# Patient Record
Sex: MALE | Race: WHITE | NOT HISPANIC OR LATINO | Employment: FULL TIME | URBAN - METROPOLITAN AREA
[De-identification: names, ages, dates, MRNs, and addresses within clinical notes are randomized per-mention and may not be internally consistent; named-entity substitution may affect disease eponyms.]

---

## 2018-09-27 LAB — CRC RECOMMENDATION EXT: NORMAL

## 2020-08-11 ENCOUNTER — OFFICE VISIT (OUTPATIENT)
Dept: FAMILY MEDICINE | Facility: CLINIC | Age: 55
End: 2020-08-11
Payer: COMMERCIAL

## 2020-08-11 VITALS
BODY MASS INDEX: 29.83 KG/M2 | HEART RATE: 74 BPM | DIASTOLIC BLOOD PRESSURE: 98 MMHG | TEMPERATURE: 97 F | SYSTOLIC BLOOD PRESSURE: 158 MMHG | HEIGHT: 76 IN | WEIGHT: 245 LBS

## 2020-08-11 DIAGNOSIS — I10 ESSENTIAL HYPERTENSION: ICD-10-CM

## 2020-08-11 DIAGNOSIS — E78.2 MIXED HYPERLIPIDEMIA: Primary | ICD-10-CM

## 2020-08-11 DIAGNOSIS — M72.2 PLANTAR FASCIITIS OF RIGHT FOOT: ICD-10-CM

## 2020-08-11 DIAGNOSIS — E11.9 TYPE 2 DIABETES MELLITUS WITHOUT COMPLICATION, WITHOUT LONG-TERM CURRENT USE OF INSULIN: ICD-10-CM

## 2020-08-11 DIAGNOSIS — Z12.5 SCREENING FOR PROSTATE CANCER: ICD-10-CM

## 2020-08-11 LAB — HBA1C MFR BLD: 8.3 %

## 2020-08-11 PROCEDURE — 83036 HEMOGLOBIN GLYCOSYLATED A1C: CPT | Mod: QW,,, | Performed by: NURSE PRACTITIONER

## 2020-08-11 PROCEDURE — 3008F PR BODY MASS INDEX (BMI) DOCUMENTED: ICD-10-PCS | Mod: S$GLB,,, | Performed by: NURSE PRACTITIONER

## 2020-08-11 PROCEDURE — 83036 POCT HEMOGLOBIN A1C: ICD-10-PCS | Mod: QW,,, | Performed by: NURSE PRACTITIONER

## 2020-08-11 PROCEDURE — 99204 PR OFFICE/OUTPT VISIT, NEW, LEVL IV, 45-59 MIN: ICD-10-PCS | Mod: S$GLB,,, | Performed by: NURSE PRACTITIONER

## 2020-08-11 PROCEDURE — 99204 OFFICE O/P NEW MOD 45 MIN: CPT | Mod: S$GLB,,, | Performed by: NURSE PRACTITIONER

## 2020-08-11 PROCEDURE — 3008F BODY MASS INDEX DOCD: CPT | Mod: S$GLB,,, | Performed by: NURSE PRACTITIONER

## 2020-08-11 RX ORDER — EMPAGLIFLOZIN 25 MG/1
25 TABLET, FILM COATED ORAL DAILY
Qty: 90 TABLET | Refills: 1 | Status: SHIPPED | OUTPATIENT
Start: 2020-08-11 | End: 2021-06-29

## 2020-08-11 RX ORDER — ATORVASTATIN CALCIUM 20 MG/1
20 TABLET, FILM COATED ORAL DAILY
COMMUNITY
End: 2020-08-11 | Stop reason: SDUPTHER

## 2020-08-11 RX ORDER — METFORMIN HYDROCHLORIDE 1000 MG/1
1000 TABLET ORAL 2 TIMES DAILY WITH MEALS
Qty: 180 TABLET | Refills: 1 | Status: SHIPPED | OUTPATIENT
Start: 2020-08-11 | End: 2021-02-18 | Stop reason: SDUPTHER

## 2020-08-11 RX ORDER — SILDENAFIL 100 MG/1
100 TABLET, FILM COATED ORAL DAILY PRN
COMMUNITY
End: 2020-08-11 | Stop reason: SDUPTHER

## 2020-08-11 RX ORDER — EMPAGLIFLOZIN 25 MG/1
25 TABLET, FILM COATED ORAL DAILY
COMMUNITY
End: 2020-08-11 | Stop reason: SDUPTHER

## 2020-08-11 RX ORDER — SILDENAFIL 100 MG/1
100 TABLET, FILM COATED ORAL DAILY PRN
Qty: 30 TABLET | Refills: 0 | Status: SHIPPED | OUTPATIENT
Start: 2020-08-11 | End: 2021-01-04 | Stop reason: SDUPTHER

## 2020-08-11 RX ORDER — ATORVASTATIN CALCIUM 20 MG/1
20 TABLET, FILM COATED ORAL DAILY
Qty: 90 TABLET | Refills: 1 | Status: SHIPPED | OUTPATIENT
Start: 2020-08-11 | End: 2021-02-18 | Stop reason: SDUPTHER

## 2020-08-11 RX ORDER — ENALAPRIL MALEATE AND HYDROCHLOROTHIAZIDE 10; 25 MG/1; MG/1
TABLET ORAL
COMMUNITY
Start: 2020-05-10 | End: 2020-08-11 | Stop reason: SDUPTHER

## 2020-08-11 RX ORDER — ENALAPRIL MALEATE AND HYDROCHLOROTHIAZIDE 10; 25 MG/1; MG/1
1 TABLET ORAL DAILY
Qty: 90 TABLET | Refills: 1 | Status: SHIPPED | OUTPATIENT
Start: 2020-08-11 | End: 2021-02-18 | Stop reason: SDUPTHER

## 2020-08-11 RX ORDER — METFORMIN HYDROCHLORIDE 1000 MG/1
1000 TABLET ORAL 2 TIMES DAILY WITH MEALS
COMMUNITY
End: 2020-08-11 | Stop reason: SDUPTHER

## 2020-08-11 NOTE — PATIENT INSTRUCTIONS
Treating Plantar Fasciitis  First, your healthcare provider tries to determine the cause of your problem in order to suggest ways to relieve pain. If your pain is due to poor foot mechanics, custom-made shoe inserts (orthoses) may help.    Reduce symptoms  · To relieve mild symptoms, try aspirin, ibuprofen, or other medicines as directed. Rubbing ice on the affected area may also help.  · To reduce severe pain and swelling, your healthcare provider may prescribe pills or injections or a walking cast in some instances. Physical therapy, such as ultrasound or a daily stretching program, may also be recommended. Surgery is rarely required.  · To reduce symptoms caused by poor foot mechanics, your foot may be taped. This supports the arch and temporarily controls movement. Night splints may also help by stretching the fascia.  Control movement  If taping helps, your healthcare provider may prescribe orthoses. Built from plaster casts of your feet, these inserts control the way your foot moves. As a result, your symptoms should go away.  Reduce overuse  Every time your foot strikes the ground, the plantar fascia is stretched. You can reduce the strain on the plantar fascia and the possibility of overuse by following these suggestions:  · Lose any excess weight.  · Avoid running on hard or uneven ground.  · Use orthoses at all times in your shoes and house slippers.  If surgery is needed  Your healthcare provider may consider surgery if other types of treatment don't control your pain. During surgery, the plantar fascia is partially cut to release tension. As you heal, fibrous tissue fills the space between the heel bone and the plantar fascia.   Date Last Reviewed: 10/14/2015  © 3924-6115 [employer] [address] [place] [address], [place] [address]. All rights reserved. This information is not intended as a substitute for professional medical care. Always follow your healthcare professional's  instructions.

## 2020-08-11 NOTE — PROGRESS NOTES
SUBJECTIVE:    Patient ID: Osman Li is a 55 y.o. male.    Chief Complaint: Establish Care (brought bottles/list of meds//pt has been out of meds vaseretic and jardiance for 1 month tb )    Pt presents to establish care. Moved to the area last fall. History includes HTN, DMII, and HLD. No known cardiac issues. Currently on Metformin and Jardiance but has been out of Jardiance for over a month. Also has been out of enalapril-HCTZ for over a month. BP elevated this morning. Works full-time and lives with wife. Had colonoscopy in 2018. Needs blood work. Complains of some right foot pain thought to be plantar fasciitis that has developed in the last few weeks. Changed out work boots insoles in hopes of helping.      No results found for any previous visit.       Past Medical History:   Diagnosis Date    Hyperlipidemia     Hypertension     Prediabetes      Past Surgical History:   Procedure Laterality Date    CHOLECYSTECTOMY  2011    COLONOSCOPY      2018     Family History   Problem Relation Age of Onset    Heart disease Father        Marital Status:   Alcohol History:  reports previous alcohol use.  Tobacco History:  reports that he has been smoking. He has a 35.00 pack-year smoking history. He has never used smokeless tobacco.  Drug History:  reports no history of drug use.    Review of patient's allergies indicates:   Allergen Reactions    Penicillins Rash       Current Outpatient Medications:     atorvastatin (LIPITOR) 20 MG tablet, Take 1 tablet (20 mg total) by mouth once daily., Disp: 90 tablet, Rfl: 1    metFORMIN (GLUCOPHAGE) 1000 MG tablet, Take 1 tablet (1,000 mg total) by mouth 2 (two) times daily with meals., Disp: 180 tablet, Rfl: 1    sildenafiL (VIAGRA) 100 MG tablet, Take 1 tablet (100 mg total) by mouth daily as needed for Erectile Dysfunction., Disp: 30 tablet, Rfl: 0    empagliflozin (JARDIANCE) 25 mg tablet, Take 1 tablet (25 mg total) by mouth once daily., Disp: 90  "tablet, Rfl: 1    enalapriL-hydrochlorothiazide (VASERETIC) 10-25 mg per tablet, Take 1 tablet by mouth once daily., Disp: 90 tablet, Rfl: 1    Review of Systems   Constitutional: Negative.    HENT: Negative for congestion, ear pain, sinus pressure, sinus pain, tinnitus and trouble swallowing.         Seasonal allergies. Takes otc antihistamine daily   Eyes: Negative for pain and redness.   Respiratory: Negative for cough, chest tightness, shortness of breath and wheezing.    Cardiovascular: Negative for chest pain and palpitations.   Gastrointestinal: Negative for abdominal pain, nausea and vomiting.   Genitourinary: Negative for dysuria, frequency and urgency.   Musculoskeletal: Negative for arthralgias, back pain and myalgias.   Skin: Negative for rash and wound.   Neurological: Negative for dizziness, weakness, light-headedness and headaches.   Psychiatric/Behavioral: Negative.           Objective:      Vitals:    08/11/20 0950 08/11/20 0952   BP: (!) 160/98 (!) 158/98   Pulse: 74    Temp: 97.3 °F (36.3 °C)    Weight: 111.1 kg (245 lb)    Height: 6' 4" (1.93 m)      Body mass index is 29.82 kg/m².  Physical Exam  Vitals signs reviewed.   Constitutional:       General: He is not in acute distress.     Appearance: Normal appearance. He is well-developed.   HENT:      Head: Normocephalic and atraumatic.      Right Ear: Tympanic membrane normal.      Left Ear: Tympanic membrane normal.      Nose: Nose normal. No nasal deformity or mucosal edema.      Mouth/Throat:      Dentition: No dental caries or dental abscesses.      Pharynx: No oropharyngeal exudate.   Eyes:      General: Lids are normal.      Conjunctiva/sclera: Conjunctivae normal.      Pupils: Pupils are equal, round, and reactive to light.   Neck:      Musculoskeletal: Normal range of motion.      Thyroid: No thyromegaly.      Vascular: No JVD.      Trachea: No tracheal tenderness or tracheal deviation.   Cardiovascular:      Rate and Rhythm: Normal rate " and regular rhythm.      Heart sounds: Normal heart sounds. No murmur.   Pulmonary:      Effort: Pulmonary effort is normal. No accessory muscle usage or respiratory distress.      Breath sounds: Normal breath sounds.   Abdominal:      General: Bowel sounds are normal.      Palpations: Abdomen is soft.      Tenderness: There is no abdominal tenderness.   Musculoskeletal: Normal range of motion.         General: No tenderness.   Lymphadenopathy:      Cervical: No cervical adenopathy.   Skin:     General: Skin is warm and dry.      Capillary Refill: Capillary refill takes less than 2 seconds.      Findings: No bruising, ecchymosis or erythema.   Neurological:      Mental Status: He is alert and oriented to person, place, and time.   Psychiatric:         Mood and Affect: Mood normal.         Behavior: Behavior normal.           Assessment:       1. Mixed hyperlipidemia    2. Essential hypertension    3. Type 2 diabetes mellitus without complication, without long-term current use of insulin    4. Screening for prostate cancer    5. BMI 29.0-29.9,adult    6. Plantar fasciitis of right foot         Plan:       Mixed hyperlipidemia  -     atorvastatin (LIPITOR) 20 MG tablet; Take 1 tablet (20 mg total) by mouth once daily.  Dispense: 90 tablet; Refill: 1  -     Lipid Panel; Future; Expected date: 08/11/2020  -     TSH w/reflex to FT4; Future; Expected date: 08/11/2020    Essential hypertension  -     enalapriL-hydrochlorothiazide (VASERETIC) 10-25 mg per tablet; Take 1 tablet by mouth once daily.  Dispense: 90 tablet; Refill: 1  -     CBC auto differential; Future; Expected date: 08/11/2020  -     Comprehensive metabolic panel; Future; Expected date: 08/11/2020  -     Urinalysis, Reflex to Urine Culture Urine, Clean Catch; Future; Expected date: 08/11/2020    Type 2 diabetes mellitus without complication, without long-term current use of insulin  -     empagliflozin (JARDIANCE) 25 mg tablet; Take 1 tablet (25 mg total) by  mouth once daily.  Dispense: 90 tablet; Refill: 1  -     metFORMIN (GLUCOPHAGE) 1000 MG tablet; Take 1 tablet (1,000 mg total) by mouth 2 (two) times daily with meals.  Dispense: 180 tablet; Refill: 1  -     POCT HEMOGLOBIN A1C    Screening for prostate cancer  -     PSA, Screening; Future; Expected date: 08/11/2020    BMI 29.0-29.9,adult    Plantar fasciitis of right foot  - Consider Podiatry referral if no improvement    Other orders  -     sildenafiL (VIAGRA) 100 MG tablet; Take 1 tablet (100 mg total) by mouth daily as needed for Erectile Dysfunction.  Dispense: 30 tablet; Refill: 0      No follow-ups on file.

## 2020-08-12 LAB
ALBUMIN SERPL-MCNC: 4.9 G/DL (ref 3.6–5.1)
ALBUMIN/GLOB SERPL: 1.8 (CALC) (ref 1–2.5)
ALP SERPL-CCNC: 91 U/L (ref 35–144)
ALT SERPL-CCNC: 16 U/L (ref 9–46)
APPEARANCE UR: CLEAR
AST SERPL-CCNC: 14 U/L (ref 10–35)
BACTERIA #/AREA URNS HPF: ABNORMAL /HPF
BACTERIA UR CULT: ABNORMAL
BASOPHILS # BLD AUTO: 64 CELLS/UL (ref 0–200)
BASOPHILS NFR BLD AUTO: 0.6 %
BILIRUB SERPL-MCNC: 0.8 MG/DL (ref 0.2–1.2)
BILIRUB UR QL STRIP: NEGATIVE
BUN SERPL-MCNC: 21 MG/DL (ref 7–25)
BUN/CREAT SERPL: ABNORMAL (CALC) (ref 6–22)
CALCIUM SERPL-MCNC: 10.3 MG/DL (ref 8.6–10.3)
CHLORIDE SERPL-SCNC: 101 MMOL/L (ref 98–110)
CHOLEST SERPL-MCNC: 145 MG/DL
CHOLEST/HDLC SERPL: 3.9 (CALC)
CO2 SERPL-SCNC: 28 MMOL/L (ref 20–32)
COLOR UR: YELLOW
CREAT SERPL-MCNC: 0.97 MG/DL (ref 0.7–1.33)
EOSINOPHIL # BLD AUTO: 127 CELLS/UL (ref 15–500)
EOSINOPHIL NFR BLD AUTO: 1.2 %
ERYTHROCYTE [DISTWIDTH] IN BLOOD BY AUTOMATED COUNT: 13 % (ref 11–15)
GFRSERPLBLD MDRD-ARVRAT: 87 ML/MIN/1.73M2
GLOBULIN SER CALC-MCNC: 2.7 G/DL (CALC) (ref 1.9–3.7)
GLUCOSE SERPL-MCNC: 152 MG/DL (ref 65–99)
GLUCOSE UR QL STRIP: ABNORMAL
HCT VFR BLD AUTO: 47.5 % (ref 38.5–50)
HDLC SERPL-MCNC: 37 MG/DL
HGB BLD-MCNC: 16.2 G/DL (ref 13.2–17.1)
HGB UR QL STRIP: NEGATIVE
HYALINE CASTS #/AREA URNS LPF: ABNORMAL /LPF
KETONES UR QL STRIP: NEGATIVE
LDLC SERPL CALC-MCNC: 83 MG/DL (CALC)
LEUKOCYTE ESTERASE UR QL STRIP: NEGATIVE
LYMPHOCYTES # BLD AUTO: 3180 CELLS/UL (ref 850–3900)
LYMPHOCYTES NFR BLD AUTO: 30 %
MCH RBC QN AUTO: 30.2 PG (ref 27–33)
MCHC RBC AUTO-ENTMCNC: 34.1 G/DL (ref 32–36)
MCV RBC AUTO: 88.5 FL (ref 80–100)
MONOCYTES # BLD AUTO: 721 CELLS/UL (ref 200–950)
MONOCYTES NFR BLD AUTO: 6.8 %
NEUTROPHILS # BLD AUTO: 6508 CELLS/UL (ref 1500–7800)
NEUTROPHILS NFR BLD AUTO: 61.4 %
NITRITE UR QL STRIP: NEGATIVE
NONHDLC SERPL-MCNC: 108 MG/DL (CALC)
PH UR STRIP: ABNORMAL [PH] (ref 5–8)
PLATELET # BLD AUTO: 324 THOUSAND/UL (ref 140–400)
PMV BLD REES-ECKER: 10.1 FL (ref 7.5–12.5)
POTASSIUM SERPL-SCNC: 4 MMOL/L (ref 3.5–5.3)
PROT SERPL-MCNC: 7.6 G/DL (ref 6.1–8.1)
PROT UR QL STRIP: NEGATIVE
PSA SERPL-MCNC: 0.7 NG/ML
RBC # BLD AUTO: 5.37 MILLION/UL (ref 4.2–5.8)
RBC #/AREA URNS HPF: ABNORMAL /HPF
SODIUM SERPL-SCNC: 138 MMOL/L (ref 135–146)
SP GR UR STRIP: 1.02 (ref 1–1.03)
SQUAMOUS #/AREA URNS HPF: ABNORMAL /HPF
TRIGL SERPL-MCNC: 145 MG/DL
TSH SERPL-ACNC: 1.72 MIU/L (ref 0.4–4.5)
WBC # BLD AUTO: 10.6 THOUSAND/UL (ref 3.8–10.8)
WBC #/AREA URNS HPF: ABNORMAL /HPF

## 2020-08-14 ENCOUNTER — TELEPHONE (OUTPATIENT)
Dept: FAMILY MEDICINE | Facility: CLINIC | Age: 55
End: 2020-08-14

## 2020-08-14 NOTE — TELEPHONE ENCOUNTER
----- Message from Kathy English NP sent at 8/13/2020  4:22 PM CDT -----  Please let pt know all of his lab work looks great.

## 2021-01-04 RX ORDER — SILDENAFIL 100 MG/1
100 TABLET, FILM COATED ORAL DAILY PRN
Qty: 30 TABLET | Refills: 0 | Status: SHIPPED | OUTPATIENT
Start: 2021-01-04 | End: 2021-02-18 | Stop reason: SDUPTHER

## 2021-01-26 ENCOUNTER — TELEPHONE (OUTPATIENT)
Dept: FAMILY MEDICINE | Facility: CLINIC | Age: 56
End: 2021-01-26

## 2021-02-05 ENCOUNTER — TELEPHONE (OUTPATIENT)
Dept: FAMILY MEDICINE | Facility: CLINIC | Age: 56
End: 2021-02-05

## 2021-02-05 DIAGNOSIS — Z00.00 ROUTINE GENERAL MEDICAL EXAMINATION AT A HEALTH CARE FACILITY: Primary | ICD-10-CM

## 2021-02-05 DIAGNOSIS — E11.9 TYPE 2 DIABETES MELLITUS WITHOUT COMPLICATION, WITHOUT LONG-TERM CURRENT USE OF INSULIN: ICD-10-CM

## 2021-02-05 DIAGNOSIS — I10 ESSENTIAL HYPERTENSION: ICD-10-CM

## 2021-02-05 DIAGNOSIS — Z79.899 ENCOUNTER FOR LONG-TERM (CURRENT) USE OF OTHER MEDICATIONS: ICD-10-CM

## 2021-02-05 DIAGNOSIS — E78.2 MIXED HYPERLIPIDEMIA: ICD-10-CM

## 2021-02-18 ENCOUNTER — OFFICE VISIT (OUTPATIENT)
Dept: FAMILY MEDICINE | Facility: CLINIC | Age: 56
End: 2021-02-18
Payer: COMMERCIAL

## 2021-02-18 VITALS
BODY MASS INDEX: 29.96 KG/M2 | SYSTOLIC BLOOD PRESSURE: 172 MMHG | HEIGHT: 76 IN | HEART RATE: 80 BPM | WEIGHT: 246 LBS | DIASTOLIC BLOOD PRESSURE: 94 MMHG

## 2021-02-18 DIAGNOSIS — I10 ESSENTIAL HYPERTENSION: ICD-10-CM

## 2021-02-18 DIAGNOSIS — N52.9 ERECTILE DYSFUNCTION, UNSPECIFIED ERECTILE DYSFUNCTION TYPE: ICD-10-CM

## 2021-02-18 DIAGNOSIS — E11.9 TYPE 2 DIABETES MELLITUS WITHOUT COMPLICATION, WITHOUT LONG-TERM CURRENT USE OF INSULIN: Primary | ICD-10-CM

## 2021-02-18 DIAGNOSIS — E78.2 MIXED HYPERLIPIDEMIA: ICD-10-CM

## 2021-02-18 LAB — HBA1C MFR BLD: 8.1 %

## 2021-02-18 PROCEDURE — 99214 OFFICE O/P EST MOD 30 MIN: CPT | Mod: S$GLB,,, | Performed by: NURSE PRACTITIONER

## 2021-02-18 PROCEDURE — 3052F HG A1C>EQUAL 8.0%<EQUAL 9.0%: CPT | Mod: S$GLB,,, | Performed by: NURSE PRACTITIONER

## 2021-02-18 PROCEDURE — 3077F SYST BP >= 140 MM HG: CPT | Mod: S$GLB,,, | Performed by: NURSE PRACTITIONER

## 2021-02-18 PROCEDURE — 83036 HEMOGLOBIN GLYCOSYLATED A1C: CPT | Mod: QW,,, | Performed by: NURSE PRACTITIONER

## 2021-02-18 PROCEDURE — 3052F PR MOST RECENT HEMOGLOBIN A1C LEVEL 8.0 - < 9.0%: ICD-10-PCS | Mod: S$GLB,,, | Performed by: NURSE PRACTITIONER

## 2021-02-18 PROCEDURE — 83036 POCT HEMOGLOBIN A1C: ICD-10-PCS | Mod: QW,,, | Performed by: NURSE PRACTITIONER

## 2021-02-18 PROCEDURE — 3008F PR BODY MASS INDEX (BMI) DOCUMENTED: ICD-10-PCS | Mod: S$GLB,,, | Performed by: NURSE PRACTITIONER

## 2021-02-18 PROCEDURE — 3077F PR MOST RECENT SYSTOLIC BLOOD PRESSURE >= 140 MM HG: ICD-10-PCS | Mod: S$GLB,,, | Performed by: NURSE PRACTITIONER

## 2021-02-18 PROCEDURE — 99214 PR OFFICE/OUTPT VISIT, EST, LEVL IV, 30-39 MIN: ICD-10-PCS | Mod: S$GLB,,, | Performed by: NURSE PRACTITIONER

## 2021-02-18 PROCEDURE — 3080F PR MOST RECENT DIASTOLIC BLOOD PRESSURE >= 90 MM HG: ICD-10-PCS | Mod: S$GLB,,, | Performed by: NURSE PRACTITIONER

## 2021-02-18 PROCEDURE — 3008F BODY MASS INDEX DOCD: CPT | Mod: S$GLB,,, | Performed by: NURSE PRACTITIONER

## 2021-02-18 PROCEDURE — 3080F DIAST BP >= 90 MM HG: CPT | Mod: S$GLB,,, | Performed by: NURSE PRACTITIONER

## 2021-02-18 RX ORDER — METFORMIN HYDROCHLORIDE 1000 MG/1
1000 TABLET ORAL 2 TIMES DAILY WITH MEALS
Qty: 180 TABLET | Refills: 1 | Status: SHIPPED | OUTPATIENT
Start: 2021-02-18 | End: 2021-06-29 | Stop reason: SDUPTHER

## 2021-02-18 RX ORDER — ENALAPRIL MALEATE AND HYDROCHLOROTHIAZIDE 10; 25 MG/1; MG/1
1 TABLET ORAL DAILY
Qty: 90 TABLET | Refills: 1 | Status: SHIPPED | OUTPATIENT
Start: 2021-02-18 | End: 2021-06-29 | Stop reason: SDUPTHER

## 2021-02-18 RX ORDER — SILDENAFIL 100 MG/1
100 TABLET, FILM COATED ORAL DAILY PRN
Qty: 30 TABLET | Refills: 0 | Status: SHIPPED | OUTPATIENT
Start: 2021-02-18 | End: 2021-10-25 | Stop reason: SDUPTHER

## 2021-02-18 RX ORDER — ATORVASTATIN CALCIUM 20 MG/1
20 TABLET, FILM COATED ORAL DAILY
Qty: 90 TABLET | Refills: 1 | Status: SHIPPED | OUTPATIENT
Start: 2021-02-18 | End: 2021-06-29 | Stop reason: SDUPTHER

## 2021-02-19 LAB
ALBUMIN SERPL-MCNC: 4.9 G/DL (ref 3.6–5.1)
ALBUMIN/CREAT UR: 11 MCG/MG CREAT
ALBUMIN/GLOB SERPL: 1.8 (CALC) (ref 1–2.5)
ALP SERPL-CCNC: 78 U/L (ref 35–144)
ALT SERPL-CCNC: 20 U/L (ref 9–46)
APPEARANCE UR: CLEAR
AST SERPL-CCNC: 15 U/L (ref 10–35)
BACTERIA #/AREA URNS HPF: ABNORMAL /HPF
BACTERIA UR CULT: ABNORMAL
BASOPHILS # BLD AUTO: 50 CELLS/UL (ref 0–200)
BASOPHILS NFR BLD AUTO: 0.5 %
BILIRUB SERPL-MCNC: 0.6 MG/DL (ref 0.2–1.2)
BILIRUB UR QL STRIP: NEGATIVE
BUN SERPL-MCNC: 16 MG/DL (ref 7–25)
BUN/CREAT SERPL: ABNORMAL (CALC) (ref 6–22)
CALCIUM SERPL-MCNC: 10.6 MG/DL (ref 8.6–10.3)
CHLORIDE SERPL-SCNC: 99 MMOL/L (ref 98–110)
CHOLEST SERPL-MCNC: 184 MG/DL
CHOLEST/HDLC SERPL: 5.3 (CALC)
CO2 SERPL-SCNC: 28 MMOL/L (ref 20–32)
COLOR UR: YELLOW
CREAT SERPL-MCNC: 0.86 MG/DL (ref 0.7–1.33)
CREAT UR-MCNC: 27 MG/DL (ref 20–320)
EOSINOPHIL # BLD AUTO: 120 CELLS/UL (ref 15–500)
EOSINOPHIL NFR BLD AUTO: 1.2 %
ERYTHROCYTE [DISTWIDTH] IN BLOOD BY AUTOMATED COUNT: 12.2 % (ref 11–15)
GFRSERPLBLD MDRD-ARVRAT: 97 ML/MIN/1.73M2
GLOBULIN SER CALC-MCNC: 2.8 G/DL (CALC) (ref 1.9–3.7)
GLUCOSE SERPL-MCNC: 192 MG/DL (ref 65–99)
GLUCOSE UR QL STRIP: ABNORMAL
HBA1C MFR BLD: 8 % OF TOTAL HGB
HCT VFR BLD AUTO: 51.3 % (ref 38.5–50)
HDLC SERPL-MCNC: 35 MG/DL
HGB BLD-MCNC: 17.9 G/DL (ref 13.2–17.1)
HGB UR QL STRIP: NEGATIVE
HYALINE CASTS #/AREA URNS LPF: ABNORMAL /LPF
KETONES UR QL STRIP: NEGATIVE
LDLC SERPL CALC-MCNC: 123 MG/DL (CALC)
LEUKOCYTE ESTERASE UR QL STRIP: NEGATIVE
LYMPHOCYTES # BLD AUTO: 3250 CELLS/UL (ref 850–3900)
LYMPHOCYTES NFR BLD AUTO: 32.5 %
MCH RBC QN AUTO: 30.1 PG (ref 27–33)
MCHC RBC AUTO-ENTMCNC: 34.9 G/DL (ref 32–36)
MCV RBC AUTO: 86.4 FL (ref 80–100)
MICROALBUMIN UR-MCNC: 0.3 MG/DL
MONOCYTES # BLD AUTO: 670 CELLS/UL (ref 200–950)
MONOCYTES NFR BLD AUTO: 6.7 %
NEUTROPHILS # BLD AUTO: 5910 CELLS/UL (ref 1500–7800)
NEUTROPHILS NFR BLD AUTO: 59.1 %
NITRITE UR QL STRIP: NEGATIVE
NONHDLC SERPL-MCNC: 149 MG/DL (CALC)
PH UR STRIP: 6.5 [PH] (ref 5–8)
PLATELET # BLD AUTO: 334 THOUSAND/UL (ref 140–400)
PMV BLD REES-ECKER: 9.7 FL (ref 7.5–12.5)
POTASSIUM SERPL-SCNC: 4.3 MMOL/L (ref 3.5–5.3)
PROT SERPL-MCNC: 7.7 G/DL (ref 6.1–8.1)
PROT UR QL STRIP: NEGATIVE
RBC # BLD AUTO: 5.94 MILLION/UL (ref 4.2–5.8)
RBC #/AREA URNS HPF: ABNORMAL /HPF
SODIUM SERPL-SCNC: 138 MMOL/L (ref 135–146)
SP GR UR STRIP: 1.01 (ref 1–1.03)
SQUAMOUS #/AREA URNS HPF: ABNORMAL /HPF
TRIGL SERPL-MCNC: 150 MG/DL
WBC # BLD AUTO: 10 THOUSAND/UL (ref 3.8–10.8)
WBC #/AREA URNS HPF: ABNORMAL /HPF

## 2021-03-25 ENCOUNTER — OFFICE VISIT (OUTPATIENT)
Dept: FAMILY MEDICINE | Facility: CLINIC | Age: 56
End: 2021-03-25
Payer: COMMERCIAL

## 2021-03-25 VITALS
WEIGHT: 238 LBS | HEIGHT: 76 IN | DIASTOLIC BLOOD PRESSURE: 90 MMHG | SYSTOLIC BLOOD PRESSURE: 150 MMHG | BODY MASS INDEX: 28.98 KG/M2 | HEART RATE: 80 BPM

## 2021-03-25 DIAGNOSIS — E11.9 TYPE 2 DIABETES MELLITUS WITHOUT COMPLICATION, WITHOUT LONG-TERM CURRENT USE OF INSULIN: Primary | ICD-10-CM

## 2021-03-25 LAB — HBA1C MFR BLD: 7.7 %

## 2021-03-25 PROCEDURE — 3077F PR MOST RECENT SYSTOLIC BLOOD PRESSURE >= 140 MM HG: ICD-10-PCS | Mod: S$GLB,,, | Performed by: NURSE PRACTITIONER

## 2021-03-25 PROCEDURE — 3078F DIAST BP <80 MM HG: CPT | Mod: S$GLB,,, | Performed by: NURSE PRACTITIONER

## 2021-03-25 PROCEDURE — 83036 POCT HEMOGLOBIN A1C: ICD-10-PCS | Mod: QW,,, | Performed by: NURSE PRACTITIONER

## 2021-03-25 PROCEDURE — 3051F PR MOST RECENT HEMOGLOBIN A1C LEVEL 7.0 - < 8.0%: ICD-10-PCS | Mod: S$GLB,,, | Performed by: NURSE PRACTITIONER

## 2021-03-25 PROCEDURE — 3077F SYST BP >= 140 MM HG: CPT | Mod: S$GLB,,, | Performed by: NURSE PRACTITIONER

## 2021-03-25 PROCEDURE — 3051F HG A1C>EQUAL 7.0%<8.0%: CPT | Mod: S$GLB,,, | Performed by: NURSE PRACTITIONER

## 2021-03-25 PROCEDURE — 3008F BODY MASS INDEX DOCD: CPT | Mod: S$GLB,,, | Performed by: NURSE PRACTITIONER

## 2021-03-25 PROCEDURE — 83036 HEMOGLOBIN GLYCOSYLATED A1C: CPT | Mod: QW,,, | Performed by: NURSE PRACTITIONER

## 2021-03-25 PROCEDURE — 3008F PR BODY MASS INDEX (BMI) DOCUMENTED: ICD-10-PCS | Mod: S$GLB,,, | Performed by: NURSE PRACTITIONER

## 2021-03-25 PROCEDURE — 99213 OFFICE O/P EST LOW 20 MIN: CPT | Mod: S$GLB,,, | Performed by: NURSE PRACTITIONER

## 2021-03-25 PROCEDURE — 3078F PR MOST RECENT DIASTOLIC BLOOD PRESSURE < 80 MM HG: ICD-10-PCS | Mod: S$GLB,,, | Performed by: NURSE PRACTITIONER

## 2021-03-25 PROCEDURE — 99213 PR OFFICE/OUTPT VISIT, EST, LEVL III, 20-29 MIN: ICD-10-PCS | Mod: S$GLB,,, | Performed by: NURSE PRACTITIONER

## 2021-03-25 RX ORDER — BLOOD SUGAR DIAGNOSTIC
1 STRIP MISCELLANEOUS WEEKLY
Qty: 50 EACH | Refills: 1 | Status: SHIPPED | OUTPATIENT
Start: 2021-03-25 | End: 2021-06-29 | Stop reason: SDUPTHER

## 2021-06-29 ENCOUNTER — OFFICE VISIT (OUTPATIENT)
Dept: FAMILY MEDICINE | Facility: CLINIC | Age: 56
End: 2021-06-29
Payer: COMMERCIAL

## 2021-06-29 VITALS
BODY MASS INDEX: 29.47 KG/M2 | HEIGHT: 76 IN | SYSTOLIC BLOOD PRESSURE: 132 MMHG | HEART RATE: 88 BPM | DIASTOLIC BLOOD PRESSURE: 80 MMHG | WEIGHT: 242 LBS

## 2021-06-29 DIAGNOSIS — E78.2 MIXED HYPERLIPIDEMIA: ICD-10-CM

## 2021-06-29 DIAGNOSIS — I10 ESSENTIAL HYPERTENSION: ICD-10-CM

## 2021-06-29 DIAGNOSIS — E11.9 TYPE 2 DIABETES MELLITUS WITHOUT COMPLICATION, WITHOUT LONG-TERM CURRENT USE OF INSULIN: Primary | ICD-10-CM

## 2021-06-29 LAB — HBA1C MFR BLD: 7.3 %

## 2021-06-29 PROCEDURE — 3075F PR MOST RECENT SYSTOLIC BLOOD PRESS GE 130-139MM HG: ICD-10-PCS | Mod: S$GLB,,, | Performed by: NURSE PRACTITIONER

## 2021-06-29 PROCEDURE — 3008F BODY MASS INDEX DOCD: CPT | Mod: S$GLB,,, | Performed by: NURSE PRACTITIONER

## 2021-06-29 PROCEDURE — 3079F PR MOST RECENT DIASTOLIC BLOOD PRESSURE 80-89 MM HG: ICD-10-PCS | Mod: S$GLB,,, | Performed by: NURSE PRACTITIONER

## 2021-06-29 PROCEDURE — 3051F PR MOST RECENT HEMOGLOBIN A1C LEVEL 7.0 - < 8.0%: ICD-10-PCS | Mod: S$GLB,,, | Performed by: NURSE PRACTITIONER

## 2021-06-29 PROCEDURE — 3075F SYST BP GE 130 - 139MM HG: CPT | Mod: S$GLB,,, | Performed by: NURSE PRACTITIONER

## 2021-06-29 PROCEDURE — 99214 PR OFFICE/OUTPT VISIT, EST, LEVL IV, 30-39 MIN: ICD-10-PCS | Mod: S$GLB,,, | Performed by: NURSE PRACTITIONER

## 2021-06-29 PROCEDURE — 83036 POCT HEMOGLOBIN A1C: ICD-10-PCS | Mod: QW,,, | Performed by: NURSE PRACTITIONER

## 2021-06-29 PROCEDURE — 3051F HG A1C>EQUAL 7.0%<8.0%: CPT | Mod: S$GLB,,, | Performed by: NURSE PRACTITIONER

## 2021-06-29 PROCEDURE — 99214 OFFICE O/P EST MOD 30 MIN: CPT | Mod: S$GLB,,, | Performed by: NURSE PRACTITIONER

## 2021-06-29 PROCEDURE — 83036 HEMOGLOBIN GLYCOSYLATED A1C: CPT | Mod: QW,,, | Performed by: NURSE PRACTITIONER

## 2021-06-29 PROCEDURE — 3079F DIAST BP 80-89 MM HG: CPT | Mod: S$GLB,,, | Performed by: NURSE PRACTITIONER

## 2021-06-29 PROCEDURE — 3008F PR BODY MASS INDEX (BMI) DOCUMENTED: ICD-10-PCS | Mod: S$GLB,,, | Performed by: NURSE PRACTITIONER

## 2021-06-29 RX ORDER — METFORMIN HYDROCHLORIDE 1000 MG/1
1000 TABLET ORAL 2 TIMES DAILY WITH MEALS
Qty: 180 TABLET | Refills: 1 | Status: SHIPPED | OUTPATIENT
Start: 2021-06-29 | End: 2022-01-31 | Stop reason: SDUPTHER

## 2021-06-29 RX ORDER — ENALAPRIL MALEATE AND HYDROCHLOROTHIAZIDE 10; 25 MG/1; MG/1
1 TABLET ORAL DAILY
Qty: 90 TABLET | Refills: 1 | Status: SHIPPED | OUTPATIENT
Start: 2021-06-29 | End: 2022-01-31 | Stop reason: SDUPTHER

## 2021-06-29 RX ORDER — ATORVASTATIN CALCIUM 20 MG/1
20 TABLET, FILM COATED ORAL DAILY
Qty: 90 TABLET | Refills: 1 | Status: SHIPPED | OUTPATIENT
Start: 2021-06-29 | End: 2022-01-31 | Stop reason: SDUPTHER

## 2021-06-29 RX ORDER — BLOOD SUGAR DIAGNOSTIC
1 STRIP MISCELLANEOUS WEEKLY
Qty: 50 EACH | Refills: 1 | Status: SHIPPED | OUTPATIENT
Start: 2021-06-29 | End: 2022-01-31 | Stop reason: SDUPTHER

## 2021-10-14 ENCOUNTER — TELEPHONE (OUTPATIENT)
Dept: FAMILY MEDICINE | Facility: CLINIC | Age: 56
End: 2021-10-14

## 2021-10-14 DIAGNOSIS — Z79.899 ENCOUNTER FOR LONG-TERM (CURRENT) USE OF OTHER MEDICATIONS: Primary | ICD-10-CM

## 2021-10-14 DIAGNOSIS — Z00.00 ROUTINE GENERAL MEDICAL EXAMINATION AT A HEALTH CARE FACILITY: ICD-10-CM

## 2021-10-20 LAB
ALBUMIN SERPL-MCNC: 4.5 G/DL (ref 3.6–5.1)
ALBUMIN/GLOB SERPL: 1.7 (CALC) (ref 1–2.5)
ALP SERPL-CCNC: 83 U/L (ref 35–144)
ALT SERPL-CCNC: 9 U/L (ref 9–46)
AST SERPL-CCNC: 12 U/L (ref 10–35)
BILIRUB SERPL-MCNC: 0.6 MG/DL (ref 0.2–1.2)
BUN SERPL-MCNC: 13 MG/DL (ref 7–25)
BUN/CREAT SERPL: ABNORMAL (CALC) (ref 6–22)
CALCIUM SERPL-MCNC: 9.9 MG/DL (ref 8.6–10.3)
CHLORIDE SERPL-SCNC: 98 MMOL/L (ref 98–110)
CHOLEST SERPL-MCNC: 134 MG/DL
CHOLEST/HDLC SERPL: 3.7 (CALC)
CO2 SERPL-SCNC: 28 MMOL/L (ref 20–32)
CREAT SERPL-MCNC: 0.97 MG/DL (ref 0.7–1.33)
GLOBULIN SER CALC-MCNC: 2.7 G/DL (CALC) (ref 1.9–3.7)
GLUCOSE SERPL-MCNC: 167 MG/DL (ref 65–99)
HBA1C MFR BLD: 8.2 % OF TOTAL HGB
HDLC SERPL-MCNC: 36 MG/DL
LDLC SERPL CALC-MCNC: 77 MG/DL (CALC)
NONHDLC SERPL-MCNC: 98 MG/DL (CALC)
POTASSIUM SERPL-SCNC: 4.3 MMOL/L (ref 3.5–5.3)
PROT SERPL-MCNC: 7.2 G/DL (ref 6.1–8.1)
SODIUM SERPL-SCNC: 137 MMOL/L (ref 135–146)
TRIGL SERPL-MCNC: 120 MG/DL

## 2021-10-25 ENCOUNTER — OFFICE VISIT (OUTPATIENT)
Dept: FAMILY MEDICINE | Facility: CLINIC | Age: 56
End: 2021-10-25
Payer: COMMERCIAL

## 2021-10-25 VITALS
SYSTOLIC BLOOD PRESSURE: 136 MMHG | BODY MASS INDEX: 29.08 KG/M2 | HEIGHT: 76 IN | HEART RATE: 84 BPM | DIASTOLIC BLOOD PRESSURE: 78 MMHG | WEIGHT: 238.81 LBS

## 2021-10-25 DIAGNOSIS — N52.9 ERECTILE DYSFUNCTION, UNSPECIFIED ERECTILE DYSFUNCTION TYPE: ICD-10-CM

## 2021-10-25 DIAGNOSIS — E11.9 TYPE 2 DIABETES MELLITUS WITHOUT COMPLICATION, WITHOUT LONG-TERM CURRENT USE OF INSULIN: Primary | ICD-10-CM

## 2021-10-25 DIAGNOSIS — I10 ESSENTIAL HYPERTENSION: ICD-10-CM

## 2021-10-25 PROCEDURE — 3066F NEPHROPATHY DOC TX: CPT | Mod: S$GLB,,, | Performed by: PHYSICIAN ASSISTANT

## 2021-10-25 PROCEDURE — 3008F BODY MASS INDEX DOCD: CPT | Mod: S$GLB,,, | Performed by: PHYSICIAN ASSISTANT

## 2021-10-25 PROCEDURE — 3052F HG A1C>EQUAL 8.0%<EQUAL 9.0%: CPT | Mod: S$GLB,,, | Performed by: PHYSICIAN ASSISTANT

## 2021-10-25 PROCEDURE — 99214 PR OFFICE/OUTPT VISIT, EST, LEVL IV, 30-39 MIN: ICD-10-PCS | Mod: S$GLB,,, | Performed by: PHYSICIAN ASSISTANT

## 2021-10-25 PROCEDURE — 3061F NEG MICROALBUMINURIA REV: CPT | Mod: S$GLB,,, | Performed by: PHYSICIAN ASSISTANT

## 2021-10-25 PROCEDURE — 3078F DIAST BP <80 MM HG: CPT | Mod: S$GLB,,, | Performed by: PHYSICIAN ASSISTANT

## 2021-10-25 PROCEDURE — 3052F PR MOST RECENT HEMOGLOBIN A1C LEVEL 8.0 - < 9.0%: ICD-10-PCS | Mod: S$GLB,,, | Performed by: PHYSICIAN ASSISTANT

## 2021-10-25 PROCEDURE — 3061F PR NEG MICROALBUMINURIA RESULT DOCUMENTED/REVIEW: ICD-10-PCS | Mod: S$GLB,,, | Performed by: PHYSICIAN ASSISTANT

## 2021-10-25 PROCEDURE — 99214 OFFICE O/P EST MOD 30 MIN: CPT | Mod: S$GLB,,, | Performed by: PHYSICIAN ASSISTANT

## 2021-10-25 PROCEDURE — 4010F ACE/ARB THERAPY RXD/TAKEN: CPT | Mod: S$GLB,,, | Performed by: PHYSICIAN ASSISTANT

## 2021-10-25 PROCEDURE — 4010F PR ACE/ARB THEARPY RXD/TAKEN: ICD-10-PCS | Mod: S$GLB,,, | Performed by: PHYSICIAN ASSISTANT

## 2021-10-25 PROCEDURE — 3066F PR DOCUMENTATION OF TREATMENT FOR NEPHROPATHY: ICD-10-PCS | Mod: S$GLB,,, | Performed by: PHYSICIAN ASSISTANT

## 2021-10-25 PROCEDURE — 1160F RVW MEDS BY RX/DR IN RCRD: CPT | Mod: S$GLB,,, | Performed by: PHYSICIAN ASSISTANT

## 2021-10-25 PROCEDURE — 3075F PR MOST RECENT SYSTOLIC BLOOD PRESS GE 130-139MM HG: ICD-10-PCS | Mod: S$GLB,,, | Performed by: PHYSICIAN ASSISTANT

## 2021-10-25 PROCEDURE — 1160F PR REVIEW ALL MEDS BY PRESCRIBER/CLIN PHARMACIST DOCUMENTED: ICD-10-PCS | Mod: S$GLB,,, | Performed by: PHYSICIAN ASSISTANT

## 2021-10-25 PROCEDURE — 3075F SYST BP GE 130 - 139MM HG: CPT | Mod: S$GLB,,, | Performed by: PHYSICIAN ASSISTANT

## 2021-10-25 PROCEDURE — 3008F PR BODY MASS INDEX (BMI) DOCUMENTED: ICD-10-PCS | Mod: S$GLB,,, | Performed by: PHYSICIAN ASSISTANT

## 2021-10-25 PROCEDURE — 3078F PR MOST RECENT DIASTOLIC BLOOD PRESSURE < 80 MM HG: ICD-10-PCS | Mod: S$GLB,,, | Performed by: PHYSICIAN ASSISTANT

## 2021-10-25 RX ORDER — SILDENAFIL 100 MG/1
100 TABLET, FILM COATED ORAL DAILY PRN
Qty: 30 TABLET | Refills: 0 | Status: SHIPPED | OUTPATIENT
Start: 2021-10-25 | End: 2022-01-31 | Stop reason: SDUPTHER

## 2022-01-31 ENCOUNTER — TELEPHONE (OUTPATIENT)
Dept: FAMILY MEDICINE | Facility: CLINIC | Age: 57
End: 2022-01-31
Payer: COMMERCIAL

## 2022-01-31 ENCOUNTER — OFFICE VISIT (OUTPATIENT)
Dept: FAMILY MEDICINE | Facility: CLINIC | Age: 57
End: 2022-01-31
Payer: COMMERCIAL

## 2022-01-31 VITALS
BODY MASS INDEX: 29.71 KG/M2 | DIASTOLIC BLOOD PRESSURE: 82 MMHG | HEART RATE: 80 BPM | SYSTOLIC BLOOD PRESSURE: 138 MMHG | HEIGHT: 76 IN | WEIGHT: 244 LBS

## 2022-01-31 DIAGNOSIS — Z00.00 GENERAL MEDICAL EXAM: Primary | ICD-10-CM

## 2022-01-31 DIAGNOSIS — Z53.20 LUNG CANCER SCREENING DECLINED BY PATIENT: ICD-10-CM

## 2022-01-31 DIAGNOSIS — N52.9 ERECTILE DYSFUNCTION, UNSPECIFIED ERECTILE DYSFUNCTION TYPE: ICD-10-CM

## 2022-01-31 DIAGNOSIS — E11.9 TYPE 2 DIABETES MELLITUS WITHOUT COMPLICATION, WITHOUT LONG-TERM CURRENT USE OF INSULIN: ICD-10-CM

## 2022-01-31 DIAGNOSIS — Z28.21 INFLUENZA VACCINE REFUSED: ICD-10-CM

## 2022-01-31 DIAGNOSIS — E78.2 MIXED HYPERLIPIDEMIA: ICD-10-CM

## 2022-01-31 DIAGNOSIS — I10 ESSENTIAL HYPERTENSION: ICD-10-CM

## 2022-01-31 LAB — HBA1C MFR BLD: 7.2 %

## 2022-01-31 PROCEDURE — 3075F PR MOST RECENT SYSTOLIC BLOOD PRESS GE 130-139MM HG: ICD-10-PCS | Mod: S$GLB,,, | Performed by: NURSE PRACTITIONER

## 2022-01-31 PROCEDURE — 3008F PR BODY MASS INDEX (BMI) DOCUMENTED: ICD-10-PCS | Mod: S$GLB,,, | Performed by: NURSE PRACTITIONER

## 2022-01-31 PROCEDURE — 1160F RVW MEDS BY RX/DR IN RCRD: CPT | Mod: S$GLB,,, | Performed by: NURSE PRACTITIONER

## 2022-01-31 PROCEDURE — 99396 PR PREVENTIVE VISIT,EST,40-64: ICD-10-PCS | Mod: S$GLB,,, | Performed by: NURSE PRACTITIONER

## 2022-01-31 PROCEDURE — 4010F PR ACE/ARB THEARPY RXD/TAKEN: ICD-10-PCS | Mod: S$GLB,,, | Performed by: NURSE PRACTITIONER

## 2022-01-31 PROCEDURE — 3051F PR MOST RECENT HEMOGLOBIN A1C LEVEL 7.0 - < 8.0%: ICD-10-PCS | Mod: S$GLB,,, | Performed by: NURSE PRACTITIONER

## 2022-01-31 PROCEDURE — 83036 POCT HEMOGLOBIN A1C: ICD-10-PCS | Mod: QW,,, | Performed by: NURSE PRACTITIONER

## 2022-01-31 PROCEDURE — 3079F DIAST BP 80-89 MM HG: CPT | Mod: S$GLB,,, | Performed by: NURSE PRACTITIONER

## 2022-01-31 PROCEDURE — 1160F PR REVIEW ALL MEDS BY PRESCRIBER/CLIN PHARMACIST DOCUMENTED: ICD-10-PCS | Mod: S$GLB,,, | Performed by: NURSE PRACTITIONER

## 2022-01-31 PROCEDURE — 99396 PREV VISIT EST AGE 40-64: CPT | Mod: S$GLB,,, | Performed by: NURSE PRACTITIONER

## 2022-01-31 PROCEDURE — 83036 HEMOGLOBIN GLYCOSYLATED A1C: CPT | Mod: QW,,, | Performed by: NURSE PRACTITIONER

## 2022-01-31 PROCEDURE — 3051F HG A1C>EQUAL 7.0%<8.0%: CPT | Mod: S$GLB,,, | Performed by: NURSE PRACTITIONER

## 2022-01-31 PROCEDURE — 3075F SYST BP GE 130 - 139MM HG: CPT | Mod: S$GLB,,, | Performed by: NURSE PRACTITIONER

## 2022-01-31 PROCEDURE — 3008F BODY MASS INDEX DOCD: CPT | Mod: S$GLB,,, | Performed by: NURSE PRACTITIONER

## 2022-01-31 PROCEDURE — 3079F PR MOST RECENT DIASTOLIC BLOOD PRESSURE 80-89 MM HG: ICD-10-PCS | Mod: S$GLB,,, | Performed by: NURSE PRACTITIONER

## 2022-01-31 PROCEDURE — 4010F ACE/ARB THERAPY RXD/TAKEN: CPT | Mod: S$GLB,,, | Performed by: NURSE PRACTITIONER

## 2022-01-31 RX ORDER — SILDENAFIL 100 MG/1
100 TABLET, FILM COATED ORAL DAILY PRN
Qty: 30 TABLET | Refills: 1 | Status: SHIPPED | OUTPATIENT
Start: 2022-01-31 | End: 2023-10-24 | Stop reason: SDUPTHER

## 2022-01-31 RX ORDER — ATORVASTATIN CALCIUM 20 MG/1
20 TABLET, FILM COATED ORAL DAILY
Qty: 90 TABLET | Refills: 1 | Status: SHIPPED | OUTPATIENT
Start: 2022-01-31 | End: 2022-08-09 | Stop reason: SDUPTHER

## 2022-01-31 RX ORDER — BLOOD SUGAR DIAGNOSTIC
1 STRIP MISCELLANEOUS WEEKLY
Qty: 50 EACH | Refills: 1 | Status: SHIPPED | OUTPATIENT
Start: 2022-01-31 | End: 2022-07-07

## 2022-01-31 RX ORDER — ENALAPRIL MALEATE AND HYDROCHLOROTHIAZIDE 10; 25 MG/1; MG/1
1 TABLET ORAL DAILY
Qty: 90 TABLET | Refills: 1 | Status: SHIPPED | OUTPATIENT
Start: 2022-01-31 | End: 2022-08-09 | Stop reason: SDUPTHER

## 2022-01-31 RX ORDER — METFORMIN HYDROCHLORIDE 1000 MG/1
1000 TABLET ORAL 2 TIMES DAILY WITH MEALS
Qty: 180 TABLET | Refills: 1 | Status: SHIPPED | OUTPATIENT
Start: 2022-01-31 | End: 2022-08-09 | Stop reason: SDUPTHER

## 2022-01-31 NOTE — TELEPHONE ENCOUNTER
Please call pt and let him know that I doubled checked the Ozempic and the increased dose isn't approved yet for diabetes. So I can either add another medication or he can continue with dietary modification. His A1C is so close to goal that he could really do it without additional medications but if he doesn't think he can sustain dietary changes, I can add another oral.

## 2022-01-31 NOTE — TELEPHONE ENCOUNTER
Spoke to pt verbatim per Kathy. Pt states understanding. Pt wants to wait on medications. He would like to try diet modifications. Kathy aware.

## 2022-03-25 LAB
LEFT EYE DM RETINOPATHY: NORMAL
RIGHT EYE DM RETINOPATHY: NORMAL

## 2022-04-24 ENCOUNTER — HOSPITAL ENCOUNTER (INPATIENT)
Facility: HOSPITAL | Age: 57
LOS: 7 days | Discharge: HOME | DRG: 330 | End: 2022-05-02
Attending: EMERGENCY MEDICINE | Admitting: INTERNAL MEDICINE
Payer: COMMERCIAL

## 2022-04-24 DIAGNOSIS — R10.10 UPPER ABDOMINAL PAIN: ICD-10-CM

## 2022-04-24 DIAGNOSIS — Z93.3 COLOSTOMY STATUS: ICD-10-CM

## 2022-04-24 DIAGNOSIS — E11.9 TYPE 2 DIABETES MELLITUS: ICD-10-CM

## 2022-04-24 DIAGNOSIS — K56.609 LARGE BOWEL OBSTRUCTION: Primary | ICD-10-CM

## 2022-04-24 DIAGNOSIS — K63.89 COLONIC MASS: ICD-10-CM

## 2022-04-24 PROBLEM — E87.1 HYPONATREMIA: Status: ACTIVE | Noted: 2022-04-24

## 2022-04-24 PROBLEM — R10.9 ABDOMINAL PAIN: Status: ACTIVE | Noted: 2022-04-24

## 2022-04-24 PROBLEM — E87.6 HYPOKALEMIA: Status: ACTIVE | Noted: 2022-04-24

## 2022-04-24 PROBLEM — I10 HTN (HYPERTENSION): Status: ACTIVE | Noted: 2022-04-24

## 2022-04-24 PROBLEM — D64.9 ANEMIA: Status: ACTIVE | Noted: 2022-04-24

## 2022-04-24 PROBLEM — E78.5 HLD (HYPERLIPIDEMIA): Status: ACTIVE | Noted: 2022-04-24

## 2022-04-24 LAB
ALBUMIN SERPL BCP-MCNC: 3.3 G/DL (ref 3.5–5.2)
ALP SERPL-CCNC: 79 U/L (ref 55–135)
ALT SERPL W/O P-5'-P-CCNC: 10 U/L (ref 10–44)
ANION GAP SERPL CALC-SCNC: 13 MMOL/L (ref 8–16)
AST SERPL-CCNC: 12 U/L (ref 10–40)
BASOPHILS # BLD AUTO: 0.03 K/UL (ref 0–0.2)
BASOPHILS NFR BLD: 0.2 % (ref 0–1.9)
BILIRUB SERPL-MCNC: 0.5 MG/DL (ref 0.1–1)
BUN SERPL-MCNC: 11 MG/DL (ref 6–20)
CALCIUM SERPL-MCNC: 9.3 MG/DL (ref 8.7–10.5)
CHLORIDE SERPL-SCNC: 97 MMOL/L (ref 95–110)
CO2 SERPL-SCNC: 22 MMOL/L (ref 23–29)
CREAT SERPL-MCNC: 0.8 MG/DL (ref 0.5–1.4)
DIFFERENTIAL METHOD: ABNORMAL
EOSINOPHIL # BLD AUTO: 0.1 K/UL (ref 0–0.5)
EOSINOPHIL NFR BLD: 0.4 % (ref 0–8)
ERYTHROCYTE [DISTWIDTH] IN BLOOD BY AUTOMATED COUNT: 12.5 % (ref 11.5–14.5)
EST. GFR  (AFRICAN AMERICAN): >60 ML/MIN/1.73 M^2
EST. GFR  (NON AFRICAN AMERICAN): >60 ML/MIN/1.73 M^2
GLUCOSE SERPL-MCNC: 165 MG/DL (ref 70–110)
HCT VFR BLD AUTO: 37 % (ref 40–54)
HGB BLD-MCNC: 12.9 G/DL (ref 14–18)
IMM GRANULOCYTES # BLD AUTO: 0.05 K/UL (ref 0–0.04)
IMM GRANULOCYTES NFR BLD AUTO: 0.4 % (ref 0–0.5)
LIPASE SERPL-CCNC: 19 U/L (ref 4–60)
LYMPHOCYTES # BLD AUTO: 2.5 K/UL (ref 1–4.8)
LYMPHOCYTES NFR BLD: 18.3 % (ref 18–48)
MCH RBC QN AUTO: 29 PG (ref 27–31)
MCHC RBC AUTO-ENTMCNC: 34.9 G/DL (ref 32–36)
MCV RBC AUTO: 83 FL (ref 82–98)
MONOCYTES # BLD AUTO: 0.9 K/UL (ref 0.3–1)
MONOCYTES NFR BLD: 6.9 % (ref 4–15)
NEUTROPHILS # BLD AUTO: 10 K/UL (ref 1.8–7.7)
NEUTROPHILS NFR BLD: 73.8 % (ref 38–73)
NRBC BLD-RTO: 0 /100 WBC
PLATELET # BLD AUTO: 416 K/UL (ref 150–450)
PMV BLD AUTO: 9 FL (ref 9.2–12.9)
POTASSIUM SERPL-SCNC: 3 MMOL/L (ref 3.5–5.1)
PROT SERPL-MCNC: 7.1 G/DL (ref 6–8.4)
RBC # BLD AUTO: 4.45 M/UL (ref 4.6–6.2)
SARS-COV-2 RDRP RESP QL NAA+PROBE: NEGATIVE
SODIUM SERPL-SCNC: 132 MMOL/L (ref 136–145)
WBC # BLD AUTO: 13.58 K/UL (ref 3.9–12.7)

## 2022-04-24 PROCEDURE — 99285 EMERGENCY DEPT VISIT HI MDM: CPT | Mod: 25

## 2022-04-24 PROCEDURE — G0378 HOSPITAL OBSERVATION PER HR: HCPCS

## 2022-04-24 PROCEDURE — 80053 COMPREHEN METABOLIC PANEL: CPT | Performed by: EMERGENCY MEDICINE

## 2022-04-24 PROCEDURE — 25000003 PHARM REV CODE 250

## 2022-04-24 PROCEDURE — 93010 EKG 12-LEAD: ICD-10-PCS | Mod: ,,, | Performed by: SPECIALIST

## 2022-04-24 PROCEDURE — 36415 COLL VENOUS BLD VENIPUNCTURE: CPT | Performed by: EMERGENCY MEDICINE

## 2022-04-24 PROCEDURE — 96374 THER/PROPH/DIAG INJ IV PUSH: CPT

## 2022-04-24 PROCEDURE — 25000003 PHARM REV CODE 250: Performed by: EMERGENCY MEDICINE

## 2022-04-24 PROCEDURE — 93005 ELECTROCARDIOGRAM TRACING: CPT

## 2022-04-24 PROCEDURE — 63600175 PHARM REV CODE 636 W HCPCS: Performed by: EMERGENCY MEDICINE

## 2022-04-24 PROCEDURE — U0002 COVID-19 LAB TEST NON-CDC: HCPCS | Performed by: EMERGENCY MEDICINE

## 2022-04-24 PROCEDURE — 83690 ASSAY OF LIPASE: CPT | Performed by: EMERGENCY MEDICINE

## 2022-04-24 PROCEDURE — 25500020 PHARM REV CODE 255

## 2022-04-24 PROCEDURE — 85025 COMPLETE CBC W/AUTO DIFF WBC: CPT | Performed by: EMERGENCY MEDICINE

## 2022-04-24 PROCEDURE — 96361 HYDRATE IV INFUSION ADD-ON: CPT

## 2022-04-24 PROCEDURE — 93010 ELECTROCARDIOGRAM REPORT: CPT | Mod: ,,, | Performed by: SPECIALIST

## 2022-04-24 RX ORDER — TALC
9 POWDER (GRAM) TOPICAL NIGHTLY PRN
Status: DISCONTINUED | OUTPATIENT
Start: 2022-04-24 | End: 2022-04-25

## 2022-04-24 RX ORDER — INSULIN ASPART 100 [IU]/ML
0-5 INJECTION, SOLUTION INTRAVENOUS; SUBCUTANEOUS
Status: DISCONTINUED | OUTPATIENT
Start: 2022-04-24 | End: 2022-05-02 | Stop reason: HOSPADM

## 2022-04-24 RX ORDER — SODIUM CHLORIDE 9 MG/ML
INJECTION, SOLUTION INTRAVENOUS CONTINUOUS
Status: DISCONTINUED | OUTPATIENT
Start: 2022-04-24 | End: 2022-04-25

## 2022-04-24 RX ORDER — LANOLIN ALCOHOL/MO/W.PET/CERES
800 CREAM (GRAM) TOPICAL
Status: DISCONTINUED | OUTPATIENT
Start: 2022-04-24 | End: 2022-04-25

## 2022-04-24 RX ORDER — MORPHINE SULFATE 4 MG/ML
8 INJECTION, SOLUTION INTRAMUSCULAR; INTRAVENOUS
Status: COMPLETED | OUTPATIENT
Start: 2022-04-24 | End: 2022-04-24

## 2022-04-24 RX ORDER — ACETAMINOPHEN 325 MG/1
650 TABLET ORAL EVERY 4 HOURS PRN
Status: DISCONTINUED | OUTPATIENT
Start: 2022-04-24 | End: 2022-04-25

## 2022-04-24 RX ORDER — HYDROCHLOROTHIAZIDE 12.5 MG/1
25 TABLET ORAL DAILY
Status: DISCONTINUED | OUTPATIENT
Start: 2022-04-25 | End: 2022-04-25

## 2022-04-24 RX ORDER — IBUPROFEN 200 MG
16 TABLET ORAL
Status: DISCONTINUED | OUTPATIENT
Start: 2022-04-24 | End: 2022-04-25

## 2022-04-24 RX ORDER — NALOXONE HCL 0.4 MG/ML
0.02 VIAL (ML) INJECTION
Status: DISCONTINUED | OUTPATIENT
Start: 2022-04-24 | End: 2022-05-02 | Stop reason: HOSPADM

## 2022-04-24 RX ORDER — LISINOPRIL 10 MG/1
20 TABLET ORAL DAILY
Status: DISCONTINUED | OUTPATIENT
Start: 2022-04-25 | End: 2022-04-25

## 2022-04-24 RX ORDER — LANOLIN ALCOHOL/MO/W.PET/CERES
400 CREAM (GRAM) TOPICAL
Status: COMPLETED | OUTPATIENT
Start: 2022-04-24 | End: 2022-04-24

## 2022-04-24 RX ORDER — ATORVASTATIN CALCIUM 20 MG/1
20 TABLET, FILM COATED ORAL DAILY
Status: DISCONTINUED | OUTPATIENT
Start: 2022-04-25 | End: 2022-04-25

## 2022-04-24 RX ORDER — IBUPROFEN 200 MG
24 TABLET ORAL
Status: DISCONTINUED | OUTPATIENT
Start: 2022-04-24 | End: 2022-04-25

## 2022-04-24 RX ORDER — HYDROCODONE BITARTRATE AND ACETAMINOPHEN 5; 325 MG/1; MG/1
1 TABLET ORAL EVERY 6 HOURS PRN
Status: DISCONTINUED | OUTPATIENT
Start: 2022-04-24 | End: 2022-04-25

## 2022-04-24 RX ORDER — SODIUM CHLORIDE 0.9 % (FLUSH) 0.9 %
3 SYRINGE (ML) INJECTION
Status: DISCONTINUED | OUTPATIENT
Start: 2022-04-24 | End: 2022-05-02 | Stop reason: HOSPADM

## 2022-04-24 RX ORDER — ACETAMINOPHEN 325 MG/1
650 TABLET ORAL EVERY 6 HOURS PRN
Status: DISCONTINUED | OUTPATIENT
Start: 2022-04-24 | End: 2022-04-25

## 2022-04-24 RX ORDER — GLUCAGON 1 MG
1 KIT INJECTION
Status: DISCONTINUED | OUTPATIENT
Start: 2022-04-24 | End: 2022-05-02 | Stop reason: HOSPADM

## 2022-04-24 RX ORDER — ONDANSETRON 2 MG/ML
4 INJECTION INTRAMUSCULAR; INTRAVENOUS EVERY 6 HOURS PRN
Status: DISCONTINUED | OUTPATIENT
Start: 2022-04-24 | End: 2022-05-02 | Stop reason: HOSPADM

## 2022-04-24 RX ORDER — MORPHINE SULFATE 4 MG/ML
4 INJECTION, SOLUTION INTRAMUSCULAR; INTRAVENOUS EVERY 4 HOURS PRN
Status: DISCONTINUED | OUTPATIENT
Start: 2022-04-24 | End: 2022-04-26

## 2022-04-24 RX ADMIN — SODIUM CHLORIDE 1000 ML: 0.9 INJECTION, SOLUTION INTRAVENOUS at 05:04

## 2022-04-24 RX ADMIN — MORPHINE SULFATE 8 MG: 4 INJECTION INTRAVENOUS at 08:04

## 2022-04-24 RX ADMIN — SODIUM CHLORIDE: 0.9 INJECTION, SOLUTION INTRAVENOUS at 10:04

## 2022-04-24 RX ADMIN — POTASSIUM BICARBONATE 50 MEQ: 977.5 TABLET, EFFERVESCENT ORAL at 07:04

## 2022-04-24 RX ADMIN — Medication 400 MG: at 07:04

## 2022-04-24 RX ADMIN — IOHEXOL 100 ML: 350 INJECTION, SOLUTION INTRAVENOUS at 05:04

## 2022-04-24 NOTE — ED PROVIDER NOTES
"Chief complaint:  Abdominal Pain (Started Thursday )      HPI:  Osman Li is a 56 y.o. male with hx htn, DM, cholex presenting with 3 days of constant, worsening upper abdominal pain in the bilateral upper quadrants and epigastric region.  He has had no relief with laxatives.  He denies emesis.  He denies associated fever.  No radiation or migration of pain.  Pain is worst postprandial but constant.  He denies blood in his stools or dark stools.  No urinary complaints such as dysuria.  No prior history of similar pain.    ROS: As per HPI and below:  No headache, neck pain, chest pain, diaphoresis, dyspnea, emesis, fever, rashes, swelling, dysuria. The patient/family denies blurry vision, dysphagia, joint swelling, easy bruising.    Review of patient's allergies indicates:   Allergen Reactions    Penicillins Rash       Patient's Medications   New Prescriptions    No medications on file   Previous Medications    ATORVASTATIN (LIPITOR) 20 MG TABLET    Take 1 tablet (20 mg total) by mouth once daily.    ENALAPRIL-HYDROCHLOROTHIAZIDE (VASERETIC) 10-25 MG PER TABLET    Take 1 tablet by mouth once daily.    METFORMIN (GLUCOPHAGE) 1000 MG TABLET    Take 1 tablet (1,000 mg total) by mouth 2 (two) times daily with meals.    PEN NEEDLE, DIABETIC (BD ULTRA-FINE MICRO PEN NEEDLE) 32 GAUGE X 1/4" NDLE    1 each by Misc.(Non-Drug; Combo Route) route once a week.    SEMAGLUTIDE (OZEMPIC) 1 MG/DOSE (4 MG/3 ML)    Inject 1 mg into the skin every 7 days.    SILDENAFIL (VIAGRA) 100 MG TABLET    Take 1 tablet (100 mg total) by mouth daily as needed for Erectile Dysfunction.   Modified Medications    No medications on file   Discontinued Medications    No medications on file       PMH:  As per HPI and below:  Past Medical History:   Diagnosis Date    Hyperlipidemia     Hypertension     Prediabetes      Past Surgical History:   Procedure Laterality Date    CHOLECYSTECTOMY  2011    COLONOSCOPY      2018       Social " History     Socioeconomic History    Marital status:    Tobacco Use    Smoking status: Current Every Day Smoker     Packs/day: 1.00     Years: 35.00     Pack years: 35.00    Smokeless tobacco: Never Used   Substance and Sexual Activity    Alcohol use: Not Currently    Drug use: Never       Family History   Problem Relation Age of Onset    Heart disease Father        Physical Exam:    Vitals:    04/24/22 2004   BP:    Pulse:    Resp: 20   Temp:      GENERAL:  No apparent distress.  Alert.    HEENT:  Moist mucous membranes.  Normocephalic and atraumatic.  No scleral icterus.  NECK:  No swelling.  Midline trachea.   CARDIOVASCULAR:  Regular rate and rhythm.  2+ radial pulses.    PULMONARY:  Lungs clear to auscultation bilaterally.  No wheezes, rales, or rhonci.    ABDOMEN:  Bilateral upper quadrant tenderness with mild voluntary guarding in the epigastric region.  No involuntary guarding, distention, masses, palpable hernias.  No organomegaly palpable.  EXTREMITIES:  Warm and well perfused.  Brisk capillary refill.  No peripheral edema.  NEUROLOGICAL:  Normal mental status.  Appropriate and conversant.    SKIN:  No rashes or ecchymoses.    BACK:  Atraumatic.  No CVA tenderness to palpation.      Labs Reviewed   CBC W/ AUTO DIFFERENTIAL - Abnormal; Notable for the following components:       Result Value    WBC 13.58 (*)     RBC 4.45 (*)     Hemoglobin 12.9 (*)     Hematocrit 37.0 (*)     MPV 9.0 (*)     Gran # (ANC) 10.0 (*)     Immature Grans (Abs) 0.05 (*)     Gran % 73.8 (*)     All other components within normal limits   COMPREHENSIVE METABOLIC PANEL - Abnormal; Notable for the following components:    Sodium 132 (*)     Potassium 3.0 (*)     CO2 22 (*)     Glucose 165 (*)     Albumin 3.3 (*)     All other components within normal limits   LIPASE   SARS-COV-2 RNA AMPLIFICATION, QUAL       Current Discharge Medication List      CONTINUE these medications which have NOT CHANGED    Details   atorvastatin  "(LIPITOR) 20 MG tablet Take 1 tablet (20 mg total) by mouth once daily.  Qty: 90 tablet, Refills: 1    Associated Diagnoses: Mixed hyperlipidemia      enalapriL-hydrochlorothiazide (VASERETIC) 10-25 mg per tablet Take 1 tablet by mouth once daily.  Qty: 90 tablet, Refills: 1    Associated Diagnoses: Essential hypertension      metFORMIN (GLUCOPHAGE) 1000 MG tablet Take 1 tablet (1,000 mg total) by mouth 2 (two) times daily with meals.  Qty: 180 tablet, Refills: 1    Associated Diagnoses: Type 2 diabetes mellitus without complication, without long-term current use of insulin      pen needle, diabetic (BD ULTRA-FINE MICRO PEN NEEDLE) 32 gauge x 1/4" Ndle 1 each by Misc.(Non-Drug; Combo Route) route once a week.  Qty: 50 each, Refills: 1    Associated Diagnoses: Type 2 diabetes mellitus without complication, without long-term current use of insulin      semaglutide (OZEMPIC) 1 mg/dose (4 mg/3 mL) Inject 1 mg into the skin every 7 days.  Qty: 3 pen, Refills: 1    Associated Diagnoses: Type 2 diabetes mellitus without complication, without long-term current use of insulin      sildenafiL (VIAGRA) 100 MG tablet Take 1 tablet (100 mg total) by mouth daily as needed for Erectile Dysfunction.  Qty: 30 tablet, Refills: 1    Associated Diagnoses: Erectile dysfunction, unspecified erectile dysfunction type             Orders Placed This Encounter   Procedures    CT Abdomen Pelvis With Contrast    CBC Auto Differential    Comprehensive Metabolic Panel    Lipase    COVID-19 Rapid Screening    Comprehensive Metabolic Panel (CMP)    Magnesium    Phosphorus    CBC with Automated Differential    Diet NPO Except for: Sips with Medication    Vital Signs    Weigh patient    Strict intake and output (1) Document % meals taken (2) # of urinations (3) # and consistency of BMs    Bladder scan    Notify Physician    Recheck Blood Glucose:    Cardiac Monitoring - Adult    During the day, please open the shades and turn on " the lights- If patient is in private room, please make sure they are close to the window.    Make sure the correct date and time is written on the whiteboard, as well as the current care team    Place sequential compression device    Full code    Inpatient consult to Gastroenterology    Inpatient consult to General Surgery    POCT glucose    EKG 12-lead    Insert peripheral IV    Insert saline lock    Place in Observation    Fall precautions       Imaging Results          CT Abdomen Pelvis With Contrast (In process)                 ED Course as of 04/24/22 2126   Sun Apr 24, 2022   1932 CT-AP:   Stranding/prominent thickening involving the colon near the splenic flexure with narrowing. Nearby nodular areas that may suggest lymphadenopathy. Findings are concerning for an obstructive mass.  Infectious/inflammatory process may have a similar appearance. The more proximal colon is  distended predominately with fluid. The cecum measures up to 9 cm and the transverse colon  measures up to 7 cm. Stool is seen distal to the prominent colonic thickening.  Small amount of fluid within the abdomen /pelvis.  Some stranding adjacent to the pancreatic tail may be extending from the adjacent colonic region rather than findings related to pancreatitis which can be correlated with laboratory values.  (Hilario blanchard, d/w interpreting radiologist) [MR]      ED Course User Index  [MR] Fredrick Rock MD       MDM:    56 y.o. male with 3 days of worsening upper abdominal pain.  Patient has had prior cholecystectomy and I doubt gallbladder etiology.  Laboratories done to assess for end-organ dysfunction including LFTs and lipase.  Differential discussed with patient including peptic ulcer disease versus gastritis pending further imaging.  He declines analgesia.  CT ordered to rule out emergent, life-threatening process such as bowel obstruction given prior history of surgery.     Findings on CT concerning for area of  inflammation or mass involving the colon at the splenic flexure with associated lymphadenopathy.  Differential discussed with patient.  Appearance of partial obstruction warrants admission for observation to ensure no progression.  I have discussed with hospital medicine with GI consultation at their discretion.  I do not think antibiotics are indicated at this point.  I doubt mesenteric ischemia.  There is no evidence of complications such as perforation.  I do not think requires NG tube at this time as he is not vomiting.    Diagnoses:    1. Upper abdominal pain     Fredrick Rock MD  04/24/22 0680

## 2022-04-25 ENCOUNTER — ANESTHESIA (OUTPATIENT)
Dept: SURGERY | Facility: HOSPITAL | Age: 57
DRG: 330 | End: 2022-04-25
Payer: COMMERCIAL

## 2022-04-25 ENCOUNTER — ANESTHESIA EVENT (OUTPATIENT)
Dept: SURGERY | Facility: HOSPITAL | Age: 57
DRG: 330 | End: 2022-04-25
Payer: COMMERCIAL

## 2022-04-25 LAB
ALBUMIN SERPL BCP-MCNC: 3.1 G/DL (ref 3.5–5.2)
ALP SERPL-CCNC: 99 U/L (ref 55–135)
ALT SERPL W/O P-5'-P-CCNC: 18 U/L (ref 10–44)
ANION GAP SERPL CALC-SCNC: 10 MMOL/L (ref 8–16)
AST SERPL-CCNC: 17 U/L (ref 10–40)
BASOPHILS # BLD AUTO: 0.04 K/UL (ref 0–0.2)
BASOPHILS NFR BLD: 0.4 % (ref 0–1.9)
BILIRUB SERPL-MCNC: 0.7 MG/DL (ref 0.1–1)
BUN SERPL-MCNC: 9 MG/DL (ref 6–20)
CALCIUM SERPL-MCNC: 8.9 MG/DL (ref 8.7–10.5)
CHLORIDE SERPL-SCNC: 101 MMOL/L (ref 95–110)
CO2 SERPL-SCNC: 25 MMOL/L (ref 23–29)
CREAT SERPL-MCNC: 0.8 MG/DL (ref 0.5–1.4)
DIFFERENTIAL METHOD: ABNORMAL
EOSINOPHIL # BLD AUTO: 0.1 K/UL (ref 0–0.5)
EOSINOPHIL NFR BLD: 1.2 % (ref 0–8)
ERYTHROCYTE [DISTWIDTH] IN BLOOD BY AUTOMATED COUNT: 12.5 % (ref 11.5–14.5)
EST. GFR  (AFRICAN AMERICAN): >60 ML/MIN/1.73 M^2
EST. GFR  (NON AFRICAN AMERICAN): >60 ML/MIN/1.73 M^2
GLUCOSE SERPL-MCNC: 157 MG/DL (ref 70–110)
HCT VFR BLD AUTO: 37.2 % (ref 40–54)
HGB BLD-MCNC: 12.5 G/DL (ref 14–18)
IMM GRANULOCYTES # BLD AUTO: 0.05 K/UL (ref 0–0.04)
IMM GRANULOCYTES NFR BLD AUTO: 0.5 % (ref 0–0.5)
LYMPHOCYTES # BLD AUTO: 2 K/UL (ref 1–4.8)
LYMPHOCYTES NFR BLD: 19 % (ref 18–48)
MAGNESIUM SERPL-MCNC: 1.9 MG/DL (ref 1.6–2.6)
MCH RBC QN AUTO: 28.3 PG (ref 27–31)
MCHC RBC AUTO-ENTMCNC: 33.6 G/DL (ref 32–36)
MCV RBC AUTO: 84 FL (ref 82–98)
MONOCYTES # BLD AUTO: 1 K/UL (ref 0.3–1)
MONOCYTES NFR BLD: 9.4 % (ref 4–15)
NEUTROPHILS # BLD AUTO: 7.1 K/UL (ref 1.8–7.7)
NEUTROPHILS NFR BLD: 69.5 % (ref 38–73)
NRBC BLD-RTO: 0 /100 WBC
PHOSPHATE SERPL-MCNC: 2.7 MG/DL (ref 2.7–4.5)
PLATELET # BLD AUTO: 419 K/UL (ref 150–450)
PMV BLD AUTO: 9 FL (ref 9.2–12.9)
POCT GLUCOSE: 135 MG/DL (ref 70–110)
POCT GLUCOSE: 160 MG/DL (ref 70–110)
POCT GLUCOSE: 244 MG/DL (ref 70–110)
POTASSIUM SERPL-SCNC: 3.6 MMOL/L (ref 3.5–5.1)
PROT SERPL-MCNC: 6.8 G/DL (ref 6–8.4)
RBC # BLD AUTO: 4.42 M/UL (ref 4.6–6.2)
SODIUM SERPL-SCNC: 136 MMOL/L (ref 136–145)
WBC # BLD AUTO: 10.25 K/UL (ref 3.9–12.7)

## 2022-04-25 PROCEDURE — 38102 PR REMOVAL SPLEEN, TOTAL W OTHR PROC: ICD-10-PCS | Mod: ,,, | Performed by: STUDENT IN AN ORGANIZED HEALTH CARE EDUCATION/TRAINING PROGRAM

## 2022-04-25 PROCEDURE — 44139 MOBILIZATION OF COLON: CPT | Mod: ,,, | Performed by: STUDENT IN AN ORGANIZED HEALTH CARE EDUCATION/TRAINING PROGRAM

## 2022-04-25 PROCEDURE — 88342 CHG IMMUNOCYTOCHEMISTRY: ICD-10-PCS | Mod: 26,,, | Performed by: STUDENT IN AN ORGANIZED HEALTH CARE EDUCATION/TRAINING PROGRAM

## 2022-04-25 PROCEDURE — 36000709 HC OR TIME LEV III EA ADD 15 MIN: Performed by: STUDENT IN AN ORGANIZED HEALTH CARE EDUCATION/TRAINING PROGRAM

## 2022-04-25 PROCEDURE — 88342 IMHCHEM/IMCYTCHM 1ST ANTB: CPT | Performed by: STUDENT IN AN ORGANIZED HEALTH CARE EDUCATION/TRAINING PROGRAM

## 2022-04-25 PROCEDURE — 63600175 PHARM REV CODE 636 W HCPCS: Performed by: ANESTHESIOLOGY

## 2022-04-25 PROCEDURE — 25000003 PHARM REV CODE 250: Performed by: NURSE ANESTHETIST, CERTIFIED REGISTERED

## 2022-04-25 PROCEDURE — 44141 PARTIAL REMOVAL OF COLON: CPT | Mod: ,,, | Performed by: STUDENT IN AN ORGANIZED HEALTH CARE EDUCATION/TRAINING PROGRAM

## 2022-04-25 PROCEDURE — 83735 ASSAY OF MAGNESIUM: CPT

## 2022-04-25 PROCEDURE — D9220A PRA ANESTHESIA: ICD-10-PCS | Mod: ANES,,, | Performed by: ANESTHESIOLOGY

## 2022-04-25 PROCEDURE — 88305 TISSUE EXAM BY PATHOLOGIST: CPT | Mod: 26,,, | Performed by: STUDENT IN AN ORGANIZED HEALTH CARE EDUCATION/TRAINING PROGRAM

## 2022-04-25 PROCEDURE — 88341 IMHCHEM/IMCYTCHM EA ADD ANTB: CPT | Mod: 26,,, | Performed by: STUDENT IN AN ORGANIZED HEALTH CARE EDUCATION/TRAINING PROGRAM

## 2022-04-25 PROCEDURE — 36000708 HC OR TIME LEV III 1ST 15 MIN: Performed by: STUDENT IN AN ORGANIZED HEALTH CARE EDUCATION/TRAINING PROGRAM

## 2022-04-25 PROCEDURE — 25000003 PHARM REV CODE 250

## 2022-04-25 PROCEDURE — D9220A PRA ANESTHESIA: Mod: ANES,,, | Performed by: ANESTHESIOLOGY

## 2022-04-25 PROCEDURE — 94760 N-INVAS EAR/PLS OXIMETRY 1: CPT

## 2022-04-25 PROCEDURE — D9220A PRA ANESTHESIA: Mod: CRNA,,, | Performed by: NURSE ANESTHETIST, CERTIFIED REGISTERED

## 2022-04-25 PROCEDURE — 88381 MICRODISSECTION MANUAL: CPT | Performed by: STUDENT IN AN ORGANIZED HEALTH CARE EDUCATION/TRAINING PROGRAM

## 2022-04-25 PROCEDURE — 88309 PR  SURG PATH,LEVEL VI: ICD-10-PCS | Mod: 26,,, | Performed by: STUDENT IN AN ORGANIZED HEALTH CARE EDUCATION/TRAINING PROGRAM

## 2022-04-25 PROCEDURE — 71000039 HC RECOVERY, EACH ADD'L HOUR: Performed by: STUDENT IN AN ORGANIZED HEALTH CARE EDUCATION/TRAINING PROGRAM

## 2022-04-25 PROCEDURE — 99223 1ST HOSP IP/OBS HIGH 75: CPT | Mod: 57,,, | Performed by: STUDENT IN AN ORGANIZED HEALTH CARE EDUCATION/TRAINING PROGRAM

## 2022-04-25 PROCEDURE — 99900103 DSU ONLY-NO CHARGE-INITIAL HR (STAT): Performed by: STUDENT IN AN ORGANIZED HEALTH CARE EDUCATION/TRAINING PROGRAM

## 2022-04-25 PROCEDURE — 99900104 DSU ONLY-NO CHARGE-EA ADD'L HR (STAT): Performed by: STUDENT IN AN ORGANIZED HEALTH CARE EDUCATION/TRAINING PROGRAM

## 2022-04-25 PROCEDURE — 88309 TISSUE EXAM BY PATHOLOGIST: CPT | Mod: 26,,, | Performed by: STUDENT IN AN ORGANIZED HEALTH CARE EDUCATION/TRAINING PROGRAM

## 2022-04-25 PROCEDURE — 25000003 PHARM REV CODE 250: Performed by: STUDENT IN AN ORGANIZED HEALTH CARE EDUCATION/TRAINING PROGRAM

## 2022-04-25 PROCEDURE — 71000033 HC RECOVERY, INTIAL HOUR: Performed by: STUDENT IN AN ORGANIZED HEALTH CARE EDUCATION/TRAINING PROGRAM

## 2022-04-25 PROCEDURE — 25000003 PHARM REV CODE 250: Performed by: INTERNAL MEDICINE

## 2022-04-25 PROCEDURE — 44139 PR MOBILIZE SPLENIC FLEX: ICD-10-PCS | Mod: ,,, | Performed by: STUDENT IN AN ORGANIZED HEALTH CARE EDUCATION/TRAINING PROGRAM

## 2022-04-25 PROCEDURE — 44141 PR PART REMOVAL COLON W COLOSTOMY: ICD-10-PCS | Mod: ,,, | Performed by: STUDENT IN AN ORGANIZED HEALTH CARE EDUCATION/TRAINING PROGRAM

## 2022-04-25 PROCEDURE — 88341 PR IHC OR ICC EACH ADD'L SINGLE ANTIBODY  STAINPR: ICD-10-PCS | Mod: 26,,, | Performed by: STUDENT IN AN ORGANIZED HEALTH CARE EDUCATION/TRAINING PROGRAM

## 2022-04-25 PROCEDURE — 27201423 OPTIME MED/SURG SUP & DEVICES STERILE SUPPLY: Performed by: STUDENT IN AN ORGANIZED HEALTH CARE EDUCATION/TRAINING PROGRAM

## 2022-04-25 PROCEDURE — 84100 ASSAY OF PHOSPHORUS: CPT

## 2022-04-25 PROCEDURE — S4991 NICOTINE PATCH NONLEGEND: HCPCS

## 2022-04-25 PROCEDURE — 99223 PR INITIAL HOSPITAL CARE,LEVL III: ICD-10-PCS | Mod: 57,,, | Performed by: STUDENT IN AN ORGANIZED HEALTH CARE EDUCATION/TRAINING PROGRAM

## 2022-04-25 PROCEDURE — 37000009 HC ANESTHESIA EA ADD 15 MINS: Performed by: STUDENT IN AN ORGANIZED HEALTH CARE EDUCATION/TRAINING PROGRAM

## 2022-04-25 PROCEDURE — 99222 1ST HOSP IP/OBS MODERATE 55: CPT | Mod: ,,, | Performed by: INTERNAL MEDICINE

## 2022-04-25 PROCEDURE — 88341 IMHCHEM/IMCYTCHM EA ADD ANTB: CPT | Performed by: STUDENT IN AN ORGANIZED HEALTH CARE EDUCATION/TRAINING PROGRAM

## 2022-04-25 PROCEDURE — 81210 BRAF GENE: CPT | Performed by: STUDENT IN AN ORGANIZED HEALTH CARE EDUCATION/TRAINING PROGRAM

## 2022-04-25 PROCEDURE — 63600175 PHARM REV CODE 636 W HCPCS: Performed by: NURSE ANESTHETIST, CERTIFIED REGISTERED

## 2022-04-25 PROCEDURE — 88305 TISSUE EXAM BY PATHOLOGIST: CPT | Performed by: STUDENT IN AN ORGANIZED HEALTH CARE EDUCATION/TRAINING PROGRAM

## 2022-04-25 PROCEDURE — 81311 NRAS GENE VARIANTS EXON 2&3: CPT | Performed by: STUDENT IN AN ORGANIZED HEALTH CARE EDUCATION/TRAINING PROGRAM

## 2022-04-25 PROCEDURE — 12000002 HC ACUTE/MED SURGE SEMI-PRIVATE ROOM

## 2022-04-25 PROCEDURE — 85025 COMPLETE CBC W/AUTO DIFF WBC: CPT

## 2022-04-25 PROCEDURE — 81275 KRAS GENE VARIANTS EXON 2: CPT | Performed by: STUDENT IN AN ORGANIZED HEALTH CARE EDUCATION/TRAINING PROGRAM

## 2022-04-25 PROCEDURE — D9220A PRA ANESTHESIA: ICD-10-PCS | Mod: CRNA,,, | Performed by: NURSE ANESTHETIST, CERTIFIED REGISTERED

## 2022-04-25 PROCEDURE — 36415 COLL VENOUS BLD VENIPUNCTURE: CPT

## 2022-04-25 PROCEDURE — 63600175 PHARM REV CODE 636 W HCPCS: Performed by: INTERNAL MEDICINE

## 2022-04-25 PROCEDURE — 88342 IMHCHEM/IMCYTCHM 1ST ANTB: CPT | Mod: 26,,, | Performed by: STUDENT IN AN ORGANIZED HEALTH CARE EDUCATION/TRAINING PROGRAM

## 2022-04-25 PROCEDURE — P9045 ALBUMIN (HUMAN), 5%, 250 ML: HCPCS | Performed by: NURSE ANESTHETIST, CERTIFIED REGISTERED

## 2022-04-25 PROCEDURE — 99222 PR INITIAL HOSPITAL CARE,LEVL II: ICD-10-PCS | Mod: ,,, | Performed by: INTERNAL MEDICINE

## 2022-04-25 PROCEDURE — 63600175 PHARM REV CODE 636 W HCPCS

## 2022-04-25 PROCEDURE — 81403 MOPATH PROCEDURE LEVEL 4: CPT | Performed by: STUDENT IN AN ORGANIZED HEALTH CARE EDUCATION/TRAINING PROGRAM

## 2022-04-25 PROCEDURE — 38102 REMOVAL OF SPLEEN TOTAL: CPT | Mod: ,,, | Performed by: STUDENT IN AN ORGANIZED HEALTH CARE EDUCATION/TRAINING PROGRAM

## 2022-04-25 PROCEDURE — 37000008 HC ANESTHESIA 1ST 15 MINUTES: Performed by: STUDENT IN AN ORGANIZED HEALTH CARE EDUCATION/TRAINING PROGRAM

## 2022-04-25 PROCEDURE — 88309 TISSUE EXAM BY PATHOLOGIST: CPT | Performed by: STUDENT IN AN ORGANIZED HEALTH CARE EDUCATION/TRAINING PROGRAM

## 2022-04-25 PROCEDURE — 88305 TISSUE EXAM BY PATHOLOGIST: ICD-10-PCS | Mod: 26,,, | Performed by: STUDENT IN AN ORGANIZED HEALTH CARE EDUCATION/TRAINING PROGRAM

## 2022-04-25 PROCEDURE — 80053 COMPREHEN METABOLIC PANEL: CPT

## 2022-04-25 RX ORDER — HYDROMORPHONE HYDROCHLORIDE 2 MG/ML
0.5 INJECTION, SOLUTION INTRAMUSCULAR; INTRAVENOUS; SUBCUTANEOUS ONCE
Status: COMPLETED | OUTPATIENT
Start: 2022-04-25 | End: 2022-04-25

## 2022-04-25 RX ORDER — ONDANSETRON 2 MG/ML
4 INJECTION INTRAMUSCULAR; INTRAVENOUS ONCE AS NEEDED
Status: DISCONTINUED | OUTPATIENT
Start: 2022-04-25 | End: 2022-04-25

## 2022-04-25 RX ORDER — NEOSTIGMINE METHYLSULFATE 1 MG/ML
INJECTION, SOLUTION INTRAVENOUS
Status: DISCONTINUED | OUTPATIENT
Start: 2022-04-25 | End: 2022-04-25

## 2022-04-25 RX ORDER — LIDOCAINE HCL/PF 100 MG/5ML
SYRINGE (ML) INTRAVENOUS
Status: DISCONTINUED | OUTPATIENT
Start: 2022-04-25 | End: 2022-04-25

## 2022-04-25 RX ORDER — POTASSIUM CHLORIDE 750 MG/1
10 TABLET, EXTENDED RELEASE ORAL ONCE
Status: DISCONTINUED | OUTPATIENT
Start: 2022-04-25 | End: 2022-04-25

## 2022-04-25 RX ORDER — FENTANYL CITRATE 50 UG/ML
25 INJECTION, SOLUTION INTRAMUSCULAR; INTRAVENOUS EVERY 5 MIN PRN
Status: DISCONTINUED | OUTPATIENT
Start: 2022-04-25 | End: 2022-04-25

## 2022-04-25 RX ORDER — SODIUM CHLORIDE AND POTASSIUM CHLORIDE 150; 900 MG/100ML; MG/100ML
INJECTION, SOLUTION INTRAVENOUS CONTINUOUS
Status: DISCONTINUED | OUTPATIENT
Start: 2022-04-25 | End: 2022-04-26

## 2022-04-25 RX ORDER — HYDROMORPHONE HYDROCHLORIDE 2 MG/ML
INJECTION, SOLUTION INTRAMUSCULAR; INTRAVENOUS; SUBCUTANEOUS
Status: DISCONTINUED | OUTPATIENT
Start: 2022-04-25 | End: 2022-04-25

## 2022-04-25 RX ORDER — ALBUMIN HUMAN 50 G/1000ML
SOLUTION INTRAVENOUS CONTINUOUS PRN
Status: DISCONTINUED | OUTPATIENT
Start: 2022-04-25 | End: 2022-04-25

## 2022-04-25 RX ORDER — HYDROMORPHONE HYDROCHLORIDE 2 MG/ML
0.5 INJECTION, SOLUTION INTRAMUSCULAR; INTRAVENOUS; SUBCUTANEOUS ONCE
Status: DISCONTINUED | OUTPATIENT
Start: 2022-04-25 | End: 2022-04-25

## 2022-04-25 RX ORDER — HYDRALAZINE HYDROCHLORIDE 20 MG/ML
10 INJECTION INTRAMUSCULAR; INTRAVENOUS EVERY 4 HOURS PRN
Status: DISCONTINUED | OUTPATIENT
Start: 2022-04-25 | End: 2022-05-02 | Stop reason: HOSPADM

## 2022-04-25 RX ORDER — SUCCINYLCHOLINE CHLORIDE 20 MG/ML
INJECTION INTRAMUSCULAR; INTRAVENOUS
Status: DISCONTINUED | OUTPATIENT
Start: 2022-04-25 | End: 2022-04-25

## 2022-04-25 RX ORDER — FENTANYL CITRATE 50 UG/ML
INJECTION, SOLUTION INTRAMUSCULAR; INTRAVENOUS
Status: DISCONTINUED | OUTPATIENT
Start: 2022-04-25 | End: 2022-04-25

## 2022-04-25 RX ORDER — PROPOFOL 10 MG/ML
VIAL (ML) INTRAVENOUS
Status: DISCONTINUED | OUTPATIENT
Start: 2022-04-25 | End: 2022-04-25

## 2022-04-25 RX ORDER — ROCURONIUM BROMIDE 10 MG/ML
INJECTION, SOLUTION INTRAVENOUS
Status: DISCONTINUED | OUTPATIENT
Start: 2022-04-25 | End: 2022-04-25

## 2022-04-25 RX ORDER — OXYCODONE HYDROCHLORIDE 5 MG/1
5 TABLET ORAL ONCE AS NEEDED
Status: DISCONTINUED | OUTPATIENT
Start: 2022-04-25 | End: 2022-04-25

## 2022-04-25 RX ORDER — MUPIROCIN 20 MG/G
OINTMENT TOPICAL 2 TIMES DAILY
Status: COMPLETED | OUTPATIENT
Start: 2022-04-25 | End: 2022-04-30

## 2022-04-25 RX ORDER — CLINDAMYCIN PHOSPHATE 900 MG/50ML
900 INJECTION, SOLUTION INTRAVENOUS ONCE
Status: COMPLETED | OUTPATIENT
Start: 2022-04-25 | End: 2022-04-25

## 2022-04-25 RX ORDER — DEXAMETHASONE SODIUM PHOSPHATE 4 MG/ML
INJECTION, SOLUTION INTRA-ARTICULAR; INTRALESIONAL; INTRAMUSCULAR; INTRAVENOUS; SOFT TISSUE
Status: DISCONTINUED | OUTPATIENT
Start: 2022-04-25 | End: 2022-04-25

## 2022-04-25 RX ORDER — PHENYLEPHRINE HYDROCHLORIDE 10 MG/ML
INJECTION INTRAVENOUS
Status: DISCONTINUED | OUTPATIENT
Start: 2022-04-25 | End: 2022-04-25

## 2022-04-25 RX ORDER — MIDAZOLAM HYDROCHLORIDE 1 MG/ML
INJECTION INTRAMUSCULAR; INTRAVENOUS
Status: DISCONTINUED | OUTPATIENT
Start: 2022-04-25 | End: 2022-04-25

## 2022-04-25 RX ORDER — ACETAMINOPHEN 10 MG/ML
INJECTION, SOLUTION INTRAVENOUS
Status: DISCONTINUED | OUTPATIENT
Start: 2022-04-25 | End: 2022-04-25

## 2022-04-25 RX ORDER — POTASSIUM CHLORIDE 7.45 MG/ML
10 INJECTION INTRAVENOUS
Status: DISPENSED | OUTPATIENT
Start: 2022-04-25 | End: 2022-04-25

## 2022-04-25 RX ORDER — ONDANSETRON 2 MG/ML
INJECTION INTRAMUSCULAR; INTRAVENOUS
Status: DISCONTINUED | OUTPATIENT
Start: 2022-04-25 | End: 2022-04-25

## 2022-04-25 RX ORDER — IBUPROFEN 200 MG
1 TABLET ORAL DAILY
Status: DISCONTINUED | OUTPATIENT
Start: 2022-04-25 | End: 2022-05-02 | Stop reason: HOSPADM

## 2022-04-25 RX ADMIN — MORPHINE SULFATE 4 MG: 4 INJECTION INTRAVENOUS at 12:04

## 2022-04-25 RX ADMIN — PHENYLEPHRINE HYDROCHLORIDE 200 MCG: 10 INJECTION INTRAVENOUS at 07:04

## 2022-04-25 RX ADMIN — SODIUM CHLORIDE, SODIUM GLUCONATE, SODIUM ACETATE, POTASSIUM CHLORIDE, MAGNESIUM CHLORIDE, SODIUM PHOSPHATE, DIBASIC, AND POTASSIUM PHOSPHATE: .53; .5; .37; .037; .03; .012; .00082 INJECTION, SOLUTION INTRAVENOUS at 04:04

## 2022-04-25 RX ADMIN — ROCURONIUM BROMIDE 40 MG: 10 INJECTION, SOLUTION INTRAVENOUS at 05:04

## 2022-04-25 RX ADMIN — SODIUM CHLORIDE AND POTASSIUM CHLORIDE: .9; .15 SOLUTION INTRAVENOUS at 11:04

## 2022-04-25 RX ADMIN — LIDOCAINE HYDROCHLORIDE 100 MG: 20 INJECTION INTRAVENOUS at 04:04

## 2022-04-25 RX ADMIN — ACETAMINOPHEN 1000 MG: 10 INJECTION, SOLUTION INTRAVENOUS at 05:04

## 2022-04-25 RX ADMIN — SODIUM CHLORIDE, SODIUM GLUCONATE, SODIUM ACETATE, POTASSIUM CHLORIDE, MAGNESIUM CHLORIDE, SODIUM PHOSPHATE, DIBASIC, AND POTASSIUM PHOSPHATE: .53; .5; .37; .037; .03; .012; .00082 INJECTION, SOLUTION INTRAVENOUS at 05:04

## 2022-04-25 RX ADMIN — FENTANYL CITRATE 50 MCG: 50 INJECTION, SOLUTION INTRAMUSCULAR; INTRAVENOUS at 04:04

## 2022-04-25 RX ADMIN — ROCURONIUM BROMIDE 20 MG: 10 INJECTION, SOLUTION INTRAVENOUS at 05:04

## 2022-04-25 RX ADMIN — ATORVASTATIN CALCIUM 20 MG: 20 TABLET, FILM COATED ORAL at 08:04

## 2022-04-25 RX ADMIN — PROPOFOL 200 MG: 10 INJECTION, EMULSION INTRAVENOUS at 04:04

## 2022-04-25 RX ADMIN — ROCURONIUM BROMIDE 10 MG: 10 INJECTION, SOLUTION INTRAVENOUS at 04:04

## 2022-04-25 RX ADMIN — ONDANSETRON 4 MG: 2 INJECTION INTRAMUSCULAR; INTRAVENOUS at 05:04

## 2022-04-25 RX ADMIN — HYDROCODONE BITARTRATE AND ACETAMINOPHEN 1 TABLET: 5; 325 TABLET ORAL at 08:04

## 2022-04-25 RX ADMIN — POTASSIUM CHLORIDE 10 MEQ: 7.46 INJECTION, SOLUTION INTRAVENOUS at 12:04

## 2022-04-25 RX ADMIN — MUPIROCIN: 20 OINTMENT TOPICAL at 11:04

## 2022-04-25 RX ADMIN — HYDROMORPHONE HYDROCHLORIDE 1 MG: 2 INJECTION INTRAMUSCULAR; INTRAVENOUS; SUBCUTANEOUS at 07:04

## 2022-04-25 RX ADMIN — DEXAMETHASONE SODIUM PHOSPHATE 4 MG: 4 INJECTION, SOLUTION INTRA-ARTICULAR; INTRALESIONAL; INTRAMUSCULAR; INTRAVENOUS; SOFT TISSUE at 05:04

## 2022-04-25 RX ADMIN — ONDANSETRON 4 MG: 2 INJECTION INTRAMUSCULAR; INTRAVENOUS at 07:04

## 2022-04-25 RX ADMIN — HYDROMORPHONE HYDROCHLORIDE 1 MG: 2 INJECTION INTRAMUSCULAR; INTRAVENOUS; SUBCUTANEOUS at 05:04

## 2022-04-25 RX ADMIN — NICOTINE 1 PATCH: 14 PATCH, EXTENDED RELEASE TRANSDERMAL at 12:04

## 2022-04-25 RX ADMIN — SUCCINYLCHOLINE CHLORIDE 180 MG: 20 INJECTION, SOLUTION INTRAMUSCULAR; INTRAVENOUS; PARENTERAL at 04:04

## 2022-04-25 RX ADMIN — PHENYLEPHRINE HYDROCHLORIDE 200 MCG: 10 INJECTION INTRAVENOUS at 04:04

## 2022-04-25 RX ADMIN — PHENYLEPHRINE HYDROCHLORIDE 300 MCG: 10 INJECTION INTRAVENOUS at 05:04

## 2022-04-25 RX ADMIN — POTASSIUM CHLORIDE 10 MEQ: 7.46 INJECTION, SOLUTION INTRAVENOUS at 10:04

## 2022-04-25 RX ADMIN — MIDAZOLAM HYDROCHLORIDE 2 MG: 1 INJECTION, SOLUTION INTRAMUSCULAR; INTRAVENOUS at 04:04

## 2022-04-25 RX ADMIN — ALBUMIN (HUMAN): 12.5 SOLUTION INTRAVENOUS at 06:04

## 2022-04-25 RX ADMIN — GLYCOPYRROLATE 0.4 MG: 0.2 INJECTION, SOLUTION INTRAMUSCULAR; INTRAVENOUS at 07:04

## 2022-04-25 RX ADMIN — SODIUM CHLORIDE: 0.9 INJECTION, SOLUTION INTRAVENOUS at 10:04

## 2022-04-25 RX ADMIN — HYDROMORPHONE HYDROCHLORIDE 0.5 MG: 2 INJECTION, SOLUTION INTRAMUSCULAR; INTRAVENOUS; SUBCUTANEOUS at 08:04

## 2022-04-25 RX ADMIN — CLINDAMYCIN PHOSPHATE 900 MG: 18 INJECTION, SOLUTION INTRAVENOUS at 05:04

## 2022-04-25 RX ADMIN — SODIUM CHLORIDE, SODIUM GLUCONATE, SODIUM ACETATE, POTASSIUM CHLORIDE, MAGNESIUM CHLORIDE, SODIUM PHOSPHATE, DIBASIC, AND POTASSIUM PHOSPHATE: .53; .5; .37; .037; .03; .012; .00082 INJECTION, SOLUTION INTRAVENOUS at 06:04

## 2022-04-25 RX ADMIN — FENTANYL CITRATE 50 MCG: 50 INJECTION, SOLUTION INTRAMUSCULAR; INTRAVENOUS at 05:04

## 2022-04-25 RX ADMIN — NICOTINE 1 PATCH: 14 PATCH, EXTENDED RELEASE TRANSDERMAL at 08:04

## 2022-04-25 RX ADMIN — HYDROMORPHONE HYDROCHLORIDE 0.5 MG: 2 INJECTION, SOLUTION INTRAMUSCULAR; INTRAVENOUS; SUBCUTANEOUS at 09:04

## 2022-04-25 RX ADMIN — NEOSTIGMINE METHYLSULFATE 3 MG: 1 INJECTION INTRAVENOUS at 07:04

## 2022-04-25 RX ADMIN — HYDROCHLOROTHIAZIDE 25 MG: 12.5 TABLET ORAL at 08:04

## 2022-04-25 NOTE — ASSESSMENT & PLAN NOTE
Patient is chronically on statin.will continue for now. Monitor clinically. Last LDL was   Lab Results   Component Value Date    LDLCALC 77 10/19/2021

## 2022-04-25 NOTE — H&P
"[facility] Hospital Medicine  History & Physical    Patient Name: Osman Li  MRN: [id number]  Patient Class: OP- Observation  Admission Date: 4/24/2022  Attending Physician: Delaney Pan MD   Primary Care Provider: ANTONIO Emmanuel         Patient information was obtained from patient, spouse/SO, past medical records and ER records.     Subjective:     Principal Problem:Large bowel obstruction    Chief Complaint:   Chief Complaint   Patient presents with    Abdominal Pain     Started Thursday         HPI: Osman Li is a 56 y.o. y.o. male with a PMHx of HTN, T2DM, and HLD who presented to the ED with upper abdominal pain onset x 1 months, constant since 4/21. He describes that pain as an intermittent cramping sensation that lasts approximately 30 seconds. No radiation. Pain is exacerbated with "tensing up" and alleviated with repositioning. Pain is worst postprandial but constant.  He denies blood in his stools or dark stools. Pain was 9/10 at its worst and is currently "mild." He also reports chills night sweats, fatigue, difficulty urinating which he attributes to "old age" and back pain which he attributes to laying on the bed. He denies fever, WL, CP, SOB, and n/v/d. Patient reports colonoscopy approximately 5 years ago where he had 4 polyps removed. Denies FHx of colon cancer. Patient is a smoker with a 40 pack year history. ED workup was significant for anemia, elevated WBC, mild hyponatremia, mild hypokalemia, and CT abdomen concerning for an obstructive mass. The patient was placed in observation under the care of Hospital Medicine.       Past Medical History:   Diagnosis Date    Hyperlipidemia     Hypertension     Prediabetes        Past Surgical History:   Procedure Laterality Date    CHOLECYSTECTOMY  2011    COLONOSCOPY      2018       Review of patient's allergies indicates:   Allergen Reactions    Penicillins Rash       No current " "facility-administered medications on file prior to encounter.     Current Outpatient Medications on File Prior to Encounter   Medication Sig    atorvastatin (LIPITOR) 20 MG tablet Take 1 tablet (20 mg total) by mouth once daily.    enalapriL-hydrochlorothiazide (VASERETIC) 10-25 mg per tablet Take 1 tablet by mouth once daily.    metFORMIN (GLUCOPHAGE) 1000 MG tablet Take 1 tablet (1,000 mg total) by mouth 2 (two) times daily with meals.    pen needle, diabetic (BD ULTRA-FINE MICRO PEN NEEDLE) 32 gauge x 1/4" Ndle 1 each by Misc.(Non-Drug; Combo Route) route once a week.    semaglutide (OZEMPIC) 1 mg/dose (4 mg/3 mL) Inject 1 mg into the skin every 7 days.    sildenafiL (VIAGRA) 100 MG tablet Take 1 tablet (100 mg total) by mouth daily as needed for Erectile Dysfunction.     Family History       Problem Relation (Age of Onset)    Heart disease Father          Tobacco Use    Smoking status: Current Every Day Smoker     Packs/day: 1.00     Years: 35.00     Pack years: 35.00    Smokeless tobacco: Never Used   Substance and Sexual Activity    Alcohol use: Not Currently    Drug use: Never    Sexual activity: Not on file     Review of Systems   Constitutional:  Positive for appetite change, chills, diaphoresis (night sweats) and fatigue. Negative for fever and unexpected weight change.   HENT:  Negative for dental problem, sore throat and trouble swallowing.    Eyes:  Negative for visual disturbance.   Respiratory:  Negative for cough, shortness of breath and wheezing.    Gastrointestinal:  Positive for abdominal pain. Negative for diarrhea, nausea and vomiting.   Genitourinary:  Positive for difficulty urinating (chronic). Negative for dysuria and hematuria.   Musculoskeletal:  Positive for back pain.   Skin:  Negative for rash.   Neurological:  Negative for dizziness, light-headedness and headaches.   Psychiatric/Behavioral:  Negative for confusion.    Objective:     Vital Signs (Most Recent):  Temp: 98.5 °F " (36.9 °C) (04/24/22 1608)  Pulse: 81 (04/24/22 1930)  Resp: 20 (04/24/22 2004)  BP: (!) 163/81 (04/24/22 1930)  SpO2: 97 % (04/24/22 1930)   Vital Signs (24h Range):  Temp:  [98.5 °F (36.9 °C)] 98.5 °F (36.9 °C)  Pulse:  [78-98] 81  Resp:  [18-20] 20  SpO2:  [97 %-98 %] 97 %  BP: (149-163)/(78-83) 163/81     Weight: 110.7 kg (244 lb)  Body mass index is 29.7 kg/m².    Physical Exam  Vitals and nursing note reviewed.   Constitutional:       General: He is not in acute distress.     Appearance: Normal appearance. He is obese. He is not ill-appearing.   HENT:      Head: Normocephalic and atraumatic.      Mouth/Throat:      Mouth: Mucous membranes are moist.      Pharynx: Oropharynx is clear.   Eyes:      Extraocular Movements: Extraocular movements intact.      Pupils: Pupils are equal, round, and reactive to light.   Cardiovascular:      Rate and Rhythm: Normal rate and regular rhythm.      Pulses: Normal pulses.      Heart sounds: Normal heart sounds.   Pulmonary:      Effort: Pulmonary effort is normal. No respiratory distress.      Breath sounds: Normal breath sounds. No wheezing, rhonchi or rales.   Abdominal:      General: Abdomen is flat. Bowel sounds are normal.      Palpations: Abdomen is soft.      Tenderness: There is abdominal tenderness in the right upper quadrant, epigastric area and left upper quadrant. There is no guarding or rebound.   Musculoskeletal:         General: Normal range of motion.      Cervical back: Normal range of motion and neck supple.   Skin:     General: Skin is warm.      Capillary Refill: Capillary refill takes 2 to 3 seconds.   Neurological:      General: No focal deficit present.      Mental Status: He is alert and oriented to person, place, and time. Mental status is at baseline.   Psychiatric:         Mood and Affect: Mood normal.         Behavior: Behavior normal.         CRANIAL NERVES     CN III, IV, VI   Pupils are equal, round, and reactive to light.     Significant Labs:  All pertinent labs within the past 24 hours have been reviewed.  CBC:   Recent Labs   Lab 04/24/22  1658   WBC 13.58*   HGB 12.9*   HCT 37.0*        CMP:   Recent Labs   Lab 04/24/22  1658   *   K 3.0*   CL 97   CO2 22*   *   BUN 11   CREATININE 0.8   CALCIUM 9.3   PROT 7.1   ALBUMIN 3.3*   BILITOT 0.5   ALKPHOS 79   AST 12   ALT 10   ANIONGAP 13   EGFRNONAA >60     Lipase:   Recent Labs   Lab 04/24/22  1658   LIPASE 19       Significant Imaging: I have reviewed all pertinent imaging results/findings within the past 24 hours.    CT abdomen with contrast:  Stranding/prominent thickening involving the colon near the splenic flexure with narrowing. Nearby nodular areas that may suggest lymphadenopathy. Findings are concerning for an obstructive mass. Infectious/inflammatory process may have a similar appearance. The more proximal colon is distended predominately with fluid. The cecum measures up to 9 cm and the transverse colon measures up to 7 cm. Stool is seen distal to the prominent colonic thickening.  Small amount of fluid within the abdomen /pelvis.  Some stranding adjacent to the pancreatic tail may be extending from the adjacent colonic region rather than findings related to pancreatitis which can be correlated with laboratory values.    Assessment/Plan:     * Large bowel obstruction  Acute:  - consult GI appreciate recommendations  - Consult general surgery: appreciate recommendations   - NPO  - prn antiemetics ordered  - prn pain medication ordered   - IVFH        Anemia  Patient's anemia is currently controlled. Has not received any PRBCs to date..   Current CBC reviewed-   Lab Results   Component Value Date    HGB 12.9 (L) 04/24/2022    HCT 37.0 (L) 04/24/2022     Monitor serial CBC and transfuse if patient becomes hemodynamically unstable, symptomatic or H/H drops below 7/21.         Hyponatremia  Acute:  - mild: Na 132  - gentle IVFH  - monitor  electrolytes        Hypokalemia  Acute:  - mild K: 3.0  - replacement lytes ordered  - monitor electrolytes closely       HLD (hyperlipidemia)   Patient is chronically on statin.will continue for now. Monitor clinically. Last LDL was   Lab Results   Component Value Date    LDLCALC 77 10/19/2021            Type 2 diabetes mellitus  Patient's FSGs are controlled on current medication regimen.  Last A1c reviewed-   Lab Results   Component Value Date    HGBA1C 7.2 01/31/2022     Most recent fingerstick glucose reviewed- No results for input(s): POCTGLUCOSE in the last 24 hours.  Current correctional scale  Low  Maintain anti-hyperglycemic dose as follows-   Antihyperglycemics (From admission, onward)            Start     Stop Route Frequency Ordered    04/24/22 2205  insulin aspart U-100 pen 0-5 Units         -- SubQ Before meals & nightly PRN 04/24/22 2108        Hold Oral hypoglycemics while patient is in the hospital.        HTN (hypertension)  Chronic, controlled.  Latest blood pressure and vitals reviewed-   Temp:  [98.5 °F (36.9 °C)]   Pulse:  [78-98]   Resp:  [18-20]   BP: (149-163)/(78-83)   SpO2:  [97 %-98 %] .   Home meds for hypertension were reviewed and noted below.   Hypertension Medications             enalapriL-hydrochlorothiazide (VASERETIC) 10-25 mg per tablet Take 1 tablet by mouth once daily.          While in the hospital, will manage blood pressure as follows; Continue home antihypertensive regimen    Will utilize p.r.n. blood pressure medication only if patient's blood pressure greater than  180/110 and he develops symptoms such as worsening chest pain or shortness of breath.          VTE Risk Mitigation (From admission, onward)         Ordered     IP VTE HIGH RISK PATIENT  Once         04/24/22 2108     Place sequential compression device  Until discontinued         04/24/22 2108     Reason for No Pharmacological VTE Prophylaxis  Once        Question:  Reasons:  Answer:  Risk of Bleeding     04/24/22 2108                   Liyah David PA-C  Department of Hospital Medicine   [facility]

## 2022-04-25 NOTE — SUBJECTIVE & OBJECTIVE
"Past Medical History:   Diagnosis Date    Hyperlipidemia     Hypertension     Prediabetes        Past Surgical History:   Procedure Laterality Date    CHOLECYSTECTOMY  2011    COLONOSCOPY      2018       Review of patient's allergies indicates:   Allergen Reactions    Penicillins Rash       No current facility-administered medications on file prior to encounter.     Current Outpatient Medications on File Prior to Encounter   Medication Sig    atorvastatin (LIPITOR) 20 MG tablet Take 1 tablet (20 mg total) by mouth once daily.    enalapriL-hydrochlorothiazide (VASERETIC) 10-25 mg per tablet Take 1 tablet by mouth once daily.    metFORMIN (GLUCOPHAGE) 1000 MG tablet Take 1 tablet (1,000 mg total) by mouth 2 (two) times daily with meals.    pen needle, diabetic (BD ULTRA-FINE MICRO PEN NEEDLE) 32 gauge x 1/4" Ndle 1 each by Misc.(Non-Drug; Combo Route) route once a week.    semaglutide (OZEMPIC) 1 mg/dose (4 mg/3 mL) Inject 1 mg into the skin every 7 days.    sildenafiL (VIAGRA) 100 MG tablet Take 1 tablet (100 mg total) by mouth daily as needed for Erectile Dysfunction.     Family History       Problem Relation (Age of Onset)    Heart disease Father          Tobacco Use    Smoking status: Current Every Day Smoker     Packs/day: 1.00     Years: 35.00     Pack years: 35.00    Smokeless tobacco: Never Used   Substance and Sexual Activity    Alcohol use: Not Currently    Drug use: Never    Sexual activity: Not on file     Review of Systems   Constitutional:  Positive for appetite change, chills, diaphoresis (night sweats) and fatigue. Negative for fever and unexpected weight change.   HENT:  Negative for dental problem, sore throat and trouble swallowing.    Eyes:  Negative for visual disturbance.   Respiratory:  Negative for cough, shortness of breath and wheezing.    Gastrointestinal:  Positive for abdominal pain. Negative for diarrhea, nausea and vomiting.   Genitourinary:  Positive for difficulty urinating (chronic). " Negative for dysuria and hematuria.   Musculoskeletal:  Positive for back pain.   Skin:  Negative for rash.   Neurological:  Negative for dizziness, light-headedness and headaches.   Psychiatric/Behavioral:  Negative for confusion.    Objective:     Vital Signs (Most Recent):  Temp: 98.5 °F (36.9 °C) (04/24/22 1608)  Pulse: 81 (04/24/22 1930)  Resp: 20 (04/24/22 2004)  BP: (!) 163/81 (04/24/22 1930)  SpO2: 97 % (04/24/22 1930)   Vital Signs (24h Range):  Temp:  [98.5 °F (36.9 °C)] 98.5 °F (36.9 °C)  Pulse:  [78-98] 81  Resp:  [18-20] 20  SpO2:  [97 %-98 %] 97 %  BP: (149-163)/(78-83) 163/81     Weight: 110.7 kg (244 lb)  Body mass index is 29.7 kg/m².    Physical Exam  Vitals and nursing note reviewed.   Constitutional:       General: He is not in acute distress.     Appearance: Normal appearance. He is obese. He is not ill-appearing.   HENT:      Head: Normocephalic and atraumatic.      Mouth/Throat:      Mouth: Mucous membranes are moist.      Pharynx: Oropharynx is clear.   Eyes:      Extraocular Movements: Extraocular movements intact.      Pupils: Pupils are equal, round, and reactive to light.   Cardiovascular:      Rate and Rhythm: Normal rate and regular rhythm.      Pulses: Normal pulses.      Heart sounds: Normal heart sounds.   Pulmonary:      Effort: Pulmonary effort is normal. No respiratory distress.      Breath sounds: Normal breath sounds. No wheezing, rhonchi or rales.   Abdominal:      General: Abdomen is flat. Bowel sounds are normal.      Palpations: Abdomen is soft.      Tenderness: There is abdominal tenderness in the right upper quadrant, epigastric area and left upper quadrant. There is no guarding or rebound.   Musculoskeletal:         General: Normal range of motion.      Cervical back: Normal range of motion and neck supple.   Skin:     General: Skin is warm.      Capillary Refill: Capillary refill takes 2 to 3 seconds.   Neurological:      General: No focal deficit present.      Mental  Status: He is alert and oriented to person, place, and time. Mental status is at baseline.   Psychiatric:         Mood and Affect: Mood normal.         Behavior: Behavior normal.         CRANIAL NERVES     CN III, IV, VI   Pupils are equal, round, and reactive to light.     Significant Labs: All pertinent labs within the past 24 hours have been reviewed.  CBC:   Recent Labs   Lab 04/24/22  1658   WBC 13.58*   HGB 12.9*   HCT 37.0*        CMP:   Recent Labs   Lab 04/24/22  1658   *   K 3.0*   CL 97   CO2 22*   *   BUN 11   CREATININE 0.8   CALCIUM 9.3   PROT 7.1   ALBUMIN 3.3*   BILITOT 0.5   ALKPHOS 79   AST 12   ALT 10   ANIONGAP 13   EGFRNONAA >60     Lipase:   Recent Labs   Lab 04/24/22  1658   LIPASE 19       Significant Imaging: I have reviewed all pertinent imaging results/findings within the past 24 hours.    CT abdomen with contrast:  Stranding/prominent thickening involving the colon near the splenic flexure with narrowing. Nearby nodular areas that may suggest lymphadenopathy. Findings are concerning for an obstructive mass. Infectious/inflammatory process may have a similar appearance. The more proximal colon is distended predominately with fluid. The cecum measures up to 9 cm and the transverse colon measures up to 7 cm. Stool is seen distal to the prominent colonic thickening.  Small amount of fluid within the abdomen /pelvis.  Some stranding adjacent to the pancreatic tail may be extending from the adjacent colonic region rather than findings related to pancreatitis which can be correlated with laboratory values.

## 2022-04-25 NOTE — ASSESSMENT & PLAN NOTE
Acute:  - consult GI appreciate recommendations  - Consult general surgery: appreciate recommendations   - NPO  - prn antiemetics ordered  - prn pain medication ordered   - IVFH

## 2022-04-25 NOTE — PROGRESS NOTES
"Hospital Medicine  [facility]  Daily Progress Note      Recent 24-Hour Events    Abdominal pain minimal. Still passing minimal gas. No vomiting. No CP, SOB, lightheadedness      PHYSICAL EXAM    Vitals: BP (!) 144/81 (BP Location: Right arm, Patient Position: Lying)   Pulse 95   Temp 97.4 °F (36.3 °C) (Skin)   Resp 18   Ht 6' 2" (1.88 m)   Wt 107.5 kg (237 lb)   SpO2 95%   BMI 30.43 kg/m²  Body mass index is 30.43 kg/m².    General: NAD, AO3  HEENT: PERRL, EOMI.   Cardiovascular: RRR  Respiratory: lungs CTAB  GI: abdomen S/mild diffuse tenderness without rebound or guarding/ND, +BS  Extremities: no edema  MSK: moves upper extremities.   Neuro/Psych: A&OX3. CNII-XII grossly intact.          Medications      Scheduled Meds:   clindamycin in D5W  900 mg Intravenous Once    nicotine  1 patch Transdermal Daily     Continuous Infusions:   sodium chloride 0.9% 100 mL/hr at 04/25/22 1046     PRN Meds:.acetaminophen, acetaminophen, dextrose 50%, dextrose 50%, glucagon (human recombinant), glucose, glucose, hydrALAZINE, HYDROcodone-acetaminophen, insulin aspart U-100, magnesium oxide, magnesium oxide, melatonin, morphine, naloxone, ondansetron, potassium bicarbonate, potassium bicarbonate, potassium bicarbonate, sodium chloride 0.9%        Labs & Diagnostics    Labs:      Recent Labs     04/24/22  1658 04/25/22  0504   WBC 13.58* 10.25   HGB 12.9* 12.5*   HCT 37.0* 37.2*   MCV 83 84    419   * 136   K 3.0* 3.6   CO2 22* 25   BUN 11 9   CREATININE 0.8 0.8   ANIONGAP 13 10   MG  --  1.9   PHOS  --  2.7   ALT 10 18   AST 12 17   ALKPHOS 79 99   BILITOT 0.5 0.7   PROT 7.1 6.8   LIPASE 19  --           EKG, labs and radiographs from the past 24h reviewed and personally interpreted.       Assessment & Plan    * Large bowel obstruction  Acute:  - consult GI appreciate recommendations  - Consult general surgery: appreciate recommendations; to surgery on 4/25  - NPO  - prn antiemetics ordered  - prn " pain medication ordered   [facility]           Anemia  Patient's anemia is currently controlled. Has not received any PRBCs to date..   Current CBC reviewed-         Lab Results   Component Value Date     HGB 12.9 (L) 04/24/2022     HCT 37.0 (L) 04/24/2022      Monitor serial CBC and transfuse if patient becomes hemodynamically unstable, symptomatic or H/H drops below 7/21.            Hyponatremia  Acute:  - mild: Na 132  - gentle IVFH  - monitor electrolytes           Hypokalemia  Acute:  - mild K: 3.0  - replacement lytes ordered  - monitor electrolytes closely         HLD (hyperlipidemia)   Patient is chronically on statin.will continue for now. Monitor clinically. Last LDL was         Lab Results   Component Value Date     LDLCALC 77 10/19/2021               Type 2 diabetes mellitus  Patient's FSGs are controlled on current medication regimen.  Last A1c reviewed-         Lab Results   Component Value Date     HGBA1C 7.2 01/31/2022      Most recent fingerstick glucose reviewed- No results for input(s): POCTGLUCOSE in the last 24 hours.  Current correctional scale  Low  Maintain anti-hyperglycemic dose as follows-              Antihyperglycemics (From admission, onward)                            Start     Stop Route Frequency Ordered     04/24/22 2205   insulin aspart U-100 pen 0-5 Units         -- SubQ Before meals & nightly PRN 04/24/22 2108          Hold Oral hypoglycemics while patient is in the hospital.           HTN (hypertension)  Chronic, controlled.  Latest blood pressure and vitals reviewed-   Temp:  [98.5 °F (36.9 °C)]   Pulse:  [78-98]   Resp:  [18-20]   BP: (149-163)/(78-83)   SpO2:  [97 %-98 %] .   Home meds for hypertension were reviewed and noted below.         Hypertension Medications                 enalapriL-hydrochlorothiazide (VASERETIC) 10-25 mg per tablet Take 1 tablet by mouth once daily.             While in the hospital, will manage blood pressure as follows; Continue home antihypertensive  regimen     Will utilize p.r.n. blood pressure medication only if patient's blood pressure greater than  180/110 and he develops symptoms such as worsening chest pain or shortness of breath.             Dispo/Code status: Full Code.     ?    Delaney Pan    Date: 4/25/2022

## 2022-04-25 NOTE — HPI
"Osman Li is a 56 y.o. y.o. male with a PMHx of HTN, T2DM, and HLD who presented to the ED with upper abdominal pain onset x 1 months, constant since 4/21. He describes that pain as an intermittent cramping sensation that lasts approximately 30 seconds. No radiation. Pain is exacerbated with "tensing up" and alleviated with repositioning. Pain is worst postprandial but constant.  He denies blood in his stools or dark stools. Pain was 9/10 at its worst and is currently "mild." He also reports chills night sweats, fatigue, difficulty urinating which he attributes to "old age" and back pain which he attributes to laying on the bed. He denies fever, WL, CP, SOB, and n/v/d. Patient reports colonoscopy approximately 5 years ago where he had 4 polyps removed. Denies FHx of colon cancer. Patient is a smoker with a 40 pack year history. ED workup was significant for anemia, elevated WBC, mild hyponatremia, mild hypokalemia, and CT abdomen concerning for an obstructive mass. The patient was placed in observation under the care of Hospital Medicine.   "

## 2022-04-25 NOTE — PLAN OF CARE
[facility]  Initial Discharge Assessment       Primary Care Provider: ANTONIO Emmanuel    Admission Diagnosis: Large bowel obstruction [K56.609]  Upper abdominal pain [R10.10]    Admission Date: 4/24/2022  Expected Discharge Date:       met with patient and spouse Jason at bedside to complete assessment. Demographics, PCP and insurance verified. Patient oriented xs 4. No current HH. No DME. No dialysis. Case management to assist with any additional needs upon discharge. No further needs to address at this time.    Discharge Barriers Identified: None    Payor: [employer] / Plan: [facility] CHOICE PLUS / Product Type: Commercial /     Extended Emergency Contact Information  Primary Emergency Contact: JASON REINOSO  Address: [address] [place] [address] [place]  Mobile Phone: [phone]  Relation: Spouse  Preferred language: English   needed? No    Discharge Plan A: Home with family  Discharge Plan B: Home      [id number] - [place], [place] - [address]  [address]  [place] [address]  Phone: [phone] Fax: [phone]    [id number] - [place], [place] - [address]  [address]  [facility] [address]  Phone: [phone] Fax: [phone]      Initial Assessment (most recent)     Adult Discharge Assessment - 04/25/22 1536        Discharge Assessment    Assessment Type Discharge Planning Assessment     Confirmed/corrected address, phone number and insurance Yes     Confirmed Demographics Correct on Facesheet     Source of Information patient;family     Does patient/caregiver understand observation status Yes     Lives With spouse     Do you expect to return to your current living situation? Yes     Do you have help at home or someone to help you manage your care at home? Yes     Who are your caregiver(s) and their phone number(s)? JASON REINOSO (Spouse)   [phone] (Mobile)     Prior to  hospitilization cognitive status: Alert/Oriented     Current cognitive status: Alert/Oriented     Walking or Climbing Stairs Difficulty none     Dressing/Bathing Difficulty none     Home Accessibility wheelchair accessible     Equipment Currently Used at Home none     Patient currently being followed by outpatient case management? No     Do you currently have service(s) that help you manage your care at home? No     Do you take prescription medications? Yes     Do you have prescription coverage? Yes     Do you have any problems affording any of your prescribed medications? No     Is the patient taking medications as prescribed? yes     Who is going to help you get home at discharge? JASON REINOSO (Spouse)   [phone] (Mobile)     How do you get to doctors appointments? car, drives self     Are you on dialysis? No     Do you take coumadin? No     Discharge Plan A Home with family     Discharge Plan B Home     DME Needed Upon Discharge  none     Discharge Plan discussed with: Patient;Spouse/sig other     Name(s) and Number(s) JASON REINOSO (Spouse)   [phone] (Mobile)     Discharge Barriers Identified None        Relationship/Environment    Name(s) of Who Lives With Patient JASON REINOSO (Spouse)   [phone] (Mobile)

## 2022-04-25 NOTE — CONSULTS
"[facility]  General Surgery  Consult Note    Consults  Subjective:     Chief Complaint/Reason for Admission: LBO    History of Present Illness:  This is a 56-year-old male who who presented with about a month of abdominal distension, constipation symptoms.  Over the past few days, pain became worse prompting ED presentation.  In the ER, he had a CT scan that noted almost 10 cm of colonic dilation related to an obstruction near the splenic flexure.  Patient's last colonoscopy was approximately 5 years ago with a few polyps.  No current facility-administered medications on file prior to encounter.     Current Outpatient Medications on File Prior to Encounter   Medication Sig    atorvastatin (LIPITOR) 20 MG tablet Take 1 tablet (20 mg total) by mouth once daily.    enalapriL-hydrochlorothiazide (VASERETIC) 10-25 mg per tablet Take 1 tablet by mouth once daily.    metFORMIN (GLUCOPHAGE) 1000 MG tablet Take 1 tablet (1,000 mg total) by mouth 2 (two) times daily with meals.    pen needle, diabetic (BD ULTRA-FINE MICRO PEN NEEDLE) 32 gauge x 1/4" Ndle 1 each by Misc.(Non-Drug; Combo Route) route once a week.    semaglutide (OZEMPIC) 1 mg/dose (4 mg/3 mL) Inject 1 mg into the skin every 7 days.    sildenafiL (VIAGRA) 100 MG tablet Take 1 tablet (100 mg total) by mouth daily as needed for Erectile Dysfunction.       Review of patient's allergies indicates:   Allergen Reactions    Penicillins Rash       Past Medical History:   Diagnosis Date    DM (diabetes mellitus)     Hyperlipidemia     Hypertension     Prediabetes      Past Surgical History:   Procedure Laterality Date    CHOLECYSTECTOMY  2011    COLONOSCOPY      2018     Family History     Problem Relation (Age of Onset)    Heart disease Father        Tobacco Use    Smoking status: Current Every Day Smoker     Packs/day: 1.00     Years: 35.00     Pack years: 35.00    Smokeless tobacco: Never Used   Substance and Sexual Activity    " Alcohol use: Not Currently    Drug use: Never    Sexual activity: Not on file     Review of Systems   Constitutional: Negative.  Negative for fatigue and fever.   HENT: Negative.    Eyes: Negative.    Respiratory: Negative.  Negative for shortness of breath.    Cardiovascular: Negative.  Negative for chest pain.   Gastrointestinal: Positive for abdominal distention and abdominal pain.   Endocrine: Negative.    Genitourinary: Negative.    Musculoskeletal: Negative.    Skin: Negative.    Allergic/Immunologic: Negative.    Neurological: Negative.    Hematological: Negative.    Psychiatric/Behavioral: Negative.      Objective:     Vital Signs (Most Recent):  Temp: 98.3 °F (36.8 °C) (04/25/22 0719)  Pulse: 88 (04/25/22 0719)  Resp: 18 (04/25/22 0814)  BP: 135/81 (04/25/22 0811)  SpO2: 96 % (04/25/22 0719) Vital Signs (24h Range):  Temp:  [97.3 °F (36.3 °C)-98.5 °F (36.9 °C)] 98.3 °F (36.8 °C)  Pulse:  [75-98] 88  Resp:  [17-20] 18  SpO2:  [96 %-98 %] 96 %  BP: (135-163)/(76-83) 135/81     Weight: 107.6 kg (237 lb 3.4 oz)  Body mass index is 30.46 kg/m².    No intake or output data in the 24 hours ending 04/25/22 1055    Physical Exam  Constitutional:       General: He is not in acute distress.     Appearance: Normal appearance. He is not ill-appearing, toxic-appearing or diaphoretic.   HENT:      Head: Normocephalic.      Nose: Nose normal.   Eyes:      Conjunctiva/sclera: Conjunctivae normal.   Cardiovascular:      Rate and Rhythm: Normal rate and regular rhythm.   Pulmonary:      Effort: Pulmonary effort is normal.   Abdominal:      General: There is distension.      Palpations: Abdomen is soft.      Tenderness: There is abdominal tenderness.   Musculoskeletal:         General: Normal range of motion.      Cervical back: Normal range of motion.   Skin:     General: Skin is warm.   Neurological:      General: No focal deficit present.      Mental Status: He is alert.   Psychiatric:         Mood and Affect: Mood  normal.         Significant Labs:  CBC:   Recent Labs   Lab 04/25/22  0504   WBC 10.25   RBC 4.42*   HGB 12.5*   HCT 37.2*      MCV 84   MCH 28.3   MCHC 33.6     CMP:   Recent Labs   Lab 04/25/22  0504   *   CALCIUM 8.9   ALBUMIN 3.1*   PROT 6.8      K 3.6   CO2 25      BUN 9   CREATININE 0.8   ALKPHOS 99   ALT 18   AST 17   BILITOT 0.7     Coagulation: No results for input(s): PT, INR, APTT in the last 48 hours.  Lactic Acid: No results for input(s): LACTATE in the last 48 hours.    Significant Diagnostics:  CT has been reviewed.  Dilation of the colon up to almost 10 cm near the cecum.  There is an obstructing process near the splenic flexure.  The distal colon is decompressed.    Assessment/Plan:     Active Diagnoses:    Diagnosis Date Noted POA    PRINCIPAL PROBLEM:  Large bowel obstruction [K56.609] 04/24/2022 Yes    HTN (hypertension) [I10] 04/24/2022 Yes    Type 2 diabetes mellitus [E11.9] 04/24/2022 Yes    HLD (hyperlipidemia) [E78.5] 04/24/2022 Yes    Hypokalemia [E87.6] 04/24/2022 Yes    Hyponatremia [E87.1] 04/24/2022 Yes    Anemia [D64.9] 04/24/2022 Yes      Problems Resolved During this Admission:     56-year-old male whose last colonoscopy was approximately 5 years ago with polyps who presents with an obstructing lesion near the splenic flexure of the colon.  He has significant upstream dilation almost to 10 cm at the cecum.  The ileocecal valve appears competent on CT scan.  Patient denies a history bouts of diverticulitis.  He did have a mild leukocytosis on presentation.  That has normalized today.    Overall, malignant obstruction is high on the differential.  Given the large bowel obstruction with upstream dilation, recommend operative intervention with likely extended left colectomy with colostomy.  Patient has been NPO  Plan for operative intervention this afternoon.  Pending GI evaluation.      Thank you for your consult. I will follow-up with patient. Please  contact us if you have any additional questions.    Carlton Waddell MD  General Surgery  [facility]

## 2022-04-25 NOTE — ED NOTES
Report given to CAMRYN Martines.Hardik Ann    Monitor room notified of telebox [id number] placement.Hardik Ann

## 2022-04-25 NOTE — PLAN OF CARE
Plan of care reviewed with patient, patient states understanding. Patient A&Ox4. Patients IV clean, dry, intact and infusing NS @ 75mL/hr. Patient on RA. C/o pain controlled by PRN medication. Patient ambulatory. Bed in low, locked position with call button within reach.

## 2022-04-25 NOTE — ANESTHESIA PROCEDURE NOTES
Intubation    Date/Time: 4/25/2022 4:52 PM  Performed by: Carlos Maxwell CRNA  Authorized by: Tommy Almendarez MD     Intubation:     Induction:  Intravenous    Intubated:  Postinduction    Mask Ventilation:  Easy mask    Attempts:  1    Attempted By:  CRNA    Method of Intubation:  Direct    Blade:  [place] 2    Laryngeal View Grade: Grade I - full view of cords      Difficult Airway Encountered?: No      Complications:  None    Airway Device:  Oral endotracheal tube    Airway Device Size:  7.5    Style/Cuff Inflation:  Cuffed (inflated to minimal occlusive pressure)    Inflation Amount (mL):  7    Tube secured:  23    Secured at:  The lips    Placement Verified By:  Capnometry    Complicating Factors:  None    Findings Post-Intubation:  BS equal bilateral

## 2022-04-25 NOTE — ASSESSMENT & PLAN NOTE
Patient's anemia is currently controlled. Has not received any PRBCs to date..   Current CBC reviewed-   Lab Results   Component Value Date    HGB 12.9 (L) 04/24/2022    HCT 37.0 (L) 04/24/2022     Monitor serial CBC and transfuse if patient becomes hemodynamically unstable, symptomatic or H/H drops below 7/21.

## 2022-04-25 NOTE — ASSESSMENT & PLAN NOTE
Chronic, controlled.  Latest blood pressure and vitals reviewed-   Temp:  [98.5 °F (36.9 °C)]   Pulse:  [78-98]   Resp:  [18-20]   BP: (149-163)/(78-83)   SpO2:  [97 %-98 %] .   Home meds for hypertension were reviewed and noted below.   Hypertension Medications             enalapriL-hydrochlorothiazide (VASERETIC) 10-25 mg per tablet Take 1 tablet by mouth once daily.          While in the hospital, will manage blood pressure as follows; Continue home antihypertensive regimen    Will utilize p.r.n. blood pressure medication only if patient's blood pressure greater than  180/110 and he develops symptoms such as worsening chest pain or shortness of breath.

## 2022-04-25 NOTE — NURSING
Gen Sx consult called into Dr. Waddell for  LBO with CT concerning for obstructive mass     GI consult called into Dr. Estrada for LBO with CT concerning for obstructive mass

## 2022-04-25 NOTE — ANESTHESIA PREPROCEDURE EVALUATION
04/25/2022  Osman Li is a 56 y.o., male.      Pre-op Assessment    I have reviewed the Patient Summary Reports.     I have reviewed the Nursing Notes. I have reviewed the NPO Status.   I have reviewed the Medications.     Review of Systems  Anesthesia Hx:  No problems with previous Anesthesia    Social:  Smoker    Hematology/Oncology:     Oncology Normal    -- Anemia:   EENT/Dental:EENT/Dental Normal   Cardiovascular:   Hypertension hyperlipidemia    Pulmonary:  Pulmonary Normal    Hepatic/GI:   Large bowel obstruction    Endocrine:   Diabetes, type 2        Physical Exam  General: Well nourished, Cooperative, Alert and Oriented    Airway:  Mallampati: I   Mouth Opening: Normal  TM Distance: Normal  Neck ROM: Normal ROM    Dental:  Intact    Chest/Lungs:  Clear to auscultation, Normal Respiratory Rate    Heart:  Rate: Normal  Rhythm: Regular Rhythm        Anesthesia Plan  Type of Anesthesia, risks & benefits discussed:    Anesthesia Type: Gen ETT  Intra-op Monitoring Plan: Standard ASA Monitors  Post Op Pain Control Plan: multimodal analgesia and IV/PO Opioids PRN  Induction:  IV  Airway Plan: Direct, Post-Induction  Informed Consent: Informed consent signed with the Patient and all parties understand the risks and agree with anesthesia plan.  All questions answered.   ASA Score: 2  Day of Surgery Review of History & Physical: H&P Update referred to the surgeon/provider.    Ready For Surgery From Anesthesia Perspective.     .

## 2022-04-25 NOTE — ASSESSMENT & PLAN NOTE
Patient's FSGs are controlled on current medication regimen.  Last A1c reviewed-   Lab Results   Component Value Date    HGBA1C 7.2 01/31/2022     Most recent fingerstick glucose reviewed- No results for input(s): POCTGLUCOSE in the last 24 hours.  Current correctional scale  Low  Maintain anti-hyperglycemic dose as follows-   Antihyperglycemics (From admission, onward)            Start     Stop Route Frequency Ordered    04/24/22 2205  insulin aspart U-100 pen 0-5 Units         -- SubQ Before meals & nightly PRN 04/24/22 2108        Hold Oral hypoglycemics while patient is in the hospital.

## 2022-04-26 LAB
ALBUMIN SERPL BCP-MCNC: 2.8 G/DL (ref 3.5–5.2)
ALP SERPL-CCNC: 73 U/L (ref 55–135)
ALT SERPL W/O P-5'-P-CCNC: 18 U/L (ref 10–44)
ANION GAP SERPL CALC-SCNC: 9 MMOL/L (ref 8–16)
AST SERPL-CCNC: 18 U/L (ref 10–40)
BASOPHILS # BLD AUTO: 0.06 K/UL (ref 0–0.2)
BASOPHILS NFR BLD: 0.5 % (ref 0–1.9)
BILIRUB SERPL-MCNC: 0.8 MG/DL (ref 0.1–1)
BUN SERPL-MCNC: 14 MG/DL (ref 6–20)
CALCIUM SERPL-MCNC: 8.1 MG/DL (ref 8.7–10.5)
CHLORIDE SERPL-SCNC: 101 MMOL/L (ref 95–110)
CO2 SERPL-SCNC: 26 MMOL/L (ref 23–29)
CREAT SERPL-MCNC: 1 MG/DL (ref 0.5–1.4)
DIFFERENTIAL METHOD: ABNORMAL
EOSINOPHIL # BLD AUTO: 0 K/UL (ref 0–0.5)
EOSINOPHIL NFR BLD: 0.2 % (ref 0–8)
ERYTHROCYTE [DISTWIDTH] IN BLOOD BY AUTOMATED COUNT: 12.3 % (ref 11.5–14.5)
EST. GFR  (AFRICAN AMERICAN): >60 ML/MIN/1.73 M^2
EST. GFR  (NON AFRICAN AMERICAN): >60 ML/MIN/1.73 M^2
GLUCOSE SERPL-MCNC: 199 MG/DL (ref 70–110)
HCT VFR BLD AUTO: 34.6 % (ref 40–54)
HGB BLD-MCNC: 11.6 G/DL (ref 14–18)
IMM GRANULOCYTES # BLD AUTO: 0.05 K/UL (ref 0–0.04)
IMM GRANULOCYTES NFR BLD AUTO: 0.4 % (ref 0–0.5)
LYMPHOCYTES # BLD AUTO: 1 K/UL (ref 1–4.8)
LYMPHOCYTES NFR BLD: 8.2 % (ref 18–48)
MAGNESIUM SERPL-MCNC: 1.9 MG/DL (ref 1.6–2.6)
MCH RBC QN AUTO: 28.2 PG (ref 27–31)
MCHC RBC AUTO-ENTMCNC: 33.5 G/DL (ref 32–36)
MCV RBC AUTO: 84 FL (ref 82–98)
MONOCYTES # BLD AUTO: 0.8 K/UL (ref 0.3–1)
MONOCYTES NFR BLD: 6.4 % (ref 4–15)
NEUTROPHILS # BLD AUTO: 10.4 K/UL (ref 1.8–7.7)
NEUTROPHILS NFR BLD: 84.3 % (ref 38–73)
NRBC BLD-RTO: 0 /100 WBC
PHOSPHATE SERPL-MCNC: 3.2 MG/DL (ref 2.7–4.5)
PLATELET # BLD AUTO: 433 K/UL (ref 150–450)
PMV BLD AUTO: 8.8 FL (ref 9.2–12.9)
POTASSIUM SERPL-SCNC: 4.7 MMOL/L (ref 3.5–5.1)
PROT SERPL-MCNC: 5.9 G/DL (ref 6–8.4)
RBC # BLD AUTO: 4.11 M/UL (ref 4.6–6.2)
SODIUM SERPL-SCNC: 136 MMOL/L (ref 136–145)
WBC # BLD AUTO: 12.38 K/UL (ref 3.9–12.7)

## 2022-04-26 PROCEDURE — 85025 COMPLETE CBC W/AUTO DIFF WBC: CPT

## 2022-04-26 PROCEDURE — 25000003 PHARM REV CODE 250: Performed by: INTERNAL MEDICINE

## 2022-04-26 PROCEDURE — 27000221 HC OXYGEN, UP TO 24 HOURS

## 2022-04-26 PROCEDURE — 99232 PR SUBSEQUENT HOSPITAL CARE,LEVL II: ICD-10-PCS | Mod: ,,, | Performed by: INTERNAL MEDICINE

## 2022-04-26 PROCEDURE — 12000002 HC ACUTE/MED SURGE SEMI-PRIVATE ROOM

## 2022-04-26 PROCEDURE — 25000003 PHARM REV CODE 250

## 2022-04-26 PROCEDURE — 80053 COMPREHEN METABOLIC PANEL: CPT

## 2022-04-26 PROCEDURE — 36415 COLL VENOUS BLD VENIPUNCTURE: CPT

## 2022-04-26 PROCEDURE — 63600175 PHARM REV CODE 636 W HCPCS: Performed by: INTERNAL MEDICINE

## 2022-04-26 PROCEDURE — S4991 NICOTINE PATCH NONLEGEND: HCPCS

## 2022-04-26 PROCEDURE — 99232 SBSQ HOSP IP/OBS MODERATE 35: CPT | Mod: ,,, | Performed by: INTERNAL MEDICINE

## 2022-04-26 PROCEDURE — 63600175 PHARM REV CODE 636 W HCPCS

## 2022-04-26 PROCEDURE — 94761 N-INVAS EAR/PLS OXIMETRY MLT: CPT

## 2022-04-26 PROCEDURE — 83735 ASSAY OF MAGNESIUM: CPT

## 2022-04-26 PROCEDURE — 25000003 PHARM REV CODE 250: Performed by: STUDENT IN AN ORGANIZED HEALTH CARE EDUCATION/TRAINING PROGRAM

## 2022-04-26 PROCEDURE — 63600175 PHARM REV CODE 636 W HCPCS: Performed by: STUDENT IN AN ORGANIZED HEALTH CARE EDUCATION/TRAINING PROGRAM

## 2022-04-26 PROCEDURE — 84100 ASSAY OF PHOSPHORUS: CPT | Performed by: INTERNAL MEDICINE

## 2022-04-26 RX ORDER — OXYCODONE HYDROCHLORIDE 5 MG/1
5 TABLET ORAL EVERY 4 HOURS PRN
Status: DISCONTINUED | OUTPATIENT
Start: 2022-04-26 | End: 2022-04-30

## 2022-04-26 RX ORDER — GABAPENTIN 300 MG/1
300 CAPSULE ORAL 3 TIMES DAILY
Status: DISCONTINUED | OUTPATIENT
Start: 2022-04-26 | End: 2022-05-01

## 2022-04-26 RX ORDER — ENOXAPARIN SODIUM 100 MG/ML
40 INJECTION SUBCUTANEOUS EVERY 24 HOURS
Status: DISCONTINUED | OUTPATIENT
Start: 2022-04-26 | End: 2022-05-02 | Stop reason: HOSPADM

## 2022-04-26 RX ORDER — HYDROMORPHONE HYDROCHLORIDE 1 MG/ML
1.5 INJECTION, SOLUTION INTRAMUSCULAR; INTRAVENOUS; SUBCUTANEOUS
Status: DISCONTINUED | OUTPATIENT
Start: 2022-04-26 | End: 2022-04-28

## 2022-04-26 RX ORDER — HYDROMORPHONE HYDROCHLORIDE 1 MG/ML
0.5 INJECTION, SOLUTION INTRAMUSCULAR; INTRAVENOUS; SUBCUTANEOUS ONCE
Status: COMPLETED | OUTPATIENT
Start: 2022-04-26 | End: 2022-04-26

## 2022-04-26 RX ORDER — HYDROMORPHONE HYDROCHLORIDE 1 MG/ML
1 INJECTION, SOLUTION INTRAMUSCULAR; INTRAVENOUS; SUBCUTANEOUS
Status: DISCONTINUED | OUTPATIENT
Start: 2022-04-26 | End: 2022-04-26

## 2022-04-26 RX ORDER — SODIUM CHLORIDE 9 MG/ML
INJECTION, SOLUTION INTRAVENOUS CONTINUOUS
Status: DISCONTINUED | OUTPATIENT
Start: 2022-04-26 | End: 2022-05-02 | Stop reason: HOSPADM

## 2022-04-26 RX ORDER — ACETAMINOPHEN 325 MG/1
650 TABLET ORAL EVERY 6 HOURS
Status: DISCONTINUED | OUTPATIENT
Start: 2022-04-26 | End: 2022-05-02 | Stop reason: HOSPADM

## 2022-04-26 RX ORDER — METHOCARBAMOL 500 MG/1
500 TABLET, FILM COATED ORAL 4 TIMES DAILY
Status: DISCONTINUED | OUTPATIENT
Start: 2022-04-26 | End: 2022-05-02 | Stop reason: HOSPADM

## 2022-04-26 RX ORDER — HYDROMORPHONE HYDROCHLORIDE 1 MG/ML
1 INJECTION, SOLUTION INTRAMUSCULAR; INTRAVENOUS; SUBCUTANEOUS EVERY 6 HOURS PRN
Status: DISCONTINUED | OUTPATIENT
Start: 2022-04-26 | End: 2022-04-26

## 2022-04-26 RX ORDER — OXYCODONE HYDROCHLORIDE 10 MG/1
10 TABLET ORAL EVERY 4 HOURS PRN
Status: DISCONTINUED | OUTPATIENT
Start: 2022-04-26 | End: 2022-04-30

## 2022-04-26 RX ORDER — POLYETHYLENE GLYCOL 3350 17 G/17G
17 POWDER, FOR SOLUTION ORAL DAILY
Status: DISCONTINUED | OUTPATIENT
Start: 2022-04-27 | End: 2022-05-02 | Stop reason: HOSPADM

## 2022-04-26 RX ADMIN — ENOXAPARIN SODIUM 40 MG: 40 INJECTION SUBCUTANEOUS at 06:04

## 2022-04-26 RX ADMIN — NICOTINE 1 PATCH: 14 PATCH, EXTENDED RELEASE TRANSDERMAL at 08:04

## 2022-04-26 RX ADMIN — METHOCARBAMOL TABLETS 500 MG: 500 TABLET, COATED ORAL at 02:04

## 2022-04-26 RX ADMIN — METHOCARBAMOL TABLETS 500 MG: 500 TABLET, COATED ORAL at 09:04

## 2022-04-26 RX ADMIN — MUPIROCIN: 20 OINTMENT TOPICAL at 09:04

## 2022-04-26 RX ADMIN — HYDROMORPHONE HYDROCHLORIDE 1.5 MG: 1 INJECTION, SOLUTION INTRAMUSCULAR; INTRAVENOUS; SUBCUTANEOUS at 06:04

## 2022-04-26 RX ADMIN — METHOCARBAMOL TABLETS 500 MG: 500 TABLET, COATED ORAL at 06:04

## 2022-04-26 RX ADMIN — SODIUM CHLORIDE: 0.9 INJECTION, SOLUTION INTRAVENOUS at 10:04

## 2022-04-26 RX ADMIN — METHOCARBAMOL TABLETS 500 MG: 500 TABLET, COATED ORAL at 10:04

## 2022-04-26 RX ADMIN — HYDROMORPHONE HYDROCHLORIDE 1.5 MG: 1 INJECTION, SOLUTION INTRAMUSCULAR; INTRAVENOUS; SUBCUTANEOUS at 02:04

## 2022-04-26 RX ADMIN — HYDROMORPHONE HYDROCHLORIDE 1 MG: 1 INJECTION, SOLUTION INTRAMUSCULAR; INTRAVENOUS; SUBCUTANEOUS at 08:04

## 2022-04-26 RX ADMIN — GABAPENTIN 300 MG: 300 CAPSULE ORAL at 02:04

## 2022-04-26 RX ADMIN — HYDROMORPHONE HYDROCHLORIDE 0.5 MG: 1 INJECTION, SOLUTION INTRAMUSCULAR; INTRAVENOUS; SUBCUTANEOUS at 03:04

## 2022-04-26 RX ADMIN — MUPIROCIN: 20 OINTMENT TOPICAL at 08:04

## 2022-04-26 RX ADMIN — MORPHINE SULFATE 4 MG: 4 INJECTION INTRAVENOUS at 06:04

## 2022-04-26 RX ADMIN — GABAPENTIN 300 MG: 300 CAPSULE ORAL at 10:04

## 2022-04-26 RX ADMIN — GABAPENTIN 300 MG: 300 CAPSULE ORAL at 09:04

## 2022-04-26 RX ADMIN — ACETAMINOPHEN 650 MG: 325 TABLET ORAL at 11:04

## 2022-04-26 RX ADMIN — ACETAMINOPHEN 650 MG: 325 TABLET ORAL at 06:04

## 2022-04-26 RX ADMIN — HYDROMORPHONE HYDROCHLORIDE 1.5 MG: 1 INJECTION, SOLUTION INTRAMUSCULAR; INTRAVENOUS; SUBCUTANEOUS at 09:04

## 2022-04-26 RX ADMIN — SODIUM CHLORIDE, SODIUM LACTATE, POTASSIUM CHLORIDE, AND CALCIUM CHLORIDE 1000 ML: .6; .31; .03; .02 INJECTION, SOLUTION INTRAVENOUS at 10:04

## 2022-04-26 RX ADMIN — OXYCODONE HYDROCHLORIDE 10 MG: 10 TABLET ORAL at 10:04

## 2022-04-26 NOTE — PROGRESS NOTES
"Hospital Medicine  [facility]  Daily Progress Note      Recent 24-Hour Events    Abdominal pain uncontrolled this AM; improved later with dilaudid.. No vomiting. No CP, SOB, lightheadedness      PHYSICAL EXAM    Vitals: /69 (BP Location: Right arm, Patient Position: Lying)   Pulse 102   Temp 99.2 °F (37.3 °C) (Oral)   Resp 16   Ht 6' 2" (1.88 m)   Wt 107.5 kg (237 lb)   SpO2 95%   BMI 30.43 kg/m²  Body mass index is 30.43 kg/m².    General: NAD, AO3  HEENT: PERRL, EOMI.   Cardiovascular: RRR  Respiratory: lungs CTAB  GI: abdomen S/mild diffuse tenderness without rebound or guarding/ND, +BS. Colostomy in place  Extremities: no edema  MSK: moves upper extremities.   Neuro/Psych: A&OX3. CNII-XII grossly intact.          Medications      Scheduled Meds:   acetaminophen  650 mg Oral Q6H    enoxaparin  40 mg Subcutaneous Daily    gabapentin  300 mg Oral TID    lactated ringers  1,000 mL Intravenous Once    methocarbamoL  500 mg Oral QID    mupirocin   Nasal BID    nicotine  1 patch Transdermal Daily    [START ON 4/27/2022] polyethylene glycol  17 g Oral Daily     Continuous Infusions:   sodium chloride 0.9%       PRN Meds:.dextrose 50%, dextrose 50%, glucagon (human recombinant), hydrALAZINE, HYDROmorphone, insulin aspart U-100, naloxone, ondansetron, oxyCODONE, oxyCODONE, sodium chloride 0.9%        Labs & Diagnostics    Labs:      Recent Labs     04/24/22  1658 04/25/22  0504 04/26/22  0536   WBC 13.58* 10.25 12.38   HGB 12.9* 12.5* 11.6*   HCT 37.0* 37.2* 34.6*   MCV 83 84 84    419 433   * 136 136   K 3.0* 3.6 4.7   CO2 22* 25 26   BUN 11 9 14   CREATININE 0.8 0.8 1.0   ANIONGAP 13 10 9   MG  --  1.9 1.9   PHOS  --  2.7 3.2   ALT 10 18 18   AST 12 17 18   ALKPHOS 79 99 73   BILITOT 0.5 0.7 0.8   PROT 7.1 6.8 5.9*   LIPASE 19  --   --           EKG, labs and radiographs from the past 24h reviewed and personally interpreted.       Assessment & Plan    * Large bowel " obstruction  Acute:  -s/p colectomy, splenectomy with colostomy creation  - consult GI appreciate recommendations  - Consult general surgery: appreciate recommendations;  - prn antiemetics ordered  - prn pain medication ordered   - IVFH  -awaiting return of bowel function   -ID consulted re: vaccinations following splenectomy            Anemia  Patient's anemia is currently controlled. Has not received any PRBCs to date..   Current CBC reviewed-         Lab Results   Component Value Date     HGB 12.9 (L) 04/24/2022     HCT 37.0 (L) 04/24/2022      Monitor serial CBC and transfuse if patient becomes hemodynamically unstable, symptomatic or H/H drops below 7/21.            Hyponatremia  Acute:  - mild: Na 132  - gentle IVFH  - monitor electrolytes           Hypokalemia  Acute:  - mild K: 3.0  - replacement lytes ordered  - monitor electrolytes closely         HLD (hyperlipidemia)   Patient is chronically on statin.will continue for now. Monitor clinically. Last LDL was         Lab Results   Component Value Date     LDLCALC 77 10/19/2021               Type 2 diabetes mellitus  Patient's FSGs are controlled on current medication regimen.  Last A1c reviewed-         Lab Results   Component Value Date     HGBA1C 7.2 01/31/2022      Most recent fingerstick glucose reviewed- No results for input(s): POCTGLUCOSE in the last 24 hours.  Current correctional scale  Low  Maintain anti-hyperglycemic dose as follows-              Antihyperglycemics (From admission, onward)                            Start     Stop Route Frequency Ordered     04/24/22 2205   insulin aspart U-100 pen 0-5 Units         -- SubQ Before meals & nightly PRN 04/24/22 2108          Hold Oral hypoglycemics while patient is in the hospital.           HTN (hypertension)  Chronic, controlled.  Latest blood pressure and vitals reviewed-   Temp:  [98.5 °F (36.9 °C)]   Pulse:  [78-98]   Resp:  [18-20]   BP: (149-163)/(78-83)   SpO2:  [97 %-98 %] .   Home meds for  hypertension were reviewed and noted below.         Hypertension Medications                 enalapriL-hydrochlorothiazide (VASERETIC) 10-25 mg per tablet Take 1 tablet by mouth once daily.             While in the hospital, will manage blood pressure as follows; Continue home antihypertensive regimen     Will utilize p.r.n. blood pressure medication only if patient's blood pressure greater than  180/110 and he develops symptoms such as worsening chest pain or shortness of breath.             Dispo/Code status: Full Code.     ?    Delaney Pan    Date: 4/26/2022

## 2022-04-26 NOTE — CONSULTS
"[facility]     CC: Abdominal pain    HPI 56 y.o. male with a PMHx of HTN, T2DM, and HLD who presented to the ED with upper abdominal pain onset x 1 months, constant since 4/21. He describes that pain as an intermittent cramping sensation that lasts approximately 30 seconds. No radiation. Pain is exacerbated with "tensing up" and alleviated with repositioning. Pain is worst postprandial but constant.  He denies blood in his stools or dark stools. Pain was 9/10 at its worst and is currently "mild." He also reports chills night sweats, fatigue, difficulty urinating which he attributes to "old age" and back pain which he attributes to laying on the bed. He denies fever, WL, CP, SOB, and n/v/d. Patient reports colonoscopy approximately 5 years ago where he had 4 polyps removed. Denies FHx of colon cancer. Patient is a smoker with a 40 pack year history. ED workup was significant for anemia, elevated WBC, mild hyponatremia, mild hypokalemia, and CT abdomen concerning for an obstructive mass. The patient was placed in observation under the care of Hospital Medicine.     FURTHER HISTORY:  Above obtained from independent review of records from admitting provider as well as from direct discussion with Dr. Waddell who states that plan is for exploratory laparotomy this afternoon for obstruction.  In addition, on my interview, I note the following:  Patient complains of abdominal pain, upper abdomen, crampy, onset about a month ago, constant, ongoing, with no alleviating/exacerbating factors.  He denies emesis, bleeding, or change in bowel habits.  No family history of colon cancer.  States that his last colonoscopy was in 2018 in [place] and there were a few small polyps at that time.      Past Medical History:   Diagnosis Date    DM (diabetes mellitus)     Hyperlipidemia     Hypertension     Prediabetes        Past Surgical History:   Procedure Laterality Date    CHOLECYSTECTOMY  2011    COLONOSCOPY      2018 " "      Social History     Tobacco Use    Smoking status: Current Every Day Smoker     Packs/day: 1.00     Years: 35.00     Pack years: 35.00    Smokeless tobacco: Never Used   Substance Use Topics    Alcohol use: Not Currently    Drug use: Never       Family History   Problem Relation Age of Onset    Heart disease Father        Review of Systems  General ROS: negative for - chills, fever or weight loss  Psychological ROS: negative for - hallucination, depression or suicidal ideation  Ophthalmic ROS: negative for - blurry vision, photophobia or eye pain  ENT ROS: negative for - epistaxis, sore throat or rhinorrhea  Respiratory ROS: no cough, shortness of breath, or wheezing  Cardiovascular ROS: no chest pain or dyspnea on exertion  Gastrointestinal ROS: + abdominal pain  Genito-Urinary ROS: no dysuria, trouble voiding, or hematuria  Musculoskeletal ROS: negative for - arthralgia, myalgia, weakness  Neurological ROS: no syncope or seizures; no ataxia  Dermatological ROS: negative for pruritis, rash and jaundice    Physical Examination  BP (!) 144/81 (BP Location: Right arm, Patient Position: Lying)   Pulse 95   Temp 97.4 °F (36.3 °C) (Skin)   Resp 18   Ht 6' 2" (1.88 m)   Wt 107.5 kg (237 lb)   SpO2 95%   BMI 30.43 kg/m²   General appearance: alert, cooperative, no distress  HENT: Normocephalic, atraumatic, neck symmetrical, no nasal discharge   Eyes: conjunctivae/corneas clear, PERRL, EOM's intact, sclera anicteric  Lungs: clear to auscultation bilaterally, no dullness to percussion bilaterally, symmetric expansion, breathing unlabored  Heart: regular rate and rhythm without rub; no displacement of the PMI   Abdomen: + TTP in upper abdomen  Extremities: extremities symmetric; no clubbing, cyanosis, or edema  Integument: Skin color, texture, turgor normal; no rashes; hair distrubution normal, no jaundice  Neurologic: Alert and oriented X 3, no focal sensory or motor neurologic deficits  Psychiatric: no " pressured speech; normal affect; no evidence of impaired cognition, no anxiety/depression     Labs:  Lab Results   Component Value Date    WBC 10.25 04/25/2022    HGB 12.5 (L) 04/25/2022    HCT 37.2 (L) 04/25/2022    MCV 84 04/25/2022     04/25/2022         CMP  Sodium   Date Value Ref Range Status   04/25/2022 136 136 - 145 mmol/L Final     Potassium   Date Value Ref Range Status   04/25/2022 3.6 3.5 - 5.1 mmol/L Final     Chloride   Date Value Ref Range Status   04/25/2022 101 95 - 110 mmol/L Final     CO2   Date Value Ref Range Status   04/25/2022 25 23 - 29 mmol/L Final     Glucose   Date Value Ref Range Status   04/25/2022 157 (H) 70 - 110 mg/dL Final     BUN   Date Value Ref Range Status   04/25/2022 9 6 - 20 mg/dL Final     Creatinine   Date Value Ref Range Status   04/25/2022 0.8 0.5 - 1.4 mg/dL Final     Calcium   Date Value Ref Range Status   04/25/2022 8.9 8.7 - 10.5 mg/dL Final     Total Protein   Date Value Ref Range Status   04/25/2022 6.8 6.0 - 8.4 g/dL Final     Albumin   Date Value Ref Range Status   04/25/2022 3.1 (L) 3.5 - 5.2 g/dL Final     Total Bilirubin   Date Value Ref Range Status   04/25/2022 0.7 0.1 - 1.0 mg/dL Final     Comment:     For infants and newborns, interpretation of results should be based  on gestational age, weight and in agreement with clinical  observations.    Premature Infant recommended reference ranges:  Up to 24 hours.............<8.0 mg/dL  Up to 48 hours............<12.0 mg/dL  3-5 days..................<15.0 mg/dL  6-29 days.................<15.0 mg/dL       Alkaline Phosphatase   Date Value Ref Range Status   04/25/2022 99 55 - 135 U/L Final     AST   Date Value Ref Range Status   04/25/2022 17 10 - 40 U/L Final     ALT   Date Value Ref Range Status   04/25/2022 18 10 - 44 U/L Final     Anion Gap   Date Value Ref Range Status   04/25/2022 10 8 - 16 mmol/L Final     eGFR if    Date Value Ref Range Status   04/25/2022 >60 >60 mL/min/1.73 m^2  Final     eGFR if non    Date Value Ref Range Status   04/25/2022 >60 >60 mL/min/1.73 m^2 Final     Comment:     Calculation used to obtain the estimated glomerular filtration  rate (eGFR) is the CKD-EPI equation.            Imaging:  CT scan was independently visualized and reviewed by me and showed findings concerning for obstructive mass at the splenic flexure.    I have personally reviewed these images    Case discussed with Dr. Waddell who plans on ex lap today.    Assessment:   1.  Abdominal pain  2.  Abnormal CT scan  3.  Possible splenic flexure mass  4.  Large bowel obstruction      Plan:  1.  Agree with plan to go to OR  2.  Consider endoscopy at some point  3.  Will follow  4.  Further management per surgery  5.  Communication will be sent to the referring provider, Dr. Pan regarding my assessment and plan on this patient via EPIC.      Pete Mcrae MD  [facility]  [address], [address]  [place] [address]  Office: [phone]  Fax: [phone]

## 2022-04-26 NOTE — PT/OT/SLP PROGRESS
Physical Therapy      Patient Name:  Osman Li   MRN:  [id number]    Patient not seen today secondary to patient unwilling to participate on first two attempts (10:37 and 11:41) with patient reporting 9/10 abdominal pain/cramping and RN notified. On third attempt (1:48) wound care RN present to perform colostomy education. Will follow-up 04/27/22.

## 2022-04-26 NOTE — PROGRESS NOTES
"[facility] Note    CC: Abdominal pain    HPI 56 y.o. male with a PMHx of HTN, T2DM, and HLD who presented to the ED with upper abdominal pain onset x 1 months, constant since 4/21. He describes that pain as an intermittent cramping sensation that lasts approximately 30 seconds. No radiation. Pain is exacerbated with "tensing up" and alleviated with repositioning. Pain is worst postprandial but constant.  He denies blood in his stools or dark stools. Pain was 9/10 at its worst and is currently "mild." He also reports chills night sweats, fatigue, difficulty urinating which he attributes to "old age" and back pain which he attributes to laying on the bed. He denies fever, WL, CP, SOB, and n/v/d. Patient reports colonoscopy approximately 5 years ago where he had 4 polyps removed. Denies FHx of colon cancer. Patient is a smoker with a 40 pack year history. ED workup was significant for anemia, elevated WBC, mild hyponatremia, mild hypokalemia, and CT abdomen concerning for an obstructive mass. The patient was placed in observation under the care of Hospital Medicine.      FURTHER HISTORY:  Above obtained from independent review of records from admitting provider as well as from direct discussion with Dr. Waddell who states that plan is for exploratory laparotomy this afternoon for obstruction.  In addition, on my interview, I note the following:  Patient complains of abdominal pain, upper abdomen, crampy, onset about a month ago, constant, ongoing, with no alleviating/exacerbating factors.  He denies emesis, bleeding, or change in bowel habits.  No family history of colon cancer.  States that his last colonoscopy was in 2018 in [place] and there were a few small polyps at that time.      INTERVAL HISTORY:  Case discussed with Dr. Waddell today.  Patient had ex lap with removal of spleen, splenic flexure mass.  He has end transverse colostomy and has remnant distal descending colon, sigmoid colon, and rectum.  " "Per Dr. Waddell, nodularity palpated at pancreatic tail which may represent reactive changes and/or adenopathy.  Resected mass likely to represent primary colon malignancy.  No new complaints today.  Patient feeling well post procedure but with some postsurgical pain.  No other acute issues.    Past Medical History:   Diagnosis Date    DM (diabetes mellitus)     Hyperlipidemia     Hypertension     Prediabetes          Review of Systems  General ROS: negative for - chills, fever or weight loss  Cardiovascular ROS: no chest pain or dyspnea on exertion  Gastrointestinal ROS: + abdominal pain    Physical Examination  /77 (BP Location: Right arm, Patient Position: Lying)   Pulse 106   Temp 98.6 °F (37 °C) (Oral)   Resp 17   Ht 6' 2" (1.88 m)   Wt 107.5 kg (237 lb)   SpO2 96%   BMI 30.43 kg/m²   General appearance: alert, cooperative, no distress  HENT: Normocephalic, atraumatic, neck symmetrical, no nasal discharge, sclera anicteric   Lungs: clear to auscultation bilaterally, symmetric chest wall expansion bilaterally  Heart: regular rate and rhythm without rub; no displacement of the PMI   Abdomen: abdominal wound dressed.    Extremities: extremities symmetric; no clubbing, cyanosis, or edema  Neurologic: Alert and oriented X 3, no sensory or motor neurologic deficits      Labs:  Lab Results   Component Value Date    WBC 12.38 04/26/2022    HGB 11.6 (L) 04/26/2022    HCT 34.6 (L) 04/26/2022    MCV 84 04/26/2022     04/26/2022             Assessment:   1.  Abdominal pain  2.  Abnormal CT scan  3.  Possible splenic flexure mass  4.  Large bowel obstruction        Plan:  1.  Management per surgery  2.  Consider endoscopy at some point in 4-6 weeks.  Will need to scope blind limb as well as via stoma.  3.  Will sign off for now. Call for questions.    Pete Mcrae MD  [facility]  [address] [place], [address]  [facility] [place] [address]  Office: [phone]  Fax: [phone]    "

## 2022-04-26 NOTE — NURSING
Pt's O2 sat-91% on room air. Applied O2 at 2 liters and sats increased to 95%. Will leave O2 in place for now.

## 2022-04-26 NOTE — PROGRESS NOTES
[facility] Surgery  Progress Note    Subjective:     History of Present Illness:  This is a 56-year-old male who who presented with about a month of abdominal distension, constipation symptoms.  Over the past few days, pain became worse prompting ED presentation.  In the ER, he had a CT scan that noted almost 10 cm of colonic dilation related to an obstruction near the splenic flexure.  Patient's last colonoscopy was approximately 5 years ago with a few polyps.      Post-Op Info:  Procedure(s) (LRB):  LAPAROTOMY, EXPLORATORY (N/A)  COLECTOMY, PARTIAL (N/A)  SPLENECTOMY (N/A)  CREATION, COLOSTOMY (N/A)  MOBILIZATION, SPLENIC FLEXURE (N/A)   1 Day Post-Op     Interval History: No acute events overnight.  POD1 from ex lap with resection of a colonic mass at the splenic flexure, splenectomy, and end transverse colostomy. Complaints of incisional pain this morning, slightly tachy to 100bpm. Otherwise HDS and labs WNL. No nausea/vomiting.     Medications:  Continuous Infusions:   0/9% NACL & POTASSIUM CHLORIDE 20 MEQ/L 100 mL/hr at 04/25/22 2334     Scheduled Meds:   acetaminophen  650 mg Oral Q6H    enoxaparin  40 mg Subcutaneous Daily    gabapentin  300 mg Oral TID    methocarbamoL  500 mg Oral QID    mupirocin   Nasal BID    nicotine  1 patch Transdermal Daily    [START ON 4/27/2022] polyethylene glycol  17 g Oral Daily     PRN Meds:dextrose 50%, dextrose 50%, glucagon (human recombinant), hydrALAZINE, HYDROmorphone, insulin aspart U-100, naloxone, ondansetron, oxyCODONE, oxyCODONE, sodium chloride 0.9%     Review of patient's allergies indicates:   Allergen Reactions    Penicillins Rash     Objective:     Vital Signs (Most Recent):  Temp: 99.2 °F (37.3 °C) (04/26/22 0733)  Pulse: 102 (04/26/22 0823)  Resp: 16 (04/26/22 0837)  BP: 128/69 (04/26/22 0733)  SpO2: 95 % (04/26/22 0823) Vital Signs (24h Range):  Temp:  [97.4 °F (36.3 °C)-99.2 °F (37.3 °C)] 99.2 °F (37.3 °C)  Pulse:   [] 102  Resp:  [] 16  SpO2:  [91 %-100 %] 95 %  BP: ()/(45-81) 128/69     Weight: 107.5 kg (237 lb)  Body mass index is 30.43 kg/m².    Intake/Output - Last 3 Shifts         04/24 0700 04/25 0659 04/25 0700  04/26 0659 04/26 0700 04/27 0659    P.O.  0     I.V. (mL/kg)  1685.4 (15.7)     IV Piggyback  2700     Total Intake(mL/kg)  4385.4 (40.8)     Urine (mL/kg/hr)  1825 (0.7)     Stool  0     Blood  200     Total Output  2025     Net  +2360.4                    Physical Exam  Vitals and nursing note reviewed.   Constitutional:       Appearance: He is well-developed.   Neck:      Vascular: No JVD.      Trachea: No tracheal deviation.   Cardiovascular:      Rate and Rhythm: Normal rate and regular rhythm.   Pulmonary:      Effort: Pulmonary effort is normal. No respiratory distress.   Abdominal:      General: There is no distension.      Palpations: Abdomen is soft.      Tenderness: There is no abdominal tenderness. There is no guarding.      Comments: Midline laparotomy incision c/d/I with Dermabond dressing in place.   Abdomen soft and non-distended.   Appropriately tender to palpation around the incision site.  LLQ end colostomy pink and well-perfused, small amt of gas in ostomy appliance.    Skin:     General: Skin is warm and dry.   Neurological:      Mental Status: He is alert and oriented to person, place, and time.       Significant Labs:  I have reviewed all pertinent lab results within the past 24 hours.  CBC:   Recent Labs   Lab 04/26/22  0536   WBC 12.38   RBC 4.11*   HGB 11.6*   HCT 34.6*      MCV 84   MCH 28.2   MCHC 33.5     CMP:   Recent Labs   Lab 04/26/22  0536   *   CALCIUM 8.1*   ALBUMIN 2.8*   PROT 5.9*      K 4.7   CO2 26      BUN 14   CREATININE 1.0   ALKPHOS 73   ALT 18   AST 18   BILITOT 0.8       Significant Diagnostics:  I have reviewed all pertinent imaging results/findings within the past 24 hours.    Assessment/Plan:     * Large bowel  obstruction  Osman Li is a 56 y.o. male who presented on 4/24 with a large bowel obstruction and mass at the splenic flexure. Now 1 Day Post-Op from exploratory laparotomy with resection of splenic flexure, splenectomy, and end transverse colostomy on 4/25.     - Ok to advance to sips of clear liquids  - Continue mIVF, add LR bolus this morning for low-grade tachycardia  - Pain: multimodal pain control (oxy, dilaudid, mary tylenol/robaxin/gabapentin)   - May consider toradol tomorrow, but slight bump in Cr from 0.8 to 1.0 today  - Home meds: holding (atorvastatin, ACE-inh, and oral diabetes medications)  - Sliding scale insulin  - DVT prophylaxis, SCDs  - Continue blas today, plan to d/c tomorrow  - Appreciate ostomy nurse teaching  - PT, OOB/ambulate    Dispo: continue inpatient care. Await return of bowel function.        Padmini Gonzalez MD  General Surgery  [facility]

## 2022-04-26 NOTE — TRANSFER OF CARE
"Anesthesia Transfer of Care Note    Patient: Osman Li    Procedure(s) Performed: Procedure(s) (LRB):  LAPAROTOMY, EXPLORATORY (N/A)  COLECTOMY, PARTIAL (N/A)  SPLENECTOMY (N/A)  CREATION, COLOSTOMY (N/A)  MOBILIZATION, SPLENIC FLEXURE (N/A)    Patient location: PACU    Anesthesia Type: general    Transport from OR: Transported from OR on 2-3 L/min O2 by NC with adequate spontaneous ventilation    Post pain: adequate analgesia    Post assessment: no apparent anesthetic complications    Post vital signs: stable    Level of consciousness: awake    Nausea/Vomiting: no nausea/vomiting    Complications: none    Transfer of care protocol was followed      Last vitals:   Visit Vitals  BP (!) 144/81 (BP Location: Right arm, Patient Position: Lying)   Pulse 95   Temp 36.3 °C (97.4 °F) (Skin)   Resp 18   Ht 6' 2" (1.88 m)   Wt 107.5 kg (237 lb)   SpO2 95%   BMI 30.43 kg/m²     "

## 2022-04-26 NOTE — NURSING
Dr. Pan making rounds. Verbal order received to changed hydromorphone to 1.5mg every 3 hours prn severe pain.

## 2022-04-26 NOTE — OP NOTE
[facility]  Surgery Department  Operative Note    SUMMARY     Date of Procedure: 4/25/2022     Procedure: Procedure(s) (LRB):  LAPAROTOMY, EXPLORATORY (N/A)  COLECTOMY, PARTIAL (N/A)  SPLENECTOMY (N/A)  CREATION, COLOSTOMY (N/A)  MOBILIZATION, SPLENIC FLEXURE (N/A)     Surgeon(s) and Role:     * Carlton Waddell MD - Primary    Assisting Surgeon: MARIA ELENA Gonzalez    Pre-Operative Diagnosis: Large bowel obstruction [K56.609]    Post-Operative Diagnosis: Post-Op Diagnosis Codes:     * Large bowel obstruction [K56.609]    Anesthesia: General     Operative Findings (including complications, if any):  Large colonic mass that was obstructing at the splenic flexure and densely adherent to the splenic hilum.  Palpable abnormalities within the tail of the pancreas    Description of Technical Procedures:  This is a 56-year-old male who presented with a large bowel obstruction at the splenic flexure with upstream dilation of the colon.  He did consent to colectomy with probable colostomy.  He was taken to the OR, placed supine, general endotracheal anesthesia was induced, the abdomen was prepped and draped in the usual fashion, a time-out was completed.  A generous midline laparotomy was made sharply.  Deep tissues were divided using electrocautery down to the abdominal wall fascia.  Peritoneum was incised sharply and the abdomen was opened.  The transverse colon was severely dilated.  We proceeded to mobilize the left colon by incising the white line of T[place]t.  Dissection was taken from the mid descending colon towards the splenic flexure.  Splenic colic ligaments were divided using electrocautery and LigaSure as appropriate.  Working in the splenic hilum, it became apparent that the tumor was adherent to the spleen an hilar vasculature.  I proceeded to moved to the transverse colon.  A window in the gastrocolic ligament was made using electrocautery and the lesser sac was entered.  This transection plane was  taken towards the spleen.  Again as we approached the splenic hilum, we encountered dense adhesions.  During attempted mobilization, we encountered bleeding from the splenic hilum.  Lap sponges were used to medialized the spleen.  The short gastrics were taken using a LigaSure device.  We were able to isolate the splenic hilar vessels.  These were clamped and then divided with the LigaSure.  The spleen was passed off as specimen.  The splenic vessels were then oversewn with 2-0 silk sutures.  With the spleen removed, I could palpate the pancreas.  The tail felt hardened  with an irregular contour concerning for either primary malignancy or metastatic disease verses reactive changes.  I was able to dissect to the inferior border of the pancreas off of the colonic mesentery.  We now had the entire splenic flexure completely mobilized.  The middle colic vessel was identified.  A window in the mesentery just to the patient's left of the middle colic vessel was created using electrocautery.  This window was taken to the colon.  A blue ROSIO staple load was used to transect the transverse colon just distal to the middle colic vessel.  The mesentery at its base was divided using a LigaSure device around the splenic flexure down to the midportion of the descending colon.  The descending branches off of the NAYLA were divided using a LigaSure device.  The distal portion of the descending colon was then divided using an additional blue staple load.  There was at least 5 centimeters proximally and distally from the mass.  The splenic flexure of the colon was then passed off as specimen.  The left upper quadrant was inspected for hemostasis.  There was some oozing from raw surface edges of the peritoneum.  These were controlled with electrocautery, Tisseel, and Surgicel.  The remaining portion of the transverse colon was cleaned of omental attachments at her staple line.  A location in the left hemiabdomen was chosen for our ostomy  site.  Skin was divided in a [place] using electrocautery.  The subcutaneous tissues were removed down to the anterior surface of the abdominal wall.  A trephination was made in the rectus musculature.  The transverse colon was then delivered through the trephination.  Midline fascia was then closed with 2, #0 looped PDS sutures.  Skin was closed with 3-0 Monocryl and Dermabond was applied as a midline dressing.  A brooked end colostomy was then fashion using multiple interrupted 3-0 Vicryl sutures.  An ostomy appliance was applied.  Patient tolerated the entire procedure without apparent complication, was extubated in the OR, brought to recovery in stable condition.  All counts were correct.    Significant Surgical Tasks Conducted by the Assistant(s), if Applicable:  Assist    Estimated Blood Loss (EBL): 200 mL           Implants: * No implants in log *    Specimens:   Specimen (24h ago, onward)             Start     Ordered    04/25/22 1835  Specimen to Pathology, Surgery General Surgery  Once        Comments: Pre-op Diagnosis: Large bowel obstruction [K56.609]Procedure(s):LAPAROTOMY, EXPLORATORY Number of specimens: 2Name of specimens: COLON SPLENIC FLEXURE OF MASS, SILK STITCH MARKS PROXIMAL MARGIN     References:    Click here for ordering Quick Tip   Question Answer Comment   Procedure Type: General Surgery    Specimen Class: Routine/Screening    Release to patient Immediate        04/25/22 1840    04/25/22 1759  Specimen to Pathology, Surgery General Surgery  Once        Comments: Pre-op Diagnosis: Large bowel obstruction [K56.609]Procedure(s):LAPAROTOMY, EXPLORATORY Number of specimens: 1Name of specimens: SPLEEN     References:    Click here for ordering Quick Tip   Question Answer Comment   Procedure Type: General Surgery    Specimen Class: Routine/Screening    Release to patient Immediate        04/25/22 1800                        Condition: Good    Disposition: PACU - hemodynamically  stable.    Attestation: I was present and scrubbed for the entire procedure.

## 2022-04-26 NOTE — CONSULTS
Consulted for new colostomy education. Patient and spouse at bedside and initiated colostomy care education. Handouts and Ostomy Home Skills Kit given to the patient. Discussed stoma and care for stoma. Will plan to meet with the patient and patients spouse tomorrow around 10:30am to change the ostomy appliance for education and return demonstration. Both the patient and patients spouse very receptive to ostomy education. The patient had questions regarding his diabetes as well and the patient and patients spouse are interested in outpatient diabetic education. Will follow for orders for the same. Will see this patient tomorrow for continued ostomy and diabetic education.

## 2022-04-26 NOTE — ANESTHESIA POSTPROCEDURE EVALUATION
Anesthesia Post Evaluation    Patient: Osman Li    Procedure(s) Performed: Procedure(s) (LRB):  LAPAROTOMY, EXPLORATORY (N/A)  COLECTOMY, PARTIAL (N/A)  SPLENECTOMY (N/A)  CREATION, COLOSTOMY (N/A)  MOBILIZATION, SPLENIC FLEXURE (N/A)    Final Anesthesia Type: general      Patient location during evaluation: PACU  Patient participation: Yes- Able to Participate  Level of consciousness: awake and alert  Post-procedure vital signs: reviewed and stable  Pain management: adequate  Airway patency: patent    PONV status at discharge: No PONV  Anesthetic complications: no      Cardiovascular status: hemodynamically stable  Respiratory status: unassisted and room air  Hydration status: euvolemic  Follow-up not needed.          Vitals Value Taken Time   /53 04/25/22 2126   Temp 36.7 °C (98.1 °F) 04/25/22 2126   Pulse 97 04/25/22 2126   Resp 18 04/25/22 2126   SpO2 96 % 04/25/22 2130         Event Time   Out of Recovery 04/25/2022 21:20:00         Pain/Suleiman Score: Pain Rating Prior to Med Admin: 7 (4/25/2022  9:16 PM)  Pain Rating Post Med Admin: 6 (4/25/2022  9:16 PM)  Suleiman Score: 9 (4/25/2022  9:16 PM)

## 2022-04-26 NOTE — CARE UPDATE
04/26/22 0823   Patient Assessment/Suction   Level of Consciousness (AVPU) alert   Respiratory Effort Unlabored   PRE-TX-O2   O2 Device (Oxygen Therapy) nasal cannula   $ Is the patient on Low Flow Oxygen? Yes   Flow (L/min) 2   SpO2 95 %   Pulse Oximetry Type Intermittent   $ Pulse Oximetry - Multiple Charge Pulse Oximetry - Multiple   Pulse 102   Resp (!) 102

## 2022-04-26 NOTE — HPI
This is a 56-year-old male who who presented with about a month of abdominal distension, constipation symptoms.  Over the past few days, pain became worse prompting ED presentation.  In the ER, he had a CT scan that noted almost 10 cm of colonic dilation related to an obstruction near the splenic flexure.  Patient's last colonoscopy was approximately 5 years ago with a few polyps.

## 2022-04-26 NOTE — NURSING
Dr. Pan notified of patient complain of pain 10/10 after receiving morphine 4mg at 0640. New orders received for dilaudid 1mg every 3 hours prn severe pain.

## 2022-04-26 NOTE — PLAN OF CARE
POC reviewed with patient. PT verbalized understandins. PT on room air. Off unit in OR  Pt educated concerning call light, incentive spirometer and all meds given. PT remains free of falls.Bed is low and locked rails up x2.

## 2022-04-26 NOTE — SUBJECTIVE & OBJECTIVE
Interval History: No acute events overnight.  POD1 from ex lap with resection of a colonic mass at the splenic flexure, splenectomy, and end transverse colostomy. Complaints of incisional pain this morning, slightly tachy to 100bpm. Otherwise HDS and labs WNL. No nausea/vomiting.     Medications:  Continuous Infusions:   0/9% NACL & POTASSIUM CHLORIDE 20 MEQ/L 100 mL/hr at 04/25/22 2334     Scheduled Meds:   acetaminophen  650 mg Oral Q6H    enoxaparin  40 mg Subcutaneous Daily    gabapentin  300 mg Oral TID    methocarbamoL  500 mg Oral QID    mupirocin   Nasal BID    nicotine  1 patch Transdermal Daily    [START ON 4/27/2022] polyethylene glycol  17 g Oral Daily     PRN Meds:dextrose 50%, dextrose 50%, glucagon (human recombinant), hydrALAZINE, HYDROmorphone, insulin aspart U-100, naloxone, ondansetron, oxyCODONE, oxyCODONE, sodium chloride 0.9%     Review of patient's allergies indicates:   Allergen Reactions    Penicillins Rash     Objective:     Vital Signs (Most Recent):  Temp: 99.2 °F (37.3 °C) (04/26/22 0733)  Pulse: 102 (04/26/22 0823)  Resp: 16 (04/26/22 0837)  BP: 128/69 (04/26/22 0733)  SpO2: 95 % (04/26/22 0823) Vital Signs (24h Range):  Temp:  [97.4 °F (36.3 °C)-99.2 °F (37.3 °C)] 99.2 °F (37.3 °C)  Pulse:  [] 102  Resp:  [] 16  SpO2:  [91 %-100 %] 95 %  BP: ()/(45-81) 128/69     Weight: 107.5 kg (237 lb)  Body mass index is 30.43 kg/m².    Intake/Output - Last 3 Shifts         04/24 0700 04/25 0659 04/25 0700 04/26 0659 04/26 0700 04/27 0659    P.O.  0     I.V. (mL/kg)  1685.4 (15.7)     IV Piggyback  2700     Total Intake(mL/kg)  4385.4 (40.8)     Urine (mL/kg/hr)  1825 (0.7)     Stool  0     Blood  200     Total Output  2025     Net  +2360.4                    Physical Exam  Vitals and nursing note reviewed.   Constitutional:       Appearance: He is well-developed.   Neck:      Vascular: No JVD.      Trachea: No tracheal deviation.   Cardiovascular:      Rate and Rhythm: Normal  rate and regular rhythm.   Pulmonary:      Effort: Pulmonary effort is normal. No respiratory distress.   Abdominal:      General: There is no distension.      Palpations: Abdomen is soft.      Tenderness: There is no abdominal tenderness. There is no guarding.      Comments: Midline laparotomy incision c/d/I with Dermabond dressing in place.   Abdomen soft and non-distended.   Appropriately tender to palpation around the incision site.  LLQ end colostomy pink and well-perfused, small amt of gas in ostomy appliance.    Skin:     General: Skin is warm and dry.   Neurological:      Mental Status: He is alert and oriented to person, place, and time.       Significant Labs:  I have reviewed all pertinent lab results within the past 24 hours.  CBC:   Recent Labs   Lab 04/26/22  0536   WBC 12.38   RBC 4.11*   HGB 11.6*   HCT 34.6*      MCV 84   MCH 28.2   MCHC 33.5     CMP:   Recent Labs   Lab 04/26/22  0536   *   CALCIUM 8.1*   ALBUMIN 2.8*   PROT 5.9*      K 4.7   CO2 26      BUN 14   CREATININE 1.0   ALKPHOS 73   ALT 18   AST 18   BILITOT 0.8       Significant Diagnostics:  I have reviewed all pertinent imaging results/findings within the past 24 hours.

## 2022-04-26 NOTE — PLAN OF CARE
Released from Pacu when criteria met pain controlled skin w+d No nausea No emesis to report so far  Incision dry  Intact with  dermabond  Falls as Sleepy midsentence  but ox4 encouraged deep breaths did 1000 on IS  Pt has all belongings  With wife in room  Colostomy bag intact no drainage noted thusfar

## 2022-04-26 NOTE — PLAN OF CARE
Problem: Adult Inpatient Plan of Care  Goal: Plan of Care Review  Outcome: Ongoing, Progressing  Goal: Patient-Specific Goal (Individualized)  Outcome: Ongoing, Progressing  Goal: Absence of Hospital-Acquired Illness or Injury  Outcome: Ongoing, Progressing  Goal: Optimal Comfort and Wellbeing  Outcome: Ongoing, Progressing  Goal: Readiness for Transition of Care  Outcome: Ongoing, Progressing     Problem: Diabetes Comorbidity  Goal: Blood Glucose Level Within Targeted Range  Outcome: Ongoing, Progressing     Problem: Infection  Goal: Absence of Infection Signs and Symptoms  Outcome: Ongoing, Progressing     Problem: Fall Injury Risk  Goal: Absence of Fall and Fall-Related Injury  Outcome: Ongoing, Progressing     Problem: Skin Injury Risk Increased  Goal: Skin Health and Integrity  Outcome: Ongoing, Progressing

## 2022-04-26 NOTE — CONSULTS
Consult Note  Infectious Disease    Reason for Consult: Vaccines s/p splenectomy     HPI: Osman Li is a 56 y.o. male active heavy smoker, with past medical history of HTN, diabetes, and HLD who presented with worsening upper abdominal pain and constipation for a month which became constant 4/21, with associated fever, chills and some difficulty urinating. He denies melena,  headache, cough, SOB, nausea, vomit or new skin rash.   As per patient, last colonoscopy was 5 years ago anf 4 polyps were removed.     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  Adjacent pathologic lymph nodes compatible with jason spread of disease.  Obstruction of the colon proximal to the mass, with moderate dilatation.    Taken for emergent ex-lap 4/25 for LBO. S/p Clindamycin IV and Decadron 4mg.     As per Op-note; large colonic mass that was obstructing at the splenic flexure and densely adherent to the splenic hilum.  Palpable abnormalities within the tail of the pancreas. Status post splenectomy and end transverse colostomy with remnant distal descending colon, sigmoid colon and rectum.   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    ID consult for vaccinations after splenectomy.     Patient seen and examined at bedside, wife present. He is c/o severe abdominal pain 9/10 likely post surgery, he is passing gas. Hemodynamically stable, afebrile.   Labs reviewed, WNL.   He is vaccinated against COVID-19 s/p 2 doses, pending booster.       Review of patient's allergies indicates:   Allergen Reactions    Penicillins Rash     Past Medical History:   Diagnosis Date    DM (diabetes mellitus)     Hyperlipidemia     Hypertension     Prediabetes      Past Surgical  History:   Procedure Laterality Date    CHOLECYSTECTOMY  2011    COLONOSCOPY      2018     Social History     Tobacco Use    Smoking status: Current Every Day Smoker     Packs/day: 1.00     Years: 35.00     Pack years: 35.00    Smokeless tobacco: Never Used   Substance Use Topics    Alcohol use: Not Currently        Family History   Problem Relation Age of Onset    Heart disease Father          Review of Systems:   As described in HPi.    Outdoor activities: Works as a , lives with wife at home.   Travel: None  Implants: None  Antibiotic History: Clindamycin pre-op    EXAM & DIAGNOSTICS REVIEWED:   Vitals:     Temp:  [97.4 °F (36.3 °C)-99.2 °F (37.3 °C)]   Temp: 99.2 °F (37.3 °C) (04/26/22 0733)  Pulse: 102 (04/26/22 0823)  Resp: 16 (04/26/22 0837)  BP: 128/69 (04/26/22 0733)  SpO2: 95 % (04/26/22 0823)    Intake/Output Summary (Last 24 hours) at 4/26/2022 1016  Last data filed at 4/26/2022 0600  Gross per 24 hour   Intake 4385.41 ml   Output 2025 ml   Net 2360.41 ml       General:  In NAD. Alert and attentive, cooperative, in pain likely post-op  Eyes:  Anicteric, PERRL  ENT:  No ulcers, exudates, thrush, nares patent, dentition is fiar  Neck:  Supple, no adenopathy appreciated  Lungs: Clear to auscultation b/l  Heart:  S1/S2+, regular rhythm, no murmurs  Abd:  Laparotomy wound looks clean, no redness noted, LLQ colostomy bag in place, minimal serosanguinous fluid, +BS, soft, mildly tender to palpation expected post-op, no rebound   :  Macias, urine clear, no flank tenderness  Musc:  Joints without effusion, swelling,  erythema, synovitis  Skin:  Warm, no rash  Wound: Laparotomy   Neuro: Following commands, no acute focal deficit   Psych:  Calm, cooperative  Lymphatic:     No cervical, supraclavicular nodes  Extrem: No LE edema b/l  VAD:  Peripheral IV        Isolation:  None    General Labs reviewed:  Recent Labs   Lab 04/24/22  1658 04/25/22  0504 04/26/22  0536   WBC 13.58* 10.25 12.38   HGB  12.9* 12.5* 11.6*   HCT 37.0* 37.2* 34.6*    419 433       Recent Labs   Lab 04/24/22  1658 04/25/22  0504 04/26/22  0536   * 136 136   K 3.0* 3.6 4.7   CL 97 101 101   CO2 22* 25 26   BUN 11 9 14   CREATININE 0.8 0.8 1.0   CALCIUM 9.3 8.9 8.1*   PROT 7.1 6.8 5.9*   BILITOT 0.5 0.7 0.8   ALKPHOS 79 99 73   ALT 10 18 18   AST 12 17 18     No results for input(s): CRP in the last 168 hours.  No results for input(s): SEDRATE in the last 168 hours.    Estimated Creatinine Clearance: 106.4 mL/min (based on SCr of 1 mg/dL).     Micro:  Microbiology Results (last 7 days)     ** No results found for the last 168 hours. **          Imaging Reviewed:  CT abdomen/pelvis: Splenic flexure mass compatible with a primary colon neoplasm.  Adjacent pathologic lymph nodes compatible with jason spread of disease.  Obstruction of the colon proximal to the mass, with moderate dilatation.      IMPRESSION & PLAN     1. S/p emergent ex-lap and splenectomy for underlying colonic mass likely neoplasia in the setting of active heavy smoking    Path report in process    2. HTN, diabetes, HLD   3. Smoker     Recommendations:  Can remove Macias catheter   Upon discharge, Prevnar 13 (Pneumococcus), Bexsero (Meningococcus first dose) and Haemophilus influenza B vaccines  He needs second dose of Meningococcus vaccine (Menactra) outpatient in 4 weeks  Pneumovax 23 in 2 months    Influenza vaccine this fall   He can follow up ID clinic/Dr Hernandes in 1 month to complete schedule   Follow up path report  Counseled regarding smoking cessation     D/w Dr Pan, patient and wife     Medical Decision Making during this encounter was  [_] Low Complexity  [_] Moderate Complexity  [xx] High Complexity

## 2022-04-27 ENCOUNTER — TELEPHONE (OUTPATIENT)
Dept: FAMILY MEDICINE | Facility: CLINIC | Age: 57
End: 2022-04-27

## 2022-04-27 ENCOUNTER — TELEPHONE (OUTPATIENT)
Dept: GASTROENTEROLOGY | Facility: CLINIC | Age: 57
End: 2022-04-27
Payer: COMMERCIAL

## 2022-04-27 LAB
ALBUMIN SERPL BCP-MCNC: 2.4 G/DL (ref 3.5–5.2)
ALP SERPL-CCNC: 103 U/L (ref 55–135)
ALT SERPL W/O P-5'-P-CCNC: 18 U/L (ref 10–44)
ANION GAP SERPL CALC-SCNC: 8 MMOL/L (ref 8–16)
ANION GAP SERPL CALC-SCNC: 8 MMOL/L (ref 8–16)
AST SERPL-CCNC: 19 U/L (ref 10–40)
BACTERIA #/AREA URNS HPF: NORMAL /HPF
BASOPHILS # BLD AUTO: 0.05 K/UL (ref 0–0.2)
BASOPHILS NFR BLD: 0.2 % (ref 0–1.9)
BILIRUB SERPL-MCNC: 0.8 MG/DL (ref 0.1–1)
BILIRUB UR QL STRIP: NEGATIVE
BNP SERPL-MCNC: 31 PG/ML (ref 0–99)
BUN SERPL-MCNC: 10 MG/DL (ref 6–20)
BUN SERPL-MCNC: 9 MG/DL (ref 6–20)
CALCIUM SERPL-MCNC: 7.9 MG/DL (ref 8.7–10.5)
CALCIUM SERPL-MCNC: 8.4 MG/DL (ref 8.7–10.5)
CHLORIDE SERPL-SCNC: 93 MMOL/L (ref 95–110)
CHLORIDE SERPL-SCNC: 94 MMOL/L (ref 95–110)
CHLORIDE UR-SCNC: 35 MMOL/L (ref 25–200)
CLARITY UR: CLEAR
CO2 SERPL-SCNC: 25 MMOL/L (ref 23–29)
CO2 SERPL-SCNC: 26 MMOL/L (ref 23–29)
COLOR UR: YELLOW
CREAT SERPL-MCNC: 0.8 MG/DL (ref 0.5–1.4)
CREAT SERPL-MCNC: 0.8 MG/DL (ref 0.5–1.4)
CREAT UR-MCNC: 49.6 MG/DL (ref 23–375)
DIFFERENTIAL METHOD: ABNORMAL
EOSINOPHIL # BLD AUTO: 0.1 K/UL (ref 0–0.5)
EOSINOPHIL NFR BLD: 0.2 % (ref 0–8)
ERYTHROCYTE [DISTWIDTH] IN BLOOD BY AUTOMATED COUNT: 12.4 % (ref 11.5–14.5)
EST. GFR  (AFRICAN AMERICAN): >60 ML/MIN/1.73 M^2
EST. GFR  (AFRICAN AMERICAN): >60 ML/MIN/1.73 M^2
EST. GFR  (NON AFRICAN AMERICAN): >60 ML/MIN/1.73 M^2
EST. GFR  (NON AFRICAN AMERICAN): >60 ML/MIN/1.73 M^2
GLUCOSE SERPL-MCNC: 158 MG/DL (ref 70–110)
GLUCOSE SERPL-MCNC: 187 MG/DL (ref 70–110)
GLUCOSE UR QL STRIP: ABNORMAL
HCT VFR BLD AUTO: 28 % (ref 40–54)
HGB BLD-MCNC: 9.4 G/DL (ref 14–18)
HGB UR QL STRIP: ABNORMAL
IMM GRANULOCYTES # BLD AUTO: 0.18 K/UL (ref 0–0.04)
IMM GRANULOCYTES NFR BLD AUTO: 0.8 % (ref 0–0.5)
KETONES UR QL STRIP: NEGATIVE
LEUKOCYTE ESTERASE UR QL STRIP: NEGATIVE
LYMPHOCYTES # BLD AUTO: 2 K/UL (ref 1–4.8)
LYMPHOCYTES NFR BLD: 8.8 % (ref 18–48)
MAGNESIUM SERPL-MCNC: 1.8 MG/DL (ref 1.6–2.6)
MCH RBC QN AUTO: 28.5 PG (ref 27–31)
MCHC RBC AUTO-ENTMCNC: 33.6 G/DL (ref 32–36)
MCV RBC AUTO: 85 FL (ref 82–98)
MICROSCOPIC COMMENT: NORMAL
MONOCYTES # BLD AUTO: 1.9 K/UL (ref 0.3–1)
MONOCYTES NFR BLD: 8.5 % (ref 4–15)
NEUTROPHILS # BLD AUTO: 18.1 K/UL (ref 1.8–7.7)
NEUTROPHILS NFR BLD: 81.5 % (ref 38–73)
NITRITE UR QL STRIP: NEGATIVE
NRBC BLD-RTO: 0 /100 WBC
OSMOLALITY UR: 270 MOSM/KG (ref 50–1200)
PH UR STRIP: 6 [PH] (ref 5–8)
PHOSPHATE SERPL-MCNC: 1.5 MG/DL (ref 2.7–4.5)
PHOSPHATE SERPL-MCNC: 1.5 MG/DL (ref 2.7–4.5)
PLATELET # BLD AUTO: 384 K/UL (ref 150–450)
PMV BLD AUTO: 9.6 FL (ref 9.2–12.9)
POCT GLUCOSE: 182 MG/DL (ref 70–110)
POTASSIUM SERPL-SCNC: 4.3 MMOL/L (ref 3.5–5.1)
POTASSIUM SERPL-SCNC: 4.8 MMOL/L (ref 3.5–5.1)
POTASSIUM UR-SCNC: 32 MMOL/L (ref 15–95)
PROCALCITONIN SERPL IA-MCNC: 2.28 NG/ML
PROT SERPL-MCNC: 5.7 G/DL (ref 6–8.4)
PROT UR QL STRIP: NEGATIVE
RBC # BLD AUTO: 3.3 M/UL (ref 4.6–6.2)
RBC #/AREA URNS HPF: 2 /HPF (ref 0–4)
SODIUM SERPL-SCNC: 127 MMOL/L (ref 136–145)
SODIUM SERPL-SCNC: 127 MMOL/L (ref 136–145)
SODIUM SERPL-SCNC: 129 MMOL/L (ref 136–145)
SODIUM SERPL-SCNC: 130 MMOL/L (ref 136–145)
SODIUM UR-SCNC: <20 MMOL/L (ref 20–250)
SP GR UR STRIP: <=1.005 (ref 1–1.03)
URN SPEC COLLECT METH UR: ABNORMAL
UROBILINOGEN UR STRIP-ACNC: 1 EU/DL
WBC # BLD AUTO: 22.27 K/UL (ref 3.9–12.7)
YEAST URNS QL MICRO: NORMAL

## 2022-04-27 PROCEDURE — 84145 PROCALCITONIN (PCT): CPT | Performed by: STUDENT IN AN ORGANIZED HEALTH CARE EDUCATION/TRAINING PROGRAM

## 2022-04-27 PROCEDURE — 83935 ASSAY OF URINE OSMOLALITY: CPT | Performed by: INTERNAL MEDICINE

## 2022-04-27 PROCEDURE — S4991 NICOTINE PATCH NONLEGEND: HCPCS

## 2022-04-27 PROCEDURE — 83735 ASSAY OF MAGNESIUM: CPT

## 2022-04-27 PROCEDURE — 25000003 PHARM REV CODE 250: Performed by: STUDENT IN AN ORGANIZED HEALTH CARE EDUCATION/TRAINING PROGRAM

## 2022-04-27 PROCEDURE — 63600175 PHARM REV CODE 636 W HCPCS: Performed by: STUDENT IN AN ORGANIZED HEALTH CARE EDUCATION/TRAINING PROGRAM

## 2022-04-27 PROCEDURE — 82436 ASSAY OF URINE CHLORIDE: CPT | Performed by: INTERNAL MEDICINE

## 2022-04-27 PROCEDURE — 85025 COMPLETE CBC W/AUTO DIFF WBC: CPT

## 2022-04-27 PROCEDURE — S0030 INJECTION, METRONIDAZOLE: HCPCS | Performed by: STUDENT IN AN ORGANIZED HEALTH CARE EDUCATION/TRAINING PROGRAM

## 2022-04-27 PROCEDURE — 36415 COLL VENOUS BLD VENIPUNCTURE: CPT | Performed by: STUDENT IN AN ORGANIZED HEALTH CARE EDUCATION/TRAINING PROGRAM

## 2022-04-27 PROCEDURE — 80048 BASIC METABOLIC PNL TOTAL CA: CPT | Performed by: INTERNAL MEDICINE

## 2022-04-27 PROCEDURE — 25000003 PHARM REV CODE 250

## 2022-04-27 PROCEDURE — 84300 ASSAY OF URINE SODIUM: CPT | Performed by: INTERNAL MEDICINE

## 2022-04-27 PROCEDURE — 36415 COLL VENOUS BLD VENIPUNCTURE: CPT | Performed by: INTERNAL MEDICINE

## 2022-04-27 PROCEDURE — 97161 PT EVAL LOW COMPLEX 20 MIN: CPT

## 2022-04-27 PROCEDURE — 99406 BEHAV CHNG SMOKING 3-10 MIN: CPT

## 2022-04-27 PROCEDURE — 83880 ASSAY OF NATRIURETIC PEPTIDE: CPT | Performed by: INTERNAL MEDICINE

## 2022-04-27 PROCEDURE — 84100 ASSAY OF PHOSPHORUS: CPT | Mod: 91 | Performed by: INTERNAL MEDICINE

## 2022-04-27 PROCEDURE — 27000221 HC OXYGEN, UP TO 24 HOURS

## 2022-04-27 PROCEDURE — 94761 N-INVAS EAR/PLS OXIMETRY MLT: CPT

## 2022-04-27 PROCEDURE — 84133 ASSAY OF URINE POTASSIUM: CPT | Performed by: INTERNAL MEDICINE

## 2022-04-27 PROCEDURE — 80053 COMPREHEN METABOLIC PANEL: CPT

## 2022-04-27 PROCEDURE — 82570 ASSAY OF URINE CREATININE: CPT | Performed by: INTERNAL MEDICINE

## 2022-04-27 PROCEDURE — 63600175 PHARM REV CODE 636 W HCPCS: Performed by: INTERNAL MEDICINE

## 2022-04-27 PROCEDURE — 25000003 PHARM REV CODE 250: Performed by: INTERNAL MEDICINE

## 2022-04-27 PROCEDURE — 12000002 HC ACUTE/MED SURGE SEMI-PRIVATE ROOM

## 2022-04-27 PROCEDURE — 87040 BLOOD CULTURE FOR BACTERIA: CPT | Mod: 59 | Performed by: STUDENT IN AN ORGANIZED HEALTH CARE EDUCATION/TRAINING PROGRAM

## 2022-04-27 PROCEDURE — S0073 INJECTION, AZTREONAM, 500 MG: HCPCS | Performed by: STUDENT IN AN ORGANIZED HEALTH CARE EDUCATION/TRAINING PROGRAM

## 2022-04-27 PROCEDURE — 84295 ASSAY OF SERUM SODIUM: CPT | Mod: 91 | Performed by: INTERNAL MEDICINE

## 2022-04-27 PROCEDURE — 81000 URINALYSIS NONAUTO W/SCOPE: CPT | Performed by: INTERNAL MEDICINE

## 2022-04-27 RX ORDER — CEFEPIME HYDROCHLORIDE 1 G/50ML
2 INJECTION, SOLUTION INTRAVENOUS
Status: DISCONTINUED | OUTPATIENT
Start: 2022-04-27 | End: 2022-05-02 | Stop reason: HOSPADM

## 2022-04-27 RX ORDER — METRONIDAZOLE 500 MG/100ML
500 INJECTION, SOLUTION INTRAVENOUS
Status: DISCONTINUED | OUTPATIENT
Start: 2022-04-27 | End: 2022-05-02 | Stop reason: HOSPADM

## 2022-04-27 RX ADMIN — HYDROMORPHONE HYDROCHLORIDE 1.5 MG: 1 INJECTION, SOLUTION INTRAMUSCULAR; INTRAVENOUS; SUBCUTANEOUS at 03:04

## 2022-04-27 RX ADMIN — METRONIDAZOLE 500 MG: 500 INJECTION, SOLUTION INTRAVENOUS at 03:04

## 2022-04-27 RX ADMIN — ACETAMINOPHEN 650 MG: 325 TABLET ORAL at 12:04

## 2022-04-27 RX ADMIN — HYDROMORPHONE HYDROCHLORIDE 1.5 MG: 1 INJECTION, SOLUTION INTRAMUSCULAR; INTRAVENOUS; SUBCUTANEOUS at 12:04

## 2022-04-27 RX ADMIN — HYDROMORPHONE HYDROCHLORIDE 1.5 MG: 1 INJECTION, SOLUTION INTRAMUSCULAR; INTRAVENOUS; SUBCUTANEOUS at 05:04

## 2022-04-27 RX ADMIN — HYDROMORPHONE HYDROCHLORIDE 1.5 MG: 1 INJECTION, SOLUTION INTRAMUSCULAR; INTRAVENOUS; SUBCUTANEOUS at 09:04

## 2022-04-27 RX ADMIN — OXYCODONE HYDROCHLORIDE 10 MG: 10 TABLET ORAL at 09:04

## 2022-04-27 RX ADMIN — METHOCARBAMOL TABLETS 500 MG: 500 TABLET, COATED ORAL at 09:04

## 2022-04-27 RX ADMIN — AZTREONAM 2000 MG: 2 INJECTION, POWDER, LYOPHILIZED, FOR SOLUTION INTRAMUSCULAR; INTRAVENOUS at 09:04

## 2022-04-27 RX ADMIN — GABAPENTIN 300 MG: 300 CAPSULE ORAL at 09:04

## 2022-04-27 RX ADMIN — VANCOMYCIN HYDROCHLORIDE 1750 MG: 750 INJECTION, POWDER, LYOPHILIZED, FOR SOLUTION INTRAVENOUS at 10:04

## 2022-04-27 RX ADMIN — METRONIDAZOLE 500 MG: 500 INJECTION, SOLUTION INTRAVENOUS at 09:04

## 2022-04-27 RX ADMIN — GABAPENTIN 300 MG: 300 CAPSULE ORAL at 03:04

## 2022-04-27 RX ADMIN — SODIUM PHOSPHATE, MONOBASIC, MONOHYDRATE 30 MMOL: 276; 142 INJECTION, SOLUTION INTRAVENOUS at 03:04

## 2022-04-27 RX ADMIN — ACETAMINOPHEN 650 MG: 325 TABLET ORAL at 05:04

## 2022-04-27 RX ADMIN — POLYETHYLENE GLYCOL (3350) 17 G: 17 POWDER, FOR SOLUTION ORAL at 09:04

## 2022-04-27 RX ADMIN — MUPIROCIN: 20 OINTMENT TOPICAL at 09:04

## 2022-04-27 RX ADMIN — CEFEPIME HYDROCHLORIDE 2 G: 2 INJECTION, SOLUTION INTRAVENOUS at 05:04

## 2022-04-27 RX ADMIN — HYDROMORPHONE HYDROCHLORIDE 1.5 MG: 1 INJECTION, SOLUTION INTRAMUSCULAR; INTRAVENOUS; SUBCUTANEOUS at 06:04

## 2022-04-27 RX ADMIN — METHOCARBAMOL TABLETS 500 MG: 500 TABLET, COATED ORAL at 12:04

## 2022-04-27 RX ADMIN — METHOCARBAMOL TABLETS 500 MG: 500 TABLET, COATED ORAL at 03:04

## 2022-04-27 RX ADMIN — ENOXAPARIN SODIUM 40 MG: 40 INJECTION SUBCUTANEOUS at 03:04

## 2022-04-27 RX ADMIN — NICOTINE 1 PATCH: 14 PATCH, EXTENDED RELEASE TRANSDERMAL at 09:04

## 2022-04-27 RX ADMIN — VANCOMYCIN HYDROCHLORIDE 1750 MG: 750 INJECTION, POWDER, LYOPHILIZED, FOR SOLUTION INTRAVENOUS at 09:04

## 2022-04-27 NOTE — CARE UPDATE
04/27/22 0820   Tobacco Cessation Intervention   Do you use any type of tobacco product? Yes   Are you interested in quitting use of tobacco products? Thinking about quitting   Are you interested in Nicotine Replacement for symptom relief? Yes  (in use)   $ Smoking Cessation Charges Smoking Cessation - Intermediate (CTTS)   Information on the smoking cessation trust given to patient for future reference.

## 2022-04-27 NOTE — PT/OT/SLP EVAL
Physical Therapy Evaluation    Patient Name:  Osman Li   MRN:  [id number]    Recommendations:     Discharge Recommendations:  home (declines need for home health or outpatient PT)   Discharge Equipment Recommendations:  (likely none; discussed purchasing SPC from drug store if experiencing mild instability upon D/C)   Barriers to discharge: None    Assessment:     Osman Li is a 56 y.o. male admitted with a medical diagnosis of Large bowel obstruction.  He presents with the following impairments/functional limitations:  weakness, impaired endurance, impaired functional mobilty, gait instability, impaired balance, pain, impaired skin, impaired cardiopulmonary response to activity. Patient is agreeable to participation with PT evaluation. He requires SBA for supine to sit and CGA for sit to stand with no AD. He ambulated 250' holding IV pole with 1L, CGA, and mild instability. Upon return to room he is agreeable to sit up in chair with all needs met, spouse present, and RN notified.     Rehab Prognosis: Good; patient would benefit from acute skilled PT services to address these deficits and reach maximum level of function.    Recent Surgery: Procedure(s) (LRB):  LAPAROTOMY, EXPLORATORY (N/A)  COLECTOMY, PARTIAL (N/A)  SPLENECTOMY (N/A)  CREATION, COLOSTOMY (N/A)  MOBILIZATION, SPLENIC FLEXURE (N/A) 2 Days Post-Op    Plan:     During this hospitalization, patient to be seen daily to address the identified rehab impairments via gait training, therapeutic activities, therapeutic exercises and progress toward the following goals:    · Plan of Care Expires:  05/27/22    Subjective     Chief Complaint: abdominal pain   Patient/Family Comments/goals: agrees to walk   Pain/Comfort:  · Pain Rating 1: 7/10  · Location 1: abdomen  · Pain Addressed 1: Reposition, Distraction, Cessation of Activity    Patients cultural, spiritual, Pentec[employer]al conflicts given the current situation:      Living  Environment:  Patient lives with spouse   Prior to admission, patients level of function was independent. He works full time. He denies any recent PT.  Equipment used at home: none.  DME owned (not currently used): none.  Upon discharge, patient will have assistance from spouse.    Objective:     Communicated with CAMRYN Goetz prior to session.  Patient found HOB elevated with oxygen, peripheral IV, telemetry  upon PT entry to room.    General Precautions: Standard, fall, respiratory   Orthopedic Precautions:N/A   Braces: N/A  Respiratory Status: Nasal cannula, flow 1 L/min    Exams:  · RLE Strength: WFL  · LLE Strength: WFL    Functional Mobility:  · Bed Mobility:     · Supine to Sit: stand by assistance  · Transfers:     · Sit to Stand:  contact guard assistance with no AD  · Gait: 250' holding IV pole with 1L, CGA, and mild instability    Therapeutic Activities and Exercises:   Patient was educated on the importance of OOB activity and functional mobility to negate negative effects of prolonged bed rest during hospitalization, safe transfers and ambulation, log rolling for comfort, IS use, importance of sitting up in chair as much as tolerated, and D/C planning     AM-PAC 6 CLICK MOBILITY  Total Score:22     Patient left up in chair with all lines intact, call button in reach, RN notified and spouse present.    GOALS:   Multidisciplinary Problems     Physical Therapy Goals        Problem: Physical Therapy    Goal Priority Disciplines Outcome Goal Variances Interventions   Physical Therapy Goal     PT, PT/OT      Description: Goals to be met by: 22     Patient will increase functional independence with mobility by performin. Supine to sit with [place]  2. Sit to stand transfer with [place]  3. Bed to chair transfer with [place] using No Assistive Device  4. Gait  x 250 feet with [place] using No Assistive Device.   5. Lower extremity exercise program x20 reps per handout, with  independence                     History:     Past Medical History:   Diagnosis Date    DM (diabetes mellitus)     Hyperlipidemia     Hypertension     Prediabetes        Past Surgical History:   Procedure Laterality Date    CHOLECYSTECTOMY  2011    COLONOSCOPY      2018    COLOSTOMY N/A 4/25/2022    Procedure: CREATION, COLOSTOMY;  Surgeon: Carlton Waddell MD;  Location: [facility] OR;  Service: General;  Laterality: N/A;    MOBILIZATION OF SPLENIC FLEXURE N/A 4/25/2022    Procedure: MOBILIZATION, SPLENIC FLEXURE;  Surgeon: Carlton Waddell MD;  Location: [facility] OR;  Service: General;  Laterality: N/A;    SPLENECTOMY N/A 4/25/2022    Procedure: SPLENECTOMY;  Surgeon: Carlton Waddell MD;  Location: [facility] OR;  Service: General;  Laterality: N/A;    SUBTOTAL COLECTOMY N/A 4/25/2022    Procedure: COLECTOMY, PARTIAL;  Surgeon: Carlton Waddell MD;  Location: [facility] OR;  Service: General;  Laterality: N/A;       Time Tracking:     PT Received On: 04/27/22  PT Start Time: 1032     PT Stop Time: 1046  PT Total Time (min): 14 min     Billable Minutes: Evaluation 14      04/27/2022

## 2022-04-27 NOTE — SUBJECTIVE & OBJECTIVE
Interval History: Now 2 Days Post-Op from ex lap with resection of a colonic mass at the splenic flexure, splenectomy, and end transverse colostomy. Incisional pain much better controlled. Tolerating CLD without nausea. No output from ostomy yet, but stoma pink and well-perfused. Documented fever of 101.2F at midnight, but patient asymptomatic. HR remains 100bpm. Working with PT and good respiratory toilet with IS.    Medications:  Continuous Infusions:   sodium chloride 0.9% 75 mL/hr at 04/27/22 0547     Scheduled Meds:   acetaminophen  650 mg Oral Q6H    ceFEPime (MAXIPIME) IVPB  2 g Intravenous Q8H    enoxaparin  40 mg Subcutaneous Daily    gabapentin  300 mg Oral TID    methocarbamoL  500 mg Oral QID    metronidazole  500 mg Intravenous Q8H    mupirocin   Nasal BID    nicotine  1 patch Transdermal Daily    polyethylene glycol  17 g Oral Daily    vancomycin (VANCOCIN) IVPB  1,750 mg Intravenous Q12H     PRN Meds:dextrose 50%, dextrose 50%, glucagon (human recombinant), hydrALAZINE, HYDROmorphone, insulin aspart U-100, naloxone, ondansetron, oxyCODONE, oxyCODONE, sodium chloride 0.9%, Pharmacy to dose Vancomycin consult **AND** vancomycin - pharmacy to dose     Review of patient's allergies indicates:   Allergen Reactions    Penicillins Rash     Objective:     Vital Signs (Most Recent):  Temp: 98.9 °F (37.2 °C) (04/27/22 0759)  Pulse: 99 (04/27/22 0822)  Resp: 18 (04/27/22 0937)  BP: (!) 141/69 (04/27/22 0759)  SpO2: 97 % (04/27/22 0822) Vital Signs (24h Range):  Temp:  [98.6 °F (37 °C)-101.2 °F (38.4 °C)] 98.9 °F (37.2 °C)  Pulse:  [] 99  Resp:  [14-18] 18  SpO2:  [88 %-97 %] 97 %  BP: (120-141)/(57-77) 141/69     Weight: 107.5 kg (237 lb)  Body mass index is 30.43 kg/m².    Intake/Output - Last 3 Shifts         04/25 0700 04/26 0659 04/26 0700 04/27 0659 04/27 0700 04/28 0659    P.O. 0 1830     I.V. (mL/kg) 1685.4 (15.7) 1391.1 (12.9)     IV Piggyback 2700 990.7     Total Intake(mL/kg) 4385.4 (40.8)  4211.8 (39.2)     Urine (mL/kg/hr) 1825 (0.7) 2045 (0.8)     Stool 0      Blood 200      Total Output 2025 2045     Net +2360.4 +2166.8                    Physical Exam  Vitals and nursing note reviewed.   Constitutional:       Appearance: He is well-developed.   Neck:      Vascular: No JVD.      Trachea: No tracheal deviation.   Cardiovascular:      Rate and Rhythm: Normal rate and regular rhythm.   Pulmonary:      Effort: Pulmonary effort is normal. No respiratory distress.   Abdominal:      General: There is no distension.      Palpations: Abdomen is soft.      Tenderness: There is no abdominal tenderness. There is no guarding.      Comments: Midline laparotomy incision c/d/I with Dermabond dressing in place.   Abdomen soft and non-distended.   Appropriately tender to palpation around the incision site.  LLQ end colostomy pink and well-perfused, small amt of gas in ostomy appliance.    Skin:     General: Skin is warm and dry.   Neurological:      Mental Status: He is alert and oriented to person, place, and time.       Significant Labs:  I have reviewed all pertinent lab results within the past 24 hours.  CBC:   Recent Labs   Lab 04/27/22 0452   WBC 22.27*   RBC 3.30*   HGB 9.4*   HCT 28.0*      MCV 85   MCH 28.5   MCHC 33.6       CMP:   Recent Labs   Lab 04/27/22 0452 04/27/22  0948   * 187*   CALCIUM 7.9* 8.4*   ALBUMIN 2.4*  --    PROT 5.7*  --    * 127*   K 4.3 4.8   CO2 25 26   CL 94* 93*   BUN 10 9   CREATININE 0.8 0.8   ALKPHOS 103  --    ALT 18  --    AST 19  --    BILITOT 0.8  --          Significant Diagnostics:  I have reviewed all pertinent imaging results/findings within the past 24 hours.

## 2022-04-27 NOTE — PLAN OF CARE
Problem: Adult Inpatient Plan of Care  Goal: Patient-Specific Goal (Individualized)  Outcome: Ongoing, Progressing  Antibiotics administered as ordered- azactem, vancomycin, flagyl, cefepime.  Vitals and labs monitored. VSS. Pt is afebrile this shift. WBC 22.27.  IV hydration- NS@75  BMP sodium levels monitored- 127 result. Urine sodium checked- 20mmol/L.  Nephrology consulted.  Pain medications administered as ordered- Dilaudid q3H.  PT ambulated pt in the hallways x2 today.   Pt is a SBA when out of bed. Spouse at bedside. Pt instructed to call for assistance. No safety issues this shift.  Pt is mobile in bed and can shift weight.  WOC onboard for ostomy care and education. Ostomy changed today by CAMRYN Larkin.  Pt tolerating clear liquid diet.  I/O monitored and recorded.

## 2022-04-27 NOTE — TELEPHONE ENCOUNTER
----- Message from Pete Estrada MD sent at 4/26/2022  4:37 PM CDT -----  Office with me in 2 weeks.  OK to overbook somewhere.

## 2022-04-27 NOTE — PROGRESS NOTES
[facility] Surgery  Progress Note    Subjective:     History of Present Illness:  This is a 56-year-old male who who presented with about a month of abdominal distension, constipation symptoms.  Over the past few days, pain became worse prompting ED presentation.  In the ER, he had a CT scan that noted almost 10 cm of colonic dilation related to an obstruction near the splenic flexure.  Patient's last colonoscopy was approximately 5 years ago with a few polyps.      Post-Op Info:  Procedure(s) (LRB):  LAPAROTOMY, EXPLORATORY (N/A)  COLECTOMY, PARTIAL (N/A)  SPLENECTOMY (N/A)  CREATION, COLOSTOMY (N/A)  MOBILIZATION, SPLENIC FLEXURE (N/A)   2 Days Post-Op     Interval History: Now 2 Days Post-Op from ex lap with resection of a colonic mass at the splenic flexure, splenectomy, and end transverse colostomy. Incisional pain much better controlled. Tolerating CLD without nausea. No output from ostomy yet, but stoma pink and well-perfused. Documented fever of 101.2F at midnight, but patient asymptomatic. HR remains 100bpm. Working with PT and good respiratory toilet with IS.    Medications:  Continuous Infusions:   sodium chloride 0.9% 75 mL/hr at 04/27/22 0547     Scheduled Meds:   acetaminophen  650 mg Oral Q6H    ceFEPime (MAXIPIME) IVPB  2 g Intravenous Q8H    enoxaparin  40 mg Subcutaneous Daily    gabapentin  300 mg Oral TID    methocarbamoL  500 mg Oral QID    metronidazole  500 mg Intravenous Q8H    mupirocin   Nasal BID    nicotine  1 patch Transdermal Daily    polyethylene glycol  17 g Oral Daily    vancomycin (VANCOCIN) IVPB  1,750 mg Intravenous Q12H     PRN Meds:dextrose 50%, dextrose 50%, glucagon (human recombinant), hydrALAZINE, HYDROmorphone, insulin aspart U-100, naloxone, ondansetron, oxyCODONE, oxyCODONE, sodium chloride 0.9%, Pharmacy to dose Vancomycin consult **AND** vancomycin - pharmacy to dose     Review of patient's allergies indicates:   Allergen  Reactions    Penicillins Rash     Objective:     Vital Signs (Most Recent):  Temp: 98.9 °F (37.2 °C) (04/27/22 0759)  Pulse: 99 (04/27/22 0822)  Resp: 18 (04/27/22 0937)  BP: (!) 141/69 (04/27/22 0759)  SpO2: 97 % (04/27/22 0822) Vital Signs (24h Range):  Temp:  [98.6 °F (37 °C)-101.2 °F (38.4 °C)] 98.9 °F (37.2 °C)  Pulse:  [] 99  Resp:  [14-18] 18  SpO2:  [88 %-97 %] 97 %  BP: (120-141)/(57-77) 141/69     Weight: 107.5 kg (237 lb)  Body mass index is 30.43 kg/m².    Intake/Output - Last 3 Shifts         04/25 0700  04/26 0659 04/26 0700 04/27 0659 04/27 0700 04/28 0659    P.O. 0 1830     I.V. (mL/kg) 1685.4 (15.7) 1391.1 (12.9)     IV Piggyback 2700 990.7     Total Intake(mL/kg) 4385.4 (40.8) 4211.8 (39.2)     Urine (mL/kg/hr) 1825 (0.7) 2045 (0.8)     Stool 0      Blood 200      Total Output 2025 2045     Net +2360.4 +2166.8                    Physical Exam  Vitals and nursing note reviewed.   Constitutional:       Appearance: He is well-developed.   Neck:      Vascular: No JVD.      Trachea: No tracheal deviation.   Cardiovascular:      Rate and Rhythm: Normal rate and regular rhythm.   Pulmonary:      Effort: Pulmonary effort is normal. No respiratory distress.   Abdominal:      General: There is no distension.      Palpations: Abdomen is soft.      Tenderness: There is no abdominal tenderness. There is no guarding.      Comments: Midline laparotomy incision c/d/I with Dermabond dressing in place.   Abdomen soft and non-distended.   Appropriately tender to palpation around the incision site.  LLQ end colostomy pink and well-perfused, small amt of gas in ostomy appliance.    Skin:     General: Skin is warm and dry.   Neurological:      Mental Status: He is alert and oriented to person, place, and time.       Significant Labs:  I have reviewed all pertinent lab results within the past 24 hours.  CBC:   Recent Labs   Lab 04/27/22  0452   WBC 22.27*   RBC 3.30*   HGB 9.4*   HCT 28.0*      MCV 85    MCH 28.5   MCHC 33.6       CMP:   Recent Labs   Lab 04/27/22  0452 04/27/22  0948   * 187*   CALCIUM 7.9* 8.4*   ALBUMIN 2.4*  --    PROT 5.7*  --    * 127*   K 4.3 4.8   CO2 25 26   CL 94* 93*   BUN 10 9   CREATININE 0.8 0.8   ALKPHOS 103  --    ALT 18  --    AST 19  --    BILITOT 0.8  --          Significant Diagnostics:  I have reviewed all pertinent imaging results/findings within the past 24 hours.    Assessment/Plan:     * Large bowel obstruction  Osman Li is a 56 y.o. male who presented on 4/24 with a large bowel obstruction and mass at the splenic flexure. Now 2 Days Post-Op from exploratory laparotomy with resection of splenic flexure, splenectomy, and end transverse colostomy on 4/25.     - Continue sips of clear liquids - will probably advance diet tomorrow 4/28  - Continue mIVF  - Pain: multimodal pain control (oxy, dilaudid, mary tylenol/robaxin/gabapentin)  - Agree with broad spec vanc/cefepime  - Sliding scale insulin  - DVT prophylaxis, SCDs  - Appreciate ostomy nurse teaching  - PT, OOB/ambulate    Dispo: continue inpatient care. Await return of bowel function.        Padmini Gonzalez MD  General Surgery  [facility]

## 2022-04-27 NOTE — PROGRESS NOTES
Pharmacokinetic Initial Assessment: IV Vancomycin    Assessment/Plan:    Initiate intravenous vancomycin with loading dose of 1750 mg once followed by a maintenance dose of vancomycin 1750mg IV every 12 hours  Desired empiric serum trough concentration is 15 to 20 mcg/mL  Draw vancomycin trough level 60 min prior to third dose on 4/28 at approximately 0630  Pharmacy will continue to follow and monitor vancomycin.      Please contact pharmacy at extension [phone] with any questions regarding this assessment.     Thank you for the consult,   Nick Cárdenas       Patient brief summary:  Osman Li is a 56 y.o. male initiated on antimicrobial therapy with IV Vancomycin for treatment of suspected intra-abdominal infection    Drug Allergies:   Review of patient's allergies indicates:   Allergen Reactions    Penicillins Rash       Actual Body Weight:   107.5 kg    Renal Function:   Estimated Creatinine Clearance: 106.4 mL/min (based on SCr of 1 mg/dL).,     Dialysis Method (if applicable):  N/A    CBC (last 72 hours):  Recent Labs   Lab Result Units 04/24/22  1658 04/25/22  0504 04/26/22  0536 04/27/22  0452   WBC K/uL 13.58* 10.25 12.38 22.27*   Hemoglobin g/dL 12.9* 12.5* 11.6* 9.4*   Hematocrit % 37.0* 37.2* 34.6* 28.0*   Platelets K/uL 416 419 433 384   Gran % % 73.8* 69.5 84.3* 81.5*   Lymph % % 18.3 19.0 8.2* 8.8*   Mono % % 6.9 9.4 6.4 8.5   Eosinophil % % 0.4 1.2 0.2 0.2   Basophil % % 0.2 0.4 0.5 0.2   Differential Method  Automated Automated Automated Automated       Metabolic Panel (last 72 hours):  Recent Labs   Lab Result Units 04/24/22  1658 04/25/22  0504 04/26/22  0536   Sodium mmol/L 132* 136 136   Potassium mmol/L 3.0* 3.6 4.7   Chloride mmol/L 97 101 101   CO2 mmol/L 22* 25 26   Glucose mg/dL 165* 157* 199*   BUN mg/dL 11 9 14   Creatinine mg/dL 0.8 0.8 1.0   Albumin g/dL 3.3* 3.1* 2.8*   Total Bilirubin mg/dL 0.5 0.7 0.8   Alkaline Phosphatase U/L 79 99 73   AST U/L 12 17 18   ALT U/L 10 18 18    Magnesium mg/dL  --  1.9 1.9   Phosphorus mg/dL  --  2.7 3.2       Drug levels (last 3 results):  No results for input(s): VANCOMYCINRA, VANCOMYCINPE, VANCOMYCINTR in the last 72 hours.    Microbiologic Results:  Microbiology Results (last 7 days)       Procedure Component Value Units Date/Time    Blood culture [[id number]]     Order Status: Sent Specimen: Blood     Blood culture [[id number]]     Order Status: Sent Specimen: Blood

## 2022-04-27 NOTE — PROGRESS NOTES
Patient seen and examined.  Pain control is better today.  Some mild erythematous changes along the left lower quadrant lateral to the ostomy.  These are not particularly symptomatic.    Vitals reviewed, 1 time fever overnight.  Low-grade tachycardia  Abdomen soft, appropriately tender  Incision is clean and dry  Ostomy is viable without output  Mild blanching erythema lateral to the ostomy and inferiorly    White blood cell count up to 22,000  Hemoglobin 11.6 to 9.4 today    56-year-old status post partial left colectomy for splenic flexure mass with splenectomy.  No significant ostomy output.  Looks and feels better today despite 1 time fever over the in low-grade tachycardia.  Patient has been ambulating.    Agree with antibiotics  Continue sips of clear liquids till better ostomy output  Continue to be mobile and out of bed  Continue to trend CBC for leukocytosis and hemoglobin

## 2022-04-27 NOTE — CONSULTS
" INPATIENT NEPHROLOGY CONSULT   [facility] NEPHROLOGY    Osman Li  04/27/2022    Reason for consultation:    hyponatremia    Chief Complaint:   Chief Complaint   Patient presents with    Abdominal Pain     Started Thursday           History of Present Illness:    Per H and P      Osman Li is a 56 y.o. y.o. male with a PMHx of HTN, T2DM, and HLD who presented to the ED with upper abdominal pain onset x 1 months, constant since 4/21. He describes that pain as an intermittent cramping sensation that lasts approximately 30 seconds. No radiation. Pain is exacerbated with "tensing up" and alleviated with repositioning. Pain is worst postprandial but constant.  He denies blood in his stools or dark stools. Pain was 9/10 at its worst and is currently "mild." He also reports chills night sweats, fatigue, difficulty urinating which he attributes to "old age" and back pain which he attributes to laying on the bed. He denies fever, WL, CP, SOB, and n/v/d. Patient reports colonoscopy approximately 5 years ago where he had 4 polyps removed. Denies FHx of colon cancer. Patient is a smoker with a 40 pack year history. ED workup was significant for anemia, elevated WBC, mild hyponatremia, mild hypokalemia, and CT abdomen concerning for an obstructive mass. The     4/27  Consulted for acute hyponatremia.  No nausea, chest pain, sob, fever,  new neurologic symptoms, new joint pain.  He has post op abdominal pain.  Tolerable.        Plan of Care:       Assessment:    Hyponatremia likely due to multiple etiologies.  He states he drank a large volume of water yesterday, he got a dose of hctz, and has not had any solute intake for a couple of days.    --no hypotonic iv piggy backs  --urine osm, na  --uric acid  --avoid thiazide diuretics  --fluid restrict if sodium keep dropping    Hypophosphatemia  --will correct when pt taking po    Hypertension  --pain management  --resume enalapril alone (not " Vaseretic)    Anemia  --post op.  AS per surgery  --trend h/h           Thank you for allowing us to participate in this patient's care. We will continue to follow.    Vital Signs:  Temp Readings from Last 3 Encounters:   04/27/22 98.9 °F (37.2 °C)   08/11/20 97.3 °F (36.3 °C)       Pulse Readings from Last 3 Encounters:   04/27/22 99   01/31/22 80   10/25/21 84       BP Readings from Last 3 Encounters:   04/27/22 (!) 141/69   01/31/22 138/82   10/25/21 136/78       Weight:  Wt Readings from Last 3 Encounters:   04/25/22 107.5 kg (237 lb)   01/31/22 110.7 kg (244 lb)   10/25/21 108.3 kg (238 lb 12.8 oz)       Past Medical & Surgical History:  Past Medical History:   Diagnosis Date    DM (diabetes mellitus)     Hyperlipidemia     Hypertension     Prediabetes        Past Surgical History:   Procedure Laterality Date    CHOLECYSTECTOMY  2011    COLONOSCOPY      2018    COLOSTOMY N/A 4/25/2022    Procedure: CREATION, COLOSTOMY;  Surgeon: Carlton Waddell MD;  Location: [facility] OR;  Service: General;  Laterality: N/A;    MOBILIZATION OF SPLENIC FLEXURE N/A 4/25/2022    Procedure: MOBILIZATION, SPLENIC FLEXURE;  Surgeon: Carlton Waddell MD;  Location: [facility] OR;  Service: General;  Laterality: N/A;    SPLENECTOMY N/A 4/25/2022    Procedure: SPLENECTOMY;  Surgeon: Carlton Waddell MD;  Location: [facility] OR;  Service: General;  Laterality: N/A;    SUBTOTAL COLECTOMY N/A 4/25/2022    Procedure: COLECTOMY, PARTIAL;  Surgeon: Carlton Waddell MD;  Location: [facility] OR;  Service: General;  Laterality: N/A;       Past Social History:  Social History     Socioeconomic History    Marital status:    Tobacco Use    Smoking status: Current Every Day Smoker     Packs/day: 1.00     Years: 35.00     Pack years: 35.00    Smokeless tobacco: Never Used   Substance and Sexual Activity    Alcohol use: Not Currently    Drug use: Never    Sexual activity: Yes     Partners: Female       Medications:  No current facility-administered  "medications on file prior to encounter.     Current Outpatient Medications on File Prior to Encounter   Medication Sig Dispense Refill    atorvastatin (LIPITOR) 20 MG tablet Take 1 tablet (20 mg total) by mouth once daily. 90 tablet 1    enalapriL-hydrochlorothiazide (VASERETIC) 10-25 mg per tablet Take 1 tablet by mouth once daily. 90 tablet 1    metFORMIN (GLUCOPHAGE) 1000 MG tablet Take 1 tablet (1,000 mg total) by mouth 2 (two) times daily with meals. 180 tablet 1    pen needle, diabetic (BD ULTRA-FINE MICRO PEN NEEDLE) 32 gauge x 1/4" Ndle 1 each by Misc.(Non-Drug; Combo Route) route once a week. 50 each 1    semaglutide (OZEMPIC) 1 mg/dose (4 mg/3 mL) Inject 1 mg into the skin every 7 days. 3 pen 1    sildenafiL (VIAGRA) 100 MG tablet Take 1 tablet (100 mg total) by mouth daily as needed for Erectile Dysfunction. 30 tablet 1     Scheduled Meds:   acetaminophen  650 mg Oral Q6H    ceFEPime (MAXIPIME) IVPB  2 g Intravenous Q8H    enoxaparin  40 mg Subcutaneous Daily    gabapentin  300 mg Oral TID    methocarbamoL  500 mg Oral QID    metronidazole  500 mg Intravenous Q8H    mupirocin   Nasal BID    nicotine  1 patch Transdermal Daily    polyethylene glycol  17 g Oral Daily    sodium phosphate IVPB  30 mmol Intravenous Once    vancomycin (VANCOCIN) IVPB  1,750 mg Intravenous Q12H     Continuous Infusions:   sodium chloride 0.9% 75 mL/hr at 04/27/22 0547     PRN Meds:.dextrose 50%, dextrose 50%, glucagon (human recombinant), hydrALAZINE, HYDROmorphone, insulin aspart U-100, naloxone, ondansetron, oxyCODONE, oxyCODONE, sodium chloride 0.9%, Pharmacy to dose Vancomycin consult **AND** vancomycin - pharmacy to dose    Allergies:  Penicillins    Past Family History:  Reviewed; refer to Hospitalist Admission Note    Review of Systems:  Review of Systems - All 14 systems reviewed and negative, except as noted in HPI    Physical Exam:    BP (!) 141/69   Pulse 99   Temp 98.9 °F (37.2 °C)   Resp 18   " "Ht 6' 2" (1.88 m)   Wt 107.5 kg (237 lb)   SpO2 97%   BMI 30.43 kg/m²     General Appearance:    Alert, cooperative, no distress, appears stated age   Head:    Normocephalic, without obvious abnormality, atraumatic   Eyes:    PER, conjunctiva/corneas clear, EOM's intact in both eyes        Throat:   Lips, mucosa, and tongue normal; teeth and gums normal   Back:     Symmetric, no curvature, ROM normal, no CVA tenderness   Lungs:     Clear to auscultation bilaterally, respirations unlabored   Chest wall:    No tenderness or deformity   Heart:    Regular rate and rhythm, S1 and S2 normal, no murmur, rub   or gallop   Abdomen:     Quiet    Extremities:   Extremities normal, atraumatic, no cyanosis or edema   Pulses:   2+ and symmetric all extremities   MSK:   No joint or muscle swelling, tenderness or deformity   Skin:   Skin color, texture, turgor normal, no rashes or lesions   Neurologic:   CNII-XII intact, normal strength and sensation       Throughout.  No flap     Results:  Lab Results   Component Value Date     (L) 04/27/2022    K 4.8 04/27/2022    CL 93 (L) 04/27/2022    CO2 26 04/27/2022    BUN 9 04/27/2022    CREATININE 0.8 04/27/2022    CALCIUM 8.4 (L) 04/27/2022    ANIONGAP 8 04/27/2022    ESTGFRAFRICA >60 04/27/2022    EGFRNONAA >60 04/27/2022       Lab Results   Component Value Date    CALCIUM 8.4 (L) 04/27/2022    PHOS 1.5 (L) 04/27/2022       Recent Labs   Lab 04/27/22  0452   WBC 22.27*   RBC 3.30*   HGB 9.4*   HCT 28.0*      MCV 85   MCH 28.5   MCHC 33.6          I have personally reviewed pertinent radiological imaging and reports.    Patient care was time spent personally by me on the following activities:   · Obtaining a history  · Examination of patient.  · Providing medical care at the patients bedside.  · Developing a treatment plan with patient or surrogate and bedside caregivers  · Ordering and reviewing laboratory studies, radiographic studies, pulse oximetry.  · Ordering and " performing treatments and interventions.  · Evaluation of patient's response to treatment.  · Discussions with consultants while on the unit and immediately available to the patient.  · Re-evaluation of the patient's condition.  · Documentation in the medical record.     Chino Donovan MD  Nephrology  [facility]  [phone]

## 2022-04-27 NOTE — PLAN OF CARE
Problem: Adult Inpatient Plan of Care  Goal: Plan of Care Review  Outcome: Ongoing, Progressing     Problem: Adult Inpatient Plan of Care  Goal: Optimal Comfort and Wellbeing  Outcome: Ongoing, Progressing       Problem: Fall Injury Risk  Goal: Absence of Fall and Fall-Related Injury  Outcome: Ongoing, Progressing     Problem: Skin Injury Risk Increased  Goal: Skin Health and Integrity  Outcome: Ongoing, Progressing    Patient AA&O x4. Plan of care reviewed with patient. PIV intact and infusing. Midline incision clean and dry. Colostomy bag with small amount blood drainage. Patient reported abdominal pain and gas pain today. Given prn medication as ordered. Tele 8700 sinus tachycardia to NS. SCDs in place. Lovenox administered for VTE. Nausea reported, but subsided after receiving prn pain medication. Once pain got under control with medication, patient was able to get up to the chair. Tolerated clear liquids well. Safety/Fall precautions maintained. Bed low, wheels locked, call light within reach. Macias cath removed during shift. Patient due to void. Handoff given to night shift nurse.

## 2022-04-27 NOTE — PROGRESS NOTES
Consult Note  Infectious Disease    Reason for Consult: Vaccines s/p splenectomy     HPI: Osman Li is a 56 y.o. male active heavy smoker, with past medical history of HTN, diabetes, and HLD who presented with worsening upper abdominal pain and constipation for a month which became constant 4/21, with associated fever, chills and some difficulty urinating. He denies melena,  headache, cough, SOB, nausea, vomit or new skin rash.   As per patient, last colonoscopy was 5 years ago anf 4 polyps were removed.     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  Adjacent pathologic lymph nodes compatible with jason spread of disease.  Obstruction of the colon proximal to the mass, with moderate dilatation.    Taken for emergent ex-lap 4/25 for LBO. S/p Clindamycin IV and Decadron 4mg.     As per Op-note; large colonic mass that was obstructing at the splenic flexure and densely adherent to the splenic hilum.  Palpable abnormalities within the tail of the pancreas. Status post splenectomy and end transverse colostomy with remnant distal descending colon, sigmoid colon and rectum.   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    ID consult for vaccinations after splenectomy.     Patient seen and examined at bedside, wife present. He is c/o severe abdominal pain 9/10 likely post surgery, he is passing gas. Hemodynamically stable, afebrile.   Labs reviewed, WNL.   He is vaccinated against COVID-19 s/p 2 doses, pending booster.     INTERVAL HISTORY:   4/27:  Interim reviewed, patient seen and examined at bedside sitting in the chair.  Had a fever 101.2 overnight, chills.  Patient states  his abdominal pain is better controlled with pain meds. Has persistent  cough, mild sputum.  Macias catheter removed.  Labs reviewed, white count of 22.2, PMN 81.5%, H&H 9.4 or 28, platelet count 384.  Sodium 127, normal kidney function.    Review of patient's allergies indicates:   Allergen Reactions    Penicillins Rash     Past Medical History:   Diagnosis Date    DM (diabetes mellitus)     Hyperlipidemia     Hypertension     Prediabetes      Past Surgical History:   Procedure Laterality Date    CHOLECYSTECTOMY  2011    COLONOSCOPY      2018    COLOSTOMY N/A 4/25/2022    Procedure: CREATION, COLOSTOMY;  Surgeon: Carlton Waddell MD;  Location: [facility] OR;  Service: General;  Laterality: N/A;    MOBILIZATION OF SPLENIC FLEXURE N/A 4/25/2022    Procedure: MOBILIZATION, SPLENIC FLEXURE;  Surgeon: Carlton Waddell MD;  Location: [facility] OR;  Service: General;  Laterality: N/A;    SPLENECTOMY N/A 4/25/2022    Procedure: SPLENECTOMY;  Surgeon: Carlton Waddell MD;  Location: [facility] OR;  Service: General;  Laterality: N/A;    SUBTOTAL COLECTOMY N/A 4/25/2022    Procedure: COLECTOMY, PARTIAL;  Surgeon: Carlton Waddell MD;  Location: [facility] OR;  Service: General;  Laterality: N/A;     Social History     Tobacco Use    Smoking status: Current Every Day Smoker     Packs/day: 1.00     Years: 35.00     Pack years: 35.00    Smokeless tobacco: Never Used   Substance Use Topics    Alcohol use: Not Currently        Family History   Problem Relation Age of Onset    Heart disease Father          Review of Systems:   As described in HPi.    Outdoor activities: Works as a , lives with wife at home.   Travel: None  Implants: None  Antibiotic History: Clindamycin pre-op    EXAM & DIAGNOSTICS REVIEWED:   Vitals:     Temp:  [98.6 °F (37 °C)-101.2 °F (38.4 °C)]   Temp: 99.5 °F (37.5 °C) (04/27/22 0409)  Pulse: (!) 111 (04/27/22 0409)  Resp: 18 (04/27/22 0647)  BP: (!) 122/57 (04/27/22 0409)  SpO2: (!) 94 % (04/27/22 0409)    Intake/Output Summary (Last 24 hours) at 4/27/2022 3315  Last data filed  at 4/27/2022 0547  Gross per 24 hour   Intake 4211.82 ml   Output 2045 ml   Net 2166.82 ml       General:  In NAD. Alert and attentive, cooperative, in pain likely post-op  Eyes:  Anicteric, PERRL  ENT:  No ulcers, exudates, thrush, nares patent, dentition is fiar  Neck:  Supple  Lungs: Clear to auscultation b/l  Heart:  S1/S2+, regular rhythm, no murmurs  Abd:  Laparotomy wound looks clean, no redness noted, LLQ colostomy bag in place, minimal serosanguinous fluid, +BS, soft, mildly tender to palpation expected post-op, no rebound   :  Voids, urine clear, no flank tenderness  Musc:  Joints without effusion, swelling,  erythema, synovitis  Skin:  Warm, no rash  Wound: Laparotomy   Neuro: Following commands, no acute focal deficit   Psych:  Calm, cooperative  Lymphatic:       Extrem: No LE edema b/l  VAD:  Peripheral IV        Isolation:  None    General Labs reviewed:  Recent Labs   Lab 04/25/22  0504 04/26/22  0536 04/27/22  0452   WBC 10.25 12.38 22.27*   HGB 12.5* 11.6* 9.4*   HCT 37.2* 34.6* 28.0*    433 384       Recent Labs   Lab 04/25/22  0504 04/26/22  0536 04/27/22  0452    136 127*   K 3.6 4.7 4.3    101 94*   CO2 25 26 25   BUN 9 14 10   CREATININE 0.8 1.0 0.8   CALCIUM 8.9 8.1* 7.9*   PROT 6.8 5.9* 5.7*   BILITOT 0.7 0.8 0.8   ALKPHOS 99 73 103   ALT 18 18 18   AST 17 18 19     No results for input(s): CRP in the last 168 hours.  No results for input(s): SEDRATE in the last 168 hours.    Estimated Creatinine Clearance: 133 mL/min (based on SCr of 0.8 mg/dL).     Micro:  Microbiology Results (last 7 days)     Procedure Component Value Units Date/Time    Blood culture [[id number]] Collected: 04/27/22 0659    Order Status: Sent Specimen: Blood from Antecubital, Right Updated: 04/27/22 0700    Blood culture [[id number]] Collected: 04/27/22 0658    Order Status: Sent Specimen: Blood from Antecubital, Left Updated: 04/27/22 0658          Imaging Reviewed:  CT abdomen/pelvis: Splenic flexure  mass compatible with a primary colon neoplasm.  Adjacent pathologic lymph nodes compatible with jason spread of disease.  Obstruction of the colon proximal to the mass, with moderate dilatation.      IMPRESSION & PLAN     1. S/p emergent ex-lap and splenectomy for underlying colonic mass likely neoplasia in the setting of active heavy smoking, now with fever and leukocytosis   Path report in process    2. HTN, diabetes, HLD   3. Smoker     Recommendations:  Blood cultures x2 sets  Respiratory culture  CXR, procal  Start vancomycin IV, keep level around 15  Cefepime 2 g IV q.8  Flagyl 500 mg IV q.8 for anaerobic coverage  Will discuss with surgery if the need to repeat abdominal imaging to exclude acute process  Will address vaccines before discharge  Follow cultures   Incentive spirometry  Aspiration precautions     Will follow     D/w Dr Pan      Medical Decision Making during this encounter was  [_] Low Complexity  [_] Moderate Complexity  [xx] High Complexity

## 2022-04-27 NOTE — CARE UPDATE
04/27/22 0822   Patient Assessment/Suction   Level of Consciousness (AVPU) alert   Respiratory Effort Unlabored   PRE-TX-O2   O2 Device (Oxygen Therapy) nasal cannula   $ Is the patient on Low Flow Oxygen? Yes   Flow (L/min) 2   SpO2 97 %   Pulse Oximetry Type Intermittent   $ Pulse Oximetry - Multiple Charge Pulse Oximetry - Multiple   Pulse 99   Resp 18

## 2022-04-27 NOTE — ASSESSMENT & PLAN NOTE
Osman Li is a 56 y.o. male who presented on 4/24 with a large bowel obstruction and mass at the splenic flexure. Now 2 Days Post-Op from exploratory laparotomy with resection of splenic flexure, splenectomy, and end transverse colostomy on 4/25.     - Continue sips of clear liquids - will probably advance diet tomorrow 4/28  - Continue mIVF  - Pain: multimodal pain control (oxy, dilaudid, mary tylenol/robaxin/gabapentin)  - Agree with broad spec vanc/cefepime  - Sliding scale insulin  - DVT prophylaxis, SCDs  - Appreciate ostomy nurse teaching  - PT, OOB/ambulate    Dispo: continue inpatient care. Await return of bowel function.

## 2022-04-27 NOTE — TELEPHONE ENCOUNTER
----- Message from Alicia Garcia sent at 4/27/2022  2:20 PM CDT -----  Grisel with [facility] case management called the patient need to schedule a HFU the patient will be discharged on Friday he need to follow up in one week please give her a call at [phone]

## 2022-04-27 NOTE — NURSING
Macias Catheter removed intact. Bulb deflated with 9ml of saline in syringe. Patient tolerated procedure well. No redness, swelling, or irritation noted. Voiding trial initiated.

## 2022-04-27 NOTE — HOSPITAL COURSE
56 y.o. y.o. male with a PMHx of HTN, T2DM, and HLD who presented to the ED with upper abdominal pain onset x 1 months found to have a large bowel obstruction 2/2 mass.  On 4/25 the patient underwent colectomy, splenectomy and mass removal with colostomy creation.  After a few days, he had ostomy output.    He continued to have incisional pain which was difficult to control.  It required escalating doses of opiates.  This kept him hospitalized for a prolonged period.    He developed fevers and high leukocyte count.  Infectious Disease consultant was brought on the case, and vancomycin and cefepime were given.  No evidence of a specific infection manifested.    During that time, he participated with physical therapy and made good progress with ambulation.    Additionally, course complicated by hyponatremia. Urine lytes sent and renal consulted.  The sodium level was monitored, and it improved.

## 2022-04-27 NOTE — PLAN OF CARE
POC reviewed with patient and spouse. Patient is alert and oriented x 4, able to make needs known. Patient denies nausea and/or vomiting, states pain is better controlled with hydromorphone. Patient had blas removed 4/26 voided 220 ml of dark yellow urine. IVF given, PO fluids encouraged. Patient currently in bed with eyes closed, NAD noted. Call light and personal items within reach, bed locked in lowest position. Will continue to monitor.

## 2022-04-28 PROBLEM — T81.49XA POSTOPERATIVE CELLULITIS OF SURGICAL WOUND: Status: ACTIVE | Noted: 2022-04-28

## 2022-04-28 PROBLEM — E83.39 HYPOPHOSPHATEMIA: Status: ACTIVE | Noted: 2022-04-28

## 2022-04-28 LAB
ALBUMIN SERPL BCP-MCNC: 2.3 G/DL (ref 3.5–5.2)
ALBUMIN SERPL BCP-MCNC: 2.3 G/DL (ref 3.5–5.2)
ALP SERPL-CCNC: 159 U/L (ref 55–135)
ALT SERPL W/O P-5'-P-CCNC: 24 U/L (ref 10–44)
ANION GAP SERPL CALC-SCNC: 9 MMOL/L (ref 8–16)
ANION GAP SERPL CALC-SCNC: 9 MMOL/L (ref 8–16)
AST SERPL-CCNC: 25 U/L (ref 10–40)
BASOPHILS # BLD AUTO: 0.04 K/UL (ref 0–0.2)
BASOPHILS NFR BLD: 0.2 % (ref 0–1.9)
BILIRUB SERPL-MCNC: 0.6 MG/DL (ref 0.1–1)
BUN SERPL-MCNC: 6 MG/DL (ref 6–20)
BUN SERPL-MCNC: 6 MG/DL (ref 6–20)
CALCIUM SERPL-MCNC: 8.9 MG/DL (ref 8.7–10.5)
CALCIUM SERPL-MCNC: 8.9 MG/DL (ref 8.7–10.5)
CHLORIDE SERPL-SCNC: 97 MMOL/L (ref 95–110)
CHLORIDE SERPL-SCNC: 97 MMOL/L (ref 95–110)
CO2 SERPL-SCNC: 26 MMOL/L (ref 23–29)
CO2 SERPL-SCNC: 26 MMOL/L (ref 23–29)
CREAT SERPL-MCNC: 0.8 MG/DL (ref 0.5–1.4)
CREAT SERPL-MCNC: 0.8 MG/DL (ref 0.5–1.4)
DIFFERENTIAL METHOD: ABNORMAL
EOSINOPHIL # BLD AUTO: 0.2 K/UL (ref 0–0.5)
EOSINOPHIL NFR BLD: 1.1 % (ref 0–8)
ERYTHROCYTE [DISTWIDTH] IN BLOOD BY AUTOMATED COUNT: 12.2 % (ref 11.5–14.5)
EST. GFR  (AFRICAN AMERICAN): >60 ML/MIN/1.73 M^2
EST. GFR  (AFRICAN AMERICAN): >60 ML/MIN/1.73 M^2
EST. GFR  (NON AFRICAN AMERICAN): >60 ML/MIN/1.73 M^2
EST. GFR  (NON AFRICAN AMERICAN): >60 ML/MIN/1.73 M^2
GLUCOSE SERPL-MCNC: 151 MG/DL (ref 70–110)
GLUCOSE SERPL-MCNC: 151 MG/DL (ref 70–110)
HCT VFR BLD AUTO: 27.4 % (ref 40–54)
HGB BLD-MCNC: 9.2 G/DL (ref 14–18)
IMM GRANULOCYTES # BLD AUTO: 0.12 K/UL (ref 0–0.04)
IMM GRANULOCYTES NFR BLD AUTO: 0.6 % (ref 0–0.5)
LYMPHOCYTES # BLD AUTO: 1.9 K/UL (ref 1–4.8)
LYMPHOCYTES NFR BLD: 9.4 % (ref 18–48)
MAGNESIUM SERPL-MCNC: 2.2 MG/DL (ref 1.6–2.6)
MCH RBC QN AUTO: 28.6 PG (ref 27–31)
MCHC RBC AUTO-ENTMCNC: 33.6 G/DL (ref 32–36)
MCV RBC AUTO: 85 FL (ref 82–98)
MONOCYTES # BLD AUTO: 1.5 K/UL (ref 0.3–1)
MONOCYTES NFR BLD: 7.3 % (ref 4–15)
NEUTROPHILS # BLD AUTO: 16.3 K/UL (ref 1.8–7.7)
NEUTROPHILS NFR BLD: 81.4 % (ref 38–73)
NRBC BLD-RTO: 0 /100 WBC
PHOSPHATE SERPL-MCNC: 2.2 MG/DL (ref 2.7–4.5)
PHOSPHATE SERPL-MCNC: 2.2 MG/DL (ref 2.7–4.5)
PHOSPHATE SERPL-MCNC: 2.3 MG/DL (ref 2.7–4.5)
PLATELET # BLD AUTO: 395 K/UL (ref 150–450)
PMV BLD AUTO: 9.1 FL (ref 9.2–12.9)
POTASSIUM SERPL-SCNC: 4.5 MMOL/L (ref 3.5–5.1)
POTASSIUM SERPL-SCNC: 4.5 MMOL/L (ref 3.5–5.1)
PROT SERPL-MCNC: 6 G/DL (ref 6–8.4)
RBC # BLD AUTO: 3.22 M/UL (ref 4.6–6.2)
SODIUM SERPL-SCNC: 132 MMOL/L (ref 136–145)
SODIUM SERPL-SCNC: 132 MMOL/L (ref 136–145)
URATE SERPL-MCNC: 4.2 MG/DL (ref 3.4–7)
VANCOMYCIN TROUGH SERPL-MCNC: 8.6 UG/ML (ref 10–22)
WBC # BLD AUTO: 19.99 K/UL (ref 3.9–12.7)

## 2022-04-28 PROCEDURE — 25000003 PHARM REV CODE 250: Performed by: STUDENT IN AN ORGANIZED HEALTH CARE EDUCATION/TRAINING PROGRAM

## 2022-04-28 PROCEDURE — 80202 ASSAY OF VANCOMYCIN: CPT | Performed by: INTERNAL MEDICINE

## 2022-04-28 PROCEDURE — 80053 COMPREHEN METABOLIC PANEL: CPT | Performed by: INTERNAL MEDICINE

## 2022-04-28 PROCEDURE — 84100 ASSAY OF PHOSPHORUS: CPT | Performed by: INTERNAL MEDICINE

## 2022-04-28 PROCEDURE — 63600175 PHARM REV CODE 636 W HCPCS: Performed by: STUDENT IN AN ORGANIZED HEALTH CARE EDUCATION/TRAINING PROGRAM

## 2022-04-28 PROCEDURE — 87205 SMEAR GRAM STAIN: CPT | Performed by: STUDENT IN AN ORGANIZED HEALTH CARE EDUCATION/TRAINING PROGRAM

## 2022-04-28 PROCEDURE — 25000003 PHARM REV CODE 250: Performed by: INTERNAL MEDICINE

## 2022-04-28 PROCEDURE — A9698 NON-RAD CONTRAST MATERIALNOC: HCPCS

## 2022-04-28 PROCEDURE — 63600175 PHARM REV CODE 636 W HCPCS: Performed by: INTERNAL MEDICINE

## 2022-04-28 PROCEDURE — 87070 CULTURE OTHR SPECIMN AEROBIC: CPT | Performed by: STUDENT IN AN ORGANIZED HEALTH CARE EDUCATION/TRAINING PROGRAM

## 2022-04-28 PROCEDURE — 94761 N-INVAS EAR/PLS OXIMETRY MLT: CPT

## 2022-04-28 PROCEDURE — 84100 ASSAY OF PHOSPHORUS: CPT | Mod: 91 | Performed by: INTERNAL MEDICINE

## 2022-04-28 PROCEDURE — 36415 COLL VENOUS BLD VENIPUNCTURE: CPT | Performed by: INTERNAL MEDICINE

## 2022-04-28 PROCEDURE — 25500020 PHARM REV CODE 255

## 2022-04-28 PROCEDURE — 84550 ASSAY OF BLOOD/URIC ACID: CPT | Performed by: INTERNAL MEDICINE

## 2022-04-28 PROCEDURE — 63600175 PHARM REV CODE 636 W HCPCS: Performed by: HOSPITALIST

## 2022-04-28 PROCEDURE — S4991 NICOTINE PATCH NONLEGEND: HCPCS

## 2022-04-28 PROCEDURE — 27000221 HC OXYGEN, UP TO 24 HOURS

## 2022-04-28 PROCEDURE — 83735 ASSAY OF MAGNESIUM: CPT | Performed by: INTERNAL MEDICINE

## 2022-04-28 PROCEDURE — 97116 GAIT TRAINING THERAPY: CPT

## 2022-04-28 PROCEDURE — 25000003 PHARM REV CODE 250

## 2022-04-28 PROCEDURE — 12000002 HC ACUTE/MED SURGE SEMI-PRIVATE ROOM

## 2022-04-28 PROCEDURE — 85025 COMPLETE CBC W/AUTO DIFF WBC: CPT | Performed by: INTERNAL MEDICINE

## 2022-04-28 PROCEDURE — 25000003 PHARM REV CODE 250: Performed by: HOSPITALIST

## 2022-04-28 PROCEDURE — S0030 INJECTION, METRONIDAZOLE: HCPCS | Performed by: STUDENT IN AN ORGANIZED HEALTH CARE EDUCATION/TRAINING PROGRAM

## 2022-04-28 RX ORDER — HYDROMORPHONE HYDROCHLORIDE 1 MG/ML
2 INJECTION, SOLUTION INTRAMUSCULAR; INTRAVENOUS; SUBCUTANEOUS
Status: DISCONTINUED | OUTPATIENT
Start: 2022-04-28 | End: 2022-05-01

## 2022-04-28 RX ADMIN — IOHEXOL 100 ML: 350 INJECTION, SOLUTION INTRAVENOUS at 12:04

## 2022-04-28 RX ADMIN — POLYETHYLENE GLYCOL (3350) 17 G: 17 POWDER, FOR SOLUTION ORAL at 10:04

## 2022-04-28 RX ADMIN — HYDROMORPHONE HYDROCHLORIDE 2 MG: 1 INJECTION, SOLUTION INTRAMUSCULAR; INTRAVENOUS; SUBCUTANEOUS at 11:04

## 2022-04-28 RX ADMIN — ACETAMINOPHEN 650 MG: 325 TABLET ORAL at 06:04

## 2022-04-28 RX ADMIN — MUPIROCIN: 20 OINTMENT TOPICAL at 09:04

## 2022-04-28 RX ADMIN — SODIUM CHLORIDE: 0.9 INJECTION, SOLUTION INTRAVENOUS at 12:04

## 2022-04-28 RX ADMIN — METRONIDAZOLE 500 MG: 500 INJECTION, SOLUTION INTRAVENOUS at 04:04

## 2022-04-28 RX ADMIN — ACETAMINOPHEN 650 MG: 325 TABLET ORAL at 01:04

## 2022-04-28 RX ADMIN — HYDROMORPHONE HYDROCHLORIDE 1.5 MG: 1 INJECTION, SOLUTION INTRAMUSCULAR; INTRAVENOUS; SUBCUTANEOUS at 12:04

## 2022-04-28 RX ADMIN — HYDROMORPHONE HYDROCHLORIDE 2 MG: 1 INJECTION, SOLUTION INTRAMUSCULAR; INTRAVENOUS; SUBCUTANEOUS at 01:04

## 2022-04-28 RX ADMIN — CEFEPIME HYDROCHLORIDE 2 G: 2 INJECTION, SOLUTION INTRAVENOUS at 06:04

## 2022-04-28 RX ADMIN — VANCOMYCIN HYDROCHLORIDE 2000 MG: 1 INJECTION, POWDER, LYOPHILIZED, FOR SOLUTION INTRAVENOUS at 01:04

## 2022-04-28 RX ADMIN — ACETAMINOPHEN 650 MG: 325 TABLET ORAL at 05:04

## 2022-04-28 RX ADMIN — CEFEPIME HYDROCHLORIDE 2 G: 2 INJECTION, SOLUTION INTRAVENOUS at 02:04

## 2022-04-28 RX ADMIN — GABAPENTIN 300 MG: 300 CAPSULE ORAL at 10:04

## 2022-04-28 RX ADMIN — METRONIDAZOLE 500 MG: 500 INJECTION, SOLUTION INTRAVENOUS at 06:04

## 2022-04-28 RX ADMIN — NICOTINE 1 PATCH: 14 PATCH, EXTENDED RELEASE TRANSDERMAL at 10:04

## 2022-04-28 RX ADMIN — HYDROMORPHONE HYDROCHLORIDE 2 MG: 1 INJECTION, SOLUTION INTRAMUSCULAR; INTRAVENOUS; SUBCUTANEOUS at 06:04

## 2022-04-28 RX ADMIN — GABAPENTIN 300 MG: 300 CAPSULE ORAL at 05:04

## 2022-04-28 RX ADMIN — SODIUM CHLORIDE: 0.9 INJECTION, SOLUTION INTRAVENOUS at 04:04

## 2022-04-28 RX ADMIN — METRONIDAZOLE 500 MG: 500 INJECTION, SOLUTION INTRAVENOUS at 12:04

## 2022-04-28 RX ADMIN — MUPIROCIN: 20 OINTMENT TOPICAL at 10:04

## 2022-04-28 RX ADMIN — OXYCODONE HYDROCHLORIDE 10 MG: 10 TABLET ORAL at 09:04

## 2022-04-28 RX ADMIN — ACETAMINOPHEN 650 MG: 325 TABLET ORAL at 11:04

## 2022-04-28 RX ADMIN — OXYCODONE HYDROCHLORIDE 10 MG: 10 TABLET ORAL at 06:04

## 2022-04-28 RX ADMIN — OXYCODONE HYDROCHLORIDE 10 MG: 10 TABLET ORAL at 10:04

## 2022-04-28 RX ADMIN — ENOXAPARIN SODIUM 40 MG: 40 INJECTION SUBCUTANEOUS at 04:04

## 2022-04-28 RX ADMIN — GABAPENTIN 300 MG: 300 CAPSULE ORAL at 09:04

## 2022-04-28 RX ADMIN — METRONIDAZOLE 500 MG: 500 INJECTION, SOLUTION INTRAVENOUS at 11:04

## 2022-04-28 RX ADMIN — METHOCARBAMOL TABLETS 500 MG: 500 TABLET, COATED ORAL at 09:04

## 2022-04-28 RX ADMIN — METHOCARBAMOL TABLETS 500 MG: 500 TABLET, COATED ORAL at 04:04

## 2022-04-28 RX ADMIN — METHOCARBAMOL TABLETS 500 MG: 500 TABLET, COATED ORAL at 10:04

## 2022-04-28 RX ADMIN — METHOCARBAMOL TABLETS 500 MG: 500 TABLET, COATED ORAL at 01:04

## 2022-04-28 RX ADMIN — CEFEPIME HYDROCHLORIDE 2 G: 2 INJECTION, SOLUTION INTRAVENOUS at 11:04

## 2022-04-28 RX ADMIN — ACETAMINOPHEN 650 MG: 325 TABLET ORAL at 12:04

## 2022-04-28 RX ADMIN — IOHEXOL 1000 ML: 12 SOLUTION ORAL at 12:04

## 2022-04-28 NOTE — PROGRESS NOTES
Osman Li [id number] is a 56 y.o. male who has been consulted for vancomycin dosing.    Vancomycin trough has been changed to 04/28/22 at 0930.      Patient will be followed by pharmacy for changes in renal function, toxicity, and efficacy.    Thank you for allowing us to participate in this patient's care.     Sajan Davenport, GiuseppeD

## 2022-04-28 NOTE — PLAN OF CARE
Problem: Physical Therapy  Goal: Physical Therapy Goal  Description: Goals to be met by: 22     Patient will increase functional independence with mobility by performin. Supine to sit with [place]  2. Sit to stand transfer with [place]  3. Bed to chair transfer with [place] using No Assistive Device  4. Gait  x 250 feet with [place] using No Assistive Device.   5. Lower extremity exercise program x20 reps per handout, with independence    Outcome: Ongoing, Progressing

## 2022-04-28 NOTE — PROGRESS NOTES
Pharmacokinetic Assessment Follow Up: IV Vancomycin    Vancomycin serum concentration assessment(s):    The trough level was drawn correctly and can be used to guide therapy at this time. The measurement is below the desired definitive target range of 15 to 20 mcg/mL.    Vancomycin Regimen Plan:    Change regimen to Vancomycin 2000 mg IV every 12 hours with next serum trough concentration measured at 2330 prior to 4th dose on 4/29    Drug levels (last 3 results):  Recent Labs   Lab Result Units 04/28/22  0919   Vancomycin-Trough ug/mL 8.6*       Pharmacy will continue to follow and monitor vancomycin.    Please contact pharmacy at extension [phone] for questions regarding this assessment.    Thank you for the consult,   Zhao Cornejo       Patient brief summary:  Osman Li is a 56 y.o. male initiated on antimicrobial therapy with IV Vancomycin for treatment of intra-abdominal infection      Drug Allergies:   Review of patient's allergies indicates:   Allergen Reactions    Penicillins Rash       Actual Body Weight:   106.7    Renal Function:   Estimated Creatinine Clearance: 132.6 mL/min (based on SCr of 0.8 mg/dL).,     Dialysis Method (if applicable):  N/A    CBC (last 72 hours):  Recent Labs   Lab Result Units 04/26/22  0536 04/27/22  0452 04/28/22  0429   WBC K/uL 12.38 22.27* 19.99*   Hemoglobin g/dL 11.6* 9.4* 9.2*   Hematocrit % 34.6* 28.0* 27.4*   Platelets K/uL 433 384 395   Gran % % 84.3* 81.5* 81.4*   Lymph % % 8.2* 8.8* 9.4*   Mono % % 6.4 8.5 7.3   Eosinophil % % 0.2 0.2 1.1   Basophil % % 0.5 0.2 0.2   Differential Method  Automated Automated Automated       Metabolic Panel (last 72 hours):  Recent Labs   Lab Result Units 04/26/22  0536 04/27/22  0452 04/27/22  0948 04/27/22  1132 04/27/22  1136 04/27/22  1720 04/27/22  2311 04/28/22  0429   Sodium mmol/L 136 127* 127*  --   --  130* 129* 132*  132*   Sodium, Urine mmol/L  --   --   --  <20*  --   --   --   --    Potassium mmol/L 4.7 4.3  4.8  --   --   --   --  4.5  4.5   Potassium, Urine mmol/L  --   --   --  32  --   --   --   --    Chloride mmol/L 101 94* 93*  --   --   --   --  97  97   Chloride, Urine mmol/L  --   --   --  35  --   --   --   --    CO2 mmol/L 26 25 26  --   --   --   --  26  26   Glucose mg/dL 199* 158* 187*  --   --   --   --  151*  151*   Glucose, UA   --   --   --   --  3+*  --   --   --    BUN mg/dL 14 10 9  --   --   --   --  6  6   Creatinine mg/dL 1.0 0.8 0.8  --   --   --   --  0.8  0.8   Creatinine, Urine mg/dL  --   --   --  49.6  --   --   --   --    Albumin g/dL 2.8* 2.4*  --   --   --   --   --  2.3*  2.3*   Total Bilirubin mg/dL 0.8 0.8  --   --   --   --   --  0.6   Alkaline Phosphatase U/L 73 103  --   --   --   --   --  159*   AST U/L 18 19  --   --   --   --   --  25   ALT U/L 18 18  --   --   --   --   --  24   Magnesium mg/dL 1.9 1.8  --   --   --   --   --  2.2   Phosphorus mg/dL 3.2 1.5* 1.5*  --   --   --   --  2.2*  2.2*       Vancomycin Administrations:  vancomycin given in the last 96 hours                     vancomycin (VANCOCIN) 1,750 mg in dextrose 5 % 500 mL IVPB (mg) 1,750 mg New Bag 04/27/22 2230     1,750 mg New Bag  0916                    Microbiologic Results:  Microbiology Results (last 7 days)       Procedure Component Value Units Date/Time    Blood culture [[id number]] Collected: 04/27/22 0658    Order Status: Completed Specimen: Blood from Antecubital, Left Updated: 04/27/22 2115     Blood Culture, Routine No Growth to date    Blood culture [[id number]] Collected: 04/27/22 0659    Order Status: Completed Specimen: Blood from Antecubital, Right Updated: 04/27/22 2115     Blood Culture, Routine No Growth to date    Culture, Respiratory with Gram Stain [[id number]]     Order Status: No result Specimen: Respiratory from Sputum

## 2022-04-28 NOTE — PT/OT/SLP PROGRESS
Physical Therapy Treatment    Patient Name:  Osman Li   MRN:  [id number]    Recommendations:     Discharge Recommendations:  home (declines need for home health or outpatient PT)   Discharge Equipment Recommendations:  (likely none; discussed purchasing SPC from drug store if experiencing mild instability upon D/C)   Barriers to discharge: None    Assessment:     Osman Li is a 56 y.o. male admitted with a medical diagnosis of Large bowel obstruction.  He presents with the following impairments/functional limitations:  weakness, impaired endurance, impaired functional mobilty, gait instability, impaired balance, pain. Patient is agreeable to participation with PT treatment. He was premedicated for activity. Patient on RA with SpO2 of 98% at rest. Spouse assisted the patient to the EOB. He requires CGA for sit to stand transfer with no AD. He ambulated 250' x2 holding IV pole with CGA and mild instability with VC to widen HAKAN. He is agreeable to sit up in chair upon return to room with spouse present and RN notified. SpO2 of 97% post-gait on RA.    Rehab Prognosis: Good; patient would benefit from acute skilled PT services to address these deficits and reach maximum level of function.    Recent Surgery: Procedure(s) (LRB):  LAPAROTOMY, EXPLORATORY (N/A)  COLECTOMY, PARTIAL (N/A)  SPLENECTOMY (N/A)  CREATION, COLOSTOMY (N/A)  MOBILIZATION, SPLENIC FLEXURE (N/A) 3 Days Post-Op    Plan:     During this hospitalization, patient to be seen daily to address the identified rehab impairments via gait training, therapeutic activities, therapeutic exercises and progress toward the following goals:    · Plan of Care Expires:  05/27/22    Subjective     Chief Complaint: gas pain  Patient/Family Comments/goals: walk  Pain/Comfort:  · Pain Rating 1:  (not rated)  · Location 1: abdomen  · Pain Addressed 1: Pre-medicate for activity      Objective:     Communicated with CAMRYN Lord prior to session.  Patient  found HOB elevated with peripheral IV, telemetry, colostomy upon PT entry to room.     General Precautions: Standard, fall   Orthopedic Precautions:N/A   Braces: N/A  Respiratory Status: Room air     Functional Mobility:  · Bed Mobility:     · Supine to Sit: wife assisted with transfer  · Transfers:     · Sit to Stand:  contact guard assistance with no AD  · Gait: 250' x2 holding IV pole with CGA and mild instability with VC to widen HAKAN      AM-PAC 6 CLICK MOBILITY          Therapeutic Activities and Exercises:   Patient was educated on the importance of OOB activity and functional mobility to negate negative effects of prolonged bed rest during hospitalization, safe transfers and ambulation, and D/C planning     Patient left up in chair with all lines intact, RN notified and spouse present..    GOALS:   Multidisciplinary Problems     Physical Therapy Goals        Problem: Physical Therapy    Goal Priority Disciplines Outcome Goal Variances Interventions   Physical Therapy Goal     PT, PT/OT Ongoing, Progressing     Description: Goals to be met by: 22     Patient will increase functional independence with mobility by performin. Supine to sit with [place]  2. Sit to stand transfer with [place]  3. Bed to chair transfer with [place] using No Assistive Device  4. Gait  x 250 feet with [place] using No Assistive Device.   5. Lower extremity exercise program x20 reps per handout, with independence                     Time Tracking:     PT Received On: 22  PT Start Time: 1112     PT Stop Time: 1127  PT Total Time (min): 15 min     Billable Minutes: Gait Training 15    Treatment Type: Treatment  PT/PTA: PT     PTA Visit Number: 0     2022

## 2022-04-28 NOTE — PLAN OF CARE
Client admitted with dx of large bowel obstruction, upper abd pain, new colostomy and  splenectomy.  Client has an abd incision(expl lap on 4/25), well approximated.  Client's abd remains tender and splints to cough or clear throat.  IVABX  with NS@75cc/hr.  Client is tolerating a clear liquid diet.  C/o pain which is managed with current regimen of oxycodone and hydromorphine IVP.  Next vanc trough on 4/28@0930.

## 2022-04-28 NOTE — PROGRESS NOTES
Patient seen and examined.  He underwent CT scan today.  Still with some back pain on the left-hand side towards the left lower back.  Patient has been ambulating.  No significant ostomy output at time of initial evaluation but reportedly had increasing output this afternoon.    Low-grade temperature  Vitals are otherwise stable  Abdomen soft  Midline is well approximated  Mild erythema across the lower abdomen, more so on the left side  Ostomy is viable    Labs reviewed.  H&H stable  White blood cell count relatively stable and elevated    CT scan:  Reviewed.  Expected postoperative findings.    No significant changes from surgical perspective.  As ostomy starts to produce, okay to advance from clear liquids as tolerated  Continue antibiotics  Continue to ambulate  No significant findings on imaging that warrant additional surgical intervention at this point time  Following

## 2022-04-28 NOTE — PROGRESS NOTES
Consult Note  Infectious Disease    Reason for Consult: Vaccines s/p splenectomy     HPI: Osman Li is a 56 y.o. male active heavy smoker, with past medical history of HTN, diabetes, and HLD who presented with worsening upper abdominal pain and constipation for a month which became constant 4/21, with associated fever, chills and some difficulty urinating. He denies melena,  headache, cough, SOB, nausea, vomit or new skin rash.   As per patient, last colonoscopy was 5 years ago anf 4 polyps were removed.     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  Adjacent pathologic lymph nodes compatible with jason spread of disease.  Obstruction of the colon proximal to the mass, with moderate dilatation.    Taken for emergent ex-lap 4/25 for LBO. S/p Clindamycin IV and Decadron 4mg.     As per Op-note; large colonic mass that was obstructing at the splenic flexure and densely adherent to the splenic hilum.  Palpable abnormalities within the tail of the pancreas. Status post splenectomy and end transverse colostomy with remnant distal descending colon, sigmoid colon and rectum.   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    ID consult for vaccinations after splenectomy.     Patient seen and examined at bedside, wife present. He is c/o severe abdominal pain 9/10 likely post surgery, he is passing gas. Hemodynamically stable, afebrile.   Labs reviewed, WNL.   He is vaccinated against COVID-19 s/p 2 doses, pending booster.     INTERVAL HISTORY:   4/27:  Interim reviewed, patient seen and examined at bedside sitting in the chair.  Had a fever 101.2 overnight, chills.  Patient states  his abdominal pain is better controlled with pain meds. Has persistent  cough, mild sputum.  Macias catheter removed.  Labs reviewed, white count of 22.2, PMN 81.5%, H&H 9.4 or 28, platelet count 384.  Sodium 127, normal kidney function.    4/28:  Patient seen and examined at bedside, still complaining of abdominal pain.  Hemodynamically stable, T-max a 100.7° yesterday, currently afebrile.  Labs reviewed, white count 19.9, PMN 81.4%, H&H 10.2/27.4, platelet count 395.  Sodium 132. Procalcitonin 2.28.  Chest x-ray with bilateral atelectases.    Review of patient's allergies indicates:   Allergen Reactions    Penicillins Rash     Past Medical History:   Diagnosis Date    DM (diabetes mellitus)     Hyperlipidemia     Hypertension     Prediabetes      Past Surgical History:   Procedure Laterality Date    CHOLECYSTECTOMY  2011    COLONOSCOPY      2018    COLOSTOMY N/A 4/25/2022    Procedure: CREATION, COLOSTOMY;  Surgeon: Carlton Waddell MD;  Location: [facility] OR;  Service: General;  Laterality: N/A;    MOBILIZATION OF SPLENIC FLEXURE N/A 4/25/2022    Procedure: MOBILIZATION, SPLENIC FLEXURE;  Surgeon: Carlton Waddell MD;  Location: [facility] OR;  Service: General;  Laterality: N/A;    SPLENECTOMY N/A 4/25/2022    Procedure: SPLENECTOMY;  Surgeon: Carlton Waddell MD;  Location: [facility] OR;  Service: General;  Laterality: N/A;    SUBTOTAL COLECTOMY N/A 4/25/2022    Procedure: COLECTOMY, PARTIAL;  Surgeon: Carlton Waddell MD;  Location: [facility] OR;  Service: General;  Laterality: N/A;     Social History     Tobacco Use    Smoking status: Current Every Day Smoker     Packs/day: 1.00     Years: 35.00     Pack years: 35.00    Smokeless tobacco: Never Used   Substance Use Topics    Alcohol use: Not Currently        Family History   Problem Relation Age of Onset    Heart disease Father          Review of Systems:   As described in HPi.    Outdoor activities: Works as a , lives with wife at home.   Travel: None  Implants: None  Antibiotic History: Clindamycin pre-op    EXAM &  DIAGNOSTICS REVIEWED:   Vitals:     Temp:  [97 °F (36.1 °C)-100.7 °F (38.2 °C)]   Temp: 99.3 °F (37.4 °C) (04/28/22 0722)  Pulse: 98 (04/28/22 0722)  Resp: 17 (04/28/22 0722)  BP: (!) 145/74 (04/28/22 0722)  SpO2: 97 % (04/28/22 0722)    Intake/Output Summary (Last 24 hours) at 4/28/2022 0809  Last data filed at 4/28/2022 0657  Gross per 24 hour   Intake 5114.2 ml   Output 4060 ml   Net 1054.2 ml       General:  In NAD. Alert and attentive, cooperative, in pain likely post-op  Eyes:  Anicteric, PERRL  ENT:  No ulcers, exudates, thrush, nares patent, dentition is fiar  Neck:  Supple  Lungs: Clear to auscultation b/l  Heart:  S1/S2+, regular rhythm, no murmurs  Abd:  Laparotomy wound looks clean, redness noted around ostomy site across L side of abdomen, LLQ colostomy bag in place, minimal serosanguinous fluid, no stool yey, +BS, soft, mildly tender to palpation, no rebound  :  Voids, urine clear, no flank tenderness  Musc:  Joints without effusion, swelling,  erythema, synovitis  Skin:  Warm, no rash  Wound: Laparotomy   Neuro: Following commands, no acute focal deficit   Psych:  Calm, cooperative  Lymphatic:       Extrem: No LE edema b/l  VAD:  Peripheral IV        Isolation:  None    General Labs reviewed:  Recent Labs   Lab 04/26/22  0536 04/27/22 0452 04/28/22 0429   WBC 12.38 22.27* 19.99*   HGB 11.6* 9.4* 9.2*   HCT 34.6* 28.0* 27.4*    384 395       Recent Labs   Lab 04/26/22  0536 04/27/22  0452 04/27/22  0948 04/27/22  1720 04/27/22  2311 04/28/22  0429    127* 127* 130* 129* 132*  132*   K 4.7 4.3 4.8  --   --  4.5  4.5    94* 93*  --   --  97  97   CO2 26 25 26  --   --  26  26   BUN 14 10 9  --   --  6  6   CREATININE 1.0 0.8 0.8  --   --  0.8  0.8   CALCIUM 8.1* 7.9* 8.4*  --   --  8.9  8.9   PROT 5.9* 5.7*  --   --   --  6.0   BILITOT 0.8 0.8  --   --   --  0.6   ALKPHOS 73 103  --   --   --  159*   ALT 18 18  --   --   --  24   AST 18 19  --   --   --  25     No results  for input(s): CRP in the last 168 hours.  No results for input(s): SEDRATE in the last 168 hours.    Estimated Creatinine Clearance: 132.6 mL/min (based on SCr of 0.8 mg/dL).     Micro:  Microbiology Results (last 7 days)     Procedure Component Value Units Date/Time    Blood culture [[id number]] Collected: 04/27/22 0658    Order Status: Completed Specimen: Blood from Antecubital, Left Updated: 04/27/22 2115     Blood Culture, Routine No Growth to date    Blood culture [[id number]] Collected: 04/27/22 0659    Order Status: Completed Specimen: Blood from Antecubital, Right Updated: 04/27/22 2115     Blood Culture, Routine No Growth to date    Culture, Respiratory with Gram Stain [[id number]]     Order Status: No result Specimen: Respiratory from Sputum           Imaging Reviewed:  CXR: Patchy basilar atelectasis.  Thickening of the right minor fissure otherwise negative chest   CT abdomen/pelvis: Splenic flexure mass compatible with a primary colon neoplasm.  Adjacent pathologic lymph nodes compatible with jason spread of disease.  Obstruction of the colon proximal to the mass, with moderate dilatation.      IMPRESSION & PLAN     1. S/p emergent ex-lap and splenectomy for underlying colonic mass likely neoplasia in the setting of active heavy smoking, now with fever and leukocytosis   Path report in process   Blood cultures x2 sets no growth, pending final   Procal 2.28     2. HTN, diabetes, HLD   3. Smoker     Recommendations:  Please send respiratory culture  Discussed with surgery/Dr. Herrera, will repeat CT scan abdomen pelvis to rule out acute process  Continue Vancomycin IV, keep level around 15  Cefepime 2 g IV q.8  Flagyl 500 mg IV q.8 for anaerobic coverage  Will address vaccines before discharge  Follow cultures   Incentive spirometry  Aspiration precautions     Will follow     D/w Dr Waddell      Medical Decision Making during this encounter was  [_] Low Complexity  [_] Moderate Complexity  [xx] High  Complexity

## 2022-04-28 NOTE — PROGRESS NOTES
" INPATIENT NEPHROLOGY CONSULT   [facility] NEPHROLOGY    Osman Li  04/28/2022    Reason for consultation:    hyponatremia    Chief Complaint:   Chief Complaint   Patient presents with    Abdominal Pain     Started Thursday           History of Present Illness:    Per H and P      Osman Li is a 56 y.o. y.o. male with a PMHx of HTN, T2DM, and HLD who presented to the ED with upper abdominal pain onset x 1 months, constant since 4/21. He describes that pain as an intermittent cramping sensation that lasts approximately 30 seconds. No radiation. Pain is exacerbated with "tensing up" and alleviated with repositioning. Pain is worst postprandial but constant.  He denies blood in his stools or dark stools. Pain was 9/10 at its worst and is currently "mild." He also reports chills night sweats, fatigue, difficulty urinating which he attributes to "old age" and back pain which he attributes to laying on the bed. He denies fever, WL, CP, SOB, and n/v/d. Patient reports colonoscopy approximately 5 years ago where he had 4 polyps removed. Denies FHx of colon cancer. Patient is a smoker with a 40 pack year history. ED workup was significant for anemia, elevated WBC, mild hyponatremia, mild hypokalemia, and CT abdomen concerning for an obstructive mass. The     4/27  Consulted for acute hyponatremia.  No nausea, chest pain, sob, fever,  new neurologic symptoms, new joint pain.  He has post op abdominal pain.  Tolerable.    4/28  Abdominal pain.  No nausea, cp, or sob.      Plan of Care:       Assessment:    Hyponatremia likely due to multiple etiologies.  He states he drank a large volume of water yesterday, he got a dose of hctz, and has not had any solute intake for a couple of days.    --no hypotonic iv piggy backs  --urine not maximally dilute implicating adh effect  --uric acid level consistent with ADH effect  --avoid thiazide diuretics  --fluid restrict if sodium keep " dropping    Hypophosphatemia  --will correct when pt taking po    Hypertension  --pain management  --resume enalapril alone (not Vaseretic)    Anemia  --post op.  AS per surgery  --trend h/h           Thank you for allowing us to participate in this patient's care. We will continue to follow.    Vital Signs:  Temp Readings from Last 3 Encounters:   04/28/22 99.3 °F (37.4 °C)   08/11/20 97.3 °F (36.3 °C)       Pulse Readings from Last 3 Encounters:   04/28/22 103   01/31/22 80   10/25/21 84       BP Readings from Last 3 Encounters:   04/28/22 (!) 145/74   01/31/22 138/82   10/25/21 136/78       Weight:  Wt Readings from Last 3 Encounters:   04/27/22 106.7 kg (235 lb 3.7 oz)   01/31/22 110.7 kg (244 lb)   10/25/21 108.3 kg (238 lb 12.8 oz)       Past Medical & Surgical History:  Past Medical History:   Diagnosis Date    DM (diabetes mellitus)     Hyperlipidemia     Hypertension     Prediabetes        Past Surgical History:   Procedure Laterality Date    CHOLECYSTECTOMY  2011    COLONOSCOPY      2018    COLOSTOMY N/A 4/25/2022    Procedure: CREATION, COLOSTOMY;  Surgeon: Carlton Waddell MD;  Location: [facility] OR;  Service: General;  Laterality: N/A;    MOBILIZATION OF SPLENIC FLEXURE N/A 4/25/2022    Procedure: MOBILIZATION, SPLENIC FLEXURE;  Surgeon: Carlton Waddell MD;  Location: [facility] OR;  Service: General;  Laterality: N/A;    SPLENECTOMY N/A 4/25/2022    Procedure: SPLENECTOMY;  Surgeon: Carlton Waddell MD;  Location: [facility] OR;  Service: General;  Laterality: N/A;    SUBTOTAL COLECTOMY N/A 4/25/2022    Procedure: COLECTOMY, PARTIAL;  Surgeon: Carlton Waddell MD;  Location: [facility] OR;  Service: General;  Laterality: N/A;       Past Social History:  Social History     Socioeconomic History    Marital status:    Tobacco Use    Smoking status: Current Every Day Smoker     Packs/day: 1.00     Years: 35.00     Pack years: 35.00    Smokeless tobacco: Never Used   Substance and Sexual Activity    Alcohol  "use: Not Currently    Drug use: Never    Sexual activity: Yes     Partners: Female       Medications:  No current facility-administered medications on file prior to encounter.     Current Outpatient Medications on File Prior to Encounter   Medication Sig Dispense Refill    atorvastatin (LIPITOR) 20 MG tablet Take 1 tablet (20 mg total) by mouth once daily. 90 tablet 1    enalapriL-hydrochlorothiazide (VASERETIC) 10-25 mg per tablet Take 1 tablet by mouth once daily. 90 tablet 1    metFORMIN (GLUCOPHAGE) 1000 MG tablet Take 1 tablet (1,000 mg total) by mouth 2 (two) times daily with meals. 180 tablet 1    pen needle, diabetic (BD ULTRA-FINE MICRO PEN NEEDLE) 32 gauge x 1/4" Ndle 1 each by Misc.(Non-Drug; Combo Route) route once a week. 50 each 1    semaglutide (OZEMPIC) 1 mg/dose (4 mg/3 mL) Inject 1 mg into the skin every 7 days. 3 pen 1    sildenafiL (VIAGRA) 100 MG tablet Take 1 tablet (100 mg total) by mouth daily as needed for Erectile Dysfunction. 30 tablet 1     Scheduled Meds:   iohexoL        iohexoL        acetaminophen  650 mg Oral Q6H    ceFEPime (MAXIPIME) IVPB  2 g Intravenous Q8H    enoxaparin  40 mg Subcutaneous Daily    gabapentin  300 mg Oral TID    methocarbamoL  500 mg Oral QID    metronidazole  500 mg Intravenous Q8H    mupirocin   Nasal BID    nicotine  1 patch Transdermal Daily    polyethylene glycol  17 g Oral Daily    vancomycin (VANCOCIN) IVPB  1,750 mg Intravenous Q12H     Continuous Infusions:   sodium chloride 0.9% Stopped (04/28/22 0636)     PRN Meds:.dextrose 50%, dextrose 50%, glucagon (human recombinant), hydrALAZINE, HYDROmorphone, insulin aspart U-100, naloxone, ondansetron, oxyCODONE, oxyCODONE, sodium chloride 0.9%, Pharmacy to dose Vancomycin consult **AND** vancomycin - pharmacy to dose    Allergies:  Penicillins    Past Family History:  Reviewed; refer to Hospitalist Admission Note    Review of Systems:  Review of Systems - All 14 systems reviewed and " "negative, except as noted in HPI    Physical Exam:    BP (!) 145/74   Pulse 103   Temp 99.3 °F (37.4 °C)   Resp 18   Ht 6' 2" (1.88 m)   Wt 106.7 kg (235 lb 3.7 oz)   SpO2 98%   BMI 30.20 kg/m²     General Appearance:    Alert, cooperative, no distress, appears stated age   Head:    Normocephalic, without obvious abnormality, atraumatic   Eyes:    PER, conjunctiva/corneas clear, EOM's intact in both eyes        Throat:   Lips, mucosa, and tongue normal; teeth and gums normal   Back:     Symmetric, no curvature, ROM normal, no CVA tenderness   Lungs:     Clear to auscultation bilaterally, respirations unlabored   Chest wall:    No tenderness or deformity   Heart:    Regular rate and rhythm, S1 and S2 normal, no murmur, rub   or gallop   Abdomen:     Quiet    Extremities:   Extremities normal, atraumatic, no cyanosis or edema   Pulses:   2+ and symmetric all extremities   MSK:   No joint or muscle swelling, tenderness or deformity   Skin:   Red skin expanding laterally from stoma    Neurologic:   CNII-XII intact, normal strength and sensation       Throughout.  No flap     Results:  Lab Results   Component Value Date     (L) 04/28/2022     (L) 04/28/2022    K 4.5 04/28/2022    K 4.5 04/28/2022    CL 97 04/28/2022    CL 97 04/28/2022    CO2 26 04/28/2022    CO2 26 04/28/2022    BUN 6 04/28/2022    BUN 6 04/28/2022    CREATININE 0.8 04/28/2022    CREATININE 0.8 04/28/2022    CALCIUM 8.9 04/28/2022    CALCIUM 8.9 04/28/2022    ANIONGAP 9 04/28/2022    ANIONGAP 9 04/28/2022    ESTGFRAFRICA >60 04/28/2022    ESTGFRAFRICA >60 04/28/2022    EGFRNONAA >60 04/28/2022    EGFRNONAA >60 04/28/2022       Lab Results   Component Value Date    CALCIUM 8.9 04/28/2022    CALCIUM 8.9 04/28/2022    PHOS 2.2 (L) 04/28/2022    PHOS 2.2 (L) 04/28/2022       Recent Labs   Lab 04/28/22  0429   WBC 19.99*   RBC 3.22*   HGB 9.2*   HCT 27.4*      MCV 85   MCH 28.6   MCHC 33.6          I have personally reviewed " pertinent radiological imaging and reports.    Patient care was time spent personally by me on the following activities:   · Obtaining a history  · Examination of patient.  · Providing medical care at the patients bedside.  · Developing a treatment plan with patient or surrogate and bedside caregivers  · Ordering and reviewing laboratory studies, radiographic studies, pulse oximetry.  · Ordering and performing treatments and interventions.  · Evaluation of patient's response to treatment.  · Discussions with consultants while on the unit and immediately available to the patient.  · Re-evaluation of the patient's condition.  · Documentation in the medical record.     Chino Donovan MD  Nephrology  [facility] [place]  [phone]

## 2022-04-28 NOTE — CARE UPDATE
04/28/22 0850   Patient Assessment/Suction   Level of Consciousness (AVPU) alert   PRE-TX-O2   O2 Device (Oxygen Therapy) nasal cannula   $ Is the patient on Low Flow Oxygen? Yes   Flow (L/min) 1   SpO2 98 %   Pulse Oximetry Type Intermittent   $ Pulse Oximetry - Multiple Charge Pulse Oximetry - Multiple   Pulse 103   Resp 18

## 2022-04-28 NOTE — PLAN OF CARE
[facility]  Discharge Reassessment    Primary Care Provider: ANTONIO Emmanuel    Expected Discharge Date:     Case Management continuing to follow and will assist with discharge planning as needed. Pt not medically DC at this time. Pt being followed by ID, gen sx, and neph. Repeat CT scan done.    Reassessment (most recent)     Discharge Reassessment - 04/28/22 1447        Discharge Reassessment    Assessment Type Discharge Planning Reassessment     Did the patient's condition or plan change since previous assessment? No     Discharge Plan discussed with: Patient;Spouse/sig other     Communicated ONIEL with patient/caregiver No     Discharge Plan A Home with family     Discharge Plan B Home Health     DME Needed Upon Discharge  none     Discharge Barriers Identified None

## 2022-04-29 LAB
ALBUMIN SERPL BCP-MCNC: 2.2 G/DL (ref 3.5–5.2)
ALP SERPL-CCNC: 275 U/L (ref 55–135)
ALT SERPL W/O P-5'-P-CCNC: 30 U/L (ref 10–44)
ANION GAP SERPL CALC-SCNC: 9 MMOL/L (ref 8–16)
AST SERPL-CCNC: 37 U/L (ref 10–40)
BASOPHILS # BLD AUTO: 0.04 K/UL (ref 0–0.2)
BASOPHILS NFR BLD: 0.3 % (ref 0–1.9)
BILIRUB SERPL-MCNC: 0.6 MG/DL (ref 0.1–1)
BUN SERPL-MCNC: 7 MG/DL (ref 6–20)
CALCIUM SERPL-MCNC: 8.7 MG/DL (ref 8.7–10.5)
CHLORIDE SERPL-SCNC: 96 MMOL/L (ref 95–110)
CO2 SERPL-SCNC: 26 MMOL/L (ref 23–29)
CREAT SERPL-MCNC: 0.7 MG/DL (ref 0.5–1.4)
DIFFERENTIAL METHOD: ABNORMAL
EOSINOPHIL # BLD AUTO: 0.4 K/UL (ref 0–0.5)
EOSINOPHIL NFR BLD: 2.5 % (ref 0–8)
ERYTHROCYTE [DISTWIDTH] IN BLOOD BY AUTOMATED COUNT: 12.6 % (ref 11.5–14.5)
EST. GFR  (AFRICAN AMERICAN): >60 ML/MIN/1.73 M^2
EST. GFR  (NON AFRICAN AMERICAN): >60 ML/MIN/1.73 M^2
GLUCOSE SERPL-MCNC: 188 MG/DL (ref 70–110)
HCT VFR BLD AUTO: 26.3 % (ref 40–54)
HGB BLD-MCNC: 8.9 G/DL (ref 14–18)
IMM GRANULOCYTES # BLD AUTO: 0.07 K/UL (ref 0–0.04)
IMM GRANULOCYTES NFR BLD AUTO: 0.5 % (ref 0–0.5)
LYMPHOCYTES # BLD AUTO: 1.8 K/UL (ref 1–4.8)
LYMPHOCYTES NFR BLD: 11.8 % (ref 18–48)
MCH RBC QN AUTO: 28.7 PG (ref 27–31)
MCHC RBC AUTO-ENTMCNC: 33.8 G/DL (ref 32–36)
MCV RBC AUTO: 85 FL (ref 82–98)
MONOCYTES # BLD AUTO: 1.5 K/UL (ref 0.3–1)
MONOCYTES NFR BLD: 9.4 % (ref 4–15)
NEUTROPHILS # BLD AUTO: 11.6 K/UL (ref 1.8–7.7)
NEUTROPHILS NFR BLD: 75.5 % (ref 38–73)
NRBC BLD-RTO: 0 /100 WBC
PLATELET # BLD AUTO: 470 K/UL (ref 150–450)
PMV BLD AUTO: 8.9 FL (ref 9.2–12.9)
POCT GLUCOSE: 161 MG/DL (ref 70–110)
POTASSIUM SERPL-SCNC: 4 MMOL/L (ref 3.5–5.1)
PROT SERPL-MCNC: 6 G/DL (ref 6–8.4)
RBC # BLD AUTO: 3.1 M/UL (ref 4.6–6.2)
SODIUM SERPL-SCNC: 131 MMOL/L (ref 136–145)
TSH SERPL DL<=0.005 MIU/L-ACNC: 2.53 UIU/ML (ref 0.4–4)
VANCOMYCIN TROUGH SERPL-MCNC: 12.3 UG/ML (ref 10–22)
WBC # BLD AUTO: 15.36 K/UL (ref 3.9–12.7)

## 2022-04-29 PROCEDURE — 80053 COMPREHEN METABOLIC PANEL: CPT | Performed by: STUDENT IN AN ORGANIZED HEALTH CARE EDUCATION/TRAINING PROGRAM

## 2022-04-29 PROCEDURE — 25000003 PHARM REV CODE 250: Performed by: INTERNAL MEDICINE

## 2022-04-29 PROCEDURE — 25000003 PHARM REV CODE 250: Performed by: STUDENT IN AN ORGANIZED HEALTH CARE EDUCATION/TRAINING PROGRAM

## 2022-04-29 PROCEDURE — 63600175 PHARM REV CODE 636 W HCPCS: Performed by: STUDENT IN AN ORGANIZED HEALTH CARE EDUCATION/TRAINING PROGRAM

## 2022-04-29 PROCEDURE — 80202 ASSAY OF VANCOMYCIN: CPT | Performed by: HOSPITALIST

## 2022-04-29 PROCEDURE — 25000003 PHARM REV CODE 250

## 2022-04-29 PROCEDURE — 63600175 PHARM REV CODE 636 W HCPCS: Performed by: HOSPITALIST

## 2022-04-29 PROCEDURE — 84443 ASSAY THYROID STIM HORMONE: CPT | Performed by: INTERNAL MEDICINE

## 2022-04-29 PROCEDURE — S4991 NICOTINE PATCH NONLEGEND: HCPCS

## 2022-04-29 PROCEDURE — 94761 N-INVAS EAR/PLS OXIMETRY MLT: CPT

## 2022-04-29 PROCEDURE — 25000003 PHARM REV CODE 250: Performed by: HOSPITALIST

## 2022-04-29 PROCEDURE — 85025 COMPLETE CBC W/AUTO DIFF WBC: CPT | Performed by: STUDENT IN AN ORGANIZED HEALTH CARE EDUCATION/TRAINING PROGRAM

## 2022-04-29 PROCEDURE — 36415 COLL VENOUS BLD VENIPUNCTURE: CPT | Performed by: HOSPITALIST

## 2022-04-29 PROCEDURE — S0030 INJECTION, METRONIDAZOLE: HCPCS | Performed by: STUDENT IN AN ORGANIZED HEALTH CARE EDUCATION/TRAINING PROGRAM

## 2022-04-29 PROCEDURE — 12000002 HC ACUTE/MED SURGE SEMI-PRIVATE ROOM

## 2022-04-29 PROCEDURE — 36415 COLL VENOUS BLD VENIPUNCTURE: CPT | Performed by: INTERNAL MEDICINE

## 2022-04-29 PROCEDURE — 97116 GAIT TRAINING THERAPY: CPT | Mod: CQ

## 2022-04-29 PROCEDURE — 36415 COLL VENOUS BLD VENIPUNCTURE: CPT | Performed by: STUDENT IN AN ORGANIZED HEALTH CARE EDUCATION/TRAINING PROGRAM

## 2022-04-29 RX ADMIN — ENOXAPARIN SODIUM 40 MG: 40 INJECTION SUBCUTANEOUS at 05:04

## 2022-04-29 RX ADMIN — METHOCARBAMOL TABLETS 500 MG: 500 TABLET, COATED ORAL at 03:04

## 2022-04-29 RX ADMIN — METHOCARBAMOL TABLETS 500 MG: 500 TABLET, COATED ORAL at 11:04

## 2022-04-29 RX ADMIN — METRONIDAZOLE 500 MG: 500 INJECTION, SOLUTION INTRAVENOUS at 03:04

## 2022-04-29 RX ADMIN — HYDROMORPHONE HYDROCHLORIDE 2 MG: 1 INJECTION, SOLUTION INTRAMUSCULAR; INTRAVENOUS; SUBCUTANEOUS at 11:04

## 2022-04-29 RX ADMIN — ACETAMINOPHEN 650 MG: 325 TABLET ORAL at 05:04

## 2022-04-29 RX ADMIN — CEFEPIME HYDROCHLORIDE 2 G: 2 INJECTION, SOLUTION INTRAVENOUS at 11:04

## 2022-04-29 RX ADMIN — SODIUM CHLORIDE: 0.9 INJECTION, SOLUTION INTRAVENOUS at 05:04

## 2022-04-29 RX ADMIN — ACETAMINOPHEN 650 MG: 325 TABLET ORAL at 11:04

## 2022-04-29 RX ADMIN — MUPIROCIN: 20 OINTMENT TOPICAL at 08:04

## 2022-04-29 RX ADMIN — VANCOMYCIN HYDROCHLORIDE 2000 MG: 1 INJECTION, POWDER, LYOPHILIZED, FOR SOLUTION INTRAVENOUS at 11:04

## 2022-04-29 RX ADMIN — GABAPENTIN 300 MG: 300 CAPSULE ORAL at 08:04

## 2022-04-29 RX ADMIN — HYDROMORPHONE HYDROCHLORIDE 2 MG: 1 INJECTION, SOLUTION INTRAMUSCULAR; INTRAVENOUS; SUBCUTANEOUS at 08:04

## 2022-04-29 RX ADMIN — HYDROMORPHONE HYDROCHLORIDE 2 MG: 1 INJECTION, SOLUTION INTRAMUSCULAR; INTRAVENOUS; SUBCUTANEOUS at 03:04

## 2022-04-29 RX ADMIN — METRONIDAZOLE 500 MG: 500 INJECTION, SOLUTION INTRAVENOUS at 11:04

## 2022-04-29 RX ADMIN — METHOCARBAMOL TABLETS 500 MG: 500 TABLET, COATED ORAL at 08:04

## 2022-04-29 RX ADMIN — NICOTINE 1 PATCH: 14 PATCH, EXTENDED RELEASE TRANSDERMAL at 08:04

## 2022-04-29 RX ADMIN — METRONIDAZOLE 500 MG: 500 INJECTION, SOLUTION INTRAVENOUS at 08:04

## 2022-04-29 RX ADMIN — OXYCODONE HYDROCHLORIDE 10 MG: 10 TABLET ORAL at 03:04

## 2022-04-29 RX ADMIN — CEFEPIME HYDROCHLORIDE 2 G: 2 INJECTION, SOLUTION INTRAVENOUS at 05:04

## 2022-04-29 RX ADMIN — GABAPENTIN 300 MG: 300 CAPSULE ORAL at 03:04

## 2022-04-29 RX ADMIN — SODIUM CHLORIDE: 0.9 INJECTION, SOLUTION INTRAVENOUS at 11:04

## 2022-04-29 RX ADMIN — VANCOMYCIN HYDROCHLORIDE 2000 MG: 1 INJECTION, POWDER, LYOPHILIZED, FOR SOLUTION INTRAVENOUS at 12:04

## 2022-04-29 RX ADMIN — METHOCARBAMOL TABLETS 500 MG: 500 TABLET, COATED ORAL at 07:04

## 2022-04-29 RX ADMIN — CEFEPIME HYDROCHLORIDE 2 G: 2 INJECTION, SOLUTION INTRAVENOUS at 03:04

## 2022-04-29 RX ADMIN — OXYCODONE HYDROCHLORIDE 10 MG: 10 TABLET ORAL at 08:04

## 2022-04-29 RX ADMIN — HYDROMORPHONE HYDROCHLORIDE 2 MG: 1 INJECTION, SOLUTION INTRAMUSCULAR; INTRAVENOUS; SUBCUTANEOUS at 05:04

## 2022-04-29 NOTE — CARE UPDATE
04/29/22 0630   Patient Assessment/Suction   Level of Consciousness (AVPU) alert   Respiratory Effort Normal;Unlabored   All Lung Fields Breath Sounds clear;diminished   Rhythm/Pattern, Respiratory unlabored   PRE-TX-O2   O2 Device (Oxygen Therapy) room air   SpO2 96 %   Pulse Oximetry Type Intermittent   $ Pulse Oximetry - Multiple Charge Pulse Oximetry - Multiple   Pulse 95   Resp 16

## 2022-04-29 NOTE — ASSESSMENT & PLAN NOTE
Monitor level and supplement when needed.    Phosphorus   Date Value Ref Range Status   04/28/2022 2.2 (L) 2.7 - 4.5 mg/dL Final   04/28/2022 2.2 (L) 2.7 - 4.5 mg/dL Final   04/27/2022 1.5 (L) 2.7 - 4.5 mg/dL Final   ]

## 2022-04-29 NOTE — PROGRESS NOTES
Consult Note  Infectious Disease    Reason for Consult: Vaccines s/p splenectomy     HPI: Osman Li is a 56 y.o. male active heavy smoker, with past medical history of HTN, diabetes, and HLD who presented with worsening upper abdominal pain and constipation for a month which became constant 4/21, with associated fever, chills and some difficulty urinating. He denies melena,  headache, cough, SOB, nausea, vomit or new skin rash.   As per patient, last colonoscopy was 5 years ago anf 4 polyps were removed.     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  Adjacent pathologic lymph nodes compatible with jason spread of disease.  Obstruction of the colon proximal to the mass, with moderate dilatation.    Taken for emergent ex-lap 4/25 for LBO. S/p Clindamycin IV and Decadron 4mg.     As per Op-note; large colonic mass that was obstructing at the splenic flexure and densely adherent to the splenic hilum.  Palpable abnormalities within the tail of the pancreas. Status post splenectomy and end transverse colostomy with remnant distal descending colon, sigmoid colon and rectum.   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    ID consult for vaccinations after splenectomy.     Patient seen and examined at bedside, wife present. He is c/o severe abdominal pain 9/10 likely post surgery, he is passing gas. Hemodynamically stable, afebrile.   Labs reviewed, WNL.   He is vaccinated against COVID-19 s/p 2 doses, pending booster.     INTERVAL HISTORY:   4/27:  Interim reviewed, patient seen and examined at bedside sitting in the chair.  Had a fever 101.2 overnight, chills.  Patient states  his abdominal pain is better controlled with pain meds. Has persistent  cough, mild sputum.  Macias catheter removed.  Labs reviewed, white count of 22.2, PMN 81.5%, H&H 9.4 or 28, platelet count 384.  Sodium 127, normal kidney function.    4/28:  Patient seen and examined at bedside, still complaining of abdominal pain.  Hemodynamically stable, T-max a 100.7° yesterday, currently afebrile.  Labs reviewed, white count 19.9, PMN 81.4%, H&H 10.2/27.4, platelet count 395.  Sodium 132. Procalcitonin 2.28.  Chest x-ray with bilateral atelectases.    4/29: Interim reviewed, patient seen and examined at bedside, sitting in the bed, wife present. C/o abdominal pain, just received pain meds. Colostomy bag with output since yesterday afternoon, x 2. Hemodynamically stable, afebrile in the last 36h. Labs reviewed, WBC 15, PMN 75.5%, trending down, H/H 8.9/26.3, plt: 470. Sodium 131. Micro reviewed, sputum with few GPC, pending final.     Review of patient's allergies indicates:   Allergen Reactions    Penicillins Rash     Past Medical History:   Diagnosis Date    DM (diabetes mellitus)     Hyperlipidemia     Hypertension     Prediabetes      Past Surgical History:   Procedure Laterality Date    CHOLECYSTECTOMY  2011    COLONOSCOPY      2018    COLOSTOMY N/A 4/25/2022    Procedure: CREATION, COLOSTOMY;  Surgeon: Carlton Waddell MD;  Location: [facility] OR;  Service: General;  Laterality: N/A;    MOBILIZATION OF SPLENIC FLEXURE N/A 4/25/2022    Procedure: MOBILIZATION, SPLENIC FLEXURE;  Surgeon: Carlton Waddell MD;  Location: [facility] OR;  Service: General;  Laterality: N/A;    SPLENECTOMY N/A 4/25/2022    Procedure: SPLENECTOMY;  Surgeon: Carlton Waddell MD;  Location: [facility] OR;  Service: General;  Laterality: N/A;    SUBTOTAL COLECTOMY N/A 4/25/2022    Procedure: COLECTOMY, PARTIAL;  Surgeon: Carlton Waddell MD;  Location: [facility] OR;  Service: General;  Laterality: N/A;     Social History     Tobacco Use    Smoking status: Current Every Day Smoker     Packs/day: 1.00     Years: 35.00     Pack  years: 35.00    Smokeless tobacco: Never Used   Substance Use Topics    Alcohol use: Not Currently        Family History   Problem Relation Age of Onset    Heart disease Father          Review of Systems:   As described in HPi.    Outdoor activities: Works as a , lives with wife at home.   Travel: None  Implants: None  Antibiotic History: Clindamycin pre-op    EXAM & DIAGNOSTICS REVIEWED:   Vitals:     Temp:  [98.3 °F (36.8 °C)-98.7 °F (37.1 °C)]   Temp: 98.7 °F (37.1 °C) (04/29/22 0331)  Pulse: 95 (04/29/22 0630)  Resp: 16 (04/29/22 0630)  BP: (!) 151/72 (04/29/22 0331)  SpO2: 96 % (04/29/22 0630)  No intake or output data in the 24 hours ending 04/29/22 0810    General:  In NAD. Alert and attentive, cooperative, in pain likely post-op  Eyes:  Anicteric, PERRL  ENT:  No ulcers, exudates, thrush, nares patent, dentition is fiar  Neck:  Supple  Lungs: Minimal rhonchi R, L clear  Heart:  S1/S2+, regular rhythm, no murmurs  Abd:  Laparotomy wound looks clean, redness noted around ostomy site and across L side of abdomen, improving, LLQ colostomy bag in place, stool noted.     +BS, soft, tender to palpation, no rebound  :  Voids, urine clear, no flank tenderness  Musc:  Joints without effusion, swelling,  erythema, synovitis  Skin:  Warm, no rash  Wound: Laparotomy   Neuro: Following commands, no acute focal deficit   Psych:  Calm, cooperative  Lymphatic:       Extrem: No LE edema b/l  VAD:  Peripheral IV        Isolation:  None    General Labs reviewed:  Recent Labs   Lab 04/26/22  0536 04/27/22  0452 04/28/22  0429   WBC 12.38 22.27* 19.99*   HGB 11.6* 9.4* 9.2*   HCT 34.6* 28.0* 27.4*    384 395       Recent Labs   Lab 04/26/22  0536 04/27/22  0452 04/27/22  0948 04/27/22  1720 04/27/22  2311 04/28/22  0429    127* 127* 130* 129* 132*  132*   K 4.7 4.3 4.8  --   --  4.5  4.5    94* 93*  --   --  97  97   CO2 26 25 26  --   --  26  26   BUN 14 10 9  --   --  6  6   CREATININE  1.0 0.8 0.8  --   --  0.8  0.8   CALCIUM 8.1* 7.9* 8.4*  --   --  8.9  8.9   PROT 5.9* 5.7*  --   --   --  6.0   BILITOT 0.8 0.8  --   --   --  0.6   ALKPHOS 73 103  --   --   --  159*   ALT 18 18  --   --   --  24   AST 18 19  --   --   --  25     No results for input(s): CRP in the last 168 hours.  No results for input(s): SEDRATE in the last 168 hours.    Estimated Creatinine Clearance: 133.3 mL/min (based on SCr of 0.8 mg/dL).     Micro:  Microbiology Results (last 7 days)     Procedure Component Value Units Date/Time    Culture, Respiratory with Gram Stain [[id number]] Collected: 04/28/22 1836    Order Status: Completed Specimen: Sputum Updated: 04/29/22 0207     Gram Stain (Respiratory) <10 epithelial cells per low power field.     Gram Stain (Respiratory) Rare WBC's     Gram Stain (Respiratory) Rare Gram positive cocci    Blood culture [[id number]] Collected: 04/27/22 0658    Order Status: Completed Specimen: Blood from Antecubital, Left Updated: 04/28/22 1412     Blood Culture, Routine No Growth to date      No Growth to date    Blood culture [[id number]] Collected: 04/27/22 0659    Order Status: Completed Specimen: Blood from Antecubital, Right Updated: 04/28/22 1412     Blood Culture, Routine No Growth to date      No Growth to date          Imaging Reviewed:  Repeat CT scan abdomen/pelvis: Recent resection of the splenic flexure of the colon with diverting colostomy to the left lower quadrant.  Recent splenectomy.  A small amount of postsurgical fluid and air is seen in the surgical beds.  There is a small left pleural effusion with moderate atelectasis at the left lung base and trace right pleural effusion with mild atelectasis at the right lung base.  Prior cholecystectomy.    CXR: Patchy basilar atelectasis.  Thickening of the right minor fissure otherwise negative chest   CT abdomen/pelvis: Splenic flexure mass compatible with a primary colon neoplasm.  Adjacent pathologic lymph nodes compatible with  jason spread of disease.  Obstruction of the colon proximal to the mass, with moderate dilatation.      IMPRESSION & PLAN     1. S/p emergent ex-lap and splenectomy for underlying colonic mass likely neoplasia in the setting of active heavy smoking, now with fever and leukocytosis   Path report in process   Blood cultures x2 sets no growth, pending final   Respiratory culture Gram stain rare GPC, pending final    Procal 2.28     2. HTN, diabetes, HLD   3. Smoker     Recommendations:  Follow respiratory culture  Continue Vancomycin IV, keep level around 15  Cefepime 2 g IV q.8  Flagyl 500 mg IV q.8 for anaerobic coverage  Monitor WBC, if continues to improve, he can go home on Levaquin 750mg PO once a day and Doxycycline 100mg PO twice a day for 5-7 days  Incentive spirometry  Aspiration precautions     --> Regarding vaccines:  Upon discharge, Prevnar 13 (Pneumococcus), Bexsero (Meningococcus first dose) and Haemophilus influenza B vaccines  He needs second dose of Meningococcus vaccine (Menactra) outpatient in 4 weeks  Pneumovax 23 in 2 months    Influenza vaccine this fall   Follow up ID clinic/Dr Hernandes next week 5/3/21703 at 1pm if unable to give vaccines before discharge     Dr Hope will be available over the weekend if needed    D/w Dr Hurley       Medical Decision Making during this encounter was  [_] Low Complexity  [_] Moderate Complexity  [xx] High Complexity

## 2022-04-29 NOTE — PLAN OF CARE
Problem: Physical Therapy  Goal: Physical Therapy Goal  Description: Goals to be met by: 22     Patient will increase functional independence with mobility by performin. Supine to sit with [place]  2. Sit to stand transfer with [place]  3. Bed to chair transfer with [place] using No Assistive Device  4. Gait  x 250 feet with [place] using No Assistive Device.   5. Lower extremity exercise program x20 reps per handout, with independence    Outcome: Ongoing, Progressing   Ambulate with assistance for safety.

## 2022-04-29 NOTE — NURSING
To room for follow up ostomy care needs. Patient noted with positive gas and soft brown stool noted in his ostomy bag. Patient assisted to the bathroom where he emptied his bag himself with no problems. Ostomy care to continue to follow this patient.

## 2022-04-29 NOTE — ASSESSMENT & PLAN NOTE
Chronic, controlled.  Latest blood pressure and vitals reviewed-   Temp:  [97 °F (36.1 °C)-99.3 °F (37.4 °C)]   Pulse:  []   Resp:  [16-20]   BP: (145-167)/(72-86)   SpO2:  [97 %-98 %] .   Home meds for hypertension were reviewed and noted below.   Hypertension Medications             enalapriL-hydrochlorothiazide (VASERETIC) 10-25 mg per tablet Take 1 tablet by mouth once daily.          While in the hospital, will manage blood pressure as follows; Continue home antihypertensive regimen    Will utilize p.r.n. blood pressure medication only if patient's blood pressure greater than  180/110 and he develops symptoms such as worsening chest pain or shortness of breath.

## 2022-04-29 NOTE — ASSESSMENT & PLAN NOTE
Monitor sodium level.    Sodium   Date Value Ref Range Status   04/28/2022 132 (L) 136 - 145 mmol/L Final   04/28/2022 132 (L) 136 - 145 mmol/L Final   04/27/2022 129 (L) 136 - 145 mmol/L Final   ]

## 2022-04-29 NOTE — ASSESSMENT & PLAN NOTE
Monitor level and supplement when needed.    Potassium   Date Value Ref Range Status   04/28/2022 4.5 3.5 - 5.1 mmol/L Final   04/28/2022 4.5 3.5 - 5.1 mmol/L Final   04/27/2022 4.8 3.5 - 5.1 mmol/L Final   ]

## 2022-04-29 NOTE — PLAN OF CARE
Client admitted with dx of large bowel obstruction, upper abd pain, new colostomy and  splenectomy.  Client has an abd incision(expl lap on 4/25), well approximated.  Client's abd remains tender.  IVABX  with NS@75cc/hr.  C/o pain requiring a change in current regimen of oxycodone and hydromorphine IVP.  Client does ambulate with self care.

## 2022-04-29 NOTE — ASSESSMENT & PLAN NOTE
Patient's anemia is currently controlled. Has not received any PRBCs to date..   Current CBC reviewed-   Lab Results   Component Value Date    HGB 9.2 (L) 04/28/2022    HCT 27.4 (L) 04/28/2022     Monitor serial CBC and transfuse if patient becomes hemodynamically unstable, symptomatic or H/H drops below 7/21.

## 2022-04-29 NOTE — ASSESSMENT & PLAN NOTE
Patient's FSGs are controlled on current medication regimen.  Last A1c reviewed-   Lab Results   Component Value Date    HGBA1C 7.2 01/31/2022     Most recent fingerstick glucose reviewed-   Recent Labs   Lab 04/27/22 2117   POCTGLUCOSE 182*     Current correctional scale  Low  Maintain anti-hyperglycemic dose as follows-   Antihyperglycemics (From admission, onward)            Start     Stop Route Frequency Ordered    04/24/22 2205  insulin aspart U-100 pen 0-5 Units         -- SubQ Before meals & nightly PRN 04/24/22 2108        Hold Oral hypoglycemics while patient is in the hospital.

## 2022-04-29 NOTE — SUBJECTIVE & OBJECTIVE
Interval History: no ostomy output yet.  Has been running temps.  White count still high.  CT abdomen pelvis to be done.  There's new redness adjacent to the ostomy and extending to the left lower flank.    Review of Systems   Constitutional:  Positive for fever. Negative for chills.   Respiratory:  Negative for cough, shortness of breath and wheezing.    Cardiovascular:  Negative for chest pain and leg swelling.   Gastrointestinal:  Positive for abdominal pain. Negative for nausea.   Objective:     Vital Signs (Most Recent):  Temp: 98.3 °F (36.8 °C) (04/28/22 1944)  Pulse: 90 (04/28/22 1944)  Resp: 20 (04/28/22 1944)  BP: (!) 152/74 (04/28/22 1944)  SpO2: 97 % (04/28/22 1944)   Vital Signs (24h Range):  Temp:  [97 °F (36.1 °C)-99.3 °F (37.4 °C)] 98.3 °F (36.8 °C)  Pulse:  [] 90  Resp:  [16-20] 20  SpO2:  [97 %-98 %] 97 %  BP: (145-167)/(72-86) 152/74     Weight: 106.7 kg (235 lb 3.7 oz)  Body mass index is 30.2 kg/m².    Intake/Output Summary (Last 24 hours) at 4/28/2022 2022  Last data filed at 4/28/2022 0657  Gross per 24 hour   Intake 2234.2 ml   Output --   Net 2234.2 ml      Physical Exam  Vitals reviewed.   Constitutional:       General: He is not in acute distress.     Appearance: He is not diaphoretic.   HENT:      Mouth/Throat:      Mouth: Mucous membranes are moist.   Eyes:      General: No scleral icterus.        Right eye: No discharge.         Left eye: No discharge.   Neck:      Vascular: No JVD.   Cardiovascular:      Rate and Rhythm: Normal rate and regular rhythm.   Pulmonary:      Effort: Pulmonary effort is normal.      Breath sounds: Normal breath sounds.   Abdominal:      General: The ostomy site is clean. Bowel sounds are decreased. There is no distension.      Palpations: Abdomen is soft.      Tenderness: There is no abdominal tenderness.   Skin:     General: Skin is warm.      Findings: Erythema (lower abdomen from colostomy and extending to left lower flank (hot to touch)) present.    Neurological:      Mental Status: He is alert.       Significant Labs: All pertinent labs within the past 24 hours have been reviewed.    Significant Imaging:  None

## 2022-04-29 NOTE — ASSESSMENT & PLAN NOTE
New problem.  There is a moderately sized area of erythema and warmth on lower abdomen, extending from ostomy site to the left lower flank.  Pt's spouse brought it to my attention today.

## 2022-04-29 NOTE — PROGRESS NOTES
Ostomy starting to have output.  Diet advanced to fulls.  Ok to advance as tolerated as long as no nausea and ostomy continues to have output    Following

## 2022-04-29 NOTE — CARE UPDATE
04/29/22 0002   Patient Assessment/Suction   Level of Consciousness (AVPU) responds to voice   Respiratory Effort Unlabored   PRE-TX-O2   O2 Device (Oxygen Therapy) room air   SpO2 (!) 90 %   Pulse Oximetry Type Intermittent   $ Pulse Oximetry - Multiple Charge Pulse Oximetry - Multiple   Pulse 94   Resp 16

## 2022-04-29 NOTE — PROGRESS NOTES
"[facility] Medicine  Progress Note    Patient Name: Osman Li  MRN: [id number]  Patient Class: IP- Inpatient   Admission Date: 4/24/2022  Length of Stay: 3 days  Attending Physician: Mani Hurley MD  Primary Care Provider: ANTONIO Emmanuel        Subjective:     Principal Problem:Large bowel obstruction        HPI:  Osman Li is a 56 y.o. y.o. male with a PMHx of HTN, T2DM, and HLD who presented to the ED with upper abdominal pain onset x 1 months, constant since 4/21. He describes that pain as an intermittent cramping sensation that lasts approximately 30 seconds. No radiation. Pain is exacerbated with "tensing up" and alleviated with repositioning. Pain is worst postprandial but constant.  He denies blood in his stools or dark stools. Pain was 9/10 at its worst and is currently "mild." He also reports chills night sweats, fatigue, difficulty urinating which he attributes to "old age" and back pain which he attributes to laying on the bed. He denies fever, WL, CP, SOB, and n/v/d. Patient reports colonoscopy approximately 5 years ago where he had 4 polyps removed. Denies FHx of colon cancer. Patient is a smoker with a 40 pack year history. ED workup was significant for anemia, elevated WBC, mild hyponatremia, mild hypokalemia, and CT abdomen concerning for an obstructive mass. The patient was placed in observation under the care of Hospital Medicine.       Overview/Hospital Course:  56 y.o. y.o. male with a PMHx of HTN, T2DM, and HLD who presented to the ED with upper abdominal pain onset x 1 months found to have a large bowel obstruction 2/2 mass.  On 4/25 the patient underwent colectomy, splenectomy and mass removal with colostomy creation.  Currently pain is improving and we are awaiting ostomy output.       On 4/27, WBC count increased significantly. At this point potentially reactive as on vanco/cefepime/flagyl with no new signs/symptoms of infection or " surgical complication.    Additionally, course complicated by hyponatremia. Urine lytes sent and renal consulted.     Seen by GI. Requires outpatient referral for consideration of endoscopy of stoma and blind limb.      Interval History: no ostomy output yet.  Has been running temps.  White count still high.  CT abdomen pelvis to be done.  There's new redness adjacent to the ostomy and extending to the left lower flank.    Review of Systems   Constitutional:  Positive for fever. Negative for chills.   Respiratory:  Negative for cough, shortness of breath and wheezing.    Cardiovascular:  Negative for chest pain and leg swelling.   Gastrointestinal:  Positive for abdominal pain. Negative for nausea.   Objective:     Vital Signs (Most Recent):  Temp: 98.3 °F (36.8 °C) (04/28/22 1944)  Pulse: 90 (04/28/22 1944)  Resp: 20 (04/28/22 1944)  BP: (!) 152/74 (04/28/22 1944)  SpO2: 97 % (04/28/22 1944)   Vital Signs (24h Range):  Temp:  [97 °F (36.1 °C)-99.3 °F (37.4 °C)] 98.3 °F (36.8 °C)  Pulse:  [] 90  Resp:  [16-20] 20  SpO2:  [97 %-98 %] 97 %  BP: (145-167)/(72-86) 152/74     Weight: 106.7 kg (235 lb 3.7 oz)  Body mass index is 30.2 kg/m².    Intake/Output Summary (Last 24 hours) at 4/28/2022 2022  Last data filed at 4/28/2022 0657  Gross per 24 hour   Intake 2234.2 ml   Output --   Net 2234.2 ml      Physical Exam  Vitals reviewed.   Constitutional:       General: He is not in acute distress.     Appearance: He is not diaphoretic.   HENT:      Mouth/Throat:      Mouth: Mucous membranes are moist.   Eyes:      General: No scleral icterus.        Right eye: No discharge.         Left eye: No discharge.   Neck:      Vascular: No JVD.   Cardiovascular:      Rate and Rhythm: Normal rate and regular rhythm.   Pulmonary:      Effort: Pulmonary effort is normal.      Breath sounds: Normal breath sounds.   Abdominal:      General: The ostomy site is clean. Bowel sounds are decreased. There is no distension.       Palpations: Abdomen is soft.      Tenderness: There is no abdominal tenderness.   Skin:     General: Skin is warm.      Findings: Erythema (lower abdomen from colostomy and extending to left lower flank (hot to touch)) present.   Neurological:      Mental Status: He is alert.       Significant Labs: All pertinent labs within the past 24 hours have been reviewed.    Significant Imaging:  None      Assessment/Plan:      * Large bowel obstruction  Status post surgical treatment.  There was large mass at splenic flexure, which was causing obstruction.  Mass was involving the hilum of the spleen.  Spleen, therefore, was removed.  Colostomy created of last part of transverse colon.      Postoperative cellulitis of surgical wound  New problem.  There is a moderately sized area of erythema and warmth on lower abdomen, extending from ostomy site to the left lower flank.  Pt's spouse brought it to my attention today.      Hypophosphatemia  Monitor level and supplement when needed.    Phosphorus   Date Value Ref Range Status   04/28/2022 2.2 (L) 2.7 - 4.5 mg/dL Final   04/28/2022 2.2 (L) 2.7 - 4.5 mg/dL Final   04/27/2022 1.5 (L) 2.7 - 4.5 mg/dL Final   ]      Anemia  Patient's anemia is currently controlled. Has not received any PRBCs to date..   Current CBC reviewed-   Lab Results   Component Value Date    HGB 9.2 (L) 04/28/2022    HCT 27.4 (L) 04/28/2022     Monitor serial CBC and transfuse if patient becomes hemodynamically unstable, symptomatic or H/H drops below 7/21.         Hyponatremia  Monitor sodium level.    Sodium   Date Value Ref Range Status   04/28/2022 132 (L) 136 - 145 mmol/L Final   04/28/2022 132 (L) 136 - 145 mmol/L Final   04/27/2022 129 (L) 136 - 145 mmol/L Final   ]    Hypokalemia  Monitor level and supplement when needed.    Potassium   Date Value Ref Range Status   04/28/2022 4.5 3.5 - 5.1 mmol/L Final   04/28/2022 4.5 3.5 - 5.1 mmol/L Final   04/27/2022 4.8 3.5 - 5.1 mmol/L Final   ]    HLD  (hyperlipidemia)   Patient is chronically on statin.will continue for now. Monitor clinically. Last LDL was   Lab Results   Component Value Date    LDLCALC 77 10/19/2021            Type 2 diabetes mellitus  Patient's FSGs are controlled on current medication regimen.  Last A1c reviewed-   Lab Results   Component Value Date    HGBA1C 7.2 01/31/2022     Most recent fingerstick glucose reviewed-   Recent Labs   Lab 04/27/22 2117   POCTGLUCOSE 182*     Current correctional scale  Low  Maintain anti-hyperglycemic dose as follows-   Antihyperglycemics (From admission, onward)            Start     Stop Route Frequency Ordered    04/24/22 2205  insulin aspart U-100 pen 0-5 Units         -- SubQ Before meals & nightly PRN 04/24/22 2108        Hold Oral hypoglycemics while patient is in the hospital.        HTN (hypertension)  Chronic, controlled.  Latest blood pressure and vitals reviewed-   Temp:  [97 °F (36.1 °C)-99.3 °F (37.4 °C)]   Pulse:  []   Resp:  [16-20]   BP: (145-167)/(72-86)   SpO2:  [97 %-98 %] .   Home meds for hypertension were reviewed and noted below.   Hypertension Medications             enalapriL-hydrochlorothiazide (VASERETIC) 10-25 mg per tablet Take 1 tablet by mouth once daily.          While in the hospital, will manage blood pressure as follows; Continue home antihypertensive regimen    Will utilize p.r.n. blood pressure medication only if patient's blood pressure greater than  180/110 and he develops symptoms such as worsening chest pain or shortness of breath.          VTE Risk Mitigation (From admission, onward)         Ordered     enoxaparin injection 40 mg  Daily         04/26/22 0947     IP VTE HIGH RISK PATIENT  Once         04/24/22 2108     Place sequential compression device  Until discontinued         04/24/22 2108     Reason for No Pharmacological VTE Prophylaxis  Once        Question:  Reasons:  Answer:  Risk of Bleeding    04/24/22 2108                Discharge Planning   ONIEL:  4/29/2022     Code Status: Full Code   Is the patient medically ready for discharge?:     Reason for patient still in hospital (select all that apply): Patient new problem, Patient trending condition, Treatment and Imaging  Discharge Plan A: Home with family                  Mani Hurley MD  Department of Hospital Medicine   [facility]

## 2022-04-29 NOTE — PLAN OF CARE
Pt progressing well, IV abx continues. Pain controlled well with pain meds. Family at bedside. Plan of care reviewed with pt and spouse, verbalized understanding. Safety measures maintained.

## 2022-04-29 NOTE — PROGRESS NOTES
Hyponatremia better.    No hypotonic iv piggy backs  Avoid thiazide diuretics and SSRI antidepressants    Call if further assistance is needed.  Thank you for the referral     Chino Donovan MD  Nephrology  [facility] [place]  [phone]

## 2022-04-29 NOTE — ASSESSMENT & PLAN NOTE
Status post surgical treatment.  There was large mass at splenic flexure, which was causing obstruction.  Mass was involving the hilum of the spleen.  Spleen, therefore, was removed.  Colostomy created of last part of transverse colon.

## 2022-04-29 NOTE — PT/OT/SLP PROGRESS
Physical Therapy Treatment    Patient Name:  Osman Li   MRN:  [id number]    Recommendations:     Discharge Recommendations:  home (declines need for home health or outpatient PT)   Discharge Equipment Recommendations:  (likely none; discussed purchasing SPC from drug store if experiencing mild instability upon D/C)   Barriers to discharge: None    Assessment:     Osman Li is a 56 y.o. male admitted with a medical diagnosis of Large bowel obstruction.  He presents with the following impairments/functional limitations:  weakness, impaired endurance, gait instability, pain . Agreed to mobilize. Reported having had pain medicine prior but still feels discomfort with activity. Donned shorts in supine with assistance from spouse. Ambulated 250' x 2 with CGA, IV in tow. Returned to room to bed.     Rehab Prognosis: Good; patient would benefit from acute skilled PT services to address these deficits and reach maximum level of function.    Recent Surgery: Procedure(s) (LRB):  LAPAROTOMY, EXPLORATORY (N/A)  COLECTOMY, PARTIAL (N/A)  SPLENECTOMY (N/A)  CREATION, COLOSTOMY (N/A)  MOBILIZATION, SPLENIC FLEXURE (N/A) 4 Days Post-Op    Plan:     During this hospitalization, patient to be seen daily to address the identified rehab impairments via gait training, therapeutic activities, therapeutic exercises and progress toward the following goals:    · Plan of Care Expires:  05/27/22    Subjective     Chief Complaint: abdominal discomfort with activity.  Patient/Family Comments/goals: to return home  Pain/Comfort:  · Pain Rating 1: other (see comments) (did not rate)  · Location - Orientation 1: generalized  · Location 1: abdomen  · Pain Addressed 1: Pre-medicate for activity, Reposition      Objective:     Communicated with nurse Lord prior to session.  Patient found supine with colostomy, peripheral IV, telemetry upon PT entry to room.     General Precautions: Standard, fall   Orthopedic Precautions:N/A    Braces: N/A  Respiratory Status: Room air     Functional Mobility:  · Bed Mobility:     · Supine to Sit: contact guard assistance  · Sit to Supine: contact guard assistance  · Transfers:     · Sit to Stand:  contact guard assistance with no AD  · Gait: 250' x 2 with CGA with IV in tow.       AM-PAC 6 CLICK MOBILITY          Therapeutic Activities and Exercises:   Ambulated slowly / steadily with CGA for safety. No assistive device required, IV in tow.     Patient left supine with all lines intact, call button in reach, nurse Pop notified and caregiver present..    GOALS:   Multidisciplinary Problems     Physical Therapy Goals        Problem: Physical Therapy    Goal Priority Disciplines Outcome Goal Variances Interventions   Physical Therapy Goal     PT, PT/OT Ongoing, Progressing     Description: Goals to be met by: 22     Patient will increase functional independence with mobility by performin. Supine to sit with [place]  2. Sit to stand transfer with [place]  3. Bed to chair transfer with [place] using No Assistive Device  4. Gait  x 250 feet with [place] using No Assistive Device.   5. Lower extremity exercise program x20 reps per handout, with independence                     Time Tracking:     PT Received On: 22  PT Start Time: 1100     PT Stop Time: 1110  PT Total Time (min): 10 min     Billable Minutes: Gait Training 10min    Treatment Type: Treatment  PT/PTA: PTA     PTA Visit Number: 1     2022

## 2022-04-30 PROBLEM — E87.6 HYPOKALEMIA: Status: RESOLVED | Noted: 2022-04-24 | Resolved: 2022-04-30

## 2022-04-30 LAB
POCT GLUCOSE: 177 MG/DL (ref 70–110)
POCT GLUCOSE: 212 MG/DL (ref 70–110)
POCT GLUCOSE: 229 MG/DL (ref 70–110)

## 2022-04-30 PROCEDURE — 25000003 PHARM REV CODE 250: Performed by: HOSPITALIST

## 2022-04-30 PROCEDURE — 63600175 PHARM REV CODE 636 W HCPCS: Performed by: STUDENT IN AN ORGANIZED HEALTH CARE EDUCATION/TRAINING PROGRAM

## 2022-04-30 PROCEDURE — 94761 N-INVAS EAR/PLS OXIMETRY MLT: CPT

## 2022-04-30 PROCEDURE — 63600175 PHARM REV CODE 636 W HCPCS: Performed by: HOSPITALIST

## 2022-04-30 PROCEDURE — 25000003 PHARM REV CODE 250: Performed by: STUDENT IN AN ORGANIZED HEALTH CARE EDUCATION/TRAINING PROGRAM

## 2022-04-30 PROCEDURE — 63600175 PHARM REV CODE 636 W HCPCS

## 2022-04-30 PROCEDURE — 99900035 HC TECH TIME PER 15 MIN (STAT)

## 2022-04-30 PROCEDURE — S0030 INJECTION, METRONIDAZOLE: HCPCS | Performed by: STUDENT IN AN ORGANIZED HEALTH CARE EDUCATION/TRAINING PROGRAM

## 2022-04-30 PROCEDURE — 25000003 PHARM REV CODE 250: Performed by: INTERNAL MEDICINE

## 2022-04-30 PROCEDURE — 97116 GAIT TRAINING THERAPY: CPT | Mod: CQ

## 2022-04-30 PROCEDURE — S4991 NICOTINE PATCH NONLEGEND: HCPCS

## 2022-04-30 PROCEDURE — 25000003 PHARM REV CODE 250

## 2022-04-30 PROCEDURE — 12000002 HC ACUTE/MED SURGE SEMI-PRIVATE ROOM

## 2022-04-30 RX ORDER — OXYCODONE HYDROCHLORIDE 10 MG/1
20 TABLET ORAL EVERY 4 HOURS PRN
Status: DISCONTINUED | OUTPATIENT
Start: 2022-04-30 | End: 2022-05-01

## 2022-04-30 RX ORDER — OXYCODONE HYDROCHLORIDE 10 MG/1
10 TABLET ORAL EVERY 4 HOURS PRN
Status: DISCONTINUED | OUTPATIENT
Start: 2022-04-30 | End: 2022-05-01

## 2022-04-30 RX ADMIN — METHOCARBAMOL TABLETS 500 MG: 500 TABLET, COATED ORAL at 09:04

## 2022-04-30 RX ADMIN — HYDROMORPHONE HYDROCHLORIDE 2 MG: 1 INJECTION, SOLUTION INTRAMUSCULAR; INTRAVENOUS; SUBCUTANEOUS at 06:04

## 2022-04-30 RX ADMIN — ACETAMINOPHEN 650 MG: 325 TABLET ORAL at 11:04

## 2022-04-30 RX ADMIN — VANCOMYCIN HYDROCHLORIDE 2250 MG: 1.25 INJECTION, POWDER, LYOPHILIZED, FOR SOLUTION INTRAVENOUS at 12:04

## 2022-04-30 RX ADMIN — OXYCODONE HYDROCHLORIDE 10 MG: 10 TABLET ORAL at 08:04

## 2022-04-30 RX ADMIN — METHOCARBAMOL TABLETS 500 MG: 500 TABLET, COATED ORAL at 08:04

## 2022-04-30 RX ADMIN — INSULIN ASPART 2 UNITS: 100 INJECTION, SOLUTION INTRAVENOUS; SUBCUTANEOUS at 12:04

## 2022-04-30 RX ADMIN — METRONIDAZOLE 500 MG: 500 INJECTION, SOLUTION INTRAVENOUS at 03:04

## 2022-04-30 RX ADMIN — OXYCODONE HYDROCHLORIDE 20 MG: 10 TABLET ORAL at 05:04

## 2022-04-30 RX ADMIN — HYDROMORPHONE HYDROCHLORIDE 2 MG: 1 INJECTION, SOLUTION INTRAMUSCULAR; INTRAVENOUS; SUBCUTANEOUS at 03:04

## 2022-04-30 RX ADMIN — OXYCODONE HYDROCHLORIDE 20 MG: 10 TABLET ORAL at 10:04

## 2022-04-30 RX ADMIN — MUPIROCIN: 20 OINTMENT TOPICAL at 09:04

## 2022-04-30 RX ADMIN — CEFEPIME HYDROCHLORIDE 2 G: 2 INJECTION, SOLUTION INTRAVENOUS at 01:04

## 2022-04-30 RX ADMIN — CEFEPIME HYDROCHLORIDE 2 G: 2 INJECTION, SOLUTION INTRAVENOUS at 10:04

## 2022-04-30 RX ADMIN — ACETAMINOPHEN 650 MG: 325 TABLET ORAL at 12:04

## 2022-04-30 RX ADMIN — VANCOMYCIN HYDROCHLORIDE 2250 MG: 1.25 INJECTION, POWDER, LYOPHILIZED, FOR SOLUTION INTRAVENOUS at 01:04

## 2022-04-30 RX ADMIN — GABAPENTIN 300 MG: 300 CAPSULE ORAL at 09:04

## 2022-04-30 RX ADMIN — CEFEPIME HYDROCHLORIDE 2 G: 2 INJECTION, SOLUTION INTRAVENOUS at 05:04

## 2022-04-30 RX ADMIN — ACETAMINOPHEN 650 MG: 325 TABLET ORAL at 05:04

## 2022-04-30 RX ADMIN — HYDROMORPHONE HYDROCHLORIDE 2 MG: 1 INJECTION, SOLUTION INTRAMUSCULAR; INTRAVENOUS; SUBCUTANEOUS at 10:04

## 2022-04-30 RX ADMIN — GABAPENTIN 300 MG: 300 CAPSULE ORAL at 03:04

## 2022-04-30 RX ADMIN — METHOCARBAMOL TABLETS 500 MG: 500 TABLET, COATED ORAL at 12:04

## 2022-04-30 RX ADMIN — INSULIN ASPART 1 UNITS: 100 INJECTION, SOLUTION INTRAVENOUS; SUBCUTANEOUS at 10:04

## 2022-04-30 RX ADMIN — METHOCARBAMOL TABLETS 500 MG: 500 TABLET, COATED ORAL at 05:04

## 2022-04-30 RX ADMIN — ENOXAPARIN SODIUM 40 MG: 40 INJECTION SUBCUTANEOUS at 05:04

## 2022-04-30 RX ADMIN — GABAPENTIN 300 MG: 300 CAPSULE ORAL at 08:04

## 2022-04-30 RX ADMIN — NICOTINE 1 PATCH: 14 PATCH, EXTENDED RELEASE TRANSDERMAL at 09:04

## 2022-04-30 RX ADMIN — OXYCODONE HYDROCHLORIDE 10 MG: 10 TABLET ORAL at 12:04

## 2022-04-30 RX ADMIN — METRONIDAZOLE 500 MG: 500 INJECTION, SOLUTION INTRAVENOUS at 09:04

## 2022-04-30 NOTE — ASSESSMENT & PLAN NOTE
Patient's anemia is currently controlled. Has not received any PRBCs to date..   Current CBC reviewed-   Lab Results   Component Value Date    HGB 8.9 (L) 04/29/2022    HCT 26.3 (L) 04/29/2022     Monitor serial CBC and transfuse if patient becomes hemodynamically unstable, symptomatic or H/H drops below 7/21.

## 2022-04-30 NOTE — PROGRESS NOTES
[facility] Surgery  Progress Note    Subjective:     Interval History: no acute events overnight. Tolerating diet. Having ostomy output.   Awake and alert. afebrile. Hgb stable. Biggest complaint is significant incisional pain    Post-Op Info:  Procedure(s) (LRB):  LAPAROTOMY, EXPLORATORY (N/A)  COLECTOMY, PARTIAL (N/A)  SPLENECTOMY (N/A)  CREATION, COLOSTOMY (N/A)  MOBILIZATION, SPLENIC FLEXURE (N/A)   5 Days Post-Op      Medications:  Continuous Infusions:   sodium chloride 0.9% 50 mL/hr at 04/30/22 1009     Scheduled Meds:   acetaminophen  650 mg Oral Q6H    ceFEPime (MAXIPIME) IVPB  2 g Intravenous Q8H    enoxaparin  40 mg Subcutaneous Daily    gabapentin  300 mg Oral TID    methocarbamoL  500 mg Oral QID    metronidazole  500 mg Intravenous Q8H    nicotine  1 patch Transdermal Daily    polyethylene glycol  17 g Oral Daily    vancomycin (VANCOCIN) IVPB  2,250 mg Intravenous Q12H     PRN Meds:dextrose 50%, dextrose 50%, glucagon (human recombinant), hydrALAZINE, HYDROmorphone, insulin aspart U-100, naloxone, ondansetron, oxyCODONE, oxyCODONE, sodium chloride 0.9%, Pharmacy to dose Vancomycin consult **AND** vancomycin - pharmacy to dose     Objective:     Vital Signs (Most Recent):  Temp: 97.2 °F (36.2 °C) (04/30/22 1202)  Pulse: 94 (04/30/22 1202)  Resp: 18 (04/30/22 1233)  BP: (!) 154/72 (04/30/22 1202)  SpO2: 97 % (04/30/22 1202) Vital Signs (24h Range):  Temp:  [97.2 °F (36.2 °C)-99.6 °F (37.6 °C)] 97.2 °F (36.2 °C)  Pulse:  [82-98] 94  Resp:  [16-19] 18  SpO2:  [95 %-97 %] 97 %  BP: (127-163)/(60-80) 154/72       Intake/Output Summary (Last 24 hours) at 4/30/2022 1341  Last data filed at 4/30/2022 0701  Gross per 24 hour   Intake 5588.17 ml   Output 400 ml   Net 5188.17 ml       Physical Exam  Pulmonary:      Effort: Pulmonary effort is normal. No respiratory distress.   Abdominal:      General: There is no distension.      Palpations: Abdomen is soft.      Tenderness:  There is abdominal tenderness.      Comments: Appropriately tender   Left sided erythema improving/  incision clean dry and intact    Neurological:      Mental Status: He is alert and oriented to person, place, and time.         Significant Labs:  CBC:   Recent Labs   Lab 04/29/22  0838   WBC 15.36*   RBC 3.10*   HGB 8.9*   HCT 26.3*   *   MCV 85   MCH 28.7   MCHC 33.8     CMP:   Recent Labs   Lab 04/29/22  0838   *   CALCIUM 8.7   ALBUMIN 2.2*   PROT 6.0   *   K 4.0   CO2 26   CL 96   BUN 7   CREATININE 0.7   ALKPHOS 275*   ALT 30   AST 37   BILITOT 0.6       Significant Diagnostics:  I have reviewed all pertinent imaging results/findings within the past 24 hours.    Assessment/Plan:     Active Diagnoses:    Diagnosis Date Noted POA    PRINCIPAL PROBLEM:  Large bowel obstruction [K56.609] 04/24/2022 Yes    Hypophosphatemia [E83.39] 04/28/2022 Yes    Postoperative cellulitis of surgical wound [T81.49XA] 04/28/2022 No    HTN (hypertension) [I10] 04/24/2022 Yes    Type 2 diabetes mellitus [E11.9] 04/24/2022 Yes    HLD (hyperlipidemia) [E78.5] 04/24/2022 Yes    Hypokalemia [E87.6] 04/24/2022 Yes    Hyponatremia [E87.1] 04/24/2022 Yes    Anemia [D64.9] 04/24/2022 Yes      Problems Resolved During this Admission:     -Advance diet as tolerated  -ambulate, IS  -percocet now 10mg every 4 hours. IV dilaudid on MAR for breakthrough     Bill Michele III, MD  General Surgery  [facility]

## 2022-04-30 NOTE — SUBJECTIVE & OBJECTIVE
Interval History:  no change in status.  Still having pain from incision.  Gets relief from the analgesics but doesn't last.      Review of Systems   Constitutional:  Negative for chills and fever.   Respiratory:  Negative for cough, shortness of breath and wheezing.    Cardiovascular:  Negative for chest pain and leg swelling.   Gastrointestinal:  Positive for abdominal pain. Negative for nausea.   Objective:     Vital Signs (Most Recent):  Temp: 97.3 °F (36.3 °C) (04/30/22 1644)  Pulse: 87 (04/30/22 1644)  Resp: 18 (04/30/22 1739)  BP: (!) 144/73 (04/30/22 1644)  SpO2: 96 % (04/30/22 1644)   Vital Signs (24h Range):  Temp:  [97.2 °F (36.2 °C)-99.6 °F (37.6 °C)] 97.3 °F (36.3 °C)  Pulse:  [82-98] 87  Resp:  [17-19] 18  SpO2:  [95 %-97 %] 96 %  BP: (127-163)/(60-77) 144/73     Weight: 108 kg (238 lb 1.6 oz)  Body mass index is 30.57 kg/m².    Intake/Output Summary (Last 24 hours) at 4/30/2022 1842  Last data filed at 4/30/2022 0701  Gross per 24 hour   Intake 2052.42 ml   Output 400 ml   Net 1652.42 ml        Physical Exam  Vitals reviewed.   Constitutional:       General: He is not in acute distress.     Appearance: He is not diaphoretic.   HENT:      Mouth/Throat:      Mouth: Mucous membranes are moist.   Eyes:      General: No scleral icterus.        Right eye: No discharge.         Left eye: No discharge.   Neck:      Vascular: No JVD.   Cardiovascular:      Rate and Rhythm: Normal rate and regular rhythm.   Pulmonary:      Effort: Pulmonary effort is normal.      Breath sounds: Normal breath sounds.   Abdominal:      General: The ostomy site is clean. Bowel sounds are decreased. There is no distension.      Palpations: Abdomen is soft.      Tenderness: There is no abdominal tenderness.   Skin:     General: Skin is warm.      Findings: Erythema (erythema in left lower abdomen has almost gone away completely) present.   Neurological:      Mental Status: He is alert.       Significant Labs: All pertinent labs  within the past 24 hours have been reviewed.    Significant Imaging:  None

## 2022-04-30 NOTE — SUBJECTIVE & OBJECTIVE
Interval History:  having ostomy output finally.  He continues to have pain in the left upper flank.  Seems to be worse when he flexes or rotates his torso.  The redness in the left lower abdomen has faded.    Review of Systems   Constitutional:  Positive for fever. Negative for chills.   Respiratory:  Negative for cough, shortness of breath and wheezing.    Cardiovascular:  Negative for chest pain and leg swelling.   Gastrointestinal:  Positive for abdominal pain. Negative for nausea.   Objective:     Vital Signs (Most Recent):  Temp: 99.2 °F (37.3 °C) (04/29/22 1909)  Pulse: 98 (04/29/22 1909)  Resp: 18 (04/29/22 2031)  BP: (!) 142/69 (04/29/22 1909)  SpO2: 96 % (04/29/22 1923)   Vital Signs (24h Range):  Temp:  [96.2 °F (35.7 °C)-99.2 °F (37.3 °C)] 99.2 °F (37.3 °C)  Pulse:  [90-98] 98  Resp:  [16-20] 18  SpO2:  [90 %-96 %] 96 %  BP: (134-157)/(69-80) 142/69     Weight: 108 kg (238 lb 1.6 oz)  Body mass index is 30.57 kg/m².    Intake/Output Summary (Last 24 hours) at 4/29/2022 2042  Last data filed at 4/29/2022 1756  Gross per 24 hour   Intake 3535.75 ml   Output 300 ml   Net 3235.75 ml        Physical Exam  Vitals reviewed.   Constitutional:       General: He is not in acute distress.     Appearance: He is not diaphoretic.   HENT:      Mouth/Throat:      Mouth: Mucous membranes are moist.   Eyes:      General: No scleral icterus.        Right eye: No discharge.         Left eye: No discharge.   Neck:      Vascular: No JVD.   Cardiovascular:      Rate and Rhythm: Normal rate and regular rhythm.   Pulmonary:      Effort: Pulmonary effort is normal.      Breath sounds: Normal breath sounds.   Abdominal:      General: The ostomy site is clean. Bowel sounds are decreased. There is no distension.      Palpations: Abdomen is soft.      Tenderness: There is no abdominal tenderness.   Skin:     General: Skin is warm.      Findings: Erythema (lower abdomen from colostomy and extending to left lower flank (hot to touch))  present.   Neurological:      Mental Status: He is alert.       Significant Labs: All pertinent labs within the past 24 hours have been reviewed.    Significant Imaging:  None

## 2022-04-30 NOTE — ASSESSMENT & PLAN NOTE
Improved.  There is a moderately sized area of erythema and warmth on lower abdomen, extending from ostomy site to the left lower flank.  Pt's spouse brought it to my attention yesterday.

## 2022-04-30 NOTE — CARE UPDATE
04/29/22 1923   Patient Assessment/Suction   Level of Consciousness (AVPU) alert   PRE-TX-O2   O2 Device (Oxygen Therapy) room air   SpO2 96 %   Pulse Oximetry Type Intermittent

## 2022-04-30 NOTE — PROGRESS NOTES
"[facility]  Progress Note    Patient Name: Osman Li  MRN: [id number]  Patient Class: IP- Inpatient   Admission Date: 4/24/2022  Length of Stay: 5 days  Attending Physician: Mani Hurley MD  Primary Care Provider: ANTONIO Emmanuel        Subjective:     Principal Problem:Large bowel obstruction        HPI:  Osman Li is a 56 y.o. y.o. male with a PMHx of HTN, T2DM, and HLD who presented to the ED with upper abdominal pain onset x 1 months, constant since 4/21. He describes that pain as an intermittent cramping sensation that lasts approximately 30 seconds. No radiation. Pain is exacerbated with "tensing up" and alleviated with repositioning. Pain is worst postprandial but constant.  He denies blood in his stools or dark stools. Pain was 9/10 at its worst and is currently "mild." He also reports chills night sweats, fatigue, difficulty urinating which he attributes to "old age" and back pain which he attributes to laying on the bed. He denies fever, WL, CP, SOB, and n/v/d. Patient reports colonoscopy approximately 5 years ago where he had 4 polyps removed. Denies FHx of colon cancer. Patient is a smoker with a 40 pack year history. ED workup was significant for anemia, elevated WBC, mild hyponatremia, mild hypokalemia, and CT abdomen concerning for an obstructive mass. The patient was placed in observation under the care of Hospital Medicine.       Overview/Hospital Course:  56 y.o. y.o. male with a PMHx of HTN, T2DM, and HLD who presented to the ED with upper abdominal pain onset x 1 months found to have a large bowel obstruction 2/2 mass.  On 4/25 the patient underwent colectomy, splenectomy and mass removal with colostomy creation.  Currently pain is improving and we are awaiting ostomy output.       On 4/27, WBC count increased significantly. At this point potentially reactive as on vanco/cefepime/flagyl with no new signs/symptoms of infection or " surgical complication.    Additionally, course complicated by hyponatremia. Urine lytes sent and renal consulted.     Seen by GI. Requires outpatient referral for consideration of endoscopy of stoma and blind limb.      Interval History:  no change in status.  Still having pain from incision.  Gets relief from the analgesics but doesn't last.      Review of Systems   Constitutional:  Negative for chills and fever.   Respiratory:  Negative for cough, shortness of breath and wheezing.    Cardiovascular:  Negative for chest pain and leg swelling.   Gastrointestinal:  Positive for abdominal pain. Negative for nausea.   Objective:     Vital Signs (Most Recent):  Temp: 97.3 °F (36.3 °C) (04/30/22 1644)  Pulse: 87 (04/30/22 1644)  Resp: 18 (04/30/22 1739)  BP: (!) 144/73 (04/30/22 1644)  SpO2: 96 % (04/30/22 1644)   Vital Signs (24h Range):  Temp:  [97.2 °F (36.2 °C)-99.6 °F (37.6 °C)] 97.3 °F (36.3 °C)  Pulse:  [82-98] 87  Resp:  [17-19] 18  SpO2:  [95 %-97 %] 96 %  BP: (127-163)/(60-77) 144/73     Weight: 108 kg (238 lb 1.6 oz)  Body mass index is 30.57 kg/m².    Intake/Output Summary (Last 24 hours) at 4/30/2022 1842  Last data filed at 4/30/2022 0701  Gross per 24 hour   Intake 2052.42 ml   Output 400 ml   Net 1652.42 ml        Physical Exam  Vitals reviewed.   Constitutional:       General: He is not in acute distress.     Appearance: He is not diaphoretic.   HENT:      Mouth/Throat:      Mouth: Mucous membranes are moist.   Eyes:      General: No scleral icterus.        Right eye: No discharge.         Left eye: No discharge.   Neck:      Vascular: No JVD.   Cardiovascular:      Rate and Rhythm: Normal rate and regular rhythm.   Pulmonary:      Effort: Pulmonary effort is normal.      Breath sounds: Normal breath sounds.   Abdominal:      General: The ostomy site is clean. Bowel sounds are decreased. There is no distension.      Palpations: Abdomen is soft.      Tenderness: There is no abdominal tenderness.   Skin:      General: Skin is warm.      Findings: Erythema (erythema in left lower abdomen has almost gone away completely) present.   Neurological:      Mental Status: He is alert.       Significant Labs: All pertinent labs within the past 24 hours have been reviewed.    Significant Imaging:  None      Assessment/Plan:      * Large bowel obstruction  Status post surgical treatment.  There was large mass at splenic flexure, which was causing obstruction.  Mass was involving the hilum of the spleen.  Spleen, therefore, was removed.  Colostomy created of last part of transverse colon.      Postoperative cellulitis of surgical wound  Improved.  Almost resolved.    Hypophosphatemia  Monitor level and supplement when needed.    Phosphorus   Date Value Ref Range Status   04/28/2022 2.3 (L) 2.7 - 4.5 mg/dL Final   04/28/2022 2.2 (L) 2.7 - 4.5 mg/dL Final   04/28/2022 2.2 (L) 2.7 - 4.5 mg/dL Final   ]      Anemia  Patient's anemia is currently controlled. Has not received any PRBCs to date..   Current CBC reviewed-   Lab Results   Component Value Date    HGB 8.9 (L) 04/29/2022    HCT 26.3 (L) 04/29/2022     Monitor serial CBC and transfuse if patient becomes hemodynamically unstable, symptomatic or H/H drops below 7/21.         Hyponatremia  Monitor sodium level.    Sodium   Date Value Ref Range Status   04/29/2022 131 (L) 136 - 145 mmol/L Final   04/28/2022 132 (L) 136 - 145 mmol/L Final   04/28/2022 132 (L) 136 - 145 mmol/L Final   ]    Hypokalemia  Resolved.    HLD (hyperlipidemia)   Patient is chronically on statin.will continue for now. Monitor clinically. Last LDL was   Lab Results   Component Value Date    LDLCALC 77 10/19/2021            Type 2 diabetes mellitus  Patient's FSGs are controlled on current medication regimen.  Last A1c reviewed-   Lab Results   Component Value Date    HGBA1C 7.2 01/31/2022     Most recent fingerstick glucose reviewed-   Recent Labs   Lab 04/29/22  2348 04/30/22  1157 04/30/22  1746   POCTGLUCOSE  161* 229* 177*     Current correctional scale  Low  Maintain anti-hyperglycemic dose as follows-   Antihyperglycemics (From admission, onward)            Start     Stop Route Frequency Ordered    04/24/22 2205  insulin aspart U-100 pen 0-5 Units         -- SubQ Before meals & nightly PRN 04/24/22 2108        Hold Oral hypoglycemics while patient is in the hospital.        HTN (hypertension)  Chronic, controlled.  Latest blood pressure and vitals reviewed-   Temp:  [97 °F (36.1 °C)-99.3 °F (37.4 °C)]   Pulse:  []   Resp:  [16-20]   BP: (145-167)/(72-86)   SpO2:  [97 %-98 %] .   Home meds for hypertension were reviewed and noted below.   Hypertension Medications             enalapriL-hydrochlorothiazide (VASERETIC) 10-25 mg per tablet Take 1 tablet by mouth once daily.          While in the hospital, will manage blood pressure as follows; Continue home antihypertensive regimen    Will utilize p.r.n. blood pressure medication only if patient's blood pressure greater than  180/110 and he develops symptoms such as worsening chest pain or shortness of breath.          VTE Risk Mitigation (From admission, onward)         Ordered     enoxaparin injection 40 mg  Daily         04/26/22 0947     IP VTE HIGH RISK PATIENT  Once         04/24/22 2108     Place sequential compression device  Until discontinued         04/24/22 2108     Reason for No Pharmacological VTE Prophylaxis  Once        Question:  Reasons:  Answer:  Risk of Bleeding    04/24/22 2108                Discharge Planning   ONIEL: 5/1/2022     Code Status: Full Code   Is the patient medically ready for discharge?:     Reason for patient still in hospital (select all that apply): Patient trending condition and Treatment  Discharge Plan A: Home with family                  Mani Hurley MD  Department of Hospital Medicine   [facility]

## 2022-04-30 NOTE — ASSESSMENT & PLAN NOTE
Monitor sodium level.    Sodium   Date Value Ref Range Status   04/29/2022 131 (L) 136 - 145 mmol/L Final   04/28/2022 132 (L) 136 - 145 mmol/L Final   04/28/2022 132 (L) 136 - 145 mmol/L Final   ]

## 2022-04-30 NOTE — NURSING
Orders in chart for ostomy home supplies ordered from [facility]. See AVS for details at this patients discharge.

## 2022-04-30 NOTE — PLAN OF CARE
Problem: Physical Therapy  Goal: Physical Therapy Goal  Description: Goals to be met by: 22     Patient will increase functional independence with mobility by performin. Supine to sit with [place]  2. Sit to stand transfer with [place]  3. Bed to chair transfer with [place] using No Assistive Device  4. Gait  x 250 feet with [place] using No Assistive Device.   5. Lower extremity exercise program x20 reps per handout, with independence    Outcome: Ongoing, Progressing   Ambulate with assistance for safety.

## 2022-04-30 NOTE — ASSESSMENT & PLAN NOTE
Patient's FSGs are controlled on current medication regimen.  Last A1c reviewed-   Lab Results   Component Value Date    HGBA1C 7.2 01/31/2022     Most recent fingerstick glucose reviewed-   Recent Labs   Lab 04/29/22  2348 04/30/22  1157 04/30/22  1746   POCTGLUCOSE 161* 229* 177*     Current correctional scale  Low  Maintain anti-hyperglycemic dose as follows-   Antihyperglycemics (From admission, onward)            Start     Stop Route Frequency Ordered    04/24/22 2205  insulin aspart U-100 pen 0-5 Units         -- SubQ Before meals & nightly PRN 04/24/22 2108        Hold Oral hypoglycemics while patient is in the hospital.

## 2022-04-30 NOTE — ASSESSMENT & PLAN NOTE
Monitor level and supplement when needed.    Phosphorus   Date Value Ref Range Status   04/28/2022 2.3 (L) 2.7 - 4.5 mg/dL Final   04/28/2022 2.2 (L) 2.7 - 4.5 mg/dL Final   04/28/2022 2.2 (L) 2.7 - 4.5 mg/dL Final   ]

## 2022-04-30 NOTE — PT/OT/SLP PROGRESS
Physical Therapy Treatment    Patient Name:  Osman Li   MRN:  [id number]    Recommendations:     Discharge Recommendations:  home (declines need for home health or outpatient PT)   Discharge Equipment Recommendations:  (likely none; discussed purchasing SPC from drug store if experiencing mild instability upon D/C)   Barriers to discharge: None    Assessment:     Osman Li is a 57 y.o. male admitted with a medical diagnosis of Large bowel obstruction.  He presents with the following impairments/functional limitations:  weakness, impaired endurance, pain . Supine in bed with spouse present. Nurse present to give medications. Ambulated 500' with CGA with IV in tow. Sat up in chair at bedside.     Rehab Prognosis: Good; patient would benefit from acute skilled PT services to address these deficits and reach maximum level of function.    Recent Surgery: Procedure(s) (LRB):  LAPAROTOMY, EXPLORATORY (N/A)  COLECTOMY, PARTIAL (N/A)  SPLENECTOMY (N/A)  CREATION, COLOSTOMY (N/A)  MOBILIZATION, SPLENIC FLEXURE (N/A) 5 Days Post-Op    Plan:     During this hospitalization, patient to be seen daily to address the identified rehab impairments via gait training, therapeutic activities, therapeutic exercises and progress toward the following goals:    · Plan of Care Expires:  05/27/22    Subjective     Chief Complaint: abdominal pain with movement.  Patient/Family Comments/goals: to return home.   Pain/Comfort:  · Pain Rating 1: 7/10  · Location - Orientation 1: generalized  · Location 1: abdomen  · Pain Addressed 1: Pre-medicate for activity (nurse present to give medication.)      Objective:     Communicated with nurse Vega prior to session.  Patient found supine with colostomy, peripheral IV upon PT entry to room.     General Precautions: Standard, fall   Orthopedic Precautions:N/A   Braces: N/A  Respiratory Status: Room air     Functional Mobility:  · Bed Mobility:     · Rolling Right: modified  independence  · Supine to Sit: modified independence  · Transfers:     · Sit to Stand:  stand by assistance with no AD  · Gait: 500' with CGA, IV in tow.       AM-PAC 6 CLICK MOBILITY          Therapeutic Activities and Exercises:   Ambulated in hallway , returned to room to sit in chair.    Patient left up in chair with all lines intact, call button in reach, nurse Silvia notified and spouse present..    GOALS:   Multidisciplinary Problems     Physical Therapy Goals        Problem: Physical Therapy    Goal Priority Disciplines Outcome Goal Variances Interventions   Physical Therapy Goal     PT, PT/OT Ongoing, Progressing     Description: Goals to be met by: 22     Patient will increase functional independence with mobility by performin. Supine to sit with [place]is  2. Sit to stand transfer with [place]ettis  3. Bed to chair transfer with [place] using No Assistive Device  4. Gait  x 250 feet with [place]ettis using No Assistive Device.   5. Lower extremity exercise program x20 reps per handout, with independence                     Time Tracking:     PT Received On: 22  PT Start Time: 0900     PT Stop Time: 0913  PT Total Time (min): 13 min     Billable Minutes: Gait Training 13min    Treatment Type: Treatment  PT/PTA: PTA     PTA Visit Number: 2     2022

## 2022-04-30 NOTE — PROGRESS NOTES
Pharmacokinetic Assessment Follow Up: IV Vancomycin    Vancomycin serum concentration assessment(s):    The trough level was drawn correctly and can be used to guide therapy at this time. The measurement is below the desired definitive target range of 15 to 20 mcg/mL.    Vancomycin Regimen Plan:    Change regimen to Vancomycin 2250 mg IV every 12 hours with next serum trough concentration measured at 1200 prior to 4th dose on 5/1/22.     Drug levels (last 3 results):  Recent Labs   Lab Result Units 04/28/22  0919 04/29/22  2322   Vancomycin-Trough ug/mL 8.6* 12.3       Pharmacy will continue to follow and monitor vancomycin.    Please contact pharmacy at extension [phone] for questions regarding this assessment.    Thank you for the consult,   Gillian Ramírez, PharmD       Patient brief summary:  Osman Li is a 57 y.o. male initiated on antimicrobial therapy with IV Vancomycin for treatment of intra-abdominal infection    The patient's current regimen is vancomycin 2000 mg every 12 hours.    Drug Allergies:   Review of patient's allergies indicates:   Allergen Reactions    Penicillins Rash       Actual Body Weight:   108 kg (238 lb 1.6 oz)    Renal Function:   Estimated Creatinine Clearance: 150.5 mL/min (based on SCr of 0.7 mg/dL).,     Dialysis Method (if applicable):  N/A    CBC (last 72 hours):  Recent Labs   Lab Result Units 04/27/22  0452 04/28/22  0429 04/29/22  0838   WBC K/uL 22.27* 19.99* 15.36*   Hemoglobin g/dL 9.4* 9.2* 8.9*   Hematocrit % 28.0* 27.4* 26.3*   Platelets K/uL 384 395 470*   Gran % % 81.5* 81.4* 75.5*   Lymph % % 8.8* 9.4* 11.8*   Mono % % 8.5 7.3 9.4   Eosinophil % % 0.2 1.1 2.5   Basophil % % 0.2 0.2 0.3   Differential Method  Automated Automated Automated       Metabolic Panel (last 72 hours):  Recent Labs   Lab Result Units 04/27/22  0452 04/27/22  0948 04/27/22  1132 04/27/22  1136 04/27/22  1720 04/27/22  2311 04/28/22  0429 04/28/22  2301 04/29/22  0838   Sodium mmol/L 127*  127*  --   --  130* 129* 132*  132*  --  131*   Sodium, Urine mmol/L  --   --  <20*  --   --   --   --   --   --    Potassium mmol/L 4.3 4.8  --   --   --   --  4.5  4.5  --  4.0   Potassium, Urine mmol/L  --   --  32  --   --   --   --   --   --    Chloride mmol/L 94* 93*  --   --   --   --  97  97  --  96   Chloride, Urine mmol/L  --   --  35  --   --   --   --   --   --    CO2 mmol/L 25 26  --   --   --   --  26  26  --  26   Glucose mg/dL 158* 187*  --   --   --   --  151*  151*  --  188*   Glucose, UA   --   --   --  3+*  --   --   --   --   --    BUN mg/dL 10 9  --   --   --   --  6  6  --  7   Creatinine mg/dL 0.8 0.8  --   --   --   --  0.8  0.8  --  0.7   Creatinine, Urine mg/dL  --   --  49.6  --   --   --   --   --   --    Albumin g/dL 2.4*  --   --   --   --   --  2.3*  2.3*  --  2.2*   Total Bilirubin mg/dL 0.8  --   --   --   --   --  0.6  --  0.6   Alkaline Phosphatase U/L 103  --   --   --   --   --  159*  --  275*   AST U/L 19  --   --   --   --   --  25  --  37   ALT U/L 18  --   --   --   --   --  24  --  30   Magnesium mg/dL 1.8  --   --   --   --   --  2.2  --   --    Phosphorus mg/dL 1.5* 1.5*  --   --   --   --  2.2*  2.2* 2.3*  --        Vancomycin Administrations:  vancomycin given in the last 96 hours                     vancomycin (VANCOCIN) 2,000 mg in dextrose 5 % 500 mL IVPB (mg) 2,000 mg New Bag 04/29/22 1154      Restarted  0101     2,000 mg New Bag  0045     2,000 mg New Bag 04/28/22 1341    vancomycin (VANCOCIN) 1,750 mg in dextrose 5 % 500 mL IVPB (mg) 1,750 mg New Bag 04/27/22 2230     1,750 mg New Bag  0916                    Microbiologic Results:  Microbiology Results (last 7 days)       Procedure Component Value Units Date/Time    Blood culture [[id number]] Collected: 04/27/22 0658    Order Status: Completed Specimen: Blood from Antecubital, Left Updated: 04/29/22 1412     Blood Culture, Routine No Growth to date      No Growth to date      No Growth to date    Blood  culture [[id number]] Collected: 04/27/22 0659    Order Status: Completed Specimen: Blood from Antecubital, Right Updated: 04/29/22 1412     Blood Culture, Routine No Growth to date      No Growth to date      No Growth to date    Culture, Respiratory with Gram Stain [[id number]] Collected: 04/28/22 1836    Order Status: Completed Specimen: Sputum Updated: 04/29/22 0207     Gram Stain (Respiratory) <10 epithelial cells per low power field.     Gram Stain (Respiratory) Rare WBC's     Gram Stain (Respiratory) Rare Gram positive cocci

## 2022-04-30 NOTE — PLAN OF CARE
Problem: Adult Inpatient Plan of Care  Goal: Plan of Care Review  Outcome: Ongoing, Progressing  Goal: Patient-Specific Goal (Individualized)  Outcome: Ongoing, Progressing  Goal: Absence of Hospital-Acquired Illness or Injury  Outcome: Ongoing, Progressing  Goal: Optimal Comfort and Wellbeing  Outcome: Ongoing, Progressing  Goal: Readiness for Transition of Care  Outcome: Ongoing, Progressing     Problem: Diabetes Comorbidity  Goal: Blood Glucose Level Within Targeted Range  Outcome: Ongoing, Progressing     Problem: Infection  Goal: Absence of Infection Signs and Symptoms  Outcome: Ongoing, Progressing     Problem: Fall Injury Risk  Goal: Absence of Fall and Fall-Related Injury  Outcome: Ongoing, Progressing     Problem: Skin Injury Risk Increased  Goal: Skin Health and Integrity  Outcome: Ongoing, Progressing   Monitor pain, keep incision clean and dry. Monitor stool out put. Bed in lowest position, wheels locked, side rails up x 2, patient belongings within reach, call bell within reach, fall precautions  maintained.

## 2022-04-30 NOTE — ASSESSMENT & PLAN NOTE
Monitor level and supplement when needed.    Potassium   Date Value Ref Range Status   04/29/2022 4.0 3.5 - 5.1 mmol/L Final   04/28/2022 4.5 3.5 - 5.1 mmol/L Final   04/28/2022 4.5 3.5 - 5.1 mmol/L Final   ]

## 2022-04-30 NOTE — NURSING
Ostomy education continues this pm with patients spouse changing the patients ostomy appliance with verbal cues only needed. The patient did have a small amount of discharge from the area between the skin and the stoma on the lateral aspect. There is a small area of separation to the medial aspect of the stoma. Explained to the patient and spouse this sometimes happens and they verbalized understanding. Spouse obtained a good seal with the new ostomy appliance and patient ensured seal and held his hand over the ostomy appliance to ensure seal well established. Patient and spouse report they have emptied the ostomy bag 5 times today and the last emptying was a full bag. There remains an area of pink tinged skin around the stoma and extending along the lower abdomen. The patient reports pain remains an area of concern. Ostomy nurse will follow up on Monday if the patient remains in the hospital at that time.

## 2022-04-30 NOTE — CARE UPDATE
04/30/22 0630   PRE-TX-O2   O2 Device (Oxygen Therapy) room air   SpO2 97 %   Pulse Oximetry Type Intermittent   $ Pulse Oximetry - Multiple Charge Pulse Oximetry - Multiple   Pulse 84   Resp 17

## 2022-04-30 NOTE — PROGRESS NOTES
"[facility] Hospital Medicine  Progress Note    Patient Name: Osman Li  MRN: [id number]  Patient Class: IP- Inpatient   Admission Date: 4/24/2022  Length of Stay: 4 days  Attending Physician: Mani Hurley MD  Primary Care Provider: ANTONIO Emmanuel        Subjective:     Principal Problem:Large bowel obstruction        HPI:  Osman Li is a 56 y.o. y.o. male with a PMHx of HTN, T2DM, and HLD who presented to the ED with upper abdominal pain onset x 1 months, constant since 4/21. He describes that pain as an intermittent cramping sensation that lasts approximately 30 seconds. No radiation. Pain is exacerbated with "tensing up" and alleviated with repositioning. Pain is worst postprandial but constant.  He denies blood in his stools or dark stools. Pain was 9/10 at its worst and is currently "mild." He also reports chills night sweats, fatigue, difficulty urinating which he attributes to "old age" and back pain which he attributes to laying on the bed. He denies fever, WL, CP, SOB, and n/v/d. Patient reports colonoscopy approximately 5 years ago where he had 4 polyps removed. Denies FHx of colon cancer. Patient is a smoker with a 40 pack year history. ED workup was significant for anemia, elevated WBC, mild hyponatremia, mild hypokalemia, and CT abdomen concerning for an obstructive mass. The patient was placed in observation under the care of Hospital Medicine.       Overview/Hospital Course:  56 y.o. y.o. male with a PMHx of HTN, T2DM, and HLD who presented to the ED with upper abdominal pain onset x 1 months found to have a large bowel obstruction 2/2 mass.  On 4/25 the patient underwent colectomy, splenectomy and mass removal with colostomy creation.  Currently pain is improving and we are awaiting ostomy output.       On 4/27, WBC count increased significantly. At this point potentially reactive as on vanco/cefepime/flagyl with no new signs/symptoms of infection or " surgical complication.    Additionally, course complicated by hyponatremia. Urine lytes sent and renal consulted.     Seen by GI. Requires outpatient referral for consideration of endoscopy of stoma and blind limb.      Interval History:  having ostomy output finally.  He continues to have pain in the left upper flank.  Seems to be worse when he flexes or rotates his torso.  The redness in the left lower abdomen has faded.    Review of Systems   Constitutional:  Positive for fever. Negative for chills.   Respiratory:  Negative for cough, shortness of breath and wheezing.    Cardiovascular:  Negative for chest pain and leg swelling.   Gastrointestinal:  Positive for abdominal pain. Negative for nausea.   Objective:     Vital Signs (Most Recent):  Temp: 99.2 °F (37.3 °C) (04/29/22 1909)  Pulse: 98 (04/29/22 1909)  Resp: 18 (04/29/22 2031)  BP: (!) 142/69 (04/29/22 1909)  SpO2: 96 % (04/29/22 1923)   Vital Signs (24h Range):  Temp:  [96.2 °F (35.7 °C)-99.2 °F (37.3 °C)] 99.2 °F (37.3 °C)  Pulse:  [90-98] 98  Resp:  [16-20] 18  SpO2:  [90 %-96 %] 96 %  BP: (134-157)/(69-80) 142/69     Weight: 108 kg (238 lb 1.6 oz)  Body mass index is 30.57 kg/m².    Intake/Output Summary (Last 24 hours) at 4/29/2022 2042  Last data filed at 4/29/2022 1756  Gross per 24 hour   Intake 3535.75 ml   Output 300 ml   Net 3235.75 ml        Physical Exam  Vitals reviewed.   Constitutional:       General: He is not in acute distress.     Appearance: He is not diaphoretic.   HENT:      Mouth/Throat:      Mouth: Mucous membranes are moist.   Eyes:      General: No scleral icterus.        Right eye: No discharge.         Left eye: No discharge.   Neck:      Vascular: No JVD.   Cardiovascular:      Rate and Rhythm: Normal rate and regular rhythm.   Pulmonary:      Effort: Pulmonary effort is normal.      Breath sounds: Normal breath sounds.   Abdominal:      General: The ostomy site is clean. Bowel sounds are decreased. There is no distension.       Palpations: Abdomen is soft.      Tenderness: There is no abdominal tenderness.   Skin:     General: Skin is warm.      Findings: Erythema (lower abdomen from colostomy and extending to left lower flank (hot to touch)) present.   Neurological:      Mental Status: He is alert.       Significant Labs: All pertinent labs within the past 24 hours have been reviewed.    Significant Imaging:  None      Assessment/Plan:      * Large bowel obstruction  Status post surgical treatment.  There was large mass at splenic flexure, which was causing obstruction.  Mass was involving the hilum of the spleen.  Spleen, therefore, was removed.  Colostomy created of last part of transverse colon.      Postoperative cellulitis of surgical wound  Improved.  There is a moderately sized area of erythema and warmth on lower abdomen, extending from ostomy site to the left lower flank.  Pt's spouse brought it to my attention yesterday.    Hypophosphatemia  Monitor level and supplement when needed.    Phosphorus   Date Value Ref Range Status   04/28/2022 2.3 (L) 2.7 - 4.5 mg/dL Final   04/28/2022 2.2 (L) 2.7 - 4.5 mg/dL Final   04/28/2022 2.2 (L) 2.7 - 4.5 mg/dL Final   ]      Anemia  Patient's anemia is currently controlled. Has not received any PRBCs to date..   Current CBC reviewed-   Lab Results   Component Value Date    HGB 8.9 (L) 04/29/2022    HCT 26.3 (L) 04/29/2022     Monitor serial CBC and transfuse if patient becomes hemodynamically unstable, symptomatic or H/H drops below 7/21.         Hyponatremia  Monitor sodium level.    Sodium   Date Value Ref Range Status   04/29/2022 131 (L) 136 - 145 mmol/L Final   04/28/2022 132 (L) 136 - 145 mmol/L Final   04/28/2022 132 (L) 136 - 145 mmol/L Final   ]    Hypokalemia  Monitor level and supplement when needed.    Potassium   Date Value Ref Range Status   04/29/2022 4.0 3.5 - 5.1 mmol/L Final   04/28/2022 4.5 3.5 - 5.1 mmol/L Final   04/28/2022 4.5 3.5 - 5.1 mmol/L Final   ]    HLD  (hyperlipidemia)   Patient is chronically on statin.will continue for now. Monitor clinically. Last LDL was   Lab Results   Component Value Date    LDLCALC 77 10/19/2021            Type 2 diabetes mellitus  Patient's FSGs are controlled on current medication regimen.  Last A1c reviewed-   Lab Results   Component Value Date    HGBA1C 7.2 01/31/2022     Most recent fingerstick glucose reviewed-   No results for input(s): POCTGLUCOSE in the last 24 hours.  Current correctional scale  Low  Maintain anti-hyperglycemic dose as follows-   Antihyperglycemics (From admission, onward)            Start     Stop Route Frequency Ordered    04/24/22 2205  insulin aspart U-100 pen 0-5 Units         -- SubQ Before meals & nightly PRN 04/24/22 2108        Hold Oral hypoglycemics while patient is in the hospital.        HTN (hypertension)  Chronic, controlled.  Latest blood pressure and vitals reviewed-   Temp:  [97 °F (36.1 °C)-99.3 °F (37.4 °C)]   Pulse:  []   Resp:  [16-20]   BP: (145-167)/(72-86)   SpO2:  [97 %-98 %] .   Home meds for hypertension were reviewed and noted below.   Hypertension Medications             enalapriL-hydrochlorothiazide (VASERETIC) 10-25 mg per tablet Take 1 tablet by mouth once daily.          While in the hospital, will manage blood pressure as follows; Continue home antihypertensive regimen    Will utilize p.r.n. blood pressure medication only if patient's blood pressure greater than  180/110 and he develops symptoms such as worsening chest pain or shortness of breath.          VTE Risk Mitigation (From admission, onward)         Ordered     enoxaparin injection 40 mg  Daily         04/26/22 0947     IP VTE HIGH RISK PATIENT  Once         04/24/22 2108     Place sequential compression device  Until discontinued         04/24/22 2108     Reason for No Pharmacological VTE Prophylaxis  Once        Question:  Reasons:  Answer:  Risk of Bleeding    04/24/22 2108                Discharge Planning    ONIEL: 5/1/2022     Code Status: Full Code   Is the patient medically ready for discharge?:     Reason for patient still in hospital (select all that apply): Patient trending condition  Discharge Plan A: Home with family                  Mani Hurley MD  Department of Hospital Medicine   [facility]

## 2022-05-01 PROBLEM — L76.82 INCISIONAL PAIN: Status: ACTIVE | Noted: 2022-05-01

## 2022-05-01 LAB
POCT GLUCOSE: 168 MG/DL (ref 70–110)
POCT GLUCOSE: 210 MG/DL (ref 70–110)
POCT GLUCOSE: 214 MG/DL (ref 70–110)
POCT GLUCOSE: 222 MG/DL (ref 70–110)
VANCOMYCIN TROUGH SERPL-MCNC: 17.2 UG/ML (ref 10–22)

## 2022-05-01 PROCEDURE — 63600175 PHARM REV CODE 636 W HCPCS: Performed by: STUDENT IN AN ORGANIZED HEALTH CARE EDUCATION/TRAINING PROGRAM

## 2022-05-01 PROCEDURE — 25000003 PHARM REV CODE 250: Performed by: STUDENT IN AN ORGANIZED HEALTH CARE EDUCATION/TRAINING PROGRAM

## 2022-05-01 PROCEDURE — S4991 NICOTINE PATCH NONLEGEND: HCPCS

## 2022-05-01 PROCEDURE — 12000002 HC ACUTE/MED SURGE SEMI-PRIVATE ROOM

## 2022-05-01 PROCEDURE — 36415 COLL VENOUS BLD VENIPUNCTURE: CPT | Performed by: HOSPITALIST

## 2022-05-01 PROCEDURE — 25000003 PHARM REV CODE 250: Performed by: HOSPITALIST

## 2022-05-01 PROCEDURE — 80202 ASSAY OF VANCOMYCIN: CPT | Performed by: HOSPITALIST

## 2022-05-01 PROCEDURE — S0030 INJECTION, METRONIDAZOLE: HCPCS | Performed by: STUDENT IN AN ORGANIZED HEALTH CARE EDUCATION/TRAINING PROGRAM

## 2022-05-01 PROCEDURE — 94761 N-INVAS EAR/PLS OXIMETRY MLT: CPT

## 2022-05-01 PROCEDURE — 25000003 PHARM REV CODE 250: Performed by: SURGERY

## 2022-05-01 PROCEDURE — 63600175 PHARM REV CODE 636 W HCPCS: Performed by: HOSPITALIST

## 2022-05-01 PROCEDURE — 97116 GAIT TRAINING THERAPY: CPT | Mod: CQ

## 2022-05-01 PROCEDURE — 25000003 PHARM REV CODE 250

## 2022-05-01 RX ORDER — OXYCODONE HYDROCHLORIDE 10 MG/1
10 TABLET ORAL EVERY 4 HOURS PRN
Status: DISCONTINUED | OUTPATIENT
Start: 2022-05-01 | End: 2022-05-02 | Stop reason: HOSPADM

## 2022-05-01 RX ORDER — MORPHINE SULFATE 30 MG/1
90 TABLET, FILM COATED, EXTENDED RELEASE ORAL EVERY 12 HOURS
Status: DISCONTINUED | OUTPATIENT
Start: 2022-05-01 | End: 2022-05-02 | Stop reason: HOSPADM

## 2022-05-01 RX ORDER — HYDROMORPHONE HYDROCHLORIDE 1 MG/ML
1 INJECTION, SOLUTION INTRAMUSCULAR; INTRAVENOUS; SUBCUTANEOUS
Status: DISCONTINUED | OUTPATIENT
Start: 2022-05-01 | End: 2022-05-02 | Stop reason: HOSPADM

## 2022-05-01 RX ORDER — OXYCODONE HYDROCHLORIDE 5 MG/1
5 TABLET ORAL EVERY 4 HOURS PRN
Status: DISCONTINUED | OUTPATIENT
Start: 2022-05-01 | End: 2022-05-02 | Stop reason: HOSPADM

## 2022-05-01 RX ADMIN — HYDROMORPHONE HYDROCHLORIDE 2 MG: 1 INJECTION, SOLUTION INTRAMUSCULAR; INTRAVENOUS; SUBCUTANEOUS at 06:05

## 2022-05-01 RX ADMIN — VANCOMYCIN HYDROCHLORIDE 2250 MG: 1.25 INJECTION, POWDER, LYOPHILIZED, FOR SOLUTION INTRAVENOUS at 01:05

## 2022-05-01 RX ADMIN — CEFEPIME HYDROCHLORIDE 2 G: 2 INJECTION, SOLUTION INTRAVENOUS at 05:05

## 2022-05-01 RX ADMIN — METRONIDAZOLE 500 MG: 500 INJECTION, SOLUTION INTRAVENOUS at 08:05

## 2022-05-01 RX ADMIN — CEFEPIME HYDROCHLORIDE 2 G: 2 INJECTION, SOLUTION INTRAVENOUS at 10:05

## 2022-05-01 RX ADMIN — NICOTINE 1 PATCH: 14 PATCH, EXTENDED RELEASE TRANSDERMAL at 08:05

## 2022-05-01 RX ADMIN — METHOCARBAMOL TABLETS 500 MG: 500 TABLET, COATED ORAL at 08:05

## 2022-05-01 RX ADMIN — SODIUM CHLORIDE: 0.9 INJECTION, SOLUTION INTRAVENOUS at 08:05

## 2022-05-01 RX ADMIN — METRONIDAZOLE 500 MG: 500 INJECTION, SOLUTION INTRAVENOUS at 11:05

## 2022-05-01 RX ADMIN — METRONIDAZOLE 500 MG: 500 INJECTION, SOLUTION INTRAVENOUS at 04:05

## 2022-05-01 RX ADMIN — OXYCODONE HYDROCHLORIDE 20 MG: 10 TABLET ORAL at 01:05

## 2022-05-01 RX ADMIN — ENOXAPARIN SODIUM 40 MG: 40 INJECTION SUBCUTANEOUS at 05:05

## 2022-05-01 RX ADMIN — METHOCARBAMOL TABLETS 500 MG: 500 TABLET, COATED ORAL at 01:05

## 2022-05-01 RX ADMIN — POLYETHYLENE GLYCOL (3350) 17 G: 17 POWDER, FOR SOLUTION ORAL at 08:05

## 2022-05-01 RX ADMIN — MORPHINE SULFATE 90 MG: 30 TABLET, FILM COATED, EXTENDED RELEASE ORAL at 05:05

## 2022-05-01 RX ADMIN — OXYCODONE HYDROCHLORIDE 20 MG: 10 TABLET ORAL at 08:05

## 2022-05-01 RX ADMIN — CEFEPIME HYDROCHLORIDE 2 G: 2 INJECTION, SOLUTION INTRAVENOUS at 01:05

## 2022-05-01 RX ADMIN — INSULIN ASPART 2 UNITS: 100 INJECTION, SOLUTION INTRAVENOUS; SUBCUTANEOUS at 05:05

## 2022-05-01 RX ADMIN — OXYCODONE HYDROCHLORIDE 10 MG: 10 TABLET ORAL at 08:05

## 2022-05-01 RX ADMIN — METRONIDAZOLE 500 MG: 500 INJECTION, SOLUTION INTRAVENOUS at 12:05

## 2022-05-01 RX ADMIN — INSULIN ASPART 1 UNITS: 100 INJECTION, SOLUTION INTRAVENOUS; SUBCUTANEOUS at 09:05

## 2022-05-01 RX ADMIN — ACETAMINOPHEN 650 MG: 325 TABLET ORAL at 11:05

## 2022-05-01 RX ADMIN — OXYCODONE HYDROCHLORIDE 20 MG: 10 TABLET ORAL at 03:05

## 2022-05-01 RX ADMIN — METHOCARBAMOL TABLETS 500 MG: 500 TABLET, COATED ORAL at 05:05

## 2022-05-01 RX ADMIN — ACETAMINOPHEN 650 MG: 325 TABLET ORAL at 06:05

## 2022-05-01 RX ADMIN — INSULIN ASPART 2 UNITS: 100 INJECTION, SOLUTION INTRAVENOUS; SUBCUTANEOUS at 01:05

## 2022-05-01 NOTE — PT/OT/SLP PROGRESS
Physical Therapy Treatment    Patient Name:  Osman Li   MRN:  [id number]    Recommendations:     Discharge Recommendations:  home (declines need for home health or outpatient PT)   Discharge Equipment Recommendations:  (likely none; discussed purchasing SPC from drug store if experiencing mild instability upon D/C)   Barriers to discharge: None    Assessment:     Osman Li is a 57 y.o. male admitted with a medical diagnosis of Large bowel obstruction.  He presents with the following impairments/functional limitations:  weakness, impaired endurance, pain . Agreed to ambulate. Reports discomfort at incision site ( nurse aware). Ambulated 250' x 3 trials with CG / SBA for safety.     Rehab Prognosis: Good; patient would benefit from acute skilled PT services to address these deficits and reach maximum level of function.    Recent Surgery: Procedure(s) (LRB):  LAPAROTOMY, EXPLORATORY (N/A)  COLECTOMY, PARTIAL (N/A)  SPLENECTOMY (N/A)  CREATION, COLOSTOMY (N/A)  MOBILIZATION, SPLENIC FLEXURE (N/A) 6 Days Post-Op    Plan:     During this hospitalization, patient to be seen daily to address the identified rehab impairments via gait training, therapeutic activities, therapeutic exercises and progress toward the following goals:    · Plan of Care Expires:  05/27/22    Subjective     Chief Complaint: lack of sleep , some incisional pain.   Patient/Family Comments/goals: to return home.   Pain/Comfort:  · Pain Rating 1: other (see comments) (did not rate)  · Location - Orientation 1: generalized  · Location 1: abdomen  · Pain Addressed 1: Reposition, Nurse notified      Objective:     Communicated with nurse Mcclain prior to session.  Patient found supine with colostomy, peripheral IV upon PT entry to room.     General Precautions: Standard, fall   Orthopedic Precautions:N/A   Braces: N/A  Respiratory Status: Room air     Functional Mobility:  · Bed Mobility:     · Rolling Left:  modified  independence  · Rolling Right: modified independence  · Supine to Sit: modified independence  · Sit to Supine: modified independence  · Transfers:     · Sit to Stand:  stand by assistance with no AD  · Gait: 250' x 3 trials woith CG /SBA.       AM-PAC 6 CLICK MOBILITY          Therapeutic Activities and Exercises:   Ambulated slowly/ steadily with CG /SBA for safety, IV on tow.     Patient left supine with all lines intact, call button in reach, nurse Sarahi notified and spouse present..    GOALS:   Multidisciplinary Problems     Physical Therapy Goals        Problem: Physical Therapy    Goal Priority Disciplines Outcome Goal Variances Interventions   Physical Therapy Goal     PT, PT/OT Ongoing, Progressing     Description: Goals to be met by: 22     Patient will increase functional independence with mobility by performin. Supine to sit with [place]  2. Sit to stand transfer with [place]  3. Bed to chair transfer with [place] using No Assistive Device  4. Gait  x 250 feet with [place] using No Assistive Device.   5. Lower extremity exercise program x20 reps per handout, with independence                     Time Tracking:     PT Received On: 22  PT Start Time: 815     PT Stop Time: 827  PT Total Time (min): 12 min     Billable Minutes: Gait Training 12min    Treatment Type: Treatment  PT/PTA: PTA     PTA Visit Number: 3     2022

## 2022-05-01 NOTE — CARE UPDATE
04/30/22 1945   Patient Assessment/Suction   Level of Consciousness (AVPU) alert   Respiratory Effort Normal;Unlabored   PRE-TX-O2   O2 Device (Oxygen Therapy) room air   SpO2 97 %   Pulse Oximetry Type Intermittent   Pulse 86   Resp 18   Aerosol Therapy   $ Aerosol Therapy Charges PRN treatment not required   Respiratory Treatment Status (SVN) PRN treatment not required

## 2022-05-01 NOTE — PROGRESS NOTES
"[facility] Medicine  Progress Note    Patient Name: Osman Li  MRN: [id number]  Patient Class: IP- Inpatient   Admission Date: 4/24/2022  Length of Stay: 6 days  Attending Physician: Mani Hurley MD  Primary Care Provider: ANTONIO Emmanuel        Subjective:     Principal Problem:Large bowel obstruction        HPI:  Osman Li is a 56 y.o. y.o. male with a PMHx of HTN, T2DM, and HLD who presented to the ED with upper abdominal pain onset x 1 months, constant since 4/21. He describes that pain as an intermittent cramping sensation that lasts approximately 30 seconds. No radiation. Pain is exacerbated with "tensing up" and alleviated with repositioning. Pain is worst postprandial but constant.  He denies blood in his stools or dark stools. Pain was 9/10 at its worst and is currently "mild." He also reports chills night sweats, fatigue, difficulty urinating which he attributes to "old age" and back pain which he attributes to laying on the bed. He denies fever, WL, CP, SOB, and n/v/d. Patient reports colonoscopy approximately 5 years ago where he had 4 polyps removed. Denies FHx of colon cancer. Patient is a smoker with a 40 pack year history. ED workup was significant for anemia, elevated WBC, mild hyponatremia, mild hypokalemia, and CT abdomen concerning for an obstructive mass. The patient was placed in observation under the care of Hospital Medicine.       Overview/Hospital Course:  56 y.o. y.o. male with a PMHx of HTN, T2DM, and HLD who presented to the ED with upper abdominal pain onset x 1 months found to have a large bowel obstruction 2/2 mass.  On 4/25 the patient underwent colectomy, splenectomy and mass removal with colostomy creation.  After a few days, he had ostomy output.    He continued to have incisional pain which was difficult to control.  It required escalating doses of opiates.  This kept him hospitalized for a prolonged period.    He " developed fevers and high leukocyte count.  Infectious Disease consultant was brought on the case, and vancomycin and cefepime were given.  No evidence of a specific infection manifested.    During that time, he participated with physical therapy and made good progress with ambulation.    Additionally, course complicated by hyponatremia. Urine lytes sent and renal consulted.  The sodium level was monitored, and it improved.        Interval History:  no change in status.  Still having pain from incision.  The breakthrough oxycodone is not lasting long enough.  The pain spikes to a very high level.      Review of Systems   Constitutional:  Negative for chills and fever.   Respiratory:  Negative for cough, shortness of breath and wheezing.    Cardiovascular:  Negative for chest pain and leg swelling.   Gastrointestinal:  Positive for abdominal pain. Negative for nausea.   Objective:     Vital Signs (Most Recent):  Temp: 97.6 °F (36.4 °C) (05/01/22 1716)  Pulse: 87 (05/01/22 1716)  Resp: 17 (05/01/22 1729)  BP: (!) 150/69 (05/01/22 1716)  SpO2: 97 % (05/01/22 1716)   Vital Signs (24h Range):  Temp:  [97.5 °F (36.4 °C)-100.7 °F (38.2 °C)] 97.6 °F (36.4 °C)  Pulse:  [82-92] 87  Resp:  [16-20] 17  SpO2:  [94 %-97 %] 97 %  BP: (127-171)/(60-80) 150/69     Weight: 108 kg (238 lb 1.6 oz)  Body mass index is 30.57 kg/m².    Intake/Output Summary (Last 24 hours) at 5/1/2022 1854  Last data filed at 5/1/2022 1323  Gross per 24 hour   Intake 2150.48 ml   Output 1125 ml   Net 1025.48 ml        Physical Exam  Vitals reviewed.   Constitutional:       General: He is not in acute distress.     Appearance: He is not diaphoretic.   HENT:      Mouth/Throat:      Mouth: Mucous membranes are moist.   Eyes:      General: No scleral icterus.        Right eye: No discharge.         Left eye: No discharge.   Neck:      Vascular: No JVD.   Cardiovascular:      Rate and Rhythm: Normal rate and regular rhythm.   Pulmonary:      Effort: Pulmonary  effort is normal.      Breath sounds: Normal breath sounds.   Abdominal:      General: The ostomy site is clean. Bowel sounds are decreased. There is no distension.      Palpations: Abdomen is soft.      Tenderness: There is no abdominal tenderness.   Skin:     General: Skin is warm.      Findings: Erythema (erythema in left lower abdomen has almost gone away completely) present.   Neurological:      Mental Status: He is alert.       Significant Labs: All pertinent labs within the past 24 hours have been reviewed.    Significant Imaging:  None      Assessment/Plan:      * Large bowel obstruction  Status post surgical treatment.  There was large mass at splenic flexure, which was causing obstruction.  Mass was involving the hilum of the spleen.  Spleen, therefore, was removed.  Colostomy created of last part of transverse colon.      Incisional pain  Difficult to control.  I've had to increase the doses of hydromorphone and oxycodone.  Will start him today on morphine extended release, so that hopefully he won't need the breakthrough as often.  Will monitor response.  Will monitor for excessive sedation.      Postoperative cellulitis of surgical wound  Improved.  Almost resolved.    Hypophosphatemia  Monitor level and supplement when needed.    Phosphorus   Date Value Ref Range Status   04/28/2022 2.3 (L) 2.7 - 4.5 mg/dL Final   04/28/2022 2.2 (L) 2.7 - 4.5 mg/dL Final   04/28/2022 2.2 (L) 2.7 - 4.5 mg/dL Final   ]      Anemia  Patient's anemia is currently controlled. Has not received any PRBCs to date..   Current CBC reviewed-   Lab Results   Component Value Date    HGB 8.9 (L) 04/29/2022    HCT 26.3 (L) 04/29/2022     Monitor serial CBC and transfuse if patient becomes hemodynamically unstable, symptomatic or H/H drops below 7/21.         Hyponatremia  Monitor sodium level.    Sodium   Date Value Ref Range Status   04/29/2022 131 (L) 136 - 145 mmol/L Final   04/28/2022 132 (L) 136 - 145 mmol/L Final   04/28/2022  132 (L) 136 - 145 mmol/L Final   ]    HLD (hyperlipidemia)   Patient is chronically on statin.will continue for now. Monitor clinically. Last LDL was   Lab Results   Component Value Date    LDLCALC 77 10/19/2021            Type 2 diabetes mellitus  Patient's FSGs are controlled on current medication regimen.  Last A1c reviewed-   Lab Results   Component Value Date    HGBA1C 7.2 01/31/2022     Most recent fingerstick glucose reviewed-   Recent Labs   Lab 04/30/22  2155 05/01/22  0738 05/01/22  1140 05/01/22  1728   POCTGLUCOSE 212* 168* 210* 214*     Current correctional scale  Low  Maintain anti-hyperglycemic dose as follows-   Antihyperglycemics (From admission, onward)            Start     Stop Route Frequency Ordered    04/24/22 2205  insulin aspart U-100 pen 0-5 Units         -- SubQ Before meals & nightly PRN 04/24/22 2108        Hold Oral hypoglycemics while patient is in the hospital.        HTN (hypertension)  Chronic, controlled.  Latest blood pressure and vitals reviewed-   Temp:  [97 °F (36.1 °C)-99.3 °F (37.4 °C)]   Pulse:  []   Resp:  [16-20]   BP: (145-167)/(72-86)   SpO2:  [97 %-98 %] .   Home meds for hypertension were reviewed and noted below.   Hypertension Medications             enalapriL-hydrochlorothiazide (VASERETIC) 10-25 mg per tablet Take 1 tablet by mouth once daily.          While in the hospital, will manage blood pressure as follows; Continue home antihypertensive regimen    Will utilize p.r.n. blood pressure medication only if patient's blood pressure greater than  180/110 and he develops symptoms such as worsening chest pain or shortness of breath.          VTE Risk Mitigation (From admission, onward)         Ordered     enoxaparin injection 40 mg  Daily         04/26/22 0947     IP VTE HIGH RISK PATIENT  Once         04/24/22 2108     Place sequential compression device  Until discontinued         04/24/22 2108     Reason for No Pharmacological VTE Prophylaxis  Once         Question:  Reasons:  Answer:  Risk of Bleeding    04/24/22 2108                Discharge Planning   ONIEL: 5/1/2022     Code Status: Full Code   Is the patient medically ready for discharge?:     Reason for patient still in hospital (select all that apply): Patient trending condition and Treatment  Discharge Plan A: Home with family                  Mani Hurley MD  Department of Hospital Medicine   [facility]

## 2022-05-01 NOTE — ASSESSMENT & PLAN NOTE
Monitor sodium level.    Sodium   Date Value Ref Range Status   04/29/2022 131 (L) 136 - 145 mmol/L Final   04/28/2022 132 (L) 136 - 145 mmol/L Final   04/28/2022 132 (L) 136 - 145 mmol/L Final   ]   Left arm;

## 2022-05-01 NOTE — CARE UPDATE
05/01/22 0805   PRE-TX-O2   O2 Device (Oxygen Therapy) room air   SpO2 95 %   Pulse Oximetry Type Intermittent   $ Pulse Oximetry - Multiple Charge Pulse Oximetry - Multiple   Pulse 86   Resp 17

## 2022-05-01 NOTE — ASSESSMENT & PLAN NOTE
Patient's FSGs are controlled on current medication regimen.  Last A1c reviewed-   Lab Results   Component Value Date    HGBA1C 7.2 01/31/2022     Most recent fingerstick glucose reviewed-   Recent Labs   Lab 04/30/22  2155 05/01/22  0738 05/01/22  1140 05/01/22  1728   POCTGLUCOSE 212* 168* 210* 214*     Current correctional scale  Low  Maintain anti-hyperglycemic dose as follows-   Antihyperglycemics (From admission, onward)            Start     Stop Route Frequency Ordered    04/24/22 2205  insulin aspart U-100 pen 0-5 Units         -- SubQ Before meals & nightly PRN 04/24/22 2108        Hold Oral hypoglycemics while patient is in the hospital.

## 2022-05-01 NOTE — PROGRESS NOTES
[facility] Surgery  Progress Note    Subjective:     Interval History: no acute events overnight. Ostomy producing stool. Tolerating solid food.     Post-Op Info:  Procedure(s) (LRB):  LAPAROTOMY, EXPLORATORY (N/A)  COLECTOMY, PARTIAL (N/A)  SPLENECTOMY (N/A)  CREATION, COLOSTOMY (N/A)  MOBILIZATION, SPLENIC FLEXURE (N/A)   6 Days Post-Op      Medications:  Continuous Infusions:   sodium chloride 0.9% 50 mL/hr at 05/01/22 0854     Scheduled Meds:   acetaminophen  650 mg Oral Q6H    ceFEPime (MAXIPIME) IVPB  2 g Intravenous Q8H    enoxaparin  40 mg Subcutaneous Daily    methocarbamoL  500 mg Oral QID    metronidazole  500 mg Intravenous Q8H    nicotine  1 patch Transdermal Daily    polyethylene glycol  17 g Oral Daily    vancomycin (VANCOCIN) IVPB  2,250 mg Intravenous Q12H     PRN Meds:dextrose 50%, dextrose 50%, glucagon (human recombinant), hydrALAZINE, HYDROmorphone, insulin aspart U-100, naloxone, ondansetron, oxyCODONE, oxyCODONE, sodium chloride 0.9%, Pharmacy to dose Vancomycin consult **AND** vancomycin - pharmacy to dose     Objective:     Vital Signs (Most Recent):  Temp: (!) 100.7 °F (38.2 °C) (05/01/22 1155)  Pulse: 86 (05/01/22 1155)  Resp: 19 (05/01/22 1155)  BP: (!) 171/80 (05/01/22 1155)  SpO2: 96 % (05/01/22 1155) Vital Signs (24h Range):  Temp:  [97.3 °F (36.3 °C)-100.7 °F (38.2 °C)] 100.7 °F (38.2 °C)  Pulse:  [85-92] 86  Resp:  [16-20] 19  SpO2:  [94 %-97 %] 96 %  BP: (127-171)/(60-80) 171/80       Intake/Output Summary (Last 24 hours) at 5/1/2022 1258  Last data filed at 5/1/2022 0538  Gross per 24 hour   Intake 2150.48 ml   Output 375 ml   Net 1775.48 ml       Physical Exam  Pulmonary:      Effort: Pulmonary effort is normal. No respiratory distress.   Abdominal:      General: There is no distension.      Palpations: Abdomen is soft.      Tenderness: There is abdominal tenderness.      Comments: Appropriately tender   Left sided erythema improved again  today and is nearly resolved.   incision clean dry and intact   Ostomy viable with stool output   Neurological:      Mental Status: He is alert and oriented to person, place, and time.         Significant Labs:  CBC: No results for input(s): WBC, RBC, HGB, HCT, PLT, MCV, MCH, MCHC in the last 48 hours.  CMP: No results for input(s): GLU, CALCIUM, ALBUMIN, PROT, NA, K, CO2, CL, BUN, CREATININE, ALKPHOS, ALT, AST, BILITOT in the last 48 hours.    Significant Diagnostics:  I have reviewed all pertinent imaging results/findings within the past 24 hours.    Assessment/Plan:     Active Diagnoses:    Diagnosis Date Noted POA    PRINCIPAL PROBLEM:  Large bowel obstruction [K56.609] 04/24/2022 Yes    Hypophosphatemia [E83.39] 04/28/2022 Yes    Postoperative cellulitis of surgical wound [T81.49XA] 04/28/2022 No    HTN (hypertension) [I10] 04/24/2022 Yes    Type 2 diabetes mellitus [E11.9] 04/24/2022 Yes    HLD (hyperlipidemia) [E78.5] 04/24/2022 Yes    Hyponatremia [E87.1] 04/24/2022 Yes    Anemia [D64.9] 04/24/2022 Yes      Problems Resolved During this Admission:    Diagnosis Date Noted Date Resolved POA    Hypokalemia [E87.6] 04/24/2022 04/30/2022 Yes     -Progressing well over the weekend. Diet as tolerated  -PT, ambulate  -ostomy teaching     Bill Michele III, MD  General Surgery  [facility]

## 2022-05-01 NOTE — PLAN OF CARE
POC/Meds reviewed, pt verbalized understanding. Vitals stable.  Afebrile. Blood glucose monitored. Sliding scale insulin given. Prn medications given. Tele In place-NSR. independent.  Repositions self. Hourly/Q2hr rounding performed, safety maintained. Bed in lowest position, wheels locked, SR up x2, call light in easy reach. No  complaints at this time. Will continue to monitor.

## 2022-05-01 NOTE — PLAN OF CARE
Problem: Physical Therapy  Goal: Physical Therapy Goal  Description: Goals to be met by: 22     Patient will increase functional independence with mobility by performin. Supine to sit with [place]  2. Sit to stand transfer with [place]  3. Bed to chair transfer with [place] using No Assistive Device  4. Gait  x 250 feet with [place] using No Assistive Device.   5. Lower extremity exercise program x20 reps per handout, with independence    Outcome: Ongoing, Progressing   Ambulate with assistance for safety.

## 2022-05-01 NOTE — ASSESSMENT & PLAN NOTE
Difficult to control.  I've had to increase the doses of hydromorphone and oxycodone.  Will start him today on morphine extended release, so that hopefully he won't need the breakthrough as often.  Will monitor response.  Will monitor for excessive sedation.

## 2022-05-01 NOTE — PROGRESS NOTES
Pharmacokinetic Assessment Follow Up: IV Vancomycin    Vancomycin serum concentration assessment(s):    The trough level was drawn correctly and can be used to guide therapy at this time. The measurement is within the desired definitive target range of 15 to 20 mcg/mL.    Vancomycin Regimen Plan:    Continue regimen to Vancomycin 2250 mg IV every 12 hours with next serum trough concentration measured at 000 prior to 4th dose on 5/3    Drug levels (last 3 results):  Recent Labs   Lab Result Units 04/29/22  2322 05/01/22  1147   Vancomycin-Trough ug/mL 12.3 17.2       Pharmacy will continue to follow and monitor vancomycin.    Please contact pharmacy at extension [phone] for questions regarding this assessment.    Thank you for the consult,   Dominic Salvador       Patient brief summary:  Osman Li is a 57 y.o. male initiated on antimicrobial therapy with IV Vancomycin for treatment of intra-abdominal infection    Drug Allergies:   Review of patient's allergies indicates:   Allergen Reactions    Penicillins Rash       Actual Body Weight:   108 kg    Renal Function:   Estimated Creatinine Clearance: 150.5 mL/min (based on SCr of 0.7 mg/dL).,     Dialysis Method (if applicable):  N/A    CBC (last 72 hours):  Recent Labs   Lab Result Units 04/29/22  0838   WBC K/uL 15.36*   Hemoglobin g/dL 8.9*   Hematocrit % 26.3*   Platelets K/uL 470*   Gran % % 75.5*   Lymph % % 11.8*   Mono % % 9.4   Eosinophil % % 2.5   Basophil % % 0.3   Differential Method  Automated       Metabolic Panel (last 72 hours):  Recent Labs   Lab Result Units 04/28/22  2301 04/29/22  0838   Sodium mmol/L  --  131*   Potassium mmol/L  --  4.0   Chloride mmol/L  --  96   CO2 mmol/L  --  26   Glucose mg/dL  --  188*   BUN mg/dL  --  7   Creatinine mg/dL  --  0.7   Albumin g/dL  --  2.2*   Total Bilirubin mg/dL  --  0.6   Alkaline Phosphatase U/L  --  275*   AST U/L  --  37   ALT U/L  --  30   Phosphorus mg/dL 2.3*  --        Vancomycin  Administrations:  vancomycin given in the last 96 hours                     vancomycin (VANCOCIN) 2,250 mg in dextrose 5 % 500 mL IVPB (mg) 2,250 mg New Bag 05/01/22 0148     2,250 mg New Bag 04/30/22 1236      Restarted  0353      Restarted  0332     2,250 mg New Bag  0135    vancomycin (VANCOCIN) 2,000 mg in dextrose 5 % 500 mL IVPB (mg) 2,000 mg New Bag 04/29/22 1154      Restarted  0101     2,000 mg New Bag  0045     2,000 mg New Bag 04/28/22 1341    vancomycin (VANCOCIN) 1,750 mg in dextrose 5 % 500 mL IVPB (mg) 1,750 mg New Bag 04/27/22 2230                    Microbiologic Results:  Microbiology Results (last 7 days)       Procedure Component Value Units Date/Time    Blood culture [[id number]] Collected: 04/27/22 0658    Order Status: Completed Specimen: Blood from Antecubital, Left Updated: 04/30/22 1412     Blood Culture, Routine No Growth to date      No Growth to date      No Growth to date      No Growth to date    Blood culture [[id number]] Collected: 04/27/22 0659    Order Status: Completed Specimen: Blood from Antecubital, Right Updated: 04/30/22 1412     Blood Culture, Routine No Growth to date      No Growth to date      No Growth to date      No Growth to date    Culture, Respiratory with Gram Stain [[id number]] Collected: 04/28/22 1836    Order Status: Completed Specimen: Sputum Updated: 04/30/22 1112     Respiratory Culture Normal respiratory angelina     Gram Stain (Respiratory) <10 epithelial cells per low power field.     Gram Stain (Respiratory) Rare WBC's     Gram Stain (Respiratory) Rare Gram positive cocci

## 2022-05-01 NOTE — SUBJECTIVE & OBJECTIVE
Interval History:  no change in status.  Still having pain from incision.  The breakthrough oxycodone is not lasting long enough.  The pain spikes to a very high level.      Review of Systems   Constitutional:  Negative for chills and fever.   Respiratory:  Negative for cough, shortness of breath and wheezing.    Cardiovascular:  Negative for chest pain and leg swelling.   Gastrointestinal:  Positive for abdominal pain. Negative for nausea.   Objective:     Vital Signs (Most Recent):  Temp: 97.6 °F (36.4 °C) (05/01/22 1716)  Pulse: 87 (05/01/22 1716)  Resp: 17 (05/01/22 1729)  BP: (!) 150/69 (05/01/22 1716)  SpO2: 97 % (05/01/22 1716)   Vital Signs (24h Range):  Temp:  [97.5 °F (36.4 °C)-100.7 °F (38.2 °C)] 97.6 °F (36.4 °C)  Pulse:  [82-92] 87  Resp:  [16-20] 17  SpO2:  [94 %-97 %] 97 %  BP: (127-171)/(60-80) 150/69     Weight: 108 kg (238 lb 1.6 oz)  Body mass index is 30.57 kg/m².    Intake/Output Summary (Last 24 hours) at 5/1/2022 1854  Last data filed at 5/1/2022 1323  Gross per 24 hour   Intake 2150.48 ml   Output 1125 ml   Net 1025.48 ml        Physical Exam  Vitals reviewed.   Constitutional:       General: He is not in acute distress.     Appearance: He is not diaphoretic.   HENT:      Mouth/Throat:      Mouth: Mucous membranes are moist.   Eyes:      General: No scleral icterus.        Right eye: No discharge.         Left eye: No discharge.   Neck:      Vascular: No JVD.   Cardiovascular:      Rate and Rhythm: Normal rate and regular rhythm.   Pulmonary:      Effort: Pulmonary effort is normal.      Breath sounds: Normal breath sounds.   Abdominal:      General: The ostomy site is clean. Bowel sounds are decreased. There is no distension.      Palpations: Abdomen is soft.      Tenderness: There is no abdominal tenderness.   Skin:     General: Skin is warm.      Findings: Erythema (erythema in left lower abdomen has almost gone away completely) present.   Neurological:      Mental Status: He is alert.        Significant Labs: All pertinent labs within the past 24 hours have been reviewed.    Significant Imaging:  None

## 2022-05-01 NOTE — NURSING
Patient states that oxycodone 20mg and robaxin together seems to control pain, but if he takes gabapentin with it, causes heavy sedation. Wanted to know if he can alter regimen to where he receives oxy 20 and robaxin at same time and gabapentin after different time. Informed Dr. Hurley of this.Order received to discontinue gabapentin.

## 2022-05-02 VITALS
RESPIRATION RATE: 16 BRPM | HEIGHT: 74 IN | TEMPERATURE: 97 F | SYSTOLIC BLOOD PRESSURE: 161 MMHG | OXYGEN SATURATION: 97 % | HEART RATE: 81 BPM | BODY MASS INDEX: 30.56 KG/M2 | DIASTOLIC BLOOD PRESSURE: 72 MMHG | WEIGHT: 238.13 LBS

## 2022-05-02 LAB
ALBUMIN SERPL BCP-MCNC: 2.3 G/DL (ref 3.5–5.2)
ALP SERPL-CCNC: 563 U/L (ref 55–135)
ALT SERPL W/O P-5'-P-CCNC: 62 U/L (ref 10–44)
ANION GAP SERPL CALC-SCNC: 10 MMOL/L (ref 8–16)
AST SERPL-CCNC: 72 U/L (ref 10–40)
BACTERIA BLD CULT: NORMAL
BACTERIA BLD CULT: NORMAL
BACTERIA SPEC AEROBE CULT: NORMAL
BACTERIA SPEC AEROBE CULT: NORMAL
BASOPHILS # BLD AUTO: 0.16 K/UL (ref 0–0.2)
BASOPHILS NFR BLD: 1.1 % (ref 0–1.9)
BILIRUB SERPL-MCNC: 0.4 MG/DL (ref 0.1–1)
BUN SERPL-MCNC: 5 MG/DL (ref 6–20)
CALCIUM SERPL-MCNC: 9.5 MG/DL (ref 8.7–10.5)
CHLORIDE SERPL-SCNC: 98 MMOL/L (ref 95–110)
CO2 SERPL-SCNC: 30 MMOL/L (ref 23–29)
CREAT SERPL-MCNC: 0.8 MG/DL (ref 0.5–1.4)
DIFFERENTIAL METHOD: ABNORMAL
EOSINOPHIL # BLD AUTO: 0.3 K/UL (ref 0–0.5)
EOSINOPHIL NFR BLD: 2 % (ref 0–8)
ERYTHROCYTE [DISTWIDTH] IN BLOOD BY AUTOMATED COUNT: 13.6 % (ref 11.5–14.5)
EST. GFR  (AFRICAN AMERICAN): >60 ML/MIN/1.73 M^2
EST. GFR  (NON AFRICAN AMERICAN): >60 ML/MIN/1.73 M^2
GLUCOSE SERPL-MCNC: 223 MG/DL (ref 70–110)
GRAM STN SPEC: NORMAL
HCT VFR BLD AUTO: 35.5 % (ref 40–54)
HGB BLD-MCNC: 11.3 G/DL (ref 14–18)
IMM GRANULOCYTES # BLD AUTO: 0.36 K/UL (ref 0–0.04)
IMM GRANULOCYTES NFR BLD AUTO: 2.5 % (ref 0–0.5)
LYMPHOCYTES # BLD AUTO: 1.7 K/UL (ref 1–4.8)
LYMPHOCYTES NFR BLD: 11.8 % (ref 18–48)
MAGNESIUM SERPL-MCNC: 2.2 MG/DL (ref 1.6–2.6)
MCH RBC QN AUTO: 28.2 PG (ref 27–31)
MCHC RBC AUTO-ENTMCNC: 31.8 G/DL (ref 32–36)
MCV RBC AUTO: 89 FL (ref 82–98)
MONOCYTES # BLD AUTO: 1.6 K/UL (ref 0.3–1)
MONOCYTES NFR BLD: 10.8 % (ref 4–15)
NEUTROPHILS # BLD AUTO: 10.5 K/UL (ref 1.8–7.7)
NEUTROPHILS NFR BLD: 71.8 % (ref 38–73)
NRBC BLD-RTO: 0 /100 WBC
PHOSPHATE SERPL-MCNC: 3.3 MG/DL (ref 2.7–4.5)
PLATELET # BLD AUTO: 576 K/UL (ref 150–450)
PMV BLD AUTO: 10.7 FL (ref 9.2–12.9)
POCT GLUCOSE: 192 MG/DL (ref 70–110)
POCT GLUCOSE: 205 MG/DL (ref 70–110)
POTASSIUM SERPL-SCNC: 4.4 MMOL/L (ref 3.5–5.1)
PROT SERPL-MCNC: 6.4 G/DL (ref 6–8.4)
RBC # BLD AUTO: 4.01 M/UL (ref 4.6–6.2)
SODIUM SERPL-SCNC: 138 MMOL/L (ref 136–145)
WBC # BLD AUTO: 14.6 K/UL (ref 3.9–12.7)

## 2022-05-02 PROCEDURE — 25000003 PHARM REV CODE 250: Performed by: HOSPITALIST

## 2022-05-02 PROCEDURE — 87186 SC STD MICRODIL/AGAR DIL: CPT | Mod: 59 | Performed by: STUDENT IN AN ORGANIZED HEALTH CARE EDUCATION/TRAINING PROGRAM

## 2022-05-02 PROCEDURE — 90471 IMMUNIZATION ADMIN: CPT | Performed by: INTERNAL MEDICINE

## 2022-05-02 PROCEDURE — 90734 MENACWYD/MENACWYCRM VACC IM: CPT | Performed by: INTERNAL MEDICINE

## 2022-05-02 PROCEDURE — 25000003 PHARM REV CODE 250

## 2022-05-02 PROCEDURE — 63600175 PHARM REV CODE 636 W HCPCS: Performed by: STUDENT IN AN ORGANIZED HEALTH CARE EDUCATION/TRAINING PROGRAM

## 2022-05-02 PROCEDURE — 94761 N-INVAS EAR/PLS OXIMETRY MLT: CPT

## 2022-05-02 PROCEDURE — 87075 CULTR BACTERIA EXCEPT BLOOD: CPT | Performed by: STUDENT IN AN ORGANIZED HEALTH CARE EDUCATION/TRAINING PROGRAM

## 2022-05-02 PROCEDURE — 87077 CULTURE AEROBIC IDENTIFY: CPT | Performed by: STUDENT IN AN ORGANIZED HEALTH CARE EDUCATION/TRAINING PROGRAM

## 2022-05-02 PROCEDURE — S0030 INJECTION, METRONIDAZOLE: HCPCS | Performed by: STUDENT IN AN ORGANIZED HEALTH CARE EDUCATION/TRAINING PROGRAM

## 2022-05-02 PROCEDURE — 84100 ASSAY OF PHOSPHORUS: CPT | Performed by: INTERNAL MEDICINE

## 2022-05-02 PROCEDURE — 87106 FUNGI IDENTIFICATION YEAST: CPT | Performed by: STUDENT IN AN ORGANIZED HEALTH CARE EDUCATION/TRAINING PROGRAM

## 2022-05-02 PROCEDURE — 90670 PCV13 VACCINE IM: CPT | Performed by: INTERNAL MEDICINE

## 2022-05-02 PROCEDURE — 80053 COMPREHEN METABOLIC PANEL: CPT | Performed by: INTERNAL MEDICINE

## 2022-05-02 PROCEDURE — S4991 NICOTINE PATCH NONLEGEND: HCPCS

## 2022-05-02 PROCEDURE — 85025 COMPLETE CBC W/AUTO DIFF WBC: CPT | Performed by: INTERNAL MEDICINE

## 2022-05-02 PROCEDURE — 36415 COLL VENOUS BLD VENIPUNCTURE: CPT | Performed by: INTERNAL MEDICINE

## 2022-05-02 PROCEDURE — 25000003 PHARM REV CODE 250: Performed by: INTERNAL MEDICINE

## 2022-05-02 PROCEDURE — 25000003 PHARM REV CODE 250: Performed by: STUDENT IN AN ORGANIZED HEALTH CARE EDUCATION/TRAINING PROGRAM

## 2022-05-02 PROCEDURE — 63600175 PHARM REV CODE 636 W HCPCS: Performed by: INTERNAL MEDICINE

## 2022-05-02 PROCEDURE — 83735 ASSAY OF MAGNESIUM: CPT | Performed by: INTERNAL MEDICINE

## 2022-05-02 PROCEDURE — 90472 IMMUNIZATION ADMIN EACH ADD: CPT | Performed by: INTERNAL MEDICINE

## 2022-05-02 PROCEDURE — 87070 CULTURE OTHR SPECIMN AEROBIC: CPT | Performed by: STUDENT IN AN ORGANIZED HEALTH CARE EDUCATION/TRAINING PROGRAM

## 2022-05-02 RX ORDER — OXYCODONE HYDROCHLORIDE 15 MG/1
15 TABLET ORAL EVERY 6 HOURS PRN
Qty: 20 TABLET | Refills: 0 | Status: SHIPPED | OUTPATIENT
Start: 2022-05-02 | End: 2022-05-05 | Stop reason: SDUPTHER

## 2022-05-02 RX ORDER — METRONIDAZOLE 500 MG/1
500 TABLET ORAL 3 TIMES DAILY
Qty: 15 TABLET | Refills: 0 | Status: SHIPPED | OUTPATIENT
Start: 2022-05-02 | End: 2022-05-07

## 2022-05-02 RX ORDER — METHOCARBAMOL 500 MG/1
500 TABLET, FILM COATED ORAL 4 TIMES DAILY PRN
Qty: 20 TABLET | Refills: 0 | Status: SHIPPED | OUTPATIENT
Start: 2022-05-02 | End: 2022-05-07

## 2022-05-02 RX ORDER — LEVOFLOXACIN 750 MG/1
750 TABLET ORAL DAILY
Qty: 5 TABLET | Refills: 0 | Status: ON HOLD | OUTPATIENT
Start: 2022-05-02 | End: 2022-06-20 | Stop reason: ALTCHOICE

## 2022-05-02 RX ORDER — DOXYCYCLINE 100 MG/1
100 CAPSULE ORAL EVERY 12 HOURS
Qty: 10 CAPSULE | Refills: 0 | Status: SHIPPED | OUTPATIENT
Start: 2022-05-02 | End: 2022-05-07

## 2022-05-02 RX ORDER — SIMETHICONE 80 MG
1 TABLET,CHEWABLE ORAL 4 TIMES DAILY PRN
Status: DISCONTINUED | OUTPATIENT
Start: 2022-05-02 | End: 2022-05-02 | Stop reason: HOSPADM

## 2022-05-02 RX ADMIN — METHOCARBAMOL TABLETS 500 MG: 500 TABLET, COATED ORAL at 08:05

## 2022-05-02 RX ADMIN — MORPHINE SULFATE 90 MG: 30 TABLET, FILM COATED, EXTENDED RELEASE ORAL at 08:05

## 2022-05-02 RX ADMIN — Medication 80 MG: at 09:05

## 2022-05-02 RX ADMIN — NICOTINE 1 PATCH: 14 PATCH, EXTENDED RELEASE TRANSDERMAL at 08:05

## 2022-05-02 RX ADMIN — METHOCARBAMOL TABLETS 500 MG: 500 TABLET, COATED ORAL at 05:05

## 2022-05-02 RX ADMIN — METHOCARBAMOL TABLETS 500 MG: 500 TABLET, COATED ORAL at 01:05

## 2022-05-02 RX ADMIN — PNEUMOCOCCAL 13-VALENT CONJUGATE VACCINE 0.5 ML: 2.2; 2.2; 2.2; 2.2; 2.2; 4.4; 2.2; 2.2; 2.2; 2.2; 2.2; 2.2; 2.2 INJECTION, SUSPENSION INTRAMUSCULAR at 01:05

## 2022-05-02 RX ADMIN — OXYCODONE HYDROCHLORIDE 10 MG: 10 TABLET ORAL at 04:05

## 2022-05-02 RX ADMIN — METRONIDAZOLE 500 MG: 500 INJECTION, SOLUTION INTRAVENOUS at 06:05

## 2022-05-02 RX ADMIN — CEFEPIME HYDROCHLORIDE 2 G: 2 INJECTION, SOLUTION INTRAVENOUS at 09:05

## 2022-05-02 RX ADMIN — OXYCODONE HYDROCHLORIDE 10 MG: 10 TABLET ORAL at 12:05

## 2022-05-02 RX ADMIN — MENINGOCOCCAL (GROUPS A, C, Y AND W-135) OLIGOSACCHARIDE DIPHTHERIA CRM197 CONJUGATE VACCINE 0.5 ML: KIT at 01:05

## 2022-05-02 RX ADMIN — VANCOMYCIN HYDROCHLORIDE 2250 MG: 1.25 INJECTION, POWDER, LYOPHILIZED, FOR SOLUTION INTRAVENOUS at 01:05

## 2022-05-02 RX ADMIN — OXYCODONE HYDROCHLORIDE 10 MG: 10 TABLET ORAL at 05:05

## 2022-05-02 RX ADMIN — ACETAMINOPHEN 650 MG: 325 TABLET ORAL at 06:05

## 2022-05-02 RX ADMIN — POLYETHYLENE GLYCOL (3350) 17 G: 17 POWDER, FOR SOLUTION ORAL at 08:05

## 2022-05-02 RX ADMIN — CEFEPIME HYDROCHLORIDE 2 G: 2 INJECTION, SOLUTION INTRAVENOUS at 12:05

## 2022-05-02 NOTE — DISCHARGE SUMMARY
"[facility] Medicine  Discharge Summary      Patient Name: Osman Li  MRN: [id number]  Admission Date: 4/24/2022  Hospital Length of Stay: 7 days  Discharge Date and Time:  05/02/2022 10:24 AM  Attending Physician: Delaney Pan MD   Discharging Provider: Delaney Pan MD  Primary Care Provider: ANTONIO Emmanuel        HPI:       56 y.o. y.o. male with a PMHx of HTN, T2DM, and HLD who presented to the ED with upper abdominal pain onset x 1 months, constant since 4/21. He describes that pain as an intermittent cramping sensation that lasts approximately 30 seconds. No radiation. Pain is exacerbated with "tensing up" and alleviated with repositioning. Pain is worst postprandial but constant.  He denies blood in his stools or dark stools. Pain was 9/10 at its worst and is currently "mild." He also reports chills night sweats, fatigue, difficulty urinating which he attributes to "old age" and back pain which he attributes to laying on the bed. He denies fever, WL, CP, SOB, and n/v/d. Patient reports colonoscopy approximately 5 years ago where he had 4 polyps removed. Denies FHx of colon cancer. Patient is a smoker with a 40 pack year history. ED workup was significant for anemia, elevated WBC, mild hyponatremia, mild hypokalemia, and CT abdomen concerning for an obstructive mass. The patient was placed in observation under the care of Hospital Medicine.      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  He underwent resection of the colonic mass with colostomy creation in addition to splenectomy.  Course been complicated by the development of jennifer-incisional cellulitis for which he has been treated with vancomycin, " cefepime and Flagyl.  He will be discharged on levofloxacin, flagyl doxycycline for 5 additional days to complete antibiotic course and will follow up with Oncology, surgery and Infectious Disease within 2 weeks.      Regarding the splenectomy, Prevnar 13 and meningococcal vaccine series have been initiated.  Patient is to follow with Infectious Disease following discharge.    * Large bowel obstruction  Status post surgical treatment.  There was large mass at splenic flexure, which was causing obstruction.  Mass was involving the hilum of the spleen.  Spleen, therefore, was removed.  Colostomy created of last part of transverse colon.       Abnormal LFTs  -RUQ US  -Additional work up with oncology/PCP as an outpatient     Incisional pain  Pain medication sent to pharmacy      Postoperative cellulitis of surgical wound  Improved.  Almost resolved.     Hypophosphatemia  Monitor level and supplement when needed.           Phosphorus   Date Value Ref Range Status   04/28/2022 2.3 (L) 2.7 - 4.5 mg/dL Final   04/28/2022 2.2 (L) 2.7 - 4.5 mg/dL Final   04/28/2022 2.2 (L) 2.7 - 4.5 mg/dL Final   ]        Anemia  Patient's anemia is currently controlled. Has not received any PRBCs to date..   Current CBC reviewed-         Lab Results   Component Value Date     HGB 8.9 (L) 04/29/2022     HCT 26.3 (L) 04/29/2022      Monitor serial CBC and transfuse if patient becomes hemodynamically unstable, symptomatic or H/H drops below 7/21.            Hyponatremia  Monitor sodium level.           Sodium   Date Value Ref Range Status   04/29/2022 131 (L) 136 - 145 mmol/L Final   04/28/2022 132 (L) 136 - 145 mmol/L Final   04/28/2022 132 (L) 136 - 145 mmol/L Final   ]     HLD (hyperlipidemia)   Patient is chronically on statin.will continue for now. Monitor clinically. Last LDL was         Lab Results   Component Value Date     LDLCALC 77 10/19/2021               Type 2 diabetes mellitus  Patient's FSGs are controlled on current medication  "regimen.  Last A1c reviewed-         Lab Results   Component Value Date     HGBA1C 7.2 01/31/2022      Most recent fingerstick glucose reviewed-          Recent Labs   Lab 04/30/22  2155 05/01/22  0738 05/01/22  1140 05/01/22  1728   POCTGLUCOSE 212* 168* 210* 214*      Current correctional scale  Low  Maintain anti-hyperglycemic dose as follows-              Antihyperglycemics (From admission, onward)                            Start     Stop Route Frequency Ordered     04/24/22 2205   insulin aspart U-100 pen 0-5 Units         -- SubQ Before meals & nightly PRN 04/24/22 2108          Hold Oral hypoglycemics while patient is in the hospital. Resuming at discharge           HTN (hypertension)  Chronic, controlled.  Latest blood pressure and vitals reviewed-   Temp:  [97 °F (36.1 °C)-99.3 °F (37.4 °C)]   Pulse:  []   Resp:  [16-20]   BP: (145-167)/(72-86)   SpO2:  [97 %-98 %] .   Home meds for hypertension were reviewed and noted below.         Hypertension Medications                 enalapriL-hydrochlorothiazide (VASERETIC) 10-25 mg per tablet Take 1 tablet by mouth once daily.             While in the hospital, will manage blood pressure as follows; Continue home antihypertensive regimen           Consults:   Consults (From admission, onward)        Status Ordering Provider     Inpatient consult to Nephrology  Once        Provider:  Chino Donovan MD    Completed ANSLEY PATRICK     Pharmacy to dose Vancomycin consult  Once        Provider:  (Not yet assigned)   "And" Linked Group Details    Acknowledged MARIKA MARTÍNEZ     Inpatient consult to Infectious Diseases  Once        Provider:  Marika Sparks MD    Completed PETAR MASON     Inpatient consult to General Surgery  Once        Provider:  Carlton Waddell MD    Completed KARLA ADORNO     Inpatient consult to Gastroenterology  Once        Provider:  Pete Estrada MD    Completed KARLA ADORNO          Final " "Active Diagnoses:    Diagnosis Date Noted POA    PRINCIPAL PROBLEM:  Large bowel obstruction [K56.609] 04/24/2022 Yes    Incisional pain [L76.82] 05/01/2022 No    Hypophosphatemia [E83.39] 04/28/2022 Yes    Postoperative cellulitis of surgical wound [T81.49XA] 04/28/2022 No    HTN (hypertension) [I10] 04/24/2022 Yes    Type 2 diabetes mellitus [E11.9] 04/24/2022 Yes    HLD (hyperlipidemia) [E78.5] 04/24/2022 Yes    Hyponatremia [E87.1] 04/24/2022 Yes    Anemia [D64.9] 04/24/2022 Yes      Problems Resolved During this Admission:    Diagnosis Date Noted Date Resolved POA    Hypokalemia [E87.6] 04/24/2022 04/30/2022 Yes      Discharged Condition: stable    Disposition: Home or Self Care    Follow Up:   Follow-up Information     ANTONIO Emmanuel. Go on 5/9/2022.    Specialty: Family Medicine  Why: PCP POST HOSPITAL FOLLOW UP APPT- 5/9/22 at 12:00N  Contact information:  [address]  [address]  [place] [address]  [phone]             Pete Estrada MD. Schedule an appointment as soon as possible for a visit in 4 week(s).    Specialty: Gastroenterology  Why: for Endoscopy on [facility]  Contact information:  [address]  [address]  [place] [address]  [phone]             Carlton Waddell MD Follow up in 2 week(s).    Specialty: General Surgery  Why: May 12, 2022 @ 9:45am  Contact information:  [address]  [address]  [place] [address]  [phone]                       Patient Instructions:      OSTOMY SUPPLIES FOR HOME USE     Order Specific Question Answer Comments   Height: 6' 2" (1.88 m)    Weight: 108 kg (238 lb 1.6 oz)    Length of need (1-99 months): 12    Diagnosis Colostomy    1 piece system:Qty - Month 20     Reference Number Coloplast #  [id number]    Paste? No    Skin prep? Yes Sensi care sting free barrier wipes # [id number]   Ostomy belt? No    Odor eliminator? Yes M9 Odor eliminator #7015   Powder? Yes Brava #[id number]   Adhesive remover? Yes Sensi care sting free adhesive remover wipes "   Rings? Yes huma stoma wrap #[id number]   Barrier strips? Yes Brava #[id number]   Other supplies: Brava lubricating deodorant #[id number]    Does patient have medical equipment at home? none    DME Agency: [employer] ([place], [place])      Ambulatory referral/consult to Diabetes Education   Standing Status: Future   Referral Priority: Routine Referral Type: Consultation   Referral Reason: Specialty Services Required   Requested Specialty: Diabetes   Number of Visits Requested: 1 Expiration Date: 04/29/23     Ambulatory referral/consult to Hematology / Oncology   Standing Status: Future   Referral Priority: Routine Referral Type: Consultation   Referral Reason: Specialty Services Required   Requested Specialty: Hematology and Oncology   Number of Visits Requested: 1     Ambulatory referral/consult to Infectious Disease   Standing Status: Future   Referral Priority: Routine Referral Type: Consultation   Referral Reason: Specialty Services Required   Requested Specialty: Infectious Diseases   Number of Visits Requested: 1     Ambulatory referral/consult to General Surgery   Standing Status: Future   Referral Priority: Routine Referral Type: Consultation   Referral Reason: Specialty Services Required   Requested Specialty: General Surgery   Number of Visits Requested: 1     Notify your health care provider if you experience any of the following:  temperature >100.4     Notify your health care provider if you experience any of the following:  persistent nausea and vomiting or diarrhea     Notify your health care provider if you experience any of the following:  severe uncontrolled pain     Notify your health care provider if you experience any of the following:  difficulty breathing or increased cough     Notify your health care provider if you experience any of the following:  severe persistent headache     Notify your health care provider if you experience any of the following:  worsening rash     Notify your health care  "provider if you experience any of the following:  persistent dizziness, light-headedness, or visual disturbances     Notify your health care provider if you experience any of the following:  increased confusion or weakness     Activity as tolerated     Medications:  Reconciled Home Medications:      Medication List      START taking these medications    doxycycline 100 MG capsule  Commonly known as: MONODOX  Take 1 capsule (100 mg total) by mouth every 12 (twelve) hours. for 5 days     levoFLOXacin 750 MG tablet  Commonly known as: LEVAQUIN  Take 1 tablet (750 mg total) by mouth once daily.     metroNIDAZOLE 500 MG tablet  Commonly known as: FLAGYL  Take 1 tablet (500 mg total) by mouth 3 (three) times daily. for 5 days     oxyCODONE 15 MG Tab  Commonly known as: ROXICODONE  Take 1 tablet (15 mg total) by mouth every 6 (six) hours as needed for Pain.        CONTINUE taking these medications    atorvastatin 20 MG tablet  Commonly known as: LIPITOR  Take 1 tablet (20 mg total) by mouth once daily.     enalapriL-hydrochlorothiazide 10-25 mg per tablet  Commonly known as: VASERETIC  Take 1 tablet by mouth once daily.     metFORMIN 1000 MG tablet  Commonly known as: GLUCOPHAGE  Take 1 tablet (1,000 mg total) by mouth 2 (two) times daily with meals.     pen needle, diabetic 32 gauge x 1/4" Ndle  Commonly known as: BD ULTRA-FINE MICRO PEN NEEDLE  1 each by Misc.(Non-Drug; Combo Route) route once a week.     sildenafiL 100 MG tablet  Commonly known as: VIAGRA  Take 1 tablet (100 mg total) by mouth daily as needed for Erectile Dysfunction.        STOP taking these medications    semaglutide 1 mg/dose (4 mg/3 mL)  Commonly known as: OZEMPIC                Pending Diagnostic Studies:     Procedure Component Value Units Date/Time    Specimen to Pathology, Surgery General Surgery [[id number]] Collected: 04/25/22 2010    Order Status: Sent Lab Status: In process Updated: 04/26/22 0756    Specimen: Tissue     US Abdomen Limited " [[id number]] Resulted: 05/02/22 1345    Order Status: Sent Lab Status: In process Updated: 05/02/22 1345    X-Ray Chest 1 View [[id number]]     Order Status: Sent Lab Status: No result         Indwelling Lines/Drains at time of discharge:   Lines/Drains/Airways     Drain  Duration                Colostomy 04/25/22 Transverse LLQ 7 days                Time spent on the discharge of patient: 35 minutes         Delaney Pan MD  Department of Hospital Medicine  [facility]

## 2022-05-02 NOTE — PROGRESS NOTES
Pt seen and examined.  Doing well.  Tolerating diet    AF  VSS  Abs soft, cellulitis has resolved.  Ostomy health, small medial seperation    WBC continues to down trend  Hg stable    Ok to DC today  F/u with me 7-10 days

## 2022-05-02 NOTE — PLAN OF CARE
POC/Meds reviewed, pt verbalized understanding. Vitals stable.  Afebrile. Blood glucose monitored. Sliding scale insulin given. Prn medications given. Tele In place-NSR. independent.  Repositions self. Hourly/Q2hr rounding performed, safety maintained. Bed in lowest position, wheels locked, SR up x2, call light in easy reach. No  complaints at this time.

## 2022-05-02 NOTE — PLAN OF CARE
Problem: Adult Inpatient Plan of Care  Goal: Plan of Care Review  Outcome: Ongoing, Progressing     Problem: Adult Inpatient Plan of Care  Goal: Optimal Comfort and Wellbeing  Outcome: Ongoing, Progressing     Problem: Diabetes Comorbidity  Goal: Blood Glucose Level Within Targeted Range  Outcome: Ongoing, Progressing     Problem: Fall Injury Risk  Goal: Absence of Fall and Fall-Related Injury  Outcome: Ongoing, Progressing

## 2022-05-02 NOTE — PT/OT/SLP PROGRESS
Physical Therapy      Patient Name:  Osman Li   MRN:  [id number]    Patient not seen today secondary to Wound care. Will follow-up tomorrow if discharge does not proceed as planned.

## 2022-05-02 NOTE — PLAN OF CARE
Problem: Adult Inpatient Plan of Care  Goal: Plan of Care Review  Outcome: Met  Goal: Patient-Specific Goal (Individualized)  Outcome: Met  Goal: Absence of Hospital-Acquired Illness or Injury  Outcome: Met  Goal: Optimal Comfort and Wellbeing  Outcome: Met  Goal: Readiness for Transition of Care  Outcome: Met     Problem: Diabetes Comorbidity  Goal: Blood Glucose Level Within Targeted Range  Outcome: Met     Problem: Infection  Goal: Absence of Infection Signs and Symptoms  Outcome: Met     Problem: Fall Injury Risk  Goal: Absence of Fall and Fall-Related Injury  Outcome: Met     Problem: Skin Injury Risk Increased  Goal: Skin Health and Integrity  Outcome: Met      PIV removed intact. Telemetry monitor removed. Safety/fall precautions maintained throughout shift. Given prn medication for pain and muscle relaxer. Pt. Up to chair some today in room and ambulated to bathroom. Q2H rounding performed.. Reviewed discharge instructions with patient and wife.

## 2022-05-02 NOTE — CARE UPDATE
05/01/22 2012   Patient Assessment/Suction   Level of Consciousness (AVPU) alert   PRE-TX-O2   O2 Device (Oxygen Therapy) room air   SpO2 97 %   Pulse Oximetry Type Intermittent

## 2022-05-02 NOTE — DISCHARGE INSTRUCTIONS
No Haemophilus influenza B vaccines available at the moment, wife will call pharmacy to see if he can get it   He needs second dose of Meningococcus vaccine (Menactra) outpatient in 4 weeks  Pneumovax 23 in 2 months    Influenza vaccine this fall   Follow up ID clinic/Dr Hernandes in 4 weeks    Recommendations:  Upon discharge, Levaquin 750mg PO once a day and Doxycycline 100mg PO twice a day for 5 days  Incentive spirometry

## 2022-05-02 NOTE — CARE UPDATE
05/02/22 0709   PRE-TX-O2   O2 Device (Oxygen Therapy) room air   SpO2 96 %   Pulse Oximetry Type Intermittent   $ Pulse Oximetry - Multiple Charge Pulse Oximetry - Multiple   Pulse 75   Resp 15   POx

## 2022-05-02 NOTE — NURSING
Patients spouse changed his ostomy appliance with standby assist from this nurse. There is an area which drains a mix of blood and purulence when mild pressure applied to the lateral aspect of the stoma. Obtained cultures

## 2022-05-02 NOTE — PLAN OF CARE
Patient cleared for discharge from case management standpoint.       05/02/22 1113   Final Note   Assessment Type Final Discharge Note   Anticipated Discharge Disposition Home   Hospital Resources/Appts/Education Provided Appointments scheduled and added to AVS;Provided patient/caregiver with written discharge plan information;Provided education on problems/symptoms using teachback

## 2022-05-02 NOTE — PROGRESS NOTES
Consult Note  Infectious Disease    Reason for Consult: Vaccines s/p splenectomy     HPI: Osman Li is a 57 y.o. male active heavy smoker, with past medical history of HTN, diabetes, and HLD who presented with worsening upper abdominal pain and constipation for a month which became constant 4/21, with associated fever, chills and some difficulty urinating. He denies melena,  headache, cough, SOB, nausea, vomit or new skin rash.   As per patient, last colonoscopy was 5 years ago anf 4 polyps were removed.     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  Adjacent pathologic lymph nodes compatible with jason spread of disease.  Obstruction of the colon proximal to the mass, with moderate dilatation.    Taken for emergent ex-lap 4/25 for LBO. S/p Clindamycin IV and Decadron 4mg.     As per Op-note; large colonic mass that was obstructing at the splenic flexure and densely adherent to the splenic hilum.  Palpable abnormalities within the tail of the pancreas. Status post splenectomy and end transverse colostomy with remnant distal descending colon, sigmoid colon and rectum.   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    ID consult for vaccinations after splenectomy.     Patient seen and examined at bedside, wife present. He is c/o severe abdominal pain 9/10 likely post surgery, he is passing gas. Hemodynamically stable, afebrile.   Labs reviewed, WNL.   He is vaccinated against COVID-19 s/p 2 doses, pending booster.     INTERVAL HISTORY:   4/27:  Interim reviewed, patient seen and examined at bedside sitting in the chair.  Had a fever 101.2 overnight, chills.  Patient states  his abdominal pain is better controlled with pain meds. Has persistent  cough, mild sputum.  Macias catheter removed.  Labs reviewed, white count of 22.2, PMN 81.5%, H&H 9.4 or 28, platelet count 384.  Sodium 127, normal kidney function.    4/28:  Patient seen and examined at bedside, still complaining of abdominal pain.  Hemodynamically stable, T-max a 100.7° yesterday, currently afebrile.  Labs reviewed, white count 19.9, PMN 81.4%, H&H 10.2/27.4, platelet count 395.  Sodium 132. Procalcitonin 2.28.  Chest x-ray with bilateral atelectases.    4/29: Interim reviewed, patient seen and examined at bedside, sitting in the bed, wife present. C/o abdominal pain, just received pain meds. Colostomy bag with output since yesterday afternoon, x 2. Hemodynamically stable, afebrile in the last 36h. Labs reviewed, WBC 15, PMN 75.5%, trending down, H/H 8.9/26.3, plt: 470. Sodium 131. Micro reviewed, sputum with few GPC, pending final.     4/30-5/2: Patient seen and examined at bedside, abdominal pain is slightly better controlled, wants to go home.     Review of patient's allergies indicates:   Allergen Reactions    Penicillins Rash     Past Medical History:   Diagnosis Date    DM (diabetes mellitus)     Hyperlipidemia     Hypertension     Prediabetes      Past Surgical History:   Procedure Laterality Date    CHOLECYSTECTOMY  2011    COLONOSCOPY      2018    COLOSTOMY N/A 4/25/2022    Procedure: CREATION, COLOSTOMY;  Surgeon: Carlton Waddell MD;  Location: [facility] OR;  Service: General;  Laterality: N/A;    MOBILIZATION OF SPLENIC FLEXURE N/A 4/25/2022    Procedure: MOBILIZATION, SPLENIC FLEXURE;  Surgeon: Carlton Waddell MD;  Location: [facility] OR;  Service: General;  Laterality: N/A;    SPLENECTOMY N/A 4/25/2022    Procedure: SPLENECTOMY;  Surgeon: Carlton Waddell MD;  Location: [facility] OR;  Service: General;  Laterality: N/A;    SUBTOTAL COLECTOMY N/A 4/25/2022    Procedure: COLECTOMY, PARTIAL;  Surgeon: Carlton Waddell MD;  Location: [facility] OR;  Service: General;  Laterality: N/A;     Social  History     Tobacco Use    Smoking status: Current Every Day Smoker     Packs/day: 1.00     Years: 35.00     Pack years: 35.00    Smokeless tobacco: Never Used   Substance Use Topics    Alcohol use: Not Currently        Family History   Problem Relation Age of Onset    Heart disease Father          Review of Systems:   As described in HPi.    Outdoor activities: Works as a , lives with wife at home.   Travel: None  Implants: None  Antibiotic History: Clindamycin pre-op    EXAM & DIAGNOSTICS REVIEWED:   Vitals:     Temp:  [97.6 °F (36.4 °C)-100.7 °F (38.2 °C)]   Temp: 98 °F (36.7 °C) (05/02/22 0728)  Pulse: 76 (05/02/22 0728)  Resp: 18 (05/02/22 0728)  BP: (!) 148/68 (05/02/22 0728)  SpO2: 95 % (05/02/22 0728)    Intake/Output Summary (Last 24 hours) at 5/2/2022 0812  Last data filed at 5/2/2022 0635  Gross per 24 hour   Intake 3766.89 ml   Output 1425 ml   Net 2341.89 ml       General:  In NAD. Alert and attentive, cooperative, in pain likely post-op  Eyes:  Anicteric, PERRL  ENT:  No ulcers, exudates, thrush, nares patent, dentition is fiar  Neck:  Supple  Lungs: Minimal rhonchi R, L clear  Heart:  S1/S2+, regular rhythm, no murmurs  Abd:  Laparotomy wound looks clean, redness noted around ostomy site and across L side of abdomen, improving, LLQ colostomy bag in place, stool noted.     +BS, soft, tender to palpation, no rebound  :  Voids, urine clear, no flank tenderness  Musc:  Joints without effusion, swelling,  erythema, synovitis  Skin:  Warm, no rash  Wound: Laparotomy   Neuro: Following commands, no acute focal deficit   Psych:  Calm, cooperative  Lymphatic:       Extrem: No LE edema b/l  VAD:  Peripheral IV        Isolation:  None    General Labs reviewed:  Recent Labs   Lab 04/27/22  0452 04/28/22  0429 04/29/22  0838   WBC 22.27* 19.99* 15.36*   HGB 9.4* 9.2* 8.9*   HCT 28.0* 27.4* 26.3*    395 470*       Recent Labs   Lab 04/27/22  0452 04/27/22  0948 04/27/22  1726  04/27/22  2311 04/28/22  0429 04/29/22  0838   * 127*   < > 129* 132*  132* 131*   K 4.3 4.8  --   --  4.5  4.5 4.0   CL 94* 93*  --   --  97  97 96   CO2 25 26  --   --  26  26 26   BUN 10 9  --   --  6  6 7   CREATININE 0.8 0.8  --   --  0.8  0.8 0.7   CALCIUM 7.9* 8.4*  --   --  8.9  8.9 8.7   PROT 5.7*  --   --   --  6.0 6.0   BILITOT 0.8  --   --   --  0.6 0.6   ALKPHOS 103  --   --   --  159* 275*   ALT 18  --   --   --  24 30   AST 19  --   --   --  25 37    < > = values in this interval not displayed.     No results for input(s): CRP in the last 168 hours.  No results for input(s): SEDRATE in the last 168 hours.    Estimated Creatinine Clearance: 150.5 mL/min (based on SCr of 0.7 mg/dL).     Micro:  Microbiology Results (last 7 days)     Procedure Component Value Units Date/Time    Blood culture [[id number]] Collected: 04/27/22 0658    Order Status: Completed Specimen: Blood from Antecubital, Left Updated: 05/01/22 1412     Blood Culture, Routine No Growth to date      No Growth to date      No Growth to date      No Growth to date      No Growth to date    Blood culture [[id number]] Collected: 04/27/22 0659    Order Status: Completed Specimen: Blood from Antecubital, Right Updated: 05/01/22 1412     Blood Culture, Routine No Growth to date      No Growth to date      No Growth to date      No Growth to date      No Growth to date    Culture, Respiratory with Gram Stain [[id number]] Collected: 04/28/22 1836    Order Status: Completed Specimen: Sputum Updated: 04/30/22 1112     Respiratory Culture Normal respiratory angelina     Gram Stain (Respiratory) <10 epithelial cells per low power field.     Gram Stain (Respiratory) Rare WBC's     Gram Stain (Respiratory) Rare Gram positive cocci          Imaging Reviewed:  Repeat CT scan abdomen/pelvis: Recent resection of the splenic flexure of the colon with diverting colostomy to the left lower quadrant.  Recent splenectomy.  A small amount of postsurgical  fluid and air is seen in the surgical beds.  There is a small left pleural effusion with moderate atelectasis at the left lung base and trace right pleural effusion with mild atelectasis at the right lung base.  Prior cholecystectomy.    CXR: Patchy basilar atelectasis.  Thickening of the right minor fissure otherwise negative chest   CT abdomen/pelvis: Splenic flexure mass compatible with a primary colon neoplasm.  Adjacent pathologic lymph nodes compatible with jason spread of disease.  Obstruction of the colon proximal to the mass, with moderate dilatation.      IMPRESSION & PLAN     1. S/p emergent ex-lap and splenectomy for underlying colonic mass likely neoplasia in the setting of active heavy smoking, now with fever and leukocytosis   Path report in process   Blood cultures x2 sets no growth, pending final   Respiratory culture NRF   Procal 2.28    Wound culture taken at bedside     2. HTN, diabetes, HLD   3. Smoker     Recommendations:  Upon discharge, Levaquin 750mg PO once a day, Flagyl 500mg PO TID and Doxycycline 100mg PO twice a day with meals for 5 days  Incentive spirometry  Aspiration precautions     --> Regarding vaccines:  Upon discharge, Prevnar 13 (Pneumococcus), Menveo (Meningococcus first dose)  No Haemophilus influenza B vaccines available at the moment, wife will call pharmacy to see if he can get it   He needs second dose of Meningococcus vaccine (Menactra) outpatient in 4 weeks  Pneumovax 23 in 2 months    Influenza vaccine this fall   Follow up ID clinic/Dr Hernandes in 4 weeks    Will sign-off, call us back with any questions    D/w Dr Pan, nursing      Medical Decision Making during this encounter was  [_] Low Complexity  [_] Moderate Complexity  [xx] High Complexity

## 2022-05-05 ENCOUNTER — OFFICE VISIT (OUTPATIENT)
Dept: HEMATOLOGY/ONCOLOGY | Facility: CLINIC | Age: 57
End: 2022-05-05
Payer: COMMERCIAL

## 2022-05-05 ENCOUNTER — TELEPHONE (OUTPATIENT)
Dept: HEMATOLOGY/ONCOLOGY | Facility: CLINIC | Age: 57
End: 2022-05-05
Payer: COMMERCIAL

## 2022-05-05 VITALS
DIASTOLIC BLOOD PRESSURE: 85 MMHG | RESPIRATION RATE: 16 BRPM | TEMPERATURE: 97 F | SYSTOLIC BLOOD PRESSURE: 181 MMHG | OXYGEN SATURATION: 97 % | BODY MASS INDEX: 29.3 KG/M2 | WEIGHT: 228.19 LBS | HEART RATE: 86 BPM

## 2022-05-05 DIAGNOSIS — E11.00 TYPE 2 DIABETES MELLITUS WITH HYPEROSMOLARITY WITHOUT COMA, WITHOUT LONG-TERM CURRENT USE OF INSULIN: ICD-10-CM

## 2022-05-05 DIAGNOSIS — K76.9 LIVER LESION, RIGHT LOBE: ICD-10-CM

## 2022-05-05 DIAGNOSIS — E78.49 OTHER HYPERLIPIDEMIA: ICD-10-CM

## 2022-05-05 DIAGNOSIS — K63.89 COLONIC MASS: ICD-10-CM

## 2022-05-05 DIAGNOSIS — I10 PRIMARY HYPERTENSION: ICD-10-CM

## 2022-05-05 DIAGNOSIS — C18.4 MALIGNANT NEOPLASM OF TRANSVERSE COLON: Primary | ICD-10-CM

## 2022-05-05 LAB
BACTERIA SPEC AEROBE CULT: ABNORMAL

## 2022-05-05 PROCEDURE — 3051F HG A1C>EQUAL 7.0%<8.0%: CPT | Mod: CPTII,S$GLB,, | Performed by: INTERNAL MEDICINE

## 2022-05-05 PROCEDURE — 4010F ACE/ARB THERAPY RXD/TAKEN: CPT | Mod: CPTII,S$GLB,, | Performed by: INTERNAL MEDICINE

## 2022-05-05 PROCEDURE — 3077F SYST BP >= 140 MM HG: CPT | Mod: CPTII,S$GLB,, | Performed by: INTERNAL MEDICINE

## 2022-05-05 PROCEDURE — 3079F PR MOST RECENT DIASTOLIC BLOOD PRESSURE 80-89 MM HG: ICD-10-PCS | Mod: CPTII,S$GLB,, | Performed by: INTERNAL MEDICINE

## 2022-05-05 PROCEDURE — 1160F PR REVIEW ALL MEDS BY PRESCRIBER/CLIN PHARMACIST DOCUMENTED: ICD-10-PCS | Mod: CPTII,S$GLB,, | Performed by: INTERNAL MEDICINE

## 2022-05-05 PROCEDURE — 1159F MED LIST DOCD IN RCRD: CPT | Mod: CPTII,S$GLB,, | Performed by: INTERNAL MEDICINE

## 2022-05-05 PROCEDURE — 1160F RVW MEDS BY RX/DR IN RCRD: CPT | Mod: CPTII,S$GLB,, | Performed by: INTERNAL MEDICINE

## 2022-05-05 PROCEDURE — 1111F PR DISCHARGE MEDS RECONCILED W/ CURRENT OUTPATIENT MED LIST: ICD-10-PCS | Mod: CPTII,S$GLB,, | Performed by: INTERNAL MEDICINE

## 2022-05-05 PROCEDURE — 99205 PR OFFICE/OUTPT VISIT, NEW, LEVL V, 60-74 MIN: ICD-10-PCS | Mod: S$GLB,,, | Performed by: INTERNAL MEDICINE

## 2022-05-05 PROCEDURE — 3008F BODY MASS INDEX DOCD: CPT | Mod: CPTII,S$GLB,, | Performed by: INTERNAL MEDICINE

## 2022-05-05 PROCEDURE — 99999 PR PBB SHADOW E&M-EST. PATIENT-LVL V: CPT | Mod: PBBFAC,,, | Performed by: INTERNAL MEDICINE

## 2022-05-05 PROCEDURE — 1159F PR MEDICATION LIST DOCUMENTED IN MEDICAL RECORD: ICD-10-PCS | Mod: CPTII,S$GLB,, | Performed by: INTERNAL MEDICINE

## 2022-05-05 PROCEDURE — 1111F DSCHRG MED/CURRENT MED MERGE: CPT | Mod: CPTII,S$GLB,, | Performed by: INTERNAL MEDICINE

## 2022-05-05 PROCEDURE — 4010F PR ACE/ARB THEARPY RXD/TAKEN: ICD-10-PCS | Mod: CPTII,S$GLB,, | Performed by: INTERNAL MEDICINE

## 2022-05-05 PROCEDURE — 3051F PR MOST RECENT HEMOGLOBIN A1C LEVEL 7.0 - < 8.0%: ICD-10-PCS | Mod: CPTII,S$GLB,, | Performed by: INTERNAL MEDICINE

## 2022-05-05 PROCEDURE — 3008F PR BODY MASS INDEX (BMI) DOCUMENTED: ICD-10-PCS | Mod: CPTII,S$GLB,, | Performed by: INTERNAL MEDICINE

## 2022-05-05 PROCEDURE — 3077F PR MOST RECENT SYSTOLIC BLOOD PRESSURE >= 140 MM HG: ICD-10-PCS | Mod: CPTII,S$GLB,, | Performed by: INTERNAL MEDICINE

## 2022-05-05 PROCEDURE — 99205 OFFICE O/P NEW HI 60 MIN: CPT | Mod: S$GLB,,, | Performed by: INTERNAL MEDICINE

## 2022-05-05 PROCEDURE — 99999 PR PBB SHADOW E&M-EST. PATIENT-LVL V: ICD-10-PCS | Mod: PBBFAC,,, | Performed by: INTERNAL MEDICINE

## 2022-05-05 PROCEDURE — 3079F DIAST BP 80-89 MM HG: CPT | Mod: CPTII,S$GLB,, | Performed by: INTERNAL MEDICINE

## 2022-05-05 RX ORDER — OXYCODONE HYDROCHLORIDE 30 MG/1
30 TABLET ORAL EVERY 6 HOURS PRN
Qty: 60 TABLET | Refills: 0 | Status: SHIPPED | OUTPATIENT
Start: 2022-05-05 | End: 2022-07-26 | Stop reason: SDUPTHER

## 2022-05-05 NOTE — PROGRESS NOTES
"Service Date:  5/5/22    Chief Complaint: Colon Mass (Hospital f/u)    Osman Li Jr. is a 57 y.o. male malignant neoplasm of the transverse colon.  Patient was recently admitted to the hospital with obstructive symptoms.  A CT scan of the abdomen was done which showed a large obstructive lesion with lymph node involvement.  There was also a 9 mm hypodense liver lesion that could not be characterized.  Patient had a hemicolectomy with ostomy bag placed, and splenectomy.  Preliminary pathology showed poorly differentiated carcinoma, but final path is still pending.  Patient is still having pain from his surgery stating that he takes his oxycodone but then the pain returns 2 hours later.  He has no other complaints to me currently.    Review of Systems   Constitutional: Negative.    HENT: Negative.    Eyes: Negative.    Respiratory: Negative.    Cardiovascular: Negative.    Gastrointestinal: Negative.    Endocrine: Negative.    Genitourinary: Negative.    Musculoskeletal: Negative.    Integumentary:  Negative.   Neurological: Negative.    Hematological: Negative.    Psychiatric/Behavioral: Negative.         Current Outpatient Medications   Medication Instructions    atorvastatin (LIPITOR) 20 mg, Oral, Daily    doxycycline (MONODOX) 100 mg, Oral, Every 12 hours    enalapriL-hydrochlorothiazide (VASERETIC) 10-25 mg per tablet 1 tablet, Oral, Daily    levoFLOXacin (LEVAQUIN) 750 mg, Oral, Daily    metFORMIN (GLUCOPHAGE) 1,000 mg, Oral, 2 times daily with meals    methocarbamoL (ROBAXIN) 500 mg, Oral, 4 times daily PRN    metroNIDAZOLE (FLAGYL) 500 mg, Oral, 3 times daily    oxyCODONE (ROXICODONE) 15 mg, Oral, Every 6 hours PRN    pen needle, diabetic (BD ULTRA-FINE MICRO PEN NEEDLE) 32 gauge x 1/4" Ndle 1 each, Misc.(Non-Drug; Combo Route), Weekly    sildenafiL (VIAGRA) 100 mg, Oral, Daily PRN        Past Medical History:   Diagnosis Date    DM (diabetes mellitus)     Hyperlipidemia     " Hypertension     Prediabetes         Past Surgical History:   Procedure Laterality Date    CHOLECYSTECTOMY  2011    COLONOSCOPY      2018    COLOSTOMY N/A 4/25/2022    Procedure: CREATION, COLOSTOMY;  Surgeon: Carlton Waddell MD;  Location: [facility] OR;  Service: General;  Laterality: N/A;    MOBILIZATION OF SPLENIC FLEXURE N/A 4/25/2022    Procedure: MOBILIZATION, SPLENIC FLEXURE;  Surgeon: Carlton Waddell MD;  Location: [facility] OR;  Service: General;  Laterality: N/A;    SPLENECTOMY N/A 4/25/2022    Procedure: SPLENECTOMY;  Surgeon: Carlton Waddell MD;  Location: [facility] OR;  Service: General;  Laterality: N/A;    SUBTOTAL COLECTOMY N/A 4/25/2022    Procedure: COLECTOMY, PARTIAL;  Surgeon: Carlton Waddell MD;  Location: [facility] OR;  Service: General;  Laterality: N/A;        Family History   Problem Relation Age of Onset    Heart disease Father        Social History     Tobacco Use    Smoking status: Current Every Day Smoker     Packs/day: 1.00     Years: 35.00     Pack years: 35.00    Smokeless tobacco: Never Used   Substance Use Topics    Alcohol use: Not Currently    Drug use: Never         Vitals:    05/05/22 0908   BP: (!) 181/85   Pulse: 86   Resp: 16   Temp: 97.2 °F (36.2 °C)        Physical Exam:  BP (!) 181/85 (BP Location: Right arm, Patient Position: Sitting)   Pulse 86   Temp 97.2 °F (36.2 °C) (Temporal)   Resp 16   Wt 103.5 kg (228 lb 2.8 oz)   SpO2 97%   BMI 29.30 kg/m²     Physical Exam  Vitals and nursing note reviewed.   Constitutional:       Appearance: Normal appearance.   HENT:      Head: Normocephalic and atraumatic.      Nose: Nose normal.      Mouth/Throat:      Mouth: Mucous membranes are moist.      Pharynx: Oropharynx is clear.   Eyes:      Extraocular Movements: Extraocular movements intact.      Conjunctiva/sclera: Conjunctivae normal.   Cardiovascular:      Rate and Rhythm: Normal rate and regular rhythm.      Heart sounds: Normal heart sounds.   Pulmonary:      Effort: Pulmonary  effort is normal.      Breath sounds: Normal breath sounds.   Abdominal:      General: Abdomen is flat. Bowel sounds are normal.      Palpations: Abdomen is soft.      Comments: Ostomy bag in place.   Musculoskeletal:         General: Normal range of motion.      Cervical back: Normal range of motion and neck supple.   Skin:     General: Skin is warm and dry.   Neurological:      General: No focal deficit present.      Mental Status: He is alert and oriented to person, place, and time. Mental status is at baseline.   Psychiatric:         Mood and Affect: Mood normal.          Labs:  Lab Results   Component Value Date    WBC 14.60 (H) 05/02/2022    RBC 4.01 (L) 05/02/2022    HGB 11.3 (L) 05/02/2022    HCT 35.5 (L) 05/02/2022    MCV 89 05/02/2022    MCH 28.2 05/02/2022    MCHC 31.8 (L) 05/02/2022    RDW 13.6 05/02/2022     (H) 05/02/2022    MPV 10.7 05/02/2022    GRAN 10.5 (H) 05/02/2022    GRAN 71.8 05/02/2022    LYMPH 1.7 05/02/2022    LYMPH 11.8 (L) 05/02/2022    MONO 1.6 (H) 05/02/2022    MONO 10.8 05/02/2022    EOS 0.3 05/02/2022    BASO 0.16 05/02/2022    EOSINOPHIL 2.0 05/02/2022    BASOPHIL 1.1 05/02/2022     Sodium   Date Value Ref Range Status   05/02/2022 138 136 - 145 mmol/L Final     Potassium   Date Value Ref Range Status   05/02/2022 4.4 3.5 - 5.1 mmol/L Final     Chloride   Date Value Ref Range Status   05/02/2022 98 95 - 110 mmol/L Final     CO2   Date Value Ref Range Status   05/02/2022 30 (H) 23 - 29 mmol/L Final     Glucose   Date Value Ref Range Status   05/02/2022 223 (H) 70 - 110 mg/dL Final     BUN   Date Value Ref Range Status   05/02/2022 5 (L) 6 - 20 mg/dL Final     Creatinine   Date Value Ref Range Status   05/02/2022 0.8 0.5 - 1.4 mg/dL Final     Calcium   Date Value Ref Range Status   05/02/2022 9.5 8.7 - 10.5 mg/dL Final     Total Protein   Date Value Ref Range Status   05/02/2022 6.4 6.0 - 8.4 g/dL Final     Albumin   Date Value Ref Range Status   05/02/2022 2.3 (L) 3.5 - 5.2  g/dL Final     Total Bilirubin   Date Value Ref Range Status   05/02/2022 0.4 0.1 - 1.0 mg/dL Final     Comment:     For infants and newborns, interpretation of results should be based  on gestational age, weight and in agreement with clinical  observations.    Premature Infant recommended reference ranges:  Up to 24 hours.............<8.0 mg/dL  Up to 48 hours............<12.0 mg/dL  3-5 days..................<15.0 mg/dL  6-29 days.................<15.0 mg/dL       Alkaline Phosphatase   Date Value Ref Range Status   05/02/2022 563 (H) 55 - 135 U/L Final     AST   Date Value Ref Range Status   05/02/2022 72 (H) 10 - 40 U/L Final     ALT   Date Value Ref Range Status   05/02/2022 62 (H) 10 - 44 U/L Final     Anion Gap   Date Value Ref Range Status   05/02/2022 10 8 - 16 mmol/L Final     eGFR if    Date Value Ref Range Status   05/02/2022 >60 >60 mL/min/1.73 m^2 Final     eGFR if non    Date Value Ref Range Status   05/02/2022 >60 >60 mL/min/1.73 m^2 Final     Comment:     Calculation used to obtain the estimated glomerular filtration  rate (eGFR) is the CKD-EPI equation.          A/P:    Malignant neoplasm of the transverse colon  -preliminary pathology report shows poorly differentiated carcinoma with lymph node involvement about 3-4 lymph nodes, but will wait for final pathology  -I am assuming the patient will likely need adjuvant chemotherapy, so I obtained consents for this.  Patient will be treated with FOLFOX.  Unless something changes on his final pathology report, I explained that the plan is curative.  I explained the risks and benefits.  He is agreeable to treatment.  I will ask Dr. Waddell to put a Port-A-Cath.    Liver lesion  -9 mm hypodense lesion on CT abdomen, could not be characterized  -I will obtain a CT of the chest to finalize staging.  -I will plan to treat with adjuvant chemotherapy and re-scanned abdomen afterwards.  When the patient goes in for reversal of the  ostomy, perhaps this lesion could be excised at that time if we are suspicious for malignancy    HTN  - on Enalapril  - follow up with PCP    HLP  - follow up with PCP    DM2  - on Metformin  - follow up with PCP    Tobacco use  - counseled on cessation      Aurash Khoobehi, MD  Hematology and Oncology

## 2022-05-05 NOTE — TELEPHONE ENCOUNTER
Staff msg was sent to pre-service for the auth request of new chemo orders placed by Dr Khoobehi. See copy below. The pt has been scheduled with Dr Waddell for port consult and CT as ordered. Consent was signed today with Dr Khoobehi.            ----- Message from Aurash Khoobehi, MD sent at 5/5/2022  9:44 AM CDT -----  [employer]o school. Chemo auth. Chemo consents. Please message Dr Waddell's office regarding port placement. CT chest.

## 2022-05-05 NOTE — Clinical Note
Chemo school. Chemo auth. Chemo consents. Please message Dr Waddell's office regarding port placement. CT chest.

## 2022-05-05 NOTE — PLAN OF CARE
START ON PATHWAY REGIMEN - Colorectal    [id number]        Oxaliplatin (Eloxatin)       Leucovorin       Fluorouracil       Fluorouracil     **Always confirm dose/schedule in your pharmacy ordering system**    Patient Characteristics:  Postoperative without Neoadjuvant Therapy (Pathologic Staging), Colon, Stage   III, High Risk (pT4 or pN2)  Tumor Location: Colon  Therapeutic Status: Postoperative without Neoadjuvant Therapy (Pathologic   Staging)  AJCC M Category: cM0  AJCC T Category: pT4  AJCC N Category: pN2  AJCC 8 Stage Grouping: IIIC  Intent of Therapy:  Curative Intent, Discussed with Patient

## 2022-05-09 ENCOUNTER — OFFICE VISIT (OUTPATIENT)
Dept: FAMILY MEDICINE | Facility: CLINIC | Age: 57
End: 2022-05-09
Payer: COMMERCIAL

## 2022-05-09 VITALS
HEART RATE: 90 BPM | OXYGEN SATURATION: 97 % | BODY MASS INDEX: 28.44 KG/M2 | SYSTOLIC BLOOD PRESSURE: 114 MMHG | DIASTOLIC BLOOD PRESSURE: 78 MMHG | WEIGHT: 221.63 LBS | HEIGHT: 74 IN

## 2022-05-09 DIAGNOSIS — K76.9 LIVER LESION: ICD-10-CM

## 2022-05-09 DIAGNOSIS — E78.2 MIXED HYPERLIPIDEMIA: ICD-10-CM

## 2022-05-09 DIAGNOSIS — E11.9 TYPE 2 DIABETES MELLITUS WITHOUT COMPLICATION, WITHOUT LONG-TERM CURRENT USE OF INSULIN: ICD-10-CM

## 2022-05-09 DIAGNOSIS — C18.4 MALIGNANT NEOPLASM OF TRANSVERSE COLON: ICD-10-CM

## 2022-05-09 DIAGNOSIS — T81.49XA POSTOPERATIVE CELLULITIS OF SURGICAL WOUND: ICD-10-CM

## 2022-05-09 DIAGNOSIS — I10 ESSENTIAL HYPERTENSION: ICD-10-CM

## 2022-05-09 DIAGNOSIS — Z09 HOSPITAL DISCHARGE FOLLOW-UP: Primary | ICD-10-CM

## 2022-05-09 LAB
BACTERIA SPEC ANAEROBE CULT: NORMAL
HBA1C MFR BLD: 7.1 %

## 2022-05-09 PROCEDURE — 4010F PR ACE/ARB THEARPY RXD/TAKEN: ICD-10-PCS | Mod: CPTII,S$GLB,, | Performed by: PHYSICIAN ASSISTANT

## 2022-05-09 PROCEDURE — 3008F BODY MASS INDEX DOCD: CPT | Mod: CPTII,S$GLB,, | Performed by: PHYSICIAN ASSISTANT

## 2022-05-09 PROCEDURE — 3074F SYST BP LT 130 MM HG: CPT | Mod: CPTII,S$GLB,, | Performed by: PHYSICIAN ASSISTANT

## 2022-05-09 PROCEDURE — 3078F DIAST BP <80 MM HG: CPT | Mod: CPTII,S$GLB,, | Performed by: PHYSICIAN ASSISTANT

## 2022-05-09 PROCEDURE — 3078F PR MOST RECENT DIASTOLIC BLOOD PRESSURE < 80 MM HG: ICD-10-PCS | Mod: CPTII,S$GLB,, | Performed by: PHYSICIAN ASSISTANT

## 2022-05-09 PROCEDURE — 1160F RVW MEDS BY RX/DR IN RCRD: CPT | Mod: CPTII,S$GLB,, | Performed by: PHYSICIAN ASSISTANT

## 2022-05-09 PROCEDURE — 3008F PR BODY MASS INDEX (BMI) DOCUMENTED: ICD-10-PCS | Mod: CPTII,S$GLB,, | Performed by: PHYSICIAN ASSISTANT

## 2022-05-09 PROCEDURE — 83036 HEMOGLOBIN GLYCOSYLATED A1C: CPT | Mod: QW,,, | Performed by: PHYSICIAN ASSISTANT

## 2022-05-09 PROCEDURE — 1160F PR REVIEW ALL MEDS BY PRESCRIBER/CLIN PHARMACIST DOCUMENTED: ICD-10-PCS | Mod: CPTII,S$GLB,, | Performed by: PHYSICIAN ASSISTANT

## 2022-05-09 PROCEDURE — 1159F MED LIST DOCD IN RCRD: CPT | Mod: CPTII,S$GLB,, | Performed by: PHYSICIAN ASSISTANT

## 2022-05-09 PROCEDURE — 99496 TRANSITIONAL CARE MANAGE SERVICE 7 DAY DISCHARGE: ICD-10-PCS | Mod: S$GLB,,, | Performed by: PHYSICIAN ASSISTANT

## 2022-05-09 PROCEDURE — 3051F HG A1C>EQUAL 7.0%<8.0%: CPT | Mod: CPTII,S$GLB,, | Performed by: PHYSICIAN ASSISTANT

## 2022-05-09 PROCEDURE — 4010F ACE/ARB THERAPY RXD/TAKEN: CPT | Mod: CPTII,S$GLB,, | Performed by: PHYSICIAN ASSISTANT

## 2022-05-09 PROCEDURE — 3074F PR MOST RECENT SYSTOLIC BLOOD PRESSURE < 130 MM HG: ICD-10-PCS | Mod: CPTII,S$GLB,, | Performed by: PHYSICIAN ASSISTANT

## 2022-05-09 PROCEDURE — 3051F PR MOST RECENT HEMOGLOBIN A1C LEVEL 7.0 - < 8.0%: ICD-10-PCS | Mod: CPTII,S$GLB,, | Performed by: PHYSICIAN ASSISTANT

## 2022-05-09 PROCEDURE — 99496 TRANSJ CARE MGMT HIGH F2F 7D: CPT | Mod: S$GLB,,, | Performed by: PHYSICIAN ASSISTANT

## 2022-05-09 PROCEDURE — 1159F PR MEDICATION LIST DOCUMENTED IN MEDICAL RECORD: ICD-10-PCS | Mod: CPTII,S$GLB,, | Performed by: PHYSICIAN ASSISTANT

## 2022-05-09 PROCEDURE — 83036 POCT HEMOGLOBIN A1C: ICD-10-PCS | Mod: QW,,, | Performed by: PHYSICIAN ASSISTANT

## 2022-05-09 NOTE — PROGRESS NOTES
Patient ID: Osman Li Jr. is a 57 y.o. male.    Chief Complaint: Hospital Follow Up (No bottles/A1C is 7.2/ c-scope requested 2/18/21-/ eye exam- [facility] eye assoc-Requested//dp)    Admit Date: 4/24/22   Discharge Date: 5/2/22  Discharge Facility: Hospital    Medication Reconciliation:  Medications changed/added/deleted. Levofloxacin, Doxycyline, Flagyl  New Prescriptions filled after discharge: yes  Discharge summary reviewed:  yes  Pending test results at discharge reviewed:   yes  Follow up appointments scheduled:  yes   with General Surgery - Dr. Carlton Waddell   With GI - Dr. Mcrae   With infectious Disease - Dr. Hernandes  Follow up labs/tests ordered:   yes  Home Health ordered on discharge:   no  Home Health company name: N/A  DME ordered at discharge:  no    History of hospital stay:  The patient was admitted with a large bowel obstruction resulting from a colonic mass which also invaded the spleen.  He underwent resection of the colonic mass with colostomy creation in addition to splenectomy.  Course been complicated by the development of jennifer-incisional cellulitis for which he has been treated with vancomycin, cefepime and Flagyl.  He will be discharged on levofloxacin, flagyl doxycycline for 5 additional days to complete antibiotic course and will follow up with Oncology, surgery and Infectious Disease within 2 weeks.        Regarding the splenectomy, Prevnar 13 and meningococcal vaccine series have been initiated.  Patient is to follow with Infectious Disease following discharge.    How patient is feeling since discharge from the hospital?  Since discharge, he reports abdominal soreness secondary to his procedure, but is without any other complaints today. His pain is currently controlled with Oxycodone and Robaxin.  He maintains an adequate appetite, averaging 3 meals/day of a regular diet.  His ostomy bag is intact, with no leakage or cellulitic signs of infection reported.  He  is taking MiraLax daily to help facilitate the passage of stool.  He will be following up with Dr. Waddell (general surgery) later this week to schedule his port cath placement.  At this time, the plan is to complete chemo x 6 months.  Regarding the liver lesion noted, a CT scan is scheduled to be completed this Friday.    Blood sugar at home has been ranging in the 150s.  A1c today: 7.1% and is well controlled on Metformin 1000mg b.i.d.     Patient follow up phone call documented on separate encounter.    Office Visit on 05/09/2022   Component Date Value Ref Range Status    Hemoglobin A1C 05/09/2022 7.1  % Final   No results displayed because visit has over 200 results.      Office Visit on 01/31/2022   Component Date Value Ref Range Status    Hemoglobin A1C 01/31/2022 7.2  % Final       Past Medical History:   Diagnosis Date    DM (diabetes mellitus)     Hyperlipidemia     Hypertension     Prediabetes      Past Surgical History:   Procedure Laterality Date    CHOLECYSTECTOMY  2011    COLONOSCOPY      2018    COLOSTOMY N/A 4/25/2022    Procedure: CREATION, COLOSTOMY;  Surgeon: Carlton Waddell MD;  Location: [facility] OR;  Service: General;  Laterality: N/A;    MOBILIZATION OF SPLENIC FLEXURE N/A 4/25/2022    Procedure: MOBILIZATION, SPLENIC FLEXURE;  Surgeon: Carlton Waddell MD;  Location: [facility] OR;  Service: General;  Laterality: N/A;    SPLENECTOMY N/A 4/25/2022    Procedure: SPLENECTOMY;  Surgeon: Carlton Waddell MD;  Location: [facility] OR;  Service: General;  Laterality: N/A;    SUBTOTAL COLECTOMY N/A 4/25/2022    Procedure: COLECTOMY, PARTIAL;  Surgeon: Carlton Waddell MD;  Location: [facility] OR;  Service: General;  Laterality: N/A;     Family History   Problem Relation Age of Onset    Heart disease Father        Marital Status:   Alcohol History:  reports previous alcohol use.  Tobacco History:  reports that he has been smoking. He has a 35.00 pack-year smoking history. He has never used smokeless  "tobacco.  Drug History:  reports no history of drug use.    Review of patient's allergies indicates:   Allergen Reactions    Penicillins Rash       Current Outpatient Medications:     atorvastatin (LIPITOR) 20 MG tablet, Take 1 tablet (20 mg total) by mouth once daily., Disp: 90 tablet, Rfl: 1    enalapriL-hydrochlorothiazide (VASERETIC) 10-25 mg per tablet, Take 1 tablet by mouth once daily., Disp: 90 tablet, Rfl: 1    metFORMIN (GLUCOPHAGE) 1000 MG tablet, Take 1 tablet (1,000 mg total) by mouth 2 (two) times daily with meals., Disp: 180 tablet, Rfl: 1    oxyCODONE (ROXICODONE) 30 MG Tab, Take 1 tablet (30 mg total) by mouth every 6 (six) hours as needed for Pain., Disp: 60 tablet, Rfl: 0    pen needle, diabetic (BD ULTRA-FINE MICRO PEN NEEDLE) 32 gauge x 1/4" Ndle, 1 each by Misc.(Non-Drug; Combo Route) route once a week., Disp: 50 each, Rfl: 1    sildenafiL (VIAGRA) 100 MG tablet, Take 1 tablet (100 mg total) by mouth daily as needed for Erectile Dysfunction., Disp: 30 tablet, Rfl: 1    levoFLOXacin (LEVAQUIN) 750 MG tablet, Take 1 tablet (750 mg total) by mouth once daily. (Patient not taking: Reported on 5/9/2022), Disp: 5 tablet, Rfl: 0    Review of Systems   Constitutional: Negative for activity change, chills, fatigue and fever.   Respiratory: Negative for cough, shortness of breath and wheezing.    Cardiovascular: Negative for chest pain and palpitations.   Gastrointestinal: Positive for abdominal pain ("Abdominal soreness from the sugery."). Negative for constipation, diarrhea, nausea and vomiting.        "Ostomy in place."   Genitourinary: Negative for difficulty urinating, dysuria and hematuria.   Musculoskeletal: Negative for arthralgias and myalgias.   Skin: Negative for color change and rash.   Neurological: Negative for dizziness, syncope, weakness, light-headedness and headaches.   Psychiatric/Behavioral: Negative for behavioral problems.        Objective:      Vitals:    05/09/22 1146 " "  BP: 114/78   Pulse: 90   SpO2: 97%   Weight: 100.5 kg (221 lb 9.6 oz)   Height: 6' 2" (1.88 m)     Physical Exam  Constitutional:       General: He is not in acute distress.     Appearance: Normal appearance. He is normal weight. He is not ill-appearing, toxic-appearing or diaphoretic.   HENT:      Head: Normocephalic and atraumatic.      Right Ear: External ear normal.      Left Ear: External ear normal.      Nose: Nose normal. No rhinorrhea.      Mouth/Throat:      Mouth: Mucous membranes are moist.      Pharynx: Oropharynx is clear.   Eyes:      General: No scleral icterus.        Right eye: No discharge.         Left eye: No discharge.      Extraocular Movements: Extraocular movements intact.      Conjunctiva/sclera: Conjunctivae normal.      Pupils: Pupils are equal, round, and reactive to light.   Cardiovascular:      Rate and Rhythm: Normal rate and regular rhythm.      Pulses: Normal pulses.      Heart sounds: No murmur heard.  Pulmonary:      Effort: Pulmonary effort is normal. No respiratory distress.      Breath sounds: Normal breath sounds. No wheezing.   Abdominal:      General: Bowel sounds are normal. There is no distension.      Tenderness: There is no abdominal tenderness.      Hernia: No hernia is present.          Comments: Active bowel sounds noted on auscultation in all 4 quadrants and epigastrium.   Musculoskeletal:      Cervical back: Normal range of motion and neck supple.      Right lower leg: No edema.      Left lower leg: No edema.   Skin:     General: Skin is warm.      Coloration: Skin is not jaundiced or pale.      Findings: No erythema or rash.   Neurological:      Mental Status: He is alert and oriented to person, place, and time.      Gait: Gait normal.   Psychiatric:         Mood and Affect: Mood normal.         Behavior: Behavior normal.         Assessment:       1. Hospital discharge follow-up    2. Malignant neoplasm of transverse colon    3. Postoperative cellulitis of " surgical wound    4. Type 2 diabetes mellitus without complication, without long-term current use of insulin    5. Essential hypertension    6. Mixed hyperlipidemia    7. Liver lesion         Plan:       Hospital discharge follow-up  Comments:  Discharge summary and pending test results reviewed.  Lab work ordered and to be completed.     Malignant neoplasm of transverse colon  Comments:  S/p resection.  Recently followed up with Dr. Khoobehi and is planning to undergo Chemo x 6 months.    Continue to f/u with Dr. Waddell, Dr. Khoobehi, and Dr. Hernandes     Postoperative cellulitis of surgical wound  Comments:  Symptoms have resoved - patient successfully completed a course of levofloxavin, doxycycline, and Flagyl.  Continue to f/u with Dr. Hernandes as scheduled.     Liver Lesion  9mm hypodense liver lesion - smoking history of 1.25 ppd for 40 years.   CT Chest w/ Contrast ordered and scheduled to be completed on Friday - results pending.   Smoking cessation discussed with patient today in office.     Type 2 diabetes mellitus without complication, without long-term current use of insulin  Comments:  A1c today: 7.1%  Foot exam performed in office today.   Stable and well controlled - continue as is.   No refills requested today.   Eye exam ordered and is pending.   Orders:  -     Urinalysis, Reflex to Urine Culture Urine, Clean Catch; Future; Expected date: 05/09/2022  -     Microalbumin/Creatinine Ratio, Urine; Future; Expected date: 05/09/2022  -     Hemoglobin A1C, POCT    Essential hypertension  Comments:  BP is stable and well controlled.   Continue as is - no refills requested today.   Orders:  -     TSH w/reflex to FT4; Future; Expected date: 05/09/2022    Mixed hyperlipidemia  Comments:  Will obtain updated Lipid Panel.   Currently on Lipitor 20mg qd - continue as is.   Orders:  -     Lipid Panel; Future; Expected date: 05/09/2022      Follow up in about 3 months (around 8/9/2022) for Diabetic Check-Up.

## 2022-05-10 ENCOUNTER — TELEPHONE (OUTPATIENT)
Dept: FAMILY MEDICINE | Facility: CLINIC | Age: 57
End: 2022-05-10

## 2022-05-10 ENCOUNTER — CLINICAL SUPPORT (OUTPATIENT)
Dept: HEMATOLOGY/ONCOLOGY | Facility: CLINIC | Age: 57
End: 2022-05-10
Payer: COMMERCIAL

## 2022-05-10 DIAGNOSIS — K63.89 COLONIC MASS: ICD-10-CM

## 2022-05-10 PROCEDURE — 99999 PR PBB SHADOW E&M-EST. PATIENT-LVL II: CPT | Mod: PBBFAC,,,

## 2022-05-10 PROCEDURE — 99999 PR PBB SHADOW E&M-EST. PATIENT-LVL II: ICD-10-PCS | Mod: PBBFAC,,,

## 2022-05-11 ENCOUNTER — OFFICE VISIT (OUTPATIENT)
Dept: GASTROENTEROLOGY | Facility: CLINIC | Age: 57
End: 2022-05-11
Payer: COMMERCIAL

## 2022-05-11 ENCOUNTER — TELEPHONE (OUTPATIENT)
Dept: MEDSURG UNIT | Facility: HOSPITAL | Age: 57
End: 2022-05-11
Payer: COMMERCIAL

## 2022-05-11 VITALS
HEIGHT: 74 IN | HEART RATE: 93 BPM | BODY MASS INDEX: 27.42 KG/M2 | SYSTOLIC BLOOD PRESSURE: 137 MMHG | WEIGHT: 213.63 LBS | RESPIRATION RATE: 16 BRPM | DIASTOLIC BLOOD PRESSURE: 85 MMHG

## 2022-05-11 DIAGNOSIS — C18.4 MALIGNANT NEOPLASM OF TRANSVERSE COLON: ICD-10-CM

## 2022-05-11 DIAGNOSIS — R10.9 ABDOMINAL PAIN, UNSPECIFIED ABDOMINAL LOCATION: Primary | ICD-10-CM

## 2022-05-11 DIAGNOSIS — K56.609 LARGE BOWEL OBSTRUCTION: ICD-10-CM

## 2022-05-11 PROCEDURE — 4010F PR ACE/ARB THEARPY RXD/TAKEN: ICD-10-PCS | Mod: CPTII,S$GLB,, | Performed by: INTERNAL MEDICINE

## 2022-05-11 PROCEDURE — 1159F MED LIST DOCD IN RCRD: CPT | Mod: CPTII,S$GLB,, | Performed by: INTERNAL MEDICINE

## 2022-05-11 PROCEDURE — 99999 PR PBB SHADOW E&M-EST. PATIENT-LVL III: CPT | Mod: PBBFAC,,, | Performed by: INTERNAL MEDICINE

## 2022-05-11 PROCEDURE — 3008F PR BODY MASS INDEX (BMI) DOCUMENTED: ICD-10-PCS | Mod: CPTII,S$GLB,, | Performed by: INTERNAL MEDICINE

## 2022-05-11 PROCEDURE — 99999 PR PBB SHADOW E&M-EST. PATIENT-LVL III: ICD-10-PCS | Mod: PBBFAC,,, | Performed by: INTERNAL MEDICINE

## 2022-05-11 PROCEDURE — 3051F HG A1C>EQUAL 7.0%<8.0%: CPT | Mod: CPTII,S$GLB,, | Performed by: INTERNAL MEDICINE

## 2022-05-11 PROCEDURE — 3079F PR MOST RECENT DIASTOLIC BLOOD PRESSURE 80-89 MM HG: ICD-10-PCS | Mod: CPTII,S$GLB,, | Performed by: INTERNAL MEDICINE

## 2022-05-11 PROCEDURE — 99214 PR OFFICE/OUTPT VISIT, EST, LEVL IV, 30-39 MIN: ICD-10-PCS | Mod: S$GLB,,, | Performed by: INTERNAL MEDICINE

## 2022-05-11 PROCEDURE — 1111F DSCHRG MED/CURRENT MED MERGE: CPT | Mod: CPTII,S$GLB,, | Performed by: INTERNAL MEDICINE

## 2022-05-11 PROCEDURE — 3075F PR MOST RECENT SYSTOLIC BLOOD PRESS GE 130-139MM HG: ICD-10-PCS | Mod: CPTII,S$GLB,, | Performed by: INTERNAL MEDICINE

## 2022-05-11 PROCEDURE — 1111F PR DISCHARGE MEDS RECONCILED W/ CURRENT OUTPATIENT MED LIST: ICD-10-PCS | Mod: CPTII,S$GLB,, | Performed by: INTERNAL MEDICINE

## 2022-05-11 PROCEDURE — 3075F SYST BP GE 130 - 139MM HG: CPT | Mod: CPTII,S$GLB,, | Performed by: INTERNAL MEDICINE

## 2022-05-11 PROCEDURE — 3008F BODY MASS INDEX DOCD: CPT | Mod: CPTII,S$GLB,, | Performed by: INTERNAL MEDICINE

## 2022-05-11 PROCEDURE — 3079F DIAST BP 80-89 MM HG: CPT | Mod: CPTII,S$GLB,, | Performed by: INTERNAL MEDICINE

## 2022-05-11 PROCEDURE — 1159F PR MEDICATION LIST DOCUMENTED IN MEDICAL RECORD: ICD-10-PCS | Mod: CPTII,S$GLB,, | Performed by: INTERNAL MEDICINE

## 2022-05-11 PROCEDURE — 4010F ACE/ARB THERAPY RXD/TAKEN: CPT | Mod: CPTII,S$GLB,, | Performed by: INTERNAL MEDICINE

## 2022-05-11 PROCEDURE — 99214 OFFICE O/P EST MOD 30 MIN: CPT | Mod: S$GLB,,, | Performed by: INTERNAL MEDICINE

## 2022-05-11 PROCEDURE — 3051F PR MOST RECENT HEMOGLOBIN A1C LEVEL 7.0 - < 8.0%: ICD-10-PCS | Mod: CPTII,S$GLB,, | Performed by: INTERNAL MEDICINE

## 2022-05-11 RX ORDER — ONDANSETRON 4 MG/1
8 TABLET, ORALLY DISINTEGRATING ORAL EVERY 6 HOURS PRN
Qty: 60 TABLET | Refills: 3 | Status: SHIPPED | OUTPATIENT
Start: 2022-05-11 | End: 2022-06-10

## 2022-05-11 NOTE — PROGRESS NOTES
"Subjective:       Patient ID: Osman Li Jr. is a 57 y.o. male.    This is an established patient.      Chief Complaint: Abdominal Pain (Hosp f/u)    Patient seen for follow up from hospital admission.  Pain improved.  Complaining of N/V and constipation.  Patient has poorly differentiated colon cancer as well as palpable abnormality of pancreas which Dr. Waddell noted intraoperatively.  Plan is follow up with Dr. Waddell tomorrow.  See below for details of discussion.  [place]  Discussed possibility of colonoscopy as well as upper EUS.  Rx given for zofran.  Recommendation made to use narcotics sparingly as they may affect GI motility.    Patient had large splenic flexure mass removed by Dr. Waddell.  Positive nodes noted as well.      Review of Systems   Constitutional: Negative for chills and fatigue.   HENT: Negative for trouble swallowing.    Respiratory: Negative for cough, shortness of breath and wheezing.    Cardiovascular: Negative for chest pain and palpitations.   Integumentary:  Negative for color change and rash.   Neurological: Negative for dizziness, weakness and numbness.   Psychiatric/Behavioral: Negative for confusion. The patient is not nervous/anxious.    All other systems reviewed and are negative.        Objective:       Vitals:    05/11/22 1436   BP: 137/85   BP Location: Right arm   Patient Position: Sitting   BP Method: Large (Automatic)   Pulse: 93   Resp: 16   Weight: 96.9 kg (213 lb 10 oz)   Height: 6' 2" (1.88 m)       Physical Exam  Constitutional:       Appearance: He is well-developed.   HENT:      Head: Normocephalic and atraumatic.   Eyes:      General: No scleral icterus.     Pupils: Pupils are equal, round, and reactive to light.   Neck:      Thyroid: No thyromegaly.   Cardiovascular:      Rate and Rhythm: Normal rate and regular rhythm.      Heart sounds: No murmur heard.  Pulmonary:      Effort: Pulmonary effort is normal.      " Breath sounds: Normal breath sounds. No wheezing.   Abdominal:      General: Bowel sounds are normal. There is no distension.      Palpations: Abdomen is soft.      Tenderness: There is no abdominal tenderness.      Comments: + healing midline incision and healthy appearing left sided colostomy     Musculoskeletal:      Cervical back: Normal range of motion and neck supple.   Lymphadenopathy:      Cervical: No cervical adenopathy.   Skin:     General: Skin is warm and dry.      Findings: No erythema or rash.   Neurological:      Mental Status: He is alert and oriented to person, place, and time.   Psychiatric:         Behavior: Behavior normal.           CMP  Sodium   Date Value Ref Range Status   05/02/2022 138 136 - 145 mmol/L Final     Potassium   Date Value Ref Range Status   05/02/2022 4.4 3.5 - 5.1 mmol/L Final     Chloride   Date Value Ref Range Status   05/02/2022 98 95 - 110 mmol/L Final     CO2   Date Value Ref Range Status   05/02/2022 30 (H) 23 - 29 mmol/L Final     Glucose   Date Value Ref Range Status   05/02/2022 223 (H) 70 - 110 mg/dL Final     BUN   Date Value Ref Range Status   05/02/2022 5 (L) 6 - 20 mg/dL Final     Creatinine   Date Value Ref Range Status   05/02/2022 0.8 0.5 - 1.4 mg/dL Final     Calcium   Date Value Ref Range Status   05/02/2022 9.5 8.7 - 10.5 mg/dL Final     Total Protein   Date Value Ref Range Status   05/02/2022 6.4 6.0 - 8.4 g/dL Final     Albumin   Date Value Ref Range Status   05/02/2022 2.3 (L) 3.5 - 5.2 g/dL Final     Total Bilirubin   Date Value Ref Range Status   05/02/2022 0.4 0.1 - 1.0 mg/dL Final     Comment:     For infants and newborns, interpretation of results should be based  on gestational age, weight and in agreement with clinical  observations.    Premature Infant recommended reference ranges:  Up to 24 hours.............<8.0 mg/dL  Up to 48 hours............<12.0 mg/dL  3-5 days..................<15.0 mg/dL  6-29 days.................<15.0 mg/dL        Alkaline Phosphatase   Date Value Ref Range Status   05/02/2022 563 (H) 55 - 135 U/L Final     AST   Date Value Ref Range Status   05/02/2022 72 (H) 10 - 40 U/L Final     ALT   Date Value Ref Range Status   05/02/2022 62 (H) 10 - 44 U/L Final     Anion Gap   Date Value Ref Range Status   05/02/2022 10 8 - 16 mmol/L Final     eGFR if    Date Value Ref Range Status   05/02/2022 >60 >60 mL/min/1.73 m^2 Final     eGFR if non    Date Value Ref Range Status   05/02/2022 >60 >60 mL/min/1.73 m^2 Final     Comment:     Calculation used to obtain the estimated glomerular filtration  rate (eGFR) is the CKD-EPI equation.          Lab Results   Component Value Date    WBC 14.60 (H) 05/02/2022    HGB 11.3 (L) 05/02/2022    HCT 35.5 (L) 05/02/2022    MCV 89 05/02/2022     (H) 05/02/2022         Case discussed with patient's wife and he has follow up with Dr. Waddell tomorrow.      Assessment:       Problem List Items Addressed This Visit     Abdominal pain - Primary    Large bowel obstruction    Malignant neoplasm of transverse colon          Plan:       1.  Continue PPI  2.  Rx for zofran  3.  Keep follow up with Dr. Waddell.  Will discuss with Dr. Waddell utility of colonoscopy for examination of remainder of colon and/or EUS to evaluate pancreas.  4.  Keep follow up with oncology  5.  Further recommendations to follow after above.    ADDENDUM:  Per Dr. Waddell, recommendation would be for colonoscopy soon to clear the remainder of the colon.  Plan is for port placement next week.  As far as the pancreas goes, will hold off on EUS for now as Dr. Waddell plans on reimaging to further characterize.  Depending on appearance of liver lesion and/or appearance of pancreas on repeat imaging, can consider referral to surg onc at [facility] +/- EUS.

## 2022-05-11 NOTE — PHYSICIAN QUERY
PT Name: Osman Li Jr.  MR #: [id number]    DOCUMENTATION CLARIFICATION     CDS/: Lara Maxwell RN, CDS               Contact information: [email or web address]    This form is a permanent document in the medical record.     Query Date: May 11, 2022    By submitting this query, we are merely seeking further clarification of documentation.  Please utilize your independent clinical judgment when addressing the question(s) below.    The medical record contains the following:  Pathology Findings Location in Medical Record   Final Pathologic Diagnosis   2. Colon, partial colectomy:   - Invasive colonic adenocarcinoma, poorly differentiated (G3)     - Invades into pericolorectal tissue (pT3)   - Seventeen of twenty-seven lymph nodes, positive for metastatic carcinoma   (17/27, pN2b)     CAP Synoptic Checklist for Colonic Neoplasms:     - Tumor Site: Splenic flexure     - Histologic Type: Adenocarcinoma     - Histologic Grade: G3, poorly differentiated     - Tumor Size: 5.0 x 4.5 x 2.7 cm     - Tumor Extent: Invades through muscularis propria into pericolorectal tissue     - Regional Lymph Nodes:       - Number of Lymph Nodes with Tumor: 17       - Number of Lymph Nodes Examined: 27     - Pathologic Stage Classification: pT3 pN2b             Please clarify the pathology findings of splenic flexure Invasive colonic adenocarcinoma:  [  X] Pathology findings noted above are ruled in/confirmed as diagnoses   [  ] Pathology findings noted above are not confirmed as diagnoses   [  ] Other diagnosis (please specify): ___________   [  ] Clinically Undetermined     Please clarify the pathology findings of Seventeen of twenty-seven lymph nodes, positive for metastatic carcinoma:  [X ] Pathology findings noted above are ruled in/confirmed as diagnoses   [  ] Pathology findings noted above are not confirmed as diagnoses   [  ] Other diagnosis (please specify): ___________   [  ] Clinically Undetermined          Please document in your progress notes daily for the duration of treatment until resolved and include in your discharge summary.    Form No. [id number]

## 2022-05-12 ENCOUNTER — TELEPHONE (OUTPATIENT)
Dept: GASTROENTEROLOGY | Facility: CLINIC | Age: 57
End: 2022-05-12
Payer: COMMERCIAL

## 2022-05-12 ENCOUNTER — OFFICE VISIT (OUTPATIENT)
Dept: SURGERY | Facility: CLINIC | Age: 57
End: 2022-05-12
Payer: COMMERCIAL

## 2022-05-12 VITALS — TEMPERATURE: 97 F | SYSTOLIC BLOOD PRESSURE: 155 MMHG | HEART RATE: 99 BPM | DIASTOLIC BLOOD PRESSURE: 79 MMHG

## 2022-05-12 DIAGNOSIS — C18.5 MALIGNANT NEOPLASM OF SPLENIC FLEXURE: Primary | ICD-10-CM

## 2022-05-12 PROCEDURE — 3077F PR MOST RECENT SYSTOLIC BLOOD PRESSURE >= 140 MM HG: ICD-10-PCS | Mod: CPTII,S$GLB,, | Performed by: STUDENT IN AN ORGANIZED HEALTH CARE EDUCATION/TRAINING PROGRAM

## 2022-05-12 PROCEDURE — 99024 PR POST-OP FOLLOW-UP VISIT: ICD-10-PCS | Mod: S$GLB,,, | Performed by: STUDENT IN AN ORGANIZED HEALTH CARE EDUCATION/TRAINING PROGRAM

## 2022-05-12 PROCEDURE — 99024 POSTOP FOLLOW-UP VISIT: CPT | Mod: S$GLB,,, | Performed by: STUDENT IN AN ORGANIZED HEALTH CARE EDUCATION/TRAINING PROGRAM

## 2022-05-12 PROCEDURE — 4010F PR ACE/ARB THEARPY RXD/TAKEN: ICD-10-PCS | Mod: CPTII,S$GLB,, | Performed by: STUDENT IN AN ORGANIZED HEALTH CARE EDUCATION/TRAINING PROGRAM

## 2022-05-12 PROCEDURE — 3078F PR MOST RECENT DIASTOLIC BLOOD PRESSURE < 80 MM HG: ICD-10-PCS | Mod: CPTII,S$GLB,, | Performed by: STUDENT IN AN ORGANIZED HEALTH CARE EDUCATION/TRAINING PROGRAM

## 2022-05-12 PROCEDURE — 4010F ACE/ARB THERAPY RXD/TAKEN: CPT | Mod: CPTII,S$GLB,, | Performed by: STUDENT IN AN ORGANIZED HEALTH CARE EDUCATION/TRAINING PROGRAM

## 2022-05-12 PROCEDURE — 3051F HG A1C>EQUAL 7.0%<8.0%: CPT | Mod: CPTII,S$GLB,, | Performed by: STUDENT IN AN ORGANIZED HEALTH CARE EDUCATION/TRAINING PROGRAM

## 2022-05-12 PROCEDURE — 3077F SYST BP >= 140 MM HG: CPT | Mod: CPTII,S$GLB,, | Performed by: STUDENT IN AN ORGANIZED HEALTH CARE EDUCATION/TRAINING PROGRAM

## 2022-05-12 PROCEDURE — 3078F DIAST BP <80 MM HG: CPT | Mod: CPTII,S$GLB,, | Performed by: STUDENT IN AN ORGANIZED HEALTH CARE EDUCATION/TRAINING PROGRAM

## 2022-05-12 PROCEDURE — 3051F PR MOST RECENT HEMOGLOBIN A1C LEVEL 7.0 - < 8.0%: ICD-10-PCS | Mod: CPTII,S$GLB,, | Performed by: STUDENT IN AN ORGANIZED HEALTH CARE EDUCATION/TRAINING PROGRAM

## 2022-05-12 RX ORDER — PROMETHAZINE HYDROCHLORIDE 12.5 MG/1
12.5 TABLET ORAL EVERY 4 HOURS PRN
Qty: 25 TABLET | Refills: 1 | Status: SHIPPED | OUTPATIENT
Start: 2022-05-12 | End: 2022-12-08

## 2022-05-12 RX ORDER — SODIUM CHLORIDE 9 MG/ML
INJECTION, SOLUTION INTRAVENOUS CONTINUOUS
Status: CANCELLED | OUTPATIENT
Start: 2022-05-18

## 2022-05-12 NOTE — H&P (VIEW-ONLY)
Postop note    This is a 57-year-old male who presented with an obstructing colon lesion at the splenic flexure.  He underwent laparotomy with resection of his sigmoid colon and end transverse colostomy given significant size mismatch.  Since surgery, he has been recovering slowly.  He has been dealing with persistent nausea and vomiting with meals for the past couple of days.  Denies distension.  Ostomy is still functioning.    Incision is healing well  Ostomy is viable with stool in the appliance  No abdominal distension    Pathology:  1. Spleen, splenectomy:   - Benign splenic parenchyma   - Negative for malignancy   2. Colon, partial colectomy:   - Invasive colonic adenocarcinoma, poorly differentiated (G3)     - Invades into pericolorectal tissue (pT3)   - Margins uninvolved by invasive carcinoma     - 0.1 cm to the mesenteric margin   - Lymphovascular invasion identified   - No perineural invasion identified   - Seventeen of twenty-seven lymph nodes, positive for metastatic carcinoma   (17/27, pN2b)   - Fibrous serosal adhesions   - See below for results of MSI testing   - CARLOS ENRIQUE/MILLIE Targeted Gene Panel has been ordered and results will be issued in   a supplemental report     CAP Synoptic Checklist for Colonic Neoplasms:     - Procedure: Partial colectomy     - Tumor Site: Splenic flexure     - Histologic Type: Adenocarcinoma     - Histologic Grade: G3, poorly differentiated     - Tumor Size: 5.0 x 4.5 x 2.7 cm     - Tumor Extent: Invades through muscularis propria into pericolorectal   tissue     - Macroscopic Tumor Perforation: Not identified     - Lymphovascular Invasion: Present, small vessel involved     - Perineural Invasion: Not identified     - Number of Tumor Buds: 5 in one hotspot field     - Tumor Bud Score: Intermediate (5-9)     - Type of Polyp in which Invasive Carcinoma Arose: None identified     - Treatment Effect: No known presurgical therapy     - Margins: All margins negative for invasive  carcinoma       - Closest Margin to Invasive Carcinoma: 0.1 cm to mesenteric margin     - Regional Lymph Nodes:       - Number of Lymph Nodes with Tumor: 17       - Number of Lymph Nodes Examined: 27       - Tumor Deposits: Not identified     - Distant Metastasis: Not applicable     - Pathologic Stage Classification: pT3 pN2b     - Additional Findings: Fibrous serosal adhesions; benign spleen     - Special Studies: See below for results of MSI testing; CARLOS ENRIQUE/MILLIE panel has         been ordered and results will be issued in a supplemental report     - Designate Block for Future Studies: [id number]   Immunohistochemistry (IHC) Testing for Mismatch Repair (MMR) Proteins:   MLH1 - Intact nuclear expression   MSH2 - Intact nuclear expression   MSH6 - Intact nuclear expression   PMS2 - Intact nuclear expression   Background nonneoplastic tissue/internal control with intact nuclear   expression   IHC Interpretation   No loss of nuclear expression of MMR proteins: low probability of   microsatellite instability   There are exceptions to the above IHC interpretations. These results should   not be considered in isolation, and clinical correlation with genetic   counseling is recommended to assess the need for germline testing.       57-year-old male who underwent colon resection for an obstructing mass.  Pathologically he is a pT3N2b.  On initial imaging, there was a concern about a hypodense, subcentimeter lesion in segment 6 of the liver.  Grossly, the pancreatic bed was abnormal during surgery.  He is planning on undergoing FOLFOX adjuvant chemo.    Tentative port placement was planned for the 18th.  Recommended additional cross-sectional imaging with CT scan of the chest abdomen and pelvis with a triple phase liver protocol to further evaluate this hypodense lesion in the liver.  CEA levels were also sent.  Patient may ultimately require a PET-CT scan prior to starting chemo.  Will discuss at tumor board

## 2022-05-12 NOTE — PATIENT INSTRUCTIONS
Your procedure has been scheduled at:    [facility]pre-admit nurse  [phone]  [facility].........................................................pre_admit  [phone]    If you have had heart surgery, atrial fibrillation and are on blood thinners from your cardiologist you will need cardiac clearance prior to surgery!!!      DAY   Wednesday    DATE   May 18, 2022         Someone from the hospital will call you the evening before surgery to let you know what time you need to be at the hospital for surgery.                                               1:  DO NOT EAT OR DRINK ANYTHING AFTER MIDNIGHT THE NIGHT BEFORE SURGERY.     2:  You will need to stop any blood thinners 1 week prior to surgery.  This includes Aspirin, fish oil, Pradaxa, Coumadin, Plavix, Pletal.  Please contact the prescribing doctor to be sure it is ok to stop these medicines.    3:  Pre-admit nurse will go over your medicines and let you know which ones not to take the morning of surgery    4:  Plan to have someone drive you home after you are released from the hospital.  You WILL NOT be able to drive yourself.    5:  If you have any questions or need to change your surgery date, please call Arslan Kiran or Curtis  at [phone]    AFTER SURGERY:    You can shower 48 hours after surgery, REMOVE WET BANDAGES AND BANDAIDS, leave the steri- strips on.  If you have not had a bowel movement within 3 days after surgery you may take a laxative of your choice.  Do not lift anything over 5-10 pounds.    You need to have a follow up appointment 7-14 days after surgery, call the office to schedule or if you have questions or concerns.

## 2022-05-12 NOTE — NURSING
I met with Mr. Li and his wife today for chemotherapy education. Mr Li will be treated with FOLFOX.  We discussed the mechanism of action, potential side effects of this treatment. Some of these side effects include but or not limited to fever, nausea, vomiting, decreased appetite, fatigue, weakness, cytopenias, myalgia/arthralgia, constipation, diarrhea, bleeding, headache, shortness of breath, nail changes, taste change, hair thinning/loss. Catglobe education was verbalized with patient regarding each chemotherapy drug.  Patient notified to call anytime  advised their is a physician on call for any problems that may arise or to report to [facility] or the nearest ER for any emergencies. The patient verbalized understanding to all instructions given     Treatment Summary   Plan Name: OP mFOLFOX 6 Q2W  Treatment Goal: Curative  Status: Active  Start Date: 2022 (Planned)  End Date: 10/8/2022 (Planned)  Provider: Aurash Khoobehi, MD  Chemotherapy: dexAMETHasone (DECADRON) 4 MG Tab, 8 mg, Oral, Daily, 0 of 1 cycle, Start date: --, End date: --    fluorouraciL injection 930 mg, 400 mg/m2, Intravenous, Clinic/HOD 1 time, 0 of 12 cycles    leucovorin calcium 400 mg/m2 = 930 mg in dextrose 5 % 250 mL infusion, 400 mg/m2, Intravenous, Clinic/HOD 1 time, 0 of 12 cycles    oxaliplatin (ELOXATIN) 85 mg/m2 = 197 mg in dextrose 5 % 500 mL chemo infusion, 85 mg/m2, Intravenous, Clinic/HOD 1 time, 0 of 12 cycles      Oncology Navigation   Intake  Date Worked: 5/10/2022     Treatment  Current Status: Active       Medical Oncologist: Dr. Aurash Khoobehi  Chemotherapy: Planned  Chemotherapy Regimen: FOLFOX       Procedures: Port / PICC  Port / PICC Schedule Date: 2022 (Consult with Dr. Waddell)             Support Systems: Spouse/significant other     Acuity  Treatment Tolerability: Has not started treatment yet/treatment fully completed and side effects resolved  ECO-1 (+0)  Comorbidities in  Medical History: 6+ (+2)  Support: Patient reports adequate support system  Verbalizes Financial Concerns: No  Transportation: Adequate transportation for treatment  Verbalizes the need for more education: No  Navigation Acuity: 2     Follow Up  No follow-ups on file.

## 2022-05-12 NOTE — PROGRESS NOTES
Postop note    This is a 57-year-old male who presented with an obstructing colon lesion at the splenic flexure.  He underwent laparotomy with resection of his sigmoid colon and end transverse colostomy given significant size mismatch.  Since surgery, he has been recovering slowly.  He has been dealing with persistent nausea and vomiting with meals for the past couple of days.  Denies distension.  Ostomy is still functioning.    Incision is healing well  Ostomy is viable with stool in the appliance  No abdominal distension    Pathology:  1. Spleen, splenectomy:   - Benign splenic parenchyma   - Negative for malignancy   2. Colon, partial colectomy:   - Invasive colonic adenocarcinoma, poorly differentiated (G3)     - Invades into pericolorectal tissue (pT3)   - Margins uninvolved by invasive carcinoma     - 0.1 cm to the mesenteric margin   - Lymphovascular invasion identified   - No perineural invasion identified   - Seventeen of twenty-seven lymph nodes, positive for metastatic carcinoma   (17/27, pN2b)   - Fibrous serosal adhesions   - See below for results of MSI testing   - CARLOS ENRIQUE/MILLIE Targeted Gene Panel has been ordered and results will be issued in   a supplemental report     CAP Synoptic Checklist for Colonic Neoplasms:     - Procedure: Partial colectomy     - Tumor Site: Splenic flexure     - Histologic Type: Adenocarcinoma     - Histologic Grade: G3, poorly differentiated     - Tumor Size: 5.0 x 4.5 x 2.7 cm     - Tumor Extent: Invades through muscularis propria into pericolorectal   tissue     - Macroscopic Tumor Perforation: Not identified     - Lymphovascular Invasion: Present, small vessel involved     - Perineural Invasion: Not identified     - Number of Tumor Buds: 5 in one hotspot field     - Tumor Bud Score: Intermediate (5-9)     - Type of Polyp in which Invasive Carcinoma Arose: None identified     - Treatment Effect: No known presurgical therapy     - Margins: All margins negative for invasive  carcinoma       - Closest Margin to Invasive Carcinoma: 0.1 cm to mesenteric margin     - Regional Lymph Nodes:       - Number of Lymph Nodes with Tumor: 17       - Number of Lymph Nodes Examined: 27       - Tumor Deposits: Not identified     - Distant Metastasis: Not applicable     - Pathologic Stage Classification: pT3 pN2b     - Additional Findings: Fibrous serosal adhesions; benign spleen     - Special Studies: See below for results of MSI testing; CARLOS ENRIQUE/MILLIE panel has         been ordered and results will be issued in a supplemental report     - Designate Block for Future Studies: [id number]   Immunohistochemistry (IHC) Testing for Mismatch Repair (MMR) Proteins:   MLH1 - Intact nuclear expression   MSH2 - Intact nuclear expression   MSH6 - Intact nuclear expression   PMS2 - Intact nuclear expression   Background nonneoplastic tissue/internal control with intact nuclear   expression   IHC Interpretation   No loss of nuclear expression of MMR proteins: low probability of   microsatellite instability   There are exceptions to the above IHC interpretations. These results should   not be considered in isolation, and clinical correlation with genetic   counseling is recommended to assess the need for germline testing.       57-year-old male who underwent colon resection for an obstructing mass.  Pathologically he is a pT3N2b.  On initial imaging, there was a concern about a hypodense, subcentimeter lesion in segment 6 of the liver.  Grossly, the pancreatic bed was abnormal during surgery.  He is planning on undergoing FOLFOX adjuvant chemo.    Tentative port placement was planned for the 18th.  Recommended additional cross-sectional imaging with CT scan of the chest abdomen and pelvis with a triple phase liver protocol to further evaluate this hypodense lesion in the liver.  CEA levels were also sent.  Patient may ultimately require a PET-CT scan prior to starting chemo.  Will discuss at tumor board

## 2022-05-12 NOTE — TELEPHONE ENCOUNTER
----- Message from Pete Estrada MD sent at 5/11/2022  5:00 PM CDT -----  Please advise patient that Dr. Waddell would like colonoscopy in the next couple of weeks to clear the remainder of the colon.  Will hold off on EUS for now.  Please schedule colonoscopy.

## 2022-05-13 ENCOUNTER — PATIENT MESSAGE (OUTPATIENT)
Dept: SURGERY | Facility: HOSPITAL | Age: 57
End: 2022-05-13
Payer: COMMERCIAL

## 2022-05-13 ENCOUNTER — HOSPITAL ENCOUNTER (OUTPATIENT)
Dept: RADIOLOGY | Facility: HOSPITAL | Age: 57
Discharge: HOME | End: 2022-05-13
Attending: INTERNAL MEDICINE
Payer: COMMERCIAL

## 2022-05-13 DIAGNOSIS — C18.5 MALIGNANT NEOPLASM OF SPLENIC FLEXURE: ICD-10-CM

## 2022-05-13 PROCEDURE — 74176 CT ABD & PELVIS W/O CONTRAST: CPT | Mod: TC

## 2022-05-13 PROCEDURE — 71250 CT THORAX DX C-: CPT | Mod: 26,,, | Performed by: RADIOLOGY

## 2022-05-13 PROCEDURE — 74176 CT CHEST ABDOMEN PELVIS WITHOUT CONTRAST(XPD): ICD-10-PCS | Mod: 26,,, | Performed by: RADIOLOGY

## 2022-05-13 PROCEDURE — 74176 CT ABD & PELVIS W/O CONTRAST: CPT | Mod: 26,,, | Performed by: RADIOLOGY

## 2022-05-13 PROCEDURE — 71250 CT CHEST ABDOMEN PELVIS WITHOUT CONTRAST(XPD): ICD-10-PCS | Mod: 26,,, | Performed by: RADIOLOGY

## 2022-05-13 NOTE — PROGRESS NOTES
SYLVESTER HESS RT changed this order from triple phase liver   This patient is scheduled for a CT C/A/P w/triple phase liver and w/IV contrast for Friday 5/13.  As you may know, there is a critical shortage of all Omnipaque contrast agents, including IV & PO versions.  Due to this critical shortage our team has made the decision to modify this patients order to a non-contrast study.  Another imaging option would be to do an MR Liver.  We expect this shortage to continue until July.

## 2022-05-16 ENCOUNTER — TELEPHONE (OUTPATIENT)
Dept: SURGERY | Facility: CLINIC | Age: 57
End: 2022-05-16
Payer: COMMERCIAL

## 2022-05-16 NOTE — TELEPHONE ENCOUNTER
Spoke with patient, advised that I did not receive any paper work on him so it must have went to the wrong office.  Advised that if they can resend it to [phone] I will get it filled out for him.  Claim #  [id number]  Fax#  [phone]

## 2022-05-16 NOTE — TELEPHONE ENCOUNTER
----- Message from Katherine Mccormick sent at 5/16/2022  1:05 PM CDT -----  Contact: pt  Type: Needs Medical Advice    Who: Called: Pt     Best Call Back Number: [phone]    Inquiry/Question: Pt is calling in because he was denied disability until they hear from Dr Waddell, apparently someone in the office responded back to the disability office stating they did not know Mr Manjarrez and he is confused because Dr Waddell did his surgery on 04/25/22, so he is wanting to speak to someone in the office pertaining to this matter, please call pt to discuss Thank you~

## 2022-05-17 ENCOUNTER — ANESTHESIA EVENT (OUTPATIENT)
Dept: SURGERY | Facility: HOSPITAL | Age: 57
End: 2022-05-17
Payer: COMMERCIAL

## 2022-05-18 ENCOUNTER — ANESTHESIA (OUTPATIENT)
Dept: SURGERY | Facility: HOSPITAL | Age: 57
End: 2022-05-18
Payer: COMMERCIAL

## 2022-05-18 ENCOUNTER — HOSPITAL ENCOUNTER (OUTPATIENT)
Facility: HOSPITAL | Age: 57
Discharge: HOME | End: 2022-05-18
Attending: STUDENT IN AN ORGANIZED HEALTH CARE EDUCATION/TRAINING PROGRAM | Admitting: STUDENT IN AN ORGANIZED HEALTH CARE EDUCATION/TRAINING PROGRAM
Payer: COMMERCIAL

## 2022-05-18 VITALS
DIASTOLIC BLOOD PRESSURE: 72 MMHG | SYSTOLIC BLOOD PRESSURE: 136 MMHG | BODY MASS INDEX: 26.95 KG/M2 | WEIGHT: 210 LBS | HEART RATE: 81 BPM | RESPIRATION RATE: 20 BRPM | TEMPERATURE: 99 F | OXYGEN SATURATION: 96 % | HEIGHT: 74 IN

## 2022-05-18 DIAGNOSIS — C18.5 MALIGNANT NEOPLASM OF SPLENIC FLEXURE: Primary | ICD-10-CM

## 2022-05-18 PROCEDURE — D9220A PRA ANESTHESIA: ICD-10-PCS | Mod: CRNA,,, | Performed by: NURSE ANESTHETIST, CERTIFIED REGISTERED

## 2022-05-18 PROCEDURE — 99900104 DSU ONLY-NO CHARGE-EA ADD'L HR (STAT): Performed by: STUDENT IN AN ORGANIZED HEALTH CARE EDUCATION/TRAINING PROGRAM

## 2022-05-18 PROCEDURE — 77001 CHG FLUOROGUIDE CNTRL VEN ACCESS,PLACE,REPLACE,REMOVE: ICD-10-PCS | Mod: 26,,, | Performed by: STUDENT IN AN ORGANIZED HEALTH CARE EDUCATION/TRAINING PROGRAM

## 2022-05-18 PROCEDURE — 36000706: Performed by: STUDENT IN AN ORGANIZED HEALTH CARE EDUCATION/TRAINING PROGRAM

## 2022-05-18 PROCEDURE — 25000003 PHARM REV CODE 250: Performed by: NURSE ANESTHETIST, CERTIFIED REGISTERED

## 2022-05-18 PROCEDURE — D9220A PRA ANESTHESIA: ICD-10-PCS | Mod: ANES,,, | Performed by: ANESTHESIOLOGY

## 2022-05-18 PROCEDURE — 99900103 DSU ONLY-NO CHARGE-INITIAL HR (STAT): Performed by: STUDENT IN AN ORGANIZED HEALTH CARE EDUCATION/TRAINING PROGRAM

## 2022-05-18 PROCEDURE — 71000015 HC POSTOP RECOV 1ST HR: Performed by: STUDENT IN AN ORGANIZED HEALTH CARE EDUCATION/TRAINING PROGRAM

## 2022-05-18 PROCEDURE — 37000009 HC ANESTHESIA EA ADD 15 MINS: Performed by: STUDENT IN AN ORGANIZED HEALTH CARE EDUCATION/TRAINING PROGRAM

## 2022-05-18 PROCEDURE — 25000003 PHARM REV CODE 250: Performed by: STUDENT IN AN ORGANIZED HEALTH CARE EDUCATION/TRAINING PROGRAM

## 2022-05-18 PROCEDURE — 36561 PR INSERT TUNNELED CV CATH WITH PORT: ICD-10-PCS | Mod: 79,RT,, | Performed by: STUDENT IN AN ORGANIZED HEALTH CARE EDUCATION/TRAINING PROGRAM

## 2022-05-18 PROCEDURE — 36000707: Performed by: STUDENT IN AN ORGANIZED HEALTH CARE EDUCATION/TRAINING PROGRAM

## 2022-05-18 PROCEDURE — D9220A PRA ANESTHESIA: Mod: ANES,,, | Performed by: ANESTHESIOLOGY

## 2022-05-18 PROCEDURE — C1788 PORT, INDWELLING, IMP: HCPCS | Performed by: STUDENT IN AN ORGANIZED HEALTH CARE EDUCATION/TRAINING PROGRAM

## 2022-05-18 PROCEDURE — 63600175 PHARM REV CODE 636 W HCPCS: Performed by: STUDENT IN AN ORGANIZED HEALTH CARE EDUCATION/TRAINING PROGRAM

## 2022-05-18 PROCEDURE — 71000033 HC RECOVERY, INTIAL HOUR: Performed by: STUDENT IN AN ORGANIZED HEALTH CARE EDUCATION/TRAINING PROGRAM

## 2022-05-18 PROCEDURE — 63600175 PHARM REV CODE 636 W HCPCS: Performed by: NURSE ANESTHETIST, CERTIFIED REGISTERED

## 2022-05-18 PROCEDURE — 36561 INSERT TUNNELED CV CATH: CPT | Mod: 79,RT,, | Performed by: STUDENT IN AN ORGANIZED HEALTH CARE EDUCATION/TRAINING PROGRAM

## 2022-05-18 PROCEDURE — 25000003 PHARM REV CODE 250: Performed by: ANESTHESIOLOGY

## 2022-05-18 PROCEDURE — 37000008 HC ANESTHESIA 1ST 15 MINUTES: Performed by: STUDENT IN AN ORGANIZED HEALTH CARE EDUCATION/TRAINING PROGRAM

## 2022-05-18 PROCEDURE — 71000039 HC RECOVERY, EACH ADD'L HOUR: Performed by: STUDENT IN AN ORGANIZED HEALTH CARE EDUCATION/TRAINING PROGRAM

## 2022-05-18 PROCEDURE — 77001 FLUOROGUIDE FOR VEIN DEVICE: CPT | Mod: 26,,, | Performed by: STUDENT IN AN ORGANIZED HEALTH CARE EDUCATION/TRAINING PROGRAM

## 2022-05-18 PROCEDURE — D9220A PRA ANESTHESIA: Mod: CRNA,,, | Performed by: NURSE ANESTHETIST, CERTIFIED REGISTERED

## 2022-05-18 DEVICE — KIT POWERPORT SINGLE 8FR: Type: IMPLANTABLE DEVICE | Site: CHEST | Status: FUNCTIONAL

## 2022-05-18 RX ORDER — LIDOCAINE HYDROCHLORIDE 10 MG/ML
0.5 INJECTION, SOLUTION EPIDURAL; INFILTRATION; INTRACAUDAL; PERINEURAL ONCE
Status: DISCONTINUED | OUTPATIENT
Start: 2022-05-18 | End: 2022-05-18 | Stop reason: HOSPADM

## 2022-05-18 RX ORDER — HYDROMORPHONE HYDROCHLORIDE 2 MG/ML
0.2 INJECTION, SOLUTION INTRAMUSCULAR; INTRAVENOUS; SUBCUTANEOUS EVERY 5 MIN PRN
Status: DISCONTINUED | OUTPATIENT
Start: 2022-05-18 | End: 2022-05-18 | Stop reason: HOSPADM

## 2022-05-18 RX ORDER — HEPARIN SODIUM,PORCINE 10 UNIT/ML
VIAL (ML) INTRAVENOUS
Status: DISCONTINUED | OUTPATIENT
Start: 2022-05-18 | End: 2022-05-18 | Stop reason: HOSPADM

## 2022-05-18 RX ORDER — CLINDAMYCIN PHOSPHATE 900 MG/50ML
900 INJECTION, SOLUTION INTRAVENOUS
Status: COMPLETED | OUTPATIENT
Start: 2022-05-18 | End: 2022-05-18

## 2022-05-18 RX ORDER — ACETAMINOPHEN 10 MG/ML
INJECTION, SOLUTION INTRAVENOUS
Status: DISCONTINUED | OUTPATIENT
Start: 2022-05-18 | End: 2022-05-18

## 2022-05-18 RX ORDER — SODIUM CHLORIDE 0.9 % (FLUSH) 0.9 %
3 SYRINGE (ML) INJECTION
Status: DISCONTINUED | OUTPATIENT
Start: 2022-05-18 | End: 2022-05-18 | Stop reason: HOSPADM

## 2022-05-18 RX ORDER — ONDANSETRON 2 MG/ML
INJECTION INTRAMUSCULAR; INTRAVENOUS
Status: DISCONTINUED | OUTPATIENT
Start: 2022-05-18 | End: 2022-05-18

## 2022-05-18 RX ORDER — OXYCODONE HYDROCHLORIDE 5 MG/1
5 TABLET ORAL
Status: DISCONTINUED | OUTPATIENT
Start: 2022-05-18 | End: 2022-05-18 | Stop reason: HOSPADM

## 2022-05-18 RX ORDER — LIDOCAINE HYDROCHLORIDE 20 MG/ML
INJECTION INTRAVENOUS
Status: DISCONTINUED | OUTPATIENT
Start: 2022-05-18 | End: 2022-05-18

## 2022-05-18 RX ORDER — ONDANSETRON 2 MG/ML
4 INJECTION INTRAMUSCULAR; INTRAVENOUS DAILY PRN
Status: DISCONTINUED | OUTPATIENT
Start: 2022-05-18 | End: 2022-05-18 | Stop reason: HOSPADM

## 2022-05-18 RX ORDER — PROPOFOL 10 MG/ML
VIAL (ML) INTRAVENOUS CONTINUOUS PRN
Status: DISCONTINUED | OUTPATIENT
Start: 2022-05-18 | End: 2022-05-18

## 2022-05-18 RX ORDER — FENTANYL CITRATE 50 UG/ML
INJECTION, SOLUTION INTRAMUSCULAR; INTRAVENOUS
Status: DISCONTINUED | OUTPATIENT
Start: 2022-05-18 | End: 2022-05-18

## 2022-05-18 RX ORDER — FENTANYL CITRATE 50 UG/ML
25 INJECTION, SOLUTION INTRAMUSCULAR; INTRAVENOUS EVERY 5 MIN PRN
Status: DISCONTINUED | OUTPATIENT
Start: 2022-05-18 | End: 2022-05-18 | Stop reason: HOSPADM

## 2022-05-18 RX ORDER — SODIUM CHLORIDE 9 MG/ML
INJECTION, SOLUTION INTRAVENOUS CONTINUOUS
Status: DISCONTINUED | OUTPATIENT
Start: 2022-05-18 | End: 2022-05-18 | Stop reason: HOSPADM

## 2022-05-18 RX ORDER — DIPHENHYDRAMINE HYDROCHLORIDE 50 MG/ML
25 INJECTION INTRAMUSCULAR; INTRAVENOUS EVERY 6 HOURS PRN
Status: DISCONTINUED | OUTPATIENT
Start: 2022-05-18 | End: 2022-05-18 | Stop reason: HOSPADM

## 2022-05-18 RX ORDER — SODIUM CHLORIDE, SODIUM LACTATE, POTASSIUM CHLORIDE, CALCIUM CHLORIDE 600; 310; 30; 20 MG/100ML; MG/100ML; MG/100ML; MG/100ML
INJECTION, SOLUTION INTRAVENOUS CONTINUOUS
Status: DISCONTINUED | OUTPATIENT
Start: 2022-05-18 | End: 2022-05-18 | Stop reason: HOSPADM

## 2022-05-18 RX ORDER — SODIUM CHLORIDE 0.9 % (FLUSH) 0.9 %
3 SYRINGE (ML) INJECTION EVERY 8 HOURS
Status: DISCONTINUED | OUTPATIENT
Start: 2022-05-18 | End: 2022-05-18 | Stop reason: HOSPADM

## 2022-05-18 RX ORDER — MIDAZOLAM HYDROCHLORIDE 1 MG/ML
INJECTION, SOLUTION INTRAMUSCULAR; INTRAVENOUS
Status: DISCONTINUED | OUTPATIENT
Start: 2022-05-18 | End: 2022-05-18

## 2022-05-18 RX ORDER — BUPIVACAINE HYDROCHLORIDE AND EPINEPHRINE 2.5; 5 MG/ML; UG/ML
INJECTION, SOLUTION EPIDURAL; INFILTRATION; INTRACAUDAL; PERINEURAL
Status: DISCONTINUED | OUTPATIENT
Start: 2022-05-18 | End: 2022-05-18 | Stop reason: HOSPADM

## 2022-05-18 RX ADMIN — PROPOFOL 75 MCG/KG/MIN: 10 INJECTION, EMULSION INTRAVENOUS at 09:05

## 2022-05-18 RX ADMIN — LIDOCAINE HYDROCHLORIDE 50 MG: 20 INJECTION, SOLUTION INTRAVENOUS at 09:05

## 2022-05-18 RX ADMIN — CLINDAMYCIN PHOSPHATE 900 MG: 18 INJECTION, SOLUTION INTRAVENOUS at 09:05

## 2022-05-18 RX ADMIN — FENTANYL CITRATE 50 MCG: 50 INJECTION, SOLUTION INTRAMUSCULAR; INTRAVENOUS at 09:05

## 2022-05-18 RX ADMIN — FENTANYL CITRATE 25 MCG: 50 INJECTION, SOLUTION INTRAMUSCULAR; INTRAVENOUS at 09:05

## 2022-05-18 RX ADMIN — SODIUM CHLORIDE, SODIUM GLUCONATE, SODIUM ACETATE, POTASSIUM CHLORIDE, MAGNESIUM CHLORIDE, SODIUM PHOSPHATE, DIBASIC, AND POTASSIUM PHOSPHATE: .53; .5; .37; .037; .03; .012; .00082 INJECTION, SOLUTION INTRAVENOUS at 07:05

## 2022-05-18 RX ADMIN — MIDAZOLAM 2 MG: 1 INJECTION INTRAMUSCULAR; INTRAVENOUS at 08:05

## 2022-05-18 RX ADMIN — ONDANSETRON 4 MG: 2 INJECTION INTRAMUSCULAR; INTRAVENOUS at 09:05

## 2022-05-18 RX ADMIN — ACETAMINOPHEN 1000 MG: 10 INJECTION, SOLUTION INTRAVENOUS at 09:05

## 2022-05-18 RX ADMIN — OXYCODONE 5 MG: 5 TABLET ORAL at 10:05

## 2022-05-18 NOTE — PLAN OF CARE
Patient prepared for procedure.  No questions at this time.  Family at bedside.  Belongings placed in preop cabinet.  Wedding ring and glasses with wife.

## 2022-05-18 NOTE — ANESTHESIA PREPROCEDURE EVALUATION
05/18/2022  Osman Li Jr. is a 57 y.o., male.      Pre-op Assessment    I have reviewed the Patient Summary Reports.     I have reviewed the Nursing Notes. I have reviewed the NPO Status.   I have reviewed the Medications.     Review of Systems  Anesthesia Hx:  No problems with previous Anesthesia Denies Hx of Anesthetic complications    Social:  Non-Smoker    Cardiovascular:   Hypertension Denies MI.  Denies CAD.    Denies CABG/stent.   Denies Angina. hyperlipidemia    Pulmonary:   Denies COPD.  Denies Asthma.  Denies Recent URI.    Renal/:   Denies Chronic Renal Disease.     Hepatic/GI:   Denies GERD. Denies Liver Disease. Malignant neoplasm of transverse colon   Neurological:   Denies TIA. Denies CVA. Denies Seizures.    Endocrine:   Denies Diabetes. Denies Hypothyroidism.    Psych:   Denies Psychiatric History.          Physical Exam  General: Well nourished, Cooperative, Alert and Oriented    Airway:  Mallampati: II / II  Mouth Opening: Normal  TM Distance: 4 - 6 cm  Tongue: Normal    Dental:  Intact    Chest/Lungs:  Clear to auscultation, Normal Respiratory Rate    Heart:  Rate: Normal  Rhythm: Regular Rhythm  Sounds: Normal        Anesthesia Plan  Type of Anesthesia, risks & benefits discussed:    Anesthesia Type: MAC  Intra-op Monitoring Plan: Standard ASA Monitors  Induction:  IV  Informed Consent: Informed consent signed with the Patient and all parties understand the risks and agree with anesthesia plan.  All questions answered.   ASA Score: 4    Ready For Surgery From Anesthesia Perspective.     .       Date of Service: 06/07/2019    REASON FOR VISIT:  Kristen is a known patient to my practice I am seeing today for followup.  She is a 56-year-old female with known history of asthma, mild sleep apnea.    She has intractable eosinophilic asthma with eosinophil counts of about 300.  She was initially on maximum treatment including Zithromax every other day, treatment of upper airway.  We started her on Fasenra a while ago, there was some issue getting a few doses last year, but this has not been an issue now.  She is also on Singulair.  Since her last visit to me, she underwent updated pulmonary function study that showed dramatic improvement compared to 2017 with normalization and reduction of fraction excretion of nitric oxide from 29-16.  She is doing well with Fasenra and Trelegy but struggling with her upper airways.  She has had a sinus infection that has been lingering around for a while.  We have not had any recent CT images of her sinuses.  Last CT chest was done in 2017 that showed very mild reticulation with a small area of a scar.    She had CT sinuses in 2016 and it seems possibly she did see Dr. Evans, that showed no sinus disease.  She has polypoid.    She has mild sleep apnea.  Her compliance with CPAP is mediocre at best, but overall relatively stable.    PAST MEDICAL, SOCIAL AND FAMILY HISTORY, MEDICATIONS:  Updated and reviewed.    PHYSICAL EXAMINATION:  GENERAL:  She is awake, alert x3, looking in no acute distress.  VITAL SIGNS:  Stable and documented in Epic and reviewed.  NECK:  Supple, no JVD, no bruit, no thyromegaly.  HEART:  Regular rate and rhythm.  No rubs or gallops or murmur.  LUNGS:  Clear, no wheezes, rales or crackles.  ABDOMEN:  Soft.  EXTREMITIES:  Without edema.    IMPRESSION:  1.  Eosinophilic asthma, adequately controlled with Trelegy, Fasenra and Singulair.  2.  Upper airway and sinus disease.  She may need to revisit with Dr. Evans and his partner, he is retiring and I will send  a referral to Dr. Natarajan  3.  Mild sleep apnea adequately treated with CPAP, but she is not using it due to a sinus problem and previously her compliance with it at best is mediocre.    PLAN:   1.  At this time, no change from pulmonary prospective.  2.  Refer to Dr. Natarajan.  3.  See me in her usual 3-month followup.  She is comfortable with that.      Dictated By: Lissy Lloyd MD  Signing Provider: Lissy Lloyd MD    MR/msc ([id number])  DD: 06/07/2019 09:27:28 TD: 06/10/2019 14:15:57    Copy Sent To:     cc: Tavon Guy MD

## 2022-05-18 NOTE — TRANSFER OF CARE
"Anesthesia Transfer of Care Note    Patient: Osman Li Jr.    Procedure(s) Performed: Procedure(s) (LRB):  [id number] (Right)    Patient location: PACU    Anesthesia Type: MAC    Transport from OR: Transported from OR on 2-3 L/min O2 by NC with adequate spontaneous ventilation    Post pain: adequate analgesia    Post assessment: no apparent anesthetic complications    Post vital signs: stable    Level of consciousness: awake    Nausea/Vomiting: no nausea/vomiting    Complications: none    Transfer of care protocol was followed      Last vitals:   Visit Vitals  /78 (BP Location: Left arm, Patient Position: Lying)   Pulse 95   Temp 36.6 °C (97.8 °F) (Skin)   Resp 20   Ht 6' 2" (1.88 m)   Wt 95.3 kg (210 lb)   SpO2 99%   BMI 26.96 kg/m²     "

## 2022-05-18 NOTE — DISCHARGE INSTRUCTIONS
General Information:    1.  Do not drink alcoholic beverages including beer for 24 hours or as long as you are on pain medication..  2.  Do not drive a motor vehicle, operate machinery or power tools, or signs legal papers for 24 hours or as long as you are on pain medication.   3.  You may experience light-headedness, dizziness, and sleepiness following surgery. Please do not stay alone. A responsible adult should be with you for this 24 hour period.  4.  Go home and rest.    5. Progress slowly to a normal diet unless instructed.  Otherwise, begin with liquids such as soft drinks, then soup and crackers working up to solid foods. Drink plenty of nonalcoholic fluids.  6.  Certain anesthetics and pain medications produce nausea and vomiting in certain       individuals. If nausea becomes a problem at home, call you doctor.    7. A nurse will be calling you sometime after surgery. Do not be alarmed. This is our way of finding out how you are doing.    8. Several times every hour while you are awake, take 2-3 deep breaths and cough. If you had stomach surgery hold a pillow or rolled towel firmly against your stomach before you cough. This will help with any pain the cough might cause.  9. Several times every hour while you are awake, pump and flex your feet 5-6 times and do foot circles. This will help prevent blood clots.    10.Call your doctor for severe pain, bleeding, fever, or signs or symptoms of infection (pain, swelling, redness, foul odor, drainage).   We hope your stay was comfortable as you heal now, mend and rest.    For we have enjoyed taking care of you by giving your our best.    And as you get better, by regaining your health and strength;   We count it as a privilege to have served you and hope your time at [facility] was well spent.      Thank  You!!!

## 2022-05-18 NOTE — PROGRESS NOTES
Waiting on radiologist to read chest xray but MD is in another procedure. Spoke to Dr. Waddell who looked at chest xray and stated looks good and ok for pt to be transferred to post op to d/c to home.

## 2022-05-18 NOTE — DISCHARGE SUMMARY
[facility]  Brief Operative Note    Surgery Date: 5/18/2022     Surgeon(s) and Role:     * Carlton Waddell MD - Primary    Assisting Surgeon: None    Pre-op Diagnosis:  Malignant neoplasm of splenic flexure [C18.5]    Post-op Diagnosis:  Post-Op Diagnosis Codes:     * Malignant neoplasm of splenic flexure [C18.5]    Procedure(s) (LRB):  [id number]-[id number]-X-CATH (Right)    Anesthesia: General    Operative Findings:  Right IJ port    Estimated Blood Loss: 10 mL         Specimens:   Specimen (24h ago, onward)            None            Discharge Note    OUTCOME: Patient tolerated treatment/procedure well without complication and is now ready for discharge.    DISPOSITION: Home or Self Care    FINAL DIAGNOSIS:  Malignant neoplasm of splenic flexure    FOLLOWUP: In clinic    DISCHARGE INSTRUCTIONS:    Discharge Procedure Orders   Diet Adult Regular     Notify your health care provider if you experience any of the following:  redness, tenderness, or signs of infection (pain, swelling, redness, odor or green/yellow discharge around incision site)     Notify your health care provider if you experience any of the following:  difficulty breathing or increased cough     No dressing needed   Order Comments: Okay to shower gently over the incision.  No swimming or soaking for 3 weeks.     Activity as tolerated

## 2022-05-18 NOTE — OP NOTE
[facility]  Surgery Department  Operative Note    SUMMARY     Date of Procedure: 5/18/2022     Procedure: Procedure(s) (LRB):  JPIPOXNAG-XLBG-X-CATH (Right)     Surgeon(s) and Role:     * Carlton Waddell MD - Primary    Assisting Surgeon: None    Pre-Operative Diagnosis: Malignant neoplasm of splenic flexure [C18.5]    Post-Operative Diagnosis: Post-Op Diagnosis Codes:     * Malignant neoplasm of splenic flexure [C18.5]    Anesthesia:  Local/mac    Operative Findings (including complications, if any):  Right IJ port tunneled to the right chest wall    Description of Technical Procedures:  This is a 57-year-old male with colon cancer.  He did consent to port placement for adjuvant chemo.  He was taken to the OR, placed supine, sedated by Anesthesia, the neck and chest were prepped and draped in the usual fashion, a time-out was completed.  Using ultrasound, the right internal jugular vein was cannulated with a hollow bore needle.  A wire was advanced and confirmed to be in appropriate location using ultrasound and fluoroscopy.  A location on the right chest wall was chosen for the port site.  A 3 cm incision was made sharply.  A port pocket was developed using electrocautery.  The port was secured within the pocket using 2-0 Vicryl sutures.  The catheter was then tunneled from the chest wall to the wire exit site in the neck.  Using Seldinger technique and under fluoroscopic guidance, the wire was exchanged for a split sheath dilator without apparent complication.  The catheter was advanced down the sheath and the sheath was then removed.  Repeat fluoroscopy confirmed appropriate location of the catheter without kinking.  The port was accessed, aspirated, and then flushed with heparinized saline with ease.  The port pocket was closed in layers with 3-0 Vicryl and a running 4-0 Monocryl.  The wire exit site in the neck was closed with a single buried Monocryl suture.  Dermabond was applied as a  dressing.  Patient tolerated the entire procedure without apparent complication, was woken from anesthesia, brought to recovery in stable condition.  All counts were correct.    Significant Surgical Tasks Conducted by the Assistant(s), if Applicable: n/a    Estimated Blood Loss (EBL): 10 mL           Implants:   Implant Name Type Inv. Item Serial No.  Lot No. LRB No. Used Action   KIT POWERPORT SINGLE 8FR - [id number]  KIT POWERPORT SINGLE 8FR  [employer] [id number] Right 1 Implanted       Specimens:   Specimen (24h ago, onward)            None                  Condition: Good    Disposition: PACU - hemodynamically stable.    Attestation: I was present and scrubbed for the entire procedure.

## 2022-05-18 NOTE — PLAN OF CARE
Pt transferred to phase II. VSS, pain pill given, dermabond cdi to R chest and R neck area, chest xray done and Dr. Waddell interpreted, tolerated clear liquids without N/V. Report given to CAMRYN Dong.

## 2022-05-24 ENCOUNTER — PATIENT MESSAGE (OUTPATIENT)
Dept: FAMILY MEDICINE | Facility: CLINIC | Age: 57
End: 2022-05-24

## 2022-05-24 ENCOUNTER — PATIENT MESSAGE (OUTPATIENT)
Dept: HEMATOLOGY/ONCOLOGY | Facility: CLINIC | Age: 57
End: 2022-05-24
Payer: COMMERCIAL

## 2022-05-24 ENCOUNTER — TELEPHONE (OUTPATIENT)
Dept: SURGERY | Facility: CLINIC | Age: 57
End: 2022-05-24
Payer: COMMERCIAL

## 2022-05-24 ENCOUNTER — TELEPHONE (OUTPATIENT)
Dept: HEMATOLOGY/ONCOLOGY | Facility: CLINIC | Age: 57
End: 2022-05-24
Payer: COMMERCIAL

## 2022-05-24 DIAGNOSIS — K76.9 LIVER LESION, RIGHT LOBE: ICD-10-CM

## 2022-05-24 DIAGNOSIS — C78.7 LIVER METASTASES: ICD-10-CM

## 2022-05-24 DIAGNOSIS — C18.5 MALIGNANT NEOPLASM OF SPLENIC FLEXURE: Primary | ICD-10-CM

## 2022-05-24 DIAGNOSIS — C18.4 MALIGNANT NEOPLASM OF TRANSVERSE COLON: Primary | ICD-10-CM

## 2022-05-24 NOTE — TELEPHONE ENCOUNTER
Outgoing staff message to [employer] regarding an update on auth for this patient. See message below.

## 2022-05-24 NOTE — LETTER
May 26, 2022      [facility]  [address]  [place] [address]  Phone: [phone]  Fax: [phone]       Patient: Osman Li   YOB: 1965  Date of Visit: 05/26/2022    To Whom It May Concern:    Easton Li  was at [employer]. The patient may return to work/school on 5/27/22 with light duty restrictions.Aviod any heavy lifting > 10lbs.  If you have any questions or concerns, or if I can be of further assistance, please do not hesitate to contact me.    Sincerely,    Electronically Signed By: ANTONIO Emmanuel

## 2022-05-25 LAB
COMMENT: NORMAL
FINAL PATHOLOGIC DIAGNOSIS: NORMAL
GROSS: NORMAL
Lab: NORMAL
MICROSCOPIC EXAM: NORMAL
SUPPLEMENTAL DIAGNOSIS: NORMAL

## 2022-05-26 NOTE — TELEPHONE ENCOUNTER
Okay to return to light duty work. Avoid any heavy lifting > 10 lbs. Please type a return to work letter for patient and inform him he can come pick it up at his convince.

## 2022-05-30 ENCOUNTER — TELEPHONE (OUTPATIENT)
Dept: ADMINISTRATIVE | Facility: HOSPITAL | Age: 57
End: 2022-05-30
Payer: COMMERCIAL

## 2022-06-01 ENCOUNTER — TELEPHONE (OUTPATIENT)
Dept: FAMILY MEDICINE | Facility: CLINIC | Age: 57
End: 2022-06-01

## 2022-06-01 NOTE — LETTER
[facility]  [address]  [place] [address]  Phone: [phone]  Fax: [phone]       Patient: Osman Li   YOB: 1965    To Whom It May Concern:    Easton Li  was at [employer]. The patient may return to work/school on 5/5/22  With light duty restrictions. Aviod any heavy lifting > 10lbs.  If you have any questions or concerns, or if I can be of further assistance, please do not hesitate to contact me.    Sincerely,    Electronically Signed By: ANTONIO Emmanuel

## 2022-06-01 NOTE — TELEPHONE ENCOUNTER
----- Message from Jennyfer Kenny sent at 6/1/2022  9:17 AM CDT -----  Pt calling said he needs revised return to work note. We put the date the letter was created but he needs it to say RTW 5/5 when he actually returned to work. Pt said to put it on the portal and shoot him a message  # [phone]

## 2022-06-06 ENCOUNTER — HOSPITAL ENCOUNTER (OUTPATIENT)
Dept: RADIOLOGY | Facility: HOSPITAL | Age: 57
Discharge: HOME | End: 2022-06-06
Attending: SURGERY
Payer: COMMERCIAL

## 2022-06-06 DIAGNOSIS — C78.7 LIVER METASTASES: ICD-10-CM

## 2022-06-06 DIAGNOSIS — C18.5 MALIGNANT NEOPLASM OF SPLENIC FLEXURE: ICD-10-CM

## 2022-06-06 PROCEDURE — 74183 MRI ABD W/O CNTR FLWD CNTR: CPT | Mod: 26,,, | Performed by: RADIOLOGY

## 2022-06-06 PROCEDURE — 74183 MRI ABDOMEN W WO CONTRAST: ICD-10-PCS | Mod: 26,,, | Performed by: RADIOLOGY

## 2022-06-06 PROCEDURE — 74183 MRI ABD W/O CNTR FLWD CNTR: CPT | Mod: TC

## 2022-06-06 PROCEDURE — A9585 GADOBUTROL INJECTION: HCPCS | Performed by: SURGERY

## 2022-06-06 PROCEDURE — 25500020 PHARM REV CODE 255: Performed by: SURGERY

## 2022-06-06 RX ORDER — GADOBUTROL 604.72 MG/ML
9 INJECTION INTRAVENOUS
Status: COMPLETED | OUTPATIENT
Start: 2022-06-06 | End: 2022-06-06

## 2022-06-06 RX ADMIN — GADOBUTROL 9 ML: 604.72 INJECTION INTRAVENOUS at 10:06

## 2022-06-07 ENCOUNTER — TELEPHONE (OUTPATIENT)
Dept: FAMILY MEDICINE | Facility: CLINIC | Age: 57
End: 2022-06-07

## 2022-06-07 NOTE — TELEPHONE ENCOUNTER
----- Message from Benita Matthews sent at 6/7/2022  1:23 PM CDT -----  - pt would like to talk to nurse regarding revised return to work document  [phone]

## 2022-06-08 NOTE — PROGRESS NOTES
[facility]  Colorectal Cancer Liver Metastases Program Intake Note      Patient: Osman Li  Demographics: 57 y.o. male   Referring provider: Dr. Khoobehi  In person or virtual visit: In person  Date of visit: 06/09/2022    Co-morbid diagnoses: HTN, HLD, anemia, DM2, cholecystectomy, bowel obstruction (@ time of diagnosis)  ECOG Performance Status: 1    Oncologic History:    Date of original cancer diagnosis: 4/25/22  Timing of metastatic presentation: Synchronous  Primary disease site: Transverse  Primary in place or resected: Resected  - Pathology of primary resection: pT3 pN2b. Invasive colonic adenocarcinoma, poorly differentiated, G3. Invades pericolorectal tissue. 17/27 +LN. No perineural invasion.   Chemotherapy regimens and dates:   None.   Prior cancer surgeries or liver-directed therapies:   4/25/22:   1. Exploratory laparotomy   2. Splenectomy  3. Partial colectomy  4. Colostomy creation  5. Splenic flexure mobilization   Most recent imaging & date:   6/6/22 - MRI Abdomen  5/18/22 - CXR  5/13/22 - CT C/A/P    Tumor Characteristics:    CARLOS ENRIQUE: BRAF c.1783T>C, amino acid change p.F595L ([id number])  Microsatellite status: MARY  Molecular profiling: not done.   Most recent CEA: <1.7      Review of Systems   Constitutional: Positive for malaise/fatigue (improving throughout surgical recovery). Negative for chills, fever and weight loss.   HENT: Negative for hearing loss, nosebleeds and sore throat.    Eyes: Negative for blurred vision, double vision and redness.        Wears prescription glasses   Respiratory: Negative for cough, shortness of breath and wheezing.    Cardiovascular: Negative for chest pain, palpitations and leg swelling.   Gastrointestinal: Positive for abdominal pain (generalized). Negative for blood in stool, heartburn, nausea and vomiting.        Colostomy   Genitourinary: Negative for dysuria, flank pain, frequency, hematuria and urgency.   Musculoskeletal: Positive for back pain (mid  "back pain, feels related to overcompensating for not using abdominal muscles after surgery). Negative for falls and myalgias.   Skin: Negative for itching and rash.   Neurological: Negative for dizziness, tingling, sensory change, seizures, loss of consciousness and headaches.   Endo/Heme/Allergies: Does not bruise/bleed easily.   Psychiatric/Behavioral: The patient is not nervous/anxious and does not have insomnia (sleeps 6 hours, states this is normal).          Vitals:    06/09/22 1356   BP: 127/81   BP Location: Left arm   Patient Position: Sitting   BP Method: Large (Automatic)   Pulse: 99   Temp: 98.7 °F (37.1 °C)   TempSrc: Oral   SpO2: 97%   Weight: 93 kg (205 lb)   Height: 6' 2" (1.88 m)       Physical Exam  Vitals and nursing note reviewed.   Constitutional:       General: He is not in acute distress.     Appearance: Normal appearance. He is normal weight. He is not ill-appearing or toxic-appearing.   HENT:      Head: Normocephalic.      Right Ear: External ear normal.      Left Ear: External ear normal.   Eyes:      General: No scleral icterus.     Conjunctiva/sclera: Conjunctivae normal.      Pupils: Pupils are equal, round, and reactive to light.   Cardiovascular:      Rate and Rhythm: Normal rate and regular rhythm.      Pulses: Normal pulses.      Heart sounds: Normal heart sounds. No murmur heard.  Pulmonary:      Effort: Pulmonary effort is normal. No respiratory distress.      Breath sounds: Normal breath sounds. No wheezing or rales.   Chest:      Chest wall: No tenderness.   Abdominal:      General: Bowel sounds are normal. There is no distension.      Palpations: Abdomen is soft.      Tenderness: There is no abdominal tenderness. There is no guarding.      Comments: Colostomy noted to [facility] with stool in bag, stoma pink   Musculoskeletal:         General: No swelling or deformity.      Cervical back: Normal range of motion.      Right lower leg: No edema.      Left lower leg: No edema.   Skin:     " "General: Skin is warm.      Capillary Refill: Capillary refill takes less than 2 seconds.      Coloration: Skin is not jaundiced.      Findings: No bruising.      Comments: Midline abdominal incision, healing well   Neurological:      General: No focal deficit present.      Mental Status: He is alert and oriented to person, place, and time. Mental status is at baseline.      Cranial Nerves: No cranial nerve deficit.      Sensory: No sensory deficit.      Motor: No weakness.      Gait: Gait normal.   Psychiatric:         Mood and Affect: Mood normal.         Behavior: Behavior normal.         Thought Content: Thought content normal.         Judgment: Judgment normal.           Plan    Other notes: Patient initially presented at end of April with constipation and feelings of having a "bowel blockage" with increasing abdominal pain. He took laxatives without relief. Mass found while in ED, and he was admitted for surgical resection. Since surgery, he notices his fatigue and stamina are slowly improving. Continues to have generalized abdominal pain. He reports bilateral mid back pain, which he feels is related to overcompensating from not using core abdominal muscles. He reports relief with medication. He is cutting his 30 mg oxycodone pills into 4; reports taking an average of 2 pieces daily (total of 15 mg). Overall he feels well.     Last colonoscopy was done out of state in [place] in 2018 - 4 polyps removed. He was instructed to have next scope done in 5-10 years. During surgery, nodularity palpated at pancreatic tail (reactive changes v. adenopathy).     He is  with 2 children, 1 son and 1 daughter, both healthy. He reports a maternal aunt passed from lung cancer, but non-smoker. No other known cancers in the family. Currently +smoker, about 1 ppd. No EtOH or drug use. Continues to work as a / for [employer]. Would like to continue treatment with Dr. Khoobehi in [place],     Disposition: " Presented at [facility] tumor board today. See recommendation note to follow. Enrolled in [facility] [place] ctDNA trial during this visit.     Patient is in agreement with the proposed treatment plan. All questions were answered to the patient's satisfaction. Pt knows to call clinic if anything is needed before the next clinic visit.    Patient discussed with collaborating physician, Dr. Nichole.        Zara Soler, MSN, APRN, [facility]  Hematology and Medical Oncology  Clinical Nurse Specialist to Dr. Nichole, Dr. Laura & Dr. Sorto        Route Chart for Scheduling    Med Onc Chart Routing      Follow up with physician . None needed for Dr. Nichole. Will reach out to Dr. Khoobehi's office for future appointments.    Follow up with DONTA    Labs    Lab interval: every 12 weeks  Needs ctDNA trial labs drawn every 3 months at [facility]   Imaging    Pharmacy appointment    Other referrals          Treatment Plan Information   OP mFOLFOX 6 Q2W   Aurash Khoobehi, MD   Upcoming Treatment Dates - OP mFOLFOX 6 Q2W    5/5/2022       Chemotherapy       oxaliplatin (ELOXATIN) 85 mg/m2 = 197 mg in dextrose 5 % 500 mL chemo infusion       leucovorin calcium 400 mg/m2 = 930 mg in dextrose 5 % 250 mL infusion       fluorouraciL injection 930 mg       fluorouracil injection 5,570 mg  5/19/2022       Chemotherapy       oxaliplatin (ELOXATIN) 85 mg/m2 = 197 mg in dextrose 5 % 500 mL chemo infusion       leucovorin calcium 400 mg/m2 = 930 mg in dextrose 5 % 250 mL infusion       fluorouraciL injection 930 mg       fluorouracil injection 5,570 mg  6/2/2022       Chemotherapy       oxaliplatin (ELOXATIN) 85 mg/m2 = 197 mg in dextrose 5 % 500 mL chemo infusion       leucovorin calcium 400 mg/m2 = 930 mg in dextrose 5 % 250 mL infusion       fluorouraciL injection 930 mg       fluorouracil injection 5,570 mg  6/16/2022       Chemotherapy       oxaliplatin (ELOXATIN) 85 mg/m2 = 197 mg in dextrose 5 % 500 mL chemo infusion       leucovorin  calcium 400 mg/m2 = 930 mg in dextrose 5 % 250 mL infusion       fluorouraciL injection 930 mg       fluorouracil injection 5,570 mg

## 2022-06-09 ENCOUNTER — LAB VISIT (OUTPATIENT)
Dept: LAB | Facility: HOSPITAL | Age: 57
End: 2022-06-09
Payer: COMMERCIAL

## 2022-06-09 ENCOUNTER — OFFICE VISIT (OUTPATIENT)
Dept: SURGERY | Facility: CLINIC | Age: 57
End: 2022-06-09
Payer: COMMERCIAL

## 2022-06-09 ENCOUNTER — OFFICE VISIT (OUTPATIENT)
Dept: HEMATOLOGY/ONCOLOGY | Facility: CLINIC | Age: 57
End: 2022-06-09
Payer: COMMERCIAL

## 2022-06-09 ENCOUNTER — RESEARCH ENCOUNTER (OUTPATIENT)
Dept: RESEARCH | Facility: HOSPITAL | Age: 57
End: 2022-06-09
Payer: COMMERCIAL

## 2022-06-09 VITALS
HEIGHT: 74 IN | WEIGHT: 205 LBS | HEART RATE: 99 BPM | BODY MASS INDEX: 26.31 KG/M2 | OXYGEN SATURATION: 97 % | TEMPERATURE: 99 F | DIASTOLIC BLOOD PRESSURE: 81 MMHG | SYSTOLIC BLOOD PRESSURE: 127 MMHG

## 2022-06-09 VITALS
BODY MASS INDEX: 26.31 KG/M2 | HEIGHT: 74 IN | SYSTOLIC BLOOD PRESSURE: 150 MMHG | OXYGEN SATURATION: 97 % | HEART RATE: 108 BPM | DIASTOLIC BLOOD PRESSURE: 75 MMHG | WEIGHT: 205 LBS

## 2022-06-09 DIAGNOSIS — C18.9 COLON ADENOCARCINOMA: ICD-10-CM

## 2022-06-09 DIAGNOSIS — K76.9 LIVER LESION, RIGHT LOBE: ICD-10-CM

## 2022-06-09 DIAGNOSIS — Z00.6 CLINICAL TRIAL PARTICIPANT: ICD-10-CM

## 2022-06-09 DIAGNOSIS — E11.00 TYPE 2 DIABETES MELLITUS WITH HYPEROSMOLARITY WITHOUT COMA, WITHOUT LONG-TERM CURRENT USE OF INSULIN: ICD-10-CM

## 2022-06-09 DIAGNOSIS — K63.89 COLONIC MASS: ICD-10-CM

## 2022-06-09 DIAGNOSIS — I10 PRIMARY HYPERTENSION: ICD-10-CM

## 2022-06-09 DIAGNOSIS — E78.49 OTHER HYPERLIPIDEMIA: ICD-10-CM

## 2022-06-09 DIAGNOSIS — R10.9 ABDOMINAL PAIN, UNSPECIFIED ABDOMINAL LOCATION: ICD-10-CM

## 2022-06-09 DIAGNOSIS — C18.5 MALIGNANT NEOPLASM OF SPLENIC FLEXURE: Primary | ICD-10-CM

## 2022-06-09 DIAGNOSIS — C18.9 COLON ADENOCARCINOMA: Primary | ICD-10-CM

## 2022-06-09 DIAGNOSIS — C18.4 MALIGNANT NEOPLASM OF TRANSVERSE COLON: Primary | ICD-10-CM

## 2022-06-09 PROBLEM — K56.609 LARGE BOWEL OBSTRUCTION: Status: RESOLVED | Noted: 2022-04-24 | Resolved: 2022-06-09

## 2022-06-09 PROBLEM — T81.49XA POSTOPERATIVE CELLULITIS OF SURGICAL WOUND: Status: RESOLVED | Noted: 2022-04-28 | Resolved: 2022-06-09

## 2022-06-09 PROBLEM — L76.82 INCISIONAL PAIN: Status: RESOLVED | Noted: 2022-05-01 | Resolved: 2022-06-09

## 2022-06-09 LAB
DRUG STUDY SPECIMEN TYPE: NORMAL
DRUG STUDY TEST NAME: NORMAL
DRUG STUDY TEST RESULT: NORMAL

## 2022-06-09 PROCEDURE — 3077F SYST BP >= 140 MM HG: CPT | Mod: CPTII,S$GLB,, | Performed by: SURGERY

## 2022-06-09 PROCEDURE — 3008F BODY MASS INDEX DOCD: CPT | Mod: CPTII,S$GLB,, | Performed by: SURGERY

## 2022-06-09 PROCEDURE — 99215 PR OFFICE/OUTPT VISIT, EST, LEVL V, 40-54 MIN: ICD-10-PCS | Mod: S$GLB,,, | Performed by: REGISTERED NURSE

## 2022-06-09 PROCEDURE — 36415 COLL VENOUS BLD VENIPUNCTURE: CPT | Performed by: INTERNAL MEDICINE

## 2022-06-09 PROCEDURE — 3008F BODY MASS INDEX DOCD: CPT | Mod: CPTII,S$GLB,, | Performed by: REGISTERED NURSE

## 2022-06-09 PROCEDURE — 3051F HG A1C>EQUAL 7.0%<8.0%: CPT | Mod: CPTII,S$GLB,, | Performed by: REGISTERED NURSE

## 2022-06-09 PROCEDURE — 3051F HG A1C>EQUAL 7.0%<8.0%: CPT | Mod: CPTII,S$GLB,, | Performed by: SURGERY

## 2022-06-09 PROCEDURE — 99999 PR PBB SHADOW E&M-EST. PATIENT-LVL III: CPT | Mod: PBBFAC,,, | Performed by: REGISTERED NURSE

## 2022-06-09 PROCEDURE — 3051F PR MOST RECENT HEMOGLOBIN A1C LEVEL 7.0 - < 8.0%: ICD-10-PCS | Mod: CPTII,S$GLB,, | Performed by: SURGERY

## 2022-06-09 PROCEDURE — 1160F PR REVIEW ALL MEDS BY PRESCRIBER/CLIN PHARMACIST DOCUMENTED: ICD-10-PCS | Mod: CPTII,S$GLB,, | Performed by: SURGERY

## 2022-06-09 PROCEDURE — 99204 PR OFFICE/OUTPT VISIT, NEW, LEVL IV, 45-59 MIN: ICD-10-PCS | Mod: S$GLB,,, | Performed by: SURGERY

## 2022-06-09 PROCEDURE — 1160F RVW MEDS BY RX/DR IN RCRD: CPT | Mod: CPTII,S$GLB,, | Performed by: SURGERY

## 2022-06-09 PROCEDURE — 4010F ACE/ARB THERAPY RXD/TAKEN: CPT | Mod: CPTII,S$GLB,, | Performed by: SURGERY

## 2022-06-09 PROCEDURE — 99999 PR PBB SHADOW E&M-EST. PATIENT-LVL III: ICD-10-PCS | Mod: PBBFAC,,, | Performed by: REGISTERED NURSE

## 2022-06-09 PROCEDURE — 1159F PR MEDICATION LIST DOCUMENTED IN MEDICAL RECORD: ICD-10-PCS | Mod: CPTII,S$GLB,, | Performed by: SURGERY

## 2022-06-09 PROCEDURE — 3078F DIAST BP <80 MM HG: CPT | Mod: CPTII,S$GLB,, | Performed by: SURGERY

## 2022-06-09 PROCEDURE — 3051F PR MOST RECENT HEMOGLOBIN A1C LEVEL 7.0 - < 8.0%: ICD-10-PCS | Mod: CPTII,S$GLB,, | Performed by: REGISTERED NURSE

## 2022-06-09 PROCEDURE — 4010F PR ACE/ARB THEARPY RXD/TAKEN: ICD-10-PCS | Mod: CPTII,S$GLB,, | Performed by: REGISTERED NURSE

## 2022-06-09 PROCEDURE — 3079F DIAST BP 80-89 MM HG: CPT | Mod: CPTII,S$GLB,, | Performed by: REGISTERED NURSE

## 2022-06-09 PROCEDURE — 1159F MED LIST DOCD IN RCRD: CPT | Mod: CPTII,S$GLB,, | Performed by: SURGERY

## 2022-06-09 PROCEDURE — 99215 OFFICE O/P EST HI 40 MIN: CPT | Mod: S$GLB,,, | Performed by: REGISTERED NURSE

## 2022-06-09 PROCEDURE — 1159F MED LIST DOCD IN RCRD: CPT | Mod: CPTII,S$GLB,, | Performed by: REGISTERED NURSE

## 2022-06-09 PROCEDURE — 99999 PR PBB SHADOW E&M-EST. PATIENT-LVL III: CPT | Mod: PBBFAC,,, | Performed by: SURGERY

## 2022-06-09 PROCEDURE — 3074F SYST BP LT 130 MM HG: CPT | Mod: CPTII,S$GLB,, | Performed by: REGISTERED NURSE

## 2022-06-09 PROCEDURE — 3008F PR BODY MASS INDEX (BMI) DOCUMENTED: ICD-10-PCS | Mod: CPTII,S$GLB,, | Performed by: SURGERY

## 2022-06-09 PROCEDURE — 3077F PR MOST RECENT SYSTOLIC BLOOD PRESSURE >= 140 MM HG: ICD-10-PCS | Mod: CPTII,S$GLB,, | Performed by: SURGERY

## 2022-06-09 PROCEDURE — 99999 PR PBB SHADOW E&M-EST. PATIENT-LVL III: ICD-10-PCS | Mod: PBBFAC,,, | Performed by: SURGERY

## 2022-06-09 PROCEDURE — 99204 OFFICE O/P NEW MOD 45 MIN: CPT | Mod: S$GLB,,, | Performed by: SURGERY

## 2022-06-09 PROCEDURE — 3078F PR MOST RECENT DIASTOLIC BLOOD PRESSURE < 80 MM HG: ICD-10-PCS | Mod: CPTII,S$GLB,, | Performed by: SURGERY

## 2022-06-09 PROCEDURE — 3008F PR BODY MASS INDEX (BMI) DOCUMENTED: ICD-10-PCS | Mod: CPTII,S$GLB,, | Performed by: REGISTERED NURSE

## 2022-06-09 PROCEDURE — 3074F PR MOST RECENT SYSTOLIC BLOOD PRESSURE < 130 MM HG: ICD-10-PCS | Mod: CPTII,S$GLB,, | Performed by: REGISTERED NURSE

## 2022-06-09 PROCEDURE — 99000 SPECIMEN HANDLING OFFICE-LAB: CPT | Performed by: INTERNAL MEDICINE

## 2022-06-09 PROCEDURE — 3079F PR MOST RECENT DIASTOLIC BLOOD PRESSURE 80-89 MM HG: ICD-10-PCS | Mod: CPTII,S$GLB,, | Performed by: REGISTERED NURSE

## 2022-06-09 PROCEDURE — 4010F ACE/ARB THERAPY RXD/TAKEN: CPT | Mod: CPTII,S$GLB,, | Performed by: REGISTERED NURSE

## 2022-06-09 PROCEDURE — 4010F PR ACE/ARB THEARPY RXD/TAKEN: ICD-10-PCS | Mod: CPTII,S$GLB,, | Performed by: SURGERY

## 2022-06-09 PROCEDURE — 1159F PR MEDICATION LIST DOCUMENTED IN MEDICAL RECORD: ICD-10-PCS | Mod: CPTII,S$GLB,, | Performed by: REGISTERED NURSE

## 2022-06-09 NOTE — PROGRESS NOTES
Encounter Date:  2022    Patient ID: Osman Li Jr.  Age:  57 y.o. :  1965     No chief complaint on file.    History:    Mr. Li is a 57 y.o. male who presents with 9mm R hypodense liver lesion in the setting of newly dx colon CA, pT3N2b.  He arrives to clinic from [place].     Referred by: Dr. Waddell    He presented to local ED with abd pain with constipation x 1 month. Imaging revealed ~ 10 cm colonic dilation related to obstruction near splenic flexure. He underwent partial colectomy, end colostomy, and splenectomy with Dr. Waddell on 22.     Pathology revealed 5 cm poorly differentiated adenocarcinoma, pT3N2b with 17 out of 27 lymph nodes positive.   His case was presented at [place] tumor board with recommendations for adj chemotherapy and MRI to evaluate liver lesion. He has been evaluated by med onc, Dr. Khoobehi, for adj chemotherapy with FOLFOX.    He reports recovering from surgery now mostly. Tolerating regular diet and changing ostomy 1-3 times per day. Had initial 30 lbs weight loss after surgery but has not stabilized. Denies fever, chills, dyspnea, changes with bowel/bladder, jaundice, or acholic stools.    Prior abd surgery: s/p partial colectomy, end colostomy, and splenectomy 2022; laparoscopic cholecystectomy , right internal jugular port placement .  No family hx of CA  Not currently taking anticoagulation  + smoking (former 1.5 now cutting back <1 to 1 pack per day)    Data:     Radiology:  Dr. Bo Goodwin and I personally reviewed these images:   2022: MRI abd:  No definite findings of a patent metastatic disease.  Subcentimeter right hepatic lobe lesion which may represent a tiny hemangioma.  Attention on follow-up is recommended.     Status post partial left hemicolectomy with left lower quadrant colostomy.  Status post splenectomy.  Nonspecific left upper quadrant postoperative fluid collection, grossly similar in size to previous  CT.     Moderate left pleural effusion also similar to previous exam    5/31/2022: CT C/A/P:  Air and fluid collections left upper quadrant of the abdomen in the region of the splenic bed and bed of the splenic flexure of the colon extending subphrenic with air and fluid particular the air increased compared to the prior exam..  The scattered amount of 3 air seen elsewhere have decreased.  Persistent left pleural effusion and left posterior basilar consolidation and atelectasis.  Resolution of previously seen right pleural effusion.  Dilated small bowel left upper quadrant of the abdomen may represent localized ileus.     The amount of extra intestinal air left upper quadrant of the abdomen/subphrenic, and the increase in amount of air compared to the prior study 04/29/2022 questions in the bowel leak left upper quadrant of the abdomen.  One collection that appears more defined than on the prior exam questions also developing abscess.      4/25/2022: Pathology:    CAP Synoptic Checklist for Colonic Neoplasms:     - Procedure: Partial colectomy     - Tumor Site: Splenic flexure     - Histologic Type: Adenocarcinoma     - Histologic Grade: G3, poorly differentiated     - Tumor Size: 5.0 x 4.5 x 2.7 cm     - Tumor Extent: Invades through muscularis propria into pericolorectal   tissue     - Macroscopic Tumor Perforation: Not identified     - Lymphovascular Invasion: Present, small vessel involved     - Perineural Invasion: Not identified     - Number of Tumor Buds: 5 in one hotspot field     - Tumor Bud Score: Intermediate (5-9)     - Type of Polyp in which Invasive Carcinoma Arose: None identified     - Treatment Effect: No known presurgical therapy     - Margins: All margins negative for invasive carcinoma       - Closest Margin to Invasive Carcinoma: 0.1 cm to mesenteric margin     - Regional Lymph Nodes:       - Number of Lymph Nodes with Tumor: 17       - Number of Lymph Nodes Examined: 27       - Tumor Deposits:  Not identified     - Distant Metastasis: Not applicable     - Pathologic Stage Classification: pT3 pN2b     - Additional Findings: Fibrous serosal adhesions; benign spleen     - Special Studies: See below for results of MSI testing; CARLOS ENRIQUE/MILLIE panel has         been ordered and results will be issued in a supplemental report     - Designate Block for Future Studies: [id number]   Immunohistochemistry (IHC) Testing for Mismatch Repair (MMR) Proteins:   MLH1 - Intact nuclear expression   MSH2 - Intact nuclear expression   MSH6 - Intact nuclear expression   PMS2 - Intact nuclear expression   Background nonneoplastic tissue/internal control with intact nuclear   expression   IHC Interpretation   No loss of nuclear expression of MMR proteins: low probability of   microsatellite instability     Labs:    Lab Results   Component Value Date    CEA <1.7 06/06/2022       Lab Results   Component Value Date    WBC 16.91 (H) 06/06/2022    HGB 11.6 (L) 06/06/2022    HCT 34.8 (L) 06/06/2022    MCV 81 (L) 06/06/2022     (H) 06/06/2022         Chemistry        Component Value Date/Time     (L) 06/06/2022 0829    K 5.0 06/06/2022 0829    CL 95 06/06/2022 0829    CO2 26 06/06/2022 0829    BUN 10 06/06/2022 0829    CREATININE 1.1 06/06/2022 0829     (H) 06/06/2022 0829        Component Value Date/Time    CALCIUM 10.3 06/06/2022 0829    ALKPHOS 196 (H) 06/06/2022 0829    AST 14 06/06/2022 0829    ALT 17 06/06/2022 0829    BILITOT 0.3 06/06/2022 0829    ESTGFRAFRICA >60 06/06/2022 0829    EGFRNONAA >60 06/06/2022 0829        Lab Results   Component Value Date    HGBA1C 7.1 05/09/2022       Past Medical History:   Diagnosis Date    DM (diabetes mellitus)     Hyperlipidemia     Hypertension     Prediabetes      Past Surgical History:   Procedure Laterality Date    CHOLECYSTECTOMY  2011    COLONOSCOPY      2018    COLOSTOMY N/A 4/25/2022    Procedure: CREATION, COLOSTOMY;  Surgeon: Carlton Waddell MD;  Location:  "[facility] OR;  Service: General;  Laterality: N/A;    INSERTION OF TUNNELED CENTRAL VENOUS CATHETER (CVC) WITH SUBCUTANEOUS PORT Right 5/18/2022    Procedure: [id number];  Surgeon: Carlton Waddell MD;  Location: [facility] OR;  Service: General;  Laterality: Right;    MOBILIZATION OF SPLENIC FLEXURE N/A 4/25/2022    Procedure: MOBILIZATION, SPLENIC FLEXURE;  Surgeon: Carlton Waddell MD;  Location: [facility] OR;  Service: General;  Laterality: N/A;    SPLENECTOMY N/A 4/25/2022    Procedure: SPLENECTOMY;  Surgeon: Carlton Waddell MD;  Location: [facility] OR;  Service: General;  Laterality: N/A;    SUBTOTAL COLECTOMY N/A 4/25/2022    Procedure: COLECTOMY, PARTIAL;  Surgeon: Carlton Waddell MD;  Location: [facility] OR;  Service: General;  Laterality: N/A;     Current Outpatient Medications on File Prior to Visit   Medication Sig Dispense Refill    atorvastatin (LIPITOR) 20 MG tablet Take 1 tablet (20 mg total) by mouth once daily. 90 tablet 1    enalapriL-hydrochlorothiazide (VASERETIC) 10-25 mg per tablet Take 1 tablet by mouth once daily. 90 tablet 1    levoFLOXacin (LEVAQUIN) 750 MG tablet Take 1 tablet (750 mg total) by mouth once daily. 5 tablet 0    metFORMIN (GLUCOPHAGE) 1000 MG tablet Take 1 tablet (1,000 mg total) by mouth 2 (two) times daily with meals. 180 tablet 1    ondansetron (ZOFRAN-ODT) 4 MG TbDL Take 2 tablets (8 mg total) by mouth every 6 (six) hours as needed (nausea). 60 tablet 3    oxyCODONE (ROXICODONE) 30 MG Tab Take 1 tablet (30 mg total) by mouth every 6 (six) hours as needed for Pain. 60 tablet 0    pen needle, diabetic (BD ULTRA-FINE MICRO PEN NEEDLE) 32 gauge x 1/4" Ndle 1 each by Misc.(Non-Drug; Combo Route) route once a week. 50 each 1    promethazine (PHENERGAN) 12.5 MG Tab Take 1 tablet (12.5 mg total) by mouth every 4 (four) hours as needed. 25 tablet 1    sildenafiL (VIAGRA) 100 MG tablet Take 1 tablet (100 mg total) by mouth daily as needed for Erectile Dysfunction. 30 tablet 1     No " current facility-administered medications on file prior to visit.     Review of patient's allergies indicates:   Allergen Reactions    Penicillins Rash       Family History:  His family history includes Heart disease in his father.      Social History:   reports that he has been smoking cigarettes. He has a 17.50 pack-year smoking history. He has never used smokeless tobacco. He reports previous alcohol use. He reports that he does not use drugs. Now cutting back     ROS:    Pertinent positive/negatives detailed in HPI, all other systems negative.     Physical Exam:  There were no vitals taken for this visit.    Constitutional:  Non-toxic, no acute distress.  Performance status: ECOG 0  Eyes:  Sclerae anicteric, gaze symmetrical  Neck:  Trachea midline, FROM  Resp:  Even and unlabored resp  CV:  Regular pulse, no edema  Abd:  Soft, non-tender, no masses, no ascites, no superficial varices. Midline scar. Ostomy in left lower quadrant pink, productive of stool  Musculoskeletal:  Ambulatory, normal gait, no muscle wasting  Neuro:  No gross deficits  Psych:  Awake, alert, oriented.  Answers and asks questions appropriately      ICD-10-CM ICD-9-CM    1. Malignant neoplasm of splenic flexure  C18.5 153.7      Assessment/Plan:  57M hx HTN and colon adenocarcinoma s/p partial colectomy, end colostomy, and splenectomy (pT3N2b) planned for adjuvant chemotherapy found to have subcentimeter lesion in right lobe of the liver (segment VI). Imaging appears to be hemangioma as opposed to metastatic disease. Would not pursue biopsy or more aggressive surgical treatment at this time as patient. He will receive adjuvant chemotherapy and this lesion should be followed with routine surveillance imaging.    Case discussed with MD Sajan Mondragon MD  General Surgery, PGY-4

## 2022-06-09 NOTE — PROGRESS NOTES
"    Osman Li    MRN [id number]  [id number] (SENTINEL)  IRB# [id number]  09 June 2022      INFORMED CONSENT AND BASELINE VISIT NOTE:     Meet and Greet Pt arrival at clinic today to discuss participation in Raymond trial after recommendation from his Oncologists, Dr. Geoffrey Nichole and YANELI Amos. Through pre-screening, Pt appears to be an eligible match for Raymond trial. I introduced the trial as an option to Pt who was interested. We spoke about the trial and the pre-screening IFC process.     We discussed the ICF in detail including:   * Purpose of the study   * Length of study and number of participants  * Procedures (Tissue testing and biomarker requirements)  * Study visits  * Risks  * Confidentiality and HIPAA Authorization for Release of Medical Records for the research trial/ subject's rights/research related injury  * Potential Benefits  * Costs (Insurance)  * Payment for Participation and/or Reimbursement of expenses  * Alternative/Treatments  * Study Related Questions and Compensation for Injury   * Participation in the research trial is voluntary and patient may withdraw at any time, Questions about your rights as a research subject  * Employees in Research, New Findings, Study withdrawal, Withdrawal of consent to collect and use of personal data  * DNA sequencing  * Use of remaining samples, Return of research results   * Confidentiality/ HIPAA     Pt was given the opportunity for questions. All questions were answered to his satisfaction. Pt would like to proceed with signing "pre-screening" ICF. Pre-screening ICF was signed by the Pt and witnessed by me. Pt was provided with a copy of signed ICF, along with Ms. Adelina Gallego information card.     Per study calendar, Pt had his mandatory whole blood specimens drawn for the screening visit. Pt tissue will be requested by [facility]'s Tissue procurement and obtained from the pathology department. Mandatory whole blood specimens " shipped out today via [employer]. Pending the results, Pt may proceed to trial if eligible.      Pt was entered into the study Portal and assigned the pt ID# [id number].     (Copy of signed ICF has been scanned into pt's chart and linked to this encounter, see link @ bottom of encounter.)

## 2022-06-09 NOTE — Clinical Note
Good morning! We saw this patient in clinic yesterday. He will return to your office for management and treatment. We enrolled him in the [employer] ctDNA study, which we will draw labs here at [facility] every 3 months. He is aware and is agreeable. He was inquiring about start date for treatment - can you please have your office contact him to schedule labs, visits, and treatments? Looks like he is already authorized and has his port, so he's ready to go!

## 2022-06-10 ENCOUNTER — DOCUMENTATION ONLY (OUTPATIENT)
Dept: HEMATOLOGY/ONCOLOGY | Facility: CLINIC | Age: 57
End: 2022-06-10
Payer: COMMERCIAL

## 2022-06-10 ENCOUNTER — PATIENT MESSAGE (OUTPATIENT)
Dept: FAMILY MEDICINE | Facility: CLINIC | Age: 57
End: 2022-06-10

## 2022-06-10 NOTE — PROGRESS NOTES
[facility]     Colorectal Liver Metastasis Tumor Board Note      Date: 6/9/2022      Case Overview: Mr. 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 He has not yet started on treatment.       Participants: Medical oncology, surgical oncology, transplant surgeons, interventional radiology, oncology navigation.     Imaging Reviewed: 6/6/22 - MRI Abdomen    Radiology Review: Most recent MRI shows indeterminate liver lesion in segment 6 measuring 1.1 cm. Lesion demonstrates subtle, partial delayed filling which can be seen with hemangiomas. Stable when compared to 4/24/22. Post op collection in LUQ also unchanged. Non specific lymph nodes adjacent to greater curvature, slightly smaller than on prior. Attention at f/u imaging recommended. No definite evidence of lung metastatic disease.    Orders/Referrals: Enrolled in [facility] [place] ctDNA study. Initial blood test drawn and sent. Pt to return in 3 months for next set of labs (only drawn at [facility] [place] location).     Board Recommendations: Continue with plan for systemic therapy x 6 months. No evidence of metastatic disease at this time. Monitor area in liver, though believed to be hemangioma. Area near greater curvature of stomach may be representative of pancreatic tail leak. Continue to monitor.

## 2022-06-13 ENCOUNTER — PATIENT MESSAGE (OUTPATIENT)
Dept: FAMILY MEDICINE | Facility: CLINIC | Age: 57
End: 2022-06-13

## 2022-06-13 ENCOUNTER — PATIENT MESSAGE (OUTPATIENT)
Dept: HEMATOLOGY/ONCOLOGY | Facility: CLINIC | Age: 57
End: 2022-06-13
Payer: COMMERCIAL

## 2022-06-14 ENCOUNTER — TELEPHONE (OUTPATIENT)
Dept: HEMATOLOGY/ONCOLOGY | Facility: CLINIC | Age: 57
End: 2022-06-14
Payer: COMMERCIAL

## 2022-06-16 ENCOUNTER — TELEPHONE (OUTPATIENT)
Dept: ADMINISTRATIVE | Facility: HOSPITAL | Age: 57
End: 2022-06-16
Payer: COMMERCIAL

## 2022-06-20 ENCOUNTER — HOSPITAL ENCOUNTER (INPATIENT)
Facility: HOSPITAL | Age: 57
LOS: 2 days | Discharge: HOME HEALTH | DRG: 862 | End: 2022-06-22
Attending: EMERGENCY MEDICINE | Admitting: HOSPITALIST
Payer: COMMERCIAL

## 2022-06-20 ENCOUNTER — PATIENT MESSAGE (OUTPATIENT)
Dept: HEMATOLOGY/ONCOLOGY | Facility: CLINIC | Age: 57
End: 2022-06-20
Payer: COMMERCIAL

## 2022-06-20 DIAGNOSIS — A41.9 SEPSIS, DUE TO UNSPECIFIED ORGANISM, UNSPECIFIED WHETHER ACUTE ORGAN DYSFUNCTION PRESENT: Primary | ICD-10-CM

## 2022-06-20 DIAGNOSIS — K65.1 INTRA-ABDOMINAL ABSCESS: ICD-10-CM

## 2022-06-20 DIAGNOSIS — E87.1 HYPONATREMIA: ICD-10-CM

## 2022-06-20 DIAGNOSIS — R06.02 SHORTNESS OF BREATH: ICD-10-CM

## 2022-06-20 DIAGNOSIS — J18.9 COMMUNITY ACQUIRED PNEUMONIA OF LEFT LOWER LOBE OF LUNG: ICD-10-CM

## 2022-06-20 LAB
BACTERIA #/AREA URNS HPF: NORMAL /HPF
BILIRUB UR QL STRIP: NEGATIVE
CLARITY UR: CLEAR
COLOR UR: YELLOW
GLUCOSE UR QL STRIP: ABNORMAL
HGB UR QL STRIP: ABNORMAL
INFLUENZA A, MOLECULAR: NEGATIVE
INFLUENZA B, MOLECULAR: NEGATIVE
KETONES UR QL STRIP: ABNORMAL
LACTATE SERPL-SCNC: 1.2 MMOL/L (ref 0.5–2.2)
LEUKOCYTE ESTERASE UR QL STRIP: NEGATIVE
MICROSCOPIC COMMENT: NORMAL
NITRITE UR QL STRIP: NEGATIVE
PH UR STRIP: 6 [PH] (ref 5–8)
POCT GLUCOSE: 242 MG/DL (ref 70–110)
POCT GLUCOSE: 272 MG/DL (ref 70–110)
PROT UR QL STRIP: NEGATIVE
RBC #/AREA URNS HPF: 1 /HPF (ref 0–4)
SARS-COV-2 RDRP RESP QL NAA+PROBE: NEGATIVE
SP GR UR STRIP: <=1.005 (ref 1–1.03)
SPECIMEN SOURCE: NORMAL
URN SPEC COLLECT METH UR: ABNORMAL
UROBILINOGEN UR STRIP-ACNC: NEGATIVE EU/DL
YEAST URNS QL MICRO: NORMAL

## 2022-06-20 PROCEDURE — 99024 POSTOP FOLLOW-UP VISIT: CPT | Mod: ,,, | Performed by: STUDENT IN AN ORGANIZED HEALTH CARE EDUCATION/TRAINING PROGRAM

## 2022-06-20 PROCEDURE — 25000003 PHARM REV CODE 250: Performed by: HOSPITALIST

## 2022-06-20 PROCEDURE — 83605 ASSAY OF LACTIC ACID: CPT | Performed by: EMERGENCY MEDICINE

## 2022-06-20 PROCEDURE — 63600175 PHARM REV CODE 636 W HCPCS: Performed by: EMERGENCY MEDICINE

## 2022-06-20 PROCEDURE — 96367 TX/PROPH/DG ADDL SEQ IV INF: CPT

## 2022-06-20 PROCEDURE — 99285 EMERGENCY DEPT VISIT HI MDM: CPT | Mod: 25

## 2022-06-20 PROCEDURE — 81000 URINALYSIS NONAUTO W/SCOPE: CPT | Performed by: EMERGENCY MEDICINE

## 2022-06-20 PROCEDURE — 99024 PR POST-OP FOLLOW-UP VISIT: ICD-10-PCS | Mod: ,,, | Performed by: STUDENT IN AN ORGANIZED HEALTH CARE EDUCATION/TRAINING PROGRAM

## 2022-06-20 PROCEDURE — 63600175 PHARM REV CODE 636 W HCPCS: Performed by: HOSPITALIST

## 2022-06-20 PROCEDURE — 87502 INFLUENZA DNA AMP PROBE: CPT | Performed by: EMERGENCY MEDICINE

## 2022-06-20 PROCEDURE — U0002 COVID-19 LAB TEST NON-CDC: HCPCS | Performed by: EMERGENCY MEDICINE

## 2022-06-20 PROCEDURE — 25500020 PHARM REV CODE 255

## 2022-06-20 PROCEDURE — 96365 THER/PROPH/DIAG IV INF INIT: CPT

## 2022-06-20 PROCEDURE — S0030 INJECTION, METRONIDAZOLE: HCPCS | Performed by: HOSPITALIST

## 2022-06-20 PROCEDURE — 25000003 PHARM REV CODE 250: Performed by: EMERGENCY MEDICINE

## 2022-06-20 PROCEDURE — 12000002 HC ACUTE/MED SURGE SEMI-PRIVATE ROOM

## 2022-06-20 PROCEDURE — 87040 BLOOD CULTURE FOR BACTERIA: CPT | Mod: 59 | Performed by: EMERGENCY MEDICINE

## 2022-06-20 RX ORDER — OXYCODONE HYDROCHLORIDE 10 MG/1
30 TABLET ORAL EVERY 6 HOURS PRN
Status: DISCONTINUED | OUTPATIENT
Start: 2022-06-20 | End: 2022-06-21

## 2022-06-20 RX ORDER — GLUCAGON 1 MG
1 KIT INJECTION
Status: DISCONTINUED | OUTPATIENT
Start: 2022-06-20 | End: 2022-06-22

## 2022-06-20 RX ORDER — INSULIN ASPART 100 [IU]/ML
0-5 INJECTION, SOLUTION INTRAVENOUS; SUBCUTANEOUS EVERY 6 HOURS PRN
Status: DISCONTINUED | OUTPATIENT
Start: 2022-06-20 | End: 2022-06-22

## 2022-06-20 RX ORDER — LISINOPRIL 10 MG/1
20 TABLET ORAL DAILY
Status: DISCONTINUED | OUTPATIENT
Start: 2022-06-21 | End: 2022-06-22 | Stop reason: HOSPADM

## 2022-06-20 RX ORDER — SODIUM CHLORIDE 9 MG/ML
1000 INJECTION, SOLUTION INTRAVENOUS
Status: COMPLETED | OUTPATIENT
Start: 2022-06-20 | End: 2022-06-20

## 2022-06-20 RX ORDER — POLYETHYLENE GLYCOL 3350 17 G/17G
17 POWDER, FOR SOLUTION ORAL NIGHTLY
Status: DISCONTINUED | OUTPATIENT
Start: 2022-06-20 | End: 2022-06-22 | Stop reason: HOSPADM

## 2022-06-20 RX ORDER — METRONIDAZOLE 500 MG/100ML
500 INJECTION, SOLUTION INTRAVENOUS
Status: DISCONTINUED | OUTPATIENT
Start: 2022-06-20 | End: 2022-06-22 | Stop reason: HOSPADM

## 2022-06-20 RX ORDER — ENOXAPARIN SODIUM 100 MG/ML
40 INJECTION SUBCUTANEOUS EVERY 24 HOURS
Status: DISCONTINUED | OUTPATIENT
Start: 2022-06-20 | End: 2022-06-22 | Stop reason: HOSPADM

## 2022-06-20 RX ORDER — NALOXONE HCL 0.4 MG/ML
0.02 VIAL (ML) INJECTION
Status: DISCONTINUED | OUTPATIENT
Start: 2022-06-20 | End: 2022-06-22 | Stop reason: HOSPADM

## 2022-06-20 RX ORDER — PROCHLORPERAZINE EDISYLATE 5 MG/ML
5 INJECTION INTRAMUSCULAR; INTRAVENOUS EVERY 6 HOURS PRN
Status: DISCONTINUED | OUTPATIENT
Start: 2022-06-20 | End: 2022-06-22 | Stop reason: HOSPADM

## 2022-06-20 RX ORDER — ONDANSETRON 2 MG/ML
8 INJECTION INTRAMUSCULAR; INTRAVENOUS EVERY 8 HOURS PRN
Status: DISCONTINUED | OUTPATIENT
Start: 2022-06-20 | End: 2022-06-22 | Stop reason: HOSPADM

## 2022-06-20 RX ORDER — SODIUM CHLORIDE 9 MG/ML
INJECTION, SOLUTION INTRAVENOUS CONTINUOUS
Status: DISCONTINUED | OUTPATIENT
Start: 2022-06-20 | End: 2022-06-22 | Stop reason: HOSPADM

## 2022-06-20 RX ORDER — ATORVASTATIN CALCIUM 20 MG/1
20 TABLET, FILM COATED ORAL DAILY
Status: DISCONTINUED | OUTPATIENT
Start: 2022-06-21 | End: 2022-06-22 | Stop reason: HOSPADM

## 2022-06-20 RX ADMIN — SODIUM CHLORIDE 1000 ML: 0.9 INJECTION, SOLUTION INTRAVENOUS at 08:06

## 2022-06-20 RX ADMIN — OXYCODONE HYDROCHLORIDE 10 MG: 10 TABLET ORAL at 08:06

## 2022-06-20 RX ADMIN — METRONIDAZOLE 500 MG: 500 INJECTION, SOLUTION INTRAVENOUS at 01:06

## 2022-06-20 RX ADMIN — OXYCODONE HYDROCHLORIDE 30 MG: 10 TABLET ORAL at 01:06

## 2022-06-20 RX ADMIN — INSULIN ASPART 1 UNITS: 100 INJECTION, SOLUTION INTRAVENOUS; SUBCUTANEOUS at 08:06

## 2022-06-20 RX ADMIN — IOHEXOL 100 ML: 350 INJECTION, SOLUTION INTRAVENOUS at 09:06

## 2022-06-20 RX ADMIN — VANCOMYCIN HYDROCHLORIDE 2000 MG: 1 INJECTION, POWDER, LYOPHILIZED, FOR SOLUTION INTRAVENOUS at 03:06

## 2022-06-20 RX ADMIN — CEFTRIAXONE 1 G: 1 INJECTION, SOLUTION INTRAVENOUS at 09:06

## 2022-06-20 RX ADMIN — SODIUM CHLORIDE 1000 ML: 0.9 INJECTION, SOLUTION INTRAVENOUS at 09:06

## 2022-06-20 RX ADMIN — INSULIN ASPART 3 UNITS: 100 INJECTION, SOLUTION INTRAVENOUS; SUBCUTANEOUS at 05:06

## 2022-06-20 RX ADMIN — POLYETHYLENE GLYCOL 3350 17 G: 17 POWDER, FOR SOLUTION ORAL at 06:06

## 2022-06-20 RX ADMIN — METRONIDAZOLE 500 MG: 500 INJECTION, SOLUTION INTRAVENOUS at 08:06

## 2022-06-20 RX ADMIN — CEFTRIAXONE 1 G: 1 INJECTION, SOLUTION INTRAVENOUS at 10:06

## 2022-06-20 RX ADMIN — SODIUM CHLORIDE: 0.9 INJECTION, SOLUTION INTRAVENOUS at 01:06

## 2022-06-20 RX ADMIN — AZITHROMYCIN MONOHYDRATE 500 MG: 500 INJECTION, POWDER, LYOPHILIZED, FOR SOLUTION INTRAVENOUS at 09:06

## 2022-06-20 NOTE — PROGRESS NOTES
Pharmacokinetic Initial Assessment: IV Vancomycin    Assessment/Plan:    Initiate intravenous vancomycin 2000 mg IV every 12 hours  Desired empiric serum trough concentration is 15 to 20 mcg/mL  Draw vancomycin trough level 60 min prior to third dose on 6-21 at approximately 1230  Pharmacy will continue to follow and monitor vancomycin.      Please contact pharmacy at extension [phone] with any questions regarding this assessment.     Thank you for the consult,   Dominic Salvador       Patient brief summary:  Osman Li Jr. is a 57 y.o. male initiated on antimicrobial therapy with IV Vancomycin for treatment of suspected intra-abdominal infection    Drug Allergies:   Review of patient's allergies indicates:   Allergen Reactions    Penicillins Rash       Actual Body Weight:   93 kg    Renal Function:   Estimated Creatinine Clearance: 104 mL/min (based on SCr of 0.9 mg/dL).,     Dialysis Method (if applicable):  N/A    CBC (last 72 hours):  Recent Labs   Lab Result Units 06/20/22  0708   WBC K/uL 27.25*   Hemoglobin g/dL 10.6*   Hematocrit % 32.5*   Platelets K/uL 650*   Gran % % 79.0*   Lymph % % 13.0*   Mono % % 8.0   Eosinophil % % 0.0   Basophil % % 0.0   Differential Method  Manual       Metabolic Panel (last 72 hours):  Recent Labs   Lab Result Units 06/20/22  0708 06/20/22  1104   Sodium mmol/L 127*  --    Potassium mmol/L 3.8  --    Chloride mmol/L 89*  --    CO2 mmol/L 25  --    Glucose mg/dL 248*  --    Glucose, UA   --  4+*   BUN mg/dL 15  --    Creatinine mg/dL 0.9  --    Albumin g/dL 2.7*  --    Total Bilirubin mg/dL 0.9  --    Alkaline Phosphatase U/L 306*  --    AST U/L 17  --    ALT U/L 30  --    Magnesium mg/dL 1.6  --        Drug levels (last 3 results):  No results for input(s): VANCOMYCINRA, VANCORANDOM, VANCOMYCINPE, VANCOPEAK, VANCOMYCINTR, VANCOTROUGH in the last 72 hours.    Microbiologic Results:  Microbiology Results (last 7 days)       Procedure Component Value Units Date/Time    Blood  Culture #1 [[id number]] Collected: 06/20/22 0917    Order Status: Sent Specimen: Blood from Antecubital, Left Updated: 06/20/22 1159    Blood Culture #2 [[id number]] Collected: 06/20/22 0917    Order Status: Sent Specimen: Blood from Antecubital, Right Updated: 06/20/22 1159    Influenza A & B by Molecular [[id number]] Collected: 06/20/22 0808    Order Status: Completed Specimen: Nasopharyngeal Swab Updated: 06/20/22 0903     Influenza A, Molecular Negative     Influenza B, Molecular Negative     Flu A & B Source Nasal swab

## 2022-06-20 NOTE — PROGRESS NOTES
"[facility] Emergency Dept  General Surgery  Consult Note    Consults  Subjective:     Chief Complaint/Reason for Admission:  Abdominal pain, cough, shortness of breath.    History of Present Illness:  This is a 57-year-old male who recently underwent exploratory laparotomy with resection of his splenic flexure colon for a large, node positive, colon cancer.  It was bleeding during the operation from the spleen so splenectomy was also performed.  He has had a air-fluid level collection in the splenic bed since surgery.  He re-presented with worsening respiratory symptoms, and left upper quadrant abdominal pain.  Imaging in the ER was consistent with a splenic bed abscess.    No current facility-administered medications on file prior to encounter.     Current Outpatient Medications on File Prior to Encounter   Medication Sig    atorvastatin (LIPITOR) 20 MG tablet Take 1 tablet (20 mg total) by mouth once daily.    enalapriL-hydrochlorothiazide (VASERETIC) 10-25 mg per tablet Take 1 tablet by mouth once daily.    levoFLOXacin (LEVAQUIN) 750 MG tablet Take 1 tablet (750 mg total) by mouth once daily. (Patient not taking: Reported on 6/9/2022)    metFORMIN (GLUCOPHAGE) 1000 MG tablet Take 1 tablet (1,000 mg total) by mouth 2 (two) times daily with meals.    oxyCODONE (ROXICODONE) 30 MG Tab Take 1 tablet (30 mg total) by mouth every 6 (six) hours as needed for Pain.    pen needle, diabetic (BD ULTRA-FINE MICRO PEN NEEDLE) 32 gauge x 1/4" Ndle 1 each by Misc.(Non-Drug; Combo Route) route once a week.    promethazine (PHENERGAN) 12.5 MG Tab Take 1 tablet (12.5 mg total) by mouth every 4 (four) hours as needed.    sildenafiL (VIAGRA) 100 MG tablet Take 1 tablet (100 mg total) by mouth daily as needed for Erectile Dysfunction.       Review of patient's allergies indicates:   Allergen Reactions    Penicillins Rash       Past Medical History:   Diagnosis Date    DM (diabetes mellitus)     Hyperlipidemia     " Hypertension     Prediabetes      Past Surgical History:   Procedure Laterality Date    CHOLECYSTECTOMY  2011    COLONOSCOPY      2018    COLOSTOMY N/A 4/25/2022    Procedure: CREATION, COLOSTOMY;  Surgeon: Carlton Waddell MD;  Location: [facility] OR;  Service: General;  Laterality: N/A;    INSERTION OF TUNNELED CENTRAL VENOUS CATHETER (CVC) WITH SUBCUTANEOUS PORT Right 5/18/2022    Procedure: [id number];  Surgeon: Carlton Waddell MD;  Location: [facility] OR;  Service: General;  Laterality: Right;    MOBILIZATION OF SPLENIC FLEXURE N/A 4/25/2022    Procedure: MOBILIZATION, SPLENIC FLEXURE;  Surgeon: Carlton Waddell MD;  Location: [facility] OR;  Service: General;  Laterality: N/A;    SPLENECTOMY N/A 4/25/2022    Procedure: SPLENECTOMY;  Surgeon: Carlton Waddell MD;  Location: [facility] OR;  Service: General;  Laterality: N/A;    SUBTOTAL COLECTOMY N/A 4/25/2022    Procedure: COLECTOMY, PARTIAL;  Surgeon: Carlton Waddell MD;  Location: [facility] OR;  Service: General;  Laterality: N/A;     Family History     Problem Relation (Age of Onset)    Heart disease Father        Tobacco Use    Smoking status: Current Every Day Smoker     Packs/day: 0.50     Years: 35.00     Pack years: 17.50     Types: Cigarettes    Smokeless tobacco: Never Used   Substance and Sexual Activity    Alcohol use: Not Currently    Drug use: Never    Sexual activity: Yes     Partners: Female     Review of Systems   Constitutional: Negative.  Negative for fatigue and fever.   HENT: Negative.    Eyes: Negative.    Respiratory: Positive for cough and shortness of breath.    Cardiovascular: Negative.  Negative for chest pain.   Gastrointestinal: Positive for abdominal distention.   Endocrine: Negative.    Genitourinary: Negative.    Musculoskeletal: Negative.    Skin: Negative.    Allergic/Immunologic: Negative.    Neurological: Negative.    Hematological: Negative.    Psychiatric/Behavioral: Negative.      Objective:     Vital Signs (Most  Recent):  Temp: 99.5 °F (37.5 °C) (06/20/22 0720)  Pulse: 110 (06/20/22 0935)  Resp: 20 (06/20/22 0720)  BP: (!) 171/85 (06/20/22 0935)  SpO2: 97 % (06/20/22 0935) Vital Signs (24h Range):  Temp:  [99.5 °F (37.5 °C)] 99.5 °F (37.5 °C)  Pulse:  [106-111] 110  Resp:  [20] 20  SpO2:  [97 %] 97 %  BP: (161-176)/(77-85) 171/85     Weight: 93 kg (205 lb)  Body mass index is 26.32 kg/m².    No intake or output data in the 24 hours ending 06/20/22 1236    Physical Exam  Constitutional:       General: He is not in acute distress.     Appearance: Normal appearance. He is not ill-appearing, toxic-appearing or diaphoretic.   HENT:      Head: Normocephalic.      Nose: Nose normal.   Eyes:      Conjunctiva/sclera: Conjunctivae normal.   Cardiovascular:      Rate and Rhythm: Regular rhythm. Tachycardia present.   Pulmonary:      Effort: Pulmonary effort is normal.      Breath sounds: Wheezing present.   Abdominal:      Palpations: Abdomen is soft.      Comments: Tenderness in the left upper quadrant towards the flank.   Musculoskeletal:         General: Normal range of motion.      Cervical back: Normal range of motion.   Skin:     General: Skin is warm.   Neurological:      General: No focal deficit present.      Mental Status: He is alert.   Psychiatric:         Mood and Affect: Mood normal.         Significant Labs:  CBC:   Recent Labs   Lab 06/20/22  0708   WBC 27.25*   RBC 4.19*   HGB 10.6*   HCT 32.5*   *   MCV 78*   MCH 25.3*   MCHC 32.6     CMP:   Recent Labs   Lab 06/20/22  0708   *   CALCIUM 10.1   ALBUMIN 2.7*   PROT 7.2   *   K 3.8   CO2 25   CL 89*   BUN 15   CREATININE 0.9   ALKPHOS 306*   ALT 30   AST 17   BILITOT 0.9     Coagulation: No results for input(s): PT, INR, APTT in the last 48 hours.  Lactic Acid:   Recent Labs   Lab 06/20/22  0917   LACTATE 1.2       Significant Diagnostics:  CT scan was reviewed.  Rim enhancing fluid collection in the left upper quadrant at the location of his  splenic resection.  This is slightly enlarged can prior.    Assessment/Plan:     57-year-old male with a likely splenic flexure abscess after partial colectomy and end colostomy and splenectomy for an obstructing colon cancer.  Symptoms have progressively become worse over the past few days prompting ED presentation.  I suspect that some of his respiratory symptoms are likely reactive given the close proximity of this abscess to the diaphragm.    Consult placed to IR for percutaneous drain placement  Antibiotics  Pulmonary toilet  Following    Thank you for your consult. I will follow-up with patient. Please contact us if you have any additional questions.    Carlton Waddell MD  General Surgery  [facility] - Emergency Dept

## 2022-06-20 NOTE — PLAN OF CARE
Medications administered per orders  Pain medication administered per orders  IV fluids administered per orders  IV abx administered per orders  Safe environment maintained   no diabetes and no thyroid trouble.

## 2022-06-20 NOTE — Clinical Note
A pre-sedation assessment was completed by the physician immediately prior to sedation start. Verbal ASA-2 Mallampati-2 Fredrick Lundberg MD-

## 2022-06-20 NOTE — NURSING
Monitor. Received request for IR to place drain for LUQ abscess. Radiologist reviewed and will attempt to place drain tomorrow (6/21/22) sometime after 10 AM. Called to speak with CAMRYN Norman. Informed that MD will attempt to place drain tomorrow. Pt need to be NPO after midnight. Lovenox must be held tonight. Hold placed on Lovenox in pt's MAR.     Also, Emerson mentioned that pt's order states that pt is not taking Metformin, but pt DID take metformin at home yesterday before arriving at the hospital. RN wanted to make sure that medication was noted.

## 2022-06-20 NOTE — ED PROVIDER NOTES
SCRIBE #1 NOTE: I, Hilda Davis, am scribing for, and in the presence of, Sudhir Ledesma DO.         Source of History:  Patient    Chief complaint:  Shortness of Breath, Chills, and Fever ( Symptoms started Friday / tylenol at 0600)      HPI:  Osman Li Jr. is a 57 y.o. male presenting with fever TMAX 101.7 °F, sweating, chills, SOB, mild productive cough, nausea and vomiting that started 3 days ago (Friday 6/17). Patient reports highest fever was measured Saturday morning and last temperature over 100.4 °F was measured yesterday. He endorses he has been taking Tylenol and Ibuprofen as needed. 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 Patient had outpatient labs drawn this morning and is due to start chemotherapy tomorrow. Patient denies known sick contact and endorses he is covid and flu vaccinated. Patient denies any other symptoms at this time. Patient smokes 1 pack of cigarettes per day. Patient had a small left pleural effusion on cxr from 5/18.      This is the extent to the patients complaints today here in the emergency department.    ROS: As per HPI and below:  Constitutional: Fever. Chills. Sweating.  Eyes: No visual changes.  ENT: No sore throat. No ear pain    Cardiovascular: No chest pain.  Respiratory: Shortness of breath. Productive cough.  GI: No abdominal pain. Nausea. Vomiting.  Genitourinary: No abnormal urination.  Neurologic: No headache. No focal weakness.  No numbness.  MSK: No neck pain.  Integument: No rashes or lesions.  Hematologic: No easy bruising.  Endocrine: No excessive thirst or urination.    Review of patient's allergies indicates:   Allergen Reactions    Penicillins Rash       PMH:  As per HPI and below:  Past Medical History:   Diagnosis Date    DM (diabetes mellitus)      "Hyperlipidemia     Hypertension     Prediabetes      Past Surgical History:   Procedure Laterality Date    CHOLECYSTECTOMY  2011    COLONOSCOPY      2018    COLOSTOMY N/A 4/25/2022    Procedure: CREATION, COLOSTOMY;  Surgeon: Carlton Waddell MD;  Location: [facility] OR;  Service: General;  Laterality: N/A;    INSERTION OF TUNNELED CENTRAL VENOUS CATHETER (CVC) WITH SUBCUTANEOUS PORT Right 5/18/2022    Procedure: [id number];  Surgeon: Carlton Waddell MD;  Location: [facility] OR;  Service: General;  Laterality: Right;    MOBILIZATION OF SPLENIC FLEXURE N/A 4/25/2022    Procedure: MOBILIZATION, SPLENIC FLEXURE;  Surgeon: Carlton Waddell MD;  Location: [facility] OR;  Service: General;  Laterality: N/A;    SPLENECTOMY N/A 4/25/2022    Procedure: SPLENECTOMY;  Surgeon: Carlton Waddell MD;  Location: [facility] OR;  Service: General;  Laterality: N/A;    SUBTOTAL COLECTOMY N/A 4/25/2022    Procedure: COLECTOMY, PARTIAL;  Surgeon: Carlton Waddell MD;  Location: [facility] OR;  Service: General;  Laterality: N/A;       Social History     Tobacco Use    Smoking status: Current Every Day Smoker     Packs/day: 0.50     Years: 35.00     Pack years: 17.50     Types: Cigarettes    Smokeless tobacco: Never Used   Substance Use Topics    Alcohol use: Not Currently    Drug use: Never       Physical Exam:    BP (!) 171/85   Pulse 110   Temp 99.5 °F (37.5 °C) (Oral)   Resp 20   Ht 6' 2" (1.88 m)   Wt 93 kg (205 lb)   SpO2 97%   BMI 26.32 kg/m²   Nursing note and vital signs reviewed.  Constitutional: No acute distress.  Nontoxic  Eyes: No conjunctival injection.  Extraocular muscles are intact.  ENT: Oropharynx clear.  Cardiovascular:  Tachycardic. Regular rhythm no murmurs gallops or rubs  Chest/ Respiratory: No respiratory distress. Crackles to left lower base.  Abdomen:  Tenderness palpation the epigastric region the right upper quadrant and the left upper quadrant.  No guarding no rebound non peritoneal.  Colostomy bag with " brown stool in the left quadrant.  Musculoskeletal: Good range of motion all joints.  No deformities.  Neck supple.  No meningismus.  Skin: No rashes seen.  Good turgor.  No abrasions.  No ecchymoses.  Neuro: alert and oriented x3,  no focal neurological deficits.  Psych: Appropriate, conversant    Labs that have been ordered have been independently reviewed and interpreted by myself.    I decided to obtain the patient's medical records.  Summary of Medical Records:  History of colon adenocarcinoma.  Malignant neoplasm of the splenic flexure.  Recent hemicolectomy with colostomy.    MDM/ Differential Dx:  57 y.o. male with colon cancer getting ready to start chemotherapy presents with 3 days fevers and chills.  Differential could include COVID, pneumonia, influenza.  It history is inconsistent with abdominal etiology such as cholecystitis, or intra-abdominal abscess/infection.  Had some outpatient labs done.  Will get a lactic acid as well as blood culture give antibiotics.  Based on the chest x-rays of my clinical exam I am concerned of pneumonia.    Lab Results   Component Value Date    WBC 27.25 (H) 06/20/2022    HGB 10.6 (L) 06/20/2022    HCT 32.5 (L) 06/20/2022    MCV 78 (L) 06/20/2022     (H) 06/20/2022             ED Course as of 06/20/22 1049   Mon Jun 20, 2022   0825 X-Ray Chest AP Portable  Chest x-ray independently interpreted by myself shows normal heart size, Port-A-Cath in place.  Increased interstitial markings in the left base.  No focal consolidation or pneumothorax.  No bony abnormalities.  Overall impression some increased interstitial markings in the left base otherwise no acute disease. [SM]   0919 SARS-CoV-2 RNA, Amplification, Qual: Negative [SM]   0919 Influenza A, Molecular: Negative [SM]   0919 Influenza B, Molecular: Negative [SM]   0928 Reviewing records outpatient blood work from this morning shows a white blood cell count of 36476. Looking at his most recent history his white  blood cell count approximately 15,000. Hemoglobin is 10.6 today which is baseline.   Sodium of 127. Glucose 248.  [SM]   0944 Upon re-evaluation patient is complaining more of his abdominal region hurting.  Will get a CT scan as there is risk of postop infection.  Updated him that I do suspect the need for hospitalization for pneumonia as well as hyponatremia. [SM]   1019 Spoke with Hospital Medicine who will admit for further evaluation management. []   1031 Just spoke with radiologist who is concerned of left upper quadrant abscess.  I updated Hospital Medicine who will add on antibiotics.  I will consult surgery and update the patient as well as his wife. []   1049 Discussed with Dr. Waddell with General surgery []   1049 . []      ED Course User Index  [] Sudhir Ledesma DO              Scribe Attestation:   Scribe #1: I performed the above scribed service and the documentation accurately describes the services I performed. I attest to the accuracy of the note.      Diagnostic Impression:    1. Community acquired pneumonia of left lower lobe of lung    2. Shortness of breath    3. Hyponatremia         ED Disposition Condition    Observation                 Sudhir Ledesma DO  06/20/22 1041

## 2022-06-20 NOTE — CONSULTS
"[facility]  General Surgery  Consult Note    Consults  Subjective:     Chief Complaint/Reason for Admission:  Abdominal pain, cough, shortness of breath.    History of Present Illness:  This is a 57-year-old male who recently underwent exploratory laparotomy with resection of his splenic flexure colon for a large, node positive, colon cancer.  It was bleeding during the operation from the spleen so splenectomy was also performed.  He has had a air-fluid level collection in the splenic bed since surgery.  He re-presented with worsening respiratory symptoms, and left upper quadrant abdominal pain.  Imaging in the ER was consistent with a splenic bed abscess.    No current facility-administered medications on file prior to encounter.     Current Outpatient Medications on File Prior to Encounter   Medication Sig    atorvastatin (LIPITOR) 20 MG tablet Take 1 tablet (20 mg total) by mouth once daily.    enalapriL-hydrochlorothiazide (VASERETIC) 10-25 mg per tablet Take 1 tablet by mouth once daily.    levoFLOXacin (LEVAQUIN) 750 MG tablet Take 1 tablet (750 mg total) by mouth once daily. (Patient not taking: Reported on 6/9/2022)    metFORMIN (GLUCOPHAGE) 1000 MG tablet Take 1 tablet (1,000 mg total) by mouth 2 (two) times daily with meals.    oxyCODONE (ROXICODONE) 30 MG Tab Take 1 tablet (30 mg total) by mouth every 6 (six) hours as needed for Pain.    pen needle, diabetic (BD ULTRA-FINE MICRO PEN NEEDLE) 32 gauge x 1/4" Ndle 1 each by Misc.(Non-Drug; Combo Route) route once a week.    promethazine (PHENERGAN) 12.5 MG Tab Take 1 tablet (12.5 mg total) by mouth every 4 (four) hours as needed.    sildenafiL (VIAGRA) 100 MG tablet Take 1 tablet (100 mg total) by mouth daily as needed for Erectile Dysfunction.       Review of patient's allergies indicates:   Allergen Reactions    Penicillins Rash       Past Medical History:   Diagnosis Date    DM (diabetes mellitus)     Hyperlipidemia     " Hypertension     Prediabetes      Past Surgical History:   Procedure Laterality Date    CHOLECYSTECTOMY  2011    COLONOSCOPY      2018    COLOSTOMY N/A 4/25/2022    Procedure: CREATION, COLOSTOMY;  Surgeon: Carlton Waddell MD;  Location: [facility] OR;  Service: General;  Laterality: N/A;    INSERTION OF TUNNELED CENTRAL VENOUS CATHETER (CVC) WITH SUBCUTANEOUS PORT Right 5/18/2022    Procedure: [id number];  Surgeon: Carlton Waddell MD;  Location: [facility] OR;  Service: General;  Laterality: Right;    MOBILIZATION OF SPLENIC FLEXURE N/A 4/25/2022    Procedure: MOBILIZATION, SPLENIC FLEXURE;  Surgeon: Carlton Waddell MD;  Location: [facility] OR;  Service: General;  Laterality: N/A;    SPLENECTOMY N/A 4/25/2022    Procedure: SPLENECTOMY;  Surgeon: Carlton Waddell MD;  Location: [facility] OR;  Service: General;  Laterality: N/A;    SUBTOTAL COLECTOMY N/A 4/25/2022    Procedure: COLECTOMY, PARTIAL;  Surgeon: Carlton Waddell MD;  Location: [facility] OR;  Service: General;  Laterality: N/A;     Family History     Problem Relation (Age of Onset)    Heart disease Father        Tobacco Use    Smoking status: Current Every Day Smoker     Packs/day: 0.50     Years: 35.00     Pack years: 17.50     Types: Cigarettes    Smokeless tobacco: Never Used   Substance and Sexual Activity    Alcohol use: Not Currently    Drug use: Never    Sexual activity: Yes     Partners: Female     Review of Systems   Constitutional: Negative.  Negative for fatigue and fever.   HENT: Negative.    Eyes: Negative.    Respiratory: Positive for cough and shortness of breath.    Cardiovascular: Negative.  Negative for chest pain.   Gastrointestinal: Positive for abdominal distention.   Endocrine: Negative.    Genitourinary: Negative.    Musculoskeletal: Negative.    Skin: Negative.    Allergic/Immunologic: Negative.    Neurological: Negative.    Hematological: Negative.    Psychiatric/Behavioral: Negative.      Objective:     Vital Signs (Most  Recent):  Temp: 99.5 °F (37.5 °C) (06/20/22 0720)  Pulse: 110 (06/20/22 0935)  Resp: 20 (06/20/22 0720)  BP: (!) 171/85 (06/20/22 0935)  SpO2: 97 % (06/20/22 0935) Vital Signs (24h Range):  Temp:  [99.5 °F (37.5 °C)] 99.5 °F (37.5 °C)  Pulse:  [106-111] 110  Resp:  [20] 20  SpO2:  [97 %] 97 %  BP: (161-176)/(77-85) 171/85     Weight: 93 kg (205 lb)  Body mass index is 26.32 kg/m².    No intake or output data in the 24 hours ending 06/20/22 1236    Physical Exam  Constitutional:       General: He is not in acute distress.     Appearance: Normal appearance. He is not ill-appearing, toxic-appearing or diaphoretic.   HENT:      Head: Normocephalic.      Nose: Nose normal.   Eyes:      Conjunctiva/sclera: Conjunctivae normal.   Cardiovascular:      Rate and Rhythm: Regular rhythm. Tachycardia present.   Pulmonary:      Effort: Pulmonary effort is normal.      Breath sounds: Wheezing present.   Abdominal:      Palpations: Abdomen is soft.      Comments: Tenderness in the left upper quadrant towards the flank.   Musculoskeletal:         General: Normal range of motion.      Cervical back: Normal range of motion.   Skin:     General: Skin is warm.   Neurological:      General: No focal deficit present.      Mental Status: He is alert.   Psychiatric:         Mood and Affect: Mood normal.         Significant Labs:  CBC:   Recent Labs   Lab 06/20/22  0708   WBC 27.25*   RBC 4.19*   HGB 10.6*   HCT 32.5*   *   MCV 78*   MCH 25.3*   MCHC 32.6     CMP:   Recent Labs   Lab 06/20/22  0708   *   CALCIUM 10.1   ALBUMIN 2.7*   PROT 7.2   *   K 3.8   CO2 25   CL 89*   BUN 15   CREATININE 0.9   ALKPHOS 306*   ALT 30   AST 17   BILITOT 0.9     Coagulation: No results for input(s): PT, INR, APTT in the last 48 hours.  Lactic Acid:   Recent Labs   Lab 06/20/22  0917   LACTATE 1.2       Significant Diagnostics:  CT scan was reviewed.  Rim enhancing fluid collection in the left upper quadrant at the location of his  splenic resection.  This is slightly enlarged can prior.    Assessment/Plan:     57-year-old male with a likely splenic flexure abscess after partial colectomy and end colostomy and splenectomy for an obstructing colon cancer.  Symptoms have progressively become worse over the past few days prompting ED presentation.  I suspect that some of his respiratory symptoms are likely reactive given the close proximity of this abscess to the diaphragm.    Consult placed to IR for percutaneous drain placement  Antibiotics  Pulmonary toilet  Following    Thank you for your consult. I will follow-up with patient. Please contact us if you have any additional questions.    Carlton Waddell MD  General Surgery  [facility] - Emergency Dept

## 2022-06-21 LAB
ANION GAP SERPL CALC-SCNC: 12 MMOL/L (ref 8–16)
BASOPHILS # BLD AUTO: 0.06 K/UL (ref 0–0.2)
BASOPHILS NFR BLD: 0.4 % (ref 0–1.9)
BUN SERPL-MCNC: 8 MG/DL (ref 6–20)
CALCIUM SERPL-MCNC: 10 MG/DL (ref 8.7–10.5)
CHLORIDE SERPL-SCNC: 95 MMOL/L (ref 95–110)
CO2 SERPL-SCNC: 26 MMOL/L (ref 23–29)
CREAT SERPL-MCNC: 0.8 MG/DL (ref 0.5–1.4)
DIFFERENTIAL METHOD: ABNORMAL
EOSINOPHIL # BLD AUTO: 0.1 K/UL (ref 0–0.5)
EOSINOPHIL NFR BLD: 0.5 % (ref 0–8)
ERYTHROCYTE [DISTWIDTH] IN BLOOD BY AUTOMATED COUNT: 14.7 % (ref 11.5–14.5)
EST. GFR  (AFRICAN AMERICAN): >60 ML/MIN/1.73 M^2
EST. GFR  (NON AFRICAN AMERICAN): >60 ML/MIN/1.73 M^2
GLUCOSE SERPL-MCNC: 235 MG/DL (ref 70–110)
HCT VFR BLD AUTO: 28.9 % (ref 40–54)
HGB BLD-MCNC: 9.6 G/DL (ref 14–18)
IMM GRANULOCYTES # BLD AUTO: 0.17 K/UL (ref 0–0.04)
IMM GRANULOCYTES NFR BLD AUTO: 1.1 % (ref 0–0.5)
INR PPP: 1 (ref 0.8–1.2)
LYMPHOCYTES # BLD AUTO: 4.8 K/UL (ref 1–4.8)
LYMPHOCYTES NFR BLD: 32.5 % (ref 18–48)
MAGNESIUM SERPL-MCNC: 1.7 MG/DL (ref 1.6–2.6)
MCH RBC QN AUTO: 25.9 PG (ref 27–31)
MCHC RBC AUTO-ENTMCNC: 33.2 G/DL (ref 32–36)
MCV RBC AUTO: 78 FL (ref 82–98)
MONOCYTES # BLD AUTO: 2.3 K/UL (ref 0.3–1)
MONOCYTES NFR BLD: 15.6 % (ref 4–15)
NEUTROPHILS # BLD AUTO: 7.4 K/UL (ref 1.8–7.7)
NEUTROPHILS NFR BLD: 49.9 % (ref 38–73)
NRBC BLD-RTO: 0 /100 WBC
PLATELET # BLD AUTO: 660 K/UL (ref 150–450)
PMV BLD AUTO: 9.4 FL (ref 9.2–12.9)
POCT GLUCOSE: 201 MG/DL (ref 70–110)
POCT GLUCOSE: 239 MG/DL (ref 70–110)
POCT GLUCOSE: 372 MG/DL (ref 70–110)
POTASSIUM SERPL-SCNC: 3.9 MMOL/L (ref 3.5–5.1)
PROTHROMBIN TIME: 10.5 SEC (ref 9–12.5)
RBC # BLD AUTO: 3.7 M/UL (ref 4.6–6.2)
SODIUM SERPL-SCNC: 133 MMOL/L (ref 136–145)
VANCOMYCIN TROUGH SERPL-MCNC: 13 UG/ML (ref 10–22)
WBC # BLD AUTO: 14.85 K/UL (ref 3.9–12.7)

## 2022-06-21 PROCEDURE — 80048 BASIC METABOLIC PNL TOTAL CA: CPT | Performed by: HOSPITALIST

## 2022-06-21 PROCEDURE — S0030 INJECTION, METRONIDAZOLE: HCPCS | Performed by: HOSPITALIST

## 2022-06-21 PROCEDURE — 80202 ASSAY OF VANCOMYCIN: CPT | Performed by: EMERGENCY MEDICINE

## 2022-06-21 PROCEDURE — 63600175 PHARM REV CODE 636 W HCPCS: Performed by: HOSPITALIST

## 2022-06-21 PROCEDURE — 25000003 PHARM REV CODE 250: Performed by: HOSPITALIST

## 2022-06-21 PROCEDURE — 85610 PROTHROMBIN TIME: CPT | Performed by: RADIOLOGY

## 2022-06-21 PROCEDURE — 12000002 HC ACUTE/MED SURGE SEMI-PRIVATE ROOM

## 2022-06-21 PROCEDURE — 85025 COMPLETE CBC W/AUTO DIFF WBC: CPT | Performed by: HOSPITALIST

## 2022-06-21 PROCEDURE — 36415 COLL VENOUS BLD VENIPUNCTURE: CPT | Performed by: EMERGENCY MEDICINE

## 2022-06-21 PROCEDURE — 83735 ASSAY OF MAGNESIUM: CPT | Performed by: HOSPITALIST

## 2022-06-21 PROCEDURE — 36415 COLL VENOUS BLD VENIPUNCTURE: CPT | Performed by: RADIOLOGY

## 2022-06-21 RX ORDER — OXYCODONE HYDROCHLORIDE 10 MG/1
10 TABLET ORAL EVERY 6 HOURS PRN
Status: DISCONTINUED | OUTPATIENT
Start: 2022-06-21 | End: 2022-06-22 | Stop reason: HOSPADM

## 2022-06-21 RX ADMIN — CEFTRIAXONE 1 G: 1 INJECTION, SOLUTION INTRAVENOUS at 10:06

## 2022-06-21 RX ADMIN — LISINOPRIL 20 MG: 10 TABLET ORAL at 09:06

## 2022-06-21 RX ADMIN — INSULIN ASPART 3 UNITS: 100 INJECTION, SOLUTION INTRAVENOUS; SUBCUTANEOUS at 08:06

## 2022-06-21 RX ADMIN — OXYCODONE HYDROCHLORIDE 10 MG: 10 TABLET ORAL at 01:06

## 2022-06-21 RX ADMIN — ENOXAPARIN SODIUM 40 MG: 100 INJECTION SUBCUTANEOUS at 05:06

## 2022-06-21 RX ADMIN — OXYCODONE HYDROCHLORIDE 10 MG: 10 TABLET ORAL at 10:06

## 2022-06-21 RX ADMIN — VANCOMYCIN HYDROCHLORIDE 2000 MG: 1 INJECTION, POWDER, LYOPHILIZED, FOR SOLUTION INTRAVENOUS at 01:06

## 2022-06-21 RX ADMIN — CEFTRIAXONE 1 G: 1 INJECTION, SOLUTION INTRAVENOUS at 09:06

## 2022-06-21 RX ADMIN — ATORVASTATIN CALCIUM 20 MG: 20 TABLET, FILM COATED ORAL at 09:06

## 2022-06-21 RX ADMIN — VANCOMYCIN HYDROCHLORIDE 2000 MG: 1 INJECTION, POWDER, LYOPHILIZED, FOR SOLUTION INTRAVENOUS at 04:06

## 2022-06-21 RX ADMIN — OXYCODONE HYDROCHLORIDE 10 MG: 10 TABLET ORAL at 05:06

## 2022-06-21 RX ADMIN — METRONIDAZOLE 500 MG: 500 INJECTION, SOLUTION INTRAVENOUS at 05:06

## 2022-06-21 RX ADMIN — METRONIDAZOLE 500 MG: 500 INJECTION, SOLUTION INTRAVENOUS at 09:06

## 2022-06-21 RX ADMIN — METRONIDAZOLE 500 MG: 500 INJECTION, SOLUTION INTRAVENOUS at 01:06

## 2022-06-21 RX ADMIN — SODIUM CHLORIDE: 0.9 INJECTION, SOLUTION INTRAVENOUS at 02:06

## 2022-06-21 NOTE — NURSING
Pt arrived from room 309 to CT- positioned patient on CT table in multiple different positions. Images taken- Per radiologist collection is not safely accessible percutaneously. Report called to Charge CAMRYN guido pt transported back to room in stable condition. No sedation received.

## 2022-06-21 NOTE — PROGRESS NOTES
[facility]  Adult Nutrition  Progress Note    SUMMARY   Intervention: nutrition education    Recommendation:    1) Advance PO diet to DM 2000 kcal, cardiac low fiber - no corn   2) weigh weekly   3) nutrition education and handout given    Goals: 1) PO intakes > 75% of meals and supplemets at f/u  Nutrition Goal Status: new  Communication of RD Recs:  (POC, sticky note, second sign)    Assessment and Plan    Inadequate energy intake  R/t NPO  As evidenced by PO intakes < 50% of needs x 1 day  Intervention: above  new     Malnutrition Assessment     Skin (Micronutrient):  (Pedro = 20, colostomy)  Teeth (Micronutrient):  (WDL)   Micronutrient Evaluation: no deficiencies               Edema (Fluid Accumulation): 0-->no edema present   Subcutaneous Fat Loss (Final Summary): well nourished  Muscle Loss Evaluation (Final Summary): well nourished         Reason for Assessment    Reason For Assessment: identified at risk by screening criteria  Diagnosis:  (sepsis)  Relevant Medical History: Pre-DM, HTN, HLD  Interdisciplinary Rounds: did not attend    General Information Comments: 58 y/o male with colon mass s/p partial colectomy with colostomy ( a few months ago). PTA pt was avoiding gas producing foods which included a lot of the vegetables he used to eat, willing to add foods back in now to keep blood sugar under better control. I educated pt on non-starchy vegetables along with wound care nurse and went over gas producing and less gas producing vegetables as well as encouraged adequte hydration and use of sandy etc..Gave pt ostomy nutrition handout. NFPE done 6/21/22 no wasting seen. Stable weight.    Nutrition Discharge Planning: To be determined- Cardiac, diabetic 2000 kcal diet with ostomy considerations    Nutrition Risk Screen    Nutrition Risk Screen: no indicators present    Nutrition/Diet History    Patient Reported Diet/Restrictions/Preferences: general  Spiritual, Cultural Beliefs,  "Adventism Practices, Values that Affect Care: no  Food Allergies: NKFA  Factors Affecting Nutritional Intake: NPO    Anthropometrics    Temp: 98.4 °F (36.9 °C)  Height Method: Stated  Height: 6' 2"  Height (inches): 74 in  Weight Method: Bed Scale  Weight: 93 kg (205 lb 0.4 oz)  Weight (lb): 205.03 lb  Ideal Body Weight (IBW), Male: 190 lb  % Ideal Body Weight, Male (lb): 107.91 %  BMI (Calculated): 26.3  BMI Grade: 25 - 29.9 - overweight       Lab/Procedures/Meds    Pertinent Labs Reviewed: reviewed  BMP  Lab Results   Component Value Date     (L) 06/21/2022    K 3.9 06/21/2022    CL 95 06/21/2022    CO2 26 06/21/2022    BUN 8 06/21/2022    CREATININE 0.8 06/21/2022    CALCIUM 10.0 06/21/2022    ANIONGAP 12 06/21/2022    ESTGFRAFRICA >60 06/21/2022    EGFRNONAA >60 06/21/2022     Lab Results   Component Value Date    ALBUMIN 2.7 (L) 06/20/2022       Pertinent Medications Reviewed: reviewed  Pertinent Medications Comments: zofran, prochlorperazine, statin, NS @ 100 ml/hr, insulin    Estimated/Assessed Needs    Weight Used For Calorie Calculations: 93 kg (205 lb 0.4 oz)  Energy Calorie Requirements (kcal): MSj x 1.2 = 2180 kcal  Energy Need Method: [place]  Protein Requirements: 1-1.2 g protein/kg ostomy (  g)  Weight Used For Protein Calculations: 93 kg (205 lb 0.4 oz)  Fluid Requirements (mL): 2100 ml or per MD  Estimated Fluid Requirement Method: RDA Method  CHO Requirement: 245-299 g      Nutrition Prescription Ordered    Current Diet Order: NPO x 1 day    Evaluation of Received Nutrient/Fluid Intake    Energy Calories Required: not meeting needs  Protein Required: not meeting needs  Fluid Required: exceeds needs  Total Fluid Intake (mL/kg): IVF @ 100 ml/hr  Tolerance: other (see comments) (NPO)  % Intake of Estimated Energy Needs: 0%  % Meal Intake: NPO    Nutrition Risk    Level of Risk/Frequency of Follow-up: moderate 2 x weekly    Monitor and Evaluation    Food and Nutrient Intake: energy " intake, food and beverage intake  Food and Nutrient Adminstration: diet order  Anthropometric Measurements: weight  Biochemical Data, Medical Tests and Procedures: electrolyte and renal panel, gastrointestinal profile, glucose/endocrine profile  Nutrition-Focused Physical Findings: overall appearance     Nutrition Follow-Up    RD Follow-up?: Yes

## 2022-06-21 NOTE — CONSULTS
Consulted for ostomy care. Patient and spouse report the ostomy flange has been leaking lately from the lateral aspect of the appliance. Removed the ostomy appliance and assessed the stoma. The stoma is noted to be retracted at the lateral aspect and there is an additional stoma opening in the appearance of a fistula at the stoma edge laterally which is the most probable cause of the leakage. Cut the bag opening to 40mm to allow space. Applied a stoma stick to the wound edges and applied a new ostomy appliance and obtained a good seal. Note if there is any problems with ostomy bag keeping a seal during hospitalization nurse should notify ostomy nurse for follow up. Patient may need a convex flange if leakage continues to occur. Ostomy nurse to follow this patient throughout his hospitalization.      Stoma 40mm      Stoma retracting at the lateral aspect with an additional opening noted at the stoma edge.

## 2022-06-21 NOTE — ASSESSMENT & PLAN NOTE
"This patient does have evidence of infective focus  My overall impression is sepsis. Vital signs were reviewed and noted in progress note.  Antibiotics given-   Antibiotics (From admission, onward)            Start     Stop Route Frequency Ordered    06/20/22 2130  cefTRIAXone (ROCEPHIN) 1 g/50 mL D5W IVPB         -- IV Every 12 hours (non-standard times) 06/20/22 1306    06/20/22 1330  vancomycin (VANCOCIN) 2,000 mg in dextrose 5 % 500 mL IVPB         -- IV Every 12 hours (non-standard times) 06/20/22 1204    06/20/22 1300  metronidazole IVPB 500 mg         -- IV Every 8 hours (non-standard times) 06/20/22 1157    06/20/22 1257  vancomycin - pharmacy to dose  (vancomycin IVPB)        "And" Linked Group Details    -- IV pharmacy to manage frequency 06/20/22 1157        Cultures were taken-   Microbiology Results (last 7 days)     Procedure Component Value Units Date/Time    Blood Culture #1 [[id number]] Collected: 06/20/22 0917    Order Status: Completed Specimen: Blood from Antecubital, Left Updated: 06/20/22 1915     Blood Culture, Routine No Growth to date    Blood Culture #2 [[id number]] Collected: 06/20/22 0917    Order Status: Completed Specimen: Blood from Antecubital, Right Updated: 06/20/22 1915     Blood Culture, Routine No Growth to date    Influenza A & B by Molecular [[id number]] Collected: 06/20/22 0808    Order Status: Completed Specimen: Nasopharyngeal Swab Updated: 06/20/22 0903     Influenza A, Molecular Negative     Influenza B, Molecular Negative     Flu A & B Source Nasal swab        Latest lactate reviewed, they are-  Recent Labs   Lab 06/20/22 0917   LACTATE 1.2     Source-  intraabdominal    Source control Achieved by- antibiotics and drainage    "

## 2022-06-21 NOTE — HPI
Patient presented today with fever.  Associated with chills, nausea and vomiting.  Symptoms began about three days ago.  Symptoms are constant and worsening.  He recently underwent resection of splenic flexure of colon due to cancerous mass.  During that surgery, he underwent splenectomy as well.  Received a PortaCath on May 18.  He is due to start chemotherapy tomorrow.  Imaging done today shows abscess in the left upper quadrant.  He will be admitted and will be assessed by surgeon.  He has diabetes, hypertension, and is cigarette smoker.

## 2022-06-21 NOTE — ASSESSMENT & PLAN NOTE
Monitor sodium level.  He's not hypervolemic at this time.  Will give some normal saline and monitor.

## 2022-06-21 NOTE — PLAN OF CARE
POC discussed with patient, verbalized understanding. Patient with uneventful night, slept off and on between care. VS stable. ABX X 3. No complaints voiced. Up to bathroom to void, tolerated well. NSR. NPO for IR procedure today. Call light at bedside. Family at bedside.

## 2022-06-21 NOTE — ASSESSMENT & PLAN NOTE
"Consult with general surgeon.  Give IVAB.    Antibiotics (From admission, onward)            Start     Stop Route Frequency Ordered    06/20/22 2130  cefTRIAXone (ROCEPHIN) 1 g/50 mL D5W IVPB         -- IV Every 12 hours (non-standard times) 06/20/22 1306    06/20/22 1330  vancomycin (VANCOCIN) 2,000 mg in dextrose 5 % 500 mL IVPB         -- IV Every 12 hours (non-standard times) 06/20/22 1204    06/20/22 1300  metronidazole IVPB 500 mg         -- IV Every 8 hours (non-standard times) 06/20/22 1157    06/20/22 1257  vancomycin - pharmacy to dose  (vancomycin IVPB)        "And" Linked Group Details    -- IV pharmacy to manage frequency 06/20/22 1157            "

## 2022-06-21 NOTE — H&P
[facility] Medicine  History & Physical    Patient Name: Osman Li Jr.  MRN: [id number]  Patient Class: IP- Inpatient  Admission Date: 6/20/2022  Attending Physician: Mani Hurley MD   Primary Care Provider: ANTONIO Emmanuel         Patient information was obtained from patient, past medical records and ER records.     Subjective:     Principal Problem:Sepsis    Chief Complaint:   Chief Complaint   Patient presents with    Shortness of Breath    Chills    Fever      Symptoms started Friday / tylenol at 0600        HPI: Patient presented today with fever.  Associated with chills, nausea and vomiting.  Symptoms began about three days ago.  Symptoms are constant and worsening.  He recently underwent resection of splenic flexure of colon due to cancerous mass.  During that surgery, he underwent splenectomy as well.  Received a PortaCath on May 18.  He is due to start chemotherapy tomorrow.  Imaging done today shows abscess in the left upper quadrant.  He will be admitted and will be assessed by surgeon.  He has diabetes, hypertension, and is cigarette smoker.      Past Medical History:   Diagnosis Date    DM (diabetes mellitus)     Hyperlipidemia     Hypertension     Prediabetes        Past Surgical History:   Procedure Laterality Date    CHOLECYSTECTOMY  2011    COLONOSCOPY      2018    COLOSTOMY N/A 4/25/2022    Procedure: CREATION, COLOSTOMY;  Surgeon: Carlton Waddell MD;  Location: [facility] OR;  Service: General;  Laterality: N/A;    INSERTION OF TUNNELED CENTRAL VENOUS CATHETER (CVC) WITH SUBCUTANEOUS PORT Right 5/18/2022    Procedure: [id number];  Surgeon: Carlton Waddell MD;  Location: [facility] OR;  Service: General;  Laterality: Right;    MOBILIZATION OF SPLENIC FLEXURE N/A 4/25/2022    Procedure: MOBILIZATION, SPLENIC FLEXURE;  Surgeon: Carlton Waddell MD;  Location: [facility] OR;  Service: General;  Laterality: N/A;    SPLENECTOMY N/A 4/25/2022     "Procedure: SPLENECTOMY;  Surgeon: Carlton Waddell MD;  Location: [facility] OR;  Service: General;  Laterality: N/A;    SUBTOTAL COLECTOMY N/A 4/25/2022    Procedure: COLECTOMY, PARTIAL;  Surgeon: Carlton Waddell MD;  Location: [facility] OR;  Service: General;  Laterality: N/A;       Review of patient's allergies indicates:   Allergen Reactions    Penicillins Rash       No current facility-administered medications on file prior to encounter.     Current Outpatient Medications on File Prior to Encounter   Medication Sig    atorvastatin (LIPITOR) 20 MG tablet Take 1 tablet (20 mg total) by mouth once daily.    enalapriL-hydrochlorothiazide (VASERETIC) 10-25 mg per tablet Take 1 tablet by mouth once daily.    metFORMIN (GLUCOPHAGE) 1000 MG tablet Take 1 tablet (1,000 mg total) by mouth 2 (two) times daily with meals.    oxyCODONE (ROXICODONE) 30 MG Tab Take 1 tablet (30 mg total) by mouth every 6 (six) hours as needed for Pain.    pen needle, diabetic (BD ULTRA-FINE MICRO PEN NEEDLE) 32 gauge x 1/4" Ndle 1 each by Misc.(Non-Drug; Combo Route) route once a week.    promethazine (PHENERGAN) 12.5 MG Tab Take 1 tablet (12.5 mg total) by mouth every 4 (four) hours as needed.    sildenafiL (VIAGRA) 100 MG tablet Take 1 tablet (100 mg total) by mouth daily as needed for Erectile Dysfunction.    [DISCONTINUED] levoFLOXacin (LEVAQUIN) 750 MG tablet Take 1 tablet (750 mg total) by mouth once daily. (Patient not taking: Reported on 6/9/2022)     Family History       Problem Relation (Age of Onset)    Heart disease Father          Tobacco Use    Smoking status: Current Every Day Smoker     Packs/day: 0.50     Years: 35.00     Pack years: 17.50     Types: Cigarettes    Smokeless tobacco: Never Used   Substance and Sexual Activity    Alcohol use: Not Currently    Drug use: Never    Sexual activity: Yes     Partners: Female     Review of Systems   Constitutional:  Positive for chills and fever. Negative for fatigue.   HENT:  " Negative for congestion and sinus pressure.    Eyes:  Negative for pain and visual disturbance.   Respiratory:  Positive for cough and shortness of breath (pain upon deep inspiration on left side). Negative for wheezing.    Cardiovascular:  Negative for chest pain, palpitations and leg swelling.   Gastrointestinal:  Positive for nausea and vomiting. Negative for abdominal pain and diarrhea.   Genitourinary:  Negative for difficulty urinating, dysuria and hematuria.   Musculoskeletal:  Negative for arthralgias and myalgias.   Skin:  Negative for rash and wound.   Neurological:  Negative for dizziness, weakness, light-headedness and headaches.   Hematological:  Negative for adenopathy. Does not bruise/bleed easily.   Psychiatric/Behavioral:  Negative for confusion and dysphoric mood. The patient is not nervous/anxious.    Objective:     Vital Signs (Most Recent):  Temp: 98.4 °F (36.9 °C) (06/20/22 1935)  Pulse: 91 (06/20/22 1935)  Resp: 18 (06/20/22 2053)  BP: 139/72 (06/20/22 1935)  SpO2: 96 % (06/20/22 1935) Vital Signs (24h Range):  Temp:  [97.5 °F (36.4 °C)-99.5 °F (37.5 °C)] 98.4 °F (36.9 °C)  Pulse:  [] 91  Resp:  [17-20] 18  SpO2:  [91 %-98 %] 96 %  BP: (121-176)/(66-86) 139/72     Weight: 93 kg (205 lb)  Body mass index is 26.32 kg/m².    Physical Exam  Constitutional:       General: He is not in acute distress.     Appearance: He is not ill-appearing.   HENT:      Head: Normocephalic and atraumatic.      Right Ear: External ear normal.      Left Ear: External ear normal.      Mouth/Throat:      Pharynx: No oropharyngeal exudate.   Eyes:      General: No scleral icterus.        Right eye: No discharge.         Left eye: No discharge.      Conjunctiva/sclera: Conjunctivae normal.   Neck:      Thyroid: No thyromegaly.      Vascular: No JVD.   Cardiovascular:      Rate and Rhythm: Normal rate and regular rhythm.      Heart sounds:     No gallop.   Pulmonary:      Effort: Pulmonary effort is normal.       Breath sounds: Normal breath sounds. Decreased air movement present. No wheezing.   Abdominal:      General: Bowel sounds are normal. There is no distension.      Palpations: Abdomen is soft. There is no mass.      Tenderness: There is abdominal tenderness in the left upper quadrant. There is no rebound.   Musculoskeletal:         General: No deformity.      Cervical back: Normal range of motion and neck supple.   Lymphadenopathy:      Cervical: No cervical adenopathy.   Skin:     General: Skin is warm and dry.      Capillary Refill: Capillary refill takes less than 2 seconds.      Findings: No rash.   Neurological:      Mental Status: He is alert and oriented to person, place, and time.   Psychiatric:         Behavior: Behavior normal.           Significant Labs: BMP:   Recent Labs   Lab 06/20/22  0708   *   *   K 3.8   CL 89*   CO2 25   BUN 15   CREATININE 0.9   CALCIUM 10.1   MG 1.6     CBC:   Recent Labs   Lab 06/20/22  0708   WBC 27.25*   HGB 10.6*   HCT 32.5*   *       Significant Imaging:   Results for orders placed during the hospital encounter of 06/20/22    CT Abdomen Pelvis With Contrast    Narrative  EXAMINATION:  CT ABDOMEN PELVIS WITH CONTRAST    CLINICAL HISTORY:  Abdominal abscess/infection suspected;    TECHNIQUE:  Low dose axial images, sagittal and coronal reformations were obtained from the lung bases to the pubic symphysis following the IV administration of 100 mL of Omnipaque 350 .  Oral contrast was not given.    COMPARISON:  05/13/2022    FINDINGS:  Minimal atelectasis left lower lobe.  Left pleural effusion.  Normal size heart.  Cholecystectomy.    Liver is 21 cm in length and spleen is not seen and reportedly surgically absent, per chart review.  Pancreas atrophy.  Remaining solid abdominal organs are unremarkable.    There is no enteric contrast which limits bowel assessment.  Stomach is decompressed with mild mass effect near the fundus.  No dilated bowel loops.   "Normal appendix.  There is a left mid abdominal colostomy.  There is also a stapled colonic stump in the left abdomen along the descending segment.    In the left upper abdominal quadrant below the diaphragm there is a 9 x 11 cm air/fluid collection with adjacent fat stranding.  This has a thickened wall and abuts the greater curvature of the stomach and several adjacent small bowel loops.  There is some fat stranding in the region and this does have a tubular tract extending towards the stapled end of the colon as can be seen coronal series 601, image 96.  Atherosclerosis.  Prostate 5.3 cm diameter.  Unremarkable urinary bladder.    Transitional lumbosacral anatomy.  Minimal degenerative change of the hips.    Impression  1. Postsurgical change of the colon with increased size of air-fluid collection near the operative bed in the left upper abdominal quadrant.  Abscess or contained perforation are considerations.  2. Hepatomegaly.  3. Similar to slight decrease in size of the left pleural effusion.  Findings discussed with Dr. Ledesma via telephone at 10:25.      Electronically signed by: Fredrick Lundberg  Date:    06/20/2022  Time:    10:28]    Assessment/Plan:     * Sepsis  This patient does have evidence of infective focus  My overall impression is sepsis. Vital signs were reviewed and noted in progress note.  Antibiotics given-   Antibiotics (From admission, onward)            Start     Stop Route Frequency Ordered    06/20/22 2130  cefTRIAXone (ROCEPHIN) 1 g/50 mL D5W IVPB         -- IV Every 12 hours (non-standard times) 06/20/22 1306    06/20/22 1330  vancomycin (VANCOCIN) 2,000 mg in dextrose 5 % 500 mL IVPB         -- IV Every 12 hours (non-standard times) 06/20/22 1204    06/20/22 1300  metronidazole IVPB 500 mg         -- IV Every 8 hours (non-standard times) 06/20/22 1157    06/20/22 1257  vancomycin - pharmacy to dose  (vancomycin IVPB)        "And" Linked Group Details    -- IV pharmacy to manage " "frequency 06/20/22 1157        Cultures were taken-   Microbiology Results (last 7 days)     Procedure Component Value Units Date/Time    Blood Culture #1 [[id number]] Collected: 06/20/22 0917    Order Status: Completed Specimen: Blood from Antecubital, Left Updated: 06/20/22 1915     Blood Culture, Routine No Growth to date    Blood Culture #2 [[id number]] Collected: 06/20/22 0917    Order Status: Completed Specimen: Blood from Antecubital, Right Updated: 06/20/22 1915     Blood Culture, Routine No Growth to date    Influenza A & B by Molecular [[id number]] Collected: 06/20/22 0808    Order Status: Completed Specimen: Nasopharyngeal Swab Updated: 06/20/22 0903     Influenza A, Molecular Negative     Influenza B, Molecular Negative     Flu A & B Source Nasal swab        Latest lactate reviewed, they are-  Recent Labs   Lab 06/20/22 0917   LACTATE 1.2     Source-  intraabdominal    Source control Achieved by- antibiotics and drainage      Intra-abdominal abscess  Consult with general surgeon.  Give IVAB.    Antibiotics (From admission, onward)            Start     Stop Route Frequency Ordered    06/20/22 2130  cefTRIAXone (ROCEPHIN) 1 g/50 mL D5W IVPB         -- IV Every 12 hours (non-standard times) 06/20/22 1306    06/20/22 1330  vancomycin (VANCOCIN) 2,000 mg in dextrose 5 % 500 mL IVPB         -- IV Every 12 hours (non-standard times) 06/20/22 1204    06/20/22 1300  metronidazole IVPB 500 mg         -- IV Every 8 hours (non-standard times) 06/20/22 1157    06/20/22 1257  vancomycin - pharmacy to dose  (vancomycin IVPB)        "And" Linked Group Details    -- IV pharmacy to manage frequency 06/20/22 1157              Malignant neoplasm of splenic flexure  Established with oncology.  Due to start chemo soon; it will need to wait until after infection issues are resolved.      Hyponatremia  Monitor sodium level.  He's not hypervolemic at this time.  Will give some normal saline and monitor.      Type 2 diabetes " mellitus  Monitor CBG's  Use short acting insulin PRN.    HTN (hypertension)  Monitor BP.  Continue lisinopril.      VTE Risk Mitigation (From admission, onward)         Ordered     enoxaparin injection 40 mg  Daily         06/20/22 1306     IP VTE HIGH RISK PATIENT  Once         06/20/22 1306     Place sequential compression device  Until discontinued         06/20/22 1306                   Mani Hurley MD  Department of Hospital Medicine   [facility]

## 2022-06-21 NOTE — ASSESSMENT & PLAN NOTE
CARDIOLOGY CLINIC  Jaja Ernandez is 76 year old female with chronic systolic heart failure, breast cancer in remission and hypertension who presents to [facility] for follow up.     She was seen by Ms. Chavez just over a week ago for hospital follow up. She had presented to [facility] on 6/29 with hypertensive emergency, acute kidney injury and decompensated heart failure due to medication non-adherence for 4 months. Echo showed EF 20-30% and moderate to severe MR and TR and severe pulmonary hypertension. She was diuresed down 15 pounds and discharged on lasix 40mg twice daily. At that appointment they increased her Lasix to 80mg in the morning and 40mg in the evening until weight dropped to 182lbs.     Today she says she's doing much better. Last time she was really tired due to the medications but that has improved. She is also sleeping much better. Her breathing is good, she is able to walk from her apartment to the mailbox without issue. No chest pain, palpitations or lightheadedness. Her bloating is much improved and no more phlegm. She hasn't received her CardioCom but has been doing her blood pressures about every other day- yesterday evening it was 140/80 but that's the highest reading she has had. Typically closer to 110s/70s. Her weight has been bouncing between 182-184lbs. It doesn't seem she increased her furosemide to 80mg in the AM as directed, she seems confused about which medication to increase. She is otherwise taking her medications as the bottles state. She needs refills on hydralazine and isosorbide.     She has never been evaluated for sleep apnea. She doesn't think she snores but daughter says yes. Denies daytime somnolence now. She has had previous coronary angiogram.    She knows not to drink >2L per day and has no issues with hi. She knows the importance of taking her medications know and is determined to get her heart function improved again.      PAST MEDICAL HISTORY:  Hypertension  CKD stage  Monitor CBG's  Use short acting insulin PRN.   III  Cardiomyopathy- EF in 2015 35%, recovered to 49% as of 6/2015, back now to 20-30%    FAMILY HISTORY:  Family History   Problem Relation Age of Onset     Breast Cancer Mother 50     Cancer Mother      Asthma Other      Unknown/Adopted Father      Breast Cancer Sister 70     Cancer Sister      Hypertension Daughter      Diabetes No family hx of      Cerebrovascular Disease No family hx of      Thyroid Disease No family hx of      Glaucoma No family hx of      Macular Degeneration No family hx of        SOCIAL HISTORY: , lives alone in apartment, oldest daughter here with her today drives her to appointments. Never a smoker    CURRENT MEDICATIONS:  Outpatient Medications Prior to Visit   Medication Sig Dispense Refill     amLODIPine (NORVASC) 10 MG tablet Take 1 tablet (10 mg) by mouth At Bedtime 30 tablet 0     Ascorbic Acid (VITAMIN C PO) Take 500 mg by mouth daily        aspirin 81 MG tablet Take 81 mg by mouth daily        Aspirin-Salicylamide-Caffeine (BC HEADACHE POWDER PO) Take 1 powder on the tongue every 6 hours with water as needed for headache       atorvastatin (LIPITOR) 20 MG tablet Take 1 tablet (20 mg) by mouth daily 30 tablet 0     calcium-vitamin D (CALTRATE) 600-400 MG-UNIT per tablet Take 1 tablet by mouth daily       carvedilol (COREG) 6.25 MG tablet Take 1 tablet (6.25 mg) by mouth 2 times daily (with meals) 60 tablet 0     docusate sodium (COLACE) 100 MG capsule Take 1 capsule (100 mg) by mouth 2 times daily as needed for constipation 60 capsule 0     ferrous sulfate (IRON) 325 (65 Fe) MG tablet Take 1 tablet (325 mg) by mouth 2 times daily 100 tablet 0     furosemide (LASIX) 40 MG tablet Take 80mg (2 tabs) in the AM and 40mg (1 tab) in the PM. 90 tablet 3     garlic 150 MG TABS Take 150 mg by mouth daily       hydrALAZINE (APRESOLINE) 50 MG tablet Take 1 tablet (50 mg) by mouth 3 times daily 60 tablet 0     isosorbide mononitrate (IMDUR) 60 MG 24 hr tablet Take 1 tablet (60 mg) by  mouth 2 times daily 30 tablet 0     Multiple Vitamins-Minerals (CENTRUM SILVER) per tablet Take 1 tablet by mouth daily       vitamin E (VITAMIN E-400) 400 UNIT capsule Take 400 Units by mouth daily        No facility-administered medications prior to visit.        ROS:  Constitutional: no fever, chills, or diaphoresis.  Fatigue improved   ENT: no vision changes, epistaxis, vertigo,   Respiratory: No cough, hemoptysis, Dyspnea  Cardiovascular: As per HPI.   GI: no nausea, bloating, vomiting.   : +urinary response to diuretic, no nocturia or dysuria  Integument: Negative for bruising, rash, or pruritis.  Psychiatric: Negative for anxiety, depression, sleep disturbance, or mood changes.   Neuro: headaches resolved, no syncope or numbness   Endocrinology: Negative for thyroid disorder, night sweats, diabetes.   Musculoskeletal: Negative for gout, joint stiffness/ swelling, or muscle weakness    EXAM:  /68 (BP Location: Right arm, Patient Position: Sitting, Cuff Size: Adult Large)  Pulse 72  Wt 83.9 kg (185 lb)  SpO2 97%  BMI 30.79 kg/m2  General: seated in chair, in NAD  HEENT: NC/AT, sclera anicteric, MMM  Pulm: CTA B, no wheezing or rales noted.  Card: RRR, 2/6 systolic murmur noted, no gallop appreciated. JVP difficult to appreciate  ABd: protuberant, +BS, soft, NT  Ext: no significant edema, calves soft and equal in size bilateral  Neuro: alert, conversant, moving all extremities  Psych: pleasant mood and affect  Vasc: no carotid bruit, 2+ radial and DP bilateral    Labs:  CMP RESULTS:  Lab Results   Component Value Date     07/17/2018    POTASSIUM 3.3 (L) 07/17/2018    CHLORIDE 105 07/17/2018    CO2 30 07/17/2018    ANIONGAP 8 07/17/2018     (H) 07/17/2018    BUN 32 (H) 07/17/2018    CR 2.98 (H) 07/17/2018    GFRESTIMATED 15 (L) 07/17/2018    GFRESTBLACK 18 (L) 07/17/2018    CHICHO 9.1 07/17/2018    BILITOTAL 0.7 07/01/2018    ALBUMIN 3.0 (L) 07/01/2018    ALKPHOS 128 07/01/2018      (H) 07/01/2018     (H) 07/01/2018      Lab Results   Component Value Date    MAG 2.6 (H) 07/10/2018     CARDIAC STUDIES:  Echocardiogram 6/29/2018: Prominent LVH with global hypokinesis and EF 20-30%, traced at 24%. Severe MR and TR     Assessment and Plan:   .Jaja Ernandez is a 76 year old female with systolic heart failure with here for follow up. She is doing much better and seems committed to being compliant.     1. Chronic systolic heart failure secondary to hypertensive cardiomyopathy.    Stage C  NYHA Class III  Fluid status- improving hypervolemia, continue on lasix 80mg in AM and 40mg in PM. Needs potassium supplementation  ACEi/ARB-held for recent TATIANA, on hydralazine 50mg TID and Imdur 60mg twice daily for afterload  BB yes- carvedilol 6.25mg twice daily  Aldosterone antagonist deferred secondary to recent TATIANA  SCD prophylaxis decision deferred during medication uptitration, risk of SCD discussed with patient   Ischemia evaluation: Angiogram in 5/2015- diffuse mild disease without obstruction  NSAID use: avoid  Sleep Apnea Evaluation: never evaluated, we discussed reasoning, she will think about and get back to us  Remote monitoring: Will look into why she hasn't received CardioCom yet      Follow-up: BMP in 1 week  Should establish primary cardiologist again- willing to see Dr. Pepe in [place]- will arrange for 6-8 weeks from now. Can follow up with [facility] here every 3-6 months or as needed.       Nancy Auguste PA-C  [facility]  Pager [phone]

## 2022-06-21 NOTE — PLAN OF CARE
POC discussed with patient and wife, verbalized understanding. Alert and oriented.VS stable.  Ct done but unable to aspirate. NS a 100 cc/hr. Lovenox injection for VTE precautions. No complaints voiced. Up to bathroom to void, tolerated well. Colostomy intact draining brownish stool. Tele intact w NSR. Call light at bedside. Family at bedside

## 2022-06-21 NOTE — PLAN OF CARE
[facility]  Initial Discharge Assessment       Primary Care Provider: ANTONIO Emmanuel    Admission Diagnosis: Shortness of breath [R06.02]  Hyponatremia [E87.1]  Intra-abdominal abscess [K65.1]  Community acquired pneumonia of left lower lobe of lung [J18.9]    Admission Date: 6/20/2022  Expected Discharge Date: Pt confirmed his home address and that he lives with Spouse- Jason Lucas [phone] also at bedside. Pt denied [facility] DMe and uses [facility] [address] [facility]. Pt verified PCP as ANTONIO Emmanuel and insurance as [employer]. Pt was to start chemo today. Pt asked to see Chaya with Wound care for Ostomy questions and I updated Lydia Calvo to have her come to the pts room. CM following.     Discharge Barriers Identified: None    Payor: [employer] / Plan: [facility] CHOICE PLUS / Product Type: Commercial /     Extended Emergency Contact Information  Primary Emergency Contact: JASON REINOSO  Address: [address] [place] [address] [place]  Mobile Phone: [phone]  Relation: Spouse  Preferred language: English   needed? No    Discharge Plan A: Home with family  Discharge Plan B: Home      [id number] - [place], [place] - [address]  [address]  [facility] [address]  Phone: [phone] Fax: [phone]    [id number] - [place], [place] - [address]  [address] [facility] [address]  Phone: [phone] Fax: [phone]      Initial Assessment (most recent)     Adult Discharge Assessment - 06/21/22 0945        Discharge Assessment    Assessment Type Discharge Planning Assessment     Confirmed/corrected address, phone number and insurance Yes     Confirmed Demographics Correct on Facesheet     Source of Information patient;family     Communicated ONIEL with patient/caregiver Yes     Reason For Admission SOB     Lives With spouse     Facility Arrived From: home     Do you expect to  return to your current living situation? Yes     Do you have help at home or someone to help you manage your care at home? Yes     Who are your caregiver(s) and their phone number(s)? SpouseRinku Lucas [phone]     Prior to hospitilization cognitive status: Alert/Oriented     Current cognitive status: Alert/Oriented     Walking or Climbing Stairs Difficulty none     Dressing/Bathing Difficulty none     Home Layout Able to live on 1st floor     Equipment Currently Used at Home none     Readmission within 30 days? No     Patient currently being followed by outpatient case management? No     Do you currently have service(s) that help you manage your care at home? No     Do you take prescription medications? Yes     Do you have prescription coverage? Yes     Do you have any problems affording any of your prescribed medications? No     Is the patient taking medications as prescribed? yes     Who is going to help you get home at discharge? Tu Lucas [phone]     How do you get to doctors appointments? car, drives self;family or friend will provide     Are you on dialysis? No     Do you take coumadin? No     Discharge Plan A Home with family     Discharge Plan B Home     DME Needed Upon Discharge  none     Discharge Plan discussed with: Patient     Discharge Barriers Identified None        Relationship/Environment    Name(s) of Who Lives With Patient Tu Lucas [phone]

## 2022-06-21 NOTE — PROGRESS NOTES
Pharmacokinetic Assessment Follow Up: IV Vancomycin    Vancomycin serum concentration assessment(s):    The trough level was drawn correctly and can be used to guide therapy at this time. The measurement is below the desired definitive target range of 15 to 20 mcg/mL.    Vancomycin Regimen Plan:    Continue regimen to Vancomycin 2000 mg IV every 12 hours with next serum trough concentration measured at 1230 prior to third dose on 6/22/22    Drug levels (last 3 results):  Recent Labs   Lab Result Units 06/21/22  1227   Vancomycin-Trough ug/mL 13.0       Pharmacy will continue to follow and monitor vancomycin.    Please contact pharmacy at extension [phone] for questions regarding this assessment.    Thank you for the consult,   Cher Davenport       Patient brief summary:  Osman Li Jr. is a 57 y.o. male initiated on antimicrobial therapy with IV Vancomycin for treatment of intra-abdominal infection      Drug Allergies:   Review of patient's allergies indicates:   Allergen Reactions    Penicillins Rash       Actual Body Weight:   93 kg    Renal Function:   Estimated Creatinine Clearance: 117 mL/min (based on SCr of 0.8 mg/dL).,     Dialysis Method (if applicable):  N/A    CBC (last 72 hours):  Recent Labs   Lab Result Units 06/20/22  0708 06/21/22  0451   WBC K/uL 27.25* 14.85*   Hemoglobin g/dL 10.6* 9.6*   Hematocrit % 32.5* 28.9*   Platelets K/uL 650* 660*   Gran % % 79.0* 49.9   Lymph % % 13.0* 32.5   Mono % % 8.0 15.6*   Eosinophil % % 0.0 0.5   Basophil % % 0.0 0.4   Differential Method  Manual Automated       Metabolic Panel (last 72 hours):  Recent Labs   Lab Result Units 06/20/22  0708 06/20/22  1104 06/21/22  0451   Sodium mmol/L 127*  --  133*   Potassium mmol/L 3.8  --  3.9   Chloride mmol/L 89*  --  95   CO2 mmol/L 25  --  26   Glucose mg/dL 248*  --  235*   Glucose, UA   --  4+*  --    BUN mg/dL 15  --  8   Creatinine mg/dL 0.9  --  0.8   Albumin g/dL 2.7*  --   --    Total Bilirubin mg/dL 0.9  --    --    Alkaline Phosphatase U/L 306*  --   --    AST U/L 17  --   --    ALT U/L 30  --   --    Magnesium mg/dL 1.6  --  1.7       Vancomycin Administrations:  vancomycin given in the last 96 hours                     vancomycin (VANCOCIN) 2,000 mg in dextrose 5 % 500 mL IVPB (mg) 2,000 mg New Bag 06/21/22 0140     2,000 mg New Bag 06/20/22 1508                    Microbiologic Results:  Microbiology Results (last 7 days)       Procedure Component Value Units Date/Time    Blood Culture #1 [[id number]] Collected: 06/20/22 0917    Order Status: Completed Specimen: Blood from Antecubital, Left Updated: 06/20/22 1915     Blood Culture, Routine No Growth to date    Blood Culture #2 [[id number]] Collected: 06/20/22 0917    Order Status: Completed Specimen: Blood from Antecubital, Right Updated: 06/20/22 1915     Blood Culture, Routine No Growth to date    Influenza A & B by Molecular [[id number]] Collected: 06/20/22 0808    Order Status: Completed Specimen: Nasopharyngeal Swab Updated: 06/20/22 0903     Influenza A, Molecular Negative     Influenza B, Molecular Negative     Flu A & B Source Nasal swab

## 2022-06-21 NOTE — SUBJECTIVE & OBJECTIVE
"Past Medical History:   Diagnosis Date    DM (diabetes mellitus)     Hyperlipidemia     Hypertension     Prediabetes        Past Surgical History:   Procedure Laterality Date    CHOLECYSTECTOMY  2011    COLONOSCOPY      2018    COLOSTOMY N/A 4/25/2022    Procedure: CREATION, COLOSTOMY;  Surgeon: Carlton Waddell MD;  Location: [facility] OR;  Service: General;  Laterality: N/A;    INSERTION OF TUNNELED CENTRAL VENOUS CATHETER (CVC) WITH SUBCUTANEOUS PORT Right 5/18/2022    Procedure: [id number]-[id number];  Surgeon: Carlton Waddell MD;  Location: [facility] OR;  Service: General;  Laterality: Right;    MOBILIZATION OF SPLENIC FLEXURE N/A 4/25/2022    Procedure: MOBILIZATION, SPLENIC FLEXURE;  Surgeon: Carlton Waddell MD;  Location: [facility] OR;  Service: General;  Laterality: N/A;    SPLENECTOMY N/A 4/25/2022    Procedure: SPLENECTOMY;  Surgeon: Carlton Waddell MD;  Location: [facility] OR;  Service: General;  Laterality: N/A;    SUBTOTAL COLECTOMY N/A 4/25/2022    Procedure: COLECTOMY, PARTIAL;  Surgeon: Carlton Waddell MD;  Location: [facility] OR;  Service: General;  Laterality: N/A;       Review of patient's allergies indicates:   Allergen Reactions    Penicillins Rash       No current facility-administered medications on file prior to encounter.     Current Outpatient Medications on File Prior to Encounter   Medication Sig    atorvastatin (LIPITOR) 20 MG tablet Take 1 tablet (20 mg total) by mouth once daily.    enalapriL-hydrochlorothiazide (VASERETIC) 10-25 mg per tablet Take 1 tablet by mouth once daily.    metFORMIN (GLUCOPHAGE) 1000 MG tablet Take 1 tablet (1,000 mg total) by mouth 2 (two) times daily with meals.    oxyCODONE (ROXICODONE) 30 MG Tab Take 1 tablet (30 mg total) by mouth every 6 (six) hours as needed for Pain.    pen needle, diabetic (BD ULTRA-FINE MICRO PEN NEEDLE) 32 gauge x 1/4" Ndle 1 each by Misc.(Non-Drug; Combo Route) route once a week.    promethazine (PHENERGAN) 12.5 MG Tab Take 1 tablet (12.5 mg total) by mouth " every 4 (four) hours as needed.    sildenafiL (VIAGRA) 100 MG tablet Take 1 tablet (100 mg total) by mouth daily as needed for Erectile Dysfunction.    [DISCONTINUED] levoFLOXacin (LEVAQUIN) 750 MG tablet Take 1 tablet (750 mg total) by mouth once daily. (Patient not taking: Reported on 6/9/2022)     Family History       Problem Relation (Age of Onset)    Heart disease Father          Tobacco Use    Smoking status: Current Every Day Smoker     Packs/day: 0.50     Years: 35.00     Pack years: 17.50     Types: Cigarettes    Smokeless tobacco: Never Used   Substance and Sexual Activity    Alcohol use: Not Currently    Drug use: Never    Sexual activity: Yes     Partners: Female     Review of Systems   Constitutional:  Positive for chills and fever. Negative for fatigue.   HENT:  Negative for congestion and sinus pressure.    Eyes:  Negative for pain and visual disturbance.   Respiratory:  Positive for cough and shortness of breath (pain upon deep inspiration on left side). Negative for wheezing.    Cardiovascular:  Negative for chest pain, palpitations and leg swelling.   Gastrointestinal:  Positive for nausea and vomiting. Negative for abdominal pain and diarrhea.   Genitourinary:  Negative for difficulty urinating, dysuria and hematuria.   Musculoskeletal:  Negative for arthralgias and myalgias.   Skin:  Negative for rash and wound.   Neurological:  Negative for dizziness, weakness, light-headedness and headaches.   Hematological:  Negative for adenopathy. Does not bruise/bleed easily.   Psychiatric/Behavioral:  Negative for confusion and dysphoric mood. The patient is not nervous/anxious.    Objective:     Vital Signs (Most Recent):  Temp: 98.4 °F (36.9 °C) (06/20/22 1935)  Pulse: 91 (06/20/22 1935)  Resp: 18 (06/20/22 2053)  BP: 139/72 (06/20/22 1935)  SpO2: 96 % (06/20/22 1935) Vital Signs (24h Range):  Temp:  [97.5 °F (36.4 °C)-99.5 °F (37.5 °C)] 98.4 °F (36.9 °C)  Pulse:  [] 91  Resp:  [17-20] 18  SpO2:   [91 %-98 %] 96 %  BP: (121-176)/(66-86) 139/72     Weight: 93 kg (205 lb)  Body mass index is 26.32 kg/m².    Physical Exam  Constitutional:       General: He is not in acute distress.     Appearance: He is not ill-appearing.   HENT:      Head: Normocephalic and atraumatic.      Right Ear: External ear normal.      Left Ear: External ear normal.      Mouth/Throat:      Pharynx: No oropharyngeal exudate.   Eyes:      General: No scleral icterus.        Right eye: No discharge.         Left eye: No discharge.      Conjunctiva/sclera: Conjunctivae normal.   Neck:      Thyroid: No thyromegaly.      Vascular: No JVD.   Cardiovascular:      Rate and Rhythm: Normal rate and regular rhythm.      Heart sounds:     No gallop.   Pulmonary:      Effort: Pulmonary effort is normal.      Breath sounds: Normal breath sounds. Decreased air movement present. No wheezing.   Abdominal:      General: Bowel sounds are normal. There is no distension.      Palpations: Abdomen is soft. There is no mass.      Tenderness: There is abdominal tenderness in the left upper quadrant. There is no rebound.   Musculoskeletal:         General: No deformity.      Cervical back: Normal range of motion and neck supple.   Lymphadenopathy:      Cervical: No cervical adenopathy.   Skin:     General: Skin is warm and dry.      Capillary Refill: Capillary refill takes less than 2 seconds.      Findings: No rash.   Neurological:      Mental Status: He is alert and oriented to person, place, and time.   Psychiatric:         Behavior: Behavior normal.           Significant Labs: BMP:   Recent Labs   Lab 06/20/22  0708   *   *   K 3.8   CL 89*   CO2 25   BUN 15   CREATININE 0.9   CALCIUM 10.1   MG 1.6     CBC:   Recent Labs   Lab 06/20/22  0708   WBC 27.25*   HGB 10.6*   HCT 32.5*   *       Significant Imaging:   Results for orders placed during the hospital encounter of 06/20/22    CT Abdomen Pelvis With  Contrast    Narrative  EXAMINATION:  CT ABDOMEN PELVIS WITH CONTRAST    CLINICAL HISTORY:  Abdominal abscess/infection suspected;    TECHNIQUE:  Low dose axial images, sagittal and coronal reformations were obtained from the lung bases to the pubic symphysis following the IV administration of 100 mL of Omnipaque 350 .  Oral contrast was not given.    COMPARISON:  05/13/2022    FINDINGS:  Minimal atelectasis left lower lobe.  Left pleural effusion.  Normal size heart.  Cholecystectomy.    Liver is 21 cm in length and spleen is not seen and reportedly surgically absent, per chart review.  Pancreas atrophy.  Remaining solid abdominal organs are unremarkable.    There is no enteric contrast which limits bowel assessment.  Stomach is decompressed with mild mass effect near the fundus.  No dilated bowel loops.  Normal appendix.  There is a left mid abdominal colostomy.  There is also a stapled colonic stump in the left abdomen along the descending segment.    In the left upper abdominal quadrant below the diaphragm there is a 9 x 11 cm air/fluid collection with adjacent fat stranding.  This has a thickened wall and abuts the greater curvature of the stomach and several adjacent small bowel loops.  There is some fat stranding in the region and this does have a tubular tract extending towards the stapled end of the colon as can be seen coronal series 601, image 96.  Atherosclerosis.  Prostate 5.3 cm diameter.  Unremarkable urinary bladder.    Transitional lumbosacral anatomy.  Minimal degenerative change of the hips.    Impression  1. Postsurgical change of the colon with increased size of air-fluid collection near the operative bed in the left upper abdominal quadrant.  Abscess or contained perforation are considerations.  2. Hepatomegaly.  3. Similar to slight decrease in size of the left pleural effusion.  Findings discussed with Dr. Ledesma via telephone at 10:25.      Electronically signed by: Fredrick  Lundberg  Date:    06/20/2022  Time:    10:28]

## 2022-06-21 NOTE — ASSESSMENT & PLAN NOTE
Established with oncology.  Due to start chemo soon; it will need to wait until after infection issues are resolved.

## 2022-06-21 NOTE — PLAN OF CARE
Intervention: nutrition education    Recommendation:    1) Advance PO diet to DM 2000 kcal, cardiac low fiber - no corn   2) weigh weekly   3) nutrition education and handout given    Goals: 1) PO intakes > 75% of meals and supplemets at f/u  Nutrition Goal Status: new  Communication of RD Recs:  (POC, sticky note, second sign)

## 2022-06-22 ENCOUNTER — TELEPHONE (OUTPATIENT)
Dept: FAMILY MEDICINE | Facility: CLINIC | Age: 57
End: 2022-06-22

## 2022-06-22 VITALS
DIASTOLIC BLOOD PRESSURE: 77 MMHG | WEIGHT: 205 LBS | HEART RATE: 72 BPM | OXYGEN SATURATION: 94 % | TEMPERATURE: 98 F | SYSTOLIC BLOOD PRESSURE: 161 MMHG | BODY MASS INDEX: 26.31 KG/M2 | RESPIRATION RATE: 17 BRPM | HEIGHT: 74 IN

## 2022-06-22 LAB
ANION GAP SERPL CALC-SCNC: 13 MMOL/L (ref 8–16)
BASOPHILS # BLD AUTO: 0.07 K/UL (ref 0–0.2)
BASOPHILS NFR BLD: 0.6 % (ref 0–1.9)
BUN SERPL-MCNC: 9 MG/DL (ref 6–20)
CALCIUM SERPL-MCNC: 9.5 MG/DL (ref 8.7–10.5)
CHLORIDE SERPL-SCNC: 97 MMOL/L (ref 95–110)
CO2 SERPL-SCNC: 24 MMOL/L (ref 23–29)
CREAT SERPL-MCNC: 0.8 MG/DL (ref 0.5–1.4)
DIFFERENTIAL METHOD: ABNORMAL
EOSINOPHIL # BLD AUTO: 0.2 K/UL (ref 0–0.5)
EOSINOPHIL NFR BLD: 1.2 % (ref 0–8)
ERYTHROCYTE [DISTWIDTH] IN BLOOD BY AUTOMATED COUNT: 14.7 % (ref 11.5–14.5)
EST. GFR  (AFRICAN AMERICAN): >60 ML/MIN/1.73 M^2
EST. GFR  (NON AFRICAN AMERICAN): >60 ML/MIN/1.73 M^2
GLUCOSE SERPL-MCNC: 252 MG/DL (ref 70–110)
HCT VFR BLD AUTO: 29.9 % (ref 40–54)
HGB BLD-MCNC: 10.3 G/DL (ref 14–18)
IMM GRANULOCYTES # BLD AUTO: 0.17 K/UL (ref 0–0.04)
IMM GRANULOCYTES NFR BLD AUTO: 1.3 % (ref 0–0.5)
LYMPHOCYTES # BLD AUTO: 3.9 K/UL (ref 1–4.8)
LYMPHOCYTES NFR BLD: 30.6 % (ref 18–48)
MAGNESIUM SERPL-MCNC: 1.6 MG/DL (ref 1.6–2.6)
MCH RBC QN AUTO: 26.4 PG (ref 27–31)
MCHC RBC AUTO-ENTMCNC: 34.4 G/DL (ref 32–36)
MCV RBC AUTO: 77 FL (ref 82–98)
MONOCYTES # BLD AUTO: 1.7 K/UL (ref 0.3–1)
MONOCYTES NFR BLD: 13.1 % (ref 4–15)
NEUTROPHILS # BLD AUTO: 6.8 K/UL (ref 1.8–7.7)
NEUTROPHILS NFR BLD: 53.2 % (ref 38–73)
NRBC BLD-RTO: 0 /100 WBC
PLATELET # BLD AUTO: 732 K/UL (ref 150–450)
PMV BLD AUTO: 9.8 FL (ref 9.2–12.9)
POCT GLUCOSE: 218 MG/DL (ref 70–110)
POCT GLUCOSE: 256 MG/DL (ref 70–110)
POCT GLUCOSE: 274 MG/DL (ref 70–110)
POTASSIUM SERPL-SCNC: 3.5 MMOL/L (ref 3.5–5.1)
RBC # BLD AUTO: 3.9 M/UL (ref 4.6–6.2)
SODIUM SERPL-SCNC: 134 MMOL/L (ref 136–145)
VANCOMYCIN TROUGH SERPL-MCNC: 17.6 UG/ML (ref 10–22)
WBC # BLD AUTO: 12.7 K/UL (ref 3.9–12.7)

## 2022-06-22 PROCEDURE — S4991 NICOTINE PATCH NONLEGEND: HCPCS | Performed by: HOSPITALIST

## 2022-06-22 PROCEDURE — S0030 INJECTION, METRONIDAZOLE: HCPCS | Performed by: HOSPITALIST

## 2022-06-22 PROCEDURE — 36415 COLL VENOUS BLD VENIPUNCTURE: CPT | Performed by: HOSPITALIST

## 2022-06-22 PROCEDURE — 80202 ASSAY OF VANCOMYCIN: CPT | Performed by: HOSPITALIST

## 2022-06-22 PROCEDURE — 99024 PR POST-OP FOLLOW-UP VISIT: ICD-10-PCS | Mod: ,,, | Performed by: STUDENT IN AN ORGANIZED HEALTH CARE EDUCATION/TRAINING PROGRAM

## 2022-06-22 PROCEDURE — 85025 COMPLETE CBC W/AUTO DIFF WBC: CPT | Performed by: HOSPITALIST

## 2022-06-22 PROCEDURE — 63600175 PHARM REV CODE 636 W HCPCS: Performed by: HOSPITALIST

## 2022-06-22 PROCEDURE — 25000003 PHARM REV CODE 250: Performed by: HOSPITALIST

## 2022-06-22 PROCEDURE — 99024 POSTOP FOLLOW-UP VISIT: CPT | Mod: ,,, | Performed by: STUDENT IN AN ORGANIZED HEALTH CARE EDUCATION/TRAINING PROGRAM

## 2022-06-22 PROCEDURE — 80048 BASIC METABOLIC PNL TOTAL CA: CPT | Performed by: HOSPITALIST

## 2022-06-22 PROCEDURE — 83735 ASSAY OF MAGNESIUM: CPT | Performed by: HOSPITALIST

## 2022-06-22 PROCEDURE — 99406 BEHAV CHNG SMOKING 3-10 MIN: CPT

## 2022-06-22 RX ORDER — METRONIDAZOLE 500 MG/1
500 TABLET ORAL EVERY 8 HOURS
Qty: 30 TABLET | Refills: 0 | Status: SHIPPED | OUTPATIENT
Start: 2022-06-22 | End: 2022-07-02

## 2022-06-22 RX ORDER — GLUCAGON 1 MG
1 KIT INJECTION
Status: DISCONTINUED | OUTPATIENT
Start: 2022-06-22 | End: 2022-06-22 | Stop reason: HOSPADM

## 2022-06-22 RX ORDER — IBUPROFEN 200 MG
24 TABLET ORAL
Status: DISCONTINUED | OUTPATIENT
Start: 2022-06-22 | End: 2022-06-22 | Stop reason: HOSPADM

## 2022-06-22 RX ORDER — METRONIDAZOLE 500 MG/1
500 TABLET ORAL EVERY 8 HOURS
Status: DISCONTINUED | OUTPATIENT
Start: 2022-06-22 | End: 2022-06-22 | Stop reason: SDUPTHER

## 2022-06-22 RX ORDER — NICOTINE 7MG/24HR
1 PATCH, TRANSDERMAL 24 HOURS TRANSDERMAL DAILY
Status: DISCONTINUED | OUTPATIENT
Start: 2022-06-22 | End: 2022-06-22 | Stop reason: HOSPADM

## 2022-06-22 RX ORDER — IBUPROFEN 200 MG
16 TABLET ORAL
Status: DISCONTINUED | OUTPATIENT
Start: 2022-06-22 | End: 2022-06-22 | Stop reason: HOSPADM

## 2022-06-22 RX ORDER — INSULIN ASPART 100 [IU]/ML
1-10 INJECTION, SOLUTION INTRAVENOUS; SUBCUTANEOUS
Status: DISCONTINUED | OUTPATIENT
Start: 2022-06-22 | End: 2022-06-22 | Stop reason: HOSPADM

## 2022-06-22 RX ADMIN — INSULIN ASPART 1 UNITS: 100 INJECTION, SOLUTION INTRAVENOUS; SUBCUTANEOUS at 11:06

## 2022-06-22 RX ADMIN — INSULIN ASPART 4 UNITS: 100 INJECTION, SOLUTION INTRAVENOUS; SUBCUTANEOUS at 11:06

## 2022-06-22 RX ADMIN — INSULIN ASPART 1 UNITS: 100 INJECTION, SOLUTION INTRAVENOUS; SUBCUTANEOUS at 04:06

## 2022-06-22 RX ADMIN — INSULIN ASPART 4 UNITS: 100 INJECTION, SOLUTION INTRAVENOUS; SUBCUTANEOUS at 07:06

## 2022-06-22 RX ADMIN — VANCOMYCIN HYDROCHLORIDE 2000 MG: 1 INJECTION, POWDER, LYOPHILIZED, FOR SOLUTION INTRAVENOUS at 01:06

## 2022-06-22 RX ADMIN — OXYCODONE HYDROCHLORIDE 10 MG: 10 TABLET ORAL at 01:06

## 2022-06-22 RX ADMIN — ATORVASTATIN CALCIUM 20 MG: 20 TABLET, FILM COATED ORAL at 07:06

## 2022-06-22 RX ADMIN — METRONIDAZOLE 500 MG: 500 INJECTION, SOLUTION INTRAVENOUS at 05:06

## 2022-06-22 RX ADMIN — METRONIDAZOLE 500 MG: 500 INJECTION, SOLUTION INTRAVENOUS at 01:06

## 2022-06-22 RX ADMIN — LISINOPRIL 20 MG: 10 TABLET ORAL at 07:06

## 2022-06-22 RX ADMIN — CEFTRIAXONE 1 G: 1 INJECTION, SOLUTION INTRAVENOUS at 07:06

## 2022-06-22 RX ADMIN — OXYCODONE HYDROCHLORIDE 10 MG: 10 TABLET ORAL at 05:06

## 2022-06-22 RX ADMIN — NICOTINE 1 PATCH: 7 PATCH, EXTENDED RELEASE TRANSDERMAL at 10:06

## 2022-06-22 NOTE — NURSING
Follow up ostomy care. Ostomy appliance remains intact with no leakage noted. Patient has no further needs from ostomy nurse at this time.

## 2022-06-22 NOTE — PLAN OF CARE
HH orders and packet sent to [facility] to review for HH acceptance.    06/22/22 1150   Post-Acute Status   Post-Acute Authorization Home Health   Home Health Status Referrals Sent

## 2022-06-22 NOTE — NURSING
IV infiltrated at start of Vancomycin administration. Dr Clark notified and instructed  that Home Health may administer vancomycin this evening.   [facility] case management notified.

## 2022-06-22 NOTE — PROGRESS NOTES
Patient seen and examined.  Still complaining of left upper quadrant/chest tightness.  No window for IR drainage she yesterday.      Vitals are stable   Abdomen soft     Labs reviewed, leukocytosis is improving     IR CT was reviewed.  Agree with the dictated findings.  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      Abscess cavity seems to be decreasing with IV antibiotics.  There was no good window for IR drain placement.  Lab seem to be improving.  Patient has remained stable.  Given his recent operative intervention, unclear if surgical drainage is possible without significant risk of intra-abdominal injury related to adhesions.  Overall, seems to be responding well to conservative therapy with IV antibiotics.  Discussed DC on IV antibiotics with follow-up CT in 1 week.

## 2022-06-22 NOTE — ASSESSMENT & PLAN NOTE
Chronic, controlled.  Latest blood pressure and vitals reviewed-   Temp:  [97.4 °F (36.3 °C)-98.4 °F (36.9 °C)]   Pulse:  [77-86]   Resp:  [14-20]   BP: (131-150)/(65-79)   SpO2:  [93 %-99 %] .   Home meds for hypertension were reviewed and noted below.   Hypertension Medications             enalapriL-hydrochlorothiazide (VASERETIC) 10-25 mg per tablet Take 1 tablet by mouth once daily.          While in the hospital, will manage blood pressure as follows; Continue home antihypertensive regimen    Will utilize p.r.n. blood pressure medication only if patient's blood pressure greater than  180/110 and he develops symptoms such as worsening chest pain or shortness of breath.

## 2022-06-22 NOTE — PLAN OF CARE
Pt is accepted for  care by [facility] - Discharge plan closed in careport and AVS updated.    06/22/22 7318   Post-Acute Status   Post-Acute Authorization Home Health   Home Health Status Set-up Complete/Auth obtained

## 2022-06-22 NOTE — SUBJECTIVE & OBJECTIVE
Interval History:  Patient seen and examined.  No acute events overnight.  Patient went to Interventional Radiology today for insertion of CT-guided catheter drainage.  Plan of care discussed with the patient and his wife at the bedside in detail.    Review of Systems   Constitutional:  Negative for chills, fatigue and fever.   Respiratory:  Negative for cough and shortness of breath.    Cardiovascular:  Negative for chest pain and leg swelling.   Gastrointestinal:  Positive for abdominal pain. Negative for nausea and vomiting.   Musculoskeletal:  Negative for back pain.   Neurological:  Negative for weakness.   Psychiatric/Behavioral:  Negative for confusion. The patient is not nervous/anxious.    All other systems reviewed and are negative.  Objective:     Vital Signs (Most Recent):  Temp: 97.4 °F (36.3 °C) (06/21/22 2001)  Pulse: 80 (06/21/22 2001)  Resp: 20 (06/21/22 2001)  BP: 139/65 (06/21/22 2001)  SpO2: (!) 93 % (06/21/22 2001)   Vital Signs (24h Range):  Temp:  [97.4 °F (36.3 °C)-98.4 °F (36.9 °C)] 97.4 °F (36.3 °C)  Pulse:  [77-86] 80  Resp:  [14-20] 20  SpO2:  [93 %-99 %] 93 %  BP: (131-150)/(65-79) 139/65     Weight: 93 kg (205 lb 0.4 oz)  Body mass index is 26.32 kg/m².    Intake/Output Summary (Last 24 hours) at 6/21/2022 2130  Last data filed at 6/21/2022 1730  Gross per 24 hour   Intake 370 ml   Output --   Net 370 ml      Physical Exam  Vitals and nursing note reviewed.   Eyes:      Pupils: Pupils are equal, round, and reactive to light.   Neck:      Vascular: No JVD.   Cardiovascular:      Rate and Rhythm: Normal rate and regular rhythm.      Heart sounds: Normal heart sounds.   Pulmonary:      Effort: Pulmonary effort is normal.      Breath sounds: Normal breath sounds.   Abdominal:      General: Bowel sounds are normal. There is no distension.      Palpations: Abdomen is soft.      Tenderness: There is no abdominal tenderness.      Comments: Ostomy site appears to be nontender.  Noted  fistulization around the original ostomy site.   Musculoskeletal:         General: No deformity.   Lymphadenopathy:      Cervical: No cervical adenopathy.   Skin:     Coloration: Skin is not pale.      Findings: No rash.       Significant Labs: All pertinent labs within the past 24 hours have been reviewed.    Significant Imaging: I have reviewed all pertinent imaging results/findings within the past 24 hours.

## 2022-06-22 NOTE — PLAN OF CARE
Telly with [employer] came to the office and stated that the pt is good to go home- she is finished education with the pt and spouse - she just needs to make sure that the pts port is accessed tonight by nursing prior to him going home and he is going to get one dose of Vancomycin medication tonight at home. I updated the pts nurse Ivonne via secure chat.    06/22/22 1535   Post-Acute Status   Post-Acute Authorization IV Infusion   IV Infusion Status Set-up Complete/Auth obtained

## 2022-06-22 NOTE — PROGRESS NOTES
[facility] Hospital Medicine  Progress Note    Patient Name: Osman Li Jr.  MRN: [id number]  Patient Class: IP- Inpatient   Admission Date: 6/20/2022  Length of Stay: 1 days  Attending Physician: Tyler Clark MD  Primary Care Provider: ANTONIO Emmanuel        Subjective:     Principal Problem:Sepsis        HPI:  Patient presented today with fever.  Associated with chills, nausea and vomiting.  Symptoms began about three days ago.  Symptoms are constant and worsening.  He recently underwent resection of splenic flexure of colon due to cancerous mass.  During that surgery, he underwent splenectomy as well.  Received a PortaCath on May 18.  He is due to start chemotherapy tomorrow.  Imaging done today shows abscess in the left upper quadrant.  He will be admitted and will be assessed by surgeon.  He has diabetes, hypertension, and is cigarette smoker.      Overview/Hospital Course:  No notes on file    Interval History:  Patient seen and examined.  No acute events overnight.  Patient went to Interventional Radiology today for insertion of CT-guided catheter drainage.  Plan of care discussed with the patient and his wife at the bedside in detail.    Review of Systems   Constitutional:  Negative for chills, fatigue and fever.   Respiratory:  Negative for cough and shortness of breath.    Cardiovascular:  Negative for chest pain and leg swelling.   Gastrointestinal:  Positive for abdominal pain. Negative for nausea and vomiting.   Musculoskeletal:  Negative for back pain.   Neurological:  Negative for weakness.   Psychiatric/Behavioral:  Negative for confusion. The patient is not nervous/anxious.    All other systems reviewed and are negative.  Objective:     Vital Signs (Most Recent):  Temp: 97.4 °F (36.3 °C) (06/21/22 2001)  Pulse: 80 (06/21/22 2001)  Resp: 20 (06/21/22 2001)  BP: 139/65 (06/21/22 2001)  SpO2: (!) 93 % (06/21/22 2001)   Vital Signs (24h Range):  Temp:  [97.4 °F (36.3  °C)-98.4 °F (36.9 °C)] 97.4 °F (36.3 °C)  Pulse:  [77-86] 80  Resp:  [14-20] 20  SpO2:  [93 %-99 %] 93 %  BP: (131-150)/(65-79) 139/65     Weight: 93 kg (205 lb 0.4 oz)  Body mass index is 26.32 kg/m².    Intake/Output Summary (Last 24 hours) at 6/21/2022 2130  Last data filed at 6/21/2022 1730  Gross per 24 hour   Intake 370 ml   Output --   Net 370 ml      Physical Exam  Vitals and nursing note reviewed.   Eyes:      Pupils: Pupils are equal, round, and reactive to light.   Neck:      Vascular: No JVD.   Cardiovascular:      Rate and Rhythm: Normal rate and regular rhythm.      Heart sounds: Normal heart sounds.   Pulmonary:      Effort: Pulmonary effort is normal.      Breath sounds: Normal breath sounds.   Abdominal:      General: Bowel sounds are normal. There is no distension.      Palpations: Abdomen is soft.      Tenderness: There is no abdominal tenderness.      Comments: Ostomy site appears to be nontender.  Noted fistulization around the original ostomy site.   Musculoskeletal:         General: No deformity.   Lymphadenopathy:      Cervical: No cervical adenopathy.   Skin:     Coloration: Skin is not pale.      Findings: No rash.       Significant Labs: All pertinent labs within the past 24 hours have been reviewed.    Significant Imaging: I have reviewed all pertinent imaging results/findings within the past 24 hours.      Assessment/Plan:      * Sepsis  This patient does have evidence of infective focus  My overall impression is sepsis. Vital signs were reviewed and noted in progress note.  Antibiotics given-   Antibiotics (From admission, onward)            Start     Stop Route Frequency Ordered    06/20/22 2130  cefTRIAXone (ROCEPHIN) 1 g/50 mL D5W IVPB         -- IV Every 12 hours (non-standard times) 06/20/22 1306    06/20/22 1330  vancomycin (VANCOCIN) 2,000 mg in dextrose 5 % 500 mL IVPB         -- IV Every 12 hours (non-standard times) 06/20/22 1204    06/20/22 1300  metronidazole IVPB 500 mg      "    -- IV Every 8 hours (non-standard times) 06/20/22 1157    06/20/22 1257  vancomycin - pharmacy to dose  (vancomycin IVPB)        "And" Linked Group Details    -- IV pharmacy to manage frequency 06/20/22 1157        Cultures were taken-   Microbiology Results (last 7 days)     Procedure Component Value Units Date/Time    Blood Culture #1 [[id number]] Collected: 06/20/22 0917    Order Status: Completed Specimen: Blood from Antecubital, Left Updated: 06/21/22 1412     Blood Culture, Routine No Growth to date      No Growth to date    Blood Culture #2 [[id number]] Collected: 06/20/22 0917    Order Status: Completed Specimen: Blood from Antecubital, Right Updated: 06/21/22 1412     Blood Culture, Routine No Growth to date      No Growth to date    Influenza A & B by Molecular [[id number]] Collected: 06/20/22 0808    Order Status: Completed Specimen: Nasopharyngeal Swab Updated: 06/20/22 0903     Influenza A, Molecular Negative     Influenza B, Molecular Negative     Flu A & B Source Nasal swab        Latest lactate reviewed, they are-  Recent Labs   Lab 06/20/22 0917   LACTATE 1.2     Source-  intraabdominal    Source control Achieved by- antibiotics and drainage      Intra-abdominal abscess  Consult with general surgeon.  Give IVAB.    Antibiotics (From admission, onward)            Start     Stop Route Frequency Ordered    06/20/22 2130  cefTRIAXone (ROCEPHIN) 1 g/50 mL D5W IVPB         -- IV Every 12 hours (non-standard times) 06/20/22 1306    06/20/22 1330  vancomycin (VANCOCIN) 2,000 mg in dextrose 5 % 500 mL IVPB         -- IV Every 12 hours (non-standard times) 06/20/22 1204    06/20/22 1300  metronidazole IVPB 500 mg         -- IV Every 8 hours (non-standard times) 06/20/22 1157    06/20/22 1257  vancomycin - pharmacy to dose  (vancomycin IVPB)        "And" Linked Group Details    -- IV pharmacy to manage frequency 06/20/22 1157              Malignant neoplasm of splenic flexure  Established with oncology.  Due " to start chemo soon; it will need to wait until after infection issues are resolved.      Hyponatremia  Sodium level improving, likely secondary to acute illness.  No treatment indicated at this point in time.      Type 2 diabetes mellitus  Patient's FSGs are controlled on current medication regimen.  Last A1c reviewed-   Lab Results   Component Value Date    HGBA1C 7.1 05/09/2022     Most recent fingerstick glucose reviewed-   Recent Labs   Lab 06/21/22  0730 06/21/22  1143 06/21/22 2022   POCTGLUCOSE 239* 201* 372*     Current correctional scale  Low  Maintain anti-hyperglycemic dose as follows-   Antihyperglycemics (From admission, onward)            Start     Stop Route Frequency Ordered    06/20/22 1306  insulin aspart U-100 pen 0-5 Units         -- SubQ Every 6 hours PRN 06/20/22 1306        Hold Oral hypoglycemics while patient is in the hospital.      HTN (hypertension)  Chronic, controlled.  Latest blood pressure and vitals reviewed-   Temp:  [97.4 °F (36.3 °C)-98.4 °F (36.9 °C)]   Pulse:  [77-86]   Resp:  [14-20]   BP: (131-150)/(65-79)   SpO2:  [93 %-99 %] .   Home meds for hypertension were reviewed and noted below.   Hypertension Medications             enalapriL-hydrochlorothiazide (VASERETIC) 10-25 mg per tablet Take 1 tablet by mouth once daily.          While in the hospital, will manage blood pressure as follows; Continue home antihypertensive regimen    Will utilize p.r.n. blood pressure medication only if patient's blood pressure greater than  180/110 and he develops symptoms such as worsening chest pain or shortness of breath.        VTE Risk Mitigation (From admission, onward)         Ordered     enoxaparin injection 40 mg  Daily         06/20/22 1306     IP VTE HIGH RISK PATIENT  Once         06/20/22 1306     Place sequential compression device  Until discontinued         06/20/22 1306                Discharge Planning   ONIEL:      Code Status: Full Code   Is the patient medically ready for  discharge?:     Reason for patient still in hospital (select all that apply): Treatment  Discharge Plan A: Home with family                  Tyler Clark MD  Department of Hospital Medicine   [facility]

## 2022-06-22 NOTE — ASSESSMENT & PLAN NOTE
Sodium level improving, likely secondary to acute illness.  No treatment indicated at this point in time.

## 2022-06-22 NOTE — ASSESSMENT & PLAN NOTE
"This patient does have evidence of infective focus  My overall impression is sepsis. Vital signs were reviewed and noted in progress note.  Antibiotics given-   Antibiotics (From admission, onward)            Start     Stop Route Frequency Ordered    06/20/22 2130  cefTRIAXone (ROCEPHIN) 1 g/50 mL D5W IVPB         -- IV Every 12 hours (non-standard times) 06/20/22 1306    06/20/22 1330  vancomycin (VANCOCIN) 2,000 mg in dextrose 5 % 500 mL IVPB         -- IV Every 12 hours (non-standard times) 06/20/22 1204    06/20/22 1300  metronidazole IVPB 500 mg         -- IV Every 8 hours (non-standard times) 06/20/22 1157    06/20/22 1257  vancomycin - pharmacy to dose  (vancomycin IVPB)        "And" Linked Group Details    -- IV pharmacy to manage frequency 06/20/22 1157        Cultures were taken-   Microbiology Results (last 7 days)     Procedure Component Value Units Date/Time    Blood Culture #1 [[id number]] Collected: 06/20/22 0917    Order Status: Completed Specimen: Blood from Antecubital, Left Updated: 06/21/22 1412     Blood Culture, Routine No Growth to date      No Growth to date    Blood Culture #2 [[id number]] Collected: 06/20/22 0917    Order Status: Completed Specimen: Blood from Antecubital, Right Updated: 06/21/22 1412     Blood Culture, Routine No Growth to date      No Growth to date    Influenza A & B by Molecular [[id number]] Collected: 06/20/22 0808    Order Status: Completed Specimen: Nasopharyngeal Swab Updated: 06/20/22 0903     Influenza A, Molecular Negative     Influenza B, Molecular Negative     Flu A & B Source Nasal swab        Latest lactate reviewed, they are-  Recent Labs   Lab 06/20/22 0917   LACTATE 1.2     Source-  intraabdominal    Source control Achieved by- antibiotics and drainage    "

## 2022-06-22 NOTE — DISCHARGE INSTRUCTIONS
Discharge Instructions, [facility] Medicine    Thank you for choosing [facility] for your medical care. The primary doctor who is taking care of you at the time of your discharge is Tyler Clark MD.     You were admitted to the hospital with Sepsis.     Please note your discharge instructions, including diet/activity restrictions, follow-up appointments, and medication changes.  If you have any questions about your medical issues, prescriptions, or any other questions, please feel free to contact the [facility] Medicine Dept at [phone] and we will help.    If you are previously with Home health, outpatient PT/OT or under a therapy program, you are cleared to return to those programs.    Please direct all long term medication refills and follow up to your primary care provider, ANTONIO Emmanuel. Thank you again for letting us take care of your health care needs.

## 2022-06-22 NOTE — PROGRESS NOTES
Pharmacokinetic Assessment Follow Up: IV Vancomycin    Vancomycin serum concentration assessment(s):    The trough level was drawn correctly and can be used to guide therapy at this time. The measurement is within the desired definitive target range of 15 to 20 mcg/mL.      Continue regimen to Vancomycin 2000 mg IV every 12 hours with next serum trough concentration measured at 1230 prior to 3rd dose on 6-23    Drug levels (last 3 results):  Recent Labs   Lab Result Units 06/21/22  1227 06/22/22  1122   Vancomycin-Trough ug/mL 13.0 17.6       Pharmacy will continue to follow and monitor vancomycin.    Please contact pharmacy at extension [phone] for questions regarding this assessment.    Thank you for the consult,   Dominic Salvador       Patient brief summary:  Osman Li Jr. is a 57 y.o. male initiated on antimicrobial therapy with IV Vancomycin for treatment of intra-abdominal infection        Drug Allergies:   Review of patient's allergies indicates:   Allergen Reactions    Penicillins Rash       Actual Body Weight:   93 kg    Renal Function:   Estimated Creatinine Clearance: 117 mL/min (based on SCr of 0.8 mg/dL).,     Dialysis Method (if applicable):  N/A    CBC (last 72 hours):  Recent Labs   Lab Result Units 06/20/22  0708 06/21/22  0451 06/22/22  0439   WBC K/uL 27.25* 14.85* 12.70   Hemoglobin g/dL 10.6* 9.6* 10.3*   Hematocrit % 32.5* 28.9* 29.9*   Platelets K/uL 650* 660* 732*   Gran % % 79.0* 49.9 53.2   Lymph % % 13.0* 32.5 30.6   Mono % % 8.0 15.6* 13.1   Eosinophil % % 0.0 0.5 1.2   Basophil % % 0.0 0.4 0.6   Differential Method  Manual Automated Automated       Metabolic Panel (last 72 hours):  Recent Labs   Lab Result Units 06/20/22  0708 06/20/22  1104 06/21/22  0451 06/22/22  0439   Sodium mmol/L 127*  --  133* 134*   Potassium mmol/L 3.8  --  3.9 3.5   Chloride mmol/L 89*  --  95 97   CO2 mmol/L 25  --  26 24   Glucose mg/dL 248*  --  235* 252*   Glucose, UA   --  4+*  --   --    BUN mg/dL 15   --  8 9   Creatinine mg/dL 0.9  --  0.8 0.8   Albumin g/dL 2.7*  --   --   --    Total Bilirubin mg/dL 0.9  --   --   --    Alkaline Phosphatase U/L 306*  --   --   --    AST U/L 17  --   --   --    ALT U/L 30  --   --   --    Magnesium mg/dL 1.6  --  1.7 1.6       Vancomycin Administrations:  vancomycin given in the last 96 hours                     vancomycin (VANCOCIN) 2,000 mg in dextrose 5 % 500 mL IVPB (mg) 2,000 mg New Bag 06/22/22 0132     2,000 mg New Bag 06/21/22 1640     2,000 mg New Bag  0140     2,000 mg New Bag 06/20/22 1508                    Microbiologic Results:  Microbiology Results (last 7 days)       Procedure Component Value Units Date/Time    Blood Culture #1 [[id number]] Collected: 06/20/22 0917    Order Status: Completed Specimen: Blood from Antecubital, Left Updated: 06/21/22 1412     Blood Culture, Routine No Growth to date      No Growth to date    Blood Culture #2 [[id number]] Collected: 06/20/22 0917    Order Status: Completed Specimen: Blood from Antecubital, Right Updated: 06/21/22 1412     Blood Culture, Routine No Growth to date      No Growth to date    Influenza A & B by Molecular [[id number]] Collected: 06/20/22 0808    Order Status: Completed Specimen: Nasopharyngeal Swab Updated: 06/20/22 0903     Influenza A, Molecular Negative     Influenza B, Molecular Negative     Flu A & B Source Nasal swab

## 2022-06-22 NOTE — PLAN OF CARE
Per Noemi Hammer with [facility] via [employer] - yes they are willing to accept- CM following.    1:40 pm- I called [facility] to get an update and was informed that they are still working on it and further updates can be obtained from     Leydi at [phone] or Telly at [phone]- CM following.     2:21- CM met with pt and wife at bedside- they have not talked to anyone from [facility]. I called Ley[facility] at [phone] but was unable to leave a voicemail. I called Telly at [phone] and left detailed message for a return call. CM following.    3:13- I left a message for Lesly with [facility] [phone]. CM following.      06/22/22 1257   Post-Acute Status   Post-Acute Authorization IV Infusion   IV Infusion Status Post acute provider reviewing for acceptance

## 2022-06-22 NOTE — CARE UPDATE
06/22/22 1552   Tobacco Cessation Intervention   Do you use any type of tobacco product? Yes   Are you interested in quitting use of tobacco products? Thinking about quitting   $ Smoking Cessation Charges Smoking Cessation - Intermediate (CTTS)   Pt is a current daily smoker and was educated on smoking cessation.  Pt states he is trying to quit on his own. Information about the [facility] smoking cessation program given to Pt.

## 2022-06-22 NOTE — PLAN OF CARE
Per Ese with [facility]- they are out of network but pt will be covered at 100% with someone in network such as [facility], [facility], [facility], or [facility].     I sent to [facility]) and called to update them on the new referral at [phone]. CM following.    06/22/22 1233   Post-Acute Status   Post-Acute Authorization IV Infusion   IV Infusion Status Referral(s) sent

## 2022-06-22 NOTE — PLAN OF CARE
I sent pts HH packet and IV abx infusion orders to [facility] and updated Ese at [phone] and updated her that discharge is planned for today. I sent a secure chat to Dr. Clark requesting Home health orders. CM following.    Stewart Kang in [facility]- they have received the IV abx referral and are verifying benefits and will be in contact shortly.     06/22/22 1138   Post-Acute Status   Post-Acute Authorization IV Infusion   IV Infusion Status Referral(s) sent

## 2022-06-22 NOTE — PLAN OF CARE
POC/Meds reviewed, pt verbalized understanding. Wife at bedside. Vitals stable.  Afebrile.  Tele In place. Blood glucose monitored. PRN medication given. Independent. Repositions self. Hourly/Q2hr rounding performed, safety maintained. Bed in lowest position, wheels locked, SR up x2, call light in easy reach. No  complaints at this time.

## 2022-06-22 NOTE — ASSESSMENT & PLAN NOTE
Patient's FSGs are controlled on current medication regimen.  Last A1c reviewed-   Lab Results   Component Value Date    HGBA1C 7.1 05/09/2022     Most recent fingerstick glucose reviewed-   Recent Labs   Lab 06/21/22  0730 06/21/22  1143 06/21/22 2022   POCTGLUCOSE 239* 201* 372*     Current correctional scale  Low  Maintain anti-hyperglycemic dose as follows-   Antihyperglycemics (From admission, onward)            Start     Stop Route Frequency Ordered    06/20/22 1306  insulin aspart U-100 pen 0-5 Units         -- SubQ Every 6 hours PRN 06/20/22 1306        Hold Oral hypoglycemics while patient is in the hospital.

## 2022-06-22 NOTE — TELEPHONE ENCOUNTER
----- Message from Benita Matthews sent at 6/22/2022 11:52 AM CDT -----  Emily with [facility] case management is calling to schedule an hfu appt. He is going home with iv antibiotics. Please call the patient back

## 2022-06-22 NOTE — PLAN OF CARE
The pt is cleared for discharge home from case management with [facility].    06/22/22 1542   Final Note   Assessment Type Final Discharge Note   Anticipated Discharge Disposition Home   Hospital Resources/Appts/Education Provided Appointments scheduled and added to AVS

## 2022-06-23 ENCOUNTER — PATIENT OUTREACH (OUTPATIENT)
Dept: FAMILY MEDICINE | Facility: CLINIC | Age: 57
End: 2022-06-23

## 2022-06-23 ENCOUNTER — PATIENT OUTREACH (OUTPATIENT)
Dept: ADMINISTRATIVE | Facility: CLINIC | Age: 57
End: 2022-06-23
Payer: COMMERCIAL

## 2022-06-23 ENCOUNTER — TELEPHONE (OUTPATIENT)
Dept: MEDSURG UNIT | Facility: HOSPITAL | Age: 57
End: 2022-06-23
Payer: COMMERCIAL

## 2022-06-23 PROCEDURE — G0180 PR HOME HEALTH MD CERTIFICATION: ICD-10-PCS | Mod: ,,, | Performed by: PHYSICIAN ASSISTANT

## 2022-06-23 PROCEDURE — G0180 MD CERTIFICATION HHA PATIENT: HCPCS | Mod: ,,, | Performed by: PHYSICIAN ASSISTANT

## 2022-06-23 NOTE — DISCHARGE SUMMARY
[facility] Hospital Medicine  Discharge Summary      Patient Name: Osman Li Jr.  MRN: [id number]  Patient Class: IP- Inpatient  Admission Date: 6/20/2022  Hospital Length of Stay: 2 days  Discharge Date and Time: 6/22/2022  5:32 PM  Attending Physician: No att. providers found   Discharging Provider: Tyler Clark MD  Primary Care Provider: ANTONIO Emmanuel      HPI:   Patient presented today with fever.  Associated with chills, nausea and vomiting.  Symptoms began about three days ago.  Symptoms are constant and worsening.  He recently underwent resection of splenic flexure of colon due to cancerous mass.  During that surgery, he underwent splenectomy as well.  Received a PortaCath on May 18.  He is due to start chemotherapy tomorrow.  Imaging done today shows abscess in the left upper quadrant.  He will be admitted and will be assessed by surgeon.  He has diabetes, hypertension, and is cigarette smoker.      * No surgery found *      Hospital Course:   Patient was admitted to the hospital for intra-abdominal abscess secondary to colon cancer status post resection insert initially started on broad-spectrum IV antibiotic therapy and his abscess improved size.  Patient also improved clinically. Given the abscess location, was initially opted for interventional radiology to place CT-guided abscess drain.  Unfortunately, abscess was not excessively by percutaneous drain.  General surgery felt the patient's risk for complications was very high for surgical intervention, so conservative management was agreed upon by the medical surgical team.  Patient was discharged home on 10 days of IV antibiotics ceftriaxone and vancomycin, as well as oral Flagyl and will have follow-up labs to be monitored by General surgery.  Patient was doing well, afebrile, and at his symptomatic baseline at time of discharge.       Goals of Care Treatment Preferences:  Code Status: Full Code      Consults:    Consults (From admission, onward)        Status Ordering Provider     Inpatient consult to Social Work/Case Management  Once        Provider:  (Not yet assigned)    Completed TIMOTHY LANE     Inpatient consult to General surgery  Once        Provider:  Carlton Waddell MD    Completed DAVE MENDEZ          No new Assessment & Plan notes have been filed under this hospital service since the last note was generated.  Service: Hospital Medicine    Final Active Diagnoses:    Diagnosis Date Noted POA    PRINCIPAL PROBLEM:  Sepsis [A41.9] 06/20/2022 Yes    Intra-abdominal abscess [K65.1] 06/20/2022 Yes    Malignant neoplasm of splenic flexure [C18.5] 05/05/2022 Yes    HTN (hypertension) [I10] 04/24/2022 Yes    Type 2 diabetes mellitus [E11.9] 04/24/2022 Yes    Hyponatremia [E87.1] 04/24/2022 Yes      Problems Resolved During this Admission:       Discharged Condition: stable    Disposition: Home-Health Care [facility]    Follow Up:   Follow-up Information     ANTONIO Emmanuel Follow up in 1 week(s).    Specialty: Family Medicine  Why: Message sent to the scheduling nurse to call the pt back to schedule a hospital follow up appointment.  Contact information:  [address]  [facility] [id number] [phone]             Carlton Waddell MD. Schedule an appointment as soon as possible for a visit in 1 week(s).    Specialty: General Surgery  Contact information:  [address]  [address]  [place] [address]  [phone]             [facility] Follow up.    Specialties: Home Health Services, Home Therapy Services, Home Living Aide Services  Why: Home Health  Contact information:  [address]  [facility] [id number] [phone]           [facility] Follow up.    Specialty: Infusion Provider  Why: Infusion  Contact information:  [address]  [facility] [id number] [phone]                       Patient Instructions:      CT  Abdomen Pelvis With Contrast   Standing Status: Future Standing Exp. Date: 07/22/22     Order Specific Question Answer Comments   Is the patient allergic to iodine or contrast? No    Is the patient on ANY Metformin drug such as Glucophage/Glucovance?           Should be off drug 48 hours after contrast. Check renal function before restart. Yes    History of Kidney Disease - including: decreased kidney function, dialysis, kidney transplay, single kidney, kidney cancer, kidney surgery? None    Does the patient have high blood pressure requiring medical treatment? Yes    Diabetes? Yes    May the Radiologist modify the order per protocol to meet the clinical needs of the patient? Yes    Oral/Rectal Contrast instructions: Routine Oral Contrast      Ambulatory referral/consult to Home Health   Standing Status: Future   Referral Priority: Routine Referral Type: Home Health   Referral Reason: Specialty Services Required   Requested Specialty: Home Health Services   Number of Visits Requested: 1     Diet diabetic     Notify your health care provider if you experience any of the following:  temperature >100.4     Notify your health care provider if you experience any of the following:  severe uncontrolled pain     Notify your health care provider if you experience any of the following:  increased confusion or weakness     Activity as tolerated       Significant Diagnostic Studies: Labs:   BMP:   Recent Labs   Lab 06/21/22  0451 06/22/22  0439   * 252*   * 134*   K 3.9 3.5   CL 95 97   CO2 26 24   BUN 8 9   CREATININE 0.8 0.8   CALCIUM 10.0 9.5   MG 1.7 1.6    and CBC   Recent Labs   Lab 06/21/22  0451 06/22/22  0439   WBC 14.85* 12.70   HGB 9.6* 10.3*   HCT 28.9* 29.9*   * 732*       Pending Diagnostic Studies:     None         Medications:  Reconciled Home Medications:      Medication List      START taking these medications    cefTRIAXone 1 g/50 mL Pgbk IVPB  Commonly known as: ROCEPHIN  Inject 50 mLs (1  "g total) into the vein every 12 (twelve) hours. for 10 days     dextrose 5 % SolP 500 mL with vancomycin 1,000 mg SolR 2,000 mg  Inject 2,000 mg into the vein every 12 (twelve) hours. for 10 days     metroNIDAZOLE 500 MG tablet  Commonly known as: FLAGYL  Take 1 tablet (500 mg total) by mouth every 8 (eight) hours. for 10 days        CONTINUE taking these medications    atorvastatin 20 MG tablet  Commonly known as: LIPITOR  Take 1 tablet (20 mg total) by mouth once daily.     enalapriL-hydrochlorothiazide 10-25 mg per tablet  Commonly known as: VASERETIC  Take 1 tablet by mouth once daily.     metFORMIN 1000 MG tablet  Commonly known as: GLUCOPHAGE  Take 1 tablet (1,000 mg total) by mouth 2 (two) times daily with meals.     oxyCODONE 30 MG Tab  Commonly known as: ROXICODONE  Take 1 tablet (30 mg total) by mouth every 6 (six) hours as needed for Pain.     pen needle, diabetic 32 gauge x 1/4" Ndle  Commonly known as: BD ULTRA-FINE MICRO PEN NEEDLE  1 each by Misc.(Non-Drug; Combo Route) route once a week.     promethazine 12.5 MG Tab  Commonly known as: PHENERGAN  Take 1 tablet (12.5 mg total) by mouth every 4 (four) hours as needed.     sildenafiL 100 MG tablet  Commonly known as: VIAGRA  Take 1 tablet (100 mg total) by mouth daily as needed for Erectile Dysfunction.            Indwelling Lines/Drains at time of discharge:   Lines/Drains/Airways     Central Venous Catheter Line  Duration           Port A Cath Single Lumen 05/18/22 0900 right subclavian 35 days          Drain  Duration                Colostomy 04/25/22 Transverse LLQ 58 days                Time spent on the discharge of patient: 38 minutes         Tyler Clark MD  Department of Hospital Medicine  [facility]  "

## 2022-06-23 NOTE — PROGRESS NOTES
Discharge Information     Discharge Date:   6/22/22    Primary Discharge Diagnosis:  Sepsis     Discharge Summary:  Reviewed      Medication & Order Review     Were medication changes made or new medications added?   Yes    If so, has the patient filled the prescriptions?  Yes     Was Home Health ordered? Yes    If so, has Home Health contacted patient and/or initiated services?  Yes    Name of Home Health Agency? N/A    Durable Medical Equipment ordered?  Yes     If so, has the DME provider contacted patient and delivered equipment?  IV pumps    Follow Up               Any problems since discharge? Yes    How is the patient feeling since returning home?      Have you set up recommended follow up appointments?  (cardiology, surgery, etc.)    Schedule Hospital Follow-up appointment within 7-14 days (preferably 7).      Notes:  Spoke with pts wife who states home health has already been out and they are working on the medication with the IV abx. They are already scheduled with infectious disease. She will let us know if she needs anything before then            Angelic Dickson

## 2022-06-23 NOTE — HOSPITAL COURSE
Patient was admitted to the hospital for intra-abdominal abscess secondary to colon cancer status post resection insert initially started on broad-spectrum IV antibiotic therapy and his abscess improved size.  Patient also improved clinically. Given the abscess location, was initially opted for interventional radiology to place CT-guided abscess drain.  Unfortunately, abscess was not excessively by percutaneous drain.  General surgery felt the patient's risk for complications was very high for surgical intervention, so conservative management was agreed upon by the medical surgical team.  Patient was discharged home on 10 days of IV antibiotics ceftriaxone and vancomycin, as well as oral Flagyl and will have follow-up labs to be monitored by General surgery.  Patient was doing well, afebrile, and at his symptomatic baseline at time of discharge.

## 2022-06-25 LAB
BACTERIA BLD CULT: NORMAL
BACTERIA BLD CULT: NORMAL

## 2022-06-27 ENCOUNTER — PATIENT MESSAGE (OUTPATIENT)
Dept: SURGERY | Facility: CLINIC | Age: 57
End: 2022-06-27
Payer: COMMERCIAL

## 2022-06-27 ENCOUNTER — LAB VISIT (OUTPATIENT)
Dept: LAB | Facility: HOSPITAL | Age: 57
End: 2022-06-27
Attending: STUDENT IN AN ORGANIZED HEALTH CARE EDUCATION/TRAINING PROGRAM
Payer: COMMERCIAL

## 2022-06-27 DIAGNOSIS — K65.1 PERITONEAL ABSCESS: Primary | ICD-10-CM

## 2022-06-27 LAB
ALBUMIN SERPL BCP-MCNC: 3.1 G/DL (ref 3.5–5.2)
ALP SERPL-CCNC: 163 U/L (ref 55–135)
ALT SERPL W/O P-5'-P-CCNC: 14 U/L (ref 10–44)
ANION GAP SERPL CALC-SCNC: 13 MMOL/L (ref 8–16)
AST SERPL-CCNC: 12 U/L (ref 10–40)
BASOPHILS # BLD AUTO: 0.07 K/UL (ref 0–0.2)
BASOPHILS NFR BLD: 0.5 % (ref 0–1.9)
BILIRUB SERPL-MCNC: 0.2 MG/DL (ref 0.1–1)
BUN SERPL-MCNC: 8 MG/DL (ref 6–20)
CALCIUM SERPL-MCNC: 9.5 MG/DL (ref 8.7–10.5)
CHLORIDE SERPL-SCNC: 97 MMOL/L (ref 95–110)
CO2 SERPL-SCNC: 25 MMOL/L (ref 23–29)
CREAT SERPL-MCNC: 0.8 MG/DL (ref 0.5–1.4)
CRP SERPL-MCNC: 8.5 MG/L (ref 0–8.2)
DIFFERENTIAL METHOD: ABNORMAL
EOSINOPHIL # BLD AUTO: 0.2 K/UL (ref 0–0.5)
EOSINOPHIL NFR BLD: 1.5 % (ref 0–8)
ERYTHROCYTE [DISTWIDTH] IN BLOOD BY AUTOMATED COUNT: 15.9 % (ref 11.5–14.5)
EST. GFR  (AFRICAN AMERICAN): >60 ML/MIN/1.73 M^2
EST. GFR  (NON AFRICAN AMERICAN): >60 ML/MIN/1.73 M^2
GLUCOSE SERPL-MCNC: 303 MG/DL (ref 70–110)
HCT VFR BLD AUTO: 32.2 % (ref 40–54)
HGB BLD-MCNC: 11 G/DL (ref 14–18)
IMM GRANULOCYTES # BLD AUTO: 0.1 K/UL (ref 0–0.04)
IMM GRANULOCYTES NFR BLD AUTO: 0.8 % (ref 0–0.5)
LYMPHOCYTES # BLD AUTO: 5.1 K/UL (ref 1–4.8)
LYMPHOCYTES NFR BLD: 39.7 % (ref 18–48)
MCH RBC QN AUTO: 26.6 PG (ref 27–31)
MCHC RBC AUTO-ENTMCNC: 34.2 G/DL (ref 32–36)
MCV RBC AUTO: 78 FL (ref 82–98)
MONOCYTES # BLD AUTO: 0.8 K/UL (ref 0.3–1)
MONOCYTES NFR BLD: 6.4 % (ref 4–15)
NEUTROPHILS # BLD AUTO: 6.6 K/UL (ref 1.8–7.7)
NEUTROPHILS NFR BLD: 51.1 % (ref 38–73)
NRBC BLD-RTO: 0 /100 WBC
PLATELET # BLD AUTO: 841 K/UL (ref 150–450)
PMV BLD AUTO: 9.2 FL (ref 9.2–12.9)
POTASSIUM SERPL-SCNC: 3.3 MMOL/L (ref 3.5–5.1)
PROT SERPL-MCNC: 7.1 G/DL (ref 6–8.4)
RBC # BLD AUTO: 4.13 M/UL (ref 4.6–6.2)
SODIUM SERPL-SCNC: 135 MMOL/L (ref 136–145)
VANCOMYCIN TROUGH SERPL-MCNC: 17.1 UG/ML (ref 10–22)
WBC # BLD AUTO: 12.93 K/UL (ref 3.9–12.7)

## 2022-06-27 PROCEDURE — 80053 COMPREHEN METABOLIC PANEL: CPT | Performed by: STUDENT IN AN ORGANIZED HEALTH CARE EDUCATION/TRAINING PROGRAM

## 2022-06-27 PROCEDURE — 86140 C-REACTIVE PROTEIN: CPT | Performed by: STUDENT IN AN ORGANIZED HEALTH CARE EDUCATION/TRAINING PROGRAM

## 2022-06-27 PROCEDURE — 85025 COMPLETE CBC W/AUTO DIFF WBC: CPT | Performed by: STUDENT IN AN ORGANIZED HEALTH CARE EDUCATION/TRAINING PROGRAM

## 2022-06-27 PROCEDURE — 80202 ASSAY OF VANCOMYCIN: CPT | Performed by: STUDENT IN AN ORGANIZED HEALTH CARE EDUCATION/TRAINING PROGRAM

## 2022-06-27 PROCEDURE — 36415 COLL VENOUS BLD VENIPUNCTURE: CPT | Performed by: STUDENT IN AN ORGANIZED HEALTH CARE EDUCATION/TRAINING PROGRAM

## 2022-06-29 ENCOUNTER — HOSPITAL ENCOUNTER (OUTPATIENT)
Dept: RADIOLOGY | Facility: HOSPITAL | Age: 57
Discharge: HOME | End: 2022-06-29
Attending: HOSPITALIST
Payer: COMMERCIAL

## 2022-06-29 DIAGNOSIS — A41.9 SEPSIS, DUE TO UNSPECIFIED ORGANISM, UNSPECIFIED WHETHER ACUTE ORGAN DYSFUNCTION PRESENT: ICD-10-CM

## 2022-06-29 PROCEDURE — A9698 NON-RAD CONTRAST MATERIALNOC: HCPCS | Performed by: HOSPITALIST

## 2022-06-29 PROCEDURE — 74176 CT ABDOMEN PELVIS WITHOUT CONTRAST: ICD-10-PCS | Mod: 26,,, | Performed by: RADIOLOGY

## 2022-06-29 PROCEDURE — 25500020 PHARM REV CODE 255: Performed by: HOSPITALIST

## 2022-06-29 PROCEDURE — 74176 CT ABD & PELVIS W/O CONTRAST: CPT | Mod: TC

## 2022-06-29 PROCEDURE — 74176 CT ABD & PELVIS W/O CONTRAST: CPT | Mod: 26,,, | Performed by: RADIOLOGY

## 2022-06-29 RX ADMIN — BARIUM SULFATE 900 ML: 20 SUSPENSION ORAL at 01:06

## 2022-06-30 ENCOUNTER — LAB VISIT (OUTPATIENT)
Dept: LAB | Facility: HOSPITAL | Age: 57
End: 2022-06-30
Attending: NURSE PRACTITIONER
Payer: COMMERCIAL

## 2022-06-30 ENCOUNTER — OFFICE VISIT (OUTPATIENT)
Dept: SURGERY | Facility: CLINIC | Age: 57
End: 2022-06-30
Payer: COMMERCIAL

## 2022-06-30 ENCOUNTER — TELEPHONE (OUTPATIENT)
Dept: SURGERY | Facility: CLINIC | Age: 57
End: 2022-06-30

## 2022-06-30 VITALS
TEMPERATURE: 99 F | SYSTOLIC BLOOD PRESSURE: 155 MMHG | HEART RATE: 87 BPM | BODY MASS INDEX: 25.8 KG/M2 | DIASTOLIC BLOOD PRESSURE: 83 MMHG | HEIGHT: 74 IN | WEIGHT: 201 LBS

## 2022-06-30 DIAGNOSIS — C18.5 MALIGNANT NEOPLASM OF SPLENIC FLEXURE: Primary | ICD-10-CM

## 2022-06-30 DIAGNOSIS — T81.43XA INTRA-ABDOMINAL ABSCESS POST-PROCEDURE: Primary | ICD-10-CM

## 2022-06-30 DIAGNOSIS — A41.9 SYSTEMIC INFECTION: Primary | ICD-10-CM

## 2022-06-30 DIAGNOSIS — C18.5 MALIGNANT NEOPLASM OF SPLENIC FLEXURE: ICD-10-CM

## 2022-06-30 LAB
ALBUMIN SERPL BCP-MCNC: 3.2 G/DL (ref 3.5–5.2)
ALP SERPL-CCNC: 129 U/L (ref 55–135)
ALT SERPL W/O P-5'-P-CCNC: 8 U/L (ref 10–44)
ANION GAP SERPL CALC-SCNC: 12 MMOL/L (ref 8–16)
AST SERPL-CCNC: 9 U/L (ref 10–40)
BASOPHILS # BLD AUTO: 0.09 K/UL (ref 0–0.2)
BASOPHILS NFR BLD: 0.9 % (ref 0–1.9)
BILIRUB SERPL-MCNC: 0.2 MG/DL (ref 0.1–1)
BUN SERPL-MCNC: 9 MG/DL (ref 6–20)
CALCIUM SERPL-MCNC: 9.6 MG/DL (ref 8.7–10.5)
CHLORIDE SERPL-SCNC: 98 MMOL/L (ref 95–110)
CO2 SERPL-SCNC: 26 MMOL/L (ref 23–29)
CREAT SERPL-MCNC: 0.9 MG/DL (ref 0.5–1.4)
CRP SERPL-MCNC: 3.5 MG/L (ref 0–8.2)
DIFFERENTIAL METHOD: ABNORMAL
EOSINOPHIL # BLD AUTO: 0.2 K/UL (ref 0–0.5)
EOSINOPHIL NFR BLD: 2 % (ref 0–8)
ERYTHROCYTE [DISTWIDTH] IN BLOOD BY AUTOMATED COUNT: 17.4 % (ref 11.5–14.5)
EST. GFR  (AFRICAN AMERICAN): >60 ML/MIN/1.73 M^2
EST. GFR  (NON AFRICAN AMERICAN): >60 ML/MIN/1.73 M^2
GLUCOSE SERPL-MCNC: 365 MG/DL (ref 70–110)
HCT VFR BLD AUTO: 33.4 % (ref 40–54)
HGB BLD-MCNC: 10.9 G/DL (ref 14–18)
IMM GRANULOCYTES # BLD AUTO: 0.06 K/UL (ref 0–0.04)
IMM GRANULOCYTES NFR BLD AUTO: 0.6 % (ref 0–0.5)
LYMPHOCYTES # BLD AUTO: 3.9 K/UL (ref 1–4.8)
LYMPHOCYTES NFR BLD: 40.5 % (ref 18–48)
MCH RBC QN AUTO: 26.3 PG (ref 27–31)
MCHC RBC AUTO-ENTMCNC: 32.6 G/DL (ref 32–36)
MCV RBC AUTO: 81 FL (ref 82–98)
MONOCYTES # BLD AUTO: 0.7 K/UL (ref 0.3–1)
MONOCYTES NFR BLD: 7.7 % (ref 4–15)
NEUTROPHILS # BLD AUTO: 4.6 K/UL (ref 1.8–7.7)
NEUTROPHILS NFR BLD: 48.3 % (ref 38–73)
NRBC BLD-RTO: 0 /100 WBC
PLATELET # BLD AUTO: 852 K/UL (ref 150–450)
PMV BLD AUTO: 9.7 FL (ref 9.2–12.9)
POTASSIUM SERPL-SCNC: 4 MMOL/L (ref 3.5–5.1)
PROT SERPL-MCNC: 6.7 G/DL (ref 6–8.4)
RBC # BLD AUTO: 4.15 M/UL (ref 4.6–6.2)
SODIUM SERPL-SCNC: 136 MMOL/L (ref 136–145)
VANCOMYCIN TROUGH SERPL-MCNC: 17.4 UG/ML (ref 10–22)
WBC # BLD AUTO: 9.56 K/UL (ref 3.9–12.7)

## 2022-06-30 PROCEDURE — 80202 ASSAY OF VANCOMYCIN: CPT | Performed by: NURSE PRACTITIONER

## 2022-06-30 PROCEDURE — 1111F DSCHRG MED/CURRENT MED MERGE: CPT | Mod: CPTII,S$GLB,, | Performed by: STUDENT IN AN ORGANIZED HEALTH CARE EDUCATION/TRAINING PROGRAM

## 2022-06-30 PROCEDURE — 3077F SYST BP >= 140 MM HG: CPT | Mod: CPTII,S$GLB,, | Performed by: STUDENT IN AN ORGANIZED HEALTH CARE EDUCATION/TRAINING PROGRAM

## 2022-06-30 PROCEDURE — 3008F PR BODY MASS INDEX (BMI) DOCUMENTED: ICD-10-PCS | Mod: CPTII,S$GLB,, | Performed by: STUDENT IN AN ORGANIZED HEALTH CARE EDUCATION/TRAINING PROGRAM

## 2022-06-30 PROCEDURE — 3079F DIAST BP 80-89 MM HG: CPT | Mod: CPTII,S$GLB,, | Performed by: STUDENT IN AN ORGANIZED HEALTH CARE EDUCATION/TRAINING PROGRAM

## 2022-06-30 PROCEDURE — 80053 COMPREHEN METABOLIC PANEL: CPT | Performed by: NURSE PRACTITIONER

## 2022-06-30 PROCEDURE — 3051F PR MOST RECENT HEMOGLOBIN A1C LEVEL 7.0 - < 8.0%: ICD-10-PCS | Mod: CPTII,S$GLB,, | Performed by: STUDENT IN AN ORGANIZED HEALTH CARE EDUCATION/TRAINING PROGRAM

## 2022-06-30 PROCEDURE — 3051F HG A1C>EQUAL 7.0%<8.0%: CPT | Mod: CPTII,S$GLB,, | Performed by: STUDENT IN AN ORGANIZED HEALTH CARE EDUCATION/TRAINING PROGRAM

## 2022-06-30 PROCEDURE — 4010F PR ACE/ARB THEARPY RXD/TAKEN: ICD-10-PCS | Mod: CPTII,S$GLB,, | Performed by: STUDENT IN AN ORGANIZED HEALTH CARE EDUCATION/TRAINING PROGRAM

## 2022-06-30 PROCEDURE — 3008F BODY MASS INDEX DOCD: CPT | Mod: CPTII,S$GLB,, | Performed by: STUDENT IN AN ORGANIZED HEALTH CARE EDUCATION/TRAINING PROGRAM

## 2022-06-30 PROCEDURE — 99024 PR POST-OP FOLLOW-UP VISIT: ICD-10-PCS | Mod: S$GLB,,, | Performed by: STUDENT IN AN ORGANIZED HEALTH CARE EDUCATION/TRAINING PROGRAM

## 2022-06-30 PROCEDURE — 86140 C-REACTIVE PROTEIN: CPT | Performed by: NURSE PRACTITIONER

## 2022-06-30 PROCEDURE — 1111F PR DISCHARGE MEDS RECONCILED W/ CURRENT OUTPATIENT MED LIST: ICD-10-PCS | Mod: CPTII,S$GLB,, | Performed by: STUDENT IN AN ORGANIZED HEALTH CARE EDUCATION/TRAINING PROGRAM

## 2022-06-30 PROCEDURE — 1159F MED LIST DOCD IN RCRD: CPT | Mod: CPTII,S$GLB,, | Performed by: STUDENT IN AN ORGANIZED HEALTH CARE EDUCATION/TRAINING PROGRAM

## 2022-06-30 PROCEDURE — 36415 COLL VENOUS BLD VENIPUNCTURE: CPT | Performed by: NURSE PRACTITIONER

## 2022-06-30 PROCEDURE — 3077F PR MOST RECENT SYSTOLIC BLOOD PRESSURE >= 140 MM HG: ICD-10-PCS | Mod: CPTII,S$GLB,, | Performed by: STUDENT IN AN ORGANIZED HEALTH CARE EDUCATION/TRAINING PROGRAM

## 2022-06-30 PROCEDURE — 85025 COMPLETE CBC W/AUTO DIFF WBC: CPT | Performed by: NURSE PRACTITIONER

## 2022-06-30 PROCEDURE — 3079F PR MOST RECENT DIASTOLIC BLOOD PRESSURE 80-89 MM HG: ICD-10-PCS | Mod: CPTII,S$GLB,, | Performed by: STUDENT IN AN ORGANIZED HEALTH CARE EDUCATION/TRAINING PROGRAM

## 2022-06-30 PROCEDURE — 1159F PR MEDICATION LIST DOCUMENTED IN MEDICAL RECORD: ICD-10-PCS | Mod: CPTII,S$GLB,, | Performed by: STUDENT IN AN ORGANIZED HEALTH CARE EDUCATION/TRAINING PROGRAM

## 2022-06-30 PROCEDURE — 4010F ACE/ARB THERAPY RXD/TAKEN: CPT | Mod: CPTII,S$GLB,, | Performed by: STUDENT IN AN ORGANIZED HEALTH CARE EDUCATION/TRAINING PROGRAM

## 2022-06-30 PROCEDURE — 99024 POSTOP FOLLOW-UP VISIT: CPT | Mod: S$GLB,,, | Performed by: STUDENT IN AN ORGANIZED HEALTH CARE EDUCATION/TRAINING PROGRAM

## 2022-06-30 NOTE — TELEPHONE ENCOUNTER
Called pt to notify of follow-up appts:  7/13/22 @0945~ CT at [facility] (NPO 4hrs prior)  7/14/22 @0915~ Dr. Waddell at [facility]  Pt agreed to all dates/times/locations. Complete understanding verbalized.

## 2022-06-30 NOTE — PROGRESS NOTES
Follow-up note    This is a 57-year-old male with splenic flexure colon cancer status post resection, splenectomy.  He re-presented to the hospital with increasing left upper quadrant abdominal pain and a CT scan that showed a likely abscess in the splenic fossa.  Attempted IR drainage was aborted given improvement in symptoms and imaging with IV antibiotics and a lack of a viable window.    Patient has been doing well since discharge on IV antibiotics.  Pain is improving.    Abdomen is soft    All labs have been reviewed.  Very mild leukocytosis.  CRP is almost normal.  CT scan shows interval improvement in the size of this fluid collection and air collection in the splenic fossa    57-year-old male with colon cancer that is node positive.  Abscess in the splenic fossa seems to be improving with outpatient IV antibiotics.  Recommend continued therapy for at least another week.  Will plan on reimaging in 2 weeks and will follow-up with the patient at that point in time.

## 2022-07-01 ENCOUNTER — DOCUMENTATION ONLY (OUTPATIENT)
Dept: PULMONOLOGY | Facility: CLINIC | Age: 57
End: 2022-07-01

## 2022-07-01 NOTE — PROGRESS NOTES
Rashaad with [employer] called     Patient's EOC IV abx  is 7/03/22    I advised Rashaad to end IV abx after 7/03/22 dose and   Line may be removed ; per Dr Hernandes's orders     [facility]  7/01/22

## 2022-07-04 ENCOUNTER — LAB VISIT (OUTPATIENT)
Dept: LAB | Facility: HOSPITAL | Age: 57
End: 2022-07-04
Attending: NURSE PRACTITIONER
Payer: COMMERCIAL

## 2022-07-04 DIAGNOSIS — Z79.2 ENCOUNTER FOR LONG-TERM (CURRENT) USE OF ANTIBIOTICS: ICD-10-CM

## 2022-07-04 DIAGNOSIS — T81.43XA POSTOPERATIVE INTRA-ABDOMINAL ABSCESS: Primary | ICD-10-CM

## 2022-07-04 LAB
ALBUMIN SERPL BCP-MCNC: 3.4 G/DL (ref 3.5–5.2)
ALP SERPL-CCNC: 111 U/L (ref 55–135)
ALT SERPL W/O P-5'-P-CCNC: 11 U/L (ref 10–44)
ANION GAP SERPL CALC-SCNC: 13 MMOL/L (ref 8–16)
AST SERPL-CCNC: 12 U/L (ref 10–40)
BASOPHILS # BLD AUTO: 0.11 K/UL (ref 0–0.2)
BASOPHILS NFR BLD: 0.9 % (ref 0–1.9)
BILIRUB SERPL-MCNC: 0.2 MG/DL (ref 0.1–1)
BUN SERPL-MCNC: 8 MG/DL (ref 6–20)
CALCIUM SERPL-MCNC: 9.9 MG/DL (ref 8.7–10.5)
CHLORIDE SERPL-SCNC: 98 MMOL/L (ref 95–110)
CO2 SERPL-SCNC: 23 MMOL/L (ref 23–29)
CREAT SERPL-MCNC: 1.1 MG/DL (ref 0.5–1.4)
CRP SERPL-MCNC: 4 MG/L (ref 0–8.2)
DIFFERENTIAL METHOD: ABNORMAL
EOSINOPHIL # BLD AUTO: 0.3 K/UL (ref 0–0.5)
EOSINOPHIL NFR BLD: 2 % (ref 0–8)
ERYTHROCYTE [DISTWIDTH] IN BLOOD BY AUTOMATED COUNT: 18.3 % (ref 11.5–14.5)
EST. GFR  (AFRICAN AMERICAN): >60 ML/MIN/1.73 M^2
EST. GFR  (NON AFRICAN AMERICAN): >60 ML/MIN/1.73 M^2
GLUCOSE SERPL-MCNC: 302 MG/DL (ref 70–110)
HCT VFR BLD AUTO: 35.4 % (ref 40–54)
HGB BLD-MCNC: 11.6 G/DL (ref 14–18)
IMM GRANULOCYTES # BLD AUTO: 0.08 K/UL (ref 0–0.04)
IMM GRANULOCYTES NFR BLD AUTO: 0.7 % (ref 0–0.5)
LYMPHOCYTES # BLD AUTO: 4.6 K/UL (ref 1–4.8)
LYMPHOCYTES NFR BLD: 37.7 % (ref 18–48)
MCH RBC QN AUTO: 26.3 PG (ref 27–31)
MCHC RBC AUTO-ENTMCNC: 32.8 G/DL (ref 32–36)
MCV RBC AUTO: 80 FL (ref 82–98)
MONOCYTES # BLD AUTO: 0.9 K/UL (ref 0.3–1)
MONOCYTES NFR BLD: 7.4 % (ref 4–15)
NEUTROPHILS # BLD AUTO: 6.3 K/UL (ref 1.8–7.7)
NEUTROPHILS NFR BLD: 51.3 % (ref 38–73)
NRBC BLD-RTO: 0 /100 WBC
PLATELET # BLD AUTO: 676 K/UL (ref 150–450)
PMV BLD AUTO: 10.4 FL (ref 9.2–12.9)
POTASSIUM SERPL-SCNC: 4.5 MMOL/L (ref 3.5–5.1)
PROT SERPL-MCNC: 7.6 G/DL (ref 6–8.4)
RBC # BLD AUTO: 4.41 M/UL (ref 4.6–6.2)
SODIUM SERPL-SCNC: 134 MMOL/L (ref 136–145)
VANCOMYCIN TROUGH SERPL-MCNC: 14 UG/ML (ref 10–22)
WBC # BLD AUTO: 12.27 K/UL (ref 3.9–12.7)

## 2022-07-04 PROCEDURE — 85025 COMPLETE CBC W/AUTO DIFF WBC: CPT | Performed by: NURSE PRACTITIONER

## 2022-07-04 PROCEDURE — 80202 ASSAY OF VANCOMYCIN: CPT | Performed by: NURSE PRACTITIONER

## 2022-07-04 PROCEDURE — 86140 C-REACTIVE PROTEIN: CPT | Performed by: NURSE PRACTITIONER

## 2022-07-04 PROCEDURE — 36415 COLL VENOUS BLD VENIPUNCTURE: CPT | Performed by: NURSE PRACTITIONER

## 2022-07-04 PROCEDURE — 80053 COMPREHEN METABOLIC PANEL: CPT | Performed by: NURSE PRACTITIONER

## 2022-07-05 ENCOUNTER — TELEPHONE (OUTPATIENT)
Dept: FAMILY MEDICINE | Facility: CLINIC | Age: 57
End: 2022-07-05

## 2022-07-05 ENCOUNTER — EXTERNAL HOME HEALTH (OUTPATIENT)
Dept: HOME HEALTH SERVICES | Facility: HOSPITAL | Age: 57
End: 2022-07-05
Payer: COMMERCIAL

## 2022-07-07 ENCOUNTER — TELEPHONE (OUTPATIENT)
Dept: HEMATOLOGY/ONCOLOGY | Facility: CLINIC | Age: 57
End: 2022-07-07
Payer: COMMERCIAL

## 2022-07-07 ENCOUNTER — OFFICE VISIT (OUTPATIENT)
Dept: INFECTIOUS DISEASES | Facility: CLINIC | Age: 57
End: 2022-07-07
Payer: COMMERCIAL

## 2022-07-07 ENCOUNTER — INFUSION (OUTPATIENT)
Dept: INFUSION THERAPY | Facility: HOSPITAL | Age: 57
End: 2022-07-07
Attending: STUDENT IN AN ORGANIZED HEALTH CARE EDUCATION/TRAINING PROGRAM
Payer: COMMERCIAL

## 2022-07-07 VITALS
HEART RATE: 68 BPM | SYSTOLIC BLOOD PRESSURE: 128 MMHG | HEIGHT: 74 IN | DIASTOLIC BLOOD PRESSURE: 66 MMHG | TEMPERATURE: 97 F | WEIGHT: 198.13 LBS | OXYGEN SATURATION: 97 % | BODY MASS INDEX: 25.43 KG/M2

## 2022-07-07 DIAGNOSIS — Z90.81 STATUS POST SPLENECTOMY: ICD-10-CM

## 2022-07-07 DIAGNOSIS — K65.1 INTRA-ABDOMINAL ABSCESS: Primary | ICD-10-CM

## 2022-07-07 DIAGNOSIS — C18.5 MALIGNANT NEOPLASM OF SPLENIC FLEXURE: Primary | ICD-10-CM

## 2022-07-07 PROCEDURE — 90734 MENACWYD/MENACWYCRM VACC IM: CPT | Mod: S$GLB,,, | Performed by: STUDENT IN AN ORGANIZED HEALTH CARE EDUCATION/TRAINING PROGRAM

## 2022-07-07 PROCEDURE — 4010F PR ACE/ARB THEARPY RXD/TAKEN: ICD-10-PCS | Mod: CPTII,S$GLB,, | Performed by: STUDENT IN AN ORGANIZED HEALTH CARE EDUCATION/TRAINING PROGRAM

## 2022-07-07 PROCEDURE — 4010F ACE/ARB THERAPY RXD/TAKEN: CPT | Mod: CPTII,S$GLB,, | Performed by: STUDENT IN AN ORGANIZED HEALTH CARE EDUCATION/TRAINING PROGRAM

## 2022-07-07 PROCEDURE — 3078F PR MOST RECENT DIASTOLIC BLOOD PRESSURE < 80 MM HG: ICD-10-PCS | Mod: CPTII,S$GLB,, | Performed by: STUDENT IN AN ORGANIZED HEALTH CARE EDUCATION/TRAINING PROGRAM

## 2022-07-07 PROCEDURE — 3078F DIAST BP <80 MM HG: CPT | Mod: CPTII,S$GLB,, | Performed by: STUDENT IN AN ORGANIZED HEALTH CARE EDUCATION/TRAINING PROGRAM

## 2022-07-07 PROCEDURE — 99214 OFFICE O/P EST MOD 30 MIN: CPT | Mod: 25,S$GLB,, | Performed by: STUDENT IN AN ORGANIZED HEALTH CARE EDUCATION/TRAINING PROGRAM

## 2022-07-07 PROCEDURE — 3051F PR MOST RECENT HEMOGLOBIN A1C LEVEL 7.0 - < 8.0%: ICD-10-PCS | Mod: CPTII,S$GLB,, | Performed by: STUDENT IN AN ORGANIZED HEALTH CARE EDUCATION/TRAINING PROGRAM

## 2022-07-07 PROCEDURE — 3074F PR MOST RECENT SYSTOLIC BLOOD PRESSURE < 130 MM HG: ICD-10-PCS | Mod: CPTII,S$GLB,, | Performed by: STUDENT IN AN ORGANIZED HEALTH CARE EDUCATION/TRAINING PROGRAM

## 2022-07-07 PROCEDURE — 3074F SYST BP LT 130 MM HG: CPT | Mod: CPTII,S$GLB,, | Performed by: STUDENT IN AN ORGANIZED HEALTH CARE EDUCATION/TRAINING PROGRAM

## 2022-07-07 PROCEDURE — 3008F BODY MASS INDEX DOCD: CPT | Mod: CPTII,S$GLB,, | Performed by: STUDENT IN AN ORGANIZED HEALTH CARE EDUCATION/TRAINING PROGRAM

## 2022-07-07 PROCEDURE — 90734 MENINGOCOCCAL CONJUGATE VACCINE 4-VALENT IM (MENVEO): ICD-10-PCS | Mod: S$GLB,,, | Performed by: STUDENT IN AN ORGANIZED HEALTH CARE EDUCATION/TRAINING PROGRAM

## 2022-07-07 PROCEDURE — 99214 PR OFFICE/OUTPT VISIT, EST, LEVL IV, 30-39 MIN: ICD-10-PCS | Mod: 25,S$GLB,, | Performed by: STUDENT IN AN ORGANIZED HEALTH CARE EDUCATION/TRAINING PROGRAM

## 2022-07-07 PROCEDURE — 90732 PPSV23 VACC 2 YRS+ SUBQ/IM: CPT | Mod: S$GLB,,, | Performed by: STUDENT IN AN ORGANIZED HEALTH CARE EDUCATION/TRAINING PROGRAM

## 2022-07-07 PROCEDURE — 90471 IMMUNIZATION ADMIN: CPT | Mod: S$GLB,,, | Performed by: STUDENT IN AN ORGANIZED HEALTH CARE EDUCATION/TRAINING PROGRAM

## 2022-07-07 PROCEDURE — 3051F HG A1C>EQUAL 7.0%<8.0%: CPT | Mod: CPTII,S$GLB,, | Performed by: STUDENT IN AN ORGANIZED HEALTH CARE EDUCATION/TRAINING PROGRAM

## 2022-07-07 PROCEDURE — 63600175 PHARM REV CODE 636 W HCPCS: Performed by: STUDENT IN AN ORGANIZED HEALTH CARE EDUCATION/TRAINING PROGRAM

## 2022-07-07 PROCEDURE — 90732 PNEUMOCOCCAL POLYSACCHARIDE VACCINE 23-VALENT =>2YO SQ IM: ICD-10-PCS | Mod: S$GLB,,, | Performed by: STUDENT IN AN ORGANIZED HEALTH CARE EDUCATION/TRAINING PROGRAM

## 2022-07-07 PROCEDURE — 1111F DSCHRG MED/CURRENT MED MERGE: CPT | Mod: CPTII,S$GLB,, | Performed by: STUDENT IN AN ORGANIZED HEALTH CARE EDUCATION/TRAINING PROGRAM

## 2022-07-07 PROCEDURE — 3008F PR BODY MASS INDEX (BMI) DOCUMENTED: ICD-10-PCS | Mod: CPTII,S$GLB,, | Performed by: STUDENT IN AN ORGANIZED HEALTH CARE EDUCATION/TRAINING PROGRAM

## 2022-07-07 PROCEDURE — 90472 IMMUNIZATION ADMIN EACH ADD: CPT | Mod: S$GLB,,, | Performed by: STUDENT IN AN ORGANIZED HEALTH CARE EDUCATION/TRAINING PROGRAM

## 2022-07-07 PROCEDURE — 1111F PR DISCHARGE MEDS RECONCILED W/ CURRENT OUTPATIENT MED LIST: ICD-10-PCS | Mod: CPTII,S$GLB,, | Performed by: STUDENT IN AN ORGANIZED HEALTH CARE EDUCATION/TRAINING PROGRAM

## 2022-07-07 PROCEDURE — 90471 PNEUMOCOCCAL POLYSACCHARIDE VACCINE 23-VALENT =>2YO SQ IM: ICD-10-PCS | Mod: S$GLB,,, | Performed by: STUDENT IN AN ORGANIZED HEALTH CARE EDUCATION/TRAINING PROGRAM

## 2022-07-07 PROCEDURE — 90472 MENINGOCOCCAL CONJUGATE VACCINE 4-VALENT IM (MENVEO): ICD-10-PCS | Mod: S$GLB,,, | Performed by: STUDENT IN AN ORGANIZED HEALTH CARE EDUCATION/TRAINING PROGRAM

## 2022-07-07 RX ORDER — HEPARIN 100 UNIT/ML
500 SYRINGE INTRAVENOUS ONCE
Status: COMPLETED | OUTPATIENT
Start: 2022-07-07 | End: 2022-07-07

## 2022-07-07 RX ADMIN — Medication 500 UNITS: at 04:07

## 2022-07-08 ENCOUNTER — PATIENT MESSAGE (OUTPATIENT)
Dept: FAMILY MEDICINE | Facility: CLINIC | Age: 57
End: 2022-07-08

## 2022-07-08 ENCOUNTER — HOSPITAL ENCOUNTER (OUTPATIENT)
Dept: RADIOLOGY | Facility: HOSPITAL | Age: 57
Discharge: HOME | End: 2022-07-08
Attending: STUDENT IN AN ORGANIZED HEALTH CARE EDUCATION/TRAINING PROGRAM
Payer: COMMERCIAL

## 2022-07-08 DIAGNOSIS — C18.5 MALIGNANT NEOPLASM OF SPLENIC FLEXURE: ICD-10-CM

## 2022-07-08 PROCEDURE — 74177 CT ABD & PELVIS W/CONTRAST: CPT | Mod: TC

## 2022-07-08 PROCEDURE — 25500020 PHARM REV CODE 255: Performed by: STUDENT IN AN ORGANIZED HEALTH CARE EDUCATION/TRAINING PROGRAM

## 2022-07-08 RX ADMIN — IOHEXOL 100 ML: 350 INJECTION, SOLUTION INTRAVENOUS at 04:07

## 2022-07-08 NOTE — PROGRESS NOTES
Subjective: Hospital Follow UP - s/p splenectomy - vaccines and recent admission for intraabdominal abscess       Patient ID: Osman Li Jr. is a 57 y.o. male.    Chief Complaint:: hospital follow up visit     HPI Osman Li is a 57 y.o. male active heavy smoker, with past medical history of HTN, diabetes, and HLD, known to me from prior admission to [facility] 4/21 for acute abdomen secondary to LBO in the setting of large colonic mass s/p resection and splenectomy 4/21. Patient was discharged on levofloxacin, doxycyclin and flagyl PO. Received first 2 doses of meningococcus and pneumonococcus vaccines before discharge. Port-A-Cath placed 5/18. Patient had a recent admission to [facility] for sepsis secondary to intra-abdominal abscess, unable to have it drained by IR, sent home on IV ceftriaxone and flagyl PO for 10 days. He completed antibiotics 7/3.     Patient is here for follow up, wife present. Has had significant weight loss in the last couple of months. Plan to start chemotherapy next week, will re-schedule CT scan for early next week to assess status of prior abscess before starting chemotherapy.     He states he feels good, no abdominal pain, colostomy functioning, no nausea or vomit. No fever or chills.   Recent labs reviewed, normal WBC, CRP 8.5, slightly elevated.    Review of patient's allergies indicates:   Allergen Reactions    Penicillins Rash     Past Medical History:   Diagnosis Date    DM (diabetes mellitus)     Hyperlipidemia     Hypertension     Prediabetes      Past Surgical History:   Procedure Laterality Date    CHOLECYSTECTOMY  2011    COLONOSCOPY      2018    COLOSTOMY N/A 4/25/2022    Procedure: CREATION, COLOSTOMY;  Surgeon: Carlton Waddell MD;  Location: [facility];  Service: General;  Laterality: N/A;    INSERTION OF TUNNELED CENTRAL VENOUS CATHETER (CVC) WITH SUBCUTANEOUS PORT Right 5/18/2022    Procedure: [id number]-CATH;  Surgeon: Carlton Waddell MD;   "Location: [facility] OR;  Service: General;  Laterality: Right;    MOBILIZATION OF SPLENIC FLEXURE N/A 4/25/2022    Procedure: MOBILIZATION, SPLENIC FLEXURE;  Surgeon: Carlton Waddell MD;  Location: [facility] OR;  Service: General;  Laterality: N/A;    SPLENECTOMY N/A 4/25/2022    Procedure: SPLENECTOMY;  Surgeon: Carlton Waddell MD;  Location: [facility] OR;  Service: General;  Laterality: N/A;    SUBTOTAL COLECTOMY N/A 4/25/2022    Procedure: COLECTOMY, PARTIAL;  Surgeon: Carlton Waddell MD;  Location: [facility] OR;  Service: General;  Laterality: N/A;     Social History     Tobacco Use    Smoking status: Current Every Day Smoker     Packs/day: 0.50     Years: 35.00     Pack years: 17.50     Types: Cigarettes    Smokeless tobacco: Never Used   Substance Use Topics    Alcohol use: Not Currently        Family History   Problem Relation Age of Onset    Heart disease Father          Review of Systems   Constitutional: Positive for appetite change and unexpected weight change. Negative for chills and fever.   HENT: Negative for sinus pain.    Respiratory: Negative for cough and shortness of breath.    Cardiovascular: Negative for chest pain and leg swelling.   Gastrointestinal: Negative for abdominal pain, diarrhea and nausea.        Colostomy bag   Genitourinary: Negative for dysuria.   Musculoskeletal: Negative for back pain.   Neurological: Negative for headaches.           VAD: R Port-A-Cath accessed, no redness noted, non tender to palpation - will have it de-accessed today     Objective:      Blood pressure 128/66, pulse 68, temperature 97.3 °F (36.3 °C), height 6' 2" (1.88 m), weight 89.9 kg (198 lb 1.6 oz), SpO2 97 %. Body mass index is 25.43 kg/m².  Physical Exam  Constitutional:       Appearance: He is normal weight.      Comments: Frail   HENT:      Mouth/Throat:      Mouth: Mucous membranes are moist.      Pharynx: Oropharynx is clear.   Eyes:      Conjunctiva/sclera: Conjunctivae normal.      Pupils: Pupils are equal, " round, and reactive to light.   Cardiovascular:      Rate and Rhythm: Normal rate and regular rhythm.      Pulses: Normal pulses.      Heart sounds: Normal heart sounds.   Pulmonary:      Effort: Pulmonary effort is normal.      Breath sounds: Normal breath sounds.   Abdominal:      General: Bowel sounds are normal.      Palpations: Abdomen is soft.      Tenderness: There is no abdominal tenderness.      Comments: Healed wound, LL colostomy, formed stool in bag, non tender to palpation   Musculoskeletal:         General: Normal range of motion.      Cervical back: Normal range of motion.      Right lower leg: No edema.      Left lower leg: No edema.   Skin:     General: Skin is warm.      Capillary Refill: Capillary refill takes less than 2 seconds.   Neurological:      General: No focal deficit present.      Mental Status: He is alert and oriented to person, place, and time. Mental status is at baseline.   Psychiatric:         Thought Content: Thought content normal.         VAD: R Port-A-Cath accessed, no redness noted, non tender to palpation - will have it de-accessed today        Recent Diagnostics:  CT scan 6/20/22:  1. Postsurgical change of the colon with increased size of air-fluid collection near the operative bed in the left upper abdominal quadrant.  Abscess or contained perforation are considerations.  2. Hepatomegaly.    CT scan 6/29/22:  1. Decreased size of left upper quadrant air-fluid collection.  2. Unchanged size of left pleural effusion.      Micro:  Wound cultures 5/2 E coli, Klebsiella pneumoniae and Candida dubliensis    Pathology:  inal Pathologic Diagnosis 1. Spleen, splenectomy:   - Benign splenic parenchyma   - Negative for malignancy   2. Colon, partial colectomy:   - Invasive colonic adenocarcinoma, poorly differentiated (G3)     - Invades into pericolorectal tissue (pT3)   - Margins uninvolved by invasive carcinoma     - 0.1 cm to the mesenteric margin   - Lymphovascular invasion  identified   - No perineural invasion identified   - Seventeen of twenty-seven lymph nodes, positive for metastatic carcinoma   (17/27, pN2b)   - Fibrous serosal adhesions   - See below for results of MSI testing   - CARLOS ENRIQUE/MILLIE Targeted Gene Panel has been ordered and results will be issued in   a supplemental report     CAP Synoptic Checklist for Colonic Neoplasms:     - Procedure: Partial colectomy     - Tumor Site: Splenic flexure     - Histologic Type: Adenocarcinoma     - Histologic Grade: G3, poorly differentiated     - Tumor Size: 5.0 x 4.5 x 2.7 cm     - Tumor Extent: Invades through muscularis propria into pericolorectal   tissue     - Macroscopic Tumor Perforation: Not identified     - Lymphovascular Invasion: Present, small vessel involved     - Perineural Invasion: Not identified     - Number of Tumor Buds: 5 in one hotspot field     - Tumor Bud Score: Intermediate (5-9)     - Type of Polyp in which Invasive Carcinoma Arose: None identified     - Treatment Effect: No known presurgical therapy     - Margins: All margins negative for invasive carcinoma              Assessment and Plan:         1. Poorly differentiated colon cancer s/p colectomy and splenectomy 4/21 complicated by recurrent intra-abdominal abscess, unable to drain by IR   S/p ceftriaxone IV and flagyl PO 10 days   Serial blood cultures no growth   Labs from 6/27 reviewed, CRP minimally elevated 8.5, stable WBC, no left shft   CT scan abdomen pelvis w/ IV contrast to assess status of abscess before planned chemotherapy, scheduled for next Monday    Will give second doses of vaccines today; Menveo and Pneumovac    Based on CT scan findings will decide if further antibiotic treatment is warranted before chemotherapy     2. Smoker   3. PMHx HTN, diabetes, HLD     Will call patient with results   D/w patient, wife, Dr Khoobehi, and Dr Waddell     Intra-abdominal abscess  -     PICC line removal    Status post splenectomy  -     Cancel: Pneumococcal  polysaccharide vaccine 23-valent greater than or equal to 1yo subcutaneous/IM; Future; Expected date: 07/07/2022  -     (In Office Administered) Meningococcal Conjugate - MCV4O (MENVEO)  -     Cancel: Pneumococcal polysaccharide vaccine 23-valent greater than or equal to 1yo subcutaneous/IM; Future; Expected date: 07/07/2022  -     Pneumococcal polysaccharide vaccine 23-valent greater than or equal to 1yo subcutaneous/IM

## 2022-07-08 NOTE — TELEPHONE ENCOUNTER
No need to urgently change anything. Most recent a1c right at 7. Just needs to being log to visit next week with Richard and can adjust as needed

## 2022-07-11 ENCOUNTER — LAB VISIT (OUTPATIENT)
Dept: LAB | Facility: HOSPITAL | Age: 57
End: 2022-07-11
Attending: INTERNAL MEDICINE
Payer: COMMERCIAL

## 2022-07-11 ENCOUNTER — INFUSION (OUTPATIENT)
Dept: INFUSION THERAPY | Facility: HOSPITAL | Age: 57
End: 2022-07-11
Attending: STUDENT IN AN ORGANIZED HEALTH CARE EDUCATION/TRAINING PROGRAM
Payer: COMMERCIAL

## 2022-07-11 ENCOUNTER — OFFICE VISIT (OUTPATIENT)
Dept: HEMATOLOGY/ONCOLOGY | Facility: CLINIC | Age: 57
End: 2022-07-11
Payer: COMMERCIAL

## 2022-07-11 ENCOUNTER — TELEPHONE (OUTPATIENT)
Dept: INFECTIOUS DISEASES | Facility: HOSPITAL | Age: 57
End: 2022-07-11
Payer: COMMERCIAL

## 2022-07-11 VITALS
OXYGEN SATURATION: 98 % | RESPIRATION RATE: 16 BRPM | HEART RATE: 84 BPM | DIASTOLIC BLOOD PRESSURE: 78 MMHG | SYSTOLIC BLOOD PRESSURE: 138 MMHG | TEMPERATURE: 98 F

## 2022-07-11 VITALS
TEMPERATURE: 98 F | SYSTOLIC BLOOD PRESSURE: 140 MMHG | RESPIRATION RATE: 15 BRPM | HEART RATE: 106 BPM | HEIGHT: 74 IN | WEIGHT: 196.19 LBS | OXYGEN SATURATION: 99 % | BODY MASS INDEX: 25.18 KG/M2 | DIASTOLIC BLOOD PRESSURE: 81 MMHG

## 2022-07-11 DIAGNOSIS — E11.00 TYPE 2 DIABETES MELLITUS WITH HYPEROSMOLARITY WITHOUT COMA, WITHOUT LONG-TERM CURRENT USE OF INSULIN: ICD-10-CM

## 2022-07-11 DIAGNOSIS — C18.4 MALIGNANT NEOPLASM OF TRANSVERSE COLON: Primary | ICD-10-CM

## 2022-07-11 DIAGNOSIS — I10 PRIMARY HYPERTENSION: ICD-10-CM

## 2022-07-11 DIAGNOSIS — E78.49 OTHER HYPERLIPIDEMIA: ICD-10-CM

## 2022-07-11 DIAGNOSIS — K65.1 INTRA-ABDOMINAL ABSCESS: Primary | ICD-10-CM

## 2022-07-11 DIAGNOSIS — K63.89 COLONIC MASS: ICD-10-CM

## 2022-07-11 DIAGNOSIS — K86.9 PANCREATIC LESION: ICD-10-CM

## 2022-07-11 DIAGNOSIS — K65.1 INTRA-ABDOMINAL ABSCESS: ICD-10-CM

## 2022-07-11 LAB
ALBUMIN SERPL BCP-MCNC: 3.9 G/DL (ref 3.5–5.2)
ALP SERPL-CCNC: 102 U/L (ref 55–135)
ALT SERPL W/O P-5'-P-CCNC: 11 U/L (ref 10–44)
ANION GAP SERPL CALC-SCNC: 12 MMOL/L (ref 8–16)
AST SERPL-CCNC: 12 U/L (ref 10–40)
BASOPHILS # BLD AUTO: 0.08 K/UL (ref 0–0.2)
BASOPHILS NFR BLD: 1 % (ref 0–1.9)
BILIRUB SERPL-MCNC: 0.5 MG/DL (ref 0.1–1)
BUN SERPL-MCNC: 13 MG/DL (ref 6–20)
CALCIUM SERPL-MCNC: 10.5 MG/DL (ref 8.7–10.5)
CHLORIDE SERPL-SCNC: 97 MMOL/L (ref 95–110)
CO2 SERPL-SCNC: 25 MMOL/L (ref 23–29)
CREAT SERPL-MCNC: 1 MG/DL (ref 0.5–1.4)
DIFFERENTIAL METHOD: ABNORMAL
EOSINOPHIL # BLD AUTO: 0.3 K/UL (ref 0–0.5)
EOSINOPHIL NFR BLD: 3.2 % (ref 0–8)
ERYTHROCYTE [DISTWIDTH] IN BLOOD BY AUTOMATED COUNT: 18.2 % (ref 11.5–14.5)
EST. GFR  (AFRICAN AMERICAN): >60 ML/MIN/1.73 M^2
EST. GFR  (NON AFRICAN AMERICAN): >60 ML/MIN/1.73 M^2
GLUCOSE SERPL-MCNC: 229 MG/DL (ref 70–110)
HCT VFR BLD AUTO: 36.5 % (ref 40–54)
HGB BLD-MCNC: 12.4 G/DL (ref 14–18)
IMM GRANULOCYTES # BLD AUTO: 0.03 K/UL (ref 0–0.04)
IMM GRANULOCYTES NFR BLD AUTO: 0.4 % (ref 0–0.5)
LYMPHOCYTES # BLD AUTO: 4.2 K/UL (ref 1–4.8)
LYMPHOCYTES NFR BLD: 49.7 % (ref 18–48)
MAGNESIUM SERPL-MCNC: 1.9 MG/DL (ref 1.6–2.6)
MCH RBC QN AUTO: 26.8 PG (ref 27–31)
MCHC RBC AUTO-ENTMCNC: 34 G/DL (ref 32–36)
MCV RBC AUTO: 79 FL (ref 82–98)
MONOCYTES # BLD AUTO: 0.7 K/UL (ref 0.3–1)
MONOCYTES NFR BLD: 8.7 % (ref 4–15)
NEUTROPHILS # BLD AUTO: 3.1 K/UL (ref 1.8–7.7)
NEUTROPHILS NFR BLD: 37 % (ref 38–73)
NRBC BLD-RTO: 0 /100 WBC
PLATELET # BLD AUTO: 567 K/UL (ref 150–450)
PMV BLD AUTO: 9.7 FL (ref 9.2–12.9)
POTASSIUM SERPL-SCNC: 5.1 MMOL/L (ref 3.5–5.1)
PROT SERPL-MCNC: 7.8 G/DL (ref 6–8.4)
RBC # BLD AUTO: 4.63 M/UL (ref 4.6–6.2)
SODIUM SERPL-SCNC: 134 MMOL/L (ref 136–145)
WBC # BLD AUTO: 8.41 K/UL (ref 3.9–12.7)

## 2022-07-11 PROCEDURE — 83735 ASSAY OF MAGNESIUM: CPT | Performed by: INTERNAL MEDICINE

## 2022-07-11 PROCEDURE — 3008F PR BODY MASS INDEX (BMI) DOCUMENTED: ICD-10-PCS | Mod: CPTII,S$GLB,, | Performed by: INTERNAL MEDICINE

## 2022-07-11 PROCEDURE — 99215 PR OFFICE/OUTPT VISIT, EST, LEVL V, 40-54 MIN: ICD-10-PCS | Mod: S$GLB,,, | Performed by: INTERNAL MEDICINE

## 2022-07-11 PROCEDURE — 63600175 PHARM REV CODE 636 W HCPCS: Performed by: STUDENT IN AN ORGANIZED HEALTH CARE EDUCATION/TRAINING PROGRAM

## 2022-07-11 PROCEDURE — 3079F PR MOST RECENT DIASTOLIC BLOOD PRESSURE 80-89 MM HG: ICD-10-PCS | Mod: CPTII,S$GLB,, | Performed by: INTERNAL MEDICINE

## 2022-07-11 PROCEDURE — 99999 PR PBB SHADOW E&M-EST. PATIENT-LVL IV: CPT | Mod: PBBFAC,,, | Performed by: INTERNAL MEDICINE

## 2022-07-11 PROCEDURE — 3079F DIAST BP 80-89 MM HG: CPT | Mod: CPTII,S$GLB,, | Performed by: INTERNAL MEDICINE

## 2022-07-11 PROCEDURE — 1159F PR MEDICATION LIST DOCUMENTED IN MEDICAL RECORD: ICD-10-PCS | Mod: CPTII,S$GLB,, | Performed by: INTERNAL MEDICINE

## 2022-07-11 PROCEDURE — 3077F PR MOST RECENT SYSTOLIC BLOOD PRESSURE >= 140 MM HG: ICD-10-PCS | Mod: CPTII,S$GLB,, | Performed by: INTERNAL MEDICINE

## 2022-07-11 PROCEDURE — 99999 PR PBB SHADOW E&M-EST. PATIENT-LVL IV: ICD-10-PCS | Mod: PBBFAC,,, | Performed by: INTERNAL MEDICINE

## 2022-07-11 PROCEDURE — 80053 COMPREHEN METABOLIC PANEL: CPT | Performed by: INTERNAL MEDICINE

## 2022-07-11 PROCEDURE — 4010F ACE/ARB THERAPY RXD/TAKEN: CPT | Mod: CPTII,S$GLB,, | Performed by: INTERNAL MEDICINE

## 2022-07-11 PROCEDURE — 1111F DSCHRG MED/CURRENT MED MERGE: CPT | Mod: CPTII,S$GLB,, | Performed by: INTERNAL MEDICINE

## 2022-07-11 PROCEDURE — 1160F PR REVIEW ALL MEDS BY PRESCRIBER/CLIN PHARMACIST DOCUMENTED: ICD-10-PCS | Mod: CPTII,S$GLB,, | Performed by: INTERNAL MEDICINE

## 2022-07-11 PROCEDURE — 36415 COLL VENOUS BLD VENIPUNCTURE: CPT | Performed by: INTERNAL MEDICINE

## 2022-07-11 PROCEDURE — 4010F PR ACE/ARB THEARPY RXD/TAKEN: ICD-10-PCS | Mod: CPTII,S$GLB,, | Performed by: INTERNAL MEDICINE

## 2022-07-11 PROCEDURE — 1159F MED LIST DOCD IN RCRD: CPT | Mod: CPTII,S$GLB,, | Performed by: INTERNAL MEDICINE

## 2022-07-11 PROCEDURE — 1111F PR DISCHARGE MEDS RECONCILED W/ CURRENT OUTPATIENT MED LIST: ICD-10-PCS | Mod: CPTII,S$GLB,, | Performed by: INTERNAL MEDICINE

## 2022-07-11 PROCEDURE — 3077F SYST BP >= 140 MM HG: CPT | Mod: CPTII,S$GLB,, | Performed by: INTERNAL MEDICINE

## 2022-07-11 PROCEDURE — 99215 OFFICE O/P EST HI 40 MIN: CPT | Mod: S$GLB,,, | Performed by: INTERNAL MEDICINE

## 2022-07-11 PROCEDURE — 96365 THER/PROPH/DIAG IV INF INIT: CPT

## 2022-07-11 PROCEDURE — 3051F HG A1C>EQUAL 7.0%<8.0%: CPT | Mod: CPTII,S$GLB,, | Performed by: INTERNAL MEDICINE

## 2022-07-11 PROCEDURE — 3051F PR MOST RECENT HEMOGLOBIN A1C LEVEL 7.0 - < 8.0%: ICD-10-PCS | Mod: CPTII,S$GLB,, | Performed by: INTERNAL MEDICINE

## 2022-07-11 PROCEDURE — 85025 COMPLETE CBC W/AUTO DIFF WBC: CPT | Performed by: INTERNAL MEDICINE

## 2022-07-11 PROCEDURE — 1160F RVW MEDS BY RX/DR IN RCRD: CPT | Mod: CPTII,S$GLB,, | Performed by: INTERNAL MEDICINE

## 2022-07-11 PROCEDURE — 3008F BODY MASS INDEX DOCD: CPT | Mod: CPTII,S$GLB,, | Performed by: INTERNAL MEDICINE

## 2022-07-11 RX ORDER — SODIUM CHLORIDE 0.9 % (FLUSH) 0.9 %
10 SYRINGE (ML) INJECTION
Status: CANCELLED | OUTPATIENT
Start: 2022-07-11

## 2022-07-11 RX ORDER — HEPARIN 100 UNIT/ML
500 SYRINGE INTRAVENOUS
Status: CANCELLED | OUTPATIENT
Start: 2022-07-11

## 2022-07-11 RX ORDER — SODIUM CHLORIDE 0.9 % (FLUSH) 0.9 %
10 SYRINGE (ML) INJECTION
OUTPATIENT
Start: 2022-07-11

## 2022-07-11 RX ORDER — AMOXICILLIN AND CLAVULANATE POTASSIUM 875; 125 MG/1; MG/1
1 TABLET, FILM COATED ORAL EVERY 12 HOURS
Qty: 28 TABLET | Refills: 1 | Status: CANCELLED | OUTPATIENT
Start: 2022-07-11 | End: 2022-07-25

## 2022-07-11 RX ORDER — HEPARIN 100 UNIT/ML
500 SYRINGE INTRAVENOUS
Status: DISCONTINUED | OUTPATIENT
Start: 2022-07-11 | End: 2022-07-11 | Stop reason: HOSPADM

## 2022-07-11 RX ADMIN — CEFTRIAXONE 2 G: 2 INJECTION, SOLUTION INTRAVENOUS at 02:07

## 2022-07-11 RX ADMIN — Medication 500 UNITS: at 02:07

## 2022-07-11 NOTE — PROGRESS NOTES
Service Date:  7/11/22    Chief Complaint: Colon Cancer    Osman Li Jr. is a 57 y.o. 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  A CT scan of the abdomen was done which showed a large obstructive lesion with lymph node involvement.  There was also a 9 mm hypodense liver lesion that could not be characterized.  Patient had a hemicolectomy with ostomy bag placed, and splenectomy.      He was then set to start chemotherapy but it was delayed due to the liver lesion.  He was evaluated by Dr. Goodwin with Surgical Oncology who determined that this was likely not malignant.  Unfortunately afterwards, patient suffered a setback with an abdominal abscess formation that could not be drained.  He recently completed 2 weeks of antibiotics, and a repeat CT scan showed the presence of an abscess still there, but improved.  The plan is for him to continue with about 2 more weeks of antibiotics.  I would like to now start him on chemotherapy.      Review of Systems   Constitutional: Negative.    HENT: Negative.    Eyes: Negative.    Respiratory: Negative.    Cardiovascular: Negative.    Gastrointestinal: Negative.    Endocrine: Negative.    Genitourinary: Negative.    Musculoskeletal: Negative.    Integumentary:  Negative.   Neurological: Negative.    Hematological: Negative.    Psychiatric/Behavioral: Negative.         Current Outpatient Medications   Medication Instructions    atorvastatin (LIPITOR) 20 mg, Oral, Daily    dextrose 5 % in water (D5W) 5 % PgBk 50 mL with cefTRIAXone 2 gram SolR 2 g 2 g, Intravenous, Daily    enalapriL-hydrochlorothiazide (VASERETIC) 10-25 mg per tablet 1 tablet, Oral, Daily    metFORMIN (GLUCOPHAGE) 1,000 mg, Oral, 2 times daily with meals    oxyCODONE (ROXICODONE) 30 mg, Oral, Every 6 hours PRN    promethazine (PHENERGAN) 12.5 mg, Oral, Every 4 hours PRN    sildenafiL (VIAGRA) 100 mg, Oral, Daily PRN        Past Medical History:   Diagnosis Date    DM (diabetes  "mellitus)     Hyperlipidemia     Hypertension     Prediabetes         Past Surgical History:   Procedure Laterality Date    CHOLECYSTECTOMY  2011    COLONOSCOPY      2018    COLOSTOMY N/A 4/25/2022    Procedure: CREATION, COLOSTOMY;  Surgeon: Carlton Wadedll MD;  Location: [facility] OR;  Service: General;  Laterality: N/A;    INSERTION OF TUNNELED CENTRAL VENOUS CATHETER (CVC) WITH SUBCUTANEOUS PORT Right 5/18/2022    Procedure: [id number];  Surgeon: Carlton Waddell MD;  Location: [facility] OR;  Service: General;  Laterality: Right;    MOBILIZATION OF SPLENIC FLEXURE N/A 4/25/2022    Procedure: MOBILIZATION, SPLENIC FLEXURE;  Surgeon: Carlton Waddell MD;  Location: [facility] OR;  Service: General;  Laterality: N/A;    SPLENECTOMY N/A 4/25/2022    Procedure: SPLENECTOMY;  Surgeon: Carlton Waddell MD;  Location: [facility] OR;  Service: General;  Laterality: N/A;    SUBTOTAL COLECTOMY N/A 4/25/2022    Procedure: COLECTOMY, PARTIAL;  Surgeon: Carlton Waddell MD;  Location: [facility] OR;  Service: General;  Laterality: N/A;        Family History   Problem Relation Age of Onset    Heart disease Father        Social History     Tobacco Use    Smoking status: Current Every Day Smoker     Packs/day: 0.50     Years: 35.00     Pack years: 17.50     Types: Cigarettes    Smokeless tobacco: Never Used   Substance Use Topics    Alcohol use: Not Currently    Drug use: Never         Vitals:    07/11/22 1117   BP: (!) 140/81   Pulse: 106   Resp: 15   Temp: 98.1 °F (36.7 °C)        Physical Exam:  BP (!) 140/81 (BP Location: Right arm, Patient Position: Sitting, BP Method: Medium (Automatic))   Pulse 106   Temp 98.1 °F (36.7 °C) (Temporal)   Resp 15   Ht 6' 2" (1.88 m)   Wt 89 kg (196 lb 3.4 oz)   SpO2 99%   BMI 25.19 kg/m²     Physical Exam  Vitals and nursing note reviewed.   Constitutional:       Appearance: Normal appearance.   HENT:      Head: Normocephalic and atraumatic.      Nose: Nose normal.      Mouth/Throat:      " Mouth: Mucous membranes are moist.      Pharynx: Oropharynx is clear.   Eyes:      Extraocular Movements: Extraocular movements intact.      Conjunctiva/sclera: Conjunctivae normal.   Cardiovascular:      Rate and Rhythm: Normal rate and regular rhythm.      Heart sounds: Normal heart sounds.   Pulmonary:      Effort: Pulmonary effort is normal.      Breath sounds: Normal breath sounds.   Abdominal:      General: Abdomen is flat. Bowel sounds are normal.      Palpations: Abdomen is soft.      Comments: Ostomy bag in place.   Musculoskeletal:         General: Normal range of motion.      Cervical back: Normal range of motion and neck supple.   Skin:     General: Skin is warm and dry.   Neurological:      General: No focal deficit present.      Mental Status: He is alert and oriented to person, place, and time. Mental status is at baseline.   Psychiatric:         Mood and Affect: Mood normal.          Labs:  Lab Results   Component Value Date    WBC 8.41 07/11/2022    RBC 4.63 07/11/2022    HGB 12.4 (L) 07/11/2022    HCT 36.5 (L) 07/11/2022    MCV 79 (L) 07/11/2022    MCH 26.8 (L) 07/11/2022    MCHC 34.0 07/11/2022    RDW 18.2 (H) 07/11/2022     (H) 07/11/2022    MPV 9.7 07/11/2022    GRAN 3.1 07/11/2022    GRAN 37.0 (L) 07/11/2022    LYMPH 4.2 07/11/2022    LYMPH 49.7 (H) 07/11/2022    MONO 0.7 07/11/2022    MONO 8.7 07/11/2022    EOS 0.3 07/11/2022    BASO 0.08 07/11/2022    EOSINOPHIL 3.2 07/11/2022    BASOPHIL 1.0 07/11/2022     Sodium   Date Value Ref Range Status   07/11/2022 134 (L) 136 - 145 mmol/L Final     Potassium   Date Value Ref Range Status   07/11/2022 5.1 3.5 - 5.1 mmol/L Final     Chloride   Date Value Ref Range Status   07/11/2022 97 95 - 110 mmol/L Final     CO2   Date Value Ref Range Status   07/11/2022 25 23 - 29 mmol/L Final     Glucose   Date Value Ref Range Status   07/11/2022 229 (H) 70 - 110 mg/dL Final     BUN   Date Value Ref Range Status   07/11/2022 13 6 - 20 mg/dL Final      Creatinine   Date Value Ref Range Status   07/11/2022 1.0 0.5 - 1.4 mg/dL Final     Calcium   Date Value Ref Range Status   07/11/2022 10.5 8.7 - 10.5 mg/dL Final     Total Protein   Date Value Ref Range Status   07/11/2022 7.8 6.0 - 8.4 g/dL Final     Albumin   Date Value Ref Range Status   07/11/2022 3.9 3.5 - 5.2 g/dL Final     Total Bilirubin   Date Value Ref Range Status   07/11/2022 0.5 0.1 - 1.0 mg/dL Final     Comment:     For infants and newborns, interpretation of results should be based  on gestational age, weight and in agreement with clinical  observations.    Premature Infant recommended reference ranges:  Up to 24 hours.............<8.0 mg/dL  Up to 48 hours............<12.0 mg/dL  3-5 days..................<15.0 mg/dL  6-29 days.................<15.0 mg/dL       Alkaline Phosphatase   Date Value Ref Range Status   07/11/2022 102 55 - 135 U/L Final     AST   Date Value Ref Range Status   07/11/2022 12 10 - 40 U/L Final     ALT   Date Value Ref Range Status   07/11/2022 11 10 - 44 U/L Final     Anion Gap   Date Value Ref Range Status   07/11/2022 12 8 - 16 mmol/L Final     eGFR if    Date Value Ref Range Status   07/11/2022 >60 >60 mL/min/1.73 m^2 Final     eGFR if non    Date Value Ref Range Status   07/11/2022 >60 >60 mL/min/1.73 m^2 Final     Comment:     Calculation used to obtain the estimated glomerular filtration  rate (eGFR) is the CKD-EPI equation.          A/P:    Malignant neoplasm of the transverse colon  -poorly differentiated adenocarcinoma  -pT3 N2b  -I would like to start FOLFOX as soon as possible so as to not miss though window for treatment.  Patient understands this.  It is risky given his continued abdominal abscess, but hopefully this will clear out with more antibiotics.  -labs reviewed  -proceed with FOLFOX today  -return to clinic in 1 week for tox check    Intra-abdominal abscess  -completed antibiotics and abscess is still present, but  improved  -he will be placed on 2 more weeks of antibiotics starting today after discussing with Dr. Hernandes    Liver lesion  -9 mm hypodense lesion on CT abdomen, could not be characterized  -not deemed to be malignant    Pancreatic lesion  -seen on recent CT scan  -given that he also had a CT scan with no mention of this lesion about 2-3 weeks prior, I do not believe that this is likely a malignancy.  I will monitor it, but I do believe it is related to his abscess.    HTN  - on Enalapril  - follow up with PCP    HLP  - follow up with PCP    DM2  - on Metformin  - follow up with PCP    Tobacco use  - counseled on cessation      Aurash Khoobehi, MD  Hematology and Oncology

## 2022-07-11 NOTE — PROGRESS NOTES
Port accessed without difficulty. Blood return noted. Rocephin ivpb given. Pt tolerated without reaction port left accessed. Pt has chemo tomorrow. D/c'd home

## 2022-07-11 NOTE — TELEPHONE ENCOUNTER
Repeat CT scan reviewed  Rim-enhancing left upper quadrant fluid collection 5.3 x 1.8 cm  suspicious for abscess considering the provided clinical history. Smaller than prior, 9 x 11 cm.  Adjacent to this there is masslike hypoattenuation involving the tail of the pancreas. This could is suspicious for pancreatic neoplasm, although phlegmon related to aforementioned abscess is also possible. Dedicated pancreatic protocol imaging and correlation with surgical history is recommended.  Left-sided pleural effusion with left basilar atelectasis.  Left lower quadrant ostomy.  Status post cholecystectomy.  Plan to start chemotherapy tomorrow  Ceftriaxone 2g IV daily for at least 2 weeks  Will repeat imaging in 2 weeks and assess complete resolution of fluid collection    D/w Dr Khoobehi

## 2022-07-12 ENCOUNTER — DOCUMENTATION ONLY (OUTPATIENT)
Dept: HEMATOLOGY/ONCOLOGY | Facility: CLINIC | Age: 57
End: 2022-07-12

## 2022-07-12 ENCOUNTER — DOCUMENTATION ONLY (OUTPATIENT)
Dept: INFECTIOUS DISEASES | Facility: CLINIC | Age: 57
End: 2022-07-12

## 2022-07-12 ENCOUNTER — LAB VISIT (OUTPATIENT)
Dept: LAB | Facility: HOSPITAL | Age: 57
End: 2022-07-12
Attending: STUDENT IN AN ORGANIZED HEALTH CARE EDUCATION/TRAINING PROGRAM
Payer: COMMERCIAL

## 2022-07-12 ENCOUNTER — INFUSION (OUTPATIENT)
Dept: INFUSION THERAPY | Facility: HOSPITAL | Age: 57
End: 2022-07-12
Attending: INTERNAL MEDICINE
Payer: COMMERCIAL

## 2022-07-12 ENCOUNTER — SOCIAL WORK (OUTPATIENT)
Dept: HEMATOLOGY/ONCOLOGY | Facility: CLINIC | Age: 57
End: 2022-07-12

## 2022-07-12 VITALS
RESPIRATION RATE: 18 BRPM | HEIGHT: 74 IN | WEIGHT: 196.69 LBS | BODY MASS INDEX: 25.24 KG/M2 | SYSTOLIC BLOOD PRESSURE: 146 MMHG | HEART RATE: 100 BPM | OXYGEN SATURATION: 97 % | DIASTOLIC BLOOD PRESSURE: 83 MMHG | TEMPERATURE: 98 F

## 2022-07-12 DIAGNOSIS — A41.9 SEPTICEMIA DURING LABOR: Primary | ICD-10-CM

## 2022-07-12 DIAGNOSIS — C18.5 MALIGNANT NEOPLASM OF SPLENIC FLEXURE: Primary | ICD-10-CM

## 2022-07-12 DIAGNOSIS — C18.5 MALIGNANT NEOPLASM OF SPLENIC FLEXURE: ICD-10-CM

## 2022-07-12 LAB — CRP SERPL-MCNC: 3 MG/L (ref 0–8.2)

## 2022-07-12 PROCEDURE — 96366 THER/PROPH/DIAG IV INF ADDON: CPT

## 2022-07-12 PROCEDURE — 96416 CHEMO PROLONG INFUSE W/PUMP: CPT

## 2022-07-12 PROCEDURE — 96415 CHEMO IV INFUSION ADDL HR: CPT

## 2022-07-12 PROCEDURE — 25000003 PHARM REV CODE 250: Performed by: INTERNAL MEDICINE

## 2022-07-12 PROCEDURE — 96368 THER/DIAG CONCURRENT INF: CPT

## 2022-07-12 PROCEDURE — 96367 TX/PROPH/DG ADDL SEQ IV INF: CPT

## 2022-07-12 PROCEDURE — 96413 CHEMO IV INFUSION 1 HR: CPT

## 2022-07-12 PROCEDURE — 36415 COLL VENOUS BLD VENIPUNCTURE: CPT | Performed by: STUDENT IN AN ORGANIZED HEALTH CARE EDUCATION/TRAINING PROGRAM

## 2022-07-12 PROCEDURE — 63600175 PHARM REV CODE 636 W HCPCS: Performed by: INTERNAL MEDICINE

## 2022-07-12 PROCEDURE — 96411 CHEMO IV PUSH ADDL DRUG: CPT

## 2022-07-12 PROCEDURE — 86140 C-REACTIVE PROTEIN: CPT | Performed by: STUDENT IN AN ORGANIZED HEALTH CARE EDUCATION/TRAINING PROGRAM

## 2022-07-12 RX ORDER — SODIUM CHLORIDE 0.9 % (FLUSH) 0.9 %
10 SYRINGE (ML) INJECTION
Status: DISCONTINUED | OUTPATIENT
Start: 2022-07-12 | End: 2022-07-12 | Stop reason: HOSPADM

## 2022-07-12 RX ORDER — FLUOROURACIL 50 MG/ML
2400 INJECTION, SOLUTION INTRAVENOUS
Status: CANCELLED | OUTPATIENT
Start: 2022-07-12

## 2022-07-12 RX ORDER — EPINEPHRINE 0.3 MG/.3ML
0.3 INJECTION SUBCUTANEOUS ONCE AS NEEDED
Status: DISCONTINUED | OUTPATIENT
Start: 2022-07-12 | End: 2022-07-12 | Stop reason: HOSPADM

## 2022-07-12 RX ORDER — HEPARIN 100 UNIT/ML
500 SYRINGE INTRAVENOUS
Status: CANCELLED | OUTPATIENT
Start: 2022-07-13

## 2022-07-12 RX ORDER — FLUOROURACIL 50 MG/ML
400 INJECTION, SOLUTION INTRAVENOUS
Status: CANCELLED | OUTPATIENT
Start: 2022-07-12

## 2022-07-12 RX ORDER — DIPHENHYDRAMINE HYDROCHLORIDE 50 MG/ML
50 INJECTION INTRAMUSCULAR; INTRAVENOUS ONCE AS NEEDED
Status: CANCELLED | OUTPATIENT
Start: 2022-07-12

## 2022-07-12 RX ORDER — EPINEPHRINE 0.3 MG/.3ML
0.3 INJECTION SUBCUTANEOUS ONCE AS NEEDED
Status: CANCELLED | OUTPATIENT
Start: 2022-07-12

## 2022-07-12 RX ORDER — FLUOROURACIL 50 MG/ML
400 INJECTION, SOLUTION INTRAVENOUS
Status: COMPLETED | OUTPATIENT
Start: 2022-07-12 | End: 2022-07-12

## 2022-07-12 RX ORDER — HEPARIN 100 UNIT/ML
500 SYRINGE INTRAVENOUS
Status: CANCELLED | OUTPATIENT
Start: 2022-07-12

## 2022-07-12 RX ORDER — SODIUM CHLORIDE 0.9 % (FLUSH) 0.9 %
10 SYRINGE (ML) INJECTION
Status: CANCELLED | OUTPATIENT
Start: 2022-07-13

## 2022-07-12 RX ADMIN — FLUOROURACIL 930 MG: 50 INJECTION, SOLUTION INTRAVENOUS at 11:07

## 2022-07-12 RX ADMIN — DEXAMETHASONE SODIUM PHOSPHATE 0.25 MG: 4 INJECTION, SOLUTION INTRA-ARTICULAR; INTRALESIONAL; INTRAMUSCULAR; INTRAVENOUS; SOFT TISSUE at 08:07

## 2022-07-12 RX ADMIN — LEUCOVORIN CALCIUM 930 MG: 350 INJECTION, POWDER, LYOPHILIZED, FOR SUSPENSION INTRAMUSCULAR; INTRAVENOUS at 09:07

## 2022-07-12 RX ADMIN — FLUOROURACIL 5570 MG: 50 INJECTION, SOLUTION INTRAVENOUS at 11:07

## 2022-07-12 RX ADMIN — OXALIPLATIN 197 MG: 5 INJECTION, SOLUTION INTRAVENOUS at 09:07

## 2022-07-12 RX ADMIN — DEXTROSE: 5 SOLUTION INTRAVENOUS at 08:07

## 2022-07-12 RX ADMIN — FOSAPREPITANT 150 MG: 150 INJECTION, POWDER, LYOPHILIZED, FOR SOLUTION INTRAVENOUS at 09:07

## 2022-07-12 NOTE — PLAN OF CARE
Problem: Activity Intolerance  Goal: Enhanced Capacity and Energy  Outcome: Ongoing, Progressing  Intervention: Optimize Activity Tolerance  Flowsheets (Taken 7/12/2022 1312)  Self-Care Promotion: independence encouraged  Activity Management:   Ambulated -L4   Up in chair - L3  Environmental Support: rest periods encouraged

## 2022-07-12 NOTE — PROGRESS NOTES
CHEMO SCHOOL- NUTRITION    Osman Li Jr. is a 57 y.o. male with colon cancer. Pt will be receiving folfox. I met with Mr. Li and his wife for chemo school today. Pt reports good appetite and intake though he has noticed significant unintentional weight loss since diagnosis. He does supplement with Ensure/Boost daily. He is not currently taking any herbal or antioxidant supplements. He denies N/V. Denies diarrhea or constipation via colostomy. He denies having any nutrition related questions or concerns at the current time.     CW: 196#.     Plan:   1. Reviewed chemo school packet & provided copy to pt.   2. Discussed importance of maintaining wt & staying hydrated.   3. Reviewed food safety guidelines recommended during treatment.   4. Recommended small, frequent meals and snacks during treatment to promote weight maintenance  5. Provided RD contact info & encouraged pt to call with any questions/concerns.   6. Will f/u as needed.       Electronically signed by: Xuan Fair MBA, LANDONN, LDN

## 2022-07-12 NOTE — PROGRESS NOTES
I spoke with patient and with Chaya ( [employer] ) to   Advise to skip IV abx on Chemo days ( Tues & Wed )  And resume on Thursday ; per Dr Hernandes's orders.     KATHRYN Johnson [facility]  7/12/22

## 2022-07-12 NOTE — PROGRESS NOTES
Mr. Loki Li is a 57 year old diagnosed with colon cancer.  He is here today, accompanied by his wife, for his first chemo infusion.  I met with him to complete new patient orientation and the [facility] Distress Screening; he indicated a rating of 3.  Patient denied needing psychosocial support at this time.  I provided him with the [facility] community events flyer and my contact information in the event he needs supportive services in the future.

## 2022-07-13 ENCOUNTER — OFFICE VISIT (OUTPATIENT)
Dept: FAMILY MEDICINE | Facility: CLINIC | Age: 57
End: 2022-07-13
Payer: COMMERCIAL

## 2022-07-13 VITALS
TEMPERATURE: 98 F | OXYGEN SATURATION: 96 % | BODY MASS INDEX: 25.56 KG/M2 | HEIGHT: 74 IN | HEART RATE: 89 BPM | WEIGHT: 199.19 LBS | DIASTOLIC BLOOD PRESSURE: 74 MMHG | SYSTOLIC BLOOD PRESSURE: 118 MMHG

## 2022-07-13 DIAGNOSIS — I10 ESSENTIAL HYPERTENSION: ICD-10-CM

## 2022-07-13 DIAGNOSIS — C18.4 MALIGNANT NEOPLASM OF TRANSVERSE COLON: ICD-10-CM

## 2022-07-13 DIAGNOSIS — E11.00 TYPE 2 DIABETES MELLITUS WITH HYPEROSMOLARITY WITHOUT COMA, WITHOUT LONG-TERM CURRENT USE OF INSULIN: Primary | ICD-10-CM

## 2022-07-13 DIAGNOSIS — E78.2 MIXED HYPERLIPIDEMIA: ICD-10-CM

## 2022-07-13 LAB — HBA1C MFR BLD: 9.3 %

## 2022-07-13 PROCEDURE — 83036 HEMOGLOBIN GLYCOSYLATED A1C: CPT | Mod: QW,,, | Performed by: PHYSICIAN ASSISTANT

## 2022-07-13 PROCEDURE — 1159F PR MEDICATION LIST DOCUMENTED IN MEDICAL RECORD: ICD-10-PCS | Mod: CPTII,S$GLB,, | Performed by: PHYSICIAN ASSISTANT

## 2022-07-13 PROCEDURE — 4010F ACE/ARB THERAPY RXD/TAKEN: CPT | Mod: CPTII,S$GLB,, | Performed by: PHYSICIAN ASSISTANT

## 2022-07-13 PROCEDURE — 3046F PR MOST RECENT HEMOGLOBIN A1C LEVEL > 9.0%: ICD-10-PCS | Mod: CPTII,S$GLB,, | Performed by: PHYSICIAN ASSISTANT

## 2022-07-13 PROCEDURE — 83036 POCT HEMOGLOBIN A1C: ICD-10-PCS | Mod: QW,,, | Performed by: PHYSICIAN ASSISTANT

## 2022-07-13 PROCEDURE — 99213 OFFICE O/P EST LOW 20 MIN: CPT | Mod: S$GLB,,, | Performed by: PHYSICIAN ASSISTANT

## 2022-07-13 PROCEDURE — 3008F PR BODY MASS INDEX (BMI) DOCUMENTED: ICD-10-PCS | Mod: CPTII,S$GLB,, | Performed by: PHYSICIAN ASSISTANT

## 2022-07-13 PROCEDURE — 1159F MED LIST DOCD IN RCRD: CPT | Mod: CPTII,S$GLB,, | Performed by: PHYSICIAN ASSISTANT

## 2022-07-13 PROCEDURE — 1111F DSCHRG MED/CURRENT MED MERGE: CPT | Mod: CPTII,S$GLB,, | Performed by: PHYSICIAN ASSISTANT

## 2022-07-13 PROCEDURE — 4010F PR ACE/ARB THEARPY RXD/TAKEN: ICD-10-PCS | Mod: CPTII,S$GLB,, | Performed by: PHYSICIAN ASSISTANT

## 2022-07-13 PROCEDURE — 3074F PR MOST RECENT SYSTOLIC BLOOD PRESSURE < 130 MM HG: ICD-10-PCS | Mod: CPTII,S$GLB,, | Performed by: PHYSICIAN ASSISTANT

## 2022-07-13 PROCEDURE — 1111F PR DISCHARGE MEDS RECONCILED W/ CURRENT OUTPATIENT MED LIST: ICD-10-PCS | Mod: CPTII,S$GLB,, | Performed by: PHYSICIAN ASSISTANT

## 2022-07-13 PROCEDURE — 3008F BODY MASS INDEX DOCD: CPT | Mod: CPTII,S$GLB,, | Performed by: PHYSICIAN ASSISTANT

## 2022-07-13 PROCEDURE — 1160F PR REVIEW ALL MEDS BY PRESCRIBER/CLIN PHARMACIST DOCUMENTED: ICD-10-PCS | Mod: CPTII,S$GLB,, | Performed by: PHYSICIAN ASSISTANT

## 2022-07-13 PROCEDURE — 3046F HEMOGLOBIN A1C LEVEL >9.0%: CPT | Mod: CPTII,S$GLB,, | Performed by: PHYSICIAN ASSISTANT

## 2022-07-13 PROCEDURE — 99213 PR OFFICE/OUTPT VISIT, EST, LEVL III, 20-29 MIN: ICD-10-PCS | Mod: S$GLB,,, | Performed by: PHYSICIAN ASSISTANT

## 2022-07-13 PROCEDURE — 3074F SYST BP LT 130 MM HG: CPT | Mod: CPTII,S$GLB,, | Performed by: PHYSICIAN ASSISTANT

## 2022-07-13 PROCEDURE — 3078F PR MOST RECENT DIASTOLIC BLOOD PRESSURE < 80 MM HG: ICD-10-PCS | Mod: CPTII,S$GLB,, | Performed by: PHYSICIAN ASSISTANT

## 2022-07-13 PROCEDURE — 1160F RVW MEDS BY RX/DR IN RCRD: CPT | Mod: CPTII,S$GLB,, | Performed by: PHYSICIAN ASSISTANT

## 2022-07-13 PROCEDURE — 3078F DIAST BP <80 MM HG: CPT | Mod: CPTII,S$GLB,, | Performed by: PHYSICIAN ASSISTANT

## 2022-07-13 RX ORDER — SODIUM CHLORIDE 0.9 % (FLUSH) 0.9 %
SYRINGE (ML) INJECTION
COMMUNITY
Start: 2022-06-23 | End: 2022-10-12

## 2022-07-13 RX ORDER — INSULIN GLARGINE 300 U/ML
20 INJECTION, SOLUTION SUBCUTANEOUS DAILY
Qty: 1 PEN | Refills: 5 | Status: SHIPPED | OUTPATIENT
Start: 2022-07-13 | End: 2022-08-09 | Stop reason: SDUPTHER

## 2022-07-13 RX ORDER — PEN NEEDLE, DIABETIC 30 GX3/16"
1 NEEDLE, DISPOSABLE MISCELLANEOUS DAILY
Qty: 90 EACH | Refills: 3 | Status: SHIPPED | OUTPATIENT
Start: 2022-07-13 | End: 2023-09-14 | Stop reason: SDUPTHER

## 2022-07-13 RX ORDER — HEPARIN SODIUM (PORCINE) LOCK FLUSH IV SOLN 100 UNIT/ML 100 UNIT/ML
SOLUTION INTRAVENOUS
COMMUNITY
Start: 2022-06-23 | End: 2022-10-12

## 2022-07-13 NOTE — PROGRESS NOTES
"  SUBJECTIVE:    Patient ID: Osman Li Jr. is a 57 y.o. male.    Chief Complaint: Hyperglycemia (No bottles/ brought glucose log/ A1C done 5/9 and it was 7.1%//dp)    Pt is a 57 y.o. male w/ a PMHx of colon cancer, DM II (Metformin 1,000mg b.i.d), HTN (Enalapril-HCTZ 10-25mg), and HLP (Lipitor 20mg) who presentswho presents today for an urgent visit with a CC of "elevated blood sugar." Blood sugar at home has been ranging in the 230-330 range.  He maintains a healthy, low carb diet - averaging 3 meals/day.  Breakfast consists of one eggs, 1 sausage missy, and 1 slice of low carb toast.  Lunch involves a sandwich with turkey and low carb bread. Dinner consist of baked chicken or pork, with veggies.  He avoids sweets, but occasionally eats low sugar ice cream.  On last visit, he was on Ozempic 1mg q7 days and his last A1c (05/09/2022) was 7.1%.  He reports being off of the Ozempic for > 2 months due to pharmacy availability.  Today, he reports polydipsia.    Office Visit on 07/13/2022   Component Date Value Ref Range Status    Hemoglobin A1C 07/13/2022 9.3  % Final   Lab Visit on 07/12/2022   Component Date Value Ref Range Status    CRP 07/12/2022 3.0  0.0 - 8.2 mg/L Final   Lab Visit on 07/11/2022   Component Date Value Ref Range Status    WBC 07/11/2022 8.41  3.90 - 12.70 K/uL Final    RBC 07/11/2022 4.63  4.60 - 6.20 M/uL Final    Hemoglobin 07/11/2022 12.4 (A) 14.0 - 18.0 g/dL Final    Hematocrit 07/11/2022 36.5 (A) 40.0 - 54.0 % Final    MCV 07/11/2022 79 (A) 82 - 98 fL Final    MCH 07/11/2022 26.8 (A) 27.0 - 31.0 pg Final    MCHC 07/11/2022 34.0  32.0 - 36.0 g/dL Final    RDW 07/11/2022 18.2 (A) 11.5 - 14.5 % Final    Platelets 07/11/2022 567 (A) 150 - 450 K/uL Final    MPV 07/11/2022 9.7  9.2 - 12.9 fL Final    Immature Granulocytes 07/11/2022 0.4  0.0 - 0.5 % Final    Gran # (ANC) 07/11/2022 3.1  1.8 - 7.7 K/uL Final    Immature Grans (Abs) 07/11/2022 0.03  0.00 - 0.04 K/uL Final "    Lymph # 07/11/2022 4.2  1.0 - 4.8 K/uL Final    Mono # 07/11/2022 0.7  0.3 - 1.0 K/uL Final    Eos # 07/11/2022 0.3  0.0 - 0.5 K/uL Final    Baso # 07/11/2022 0.08  0.00 - 0.20 K/uL Final    nRBC 07/11/2022 0  0 /100 WBC Final    Gran % 07/11/2022 37.0 (A) 38.0 - 73.0 % Final    Lymph % 07/11/2022 49.7 (A) 18.0 - 48.0 % Final    Mono % 07/11/2022 8.7  4.0 - 15.0 % Final    Eosinophil % 07/11/2022 3.2  0.0 - 8.0 % Final    Basophil % 07/11/2022 1.0  0.0 - 1.9 % Final    Differential Method 07/11/2022 Automated   Final    Sodium 07/11/2022 134 (A) 136 - 145 mmol/L Final    Potassium 07/11/2022 5.1  3.5 - 5.1 mmol/L Final    Chloride 07/11/2022 97  95 - 110 mmol/L Final    CO2 07/11/2022 25  23 - 29 mmol/L Final    Glucose 07/11/2022 229 (A) 70 - 110 mg/dL Final    BUN 07/11/2022 13  6 - 20 mg/dL Final    Creatinine 07/11/2022 1.0  0.5 - 1.4 mg/dL Final    Calcium 07/11/2022 10.5  8.7 - 10.5 mg/dL Final    Total Protein 07/11/2022 7.8  6.0 - 8.4 g/dL Final    Albumin 07/11/2022 3.9  3.5 - 5.2 g/dL Final    Total Bilirubin 07/11/2022 0.5  0.1 - 1.0 mg/dL Final    Alkaline Phosphatase 07/11/2022 102  55 - 135 U/L Final    AST 07/11/2022 12  10 - 40 U/L Final    ALT 07/11/2022 11  10 - 44 U/L Final    Anion Gap 07/11/2022 12  8 - 16 mmol/L Final    eGFR if African American 07/11/2022 >60  >60 mL/min/1.73 m^2 Final    eGFR if non African American 07/11/2022 >60  >60 mL/min/1.73 m^2 Final    Magnesium 07/11/2022 1.9  1.6 - 2.6 mg/dL Final   Lab Visit on 07/04/2022   Component Date Value Ref Range Status    WBC 07/04/2022 12.27  3.90 - 12.70 K/uL Final    RBC 07/04/2022 4.41 (A) 4.60 - 6.20 M/uL Final    Hemoglobin 07/04/2022 11.6 (A) 14.0 - 18.0 g/dL Final    Hematocrit 07/04/2022 35.4 (A) 40.0 - 54.0 % Final    MCV 07/04/2022 80 (A) 82 - 98 fL Final    MCH 07/04/2022 26.3 (A) 27.0 - 31.0 pg Final    MCHC 07/04/2022 32.8  32.0 - 36.0 g/dL Final    RDW 07/04/2022 18.3 (A) 11.5 - 14.5 %  Final    Platelets 07/04/2022 676 (A) 150 - 450 K/uL Final    MPV 07/04/2022 10.4  9.2 - 12.9 fL Final    Immature Granulocytes 07/04/2022 0.7 (A) 0.0 - 0.5 % Final    Gran # (ANC) 07/04/2022 6.3  1.8 - 7.7 K/uL Final    Immature Grans (Abs) 07/04/2022 0.08 (A) 0.00 - 0.04 K/uL Final    Lymph # 07/04/2022 4.6  1.0 - 4.8 K/uL Final    Mono # 07/04/2022 0.9  0.3 - 1.0 K/uL Final    Eos # 07/04/2022 0.3  0.0 - 0.5 K/uL Final    Baso # 07/04/2022 0.11  0.00 - 0.20 K/uL Final    nRBC 07/04/2022 0  0 /100 WBC Final    Gran % 07/04/2022 51.3  38.0 - 73.0 % Final    Lymph % 07/04/2022 37.7  18.0 - 48.0 % Final    Mono % 07/04/2022 7.4  4.0 - 15.0 % Final    Eosinophil % 07/04/2022 2.0  0.0 - 8.0 % Final    Basophil % 07/04/2022 0.9  0.0 - 1.9 % Final    Differential Method 07/04/2022 Automated   Final    Sodium 07/04/2022 134 (A) 136 - 145 mmol/L Final    Potassium 07/04/2022 4.5  3.5 - 5.1 mmol/L Final    Chloride 07/04/2022 98  95 - 110 mmol/L Final    CO2 07/04/2022 23  23 - 29 mmol/L Final    Glucose 07/04/2022 302 (A) 70 - 110 mg/dL Final    BUN 07/04/2022 8  6 - 20 mg/dL Final    Creatinine 07/04/2022 1.1  0.5 - 1.4 mg/dL Final    Calcium 07/04/2022 9.9  8.7 - 10.5 mg/dL Final    Total Protein 07/04/2022 7.6  6.0 - 8.4 g/dL Final    Albumin 07/04/2022 3.4 (A) 3.5 - 5.2 g/dL Final    Total Bilirubin 07/04/2022 0.2  0.1 - 1.0 mg/dL Final    Alkaline Phosphatase 07/04/2022 111  55 - 135 U/L Final    AST 07/04/2022 12  10 - 40 U/L Final    ALT 07/04/2022 11  10 - 44 U/L Final    Anion Gap 07/04/2022 13  8 - 16 mmol/L Final    eGFR if African American 07/04/2022 >60  >60 mL/min/1.73 m^2 Final    eGFR if non African American 07/04/2022 >60  >60 mL/min/1.73 m^2 Final    CRP 07/04/2022 4.0  0.0 - 8.2 mg/L Final    Vancomycin-Trough 07/04/2022 14.0  10.0 - 22.0 ug/mL Final   Lab Visit on 06/30/2022   Component Date Value Ref Range Status    WBC 06/30/2022 9.56  3.90 - 12.70 K/uL Final     RBC 06/30/2022 4.15 (A) 4.60 - 6.20 M/uL Final    Hemoglobin 06/30/2022 10.9 (A) 14.0 - 18.0 g/dL Final    Hematocrit 06/30/2022 33.4 (A) 40.0 - 54.0 % Final    MCV 06/30/2022 81 (A) 82 - 98 fL Final    MCH 06/30/2022 26.3 (A) 27.0 - 31.0 pg Final    MCHC 06/30/2022 32.6  32.0 - 36.0 g/dL Final    RDW 06/30/2022 17.4 (A) 11.5 - 14.5 % Final    Platelets 06/30/2022 852 (A) 150 - 450 K/uL Final    MPV 06/30/2022 9.7  9.2 - 12.9 fL Final    Immature Granulocytes 06/30/2022 0.6 (A) 0.0 - 0.5 % Final    Gran # (ANC) 06/30/2022 4.6  1.8 - 7.7 K/uL Final    Immature Grans (Abs) 06/30/2022 0.06 (A) 0.00 - 0.04 K/uL Final    Lymph # 06/30/2022 3.9  1.0 - 4.8 K/uL Final    Mono # 06/30/2022 0.7  0.3 - 1.0 K/uL Final    Eos # 06/30/2022 0.2  0.0 - 0.5 K/uL Final    Baso # 06/30/2022 0.09  0.00 - 0.20 K/uL Final    nRBC 06/30/2022 0  0 /100 WBC Final    Gran % 06/30/2022 48.3  38.0 - 73.0 % Final    Lymph % 06/30/2022 40.5  18.0 - 48.0 % Final    Mono % 06/30/2022 7.7  4.0 - 15.0 % Final    Eosinophil % 06/30/2022 2.0  0.0 - 8.0 % Final    Basophil % 06/30/2022 0.9  0.0 - 1.9 % Final    Differential Method 06/30/2022 Automated   Final    Sodium 06/30/2022 136  136 - 145 mmol/L Final    Potassium 06/30/2022 4.0  3.5 - 5.1 mmol/L Final    Chloride 06/30/2022 98  95 - 110 mmol/L Final    CO2 06/30/2022 26  23 - 29 mmol/L Final    Glucose 06/30/2022 365 (A) 70 - 110 mg/dL Final    BUN 06/30/2022 9  6 - 20 mg/dL Final    Creatinine 06/30/2022 0.9  0.5 - 1.4 mg/dL Final    Calcium 06/30/2022 9.6  8.7 - 10.5 mg/dL Final    Total Protein 06/30/2022 6.7  6.0 - 8.4 g/dL Final    Albumin 06/30/2022 3.2 (A) 3.5 - 5.2 g/dL Final    Total Bilirubin 06/30/2022 0.2  0.1 - 1.0 mg/dL Final    Alkaline Phosphatase 06/30/2022 129  55 - 135 U/L Final    AST 06/30/2022 9 (A) 10 - 40 U/L Final    ALT 06/30/2022 8 (A) 10 - 44 U/L Final    Anion Gap 06/30/2022 12  8 - 16 mmol/L Final    eGFR if   06/30/2022 >60  >60 mL/min/1.73 m^2 Final    eGFR if non African American 06/30/2022 >60  >60 mL/min/1.73 m^2 Final    CRP 06/30/2022 3.5  0.0 - 8.2 mg/L Final    Vancomycin-Trough 06/30/2022 17.4  10.0 - 22.0 ug/mL Final   Lab Visit on 06/27/2022   Component Date Value Ref Range Status    WBC 06/27/2022 12.93 (A) 3.90 - 12.70 K/uL Final    RBC 06/27/2022 4.13 (A) 4.60 - 6.20 M/uL Final    Hemoglobin 06/27/2022 11.0 (A) 14.0 - 18.0 g/dL Final    Hematocrit 06/27/2022 32.2 (A) 40.0 - 54.0 % Final    MCV 06/27/2022 78 (A) 82 - 98 fL Final    MCH 06/27/2022 26.6 (A) 27.0 - 31.0 pg Final    MCHC 06/27/2022 34.2  32.0 - 36.0 g/dL Final    RDW 06/27/2022 15.9 (A) 11.5 - 14.5 % Final    Platelets 06/27/2022 841 (A) 150 - 450 K/uL Final    MPV 06/27/2022 9.2  9.2 - 12.9 fL Final    Immature Granulocytes 06/27/2022 0.8 (A) 0.0 - 0.5 % Final    Gran # (ANC) 06/27/2022 6.6  1.8 - 7.7 K/uL Final    Immature Grans (Abs) 06/27/2022 0.10 (A) 0.00 - 0.04 K/uL Final    Lymph # 06/27/2022 5.1 (A) 1.0 - 4.8 K/uL Final    Mono # 06/27/2022 0.8  0.3 - 1.0 K/uL Final    Eos # 06/27/2022 0.2  0.0 - 0.5 K/uL Final    Baso # 06/27/2022 0.07  0.00 - 0.20 K/uL Final    nRBC 06/27/2022 0  0 /100 WBC Final    Gran % 06/27/2022 51.1  38.0 - 73.0 % Final    Lymph % 06/27/2022 39.7  18.0 - 48.0 % Final    Mono % 06/27/2022 6.4  4.0 - 15.0 % Final    Eosinophil % 06/27/2022 1.5  0.0 - 8.0 % Final    Basophil % 06/27/2022 0.5  0.0 - 1.9 % Final    Differential Method 06/27/2022 Automated   Final    Sodium 06/27/2022 135 (A) 136 - 145 mmol/L Final    Potassium 06/27/2022 3.3 (A) 3.5 - 5.1 mmol/L Final    Chloride 06/27/2022 97  95 - 110 mmol/L Final    CO2 06/27/2022 25  23 - 29 mmol/L Final    Glucose 06/27/2022 303 (A) 70 - 110 mg/dL Final    BUN 06/27/2022 8  6 - 20 mg/dL Final    Creatinine 06/27/2022 0.8  0.5 - 1.4 mg/dL Final    Calcium 06/27/2022 9.5  8.7 - 10.5 mg/dL Final    Total Protein 06/27/2022 7.1   6.0 - 8.4 g/dL Final    Albumin 06/27/2022 3.1 (A) 3.5 - 5.2 g/dL Final    Total Bilirubin 06/27/2022 0.2  0.1 - 1.0 mg/dL Final    Alkaline Phosphatase 06/27/2022 163 (A) 55 - 135 U/L Final    AST 06/27/2022 12  10 - 40 U/L Final    ALT 06/27/2022 14  10 - 44 U/L Final    Anion Gap 06/27/2022 13  8 - 16 mmol/L Final    eGFR if African American 06/27/2022 >60  >60 mL/min/1.73 m^2 Final    eGFR if non African American 06/27/2022 >60  >60 mL/min/1.73 m^2 Final    CRP 06/27/2022 8.5 (A) 0.0 - 8.2 mg/L Final    Vancomycin-Trough 06/27/2022 17.1  10.0 - 22.0 ug/mL Final   Admission on 06/20/2022, Discharged on 06/22/2022   Component Date Value Ref Range Status    SARS-CoV-2 RNA, Amplification, Qual 06/20/2022 Negative  Negative Final    Influenza A, Molecular 06/20/2022 Negative  Negative Final    Influenza B, Molecular 06/20/2022 Negative  Negative Final    Flu A & B Source 06/20/2022 Nasal swab   Final    Lactate (Lactic Acid) 06/20/2022 1.2  0.5 - 2.2 mmol/L Final    Blood Culture, Routine 06/20/2022 No growth after 5 days.   Final    Blood Culture, Routine 06/20/2022 No growth after 5 days.   Final    Specimen UA 06/20/2022 Urine, Clean Catch   Final    Color, UA 06/20/2022 Yellow  Yellow, Straw, Chela Final    Appearance, UA 06/20/2022 Clear  Clear Final    pH, UA 06/20/2022 6.0  5.0 - 8.0 Final    Specific Gravity, UA 06/20/2022 <=1.005 (A) 1.005 - 1.030 Final    Protein, UA 06/20/2022 Negative  Negative Final    Glucose, UA 06/20/2022 4+ (A) Negative Final    Ketones, UA 06/20/2022 1+ (A) Negative Final    Bilirubin (UA) 06/20/2022 Negative  Negative Final    Occult Blood UA 06/20/2022 Trace (A) Negative Final    Nitrite, UA 06/20/2022 Negative  Negative Final    Urobilinogen, UA 06/20/2022 Negative  <2.0 EU/dL Final    Leukocytes, UA 06/20/2022 Negative  Negative Final    RBC, UA 06/20/2022 1  0 - 4 /hpf Final    Bacteria 06/20/2022 None  None-Occ /hpf Final    Yeast, UA  06/20/2022 None  None Final    Microscopic Comment 06/20/2022 SEE COMMENT   Final    POCT Glucose 06/20/2022 272 (A) 70 - 110 mg/dL Final    POCT Glucose 06/20/2022 242 (A) 70 - 110 mg/dL Final    Sodium 06/21/2022 133 (A) 136 - 145 mmol/L Final    Potassium 06/21/2022 3.9  3.5 - 5.1 mmol/L Final    Chloride 06/21/2022 95  95 - 110 mmol/L Final    CO2 06/21/2022 26  23 - 29 mmol/L Final    Glucose 06/21/2022 235 (A) 70 - 110 mg/dL Final    BUN 06/21/2022 8  6 - 20 mg/dL Final    Creatinine 06/21/2022 0.8  0.5 - 1.4 mg/dL Final    Calcium 06/21/2022 10.0  8.7 - 10.5 mg/dL Final    Anion Gap 06/21/2022 12  8 - 16 mmol/L Final    eGFR if African American 06/21/2022 >60  >60 mL/min/1.73 m^2 Final    eGFR if non African American 06/21/2022 >60  >60 mL/min/1.73 m^2 Final    Magnesium 06/21/2022 1.7  1.6 - 2.6 mg/dL Final    WBC 06/21/2022 14.85 (A) 3.90 - 12.70 K/uL Final    RBC 06/21/2022 3.70 (A) 4.60 - 6.20 M/uL Final    Hemoglobin 06/21/2022 9.6 (A) 14.0 - 18.0 g/dL Final    Hematocrit 06/21/2022 28.9 (A) 40.0 - 54.0 % Final    MCV 06/21/2022 78 (A) 82 - 98 fL Final    MCH 06/21/2022 25.9 (A) 27.0 - 31.0 pg Final    MCHC 06/21/2022 33.2  32.0 - 36.0 g/dL Final    RDW 06/21/2022 14.7 (A) 11.5 - 14.5 % Final    Platelets 06/21/2022 660 (A) 150 - 450 K/uL Final    MPV 06/21/2022 9.4  9.2 - 12.9 fL Final    Immature Granulocytes 06/21/2022 1.1 (A) 0.0 - 0.5 % Final    Gran # (ANC) 06/21/2022 7.4  1.8 - 7.7 K/uL Final    Immature Grans (Abs) 06/21/2022 0.17 (A) 0.00 - 0.04 K/uL Final    Lymph # 06/21/2022 4.8  1.0 - 4.8 K/uL Final    Mono # 06/21/2022 2.3 (A) 0.3 - 1.0 K/uL Final    Eos # 06/21/2022 0.1  0.0 - 0.5 K/uL Final    Baso # 06/21/2022 0.06  0.00 - 0.20 K/uL Final    nRBC 06/21/2022 0  0 /100 WBC Final    Gran % 06/21/2022 49.9  38.0 - 73.0 % Final    Lymph % 06/21/2022 32.5  18.0 - 48.0 % Final    Mono % 06/21/2022 15.6 (A) 4.0 - 15.0 % Final    Eosinophil % 06/21/2022 0.5   0.0 - 8.0 % Final    Basophil % 06/21/2022 0.4  0.0 - 1.9 % Final    Differential Method 06/21/2022 Automated   Final    Prothrombin Time 06/21/2022 10.5  9.0 - 12.5 sec Final    INR 06/21/2022 1.0  0.8 - 1.2 Final    POCT Glucose 06/21/2022 239 (A) 70 - 110 mg/dL Final    Vancomycin-Trough 06/21/2022 13.0  10.0 - 22.0 ug/mL Final    POCT Glucose 06/21/2022 201 (A) 70 - 110 mg/dL Final    POCT Glucose 06/21/2022 372 (A) 70 - 110 mg/dL Final    Sodium 06/22/2022 134 (A) 136 - 145 mmol/L Final    Potassium 06/22/2022 3.5  3.5 - 5.1 mmol/L Final    Chloride 06/22/2022 97  95 - 110 mmol/L Final    CO2 06/22/2022 24  23 - 29 mmol/L Final    Glucose 06/22/2022 252 (A) 70 - 110 mg/dL Final    BUN 06/22/2022 9  6 - 20 mg/dL Final    Creatinine 06/22/2022 0.8  0.5 - 1.4 mg/dL Final    Calcium 06/22/2022 9.5  8.7 - 10.5 mg/dL Final    Anion Gap 06/22/2022 13  8 - 16 mmol/L Final    eGFR if African American 06/22/2022 >60  >60 mL/min/1.73 m^2 Final    eGFR if non African American 06/22/2022 >60  >60 mL/min/1.73 m^2 Final    Magnesium 06/22/2022 1.6  1.6 - 2.6 mg/dL Final    WBC 06/22/2022 12.70  3.90 - 12.70 K/uL Final    RBC 06/22/2022 3.90 (A) 4.60 - 6.20 M/uL Final    Hemoglobin 06/22/2022 10.3 (A) 14.0 - 18.0 g/dL Final    Hematocrit 06/22/2022 29.9 (A) 40.0 - 54.0 % Final    MCV 06/22/2022 77 (A) 82 - 98 fL Final    MCH 06/22/2022 26.4 (A) 27.0 - 31.0 pg Final    MCHC 06/22/2022 34.4  32.0 - 36.0 g/dL Final    RDW 06/22/2022 14.7 (A) 11.5 - 14.5 % Final    Platelets 06/22/2022 732 (A) 150 - 450 K/uL Final    MPV 06/22/2022 9.8  9.2 - 12.9 fL Final    Immature Granulocytes 06/22/2022 1.3 (A) 0.0 - 0.5 % Final    Gran # (ANC) 06/22/2022 6.8  1.8 - 7.7 K/uL Final    Immature Grans (Abs) 06/22/2022 0.17 (A) 0.00 - 0.04 K/uL Final    Lymph # 06/22/2022 3.9  1.0 - 4.8 K/uL Final    Mono # 06/22/2022 1.7 (A) 0.3 - 1.0 K/uL Final    Eos # 06/22/2022 0.2  0.0 - 0.5 K/uL Final    Baso #  06/22/2022 0.07  0.00 - 0.20 K/uL Final    nRBC 06/22/2022 0  0 /100 WBC Final    Gran % 06/22/2022 53.2  38.0 - 73.0 % Final    Lymph % 06/22/2022 30.6  18.0 - 48.0 % Final    Mono % 06/22/2022 13.1  4.0 - 15.0 % Final    Eosinophil % 06/22/2022 1.2  0.0 - 8.0 % Final    Basophil % 06/22/2022 0.6  0.0 - 1.9 % Final    Differential Method 06/22/2022 Automated   Final    POCT Glucose 06/22/2022 218 (A) 70 - 110 mg/dL Final    Vancomycin-Trough 06/22/2022 17.6  10.0 - 22.0 ug/mL Final    POCT Glucose 06/22/2022 256 (A) 70 - 110 mg/dL Final    POCT Glucose 06/22/2022 274 (A) 70 - 110 mg/dL Final   Lab Visit on 06/20/2022   Component Date Value Ref Range Status    WBC 06/20/2022 27.25 (A) 3.90 - 12.70 K/uL Final    RBC 06/20/2022 4.19 (A) 4.60 - 6.20 M/uL Final    Hemoglobin 06/20/2022 10.6 (A) 14.0 - 18.0 g/dL Final    Hematocrit 06/20/2022 32.5 (A) 40.0 - 54.0 % Final    MCV 06/20/2022 78 (A) 82 - 98 fL Final    MCH 06/20/2022 25.3 (A) 27.0 - 31.0 pg Final    MCHC 06/20/2022 32.6  32.0 - 36.0 g/dL Final    RDW 06/20/2022 14.7 (A) 11.5 - 14.5 % Final    Platelets 06/20/2022 650 (A) 150 - 450 K/uL Final    MPV 06/20/2022 9.5  9.2 - 12.9 fL Final    Immature Granulocytes 06/20/2022 CANCELED  % Final-Edited    Immature Grans (Abs) 06/20/2022 CANCELED  K/uL Final-Edited    nRBC 06/20/2022 0  0 /100 WBC Final    Gran % 06/20/2022 79.0 (A) 38.0 - 73.0 % Final    Lymph % 06/20/2022 13.0 (A) 18.0 - 48.0 % Final    Mono % 06/20/2022 8.0  4.0 - 15.0 % Final    Eosinophil % 06/20/2022 0.0  0.0 - 8.0 % Final    Basophil % 06/20/2022 0.0  0.0 - 1.9 % Final    Platelet Estimate 06/20/2022 Increased (A)  Final    Differential Method 06/20/2022 Manual   Corrected    Sodium 06/20/2022 127 (A) 136 - 145 mmol/L Final    Potassium 06/20/2022 3.8  3.5 - 5.1 mmol/L Final    Chloride 06/20/2022 89 (A) 95 - 110 mmol/L Final    CO2 06/20/2022 25  23 - 29 mmol/L Final    Glucose 06/20/2022 248 (A) 70 - 110  mg/dL Final    BUN 06/20/2022 15  6 - 20 mg/dL Final    Creatinine 06/20/2022 0.9  0.5 - 1.4 mg/dL Final    Calcium 06/20/2022 10.1  8.7 - 10.5 mg/dL Final    Total Protein 06/20/2022 7.2  6.0 - 8.4 g/dL Final    Albumin 06/20/2022 2.7 (A) 3.5 - 5.2 g/dL Final    Total Bilirubin 06/20/2022 0.9  0.1 - 1.0 mg/dL Final    Alkaline Phosphatase 06/20/2022 306 (A) 55 - 135 U/L Final    AST 06/20/2022 17  10 - 40 U/L Final    ALT 06/20/2022 30  10 - 44 U/L Final    Anion Gap 06/20/2022 13  8 - 16 mmol/L Final    eGFR if African American 06/20/2022 >60  >60 mL/min/1.73 m^2 Final    eGFR if non African American 06/20/2022 >60  >60 mL/min/1.73 m^2 Final    Magnesium 06/20/2022 1.6  1.6 - 2.6 mg/dL Final   Lab Visit on 06/09/2022   Component Date Value Ref Range Status    Drug Study Test Name 06/09/2022 Drug Study   Final    Drug Study Specimen Type 06/09/2022 blood   Final    Drug Study Test Result 06/09/2022 Result sent directly to physician   Final   Lab Visit on 06/06/2022   Component Date Value Ref Range Status    CEA 06/06/2022 <1.7  0.0 - 5.0 ng/mL Final    WBC 06/06/2022 16.91 (A) 3.90 - 12.70 K/uL Final    RBC 06/06/2022 4.30 (A) 4.60 - 6.20 M/uL Final    Hemoglobin 06/06/2022 11.6 (A) 14.0 - 18.0 g/dL Final    Hematocrit 06/06/2022 34.8 (A) 40.0 - 54.0 % Final    MCV 06/06/2022 81 (A) 82 - 98 fL Final    MCH 06/06/2022 27.0  27.0 - 31.0 pg Final    MCHC 06/06/2022 33.3  32.0 - 36.0 g/dL Final    RDW 06/06/2022 13.7  11.5 - 14.5 % Final    Platelets 06/06/2022 554 (A) 150 - 450 K/uL Final    MPV 06/06/2022 9.9  9.2 - 12.9 fL Final    Immature Granulocytes 06/06/2022 CANCELED  0.0 - 0.5 % Final    Immature Grans (Abs) 06/06/2022 CANCELED  0.00 - 0.04 K/uL Final    Lymph # 06/06/2022 CANCELED  1.0 - 4.8 K/uL Final    Mono # 06/06/2022 CANCELED  0.3 - 1.0 K/uL Final    Eos # 06/06/2022 CANCELED  0.0 - 0.5 K/uL Final    Baso # 06/06/2022 CANCELED  0.00 - 0.20 K/uL Final    nRBC 06/06/2022  0  0 /100 WBC Final    Gran % 06/06/2022 67.0  38.0 - 73.0 % Final    Lymph % 06/06/2022 23.0  18.0 - 48.0 % Final    Mono % 06/06/2022 3.0 (A) 4.0 - 15.0 % Final    Eosinophil % 06/06/2022 1.0  0.0 - 8.0 % Final    Basophil % 06/06/2022 0.0  0.0 - 1.9 % Final    Bands 06/06/2022 6.0  % Final    Platelet Estimate 06/06/2022 Increased (A)  Final    Differential Method 06/06/2022 Manual   Final    Sodium 06/06/2022 133 (A) 136 - 145 mmol/L Final    Potassium 06/06/2022 5.0  3.5 - 5.1 mmol/L Final    Chloride 06/06/2022 95  95 - 110 mmol/L Final    CO2 06/06/2022 26  23 - 29 mmol/L Final    Glucose 06/06/2022 421 (A) 70 - 110 mg/dL Final    BUN 06/06/2022 10  6 - 20 mg/dL Final    Creatinine 06/06/2022 1.1  0.5 - 1.4 mg/dL Final    Calcium 06/06/2022 10.3  8.7 - 10.5 mg/dL Final    Total Protein 06/06/2022 7.6  6.0 - 8.4 g/dL Final    Albumin 06/06/2022 3.1 (A) 3.5 - 5.2 g/dL Final    Total Bilirubin 06/06/2022 0.3  0.1 - 1.0 mg/dL Final    Alkaline Phosphatase 06/06/2022 196 (A) 55 - 135 U/L Final    AST 06/06/2022 14  10 - 40 U/L Final    ALT 06/06/2022 17  10 - 44 U/L Final    Anion Gap 06/06/2022 12  8 - 16 mmol/L Final    eGFR if African American 06/06/2022 >60  >60 mL/min/1.73 m^2 Final    eGFR if non African American 06/06/2022 >60  >60 mL/min/1.73 m^2 Final   There may be more visits with results that are not included.       Past Medical History:   Diagnosis Date    DM (diabetes mellitus)     Hyperlipidemia     Hypertension     Prediabetes      Past Surgical History:   Procedure Laterality Date    CHOLECYSTECTOMY  2011    COLONOSCOPY      2018    COLOSTOMY N/A 4/25/2022    Procedure: CREATION, COLOSTOMY;  Surgeon: Carlton Waddell MD;  Location: [facility] OR;  Service: General;  Laterality: N/A;    INSERTION OF TUNNELED CENTRAL VENOUS CATHETER (CVC) WITH SUBCUTANEOUS PORT Right 5/18/2022    Procedure: [id number];  Surgeon: Carlton Waddell MD;  Location: [facility] OR;  Service:  General;  Laterality: Right;    MOBILIZATION OF SPLENIC FLEXURE N/A 4/25/2022    Procedure: MOBILIZATION, SPLENIC FLEXURE;  Surgeon: Carlton Waddell MD;  Location: [facility] OR;  Service: General;  Laterality: N/A;    SPLENECTOMY N/A 4/25/2022    Procedure: SPLENECTOMY;  Surgeon: Carlton Waddell MD;  Location: [facility] OR;  Service: General;  Laterality: N/A;    SUBTOTAL COLECTOMY N/A 4/25/2022    Procedure: COLECTOMY, PARTIAL;  Surgeon: Carlton Waddell MD;  Location: [facility] OR;  Service: General;  Laterality: N/A;     Family History   Problem Relation Age of Onset    Heart disease Father        Marital Status:   Alcohol History:  reports previous alcohol use.  Tobacco History:  reports that he has been smoking cigarettes. He has a 17.50 pack-year smoking history. He has never used smokeless tobacco.  Drug History:  reports no history of drug use.    Health Maintenance Topics with due status: Not Due       Topic Last Completion Date    Influenza Vaccine 11/11/2020    Lipid Panel 10/19/2021    Eye Exam 03/25/2022    Foot Exam 05/09/2022    Pneumococcal Vaccines (Age 0-64) 07/07/2022    Low Dose Statin 07/13/2022    Hemoglobin A1c 07/13/2022     Immunization History   Administered Date(s) Administered    COVID-19, vector-nr, [id number], PF (Jose Elias) 05/27/2021    Influenza 11/11/2020    Influenza - Quadrivalent - [facility]CK 10/16/2019    Influenza - Quadrivalent - [facility] - PF 11/07/2020    Meningococcal Conjugate (MCV4O) 05/02/2022, 07/07/2022    Pneumococcal Conjugate - 13 Valent 05/02/2022    Pneumococcal Polysaccharide - 23 Valent 07/07/2022    Zoster Recombinant 02/06/2021, 04/30/2021       Review of patient's allergies indicates:   Allergen Reactions    Penicillins Rash       Current Outpatient Medications:     atorvastatin (LIPITOR) 20 MG tablet, Take 1 tablet (20 mg total) by mouth once daily., Disp: 90 tablet, Rfl: 1    dextrose 5 % in water (D5W) 5 % PgBk 50 mL with cefTRIAXone 2 gram SolR 2 g, Inject 2 g  "into the vein once daily. for 14 days, Disp: 28 g, Rfl: 0    enalapriL-hydrochlorothiazide (VASERETIC) 10-25 mg per tablet, Take 1 tablet by mouth once daily., Disp: 90 tablet, Rfl: 1    heparin lock flush, porcine, (HEPARIN FLUSH 100 UNITS/ML) 100 unit/mL Soln, 3-5 mL DAILY (route: intravenous), Disp: , Rfl:     metFORMIN (GLUCOPHAGE) 1000 MG tablet, Take 1 tablet (1,000 mg total) by mouth 2 (two) times daily with meals., Disp: 180 tablet, Rfl: 1    oxyCODONE (ROXICODONE) 30 MG Tab, Take 1 tablet (30 mg total) by mouth every 6 (six) hours as needed for Pain., Disp: 60 tablet, Rfl: 0    promethazine (PHENERGAN) 12.5 MG Tab, Take 1 tablet (12.5 mg total) by mouth every 4 (four) hours as needed., Disp: 25 tablet, Rfl: 1    sildenafiL (VIAGRA) 100 MG tablet, Take 1 tablet (100 mg total) by mouth daily as needed for Erectile Dysfunction., Disp: 30 tablet, Rfl: 1    sodium chloride 0.9% (NORMAL SALINE FLUSH) injection, 10 mL DAILY (route: injection), Disp: , Rfl:     insulin glargine U-300 conc (TOUJEO MAX U-300 SOLOSTAR) 300 unit/mL (3 mL) insulin pen, Inject 20 Units into the skin once daily., Disp: 1 pen, Rfl: 5    pen needle, diabetic 31 gauge x 3/16" Ndle, 1 each by Misc.(Non-Drug; Combo Route) route once daily., Disp: 90 each, Rfl: 3    Review of Systems   Constitutional: Negative for activity change, appetite change, chills, fatigue and fever.   Respiratory: Negative for cough, shortness of breath and wheezing.    Cardiovascular: Negative for chest pain and palpitations.   Gastrointestinal: Negative for abdominal pain, constipation, diarrhea, nausea and vomiting.   Endocrine: Positive for polydipsia. Negative for polyuria.   Genitourinary: Negative for difficulty urinating, dysuria, flank pain, frequency, hematuria and urgency.   Musculoskeletal: Negative for arthralgias and myalgias.   Skin: Negative for rash.   Neurological: Negative for dizziness, syncope, light-headedness and headaches. " "  Psychiatric/Behavioral: Negative for behavioral problems.          Objective:      Vitals:    07/13/22 1613   BP: 118/74   Pulse: 89   Temp: 97.9 °F (36.6 °C)   SpO2: 96%   Weight: 90.4 kg (199 lb 3.2 oz)   Height: 6' 2" (1.88 m)     Physical Exam  Constitutional:       General: He is not in acute distress.     Appearance: Normal appearance. He is normal weight. He is not ill-appearing, toxic-appearing or diaphoretic.   HENT:      Head: Normocephalic and atraumatic.      Right Ear: External ear normal.      Left Ear: External ear normal.      Nose: Nose normal. No rhinorrhea.      Mouth/Throat:      Mouth: Mucous membranes are moist.      Pharynx: Oropharynx is clear.   Eyes:      General: No scleral icterus.        Right eye: No discharge.         Left eye: No discharge.      Extraocular Movements: Extraocular movements intact.      Conjunctiva/sclera: Conjunctivae normal.      Pupils: Pupils are equal, round, and reactive to light.   Cardiovascular:      Rate and Rhythm: Normal rate and regular rhythm.      Pulses: Normal pulses.      Heart sounds: No murmur heard.  Pulmonary:      Effort: Pulmonary effort is normal. No respiratory distress.      Breath sounds: Normal breath sounds. No wheezing.   Chest:       Abdominal:      General: Bowel sounds are normal. There is no distension.      Tenderness: There is no abdominal tenderness. There is no guarding.      Hernia: No hernia is present.       Musculoskeletal:      Cervical back: Normal range of motion and neck supple.      Right lower leg: No edema.      Left lower leg: No edema.   Skin:     General: Skin is warm.      Coloration: Skin is not jaundiced or pale.      Findings: No erythema or rash.   Neurological:      Mental Status: He is alert and oriented to person, place, and time.      Gait: Gait normal.   Psychiatric:         Mood and Affect: Mood normal.         Behavior: Behavior normal.           Assessment:       1. Type 2 diabetes mellitus with " "hyperosmolarity without coma, without long-term current use of insulin    2. Malignant neoplasm of transverse colon    3. Essential hypertension    4. Mixed hyperlipidemia           Plan:       Type 2 diabetes mellitus with hyperosmolarity without coma, without long-term current use of insulin  POCT A1c today: 9.3%, increased from 7.1% in May.  Continue Metformin 1,000 mg b.i.d..  Start Toujeo Max 20 units daily - will titrate dosage as appropriate.  Continue logging blood sugar t.i.d. and provide log on 4 week follow-up.  If blood sugar remains elevated on follow-up, consider starting Glipizide 5mg b.i.d. or Lispro t.i.d. with meals.  Due to Ozempic shortage, will consider restarting Ozempic once pharmacies are supplied.  -     Hemoglobin A1C, POCT  -     insulin glargine U-300 conc (TOUJEO MAX U-300 SOLOSTAR) 300 unit/mL (3 mL) insulin pen; Inject 20 Units into the skin once daily.  Dispense: 1 pen; Refill: 5  -     pen needle, diabetic 31 gauge x 3/16" Ndle; 1 each by Misc.(Non-Drug; Combo Route) route once daily.  Dispense: 90 each; Refill: 3    Malignant neoplasm of transverse colon    Essential hypertension  Stable and well controlled.  Continue as is.    Mixed hyperlipidemia      Follow up in about 4 weeks (around 8/10/2022) for Hyperglycemia .        7/13/2022 Richard Herrera PA-C      "

## 2022-07-14 ENCOUNTER — OFFICE VISIT (OUTPATIENT)
Dept: SURGERY | Facility: CLINIC | Age: 57
End: 2022-07-14
Payer: COMMERCIAL

## 2022-07-14 ENCOUNTER — INFUSION (OUTPATIENT)
Dept: INFUSION THERAPY | Facility: HOSPITAL | Age: 57
End: 2022-07-14
Attending: INTERNAL MEDICINE
Payer: COMMERCIAL

## 2022-07-14 VITALS
HEART RATE: 97 BPM | BODY MASS INDEX: 25.58 KG/M2 | SYSTOLIC BLOOD PRESSURE: 128 MMHG | TEMPERATURE: 98 F | HEIGHT: 74 IN | DIASTOLIC BLOOD PRESSURE: 77 MMHG

## 2022-07-14 VITALS
WEIGHT: 195.13 LBS | RESPIRATION RATE: 18 BRPM | SYSTOLIC BLOOD PRESSURE: 116 MMHG | HEIGHT: 74 IN | DIASTOLIC BLOOD PRESSURE: 81 MMHG | BODY MASS INDEX: 25.04 KG/M2 | OXYGEN SATURATION: 96 % | HEART RATE: 96 BPM | TEMPERATURE: 97 F

## 2022-07-14 DIAGNOSIS — C18.5 MALIGNANT NEOPLASM OF SPLENIC FLEXURE: Primary | ICD-10-CM

## 2022-07-14 DIAGNOSIS — Z85.038 ENCOUNTER FOR FOLLOW-UP SURVEILLANCE OF COLON CANCER: Primary | ICD-10-CM

## 2022-07-14 DIAGNOSIS — Z08 ENCOUNTER FOR FOLLOW-UP SURVEILLANCE OF COLON CANCER: Primary | ICD-10-CM

## 2022-07-14 PROCEDURE — 25000003 PHARM REV CODE 250: Performed by: INTERNAL MEDICINE

## 2022-07-14 PROCEDURE — 3046F PR MOST RECENT HEMOGLOBIN A1C LEVEL > 9.0%: ICD-10-PCS | Mod: CPTII,S$GLB,, | Performed by: STUDENT IN AN ORGANIZED HEALTH CARE EDUCATION/TRAINING PROGRAM

## 2022-07-14 PROCEDURE — 3046F HEMOGLOBIN A1C LEVEL >9.0%: CPT | Mod: CPTII,S$GLB,, | Performed by: STUDENT IN AN ORGANIZED HEALTH CARE EDUCATION/TRAINING PROGRAM

## 2022-07-14 PROCEDURE — 99024 PR POST-OP FOLLOW-UP VISIT: ICD-10-PCS | Mod: S$GLB,,, | Performed by: STUDENT IN AN ORGANIZED HEALTH CARE EDUCATION/TRAINING PROGRAM

## 2022-07-14 PROCEDURE — 4010F PR ACE/ARB THEARPY RXD/TAKEN: ICD-10-PCS | Mod: CPTII,S$GLB,, | Performed by: STUDENT IN AN ORGANIZED HEALTH CARE EDUCATION/TRAINING PROGRAM

## 2022-07-14 PROCEDURE — 3008F PR BODY MASS INDEX (BMI) DOCUMENTED: ICD-10-PCS | Mod: CPTII,S$GLB,, | Performed by: STUDENT IN AN ORGANIZED HEALTH CARE EDUCATION/TRAINING PROGRAM

## 2022-07-14 PROCEDURE — 1111F DSCHRG MED/CURRENT MED MERGE: CPT | Mod: CPTII,S$GLB,, | Performed by: STUDENT IN AN ORGANIZED HEALTH CARE EDUCATION/TRAINING PROGRAM

## 2022-07-14 PROCEDURE — 3074F PR MOST RECENT SYSTOLIC BLOOD PRESSURE < 130 MM HG: ICD-10-PCS | Mod: CPTII,S$GLB,, | Performed by: STUDENT IN AN ORGANIZED HEALTH CARE EDUCATION/TRAINING PROGRAM

## 2022-07-14 PROCEDURE — 99024 POSTOP FOLLOW-UP VISIT: CPT | Mod: S$GLB,,, | Performed by: STUDENT IN AN ORGANIZED HEALTH CARE EDUCATION/TRAINING PROGRAM

## 2022-07-14 PROCEDURE — 1111F PR DISCHARGE MEDS RECONCILED W/ CURRENT OUTPATIENT MED LIST: ICD-10-PCS | Mod: CPTII,S$GLB,, | Performed by: STUDENT IN AN ORGANIZED HEALTH CARE EDUCATION/TRAINING PROGRAM

## 2022-07-14 PROCEDURE — 3078F DIAST BP <80 MM HG: CPT | Mod: CPTII,S$GLB,, | Performed by: STUDENT IN AN ORGANIZED HEALTH CARE EDUCATION/TRAINING PROGRAM

## 2022-07-14 PROCEDURE — 96523 IRRIG DRUG DELIVERY DEVICE: CPT

## 2022-07-14 PROCEDURE — A4216 STERILE WATER/SALINE, 10 ML: HCPCS | Performed by: INTERNAL MEDICINE

## 2022-07-14 PROCEDURE — 4010F ACE/ARB THERAPY RXD/TAKEN: CPT | Mod: CPTII,S$GLB,, | Performed by: STUDENT IN AN ORGANIZED HEALTH CARE EDUCATION/TRAINING PROGRAM

## 2022-07-14 PROCEDURE — 3008F BODY MASS INDEX DOCD: CPT | Mod: CPTII,S$GLB,, | Performed by: STUDENT IN AN ORGANIZED HEALTH CARE EDUCATION/TRAINING PROGRAM

## 2022-07-14 PROCEDURE — 3074F SYST BP LT 130 MM HG: CPT | Mod: CPTII,S$GLB,, | Performed by: STUDENT IN AN ORGANIZED HEALTH CARE EDUCATION/TRAINING PROGRAM

## 2022-07-14 PROCEDURE — 3078F PR MOST RECENT DIASTOLIC BLOOD PRESSURE < 80 MM HG: ICD-10-PCS | Mod: CPTII,S$GLB,, | Performed by: STUDENT IN AN ORGANIZED HEALTH CARE EDUCATION/TRAINING PROGRAM

## 2022-07-14 RX ORDER — HEPARIN 100 UNIT/ML
500 SYRINGE INTRAVENOUS
Status: DISCONTINUED | OUTPATIENT
Start: 2022-07-14 | End: 2022-07-14 | Stop reason: HOSPADM

## 2022-07-14 RX ORDER — SODIUM CHLORIDE 0.9 % (FLUSH) 0.9 %
10 SYRINGE (ML) INJECTION
Status: DISCONTINUED | OUTPATIENT
Start: 2022-07-14 | End: 2022-07-14 | Stop reason: HOSPADM

## 2022-07-14 RX ADMIN — SODIUM CHLORIDE, PRESERVATIVE FREE 10 ML: 5 INJECTION INTRAVENOUS at 10:07

## 2022-07-14 NOTE — PROGRESS NOTES
Follow-up note    Is a 57-year-old male with a splenic flexure advanced stage colon cancer.  Postoperatively, he had a fluid collection that was concerning for abscess.  He was admitted recently without a good window for IR drainage.  He was treated conservatively with antibiotics and has been responding well.  Pain in the left upper quadrant has improved.  He he is tolerating a diet.  No fevers.  He has started chemo recently.  Overall, feels reasonably well.    Abdomen soft    CT scan was reviewed.  Overall decrease in the size of the likely abscess in the splenic bed.  There was a small rim enhancing fluid collection at this location without air.  The tail of the pancreas also appears hypodense concerning for possible regional disease verses inflammatory changes related to the abscess cavity that is resolving.    Overall, I think the patient is doing very well considering.  Continue chemotherapy.  Patient is also on antibiotics.  Concerning signs or symptoms that would warrant more urgent evaluation were discussed.  Follow-up with me after chemo has been completed.  Would recommend a pancreatic protocol CT scan once his next imaging study is ordered.

## 2022-07-14 NOTE — PLAN OF CARE
Problem: Activity Intolerance  Goal: Enhanced Capacity and Energy  Outcome: Ongoing, Progressing  Intervention: Optimize Activity Tolerance  Flowsheets (Taken 7/14/2022 1020)  Self-Care Promotion: independence encouraged  Activity Management:   Ambulated -L4   Up in chair - L3  Environmental Support: rest periods encouraged

## 2022-07-18 ENCOUNTER — LAB VISIT (OUTPATIENT)
Dept: LAB | Facility: HOSPITAL | Age: 57
End: 2022-07-18
Attending: INTERNAL MEDICINE
Payer: COMMERCIAL

## 2022-07-18 DIAGNOSIS — K63.89 COLONIC MASS: ICD-10-CM

## 2022-07-18 LAB
ALBUMIN SERPL BCP-MCNC: 3.8 G/DL (ref 3.5–5.2)
ALP SERPL-CCNC: 89 U/L (ref 55–135)
ALT SERPL W/O P-5'-P-CCNC: 11 U/L (ref 10–44)
ANION GAP SERPL CALC-SCNC: 14 MMOL/L (ref 8–16)
AST SERPL-CCNC: 13 U/L (ref 10–40)
BASOPHILS # BLD AUTO: 0.09 K/UL (ref 0–0.2)
BASOPHILS NFR BLD: 1.3 % (ref 0–1.9)
BILIRUB SERPL-MCNC: 0.3 MG/DL (ref 0.1–1)
BUN SERPL-MCNC: 18 MG/DL (ref 6–20)
CALCIUM SERPL-MCNC: 10.8 MG/DL (ref 8.7–10.5)
CHLORIDE SERPL-SCNC: 96 MMOL/L (ref 95–110)
CO2 SERPL-SCNC: 23 MMOL/L (ref 23–29)
CREAT SERPL-MCNC: 0.9 MG/DL (ref 0.5–1.4)
DIFFERENTIAL METHOD: ABNORMAL
EOSINOPHIL # BLD AUTO: 0.3 K/UL (ref 0–0.5)
EOSINOPHIL NFR BLD: 4.2 % (ref 0–8)
ERYTHROCYTE [DISTWIDTH] IN BLOOD BY AUTOMATED COUNT: 18.4 % (ref 11.5–14.5)
EST. GFR  (AFRICAN AMERICAN): >60 ML/MIN/1.73 M^2
EST. GFR  (NON AFRICAN AMERICAN): >60 ML/MIN/1.73 M^2
GLUCOSE SERPL-MCNC: 212 MG/DL (ref 70–110)
HCT VFR BLD AUTO: 38.1 % (ref 40–54)
HGB BLD-MCNC: 12.8 G/DL (ref 14–18)
IMM GRANULOCYTES # BLD AUTO: 0.02 K/UL (ref 0–0.04)
IMM GRANULOCYTES NFR BLD AUTO: 0.3 % (ref 0–0.5)
LYMPHOCYTES # BLD AUTO: 4 K/UL (ref 1–4.8)
LYMPHOCYTES NFR BLD: 55.2 % (ref 18–48)
MAGNESIUM SERPL-MCNC: 1.8 MG/DL (ref 1.6–2.6)
MCH RBC QN AUTO: 26.6 PG (ref 27–31)
MCHC RBC AUTO-ENTMCNC: 33.6 G/DL (ref 32–36)
MCV RBC AUTO: 79 FL (ref 82–98)
MONOCYTES # BLD AUTO: 0.4 K/UL (ref 0.3–1)
MONOCYTES NFR BLD: 5.9 % (ref 4–15)
NEUTROPHILS # BLD AUTO: 2.4 K/UL (ref 1.8–7.7)
NEUTROPHILS NFR BLD: 33.1 % (ref 38–73)
NRBC BLD-RTO: 1 /100 WBC
PLATELET # BLD AUTO: 477 K/UL (ref 150–450)
PMV BLD AUTO: 9.2 FL (ref 9.2–12.9)
POTASSIUM SERPL-SCNC: 4 MMOL/L (ref 3.5–5.1)
PROT SERPL-MCNC: 8.1 G/DL (ref 6–8.4)
RBC # BLD AUTO: 4.82 M/UL (ref 4.6–6.2)
SODIUM SERPL-SCNC: 133 MMOL/L (ref 136–145)
WBC # BLD AUTO: 7.17 K/UL (ref 3.9–12.7)

## 2022-07-18 PROCEDURE — 80053 COMPREHEN METABOLIC PANEL: CPT | Mod: 91 | Performed by: INTERNAL MEDICINE

## 2022-07-18 PROCEDURE — 36415 COLL VENOUS BLD VENIPUNCTURE: CPT | Performed by: INTERNAL MEDICINE

## 2022-07-18 PROCEDURE — 83735 ASSAY OF MAGNESIUM: CPT | Performed by: INTERNAL MEDICINE

## 2022-07-18 PROCEDURE — 85025 COMPLETE CBC W/AUTO DIFF WBC: CPT | Performed by: INTERNAL MEDICINE

## 2022-07-19 ENCOUNTER — OFFICE VISIT (OUTPATIENT)
Dept: HEMATOLOGY/ONCOLOGY | Facility: CLINIC | Age: 57
End: 2022-07-19
Payer: COMMERCIAL

## 2022-07-19 VITALS
SYSTOLIC BLOOD PRESSURE: 140 MMHG | TEMPERATURE: 98 F | HEIGHT: 74 IN | OXYGEN SATURATION: 98 % | WEIGHT: 197.06 LBS | BODY MASS INDEX: 25.29 KG/M2 | DIASTOLIC BLOOD PRESSURE: 76 MMHG | HEART RATE: 100 BPM | RESPIRATION RATE: 16 BRPM

## 2022-07-19 DIAGNOSIS — I10 PRIMARY HYPERTENSION: ICD-10-CM

## 2022-07-19 DIAGNOSIS — K76.9 LIVER LESION, RIGHT LOBE: ICD-10-CM

## 2022-07-19 DIAGNOSIS — E11.00 TYPE 2 DIABETES MELLITUS WITH HYPEROSMOLARITY WITHOUT COMA, WITHOUT LONG-TERM CURRENT USE OF INSULIN: ICD-10-CM

## 2022-07-19 DIAGNOSIS — K86.9 PANCREATIC LESION: ICD-10-CM

## 2022-07-19 DIAGNOSIS — E78.49 OTHER HYPERLIPIDEMIA: ICD-10-CM

## 2022-07-19 DIAGNOSIS — C18.4 MALIGNANT NEOPLASM OF TRANSVERSE COLON: Primary | ICD-10-CM

## 2022-07-19 DIAGNOSIS — K65.1 INTRA-ABDOMINAL ABSCESS: ICD-10-CM

## 2022-07-19 PROCEDURE — 3078F DIAST BP <80 MM HG: CPT | Mod: CPTII,S$GLB,, | Performed by: INTERNAL MEDICINE

## 2022-07-19 PROCEDURE — 99999 PR PBB SHADOW E&M-EST. PATIENT-LVL V: ICD-10-PCS | Mod: PBBFAC,,, | Performed by: INTERNAL MEDICINE

## 2022-07-19 PROCEDURE — 99999 PR PBB SHADOW E&M-EST. PATIENT-LVL V: CPT | Mod: PBBFAC,,, | Performed by: INTERNAL MEDICINE

## 2022-07-19 PROCEDURE — 1160F PR REVIEW ALL MEDS BY PRESCRIBER/CLIN PHARMACIST DOCUMENTED: ICD-10-PCS | Mod: CPTII,S$GLB,, | Performed by: INTERNAL MEDICINE

## 2022-07-19 PROCEDURE — 3077F SYST BP >= 140 MM HG: CPT | Mod: CPTII,S$GLB,, | Performed by: INTERNAL MEDICINE

## 2022-07-19 PROCEDURE — 1111F DSCHRG MED/CURRENT MED MERGE: CPT | Mod: CPTII,S$GLB,, | Performed by: INTERNAL MEDICINE

## 2022-07-19 PROCEDURE — 1111F PR DISCHARGE MEDS RECONCILED W/ CURRENT OUTPATIENT MED LIST: ICD-10-PCS | Mod: CPTII,S$GLB,, | Performed by: INTERNAL MEDICINE

## 2022-07-19 PROCEDURE — 99214 OFFICE O/P EST MOD 30 MIN: CPT | Mod: S$GLB,,, | Performed by: INTERNAL MEDICINE

## 2022-07-19 PROCEDURE — 3046F PR MOST RECENT HEMOGLOBIN A1C LEVEL > 9.0%: ICD-10-PCS | Mod: CPTII,S$GLB,, | Performed by: INTERNAL MEDICINE

## 2022-07-19 PROCEDURE — 1159F PR MEDICATION LIST DOCUMENTED IN MEDICAL RECORD: ICD-10-PCS | Mod: CPTII,S$GLB,, | Performed by: INTERNAL MEDICINE

## 2022-07-19 PROCEDURE — 3077F PR MOST RECENT SYSTOLIC BLOOD PRESSURE >= 140 MM HG: ICD-10-PCS | Mod: CPTII,S$GLB,, | Performed by: INTERNAL MEDICINE

## 2022-07-19 PROCEDURE — 3008F BODY MASS INDEX DOCD: CPT | Mod: CPTII,S$GLB,, | Performed by: INTERNAL MEDICINE

## 2022-07-19 PROCEDURE — 4010F PR ACE/ARB THEARPY RXD/TAKEN: ICD-10-PCS | Mod: CPTII,S$GLB,, | Performed by: INTERNAL MEDICINE

## 2022-07-19 PROCEDURE — 1160F RVW MEDS BY RX/DR IN RCRD: CPT | Mod: CPTII,S$GLB,, | Performed by: INTERNAL MEDICINE

## 2022-07-19 PROCEDURE — 1159F MED LIST DOCD IN RCRD: CPT | Mod: CPTII,S$GLB,, | Performed by: INTERNAL MEDICINE

## 2022-07-19 PROCEDURE — 3046F HEMOGLOBIN A1C LEVEL >9.0%: CPT | Mod: CPTII,S$GLB,, | Performed by: INTERNAL MEDICINE

## 2022-07-19 PROCEDURE — 4010F ACE/ARB THERAPY RXD/TAKEN: CPT | Mod: CPTII,S$GLB,, | Performed by: INTERNAL MEDICINE

## 2022-07-19 PROCEDURE — 3078F PR MOST RECENT DIASTOLIC BLOOD PRESSURE < 80 MM HG: ICD-10-PCS | Mod: CPTII,S$GLB,, | Performed by: INTERNAL MEDICINE

## 2022-07-19 PROCEDURE — 3008F PR BODY MASS INDEX (BMI) DOCUMENTED: ICD-10-PCS | Mod: CPTII,S$GLB,, | Performed by: INTERNAL MEDICINE

## 2022-07-19 PROCEDURE — 99214 PR OFFICE/OUTPT VISIT, EST, LEVL IV, 30-39 MIN: ICD-10-PCS | Mod: S$GLB,,, | Performed by: INTERNAL MEDICINE

## 2022-07-19 NOTE — PROGRESS NOTES
Service Date:  7/19/22    Chief Complaint: Colon Cancer    Osman Li Jr. is a 57 y.o. 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  A CT scan of the abdomen was done which showed a large obstructive lesion with lymph node involvement.  There was also a 9 mm hypodense liver lesion that could not be characterized.  Patient had a hemicolectomy with ostomy bag placed, and splenectomy.      He was then set to start chemotherapy but it was delayed due to the liver lesion.  He was evaluated by Dr. Goodwin with Surgical Oncology who determined that this was likely not malignant.  Unfortunately afterwards, patient suffered a setback with an abdominal abscess formation that could not be drained.  He recently completed 2 weeks of antibiotics, and a repeat CT scan showed the presence of an abscess still there, but improved.  The plan is for him to continue with about 2 more weeks of antibiotics.    He has now started on C1 of treatment. He tolerated it mostly without issues.       Review of Systems   Constitutional: Negative.    HENT: Negative.    Eyes: Negative.    Respiratory: Negative.    Cardiovascular: Negative.    Gastrointestinal: Negative.    Endocrine: Negative.    Genitourinary: Negative.    Musculoskeletal: Negative.    Integumentary:  Negative.   Neurological: Negative.    Hematological: Negative.    Psychiatric/Behavioral: Negative.         Current Outpatient Medications   Medication Instructions    atorvastatin (LIPITOR) 20 mg, Oral, Daily    dextrose 5 % in water (D5W) 5 % PgBk 50 mL with cefTRIAXone 2 gram SolR 2 g 2 g, Intravenous, Daily    enalapriL-hydrochlorothiazide (VASERETIC) 10-25 mg per tablet 1 tablet, Oral, Daily    heparin lock flush, porcine, (HEPARIN FLUSH 100 UNITS/ML) 100 unit/mL Soln 3-5 mL DAILY (route: intravenous)    metFORMIN (GLUCOPHAGE) 1,000 mg, Oral, 2 times daily with meals    oxyCODONE (ROXICODONE) 30 mg, Oral, Every 6 hours PRN    pen needle, diabetic 31  "gauge x 3/16" Ndle 1 each, Misc.(Non-Drug; Combo Route), Daily    promethazine (PHENERGAN) 12.5 mg, Oral, Every 4 hours PRN    sildenafiL (VIAGRA) 100 mg, Oral, Daily PRN    sodium chloride 0.9% (NORMAL SALINE FLUSH) injection 10 mL DAILY (route: injection)    TOUJEO MAX U-300 SOLOSTAR 20 Units, Subcutaneous, Daily        Past Medical History:   Diagnosis Date    DM (diabetes mellitus)     Hyperlipidemia     Hypertension     Prediabetes         Past Surgical History:   Procedure Laterality Date    CHOLECYSTECTOMY  2011    COLONOSCOPY      2018    COLOSTOMY N/A 4/25/2022    Procedure: CREATION, COLOSTOMY;  Surgeon: Carlton Waddell MD;  Location: [facility] OR;  Service: General;  Laterality: N/A;    INSERTION OF TUNNELED CENTRAL VENOUS CATHETER (CVC) WITH SUBCUTANEOUS PORT Right 5/18/2022    Procedure: [id number];  Surgeon: Carlton Waddell MD;  Location: [facility] OR;  Service: General;  Laterality: Right;    MOBILIZATION OF SPLENIC FLEXURE N/A 4/25/2022    Procedure: MOBILIZATION, SPLENIC FLEXURE;  Surgeon: Carlton Waddell MD;  Location: [facility] OR;  Service: General;  Laterality: N/A;    SPLENECTOMY N/A 4/25/2022    Procedure: SPLENECTOMY;  Surgeon: Carlton Waddell MD;  Location: [facility] OR;  Service: General;  Laterality: N/A;    SUBTOTAL COLECTOMY N/A 4/25/2022    Procedure: COLECTOMY, PARTIAL;  Surgeon: Carlton Waddell MD;  Location: [facility] OR;  Service: General;  Laterality: N/A;        Family History   Problem Relation Age of Onset    Heart disease Father        Social History     Tobacco Use    Smoking status: Current Every Day Smoker     Packs/day: 0.50     Years: 35.00     Pack years: 17.50     Types: Cigarettes    Smokeless tobacco: Never Used   Substance Use Topics    Alcohol use: Not Currently    Drug use: Never         Vitals:    07/19/22 0951   BP: (!) 140/76   Pulse: 100   Resp: 16   Temp: 97.6 °F (36.4 °C)        Physical Exam:  BP (!) 140/76 (BP Location: Right arm, Patient Position: " "Sitting, BP Method: Medium (Automatic))   Pulse 100   Temp 97.6 °F (36.4 °C) (Temporal)   Resp 16   Ht 6' 2" (1.88 m)   Wt 89.4 kg (197 lb 1.5 oz)   SpO2 98%   BMI 25.31 kg/m²     Physical Exam  Vitals and nursing note reviewed.   Constitutional:       Appearance: Normal appearance.   HENT:      Head: Normocephalic and atraumatic.      Nose: Nose normal.      Mouth/Throat:      Mouth: Mucous membranes are moist.      Pharynx: Oropharynx is clear.   Eyes:      Extraocular Movements: Extraocular movements intact.      Conjunctiva/sclera: Conjunctivae normal.   Cardiovascular:      Rate and Rhythm: Normal rate and regular rhythm.      Heart sounds: Normal heart sounds.   Pulmonary:      Effort: Pulmonary effort is normal.      Breath sounds: Normal breath sounds.   Abdominal:      General: Abdomen is flat. Bowel sounds are normal.      Palpations: Abdomen is soft.      Comments: Ostomy bag in place.   Musculoskeletal:         General: Normal range of motion.      Cervical back: Normal range of motion and neck supple.   Skin:     General: Skin is warm and dry.   Neurological:      General: No focal deficit present.      Mental Status: He is alert and oriented to person, place, and time. Mental status is at baseline.   Psychiatric:         Mood and Affect: Mood normal.          Labs:  Lab Results   Component Value Date    WBC 7.17 07/18/2022    RBC 4.82 07/18/2022    HGB 12.8 (L) 07/18/2022    HCT 38.1 (L) 07/18/2022    MCV 79 (L) 07/18/2022    MCH 26.6 (L) 07/18/2022    MCHC 33.6 07/18/2022    RDW 18.4 (H) 07/18/2022     (H) 07/18/2022    MPV 9.2 07/18/2022    GRAN 2.4 07/18/2022    GRAN 33.1 (L) 07/18/2022    LYMPH 4.0 07/18/2022    LYMPH 55.2 (H) 07/18/2022    MONO 0.4 07/18/2022    MONO 5.9 07/18/2022    EOS 0.3 07/18/2022    BASO 0.09 07/18/2022    EOSINOPHIL 4.2 07/18/2022    BASOPHIL 1.3 07/18/2022     Sodium   Date Value Ref Range Status   07/18/2022 132 (L) 136 - 145 mmol/L Final     Potassium "   Date Value Ref Range Status   07/18/2022 3.6 3.5 - 5.1 mmol/L Final     Chloride   Date Value Ref Range Status   07/18/2022 94 (L) 95 - 110 mmol/L Final     CO2   Date Value Ref Range Status   07/18/2022 21 (L) 23 - 29 mmol/L Final     Glucose   Date Value Ref Range Status   07/18/2022 261 (H) 70 - 110 mg/dL Final     BUN   Date Value Ref Range Status   07/18/2022 19 6 - 20 mg/dL Final     Creatinine   Date Value Ref Range Status   07/18/2022 1.0 0.5 - 1.4 mg/dL Final     Calcium   Date Value Ref Range Status   07/18/2022 10.2 8.7 - 10.5 mg/dL Final     Total Protein   Date Value Ref Range Status   07/18/2022 7.7 6.0 - 8.4 g/dL Final     Albumin   Date Value Ref Range Status   07/18/2022 3.6 3.5 - 5.2 g/dL Final     Total Bilirubin   Date Value Ref Range Status   07/18/2022 0.2 0.1 - 1.0 mg/dL Final     Comment:     For infants and newborns, interpretation of results should be based  on gestational age, weight and in agreement with clinical  observations.    Premature Infant recommended reference ranges:  Up to 24 hours.............<8.0 mg/dL  Up to 48 hours............<12.0 mg/dL  3-5 days..................<15.0 mg/dL  6-29 days.................<15.0 mg/dL       Alkaline Phosphatase   Date Value Ref Range Status   07/18/2022 93 55 - 135 U/L Final     AST   Date Value Ref Range Status   07/18/2022 13 10 - 40 U/L Final     ALT   Date Value Ref Range Status   07/18/2022 10 10 - 44 U/L Final     Anion Gap   Date Value Ref Range Status   07/18/2022 17 (H) 8 - 16 mmol/L Final     eGFR if    Date Value Ref Range Status   07/18/2022 >60 >60 mL/min/1.73 m^2 Final     eGFR if non    Date Value Ref Range Status   07/18/2022 >60 >60 mL/min/1.73 m^2 Final     Comment:     Calculation used to obtain the estimated glomerular filtration  rate (eGFR) is the CKD-EPI equation.          A/P:    Malignant neoplasm of the transverse colon  -poorly differentiated adenocarcinoma  -pT3 N2b  -I would like  to start FOLFOX as soon as possible so as to not miss though window for treatment.  Patient understands this.  It is risky given his continued abdominal abscess, but hopefully this will clear out with more antibiotics.  -labs reviewed  -FOLFOX given 7/12/22  -no DLTs on tox check  -RTC in 1 week for next treatment    Intra-abdominal abscess  -completed antibiotics and abscess is still present, but improved  -now on IV abx    Liver lesion  -9 mm hypodense lesion on CT abdomen, could not be characterized  -not deemed to be malignant    Pancreatic lesion  -seen on recent CT scan  -given that he also had a CT scan with no mention of this lesion about 2-3 weeks prior, I do not believe that this is likely a malignancy.  I will monitor it, but I do believe it is related to his abscess.    HTN  - on Enalapril  - follow up with PCP    HLP  - follow up with PCP    DM2  - on Metformin  - follow up with PCP    Tobacco use  - counseled on cessation      Aurash Khoobehi, MD  Hematology and Oncology

## 2022-07-25 ENCOUNTER — TELEPHONE (OUTPATIENT)
Dept: INFECTIOUS DISEASES | Facility: CLINIC | Age: 57
End: 2022-07-25

## 2022-07-25 ENCOUNTER — TELEPHONE (OUTPATIENT)
Dept: INFECTIOUS DISEASES | Facility: HOSPITAL | Age: 57
End: 2022-07-25
Payer: COMMERCIAL

## 2022-07-25 ENCOUNTER — LAB VISIT (OUTPATIENT)
Dept: LAB | Facility: HOSPITAL | Age: 57
End: 2022-07-25
Attending: INTERNAL MEDICINE
Payer: COMMERCIAL

## 2022-07-25 DIAGNOSIS — K65.1 INTRA-ABDOMINAL ABSCESS: Primary | ICD-10-CM

## 2022-07-25 DIAGNOSIS — K63.89 COLONIC MASS: Primary | ICD-10-CM

## 2022-07-25 LAB
ALBUMIN SERPL BCP-MCNC: 3.7 G/DL (ref 3.5–5.2)
ALP SERPL-CCNC: 85 U/L (ref 55–135)
ALT SERPL W/O P-5'-P-CCNC: 12 U/L (ref 10–44)
ANION GAP SERPL CALC-SCNC: 8 MMOL/L (ref 8–16)
AST SERPL-CCNC: 13 U/L (ref 10–40)
BASOPHILS # BLD AUTO: 0.08 K/UL (ref 0–0.2)
BASOPHILS NFR BLD: 0.8 % (ref 0–1.9)
BILIRUB SERPL-MCNC: 0.3 MG/DL (ref 0.1–1)
BUN SERPL-MCNC: 15 MG/DL (ref 6–20)
CALCIUM SERPL-MCNC: 9.8 MG/DL (ref 8.7–10.5)
CHLORIDE SERPL-SCNC: 100 MMOL/L (ref 95–110)
CO2 SERPL-SCNC: 27 MMOL/L (ref 23–29)
CREAT SERPL-MCNC: 1 MG/DL (ref 0.5–1.4)
DIFFERENTIAL METHOD: ABNORMAL
EOSINOPHIL # BLD AUTO: 0.1 K/UL (ref 0–0.5)
EOSINOPHIL NFR BLD: 0.9 % (ref 0–8)
ERYTHROCYTE [DISTWIDTH] IN BLOOD BY AUTOMATED COUNT: 19.1 % (ref 11.5–14.5)
EST. GFR  (AFRICAN AMERICAN): >60 ML/MIN/1.73 M^2
EST. GFR  (NON AFRICAN AMERICAN): >60 ML/MIN/1.73 M^2
GLUCOSE SERPL-MCNC: 207 MG/DL (ref 70–110)
HCT VFR BLD AUTO: 37.5 % (ref 40–54)
HGB BLD-MCNC: 12.7 G/DL (ref 14–18)
IMM GRANULOCYTES # BLD AUTO: 0.02 K/UL (ref 0–0.04)
IMM GRANULOCYTES NFR BLD AUTO: 0.2 % (ref 0–0.5)
LYMPHOCYTES # BLD AUTO: 3.9 K/UL (ref 1–4.8)
LYMPHOCYTES NFR BLD: 39.4 % (ref 18–48)
MAGNESIUM SERPL-MCNC: 1.9 MG/DL (ref 1.6–2.6)
MCH RBC QN AUTO: 27.1 PG (ref 27–31)
MCHC RBC AUTO-ENTMCNC: 33.9 G/DL (ref 32–36)
MCV RBC AUTO: 80 FL (ref 82–98)
MONOCYTES # BLD AUTO: 0.9 K/UL (ref 0.3–1)
MONOCYTES NFR BLD: 9.2 % (ref 4–15)
NEUTROPHILS # BLD AUTO: 4.9 K/UL (ref 1.8–7.7)
NEUTROPHILS NFR BLD: 49.5 % (ref 38–73)
NRBC BLD-RTO: 0 /100 WBC
PLATELET # BLD AUTO: 390 K/UL (ref 150–450)
PMV BLD AUTO: 9.7 FL (ref 9.2–12.9)
POTASSIUM SERPL-SCNC: 4.5 MMOL/L (ref 3.5–5.1)
PROT SERPL-MCNC: 7.2 G/DL (ref 6–8.4)
RBC # BLD AUTO: 4.69 M/UL (ref 4.6–6.2)
SODIUM SERPL-SCNC: 135 MMOL/L (ref 136–145)
WBC # BLD AUTO: 9.82 K/UL (ref 3.9–12.7)

## 2022-07-25 PROCEDURE — 80053 COMPREHEN METABOLIC PANEL: CPT | Performed by: INTERNAL MEDICINE

## 2022-07-25 PROCEDURE — 83735 ASSAY OF MAGNESIUM: CPT | Performed by: INTERNAL MEDICINE

## 2022-07-25 PROCEDURE — 36415 COLL VENOUS BLD VENIPUNCTURE: CPT | Performed by: INTERNAL MEDICINE

## 2022-07-25 PROCEDURE — 85025 COMPLETE CBC W/AUTO DIFF WBC: CPT | Performed by: INTERNAL MEDICINE

## 2022-07-25 NOTE — TELEPHONE ENCOUNTER
Rashaad with [employer] called   ([phone] )     patient's end of care for ceftriaxone is tomorrow   can they D/C iv med and de-access port     I spoke  with Rashaad ( [employer] ) to advise   we need to continue for 2 more weeks of rocephin IV    MRI ordered and Dr Hernandes spoke with patient to advise   Per Dr Hernandes's orders     [facility]  7/25/22

## 2022-07-26 ENCOUNTER — TELEPHONE (OUTPATIENT)
Dept: HEMATOLOGY/ONCOLOGY | Facility: CLINIC | Age: 57
End: 2022-07-26

## 2022-07-26 ENCOUNTER — INFUSION (OUTPATIENT)
Dept: INFUSION THERAPY | Facility: HOSPITAL | Age: 57
End: 2022-07-26
Attending: INTERNAL MEDICINE
Payer: COMMERCIAL

## 2022-07-26 ENCOUNTER — OFFICE VISIT (OUTPATIENT)
Dept: HEMATOLOGY/ONCOLOGY | Facility: CLINIC | Age: 57
End: 2022-07-26
Payer: COMMERCIAL

## 2022-07-26 VITALS
BODY MASS INDEX: 25.95 KG/M2 | TEMPERATURE: 98 F | OXYGEN SATURATION: 97 % | HEIGHT: 74 IN | DIASTOLIC BLOOD PRESSURE: 79 MMHG | RESPIRATION RATE: 12 BRPM | WEIGHT: 202.19 LBS | SYSTOLIC BLOOD PRESSURE: 135 MMHG | HEART RATE: 96 BPM

## 2022-07-26 VITALS
BODY MASS INDEX: 25.95 KG/M2 | HEART RATE: 87 BPM | HEIGHT: 74 IN | SYSTOLIC BLOOD PRESSURE: 130 MMHG | TEMPERATURE: 97 F | DIASTOLIC BLOOD PRESSURE: 76 MMHG | OXYGEN SATURATION: 97 % | WEIGHT: 202.19 LBS | RESPIRATION RATE: 18 BRPM

## 2022-07-26 DIAGNOSIS — M54.9 ACUTE BACK PAIN LESS THAN 4 WEEKS DURATION: ICD-10-CM

## 2022-07-26 DIAGNOSIS — K86.9 PANCREATIC LESION: ICD-10-CM

## 2022-07-26 DIAGNOSIS — I10 PRIMARY HYPERTENSION: ICD-10-CM

## 2022-07-26 DIAGNOSIS — K63.89 COLONIC MASS: ICD-10-CM

## 2022-07-26 DIAGNOSIS — K65.1 INTRA-ABDOMINAL ABSCESS: ICD-10-CM

## 2022-07-26 DIAGNOSIS — E78.49 OTHER HYPERLIPIDEMIA: ICD-10-CM

## 2022-07-26 DIAGNOSIS — E11.00 TYPE 2 DIABETES MELLITUS WITH HYPEROSMOLARITY WITHOUT COMA, WITHOUT LONG-TERM CURRENT USE OF INSULIN: ICD-10-CM

## 2022-07-26 DIAGNOSIS — C18.5 MALIGNANT NEOPLASM OF SPLENIC FLEXURE: Primary | ICD-10-CM

## 2022-07-26 DIAGNOSIS — C18.4 MALIGNANT NEOPLASM OF TRANSVERSE COLON: Primary | ICD-10-CM

## 2022-07-26 PROCEDURE — 1159F MED LIST DOCD IN RCRD: CPT | Mod: CPTII,S$GLB,, | Performed by: INTERNAL MEDICINE

## 2022-07-26 PROCEDURE — 96368 THER/DIAG CONCURRENT INF: CPT

## 2022-07-26 PROCEDURE — 3046F PR MOST RECENT HEMOGLOBIN A1C LEVEL > 9.0%: ICD-10-PCS | Mod: CPTII,S$GLB,, | Performed by: INTERNAL MEDICINE

## 2022-07-26 PROCEDURE — 3008F BODY MASS INDEX DOCD: CPT | Mod: CPTII,S$GLB,, | Performed by: INTERNAL MEDICINE

## 2022-07-26 PROCEDURE — 96366 THER/PROPH/DIAG IV INF ADDON: CPT

## 2022-07-26 PROCEDURE — 1160F PR REVIEW ALL MEDS BY PRESCRIBER/CLIN PHARMACIST DOCUMENTED: ICD-10-PCS | Mod: CPTII,S$GLB,, | Performed by: INTERNAL MEDICINE

## 2022-07-26 PROCEDURE — 3008F PR BODY MASS INDEX (BMI) DOCUMENTED: ICD-10-PCS | Mod: CPTII,S$GLB,, | Performed by: INTERNAL MEDICINE

## 2022-07-26 PROCEDURE — 99215 PR OFFICE/OUTPT VISIT, EST, LEVL V, 40-54 MIN: ICD-10-PCS | Mod: S$GLB,,, | Performed by: INTERNAL MEDICINE

## 2022-07-26 PROCEDURE — 4010F ACE/ARB THERAPY RXD/TAKEN: CPT | Mod: CPTII,S$GLB,, | Performed by: INTERNAL MEDICINE

## 2022-07-26 PROCEDURE — 3078F DIAST BP <80 MM HG: CPT | Mod: CPTII,S$GLB,, | Performed by: INTERNAL MEDICINE

## 2022-07-26 PROCEDURE — 99999 PR PBB SHADOW E&M-EST. PATIENT-LVL V: ICD-10-PCS | Mod: PBBFAC,,, | Performed by: INTERNAL MEDICINE

## 2022-07-26 PROCEDURE — 4010F PR ACE/ARB THEARPY RXD/TAKEN: ICD-10-PCS | Mod: CPTII,S$GLB,, | Performed by: INTERNAL MEDICINE

## 2022-07-26 PROCEDURE — 25000003 PHARM REV CODE 250: Performed by: INTERNAL MEDICINE

## 2022-07-26 PROCEDURE — 96415 CHEMO IV INFUSION ADDL HR: CPT

## 2022-07-26 PROCEDURE — 99215 OFFICE O/P EST HI 40 MIN: CPT | Mod: S$GLB,,, | Performed by: INTERNAL MEDICINE

## 2022-07-26 PROCEDURE — 3078F PR MOST RECENT DIASTOLIC BLOOD PRESSURE < 80 MM HG: ICD-10-PCS | Mod: CPTII,S$GLB,, | Performed by: INTERNAL MEDICINE

## 2022-07-26 PROCEDURE — 1159F PR MEDICATION LIST DOCUMENTED IN MEDICAL RECORD: ICD-10-PCS | Mod: CPTII,S$GLB,, | Performed by: INTERNAL MEDICINE

## 2022-07-26 PROCEDURE — 3046F HEMOGLOBIN A1C LEVEL >9.0%: CPT | Mod: CPTII,S$GLB,, | Performed by: INTERNAL MEDICINE

## 2022-07-26 PROCEDURE — 1160F RVW MEDS BY RX/DR IN RCRD: CPT | Mod: CPTII,S$GLB,, | Performed by: INTERNAL MEDICINE

## 2022-07-26 PROCEDURE — 96413 CHEMO IV INFUSION 1 HR: CPT

## 2022-07-26 PROCEDURE — 96411 CHEMO IV PUSH ADDL DRUG: CPT

## 2022-07-26 PROCEDURE — 96416 CHEMO PROLONG INFUSE W/PUMP: CPT

## 2022-07-26 PROCEDURE — 96367 TX/PROPH/DG ADDL SEQ IV INF: CPT

## 2022-07-26 PROCEDURE — 99999 PR PBB SHADOW E&M-EST. PATIENT-LVL V: CPT | Mod: PBBFAC,,, | Performed by: INTERNAL MEDICINE

## 2022-07-26 PROCEDURE — 3075F PR MOST RECENT SYSTOLIC BLOOD PRESS GE 130-139MM HG: ICD-10-PCS | Mod: CPTII,S$GLB,, | Performed by: INTERNAL MEDICINE

## 2022-07-26 PROCEDURE — 63600175 PHARM REV CODE 636 W HCPCS: Performed by: INTERNAL MEDICINE

## 2022-07-26 PROCEDURE — 3075F SYST BP GE 130 - 139MM HG: CPT | Mod: CPTII,S$GLB,, | Performed by: INTERNAL MEDICINE

## 2022-07-26 RX ORDER — HEPARIN 100 UNIT/ML
500 SYRINGE INTRAVENOUS
Status: CANCELLED | OUTPATIENT
Start: 2022-07-28

## 2022-07-26 RX ORDER — FLUOROURACIL 50 MG/ML
400 INJECTION, SOLUTION INTRAVENOUS
Status: COMPLETED | OUTPATIENT
Start: 2022-07-26 | End: 2022-07-26

## 2022-07-26 RX ORDER — FLUOROURACIL 50 MG/ML
2400 INJECTION, SOLUTION INTRAVENOUS
Status: CANCELLED | OUTPATIENT
Start: 2022-07-26

## 2022-07-26 RX ORDER — SODIUM CHLORIDE 0.9 % (FLUSH) 0.9 %
10 SYRINGE (ML) INJECTION
Status: CANCELLED | OUTPATIENT
Start: 2022-07-26

## 2022-07-26 RX ORDER — HEPARIN 100 UNIT/ML
500 SYRINGE INTRAVENOUS
Status: DISCONTINUED | OUTPATIENT
Start: 2022-07-26 | End: 2022-07-26 | Stop reason: HOSPADM

## 2022-07-26 RX ORDER — DIPHENHYDRAMINE HYDROCHLORIDE 50 MG/ML
50 INJECTION INTRAMUSCULAR; INTRAVENOUS ONCE AS NEEDED
Status: CANCELLED | OUTPATIENT
Start: 2022-07-26

## 2022-07-26 RX ORDER — OXYCODONE HYDROCHLORIDE 30 MG/1
30 TABLET ORAL EVERY 6 HOURS PRN
Qty: 60 TABLET | Refills: 0 | Status: SHIPPED | OUTPATIENT
Start: 2022-07-26 | End: 2022-07-26 | Stop reason: SDUPTHER

## 2022-07-26 RX ORDER — FLUOROURACIL 50 MG/ML
400 INJECTION, SOLUTION INTRAVENOUS
Status: CANCELLED | OUTPATIENT
Start: 2022-07-26

## 2022-07-26 RX ORDER — SODIUM CHLORIDE 0.9 % (FLUSH) 0.9 %
10 SYRINGE (ML) INJECTION
Status: DISCONTINUED | OUTPATIENT
Start: 2022-07-26 | End: 2022-07-26 | Stop reason: HOSPADM

## 2022-07-26 RX ORDER — EPINEPHRINE 0.3 MG/.3ML
0.3 INJECTION SUBCUTANEOUS ONCE AS NEEDED
Status: CANCELLED | OUTPATIENT
Start: 2022-07-26

## 2022-07-26 RX ORDER — HEPARIN 100 UNIT/ML
500 SYRINGE INTRAVENOUS
Status: CANCELLED | OUTPATIENT
Start: 2022-07-26

## 2022-07-26 RX ORDER — EPINEPHRINE 0.3 MG/.3ML
0.3 INJECTION SUBCUTANEOUS ONCE AS NEEDED
Status: DISCONTINUED | OUTPATIENT
Start: 2022-07-26 | End: 2022-07-26 | Stop reason: HOSPADM

## 2022-07-26 RX ORDER — OXYCODONE HYDROCHLORIDE 30 MG/1
30 TABLET ORAL EVERY 6 HOURS PRN
Qty: 60 TABLET | Refills: 0 | Status: SHIPPED | OUTPATIENT
Start: 2022-07-26 | End: 2022-07-29 | Stop reason: SDUPTHER

## 2022-07-26 RX ORDER — SODIUM CHLORIDE 0.9 % (FLUSH) 0.9 %
10 SYRINGE (ML) INJECTION
Status: CANCELLED | OUTPATIENT
Start: 2022-07-28

## 2022-07-26 RX ADMIN — LEUCOVORIN CALCIUM 930 MG: 350 INJECTION, POWDER, LYOPHILIZED, FOR SUSPENSION INTRAMUSCULAR; INTRAVENOUS at 10:07

## 2022-07-26 RX ADMIN — FLUOROURACIL 5570 MG: 50 INJECTION, SOLUTION INTRAVENOUS at 01:07

## 2022-07-26 RX ADMIN — DEXAMETHASONE SODIUM PHOSPHATE 0.25 MG: 4 INJECTION, SOLUTION INTRA-ARTICULAR; INTRALESIONAL; INTRAMUSCULAR; INTRAVENOUS; SOFT TISSUE at 10:07

## 2022-07-26 RX ADMIN — FLUOROURACIL 930 MG: 50 INJECTION, SOLUTION INTRAVENOUS at 01:07

## 2022-07-26 RX ADMIN — OXALIPLATIN 197 MG: 5 INJECTION, SOLUTION, CONCENTRATE INTRAVENOUS at 10:07

## 2022-07-26 RX ADMIN — FOSAPREPITANT 150 MG: 150 INJECTION, POWDER, LYOPHILIZED, FOR SOLUTION INTRAVENOUS at 10:07

## 2022-07-26 NOTE — NURSING
1000 Patient arrived to [facility] with PAC in place upon arrival. Per patient, PAC was accessed yesterday on 7/25/22 by his HH nurse for IV antibiotics daily. Patient will not be receiving IV antibiotics while on the 5FU CIP per MD Hernandes.  PAC site clean, dry and intact, biopatch noted, tegraderm occlusive and dated 7/25/22,  flushed with out difficulty and blood return noted.

## 2022-07-26 NOTE — PLAN OF CARE
Problem: Fatigue  Goal: Improved Activity Tolerance  Outcome: Ongoing, Progressing  Intervention: Promote Improved Energy  Flowsheets (Taken 7/26/2022 1002)  Fatigue Management: frequent rest breaks encouraged  Sleep/Rest Enhancement:   regular sleep/rest pattern promoted   relaxation techniques promoted  Activity Management: Ambulated -L4

## 2022-07-26 NOTE — TELEPHONE ENCOUNTER
Outgoing call to pharmacy. Dx code given M54.9 and dx is acute per dr khoobehi.       ----- Message from Saira Miramontes sent at 7/26/2022 10:35 AM CDT -----  Contact: [facility]  Type:  Pharmacy Calling to Clarify an RX    Name of Caller: Pharmacy     [facility]    Prescription Name: oxyCODONE (ROXICODONE) 30 MG Tab    What do they need to clarify?: diagnosis code; also needs to know if diagnosis is acute due to it being a narcotic     Best Call Back Number:     [facility] [address] [place], [place] - [address] [place] [id number]  Phone: [phone] Fax: [phone]      Additional Information:

## 2022-07-26 NOTE — TELEPHONE ENCOUNTER
----- Message from Mague Santo sent at 7/26/2022  4:21 PM CDT -----  Contact: [phone]  Type:  Pharmacy Calling to Clarify an RX    Name of Caller: Loren  [facility]  Prescription Name:  oxyCODONE (ROXICODONE) 30 MG Tab      [facility] [address] [address]  Phone: [phone] Fax: [phone]    Pls call back. Pharmacy stillhaving issue getting rx filled.

## 2022-07-26 NOTE — PROGRESS NOTES
Service Date:  7/26/22    Chief Complaint: Colon Cancer    Osman Li Jr. is a 57 y.o. 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  A CT scan of the abdomen was done which showed a large obstructive lesion with lymph node involvement.  There was also a 9 mm hypodense liver lesion that could not be characterized.  Patient had a hemicolectomy with ostomy bag placed, and splenectomy.      He was then set to start chemotherapy but it was delayed due to the liver lesion.  He was evaluated by Dr. Goodwin with Surgical Oncology who determined that this was likely not malignant.  Unfortunately afterwards, patient suffered a setback with an abdominal abscess formation that could not be drained.  He recently completed 2 weeks of antibiotics, and a repeat CT scan showed the presence of an abscess still there, but improved.  The plan is for him to continue with about 2 more weeks of antibiotics.    He is here for cycle 2 of treatment.  He tolerated cycle 1 well.  He complains of some back pain, but states that this is different than the pain he was having from his abscess.  It is dull in nature.  States that runs down his whole back.  It is mostly centrally located.  He denies any weakness from this.      Review of Systems   Constitutional: Negative.    HENT: Negative.    Eyes: Negative.    Respiratory: Negative.    Cardiovascular: Negative.    Gastrointestinal: Negative.    Endocrine: Negative.    Genitourinary: Negative.    Musculoskeletal: Negative.    Integumentary:  Negative.   Neurological: Negative.    Hematological: Negative.    Psychiatric/Behavioral: Negative.         Current Outpatient Medications   Medication Instructions    atorvastatin (LIPITOR) 20 mg, Oral, Daily    enalapriL-hydrochlorothiazide (VASERETIC) 10-25 mg per tablet 1 tablet, Oral, Daily    heparin lock flush, porcine, (HEPARIN FLUSH 100 UNITS/ML) 100 unit/mL Soln 3-5 mL DAILY (route: intravenous)    metFORMIN (GLUCOPHAGE)  "1,000 mg, Oral, 2 times daily with meals    oxyCODONE (ROXICODONE) 30 mg, Oral, Every 6 hours PRN    pen needle, diabetic 31 gauge x 3/16" Ndle 1 each, Misc.(Non-Drug; Combo Route), Daily    promethazine (PHENERGAN) 12.5 mg, Oral, Every 4 hours PRN    sildenafiL (VIAGRA) 100 mg, Oral, Daily PRN    sodium chloride 0.9% (NORMAL SALINE FLUSH) injection 10 mL DAILY (route: injection)    TOUJEO MAX U-300 SOLOSTAR 20 Units, Subcutaneous, Daily        Past Medical History:   Diagnosis Date    DM (diabetes mellitus)     Hyperlipidemia     Hypertension     Prediabetes         Past Surgical History:   Procedure Laterality Date    CHOLECYSTECTOMY  2011    COLONOSCOPY      2018    COLOSTOMY N/A 4/25/2022    Procedure: CREATION, COLOSTOMY;  Surgeon: Carlton Waddell MD;  Location: [facility] OR;  Service: General;  Laterality: N/A;    INSERTION OF TUNNELED CENTRAL VENOUS CATHETER (CVC) WITH SUBCUTANEOUS PORT Right 5/18/2022    Procedure: [id number];  Surgeon: Carlton Waddell MD;  Location: [facility] OR;  Service: General;  Laterality: Right;    MOBILIZATION OF SPLENIC FLEXURE N/A 4/25/2022    Procedure: MOBILIZATION, SPLENIC FLEXURE;  Surgeon: Carlton Waddell MD;  Location: [facility] OR;  Service: General;  Laterality: N/A;    SPLENECTOMY N/A 4/25/2022    Procedure: SPLENECTOMY;  Surgeon: Carlton Waddell MD;  Location: [facility] OR;  Service: General;  Laterality: N/A;    SUBTOTAL COLECTOMY N/A 4/25/2022    Procedure: COLECTOMY, PARTIAL;  Surgeon: Carlton Waddell MD;  Location: [facility] OR;  Service: General;  Laterality: N/A;        Family History   Problem Relation Age of Onset    Heart disease Father        Social History     Tobacco Use    Smoking status: Current Every Day Smoker     Packs/day: 0.50     Years: 35.00     Pack years: 17.50     Types: Cigarettes    Smokeless tobacco: Never Used   Substance Use Topics    Alcohol use: Not Currently    Drug use: Never         Vitals:    07/26/22 0918   BP: 135/79   Pulse: " "96   Resp: 12   Temp: 97.8 °F (36.6 °C)        Physical Exam:  /79 (BP Location: Right arm, Patient Position: Sitting, BP Method: Medium (Automatic))   Pulse 96   Temp 97.8 °F (36.6 °C) (Temporal)   Resp 12   Ht 6' 2" (1.88 m)   Wt 91.7 kg (202 lb 2.6 oz)   SpO2 97%   BMI 25.96 kg/m²     Physical Exam  Vitals and nursing note reviewed.   Constitutional:       Appearance: Normal appearance.   HENT:      Head: Normocephalic and atraumatic.      Nose: Nose normal.      Mouth/Throat:      Mouth: Mucous membranes are moist.      Pharynx: Oropharynx is clear.   Eyes:      Extraocular Movements: Extraocular movements intact.      Conjunctiva/sclera: Conjunctivae normal.   Cardiovascular:      Rate and Rhythm: Normal rate and regular rhythm.      Heart sounds: Normal heart sounds.   Pulmonary:      Effort: Pulmonary effort is normal.      Breath sounds: Normal breath sounds.   Abdominal:      General: Abdomen is flat. Bowel sounds are normal.      Palpations: Abdomen is soft.      Comments: Ostomy bag in place.   Musculoskeletal:         General: Normal range of motion.      Cervical back: Normal range of motion and neck supple.   Skin:     General: Skin is warm and dry.   Neurological:      General: No focal deficit present.      Mental Status: He is alert and oriented to person, place, and time. Mental status is at baseline.   Psychiatric:         Mood and Affect: Mood normal.          Labs:  Lab Results   Component Value Date    WBC 9.82 07/25/2022    RBC 4.69 07/25/2022    HGB 12.7 (L) 07/25/2022    HCT 37.5 (L) 07/25/2022    MCV 80 (L) 07/25/2022    MCH 27.1 07/25/2022    MCHC 33.9 07/25/2022    RDW 19.1 (H) 07/25/2022     07/25/2022    MPV 9.7 07/25/2022    GRAN 4.9 07/25/2022    GRAN 49.5 07/25/2022    LYMPH 3.9 07/25/2022    LYMPH 39.4 07/25/2022    MONO 0.9 07/25/2022    MONO 9.2 07/25/2022    EOS 0.1 07/25/2022    BASO 0.08 07/25/2022    EOSINOPHIL 0.9 07/25/2022    BASOPHIL 0.8 07/25/2022 "     Sodium   Date Value Ref Range Status   07/25/2022 135 (L) 136 - 145 mmol/L Final     Potassium   Date Value Ref Range Status   07/25/2022 4.5 3.5 - 5.1 mmol/L Final     Chloride   Date Value Ref Range Status   07/25/2022 100 95 - 110 mmol/L Final     CO2   Date Value Ref Range Status   07/25/2022 27 23 - 29 mmol/L Final     Glucose   Date Value Ref Range Status   07/25/2022 207 (H) 70 - 110 mg/dL Final     BUN   Date Value Ref Range Status   07/25/2022 15 6 - 20 mg/dL Final     Creatinine   Date Value Ref Range Status   07/25/2022 1.0 0.5 - 1.4 mg/dL Final     Calcium   Date Value Ref Range Status   07/25/2022 9.8 8.7 - 10.5 mg/dL Final     Total Protein   Date Value Ref Range Status   07/25/2022 7.2 6.0 - 8.4 g/dL Final     Albumin   Date Value Ref Range Status   07/25/2022 3.7 3.5 - 5.2 g/dL Final     Total Bilirubin   Date Value Ref Range Status   07/25/2022 0.3 0.1 - 1.0 mg/dL Final     Comment:     For infants and newborns, interpretation of results should be based  on gestational age, weight and in agreement with clinical  observations.    Premature Infant recommended reference ranges:  Up to 24 hours.............<8.0 mg/dL  Up to 48 hours............<12.0 mg/dL  3-5 days..................<15.0 mg/dL  6-29 days.................<15.0 mg/dL       Alkaline Phosphatase   Date Value Ref Range Status   07/25/2022 85 55 - 135 U/L Final     AST   Date Value Ref Range Status   07/25/2022 13 10 - 40 U/L Final     ALT   Date Value Ref Range Status   07/25/2022 12 10 - 44 U/L Final     Anion Gap   Date Value Ref Range Status   07/25/2022 8 8 - 16 mmol/L Final     eGFR if    Date Value Ref Range Status   07/25/2022 >60 >60 mL/min/1.73 m^2 Final     eGFR if non    Date Value Ref Range Status   07/25/2022 >60 >60 mL/min/1.73 m^2 Final     Comment:     Calculation used to obtain the estimated glomerular filtration  rate (eGFR) is the CKD-EPI equation.          A/P:    Malignant neoplasm of  the transverse colon  -poorly differentiated adenocarcinoma  -pT3 N2b  -I would like to start FOLFOX as soon as possible so as to not miss though window for treatment.  Patient understands this.  It is risky given his continued abdominal abscess, but hopefully this will clear out with more antibiotics.  -labs reviewed  -FOLFOX started on 7/12/22  -proceed with cycle 2  -return to clinic in 2 weeks for next treatment    Intra-abdominal abscess  -completed antibiotics and abscess is still present, but improved  -now on IV abx    Liver lesion  -9 mm hypodense lesion on CT abdomen, could not be characterized  -not deemed to be malignant    Pancreatic lesion  -seen on recent CT scan  -given that he also had a CT scan with no mention of this lesion about 2-3 weeks prior, I do not believe that this is likely a malignancy.  I will monitor it, but I do believe it is related to his abscess.    HTN  - on Enalapril  - follow up with PCP    HLP  - follow up with PCP    DM2  - on Metformin  - follow up with PCP    Tobacco use  - counseled on cessation      Aurash Khoobehi, MD  Hematology and Oncology

## 2022-07-27 ENCOUNTER — DOCUMENT SCAN (OUTPATIENT)
Dept: HOME HEALTH SERVICES | Facility: HOSPITAL | Age: 57
End: 2022-07-27
Payer: COMMERCIAL

## 2022-07-28 ENCOUNTER — INFUSION (OUTPATIENT)
Dept: INFUSION THERAPY | Facility: HOSPITAL | Age: 57
End: 2022-07-28
Attending: INTERNAL MEDICINE
Payer: COMMERCIAL

## 2022-07-28 VITALS
DIASTOLIC BLOOD PRESSURE: 81 MMHG | RESPIRATION RATE: 18 BRPM | SYSTOLIC BLOOD PRESSURE: 140 MMHG | WEIGHT: 203.38 LBS | OXYGEN SATURATION: 98 % | HEIGHT: 74 IN | HEART RATE: 85 BPM | BODY MASS INDEX: 26.1 KG/M2 | TEMPERATURE: 98 F

## 2022-07-28 DIAGNOSIS — C18.5 MALIGNANT NEOPLASM OF SPLENIC FLEXURE: Primary | ICD-10-CM

## 2022-07-28 PROCEDURE — 96523 IRRIG DRUG DELIVERY DEVICE: CPT

## 2022-07-28 PROCEDURE — A4216 STERILE WATER/SALINE, 10 ML: HCPCS | Performed by: INTERNAL MEDICINE

## 2022-07-28 PROCEDURE — 25000003 PHARM REV CODE 250: Performed by: INTERNAL MEDICINE

## 2022-07-28 PROCEDURE — 63600175 PHARM REV CODE 636 W HCPCS: Performed by: INTERNAL MEDICINE

## 2022-07-28 RX ORDER — HEPARIN 100 UNIT/ML
500 SYRINGE INTRAVENOUS
Status: DISCONTINUED | OUTPATIENT
Start: 2022-07-28 | End: 2022-07-28 | Stop reason: HOSPADM

## 2022-07-28 RX ORDER — SODIUM CHLORIDE 0.9 % (FLUSH) 0.9 %
10 SYRINGE (ML) INJECTION
Status: DISCONTINUED | OUTPATIENT
Start: 2022-07-28 | End: 2022-07-28 | Stop reason: HOSPADM

## 2022-07-28 RX ADMIN — SODIUM CHLORIDE, PRESERVATIVE FREE 10 ML: 5 INJECTION INTRAVENOUS at 11:07

## 2022-07-28 NOTE — PLAN OF CARE
Problem: Fatigue  Goal: Improved Activity Tolerance  Outcome: Ongoing, Progressing  Intervention: Promote Improved Energy  Flowsheets (Taken 7/28/2022 1107)  Fatigue Management: frequent rest breaks encouraged  Sleep/Rest Enhancement:   regular sleep/rest pattern promoted   relaxation techniques promoted  Activity Management: Ambulated -L4

## 2022-07-29 ENCOUNTER — PATIENT MESSAGE (OUTPATIENT)
Dept: HEMATOLOGY/ONCOLOGY | Facility: CLINIC | Age: 57
End: 2022-07-29
Payer: COMMERCIAL

## 2022-07-29 DIAGNOSIS — K63.89 COLONIC MASS: ICD-10-CM

## 2022-07-29 RX ORDER — OXYCODONE HYDROCHLORIDE 30 MG/1
30 TABLET ORAL EVERY 6 HOURS PRN
Qty: 60 TABLET | Refills: 0 | Status: SHIPPED | OUTPATIENT
Start: 2022-07-29 | End: 2022-09-01 | Stop reason: SDUPTHER

## 2022-08-01 ENCOUNTER — PATIENT MESSAGE (OUTPATIENT)
Dept: INFECTIOUS DISEASES | Facility: CLINIC | Age: 57
End: 2022-08-01

## 2022-08-03 ENCOUNTER — LAB VISIT (OUTPATIENT)
Dept: LAB | Facility: HOSPITAL | Age: 57
End: 2022-08-03
Attending: NURSE PRACTITIONER
Payer: COMMERCIAL

## 2022-08-03 ENCOUNTER — TELEPHONE (OUTPATIENT)
Dept: INFECTIOUS DISEASES | Facility: CLINIC | Age: 57
End: 2022-08-03

## 2022-08-03 DIAGNOSIS — T81.43XA POSTOPERATIVE INTRA-ABDOMINAL ABSCESS: ICD-10-CM

## 2022-08-03 DIAGNOSIS — Z79.2 ENCOUNTER FOR LONG-TERM (CURRENT) USE OF ANTIBIOTICS: Primary | ICD-10-CM

## 2022-08-03 LAB
ALBUMIN SERPL BCP-MCNC: 3.5 G/DL (ref 3.5–5.2)
ALP SERPL-CCNC: 95 U/L (ref 55–135)
ALT SERPL W/O P-5'-P-CCNC: 13 U/L (ref 10–44)
ANION GAP SERPL CALC-SCNC: 16 MMOL/L (ref 8–16)
AST SERPL-CCNC: 14 U/L (ref 10–40)
BASOPHILS # BLD AUTO: 0.07 K/UL (ref 0–0.2)
BASOPHILS NFR BLD: 0.8 % (ref 0–1.9)
BILIRUB SERPL-MCNC: 0.3 MG/DL (ref 0.1–1)
BUN SERPL-MCNC: 16 MG/DL (ref 6–20)
CALCIUM SERPL-MCNC: 9.4 MG/DL (ref 8.7–10.5)
CHLORIDE SERPL-SCNC: 95 MMOL/L (ref 95–110)
CO2 SERPL-SCNC: 22 MMOL/L (ref 23–29)
CREAT SERPL-MCNC: 0.9 MG/DL (ref 0.5–1.4)
CRP SERPL-MCNC: 76.1 MG/L (ref 0–8.2)
DIFFERENTIAL METHOD: ABNORMAL
EOSINOPHIL # BLD AUTO: 0.3 K/UL (ref 0–0.5)
EOSINOPHIL NFR BLD: 3.5 % (ref 0–8)
ERYTHROCYTE [DISTWIDTH] IN BLOOD BY AUTOMATED COUNT: 19.6 % (ref 11.5–14.5)
EST. GFR  (NO RACE VARIABLE): >60 ML/MIN/1.73 M^2
GLUCOSE SERPL-MCNC: 225 MG/DL (ref 70–110)
HCT VFR BLD AUTO: 36.5 % (ref 40–54)
HGB BLD-MCNC: 12.6 G/DL (ref 14–18)
IMM GRANULOCYTES # BLD AUTO: 0.06 K/UL (ref 0–0.04)
IMM GRANULOCYTES NFR BLD AUTO: 0.7 % (ref 0–0.5)
LYMPHOCYTES # BLD AUTO: 4 K/UL (ref 1–4.8)
LYMPHOCYTES NFR BLD: 45.1 % (ref 18–48)
MCH RBC QN AUTO: 27.2 PG (ref 27–31)
MCHC RBC AUTO-ENTMCNC: 34.5 G/DL (ref 32–36)
MCV RBC AUTO: 79 FL (ref 82–98)
MONOCYTES # BLD AUTO: 1.1 K/UL (ref 0.3–1)
MONOCYTES NFR BLD: 12.5 % (ref 4–15)
NEUTROPHILS # BLD AUTO: 3.3 K/UL (ref 1.8–7.7)
NEUTROPHILS NFR BLD: 37.4 % (ref 38–73)
NRBC BLD-RTO: 1 /100 WBC
PLATELET # BLD AUTO: 377 K/UL (ref 150–450)
PMV BLD AUTO: 10.3 FL (ref 9.2–12.9)
POTASSIUM SERPL-SCNC: 4.1 MMOL/L (ref 3.5–5.1)
PROT SERPL-MCNC: 7 G/DL (ref 6–8.4)
RBC # BLD AUTO: 4.63 M/UL (ref 4.6–6.2)
SODIUM SERPL-SCNC: 133 MMOL/L (ref 136–145)
WBC # BLD AUTO: 8.81 K/UL (ref 3.9–12.7)

## 2022-08-03 PROCEDURE — 86140 C-REACTIVE PROTEIN: CPT | Performed by: NURSE PRACTITIONER

## 2022-08-03 PROCEDURE — 80053 COMPREHEN METABOLIC PANEL: CPT | Performed by: NURSE PRACTITIONER

## 2022-08-03 PROCEDURE — 85025 COMPLETE CBC W/AUTO DIFF WBC: CPT | Performed by: NURSE PRACTITIONER

## 2022-08-03 PROCEDURE — 36415 COLL VENOUS BLD VENIPUNCTURE: CPT | Performed by: NURSE PRACTITIONER

## 2022-08-05 ENCOUNTER — TELEPHONE (OUTPATIENT)
Dept: INFECTIOUS DISEASES | Facility: CLINIC | Age: 57
End: 2022-08-05

## 2022-08-05 ENCOUNTER — HOSPITAL ENCOUNTER (OUTPATIENT)
Dept: RADIOLOGY | Facility: HOSPITAL | Age: 57
Discharge: HOME | End: 2022-08-05
Attending: STUDENT IN AN ORGANIZED HEALTH CARE EDUCATION/TRAINING PROGRAM
Payer: COMMERCIAL

## 2022-08-05 DIAGNOSIS — K65.1 INTRA-ABDOMINAL ABSCESS: ICD-10-CM

## 2022-08-05 PROCEDURE — 25500020 PHARM REV CODE 255: Performed by: STUDENT IN AN ORGANIZED HEALTH CARE EDUCATION/TRAINING PROGRAM

## 2022-08-05 PROCEDURE — 74178 CT ABD&PLV WO CNTR FLWD CNTR: CPT | Mod: TC

## 2022-08-05 RX ADMIN — IOHEXOL 100 ML: 350 INJECTION, SOLUTION INTRAVENOUS at 08:08

## 2022-08-05 NOTE — TELEPHONE ENCOUNTER
Per Dr Hernandes's order :   Ct scan is back, persistent fluid collection.   Kristina can you please call infusion company and have them continue rocephin for 2 more weeks.   Waiting on surgeon to get IR guided drainage.   Joyce, please schedule appt     I spoke with Chaya ( [employer]) ( [phone])  To give orders   She requested I fax orders   I faxed orders     Patient was called and  scheduled for 8/24/22 @ 1:00 pm  With Dr Cecilio PERALTA [facility]  8/05/22

## 2022-08-08 ENCOUNTER — LAB VISIT (OUTPATIENT)
Dept: LAB | Facility: HOSPITAL | Age: 57
End: 2022-08-08
Attending: INTERNAL MEDICINE
Payer: COMMERCIAL

## 2022-08-08 DIAGNOSIS — K63.89 COLONIC MASS: ICD-10-CM

## 2022-08-08 LAB
ALBUMIN SERPL BCP-MCNC: 3.3 G/DL (ref 3.5–5.2)
ALP SERPL-CCNC: 96 U/L (ref 55–135)
ALT SERPL W/O P-5'-P-CCNC: 15 U/L (ref 10–44)
ANION GAP SERPL CALC-SCNC: 10 MMOL/L (ref 8–16)
AST SERPL-CCNC: 14 U/L (ref 10–40)
BASOPHILS # BLD AUTO: 0.11 K/UL (ref 0–0.2)
BASOPHILS NFR BLD: 1.7 % (ref 0–1.9)
BILIRUB SERPL-MCNC: 0.2 MG/DL (ref 0.1–1)
BUN SERPL-MCNC: 12 MG/DL (ref 6–20)
CALCIUM SERPL-MCNC: 10.4 MG/DL (ref 8.7–10.5)
CHLORIDE SERPL-SCNC: 99 MMOL/L (ref 95–110)
CO2 SERPL-SCNC: 27 MMOL/L (ref 23–29)
CREAT SERPL-MCNC: 0.9 MG/DL (ref 0.5–1.4)
DIFFERENTIAL METHOD: ABNORMAL
EOSINOPHIL # BLD AUTO: 0.2 K/UL (ref 0–0.5)
EOSINOPHIL NFR BLD: 3.3 % (ref 0–8)
ERYTHROCYTE [DISTWIDTH] IN BLOOD BY AUTOMATED COUNT: 19.4 % (ref 11.5–14.5)
EST. GFR  (NO RACE VARIABLE): >60 ML/MIN/1.73 M^2
GLUCOSE SERPL-MCNC: 204 MG/DL (ref 70–110)
HCT VFR BLD AUTO: 39.2 % (ref 40–54)
HGB BLD-MCNC: 12.9 G/DL (ref 14–18)
IMM GRANULOCYTES # BLD AUTO: 0.02 K/UL (ref 0–0.04)
IMM GRANULOCYTES NFR BLD AUTO: 0.3 % (ref 0–0.5)
LYMPHOCYTES # BLD AUTO: 3 K/UL (ref 1–4.8)
LYMPHOCYTES NFR BLD: 48 % (ref 18–48)
MAGNESIUM SERPL-MCNC: 1.9 MG/DL (ref 1.6–2.6)
MCH RBC QN AUTO: 26.2 PG (ref 27–31)
MCHC RBC AUTO-ENTMCNC: 32.9 G/DL (ref 32–36)
MCV RBC AUTO: 80 FL (ref 82–98)
MONOCYTES # BLD AUTO: 0.9 K/UL (ref 0.3–1)
MONOCYTES NFR BLD: 14 % (ref 4–15)
NEUTROPHILS # BLD AUTO: 2.1 K/UL (ref 1.8–7.7)
NEUTROPHILS NFR BLD: 32.7 % (ref 38–73)
NRBC BLD-RTO: 1 /100 WBC
PLATELET # BLD AUTO: 482 K/UL (ref 150–450)
PMV BLD AUTO: 9 FL (ref 9.2–12.9)
POTASSIUM SERPL-SCNC: 5 MMOL/L (ref 3.5–5.1)
PROT SERPL-MCNC: 7.5 G/DL (ref 6–8.4)
RBC # BLD AUTO: 4.92 M/UL (ref 4.6–6.2)
SODIUM SERPL-SCNC: 136 MMOL/L (ref 136–145)
WBC # BLD AUTO: 6.29 K/UL (ref 3.9–12.7)

## 2022-08-08 PROCEDURE — 80053 COMPREHEN METABOLIC PANEL: CPT | Performed by: INTERNAL MEDICINE

## 2022-08-08 PROCEDURE — 85025 COMPLETE CBC W/AUTO DIFF WBC: CPT | Performed by: INTERNAL MEDICINE

## 2022-08-08 PROCEDURE — 83735 ASSAY OF MAGNESIUM: CPT | Performed by: INTERNAL MEDICINE

## 2022-08-08 PROCEDURE — 36415 COLL VENOUS BLD VENIPUNCTURE: CPT | Performed by: INTERNAL MEDICINE

## 2022-08-09 ENCOUNTER — INFUSION (OUTPATIENT)
Dept: INFUSION THERAPY | Facility: HOSPITAL | Age: 57
End: 2022-08-09
Attending: INTERNAL MEDICINE
Payer: COMMERCIAL

## 2022-08-09 ENCOUNTER — OFFICE VISIT (OUTPATIENT)
Dept: FAMILY MEDICINE | Facility: CLINIC | Age: 57
End: 2022-08-09
Payer: COMMERCIAL

## 2022-08-09 ENCOUNTER — OFFICE VISIT (OUTPATIENT)
Dept: HEMATOLOGY/ONCOLOGY | Facility: CLINIC | Age: 57
End: 2022-08-09
Payer: COMMERCIAL

## 2022-08-09 VITALS
TEMPERATURE: 98 F | HEART RATE: 71 BPM | SYSTOLIC BLOOD PRESSURE: 103 MMHG | DIASTOLIC BLOOD PRESSURE: 75 MMHG | RESPIRATION RATE: 16 BRPM

## 2022-08-09 VITALS
DIASTOLIC BLOOD PRESSURE: 80 MMHG | WEIGHT: 198 LBS | HEIGHT: 74 IN | SYSTOLIC BLOOD PRESSURE: 126 MMHG | TEMPERATURE: 99 F | BODY MASS INDEX: 25.41 KG/M2 | HEART RATE: 86 BPM

## 2022-08-09 VITALS
HEIGHT: 74 IN | RESPIRATION RATE: 12 BRPM | SYSTOLIC BLOOD PRESSURE: 137 MMHG | HEART RATE: 87 BPM | BODY MASS INDEX: 25.49 KG/M2 | TEMPERATURE: 98 F | OXYGEN SATURATION: 99 % | WEIGHT: 198.63 LBS | DIASTOLIC BLOOD PRESSURE: 76 MMHG

## 2022-08-09 DIAGNOSIS — E11.00 TYPE 2 DIABETES MELLITUS WITH HYPEROSMOLARITY WITHOUT COMA, WITHOUT LONG-TERM CURRENT USE OF INSULIN: ICD-10-CM

## 2022-08-09 DIAGNOSIS — I10 ESSENTIAL HYPERTENSION: ICD-10-CM

## 2022-08-09 DIAGNOSIS — E78.2 MIXED HYPERLIPIDEMIA: ICD-10-CM

## 2022-08-09 DIAGNOSIS — E78.49 OTHER HYPERLIPIDEMIA: ICD-10-CM

## 2022-08-09 DIAGNOSIS — K86.9 PANCREATIC LESION: ICD-10-CM

## 2022-08-09 DIAGNOSIS — K65.1 INTRA-ABDOMINAL ABSCESS: ICD-10-CM

## 2022-08-09 DIAGNOSIS — C18.4 MALIGNANT NEOPLASM OF TRANSVERSE COLON: Primary | ICD-10-CM

## 2022-08-09 DIAGNOSIS — C18.5 MALIGNANT NEOPLASM OF SPLENIC FLEXURE: Primary | ICD-10-CM

## 2022-08-09 DIAGNOSIS — E11.00 TYPE 2 DIABETES MELLITUS WITH HYPEROSMOLARITY WITHOUT COMA, WITHOUT LONG-TERM CURRENT USE OF INSULIN: Primary | ICD-10-CM

## 2022-08-09 DIAGNOSIS — C18.4 MALIGNANT NEOPLASM OF TRANSVERSE COLON: ICD-10-CM

## 2022-08-09 DIAGNOSIS — I10 PRIMARY HYPERTENSION: ICD-10-CM

## 2022-08-09 DIAGNOSIS — M25.512 SUBSCAPULAR PAIN, LEFT: ICD-10-CM

## 2022-08-09 PROCEDURE — 3008F BODY MASS INDEX DOCD: CPT | Mod: CPTII,S$GLB,, | Performed by: PHYSICIAN ASSISTANT

## 2022-08-09 PROCEDURE — 96367 TX/PROPH/DG ADDL SEQ IV INF: CPT

## 2022-08-09 PROCEDURE — 4010F PR ACE/ARB THEARPY RXD/TAKEN: ICD-10-PCS | Mod: CPTII,S$GLB,, | Performed by: PHYSICIAN ASSISTANT

## 2022-08-09 PROCEDURE — 4010F ACE/ARB THERAPY RXD/TAKEN: CPT | Mod: CPTII,S$GLB,, | Performed by: INTERNAL MEDICINE

## 2022-08-09 PROCEDURE — 3008F BODY MASS INDEX DOCD: CPT | Mod: CPTII,S$GLB,, | Performed by: INTERNAL MEDICINE

## 2022-08-09 PROCEDURE — 96416 CHEMO PROLONG INFUSE W/PUMP: CPT

## 2022-08-09 PROCEDURE — 3078F PR MOST RECENT DIASTOLIC BLOOD PRESSURE < 80 MM HG: ICD-10-PCS | Mod: CPTII,S$GLB,, | Performed by: INTERNAL MEDICINE

## 2022-08-09 PROCEDURE — 3008F PR BODY MASS INDEX (BMI) DOCUMENTED: ICD-10-PCS | Mod: CPTII,S$GLB,, | Performed by: PHYSICIAN ASSISTANT

## 2022-08-09 PROCEDURE — 4010F ACE/ARB THERAPY RXD/TAKEN: CPT | Mod: CPTII,S$GLB,, | Performed by: PHYSICIAN ASSISTANT

## 2022-08-09 PROCEDURE — 99999 PR PBB SHADOW E&M-EST. PATIENT-LVL V: CPT | Mod: PBBFAC,,, | Performed by: INTERNAL MEDICINE

## 2022-08-09 PROCEDURE — 3074F SYST BP LT 130 MM HG: CPT | Mod: CPTII,S$GLB,, | Performed by: PHYSICIAN ASSISTANT

## 2022-08-09 PROCEDURE — 99214 PR OFFICE/OUTPT VISIT, EST, LEVL IV, 30-39 MIN: ICD-10-PCS | Mod: S$GLB,,, | Performed by: PHYSICIAN ASSISTANT

## 2022-08-09 PROCEDURE — 3046F PR MOST RECENT HEMOGLOBIN A1C LEVEL > 9.0%: ICD-10-PCS | Mod: CPTII,S$GLB,, | Performed by: INTERNAL MEDICINE

## 2022-08-09 PROCEDURE — 1159F MED LIST DOCD IN RCRD: CPT | Mod: CPTII,S$GLB,, | Performed by: PHYSICIAN ASSISTANT

## 2022-08-09 PROCEDURE — 3079F PR MOST RECENT DIASTOLIC BLOOD PRESSURE 80-89 MM HG: ICD-10-PCS | Mod: CPTII,S$GLB,, | Performed by: PHYSICIAN ASSISTANT

## 2022-08-09 PROCEDURE — 25000003 PHARM REV CODE 250: Performed by: INTERNAL MEDICINE

## 2022-08-09 PROCEDURE — 96415 CHEMO IV INFUSION ADDL HR: CPT

## 2022-08-09 PROCEDURE — 1160F RVW MEDS BY RX/DR IN RCRD: CPT | Mod: CPTII,S$GLB,, | Performed by: PHYSICIAN ASSISTANT

## 2022-08-09 PROCEDURE — 3008F PR BODY MASS INDEX (BMI) DOCUMENTED: ICD-10-PCS | Mod: CPTII,S$GLB,, | Performed by: INTERNAL MEDICINE

## 2022-08-09 PROCEDURE — 1160F PR REVIEW ALL MEDS BY PRESCRIBER/CLIN PHARMACIST DOCUMENTED: ICD-10-PCS | Mod: CPTII,S$GLB,, | Performed by: PHYSICIAN ASSISTANT

## 2022-08-09 PROCEDURE — 96366 THER/PROPH/DIAG IV INF ADDON: CPT

## 2022-08-09 PROCEDURE — 3046F PR MOST RECENT HEMOGLOBIN A1C LEVEL > 9.0%: ICD-10-PCS | Mod: CPTII,S$GLB,, | Performed by: PHYSICIAN ASSISTANT

## 2022-08-09 PROCEDURE — 3046F HEMOGLOBIN A1C LEVEL >9.0%: CPT | Mod: CPTII,S$GLB,, | Performed by: INTERNAL MEDICINE

## 2022-08-09 PROCEDURE — 96411 CHEMO IV PUSH ADDL DRUG: CPT

## 2022-08-09 PROCEDURE — 99215 PR OFFICE/OUTPT VISIT, EST, LEVL V, 40-54 MIN: ICD-10-PCS | Mod: S$GLB,,, | Performed by: INTERNAL MEDICINE

## 2022-08-09 PROCEDURE — 4010F PR ACE/ARB THEARPY RXD/TAKEN: ICD-10-PCS | Mod: CPTII,S$GLB,, | Performed by: INTERNAL MEDICINE

## 2022-08-09 PROCEDURE — 63600175 PHARM REV CODE 636 W HCPCS: Mod: JG | Performed by: INTERNAL MEDICINE

## 2022-08-09 PROCEDURE — 3075F PR MOST RECENT SYSTOLIC BLOOD PRESS GE 130-139MM HG: ICD-10-PCS | Mod: CPTII,S$GLB,, | Performed by: INTERNAL MEDICINE

## 2022-08-09 PROCEDURE — 3079F DIAST BP 80-89 MM HG: CPT | Mod: CPTII,S$GLB,, | Performed by: PHYSICIAN ASSISTANT

## 2022-08-09 PROCEDURE — 3075F SYST BP GE 130 - 139MM HG: CPT | Mod: CPTII,S$GLB,, | Performed by: INTERNAL MEDICINE

## 2022-08-09 PROCEDURE — 3074F PR MOST RECENT SYSTOLIC BLOOD PRESSURE < 130 MM HG: ICD-10-PCS | Mod: CPTII,S$GLB,, | Performed by: PHYSICIAN ASSISTANT

## 2022-08-09 PROCEDURE — 3078F DIAST BP <80 MM HG: CPT | Mod: CPTII,S$GLB,, | Performed by: INTERNAL MEDICINE

## 2022-08-09 PROCEDURE — 96368 THER/DIAG CONCURRENT INF: CPT

## 2022-08-09 PROCEDURE — 3046F HEMOGLOBIN A1C LEVEL >9.0%: CPT | Mod: CPTII,S$GLB,, | Performed by: PHYSICIAN ASSISTANT

## 2022-08-09 PROCEDURE — 96413 CHEMO IV INFUSION 1 HR: CPT

## 2022-08-09 PROCEDURE — 99215 OFFICE O/P EST HI 40 MIN: CPT | Mod: S$GLB,,, | Performed by: INTERNAL MEDICINE

## 2022-08-09 PROCEDURE — 99999 PR PBB SHADOW E&M-EST. PATIENT-LVL V: ICD-10-PCS | Mod: PBBFAC,,, | Performed by: INTERNAL MEDICINE

## 2022-08-09 PROCEDURE — 99214 OFFICE O/P EST MOD 30 MIN: CPT | Mod: S$GLB,,, | Performed by: PHYSICIAN ASSISTANT

## 2022-08-09 PROCEDURE — 1159F PR MEDICATION LIST DOCUMENTED IN MEDICAL RECORD: ICD-10-PCS | Mod: CPTII,S$GLB,, | Performed by: PHYSICIAN ASSISTANT

## 2022-08-09 RX ORDER — EPINEPHRINE 0.3 MG/.3ML
0.3 INJECTION SUBCUTANEOUS ONCE AS NEEDED
Status: DISCONTINUED | OUTPATIENT
Start: 2022-08-09 | End: 2022-08-09 | Stop reason: HOSPADM

## 2022-08-09 RX ORDER — ENALAPRIL MALEATE AND HYDROCHLOROTHIAZIDE 10; 25 MG/1; MG/1
1 TABLET ORAL DAILY
Qty: 90 TABLET | Refills: 1 | Status: SHIPPED | OUTPATIENT
Start: 2022-08-09 | End: 2023-01-25 | Stop reason: SDUPTHER

## 2022-08-09 RX ORDER — METFORMIN HYDROCHLORIDE 1000 MG/1
1000 TABLET ORAL 2 TIMES DAILY WITH MEALS
Qty: 180 TABLET | Refills: 1 | Status: SHIPPED | OUTPATIENT
Start: 2022-08-09 | End: 2023-01-25 | Stop reason: SDUPTHER

## 2022-08-09 RX ORDER — ATORVASTATIN CALCIUM 20 MG/1
20 TABLET, FILM COATED ORAL DAILY
Qty: 90 TABLET | Refills: 1 | Status: SHIPPED | OUTPATIENT
Start: 2022-08-09 | End: 2023-01-25 | Stop reason: SDUPTHER

## 2022-08-09 RX ORDER — FLUOROURACIL 50 MG/ML
400 INJECTION, SOLUTION INTRAVENOUS
Status: CANCELLED | OUTPATIENT
Start: 2022-08-09

## 2022-08-09 RX ORDER — SODIUM CHLORIDE 0.9 % (FLUSH) 0.9 %
10 SYRINGE (ML) INJECTION
Status: DISCONTINUED | OUTPATIENT
Start: 2022-08-09 | End: 2022-08-09 | Stop reason: HOSPADM

## 2022-08-09 RX ORDER — EPINEPHRINE 0.3 MG/.3ML
0.3 INJECTION SUBCUTANEOUS ONCE AS NEEDED
Status: CANCELLED | OUTPATIENT
Start: 2022-08-09

## 2022-08-09 RX ORDER — DIPHENHYDRAMINE HYDROCHLORIDE 50 MG/ML
50 INJECTION INTRAMUSCULAR; INTRAVENOUS ONCE AS NEEDED
Status: CANCELLED | OUTPATIENT
Start: 2022-08-09

## 2022-08-09 RX ORDER — INSULIN GLARGINE 300 U/ML
20 INJECTION, SOLUTION SUBCUTANEOUS DAILY
Qty: 1 PEN | Refills: 5 | Status: SHIPPED | OUTPATIENT
Start: 2022-08-09 | End: 2022-10-12 | Stop reason: SDUPTHER

## 2022-08-09 RX ORDER — FLUOROURACIL 50 MG/ML
400 INJECTION, SOLUTION INTRAVENOUS
Status: COMPLETED | OUTPATIENT
Start: 2022-08-09 | End: 2022-08-09

## 2022-08-09 RX ORDER — SEMAGLUTIDE 1.34 MG/ML
0.25 INJECTION, SOLUTION SUBCUTANEOUS
Qty: 1 PEN | Refills: 5 | Status: SHIPPED | OUTPATIENT
Start: 2022-08-09 | End: 2023-06-21

## 2022-08-09 RX ORDER — FLUOROURACIL 50 MG/ML
2400 INJECTION, SOLUTION INTRAVENOUS
Status: CANCELLED | OUTPATIENT
Start: 2022-08-09

## 2022-08-09 RX ORDER — SODIUM CHLORIDE 0.9 % (FLUSH) 0.9 %
10 SYRINGE (ML) INJECTION
Status: CANCELLED | OUTPATIENT
Start: 2022-08-11

## 2022-08-09 RX ORDER — HEPARIN 100 UNIT/ML
500 SYRINGE INTRAVENOUS
Status: CANCELLED | OUTPATIENT
Start: 2022-08-11

## 2022-08-09 RX ORDER — SODIUM CHLORIDE 0.9 % (FLUSH) 0.9 %
10 SYRINGE (ML) INJECTION
Status: CANCELLED | OUTPATIENT
Start: 2022-08-09

## 2022-08-09 RX ORDER — MELOXICAM 7.5 MG/1
7.5 TABLET ORAL DAILY
Qty: 90 TABLET | Refills: 0 | Status: SHIPPED | OUTPATIENT
Start: 2022-08-09 | End: 2022-10-12

## 2022-08-09 RX ORDER — HEPARIN 100 UNIT/ML
500 SYRINGE INTRAVENOUS
Status: CANCELLED | OUTPATIENT
Start: 2022-08-09

## 2022-08-09 RX ADMIN — FOSAPREPITANT 150 MG: 150 INJECTION, POWDER, LYOPHILIZED, FOR SOLUTION INTRAVENOUS at 11:08

## 2022-08-09 RX ADMIN — FLUOROURACIL 930 MG: 50 INJECTION, SOLUTION INTRAVENOUS at 01:08

## 2022-08-09 RX ADMIN — DEXAMETHASONE SODIUM PHOSPHATE 0.25 MG: 4 INJECTION, SOLUTION INTRA-ARTICULAR; INTRALESIONAL; INTRAMUSCULAR; INTRAVENOUS; SOFT TISSUE at 10:08

## 2022-08-09 RX ADMIN — OXALIPLATIN 197 MG: 5 INJECTION, SOLUTION, CONCENTRATE INTRAVENOUS at 11:08

## 2022-08-09 RX ADMIN — LEUCOVORIN CALCIUM 930 MG: 350 INJECTION, POWDER, LYOPHILIZED, FOR SUSPENSION INTRAMUSCULAR; INTRAVENOUS at 11:08

## 2022-08-09 RX ADMIN — DEXTROSE: 5 SOLUTION INTRAVENOUS at 11:08

## 2022-08-09 RX ADMIN — FLUOROURACIL 5570 MG: 50 INJECTION, SOLUTION INTRAVENOUS at 01:08

## 2022-08-09 NOTE — PROGRESS NOTES
"  SUBJECTIVE:    Patient ID: Osman Li Jr. is a 57 y.o. male.    Chief Complaint: Diabetes (Shoulder pain  // rash shoulder // stomach pain // brought Glucose record // abc )    Pt is a 57 y.o. male w/ a PMHx of colon cancer, DM II (Metformin 1,000mg b.i.d), HTN (Enalapril-HCTZ 10-25mg), and HLP (Lipitor 20mg) who presentswho presents today for 4 week DM II follow up.  On last visit, home blood sugar was elevated in the 230-330 range despite being compliant with his metformin regimen.  Dietary modifications were discussed and he was started on Toujeo max 20 units q.d. Today, sugar as improved slightly, averaging in the 180-220's.  Continues to follow a strict diabetic diet, and denies any peripheral neuropathy symptoms, polyuria, polydipsia.  Of note, he is actively receiving chemotherapy treatments with steroid administration.  This is a suspected cause of his spike blood sugar.  He continues to follow-up with Dr. Khoobehi (Heme/Onc) and will be receiving a chemo treatments today.      Today, he has an acute complaint.  He reports left subscapular pain that started gradually 2 months ago.  The pain is localized and is described as a "pulled muscle sensation." It is constantly present and is exacerbated when laying supine.  In an attempt to alleviate this pain, he has been applying OTC Icy Hot rub and takes his p.r.n. Oxycodone, which provides relief.  He denies any falls, trauma, or recent strenuous activity.  He does report a new change in sleep position secondary to his chemo port.  He is now sleeping on his left side more frequently.    Lab Visit on 08/08/2022   Component Date Value Ref Range Status    CRP 08/08/2022 28.2 (A) 0.0 - 8.2 mg/L Final   Lab Visit on 08/08/2022   Component Date Value Ref Range Status    WBC 08/08/2022 6.29  3.90 - 12.70 K/uL Final    RBC 08/08/2022 4.92  4.60 - 6.20 M/uL Final    Hemoglobin 08/08/2022 12.9 (A) 14.0 - 18.0 g/dL Final    Hematocrit 08/08/2022 39.2 (A) " 40.0 - 54.0 % Final    MCV 08/08/2022 80 (A) 82 - 98 fL Final    MCH 08/08/2022 26.2 (A) 27.0 - 31.0 pg Final    MCHC 08/08/2022 32.9  32.0 - 36.0 g/dL Final    RDW 08/08/2022 19.4 (A) 11.5 - 14.5 % Final    Platelets 08/08/2022 482 (A) 150 - 450 K/uL Final    MPV 08/08/2022 9.0 (A) 9.2 - 12.9 fL Final    Immature Granulocytes 08/08/2022 0.3  0.0 - 0.5 % Final    Gran # (ANC) 08/08/2022 2.1  1.8 - 7.7 K/uL Final    Immature Grans (Abs) 08/08/2022 0.02  0.00 - 0.04 K/uL Final    Lymph # 08/08/2022 3.0  1.0 - 4.8 K/uL Final    Mono # 08/08/2022 0.9  0.3 - 1.0 K/uL Final    Eos # 08/08/2022 0.2  0.0 - 0.5 K/uL Final    Baso # 08/08/2022 0.11  0.00 - 0.20 K/uL Final    nRBC 08/08/2022 1 (A) 0 /100 WBC Final    Gran % 08/08/2022 32.7 (A) 38.0 - 73.0 % Final    Lymph % 08/08/2022 48.0  18.0 - 48.0 % Final    Mono % 08/08/2022 14.0  4.0 - 15.0 % Final    Eosinophil % 08/08/2022 3.3  0.0 - 8.0 % Final    Basophil % 08/08/2022 1.7  0.0 - 1.9 % Final    Differential Method 08/08/2022 Automated   Final    Sodium 08/08/2022 136  136 - 145 mmol/L Final    Potassium 08/08/2022 5.0  3.5 - 5.1 mmol/L Final    Chloride 08/08/2022 99  95 - 110 mmol/L Final    CO2 08/08/2022 27  23 - 29 mmol/L Final    Glucose 08/08/2022 204 (A) 70 - 110 mg/dL Final    BUN 08/08/2022 12  6 - 20 mg/dL Final    Creatinine 08/08/2022 0.9  0.5 - 1.4 mg/dL Final    Calcium 08/08/2022 10.4  8.7 - 10.5 mg/dL Final    Total Protein 08/08/2022 7.5  6.0 - 8.4 g/dL Final    Albumin 08/08/2022 3.3 (A) 3.5 - 5.2 g/dL Final    Total Bilirubin 08/08/2022 0.2  0.1 - 1.0 mg/dL Final    Alkaline Phosphatase 08/08/2022 96  55 - 135 U/L Final    AST 08/08/2022 14  10 - 40 U/L Final    ALT 08/08/2022 15  10 - 44 U/L Final    Anion Gap 08/08/2022 10  8 - 16 mmol/L Final    eGFR 08/08/2022 >60  >60 mL/min/1.73 m^2 Final    Magnesium 08/08/2022 1.9  1.6 - 2.6 mg/dL Final   Lab Visit on 08/03/2022   Component Date Value Ref Range Status     WBC 08/03/2022 8.81  3.90 - 12.70 K/uL Final    RBC 08/03/2022 4.63  4.60 - 6.20 M/uL Final    Hemoglobin 08/03/2022 12.6 (A) 14.0 - 18.0 g/dL Final    Hematocrit 08/03/2022 36.5 (A) 40.0 - 54.0 % Final    MCV 08/03/2022 79 (A) 82 - 98 fL Final    MCH 08/03/2022 27.2  27.0 - 31.0 pg Final    MCHC 08/03/2022 34.5  32.0 - 36.0 g/dL Final    RDW 08/03/2022 19.6 (A) 11.5 - 14.5 % Final    Platelets 08/03/2022 377  150 - 450 K/uL Final    MPV 08/03/2022 10.3  9.2 - 12.9 fL Final    Immature Granulocytes 08/03/2022 0.7 (A) 0.0 - 0.5 % Final    Gran # (ANC) 08/03/2022 3.3  1.8 - 7.7 K/uL Final    Immature Grans (Abs) 08/03/2022 0.06 (A) 0.00 - 0.04 K/uL Final    Lymph # 08/03/2022 4.0  1.0 - 4.8 K/uL Final    Mono # 08/03/2022 1.1 (A) 0.3 - 1.0 K/uL Final    Eos # 08/03/2022 0.3  0.0 - 0.5 K/uL Final    Baso # 08/03/2022 0.07  0.00 - 0.20 K/uL Final    nRBC 08/03/2022 1 (A) 0 /100 WBC Final    Gran % 08/03/2022 37.4 (A) 38.0 - 73.0 % Final    Lymph % 08/03/2022 45.1  18.0 - 48.0 % Final    Mono % 08/03/2022 12.5  4.0 - 15.0 % Final    Eosinophil % 08/03/2022 3.5  0.0 - 8.0 % Final    Basophil % 08/03/2022 0.8  0.0 - 1.9 % Final    Differential Method 08/03/2022 Automated   Final    Sodium 08/03/2022 133 (A) 136 - 145 mmol/L Final    Potassium 08/03/2022 4.1  3.5 - 5.1 mmol/L Final    Chloride 08/03/2022 95  95 - 110 mmol/L Final    CO2 08/03/2022 22 (A) 23 - 29 mmol/L Final    Glucose 08/03/2022 225 (A) 70 - 110 mg/dL Final    BUN 08/03/2022 16  6 - 20 mg/dL Final    Creatinine 08/03/2022 0.9  0.5 - 1.4 mg/dL Final    Calcium 08/03/2022 9.4  8.7 - 10.5 mg/dL Final    Total Protein 08/03/2022 7.0  6.0 - 8.4 g/dL Final    Albumin 08/03/2022 3.5  3.5 - 5.2 g/dL Final    Total Bilirubin 08/03/2022 0.3  0.1 - 1.0 mg/dL Final    Alkaline Phosphatase 08/03/2022 95  55 - 135 U/L Final    AST 08/03/2022 14  10 - 40 U/L Final    ALT 08/03/2022 13  10 - 44 U/L Final    Anion Gap 08/03/2022 16   8 - 16 mmol/L Final    eGFR 08/03/2022 >60  >60 mL/min/1.73 m^2 Final    CRP 08/03/2022 76.1 (A) 0.0 - 8.2 mg/L Final   Lab Visit on 07/25/2022   Component Date Value Ref Range Status    WBC 07/25/2022 9.82  3.90 - 12.70 K/uL Final    RBC 07/25/2022 4.69  4.60 - 6.20 M/uL Final    Hemoglobin 07/25/2022 12.7 (A) 14.0 - 18.0 g/dL Final    Hematocrit 07/25/2022 37.5 (A) 40.0 - 54.0 % Final    MCV 07/25/2022 80 (A) 82 - 98 fL Final    MCH 07/25/2022 27.1  27.0 - 31.0 pg Final    MCHC 07/25/2022 33.9  32.0 - 36.0 g/dL Final    RDW 07/25/2022 19.1 (A) 11.5 - 14.5 % Final    Platelets 07/25/2022 390  150 - 450 K/uL Final    MPV 07/25/2022 9.7  9.2 - 12.9 fL Final    Immature Granulocytes 07/25/2022 0.2  0.0 - 0.5 % Final    Gran # (ANC) 07/25/2022 4.9  1.8 - 7.7 K/uL Final    Immature Grans (Abs) 07/25/2022 0.02  0.00 - 0.04 K/uL Final    Lymph # 07/25/2022 3.9  1.0 - 4.8 K/uL Final    Mono # 07/25/2022 0.9  0.3 - 1.0 K/uL Final    Eos # 07/25/2022 0.1  0.0 - 0.5 K/uL Final    Baso # 07/25/2022 0.08  0.00 - 0.20 K/uL Final    nRBC 07/25/2022 0  0 /100 WBC Final    Gran % 07/25/2022 49.5  38.0 - 73.0 % Final    Lymph % 07/25/2022 39.4  18.0 - 48.0 % Final    Mono % 07/25/2022 9.2  4.0 - 15.0 % Final    Eosinophil % 07/25/2022 0.9  0.0 - 8.0 % Final    Basophil % 07/25/2022 0.8  0.0 - 1.9 % Final    Differential Method 07/25/2022 Automated   Final    Sodium 07/25/2022 135 (A) 136 - 145 mmol/L Final    Potassium 07/25/2022 4.5  3.5 - 5.1 mmol/L Final    Chloride 07/25/2022 100  95 - 110 mmol/L Final    CO2 07/25/2022 27  23 - 29 mmol/L Final    Glucose 07/25/2022 207 (A) 70 - 110 mg/dL Final    BUN 07/25/2022 15  6 - 20 mg/dL Final    Creatinine 07/25/2022 1.0  0.5 - 1.4 mg/dL Final    Calcium 07/25/2022 9.8  8.7 - 10.5 mg/dL Final    Total Protein 07/25/2022 7.2  6.0 - 8.4 g/dL Final    Albumin 07/25/2022 3.7  3.5 - 5.2 g/dL Final    Total Bilirubin 07/25/2022 0.3  0.1 - 1.0 mg/dL Final     Alkaline Phosphatase 07/25/2022 85  55 - 135 U/L Final    AST 07/25/2022 13  10 - 40 U/L Final    ALT 07/25/2022 12  10 - 44 U/L Final    Anion Gap 07/25/2022 8  8 - 16 mmol/L Final    eGFR if African American 07/25/2022 >60  >60 mL/min/1.73 m^2 Final    eGFR if non African American 07/25/2022 >60  >60 mL/min/1.73 m^2 Final    Magnesium 07/25/2022 1.9  1.6 - 2.6 mg/dL Final   Lab Visit on 07/18/2022   Component Date Value Ref Range Status    Sodium 07/18/2022 132 (A) 136 - 145 mmol/L Final    Potassium 07/18/2022 3.6  3.5 - 5.1 mmol/L Final    Chloride 07/18/2022 94 (A) 95 - 110 mmol/L Final    CO2 07/18/2022 21 (A) 23 - 29 mmol/L Final    Glucose 07/18/2022 261 (A) 70 - 110 mg/dL Final    BUN 07/18/2022 19  6 - 20 mg/dL Final    Creatinine 07/18/2022 1.0  0.5 - 1.4 mg/dL Final    Calcium 07/18/2022 10.2  8.7 - 10.5 mg/dL Final    Total Protein 07/18/2022 7.7  6.0 - 8.4 g/dL Final    Albumin 07/18/2022 3.6  3.5 - 5.2 g/dL Final    Total Bilirubin 07/18/2022 0.2  0.1 - 1.0 mg/dL Final    Alkaline Phosphatase 07/18/2022 93  55 - 135 U/L Final    AST 07/18/2022 13  10 - 40 U/L Final    ALT 07/18/2022 10  10 - 44 U/L Final    Anion Gap 07/18/2022 17 (A) 8 - 16 mmol/L Final    eGFR if African American 07/18/2022 >60  >60 mL/min/1.73 m^2 Final    eGFR if non African American 07/18/2022 >60  >60 mL/min/1.73 m^2 Final   Lab Visit on 07/18/2022   Component Date Value Ref Range Status    WBC 07/18/2022 7.17  3.90 - 12.70 K/uL Final    RBC 07/18/2022 4.82  4.60 - 6.20 M/uL Final    Hemoglobin 07/18/2022 12.8 (A) 14.0 - 18.0 g/dL Final    Hematocrit 07/18/2022 38.1 (A) 40.0 - 54.0 % Final    MCV 07/18/2022 79 (A) 82 - 98 fL Final    MCH 07/18/2022 26.6 (A) 27.0 - 31.0 pg Final    MCHC 07/18/2022 33.6  32.0 - 36.0 g/dL Final    RDW 07/18/2022 18.4 (A) 11.5 - 14.5 % Final    Platelets 07/18/2022 477 (A) 150 - 450 K/uL Final    MPV 07/18/2022 9.2  9.2 - 12.9 fL Final    Immature Granulocytes  07/18/2022 0.3  0.0 - 0.5 % Final    Gran # (ANC) 07/18/2022 2.4  1.8 - 7.7 K/uL Final    Immature Grans (Abs) 07/18/2022 0.02  0.00 - 0.04 K/uL Final    Lymph # 07/18/2022 4.0  1.0 - 4.8 K/uL Final    Mono # 07/18/2022 0.4  0.3 - 1.0 K/uL Final    Eos # 07/18/2022 0.3  0.0 - 0.5 K/uL Final    Baso # 07/18/2022 0.09  0.00 - 0.20 K/uL Final    nRBC 07/18/2022 1 (A) 0 /100 WBC Final    Gran % 07/18/2022 33.1 (A) 38.0 - 73.0 % Final    Lymph % 07/18/2022 55.2 (A) 18.0 - 48.0 % Final    Mono % 07/18/2022 5.9  4.0 - 15.0 % Final    Eosinophil % 07/18/2022 4.2  0.0 - 8.0 % Final    Basophil % 07/18/2022 1.3  0.0 - 1.9 % Final    Differential Method 07/18/2022 Automated   Final    Sodium 07/18/2022 133 (A) 136 - 145 mmol/L Final    Potassium 07/18/2022 4.0  3.5 - 5.1 mmol/L Final    Chloride 07/18/2022 96  95 - 110 mmol/L Final    CO2 07/18/2022 23  23 - 29 mmol/L Final    Glucose 07/18/2022 212 (A) 70 - 110 mg/dL Final    BUN 07/18/2022 18  6 - 20 mg/dL Final    Creatinine 07/18/2022 0.9  0.5 - 1.4 mg/dL Final    Calcium 07/18/2022 10.8 (A) 8.7 - 10.5 mg/dL Final    Total Protein 07/18/2022 8.1  6.0 - 8.4 g/dL Final    Albumin 07/18/2022 3.8  3.5 - 5.2 g/dL Final    Total Bilirubin 07/18/2022 0.3  0.1 - 1.0 mg/dL Final    Alkaline Phosphatase 07/18/2022 89  55 - 135 U/L Final    AST 07/18/2022 13  10 - 40 U/L Final    ALT 07/18/2022 11  10 - 44 U/L Final    Anion Gap 07/18/2022 14  8 - 16 mmol/L Final    eGFR if African American 07/18/2022 >60  >60 mL/min/1.73 m^2 Final    eGFR if non African American 07/18/2022 >60  >60 mL/min/1.73 m^2 Final    Magnesium 07/18/2022 1.8  1.6 - 2.6 mg/dL Final   Office Visit on 07/13/2022   Component Date Value Ref Range Status    Hemoglobin A1C 07/13/2022 9.3  % Final   Lab Visit on 07/12/2022   Component Date Value Ref Range Status    CRP 07/12/2022 3.0  0.0 - 8.2 mg/L Final   Lab Visit on 07/11/2022   Component Date Value Ref Range Status    WBC  07/11/2022 8.41  3.90 - 12.70 K/uL Final    RBC 07/11/2022 4.63  4.60 - 6.20 M/uL Final    Hemoglobin 07/11/2022 12.4 (A) 14.0 - 18.0 g/dL Final    Hematocrit 07/11/2022 36.5 (A) 40.0 - 54.0 % Final    MCV 07/11/2022 79 (A) 82 - 98 fL Final    MCH 07/11/2022 26.8 (A) 27.0 - 31.0 pg Final    MCHC 07/11/2022 34.0  32.0 - 36.0 g/dL Final    RDW 07/11/2022 18.2 (A) 11.5 - 14.5 % Final    Platelets 07/11/2022 567 (A) 150 - 450 K/uL Final    MPV 07/11/2022 9.7  9.2 - 12.9 fL Final    Immature Granulocytes 07/11/2022 0.4  0.0 - 0.5 % Final    Gran # (ANC) 07/11/2022 3.1  1.8 - 7.7 K/uL Final    Immature Grans (Abs) 07/11/2022 0.03  0.00 - 0.04 K/uL Final    Lymph # 07/11/2022 4.2  1.0 - 4.8 K/uL Final    Mono # 07/11/2022 0.7  0.3 - 1.0 K/uL Final    Eos # 07/11/2022 0.3  0.0 - 0.5 K/uL Final    Baso # 07/11/2022 0.08  0.00 - 0.20 K/uL Final    nRBC 07/11/2022 0  0 /100 WBC Final    Gran % 07/11/2022 37.0 (A) 38.0 - 73.0 % Final    Lymph % 07/11/2022 49.7 (A) 18.0 - 48.0 % Final    Mono % 07/11/2022 8.7  4.0 - 15.0 % Final    Eosinophil % 07/11/2022 3.2  0.0 - 8.0 % Final    Basophil % 07/11/2022 1.0  0.0 - 1.9 % Final    Differential Method 07/11/2022 Automated   Final    Sodium 07/11/2022 134 (A) 136 - 145 mmol/L Final    Potassium 07/11/2022 5.1  3.5 - 5.1 mmol/L Final    Chloride 07/11/2022 97  95 - 110 mmol/L Final    CO2 07/11/2022 25  23 - 29 mmol/L Final    Glucose 07/11/2022 229 (A) 70 - 110 mg/dL Final    BUN 07/11/2022 13  6 - 20 mg/dL Final    Creatinine 07/11/2022 1.0  0.5 - 1.4 mg/dL Final    Calcium 07/11/2022 10.5  8.7 - 10.5 mg/dL Final    Total Protein 07/11/2022 7.8  6.0 - 8.4 g/dL Final    Albumin 07/11/2022 3.9  3.5 - 5.2 g/dL Final    Total Bilirubin 07/11/2022 0.5  0.1 - 1.0 mg/dL Final    Alkaline Phosphatase 07/11/2022 102  55 - 135 U/L Final    AST 07/11/2022 12  10 - 40 U/L Final    ALT 07/11/2022 11  10 - 44 U/L Final    Anion Gap 07/11/2022 12  8 - 16  mmol/L Final    eGFR if African American 07/11/2022 >60  >60 mL/min/1.73 m^2 Final    eGFR if non African American 07/11/2022 >60  >60 mL/min/1.73 m^2 Final    Magnesium 07/11/2022 1.9  1.6 - 2.6 mg/dL Final   Lab Visit on 07/04/2022   Component Date Value Ref Range Status    WBC 07/04/2022 12.27  3.90 - 12.70 K/uL Final    RBC 07/04/2022 4.41 (A) 4.60 - 6.20 M/uL Final    Hemoglobin 07/04/2022 11.6 (A) 14.0 - 18.0 g/dL Final    Hematocrit 07/04/2022 35.4 (A) 40.0 - 54.0 % Final    MCV 07/04/2022 80 (A) 82 - 98 fL Final    MCH 07/04/2022 26.3 (A) 27.0 - 31.0 pg Final    MCHC 07/04/2022 32.8  32.0 - 36.0 g/dL Final    RDW 07/04/2022 18.3 (A) 11.5 - 14.5 % Final    Platelets 07/04/2022 676 (A) 150 - 450 K/uL Final    MPV 07/04/2022 10.4  9.2 - 12.9 fL Final    Immature Granulocytes 07/04/2022 0.7 (A) 0.0 - 0.5 % Final    Gran # (ANC) 07/04/2022 6.3  1.8 - 7.7 K/uL Final    Immature Grans (Abs) 07/04/2022 0.08 (A) 0.00 - 0.04 K/uL Final    Lymph # 07/04/2022 4.6  1.0 - 4.8 K/uL Final    Mono # 07/04/2022 0.9  0.3 - 1.0 K/uL Final    Eos # 07/04/2022 0.3  0.0 - 0.5 K/uL Final    Baso # 07/04/2022 0.11  0.00 - 0.20 K/uL Final    nRBC 07/04/2022 0  0 /100 WBC Final    Gran % 07/04/2022 51.3  38.0 - 73.0 % Final    Lymph % 07/04/2022 37.7  18.0 - 48.0 % Final    Mono % 07/04/2022 7.4  4.0 - 15.0 % Final    Eosinophil % 07/04/2022 2.0  0.0 - 8.0 % Final    Basophil % 07/04/2022 0.9  0.0 - 1.9 % Final    Differential Method 07/04/2022 Automated   Final    Sodium 07/04/2022 134 (A) 136 - 145 mmol/L Final    Potassium 07/04/2022 4.5  3.5 - 5.1 mmol/L Final    Chloride 07/04/2022 98  95 - 110 mmol/L Final    CO2 07/04/2022 23  23 - 29 mmol/L Final    Glucose 07/04/2022 302 (A) 70 - 110 mg/dL Final    BUN 07/04/2022 8  6 - 20 mg/dL Final    Creatinine 07/04/2022 1.1  0.5 - 1.4 mg/dL Final    Calcium 07/04/2022 9.9  8.7 - 10.5 mg/dL Final    Total Protein 07/04/2022 7.6  6.0 - 8.4 g/dL Final     Albumin 07/04/2022 3.4 (A) 3.5 - 5.2 g/dL Final    Total Bilirubin 07/04/2022 0.2  0.1 - 1.0 mg/dL Final    Alkaline Phosphatase 07/04/2022 111  55 - 135 U/L Final    AST 07/04/2022 12  10 - 40 U/L Final    ALT 07/04/2022 11  10 - 44 U/L Final    Anion Gap 07/04/2022 13  8 - 16 mmol/L Final    eGFR if African American 07/04/2022 >60  >60 mL/min/1.73 m^2 Final    eGFR if non African American 07/04/2022 >60  >60 mL/min/1.73 m^2 Final    CRP 07/04/2022 4.0  0.0 - 8.2 mg/L Final    Vancomycin-Trough 07/04/2022 14.0  10.0 - 22.0 ug/mL Final   There may be more visits with results that are not included.       Past Medical History:   Diagnosis Date    DM (diabetes mellitus)     Hyperlipidemia     Hypertension     Prediabetes      Past Surgical History:   Procedure Laterality Date    CHOLECYSTECTOMY  2011    COLONOSCOPY      2018    COLOSTOMY N/A 4/25/2022    Procedure: CREATION, COLOSTOMY;  Surgeon: Carlton Waddell MD;  Location: [facility] OR;  Service: General;  Laterality: N/A;    INSERTION OF TUNNELED CENTRAL VENOUS CATHETER (CVC) WITH SUBCUTANEOUS PORT Right 5/18/2022    Procedure: [id number];  Surgeon: Carlton Waddell MD;  Location: [facility] OR;  Service: General;  Laterality: Right;    MOBILIZATION OF SPLENIC FLEXURE N/A 4/25/2022    Procedure: MOBILIZATION, SPLENIC FLEXURE;  Surgeon: Carlton Waddell MD;  Location: [facility] OR;  Service: General;  Laterality: N/A;    SPLENECTOMY N/A 4/25/2022    Procedure: SPLENECTOMY;  Surgeon: Carlton Waddell MD;  Location: [facility] OR;  Service: General;  Laterality: N/A;    SUBTOTAL COLECTOMY N/A 4/25/2022    Procedure: COLECTOMY, PARTIAL;  Surgeon: Carlton Waddell MD;  Location: [facility] OR;  Service: General;  Laterality: N/A;     Family History   Problem Relation Age of Onset    Heart disease Father        Marital Status:   Alcohol History:  reports previous alcohol use.  Tobacco History:  reports that he has been smoking cigarettes. He has a 17.50  "pack-year smoking history. He has never used smokeless tobacco.  Drug History:  reports no history of drug use.    Health Maintenance Topics with due status: Not Due       Topic Last Completion Date    Influenza Vaccine 11/11/2020    Lipid Panel 10/19/2021    Eye Exam 03/25/2022    Foot Exam 05/09/2022    Pneumococcal Vaccines (Age 0-64) 07/07/2022    Hemoglobin A1c 07/13/2022    Low Dose Statin 08/09/2022     Immunization History   Administered Date(s) Administered    COVID-19, vector-nr, [id number], PF (Jose Elias) 05/27/2021    Influenza 11/11/2020    Influenza - Quadrivalent - MDCK 10/16/2019    Influenza - Quadrivalent - MDCK - PF 11/07/2020    Meningococcal Conjugate (MCV4O) 05/02/2022, 07/07/2022    Pneumococcal Conjugate - 13 Valent 05/02/2022    Pneumococcal Polysaccharide - 23 Valent 07/07/2022    Zoster Recombinant 02/06/2021, 04/30/2021       Review of patient's allergies indicates:   Allergen Reactions    Penicillins Rash       Current Outpatient Medications:     heparin lock flush, porcine, (HEPARIN FLUSH 100 UNITS/ML) 100 unit/mL Soln, 3-5 mL DAILY (route: intravenous), Disp: , Rfl:     oxyCODONE (ROXICODONE) 30 MG Tab, Take 1 tablet (30 mg total) by mouth every 6 (six) hours as needed., Disp: 60 tablet, Rfl: 0    pen needle, diabetic 31 gauge x 3/16" Ndle, 1 each by Misc.(Non-Drug; Combo Route) route once daily., Disp: 90 each, Rfl: 3    promethazine (PHENERGAN) 12.5 MG Tab, Take 1 tablet (12.5 mg total) by mouth every 4 (four) hours as needed., Disp: 25 tablet, Rfl: 1    sildenafiL (VIAGRA) 100 MG tablet, Take 1 tablet (100 mg total) by mouth daily as needed for Erectile Dysfunction., Disp: 30 tablet, Rfl: 1    sodium chloride 0.9% (NORMAL SALINE FLUSH) injection, 10 mL DAILY (route: injection), Disp: , Rfl:     atorvastatin (LIPITOR) 20 MG tablet, Take 1 tablet (20 mg total) by mouth once daily., Disp: 90 tablet, Rfl: 1    enalapriL-hydrochlorothiazide (VASERETIC) 10-25 mg per tablet, " "Take 1 tablet by mouth once daily., Disp: 90 tablet, Rfl: 1    insulin glargine U-300 conc (TOUJEO MAX U-300 SOLOSTAR) 300 unit/mL (3 mL) insulin pen, Inject 20 Units into the skin once daily., Disp: 1 pen, Rfl: 5    meloxicam (MOBIC) 7.5 MG tablet, Take 1 tablet (7.5 mg total) by mouth once daily., Disp: 90 tablet, Rfl: 0    metFORMIN (GLUCOPHAGE) 1000 MG tablet, Take 1 tablet (1,000 mg total) by mouth 2 (two) times daily with meals., Disp: 180 tablet, Rfl: 1    semaglutide (OZEMPIC) 0.25 mg or 0.5 mg(2 mg/1.5 mL) pen injector, Inject 0.25 mg into the skin every 7 days., Disp: 1 pen, Rfl: 5    Review of Systems   Constitutional: Negative for activity change, appetite change, chills, fatigue and fever.   Respiratory: Negative for cough, shortness of breath and wheezing.    Cardiovascular: Negative for chest pain, palpitations and leg swelling.   Gastrointestinal: Negative for abdominal pain, constipation, diarrhea, nausea and vomiting.   Endocrine: Negative for polydipsia and polyuria.   Genitourinary: Negative for difficulty urinating, dysuria, frequency, hematuria and urgency.   Musculoskeletal: Positive for myalgias. Negative for arthralgias, gait problem and joint swelling.   Skin: Negative for rash.   Neurological: Negative for dizziness, syncope, weakness, light-headedness and numbness.   Hematological: Does not bruise/bleed easily.   Psychiatric/Behavioral: Negative for behavioral problems.          Objective:      Vitals:    08/09/22 0702   BP: 126/80   Pulse: 86   Temp: 98.7 °F (37.1 °C)   Weight: 89.8 kg (198 lb)   Height: 6' 2" (1.88 m)     Physical Exam  Constitutional:       General: He is not in acute distress.     Appearance: Normal appearance. He is normal weight. He is not ill-appearing, toxic-appearing or diaphoretic.   HENT:      Head: Normocephalic and atraumatic.      Right Ear: External ear normal.      Left Ear: External ear normal.      Nose: Nose normal. No rhinorrhea.      Mouth/Throat: "      Mouth: Mucous membranes are moist.      Pharynx: Oropharynx is clear.   Eyes:      General: No scleral icterus.        Right eye: No discharge.         Left eye: No discharge.      Extraocular Movements: Extraocular movements intact.      Conjunctiva/sclera: Conjunctivae normal.      Pupils: Pupils are equal, round, and reactive to light.   Cardiovascular:      Rate and Rhythm: Normal rate and regular rhythm.      Pulses: Normal pulses.      Heart sounds: No murmur heard.  Pulmonary:      Effort: Pulmonary effort is normal. No respiratory distress.      Breath sounds: Normal breath sounds. No wheezing.   Chest:       Abdominal:      General: Bowel sounds are normal. There is no distension.      Tenderness: There is no abdominal tenderness. There is no guarding.      Hernia: No hernia is present.       Musculoskeletal:      Cervical back: Normal range of motion and neck supple.        Back:       Right lower leg: No edema.      Left lower leg: No edema.      Comments: Bilateral shoulders have full ROM on flexion, extension, internal rotation, and external rotation.  5/5 strength against resista   Skin:     General: Skin is warm.      Coloration: Skin is not jaundiced or pale.      Findings: No erythema or rash.   Neurological:      Mental Status: He is alert and oriented to person, place, and time.      Gait: Gait normal.   Psychiatric:         Mood and Affect: Mood normal.         Behavior: Behavior normal.           Assessment:       1. Type 2 diabetes mellitus with hyperosmolarity without coma, without long-term current use of insulin    2. Subscapular pain, left    3. Mixed hyperlipidemia    4. Essential hypertension    5. Type 2 diabetes mellitus without complication, without long-term current use of insulin    6. Malignant neoplasm of transverse colon           Plan:       Type 2 diabetes mellitus with hyperosmolarity without coma, without long-term current use of insulin  Home blood sugar readings have  significantly improved, averaging in the 180s-220s.  Continue current treatment regimen as is, but start Ozempic 0.5mg q7 days x 2 weeks, then increase to 0.5mg q7 days x 4 weeks, then increase to 1mg q7 days.   Foot exam and eye exam are UTD.  Follow-up in 8 weeks with repeat A1c.  -     semaglutide (OZEMPIC) 0.25 mg or 0.5 mg(2 mg/1.5 mL) pen injector; Inject 0.25 mg into the skin every 7 days.  Dispense: 1 pen; Refill: 5  -     insulin glargine U-300 conc (TOUJEO MAX U-300 SOLOSTAR) 300 unit/mL (3 mL) insulin pen; Inject 20 Units into the skin once daily.  Dispense: 1 pen; Refill: 5  -     metFORMIN (GLUCOPHAGE) 1000 MG tablet; Take 1 tablet (1,000 mg total) by mouth 2 (two) times daily with meals.  Dispense: 180 tablet; Refill: 1    Subscapular pain, left  Secondary to prolonged duration of symptoms (2 months), start physical therapy - amb ref sent to [facility] today.   Start Mobic 7.5mg q.d., with Tylenol extra-strength, 2 tablets p.r.n for breakthrough pain.  Avoid any heavy lifting or strenuous activity.  Begin home stretching exercises - exercise packet provided to patient today.  -     Ambulatory referral/consult to Physical/Occupational Therapy; Future; Expected date: 08/09/2022  -     meloxicam (MOBIC) 7.5 MG tablet; Take 1 tablet (7.5 mg total) by mouth once daily.  Dispense: 90 tablet; Refill: 0    Mixed hyperlipidemia  -     atorvastatin (LIPITOR) 20 MG tablet; Take 1 tablet (20 mg total) by mouth once daily.  Dispense: 90 tablet; Refill: 1    Essential hypertension  Stable and well controlled.  Continue as is.  -     enalapriL-hydrochlorothiazide (VASERETIC) 10-25 mg per tablet; Take 1 tablet by mouth once daily.  Dispense: 90 tablet; Refill: 1    Malignant neoplasm of transverse colon  Continue following up with Dr. Khoobehi (Heme/Onc) as scheduled.    Follow up in about 2 months (around 10/9/2022) for Diabetic Check-Up.        8/9/2022 Richard Herrera PA-C

## 2022-08-09 NOTE — PROGRESS NOTES
Service Date:  8/9/22    Chief Complaint: transverse colon    Osman Li Jr. is a 57 y.o. 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  A CT scan of the abdomen was done which showed a large obstructive lesion with lymph node involvement.  There was also a 9 mm hypodense liver lesion that could not be characterized.  Patient had a hemicolectomy with ostomy bag placed, and splenectomy.      He was then set to start chemotherapy but it was delayed due to the liver lesion.  He was evaluated by Dr. Goodwin with Surgical Oncology who determined that this was likely not malignant.  Unfortunately afterwards, patient suffered a setback with an abdominal abscess formation that could not be drained.  He recently completed 2 weeks of antibiotics, and a repeat CT scan showed the presence of an abscess still there, but improved.  He had 2 weeks of abx, but a repeat scan showed some growth. It is thought that this is likely sterile fluid. He will finish his abx and we can monitor with scan.    He is here for cycle 3 of treatment.  He has no new complaints to me.      Review of Systems   Constitutional: Negative.    HENT: Negative.    Eyes: Negative.    Respiratory: Negative.    Cardiovascular: Negative.    Gastrointestinal: Negative.    Endocrine: Negative.    Genitourinary: Negative.    Musculoskeletal: Negative.    Integumentary:  Negative.   Neurological: Negative.    Hematological: Negative.    Psychiatric/Behavioral: Negative.         Current Outpatient Medications   Medication Instructions    atorvastatin (LIPITOR) 20 mg, Oral, Daily    enalapriL-hydrochlorothiazide (VASERETIC) 10-25 mg per tablet 1 tablet, Oral, Daily    heparin lock flush, porcine, (HEPARIN FLUSH 100 UNITS/ML) 100 unit/mL Soln 3-5 mL DAILY (route: intravenous)    meloxicam (MOBIC) 7.5 mg, Oral, Daily    metFORMIN (GLUCOPHAGE) 1,000 mg, Oral, 2 times daily with meals    oxyCODONE (ROXICODONE) 30 mg, Oral, Every 6 hours PRN     "OZEMPIC 0.25 mg, Subcutaneous, Every 7 days    pen needle, diabetic 31 gauge x 3/16" Ndle 1 each, Misc.(Non-Drug; Combo Route), Daily    promethazine (PHENERGAN) 12.5 mg, Oral, Every 4 hours PRN    sildenafiL (VIAGRA) 100 mg, Oral, Daily PRN    sodium chloride 0.9% (NORMAL SALINE FLUSH) injection 10 mL DAILY (route: injection)    TOUJEO MAX U-300 SOLOSTAR 20 Units, Subcutaneous, Daily        Past Medical History:   Diagnosis Date    DM (diabetes mellitus)     Hyperlipidemia     Hypertension     Prediabetes         Past Surgical History:   Procedure Laterality Date    CHOLECYSTECTOMY  2011    COLONOSCOPY      2018    COLOSTOMY N/A 4/25/2022    Procedure: CREATION, COLOSTOMY;  Surgeon: Carlton Waddell MD;  Location: [facility] OR;  Service: General;  Laterality: N/A;    INSERTION OF TUNNELED CENTRAL VENOUS CATHETER (CVC) WITH SUBCUTANEOUS PORT Right 5/18/2022    Procedure: [id number];  Surgeon: Carlton Waddell MD;  Location: [facility] OR;  Service: General;  Laterality: Right;    MOBILIZATION OF SPLENIC FLEXURE N/A 4/25/2022    Procedure: MOBILIZATION, SPLENIC FLEXURE;  Surgeon: Carlton Waddell MD;  Location: [facility] OR;  Service: General;  Laterality: N/A;    SPLENECTOMY N/A 4/25/2022    Procedure: SPLENECTOMY;  Surgeon: Carlton Waddell MD;  Location: [facility] OR;  Service: General;  Laterality: N/A;    SUBTOTAL COLECTOMY N/A 4/25/2022    Procedure: COLECTOMY, PARTIAL;  Surgeon: Carlton Waddell MD;  Location: [facility] OR;  Service: General;  Laterality: N/A;        Family History   Problem Relation Age of Onset    Heart disease Father        Social History     Tobacco Use    Smoking status: Current Every Day Smoker     Packs/day: 0.50     Years: 35.00     Pack years: 17.50     Types: Cigarettes    Smokeless tobacco: Never Used   Substance Use Topics    Alcohol use: Not Currently    Drug use: Never         Vitals:    08/09/22 0918   BP: 137/76   Pulse: 87   Resp: 12   Temp: 98.1 °F (36.7 °C)        Physical " "Exam:  /76 (BP Location: Left arm, Patient Position: Sitting, BP Method: Medium (Automatic))   Pulse 87   Temp 98.1 °F (36.7 °C) (Temporal)   Resp 12   Ht 6' 2" (1.88 m)   Wt 90.1 kg (198 lb 10.2 oz)   SpO2 99%   BMI 25.50 kg/m²     Physical Exam  Vitals and nursing note reviewed.   Constitutional:       Appearance: Normal appearance.   HENT:      Head: Normocephalic and atraumatic.      Nose: Nose normal.      Mouth/Throat:      Mouth: Mucous membranes are moist.      Pharynx: Oropharynx is clear.   Eyes:      Extraocular Movements: Extraocular movements intact.      Conjunctiva/sclera: Conjunctivae normal.   Cardiovascular:      Rate and Rhythm: Normal rate and regular rhythm.      Heart sounds: Normal heart sounds.   Pulmonary:      Effort: Pulmonary effort is normal.      Breath sounds: Normal breath sounds.   Abdominal:      General: Abdomen is flat. Bowel sounds are normal.      Palpations: Abdomen is soft.      Comments: Ostomy bag in place.   Musculoskeletal:         General: Normal range of motion.      Cervical back: Normal range of motion and neck supple.   Skin:     General: Skin is warm and dry.   Neurological:      General: No focal deficit present.      Mental Status: He is alert and oriented to person, place, and time. Mental status is at baseline.   Psychiatric:         Mood and Affect: Mood normal.          Labs:  Lab Results   Component Value Date    WBC 6.29 08/08/2022    RBC 4.92 08/08/2022    HGB 12.9 (L) 08/08/2022    HCT 39.2 (L) 08/08/2022    MCV 80 (L) 08/08/2022    MCH 26.2 (L) 08/08/2022    MCHC 32.9 08/08/2022    RDW 19.4 (H) 08/08/2022     (H) 08/08/2022    MPV 9.0 (L) 08/08/2022    GRAN 2.1 08/08/2022    GRAN 32.7 (L) 08/08/2022    LYMPH 3.0 08/08/2022    LYMPH 48.0 08/08/2022    MONO 0.9 08/08/2022    MONO 14.0 08/08/2022    EOS 0.2 08/08/2022    BASO 0.11 08/08/2022    EOSINOPHIL 3.3 08/08/2022    BASOPHIL 1.7 08/08/2022     Sodium   Date Value Ref Range Status "   08/08/2022 136 136 - 145 mmol/L Final     Potassium   Date Value Ref Range Status   08/08/2022 5.0 3.5 - 5.1 mmol/L Final     Chloride   Date Value Ref Range Status   08/08/2022 99 95 - 110 mmol/L Final     CO2   Date Value Ref Range Status   08/08/2022 27 23 - 29 mmol/L Final     Glucose   Date Value Ref Range Status   08/08/2022 204 (H) 70 - 110 mg/dL Final     BUN   Date Value Ref Range Status   08/08/2022 12 6 - 20 mg/dL Final     Creatinine   Date Value Ref Range Status   08/08/2022 0.9 0.5 - 1.4 mg/dL Final     Calcium   Date Value Ref Range Status   08/08/2022 10.4 8.7 - 10.5 mg/dL Final     Total Protein   Date Value Ref Range Status   08/08/2022 7.5 6.0 - 8.4 g/dL Final     Albumin   Date Value Ref Range Status   08/08/2022 3.3 (L) 3.5 - 5.2 g/dL Final     Total Bilirubin   Date Value Ref Range Status   08/08/2022 0.2 0.1 - 1.0 mg/dL Final     Comment:     For infants and newborns, interpretation of results should be based  on gestational age, weight and in agreement with clinical  observations.    Premature Infant recommended reference ranges:  Up to 24 hours.............<8.0 mg/dL  Up to 48 hours............<12.0 mg/dL  3-5 days..................<15.0 mg/dL  6-29 days.................<15.0 mg/dL       Alkaline Phosphatase   Date Value Ref Range Status   08/08/2022 96 55 - 135 U/L Final     AST   Date Value Ref Range Status   08/08/2022 14 10 - 40 U/L Final     ALT   Date Value Ref Range Status   08/08/2022 15 10 - 44 U/L Final     Anion Gap   Date Value Ref Range Status   08/08/2022 10 8 - 16 mmol/L Final     eGFR if    Date Value Ref Range Status   07/25/2022 >60 >60 mL/min/1.73 m^2 Final     eGFR if non    Date Value Ref Range Status   07/25/2022 >60 >60 mL/min/1.73 m^2 Final     Comment:     Calculation used to obtain the estimated glomerular filtration  rate (eGFR) is the CKD-EPI equation.          A/P:    Malignant neoplasm of the transverse colon  -poorly  differentiated adenocarcinoma  -pT3 N2b  -patient on clinical trial to measure ctDNA  -labs reviewed  -FOLFOX started on 7/12/22  -proceed with cycle 3  -return to clinic in 3 weeks for next treatment    Intra-abdominal abscess  -completed antibiotics and abscess is still present, but improved. Likely sterile fluid at this point.  -now on IV abx    Liver lesion  -9 mm hypodense lesion on CT abdomen, could not be characterized  -not deemed to be malignant    Pancreatic lesion  -resolved on last CT scan, likely related to inflammation    HTN  - on Enalapril  - follow up with PCP    HLP  - follow up with PCP    DM2  - on Metformin  - follow up with PCP    Tobacco use  - counseled on cessation      Aurash Khoobehi, MD  Hematology and Oncology

## 2022-08-11 ENCOUNTER — INFUSION (OUTPATIENT)
Dept: INFUSION THERAPY | Facility: HOSPITAL | Age: 57
End: 2022-08-11
Attending: INTERNAL MEDICINE
Payer: COMMERCIAL

## 2022-08-11 VITALS
DIASTOLIC BLOOD PRESSURE: 74 MMHG | TEMPERATURE: 98 F | HEART RATE: 95 BPM | RESPIRATION RATE: 18 BRPM | WEIGHT: 202.63 LBS | HEIGHT: 74 IN | BODY MASS INDEX: 26 KG/M2 | SYSTOLIC BLOOD PRESSURE: 117 MMHG | OXYGEN SATURATION: 97 %

## 2022-08-11 DIAGNOSIS — C18.5 MALIGNANT NEOPLASM OF SPLENIC FLEXURE: Primary | ICD-10-CM

## 2022-08-11 PROCEDURE — 63600175 PHARM REV CODE 636 W HCPCS: Performed by: INTERNAL MEDICINE

## 2022-08-11 PROCEDURE — A4216 STERILE WATER/SALINE, 10 ML: HCPCS | Performed by: INTERNAL MEDICINE

## 2022-08-11 PROCEDURE — 96523 IRRIG DRUG DELIVERY DEVICE: CPT

## 2022-08-11 PROCEDURE — 25000003 PHARM REV CODE 250: Performed by: INTERNAL MEDICINE

## 2022-08-11 RX ORDER — SODIUM CHLORIDE 0.9 % (FLUSH) 0.9 %
10 SYRINGE (ML) INJECTION
Status: DISCONTINUED | OUTPATIENT
Start: 2022-08-11 | End: 2022-08-11 | Stop reason: HOSPADM

## 2022-08-11 RX ORDER — HEPARIN 100 UNIT/ML
500 SYRINGE INTRAVENOUS
Status: DISCONTINUED | OUTPATIENT
Start: 2022-08-11 | End: 2022-08-11 | Stop reason: HOSPADM

## 2022-08-11 RX ADMIN — SODIUM CHLORIDE, PRESERVATIVE FREE 10 ML: 5 INJECTION INTRAVENOUS at 01:08

## 2022-08-11 RX ADMIN — HEPARIN 500 UNITS: 100 SYRINGE at 01:08

## 2022-08-11 NOTE — PLAN OF CARE
Problem: Activity Intolerance  Goal: Enhanced Capacity and Energy  Outcome: Met     Problem: Fatigue  Goal: Improved Activity Tolerance  Outcome: Met

## 2022-08-15 ENCOUNTER — APPOINTMENT (OUTPATIENT)
Dept: LAB | Facility: HOSPITAL | Age: 57
End: 2022-08-15
Attending: STUDENT IN AN ORGANIZED HEALTH CARE EDUCATION/TRAINING PROGRAM
Payer: COMMERCIAL

## 2022-08-15 LAB
ALBUMIN SERPL BCP-MCNC: 3.2 G/DL (ref 3.5–5.2)
ALP SERPL-CCNC: 97 U/L (ref 55–135)
ALT SERPL W/O P-5'-P-CCNC: 13 U/L (ref 10–44)
ANION GAP SERPL CALC-SCNC: 17 MMOL/L (ref 8–16)
AST SERPL-CCNC: 14 U/L (ref 10–40)
BASOPHILS # BLD AUTO: 0.05 K/UL (ref 0–0.2)
BASOPHILS NFR BLD: 0.8 % (ref 0–1.9)
BILIRUB SERPL-MCNC: 0.4 MG/DL (ref 0.1–1)
BUN SERPL-MCNC: 14 MG/DL (ref 6–20)
CALCIUM SERPL-MCNC: 9.9 MG/DL (ref 8.7–10.5)
CHLORIDE SERPL-SCNC: 94 MMOL/L (ref 95–110)
CO2 SERPL-SCNC: 22 MMOL/L (ref 23–29)
CREAT SERPL-MCNC: 0.8 MG/DL (ref 0.5–1.4)
CRP SERPL-MCNC: 173.9 MG/L (ref 0–8.2)
DIFFERENTIAL METHOD: ABNORMAL
EOSINOPHIL # BLD AUTO: 0.2 K/UL (ref 0–0.5)
EOSINOPHIL NFR BLD: 2.3 % (ref 0–8)
ERYTHROCYTE [DISTWIDTH] IN BLOOD BY AUTOMATED COUNT: 19.8 % (ref 11.5–14.5)
EST. GFR  (NO RACE VARIABLE): >60 ML/MIN/1.73 M^2
GLUCOSE SERPL-MCNC: 193 MG/DL (ref 70–110)
HCT VFR BLD AUTO: 36.4 % (ref 40–54)
HGB BLD-MCNC: 12.3 G/DL (ref 14–18)
IMM GRANULOCYTES # BLD AUTO: 0.04 K/UL (ref 0–0.04)
IMM GRANULOCYTES NFR BLD AUTO: 0.6 % (ref 0–0.5)
LYMPHOCYTES # BLD AUTO: 3.9 K/UL (ref 1–4.8)
LYMPHOCYTES NFR BLD: 59 % (ref 18–48)
MCH RBC QN AUTO: 26.2 PG (ref 27–31)
MCHC RBC AUTO-ENTMCNC: 33.8 G/DL (ref 32–36)
MCV RBC AUTO: 78 FL (ref 82–98)
MONOCYTES # BLD AUTO: 0.6 K/UL (ref 0.3–1)
MONOCYTES NFR BLD: 9.2 % (ref 4–15)
NEUTROPHILS # BLD AUTO: 1.9 K/UL (ref 1.8–7.7)
NEUTROPHILS NFR BLD: 28.1 % (ref 38–73)
NRBC BLD-RTO: 2 /100 WBC
PLATELET # BLD AUTO: 331 K/UL (ref 150–450)
PMV BLD AUTO: 10.2 FL (ref 9.2–12.9)
POTASSIUM SERPL-SCNC: 3.8 MMOL/L (ref 3.5–5.1)
PROT SERPL-MCNC: 7.7 G/DL (ref 6–8.4)
RBC # BLD AUTO: 4.69 M/UL (ref 4.6–6.2)
SODIUM SERPL-SCNC: 133 MMOL/L (ref 136–145)
WBC # BLD AUTO: 6.66 K/UL (ref 3.9–12.7)

## 2022-08-15 PROCEDURE — 86140 C-REACTIVE PROTEIN: CPT | Performed by: STUDENT IN AN ORGANIZED HEALTH CARE EDUCATION/TRAINING PROGRAM

## 2022-08-15 PROCEDURE — 36415 COLL VENOUS BLD VENIPUNCTURE: CPT | Performed by: STUDENT IN AN ORGANIZED HEALTH CARE EDUCATION/TRAINING PROGRAM

## 2022-08-15 PROCEDURE — 85025 COMPLETE CBC W/AUTO DIFF WBC: CPT | Performed by: STUDENT IN AN ORGANIZED HEALTH CARE EDUCATION/TRAINING PROGRAM

## 2022-08-15 PROCEDURE — 80053 COMPREHEN METABOLIC PANEL: CPT | Performed by: STUDENT IN AN ORGANIZED HEALTH CARE EDUCATION/TRAINING PROGRAM

## 2022-08-17 ENCOUNTER — DOCUMENTATION ONLY (OUTPATIENT)
Dept: INFECTIOUS DISEASES | Facility: CLINIC | Age: 57
End: 2022-08-17

## 2022-08-17 NOTE — PROGRESS NOTES
I spoke with Chaya ( [facility] )  To confirm patient is on Rocephin IV .    Chaya stated patient is still active and his EOC date  Is 8/22/22    I advised Chaya to continue IV abx through 8/24/22  When patient has a visit with Dr Hernandes;   Per Dr Ochoa's orders   ( covering for Dr Hernandes )     I ,also, spoke with Pravin ( [facility] )([phone])  To advise of all.     KATHRYN Wallss  [facility]  8/17/22

## 2022-08-22 ENCOUNTER — LAB VISIT (OUTPATIENT)
Dept: LAB | Facility: HOSPITAL | Age: 57
End: 2022-08-22
Attending: INTERNAL MEDICINE
Payer: COMMERCIAL

## 2022-08-22 DIAGNOSIS — K63.89 COLONIC MASS: ICD-10-CM

## 2022-08-22 LAB
ALBUMIN SERPL BCP-MCNC: 3.2 G/DL (ref 3.5–5.2)
ALP SERPL-CCNC: 121 U/L (ref 55–135)
ALT SERPL W/O P-5'-P-CCNC: 16 U/L (ref 10–44)
ANION GAP SERPL CALC-SCNC: 11 MMOL/L (ref 8–16)
AST SERPL-CCNC: 17 U/L (ref 10–40)
BASOPHILS # BLD AUTO: 0.11 K/UL (ref 0–0.2)
BASOPHILS NFR BLD: 1.4 % (ref 0–1.9)
BILIRUB SERPL-MCNC: 0.3 MG/DL (ref 0.1–1)
BUN SERPL-MCNC: 16 MG/DL (ref 6–20)
CALCIUM SERPL-MCNC: 10.3 MG/DL (ref 8.7–10.5)
CHLORIDE SERPL-SCNC: 95 MMOL/L (ref 95–110)
CO2 SERPL-SCNC: 27 MMOL/L (ref 23–29)
CREAT SERPL-MCNC: 1 MG/DL (ref 0.5–1.4)
DIFFERENTIAL METHOD: ABNORMAL
EOSINOPHIL # BLD AUTO: 0.2 K/UL (ref 0–0.5)
EOSINOPHIL NFR BLD: 2.2 % (ref 0–8)
ERYTHROCYTE [DISTWIDTH] IN BLOOD BY AUTOMATED COUNT: 19.9 % (ref 11.5–14.5)
EST. GFR  (NO RACE VARIABLE): >60 ML/MIN/1.73 M^2
GLUCOSE SERPL-MCNC: 175 MG/DL (ref 70–110)
HCT VFR BLD AUTO: 39.2 % (ref 40–54)
HGB BLD-MCNC: 13.3 G/DL (ref 14–18)
IMM GRANULOCYTES # BLD AUTO: 0.02 K/UL (ref 0–0.04)
IMM GRANULOCYTES NFR BLD AUTO: 0.3 % (ref 0–0.5)
LYMPHOCYTES # BLD AUTO: 3.5 K/UL (ref 1–4.8)
LYMPHOCYTES NFR BLD: 45.6 % (ref 18–48)
MAGNESIUM SERPL-MCNC: 1.8 MG/DL (ref 1.6–2.6)
MCH RBC QN AUTO: 26.5 PG (ref 27–31)
MCHC RBC AUTO-ENTMCNC: 33.9 G/DL (ref 32–36)
MCV RBC AUTO: 78 FL (ref 82–98)
MONOCYTES # BLD AUTO: 1 K/UL (ref 0.3–1)
MONOCYTES NFR BLD: 12.5 % (ref 4–15)
NEUTROPHILS # BLD AUTO: 3 K/UL (ref 1.8–7.7)
NEUTROPHILS NFR BLD: 38 % (ref 38–73)
NRBC BLD-RTO: 1 /100 WBC
PLATELET # BLD AUTO: 442 K/UL (ref 150–450)
PMV BLD AUTO: 9.9 FL (ref 9.2–12.9)
POTASSIUM SERPL-SCNC: 4.1 MMOL/L (ref 3.5–5.1)
PROT SERPL-MCNC: 7.4 G/DL (ref 6–8.4)
RBC # BLD AUTO: 5.02 M/UL (ref 4.6–6.2)
SODIUM SERPL-SCNC: 133 MMOL/L (ref 136–145)
WBC # BLD AUTO: 7.77 K/UL (ref 3.9–12.7)

## 2022-08-22 PROCEDURE — 83735 ASSAY OF MAGNESIUM: CPT | Performed by: INTERNAL MEDICINE

## 2022-08-22 PROCEDURE — 36415 COLL VENOUS BLD VENIPUNCTURE: CPT | Performed by: INTERNAL MEDICINE

## 2022-08-22 PROCEDURE — 85025 COMPLETE CBC W/AUTO DIFF WBC: CPT | Performed by: INTERNAL MEDICINE

## 2022-08-22 PROCEDURE — 80053 COMPREHEN METABOLIC PANEL: CPT | Performed by: INTERNAL MEDICINE

## 2022-08-23 ENCOUNTER — INFUSION (OUTPATIENT)
Dept: INFUSION THERAPY | Facility: HOSPITAL | Age: 57
End: 2022-08-23
Attending: INTERNAL MEDICINE
Payer: COMMERCIAL

## 2022-08-23 ENCOUNTER — TELEPHONE (OUTPATIENT)
Dept: HEMATOLOGY/ONCOLOGY | Facility: CLINIC | Age: 57
End: 2022-08-23

## 2022-08-23 ENCOUNTER — OFFICE VISIT (OUTPATIENT)
Dept: HEMATOLOGY/ONCOLOGY | Facility: CLINIC | Age: 57
End: 2022-08-23
Payer: COMMERCIAL

## 2022-08-23 VITALS
OXYGEN SATURATION: 98 % | DIASTOLIC BLOOD PRESSURE: 87 MMHG | WEIGHT: 193.31 LBS | SYSTOLIC BLOOD PRESSURE: 149 MMHG | TEMPERATURE: 99 F | BODY MASS INDEX: 24.81 KG/M2 | HEIGHT: 74 IN | HEART RATE: 87 BPM | RESPIRATION RATE: 12 BRPM

## 2022-08-23 VITALS
WEIGHT: 193.31 LBS | SYSTOLIC BLOOD PRESSURE: 161 MMHG | BODY MASS INDEX: 24.81 KG/M2 | HEIGHT: 74 IN | RESPIRATION RATE: 18 BRPM | TEMPERATURE: 98 F | OXYGEN SATURATION: 98 % | HEART RATE: 76 BPM | DIASTOLIC BLOOD PRESSURE: 84 MMHG

## 2022-08-23 DIAGNOSIS — C18.5 MALIGNANT NEOPLASM OF SPLENIC FLEXURE: Primary | ICD-10-CM

## 2022-08-23 DIAGNOSIS — K65.1 INTRA-ABDOMINAL ABSCESS: ICD-10-CM

## 2022-08-23 DIAGNOSIS — I10 PRIMARY HYPERTENSION: ICD-10-CM

## 2022-08-23 DIAGNOSIS — C18.4 MALIGNANT NEOPLASM OF TRANSVERSE COLON: Primary | ICD-10-CM

## 2022-08-23 DIAGNOSIS — E11.00 TYPE 2 DIABETES MELLITUS WITH HYPEROSMOLARITY WITHOUT COMA, WITHOUT LONG-TERM CURRENT USE OF INSULIN: ICD-10-CM

## 2022-08-23 DIAGNOSIS — E78.49 OTHER HYPERLIPIDEMIA: ICD-10-CM

## 2022-08-23 PROCEDURE — 99999 PR PBB SHADOW E&M-EST. PATIENT-LVL V: ICD-10-PCS | Mod: PBBFAC,,, | Performed by: INTERNAL MEDICINE

## 2022-08-23 PROCEDURE — 63600175 PHARM REV CODE 636 W HCPCS: Mod: JG | Performed by: INTERNAL MEDICINE

## 2022-08-23 PROCEDURE — 4010F PR ACE/ARB THEARPY RXD/TAKEN: ICD-10-PCS | Mod: CPTII,S$GLB,, | Performed by: INTERNAL MEDICINE

## 2022-08-23 PROCEDURE — 96368 THER/DIAG CONCURRENT INF: CPT

## 2022-08-23 PROCEDURE — 25000003 PHARM REV CODE 250: Performed by: INTERNAL MEDICINE

## 2022-08-23 PROCEDURE — 3077F PR MOST RECENT SYSTOLIC BLOOD PRESSURE >= 140 MM HG: ICD-10-PCS | Mod: CPTII,S$GLB,, | Performed by: INTERNAL MEDICINE

## 2022-08-23 PROCEDURE — 4010F ACE/ARB THERAPY RXD/TAKEN: CPT | Mod: CPTII,S$GLB,, | Performed by: INTERNAL MEDICINE

## 2022-08-23 PROCEDURE — 99999 PR PBB SHADOW E&M-EST. PATIENT-LVL V: CPT | Mod: PBBFAC,,, | Performed by: INTERNAL MEDICINE

## 2022-08-23 PROCEDURE — 99215 PR OFFICE/OUTPT VISIT, EST, LEVL V, 40-54 MIN: ICD-10-PCS | Mod: S$GLB,,, | Performed by: INTERNAL MEDICINE

## 2022-08-23 PROCEDURE — 3046F PR MOST RECENT HEMOGLOBIN A1C LEVEL > 9.0%: ICD-10-PCS | Mod: CPTII,S$GLB,, | Performed by: INTERNAL MEDICINE

## 2022-08-23 PROCEDURE — 1159F PR MEDICATION LIST DOCUMENTED IN MEDICAL RECORD: ICD-10-PCS | Mod: CPTII,S$GLB,, | Performed by: INTERNAL MEDICINE

## 2022-08-23 PROCEDURE — 96367 TX/PROPH/DG ADDL SEQ IV INF: CPT

## 2022-08-23 PROCEDURE — 1160F RVW MEDS BY RX/DR IN RCRD: CPT | Mod: CPTII,S$GLB,, | Performed by: INTERNAL MEDICINE

## 2022-08-23 PROCEDURE — 3008F BODY MASS INDEX DOCD: CPT | Mod: CPTII,S$GLB,, | Performed by: INTERNAL MEDICINE

## 2022-08-23 PROCEDURE — 96366 THER/PROPH/DIAG IV INF ADDON: CPT

## 2022-08-23 PROCEDURE — 3077F SYST BP >= 140 MM HG: CPT | Mod: CPTII,S$GLB,, | Performed by: INTERNAL MEDICINE

## 2022-08-23 PROCEDURE — 96413 CHEMO IV INFUSION 1 HR: CPT

## 2022-08-23 PROCEDURE — 1159F MED LIST DOCD IN RCRD: CPT | Mod: CPTII,S$GLB,, | Performed by: INTERNAL MEDICINE

## 2022-08-23 PROCEDURE — 99215 OFFICE O/P EST HI 40 MIN: CPT | Mod: S$GLB,,, | Performed by: INTERNAL MEDICINE

## 2022-08-23 PROCEDURE — 1160F PR REVIEW ALL MEDS BY PRESCRIBER/CLIN PHARMACIST DOCUMENTED: ICD-10-PCS | Mod: CPTII,S$GLB,, | Performed by: INTERNAL MEDICINE

## 2022-08-23 PROCEDURE — 96415 CHEMO IV INFUSION ADDL HR: CPT

## 2022-08-23 PROCEDURE — 96416 CHEMO PROLONG INFUSE W/PUMP: CPT

## 2022-08-23 PROCEDURE — 3079F DIAST BP 80-89 MM HG: CPT | Mod: CPTII,S$GLB,, | Performed by: INTERNAL MEDICINE

## 2022-08-23 PROCEDURE — 3046F HEMOGLOBIN A1C LEVEL >9.0%: CPT | Mod: CPTII,S$GLB,, | Performed by: INTERNAL MEDICINE

## 2022-08-23 PROCEDURE — 96411 CHEMO IV PUSH ADDL DRUG: CPT

## 2022-08-23 PROCEDURE — 3079F PR MOST RECENT DIASTOLIC BLOOD PRESSURE 80-89 MM HG: ICD-10-PCS | Mod: CPTII,S$GLB,, | Performed by: INTERNAL MEDICINE

## 2022-08-23 PROCEDURE — 3008F PR BODY MASS INDEX (BMI) DOCUMENTED: ICD-10-PCS | Mod: CPTII,S$GLB,, | Performed by: INTERNAL MEDICINE

## 2022-08-23 RX ORDER — SODIUM CHLORIDE 0.9 % (FLUSH) 0.9 %
10 SYRINGE (ML) INJECTION
Status: CANCELLED | OUTPATIENT
Start: 2022-08-23

## 2022-08-23 RX ORDER — FLUOROURACIL 50 MG/ML
2400 INJECTION, SOLUTION INTRAVENOUS
Status: CANCELLED | OUTPATIENT
Start: 2022-08-23

## 2022-08-23 RX ORDER — FLUOROURACIL 50 MG/ML
400 INJECTION, SOLUTION INTRAVENOUS
Status: COMPLETED | OUTPATIENT
Start: 2022-08-23 | End: 2022-08-23

## 2022-08-23 RX ORDER — EPINEPHRINE 0.3 MG/.3ML
0.3 INJECTION SUBCUTANEOUS ONCE AS NEEDED
Status: CANCELLED | OUTPATIENT
Start: 2022-08-23

## 2022-08-23 RX ORDER — HEPARIN 100 UNIT/ML
500 SYRINGE INTRAVENOUS
Status: CANCELLED | OUTPATIENT
Start: 2022-08-23

## 2022-08-23 RX ORDER — FLUOROURACIL 50 MG/ML
400 INJECTION, SOLUTION INTRAVENOUS
Status: CANCELLED | OUTPATIENT
Start: 2022-08-23

## 2022-08-23 RX ORDER — SODIUM CHLORIDE 0.9 % (FLUSH) 0.9 %
10 SYRINGE (ML) INJECTION
Status: CANCELLED | OUTPATIENT
Start: 2022-08-25

## 2022-08-23 RX ORDER — SODIUM CHLORIDE 0.9 % (FLUSH) 0.9 %
10 SYRINGE (ML) INJECTION
Status: DISCONTINUED | OUTPATIENT
Start: 2022-08-23 | End: 2022-08-23 | Stop reason: HOSPADM

## 2022-08-23 RX ORDER — DIPHENHYDRAMINE HYDROCHLORIDE 50 MG/ML
50 INJECTION INTRAMUSCULAR; INTRAVENOUS ONCE AS NEEDED
Status: CANCELLED | OUTPATIENT
Start: 2022-08-23

## 2022-08-23 RX ORDER — HEPARIN 100 UNIT/ML
500 SYRINGE INTRAVENOUS
Status: CANCELLED | OUTPATIENT
Start: 2022-08-25

## 2022-08-23 RX ADMIN — LEUCOVORIN CALCIUM 930 MG: 350 INJECTION, POWDER, LYOPHILIZED, FOR SUSPENSION INTRAMUSCULAR; INTRAVENOUS at 11:08

## 2022-08-23 RX ADMIN — FLUOROURACIL 930 MG: 50 INJECTION, SOLUTION INTRAVENOUS at 01:08

## 2022-08-23 RX ADMIN — FOSAPREPITANT 150 MG: 150 INJECTION, POWDER, LYOPHILIZED, FOR SOLUTION INTRAVENOUS at 10:08

## 2022-08-23 RX ADMIN — OXALIPLATIN 197 MG: 5 INJECTION, SOLUTION, CONCENTRATE INTRAVENOUS at 11:08

## 2022-08-23 RX ADMIN — DEXAMETHASONE SODIUM PHOSPHATE 0.25 MG: 4 INJECTION, SOLUTION INTRA-ARTICULAR; INTRALESIONAL; INTRAMUSCULAR; INTRAVENOUS; SOFT TISSUE at 11:08

## 2022-08-23 RX ADMIN — FLUOROURACIL 5570 MG: 50 INJECTION, SOLUTION INTRAVENOUS at 01:08

## 2022-08-23 NOTE — PLAN OF CARE
Problem: Fatigue  Goal: Improved Activity Tolerance  Outcome: Ongoing, Progressing  Intervention: Promote Improved Energy  Flowsheets (Taken 8/23/2022 5334)  Fatigue Management:   frequent rest breaks encouraged   fatigue-related activity identified   paced activity encouraged  Sleep/Rest Enhancement:   regular sleep/rest pattern promoted   relaxation techniques promoted

## 2022-08-23 NOTE — PROGRESS NOTES
Service Date:  8/23/22    Chief Complaint: Colon Cancer    Osman Li Jr. is a 57 y.o. 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  A CT scan of the abdomen was done which showed a large obstructive lesion with lymph node involvement.  There was also a 9 mm hypodense liver lesion that could not be characterized.  Patient had a hemicolectomy with ostomy bag placed, and splenectomy.      He was then set to start chemotherapy but it was delayed due to the liver lesion.  He was evaluated by Dr. Goodwin with Surgical Oncology who determined that this was likely not malignant.  Unfortunately afterwards, patient suffered a setback with an abdominal abscess formation that could not be drained.  He recently completed 2 weeks of antibiotics, and a repeat CT scan showed the presence of an abscess still there, but improved.  He had 2 weeks of abx, but a repeat scan showed some growth. It is thought that this is likely sterile fluid. He will finish his abx and we can monitor with scan.    He is here for cycle 4 of treatment.  He continues to have back pain, which is not improving with therapy.  It is located in the top right side, no pain with movement.  Dull in nature.  Comes and goes.      Review of Systems   Constitutional: Negative.    HENT: Negative.    Eyes: Negative.    Respiratory: Negative.    Cardiovascular: Negative.    Gastrointestinal: Negative.    Endocrine: Negative.    Genitourinary: Negative.    Musculoskeletal: Negative.    Integumentary:  Negative.   Neurological: Negative.    Hematological: Negative.    Psychiatric/Behavioral: Negative.         Current Outpatient Medications   Medication Instructions    atorvastatin (LIPITOR) 20 mg, Oral, Daily    enalapriL-hydrochlorothiazide (VASERETIC) 10-25 mg per tablet 1 tablet, Oral, Daily    heparin lock flush, porcine, (HEPARIN FLUSH 100 UNITS/ML) 100 unit/mL Soln 3-5 mL DAILY (route: intravenous)    meloxicam (MOBIC) 7.5 mg, Oral, Daily  "   metFORMIN (GLUCOPHAGE) 1,000 mg, Oral, 2 times daily with meals    oxyCODONE (ROXICODONE) 30 mg, Oral, Every 6 hours PRN    OZEMPIC 0.25 mg, Subcutaneous, Every 7 days    pen needle, diabetic 31 gauge x 3/16" Ndle 1 each, Misc.(Non-Drug; Combo Route), Daily    promethazine (PHENERGAN) 12.5 mg, Oral, Every 4 hours PRN    sildenafiL (VIAGRA) 100 mg, Oral, Daily PRN    sodium chloride 0.9% (NORMAL SALINE FLUSH) injection 10 mL DAILY (route: injection)    TOUJEO MAX U-300 SOLOSTAR 20 Units, Subcutaneous, Daily        Past Medical History:   Diagnosis Date    DM (diabetes mellitus)     Hyperlipidemia     Hypertension     Prediabetes         Past Surgical History:   Procedure Laterality Date    CHOLECYSTECTOMY  2011    COLONOSCOPY      2018    COLOSTOMY N/A 4/25/2022    Procedure: CREATION, COLOSTOMY;  Surgeon: Carlton Waddell MD;  Location: [facility] OR;  Service: General;  Laterality: N/A;    INSERTION OF TUNNELED CENTRAL VENOUS CATHETER (CVC) WITH SUBCUTANEOUS PORT Right 5/18/2022    Procedure: [id number];  Surgeon: Carlton Waddell MD;  Location: [facility] OR;  Service: General;  Laterality: Right;    MOBILIZATION OF SPLENIC FLEXURE N/A 4/25/2022    Procedure: MOBILIZATION, SPLENIC FLEXURE;  Surgeon: Carlton Waddell MD;  Location: [facility] OR;  Service: General;  Laterality: N/A;    SPLENECTOMY N/A 4/25/2022    Procedure: SPLENECTOMY;  Surgeon: Carlton Waddell MD;  Location: [facility] OR;  Service: General;  Laterality: N/A;    SUBTOTAL COLECTOMY N/A 4/25/2022    Procedure: COLECTOMY, PARTIAL;  Surgeon: Carlton Waddell MD;  Location: [facility] OR;  Service: General;  Laterality: N/A;        Family History   Problem Relation Age of Onset    Heart disease Father        Social History     Tobacco Use    Smoking status: Current Every Day Smoker     Packs/day: 0.50     Years: 35.00     Pack years: 17.50     Types: Cigarettes    Smokeless tobacco: Never Used   Substance Use Topics    Alcohol use: Not Currently " "   Drug use: Never         Vitals:    08/23/22 0913   BP: (!) 149/87   Pulse: 87   Resp: 12   Temp: 98.7 °F (37.1 °C)        Physical Exam:  BP (!) 149/87 (BP Location: Right arm, Patient Position: Sitting, BP Method: Medium (Automatic))   Pulse 87   Temp 98.7 °F (37.1 °C) (Temporal)   Resp 12   Ht 6' 2" (1.88 m)   Wt 87.7 kg (193 lb 5.5 oz)   SpO2 98%   BMI 24.82 kg/m²     Physical Exam  Vitals and nursing note reviewed.   Constitutional:       Appearance: Normal appearance.   HENT:      Head: Normocephalic and atraumatic.      Nose: Nose normal.      Mouth/Throat:      Mouth: Mucous membranes are moist.      Pharynx: Oropharynx is clear.   Eyes:      Extraocular Movements: Extraocular movements intact.      Conjunctiva/sclera: Conjunctivae normal.   Cardiovascular:      Rate and Rhythm: Normal rate and regular rhythm.      Heart sounds: Normal heart sounds.   Pulmonary:      Effort: Pulmonary effort is normal.      Breath sounds: Normal breath sounds.   Abdominal:      General: Abdomen is flat. Bowel sounds are normal.      Palpations: Abdomen is soft.      Comments: Ostomy bag in place.   Musculoskeletal:         General: Normal range of motion.      Cervical back: Normal range of motion and neck supple.   Skin:     General: Skin is warm and dry.   Neurological:      General: No focal deficit present.      Mental Status: He is alert and oriented to person, place, and time. Mental status is at baseline.   Psychiatric:         Mood and Affect: Mood normal.          Labs:  Lab Results   Component Value Date    WBC 7.77 08/22/2022    RBC 5.02 08/22/2022    HGB 13.3 (L) 08/22/2022    HCT 39.2 (L) 08/22/2022    MCV 78 (L) 08/22/2022    MCH 26.5 (L) 08/22/2022    MCHC 33.9 08/22/2022    RDW 19.9 (H) 08/22/2022     08/22/2022    MPV 9.9 08/22/2022    GRAN 3.0 08/22/2022    GRAN 38.0 08/22/2022    LYMPH 3.5 08/22/2022    LYMPH 45.6 08/22/2022    MONO 1.0 08/22/2022    MONO 12.5 08/22/2022    EOS 0.2 " 08/22/2022    BASO 0.11 08/22/2022    EOSINOPHIL 2.2 08/22/2022    BASOPHIL 1.4 08/22/2022     Sodium   Date Value Ref Range Status   08/22/2022 133 (L) 136 - 145 mmol/L Final     Potassium   Date Value Ref Range Status   08/22/2022 4.1 3.5 - 5.1 mmol/L Final     Chloride   Date Value Ref Range Status   08/22/2022 95 95 - 110 mmol/L Final     CO2   Date Value Ref Range Status   08/22/2022 27 23 - 29 mmol/L Final     Glucose   Date Value Ref Range Status   08/22/2022 175 (H) 70 - 110 mg/dL Final     BUN   Date Value Ref Range Status   08/22/2022 16 6 - 20 mg/dL Final     Creatinine   Date Value Ref Range Status   08/22/2022 1.0 0.5 - 1.4 mg/dL Final     Calcium   Date Value Ref Range Status   08/22/2022 10.3 8.7 - 10.5 mg/dL Final     Total Protein   Date Value Ref Range Status   08/22/2022 7.4 6.0 - 8.4 g/dL Final     Albumin   Date Value Ref Range Status   08/22/2022 3.2 (L) 3.5 - 5.2 g/dL Final     Total Bilirubin   Date Value Ref Range Status   08/22/2022 0.3 0.1 - 1.0 mg/dL Final     Comment:     For infants and newborns, interpretation of results should be based  on gestational age, weight and in agreement with clinical  observations.    Premature Infant recommended reference ranges:  Up to 24 hours.............<8.0 mg/dL  Up to 48 hours............<12.0 mg/dL  3-5 days..................<15.0 mg/dL  6-29 days.................<15.0 mg/dL       Alkaline Phosphatase   Date Value Ref Range Status   08/22/2022 121 55 - 135 U/L Final     AST   Date Value Ref Range Status   08/22/2022 17 10 - 40 U/L Final     ALT   Date Value Ref Range Status   08/22/2022 16 10 - 44 U/L Final     Anion Gap   Date Value Ref Range Status   08/22/2022 11 8 - 16 mmol/L Final     eGFR if    Date Value Ref Range Status   07/25/2022 >60 >60 mL/min/1.73 m^2 Final     eGFR if non    Date Value Ref Range Status   07/25/2022 >60 >60 mL/min/1.73 m^2 Final     Comment:     Calculation used to obtain the estimated  glomerular filtration  rate (eGFR) is the CKD-EPI equation.          A/P:    Malignant neoplasm of the transverse colon  -poorly differentiated adenocarcinoma  -pT3 N2b  -patient on clinical trial to measure ctDNA  -labs reviewed  -FOLFOX started on 7/12/22  -proceed with cycle 4  -return to clinic in 2 weeks for next treatment    Intra-abdominal abscess  -completed antibiotics and abscess is still present, but improved. Likely sterile fluid at this point.  -now on IV abx    Back pain  - I will order a NM bone scan to rule out malignancy as patient had extensive disease to start    HTN  - on Enalapril  - follow up with PCP    HLP  - follow up with PCP    DM2  - on Metformin  - follow up with PCP    Tobacco use  - counseled on cessation      Aurash Khoobehi, MD  Hematology and Oncology

## 2022-08-24 ENCOUNTER — OFFICE VISIT (OUTPATIENT)
Dept: INFECTIOUS DISEASES | Facility: CLINIC | Age: 57
End: 2022-08-24
Payer: COMMERCIAL

## 2022-08-24 DIAGNOSIS — K65.1 INTRA-ABDOMINAL ABSCESS: Primary | ICD-10-CM

## 2022-08-24 PROCEDURE — 99214 OFFICE O/P EST MOD 30 MIN: CPT | Mod: 95,,, | Performed by: STUDENT IN AN ORGANIZED HEALTH CARE EDUCATION/TRAINING PROGRAM

## 2022-08-24 PROCEDURE — 4010F PR ACE/ARB THEARPY RXD/TAKEN: ICD-10-PCS | Mod: CPTII,95,, | Performed by: STUDENT IN AN ORGANIZED HEALTH CARE EDUCATION/TRAINING PROGRAM

## 2022-08-24 PROCEDURE — 4010F ACE/ARB THERAPY RXD/TAKEN: CPT | Mod: CPTII,95,, | Performed by: STUDENT IN AN ORGANIZED HEALTH CARE EDUCATION/TRAINING PROGRAM

## 2022-08-24 PROCEDURE — 3046F PR MOST RECENT HEMOGLOBIN A1C LEVEL > 9.0%: ICD-10-PCS | Mod: CPTII,95,, | Performed by: STUDENT IN AN ORGANIZED HEALTH CARE EDUCATION/TRAINING PROGRAM

## 2022-08-24 PROCEDURE — 3046F HEMOGLOBIN A1C LEVEL >9.0%: CPT | Mod: CPTII,95,, | Performed by: STUDENT IN AN ORGANIZED HEALTH CARE EDUCATION/TRAINING PROGRAM

## 2022-08-24 PROCEDURE — 99214 PR OFFICE/OUTPT VISIT, EST, LEVL IV, 30-39 MIN: ICD-10-PCS | Mod: 95,,, | Performed by: STUDENT IN AN ORGANIZED HEALTH CARE EDUCATION/TRAINING PROGRAM

## 2022-08-24 NOTE — PATIENT INSTRUCTIONS
Ceftriaxone 2g IV daily until 8/25, de-access port 8/26  Labs to be done 9/1  CT scan abdomen/pelvis in 2 weeks   RTC in 3 weeks

## 2022-08-25 ENCOUNTER — INFUSION (OUTPATIENT)
Dept: INFUSION THERAPY | Facility: HOSPITAL | Age: 57
End: 2022-08-25
Attending: INTERNAL MEDICINE
Payer: COMMERCIAL

## 2022-08-25 VITALS
DIASTOLIC BLOOD PRESSURE: 78 MMHG | TEMPERATURE: 98 F | HEIGHT: 74 IN | BODY MASS INDEX: 24.94 KG/M2 | RESPIRATION RATE: 18 BRPM | SYSTOLIC BLOOD PRESSURE: 171 MMHG | HEART RATE: 81 BPM | OXYGEN SATURATION: 98 % | WEIGHT: 194.31 LBS

## 2022-08-25 DIAGNOSIS — C18.5 MALIGNANT NEOPLASM OF SPLENIC FLEXURE: Primary | ICD-10-CM

## 2022-08-25 PROCEDURE — 96523 IRRIG DRUG DELIVERY DEVICE: CPT

## 2022-08-25 PROCEDURE — 63600175 PHARM REV CODE 636 W HCPCS: Performed by: INTERNAL MEDICINE

## 2022-08-25 PROCEDURE — 25000003 PHARM REV CODE 250: Performed by: INTERNAL MEDICINE

## 2022-08-25 PROCEDURE — A4216 STERILE WATER/SALINE, 10 ML: HCPCS | Performed by: INTERNAL MEDICINE

## 2022-08-25 RX ORDER — HEPARIN 100 UNIT/ML
500 SYRINGE INTRAVENOUS
Status: DISCONTINUED | OUTPATIENT
Start: 2022-08-25 | End: 2022-08-25 | Stop reason: HOSPADM

## 2022-08-25 RX ORDER — SODIUM CHLORIDE 0.9 % (FLUSH) 0.9 %
10 SYRINGE (ML) INJECTION
Status: DISCONTINUED | OUTPATIENT
Start: 2022-08-25 | End: 2022-08-25 | Stop reason: HOSPADM

## 2022-08-25 RX ADMIN — SODIUM CHLORIDE, PRESERVATIVE FREE 10 ML: 5 INJECTION INTRAVENOUS at 11:08

## 2022-08-25 RX ADMIN — HEPARIN 500 UNITS: 100 SYRINGE at 11:08

## 2022-08-25 NOTE — PLAN OF CARE
Problem: Infection  Goal: Absence of Infection Signs and Symptoms  Outcome: Ongoing, Progressing  Intervention: Prevent or Manage Infection  Flowsheets (Taken 8/25/2022 1151)  Infection Management: aseptic technique maintained

## 2022-08-25 NOTE — PROGRESS NOTES
Subjective: Hospital Follow UP - s/p splenectomy - vaccines and recent admission for intraabdominal abscess       Patient ID: Osman Li Jr. is a 57 y.o. male.    Chief Complaint:: No chief complaint on file.    HPI Osman Li is a 57 y.o. male active heavy smoker, with past medical history of HTN, diabetes, and HLD, known to me from prior admission to [facility] 4/21 for acute abdomen secondary to LBO in the setting of large colonic mass s/p resection and splenectomy 4/21. Patient was discharged on levofloxacin, doxycyclin and flagyl PO. Received first 2 doses of meningococcus and pneumonococcus vaccines before discharge. Port-A-Cath placed 5/18. Patient had a recent admission to [facility] for sepsis secondary to intra-abdominal abscess, unable to have it drained by IR, sent home on IV ceftriaxone and flagyl PO for 10 days. He completed antibiotics 7/3.     Patient is here for follow up, wife present. Has had significant weight loss in the last couple of months. Plan to start chemotherapy next week, will re-schedule CT scan for early next week to assess status of prior abscess before starting chemotherapy.     He states he feels good, no abdominal pain, colostomy functioning, no nausea or vomit. No fever or chills.   Recent labs reviewed, normal WBC, CRP 8.5, slightly elevated.    8/23: Interim reviewed, patient scheduled for follow-up, seen via iPad. States he feels ok, some days are good and some days are not; weakness, fatigue, decreased appetite and weight loss. Patient had a repeat CT scan abdomen/pelvis 8/5 which showed rim-enhancing left upper quadrant fluid collection 5.3 x 1.8 cm  suspicious for abscess considering the provided clinical history. Smaller than prior, 9 x 11 cm. Adjacent to this there is masslike hypoattenuation involving the tail of the pancreas. This could is suspicious for pancreatic neoplasm, although phlegmon related to aforementioned abscess is also possible. Patient was  started on ceftriaxone IV on 7/11, still on ceftriaxone, plan to de-access port 8/26. He c/o lower back pain. He denies abdominal pan, fever or chills, he is aware of symptoms suggestive of infection and states he has not experienced them. Seen by Heme/Onc yesterday, on 4th cycle of chemotherapy, plan for NM bone scan to rule out metastasis.   Labs reviewed, CRP trending up, likely from underlying malignancy vs ongoing chemotherapy vs underlying fluid collection.       Review of patient's allergies indicates:   Allergen Reactions    Penicillins Rash     Past Medical History:   Diagnosis Date    DM (diabetes mellitus)     Hyperlipidemia     Hypertension     Prediabetes      Past Surgical History:   Procedure Laterality Date    CHOLECYSTECTOMY  2011    COLONOSCOPY      2018    COLOSTOMY N/A 4/25/2022    Procedure: CREATION, COLOSTOMY;  Surgeon: Carlton Waddell MD;  Location: [facility] OR;  Service: General;  Laterality: N/A;    INSERTION OF TUNNELED CENTRAL VENOUS CATHETER (CVC) WITH SUBCUTANEOUS PORT Right 5/18/2022    Procedure: [id number];  Surgeon: Carlton aWddell MD;  Location: [facility] OR;  Service: General;  Laterality: Right;    MOBILIZATION OF SPLENIC FLEXURE N/A 4/25/2022    Procedure: MOBILIZATION, SPLENIC FLEXURE;  Surgeon: Carlton Waddell MD;  Location: [facility] OR;  Service: General;  Laterality: N/A;    SPLENECTOMY N/A 4/25/2022    Procedure: SPLENECTOMY;  Surgeon: Carlton Waddell MD;  Location: [facility] OR;  Service: General;  Laterality: N/A;    SUBTOTAL COLECTOMY N/A 4/25/2022    Procedure: COLECTOMY, PARTIAL;  Surgeon: Carlton Waddell MD;  Location: [facility] OR;  Service: General;  Laterality: N/A;     Social History     Tobacco Use    Smoking status: Current Every Day Smoker     Packs/day: 0.50     Years: 35.00     Pack years: 17.50     Types: Cigarettes    Smokeless tobacco: Never Used   Substance Use Topics    Alcohol use: Not Currently        Family History   Problem Relation Age of Onset     Heart disease Father          Review of Systems   Constitutional: Positive for appetite change and unexpected weight change. Negative for chills and fever.   HENT: Negative for sinus pain.    Respiratory: Negative for cough and shortness of breath.    Cardiovascular: Negative for chest pain and leg swelling.   Gastrointestinal: Negative for abdominal pain, diarrhea and nausea.        Colostomy bag   Genitourinary: Negative for dysuria.   Musculoskeletal: Positive for back pain.   Neurological: Negative for headaches.        VAD: R Port-A-Cath accessed, no redness noted, non tender to palpation - will have it de-accessed8/26    Objective:      There were no vitals taken for this visit. There is no height or weight on file to calculate BMI. virtual visit 8/23: looks comfortable on room air, conversant.     7/7:  Physical Exam  Constitutional:       Appearance: He is normal weight.      Comments: Frail   HENT:      Mouth/Throat:      Mouth: Mucous membranes are moist.      Pharynx: Oropharynx is clear.   Eyes:      Conjunctiva/sclera: Conjunctivae normal.      Pupils: Pupils are equal, round, and reactive to light.   Cardiovascular:      Rate and Rhythm: Normal rate and regular rhythm.      Pulses: Normal pulses.      Heart sounds: Normal heart sounds.   Pulmonary:      Effort: Pulmonary effort is normal.      Breath sounds: Normal breath sounds.   Abdominal:      General: Bowel sounds are normal.      Palpations: Abdomen is soft.      Tenderness: There is no abdominal tenderness.      Comments: Healed wound, LL colostomy, formed stool in bag, non tender to palpation   Musculoskeletal:         General: Normal range of motion.      Cervical back: Normal range of motion.      Right lower leg: No edema.      Left lower leg: No edema.   Skin:     General: Skin is warm.      Capillary Refill: Capillary refill takes less than 2 seconds.   Neurological:      General: No focal deficit present.      Mental Status: He is  alert and oriented to person, place, and time. Mental status is at baseline.   Psychiatric:         Thought Content: Thought content normal.         VAD: R Port-A-Cath accessed, no redness noted, non tender to palpation - will have it de-accessed 8/26        Recent Diagnostics:  CT scan 8/5:   Rim-enhancing fluid collection within the left upper quadrant at the splenic bed minimally increased in size when compared to the prior study and suspicious for abscess  Improvement of the masslike hypoattenuation involving the tail of the pancreas most likely secondary to the adjacent inflammatory changes.  Small left pleural effusion with left lower lobe atelectasis  Left lower quadrant ostomy  Prior colectomy.    CT scan 7/8:  Rim-enhancing left upper quadrant fluid collection suspicious for abscess considering the provided clinical history.  Adjacent to this there is masslike hypoattenuation involving the tail of the pancreas. This could is suspicious for pancreatic neoplasm, although phlegmon related to aforementioned abscess is also possible. Dedicated pancreatic protocol imaging and correlation with surgical history is recommended.    CT scan 6/20/22:  1. Postsurgical change of the colon with increased size of air-fluid collection near the operative bed in the left upper abdominal quadrant.  Abscess or contained perforation are considerations.  2. Hepatomegaly.    CT scan 6/29/22:  1. Decreased size of left upper quadrant air-fluid collection.  2. Unchanged size of left pleural effusion.      Micro:  Wound cultures 5/2 E coli, Klebsiella pneumoniae and Candida dubliensis    Pathology:  inal Pathologic Diagnosis 1. Spleen, splenectomy:   - Benign splenic parenchyma   - Negative for malignancy   2. Colon, partial colectomy:   - Invasive colonic adenocarcinoma, poorly differentiated (G3)     - Invades into pericolorectal tissue (pT3)   - Margins uninvolved by invasive carcinoma     - 0.1 cm to the mesenteric margin   -  Lymphovascular invasion identified   - No perineural invasion identified   - Seventeen of twenty-seven lymph nodes, positive for metastatic carcinoma   (17/27, pN2b)   - Fibrous serosal adhesions   - See below for results of MSI testing   - CARLOS ENRIQUE/MILLIE Targeted Gene Panel has been ordered and results will be issued in   a supplemental report     CAP Synoptic Checklist for Colonic Neoplasms:     - Procedure: Partial colectomy     - Tumor Site: Splenic flexure     - Histologic Type: Adenocarcinoma     - Histologic Grade: G3, poorly differentiated     - Tumor Size: 5.0 x 4.5 x 2.7 cm     - Tumor Extent: Invades through muscularis propria into pericolorectal   tissue     - Macroscopic Tumor Perforation: Not identified     - Lymphovascular Invasion: Present, small vessel involved     - Perineural Invasion: Not identified     - Number of Tumor Buds: 5 in one hotspot field     - Tumor Bud Score: Intermediate (5-9)     - Type of Polyp in which Invasive Carcinoma Arose: None identified     - Treatment Effect: No known presurgical therapy     - Margins: All margins negative for invasive carcinoma              Assessment and Plan:         1. Poorly differentiated colon cancer s/p colectomy and splenectomy 4/21, on chemotherapy 4th cycle, complicated by recurrent intra-abdominal abscess, unable to drain by IR, latest CT scan 8/5 with slightly increased size of fluid collection at the splenic bed - possible sterile fluid collection    On ceftriaxone IV since 7/11, will de-access port on 8/26   Labs reviewed, , high - inflammation vs infection given underlying malignancy    Repeat CT scan in 2 weeks to assess status of splenic fluid collection    Plan for NM bone scan in September to rule out metastasis, follows with Dr Khoobehi outpatient    RTC in 3 weeks with labs    S/p Menveo and Pneumovac vaccines as per CDC guidelines, awaiting availability of H flu vaccine        2. Smoker   3. PMHx HTN, diabetes, HLD    PCP  follow-up    D/w patient, wife    Intra-abdominal abscess  -     C-reactive protein; Future; Expected date: 09/01/2022  -     CT Abdomen Pelvis With Contrast; Future; Expected date: 09/07/2022

## 2022-08-25 NOTE — NURSING
1133. Pt Port a cath access left in due to pt receiving IV antibiotics tomorrow at the Hospital. Order were seen that Access with be taking out after the IV antibiotics. Port flushed per protocal and capped. Dressing was  Secured and intact. Pt instructed to not remove the needle or dressing and do do not flush anything into port. Pt stated understanding.

## 2022-08-26 ENCOUNTER — INFUSION (OUTPATIENT)
Dept: INFUSION THERAPY | Facility: HOSPITAL | Age: 57
End: 2022-08-26
Attending: STUDENT IN AN ORGANIZED HEALTH CARE EDUCATION/TRAINING PROGRAM
Payer: COMMERCIAL

## 2022-08-26 DIAGNOSIS — K65.1 INTRA-ABDOMINAL ABSCESS: Primary | ICD-10-CM

## 2022-08-26 PROCEDURE — 63600175 PHARM REV CODE 636 W HCPCS: Performed by: STUDENT IN AN ORGANIZED HEALTH CARE EDUCATION/TRAINING PROGRAM

## 2022-08-26 RX ORDER — SODIUM CHLORIDE 0.9 % (FLUSH) 0.9 %
10 SYRINGE (ML) INJECTION
Status: CANCELLED | OUTPATIENT
Start: 2022-08-26

## 2022-08-26 RX ORDER — HEPARIN 100 UNIT/ML
500 SYRINGE INTRAVENOUS
Status: CANCELLED | OUTPATIENT
Start: 2022-08-26

## 2022-08-26 RX ORDER — HEPARIN 100 UNIT/ML
500 SYRINGE INTRAVENOUS
Status: DISCONTINUED | OUTPATIENT
Start: 2022-08-26 | End: 2022-08-26 | Stop reason: HOSPADM

## 2022-08-26 RX ADMIN — Medication 500 UNITS: at 08:08

## 2022-09-01 ENCOUNTER — OFFICE VISIT (OUTPATIENT)
Dept: HEMATOLOGY/ONCOLOGY | Facility: CLINIC | Age: 57
End: 2022-09-01
Payer: COMMERCIAL

## 2022-09-01 ENCOUNTER — LAB VISIT (OUTPATIENT)
Dept: LAB | Facility: HOSPITAL | Age: 57
End: 2022-09-01
Attending: INTERNAL MEDICINE
Payer: COMMERCIAL

## 2022-09-01 VITALS
DIASTOLIC BLOOD PRESSURE: 84 MMHG | WEIGHT: 191.13 LBS | OXYGEN SATURATION: 97 % | RESPIRATION RATE: 13 BRPM | BODY MASS INDEX: 24.53 KG/M2 | HEIGHT: 74 IN | SYSTOLIC BLOOD PRESSURE: 130 MMHG | HEART RATE: 98 BPM | TEMPERATURE: 98 F

## 2022-09-01 DIAGNOSIS — K63.89 COLONIC MASS: ICD-10-CM

## 2022-09-01 DIAGNOSIS — C18.4 MALIGNANT NEOPLASM OF TRANSVERSE COLON: Primary | ICD-10-CM

## 2022-09-01 DIAGNOSIS — E11.00 TYPE 2 DIABETES MELLITUS WITH HYPEROSMOLARITY WITHOUT COMA, WITHOUT LONG-TERM CURRENT USE OF INSULIN: ICD-10-CM

## 2022-09-01 DIAGNOSIS — G62.0 CHEMOTHERAPY-INDUCED NEUROPATHY: ICD-10-CM

## 2022-09-01 DIAGNOSIS — K65.1 INTRA-ABDOMINAL ABSCESS: ICD-10-CM

## 2022-09-01 DIAGNOSIS — M54.9 ACUTE BACK PAIN LESS THAN 4 WEEKS DURATION: ICD-10-CM

## 2022-09-01 DIAGNOSIS — T45.1X5A CHEMOTHERAPY-INDUCED NEUROPATHY: ICD-10-CM

## 2022-09-01 DIAGNOSIS — E78.49 OTHER HYPERLIPIDEMIA: ICD-10-CM

## 2022-09-01 DIAGNOSIS — I10 PRIMARY HYPERTENSION: ICD-10-CM

## 2022-09-01 LAB
ALBUMIN SERPL BCP-MCNC: 3.5 G/DL (ref 3.5–5.2)
ALP SERPL-CCNC: 121 U/L (ref 55–135)
ALT SERPL W/O P-5'-P-CCNC: 16 U/L (ref 10–44)
ANION GAP SERPL CALC-SCNC: 11 MMOL/L (ref 8–16)
AST SERPL-CCNC: 19 U/L (ref 10–40)
BASOPHILS # BLD AUTO: 0.08 K/UL (ref 0–0.2)
BASOPHILS NFR BLD: 1.1 % (ref 0–1.9)
BILIRUB SERPL-MCNC: 0.4 MG/DL (ref 0.1–1)
BUN SERPL-MCNC: 14 MG/DL (ref 6–20)
CALCIUM SERPL-MCNC: 10.4 MG/DL (ref 8.7–10.5)
CHLORIDE SERPL-SCNC: 94 MMOL/L (ref 95–110)
CO2 SERPL-SCNC: 26 MMOL/L (ref 23–29)
CREAT SERPL-MCNC: 0.9 MG/DL (ref 0.5–1.4)
DIFFERENTIAL METHOD: ABNORMAL
EOSINOPHIL # BLD AUTO: 0.2 K/UL (ref 0–0.5)
EOSINOPHIL NFR BLD: 2.2 % (ref 0–8)
ERYTHROCYTE [DISTWIDTH] IN BLOOD BY AUTOMATED COUNT: 19.9 % (ref 11.5–14.5)
EST. GFR  (NO RACE VARIABLE): >60 ML/MIN/1.73 M^2
GLUCOSE SERPL-MCNC: 292 MG/DL (ref 70–110)
HCT VFR BLD AUTO: 39.3 % (ref 40–54)
HGB BLD-MCNC: 13.6 G/DL (ref 14–18)
IMM GRANULOCYTES # BLD AUTO: 0.02 K/UL (ref 0–0.04)
IMM GRANULOCYTES NFR BLD AUTO: 0.3 % (ref 0–0.5)
LYMPHOCYTES # BLD AUTO: 3.4 K/UL (ref 1–4.8)
LYMPHOCYTES NFR BLD: 47 % (ref 18–48)
MAGNESIUM SERPL-MCNC: 1.9 MG/DL (ref 1.6–2.6)
MCH RBC QN AUTO: 26.6 PG (ref 27–31)
MCHC RBC AUTO-ENTMCNC: 34.6 G/DL (ref 32–36)
MCV RBC AUTO: 77 FL (ref 82–98)
MONOCYTES # BLD AUTO: 0.9 K/UL (ref 0.3–1)
MONOCYTES NFR BLD: 12.8 % (ref 4–15)
NEUTROPHILS # BLD AUTO: 2.6 K/UL (ref 1.8–7.7)
NEUTROPHILS NFR BLD: 36.6 % (ref 38–73)
NRBC BLD-RTO: 3 /100 WBC
PLATELET # BLD AUTO: 224 K/UL (ref 150–450)
PMV BLD AUTO: 9 FL (ref 9.2–12.9)
POTASSIUM SERPL-SCNC: 3.6 MMOL/L (ref 3.5–5.1)
PROT SERPL-MCNC: 7.3 G/DL (ref 6–8.4)
RBC # BLD AUTO: 5.11 M/UL (ref 4.6–6.2)
SODIUM SERPL-SCNC: 131 MMOL/L (ref 136–145)
WBC # BLD AUTO: 7.13 K/UL (ref 3.9–12.7)

## 2022-09-01 PROCEDURE — 99215 PR OFFICE/OUTPT VISIT, EST, LEVL V, 40-54 MIN: ICD-10-PCS | Mod: S$GLB,,, | Performed by: INTERNAL MEDICINE

## 2022-09-01 PROCEDURE — 3079F PR MOST RECENT DIASTOLIC BLOOD PRESSURE 80-89 MM HG: ICD-10-PCS | Mod: CPTII,S$GLB,, | Performed by: INTERNAL MEDICINE

## 2022-09-01 PROCEDURE — 3079F DIAST BP 80-89 MM HG: CPT | Mod: CPTII,S$GLB,, | Performed by: INTERNAL MEDICINE

## 2022-09-01 PROCEDURE — 3075F SYST BP GE 130 - 139MM HG: CPT | Mod: CPTII,S$GLB,, | Performed by: INTERNAL MEDICINE

## 2022-09-01 PROCEDURE — 3008F BODY MASS INDEX DOCD: CPT | Mod: CPTII,S$GLB,, | Performed by: INTERNAL MEDICINE

## 2022-09-01 PROCEDURE — 83735 ASSAY OF MAGNESIUM: CPT | Performed by: INTERNAL MEDICINE

## 2022-09-01 PROCEDURE — 4010F ACE/ARB THERAPY RXD/TAKEN: CPT | Mod: CPTII,S$GLB,, | Performed by: INTERNAL MEDICINE

## 2022-09-01 PROCEDURE — 99999 PR PBB SHADOW E&M-EST. PATIENT-LVL IV: ICD-10-PCS | Mod: PBBFAC,,, | Performed by: INTERNAL MEDICINE

## 2022-09-01 PROCEDURE — 3008F PR BODY MASS INDEX (BMI) DOCUMENTED: ICD-10-PCS | Mod: CPTII,S$GLB,, | Performed by: INTERNAL MEDICINE

## 2022-09-01 PROCEDURE — 1159F PR MEDICATION LIST DOCUMENTED IN MEDICAL RECORD: ICD-10-PCS | Mod: CPTII,S$GLB,, | Performed by: INTERNAL MEDICINE

## 2022-09-01 PROCEDURE — 3046F PR MOST RECENT HEMOGLOBIN A1C LEVEL > 9.0%: ICD-10-PCS | Mod: CPTII,S$GLB,, | Performed by: INTERNAL MEDICINE

## 2022-09-01 PROCEDURE — 4010F PR ACE/ARB THEARPY RXD/TAKEN: ICD-10-PCS | Mod: CPTII,S$GLB,, | Performed by: INTERNAL MEDICINE

## 2022-09-01 PROCEDURE — 3075F PR MOST RECENT SYSTOLIC BLOOD PRESS GE 130-139MM HG: ICD-10-PCS | Mod: CPTII,S$GLB,, | Performed by: INTERNAL MEDICINE

## 2022-09-01 PROCEDURE — 3046F HEMOGLOBIN A1C LEVEL >9.0%: CPT | Mod: CPTII,S$GLB,, | Performed by: INTERNAL MEDICINE

## 2022-09-01 PROCEDURE — 1160F RVW MEDS BY RX/DR IN RCRD: CPT | Mod: CPTII,S$GLB,, | Performed by: INTERNAL MEDICINE

## 2022-09-01 PROCEDURE — 1159F MED LIST DOCD IN RCRD: CPT | Mod: CPTII,S$GLB,, | Performed by: INTERNAL MEDICINE

## 2022-09-01 PROCEDURE — 85025 COMPLETE CBC W/AUTO DIFF WBC: CPT | Performed by: INTERNAL MEDICINE

## 2022-09-01 PROCEDURE — 99215 OFFICE O/P EST HI 40 MIN: CPT | Mod: S$GLB,,, | Performed by: INTERNAL MEDICINE

## 2022-09-01 PROCEDURE — 99999 PR PBB SHADOW E&M-EST. PATIENT-LVL IV: CPT | Mod: PBBFAC,,, | Performed by: INTERNAL MEDICINE

## 2022-09-01 PROCEDURE — 1160F PR REVIEW ALL MEDS BY PRESCRIBER/CLIN PHARMACIST DOCUMENTED: ICD-10-PCS | Mod: CPTII,S$GLB,, | Performed by: INTERNAL MEDICINE

## 2022-09-01 PROCEDURE — 36415 COLL VENOUS BLD VENIPUNCTURE: CPT | Performed by: INTERNAL MEDICINE

## 2022-09-01 PROCEDURE — 80053 COMPREHEN METABOLIC PANEL: CPT | Performed by: INTERNAL MEDICINE

## 2022-09-01 RX ORDER — HEPARIN 100 UNIT/ML
500 SYRINGE INTRAVENOUS
Status: CANCELLED | OUTPATIENT
Start: 2022-09-06

## 2022-09-01 RX ORDER — SODIUM CHLORIDE 0.9 % (FLUSH) 0.9 %
10 SYRINGE (ML) INJECTION
Status: CANCELLED | OUTPATIENT
Start: 2022-09-08

## 2022-09-01 RX ORDER — FLUOROURACIL 50 MG/ML
2400 INJECTION, SOLUTION INTRAVENOUS
Status: CANCELLED | OUTPATIENT
Start: 2022-09-06

## 2022-09-01 RX ORDER — FLUOROURACIL 50 MG/ML
400 INJECTION, SOLUTION INTRAVENOUS
Status: CANCELLED | OUTPATIENT
Start: 2022-09-06

## 2022-09-01 RX ORDER — DIPHENHYDRAMINE HYDROCHLORIDE 50 MG/ML
50 INJECTION INTRAMUSCULAR; INTRAVENOUS ONCE AS NEEDED
Status: CANCELLED | OUTPATIENT
Start: 2022-09-06

## 2022-09-01 RX ORDER — EPINEPHRINE 0.3 MG/.3ML
0.3 INJECTION SUBCUTANEOUS ONCE AS NEEDED
Status: CANCELLED | OUTPATIENT
Start: 2022-09-06

## 2022-09-01 RX ORDER — HEPARIN 100 UNIT/ML
500 SYRINGE INTRAVENOUS
Status: CANCELLED | OUTPATIENT
Start: 2022-09-08

## 2022-09-01 RX ORDER — OXYCODONE HYDROCHLORIDE 30 MG/1
30 TABLET ORAL EVERY 6 HOURS PRN
Qty: 60 TABLET | Refills: 0 | Status: SHIPPED | OUTPATIENT
Start: 2022-09-01 | End: 2022-10-03 | Stop reason: SDUPTHER

## 2022-09-01 RX ORDER — SODIUM CHLORIDE 0.9 % (FLUSH) 0.9 %
10 SYRINGE (ML) INJECTION
Status: CANCELLED | OUTPATIENT
Start: 2022-09-06

## 2022-09-01 NOTE — PROGRESS NOTES
Service Date:  9/1/22    Chief Complaint: Colon Cancer    Osman Li Jr. is a 57 y.o. 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  A CT scan of the abdomen was done which showed a large obstructive lesion with lymph node involvement.  There was also a 9 mm hypodense liver lesion that could not be characterized.  Patient had a hemicolectomy with ostomy bag placed, and splenectomy.      He was then set to start chemotherapy but it was delayed due to the liver lesion.  He was evaluated by Dr. Goodwin with Surgical Oncology who determined that this was likely not malignant.  Unfortunately afterwards, patient suffered a setback with an abdominal abscess formation that could not be drained.  He recently completed 2 weeks of antibiotics, and a repeat CT scan showed the presence of an abscess still there, but improved.  He had 2 weeks of abx, but a repeat scan showed some growth. It is thought that this is likely sterile fluid. He will finish his abx and we can monitor with scan.    He is here for cycle 5 of treatment.  He continues to have back pain, which is not improving with therapy.  It is located in the top right side, no pain with movement.  Dull in nature.  Comes and goes.  He also has neuropathy that comes and goes and lasts for a few days after the chemotherapy.    Review of Systems   Constitutional: Negative.    HENT: Negative.     Eyes: Negative.    Respiratory: Negative.     Cardiovascular: Negative.    Gastrointestinal: Negative.    Endocrine: Negative.    Genitourinary: Negative.    Musculoskeletal: Negative.    Integumentary:  Negative.   Neurological: Negative.    Hematological: Negative.    Psychiatric/Behavioral: Negative.        Current Outpatient Medications   Medication Instructions    atorvastatin (LIPITOR) 20 mg, Oral, Daily    enalapriL-hydrochlorothiazide (VASERETIC) 10-25 mg per tablet 1 tablet, Oral, Daily    heparin lock flush, porcine, (HEPARIN FLUSH 100 UNITS/ML) 100  "unit/mL Soln 3-5 mL DAILY (route: intravenous)    meloxicam (MOBIC) 7.5 mg, Oral, Daily    metFORMIN (GLUCOPHAGE) 1,000 mg, Oral, 2 times daily with meals    oxyCODONE (ROXICODONE) 30 mg, Oral, Every 6 hours PRN    OZEMPIC 0.25 mg, Subcutaneous, Every 7 days    pen needle, diabetic 31 gauge x 3/16" Ndle 1 each, Misc.(Non-Drug; Combo Route), Daily    promethazine (PHENERGAN) 12.5 mg, Oral, Every 4 hours PRN    sildenafiL (VIAGRA) 100 mg, Oral, Daily PRN    sodium chloride 0.9% (NORMAL SALINE FLUSH) injection 10 mL DAILY (route: injection)    TOUJEO MAX U-300 SOLOSTAR 20 Units, Subcutaneous, Daily        Past Medical History:   Diagnosis Date    DM (diabetes mellitus)     Hyperlipidemia     Hypertension     Prediabetes         Past Surgical History:   Procedure Laterality Date    CHOLECYSTECTOMY  2011    COLONOSCOPY      2018    COLOSTOMY N/A 4/25/2022    Procedure: CREATION, COLOSTOMY;  Surgeon: Carlton Waddell MD;  Location: [facility] OR;  Service: General;  Laterality: N/A;    INSERTION OF TUNNELED CENTRAL VENOUS CATHETER (CVC) WITH SUBCUTANEOUS PORT Right 5/18/2022    Procedure: [id number];  Surgeon: Carlton Waddell MD;  Location: [facility] OR;  Service: General;  Laterality: Right;    MOBILIZATION OF SPLENIC FLEXURE N/A 4/25/2022    Procedure: MOBILIZATION, SPLENIC FLEXURE;  Surgeon: Carlton Waddell MD;  Location: [facility] OR;  Service: General;  Laterality: N/A;    SPLENECTOMY N/A 4/25/2022    Procedure: SPLENECTOMY;  Surgeon: Carlton Waddell MD;  Location: [facility] OR;  Service: General;  Laterality: N/A;    SUBTOTAL COLECTOMY N/A 4/25/2022    Procedure: COLECTOMY, PARTIAL;  Surgeon: Carlton Waddell MD;  Location: [facility] OR;  Service: General;  Laterality: N/A;        Family History   Problem Relation Age of Onset    Heart disease Father        Social History     Tobacco Use    Smoking status: Every Day     Packs/day: 0.50     Years: 35.00     Pack years: 17.50     Types: Cigarettes    Smokeless tobacco: Never " "  Substance Use Topics    Alcohol use: Not Currently    Drug use: Never         Vitals:    09/01/22 0942   BP: 130/84   Pulse: 98   Resp: 13   Temp: 98 °F (36.7 °C)        Physical Exam:  /84 (BP Location: Right arm, Patient Position: Sitting, BP Method: Medium (Automatic))   Pulse 98   Temp 98 °F (36.7 °C) (Temporal)   Resp 13   Ht 6' 2" (1.88 m)   Wt 86.7 kg (191 lb 2.2 oz)   SpO2 97%   BMI 24.54 kg/m²     Physical Exam  Vitals and nursing note reviewed.   Constitutional:       Appearance: Normal appearance.   HENT:      Head: Normocephalic and atraumatic.      Nose: Nose normal.      Mouth/Throat:      Mouth: Mucous membranes are moist.      Pharynx: Oropharynx is clear.   Eyes:      Extraocular Movements: Extraocular movements intact.      Conjunctiva/sclera: Conjunctivae normal.   Cardiovascular:      Rate and Rhythm: Normal rate and regular rhythm.      Heart sounds: Normal heart sounds.   Pulmonary:      Effort: Pulmonary effort is normal.      Breath sounds: Normal breath sounds.   Abdominal:      General: Abdomen is flat. Bowel sounds are normal.      Palpations: Abdomen is soft.      Comments: Ostomy bag in place.   Musculoskeletal:         General: Normal range of motion.      Cervical back: Normal range of motion and neck supple.   Skin:     General: Skin is warm and dry.   Neurological:      General: No focal deficit present.      Mental Status: He is alert and oriented to person, place, and time. Mental status is at baseline.   Psychiatric:         Mood and Affect: Mood normal.        Labs:  Lab Results   Component Value Date    WBC 7.13 09/01/2022    RBC 5.11 09/01/2022    HGB 13.6 (L) 09/01/2022    HCT 39.3 (L) 09/01/2022    MCV 77 (L) 09/01/2022    MCH 26.6 (L) 09/01/2022    MCHC 34.6 09/01/2022    RDW 19.9 (H) 09/01/2022     09/01/2022    MPV 9.0 (L) 09/01/2022    GRAN 2.6 09/01/2022    GRAN 36.6 (L) 09/01/2022    LYMPH 3.4 09/01/2022    LYMPH 47.0 09/01/2022    MONO 0.9 " 09/01/2022    MONO 12.8 09/01/2022    EOS 0.2 09/01/2022    BASO 0.08 09/01/2022    EOSINOPHIL 2.2 09/01/2022    BASOPHIL 1.1 09/01/2022     Sodium   Date Value Ref Range Status   09/01/2022 131 (L) 136 - 145 mmol/L Final     Potassium   Date Value Ref Range Status   09/01/2022 3.6 3.5 - 5.1 mmol/L Final     Chloride   Date Value Ref Range Status   09/01/2022 94 (L) 95 - 110 mmol/L Final     CO2   Date Value Ref Range Status   09/01/2022 26 23 - 29 mmol/L Final     Glucose   Date Value Ref Range Status   09/01/2022 292 (H) 70 - 110 mg/dL Final     BUN   Date Value Ref Range Status   09/01/2022 14 6 - 20 mg/dL Final     Creatinine   Date Value Ref Range Status   09/01/2022 0.9 0.5 - 1.4 mg/dL Final     Calcium   Date Value Ref Range Status   09/01/2022 10.4 8.7 - 10.5 mg/dL Final     Total Protein   Date Value Ref Range Status   09/01/2022 7.3 6.0 - 8.4 g/dL Final     Albumin   Date Value Ref Range Status   09/01/2022 3.5 3.5 - 5.2 g/dL Final     Total Bilirubin   Date Value Ref Range Status   09/01/2022 0.4 0.1 - 1.0 mg/dL Final     Comment:     For infants and newborns, interpretation of results should be based  on gestational age, weight and in agreement with clinical  observations.    Premature Infant recommended reference ranges:  Up to 24 hours.............<8.0 mg/dL  Up to 48 hours............<12.0 mg/dL  3-5 days..................<15.0 mg/dL  6-29 days.................<15.0 mg/dL       Alkaline Phosphatase   Date Value Ref Range Status   09/01/2022 121 55 - 135 U/L Final     AST   Date Value Ref Range Status   09/01/2022 19 10 - 40 U/L Final     ALT   Date Value Ref Range Status   09/01/2022 16 10 - 44 U/L Final     Anion Gap   Date Value Ref Range Status   09/01/2022 11 8 - 16 mmol/L Final     eGFR if    Date Value Ref Range Status   07/25/2022 >60 >60 mL/min/1.73 m^2 Final     eGFR if non    Date Value Ref Range Status   07/25/2022 >60 >60 mL/min/1.73 m^2 Final     Comment:      Calculation used to obtain the estimated glomerular filtration  rate (eGFR) is the CKD-EPI equation.          A/P:    Malignant neoplasm of the transverse colon  -poorly differentiated adenocarcinoma  -pT3 N2b  -patient on clinical trial to measure ctDNA  -labs reviewed  -FOLFOX started on 7/12/22  -proceed with cycle 5  -return to clinic in 2 weeks for next treatment    Chemo induced neuropathy  - grade 1  - continue treatment as is; plan to drop oxaliplatin after 8 cycles or sooner if needed    Intra-abdominal abscess  -completed antibiotics and abscess is still present, but improved. Likely sterile fluid at this point.  -now on IV abx    Back pain  - I will order a NM bone scan to rule out malignancy as patient had extensive disease to start    HTN  - on Enalapril  - follow up with PCP    HLP  - follow up with PCP    DM2  - on Metformin  - follow up with PCP    Tobacco use  - counseled on cessation      Aurash Khoobehi, MD  Hematology and Oncology

## 2022-09-02 ENCOUNTER — HOSPITAL ENCOUNTER (OUTPATIENT)
Dept: RADIOLOGY | Facility: HOSPITAL | Age: 57
Discharge: HOME | End: 2022-09-02
Attending: STUDENT IN AN ORGANIZED HEALTH CARE EDUCATION/TRAINING PROGRAM
Payer: COMMERCIAL

## 2022-09-02 ENCOUNTER — PATIENT MESSAGE (OUTPATIENT)
Dept: INFECTIOUS DISEASES | Facility: CLINIC | Age: 57
End: 2022-09-02

## 2022-09-02 DIAGNOSIS — K65.1 INTRA-ABDOMINAL ABSCESS: ICD-10-CM

## 2022-09-02 PROCEDURE — 25500020 PHARM REV CODE 255: Mod: PO | Performed by: STUDENT IN AN ORGANIZED HEALTH CARE EDUCATION/TRAINING PROGRAM

## 2022-09-02 PROCEDURE — 74177 CT ABD & PELVIS W/CONTRAST: CPT | Mod: TC,PO

## 2022-09-02 RX ADMIN — IOHEXOL 100 ML: 350 INJECTION, SOLUTION INTRAVENOUS at 09:09

## 2022-09-06 ENCOUNTER — DOCUMENTATION ONLY (OUTPATIENT)
Dept: HEMATOLOGY/ONCOLOGY | Facility: CLINIC | Age: 57
End: 2022-09-06

## 2022-09-06 ENCOUNTER — INFUSION (OUTPATIENT)
Dept: INFUSION THERAPY | Facility: HOSPITAL | Age: 57
End: 2022-09-06
Attending: INTERNAL MEDICINE
Payer: COMMERCIAL

## 2022-09-06 VITALS
BODY MASS INDEX: 23.92 KG/M2 | OXYGEN SATURATION: 97 % | SYSTOLIC BLOOD PRESSURE: 127 MMHG | RESPIRATION RATE: 17 BRPM | WEIGHT: 186.38 LBS | HEIGHT: 74 IN | DIASTOLIC BLOOD PRESSURE: 80 MMHG | HEART RATE: 80 BPM | TEMPERATURE: 98 F

## 2022-09-06 DIAGNOSIS — C18.5 MALIGNANT NEOPLASM OF SPLENIC FLEXURE: Primary | ICD-10-CM

## 2022-09-06 PROCEDURE — 63600175 PHARM REV CODE 636 W HCPCS: Performed by: INTERNAL MEDICINE

## 2022-09-06 PROCEDURE — 25000003 PHARM REV CODE 250: Performed by: INTERNAL MEDICINE

## 2022-09-06 PROCEDURE — 96366 THER/PROPH/DIAG IV INF ADDON: CPT

## 2022-09-06 PROCEDURE — 96411 CHEMO IV PUSH ADDL DRUG: CPT

## 2022-09-06 PROCEDURE — 96415 CHEMO IV INFUSION ADDL HR: CPT

## 2022-09-06 PROCEDURE — 96368 THER/DIAG CONCURRENT INF: CPT

## 2022-09-06 PROCEDURE — 96416 CHEMO PROLONG INFUSE W/PUMP: CPT

## 2022-09-06 PROCEDURE — 96413 CHEMO IV INFUSION 1 HR: CPT

## 2022-09-06 PROCEDURE — 96367 TX/PROPH/DG ADDL SEQ IV INF: CPT

## 2022-09-06 RX ORDER — SODIUM CHLORIDE 0.9 % (FLUSH) 0.9 %
10 SYRINGE (ML) INJECTION
Status: DISCONTINUED | OUTPATIENT
Start: 2022-09-06 | End: 2022-09-06 | Stop reason: HOSPADM

## 2022-09-06 RX ORDER — HEPARIN 100 UNIT/ML
500 SYRINGE INTRAVENOUS
Status: DISCONTINUED | OUTPATIENT
Start: 2022-09-06 | End: 2022-09-06 | Stop reason: HOSPADM

## 2022-09-06 RX ORDER — FLUOROURACIL 50 MG/ML
400 INJECTION, SOLUTION INTRAVENOUS
Status: COMPLETED | OUTPATIENT
Start: 2022-09-06 | End: 2022-09-06

## 2022-09-06 RX ORDER — EPINEPHRINE 0.3 MG/.3ML
0.3 INJECTION SUBCUTANEOUS ONCE AS NEEDED
Status: DISCONTINUED | OUTPATIENT
Start: 2022-09-06 | End: 2022-09-06 | Stop reason: HOSPADM

## 2022-09-06 RX ADMIN — LEUCOVORIN CALCIUM 930 MG: 350 INJECTION, POWDER, LYOPHILIZED, FOR SUSPENSION INTRAMUSCULAR; INTRAVENOUS at 09:09

## 2022-09-06 RX ADMIN — DEXAMETHASONE SODIUM PHOSPHATE 0.25 MG: 4 INJECTION, SOLUTION INTRA-ARTICULAR; INTRALESIONAL; INTRAMUSCULAR; INTRAVENOUS; SOFT TISSUE at 08:09

## 2022-09-06 RX ADMIN — FLUOROURACIL 5570 MG: 50 INJECTION, SOLUTION INTRAVENOUS at 11:09

## 2022-09-06 RX ADMIN — OXALIPLATIN 197 MG: 5 INJECTION, SOLUTION, CONCENTRATE INTRAVENOUS at 09:09

## 2022-09-06 RX ADMIN — FOSAPREPITANT 150 MG: 150 INJECTION, POWDER, LYOPHILIZED, FOR SOLUTION INTRAVENOUS at 08:09

## 2022-09-06 RX ADMIN — DEXTROSE MONOHYDRATE: 50 INJECTION, SOLUTION INTRAVENOUS at 08:09

## 2022-09-06 RX ADMIN — FLUOROURACIL 930 MG: 50 INJECTION, SOLUTION INTRAVENOUS at 11:09

## 2022-09-06 NOTE — PROGRESS NOTES
Medical Nutrition Therapy Oncology Progress Note      Patient's PCP:ANTONIO Emmanuel  Referring Provider: No ref. provider found  Subjective:        Patient ID: Osman Li Jr. is a 57 y.o. male.    Chief Complaint: Unintentional weight loss    Past Medical History:   Diagnosis Date    DM (diabetes mellitus)     Hyperlipidemia     Hypertension     Prediabetes        Past Surgical History:   Procedure Laterality Date    CHOLECYSTECTOMY  2011    COLONOSCOPY      2018    COLOSTOMY N/A 4/25/2022    Procedure: CREATION, COLOSTOMY;  Surgeon: Carlton Waddell MD;  Location: [facility] OR;  Service: General;  Laterality: N/A;    INSERTION OF TUNNELED CENTRAL VENOUS CATHETER (CVC) WITH SUBCUTANEOUS PORT Right 5/18/2022    Procedure: [id number];  Surgeon: Carlton Waddell MD;  Location: [facility] OR;  Service: General;  Laterality: Right;    MOBILIZATION OF SPLENIC FLEXURE N/A 4/25/2022    Procedure: MOBILIZATION, SPLENIC FLEXURE;  Surgeon: Carlton Waddell MD;  Location: [facility] OR;  Service: General;  Laterality: N/A;    SPLENECTOMY N/A 4/25/2022    Procedure: SPLENECTOMY;  Surgeon: Carlton Waddell MD;  Location: [facility] OR;  Service: General;  Laterality: N/A;    SUBTOTAL COLECTOMY N/A 4/25/2022    Procedure: COLECTOMY, PARTIAL;  Surgeon: Carlton Waddell MD;  Location: [facility] OR;  Service: General;  Laterality: N/A;       Social History     Socioeconomic History    Marital status:    Tobacco Use    Smoking status: Every Day     Packs/day: 0.50     Years: 35.00     Pack years: 17.50     Types: Cigarettes    Smokeless tobacco: Never   Substance and Sexual Activity    Alcohol use: Not Currently    Drug use: Never    Sexual activity: Yes     Partners: Female       Family History   Problem Relation Age of Onset    Heart disease Father        Review of patient's allergies indicates:   Allergen Reactions    Penicillins Rash       Current Outpatient Medications:     atorvastatin (LIPITOR) 20  "MG tablet, Take 1 tablet (20 mg total) by mouth once daily., Disp: 90 tablet, Rfl: 1    enalapriL-hydrochlorothiazide (VASERETIC) 10-25 mg per tablet, Take 1 tablet by mouth once daily., Disp: 90 tablet, Rfl: 1    heparin lock flush, porcine, (HEPARIN FLUSH 100 UNITS/ML) 100 unit/mL Soln, 3-5 mL DAILY (route: intravenous), Disp: , Rfl:     insulin glargine U-300 conc (TOUJEO MAX U-300 SOLOSTAR) 300 unit/mL (3 mL) insulin pen, Inject 20 Units into the skin once daily., Disp: 1 pen, Rfl: 5    meloxicam (MOBIC) 7.5 MG tablet, Take 1 tablet (7.5 mg total) by mouth once daily., Disp: 90 tablet, Rfl: 0    metFORMIN (GLUCOPHAGE) 1000 MG tablet, Take 1 tablet (1,000 mg total) by mouth 2 (two) times daily with meals., Disp: 180 tablet, Rfl: 1    oxyCODONE (ROXICODONE) 30 MG Tab, Take 1 tablet (30 mg total) by mouth every 6 (six) hours as needed., Disp: 60 tablet, Rfl: 0    pen needle, diabetic 31 gauge x 3/16" Ndle, 1 each by Misc.(Non-Drug; Combo Route) route once daily., Disp: 90 each, Rfl: 3    promethazine (PHENERGAN) 12.5 MG Tab, Take 1 tablet (12.5 mg total) by mouth every 4 (four) hours as needed., Disp: 25 tablet, Rfl: 1    semaglutide (OZEMPIC) 0.25 mg or 0.5 mg(2 mg/1.5 mL) pen injector, Inject 0.25 mg into the skin every 7 days., Disp: 1 pen, Rfl: 5    sildenafiL (VIAGRA) 100 MG tablet, Take 1 tablet (100 mg total) by mouth daily as needed for Erectile Dysfunction., Disp: 30 tablet, Rfl: 1    sodium chloride 0.9% (NORMAL SALINE FLUSH) injection, 10 mL DAILY (route: injection), Disp: , Rfl:   No current facility-administered medications for this visit.    All medications and past history have been reviewed.    OP mFOLFOX 6 Q2W      Treatment Goal:   Curative      Status:   Active      Start Date:   7/12/2022      End Date:   12/15/2022 (Planned)      Provider:   Aurash Khoobehi, MD      Chemotherapy:   dexAMETHasone (DECADRON) 4 MG Tab, 8 mg, Oral, Daily, 1 of 1 cycle, Start date: --, End date: " --        fluorouraciL injection 930 mg, 400 mg/m2 = 930 mg, Intravenous, Clinic/HOD 1 time, 5 of 12 cycles    Administration: 930 mg (7/12/2022), 930 mg (7/26/2022), 930 mg (8/9/2022), 930 mg (8/23/2022), 930 mg (9/6/2022)        leucovorin calcium 400 mg/m2 = 930 mg in dextrose 5 % 296.5 mL infusion, 400 mg/m2 = 930 mg, Intravenous, Clinic/HOD 1 time, 5 of 12 cycles    Administration: 930 mg (7/12/2022), 930 mg (7/26/2022), 930 mg (8/9/2022), 930 mg (8/23/2022), 930 mg (9/6/2022)        oxaliplatin (ELOXATIN) 85 mg/m2 = 197 mg in dextrose 5 % 539.4 mL chemo infusion, 85 mg/m2 = 197 mg, Intravenous, Clinic/HOD 1 time, 5 of 12 cycles    Administration: 197 mg (7/12/2022), 197 mg (7/26/2022), 197 mg (8/9/2022), 197 mg (8/23/2022), 197 mg (9/6/2022)      Objective:      Wt Readings from Last 20 Encounters:   09/06/22 84.5 kg (186 lb 6.4 oz)   09/01/22 86.7 kg (191 lb 2.2 oz)   08/25/22 88.1 kg (194 lb 4.8 oz)   08/23/22 87.7 kg (193 lb 5.5 oz)   08/23/22 87.7 kg (193 lb 5.5 oz)   08/11/22 91.9 kg (202 lb 9.6 oz)   08/09/22 90.1 kg (198 lb 10.2 oz)   08/09/22 89.8 kg (198 lb)   07/28/22 92.3 kg (203 lb 6.4 oz)   07/26/22 91.7 kg (202 lb 2.6 oz)   07/26/22 91.7 kg (202 lb 2.6 oz)   07/19/22 89.4 kg (197 lb 1.5 oz)   07/14/22 88.5 kg (195 lb 1.6 oz)   07/13/22 90.4 kg (199 lb 3.2 oz)   07/12/22 89.2 kg (196 lb 11.2 oz)   07/11/22 89 kg (196 lb 3.4 oz)   07/07/22 89.9 kg (198 lb 1.6 oz)   06/30/22 91.2 kg (201 lb)   06/21/22 93 kg (205 lb 0.4 oz)   06/09/22 93 kg (205 lb)         Last Labs:  Last Labs:  Glucose   Date Value Ref Range Status   09/01/2022 292 (H) 70 - 110 mg/dL Final   08/22/2022 175 (H) 70 - 110 mg/dL Final     BUN   Date Value Ref Range Status   09/01/2022 14 6 - 20 mg/dL Final   08/22/2022 16 6 - 20 mg/dL Final     Creatinine   Date Value Ref Range Status   09/01/2022 0.9 0.5 - 1.4 mg/dL Final   08/22/2022 1.0 0.5 - 1.4 mg/dL Final     Sodium   Date Value Ref Range Status   09/01/2022 131 (L) 136 - 145  mmol/L Final   08/22/2022 133 (L) 136 - 145 mmol/L Final     Potassium   Date Value Ref Range Status   09/01/2022 3.6 3.5 - 5.1 mmol/L Final   08/22/2022 4.1 3.5 - 5.1 mmol/L Final     Phosphorus   Date Value Ref Range Status   05/02/2022 3.3 2.7 - 4.5 mg/dL Final   04/28/2022 2.3 (L) 2.7 - 4.5 mg/dL Final     Calcium   Date Value Ref Range Status   09/01/2022 10.4 8.7 - 10.5 mg/dL Final   08/22/2022 10.3 8.7 - 10.5 mg/dL Final     No results found for: PREALBUMIN  Total Protein   Date Value Ref Range Status   09/01/2022 7.3 6.0 - 8.4 g/dL Final   08/22/2022 7.4 6.0 - 8.4 g/dL Final     Cholesterol   Date Value Ref Range Status   10/19/2021 134 <200 mg/dL Final   02/18/2021 184 <200 mg/dL Final     Hemoglobin A1C   Date Value Ref Range Status   07/13/2022 9.3 % Final   05/09/2022 7.1 % Final   10/19/2021 8.2 (H) <5.7 % of total Hgb Final     Comment:     For someone without known diabetes, a hemoglobin A1c  value of 6.5% or greater indicates that they may have   diabetes and this should be confirmed with a follow-up   test.     For someone with known diabetes, a value <7% indicates   that their diabetes is well controlled and a value   greater than or equal to 7% indicates suboptimal   control. A1c targets should be individualized based on   duration of diabetes, age, comorbid conditions, and   other considerations.     Currently, no consensus exists regarding use of  hemoglobin A1c for diagnosis of diabetes for children.         02/18/2021 8.0 (H) <5.7 % of total Hgb Final     Comment:     For someone without known diabetes, a hemoglobin A1c  value of 6.5% or greater indicates that they may have   diabetes and this should be confirmed with a follow-up   test.     For someone with known diabetes, a value <7% indicates   that their diabetes is well controlled and a value   greater than or equal to 7% indicates suboptimal   control. A1c targets should be individualized based on   duration of diabetes, age, comorbid  conditions, and   other considerations.     Currently, no consensus exists regarding use of  hemoglobin A1c for diagnosis of diabetes for children.           Hemoglobin   Date Value Ref Range Status   09/01/2022 13.6 (L) 14.0 - 18.0 g/dL Final   08/22/2022 13.3 (L) 14.0 - 18.0 g/dL Final     Hematocrit   Date Value Ref Range Status   09/01/2022 39.3 (L) 40.0 - 54.0 % Final   08/22/2022 39.2 (L) 40.0 - 54.0 % Final     No results found for: IRON  No components found for: FROLATE  No results found for: SUYXUCSF24MR  WBC   Date Value Ref Range Status   09/01/2022 7.13 3.90 - 12.70 K/uL Final   08/22/2022 7.77 3.90 - 12.70 K/uL Final       Assessment:     Nutrition/Diet History     Patient Reported Diet/Restrictions/Preferences: regular diet  Food Allergies: NKFA  Factors Affecting Nutritional Intake: Diarrhea    Estimated/Assessed Needs     Weight Used For Calorie Calculations: 84.5 kg (186 lb)  Energy Calorie Requirements (kcal): 9618-0647 kcal/day   Energy Need Method: 1.2-1.5 Kcal/kg  Protein Requirements: 101-127 g/day   Protein Need Method: 1.2-1.5 g/kg  Fluid Requirements: 2000 ml/day  Estimated Fluid Requirement Method: 1ml/kcal      Nutrition Support  N/A    Evaluation of Received Nutrient/Fluid Intake     Calorie Intake: not meeting needs  Protein Intake: not meeting needs  Fluid Intake: meeting needs  Tolerance: tolerating  % Intake of Estimated Energy Needs: 50-75 %      Nutrition Diagnosis Related to (Etiology) As Evidenced By (Signs/Symptoms)   Inadequate energy intake Decreased ability to consume sufficient energy Unintentional weight loss of 10# in 3 weeks     RD Notes  Mr. Osman Li is a 58y/o male with colon cancer currently receiving folfox. Pt reports good appetite and intake. He denies taste or appetite changes. Denies mouth sores. He does have some diarrhea which he report may be effecting his intake and hydration status. He denies N/V/C. He denies having any nutrition related questions or  concerns at the current time. CW: 186# Noted 10# weight loss in 3 weeks (LW: 196# on 7/12)    Nutrition Intervention:      Nutrition Intervention Fiber-modified diet   Goals/Expected Outcomes Initiate high soluble fiber diet to promote regular BMs and prevent further weight loss   Progress Initial     Nutrition Intervention Fluid-modified diet   Goals/Expected Outcomes Initiate aggressive hydration via water and electrolyte rich fluids to replace losses d/t diarrhea   Progress Initial     Plan  Discussed importance of weight and hydration maintenance during treatment  Recommended high soluble fiber diet and provided examples of high soluble fiber foods  Recommended aggressive hydration via water and sports drink to replace fluids  Pt has RD contact info. Encouraged pt to call with questions or concerns    Monitoring/Evaluation:     Monitor: po intake, weight, BMs    Next Visit: f/u as needed      I have explained and the patient understands all of  the current recommendation(s). I have answered all of their questions to the best of my ability and to their complete satisfaction.   The patient is to continue with the current management plan.    Electronically signed by: Xuan Fair MBA,  LANDONN, LDN

## 2022-09-06 NOTE — PLAN OF CARE
Problem: Diarrhea  Goal: Fluid and Electrolyte Balance  Outcome: Met     Problem: Activity Intolerance  Goal: Enhanced Capacity and Energy  Outcome: Met

## 2022-09-08 ENCOUNTER — INFUSION (OUTPATIENT)
Dept: INFUSION THERAPY | Facility: HOSPITAL | Age: 57
End: 2022-09-08
Attending: INTERNAL MEDICINE
Payer: COMMERCIAL

## 2022-09-08 VITALS
SYSTOLIC BLOOD PRESSURE: 127 MMHG | TEMPERATURE: 98 F | HEIGHT: 74 IN | RESPIRATION RATE: 18 BRPM | OXYGEN SATURATION: 98 % | BODY MASS INDEX: 24.75 KG/M2 | HEART RATE: 83 BPM | DIASTOLIC BLOOD PRESSURE: 69 MMHG | WEIGHT: 192.88 LBS

## 2022-09-08 DIAGNOSIS — C18.5 MALIGNANT NEOPLASM OF SPLENIC FLEXURE: Primary | ICD-10-CM

## 2022-09-08 PROCEDURE — 63600175 PHARM REV CODE 636 W HCPCS: Performed by: INTERNAL MEDICINE

## 2022-09-08 PROCEDURE — 96523 IRRIG DRUG DELIVERY DEVICE: CPT

## 2022-09-08 PROCEDURE — 25000003 PHARM REV CODE 250: Performed by: INTERNAL MEDICINE

## 2022-09-08 PROCEDURE — A4216 STERILE WATER/SALINE, 10 ML: HCPCS | Performed by: INTERNAL MEDICINE

## 2022-09-08 RX ORDER — HEPARIN 100 UNIT/ML
500 SYRINGE INTRAVENOUS
Status: DISCONTINUED | OUTPATIENT
Start: 2022-09-08 | End: 2022-09-08 | Stop reason: HOSPADM

## 2022-09-08 RX ORDER — SODIUM CHLORIDE 0.9 % (FLUSH) 0.9 %
10 SYRINGE (ML) INJECTION
Status: DISCONTINUED | OUTPATIENT
Start: 2022-09-08 | End: 2022-09-08 | Stop reason: HOSPADM

## 2022-09-08 RX ADMIN — HEPARIN 500 UNITS: 100 SYRINGE at 10:09

## 2022-09-08 RX ADMIN — SODIUM CHLORIDE, PRESERVATIVE FREE 10 ML: 5 INJECTION INTRAVENOUS at 10:09

## 2022-09-08 NOTE — PLAN OF CARE
Problem: Fatigue  Goal: Improved Activity Tolerance  Outcome: Ongoing, Progressing  Intervention: Promote Improved Energy  Flowsheets (Taken 9/8/2022 1001)  Fatigue Management:   frequent rest breaks encouraged   fatigue-related activity identified   paced activity encouraged  Sleep/Rest Enhancement:   regular sleep/rest pattern promoted   relaxation techniques promoted

## 2022-09-16 ENCOUNTER — LAB VISIT (OUTPATIENT)
Dept: LAB | Facility: HOSPITAL | Age: 57
End: 2022-09-16
Attending: INTERNAL MEDICINE
Payer: COMMERCIAL

## 2022-09-16 DIAGNOSIS — K63.89 COLONIC MASS: ICD-10-CM

## 2022-09-16 LAB
ALBUMIN SERPL BCP-MCNC: 3.4 G/DL (ref 3.5–5.2)
ALP SERPL-CCNC: 110 U/L (ref 55–135)
ALT SERPL W/O P-5'-P-CCNC: 10 U/L (ref 10–44)
ANION GAP SERPL CALC-SCNC: 9 MMOL/L (ref 8–16)
AST SERPL-CCNC: 18 U/L (ref 10–40)
BASOPHILS # BLD AUTO: 0.1 K/UL (ref 0–0.2)
BASOPHILS NFR BLD: 1 % (ref 0–1.9)
BILIRUB SERPL-MCNC: 0.4 MG/DL (ref 0.1–1)
BUN SERPL-MCNC: 17 MG/DL (ref 6–20)
CALCIUM SERPL-MCNC: 10.5 MG/DL (ref 8.7–10.5)
CHLORIDE SERPL-SCNC: 96 MMOL/L (ref 95–110)
CO2 SERPL-SCNC: 29 MMOL/L (ref 23–29)
CREAT SERPL-MCNC: 0.9 MG/DL (ref 0.5–1.4)
DIFFERENTIAL METHOD: ABNORMAL
EOSINOPHIL # BLD AUTO: 0.1 K/UL (ref 0–0.5)
EOSINOPHIL NFR BLD: 1 % (ref 0–8)
ERYTHROCYTE [DISTWIDTH] IN BLOOD BY AUTOMATED COUNT: 20.6 % (ref 11.5–14.5)
EST. GFR  (NO RACE VARIABLE): >60 ML/MIN/1.73 M^2
GLUCOSE SERPL-MCNC: 190 MG/DL (ref 70–110)
HCT VFR BLD AUTO: 40.2 % (ref 40–54)
HGB BLD-MCNC: 13.2 G/DL (ref 14–18)
IMM GRANULOCYTES # BLD AUTO: 0.04 K/UL (ref 0–0.04)
IMM GRANULOCYTES NFR BLD AUTO: 0.4 % (ref 0–0.5)
LYMPHOCYTES # BLD AUTO: 5.2 K/UL (ref 1–4.8)
LYMPHOCYTES NFR BLD: 51.5 % (ref 18–48)
MAGNESIUM SERPL-MCNC: 1.7 MG/DL (ref 1.6–2.6)
MCH RBC QN AUTO: 26.5 PG (ref 27–31)
MCHC RBC AUTO-ENTMCNC: 32.8 G/DL (ref 32–36)
MCV RBC AUTO: 81 FL (ref 82–98)
MONOCYTES # BLD AUTO: 1.3 K/UL (ref 0.3–1)
MONOCYTES NFR BLD: 13.3 % (ref 4–15)
NEUTROPHILS # BLD AUTO: 3.3 K/UL (ref 1.8–7.7)
NEUTROPHILS NFR BLD: 32.8 % (ref 38–73)
NRBC BLD-RTO: 2 /100 WBC
PLATELET # BLD AUTO: 288 K/UL (ref 150–450)
PMV BLD AUTO: 9.8 FL (ref 9.2–12.9)
POTASSIUM SERPL-SCNC: 4.7 MMOL/L (ref 3.5–5.1)
PROT SERPL-MCNC: 7.3 G/DL (ref 6–8.4)
RBC # BLD AUTO: 4.98 M/UL (ref 4.6–6.2)
SODIUM SERPL-SCNC: 134 MMOL/L (ref 136–145)
WBC # BLD AUTO: 10.01 K/UL (ref 3.9–12.7)

## 2022-09-16 PROCEDURE — 83735 ASSAY OF MAGNESIUM: CPT | Performed by: INTERNAL MEDICINE

## 2022-09-16 PROCEDURE — 36415 COLL VENOUS BLD VENIPUNCTURE: CPT | Performed by: INTERNAL MEDICINE

## 2022-09-16 PROCEDURE — 85025 COMPLETE CBC W/AUTO DIFF WBC: CPT | Performed by: INTERNAL MEDICINE

## 2022-09-16 PROCEDURE — 80053 COMPREHEN METABOLIC PANEL: CPT | Performed by: INTERNAL MEDICINE

## 2022-09-19 ENCOUNTER — OFFICE VISIT (OUTPATIENT)
Dept: HEMATOLOGY/ONCOLOGY | Facility: CLINIC | Age: 57
End: 2022-09-19
Payer: COMMERCIAL

## 2022-09-19 ENCOUNTER — INFUSION (OUTPATIENT)
Dept: INFUSION THERAPY | Facility: HOSPITAL | Age: 57
End: 2022-09-19
Attending: INTERNAL MEDICINE
Payer: COMMERCIAL

## 2022-09-19 VITALS
RESPIRATION RATE: 12 BRPM | HEART RATE: 103 BPM | TEMPERATURE: 98 F | SYSTOLIC BLOOD PRESSURE: 131 MMHG | DIASTOLIC BLOOD PRESSURE: 88 MMHG | OXYGEN SATURATION: 97 % | WEIGHT: 186.5 LBS | BODY MASS INDEX: 23.93 KG/M2 | HEIGHT: 74 IN

## 2022-09-19 VITALS
BODY MASS INDEX: 23.93 KG/M2 | RESPIRATION RATE: 18 BRPM | OXYGEN SATURATION: 98 % | SYSTOLIC BLOOD PRESSURE: 144 MMHG | HEART RATE: 81 BPM | TEMPERATURE: 97 F | HEIGHT: 74 IN | WEIGHT: 186.5 LBS | DIASTOLIC BLOOD PRESSURE: 79 MMHG

## 2022-09-19 DIAGNOSIS — C18.5 MALIGNANT NEOPLASM OF SPLENIC FLEXURE: Primary | ICD-10-CM

## 2022-09-19 DIAGNOSIS — E78.49 OTHER HYPERLIPIDEMIA: ICD-10-CM

## 2022-09-19 DIAGNOSIS — C18.4 MALIGNANT NEOPLASM OF TRANSVERSE COLON: Primary | ICD-10-CM

## 2022-09-19 DIAGNOSIS — I10 PRIMARY HYPERTENSION: ICD-10-CM

## 2022-09-19 DIAGNOSIS — E11.00 TYPE 2 DIABETES MELLITUS WITH HYPEROSMOLARITY WITHOUT COMA, WITHOUT LONG-TERM CURRENT USE OF INSULIN: ICD-10-CM

## 2022-09-19 DIAGNOSIS — K65.1 INTRA-ABDOMINAL ABSCESS: ICD-10-CM

## 2022-09-19 PROCEDURE — 3008F BODY MASS INDEX DOCD: CPT | Mod: CPTII,S$GLB,, | Performed by: INTERNAL MEDICINE

## 2022-09-19 PROCEDURE — 3079F PR MOST RECENT DIASTOLIC BLOOD PRESSURE 80-89 MM HG: ICD-10-PCS | Mod: CPTII,S$GLB,, | Performed by: INTERNAL MEDICINE

## 2022-09-19 PROCEDURE — 3046F HEMOGLOBIN A1C LEVEL >9.0%: CPT | Mod: CPTII,S$GLB,, | Performed by: INTERNAL MEDICINE

## 2022-09-19 PROCEDURE — 3008F PR BODY MASS INDEX (BMI) DOCUMENTED: ICD-10-PCS | Mod: CPTII,S$GLB,, | Performed by: INTERNAL MEDICINE

## 2022-09-19 PROCEDURE — 4010F PR ACE/ARB THEARPY RXD/TAKEN: ICD-10-PCS | Mod: CPTII,S$GLB,, | Performed by: INTERNAL MEDICINE

## 2022-09-19 PROCEDURE — 1159F PR MEDICATION LIST DOCUMENTED IN MEDICAL RECORD: ICD-10-PCS | Mod: CPTII,S$GLB,, | Performed by: INTERNAL MEDICINE

## 2022-09-19 PROCEDURE — 1160F RVW MEDS BY RX/DR IN RCRD: CPT | Mod: CPTII,S$GLB,, | Performed by: INTERNAL MEDICINE

## 2022-09-19 PROCEDURE — 3075F SYST BP GE 130 - 139MM HG: CPT | Mod: CPTII,S$GLB,, | Performed by: INTERNAL MEDICINE

## 2022-09-19 PROCEDURE — 3075F PR MOST RECENT SYSTOLIC BLOOD PRESS GE 130-139MM HG: ICD-10-PCS | Mod: CPTII,S$GLB,, | Performed by: INTERNAL MEDICINE

## 2022-09-19 PROCEDURE — 1159F MED LIST DOCD IN RCRD: CPT | Mod: CPTII,S$GLB,, | Performed by: INTERNAL MEDICINE

## 2022-09-19 PROCEDURE — 96368 THER/DIAG CONCURRENT INF: CPT

## 2022-09-19 PROCEDURE — 96416 CHEMO PROLONG INFUSE W/PUMP: CPT

## 2022-09-19 PROCEDURE — 1160F PR REVIEW ALL MEDS BY PRESCRIBER/CLIN PHARMACIST DOCUMENTED: ICD-10-PCS | Mod: CPTII,S$GLB,, | Performed by: INTERNAL MEDICINE

## 2022-09-19 PROCEDURE — 99215 OFFICE O/P EST HI 40 MIN: CPT | Mod: S$GLB,,, | Performed by: INTERNAL MEDICINE

## 2022-09-19 PROCEDURE — 96366 THER/PROPH/DIAG IV INF ADDON: CPT

## 2022-09-19 PROCEDURE — 99999 PR PBB SHADOW E&M-EST. PATIENT-LVL V: CPT | Mod: PBBFAC,,, | Performed by: INTERNAL MEDICINE

## 2022-09-19 PROCEDURE — 3079F DIAST BP 80-89 MM HG: CPT | Mod: CPTII,S$GLB,, | Performed by: INTERNAL MEDICINE

## 2022-09-19 PROCEDURE — 99215 PR OFFICE/OUTPT VISIT, EST, LEVL V, 40-54 MIN: ICD-10-PCS | Mod: S$GLB,,, | Performed by: INTERNAL MEDICINE

## 2022-09-19 PROCEDURE — 3046F PR MOST RECENT HEMOGLOBIN A1C LEVEL > 9.0%: ICD-10-PCS | Mod: CPTII,S$GLB,, | Performed by: INTERNAL MEDICINE

## 2022-09-19 PROCEDURE — 96413 CHEMO IV INFUSION 1 HR: CPT

## 2022-09-19 PROCEDURE — 4010F ACE/ARB THERAPY RXD/TAKEN: CPT | Mod: CPTII,S$GLB,, | Performed by: INTERNAL MEDICINE

## 2022-09-19 PROCEDURE — 96411 CHEMO IV PUSH ADDL DRUG: CPT

## 2022-09-19 PROCEDURE — 96367 TX/PROPH/DG ADDL SEQ IV INF: CPT

## 2022-09-19 PROCEDURE — 25000003 PHARM REV CODE 250: Performed by: INTERNAL MEDICINE

## 2022-09-19 PROCEDURE — 63600175 PHARM REV CODE 636 W HCPCS: Performed by: INTERNAL MEDICINE

## 2022-09-19 PROCEDURE — 99999 PR PBB SHADOW E&M-EST. PATIENT-LVL V: ICD-10-PCS | Mod: PBBFAC,,, | Performed by: INTERNAL MEDICINE

## 2022-09-19 PROCEDURE — 96415 CHEMO IV INFUSION ADDL HR: CPT

## 2022-09-19 PROCEDURE — 96417 CHEMO IV INFUS EACH ADDL SEQ: CPT

## 2022-09-19 RX ORDER — EPINEPHRINE 0.3 MG/.3ML
0.3 INJECTION SUBCUTANEOUS ONCE AS NEEDED
Status: DISCONTINUED | OUTPATIENT
Start: 2022-09-19 | End: 2022-09-19 | Stop reason: HOSPADM

## 2022-09-19 RX ORDER — FLUOROURACIL 50 MG/ML
400 INJECTION, SOLUTION INTRAVENOUS
Status: CANCELLED | OUTPATIENT
Start: 2022-09-20

## 2022-09-19 RX ORDER — SODIUM CHLORIDE 0.9 % (FLUSH) 0.9 %
10 SYRINGE (ML) INJECTION
Status: DISCONTINUED | OUTPATIENT
Start: 2022-09-19 | End: 2022-09-19 | Stop reason: HOSPADM

## 2022-09-19 RX ORDER — HEPARIN 100 UNIT/ML
500 SYRINGE INTRAVENOUS
Status: DISCONTINUED | OUTPATIENT
Start: 2022-09-19 | End: 2022-09-19 | Stop reason: HOSPADM

## 2022-09-19 RX ORDER — SODIUM CHLORIDE 0.9 % (FLUSH) 0.9 %
10 SYRINGE (ML) INJECTION
Status: CANCELLED | OUTPATIENT
Start: 2022-09-20

## 2022-09-19 RX ORDER — HEPARIN 100 UNIT/ML
500 SYRINGE INTRAVENOUS
Status: CANCELLED | OUTPATIENT
Start: 2022-09-22

## 2022-09-19 RX ORDER — HEPARIN 100 UNIT/ML
500 SYRINGE INTRAVENOUS
Status: CANCELLED | OUTPATIENT
Start: 2022-09-20

## 2022-09-19 RX ORDER — FLUOROURACIL 50 MG/ML
2400 INJECTION, SOLUTION INTRAVENOUS
Status: CANCELLED | OUTPATIENT
Start: 2022-09-20

## 2022-09-19 RX ORDER — DIPHENHYDRAMINE HYDROCHLORIDE 50 MG/ML
50 INJECTION INTRAMUSCULAR; INTRAVENOUS ONCE AS NEEDED
Status: CANCELLED | OUTPATIENT
Start: 2022-09-20

## 2022-09-19 RX ORDER — EPINEPHRINE 0.3 MG/.3ML
0.3 INJECTION SUBCUTANEOUS ONCE AS NEEDED
Status: CANCELLED | OUTPATIENT
Start: 2022-09-20

## 2022-09-19 RX ORDER — FLUOROURACIL 50 MG/ML
400 INJECTION, SOLUTION INTRAVENOUS
Status: COMPLETED | OUTPATIENT
Start: 2022-09-19 | End: 2022-09-19

## 2022-09-19 RX ORDER — SODIUM CHLORIDE 0.9 % (FLUSH) 0.9 %
10 SYRINGE (ML) INJECTION
Status: CANCELLED | OUTPATIENT
Start: 2022-09-22

## 2022-09-19 RX ADMIN — SODIUM CHLORIDE 150 MG: 900 INJECTION, SOLUTION INTRAVENOUS at 10:09

## 2022-09-19 RX ADMIN — DEXTROSE: 5 SOLUTION INTRAVENOUS at 10:09

## 2022-09-19 RX ADMIN — DEXAMETHASONE SODIUM PHOSPHATE 0.25 MG: 4 INJECTION, SOLUTION INTRA-ARTICULAR; INTRALESIONAL; INTRAMUSCULAR; INTRAVENOUS; SOFT TISSUE at 10:09

## 2022-09-19 RX ADMIN — LEUCOVORIN CALCIUM 930 MG: 350 INJECTION, POWDER, LYOPHILIZED, FOR SUSPENSION INTRAMUSCULAR; INTRAVENOUS at 10:09

## 2022-09-19 RX ADMIN — FLUOROURACIL 930 MG: 50 INJECTION, SOLUTION INTRAVENOUS at 01:09

## 2022-09-19 RX ADMIN — FLUOROURACIL 5570 MG: 50 INJECTION, SOLUTION INTRAVENOUS at 01:09

## 2022-09-19 RX ADMIN — OXALIPLATIN 197 MG: 5 INJECTION, SOLUTION, CONCENTRATE INTRAVENOUS at 10:09

## 2022-09-19 NOTE — PROGRESS NOTES
Service Date:  9/19/22    Chief Complaint: Colon Cancer    Osman Li Jr. is a 57 y.o. 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  A CT scan of the abdomen was done which showed a large obstructive lesion with lymph node involvement.  There was also a 9 mm hypodense liver lesion that could not be characterized.  Patient had a hemicolectomy with ostomy bag placed, and splenectomy.      He was then set to start chemotherapy but it was delayed due to the liver lesion.  He was evaluated by Dr. Goodwin with Surgical Oncology who determined that this was likely not malignant.  Unfortunately afterwards, patient suffered a setback with an abdominal abscess formation that could not be drained.  He recently completed 2 weeks of antibiotics, and a repeat CT scan showed the presence of an abscess still there, but improved.  He had 2 weeks of abx, but a repeat scan showed some growth. It is thought that this is likely sterile fluid. He will finish his abx and we can monitor with scan.    He is here for cycle 6 of treatment.  He continues to have back pain, which is not improving with therapy.  I am waiting on a nuclear bone scan.  He also has neuropathy that comes and goes and lasts for a few days after the chemotherapy.    Review of Systems   Constitutional: Negative.    HENT: Negative.     Eyes: Negative.    Respiratory: Negative.     Cardiovascular: Negative.    Gastrointestinal: Negative.    Endocrine: Negative.    Genitourinary: Negative.    Musculoskeletal: Negative.    Integumentary:  Negative.   Neurological: Negative.    Hematological: Negative.    Psychiatric/Behavioral: Negative.        Current Outpatient Medications   Medication Instructions    atorvastatin (LIPITOR) 20 mg, Oral, Daily    enalapriL-hydrochlorothiazide (VASERETIC) 10-25 mg per tablet 1 tablet, Oral, Daily    heparin lock flush, porcine, (HEPARIN FLUSH 100 UNITS/ML) 100 unit/mL Soln 3-5 mL DAILY (route: intravenous)     "meloxicam (MOBIC) 7.5 mg, Oral, Daily    metFORMIN (GLUCOPHAGE) 1,000 mg, Oral, 2 times daily with meals    oxyCODONE (ROXICODONE) 30 mg, Oral, Every 6 hours PRN    OZEMPIC 0.25 mg, Subcutaneous, Every 7 days    pen needle, diabetic 31 gauge x 3/16" Ndle 1 each, Misc.(Non-Drug; Combo Route), Daily    promethazine (PHENERGAN) 12.5 mg, Oral, Every 4 hours PRN    sildenafiL (VIAGRA) 100 mg, Oral, Daily PRN    sodium chloride 0.9% (NORMAL SALINE FLUSH) injection 10 mL DAILY (route: injection)    TOUJEO MAX U-300 SOLOSTAR 20 Units, Subcutaneous, Daily        Past Medical History:   Diagnosis Date    DM (diabetes mellitus)     Hyperlipidemia     Hypertension     Prediabetes         Past Surgical History:   Procedure Laterality Date    CHOLECYSTECTOMY  2011    COLONOSCOPY      2018    COLOSTOMY N/A 4/25/2022    Procedure: CREATION, COLOSTOMY;  Surgeon: Carlton Waddell MD;  Location: [facility] OR;  Service: General;  Laterality: N/A;    INSERTION OF TUNNELED CENTRAL VENOUS CATHETER (CVC) WITH SUBCUTANEOUS PORT Right 5/18/2022    Procedure: [id number];  Surgeon: Carlton Waddell MD;  Location: [facility] OR;  Service: General;  Laterality: Right;    MOBILIZATION OF SPLENIC FLEXURE N/A 4/25/2022    Procedure: MOBILIZATION, SPLENIC FLEXURE;  Surgeon: Carlton Waddell MD;  Location: [facility] OR;  Service: General;  Laterality: N/A;    SPLENECTOMY N/A 4/25/2022    Procedure: SPLENECTOMY;  Surgeon: Carlton Waddell MD;  Location: [facility] OR;  Service: General;  Laterality: N/A;    SUBTOTAL COLECTOMY N/A 4/25/2022    Procedure: COLECTOMY, PARTIAL;  Surgeon: Carlton Waddell MD;  Location: [facility] OR;  Service: General;  Laterality: N/A;        Family History   Problem Relation Age of Onset    Heart disease Father        Social History     Tobacco Use    Smoking status: Every Day     Packs/day: 0.50     Years: 35.00     Pack years: 17.50     Types: Cigarettes    Smokeless tobacco: Never   Substance Use Topics    Alcohol use: Not Currently    " "Drug use: Never         Vitals:    09/19/22 0911   BP: 131/88   Pulse: 103   Resp: 12   Temp: 97.6 °F (36.4 °C)        Physical Exam:  /88 (BP Location: Right arm, Patient Position: Sitting, BP Method: Medium (Automatic))   Pulse 103   Temp 97.6 °F (36.4 °C) (Temporal)   Resp 12   Ht 6' 2" (1.88 m)   Wt 84.6 kg (186 lb 8.2 oz)   SpO2 97%   BMI 23.95 kg/m²     Physical Exam  Vitals and nursing note reviewed.   Constitutional:       Appearance: Normal appearance.   HENT:      Head: Normocephalic and atraumatic.      Nose: Nose normal.      Mouth/Throat:      Mouth: Mucous membranes are moist.      Pharynx: Oropharynx is clear.   Eyes:      Extraocular Movements: Extraocular movements intact.      Conjunctiva/sclera: Conjunctivae normal.   Cardiovascular:      Rate and Rhythm: Normal rate and regular rhythm.      Heart sounds: Normal heart sounds.   Pulmonary:      Effort: Pulmonary effort is normal.      Breath sounds: Normal breath sounds.   Abdominal:      General: Abdomen is flat. Bowel sounds are normal.      Palpations: Abdomen is soft.      Comments: Ostomy bag in place.   Musculoskeletal:         General: Normal range of motion.      Cervical back: Normal range of motion and neck supple.   Skin:     General: Skin is warm and dry.   Neurological:      General: No focal deficit present.      Mental Status: He is alert and oriented to person, place, and time. Mental status is at baseline.   Psychiatric:         Mood and Affect: Mood normal.        Labs:  Lab Results   Component Value Date    WBC 10.01 09/16/2022    RBC 4.98 09/16/2022    HGB 13.2 (L) 09/16/2022    HCT 40.2 09/16/2022    MCV 81 (L) 09/16/2022    MCH 26.5 (L) 09/16/2022    MCHC 32.8 09/16/2022    RDW 20.6 (H) 09/16/2022     09/16/2022    MPV 9.8 09/16/2022    GRAN 3.3 09/16/2022    GRAN 32.8 (L) 09/16/2022    LYMPH 5.2 (H) 09/16/2022    LYMPH 51.5 (H) 09/16/2022    MONO 1.3 (H) 09/16/2022    MONO 13.3 09/16/2022    EOS 0.1 " 09/16/2022    BASO 0.10 09/16/2022    EOSINOPHIL 1.0 09/16/2022    BASOPHIL 1.0 09/16/2022     Sodium   Date Value Ref Range Status   09/16/2022 134 (L) 136 - 145 mmol/L Final     Potassium   Date Value Ref Range Status   09/16/2022 4.7 3.5 - 5.1 mmol/L Final     Chloride   Date Value Ref Range Status   09/16/2022 96 95 - 110 mmol/L Final     CO2   Date Value Ref Range Status   09/16/2022 29 23 - 29 mmol/L Final     Glucose   Date Value Ref Range Status   09/16/2022 190 (H) 70 - 110 mg/dL Final     BUN   Date Value Ref Range Status   09/16/2022 17 6 - 20 mg/dL Final     Creatinine   Date Value Ref Range Status   09/16/2022 0.9 0.5 - 1.4 mg/dL Final     Calcium   Date Value Ref Range Status   09/16/2022 10.5 8.7 - 10.5 mg/dL Final     Total Protein   Date Value Ref Range Status   09/16/2022 7.3 6.0 - 8.4 g/dL Final     Albumin   Date Value Ref Range Status   09/16/2022 3.4 (L) 3.5 - 5.2 g/dL Final     Total Bilirubin   Date Value Ref Range Status   09/16/2022 0.4 0.1 - 1.0 mg/dL Final     Comment:     For infants and newborns, interpretation of results should be based  on gestational age, weight and in agreement with clinical  observations.    Premature Infant recommended reference ranges:  Up to 24 hours.............<8.0 mg/dL  Up to 48 hours............<12.0 mg/dL  3-5 days..................<15.0 mg/dL  6-29 days.................<15.0 mg/dL       Alkaline Phosphatase   Date Value Ref Range Status   09/16/2022 110 55 - 135 U/L Final     AST   Date Value Ref Range Status   09/16/2022 18 10 - 40 U/L Final     ALT   Date Value Ref Range Status   09/16/2022 10 10 - 44 U/L Final     Anion Gap   Date Value Ref Range Status   09/16/2022 9 8 - 16 mmol/L Final     eGFR if    Date Value Ref Range Status   07/25/2022 >60 >60 mL/min/1.73 m^2 Final     eGFR if non    Date Value Ref Range Status   07/25/2022 >60 >60 mL/min/1.73 m^2 Final     Comment:     Calculation used to obtain the estimated  glomerular filtration  rate (eGFR) is the CKD-EPI equation.          A/P:    Malignant neoplasm of the transverse colon  -poorly differentiated adenocarcinoma  -pT3 N2b  -patient on clinical trial to measure ctDNA  -labs reviewed  -FOLFOX started on 7/12/22  -proceed with cycle 6  -return to clinic in 2 weeks for next treatment    Chemo induced neuropathy  - grade 1  - continue treatment as is; plan to drop oxaliplatin after 8 cycles or sooner if needed    Intra-abdominal abscess  -completed antibiotics and abscess is still present, but improved. Likely sterile fluid at this point.  -now on IV abx    Back pain  - I will order a NM bone scan to rule out malignancy as patient had extensive disease to start    HTN  - on Enalapril  - follow up with PCP    HLP  - follow up with PCP    DM2  - on Metformin  - follow up with PCP    Tobacco use  - counseled on cessation      Aurash Khoobehi, MD  Hematology and Oncology

## 2022-09-19 NOTE — PLAN OF CARE
Problem: Fatigue  Goal: Improved Activity Tolerance  Outcome: Ongoing, Progressing  Intervention: Promote Improved Energy  Flowsheets (Taken 9/19/2022 2527)  Fatigue Management:   activity schedule adjusted   fatigue-related activity identified   frequent rest breaks encouraged  Activity Management: Ambulated -L4

## 2022-09-21 ENCOUNTER — INFUSION (OUTPATIENT)
Dept: INFUSION THERAPY | Facility: HOSPITAL | Age: 57
End: 2022-09-21
Attending: INTERNAL MEDICINE
Payer: COMMERCIAL

## 2022-09-21 VITALS
WEIGHT: 189.63 LBS | HEIGHT: 74 IN | HEART RATE: 88 BPM | TEMPERATURE: 98 F | RESPIRATION RATE: 18 BRPM | DIASTOLIC BLOOD PRESSURE: 76 MMHG | BODY MASS INDEX: 24.34 KG/M2 | SYSTOLIC BLOOD PRESSURE: 130 MMHG

## 2022-09-21 DIAGNOSIS — C18.5 MALIGNANT NEOPLASM OF SPLENIC FLEXURE: Primary | ICD-10-CM

## 2022-09-21 PROCEDURE — 25000003 PHARM REV CODE 250: Performed by: INTERNAL MEDICINE

## 2022-09-21 PROCEDURE — 63600175 PHARM REV CODE 636 W HCPCS: Performed by: INTERNAL MEDICINE

## 2022-09-21 PROCEDURE — A4216 STERILE WATER/SALINE, 10 ML: HCPCS | Performed by: INTERNAL MEDICINE

## 2022-09-21 PROCEDURE — 96523 IRRIG DRUG DELIVERY DEVICE: CPT

## 2022-09-21 RX ORDER — HEPARIN 100 UNIT/ML
500 SYRINGE INTRAVENOUS
Status: DISCONTINUED | OUTPATIENT
Start: 2022-09-21 | End: 2022-09-21 | Stop reason: HOSPADM

## 2022-09-21 RX ORDER — SODIUM CHLORIDE 0.9 % (FLUSH) 0.9 %
10 SYRINGE (ML) INJECTION
Status: DISCONTINUED | OUTPATIENT
Start: 2022-09-21 | End: 2022-09-21 | Stop reason: HOSPADM

## 2022-09-21 RX ADMIN — HEPARIN 500 UNITS: 100 SYRINGE at 11:09

## 2022-09-21 RX ADMIN — SODIUM CHLORIDE, PRESERVATIVE FREE 10 ML: 5 INJECTION INTRAVENOUS at 11:09

## 2022-09-21 NOTE — PLAN OF CARE
Problem: Fatigue  Goal: Improved Activity Tolerance  9/21/2022 1200 by Shobha Gordillo RN  Outcome: Met  9/21/2022 1200 by Shobha Gordillo RN  Outcome: Ongoing, Progressing

## 2022-09-22 ENCOUNTER — HOSPITAL ENCOUNTER (OUTPATIENT)
Dept: RADIOLOGY | Facility: HOSPITAL | Age: 57
Discharge: HOME | End: 2022-09-22
Attending: INTERNAL MEDICINE
Payer: COMMERCIAL

## 2022-09-22 DIAGNOSIS — C18.4 MALIGNANT NEOPLASM OF TRANSVERSE COLON: ICD-10-CM

## 2022-09-22 PROCEDURE — 78306 BONE IMAGING WHOLE BODY: CPT | Mod: TC

## 2022-09-22 PROCEDURE — A9503 TC99M MEDRONATE: HCPCS

## 2022-09-23 ENCOUNTER — TELEPHONE (OUTPATIENT)
Dept: RESEARCH | Facility: HOSPITAL | Age: 57
End: 2022-09-23
Payer: COMMERCIAL

## 2022-09-26 ENCOUNTER — TELEPHONE (OUTPATIENT)
Dept: RESEARCH | Facility: HOSPITAL | Age: 57
End: 2022-09-26
Payer: COMMERCIAL

## 2022-09-30 ENCOUNTER — RESEARCH ENCOUNTER (OUTPATIENT)
Dept: RESEARCH | Facility: HOSPITAL | Age: 57
End: 2022-09-30
Payer: COMMERCIAL

## 2022-09-30 ENCOUNTER — LAB VISIT (OUTPATIENT)
Dept: LAB | Facility: HOSPITAL | Age: 57
End: 2022-09-30
Attending: INTERNAL MEDICINE
Payer: COMMERCIAL

## 2022-09-30 DIAGNOSIS — C18.9 COLON ADENOCARCINOMA: ICD-10-CM

## 2022-09-30 DIAGNOSIS — Z00.6 CLINICAL TRIAL PARTICIPANT: ICD-10-CM

## 2022-09-30 DIAGNOSIS — K63.89 COLONIC MASS: ICD-10-CM

## 2022-09-30 LAB
ALBUMIN SERPL BCP-MCNC: 3.6 G/DL (ref 3.5–5.2)
ALP SERPL-CCNC: 135 U/L (ref 55–135)
ALT SERPL W/O P-5'-P-CCNC: 17 U/L (ref 10–44)
ANION GAP SERPL CALC-SCNC: 13 MMOL/L (ref 8–16)
AST SERPL-CCNC: 25 U/L (ref 10–40)
BASOPHILS # BLD AUTO: 0.08 K/UL (ref 0–0.2)
BASOPHILS NFR BLD: 0.9 % (ref 0–1.9)
BILIRUB SERPL-MCNC: 0.6 MG/DL (ref 0.1–1)
BUN SERPL-MCNC: 11 MG/DL (ref 6–20)
CALCIUM SERPL-MCNC: 10.7 MG/DL (ref 8.7–10.5)
CHLORIDE SERPL-SCNC: 96 MMOL/L (ref 95–110)
CO2 SERPL-SCNC: 27 MMOL/L (ref 23–29)
CREAT SERPL-MCNC: 0.9 MG/DL (ref 0.5–1.4)
DIFFERENTIAL METHOD: ABNORMAL
DRUG STUDY SPECIMEN TYPE: NORMAL
DRUG STUDY TEST NAME: NORMAL
DRUG STUDY TEST RESULT: NORMAL
EOSINOPHIL # BLD AUTO: 0.1 K/UL (ref 0–0.5)
EOSINOPHIL NFR BLD: 0.9 % (ref 0–8)
ERYTHROCYTE [DISTWIDTH] IN BLOOD BY AUTOMATED COUNT: 21.7 % (ref 11.5–14.5)
EST. GFR  (NO RACE VARIABLE): >60 ML/MIN/1.73 M^2
GLUCOSE SERPL-MCNC: 219 MG/DL (ref 70–110)
HCT VFR BLD AUTO: 41.9 % (ref 40–54)
HGB BLD-MCNC: 14.3 G/DL (ref 14–18)
IMM GRANULOCYTES # BLD AUTO: 0.04 K/UL (ref 0–0.04)
IMM GRANULOCYTES NFR BLD AUTO: 0.4 % (ref 0–0.5)
LYMPHOCYTES # BLD AUTO: 4.9 K/UL (ref 1–4.8)
LYMPHOCYTES NFR BLD: 51.9 % (ref 18–48)
MAGNESIUM SERPL-MCNC: 1.9 MG/DL (ref 1.6–2.6)
MCH RBC QN AUTO: 27.6 PG (ref 27–31)
MCHC RBC AUTO-ENTMCNC: 34.1 G/DL (ref 32–36)
MCV RBC AUTO: 81 FL (ref 82–98)
MONOCYTES # BLD AUTO: 1.5 K/UL (ref 0.3–1)
MONOCYTES NFR BLD: 16.4 % (ref 4–15)
NEUTROPHILS # BLD AUTO: 2.8 K/UL (ref 1.8–7.7)
NEUTROPHILS NFR BLD: 29.5 % (ref 38–73)
NRBC BLD-RTO: 1 /100 WBC
PLATELET # BLD AUTO: 196 K/UL (ref 150–450)
PMV BLD AUTO: 9.2 FL (ref 9.2–12.9)
POTASSIUM SERPL-SCNC: 3.7 MMOL/L (ref 3.5–5.1)
PROT SERPL-MCNC: 7.8 G/DL (ref 6–8.4)
RBC # BLD AUTO: 5.18 M/UL (ref 4.6–6.2)
SODIUM SERPL-SCNC: 136 MMOL/L (ref 136–145)
WBC # BLD AUTO: 9.39 K/UL (ref 3.9–12.7)

## 2022-09-30 PROCEDURE — 36415 COLL VENOUS BLD VENIPUNCTURE: CPT | Performed by: INTERNAL MEDICINE

## 2022-09-30 PROCEDURE — 83735 ASSAY OF MAGNESIUM: CPT | Performed by: INTERNAL MEDICINE

## 2022-09-30 PROCEDURE — 85025 COMPLETE CBC W/AUTO DIFF WBC: CPT | Performed by: INTERNAL MEDICINE

## 2022-09-30 PROCEDURE — 99000 SPECIMEN HANDLING OFFICE-LAB: CPT | Performed by: INTERNAL MEDICINE

## 2022-09-30 PROCEDURE — 80053 COMPREHEN METABOLIC PANEL: CPT | Performed by: INTERNAL MEDICINE

## 2022-09-30 NOTE — PROGRESS NOTES
Osman Li  MRN [id number]  [id number] ([facility])  IRB# [id number]  30 September 2022    Monitoring Visit 1 specimen collection:    Upon confirmation with Oncology Physician, Pt will continue monitoring visits. Pt came into [facility] and submitted mandatory whole blood specimens after speaking with him this week. Tubes labeled and package sent via [employer].     Per study calendar, visit is OOW due to baseline MRD result.   Pt visit 2 is due in 12 weeks +/- 15 bd ~11/24/2022

## 2022-10-03 ENCOUNTER — INFUSION (OUTPATIENT)
Dept: INFUSION THERAPY | Facility: HOSPITAL | Age: 57
End: 2022-10-03
Attending: INTERNAL MEDICINE
Payer: COMMERCIAL

## 2022-10-03 ENCOUNTER — OFFICE VISIT (OUTPATIENT)
Dept: HEMATOLOGY/ONCOLOGY | Facility: CLINIC | Age: 57
End: 2022-10-03
Payer: COMMERCIAL

## 2022-10-03 VITALS
SYSTOLIC BLOOD PRESSURE: 137 MMHG | BODY MASS INDEX: 23.6 KG/M2 | HEIGHT: 74 IN | TEMPERATURE: 98 F | WEIGHT: 183.88 LBS | OXYGEN SATURATION: 97 % | RESPIRATION RATE: 12 BRPM | HEART RATE: 98 BPM | DIASTOLIC BLOOD PRESSURE: 86 MMHG

## 2022-10-03 VITALS
SYSTOLIC BLOOD PRESSURE: 136 MMHG | OXYGEN SATURATION: 97 % | DIASTOLIC BLOOD PRESSURE: 85 MMHG | BODY MASS INDEX: 23.6 KG/M2 | TEMPERATURE: 98 F | RESPIRATION RATE: 18 BRPM | HEART RATE: 81 BPM | WEIGHT: 183.88 LBS | HEIGHT: 74 IN

## 2022-10-03 DIAGNOSIS — C18.5 MALIGNANT NEOPLASM OF SPLENIC FLEXURE: Primary | ICD-10-CM

## 2022-10-03 DIAGNOSIS — K63.89 COLONIC MASS: ICD-10-CM

## 2022-10-03 DIAGNOSIS — E11.00 TYPE 2 DIABETES MELLITUS WITH HYPEROSMOLARITY WITHOUT COMA, WITHOUT LONG-TERM CURRENT USE OF INSULIN: ICD-10-CM

## 2022-10-03 DIAGNOSIS — T45.1X5A CHEMOTHERAPY-INDUCED NAUSEA: ICD-10-CM

## 2022-10-03 DIAGNOSIS — C18.4 MALIGNANT NEOPLASM OF TRANSVERSE COLON: Primary | ICD-10-CM

## 2022-10-03 DIAGNOSIS — K65.1 INTRA-ABDOMINAL ABSCESS: ICD-10-CM

## 2022-10-03 DIAGNOSIS — I10 PRIMARY HYPERTENSION: ICD-10-CM

## 2022-10-03 DIAGNOSIS — R11.0 CHEMOTHERAPY-INDUCED NAUSEA: ICD-10-CM

## 2022-10-03 DIAGNOSIS — E78.49 OTHER HYPERLIPIDEMIA: ICD-10-CM

## 2022-10-03 PROCEDURE — 4010F ACE/ARB THERAPY RXD/TAKEN: CPT | Mod: CPTII,S$GLB,, | Performed by: INTERNAL MEDICINE

## 2022-10-03 PROCEDURE — 1160F PR REVIEW ALL MEDS BY PRESCRIBER/CLIN PHARMACIST DOCUMENTED: ICD-10-PCS | Mod: CPTII,S$GLB,, | Performed by: INTERNAL MEDICINE

## 2022-10-03 PROCEDURE — 1159F MED LIST DOCD IN RCRD: CPT | Mod: CPTII,S$GLB,, | Performed by: INTERNAL MEDICINE

## 2022-10-03 PROCEDURE — 96416 CHEMO PROLONG INFUSE W/PUMP: CPT

## 2022-10-03 PROCEDURE — 96368 THER/DIAG CONCURRENT INF: CPT

## 2022-10-03 PROCEDURE — 3075F SYST BP GE 130 - 139MM HG: CPT | Mod: CPTII,S$GLB,, | Performed by: INTERNAL MEDICINE

## 2022-10-03 PROCEDURE — 96413 CHEMO IV INFUSION 1 HR: CPT

## 2022-10-03 PROCEDURE — 3008F BODY MASS INDEX DOCD: CPT | Mod: CPTII,S$GLB,, | Performed by: INTERNAL MEDICINE

## 2022-10-03 PROCEDURE — 25000003 PHARM REV CODE 250: Performed by: INTERNAL MEDICINE

## 2022-10-03 PROCEDURE — 3046F HEMOGLOBIN A1C LEVEL >9.0%: CPT | Mod: CPTII,S$GLB,, | Performed by: INTERNAL MEDICINE

## 2022-10-03 PROCEDURE — 96367 TX/PROPH/DG ADDL SEQ IV INF: CPT

## 2022-10-03 PROCEDURE — 3079F DIAST BP 80-89 MM HG: CPT | Mod: CPTII,S$GLB,, | Performed by: INTERNAL MEDICINE

## 2022-10-03 PROCEDURE — 99999 PR PBB SHADOW E&M-EST. PATIENT-LVL V: ICD-10-PCS | Mod: PBBFAC,,, | Performed by: INTERNAL MEDICINE

## 2022-10-03 PROCEDURE — 63600175 PHARM REV CODE 636 W HCPCS: Performed by: INTERNAL MEDICINE

## 2022-10-03 PROCEDURE — 96411 CHEMO IV PUSH ADDL DRUG: CPT

## 2022-10-03 PROCEDURE — 3008F PR BODY MASS INDEX (BMI) DOCUMENTED: ICD-10-PCS | Mod: CPTII,S$GLB,, | Performed by: INTERNAL MEDICINE

## 2022-10-03 PROCEDURE — 3046F PR MOST RECENT HEMOGLOBIN A1C LEVEL > 9.0%: ICD-10-PCS | Mod: CPTII,S$GLB,, | Performed by: INTERNAL MEDICINE

## 2022-10-03 PROCEDURE — 1160F RVW MEDS BY RX/DR IN RCRD: CPT | Mod: CPTII,S$GLB,, | Performed by: INTERNAL MEDICINE

## 2022-10-03 PROCEDURE — 3075F PR MOST RECENT SYSTOLIC BLOOD PRESS GE 130-139MM HG: ICD-10-PCS | Mod: CPTII,S$GLB,, | Performed by: INTERNAL MEDICINE

## 2022-10-03 PROCEDURE — 99215 PR OFFICE/OUTPT VISIT, EST, LEVL V, 40-54 MIN: ICD-10-PCS | Mod: S$GLB,,, | Performed by: INTERNAL MEDICINE

## 2022-10-03 PROCEDURE — 3079F PR MOST RECENT DIASTOLIC BLOOD PRESSURE 80-89 MM HG: ICD-10-PCS | Mod: CPTII,S$GLB,, | Performed by: INTERNAL MEDICINE

## 2022-10-03 PROCEDURE — 4010F PR ACE/ARB THEARPY RXD/TAKEN: ICD-10-PCS | Mod: CPTII,S$GLB,, | Performed by: INTERNAL MEDICINE

## 2022-10-03 PROCEDURE — 1159F PR MEDICATION LIST DOCUMENTED IN MEDICAL RECORD: ICD-10-PCS | Mod: CPTII,S$GLB,, | Performed by: INTERNAL MEDICINE

## 2022-10-03 PROCEDURE — 99215 OFFICE O/P EST HI 40 MIN: CPT | Mod: S$GLB,,, | Performed by: INTERNAL MEDICINE

## 2022-10-03 PROCEDURE — 96366 THER/PROPH/DIAG IV INF ADDON: CPT

## 2022-10-03 PROCEDURE — 99999 PR PBB SHADOW E&M-EST. PATIENT-LVL V: CPT | Mod: PBBFAC,,, | Performed by: INTERNAL MEDICINE

## 2022-10-03 PROCEDURE — 96415 CHEMO IV INFUSION ADDL HR: CPT

## 2022-10-03 RX ORDER — EPINEPHRINE 0.3 MG/.3ML
0.3 INJECTION SUBCUTANEOUS ONCE AS NEEDED
Status: CANCELLED | OUTPATIENT
Start: 2022-10-03

## 2022-10-03 RX ORDER — SODIUM CHLORIDE 0.9 % (FLUSH) 0.9 %
10 SYRINGE (ML) INJECTION
Status: CANCELLED | OUTPATIENT
Start: 2022-10-05

## 2022-10-03 RX ORDER — SODIUM CHLORIDE 0.9 % (FLUSH) 0.9 %
10 SYRINGE (ML) INJECTION
Status: CANCELLED | OUTPATIENT
Start: 2022-10-03

## 2022-10-03 RX ORDER — HEPARIN 100 UNIT/ML
500 SYRINGE INTRAVENOUS
Status: CANCELLED | OUTPATIENT
Start: 2022-10-05

## 2022-10-03 RX ORDER — HEPARIN 100 UNIT/ML
500 SYRINGE INTRAVENOUS
Status: CANCELLED | OUTPATIENT
Start: 2022-10-03

## 2022-10-03 RX ORDER — EPINEPHRINE 0.3 MG/.3ML
0.3 INJECTION SUBCUTANEOUS ONCE AS NEEDED
Status: DISCONTINUED | OUTPATIENT
Start: 2022-10-03 | End: 2022-10-03 | Stop reason: HOSPADM

## 2022-10-03 RX ORDER — FLUOROURACIL 50 MG/ML
400 INJECTION, SOLUTION INTRAVENOUS
Status: CANCELLED | OUTPATIENT
Start: 2022-10-03

## 2022-10-03 RX ORDER — DIPHENHYDRAMINE HYDROCHLORIDE 50 MG/ML
50 INJECTION INTRAMUSCULAR; INTRAVENOUS ONCE AS NEEDED
Status: CANCELLED | OUTPATIENT
Start: 2022-10-03

## 2022-10-03 RX ORDER — FLUOROURACIL 50 MG/ML
2400 INJECTION, SOLUTION INTRAVENOUS
Status: CANCELLED | OUTPATIENT
Start: 2022-10-03

## 2022-10-03 RX ORDER — SODIUM CHLORIDE 0.9 % (FLUSH) 0.9 %
10 SYRINGE (ML) INJECTION
Status: DISCONTINUED | OUTPATIENT
Start: 2022-10-03 | End: 2022-10-03 | Stop reason: HOSPADM

## 2022-10-03 RX ORDER — OXYCODONE HYDROCHLORIDE 30 MG/1
30 TABLET ORAL EVERY 6 HOURS PRN
Qty: 60 TABLET | Refills: 0 | Status: SHIPPED | OUTPATIENT
Start: 2022-10-03 | End: 2022-10-31 | Stop reason: SDUPTHER

## 2022-10-03 RX ORDER — FLUOROURACIL 50 MG/ML
835 INJECTION, SOLUTION INTRAVENOUS
Status: COMPLETED | OUTPATIENT
Start: 2022-10-03 | End: 2022-10-03

## 2022-10-03 RX ADMIN — FOSAPREPITANT 150 MG: 150 INJECTION, POWDER, LYOPHILIZED, FOR SOLUTION INTRAVENOUS at 10:10

## 2022-10-03 RX ADMIN — FLUOROURACIL 5015 MG: 50 INJECTION, SOLUTION INTRAVENOUS at 01:10

## 2022-10-03 RX ADMIN — LEUCOVORIN CALCIUM 835 MG: 350 INJECTION, POWDER, LYOPHILIZED, FOR SUSPENSION INTRAMUSCULAR; INTRAVENOUS at 11:10

## 2022-10-03 RX ADMIN — DEXAMETHASONE SODIUM PHOSPHATE 0.25 MG: 4 INJECTION, SOLUTION INTRA-ARTICULAR; INTRALESIONAL; INTRAMUSCULAR; INTRAVENOUS; SOFT TISSUE at 10:10

## 2022-10-03 RX ADMIN — OXALIPLATIN 178 MG: 5 INJECTION, SOLUTION INTRAVENOUS at 11:10

## 2022-10-03 RX ADMIN — FLUOROURACIL 835 MG: 50 INJECTION, SOLUTION INTRAVENOUS at 01:10

## 2022-10-03 NOTE — PROGRESS NOTES
Per Dr. Khoobehi:  Reduce Fluorouracil dose from 930 mg to 835 mg for change in weight/BSA from 103.5kg / 2.32 m2 to 83.4kg / 2.09 m2 (10% dose reduction).

## 2022-10-03 NOTE — PROGRESS NOTES
Service Date:  10/3/22    Chief Complaint: Colon Cancer    Osman Li Jr. is a 57 y.o. 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  A CT scan of the abdomen was done which showed a large obstructive lesion with lymph node involvement.  There was also a 9 mm hypodense liver lesion that could not be characterized.  Patient had a hemicolectomy with ostomy bag placed, and splenectomy.      He was then set to start chemotherapy but it was delayed due to the liver lesion.  He was evaluated by Dr. Goodwin with Surgical Oncology who determined that this was likely not malignant.  Unfortunately afterwards, patient suffered a setback with an abdominal abscess formation that could not be drained.  He recently completed 2 weeks of antibiotics, and a repeat CT scan showed the presence of an abscess still there, but improved.  He had 2 weeks of abx, but a repeat scan showed some growth. It is thought that this is likely sterile fluid. He will finish his abx and we can monitor with scan.    He is here for cycle 7 of treatment.  He continues to have back pain, which is not improving with therapy.  NM bone scan showed no mets.  He also has neuropathy that comes and goes and lasts for a few days after the chemotherapy, it seemed to last longer this past week but still less than a week.    Review of Systems   Constitutional: Negative.    HENT: Negative.     Eyes: Negative.    Respiratory: Negative.     Cardiovascular: Negative.    Gastrointestinal:  Positive for nausea.   Endocrine: Negative.    Genitourinary: Negative.    Musculoskeletal: Negative.    Integumentary:  Negative.   Neurological: Negative.    Hematological: Negative.    Psychiatric/Behavioral: Negative.        Current Outpatient Medications   Medication Instructions    atorvastatin (LIPITOR) 20 mg, Oral, Daily    enalapriL-hydrochlorothiazide (VASERETIC) 10-25 mg per tablet 1 tablet, Oral, Daily    heparin lock flush, porcine, (HEPARIN FLUSH 100  "UNITS/ML) 100 unit/mL Soln 3-5 mL DAILY (route: intravenous)    meloxicam (MOBIC) 7.5 mg, Oral, Daily    metFORMIN (GLUCOPHAGE) 1,000 mg, Oral, 2 times daily with meals    oxyCODONE (ROXICODONE) 30 mg, Oral, Every 6 hours PRN    OZEMPIC 0.25 mg, Subcutaneous, Every 7 days    pen needle, diabetic 31 gauge x 3/16" Ndle 1 each, Misc.(Non-Drug; Combo Route), Daily    promethazine (PHENERGAN) 12.5 mg, Oral, Every 4 hours PRN    sildenafiL (VIAGRA) 100 mg, Oral, Daily PRN    sodium chloride 0.9% (NORMAL SALINE FLUSH) injection 10 mL DAILY (route: injection)    TOUJEO MAX U-300 SOLOSTAR 20 Units, Subcutaneous, Daily        Past Medical History:   Diagnosis Date    DM (diabetes mellitus)     Hyperlipidemia     Hypertension     Prediabetes         Past Surgical History:   Procedure Laterality Date    CHOLECYSTECTOMY  2011    COLONOSCOPY      2018    COLOSTOMY N/A 4/25/2022    Procedure: CREATION, COLOSTOMY;  Surgeon: Carlton Waddell MD;  Location: [facility] OR;  Service: General;  Laterality: N/A;    INSERTION OF TUNNELED CENTRAL VENOUS CATHETER (CVC) WITH SUBCUTANEOUS PORT Right 5/18/2022    Procedure: [id number];  Surgeon: Carlton Waddell MD;  Location: [facility] OR;  Service: General;  Laterality: Right;    MOBILIZATION OF SPLENIC FLEXURE N/A 4/25/2022    Procedure: MOBILIZATION, SPLENIC FLEXURE;  Surgeon: Carlton Waddell MD;  Location: [facility] OR;  Service: General;  Laterality: N/A;    SPLENECTOMY N/A 4/25/2022    Procedure: SPLENECTOMY;  Surgeon: Carlton Waddell MD;  Location: [facility] OR;  Service: General;  Laterality: N/A;    SUBTOTAL COLECTOMY N/A 4/25/2022    Procedure: COLECTOMY, PARTIAL;  Surgeon: Carlton Waddell MD;  Location: [facility] OR;  Service: General;  Laterality: N/A;        Family History   Problem Relation Age of Onset    Heart disease Father        Social History     Tobacco Use    Smoking status: Every Day     Packs/day: 0.50     Years: 35.00     Pack years: 17.50     Types: Cigarettes    Smokeless tobacco: " "Never   Substance Use Topics    Alcohol use: Not Currently    Drug use: Never         Vitals:    10/03/22 0907   BP: 137/86   Pulse: 98   Resp: 12   Temp: 97.8 °F (36.6 °C)        Physical Exam:  /86 (BP Location: Right arm, Patient Position: Sitting, BP Method: Medium (Automatic))   Pulse 98   Temp 97.8 °F (36.6 °C) (Temporal)   Resp 12   Ht 6' 2" (1.88 m)   Wt 83.4 kg (183 lb 13.8 oz)   SpO2 97%   BMI 23.61 kg/m²     Physical Exam  Vitals and nursing note reviewed.   Constitutional:       Appearance: Normal appearance.   HENT:      Head: Normocephalic and atraumatic.      Nose: Nose normal.      Mouth/Throat:      Mouth: Mucous membranes are moist.      Pharynx: Oropharynx is clear.   Eyes:      Extraocular Movements: Extraocular movements intact.      Conjunctiva/sclera: Conjunctivae normal.   Cardiovascular:      Rate and Rhythm: Normal rate and regular rhythm.      Heart sounds: Normal heart sounds.   Pulmonary:      Effort: Pulmonary effort is normal.      Breath sounds: Normal breath sounds.   Abdominal:      General: Abdomen is flat. Bowel sounds are normal.      Palpations: Abdomen is soft.      Comments: Ostomy bag in place.   Musculoskeletal:         General: Normal range of motion.      Cervical back: Normal range of motion and neck supple.   Skin:     General: Skin is warm and dry.   Neurological:      General: No focal deficit present.      Mental Status: He is alert and oriented to person, place, and time. Mental status is at baseline.   Psychiatric:         Mood and Affect: Mood normal.        Labs:  Lab Results   Component Value Date    WBC 9.39 09/30/2022    RBC 5.18 09/30/2022    HGB 14.3 09/30/2022    HCT 41.9 09/30/2022    MCV 81 (L) 09/30/2022    MCH 27.6 09/30/2022    MCHC 34.1 09/30/2022    RDW 21.7 (H) 09/30/2022     09/30/2022    MPV 9.2 09/30/2022    GRAN 2.8 09/30/2022    GRAN 29.5 (L) 09/30/2022    LYMPH 4.9 (H) 09/30/2022    LYMPH 51.9 (H) 09/30/2022    MONO 1.5 (H) " 09/30/2022    MONO 16.4 (H) 09/30/2022    EOS 0.1 09/30/2022    BASO 0.08 09/30/2022    EOSINOPHIL 0.9 09/30/2022    BASOPHIL 0.9 09/30/2022     Sodium   Date Value Ref Range Status   09/30/2022 136 136 - 145 mmol/L Final     Potassium   Date Value Ref Range Status   09/30/2022 3.7 3.5 - 5.1 mmol/L Final     Chloride   Date Value Ref Range Status   09/30/2022 96 95 - 110 mmol/L Final     CO2   Date Value Ref Range Status   09/30/2022 27 23 - 29 mmol/L Final     Glucose   Date Value Ref Range Status   09/30/2022 219 (H) 70 - 110 mg/dL Final     BUN   Date Value Ref Range Status   09/30/2022 11 6 - 20 mg/dL Final     Creatinine   Date Value Ref Range Status   09/30/2022 0.9 0.5 - 1.4 mg/dL Final     Calcium   Date Value Ref Range Status   09/30/2022 10.7 (H) 8.7 - 10.5 mg/dL Final     Total Protein   Date Value Ref Range Status   09/30/2022 7.8 6.0 - 8.4 g/dL Final     Albumin   Date Value Ref Range Status   09/30/2022 3.6 3.5 - 5.2 g/dL Final     Total Bilirubin   Date Value Ref Range Status   09/30/2022 0.6 0.1 - 1.0 mg/dL Final     Comment:     For infants and newborns, interpretation of results should be based  on gestational age, weight and in agreement with clinical  observations.    Premature Infant recommended reference ranges:  Up to 24 hours.............<8.0 mg/dL  Up to 48 hours............<12.0 mg/dL  3-5 days..................<15.0 mg/dL  6-29 days.................<15.0 mg/dL       Alkaline Phosphatase   Date Value Ref Range Status   09/30/2022 135 55 - 135 U/L Final     AST   Date Value Ref Range Status   09/30/2022 25 10 - 40 U/L Final     ALT   Date Value Ref Range Status   09/30/2022 17 10 - 44 U/L Final     Anion Gap   Date Value Ref Range Status   09/30/2022 13 8 - 16 mmol/L Final     eGFR if    Date Value Ref Range Status   07/25/2022 >60 >60 mL/min/1.73 m^2 Final     eGFR if non    Date Value Ref Range Status   07/25/2022 >60 >60 mL/min/1.73 m^2 Final     Comment:      Calculation used to obtain the estimated glomerular filtration  rate (eGFR) is the CKD-EPI equation.          A/P:    Malignant neoplasm of the transverse colon  -poorly differentiated adenocarcinoma  -pT3 N2b  -patient on clinical trial to measure ctDNA  -labs reviewed  -FOLFOX started on 7/12/22  -proceed with cycle 7  -return to clinic in 2 weeks for next treatment    Chemo induced neuropathy  - grade 1  - continue treatment as is; plan to drop oxaliplatin after 8 cycles or sooner if needed    Chemo induced nausea  - grade 1    Intra-abdominal abscess  -completed antibiotics and abscess is still present, but improved. Likely sterile fluid at this point.  -now on IV abx    Back pain  - I will order a NM bone scan to rule out malignancy as patient had extensive disease to start    HTN  - on Enalapril  - follow up with PCP    HLP  - follow up with PCP    DM2  - on Metformin  - follow up with PCP    Tobacco use  - counseled on cessation      Aurash Khoobehi, MD  Hematology and Oncology

## 2022-10-03 NOTE — PROGRESS NOTES
Per Dr. Khoobehi:  Reduce Oxaliplatin dose from 197 mg to 178 mg for change in weight/BSA from 103.5kg / 2.32 m2 to 83.4kg / 2.09 m2 (10% dose reduction).

## 2022-10-03 NOTE — PROGRESS NOTES
Per Dr. Khoobehi:  Reduce Leucovorin dose from 930 mg to 835 mg for change in weight/BSA from 103.5kg / 2.32 m2 to 83.4kg / 2.09 m2 (10% dose reduction).

## 2022-10-03 NOTE — PROGRESS NOTES
Per Dr. Khoobehi:  Reduce Fluorouracil dose from 5570 mg to 5015 mg for change in weight/BSA from 103.5kg / 2.32 m2 to 83.4kg / 2.09 m2 (10% dose reduction).

## 2022-10-05 ENCOUNTER — INFUSION (OUTPATIENT)
Dept: INFUSION THERAPY | Facility: HOSPITAL | Age: 57
End: 2022-10-05
Attending: INTERNAL MEDICINE
Payer: COMMERCIAL

## 2022-10-05 ENCOUNTER — OFFICE VISIT (OUTPATIENT)
Dept: INFECTIOUS DISEASES | Facility: CLINIC | Age: 57
End: 2022-10-05
Payer: COMMERCIAL

## 2022-10-05 VITALS
HEART RATE: 86 BPM | TEMPERATURE: 99 F | SYSTOLIC BLOOD PRESSURE: 146 MMHG | BODY MASS INDEX: 24.18 KG/M2 | DIASTOLIC BLOOD PRESSURE: 79 MMHG | RESPIRATION RATE: 18 BRPM | HEIGHT: 74 IN | WEIGHT: 188.38 LBS

## 2022-10-05 DIAGNOSIS — C18.5 MALIGNANT NEOPLASM OF SPLENIC FLEXURE: Primary | ICD-10-CM

## 2022-10-05 DIAGNOSIS — R19.00 INTRA-ABDOMINAL AND PELVIC SWELLING, MASS AND LUMP, UNSPECIFIED SITE: ICD-10-CM

## 2022-10-05 DIAGNOSIS — K65.1 INTRA-ABDOMINAL ABSCESS: ICD-10-CM

## 2022-10-05 DIAGNOSIS — I10 HYPERTENSION, UNSPECIFIED TYPE: ICD-10-CM

## 2022-10-05 PROCEDURE — A4216 STERILE WATER/SALINE, 10 ML: HCPCS | Performed by: INTERNAL MEDICINE

## 2022-10-05 PROCEDURE — 4010F PR ACE/ARB THEARPY RXD/TAKEN: ICD-10-PCS | Mod: CPTII,95,, | Performed by: STUDENT IN AN ORGANIZED HEALTH CARE EDUCATION/TRAINING PROGRAM

## 2022-10-05 PROCEDURE — 25000003 PHARM REV CODE 250: Performed by: INTERNAL MEDICINE

## 2022-10-05 PROCEDURE — 3046F HEMOGLOBIN A1C LEVEL >9.0%: CPT | Mod: CPTII,95,, | Performed by: STUDENT IN AN ORGANIZED HEALTH CARE EDUCATION/TRAINING PROGRAM

## 2022-10-05 PROCEDURE — 3046F PR MOST RECENT HEMOGLOBIN A1C LEVEL > 9.0%: ICD-10-PCS | Mod: CPTII,95,, | Performed by: STUDENT IN AN ORGANIZED HEALTH CARE EDUCATION/TRAINING PROGRAM

## 2022-10-05 PROCEDURE — 4010F ACE/ARB THERAPY RXD/TAKEN: CPT | Mod: CPTII,95,, | Performed by: STUDENT IN AN ORGANIZED HEALTH CARE EDUCATION/TRAINING PROGRAM

## 2022-10-05 PROCEDURE — 99214 PR OFFICE/OUTPT VISIT, EST, LEVL IV, 30-39 MIN: ICD-10-PCS | Mod: 95,,, | Performed by: STUDENT IN AN ORGANIZED HEALTH CARE EDUCATION/TRAINING PROGRAM

## 2022-10-05 PROCEDURE — 99214 OFFICE O/P EST MOD 30 MIN: CPT | Mod: 95,,, | Performed by: STUDENT IN AN ORGANIZED HEALTH CARE EDUCATION/TRAINING PROGRAM

## 2022-10-05 PROCEDURE — 96523 IRRIG DRUG DELIVERY DEVICE: CPT

## 2022-10-05 PROCEDURE — 63600175 PHARM REV CODE 636 W HCPCS: Performed by: INTERNAL MEDICINE

## 2022-10-05 RX ORDER — SODIUM CHLORIDE 0.9 % (FLUSH) 0.9 %
10 SYRINGE (ML) INJECTION
Status: DISCONTINUED | OUTPATIENT
Start: 2022-10-05 | End: 2022-10-05 | Stop reason: HOSPADM

## 2022-10-05 RX ORDER — HEPARIN 100 UNIT/ML
500 SYRINGE INTRAVENOUS
Status: DISCONTINUED | OUTPATIENT
Start: 2022-10-05 | End: 2022-10-05 | Stop reason: HOSPADM

## 2022-10-05 RX ADMIN — HEPARIN 500 UNITS: 100 SYRINGE at 11:10

## 2022-10-05 RX ADMIN — SODIUM CHLORIDE, PRESERVATIVE FREE 10 ML: 5 INJECTION INTRAVENOUS at 11:10

## 2022-10-05 NOTE — PLAN OF CARE
Problem: Fatigue  Goal: Improved Activity Tolerance  Outcome: Ongoing, Progressing  Intervention: Promote Improved Energy  Flowsheets (Taken 10/5/2022 1132)  Fatigue Management: frequent rest breaks encouraged  Sleep/Rest Enhancement:   regular sleep/rest pattern promoted   relaxation techniques promoted  Activity Management: Ambulated -L4

## 2022-10-05 NOTE — PROGRESS NOTES
Subjective: Hospital Follow UP - s/p splenectomy - vaccines and recent admission for intraabdominal abscess       Patient ID: Osman Li Jr. is a 57 y.o. male.    Chief Complaint:: No chief complaint on file.    HPI Osman Li is a 57 y.o. male active heavy smoker, with past medical history of HTN, diabetes, and HLD, known to me from prior admission to [facility] 4/21 for acute abdomen secondary to LBO in the setting of large colonic mass s/p resection and splenectomy 4/21. Patient was discharged on levofloxacin, doxycyclin and flagyl PO. Received first 2 doses of meningococcus and pneumonococcus vaccines before discharge. Port-A-Cath placed 5/18. Patient had a recent admission to [facility] for sepsis secondary to intra-abdominal abscess, unable to have it drained by IR, sent home on IV ceftriaxone and flagyl PO for 10 days. He completed antibiotics 7/3.     Patient is here for follow up, wife present. Has had significant weight loss in the last couple of months. Plan to start chemotherapy next week, will re-schedule CT scan for early next week to assess status of prior abscess before starting chemotherapy.     He states he feels good, no abdominal pain, colostomy functioning, no nausea or vomit. No fever or chills.   Recent labs reviewed, normal WBC, CRP 8.5, slightly elevated.    8/23: Interim reviewed, patient scheduled for follow-up, seen via iPad. States he feels ok, some days are good and some days are not; weakness, fatigue, decreased appetite and weight loss. Patient had a repeat CT scan abdomen/pelvis 8/5 which showed rim-enhancing left upper quadrant fluid collection 5.3 x 1.8 cm  suspicious for abscess considering the provided clinical history. Smaller than prior, 9 x 11 cm. Adjacent to this there is masslike hypoattenuation involving the tail of the pancreas. This could is suspicious for pancreatic neoplasm, although phlegmon related to aforementioned abscess is also possible. Patient was  started on ceftriaxone IV on 7/11, still on ceftriaxone, plan to de-access port 8/26. He c/o lower back pain. He denies abdominal pan, fever or chills, he is aware of symptoms suggestive of infection and states he has not experienced them. Seen by Heme/Onc yesterday, on 4th cycle of chemotherapy, plan for NM bone scan to rule out metastasis.   Labs reviewed, CRP trending up, likely from underlying malignancy vs ongoing chemotherapy vs underlying fluid collection.     10/5:  Interim reviewed, patient seen virtually via iPad, wife present.  He is status post 7 cycle of chemotherapy 10/4, still feeling weak and fatigued, appetite is okay, continues to lose weight.  Denies fever or chills.  Had NM scan negative for bone metastases.  He is a participant of a research study at [facility] to check on DNA levels in blood.  [facility] pharmacy contacted, HiB vaccine available.  Patient instructed to come Friday to get his dose for Haemophilus influenzae type B vaccine.      Review of patient's allergies indicates:   Allergen Reactions    Penicillins Rash     Past Medical History:   Diagnosis Date    DM (diabetes mellitus)     Hyperlipidemia     Hypertension     Prediabetes      Past Surgical History:   Procedure Laterality Date    CHOLECYSTECTOMY  2011    COLONOSCOPY      2018    COLOSTOMY N/A 4/25/2022    Procedure: CREATION, COLOSTOMY;  Surgeon: Carlton Waddell MD;  Location: [facility] OR;  Service: General;  Laterality: N/A;    INSERTION OF TUNNELED CENTRAL VENOUS CATHETER (CVC) WITH SUBCUTANEOUS PORT Right 5/18/2022    Procedure: [id number];  Surgeon: Carlton Waddell MD;  Location: [facility] OR;  Service: General;  Laterality: Right;    MOBILIZATION OF SPLENIC FLEXURE N/A 4/25/2022    Procedure: MOBILIZATION, SPLENIC FLEXURE;  Surgeon: Carlton Waddell MD;  Location: [facility] OR;  Service: General;  Laterality: N/A;    SPLENECTOMY N/A 4/25/2022    Procedure: SPLENECTOMY;  Surgeon: Carlton Waddell MD;  Location: [facility] OR;  Service:  General;  Laterality: N/A;    SUBTOTAL COLECTOMY N/A 4/25/2022    Procedure: COLECTOMY, PARTIAL;  Surgeon: Carlton Waddell MD;  Location: [facility];  Service: General;  Laterality: N/A;     Social History     Tobacco Use    Smoking status: Every Day     Packs/day: 0.50     Years: 35.00     Pack years: 17.50     Types: Cigarettes    Smokeless tobacco: Never   Substance Use Topics    Alcohol use: Not Currently        Family History   Problem Relation Age of Onset    Heart disease Father          Review of Systems   Constitutional:  Positive for appetite change and unexpected weight change. Negative for chills and fever.   HENT:  Negative for sinus pain.    Respiratory:  Negative for cough and shortness of breath.    Cardiovascular:  Negative for chest pain and leg swelling.   Gastrointestinal:  Negative for abdominal pain, diarrhea and nausea.        Colostomy bag   Genitourinary:  Negative for dysuria.   Musculoskeletal:  Positive for back pain.   Neurological:  Negative for headaches.      VAD: R Port-A-Cath accessed, no redness noted, non tender to palpation - will have it de-accessed8/26    Objective:      There were no vitals taken for this visit. There is no height or weight on file to calculate BMI. virtual visit 8/23: looks comfortable on room air, conversant.     7/7:  Physical Exam  Constitutional:       Appearance: He is normal weight.      Comments: Frail   HENT:      Mouth/Throat:      Mouth: Mucous membranes are moist.      Pharynx: Oropharynx is clear.   Eyes:      Conjunctiva/sclera: Conjunctivae normal.      Pupils: Pupils are equal, round, and reactive to light.   Cardiovascular:      Rate and Rhythm: Normal rate and regular rhythm.      Pulses: Normal pulses.      Heart sounds: Normal heart sounds.   Pulmonary:      Effort: Pulmonary effort is normal.      Breath sounds: Normal breath sounds.   Abdominal:      General: Bowel sounds are normal.      Palpations: Abdomen is soft.      Tenderness: There  is no abdominal tenderness.      Comments: Healed wound, LL colostomy, formed stool in bag, non tender to palpation   Musculoskeletal:         General: Normal range of motion.      Cervical back: Normal range of motion.      Right lower leg: No edema.      Left lower leg: No edema.   Skin:     General: Skin is warm.      Capillary Refill: Capillary refill takes less than 2 seconds.   Neurological:      General: No focal deficit present.      Mental Status: He is alert and oriented to person, place, and time. Mental status is at baseline.   Psychiatric:         Thought Content: Thought content normal.       VAD: R Port-A-Cath        Recent Diagnostics:  CT scan 9/5/22:  1. Little change of rim enhancing subdiaphragmatic left upper quadrant fluid collection since 8/5/2022.  2. No significant change of hypodense region involving pancreatic tail. Differential diagnosis of this has been previously discussed and remains unchanged. Continued imaging follow-up is recommended. Pancreatic protocol CT or MRI without and with IV contrast is suggested in 3 months. Alternatively, endoscopic ultrasound could be used for further evaluation.  3. Tiny left pleural effusion, unchanged.  4. Unchanged parastomal hernia.    NM scan 9/22:  No evidence of osseous metastasis.  CT scan 8/5:   Rim-enhancing fluid collection within the left upper quadrant at the splenic bed minimally increased in size when compared to the prior study and suspicious for abscess  Improvement of the masslike hypoattenuation involving the tail of the pancreas most likely secondary to the adjacent inflammatory changes.  Small left pleural effusion with left lower lobe atelectasis  Left lower quadrant ostomy  Prior colectomy.    CT scan 7/8:  Rim-enhancing left upper quadrant fluid collection suspicious for abscess considering the provided clinical history.  Adjacent to this there is masslike hypoattenuation involving the tail of the pancreas. This could is  suspicious for pancreatic neoplasm, although phlegmon related to aforementioned abscess is also possible. Dedicated pancreatic protocol imaging and correlation with surgical history is recommended.    CT scan 6/20/22:  1. Postsurgical change of the colon with increased size of air-fluid collection near the operative bed in the left upper abdominal quadrant.  Abscess or contained perforation are considerations.  2. Hepatomegaly.    CT scan 6/29/22:  1. Decreased size of left upper quadrant air-fluid collection.  2. Unchanged size of left pleural effusion.      Micro:  Wound cultures 5/2 E coli, Klebsiella pneumoniae and Candida dubliensis    Pathology:  inal Pathologic Diagnosis 1. Spleen, splenectomy:   - Benign splenic parenchyma   - Negative for malignancy   2. Colon, partial colectomy:   - Invasive colonic adenocarcinoma, poorly differentiated (G3)     - Invades into pericolorectal tissue (pT3)   - Margins uninvolved by invasive carcinoma     - 0.1 cm to the mesenteric margin   - Lymphovascular invasion identified   - No perineural invasion identified   - Seventeen of twenty-seven lymph nodes, positive for metastatic carcinoma   (17/27, pN2b)   - Fibrous serosal adhesions   - See below for results of MSI testing   - CARLOS ENRIQUE/MILLIE Targeted Gene Panel has been ordered and results will be issued in   a supplemental report     CAP Synoptic Checklist for Colonic Neoplasms:     - Procedure: Partial colectomy     - Tumor Site: Splenic flexure     - Histologic Type: Adenocarcinoma     - Histologic Grade: G3, poorly differentiated     - Tumor Size: 5.0 x 4.5 x 2.7 cm     - Tumor Extent: Invades through muscularis propria into pericolorectal   tissue     - Macroscopic Tumor Perforation: Not identified     - Lymphovascular Invasion: Present, small vessel involved     - Perineural Invasion: Not identified     - Number of Tumor Buds: 5 in one hotspot field     - Tumor Bud Score: Intermediate (5-9)     - Type of Polyp in which  Invasive Carcinoma Arose: None identified     - Treatment Effect: No known presurgical therapy     - Margins: All margins negative for invasive carcinoma              Assessment and Plan:         1. Poorly differentiated colon cancer s/p colectomy and splenectomy 4/21, on chemotherapy 4th cycle, complicated by recurrent intra-abdominal abscess, unable to drain by IR, latest CT scan 8/5 with slightly increased size of fluid collection at the splenic bed - possible sterile fluid collection    Labs reviewed, -->18.2, trending down - infection vs inflammation given underlying malignancy    NM scan negative for bone metastasis  MRI abdomen w and w/o contrast in 2 months   RTC in 2 months with imaging and labs   S/p Menveo and Pneumovac vaccines as per CDC guidelines, will get HiB vaccine at [facility], ordered for this week        2. Smoker   3. PMHx HTN, diabetes, HLD    PCP follow-up    D/w patient, wife    Malignant neoplasm of splenic flexure    Intra-abdominal abscess    Hypertension, unspecified type

## 2022-10-05 NOTE — PATIENT INSTRUCTIONS
HiB vaccine ordered to be given at [facility] pharmacy   MRI abdomen in 2 months  RTC in  December with imaging

## 2022-10-12 ENCOUNTER — OFFICE VISIT (OUTPATIENT)
Dept: FAMILY MEDICINE | Facility: CLINIC | Age: 57
End: 2022-10-12
Payer: COMMERCIAL

## 2022-10-12 VITALS
OXYGEN SATURATION: 98 % | HEIGHT: 74 IN | SYSTOLIC BLOOD PRESSURE: 128 MMHG | WEIGHT: 182.38 LBS | BODY MASS INDEX: 23.41 KG/M2 | TEMPERATURE: 98 F | HEART RATE: 93 BPM | DIASTOLIC BLOOD PRESSURE: 76 MMHG

## 2022-10-12 DIAGNOSIS — F51.01 PRIMARY INSOMNIA: ICD-10-CM

## 2022-10-12 DIAGNOSIS — I10 ESSENTIAL HYPERTENSION: ICD-10-CM

## 2022-10-12 DIAGNOSIS — C18.4 MALIGNANT NEOPLASM OF TRANSVERSE COLON: ICD-10-CM

## 2022-10-12 DIAGNOSIS — E78.2 MIXED HYPERLIPIDEMIA: ICD-10-CM

## 2022-10-12 DIAGNOSIS — G62.0 DRUG-INDUCED POLYNEUROPATHY: ICD-10-CM

## 2022-10-12 DIAGNOSIS — M25.512 SUBSCAPULAR PAIN, LEFT: ICD-10-CM

## 2022-10-12 DIAGNOSIS — E11.00 TYPE 2 DIABETES MELLITUS WITH HYPEROSMOLARITY WITHOUT COMA, WITHOUT LONG-TERM CURRENT USE OF INSULIN: Primary | ICD-10-CM

## 2022-10-12 LAB — HBA1C MFR BLD: 8.6 %

## 2022-10-12 PROCEDURE — 90682 FLU VACCINE - QUADRIVALENT (RECOMBINANT) PRESERVATIVE FREE: ICD-10-PCS | Mod: S$GLB,,, | Performed by: PHYSICIAN ASSISTANT

## 2022-10-12 PROCEDURE — 4010F ACE/ARB THERAPY RXD/TAKEN: CPT | Mod: CPTII,S$GLB,, | Performed by: PHYSICIAN ASSISTANT

## 2022-10-12 PROCEDURE — 1160F RVW MEDS BY RX/DR IN RCRD: CPT | Mod: CPTII,S$GLB,, | Performed by: PHYSICIAN ASSISTANT

## 2022-10-12 PROCEDURE — 1159F MED LIST DOCD IN RCRD: CPT | Mod: CPTII,S$GLB,, | Performed by: PHYSICIAN ASSISTANT

## 2022-10-12 PROCEDURE — 1160F PR REVIEW ALL MEDS BY PRESCRIBER/CLIN PHARMACIST DOCUMENTED: ICD-10-PCS | Mod: CPTII,S$GLB,, | Performed by: PHYSICIAN ASSISTANT

## 2022-10-12 PROCEDURE — 99215 OFFICE O/P EST HI 40 MIN: CPT | Mod: 25,S$GLB,, | Performed by: PHYSICIAN ASSISTANT

## 2022-10-12 PROCEDURE — 1159F PR MEDICATION LIST DOCUMENTED IN MEDICAL RECORD: ICD-10-PCS | Mod: CPTII,S$GLB,, | Performed by: PHYSICIAN ASSISTANT

## 2022-10-12 PROCEDURE — 3078F DIAST BP <80 MM HG: CPT | Mod: CPTII,S$GLB,, | Performed by: PHYSICIAN ASSISTANT

## 2022-10-12 PROCEDURE — 3078F PR MOST RECENT DIASTOLIC BLOOD PRESSURE < 80 MM HG: ICD-10-PCS | Mod: CPTII,S$GLB,, | Performed by: PHYSICIAN ASSISTANT

## 2022-10-12 PROCEDURE — 99215 PR OFFICE/OUTPT VISIT, EST, LEVL V, 40-54 MIN: ICD-10-PCS | Mod: 25,S$GLB,, | Performed by: PHYSICIAN ASSISTANT

## 2022-10-12 PROCEDURE — 90471 IMMUNIZATION ADMIN: CPT | Mod: S$GLB,,, | Performed by: PHYSICIAN ASSISTANT

## 2022-10-12 PROCEDURE — 3052F HG A1C>EQUAL 8.0%<EQUAL 9.0%: CPT | Mod: CPTII,S$GLB,, | Performed by: PHYSICIAN ASSISTANT

## 2022-10-12 PROCEDURE — 90471 FLU VACCINE - QUADRIVALENT (RECOMBINANT) PRESERVATIVE FREE: ICD-10-PCS | Mod: S$GLB,,, | Performed by: PHYSICIAN ASSISTANT

## 2022-10-12 PROCEDURE — 83036 HEMOGLOBIN GLYCOSYLATED A1C: CPT | Mod: QW,,, | Performed by: PHYSICIAN ASSISTANT

## 2022-10-12 PROCEDURE — 3052F PR MOST RECENT HEMOGLOBIN A1C LEVEL 8.0 - < 9.0%: ICD-10-PCS | Mod: CPTII,S$GLB,, | Performed by: PHYSICIAN ASSISTANT

## 2022-10-12 PROCEDURE — 83036 POCT HEMOGLOBIN A1C: ICD-10-PCS | Mod: QW,,, | Performed by: PHYSICIAN ASSISTANT

## 2022-10-12 PROCEDURE — 4010F PR ACE/ARB THEARPY RXD/TAKEN: ICD-10-PCS | Mod: CPTII,S$GLB,, | Performed by: PHYSICIAN ASSISTANT

## 2022-10-12 PROCEDURE — 3074F SYST BP LT 130 MM HG: CPT | Mod: CPTII,S$GLB,, | Performed by: PHYSICIAN ASSISTANT

## 2022-10-12 PROCEDURE — 3074F PR MOST RECENT SYSTOLIC BLOOD PRESSURE < 130 MM HG: ICD-10-PCS | Mod: CPTII,S$GLB,, | Performed by: PHYSICIAN ASSISTANT

## 2022-10-12 PROCEDURE — 90682 RIV4 VACC RECOMBINANT DNA IM: CPT | Mod: S$GLB,,, | Performed by: PHYSICIAN ASSISTANT

## 2022-10-12 RX ORDER — INSULIN GLARGINE 300 U/ML
INJECTION, SOLUTION SUBCUTANEOUS
COMMUNITY
Start: 2022-07-14 | End: 2022-10-12

## 2022-10-12 RX ORDER — INSULIN GLARGINE 300 U/ML
26 INJECTION, SOLUTION SUBCUTANEOUS DAILY
Qty: 1 PEN | Refills: 5 | Status: SHIPPED | OUTPATIENT
Start: 2022-10-12 | End: 2023-06-21 | Stop reason: SDUPTHER

## 2022-10-12 RX ORDER — DICLOFENAC SODIUM 50 MG/1
50 TABLET, DELAYED RELEASE ORAL 2 TIMES DAILY
Qty: 60 TABLET | Refills: 2 | Status: SHIPPED | OUTPATIENT
Start: 2022-10-12 | End: 2023-06-21

## 2022-10-12 RX ORDER — ZINC OXIDE 13 %
1 CREAM (GRAM) TOPICAL DAILY
COMMUNITY
Start: 2022-07-15 | End: 2022-12-12

## 2022-10-12 RX ORDER — TRAZODONE HYDROCHLORIDE 50 MG/1
50 TABLET ORAL NIGHTLY
Qty: 90 TABLET | Refills: 1 | Status: SHIPPED | OUTPATIENT
Start: 2022-10-12 | End: 2023-05-18 | Stop reason: SDUPTHER

## 2022-10-12 NOTE — TELEPHONE ENCOUNTER
Left message to call/ba   Pt to ED c/o irregular bowel pattern, intermittent shortness of breath and intermittent lightheadedness when she gets short of breath. Pt states this has been going on for a couple weeks. Pt states it is not limited to any specific time period. Pt ambulatory to room. Pt shows no signs of distress. Monitor applied. EKG complete.       Mary SAWANT RN  10/12/22 9400

## 2022-10-12 NOTE — PROGRESS NOTES
SUBJECTIVE:    Patient ID: Osman Li Jr. is a 57 y.o. male.    Chief Complaint: Diabetes (No bottles/ meds confimred verbally/ sleeping issues/flu denied/ hib taken//dp)    Pt is a 57 y.o. male who presents today for a 2 month follow-up.  He is currently following up with Dr. Khoobehi (Heme/Onc) and received chemo treatment w/ steroids (completed 7 cycles) regarding his history of malignant neoplasm of the transverse colon.  He continues to experience weight loss - 6 lbs since last visit.  He maintains an adequate appetite and is averaging 3 meals/day with snacks in between. Regarding his history of DM II, he is currently managed on Toujeo Max 20 units q.d., Metformin 1000 mg b.i.d., and Ozempic 0.5 mg q7days.  Last A1c (07/13/2022) was elevated at 9.3%.  Blood sugar at home has been ranging in the 130-170s consistently.  A1c today has significantly improved to 8.6%, though still not at goal.  UTD eye and foot exam.      He is also following up with Dr. Cecilio Sparks (ID) after an abdominal abscess was noted that could not be drained via IR.  He has completed IV antibiotic.  A repeat CT scan (9/2/22) was completed - abscess is still evident with little change, suspected to be a sterile fluid pocket. A MRI Abd w/wo contrast has been ordered to further assess.     Today, he continues to experience left shoulder/subscapular discomfort that is currently controlled with p.r.n. Oxycodone.  On last visit, he was started on Mobic, but discontinue this medication.  He was also start physical therapy - attempted 2 weeks of PT, but states he did not experience any improvement, so his sessions were discontinued.  He also reports increased insomnia.  He is currently averaging only 4 hours of sleep/night.      Office Visit on 10/12/2022   Component Date Value Ref Range Status    Hemoglobin A1C 10/12/2022 8.6  % Final   Lab Visit on 09/30/2022   Component Date Value Ref Range Status    Drug Study Test Name  09/30/2022 Drug Study   Final    Drug Study Specimen Type 09/30/2022 blood   Final    Drug Study Test Result 09/30/2022 Result sent directly to physician   Final   Lab Visit on 09/30/2022   Component Date Value Ref Range Status    WBC 09/30/2022 9.39  3.90 - 12.70 K/uL Final    RBC 09/30/2022 5.18  4.60 - 6.20 M/uL Final    Hemoglobin 09/30/2022 14.3  14.0 - 18.0 g/dL Final    Hematocrit 09/30/2022 41.9  40.0 - 54.0 % Final    MCV 09/30/2022 81 (L)  82 - 98 fL Final    MCH 09/30/2022 27.6  27.0 - 31.0 pg Final    MCHC 09/30/2022 34.1  32.0 - 36.0 g/dL Final    RDW 09/30/2022 21.7 (H)  11.5 - 14.5 % Final    Platelets 09/30/2022 196  150 - 450 K/uL Final    MPV 09/30/2022 9.2  9.2 - 12.9 fL Final    Immature Granulocytes 09/30/2022 0.4  0.0 - 0.5 % Final    Gran # (ANC) 09/30/2022 2.8  1.8 - 7.7 K/uL Final    Immature Grans (Abs) 09/30/2022 0.04  0.00 - 0.04 K/uL Final    Lymph # 09/30/2022 4.9 (H)  1.0 - 4.8 K/uL Final    Mono # 09/30/2022 1.5 (H)  0.3 - 1.0 K/uL Final    Eos # 09/30/2022 0.1  0.0 - 0.5 K/uL Final    Baso # 09/30/2022 0.08  0.00 - 0.20 K/uL Final    nRBC 09/30/2022 1 (A)  0 /100 WBC Final    Gran % 09/30/2022 29.5 (L)  38.0 - 73.0 % Final    Lymph % 09/30/2022 51.9 (H)  18.0 - 48.0 % Final    Mono % 09/30/2022 16.4 (H)  4.0 - 15.0 % Final    Eosinophil % 09/30/2022 0.9  0.0 - 8.0 % Final    Basophil % 09/30/2022 0.9  0.0 - 1.9 % Final    Differential Method 09/30/2022 Automated   Final    Sodium 09/30/2022 136  136 - 145 mmol/L Final    Potassium 09/30/2022 3.7  3.5 - 5.1 mmol/L Final    Chloride 09/30/2022 96  95 - 110 mmol/L Final    CO2 09/30/2022 27  23 - 29 mmol/L Final    Glucose 09/30/2022 219 (H)  70 - 110 mg/dL Final    BUN 09/30/2022 11  6 - 20 mg/dL Final    Creatinine 09/30/2022 0.9  0.5 - 1.4 mg/dL Final    Calcium 09/30/2022 10.7 (H)  8.7 - 10.5 mg/dL Final    Total Protein 09/30/2022 7.8  6.0 - 8.4 g/dL Final    Albumin 09/30/2022 3.6  3.5 - 5.2 g/dL Final    Total Bilirubin  09/30/2022 0.6  0.1 - 1.0 mg/dL Final    Alkaline Phosphatase 09/30/2022 135  55 - 135 U/L Final    AST 09/30/2022 25  10 - 40 U/L Final    ALT 09/30/2022 17  10 - 44 U/L Final    Anion Gap 09/30/2022 13  8 - 16 mmol/L Final    eGFR 09/30/2022 >60  >60 mL/min/1.73 m^2 Final    Magnesium 09/30/2022 1.9  1.6 - 2.6 mg/dL Final   Lab Visit on 09/16/2022   Component Date Value Ref Range Status    WBC 09/16/2022 10.01  3.90 - 12.70 K/uL Final    RBC 09/16/2022 4.98  4.60 - 6.20 M/uL Final    Hemoglobin 09/16/2022 13.2 (L)  14.0 - 18.0 g/dL Final    Hematocrit 09/16/2022 40.2  40.0 - 54.0 % Final    MCV 09/16/2022 81 (L)  82 - 98 fL Final    MCH 09/16/2022 26.5 (L)  27.0 - 31.0 pg Final    MCHC 09/16/2022 32.8  32.0 - 36.0 g/dL Final    RDW 09/16/2022 20.6 (H)  11.5 - 14.5 % Final    Platelets 09/16/2022 288  150 - 450 K/uL Final    MPV 09/16/2022 9.8  9.2 - 12.9 fL Final    Immature Granulocytes 09/16/2022 0.4  0.0 - 0.5 % Final    Gran # (ANC) 09/16/2022 3.3  1.8 - 7.7 K/uL Final    Immature Grans (Abs) 09/16/2022 0.04  0.00 - 0.04 K/uL Final    Lymph # 09/16/2022 5.2 (H)  1.0 - 4.8 K/uL Final    Mono # 09/16/2022 1.3 (H)  0.3 - 1.0 K/uL Final    Eos # 09/16/2022 0.1  0.0 - 0.5 K/uL Final    Baso # 09/16/2022 0.10  0.00 - 0.20 K/uL Final    nRBC 09/16/2022 2 (A)  0 /100 WBC Final    Gran % 09/16/2022 32.8 (L)  38.0 - 73.0 % Final    Lymph % 09/16/2022 51.5 (H)  18.0 - 48.0 % Final    Mono % 09/16/2022 13.3  4.0 - 15.0 % Final    Eosinophil % 09/16/2022 1.0  0.0 - 8.0 % Final    Basophil % 09/16/2022 1.0  0.0 - 1.9 % Final    Differential Method 09/16/2022 Automated   Final    Sodium 09/16/2022 134 (L)  136 - 145 mmol/L Final    Potassium 09/16/2022 4.7  3.5 - 5.1 mmol/L Final    Chloride 09/16/2022 96  95 - 110 mmol/L Final    CO2 09/16/2022 29  23 - 29 mmol/L Final    Glucose 09/16/2022 190 (H)  70 - 110 mg/dL Final    BUN 09/16/2022 17  6 - 20 mg/dL Final    Creatinine 09/16/2022 0.9  0.5 - 1.4 mg/dL Final    Calcium  09/16/2022 10.5  8.7 - 10.5 mg/dL Final    Total Protein 09/16/2022 7.3  6.0 - 8.4 g/dL Final    Albumin 09/16/2022 3.4 (L)  3.5 - 5.2 g/dL Final    Total Bilirubin 09/16/2022 0.4  0.1 - 1.0 mg/dL Final    Alkaline Phosphatase 09/16/2022 110  55 - 135 U/L Final    AST 09/16/2022 18  10 - 40 U/L Final    ALT 09/16/2022 10  10 - 44 U/L Final    Anion Gap 09/16/2022 9  8 - 16 mmol/L Final    eGFR 09/16/2022 >60  >60 mL/min/1.73 m^2 Final    Magnesium 09/16/2022 1.7  1.6 - 2.6 mg/dL Final   Lab Visit on 09/01/2022   Component Date Value Ref Range Status    CRP 09/01/2022 18.2 (H)  0.0 - 8.2 mg/L Final   Lab Visit on 09/01/2022   Component Date Value Ref Range Status    WBC 09/01/2022 7.13  3.90 - 12.70 K/uL Final    RBC 09/01/2022 5.11  4.60 - 6.20 M/uL Final    Hemoglobin 09/01/2022 13.6 (L)  14.0 - 18.0 g/dL Final    Hematocrit 09/01/2022 39.3 (L)  40.0 - 54.0 % Final    MCV 09/01/2022 77 (L)  82 - 98 fL Final    MCH 09/01/2022 26.6 (L)  27.0 - 31.0 pg Final    MCHC 09/01/2022 34.6  32.0 - 36.0 g/dL Final    RDW 09/01/2022 19.9 (H)  11.5 - 14.5 % Final    Platelets 09/01/2022 224  150 - 450 K/uL Final    MPV 09/01/2022 9.0 (L)  9.2 - 12.9 fL Final    Immature Granulocytes 09/01/2022 0.3  0.0 - 0.5 % Final    Gran # (ANC) 09/01/2022 2.6  1.8 - 7.7 K/uL Final    Immature Grans (Abs) 09/01/2022 0.02  0.00 - 0.04 K/uL Final    Lymph # 09/01/2022 3.4  1.0 - 4.8 K/uL Final    Mono # 09/01/2022 0.9  0.3 - 1.0 K/uL Final    Eos # 09/01/2022 0.2  0.0 - 0.5 K/uL Final    Baso # 09/01/2022 0.08  0.00 - 0.20 K/uL Final    nRBC 09/01/2022 3 (A)  0 /100 WBC Final    Gran % 09/01/2022 36.6 (L)  38.0 - 73.0 % Final    Lymph % 09/01/2022 47.0  18.0 - 48.0 % Final    Mono % 09/01/2022 12.8  4.0 - 15.0 % Final    Eosinophil % 09/01/2022 2.2  0.0 - 8.0 % Final    Basophil % 09/01/2022 1.1  0.0 - 1.9 % Final    Differential Method 09/01/2022 Automated   Final    Sodium 09/01/2022 131 (L)  136 - 145 mmol/L Final    Potassium 09/01/2022 3.6   3.5 - 5.1 mmol/L Final    Chloride 09/01/2022 94 (L)  95 - 110 mmol/L Final    CO2 09/01/2022 26  23 - 29 mmol/L Final    Glucose 09/01/2022 292 (H)  70 - 110 mg/dL Final    BUN 09/01/2022 14  6 - 20 mg/dL Final    Creatinine 09/01/2022 0.9  0.5 - 1.4 mg/dL Final    Calcium 09/01/2022 10.4  8.7 - 10.5 mg/dL Final    Total Protein 09/01/2022 7.3  6.0 - 8.4 g/dL Final    Albumin 09/01/2022 3.5  3.5 - 5.2 g/dL Final    Total Bilirubin 09/01/2022 0.4  0.1 - 1.0 mg/dL Final    Alkaline Phosphatase 09/01/2022 121  55 - 135 U/L Final    AST 09/01/2022 19  10 - 40 U/L Final    ALT 09/01/2022 16  10 - 44 U/L Final    Anion Gap 09/01/2022 11  8 - 16 mmol/L Final    eGFR 09/01/2022 >60  >60 mL/min/1.73 m^2 Final    Magnesium 09/01/2022 1.9  1.6 - 2.6 mg/dL Final   Lab Visit on 08/22/2022   Component Date Value Ref Range Status    WBC 08/22/2022 7.77  3.90 - 12.70 K/uL Final    RBC 08/22/2022 5.02  4.60 - 6.20 M/uL Final    Hemoglobin 08/22/2022 13.3 (L)  14.0 - 18.0 g/dL Final    Hematocrit 08/22/2022 39.2 (L)  40.0 - 54.0 % Final    MCV 08/22/2022 78 (L)  82 - 98 fL Final    MCH 08/22/2022 26.5 (L)  27.0 - 31.0 pg Final    MCHC 08/22/2022 33.9  32.0 - 36.0 g/dL Final    RDW 08/22/2022 19.9 (H)  11.5 - 14.5 % Final    Platelets 08/22/2022 442  150 - 450 K/uL Final    MPV 08/22/2022 9.9  9.2 - 12.9 fL Final    Immature Granulocytes 08/22/2022 0.3  0.0 - 0.5 % Final    Gran # (ANC) 08/22/2022 3.0  1.8 - 7.7 K/uL Final    Immature Grans (Abs) 08/22/2022 0.02  0.00 - 0.04 K/uL Final    Lymph # 08/22/2022 3.5  1.0 - 4.8 K/uL Final    Mono # 08/22/2022 1.0  0.3 - 1.0 K/uL Final    Eos # 08/22/2022 0.2  0.0 - 0.5 K/uL Final    Baso # 08/22/2022 0.11  0.00 - 0.20 K/uL Final    nRBC 08/22/2022 1 (A)  0 /100 WBC Final    Gran % 08/22/2022 38.0  38.0 - 73.0 % Final    Lymph % 08/22/2022 45.6  18.0 - 48.0 % Final    Mono % 08/22/2022 12.5  4.0 - 15.0 % Final    Eosinophil % 08/22/2022 2.2  0.0 - 8.0 % Final    Basophil % 08/22/2022 1.4   0.0 - 1.9 % Final    Differential Method 08/22/2022 Automated   Final    Sodium 08/22/2022 133 (L)  136 - 145 mmol/L Final    Potassium 08/22/2022 4.1  3.5 - 5.1 mmol/L Final    Chloride 08/22/2022 95  95 - 110 mmol/L Final    CO2 08/22/2022 27  23 - 29 mmol/L Final    Glucose 08/22/2022 175 (H)  70 - 110 mg/dL Final    BUN 08/22/2022 16  6 - 20 mg/dL Final    Creatinine 08/22/2022 1.0  0.5 - 1.4 mg/dL Final    Calcium 08/22/2022 10.3  8.7 - 10.5 mg/dL Final    Total Protein 08/22/2022 7.4  6.0 - 8.4 g/dL Final    Albumin 08/22/2022 3.2 (L)  3.5 - 5.2 g/dL Final    Total Bilirubin 08/22/2022 0.3  0.1 - 1.0 mg/dL Final    Alkaline Phosphatase 08/22/2022 121  55 - 135 U/L Final    AST 08/22/2022 17  10 - 40 U/L Final    ALT 08/22/2022 16  10 - 44 U/L Final    Anion Gap 08/22/2022 11  8 - 16 mmol/L Final    eGFR 08/22/2022 >60  >60 mL/min/1.73 m^2 Final    Magnesium 08/22/2022 1.8  1.6 - 2.6 mg/dL Final   Appointment on 08/15/2022   Component Date Value Ref Range Status    WBC 08/15/2022 6.66  3.90 - 12.70 K/uL Final    RBC 08/15/2022 4.69  4.60 - 6.20 M/uL Final    Hemoglobin 08/15/2022 12.3 (L)  14.0 - 18.0 g/dL Final    Hematocrit 08/15/2022 36.4 (L)  40.0 - 54.0 % Final    MCV 08/15/2022 78 (L)  82 - 98 fL Final    MCH 08/15/2022 26.2 (L)  27.0 - 31.0 pg Final    MCHC 08/15/2022 33.8  32.0 - 36.0 g/dL Final    RDW 08/15/2022 19.8 (H)  11.5 - 14.5 % Final    Platelets 08/15/2022 331  150 - 450 K/uL Final    MPV 08/15/2022 10.2  9.2 - 12.9 fL Final    Immature Granulocytes 08/15/2022 0.6 (H)  0.0 - 0.5 % Final    Gran # (ANC) 08/15/2022 1.9  1.8 - 7.7 K/uL Final    Immature Grans (Abs) 08/15/2022 0.04  0.00 - 0.04 K/uL Final    Lymph # 08/15/2022 3.9  1.0 - 4.8 K/uL Final    Mono # 08/15/2022 0.6  0.3 - 1.0 K/uL Final    Eos # 08/15/2022 0.2  0.0 - 0.5 K/uL Final    Baso # 08/15/2022 0.05  0.00 - 0.20 K/uL Final    nRBC 08/15/2022 2 (A)  0 /100 WBC Final    Gran % 08/15/2022 28.1 (L)  38.0 - 73.0 % Final    Lymph %  08/15/2022 59.0 (H)  18.0 - 48.0 % Final    Mono % 08/15/2022 9.2  4.0 - 15.0 % Final    Eosinophil % 08/15/2022 2.3  0.0 - 8.0 % Final    Basophil % 08/15/2022 0.8  0.0 - 1.9 % Final    Differential Method 08/15/2022 Automated   Final    CRP 08/15/2022 173.9 (H)  0.0 - 8.2 mg/L Final    Sodium 08/15/2022 133 (L)  136 - 145 mmol/L Final    Potassium 08/15/2022 3.8  3.5 - 5.1 mmol/L Final    Chloride 08/15/2022 94 (L)  95 - 110 mmol/L Final    CO2 08/15/2022 22 (L)  23 - 29 mmol/L Final    Glucose 08/15/2022 193 (H)  70 - 110 mg/dL Final    BUN 08/15/2022 14  6 - 20 mg/dL Final    Creatinine 08/15/2022 0.8  0.5 - 1.4 mg/dL Final    Calcium 08/15/2022 9.9  8.7 - 10.5 mg/dL Final    Total Protein 08/15/2022 7.7  6.0 - 8.4 g/dL Final    Albumin 08/15/2022 3.2 (L)  3.5 - 5.2 g/dL Final    Total Bilirubin 08/15/2022 0.4  0.1 - 1.0 mg/dL Final    Alkaline Phosphatase 08/15/2022 97  55 - 135 U/L Final    AST 08/15/2022 14  10 - 40 U/L Final    ALT 08/15/2022 13  10 - 44 U/L Final    Anion Gap 08/15/2022 17 (H)  8 - 16 mmol/L Final    eGFR 08/15/2022 >60  >60 mL/min/1.73 m^2 Final   Lab Visit on 08/08/2022   Component Date Value Ref Range Status    CRP 08/08/2022 28.2 (H)  0.0 - 8.2 mg/L Final   Lab Visit on 08/08/2022   Component Date Value Ref Range Status    WBC 08/08/2022 6.29  3.90 - 12.70 K/uL Final    RBC 08/08/2022 4.92  4.60 - 6.20 M/uL Final    Hemoglobin 08/08/2022 12.9 (L)  14.0 - 18.0 g/dL Final    Hematocrit 08/08/2022 39.2 (L)  40.0 - 54.0 % Final    MCV 08/08/2022 80 (L)  82 - 98 fL Final    MCH 08/08/2022 26.2 (L)  27.0 - 31.0 pg Final    MCHC 08/08/2022 32.9  32.0 - 36.0 g/dL Final    RDW 08/08/2022 19.4 (H)  11.5 - 14.5 % Final    Platelets 08/08/2022 482 (H)  150 - 450 K/uL Final    MPV 08/08/2022 9.0 (L)  9.2 - 12.9 fL Final    Immature Granulocytes 08/08/2022 0.3  0.0 - 0.5 % Final    Gran # (ANC) 08/08/2022 2.1  1.8 - 7.7 K/uL Final    Immature Grans (Abs) 08/08/2022 0.02  0.00 - 0.04 K/uL Final     Lymph # 08/08/2022 3.0  1.0 - 4.8 K/uL Final    Mono # 08/08/2022 0.9  0.3 - 1.0 K/uL Final    Eos # 08/08/2022 0.2  0.0 - 0.5 K/uL Final    Baso # 08/08/2022 0.11  0.00 - 0.20 K/uL Final    nRBC 08/08/2022 1 (A)  0 /100 WBC Final    Gran % 08/08/2022 32.7 (L)  38.0 - 73.0 % Final    Lymph % 08/08/2022 48.0  18.0 - 48.0 % Final    Mono % 08/08/2022 14.0  4.0 - 15.0 % Final    Eosinophil % 08/08/2022 3.3  0.0 - 8.0 % Final    Basophil % 08/08/2022 1.7  0.0 - 1.9 % Final    Differential Method 08/08/2022 Automated   Final    Sodium 08/08/2022 136  136 - 145 mmol/L Final    Potassium 08/08/2022 5.0  3.5 - 5.1 mmol/L Final    Chloride 08/08/2022 99  95 - 110 mmol/L Final    CO2 08/08/2022 27  23 - 29 mmol/L Final    Glucose 08/08/2022 204 (H)  70 - 110 mg/dL Final    BUN 08/08/2022 12  6 - 20 mg/dL Final    Creatinine 08/08/2022 0.9  0.5 - 1.4 mg/dL Final    Calcium 08/08/2022 10.4  8.7 - 10.5 mg/dL Final    Total Protein 08/08/2022 7.5  6.0 - 8.4 g/dL Final    Albumin 08/08/2022 3.3 (L)  3.5 - 5.2 g/dL Final    Total Bilirubin 08/08/2022 0.2  0.1 - 1.0 mg/dL Final    Alkaline Phosphatase 08/08/2022 96  55 - 135 U/L Final    AST 08/08/2022 14  10 - 40 U/L Final    ALT 08/08/2022 15  10 - 44 U/L Final    Anion Gap 08/08/2022 10  8 - 16 mmol/L Final    eGFR 08/08/2022 >60  >60 mL/min/1.73 m^2 Final    Magnesium 08/08/2022 1.9  1.6 - 2.6 mg/dL Final   There may be more visits with results that are not included.       Past Medical History:   Diagnosis Date    DM (diabetes mellitus)     Hyperlipidemia     Hypertension     Prediabetes      Past Surgical History:   Procedure Laterality Date    CHOLECYSTECTOMY  2011    COLONOSCOPY      2018    COLOSTOMY N/A 4/25/2022    Procedure: CREATION, COLOSTOMY;  Surgeon: Carlton Waddell MD;  Location: [facility];  Service: General;  Laterality: N/A;    INSERTION OF TUNNELED CENTRAL VENOUS CATHETER (CVC) WITH SUBCUTANEOUS PORT Right 5/18/2022    Procedure: [id number];  Surgeon:  Carlton Waddell MD;  Location: [facility] OR;  Service: General;  Laterality: Right;    MOBILIZATION OF SPLENIC FLEXURE N/A 4/25/2022    Procedure: MOBILIZATION, SPLENIC FLEXURE;  Surgeon: Carlton Waddell MD;  Location: [facility] OR;  Service: General;  Laterality: N/A;    SPLENECTOMY N/A 4/25/2022    Procedure: SPLENECTOMY;  Surgeon: Carlton Waddell MD;  Location: [facility] OR;  Service: General;  Laterality: N/A;    SUBTOTAL COLECTOMY N/A 4/25/2022    Procedure: COLECTOMY, PARTIAL;  Surgeon: Carlton Waddell MD;  Location: [facility] OR;  Service: General;  Laterality: N/A;     Family History   Problem Relation Age of Onset    Heart disease Father        Marital Status:   Alcohol History:  reports that he does not currently use alcohol.  Tobacco History:  reports that he has been smoking cigarettes. He has a 17.50 pack-year smoking history. He has never used smokeless tobacco.  Drug History:  reports no history of drug use.    Health Maintenance Topics with due status: Not Due       Topic Last Completion Date    Lipid Panel 10/19/2021    Eye Exam 03/25/2022    Foot Exam 05/09/2022    Pneumococcal Vaccines (Age 0-64) 07/07/2022    Low Dose Statin 10/12/2022    Hemoglobin A1c 10/12/2022     Immunization History   Administered Date(s) Administered    COVID-19, vector-nr, [id number], PF (Jose Elias) 05/27/2021    HiB PRP-T 10/07/2022    Influenza 11/11/2020    Influenza (FLUBLOK) - Quadrivalent - Recombinant - PF *Preferred* (egg allergy) 10/12/2022    Influenza - Quadrivalent - MDCK 10/16/2019    Influenza - Quadrivalent - MDCK - PF 11/07/2020    Meningococcal Conjugate (MCV4O) 05/02/2022, 07/07/2022    Pneumococcal Conjugate - 13 Valent 05/02/2022    Pneumococcal Polysaccharide - 23 Valent 07/07/2022    Zoster Recombinant 02/06/2021, 04/30/2021       Review of patient's allergies indicates:   Allergen Reactions    Penicillins Rash       Current Outpatient Medications:     atorvastatin (LIPITOR) 20 MG tablet, Take 1 tablet (20 mg total)  "by mouth once daily., Disp: 90 tablet, Rfl: 1    enalapriL-hydrochlorothiazide (VASERETIC) 10-25 mg per tablet, Take 1 tablet by mouth once daily., Disp: 90 tablet, Rfl: 1    haemophilus B polysac-tetanus toxoid (ACTHIB, PF,) 10 mcg/0.5 mL injection, TO BE ADMINISTERED., Disp: 1 each, Rfl: 0    L. acidophilus/Bifid. animalis (PROBIOTIC) 5 billion cell CpSP, Take 1 capsule by mouth once daily., Disp: , Rfl:     metFORMIN (GLUCOPHAGE) 1000 MG tablet, Take 1 tablet (1,000 mg total) by mouth 2 (two) times daily with meals., Disp: 180 tablet, Rfl: 1    oxyCODONE (ROXICODONE) 30 MG Tab, Take 1 tablet (30 mg total) by mouth every 6 (six) hours as needed., Disp: 60 tablet, Rfl: 0    pen needle, diabetic 31 gauge x 3/16" Ndle, 1 each by Misc.(Non-Drug; Combo Route) route once daily., Disp: 90 each, Rfl: 3    promethazine (PHENERGAN) 12.5 MG Tab, Take 1 tablet (12.5 mg total) by mouth every 4 (four) hours as needed., Disp: 25 tablet, Rfl: 1    semaglutide (OZEMPIC) 0.25 mg or 0.5 mg(2 mg/1.5 mL) pen injector, Inject 0.25 mg into the skin every 7 days., Disp: 1 pen, Rfl: 5    sildenafiL (VIAGRA) 100 MG tablet, Take 1 tablet (100 mg total) by mouth daily as needed for Erectile Dysfunction., Disp: 30 tablet, Rfl: 1    diclofenac (VOLTAREN) 50 MG EC tablet, Take 1 tablet (50 mg total) by mouth 2 (two) times daily., Disp: 60 tablet, Rfl: 2    insulin glargine U-300 conc (TOUJEO MAX U-300 SOLOSTAR) 300 unit/mL (3 mL) insulin pen, Inject 26 Units into the skin once daily., Disp: 1 pen, Rfl: 5    traZODone (DESYREL) 50 MG tablet, Take 1 tablet (50 mg total) by mouth every evening., Disp: 90 tablet, Rfl: 1    Review of Systems   Constitutional:  Negative for activity change, appetite change, chills, fatigue, fever and unexpected weight change.        "Cannot sleep."   Respiratory:  Negative for cough, shortness of breath and wheezing.    Cardiovascular:  Negative for chest pain, palpitations and leg swelling.   Gastrointestinal:  " "Negative for abdominal distention, abdominal pain, constipation, diarrhea, nausea and vomiting.   Genitourinary: Negative.    Musculoskeletal:  Positive for arthralgias. Negative for myalgias.   Skin:  Negative for wound.   Neurological:  Negative for dizziness, syncope, weakness, light-headedness and headaches.        "Neuropathy is starting to set in from the chemo."   Psychiatric/Behavioral:  Negative for behavioral problems.         Objective:      Vitals:    10/12/22 0706   BP: 128/76   Pulse: 93   Temp: 98.2 °F (36.8 °C)   SpO2: 98%   Weight: 82.7 kg (182 lb 6.4 oz)   Height: 6' 2" (1.88 m)     Physical Exam  Vitals and nursing note reviewed.   Constitutional:       General: He is not in acute distress.     Appearance: Normal appearance. He is normal weight. He is not ill-appearing, toxic-appearing or diaphoretic.   HENT:      Head: Normocephalic and atraumatic.      Right Ear: Tympanic membrane, ear canal and external ear normal. There is no impacted cerumen.      Left Ear: Tympanic membrane, ear canal and external ear normal. There is no impacted cerumen.      Nose: Nose normal. No rhinorrhea.      Mouth/Throat:      Mouth: Mucous membranes are moist.      Pharynx: Oropharynx is clear.   Eyes:      General: No scleral icterus.        Right eye: No discharge.         Left eye: No discharge.      Extraocular Movements: Extraocular movements intact.      Conjunctiva/sclera: Conjunctivae normal.      Pupils: Pupils are equal, round, and reactive to light.   Neck:      Comments: No bony abnormalities or step-offs noted upon palpation of the cervical spine.  Cardiovascular:      Rate and Rhythm: Normal rate and regular rhythm.      Pulses: Normal pulses.      Heart sounds: No murmur heard.  Pulmonary:      Effort: Pulmonary effort is normal. No respiratory distress.      Breath sounds: Normal breath sounds. No wheezing.   Chest:       Abdominal:      General: Bowel sounds are normal. There is no distension.      " Palpations: There is no shifting dullness or fluid wave.      Tenderness: There is no abdominal tenderness. There is no guarding.      Hernia: No hernia is present.       Musculoskeletal:      Cervical back: Normal range of motion and neck supple.        Back:       Right lower leg: No edema.      Left lower leg: No edema.      Comments: Bilateral shoulders have full ROM on flexion, extension, internal rotation, and external rotation. 5/5 strength against resistance in the bilateral upper extremities.   Skin:     General: Skin is warm.      Coloration: Skin is not jaundiced or pale.      Findings: No erythema or rash.   Neurological:      General: No focal deficit present.      Mental Status: He is alert and oriented to person, place, and time. Mental status is at baseline.      Cranial Nerves: No cranial nerve deficit.      Sensory: No sensory deficit.      Motor: No weakness.      Coordination: Coordination normal.      Gait: Gait normal.   Psychiatric:         Mood and Affect: Mood normal.         Behavior: Behavior normal.         Assessment:       1. Type 2 diabetes mellitus with hyperosmolarity without coma, without long-term current use of insulin    2. Mixed hyperlipidemia    3. Essential hypertension    4. Malignant neoplasm of transverse colon    5. Primary insomnia    6. Subscapular pain, left    7. Drug-induced polyneuropathy           Plan:       Type 2 diabetes mellitus with hyperosmolarity without coma, without long-term current use of insulin  A1c today: 8.6%, improved from 9.3% on last visit.  Actively receiving chemo treatment with steroids which is contributing to elevated glucose levels.  Increase Toujeo Max in 2 unit increments every 4 days until daily dose is 26 units - if hypoglycemic events occur, contact the office.   Increase Ozempic to 1mg q7 days.   Continue Metformin 1000 mg b.i.d..  Foot exam and eye exam are UTD.  -     Hemoglobin A1C, POCT  -     insulin glargine U-300 conc (TOUJEO  MAX U-300 SOLOSTAR) 300 unit/mL (3 mL) insulin pen; Inject 26 Units into the skin once daily.  Dispense: 1 pen; Refill: 5    Mixed hyperlipidemia  Currently managed on Lipitor 20 mg q.d..  Continue as is.  Due for updated lipid panel - lab work ordered today and to be completed when fasting.  -     Lipid Panel; Future; Expected date: 10/12/2022    Essential hypertension  Stable and well controlled.  Continue as is.  -     Microalbumin/Creatinine Ratio, Urine; Future  -     TSH w/reflex to FT4; Future; Expected date: 10/12/2022  -     Urinalysis, Reflex to Urine Culture Urine, Clean Catch    Malignant neoplasm of transverse colon  Continue following up with Dr. Khoobehi (Heme/Onc) as scheduled.    Primary insomnia  Start Trazodone 50 mg q.h.s..  -     traZODone (DESYREL) 50 MG tablet; Take 1 tablet (50 mg total) by mouth every evening.  Dispense: 90 tablet; Refill: 1    Subscapular pain, left  Attempted, but failed Mobic in the past.  Attempted, but failed physical therapy in the past.  Start Voltaren 50 mg b.i.d..  Continue home stretching exercises.  -     diclofenac (VOLTAREN) 50 MG EC tablet; Take 1 tablet (50 mg total) by mouth 2 (two) times daily.  Dispense: 60 tablet; Refill: 2    Drug-induced polyneuropathy  Patient states symptoms are minimal at this time.  Educated and reassured that this is a side effect of both uncontrolled sugar levels and current chemo treatment.  Strict blood sugar control is vital at this time to prevent progression.  If symptoms worsen, will consider starting Gabapentin or Lyrica future.    Other orders  Influenza vaccine administered today.  -     Influenza (FLUBLOK) - Quadrivalent (Recombinant) *Preferred* (PF) - egg allergy    Follow up in about 3 months (around 1/12/2023) for Diabetic Check-Up, HTN.        10/12/2022 Richard Herrera PA-C

## 2022-10-14 ENCOUNTER — LAB VISIT (OUTPATIENT)
Dept: LAB | Facility: HOSPITAL | Age: 57
End: 2022-10-14
Attending: INTERNAL MEDICINE
Payer: COMMERCIAL

## 2022-10-14 DIAGNOSIS — K63.89 COLONIC MASS: ICD-10-CM

## 2022-10-14 LAB
ALBUMIN SERPL BCP-MCNC: 3.4 G/DL (ref 3.5–5.2)
ALP SERPL-CCNC: 118 U/L (ref 55–135)
ALT SERPL W/O P-5'-P-CCNC: 13 U/L (ref 10–44)
ANION GAP SERPL CALC-SCNC: 12 MMOL/L (ref 8–16)
AST SERPL-CCNC: 24 U/L (ref 10–40)
BASOPHILS # BLD AUTO: 0.07 K/UL (ref 0–0.2)
BASOPHILS NFR BLD: 0.8 % (ref 0–1.9)
BILIRUB SERPL-MCNC: 0.6 MG/DL (ref 0.1–1)
BUN SERPL-MCNC: 10 MG/DL (ref 6–20)
CALCIUM SERPL-MCNC: 10.7 MG/DL (ref 8.7–10.5)
CHLORIDE SERPL-SCNC: 99 MMOL/L (ref 95–110)
CO2 SERPL-SCNC: 27 MMOL/L (ref 23–29)
CREAT SERPL-MCNC: 0.8 MG/DL (ref 0.5–1.4)
DIFFERENTIAL METHOD: ABNORMAL
EOSINOPHIL # BLD AUTO: 0.1 K/UL (ref 0–0.5)
EOSINOPHIL NFR BLD: 1.2 % (ref 0–8)
ERYTHROCYTE [DISTWIDTH] IN BLOOD BY AUTOMATED COUNT: 21.7 % (ref 11.5–14.5)
EST. GFR  (NO RACE VARIABLE): >60 ML/MIN/1.73 M^2
GLUCOSE SERPL-MCNC: 144 MG/DL (ref 70–110)
HCT VFR BLD AUTO: 39 % (ref 40–54)
HGB BLD-MCNC: 13.6 G/DL (ref 14–18)
IMM GRANULOCYTES # BLD AUTO: 0.02 K/UL (ref 0–0.04)
IMM GRANULOCYTES NFR BLD AUTO: 0.2 % (ref 0–0.5)
LYMPHOCYTES # BLD AUTO: 4.4 K/UL (ref 1–4.8)
LYMPHOCYTES NFR BLD: 51.8 % (ref 18–48)
MAGNESIUM SERPL-MCNC: 1.7 MG/DL (ref 1.6–2.6)
MCH RBC QN AUTO: 28 PG (ref 27–31)
MCHC RBC AUTO-ENTMCNC: 34.9 G/DL (ref 32–36)
MCV RBC AUTO: 80 FL (ref 82–98)
MONOCYTES # BLD AUTO: 1.6 K/UL (ref 0.3–1)
MONOCYTES NFR BLD: 18.6 % (ref 4–15)
NEUTROPHILS # BLD AUTO: 2.3 K/UL (ref 1.8–7.7)
NEUTROPHILS NFR BLD: 27.4 % (ref 38–73)
NRBC BLD-RTO: 1 /100 WBC
PLATELET # BLD AUTO: 187 K/UL (ref 150–450)
PMV BLD AUTO: 9.5 FL (ref 9.2–12.9)
POTASSIUM SERPL-SCNC: 3.9 MMOL/L (ref 3.5–5.1)
PROT SERPL-MCNC: 7.1 G/DL (ref 6–8.4)
RBC # BLD AUTO: 4.85 M/UL (ref 4.6–6.2)
SODIUM SERPL-SCNC: 138 MMOL/L (ref 136–145)
WBC # BLD AUTO: 8.49 K/UL (ref 3.9–12.7)

## 2022-10-14 PROCEDURE — 36415 COLL VENOUS BLD VENIPUNCTURE: CPT | Performed by: INTERNAL MEDICINE

## 2022-10-14 PROCEDURE — 80053 COMPREHEN METABOLIC PANEL: CPT | Performed by: INTERNAL MEDICINE

## 2022-10-14 PROCEDURE — 85025 COMPLETE CBC W/AUTO DIFF WBC: CPT | Performed by: INTERNAL MEDICINE

## 2022-10-14 PROCEDURE — 83735 ASSAY OF MAGNESIUM: CPT | Performed by: INTERNAL MEDICINE

## 2022-10-17 ENCOUNTER — INFUSION (OUTPATIENT)
Dept: INFUSION THERAPY | Facility: HOSPITAL | Age: 57
End: 2022-10-17
Attending: INTERNAL MEDICINE
Payer: COMMERCIAL

## 2022-10-17 ENCOUNTER — TELEPHONE (OUTPATIENT)
Dept: FAMILY MEDICINE | Facility: CLINIC | Age: 57
End: 2022-10-17

## 2022-10-17 ENCOUNTER — OFFICE VISIT (OUTPATIENT)
Dept: HEMATOLOGY/ONCOLOGY | Facility: CLINIC | Age: 57
End: 2022-10-17
Payer: COMMERCIAL

## 2022-10-17 VITALS
HEART RATE: 100 BPM | DIASTOLIC BLOOD PRESSURE: 68 MMHG | TEMPERATURE: 98 F | RESPIRATION RATE: 14 BRPM | BODY MASS INDEX: 23.65 KG/M2 | OXYGEN SATURATION: 98 % | HEIGHT: 74 IN | SYSTOLIC BLOOD PRESSURE: 124 MMHG | WEIGHT: 184.31 LBS

## 2022-10-17 VITALS
SYSTOLIC BLOOD PRESSURE: 134 MMHG | RESPIRATION RATE: 18 BRPM | HEIGHT: 74 IN | DIASTOLIC BLOOD PRESSURE: 85 MMHG | BODY MASS INDEX: 23.73 KG/M2 | WEIGHT: 184.88 LBS | HEART RATE: 89 BPM | TEMPERATURE: 98 F

## 2022-10-17 DIAGNOSIS — K65.1 INTRA-ABDOMINAL ABSCESS: ICD-10-CM

## 2022-10-17 DIAGNOSIS — E11.00 TYPE 2 DIABETES MELLITUS WITH HYPEROSMOLARITY WITHOUT COMA, WITHOUT LONG-TERM CURRENT USE OF INSULIN: ICD-10-CM

## 2022-10-17 DIAGNOSIS — E78.49 OTHER HYPERLIPIDEMIA: ICD-10-CM

## 2022-10-17 DIAGNOSIS — C18.5 MALIGNANT NEOPLASM OF SPLENIC FLEXURE: Primary | ICD-10-CM

## 2022-10-17 DIAGNOSIS — C18.4 MALIGNANT NEOPLASM OF TRANSVERSE COLON: Primary | ICD-10-CM

## 2022-10-17 DIAGNOSIS — I10 PRIMARY HYPERTENSION: ICD-10-CM

## 2022-10-17 PROCEDURE — 99215 PR OFFICE/OUTPT VISIT, EST, LEVL V, 40-54 MIN: ICD-10-PCS | Mod: S$GLB,,, | Performed by: INTERNAL MEDICINE

## 2022-10-17 PROCEDURE — 1160F PR REVIEW ALL MEDS BY PRESCRIBER/CLIN PHARMACIST DOCUMENTED: ICD-10-PCS | Mod: CPTII,S$GLB,, | Performed by: INTERNAL MEDICINE

## 2022-10-17 PROCEDURE — 3078F PR MOST RECENT DIASTOLIC BLOOD PRESSURE < 80 MM HG: ICD-10-PCS | Mod: CPTII,S$GLB,, | Performed by: INTERNAL MEDICINE

## 2022-10-17 PROCEDURE — 96416 CHEMO PROLONG INFUSE W/PUMP: CPT

## 2022-10-17 PROCEDURE — 1159F PR MEDICATION LIST DOCUMENTED IN MEDICAL RECORD: ICD-10-PCS | Mod: CPTII,S$GLB,, | Performed by: INTERNAL MEDICINE

## 2022-10-17 PROCEDURE — 3066F NEPHROPATHY DOC TX: CPT | Mod: CPTII,S$GLB,, | Performed by: INTERNAL MEDICINE

## 2022-10-17 PROCEDURE — 99999 PR PBB SHADOW E&M-EST. PATIENT-LVL V: ICD-10-PCS | Mod: PBBFAC,,, | Performed by: INTERNAL MEDICINE

## 2022-10-17 PROCEDURE — 96411 CHEMO IV PUSH ADDL DRUG: CPT

## 2022-10-17 PROCEDURE — 63600175 PHARM REV CODE 636 W HCPCS: Performed by: INTERNAL MEDICINE

## 2022-10-17 PROCEDURE — 3061F NEG MICROALBUMINURIA REV: CPT | Mod: CPTII,S$GLB,, | Performed by: INTERNAL MEDICINE

## 2022-10-17 PROCEDURE — 96367 TX/PROPH/DG ADDL SEQ IV INF: CPT

## 2022-10-17 PROCEDURE — 96415 CHEMO IV INFUSION ADDL HR: CPT

## 2022-10-17 PROCEDURE — 4010F PR ACE/ARB THEARPY RXD/TAKEN: ICD-10-PCS | Mod: CPTII,S$GLB,, | Performed by: INTERNAL MEDICINE

## 2022-10-17 PROCEDURE — 3074F PR MOST RECENT SYSTOLIC BLOOD PRESSURE < 130 MM HG: ICD-10-PCS | Mod: CPTII,S$GLB,, | Performed by: INTERNAL MEDICINE

## 2022-10-17 PROCEDURE — 3066F PR DOCUMENTATION OF TREATMENT FOR NEPHROPATHY: ICD-10-PCS | Mod: CPTII,S$GLB,, | Performed by: INTERNAL MEDICINE

## 2022-10-17 PROCEDURE — 96413 CHEMO IV INFUSION 1 HR: CPT

## 2022-10-17 PROCEDURE — 3052F HG A1C>EQUAL 8.0%<EQUAL 9.0%: CPT | Mod: CPTII,S$GLB,, | Performed by: INTERNAL MEDICINE

## 2022-10-17 PROCEDURE — 3052F PR MOST RECENT HEMOGLOBIN A1C LEVEL 8.0 - < 9.0%: ICD-10-PCS | Mod: CPTII,S$GLB,, | Performed by: INTERNAL MEDICINE

## 2022-10-17 PROCEDURE — 96368 THER/DIAG CONCURRENT INF: CPT

## 2022-10-17 PROCEDURE — 3078F DIAST BP <80 MM HG: CPT | Mod: CPTII,S$GLB,, | Performed by: INTERNAL MEDICINE

## 2022-10-17 PROCEDURE — 4010F ACE/ARB THERAPY RXD/TAKEN: CPT | Mod: CPTII,S$GLB,, | Performed by: INTERNAL MEDICINE

## 2022-10-17 PROCEDURE — 3061F PR NEG MICROALBUMINURIA RESULT DOCUMENTED/REVIEW: ICD-10-PCS | Mod: CPTII,S$GLB,, | Performed by: INTERNAL MEDICINE

## 2022-10-17 PROCEDURE — 1159F MED LIST DOCD IN RCRD: CPT | Mod: CPTII,S$GLB,, | Performed by: INTERNAL MEDICINE

## 2022-10-17 PROCEDURE — 3074F SYST BP LT 130 MM HG: CPT | Mod: CPTII,S$GLB,, | Performed by: INTERNAL MEDICINE

## 2022-10-17 PROCEDURE — 1160F RVW MEDS BY RX/DR IN RCRD: CPT | Mod: CPTII,S$GLB,, | Performed by: INTERNAL MEDICINE

## 2022-10-17 PROCEDURE — 25000003 PHARM REV CODE 250: Performed by: INTERNAL MEDICINE

## 2022-10-17 PROCEDURE — 99999 PR PBB SHADOW E&M-EST. PATIENT-LVL V: CPT | Mod: PBBFAC,,, | Performed by: INTERNAL MEDICINE

## 2022-10-17 PROCEDURE — 99215 OFFICE O/P EST HI 40 MIN: CPT | Mod: S$GLB,,, | Performed by: INTERNAL MEDICINE

## 2022-10-17 PROCEDURE — 96366 THER/PROPH/DIAG IV INF ADDON: CPT

## 2022-10-17 RX ORDER — EPINEPHRINE 0.3 MG/.3ML
0.3 INJECTION SUBCUTANEOUS ONCE AS NEEDED
Status: CANCELLED | OUTPATIENT
Start: 2022-10-17

## 2022-10-17 RX ORDER — SODIUM CHLORIDE 0.9 % (FLUSH) 0.9 %
10 SYRINGE (ML) INJECTION
Status: CANCELLED | OUTPATIENT
Start: 2022-10-17

## 2022-10-17 RX ORDER — HEPARIN 100 UNIT/ML
500 SYRINGE INTRAVENOUS
Status: CANCELLED | OUTPATIENT
Start: 2022-10-17

## 2022-10-17 RX ORDER — SODIUM CHLORIDE 0.9 % (FLUSH) 0.9 %
10 SYRINGE (ML) INJECTION
Status: CANCELLED | OUTPATIENT
Start: 2022-10-19

## 2022-10-17 RX ORDER — FLUOROURACIL 50 MG/ML
400 INJECTION, SOLUTION INTRAVENOUS
Status: CANCELLED | OUTPATIENT
Start: 2022-10-17

## 2022-10-17 RX ORDER — FLUOROURACIL 50 MG/ML
400 INJECTION, SOLUTION INTRAVENOUS
Status: COMPLETED | OUTPATIENT
Start: 2022-10-17 | End: 2022-10-17

## 2022-10-17 RX ORDER — FLUOROURACIL 50 MG/ML
2400 INJECTION, SOLUTION INTRAVENOUS
Status: CANCELLED | OUTPATIENT
Start: 2022-10-17

## 2022-10-17 RX ORDER — HEPARIN 100 UNIT/ML
500 SYRINGE INTRAVENOUS
Status: CANCELLED | OUTPATIENT
Start: 2022-10-19

## 2022-10-17 RX ORDER — SODIUM CHLORIDE 0.9 % (FLUSH) 0.9 %
10 SYRINGE (ML) INJECTION
Status: DISCONTINUED | OUTPATIENT
Start: 2022-10-17 | End: 2022-10-17 | Stop reason: HOSPADM

## 2022-10-17 RX ORDER — EPINEPHRINE 0.3 MG/.3ML
0.3 INJECTION SUBCUTANEOUS ONCE AS NEEDED
Status: DISCONTINUED | OUTPATIENT
Start: 2022-10-17 | End: 2022-10-17 | Stop reason: HOSPADM

## 2022-10-17 RX ORDER — DIPHENHYDRAMINE HYDROCHLORIDE 50 MG/ML
50 INJECTION INTRAMUSCULAR; INTRAVENOUS ONCE AS NEEDED
Status: CANCELLED | OUTPATIENT
Start: 2022-10-17

## 2022-10-17 RX ADMIN — DEXTROSE MONOHYDRATE: 50 INJECTION, SOLUTION INTRAVENOUS at 10:10

## 2022-10-17 RX ADMIN — FLUOROURACIL 5015 MG: 50 INJECTION, SOLUTION INTRAVENOUS at 01:10

## 2022-10-17 RX ADMIN — DEXAMETHASONE SODIUM PHOSPHATE 0.25 MG: 4 INJECTION, SOLUTION INTRA-ARTICULAR; INTRALESIONAL; INTRAMUSCULAR; INTRAVENOUS; SOFT TISSUE at 10:10

## 2022-10-17 RX ADMIN — LEUCOVORIN CALCIUM 835 MG: 350 INJECTION, POWDER, LYOPHILIZED, FOR SUSPENSION INTRAMUSCULAR; INTRAVENOUS at 11:10

## 2022-10-17 RX ADMIN — FOSAPREPITANT 150 MG: 150 INJECTION, POWDER, LYOPHILIZED, FOR SOLUTION INTRAVENOUS at 10:10

## 2022-10-17 RX ADMIN — FLUOROURACIL 835 MG: 50 INJECTION, SOLUTION INTRAVENOUS at 01:10

## 2022-10-17 RX ADMIN — OXALIPLATIN 142 MG: 5 INJECTION, SOLUTION, CONCENTRATE INTRAVENOUS at 11:10

## 2022-10-17 NOTE — PLAN OF CARE
Problem: Fatigue  Goal: Improved Activity Tolerance  10/17/2022 1021 by Shobha Gordillo RN  Outcome: Met  10/17/2022 1021 by Shobha Gordillo RN  Outcome: Ongoing, Progressing

## 2022-10-17 NOTE — PROGRESS NOTES
Service Date:  10/17/22    Chief Complaint: Colon Cancer    Osman Li Jr. is a 57 y.o. 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  A CT scan of the abdomen was done which showed a large obstructive lesion with lymph node involvement.  There was also a 9 mm hypodense liver lesion that could not be characterized.  Patient had a hemicolectomy with ostomy bag placed, and splenectomy.      He was then set to start chemotherapy but it was delayed due to the liver lesion.  He was evaluated by Dr. Goodwin with Surgical Oncology who determined that this was likely not malignant.  Unfortunately afterwards, patient suffered a setback with an abdominal abscess formation that could not be drained.  He recently completed 2 weeks of antibiotics, and a repeat CT scan showed the presence of an abscess still there, but improved.  He had 2 weeks of abx, but a repeat scan showed some growth. It is thought that this is likely sterile fluid. He will finish his abx and we can monitor with scan.    He is here for cycle 8 of treatment.  He continues to have back pain, which is not improving with therapy.  NM bone scan showed no mets.  He also has neuropathy that comes and goes and lasts for a few days after the chemotherapy, it seemed to last longer this past week but still less than a week.    Review of Systems   Constitutional: Negative.    HENT: Negative.     Eyes: Negative.    Respiratory: Negative.     Cardiovascular: Negative.    Gastrointestinal:  Positive for nausea.   Endocrine: Negative.    Genitourinary: Negative.    Musculoskeletal: Negative.    Integumentary:  Negative.   Neurological: Negative.    Hematological: Negative.    Psychiatric/Behavioral: Negative.        Current Outpatient Medications   Medication Instructions    atorvastatin (LIPITOR) 20 mg, Oral, Daily    diclofenac (VOLTAREN) 50 mg, Oral, 2 times daily    enalapriL-hydrochlorothiazide (VASERETIC) 10-25 mg per tablet 1 tablet, Oral, Daily  "   haemophilus B polysac-tetanus toxoid (ACTHIB, PF,) 10 mcg/0.5 mL injection TO BE ADMINISTERED.    L. acidophilus/Bifid. animalis (PROBIOTIC) 5 billion cell CpSP 1 capsule, Oral, Daily    metFORMIN (GLUCOPHAGE) 1,000 mg, Oral, 2 times daily with meals    oxyCODONE (ROXICODONE) 30 mg, Oral, Every 6 hours PRN    OZEMPIC 0.25 mg, Subcutaneous, Every 7 days    pen needle, diabetic 31 gauge x 3/16" Ndle 1 each, Misc.(Non-Drug; Combo Route), Daily    promethazine (PHENERGAN) 12.5 mg, Oral, Every 4 hours PRN    sildenafiL (VIAGRA) 100 mg, Oral, Daily PRN    TOUJEO MAX U-300 SOLOSTAR 26 Units, Subcutaneous, Daily    traZODone (DESYREL) 50 mg, Oral, Nightly        Past Medical History:   Diagnosis Date    DM (diabetes mellitus)     Hyperlipidemia     Hypertension     Prediabetes         Past Surgical History:   Procedure Laterality Date    CHOLECYSTECTOMY  2011    COLONOSCOPY      2018    COLOSTOMY N/A 4/25/2022    Procedure: CREATION, COLOSTOMY;  Surgeon: Carlton Waddell MD;  Location: [facility] OR;  Service: General;  Laterality: N/A;    INSERTION OF TUNNELED CENTRAL VENOUS CATHETER (CVC) WITH SUBCUTANEOUS PORT Right 5/18/2022    Procedure: [id number];  Surgeon: Carlton Waddell MD;  Location: [facility] OR;  Service: General;  Laterality: Right;    MOBILIZATION OF SPLENIC FLEXURE N/A 4/25/2022    Procedure: MOBILIZATION, SPLENIC FLEXURE;  Surgeon: Carlton Waddell MD;  Location: [facility] OR;  Service: General;  Laterality: N/A;    SPLENECTOMY N/A 4/25/2022    Procedure: SPLENECTOMY;  Surgeon: Carlton Waddell MD;  Location: [facility] OR;  Service: General;  Laterality: N/A;    SUBTOTAL COLECTOMY N/A 4/25/2022    Procedure: COLECTOMY, PARTIAL;  Surgeon: Carlton Waddell MD;  Location: [facility] OR;  Service: General;  Laterality: N/A;        Family History   Problem Relation Age of Onset    Heart disease Father        Social History     Tobacco Use    Smoking status: Every Day     Packs/day: 0.50     Years: 35.00     Pack years: 17.50     " "Types: Cigarettes    Smokeless tobacco: Never   Substance Use Topics    Alcohol use: Not Currently    Drug use: Never         Vitals:    10/17/22 0907   BP: 124/68   Pulse: 100   Resp: 14   Temp: 98.4 °F (36.9 °C)        Physical Exam:  /68 (BP Location: Right arm, Patient Position: Sitting, BP Method: Large (Automatic))   Pulse 100   Temp 98.4 °F (36.9 °C) (Temporal)   Resp 14   Ht 6' 2" (1.88 m)   Wt 83.6 kg (184 lb 4.9 oz)   SpO2 98%   BMI 23.66 kg/m²     Physical Exam  Vitals and nursing note reviewed.   Constitutional:       Appearance: Normal appearance.   HENT:      Head: Normocephalic and atraumatic.      Nose: Nose normal.      Mouth/Throat:      Mouth: Mucous membranes are moist.      Pharynx: Oropharynx is clear.   Eyes:      Extraocular Movements: Extraocular movements intact.      Conjunctiva/sclera: Conjunctivae normal.   Cardiovascular:      Rate and Rhythm: Normal rate and regular rhythm.      Heart sounds: Normal heart sounds.   Pulmonary:      Effort: Pulmonary effort is normal.      Breath sounds: Normal breath sounds.   Abdominal:      General: Abdomen is flat. Bowel sounds are normal.      Palpations: Abdomen is soft.      Comments: Ostomy bag in place.   Musculoskeletal:         General: Normal range of motion.      Cervical back: Normal range of motion and neck supple.   Skin:     General: Skin is warm and dry.   Neurological:      General: No focal deficit present.      Mental Status: He is alert and oriented to person, place, and time. Mental status is at baseline.   Psychiatric:         Mood and Affect: Mood normal.        Labs:  Lab Results   Component Value Date    WBC 8.49 10/14/2022    RBC 4.85 10/14/2022    HGB 13.6 (L) 10/14/2022    HCT 39.0 (L) 10/14/2022    MCV 80 (L) 10/14/2022    MCH 28.0 10/14/2022    MCHC 34.9 10/14/2022    RDW 21.7 (H) 10/14/2022     10/14/2022    MPV 9.5 10/14/2022    GRAN 2.3 10/14/2022    GRAN 27.4 (L) 10/14/2022    LYMPH 4.4 10/14/2022 "    LYMPH 51.8 (H) 10/14/2022    MONO 1.6 (H) 10/14/2022    MONO 18.6 (H) 10/14/2022    EOS 0.1 10/14/2022    BASO 0.07 10/14/2022    EOSINOPHIL 1.2 10/14/2022    BASOPHIL 0.8 10/14/2022     Sodium   Date Value Ref Range Status   10/14/2022 138 136 - 145 mmol/L Final     Potassium   Date Value Ref Range Status   10/14/2022 3.9 3.5 - 5.1 mmol/L Final     Chloride   Date Value Ref Range Status   10/14/2022 99 95 - 110 mmol/L Final     CO2   Date Value Ref Range Status   10/14/2022 27 23 - 29 mmol/L Final     Glucose   Date Value Ref Range Status   10/14/2022 144 (H) 70 - 110 mg/dL Final     BUN   Date Value Ref Range Status   10/14/2022 10 6 - 20 mg/dL Final     Creatinine   Date Value Ref Range Status   10/14/2022 0.8 0.5 - 1.4 mg/dL Final     Calcium   Date Value Ref Range Status   10/14/2022 10.7 (H) 8.7 - 10.5 mg/dL Final     Total Protein   Date Value Ref Range Status   10/14/2022 7.1 6.0 - 8.4 g/dL Final     Albumin   Date Value Ref Range Status   10/14/2022 3.4 (L) 3.5 - 5.2 g/dL Final     Total Bilirubin   Date Value Ref Range Status   10/14/2022 0.6 0.1 - 1.0 mg/dL Final     Comment:     For infants and newborns, interpretation of results should be based  on gestational age, weight and in agreement with clinical  observations.    Premature Infant recommended reference ranges:  Up to 24 hours.............<8.0 mg/dL  Up to 48 hours............<12.0 mg/dL  3-5 days..................<15.0 mg/dL  6-29 days.................<15.0 mg/dL       Alkaline Phosphatase   Date Value Ref Range Status   10/14/2022 118 55 - 135 U/L Final     AST   Date Value Ref Range Status   10/14/2022 24 10 - 40 U/L Final     ALT   Date Value Ref Range Status   10/14/2022 13 10 - 44 U/L Final     Anion Gap   Date Value Ref Range Status   10/14/2022 12 8 - 16 mmol/L Final     eGFR if    Date Value Ref Range Status   07/25/2022 >60 >60 mL/min/1.73 m^2 Final     eGFR if non    Date Value Ref Range Status    07/25/2022 >60 >60 mL/min/1.73 m^2 Final     Comment:     Calculation used to obtain the estimated glomerular filtration  rate (eGFR) is the CKD-EPI equation.          A/P:    Malignant neoplasm of the transverse colon  -poorly differentiated adenocarcinoma  -pT3 N2b  -patient on clinical trial to measure ctDNA  -labs reviewed  -FOLFOX started on 7/12/22  -proceed with cycle 8  -return to clinic in 2 weeks for next treatment    Chemo induced neuropathy  - grade 2  - reduced dose of oxaliplatin starting with cycle 8 x 20%    Chemo induced nausea  - grade 1    Intra-abdominal abscess  -completed antibiotics and abscess is still present, but improved. Likely sterile fluid at this point.  -now on IV abx    Back pain  - I will order a NM bone scan to rule out malignancy as patient had extensive disease to start    HTN  - on Enalapril  - follow up with PCP    HLP  - follow up with PCP    DM2  - on Metformin  - follow up with PCP    Tobacco use  - counseled on cessation      Aurash Khoobehi, MD  Hematology and Oncology

## 2022-10-17 NOTE — TELEPHONE ENCOUNTER
----- Message from ANTONIO Emmanuel sent at 10/14/2022  2:26 PM CDT -----  Please contact patient and inform him that his lab work was reviewed.  UA is negative for any signs of infection.  Cholesterol levels are well controlled.  Thyroid function is within normal limits.

## 2022-10-18 ENCOUNTER — TELEPHONE (OUTPATIENT)
Dept: HEMATOLOGY/ONCOLOGY | Facility: CLINIC | Age: 57
End: 2022-10-18
Payer: COMMERCIAL

## 2022-10-18 NOTE — TELEPHONE ENCOUNTER
----- Message from Melyssa Ty RN sent at 10/18/2022  3:51 PM CDT -----  Please contact patient.  ----- Message -----  From: Marjorie Santiago MA  Sent: 10/18/2022   3:23 PM CDT  To: [facility] Staff    Type:  Patient Returning Call    Who Called: pt  Does the patient know what this is regarding?: yes  Would the patient rather a call back or a response via [facility]?  Call back  Best Call Back Number: [phone]  Additional Information:  pt requesting a call back in regards to some test results regarding trial he is currently undergoing

## 2022-10-19 ENCOUNTER — INFUSION (OUTPATIENT)
Dept: INFUSION THERAPY | Facility: HOSPITAL | Age: 57
End: 2022-10-19
Attending: INTERNAL MEDICINE
Payer: COMMERCIAL

## 2022-10-19 ENCOUNTER — TELEPHONE (OUTPATIENT)
Dept: RESEARCH | Facility: HOSPITAL | Age: 57
End: 2022-10-19
Payer: COMMERCIAL

## 2022-10-19 VITALS
HEIGHT: 74 IN | BODY MASS INDEX: 24.56 KG/M2 | RESPIRATION RATE: 18 BRPM | DIASTOLIC BLOOD PRESSURE: 72 MMHG | SYSTOLIC BLOOD PRESSURE: 124 MMHG | WEIGHT: 191.38 LBS | TEMPERATURE: 97 F | HEART RATE: 87 BPM | OXYGEN SATURATION: 95 %

## 2022-10-19 DIAGNOSIS — C18.5 MALIGNANT NEOPLASM OF SPLENIC FLEXURE: Primary | ICD-10-CM

## 2022-10-19 PROCEDURE — 96523 IRRIG DRUG DELIVERY DEVICE: CPT

## 2022-10-19 PROCEDURE — 25000003 PHARM REV CODE 250: Performed by: INTERNAL MEDICINE

## 2022-10-19 PROCEDURE — 63600175 PHARM REV CODE 636 W HCPCS: Performed by: INTERNAL MEDICINE

## 2022-10-19 PROCEDURE — A4216 STERILE WATER/SALINE, 10 ML: HCPCS | Performed by: INTERNAL MEDICINE

## 2022-10-19 RX ORDER — SODIUM CHLORIDE 0.9 % (FLUSH) 0.9 %
10 SYRINGE (ML) INJECTION
Status: DISCONTINUED | OUTPATIENT
Start: 2022-10-19 | End: 2022-10-19 | Stop reason: HOSPADM

## 2022-10-19 RX ORDER — HEPARIN 100 UNIT/ML
500 SYRINGE INTRAVENOUS
Status: DISCONTINUED | OUTPATIENT
Start: 2022-10-19 | End: 2022-10-19 | Stop reason: HOSPADM

## 2022-10-19 RX ADMIN — SODIUM CHLORIDE, PRESERVATIVE FREE 10 ML: 5 INJECTION INTRAVENOUS at 11:10

## 2022-10-19 RX ADMIN — HEPARIN 500 UNITS: 100 SYRINGE at 11:10

## 2022-10-28 ENCOUNTER — LAB VISIT (OUTPATIENT)
Dept: LAB | Facility: HOSPITAL | Age: 57
End: 2022-10-28
Attending: INTERNAL MEDICINE
Payer: COMMERCIAL

## 2022-10-28 DIAGNOSIS — K63.89 COLONIC MASS: ICD-10-CM

## 2022-10-28 LAB
ALBUMIN SERPL BCP-MCNC: 3.5 G/DL (ref 3.5–5.2)
ALP SERPL-CCNC: 144 U/L (ref 55–135)
ALT SERPL W/O P-5'-P-CCNC: 20 U/L (ref 10–44)
ANION GAP SERPL CALC-SCNC: 12 MMOL/L (ref 8–16)
ANISOCYTOSIS BLD QL SMEAR: SLIGHT
AST SERPL-CCNC: 30 U/L (ref 10–40)
BASOPHILS # BLD AUTO: 0.08 K/UL (ref 0–0.2)
BASOPHILS NFR BLD: 0.7 % (ref 0–1.9)
BILIRUB SERPL-MCNC: 0.7 MG/DL (ref 0.1–1)
BUN SERPL-MCNC: 18 MG/DL (ref 6–20)
CALCIUM SERPL-MCNC: 10.4 MG/DL (ref 8.7–10.5)
CHLORIDE SERPL-SCNC: 96 MMOL/L (ref 95–110)
CO2 SERPL-SCNC: 27 MMOL/L (ref 23–29)
CREAT SERPL-MCNC: 1 MG/DL (ref 0.5–1.4)
DIFFERENTIAL METHOD: ABNORMAL
EOSINOPHIL # BLD AUTO: 0.3 K/UL (ref 0–0.5)
EOSINOPHIL NFR BLD: 2.3 % (ref 0–8)
ERYTHROCYTE [DISTWIDTH] IN BLOOD BY AUTOMATED COUNT: 22.7 % (ref 11.5–14.5)
EST. GFR  (NO RACE VARIABLE): >60 ML/MIN/1.73 M^2
GLUCOSE SERPL-MCNC: 155 MG/DL (ref 70–110)
HCT VFR BLD AUTO: 41.5 % (ref 40–54)
HGB BLD-MCNC: 14.1 G/DL (ref 14–18)
IMM GRANULOCYTES # BLD AUTO: 0.04 K/UL (ref 0–0.04)
IMM GRANULOCYTES NFR BLD AUTO: 0.4 % (ref 0–0.5)
LYMPHOCYTES # BLD AUTO: 4.9 K/UL (ref 1–4.8)
LYMPHOCYTES NFR BLD: 44.5 % (ref 18–48)
MAGNESIUM SERPL-MCNC: 1.3 MG/DL (ref 1.6–2.6)
MCH RBC QN AUTO: 28.8 PG (ref 27–31)
MCHC RBC AUTO-ENTMCNC: 34 G/DL (ref 32–36)
MCV RBC AUTO: 85 FL (ref 82–98)
MONOCYTES # BLD AUTO: 1.7 K/UL (ref 0.3–1)
MONOCYTES NFR BLD: 15.3 % (ref 4–15)
NEUTROPHILS # BLD AUTO: 4 K/UL (ref 1.8–7.7)
NEUTROPHILS NFR BLD: 36.8 % (ref 38–73)
NRBC BLD-RTO: 1 /100 WBC
OVALOCYTES BLD QL SMEAR: ABNORMAL
PLATELET # BLD AUTO: 229 K/UL (ref 150–450)
PLATELET BLD QL SMEAR: ABNORMAL
PMV BLD AUTO: 9.9 FL (ref 9.2–12.9)
POIKILOCYTOSIS BLD QL SMEAR: SLIGHT
POTASSIUM SERPL-SCNC: 4.3 MMOL/L (ref 3.5–5.1)
PROT SERPL-MCNC: 7.3 G/DL (ref 6–8.4)
RBC # BLD AUTO: 4.9 M/UL (ref 4.6–6.2)
SODIUM SERPL-SCNC: 135 MMOL/L (ref 136–145)
WBC # BLD AUTO: 10.91 K/UL (ref 3.9–12.7)

## 2022-10-28 PROCEDURE — 36415 COLL VENOUS BLD VENIPUNCTURE: CPT | Performed by: INTERNAL MEDICINE

## 2022-10-28 PROCEDURE — 83735 ASSAY OF MAGNESIUM: CPT | Performed by: INTERNAL MEDICINE

## 2022-10-28 PROCEDURE — 85025 COMPLETE CBC W/AUTO DIFF WBC: CPT | Performed by: INTERNAL MEDICINE

## 2022-10-28 PROCEDURE — 80053 COMPREHEN METABOLIC PANEL: CPT | Performed by: INTERNAL MEDICINE

## 2022-10-31 ENCOUNTER — INFUSION (OUTPATIENT)
Dept: INFUSION THERAPY | Facility: HOSPITAL | Age: 57
End: 2022-10-31
Attending: INTERNAL MEDICINE
Payer: COMMERCIAL

## 2022-10-31 ENCOUNTER — TELEPHONE (OUTPATIENT)
Dept: HEMATOLOGY/ONCOLOGY | Facility: CLINIC | Age: 57
End: 2022-10-31
Payer: COMMERCIAL

## 2022-10-31 ENCOUNTER — OFFICE VISIT (OUTPATIENT)
Dept: HEMATOLOGY/ONCOLOGY | Facility: CLINIC | Age: 57
End: 2022-10-31
Payer: COMMERCIAL

## 2022-10-31 VITALS
HEART RATE: 79 BPM | BODY MASS INDEX: 23.06 KG/M2 | WEIGHT: 179.69 LBS | DIASTOLIC BLOOD PRESSURE: 71 MMHG | TEMPERATURE: 98 F | SYSTOLIC BLOOD PRESSURE: 107 MMHG | RESPIRATION RATE: 16 BRPM | HEIGHT: 74 IN | OXYGEN SATURATION: 98 %

## 2022-10-31 VITALS
SYSTOLIC BLOOD PRESSURE: 134 MMHG | OXYGEN SATURATION: 99 % | HEART RATE: 97 BPM | BODY MASS INDEX: 23.06 KG/M2 | RESPIRATION RATE: 16 BRPM | DIASTOLIC BLOOD PRESSURE: 78 MMHG | WEIGHT: 179.69 LBS | HEIGHT: 74 IN | TEMPERATURE: 98 F

## 2022-10-31 DIAGNOSIS — E83.42 HYPOMAGNESEMIA: ICD-10-CM

## 2022-10-31 DIAGNOSIS — K63.89 COLONIC MASS: ICD-10-CM

## 2022-10-31 DIAGNOSIS — C18.4 MALIGNANT NEOPLASM OF TRANSVERSE COLON: Primary | ICD-10-CM

## 2022-10-31 DIAGNOSIS — C18.5 MALIGNANT NEOPLASM OF SPLENIC FLEXURE: Primary | ICD-10-CM

## 2022-10-31 PROCEDURE — 99999 PR PBB SHADOW E&M-EST. PATIENT-LVL V: ICD-10-PCS | Mod: PBBFAC,,, | Performed by: NURSE PRACTITIONER

## 2022-10-31 PROCEDURE — 3075F PR MOST RECENT SYSTOLIC BLOOD PRESS GE 130-139MM HG: ICD-10-PCS | Mod: CPTII,S$GLB,, | Performed by: NURSE PRACTITIONER

## 2022-10-31 PROCEDURE — 3052F HG A1C>EQUAL 8.0%<EQUAL 9.0%: CPT | Mod: CPTII,S$GLB,, | Performed by: NURSE PRACTITIONER

## 2022-10-31 PROCEDURE — 1159F PR MEDICATION LIST DOCUMENTED IN MEDICAL RECORD: ICD-10-PCS | Mod: CPTII,S$GLB,, | Performed by: NURSE PRACTITIONER

## 2022-10-31 PROCEDURE — 63600175 PHARM REV CODE 636 W HCPCS: Performed by: NURSE PRACTITIONER

## 2022-10-31 PROCEDURE — 1160F RVW MEDS BY RX/DR IN RCRD: CPT | Mod: CPTII,S$GLB,, | Performed by: NURSE PRACTITIONER

## 2022-10-31 PROCEDURE — 3061F PR NEG MICROALBUMINURIA RESULT DOCUMENTED/REVIEW: ICD-10-PCS | Mod: CPTII,S$GLB,, | Performed by: NURSE PRACTITIONER

## 2022-10-31 PROCEDURE — 4010F ACE/ARB THERAPY RXD/TAKEN: CPT | Mod: CPTII,S$GLB,, | Performed by: NURSE PRACTITIONER

## 2022-10-31 PROCEDURE — 4010F PR ACE/ARB THEARPY RXD/TAKEN: ICD-10-PCS | Mod: CPTII,S$GLB,, | Performed by: NURSE PRACTITIONER

## 2022-10-31 PROCEDURE — 3078F DIAST BP <80 MM HG: CPT | Mod: CPTII,S$GLB,, | Performed by: NURSE PRACTITIONER

## 2022-10-31 PROCEDURE — 1159F MED LIST DOCD IN RCRD: CPT | Mod: CPTII,S$GLB,, | Performed by: NURSE PRACTITIONER

## 2022-10-31 PROCEDURE — 3066F NEPHROPATHY DOC TX: CPT | Mod: CPTII,S$GLB,, | Performed by: NURSE PRACTITIONER

## 2022-10-31 PROCEDURE — 1160F PR REVIEW ALL MEDS BY PRESCRIBER/CLIN PHARMACIST DOCUMENTED: ICD-10-PCS | Mod: CPTII,S$GLB,, | Performed by: NURSE PRACTITIONER

## 2022-10-31 PROCEDURE — 3066F PR DOCUMENTATION OF TREATMENT FOR NEPHROPATHY: ICD-10-PCS | Mod: CPTII,S$GLB,, | Performed by: NURSE PRACTITIONER

## 2022-10-31 PROCEDURE — 3078F PR MOST RECENT DIASTOLIC BLOOD PRESSURE < 80 MM HG: ICD-10-PCS | Mod: CPTII,S$GLB,, | Performed by: NURSE PRACTITIONER

## 2022-10-31 PROCEDURE — 96416 CHEMO PROLONG INFUSE W/PUMP: CPT

## 2022-10-31 PROCEDURE — 3052F PR MOST RECENT HEMOGLOBIN A1C LEVEL 8.0 - < 9.0%: ICD-10-PCS | Mod: CPTII,S$GLB,, | Performed by: NURSE PRACTITIONER

## 2022-10-31 PROCEDURE — 3061F NEG MICROALBUMINURIA REV: CPT | Mod: CPTII,S$GLB,, | Performed by: NURSE PRACTITIONER

## 2022-10-31 PROCEDURE — 96366 THER/PROPH/DIAG IV INF ADDON: CPT

## 2022-10-31 PROCEDURE — 25000003 PHARM REV CODE 250: Performed by: NURSE PRACTITIONER

## 2022-10-31 PROCEDURE — 99999 PR PBB SHADOW E&M-EST. PATIENT-LVL V: CPT | Mod: PBBFAC,,, | Performed by: NURSE PRACTITIONER

## 2022-10-31 PROCEDURE — 99214 OFFICE O/P EST MOD 30 MIN: CPT | Mod: S$GLB,,, | Performed by: NURSE PRACTITIONER

## 2022-10-31 PROCEDURE — 96367 TX/PROPH/DG ADDL SEQ IV INF: CPT

## 2022-10-31 PROCEDURE — 3075F SYST BP GE 130 - 139MM HG: CPT | Mod: CPTII,S$GLB,, | Performed by: NURSE PRACTITIONER

## 2022-10-31 PROCEDURE — 99214 PR OFFICE/OUTPT VISIT, EST, LEVL IV, 30-39 MIN: ICD-10-PCS | Mod: S$GLB,,, | Performed by: NURSE PRACTITIONER

## 2022-10-31 PROCEDURE — 96409 CHEMO IV PUSH SNGL DRUG: CPT

## 2022-10-31 RX ORDER — HEPARIN 100 UNIT/ML
500 SYRINGE INTRAVENOUS
Status: CANCELLED | OUTPATIENT
Start: 2022-10-31

## 2022-10-31 RX ORDER — FLUOROURACIL 50 MG/ML
400 INJECTION, SOLUTION INTRAVENOUS
Status: CANCELLED | OUTPATIENT
Start: 2022-10-31

## 2022-10-31 RX ORDER — SODIUM CHLORIDE 0.9 % (FLUSH) 0.9 %
10 SYRINGE (ML) INJECTION
Status: CANCELLED | OUTPATIENT
Start: 2022-10-31

## 2022-10-31 RX ORDER — LORAZEPAM/0.9% SODIUM CHLORIDE 100MG/0.1L
2 PLASTIC BAG, INJECTION (ML) INTRAVENOUS
Status: CANCELLED | OUTPATIENT
Start: 2022-10-31

## 2022-10-31 RX ORDER — MAGNESIUM SULFATE HEPTAHYDRATE 40 MG/ML
2 INJECTION, SOLUTION INTRAVENOUS
Status: COMPLETED | OUTPATIENT
Start: 2022-10-31 | End: 2022-10-31

## 2022-10-31 RX ORDER — EPINEPHRINE 0.3 MG/.3ML
0.3 INJECTION SUBCUTANEOUS ONCE AS NEEDED
Status: DISCONTINUED | OUTPATIENT
Start: 2022-10-31 | End: 2022-10-31 | Stop reason: HOSPADM

## 2022-10-31 RX ORDER — FLUOROURACIL 50 MG/ML
2400 INJECTION, SOLUTION INTRAVENOUS
Status: CANCELLED | OUTPATIENT
Start: 2022-10-31

## 2022-10-31 RX ORDER — EPINEPHRINE 0.3 MG/.3ML
0.3 INJECTION SUBCUTANEOUS ONCE AS NEEDED
Status: CANCELLED | OUTPATIENT
Start: 2022-10-31

## 2022-10-31 RX ORDER — SODIUM CHLORIDE 0.9 % (FLUSH) 0.9 %
10 SYRINGE (ML) INJECTION
Status: DISCONTINUED | OUTPATIENT
Start: 2022-10-31 | End: 2022-10-31 | Stop reason: HOSPADM

## 2022-10-31 RX ORDER — SODIUM CHLORIDE 0.9 % (FLUSH) 0.9 %
10 SYRINGE (ML) INJECTION
Status: CANCELLED | OUTPATIENT
Start: 2022-11-02

## 2022-10-31 RX ORDER — DIPHENHYDRAMINE HYDROCHLORIDE 50 MG/ML
50 INJECTION INTRAMUSCULAR; INTRAVENOUS ONCE AS NEEDED
Status: CANCELLED | OUTPATIENT
Start: 2022-10-31

## 2022-10-31 RX ORDER — FLUOROURACIL 50 MG/ML
400 INJECTION, SOLUTION INTRAVENOUS
Status: COMPLETED | OUTPATIENT
Start: 2022-10-31 | End: 2022-10-31

## 2022-10-31 RX ORDER — OXYCODONE HYDROCHLORIDE 30 MG/1
30 TABLET ORAL EVERY 6 HOURS PRN
Qty: 60 TABLET | Refills: 0 | Status: SHIPPED | OUTPATIENT
Start: 2022-10-31 | End: 2022-11-21 | Stop reason: SDUPTHER

## 2022-10-31 RX ORDER — HEPARIN 100 UNIT/ML
500 SYRINGE INTRAVENOUS
Status: CANCELLED | OUTPATIENT
Start: 2022-11-02

## 2022-10-31 RX ORDER — HEPARIN 100 UNIT/ML
500 SYRINGE INTRAVENOUS
Status: DISCONTINUED | OUTPATIENT
Start: 2022-10-31 | End: 2022-10-31 | Stop reason: HOSPADM

## 2022-10-31 RX ADMIN — DEXTROSE MONOHYDRATE: 50 INJECTION, SOLUTION INTRAVENOUS at 10:10

## 2022-10-31 RX ADMIN — DEXAMETHASONE SODIUM PHOSPHATE 0.25 MG: 4 INJECTION, SOLUTION INTRA-ARTICULAR; INTRALESIONAL; INTRAMUSCULAR; INTRAVENOUS; SOFT TISSUE at 10:10

## 2022-10-31 RX ADMIN — FLUOROURACIL 4945 MG: 50 INJECTION, SOLUTION INTRAVENOUS at 01:10

## 2022-10-31 RX ADMIN — FOSAPREPITANT 150 MG: 150 INJECTION, POWDER, LYOPHILIZED, FOR SOLUTION INTRAVENOUS at 10:10

## 2022-10-31 RX ADMIN — MAGNESIUM SULFATE IN WATER 2 G: 40 INJECTION, SOLUTION INTRAVENOUS at 11:10

## 2022-10-31 RX ADMIN — FLUOROURACIL 825 MG: 50 INJECTION, SOLUTION INTRAVENOUS at 01:10

## 2022-10-31 RX ADMIN — DEXTROSE 825 MG: 5 SOLUTION INTRAVENOUS at 11:10

## 2022-10-31 NOTE — TELEPHONE ENCOUNTER
Patient called back in regards to seeing another provider for their appointment today. I rescheduled patient to see BEATA padgett.

## 2022-10-31 NOTE — TELEPHONE ENCOUNTER
Tried to call patient to reschedule appointments due to patients provider not being back in town yet. Patient did not answer so I left a voicemail.

## 2022-10-31 NOTE — PROGRESS NOTES
Service Date:  10/31/22    Chief Complaint: No chief complaint on file.    Osman Li Jr. is a 57 y.o. 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  A CT scan of the abdomen was done which showed a large obstructive lesion with lymph node involvement.  There was also a 9 mm hypodense liver lesion that could not be characterized.  Patient had a hemicolectomy with ostomy bag placed, and splenectomy.      He was then set to start chemotherapy but it was delayed due to the liver lesion.  He was evaluated by Dr. Goodwin with Surgical Oncology who determined that this was likely not malignant.  Unfortunately afterwards, patient suffered a setback with an abdominal abscess formation that could not be drained.  He recently completed 2 weeks of antibiotics, and a repeat CT scan showed the presence of an abscess still there, but improved.  He had 2 weeks of abx, but a repeat scan showed some growth. It is thought that this is likely sterile fluid. He will finish his abx and we can monitor with scan.    He is here for cycle 8 of treatment.  He continues to have back pain, which is not improving with therapy.  NM bone scan showed no mets.  He also has neuropathy that comes and goes and lasts for a few days after the chemotherapy, it seemed to last longer this past week but still less than a week.    10/31/2022:  Today with complaints of neuropathy that is interfering with ACL    Review of Systems   Constitutional: Negative.    HENT: Negative.     Eyes: Negative.    Respiratory: Negative.     Cardiovascular: Negative.    Gastrointestinal:  Positive for nausea.   Endocrine: Negative.    Genitourinary: Negative.    Musculoskeletal: Negative.    Integumentary:  Negative.   Neurological: Negative.    Hematological: Negative.    Psychiatric/Behavioral: Negative.      Complains of neuropathy that is interfering with ADL    Current Outpatient Medications   Medication Instructions    atorvastatin (LIPITOR) 20 mg,  "Oral, Daily    diclofenac (VOLTAREN) 50 mg, Oral, 2 times daily    enalapriL-hydrochlorothiazide (VASERETIC) 10-25 mg per tablet 1 tablet, Oral, Daily    haemophilus B polysac-tetanus toxoid (ACTHIB, PF,) 10 mcg/0.5 mL injection TO BE ADMINISTERED.    L. acidophilus/Bifid. animalis (PROBIOTIC) 5 billion cell CpSP 1 capsule, Oral, Daily    metFORMIN (GLUCOPHAGE) 1,000 mg, Oral, 2 times daily with meals    oxyCODONE (ROXICODONE) 30 mg, Oral, Every 6 hours PRN    OZEMPIC 0.25 mg, Subcutaneous, Every 7 days    pen needle, diabetic 31 gauge x 3/16" Ndle 1 each, Misc.(Non-Drug; Combo Route), Daily    promethazine (PHENERGAN) 12.5 mg, Oral, Every 4 hours PRN    sildenafiL (VIAGRA) 100 mg, Oral, Daily PRN    TOUJEO MAX U-300 SOLOSTAR 26 Units, Subcutaneous, Daily    traZODone (DESYREL) 50 mg, Oral, Nightly        Past Medical History:   Diagnosis Date    DM (diabetes mellitus)     Hyperlipidemia     Hypertension     Prediabetes         Past Surgical History:   Procedure Laterality Date    CHOLECYSTECTOMY  2011    COLONOSCOPY      2018    COLOSTOMY N/A 4/25/2022    Procedure: CREATION, COLOSTOMY;  Surgeon: Carlton Waddell MD;  Location: [facility] OR;  Service: General;  Laterality: N/A;    INSERTION OF TUNNELED CENTRAL VENOUS CATHETER (CVC) WITH SUBCUTANEOUS PORT Right 5/18/2022    Procedure: [id number];  Surgeon: Carlton Waddell MD;  Location: [facility] OR;  Service: General;  Laterality: Right;    MOBILIZATION OF SPLENIC FLEXURE N/A 4/25/2022    Procedure: MOBILIZATION, SPLENIC FLEXURE;  Surgeon: Carlton Waddell MD;  Location: [facility] OR;  Service: General;  Laterality: N/A;    SPLENECTOMY N/A 4/25/2022    Procedure: SPLENECTOMY;  Surgeon: Carlton Waddell MD;  Location: [facility] OR;  Service: General;  Laterality: N/A;    SUBTOTAL COLECTOMY N/A 4/25/2022    Procedure: COLECTOMY, PARTIAL;  Surgeon: Carlton Waddell MD;  Location: [facility] OR;  Service: General;  Laterality: N/A;        Family History   Problem Relation Age of Onset    " Heart disease Father        Social History     Tobacco Use    Smoking status: Every Day     Packs/day: 0.50     Years: 35.00     Pack years: 17.50     Types: Cigarettes    Smokeless tobacco: Never   Substance Use Topics    Alcohol use: Not Currently    Drug use: Never         There were no vitals filed for this visit.       Physical Exam:  There were no vitals taken for this visit.    Physical Exam  Vitals and nursing note reviewed.   Constitutional:       Appearance: Normal appearance.   HENT:      Head: Normocephalic and atraumatic.      Nose: Nose normal.      Mouth/Throat:      Mouth: Mucous membranes are moist.      Pharynx: Oropharynx is clear.   Eyes:      Extraocular Movements: Extraocular movements intact.      Conjunctiva/sclera: Conjunctivae normal.   Cardiovascular:      Rate and Rhythm: Normal rate and regular rhythm.      Heart sounds: Normal heart sounds.   Pulmonary:      Effort: Pulmonary effort is normal.      Breath sounds: Normal breath sounds.   Abdominal:      General: Abdomen is flat. Bowel sounds are normal.      Palpations: Abdomen is soft.      Comments: Ostomy bag in place.   Musculoskeletal:         General: Normal range of motion.      Cervical back: Normal range of motion and neck supple.   Skin:     General: Skin is warm and dry.   Neurological:      General: No focal deficit present.      Mental Status: He is alert and oriented to person, place, and time. Mental status is at baseline.   Psychiatric:         Mood and Affect: Mood normal.        Labs:  Lab Results   Component Value Date    WBC 10.91 10/28/2022    RBC 4.90 10/28/2022    HGB 14.1 10/28/2022    HCT 41.5 10/28/2022    MCV 85 10/28/2022    MCH 28.8 10/28/2022    MCHC 34.0 10/28/2022    RDW 22.7 (H) 10/28/2022     10/28/2022    MPV 9.9 10/28/2022    GRAN 4.0 10/28/2022    GRAN 36.8 (L) 10/28/2022    LYMPH 4.9 (H) 10/28/2022    LYMPH 44.5 10/28/2022    MONO 1.7 (H) 10/28/2022    MONO 15.3 (H) 10/28/2022    EOS 0.3  10/28/2022    BASO 0.08 10/28/2022    EOSINOPHIL 2.3 10/28/2022    BASOPHIL 0.7 10/28/2022     Sodium   Date Value Ref Range Status   10/28/2022 135 (L) 136 - 145 mmol/L Final     Potassium   Date Value Ref Range Status   10/28/2022 4.3 3.5 - 5.1 mmol/L Final     Chloride   Date Value Ref Range Status   10/28/2022 96 95 - 110 mmol/L Final     CO2   Date Value Ref Range Status   10/28/2022 27 23 - 29 mmol/L Final     Glucose   Date Value Ref Range Status   10/28/2022 155 (H) 70 - 110 mg/dL Final     BUN   Date Value Ref Range Status   10/28/2022 18 6 - 20 mg/dL Final     Creatinine   Date Value Ref Range Status   10/28/2022 1.0 0.5 - 1.4 mg/dL Final     Calcium   Date Value Ref Range Status   10/28/2022 10.4 8.7 - 10.5 mg/dL Final     Total Protein   Date Value Ref Range Status   10/28/2022 7.3 6.0 - 8.4 g/dL Final     Albumin   Date Value Ref Range Status   10/28/2022 3.5 3.5 - 5.2 g/dL Final     Total Bilirubin   Date Value Ref Range Status   10/28/2022 0.7 0.1 - 1.0 mg/dL Final     Comment:     For infants and newborns, interpretation of results should be based  on gestational age, weight and in agreement with clinical  observations.    Premature Infant recommended reference ranges:  Up to 24 hours.............<8.0 mg/dL  Up to 48 hours............<12.0 mg/dL  3-5 days..................<15.0 mg/dL  6-29 days.................<15.0 mg/dL       Alkaline Phosphatase   Date Value Ref Range Status   10/28/2022 144 (H) 55 - 135 U/L Final     AST   Date Value Ref Range Status   10/28/2022 30 10 - 40 U/L Final     ALT   Date Value Ref Range Status   10/28/2022 20 10 - 44 U/L Final     Anion Gap   Date Value Ref Range Status   10/28/2022 12 8 - 16 mmol/L Final     eGFR if    Date Value Ref Range Status   07/25/2022 >60 >60 mL/min/1.73 m^2 Final     eGFR if non    Date Value Ref Range Status   07/25/2022 >60 >60 mL/min/1.73 m^2 Final     Comment:     Calculation used to obtain the estimated  glomerular filtration  rate (eGFR) is the CKD-EPI equation.          A/P:    Malignant neoplasm of the transverse colon  -poorly differentiated adenocarcinoma  -pT3 N2b  -patient on clinical trial to measure ctDNA  -labs reviewed  -FOLFOX started on 7/12/22  -proceed with cycle 9 with the exception oxaliplatin  -return to clinic in 2 weeks for next treatment    Chemo induced neuropathy  - grade 2  - hold oxaliplatin cycle 9    Chemo induced nausea  - grade 1    Intra-abdominal abscess  -completed antibiotics and abscess is still present, but improved. Likely sterile fluid at this point.  -now on IV abx    Back pain  - no bone metastasis  -refill on oxycodone provided at today's visit    HTN  - on Enalapril  - follow up with PCP    HLP  - follow up with PCP    DM2  - on Metformin  - follow up with PCP    Tobacco use  - counseled on cessation

## 2022-10-31 NOTE — PLAN OF CARE
Problem: Fatigue  Goal: Improved Activity Tolerance  Outcome: Ongoing, Progressing  Intervention: Promote Improved Energy  Flowsheets (Taken 10/31/2022 3996)  Fatigue Management: frequent rest breaks encouraged  Sleep/Rest Enhancement:   regular sleep/rest pattern promoted   relaxation techniques promoted  Activity Management: Ambulated -L4

## 2022-11-02 ENCOUNTER — INFUSION (OUTPATIENT)
Dept: INFUSION THERAPY | Facility: HOSPITAL | Age: 57
End: 2022-11-02
Attending: INTERNAL MEDICINE
Payer: COMMERCIAL

## 2022-11-02 VITALS
RESPIRATION RATE: 16 BRPM | DIASTOLIC BLOOD PRESSURE: 66 MMHG | HEIGHT: 74 IN | WEIGHT: 185.63 LBS | SYSTOLIC BLOOD PRESSURE: 104 MMHG | BODY MASS INDEX: 23.82 KG/M2 | TEMPERATURE: 98 F | HEART RATE: 89 BPM

## 2022-11-02 DIAGNOSIS — C18.5 MALIGNANT NEOPLASM OF SPLENIC FLEXURE: Primary | ICD-10-CM

## 2022-11-02 PROCEDURE — 25000003 PHARM REV CODE 250: Performed by: NURSE PRACTITIONER

## 2022-11-02 PROCEDURE — A4216 STERILE WATER/SALINE, 10 ML: HCPCS | Performed by: NURSE PRACTITIONER

## 2022-11-02 PROCEDURE — 96523 IRRIG DRUG DELIVERY DEVICE: CPT

## 2022-11-02 PROCEDURE — 63600175 PHARM REV CODE 636 W HCPCS: Performed by: NURSE PRACTITIONER

## 2022-11-02 RX ORDER — SODIUM CHLORIDE 0.9 % (FLUSH) 0.9 %
10 SYRINGE (ML) INJECTION
Status: DISCONTINUED | OUTPATIENT
Start: 2022-11-02 | End: 2022-11-02 | Stop reason: HOSPADM

## 2022-11-02 RX ORDER — HEPARIN 100 UNIT/ML
500 SYRINGE INTRAVENOUS
Status: DISCONTINUED | OUTPATIENT
Start: 2022-11-02 | End: 2022-11-02 | Stop reason: HOSPADM

## 2022-11-02 RX ADMIN — HEPARIN 500 UNITS: 100 SYRINGE at 11:11

## 2022-11-02 RX ADMIN — SODIUM CHLORIDE, PRESERVATIVE FREE 10 ML: 5 INJECTION INTRAVENOUS at 11:11

## 2022-11-02 NOTE — PLAN OF CARE
Problem: Infection  Goal: Absence of Infection Signs and Symptoms  Outcome: Ongoing, Progressing  Intervention: Prevent or Manage Infection  Flowsheets (Taken 11/2/2022 1202)  Infection Management: aseptic technique maintained  Isolation Precautions: precautions maintained

## 2022-11-11 ENCOUNTER — LAB VISIT (OUTPATIENT)
Dept: LAB | Facility: HOSPITAL | Age: 57
End: 2022-11-11
Attending: INTERNAL MEDICINE
Payer: COMMERCIAL

## 2022-11-11 DIAGNOSIS — K63.89 COLONIC MASS: ICD-10-CM

## 2022-11-11 LAB
ALBUMIN SERPL BCP-MCNC: 3.5 G/DL (ref 3.5–5.2)
ALP SERPL-CCNC: 145 U/L (ref 55–135)
ALT SERPL W/O P-5'-P-CCNC: 17 U/L (ref 10–44)
ANION GAP SERPL CALC-SCNC: 12 MMOL/L (ref 8–16)
AST SERPL-CCNC: 27 U/L (ref 10–40)
BASOPHILS # BLD AUTO: 0.07 K/UL (ref 0–0.2)
BASOPHILS NFR BLD: 0.6 % (ref 0–1.9)
BILIRUB SERPL-MCNC: 1 MG/DL (ref 0.1–1)
BUN SERPL-MCNC: 15 MG/DL (ref 6–20)
CALCIUM SERPL-MCNC: 11.1 MG/DL (ref 8.7–10.5)
CHLORIDE SERPL-SCNC: 95 MMOL/L (ref 95–110)
CO2 SERPL-SCNC: 25 MMOL/L (ref 23–29)
CREAT SERPL-MCNC: 0.9 MG/DL (ref 0.5–1.4)
DIFFERENTIAL METHOD: ABNORMAL
EOSINOPHIL # BLD AUTO: 0.2 K/UL (ref 0–0.5)
EOSINOPHIL NFR BLD: 2.1 % (ref 0–8)
ERYTHROCYTE [DISTWIDTH] IN BLOOD BY AUTOMATED COUNT: 23.9 % (ref 11.5–14.5)
EST. GFR  (NO RACE VARIABLE): >60 ML/MIN/1.73 M^2
GLUCOSE SERPL-MCNC: 191 MG/DL (ref 70–110)
HCT VFR BLD AUTO: 43.8 % (ref 40–54)
HGB BLD-MCNC: 14.5 G/DL (ref 14–18)
IMM GRANULOCYTES # BLD AUTO: 0.05 K/UL (ref 0–0.04)
IMM GRANULOCYTES NFR BLD AUTO: 0.4 % (ref 0–0.5)
LYMPHOCYTES # BLD AUTO: 4.3 K/UL (ref 1–4.8)
LYMPHOCYTES NFR BLD: 38.4 % (ref 18–48)
MAGNESIUM SERPL-MCNC: 1.8 MG/DL (ref 1.6–2.6)
MCH RBC QN AUTO: 29.1 PG (ref 27–31)
MCHC RBC AUTO-ENTMCNC: 33.1 G/DL (ref 32–36)
MCV RBC AUTO: 88 FL (ref 82–98)
MONOCYTES # BLD AUTO: 1.5 K/UL (ref 0.3–1)
MONOCYTES NFR BLD: 13.3 % (ref 4–15)
NEUTROPHILS # BLD AUTO: 5 K/UL (ref 1.8–7.7)
NEUTROPHILS NFR BLD: 45.2 % (ref 38–73)
NRBC BLD-RTO: 1 /100 WBC
PLATELET # BLD AUTO: 343 K/UL (ref 150–450)
PMV BLD AUTO: 10.3 FL (ref 9.2–12.9)
POTASSIUM SERPL-SCNC: 4.2 MMOL/L (ref 3.5–5.1)
PROT SERPL-MCNC: 7.6 G/DL (ref 6–8.4)
RBC # BLD AUTO: 4.98 M/UL (ref 4.6–6.2)
SODIUM SERPL-SCNC: 132 MMOL/L (ref 136–145)
WBC # BLD AUTO: 11.17 K/UL (ref 3.9–12.7)

## 2022-11-11 PROCEDURE — 85025 COMPLETE CBC W/AUTO DIFF WBC: CPT | Performed by: INTERNAL MEDICINE

## 2022-11-11 PROCEDURE — 36415 COLL VENOUS BLD VENIPUNCTURE: CPT | Performed by: INTERNAL MEDICINE

## 2022-11-11 PROCEDURE — 83735 ASSAY OF MAGNESIUM: CPT | Performed by: INTERNAL MEDICINE

## 2022-11-11 PROCEDURE — 80053 COMPREHEN METABOLIC PANEL: CPT | Performed by: INTERNAL MEDICINE

## 2022-11-14 ENCOUNTER — OFFICE VISIT (OUTPATIENT)
Dept: HEMATOLOGY/ONCOLOGY | Facility: CLINIC | Age: 57
End: 2022-11-14
Payer: COMMERCIAL

## 2022-11-14 ENCOUNTER — TELEPHONE (OUTPATIENT)
Dept: HEMATOLOGY/ONCOLOGY | Facility: CLINIC | Age: 57
End: 2022-11-14
Payer: COMMERCIAL

## 2022-11-14 ENCOUNTER — INFUSION (OUTPATIENT)
Dept: INFUSION THERAPY | Facility: HOSPITAL | Age: 57
End: 2022-11-14
Attending: INTERNAL MEDICINE
Payer: COMMERCIAL

## 2022-11-14 VITALS
HEIGHT: 74 IN | HEART RATE: 109 BPM | BODY MASS INDEX: 22.66 KG/M2 | DIASTOLIC BLOOD PRESSURE: 65 MMHG | TEMPERATURE: 97 F | OXYGEN SATURATION: 97 % | RESPIRATION RATE: 18 BRPM | WEIGHT: 176.56 LBS | SYSTOLIC BLOOD PRESSURE: 112 MMHG

## 2022-11-14 VITALS
HEIGHT: 74 IN | TEMPERATURE: 98 F | SYSTOLIC BLOOD PRESSURE: 107 MMHG | BODY MASS INDEX: 22.66 KG/M2 | RESPIRATION RATE: 18 BRPM | OXYGEN SATURATION: 97 % | WEIGHT: 176.56 LBS | HEART RATE: 93 BPM | DIASTOLIC BLOOD PRESSURE: 69 MMHG

## 2022-11-14 DIAGNOSIS — K65.1 INTRA-ABDOMINAL ABSCESS: ICD-10-CM

## 2022-11-14 DIAGNOSIS — R11.0 CHEMOTHERAPY-INDUCED NAUSEA: ICD-10-CM

## 2022-11-14 DIAGNOSIS — T45.1X5A CHEMOTHERAPY-INDUCED NEUROPATHY: ICD-10-CM

## 2022-11-14 DIAGNOSIS — C18.5 MALIGNANT NEOPLASM OF SPLENIC FLEXURE: Primary | ICD-10-CM

## 2022-11-14 DIAGNOSIS — E78.49 OTHER HYPERLIPIDEMIA: ICD-10-CM

## 2022-11-14 DIAGNOSIS — T45.1X5A CHEMOTHERAPY-INDUCED NAUSEA: ICD-10-CM

## 2022-11-14 DIAGNOSIS — E11.00 TYPE 2 DIABETES MELLITUS WITH HYPEROSMOLARITY WITHOUT COMA, WITHOUT LONG-TERM CURRENT USE OF INSULIN: ICD-10-CM

## 2022-11-14 DIAGNOSIS — G62.0 CHEMOTHERAPY-INDUCED NEUROPATHY: ICD-10-CM

## 2022-11-14 DIAGNOSIS — C18.4 MALIGNANT NEOPLASM OF TRANSVERSE COLON: Primary | ICD-10-CM

## 2022-11-14 DIAGNOSIS — I10 PRIMARY HYPERTENSION: ICD-10-CM

## 2022-11-14 PROCEDURE — 3078F DIAST BP <80 MM HG: CPT | Mod: CPTII,S$GLB,, | Performed by: INTERNAL MEDICINE

## 2022-11-14 PROCEDURE — 3052F PR MOST RECENT HEMOGLOBIN A1C LEVEL 8.0 - < 9.0%: ICD-10-PCS | Mod: CPTII,S$GLB,, | Performed by: INTERNAL MEDICINE

## 2022-11-14 PROCEDURE — 3066F PR DOCUMENTATION OF TREATMENT FOR NEPHROPATHY: ICD-10-PCS | Mod: CPTII,S$GLB,, | Performed by: INTERNAL MEDICINE

## 2022-11-14 PROCEDURE — 3061F NEG MICROALBUMINURIA REV: CPT | Mod: CPTII,S$GLB,, | Performed by: INTERNAL MEDICINE

## 2022-11-14 PROCEDURE — 3074F SYST BP LT 130 MM HG: CPT | Mod: CPTII,S$GLB,, | Performed by: INTERNAL MEDICINE

## 2022-11-14 PROCEDURE — 99215 OFFICE O/P EST HI 40 MIN: CPT | Mod: S$GLB,,, | Performed by: INTERNAL MEDICINE

## 2022-11-14 PROCEDURE — 3008F PR BODY MASS INDEX (BMI) DOCUMENTED: ICD-10-PCS | Mod: CPTII,S$GLB,, | Performed by: INTERNAL MEDICINE

## 2022-11-14 PROCEDURE — 25000003 PHARM REV CODE 250: Performed by: INTERNAL MEDICINE

## 2022-11-14 PROCEDURE — 3074F PR MOST RECENT SYSTOLIC BLOOD PRESSURE < 130 MM HG: ICD-10-PCS | Mod: CPTII,S$GLB,, | Performed by: INTERNAL MEDICINE

## 2022-11-14 PROCEDURE — 3052F HG A1C>EQUAL 8.0%<EQUAL 9.0%: CPT | Mod: CPTII,S$GLB,, | Performed by: INTERNAL MEDICINE

## 2022-11-14 PROCEDURE — 96368 THER/DIAG CONCURRENT INF: CPT

## 2022-11-14 PROCEDURE — 96409 CHEMO IV PUSH SNGL DRUG: CPT

## 2022-11-14 PROCEDURE — 63600175 PHARM REV CODE 636 W HCPCS: Performed by: INTERNAL MEDICINE

## 2022-11-14 PROCEDURE — 99215 PR OFFICE/OUTPT VISIT, EST, LEVL V, 40-54 MIN: ICD-10-PCS | Mod: S$GLB,,, | Performed by: INTERNAL MEDICINE

## 2022-11-14 PROCEDURE — 3066F NEPHROPATHY DOC TX: CPT | Mod: CPTII,S$GLB,, | Performed by: INTERNAL MEDICINE

## 2022-11-14 PROCEDURE — 96366 THER/PROPH/DIAG IV INF ADDON: CPT

## 2022-11-14 PROCEDURE — 3078F PR MOST RECENT DIASTOLIC BLOOD PRESSURE < 80 MM HG: ICD-10-PCS | Mod: CPTII,S$GLB,, | Performed by: INTERNAL MEDICINE

## 2022-11-14 PROCEDURE — 96416 CHEMO PROLONG INFUSE W/PUMP: CPT

## 2022-11-14 PROCEDURE — 96367 TX/PROPH/DG ADDL SEQ IV INF: CPT

## 2022-11-14 PROCEDURE — 96411 CHEMO IV PUSH ADDL DRUG: CPT

## 2022-11-14 PROCEDURE — 99999 PR PBB SHADOW E&M-EST. PATIENT-LVL V: ICD-10-PCS | Mod: PBBFAC,,, | Performed by: INTERNAL MEDICINE

## 2022-11-14 PROCEDURE — 3061F PR NEG MICROALBUMINURIA RESULT DOCUMENTED/REVIEW: ICD-10-PCS | Mod: CPTII,S$GLB,, | Performed by: INTERNAL MEDICINE

## 2022-11-14 PROCEDURE — 4010F ACE/ARB THERAPY RXD/TAKEN: CPT | Mod: CPTII,S$GLB,, | Performed by: INTERNAL MEDICINE

## 2022-11-14 PROCEDURE — 99999 PR PBB SHADOW E&M-EST. PATIENT-LVL V: CPT | Mod: PBBFAC,,, | Performed by: INTERNAL MEDICINE

## 2022-11-14 PROCEDURE — 4010F PR ACE/ARB THEARPY RXD/TAKEN: ICD-10-PCS | Mod: CPTII,S$GLB,, | Performed by: INTERNAL MEDICINE

## 2022-11-14 PROCEDURE — 3008F BODY MASS INDEX DOCD: CPT | Mod: CPTII,S$GLB,, | Performed by: INTERNAL MEDICINE

## 2022-11-14 PROCEDURE — 96375 TX/PRO/DX INJ NEW DRUG ADDON: CPT

## 2022-11-14 RX ORDER — METHYLPREDNISOLONE SOD SUCC 125 MG
125 VIAL (EA) INJECTION
Status: COMPLETED | OUTPATIENT
Start: 2022-11-14 | End: 2022-11-14

## 2022-11-14 RX ORDER — HEPARIN 100 UNIT/ML
500 SYRINGE INTRAVENOUS
Status: CANCELLED | OUTPATIENT
Start: 2022-11-16

## 2022-11-14 RX ORDER — DIPHENHYDRAMINE HYDROCHLORIDE 50 MG/ML
50 INJECTION INTRAMUSCULAR; INTRAVENOUS ONCE AS NEEDED
Status: CANCELLED | OUTPATIENT
Start: 2022-11-14

## 2022-11-14 RX ORDER — SODIUM CHLORIDE 0.9 % (FLUSH) 0.9 %
10 SYRINGE (ML) INJECTION
Status: CANCELLED | OUTPATIENT
Start: 2022-11-14

## 2022-11-14 RX ORDER — FLUOROURACIL 50 MG/ML
400 INJECTION, SOLUTION INTRAVENOUS
Status: COMPLETED | OUTPATIENT
Start: 2022-11-14 | End: 2022-11-14

## 2022-11-14 RX ORDER — FLUOROURACIL 50 MG/ML
400 INJECTION, SOLUTION INTRAVENOUS
Status: CANCELLED | OUTPATIENT
Start: 2022-11-14

## 2022-11-14 RX ORDER — FLUOROURACIL 50 MG/ML
2400 INJECTION, SOLUTION INTRAVENOUS
Status: CANCELLED | OUTPATIENT
Start: 2022-11-14

## 2022-11-14 RX ORDER — EPINEPHRINE 0.3 MG/.3ML
0.3 INJECTION SUBCUTANEOUS ONCE AS NEEDED
Status: CANCELLED | OUTPATIENT
Start: 2022-11-14

## 2022-11-14 RX ORDER — SODIUM CHLORIDE 0.9 % (FLUSH) 0.9 %
10 SYRINGE (ML) INJECTION
Status: CANCELLED | OUTPATIENT
Start: 2022-11-16

## 2022-11-14 RX ORDER — HEPARIN 100 UNIT/ML
500 SYRINGE INTRAVENOUS
Status: CANCELLED | OUTPATIENT
Start: 2022-11-14

## 2022-11-14 RX ORDER — SODIUM CHLORIDE 0.9 % (FLUSH) 0.9 %
10 SYRINGE (ML) INJECTION
Status: DISCONTINUED | OUTPATIENT
Start: 2022-11-14 | End: 2022-11-14 | Stop reason: HOSPADM

## 2022-11-14 RX ADMIN — FLUOROURACIL 820 MG: 50 INJECTION, SOLUTION INTRAVENOUS at 01:11

## 2022-11-14 RX ADMIN — DEXTROSE 820 MG: 5 SOLUTION INTRAVENOUS at 11:11

## 2022-11-14 RX ADMIN — DIPHENHYDRAMINE HYDROCHLORIDE 50 MG: 50 INJECTION INTRAMUSCULAR; INTRAVENOUS at 11:11

## 2022-11-14 RX ADMIN — FLUOROURACIL 4920 MG: 50 INJECTION, SOLUTION INTRAVENOUS at 01:11

## 2022-11-14 RX ADMIN — METHYLPREDNISOLONE SODIUM SUCCINATE 125 MG: 125 INJECTION, POWDER, FOR SOLUTION INTRAMUSCULAR; INTRAVENOUS at 11:11

## 2022-11-14 RX ADMIN — DEXAMETHASONE SODIUM PHOSPHATE 0.25 MG: 4 INJECTION, SOLUTION INTRA-ARTICULAR; INTRALESIONAL; INTRAMUSCULAR; INTRAVENOUS; SOFT TISSUE at 10:11

## 2022-11-14 RX ADMIN — SODIUM CHLORIDE 150 MG: 900 INJECTION, SOLUTION INTRAVENOUS at 10:11

## 2022-11-14 RX ADMIN — OXALIPLATIN 139 MG: 5 INJECTION, SOLUTION INTRAVENOUS at 11:11

## 2022-11-14 NOTE — NURSING
"1123: Oxaliplation infusion started.   1142: Pt verbalized "not feeling right". Infusion stopped.   Pt noted to be red, facial flushing, diaphoretic to head area.   Vital Signs: /65; pulse 107; Sp02 97%.   Dr Khoobehi office notified of pt's symptoms.   Pt began to feel nauseous.   1147: Solumedrol 125mg IVP administered.   1149: Dr Harp at pt's chairside.   Dr Harp order: 500ml NS; solumedrol 125mg: Benadryl 50mg (anaphylaxis orders on treatment plan).   1150: Pt vomiting, increasing abdominal pain 8/10 on pain scale.   1150: Benadryl 50 IVP administered.   1200: Dr Khoobehi at pt's chairside. Dr Khoobehi ordered for pt to continue with chemotherapy treatment plan, excluding oxaliplatin, continue leucovorin and 5FU treatment.   1200: Pt states, symptoms resolving, no nausea.   1208: Pt states abdominal pain resolving.   1215: Pt at baseline, symptoms fulling resolved.   1215: Leucovorin infusion restarted.   No further complaints during pt's treatment time. Pt tolerated remaining treatment.     "

## 2022-11-14 NOTE — PROGRESS NOTES
Service Date:  11/14/22    Chief Complaint: Colon Cancer    Osman Li Jr. is a 57 y.o. 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  A CT scan of the abdomen was done which showed a large obstructive lesion with lymph node involvement.  There was also a 9 mm hypodense liver lesion that could not be characterized.  Patient had a hemicolectomy with ostomy bag placed, and splenectomy.      He was then set to start chemotherapy but it was delayed due to the liver lesion.  He was evaluated by Dr. Goodwin with Surgical Oncology who determined that this was likely not malignant.  Unfortunately afterwards, patient suffered a setback with an abdominal abscess formation that could not be drained.  He recently completed 2 weeks of antibiotics, and a repeat CT scan showed the presence of an abscess still there, but improved.  He had 2 weeks of abx, but a repeat scan showed some growth. It is thought that this is likely sterile fluid. He will finish his abx and we can monitor with scan.    He is here for cycle 10 of treatment.  Has numbness at his toes and fingertips.    Review of Systems   Constitutional: Negative.    HENT: Negative.     Eyes: Negative.    Respiratory: Negative.     Cardiovascular: Negative.    Gastrointestinal:  Positive for nausea.   Endocrine: Negative.    Genitourinary: Negative.    Musculoskeletal: Negative.    Integumentary:  Negative.   Neurological: Negative.    Hematological: Negative.    Psychiatric/Behavioral: Negative.        Current Outpatient Medications   Medication Instructions    atorvastatin (LIPITOR) 20 mg, Oral, Daily    diclofenac (VOLTAREN) 50 mg, Oral, 2 times daily    enalapriL-hydrochlorothiazide (VASERETIC) 10-25 mg per tablet 1 tablet, Oral, Daily    haemophilus B polysac-tetanus toxoid (ACTHIB, PF,) 10 mcg/0.5 mL injection TO BE ADMINISTERED.    L. acidophilus/Bifid. animalis (PROBIOTIC) 5 billion cell CpSP 1 capsule, Oral, Daily    metFORMIN (GLUCOPHAGE)  "1,000 mg, Oral, 2 times daily with meals    oxyCODONE (ROXICODONE) 30 mg, Oral, Every 6 hours PRN    OZEMPIC 0.25 mg, Subcutaneous, Every 7 days    pen needle, diabetic 31 gauge x 3/16" Ndle 1 each, Misc.(Non-Drug; Combo Route), Daily    promethazine (PHENERGAN) 12.5 mg, Oral, Every 4 hours PRN    sildenafiL (VIAGRA) 100 mg, Oral, Daily PRN    TOUJEO MAX U-300 SOLOSTAR 26 Units, Subcutaneous, Daily    traZODone (DESYREL) 50 mg, Oral, Nightly        Past Medical History:   Diagnosis Date    DM (diabetes mellitus)     Hyperlipidemia     Hypertension     Prediabetes         Past Surgical History:   Procedure Laterality Date    CHOLECYSTECTOMY  2011    COLONOSCOPY      2018    COLOSTOMY N/A 4/25/2022    Procedure: CREATION, COLOSTOMY;  Surgeon: Carlton Waddell MD;  Location: [facility] OR;  Service: General;  Laterality: N/A;    INSERTION OF TUNNELED CENTRAL VENOUS CATHETER (CVC) WITH SUBCUTANEOUS PORT Right 5/18/2022    Procedure: [id number];  Surgeon: Carlton Waddell MD;  Location: [facility] OR;  Service: General;  Laterality: Right;    MOBILIZATION OF SPLENIC FLEXURE N/A 4/25/2022    Procedure: MOBILIZATION, SPLENIC FLEXURE;  Surgeon: Carlton Waddell MD;  Location: [facility] OR;  Service: General;  Laterality: N/A;    SPLENECTOMY N/A 4/25/2022    Procedure: SPLENECTOMY;  Surgeon: Carlton Waddell MD;  Location: [facility] OR;  Service: General;  Laterality: N/A;    SUBTOTAL COLECTOMY N/A 4/25/2022    Procedure: COLECTOMY, PARTIAL;  Surgeon: Carlton Waddell MD;  Location: [facility] OR;  Service: General;  Laterality: N/A;        Family History   Problem Relation Age of Onset    Heart disease Father        Social History     Tobacco Use    Smoking status: Every Day     Packs/day: 0.50     Years: 35.00     Pack years: 17.50     Types: Cigarettes    Smokeless tobacco: Never   Substance Use Topics    Alcohol use: Not Currently    Drug use: Never         Vitals:    11/14/22 0911   BP: 112/65   Pulse: 109   Resp: 18   Temp: 97 °F (36.1 °C) " "       Physical Exam:  /65 (BP Location: Right arm, Patient Position: Sitting, BP Method: Medium (Automatic))   Pulse 109   Temp 97 °F (36.1 °C) (Temporal)   Resp 18   Ht 6' 2" (1.88 m)   Wt 80.1 kg (176 lb 9.4 oz)   SpO2 97%   BMI 22.67 kg/m²     Physical Exam  Vitals and nursing note reviewed.   Constitutional:       Appearance: Normal appearance.   HENT:      Head: Normocephalic and atraumatic.      Nose: Nose normal.      Mouth/Throat:      Mouth: Mucous membranes are moist.      Pharynx: Oropharynx is clear.   Eyes:      Extraocular Movements: Extraocular movements intact.      Conjunctiva/sclera: Conjunctivae normal.   Cardiovascular:      Rate and Rhythm: Normal rate and regular rhythm.      Heart sounds: Normal heart sounds.   Pulmonary:      Effort: Pulmonary effort is normal.      Breath sounds: Normal breath sounds.   Abdominal:      General: Abdomen is flat. Bowel sounds are normal.      Palpations: Abdomen is soft.      Comments: Ostomy bag in place.   Musculoskeletal:         General: Normal range of motion.      Cervical back: Normal range of motion and neck supple.   Skin:     General: Skin is warm and dry.   Neurological:      General: No focal deficit present.      Mental Status: He is alert and oriented to person, place, and time. Mental status is at baseline.   Psychiatric:         Mood and Affect: Mood normal.        Labs:  Lab Results   Component Value Date    WBC 11.17 11/11/2022    RBC 4.98 11/11/2022    HGB 14.5 11/11/2022    HCT 43.8 11/11/2022    MCV 88 11/11/2022    MCH 29.1 11/11/2022    MCHC 33.1 11/11/2022    RDW 23.9 (H) 11/11/2022     11/11/2022    MPV 10.3 11/11/2022    GRAN 5.0 11/11/2022    GRAN 45.2 11/11/2022    LYMPH 4.3 11/11/2022    LYMPH 38.4 11/11/2022    MONO 1.5 (H) 11/11/2022    MONO 13.3 11/11/2022    EOS 0.2 11/11/2022    BASO 0.07 11/11/2022    EOSINOPHIL 2.1 11/11/2022    BASOPHIL 0.6 11/11/2022     Sodium   Date Value Ref Range Status "   11/11/2022 132 (L) 136 - 145 mmol/L Final     Potassium   Date Value Ref Range Status   11/11/2022 4.2 3.5 - 5.1 mmol/L Final     Chloride   Date Value Ref Range Status   11/11/2022 95 95 - 110 mmol/L Final     CO2   Date Value Ref Range Status   11/11/2022 25 23 - 29 mmol/L Final     Glucose   Date Value Ref Range Status   11/11/2022 191 (H) 70 - 110 mg/dL Final     BUN   Date Value Ref Range Status   11/11/2022 15 6 - 20 mg/dL Final     Creatinine   Date Value Ref Range Status   11/11/2022 0.9 0.5 - 1.4 mg/dL Final     Calcium   Date Value Ref Range Status   11/11/2022 11.1 (H) 8.7 - 10.5 mg/dL Final     Total Protein   Date Value Ref Range Status   11/11/2022 7.6 6.0 - 8.4 g/dL Final     Albumin   Date Value Ref Range Status   11/11/2022 3.5 3.5 - 5.2 g/dL Final     Total Bilirubin   Date Value Ref Range Status   11/11/2022 1.0 0.1 - 1.0 mg/dL Final     Comment:     For infants and newborns, interpretation of results should be based  on gestational age, weight and in agreement with clinical  observations.    Premature Infant recommended reference ranges:  Up to 24 hours.............<8.0 mg/dL  Up to 48 hours............<12.0 mg/dL  3-5 days..................<15.0 mg/dL  6-29 days.................<15.0 mg/dL       Alkaline Phosphatase   Date Value Ref Range Status   11/11/2022 145 (H) 55 - 135 U/L Final     AST   Date Value Ref Range Status   11/11/2022 27 10 - 40 U/L Final     ALT   Date Value Ref Range Status   11/11/2022 17 10 - 44 U/L Final     Anion Gap   Date Value Ref Range Status   11/11/2022 12 8 - 16 mmol/L Final     eGFR if    Date Value Ref Range Status   07/25/2022 >60 >60 mL/min/1.73 m^2 Final     eGFR if non    Date Value Ref Range Status   07/25/2022 >60 >60 mL/min/1.73 m^2 Final     Comment:     Calculation used to obtain the estimated glomerular filtration  rate (eGFR) is the CKD-EPI equation.          A/P:    Malignant neoplasm of the transverse colon  -poorly  differentiated adenocarcinoma  -pT3 N2b  -patient on clinical trial to measure ctDNA  -labs reviewed  -FOLFOX started on 7/12/22  -proceed with cycle 10, drop oxali  -return to clinic in 2 weeks for next treatment    Chemo induced neuropathy  - grade 2  - was planning to give oxali with C10, but patient had a reaction to oxali during the first 5 minutes of treatment, so will drop    Chemo induced nausea  - grade 1    Intra-abdominal abscess  -completed antibiotics and abscess is still present, but improved. Likely sterile fluid at this point.  -now on IV abx    Back pain  - NM bone scan negative for mets    HTN  - on Enalapril  - follow up with PCP    HLP  - follow up with PCP    DM2  - on Metformin  - follow up with PCP    Tobacco use  - counseled on cessation      Aurash Khoobehi, MD  Hematology and Oncology

## 2022-11-16 ENCOUNTER — INFUSION (OUTPATIENT)
Dept: INFUSION THERAPY | Facility: HOSPITAL | Age: 57
End: 2022-11-16
Attending: INTERNAL MEDICINE
Payer: COMMERCIAL

## 2022-11-16 VITALS
SYSTOLIC BLOOD PRESSURE: 138 MMHG | DIASTOLIC BLOOD PRESSURE: 86 MMHG | BODY MASS INDEX: 22.96 KG/M2 | WEIGHT: 178.88 LBS | RESPIRATION RATE: 18 BRPM | TEMPERATURE: 98 F | OXYGEN SATURATION: 100 % | HEART RATE: 82 BPM | HEIGHT: 74 IN

## 2022-11-16 DIAGNOSIS — C18.5 MALIGNANT NEOPLASM OF SPLENIC FLEXURE: Primary | ICD-10-CM

## 2022-11-16 PROCEDURE — 96523 IRRIG DRUG DELIVERY DEVICE: CPT

## 2022-11-16 PROCEDURE — 25000003 PHARM REV CODE 250: Performed by: INTERNAL MEDICINE

## 2022-11-16 PROCEDURE — 63600175 PHARM REV CODE 636 W HCPCS: Performed by: INTERNAL MEDICINE

## 2022-11-16 PROCEDURE — A4216 STERILE WATER/SALINE, 10 ML: HCPCS | Performed by: INTERNAL MEDICINE

## 2022-11-16 RX ORDER — HEPARIN 100 UNIT/ML
500 SYRINGE INTRAVENOUS
Status: DISCONTINUED | OUTPATIENT
Start: 2022-11-16 | End: 2022-11-17 | Stop reason: HOSPADM

## 2022-11-16 RX ORDER — SODIUM CHLORIDE 0.9 % (FLUSH) 0.9 %
10 SYRINGE (ML) INJECTION
Status: DISCONTINUED | OUTPATIENT
Start: 2022-11-16 | End: 2022-11-17 | Stop reason: HOSPADM

## 2022-11-16 RX ADMIN — SODIUM CHLORIDE, PRESERVATIVE FREE 10 ML: 5 INJECTION INTRAVENOUS at 11:11

## 2022-11-16 RX ADMIN — HEPARIN 500 UNITS: 100 SYRINGE at 11:11

## 2022-11-21 ENCOUNTER — PATIENT MESSAGE (OUTPATIENT)
Dept: INFECTIOUS DISEASES | Facility: CLINIC | Age: 57
End: 2022-11-21

## 2022-11-28 ENCOUNTER — LAB VISIT (OUTPATIENT)
Dept: LAB | Facility: HOSPITAL | Age: 57
End: 2022-11-28
Attending: INTERNAL MEDICINE
Payer: COMMERCIAL

## 2022-11-28 ENCOUNTER — INFUSION (OUTPATIENT)
Dept: INFUSION THERAPY | Facility: HOSPITAL | Age: 57
End: 2022-11-28
Attending: INTERNAL MEDICINE
Payer: COMMERCIAL

## 2022-11-28 ENCOUNTER — OFFICE VISIT (OUTPATIENT)
Dept: HEMATOLOGY/ONCOLOGY | Facility: CLINIC | Age: 57
End: 2022-11-28
Payer: COMMERCIAL

## 2022-11-28 ENCOUNTER — TELEPHONE (OUTPATIENT)
Dept: HEMATOLOGY/ONCOLOGY | Facility: CLINIC | Age: 57
End: 2022-11-28

## 2022-11-28 VITALS
SYSTOLIC BLOOD PRESSURE: 120 MMHG | WEIGHT: 181.88 LBS | HEART RATE: 87 BPM | RESPIRATION RATE: 16 BRPM | DIASTOLIC BLOOD PRESSURE: 77 MMHG | HEIGHT: 74 IN | OXYGEN SATURATION: 98 % | BODY MASS INDEX: 23.34 KG/M2 | TEMPERATURE: 98 F

## 2022-11-28 VITALS
DIASTOLIC BLOOD PRESSURE: 81 MMHG | TEMPERATURE: 98 F | HEART RATE: 78 BPM | HEIGHT: 74 IN | RESPIRATION RATE: 16 BRPM | WEIGHT: 181.88 LBS | BODY MASS INDEX: 23.34 KG/M2 | OXYGEN SATURATION: 99 % | SYSTOLIC BLOOD PRESSURE: 129 MMHG

## 2022-11-28 DIAGNOSIS — K65.1 INTRA-ABDOMINAL ABSCESS: ICD-10-CM

## 2022-11-28 DIAGNOSIS — T45.1X5A CHEMOTHERAPY-INDUCED NAUSEA: ICD-10-CM

## 2022-11-28 DIAGNOSIS — C18.4 MALIGNANT NEOPLASM OF TRANSVERSE COLON: Primary | ICD-10-CM

## 2022-11-28 DIAGNOSIS — C18.4 MALIGNANT NEOPLASM OF TRANSVERSE COLON: ICD-10-CM

## 2022-11-28 DIAGNOSIS — T45.1X5A CHEMOTHERAPY-INDUCED NEUROPATHY: ICD-10-CM

## 2022-11-28 DIAGNOSIS — E11.00 TYPE 2 DIABETES MELLITUS WITH HYPEROSMOLARITY WITHOUT COMA, WITHOUT LONG-TERM CURRENT USE OF INSULIN: ICD-10-CM

## 2022-11-28 DIAGNOSIS — E78.49 OTHER HYPERLIPIDEMIA: ICD-10-CM

## 2022-11-28 DIAGNOSIS — I10 PRIMARY HYPERTENSION: ICD-10-CM

## 2022-11-28 DIAGNOSIS — G62.0 CHEMOTHERAPY-INDUCED NEUROPATHY: ICD-10-CM

## 2022-11-28 DIAGNOSIS — C18.5 MALIGNANT NEOPLASM OF SPLENIC FLEXURE: Primary | ICD-10-CM

## 2022-11-28 DIAGNOSIS — R11.0 CHEMOTHERAPY-INDUCED NAUSEA: ICD-10-CM

## 2022-11-28 LAB
ALBUMIN SERPL BCP-MCNC: 3.4 G/DL (ref 3.5–5.2)
ALP SERPL-CCNC: 110 U/L (ref 55–135)
ALT SERPL W/O P-5'-P-CCNC: 17 U/L (ref 10–44)
ANION GAP SERPL CALC-SCNC: 12 MMOL/L (ref 8–16)
AST SERPL-CCNC: 27 U/L (ref 10–40)
BASOPHILS # BLD AUTO: 0.07 K/UL (ref 0–0.2)
BASOPHILS NFR BLD: 0.6 % (ref 0–1.9)
BILIRUB SERPL-MCNC: 0.7 MG/DL (ref 0.1–1)
BUN SERPL-MCNC: 10 MG/DL (ref 6–20)
CALCIUM SERPL-MCNC: 10.4 MG/DL (ref 8.7–10.5)
CHLORIDE SERPL-SCNC: 98 MMOL/L (ref 95–110)
CO2 SERPL-SCNC: 26 MMOL/L (ref 23–29)
CREAT SERPL-MCNC: 0.8 MG/DL (ref 0.5–1.4)
DIFFERENTIAL METHOD: ABNORMAL
EOSINOPHIL # BLD AUTO: 0.2 K/UL (ref 0–0.5)
EOSINOPHIL NFR BLD: 1.6 % (ref 0–8)
ERYTHROCYTE [DISTWIDTH] IN BLOOD BY AUTOMATED COUNT: 22.4 % (ref 11.5–14.5)
EST. GFR  (NO RACE VARIABLE): >60 ML/MIN/1.73 M^2
GLUCOSE SERPL-MCNC: 173 MG/DL (ref 70–110)
HCT VFR BLD AUTO: 39.8 % (ref 40–54)
HGB BLD-MCNC: 13.4 G/DL (ref 14–18)
IMM GRANULOCYTES # BLD AUTO: 0.04 K/UL (ref 0–0.04)
IMM GRANULOCYTES NFR BLD AUTO: 0.4 % (ref 0–0.5)
LYMPHOCYTES # BLD AUTO: 3.8 K/UL (ref 1–4.8)
LYMPHOCYTES NFR BLD: 34 % (ref 18–48)
MCH RBC QN AUTO: 31 PG (ref 27–31)
MCHC RBC AUTO-ENTMCNC: 33.7 G/DL (ref 32–36)
MCV RBC AUTO: 92 FL (ref 82–98)
MONOCYTES # BLD AUTO: 1.3 K/UL (ref 0.3–1)
MONOCYTES NFR BLD: 11.6 % (ref 4–15)
NEUTROPHILS # BLD AUTO: 5.8 K/UL (ref 1.8–7.7)
NEUTROPHILS NFR BLD: 51.8 % (ref 38–73)
NRBC BLD-RTO: 0 /100 WBC
PLATELET # BLD AUTO: 261 K/UL (ref 150–450)
PMV BLD AUTO: 9.1 FL (ref 9.2–12.9)
POTASSIUM SERPL-SCNC: 3.9 MMOL/L (ref 3.5–5.1)
PROT SERPL-MCNC: 7 G/DL (ref 6–8.4)
RBC # BLD AUTO: 4.32 M/UL (ref 4.6–6.2)
SODIUM SERPL-SCNC: 136 MMOL/L (ref 136–145)
WBC # BLD AUTO: 11.15 K/UL (ref 3.9–12.7)

## 2022-11-28 PROCEDURE — 3074F PR MOST RECENT SYSTOLIC BLOOD PRESSURE < 130 MM HG: ICD-10-PCS | Mod: CPTII,S$GLB,, | Performed by: INTERNAL MEDICINE

## 2022-11-28 PROCEDURE — 96409 CHEMO IV PUSH SNGL DRUG: CPT

## 2022-11-28 PROCEDURE — 3052F PR MOST RECENT HEMOGLOBIN A1C LEVEL 8.0 - < 9.0%: ICD-10-PCS | Mod: CPTII,S$GLB,, | Performed by: INTERNAL MEDICINE

## 2022-11-28 PROCEDURE — 99999 PR PBB SHADOW E&M-EST. PATIENT-LVL V: CPT | Mod: PBBFAC,,, | Performed by: INTERNAL MEDICINE

## 2022-11-28 PROCEDURE — 3008F BODY MASS INDEX DOCD: CPT | Mod: CPTII,S$GLB,, | Performed by: INTERNAL MEDICINE

## 2022-11-28 PROCEDURE — 99215 OFFICE O/P EST HI 40 MIN: CPT | Mod: S$GLB,,, | Performed by: INTERNAL MEDICINE

## 2022-11-28 PROCEDURE — 25000003 PHARM REV CODE 250: Performed by: INTERNAL MEDICINE

## 2022-11-28 PROCEDURE — 1159F MED LIST DOCD IN RCRD: CPT | Mod: CPTII,S$GLB,, | Performed by: INTERNAL MEDICINE

## 2022-11-28 PROCEDURE — 3066F PR DOCUMENTATION OF TREATMENT FOR NEPHROPATHY: ICD-10-PCS | Mod: CPTII,S$GLB,, | Performed by: INTERNAL MEDICINE

## 2022-11-28 PROCEDURE — 99215 PR OFFICE/OUTPT VISIT, EST, LEVL V, 40-54 MIN: ICD-10-PCS | Mod: S$GLB,,, | Performed by: INTERNAL MEDICINE

## 2022-11-28 PROCEDURE — 96367 TX/PROPH/DG ADDL SEQ IV INF: CPT

## 2022-11-28 PROCEDURE — 4010F PR ACE/ARB THEARPY RXD/TAKEN: ICD-10-PCS | Mod: CPTII,S$GLB,, | Performed by: INTERNAL MEDICINE

## 2022-11-28 PROCEDURE — 3052F HG A1C>EQUAL 8.0%<EQUAL 9.0%: CPT | Mod: CPTII,S$GLB,, | Performed by: INTERNAL MEDICINE

## 2022-11-28 PROCEDURE — 3066F NEPHROPATHY DOC TX: CPT | Mod: CPTII,S$GLB,, | Performed by: INTERNAL MEDICINE

## 2022-11-28 PROCEDURE — 63600175 PHARM REV CODE 636 W HCPCS: Performed by: INTERNAL MEDICINE

## 2022-11-28 PROCEDURE — 3008F PR BODY MASS INDEX (BMI) DOCUMENTED: ICD-10-PCS | Mod: CPTII,S$GLB,, | Performed by: INTERNAL MEDICINE

## 2022-11-28 PROCEDURE — 1160F RVW MEDS BY RX/DR IN RCRD: CPT | Mod: CPTII,S$GLB,, | Performed by: INTERNAL MEDICINE

## 2022-11-28 PROCEDURE — 3061F NEG MICROALBUMINURIA REV: CPT | Mod: CPTII,S$GLB,, | Performed by: INTERNAL MEDICINE

## 2022-11-28 PROCEDURE — 36415 COLL VENOUS BLD VENIPUNCTURE: CPT | Performed by: INTERNAL MEDICINE

## 2022-11-28 PROCEDURE — 3078F PR MOST RECENT DIASTOLIC BLOOD PRESSURE < 80 MM HG: ICD-10-PCS | Mod: CPTII,S$GLB,, | Performed by: INTERNAL MEDICINE

## 2022-11-28 PROCEDURE — 1160F PR REVIEW ALL MEDS BY PRESCRIBER/CLIN PHARMACIST DOCUMENTED: ICD-10-PCS | Mod: CPTII,S$GLB,, | Performed by: INTERNAL MEDICINE

## 2022-11-28 PROCEDURE — 99999 PR PBB SHADOW E&M-EST. PATIENT-LVL V: ICD-10-PCS | Mod: PBBFAC,,, | Performed by: INTERNAL MEDICINE

## 2022-11-28 PROCEDURE — 80053 COMPREHEN METABOLIC PANEL: CPT | Performed by: INTERNAL MEDICINE

## 2022-11-28 PROCEDURE — 96416 CHEMO PROLONG INFUSE W/PUMP: CPT

## 2022-11-28 PROCEDURE — 3061F PR NEG MICROALBUMINURIA RESULT DOCUMENTED/REVIEW: ICD-10-PCS | Mod: CPTII,S$GLB,, | Performed by: INTERNAL MEDICINE

## 2022-11-28 PROCEDURE — 1159F PR MEDICATION LIST DOCUMENTED IN MEDICAL RECORD: ICD-10-PCS | Mod: CPTII,S$GLB,, | Performed by: INTERNAL MEDICINE

## 2022-11-28 PROCEDURE — 3078F DIAST BP <80 MM HG: CPT | Mod: CPTII,S$GLB,, | Performed by: INTERNAL MEDICINE

## 2022-11-28 PROCEDURE — 3074F SYST BP LT 130 MM HG: CPT | Mod: CPTII,S$GLB,, | Performed by: INTERNAL MEDICINE

## 2022-11-28 PROCEDURE — 85025 COMPLETE CBC W/AUTO DIFF WBC: CPT | Performed by: INTERNAL MEDICINE

## 2022-11-28 PROCEDURE — 4010F ACE/ARB THERAPY RXD/TAKEN: CPT | Mod: CPTII,S$GLB,, | Performed by: INTERNAL MEDICINE

## 2022-11-28 RX ORDER — FLUOROURACIL 50 MG/ML
400 INJECTION, SOLUTION INTRAVENOUS
Status: CANCELLED | OUTPATIENT
Start: 2022-11-28

## 2022-11-28 RX ORDER — SODIUM CHLORIDE 0.9 % (FLUSH) 0.9 %
10 SYRINGE (ML) INJECTION
Status: CANCELLED | OUTPATIENT
Start: 2022-11-28

## 2022-11-28 RX ORDER — FLUOROURACIL 50 MG/ML
400 INJECTION, SOLUTION INTRAVENOUS
Status: COMPLETED | OUTPATIENT
Start: 2022-11-28 | End: 2022-11-28

## 2022-11-28 RX ORDER — SODIUM CHLORIDE 0.9 % (FLUSH) 0.9 %
10 SYRINGE (ML) INJECTION
Status: DISCONTINUED | OUTPATIENT
Start: 2022-11-28 | End: 2022-11-28 | Stop reason: HOSPADM

## 2022-11-28 RX ORDER — EPINEPHRINE 0.3 MG/.3ML
0.3 INJECTION SUBCUTANEOUS ONCE AS NEEDED
Status: DISCONTINUED | OUTPATIENT
Start: 2022-11-28 | End: 2022-11-28 | Stop reason: HOSPADM

## 2022-11-28 RX ORDER — SODIUM CHLORIDE 0.9 % (FLUSH) 0.9 %
10 SYRINGE (ML) INJECTION
Status: CANCELLED | OUTPATIENT
Start: 2022-11-30

## 2022-11-28 RX ORDER — FLUOROURACIL 50 MG/ML
2400 INJECTION, SOLUTION INTRAVENOUS
Status: CANCELLED | OUTPATIENT
Start: 2022-11-28

## 2022-11-28 RX ORDER — HEPARIN 100 UNIT/ML
500 SYRINGE INTRAVENOUS
Status: CANCELLED | OUTPATIENT
Start: 2022-11-28

## 2022-11-28 RX ORDER — DIPHENHYDRAMINE HYDROCHLORIDE 50 MG/ML
50 INJECTION INTRAMUSCULAR; INTRAVENOUS ONCE AS NEEDED
Status: CANCELLED | OUTPATIENT
Start: 2022-11-28

## 2022-11-28 RX ORDER — DIPHENHYDRAMINE HYDROCHLORIDE 50 MG/ML
50 INJECTION INTRAMUSCULAR; INTRAVENOUS ONCE AS NEEDED
Status: DISCONTINUED | OUTPATIENT
Start: 2022-11-28 | End: 2022-11-28 | Stop reason: HOSPADM

## 2022-11-28 RX ORDER — EPINEPHRINE 0.3 MG/.3ML
0.3 INJECTION SUBCUTANEOUS ONCE AS NEEDED
Status: CANCELLED | OUTPATIENT
Start: 2022-11-28

## 2022-11-28 RX ORDER — HEPARIN 100 UNIT/ML
500 SYRINGE INTRAVENOUS
Status: CANCELLED | OUTPATIENT
Start: 2022-11-30

## 2022-11-28 RX ADMIN — DEXTROSE 830 MG: 5 SOLUTION INTRAVENOUS at 11:11

## 2022-11-28 RX ADMIN — DEXAMETHASONE SODIUM PHOSPHATE 0.25 MG: 4 INJECTION, SOLUTION INTRA-ARTICULAR; INTRALESIONAL; INTRAMUSCULAR; INTRAVENOUS; SOFT TISSUE at 10:11

## 2022-11-28 RX ADMIN — FLUOROURACIL 4990 MG: 50 INJECTION, SOLUTION INTRAVENOUS at 12:11

## 2022-11-28 RX ADMIN — FOSAPREPITANT 150 MG: 150 INJECTION, POWDER, LYOPHILIZED, FOR SOLUTION INTRAVENOUS at 11:11

## 2022-11-28 RX ADMIN — FLUOROURACIL 830 MG: 50 INJECTION, SOLUTION INTRAVENOUS at 12:11

## 2022-11-28 RX ADMIN — SODIUM CHLORIDE: 9 INJECTION, SOLUTION INTRAVENOUS at 11:11

## 2022-11-28 NOTE — PLAN OF CARE
Problem: Fatigue  Goal: Improved Activity Tolerance  Outcome: Ongoing, Progressing  Intervention: Promote Improved Energy  Flowsheets (Taken 11/28/2022 1023)  Fatigue Management: frequent rest breaks encouraged  Sleep/Rest Enhancement:   regular sleep/rest pattern promoted   relaxation techniques promoted  Activity Management: Ambulated -L4

## 2022-11-28 NOTE — PROGRESS NOTES
Service Date:  11/28/22    Chief Complaint: Malignant neoplasm of transverse colon    Osman Li Jr. is a 57 y.o. 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  A CT scan of the abdomen was done which showed a large obstructive lesion with lymph node involvement.  There was also a 9 mm hypodense liver lesion that could not be characterized.  Patient had a hemicolectomy with ostomy bag placed, and splenectomy.      He was then set to start chemotherapy but it was delayed due to the liver lesion.  He was evaluated by Dr. Goodwin with Surgical Oncology who determined that this was likely not malignant.  Unfortunately afterwards, patient suffered a setback with an abdominal abscess formation that could not be drained.  He recently completed 2 weeks of antibiotics, and a repeat CT scan showed the presence of an abscess still there, but improved.  He had 2 weeks of abx, but a repeat scan showed some growth. It is thought that this is likely sterile fluid. He will finish his abx and we can monitor with scan.    He is here for cycle 11 of treatment.  Has numbness at his toes and fingertips but stable. Oxaliplatin was dropped with last chemo as patient had a reaction to it.    Review of Systems   Constitutional: Negative.    HENT: Negative.     Eyes: Negative.    Respiratory: Negative.     Cardiovascular: Negative.    Gastrointestinal:  Positive for nausea.   Endocrine: Negative.    Genitourinary: Negative.    Musculoskeletal: Negative.    Integumentary:  Negative.   Neurological: Negative.    Hematological: Negative.    Psychiatric/Behavioral: Negative.        Current Outpatient Medications   Medication Instructions    atorvastatin (LIPITOR) 20 mg, Oral, Daily    diclofenac (VOLTAREN) 50 mg, Oral, 2 times daily    enalapriL-hydrochlorothiazide (VASERETIC) 10-25 mg per tablet 1 tablet, Oral, Daily    haemophilus B polysac-tetanus toxoid (ACTHIB, PF,) 10 mcg/0.5 mL injection TO BE ADMINISTERED.    PENELOPE  "acidophilus/Bifid. animalis (PROBIOTIC) 5 billion cell CpSP 1 capsule, Oral, Daily    metFORMIN (GLUCOPHAGE) 1,000 mg, Oral, 2 times daily with meals    oxyCODONE (ROXICODONE) 30 mg, Oral, Every 6 hours PRN    OZEMPIC 0.25 mg, Subcutaneous, Every 7 days    pen needle, diabetic 31 gauge x 3/16" Ndle 1 each, Misc.(Non-Drug; Combo Route), Daily    promethazine (PHENERGAN) 12.5 mg, Oral, Every 4 hours PRN    sildenafiL (VIAGRA) 100 mg, Oral, Daily PRN    TOUJEO MAX U-300 SOLOSTAR 26 Units, Subcutaneous, Daily    traZODone (DESYREL) 50 mg, Oral, Nightly        Past Medical History:   Diagnosis Date    DM (diabetes mellitus)     Hyperlipidemia     Hypertension     Prediabetes         Past Surgical History:   Procedure Laterality Date    CHOLECYSTECTOMY  2011    COLONOSCOPY      2018    COLOSTOMY N/A 4/25/2022    Procedure: CREATION, COLOSTOMY;  Surgeon: Carlton Waddell MD;  Location: [facility] OR;  Service: General;  Laterality: N/A;    INSERTION OF TUNNELED CENTRAL VENOUS CATHETER (CVC) WITH SUBCUTANEOUS PORT Right 5/18/2022    Procedure: [id number];  Surgeon: Carlton Waddell MD;  Location: [facility] OR;  Service: General;  Laterality: Right;    MOBILIZATION OF SPLENIC FLEXURE N/A 4/25/2022    Procedure: MOBILIZATION, SPLENIC FLEXURE;  Surgeon: Carlton Waddell MD;  Location: [facility] OR;  Service: General;  Laterality: N/A;    SPLENECTOMY N/A 4/25/2022    Procedure: SPLENECTOMY;  Surgeon: Carlton Waddell MD;  Location: [facility] OR;  Service: General;  Laterality: N/A;    SUBTOTAL COLECTOMY N/A 4/25/2022    Procedure: COLECTOMY, PARTIAL;  Surgeon: Carlton Waddell MD;  Location: [facility] OR;  Service: General;  Laterality: N/A;        Family History   Problem Relation Age of Onset    Heart disease Father        Social History     Tobacco Use    Smoking status: Every Day     Packs/day: 0.50     Years: 35.00     Pack years: 17.50     Types: Cigarettes    Smokeless tobacco: Never   Substance Use Topics    Alcohol use: Not Currently    " "Drug use: Never         Vitals:    11/28/22 0921   BP: 120/77   Pulse: 87   Resp: 16   Temp: 97.6 °F (36.4 °C)        Physical Exam:  /77 (BP Location: Right arm, Patient Position: Sitting, BP Method: Medium (Automatic))   Pulse 87   Temp 97.6 °F (36.4 °C) (Temporal)   Resp 16   Ht 6' 2" (1.88 m)   Wt 82.5 kg (181 lb 14.1 oz)   SpO2 98%   BMI 23.35 kg/m²     Physical Exam  Vitals and nursing note reviewed.   Constitutional:       Appearance: Normal appearance.   HENT:      Head: Normocephalic and atraumatic.      Nose: Nose normal.      Mouth/Throat:      Mouth: Mucous membranes are moist.      Pharynx: Oropharynx is clear.   Eyes:      Extraocular Movements: Extraocular movements intact.      Conjunctiva/sclera: Conjunctivae normal.   Cardiovascular:      Rate and Rhythm: Normal rate and regular rhythm.      Heart sounds: Normal heart sounds.   Pulmonary:      Effort: Pulmonary effort is normal.      Breath sounds: Normal breath sounds.   Abdominal:      General: Abdomen is flat. Bowel sounds are normal.      Palpations: Abdomen is soft.      Comments: Ostomy bag in place.   Musculoskeletal:         General: Normal range of motion.      Cervical back: Normal range of motion and neck supple.   Skin:     General: Skin is warm and dry.   Neurological:      General: No focal deficit present.      Mental Status: He is alert and oriented to person, place, and time. Mental status is at baseline.   Psychiatric:         Mood and Affect: Mood normal.        Labs:  Lab Results   Component Value Date    WBC 11.15 11/28/2022    RBC 4.32 (L) 11/28/2022    HGB 13.4 (L) 11/28/2022    HCT 39.8 (L) 11/28/2022    MCV 92 11/28/2022    MCH 31.0 11/28/2022    MCHC 33.7 11/28/2022    RDW 22.4 (H) 11/28/2022     11/28/2022    MPV 9.1 (L) 11/28/2022    GRAN 5.0 11/11/2022    GRAN 45.2 11/11/2022    LYMPH 4.3 11/11/2022    LYMPH 38.4 11/11/2022    MONO 1.5 (H) 11/11/2022    MONO 13.3 11/11/2022    EOS 0.2 11/11/2022    " BASO 0.07 11/11/2022    EOSINOPHIL 2.1 11/11/2022    BASOPHIL 0.6 11/11/2022     Sodium   Date Value Ref Range Status   11/28/2022 136 136 - 145 mmol/L Final     Potassium   Date Value Ref Range Status   11/28/2022 3.9 3.5 - 5.1 mmol/L Final     Chloride   Date Value Ref Range Status   11/28/2022 98 95 - 110 mmol/L Final     CO2   Date Value Ref Range Status   11/28/2022 26 23 - 29 mmol/L Final     Glucose   Date Value Ref Range Status   11/28/2022 173 (H) 70 - 110 mg/dL Final     BUN   Date Value Ref Range Status   11/28/2022 10 6 - 20 mg/dL Final     Creatinine   Date Value Ref Range Status   11/28/2022 0.8 0.5 - 1.4 mg/dL Final     Calcium   Date Value Ref Range Status   11/28/2022 10.4 8.7 - 10.5 mg/dL Final     Total Protein   Date Value Ref Range Status   11/28/2022 7.0 6.0 - 8.4 g/dL Final     Albumin   Date Value Ref Range Status   11/28/2022 3.4 (L) 3.5 - 5.2 g/dL Final     Total Bilirubin   Date Value Ref Range Status   11/28/2022 0.7 0.1 - 1.0 mg/dL Final     Comment:     For infants and newborns, interpretation of results should be based  on gestational age, weight and in agreement with clinical  observations.    Premature Infant recommended reference ranges:  Up to 24 hours.............<8.0 mg/dL  Up to 48 hours............<12.0 mg/dL  3-5 days..................<15.0 mg/dL  6-29 days.................<15.0 mg/dL       Alkaline Phosphatase   Date Value Ref Range Status   11/28/2022 110 55 - 135 U/L Final     AST   Date Value Ref Range Status   11/28/2022 27 10 - 40 U/L Final     ALT   Date Value Ref Range Status   11/28/2022 17 10 - 44 U/L Final     Anion Gap   Date Value Ref Range Status   11/28/2022 12 8 - 16 mmol/L Final     eGFR if    Date Value Ref Range Status   07/25/2022 >60 >60 mL/min/1.73 m^2 Final     eGFR if non    Date Value Ref Range Status   07/25/2022 >60 >60 mL/min/1.73 m^2 Final     Comment:     Calculation used to obtain the estimated glomerular  filtration  rate (eGFR) is the CKD-EPI equation.          A/P:    Malignant neoplasm of the transverse colon  -poorly differentiated adenocarcinoma  -pT3 N2b  -patient on clinical trial to measure ctDNA  -labs reviewed  -FOLFOX started on 7/12/22  -proceed with cycle 11, drop oxali  -return to clinic in 2 weeks for next treatment    Chemo induced neuropathy  - grade 2  - dropped Oxali with C10    Chemo induced nausea  - grade 1    Intra-abdominal abscess  -completed antibiotics and abscess is still present, but improved. Likely sterile fluid at this point.  -now on IV abx    Back pain  - NM bone scan negative for mets    HTN  - on Enalapril  - follow up with PCP    HLP  - follow up with PCP    DM2  - on Metformin  - follow up with PCP    Tobacco use  - counseled on cessation      Aurash Khoobehi, MD  Hematology and Oncology

## 2022-11-29 ENCOUNTER — PATIENT MESSAGE (OUTPATIENT)
Dept: HEMATOLOGY/ONCOLOGY | Facility: CLINIC | Age: 57
End: 2022-11-29
Payer: COMMERCIAL

## 2022-11-30 ENCOUNTER — LAB VISIT (OUTPATIENT)
Dept: LAB | Facility: HOSPITAL | Age: 57
End: 2022-11-30
Attending: INTERNAL MEDICINE
Payer: COMMERCIAL

## 2022-11-30 ENCOUNTER — INFUSION (OUTPATIENT)
Dept: INFUSION THERAPY | Facility: HOSPITAL | Age: 57
End: 2022-11-30
Attending: INTERNAL MEDICINE
Payer: COMMERCIAL

## 2022-11-30 VITALS
BODY MASS INDEX: 23.7 KG/M2 | HEIGHT: 74 IN | RESPIRATION RATE: 18 BRPM | HEART RATE: 75 BPM | OXYGEN SATURATION: 97 % | SYSTOLIC BLOOD PRESSURE: 120 MMHG | TEMPERATURE: 97 F | DIASTOLIC BLOOD PRESSURE: 71 MMHG | WEIGHT: 184.69 LBS

## 2022-11-30 DIAGNOSIS — C18.5 MALIGNANT NEOPLASM OF SPLENIC FLEXURE: Primary | ICD-10-CM

## 2022-11-30 DIAGNOSIS — C18.9 COLON ADENOCARCINOMA: ICD-10-CM

## 2022-11-30 DIAGNOSIS — Z00.6 CLINICAL TRIAL PARTICIPANT: ICD-10-CM

## 2022-11-30 LAB
DRUG STUDY SPECIMEN TYPE: NORMAL
DRUG STUDY TEST NAME: NORMAL
DRUG STUDY TEST RESULT: NORMAL

## 2022-11-30 PROCEDURE — A4216 STERILE WATER/SALINE, 10 ML: HCPCS | Performed by: INTERNAL MEDICINE

## 2022-11-30 PROCEDURE — 96523 IRRIG DRUG DELIVERY DEVICE: CPT

## 2022-11-30 PROCEDURE — 36415 COLL VENOUS BLD VENIPUNCTURE: CPT | Performed by: INTERNAL MEDICINE

## 2022-11-30 PROCEDURE — 99000 SPECIMEN HANDLING OFFICE-LAB: CPT | Performed by: INTERNAL MEDICINE

## 2022-11-30 PROCEDURE — 63600175 PHARM REV CODE 636 W HCPCS: Performed by: INTERNAL MEDICINE

## 2022-11-30 PROCEDURE — 25000003 PHARM REV CODE 250: Performed by: INTERNAL MEDICINE

## 2022-11-30 RX ORDER — HEPARIN 100 UNIT/ML
500 SYRINGE INTRAVENOUS
Status: DISCONTINUED | OUTPATIENT
Start: 2022-11-30 | End: 2022-11-30 | Stop reason: HOSPADM

## 2022-11-30 RX ORDER — SODIUM CHLORIDE 0.9 % (FLUSH) 0.9 %
10 SYRINGE (ML) INJECTION
Status: DISCONTINUED | OUTPATIENT
Start: 2022-11-30 | End: 2022-11-30 | Stop reason: HOSPADM

## 2022-11-30 RX ADMIN — SODIUM CHLORIDE, PRESERVATIVE FREE 10 ML: 5 INJECTION INTRAVENOUS at 11:11

## 2022-11-30 RX ADMIN — HEPARIN 500 UNITS: 100 SYRINGE at 11:11

## 2022-12-01 ENCOUNTER — RESEARCH ENCOUNTER (OUTPATIENT)
Dept: RESEARCH | Facility: HOSPITAL | Age: 57
End: 2022-12-01
Payer: COMMERCIAL

## 2022-12-01 NOTE — PROGRESS NOTES
Osman Li  MRN [id number]  [id number] ([facility])  IRB# [id number]  30 November 2022    Monitoring Visit 2:     Pt came into [facility] today. Pt submitted mandatory whole blood specimens. Tubes labeled and package sent via [email or web address].      [facility] MRD ordered.      EDC   Per study calendar, monitoring visit every 12 weeks +/- 15 business days.   Pt visit 3 is due in February.

## 2022-12-08 RX ORDER — PROMETHAZINE HYDROCHLORIDE 12.5 MG/1
TABLET ORAL
Qty: 25 TABLET | Refills: 1 | Status: SHIPPED | OUTPATIENT
Start: 2022-12-08 | End: 2023-08-04 | Stop reason: SDUPTHER

## 2022-12-12 ENCOUNTER — INFUSION (OUTPATIENT)
Dept: INFUSION THERAPY | Facility: HOSPITAL | Age: 57
End: 2022-12-12
Attending: INTERNAL MEDICINE
Payer: COMMERCIAL

## 2022-12-12 ENCOUNTER — LAB VISIT (OUTPATIENT)
Dept: LAB | Facility: HOSPITAL | Age: 57
End: 2022-12-12
Attending: INTERNAL MEDICINE
Payer: COMMERCIAL

## 2022-12-12 ENCOUNTER — OFFICE VISIT (OUTPATIENT)
Dept: HEMATOLOGY/ONCOLOGY | Facility: CLINIC | Age: 57
End: 2022-12-12
Payer: COMMERCIAL

## 2022-12-12 VITALS
RESPIRATION RATE: 19 BRPM | DIASTOLIC BLOOD PRESSURE: 77 MMHG | TEMPERATURE: 99 F | OXYGEN SATURATION: 97 % | WEIGHT: 178.56 LBS | SYSTOLIC BLOOD PRESSURE: 143 MMHG | HEIGHT: 74 IN | HEART RATE: 91 BPM | BODY MASS INDEX: 22.91 KG/M2

## 2022-12-12 DIAGNOSIS — C18.5 MALIGNANT NEOPLASM OF SPLENIC FLEXURE: Primary | ICD-10-CM

## 2022-12-12 DIAGNOSIS — I10 PRIMARY HYPERTENSION: ICD-10-CM

## 2022-12-12 DIAGNOSIS — E11.00 TYPE 2 DIABETES MELLITUS WITH HYPEROSMOLARITY WITHOUT COMA, WITHOUT LONG-TERM CURRENT USE OF INSULIN: ICD-10-CM

## 2022-12-12 DIAGNOSIS — E78.49 OTHER HYPERLIPIDEMIA: ICD-10-CM

## 2022-12-12 DIAGNOSIS — K63.89 COLONIC MASS: ICD-10-CM

## 2022-12-12 DIAGNOSIS — C18.4 MALIGNANT NEOPLASM OF TRANSVERSE COLON: Primary | ICD-10-CM

## 2022-12-12 DIAGNOSIS — C18.4 MALIGNANT NEOPLASM OF TRANSVERSE COLON: ICD-10-CM

## 2022-12-12 LAB
ALBUMIN SERPL BCP-MCNC: 3.3 G/DL (ref 3.5–5.2)
ALP SERPL-CCNC: 116 U/L (ref 55–135)
ALT SERPL W/O P-5'-P-CCNC: 16 U/L (ref 10–44)
ANION GAP SERPL CALC-SCNC: 10 MMOL/L (ref 8–16)
AST SERPL-CCNC: 20 U/L (ref 10–40)
BASOPHILS # BLD AUTO: 0.08 K/UL (ref 0–0.2)
BASOPHILS NFR BLD: 0.5 % (ref 0–1.9)
BILIRUB SERPL-MCNC: 0.8 MG/DL (ref 0.1–1)
BUN SERPL-MCNC: 9 MG/DL (ref 6–20)
CALCIUM SERPL-MCNC: 10.2 MG/DL (ref 8.7–10.5)
CHLORIDE SERPL-SCNC: 100 MMOL/L (ref 95–110)
CO2 SERPL-SCNC: 23 MMOL/L (ref 23–29)
CREAT SERPL-MCNC: 0.9 MG/DL (ref 0.5–1.4)
DIFFERENTIAL METHOD: ABNORMAL
EOSINOPHIL # BLD AUTO: 0.1 K/UL (ref 0–0.5)
EOSINOPHIL NFR BLD: 0.8 % (ref 0–8)
ERYTHROCYTE [DISTWIDTH] IN BLOOD BY AUTOMATED COUNT: 20.3 % (ref 11.5–14.5)
EST. GFR  (NO RACE VARIABLE): >60 ML/MIN/1.73 M^2
GLUCOSE SERPL-MCNC: 234 MG/DL (ref 70–110)
HCT VFR BLD AUTO: 39.4 % (ref 40–54)
HGB BLD-MCNC: 13.7 G/DL (ref 14–18)
IMM GRANULOCYTES # BLD AUTO: 0.07 K/UL (ref 0–0.04)
IMM GRANULOCYTES NFR BLD AUTO: 0.5 % (ref 0–0.5)
LYMPHOCYTES # BLD AUTO: 4 K/UL (ref 1–4.8)
LYMPHOCYTES NFR BLD: 26.7 % (ref 18–48)
MAGNESIUM SERPL-MCNC: 2 MG/DL (ref 1.6–2.6)
MCH RBC QN AUTO: 32.1 PG (ref 27–31)
MCHC RBC AUTO-ENTMCNC: 34.8 G/DL (ref 32–36)
MCV RBC AUTO: 92 FL (ref 82–98)
MONOCYTES # BLD AUTO: 1.5 K/UL (ref 0.3–1)
MONOCYTES NFR BLD: 10 % (ref 4–15)
NEUTROPHILS # BLD AUTO: 9.1 K/UL (ref 1.8–7.7)
NEUTROPHILS NFR BLD: 61.5 % (ref 38–73)
NRBC BLD-RTO: 0 /100 WBC
PLATELET # BLD AUTO: 288 K/UL (ref 150–450)
PMV BLD AUTO: 9.2 FL (ref 9.2–12.9)
POTASSIUM SERPL-SCNC: 4.1 MMOL/L (ref 3.5–5.1)
PROT SERPL-MCNC: 7.2 G/DL (ref 6–8.4)
RBC # BLD AUTO: 4.27 M/UL (ref 4.6–6.2)
SODIUM SERPL-SCNC: 133 MMOL/L (ref 136–145)
WBC # BLD AUTO: 14.85 K/UL (ref 3.9–12.7)

## 2022-12-12 PROCEDURE — 3066F NEPHROPATHY DOC TX: CPT | Mod: CPTII,S$GLB,, | Performed by: INTERNAL MEDICINE

## 2022-12-12 PROCEDURE — 3078F DIAST BP <80 MM HG: CPT | Mod: CPTII,S$GLB,, | Performed by: INTERNAL MEDICINE

## 2022-12-12 PROCEDURE — 83735 ASSAY OF MAGNESIUM: CPT | Performed by: NURSE PRACTITIONER

## 2022-12-12 PROCEDURE — 96409 CHEMO IV PUSH SNGL DRUG: CPT

## 2022-12-12 PROCEDURE — 3077F SYST BP >= 140 MM HG: CPT | Mod: CPTII,S$GLB,, | Performed by: INTERNAL MEDICINE

## 2022-12-12 PROCEDURE — 80053 COMPREHEN METABOLIC PANEL: CPT | Performed by: NURSE PRACTITIONER

## 2022-12-12 PROCEDURE — 3077F PR MOST RECENT SYSTOLIC BLOOD PRESSURE >= 140 MM HG: ICD-10-PCS | Mod: CPTII,S$GLB,, | Performed by: INTERNAL MEDICINE

## 2022-12-12 PROCEDURE — 85025 COMPLETE CBC W/AUTO DIFF WBC: CPT | Performed by: NURSE PRACTITIONER

## 2022-12-12 PROCEDURE — 63600175 PHARM REV CODE 636 W HCPCS: Performed by: INTERNAL MEDICINE

## 2022-12-12 PROCEDURE — 3052F PR MOST RECENT HEMOGLOBIN A1C LEVEL 8.0 - < 9.0%: ICD-10-PCS | Mod: CPTII,S$GLB,, | Performed by: INTERNAL MEDICINE

## 2022-12-12 PROCEDURE — 3008F PR BODY MASS INDEX (BMI) DOCUMENTED: ICD-10-PCS | Mod: CPTII,S$GLB,, | Performed by: INTERNAL MEDICINE

## 2022-12-12 PROCEDURE — 25000003 PHARM REV CODE 250: Performed by: INTERNAL MEDICINE

## 2022-12-12 PROCEDURE — 99999 PR PBB SHADOW E&M-EST. PATIENT-LVL V: CPT | Mod: PBBFAC,,, | Performed by: INTERNAL MEDICINE

## 2022-12-12 PROCEDURE — 3052F HG A1C>EQUAL 8.0%<EQUAL 9.0%: CPT | Mod: CPTII,S$GLB,, | Performed by: INTERNAL MEDICINE

## 2022-12-12 PROCEDURE — 4010F ACE/ARB THERAPY RXD/TAKEN: CPT | Mod: CPTII,S$GLB,, | Performed by: INTERNAL MEDICINE

## 2022-12-12 PROCEDURE — 99999 PR PBB SHADOW E&M-EST. PATIENT-LVL V: ICD-10-PCS | Mod: PBBFAC,,, | Performed by: INTERNAL MEDICINE

## 2022-12-12 PROCEDURE — 3078F PR MOST RECENT DIASTOLIC BLOOD PRESSURE < 80 MM HG: ICD-10-PCS | Mod: CPTII,S$GLB,, | Performed by: INTERNAL MEDICINE

## 2022-12-12 PROCEDURE — 3061F PR NEG MICROALBUMINURIA RESULT DOCUMENTED/REVIEW: ICD-10-PCS | Mod: CPTII,S$GLB,, | Performed by: INTERNAL MEDICINE

## 2022-12-12 PROCEDURE — 96416 CHEMO PROLONG INFUSE W/PUMP: CPT

## 2022-12-12 PROCEDURE — 96367 TX/PROPH/DG ADDL SEQ IV INF: CPT

## 2022-12-12 PROCEDURE — 36415 COLL VENOUS BLD VENIPUNCTURE: CPT | Performed by: NURSE PRACTITIONER

## 2022-12-12 PROCEDURE — 99215 OFFICE O/P EST HI 40 MIN: CPT | Mod: S$GLB,,, | Performed by: INTERNAL MEDICINE

## 2022-12-12 PROCEDURE — 3066F PR DOCUMENTATION OF TREATMENT FOR NEPHROPATHY: ICD-10-PCS | Mod: CPTII,S$GLB,, | Performed by: INTERNAL MEDICINE

## 2022-12-12 PROCEDURE — 3008F BODY MASS INDEX DOCD: CPT | Mod: CPTII,S$GLB,, | Performed by: INTERNAL MEDICINE

## 2022-12-12 PROCEDURE — 4010F PR ACE/ARB THEARPY RXD/TAKEN: ICD-10-PCS | Mod: CPTII,S$GLB,, | Performed by: INTERNAL MEDICINE

## 2022-12-12 PROCEDURE — 3061F NEG MICROALBUMINURIA REV: CPT | Mod: CPTII,S$GLB,, | Performed by: INTERNAL MEDICINE

## 2022-12-12 PROCEDURE — 99215 PR OFFICE/OUTPT VISIT, EST, LEVL V, 40-54 MIN: ICD-10-PCS | Mod: S$GLB,,, | Performed by: INTERNAL MEDICINE

## 2022-12-12 RX ORDER — EPINEPHRINE 0.3 MG/.3ML
0.3 INJECTION SUBCUTANEOUS ONCE AS NEEDED
Status: CANCELLED | OUTPATIENT
Start: 2022-12-12

## 2022-12-12 RX ORDER — SODIUM CHLORIDE 0.9 % (FLUSH) 0.9 %
10 SYRINGE (ML) INJECTION
Status: DISCONTINUED | OUTPATIENT
Start: 2022-12-12 | End: 2022-12-12 | Stop reason: HOSPADM

## 2022-12-12 RX ORDER — SODIUM CHLORIDE 0.9 % (FLUSH) 0.9 %
10 SYRINGE (ML) INJECTION
Status: CANCELLED | OUTPATIENT
Start: 2022-12-14

## 2022-12-12 RX ORDER — HEPARIN 100 UNIT/ML
500 SYRINGE INTRAVENOUS
Status: CANCELLED | OUTPATIENT
Start: 2022-12-14

## 2022-12-12 RX ORDER — FLUOROURACIL 50 MG/ML
2400 INJECTION, SOLUTION INTRAVENOUS
Status: CANCELLED | OUTPATIENT
Start: 2022-12-12

## 2022-12-12 RX ORDER — FLUOROURACIL 50 MG/ML
400 INJECTION, SOLUTION INTRAVENOUS
Status: COMPLETED | OUTPATIENT
Start: 2022-12-12 | End: 2022-12-12

## 2022-12-12 RX ORDER — OXYCODONE HYDROCHLORIDE 30 MG/1
30 TABLET ORAL EVERY 6 HOURS PRN
Qty: 60 TABLET | Refills: 0 | Status: SHIPPED | OUTPATIENT
Start: 2022-12-12 | End: 2023-01-09 | Stop reason: SDUPTHER

## 2022-12-12 RX ORDER — FLUOROURACIL 50 MG/ML
400 INJECTION, SOLUTION INTRAVENOUS
Status: CANCELLED | OUTPATIENT
Start: 2022-12-12

## 2022-12-12 RX ORDER — EPINEPHRINE 0.3 MG/.3ML
0.3 INJECTION SUBCUTANEOUS ONCE AS NEEDED
Status: DISCONTINUED | OUTPATIENT
Start: 2022-12-12 | End: 2022-12-12 | Stop reason: HOSPADM

## 2022-12-12 RX ORDER — SODIUM CHLORIDE 0.9 % (FLUSH) 0.9 %
10 SYRINGE (ML) INJECTION
Status: CANCELLED | OUTPATIENT
Start: 2022-12-12

## 2022-12-12 RX ORDER — DIPHENHYDRAMINE HYDROCHLORIDE 50 MG/ML
50 INJECTION INTRAMUSCULAR; INTRAVENOUS ONCE AS NEEDED
Status: CANCELLED | OUTPATIENT
Start: 2022-12-12

## 2022-12-12 RX ORDER — HEPARIN 100 UNIT/ML
500 SYRINGE INTRAVENOUS
Status: CANCELLED | OUTPATIENT
Start: 2022-12-12

## 2022-12-12 RX ADMIN — DEXTROSE: 5 SOLUTION INTRAVENOUS at 10:12

## 2022-12-12 RX ADMIN — PALONOSETRON HYDROCHLORIDE 0.25 MG: 0.25 INJECTION, SOLUTION INTRAVENOUS at 11:12

## 2022-12-12 RX ADMIN — DEXTROSE 825 MG: 5 SOLUTION INTRAVENOUS at 11:12

## 2022-12-12 RX ADMIN — FLUOROURACIL 4945 MG: 50 INJECTION, SOLUTION INTRAVENOUS at 12:12

## 2022-12-12 RX ADMIN — SODIUM CHLORIDE 150 MG: 900 INJECTION, SOLUTION INTRAVENOUS at 11:12

## 2022-12-12 RX ADMIN — FLUOROURACIL 825 MG: 50 INJECTION, SOLUTION INTRAVENOUS at 12:12

## 2022-12-12 NOTE — PLAN OF CARE
Problem: Activity Intolerance  Goal: Enhanced Capacity and Energy  Outcome: Ongoing, Progressing  Intervention: Optimize Activity Tolerance  Flowsheets (Taken 12/12/2022 1038)  Self-Care Promotion: independence encouraged  Activity Management:   Ambulated -L4   Up in chair - L3  Environmental Support:   calm environment promoted   rest periods encouraged

## 2022-12-12 NOTE — PROGRESS NOTES
Service Date:  12/12/22    Chief Complaint: Colon Cancer      Osman Li Jr. is a 57 y.o. 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  A CT scan of the abdomen was done which showed a large obstructive lesion with lymph node involvement.  There was also a 9 mm hypodense liver lesion that could not be characterized.  Patient had a hemicolectomy with ostomy bag placed, and splenectomy.      He was then set to start chemotherapy but it was delayed due to the liver lesion.  He was evaluated by Dr. Goodwin with Surgical Oncology who determined that this was likely not malignant.  Unfortunately afterwards, patient suffered a setback with an abdominal abscess formation that could not be drained.  He recently completed 2 weeks of antibiotics, and a repeat CT scan showed the presence of an abscess still there, but improved.  He had 2 weeks of abx, but a repeat scan showed some growth. It is thought that this is likely sterile fluid. He will finish his abx and we can monitor with scan.    He is here for cycle 12 of treatment.  He is doing well with no complaints me.  This is his last cycle of chemo.    Review of Systems   Constitutional: Negative.    HENT: Negative.     Eyes: Negative.    Respiratory: Negative.     Cardiovascular: Negative.    Gastrointestinal:  Positive for nausea.   Endocrine: Negative.    Genitourinary: Negative.    Musculoskeletal: Negative.    Integumentary:  Negative.   Neurological: Negative.    Hematological: Negative.    Psychiatric/Behavioral: Negative.        Current Outpatient Medications   Medication Instructions    atorvastatin (LIPITOR) 20 mg, Oral, Daily    diclofenac (VOLTAREN) 50 mg, Oral, 2 times daily    enalapriL-hydrochlorothiazide (VASERETIC) 10-25 mg per tablet 1 tablet, Oral, Daily    haemophilus B polysac-tetanus toxoid (ACTHIB, PF,) 10 mcg/0.5 mL injection TO BE ADMINISTERED.    L. acidophilus/Bifid. animalis (PROBIOTIC) 5 billion cell CpSP 1 capsule, Oral,  "Daily    metFORMIN (GLUCOPHAGE) 1,000 mg, Oral, 2 times daily with meals    oxyCODONE (ROXICODONE) 30 mg, Oral, Every 6 hours PRN    OZEMPIC 0.25 mg, Subcutaneous, Every 7 days    pen needle, diabetic 31 gauge x 3/16" Ndle 1 each, Misc.(Non-Drug; Combo Route), Daily    promethazine (PHENERGAN) 12.5 MG Tab TAKE 1 TABLET BY MOUTH EVERY 4 HOURS AS NEEDED    sildenafiL (VIAGRA) 100 mg, Oral, Daily PRN    TOUJEO MAX U-300 SOLOSTAR 26 Units, Subcutaneous, Daily    traZODone (DESYREL) 50 mg, Oral, Nightly        Past Medical History:   Diagnosis Date    DM (diabetes mellitus)     Hyperlipidemia     Hypertension     Prediabetes         Past Surgical History:   Procedure Laterality Date    CHOLECYSTECTOMY  2011    COLONOSCOPY      2018    COLOSTOMY N/A 4/25/2022    Procedure: CREATION, COLOSTOMY;  Surgeon: Carlton Waddell MD;  Location: [facility] OR;  Service: General;  Laterality: N/A;    INSERTION OF TUNNELED CENTRAL VENOUS CATHETER (CVC) WITH SUBCUTANEOUS PORT Right 5/18/2022    Procedure: [id number];  Surgeon: Carlton Waddell MD;  Location: [facility] OR;  Service: General;  Laterality: Right;    MOBILIZATION OF SPLENIC FLEXURE N/A 4/25/2022    Procedure: MOBILIZATION, SPLENIC FLEXURE;  Surgeon: Carlton Waddell MD;  Location: [facility] OR;  Service: General;  Laterality: N/A;    SPLENECTOMY N/A 4/25/2022    Procedure: SPLENECTOMY;  Surgeon: Carlton Waddell MD;  Location: [facility] OR;  Service: General;  Laterality: N/A;    SUBTOTAL COLECTOMY N/A 4/25/2022    Procedure: COLECTOMY, PARTIAL;  Surgeon: Carlton Waddell MD;  Location: [facility] OR;  Service: General;  Laterality: N/A;        Family History   Problem Relation Age of Onset    Heart disease Father        Social History     Tobacco Use    Smoking status: Every Day     Packs/day: 0.50     Years: 35.00     Pack years: 17.50     Types: Cigarettes    Smokeless tobacco: Never   Substance Use Topics    Alcohol use: Not Currently    Drug use: Never         Vitals:    12/12/22 0925 " "  BP: (!) 143/77   Pulse: 91   Resp: 19   Temp: 98.7 °F (37.1 °C)        Physical Exam:  BP (!) 143/77 (BP Location: Right arm, Patient Position: Sitting, BP Method: Medium (Automatic))   Pulse 91   Temp 98.7 °F (37.1 °C) (Temporal)   Resp 19   Ht 6' 2" (1.88 m)   Wt 81 kg (178 lb 9.2 oz)   SpO2 97%   BMI 22.93 kg/m²     Physical Exam  Vitals and nursing note reviewed.   Constitutional:       Appearance: Normal appearance.   HENT:      Head: Normocephalic and atraumatic.      Nose: Nose normal.      Mouth/Throat:      Mouth: Mucous membranes are moist.      Pharynx: Oropharynx is clear.   Eyes:      Extraocular Movements: Extraocular movements intact.      Conjunctiva/sclera: Conjunctivae normal.   Cardiovascular:      Rate and Rhythm: Normal rate and regular rhythm.      Heart sounds: Normal heart sounds.   Pulmonary:      Effort: Pulmonary effort is normal.      Breath sounds: Normal breath sounds.   Abdominal:      General: Abdomen is flat. Bowel sounds are normal.      Palpations: Abdomen is soft.      Comments: Ostomy bag in place.   Musculoskeletal:         General: Normal range of motion.      Cervical back: Normal range of motion and neck supple.   Skin:     General: Skin is warm and dry.   Neurological:      General: No focal deficit present.      Mental Status: He is alert and oriented to person, place, and time. Mental status is at baseline.   Psychiatric:         Mood and Affect: Mood normal.        Labs:  Lab Results   Component Value Date    WBC 14.85 (H) 12/12/2022    RBC 4.27 (L) 12/12/2022    HGB 13.7 (L) 12/12/2022    HCT 39.4 (L) 12/12/2022    MCV 92 12/12/2022    MCH 32.1 (H) 12/12/2022    MCHC 34.8 12/12/2022    RDW 20.3 (H) 12/12/2022     12/12/2022    MPV 9.2 12/12/2022    GRAN 9.1 (H) 12/12/2022    GRAN 61.5 12/12/2022    LYMPH 4.0 12/12/2022    LYMPH 26.7 12/12/2022    MONO 1.5 (H) 12/12/2022    MONO 10.0 12/12/2022    EOS 0.1 12/12/2022    BASO 0.08 12/12/2022    EOSINOPHIL " 0.8 12/12/2022    BASOPHIL 0.5 12/12/2022     Sodium   Date Value Ref Range Status   12/12/2022 133 (L) 136 - 145 mmol/L Final     Potassium   Date Value Ref Range Status   12/12/2022 4.1 3.5 - 5.1 mmol/L Final     Chloride   Date Value Ref Range Status   12/12/2022 100 95 - 110 mmol/L Final     CO2   Date Value Ref Range Status   12/12/2022 23 23 - 29 mmol/L Final     Glucose   Date Value Ref Range Status   12/12/2022 234 (H) 70 - 110 mg/dL Final     BUN   Date Value Ref Range Status   12/12/2022 9 6 - 20 mg/dL Final     Creatinine   Date Value Ref Range Status   12/12/2022 0.9 0.5 - 1.4 mg/dL Final     Calcium   Date Value Ref Range Status   12/12/2022 10.2 8.7 - 10.5 mg/dL Final     Total Protein   Date Value Ref Range Status   12/12/2022 7.2 6.0 - 8.4 g/dL Final     Albumin   Date Value Ref Range Status   12/12/2022 3.3 (L) 3.5 - 5.2 g/dL Final     Total Bilirubin   Date Value Ref Range Status   12/12/2022 0.8 0.1 - 1.0 mg/dL Final     Comment:     For infants and newborns, interpretation of results should be based  on gestational age, weight and in agreement with clinical  observations.    Premature Infant recommended reference ranges:  Up to 24 hours.............<8.0 mg/dL  Up to 48 hours............<12.0 mg/dL  3-5 days..................<15.0 mg/dL  6-29 days.................<15.0 mg/dL       Alkaline Phosphatase   Date Value Ref Range Status   12/12/2022 116 55 - 135 U/L Final     AST   Date Value Ref Range Status   12/12/2022 20 10 - 40 U/L Final     ALT   Date Value Ref Range Status   12/12/2022 16 10 - 44 U/L Final     Anion Gap   Date Value Ref Range Status   12/12/2022 10 8 - 16 mmol/L Final     eGFR if    Date Value Ref Range Status   07/25/2022 >60 >60 mL/min/1.73 m^2 Final     eGFR if non    Date Value Ref Range Status   07/25/2022 >60 >60 mL/min/1.73 m^2 Final     Comment:     Calculation used to obtain the estimated glomerular filtration  rate (eGFR) is the CKD-EPI  equation.          A/P:    Malignant neoplasm of the transverse colon  -poorly differentiated adenocarcinoma  -pT3 N2b  -patient on clinical trial to measure ctDNA, awaiting latest results from 12/1/22  -labs reviewed  -FOLFOX started on 7/12/22  -proceed with cycle 12  -return to clinic in 2 weeks for tox check    Chemo induced neuropathy  - grade 2  - dropped Oxali with C10    Chemo induced nausea  - grade 1    Intra-abdominal abscess  -completed antibiotics and abscess is still present, but improved. Likely sterile fluid at this point.  -now on IV abx    Back pain  - NM bone scan negative for mets    HTN  - on Enalapril  - follow up with PCP    HLP  - follow up with PCP    DM2  - on Metformin  - follow up with PCP    Tobacco use  - counseled on cessation      Aurash Khoobehi, MD  Hematology and Oncology

## 2022-12-13 ENCOUNTER — OFFICE VISIT (OUTPATIENT)
Dept: INFECTIOUS DISEASES | Facility: CLINIC | Age: 57
End: 2022-12-13
Payer: COMMERCIAL

## 2022-12-13 VITALS
TEMPERATURE: 98 F | SYSTOLIC BLOOD PRESSURE: 130 MMHG | OXYGEN SATURATION: 97 % | HEIGHT: 74 IN | WEIGHT: 178.31 LBS | BODY MASS INDEX: 22.88 KG/M2 | HEART RATE: 89 BPM | DIASTOLIC BLOOD PRESSURE: 68 MMHG

## 2022-12-13 DIAGNOSIS — C18.5 MALIGNANT NEOPLASM OF SPLENIC FLEXURE: Primary | ICD-10-CM

## 2022-12-13 DIAGNOSIS — K65.1 INTRA-ABDOMINAL ABSCESS: ICD-10-CM

## 2022-12-13 PROCEDURE — 3075F SYST BP GE 130 - 139MM HG: CPT | Mod: CPTII,S$GLB,, | Performed by: STUDENT IN AN ORGANIZED HEALTH CARE EDUCATION/TRAINING PROGRAM

## 2022-12-13 PROCEDURE — 4010F PR ACE/ARB THEARPY RXD/TAKEN: ICD-10-PCS | Mod: CPTII,S$GLB,, | Performed by: STUDENT IN AN ORGANIZED HEALTH CARE EDUCATION/TRAINING PROGRAM

## 2022-12-13 PROCEDURE — 3061F NEG MICROALBUMINURIA REV: CPT | Mod: CPTII,S$GLB,, | Performed by: STUDENT IN AN ORGANIZED HEALTH CARE EDUCATION/TRAINING PROGRAM

## 2022-12-13 PROCEDURE — 3052F HG A1C>EQUAL 8.0%<EQUAL 9.0%: CPT | Mod: CPTII,S$GLB,, | Performed by: STUDENT IN AN ORGANIZED HEALTH CARE EDUCATION/TRAINING PROGRAM

## 2022-12-13 PROCEDURE — 3066F PR DOCUMENTATION OF TREATMENT FOR NEPHROPATHY: ICD-10-PCS | Mod: CPTII,S$GLB,, | Performed by: STUDENT IN AN ORGANIZED HEALTH CARE EDUCATION/TRAINING PROGRAM

## 2022-12-13 PROCEDURE — 3066F NEPHROPATHY DOC TX: CPT | Mod: CPTII,S$GLB,, | Performed by: STUDENT IN AN ORGANIZED HEALTH CARE EDUCATION/TRAINING PROGRAM

## 2022-12-13 PROCEDURE — 4010F ACE/ARB THERAPY RXD/TAKEN: CPT | Mod: CPTII,S$GLB,, | Performed by: STUDENT IN AN ORGANIZED HEALTH CARE EDUCATION/TRAINING PROGRAM

## 2022-12-13 PROCEDURE — 3008F PR BODY MASS INDEX (BMI) DOCUMENTED: ICD-10-PCS | Mod: CPTII,S$GLB,, | Performed by: STUDENT IN AN ORGANIZED HEALTH CARE EDUCATION/TRAINING PROGRAM

## 2022-12-13 PROCEDURE — 99214 OFFICE O/P EST MOD 30 MIN: CPT | Mod: S$GLB,,, | Performed by: STUDENT IN AN ORGANIZED HEALTH CARE EDUCATION/TRAINING PROGRAM

## 2022-12-13 PROCEDURE — 3078F DIAST BP <80 MM HG: CPT | Mod: CPTII,S$GLB,, | Performed by: STUDENT IN AN ORGANIZED HEALTH CARE EDUCATION/TRAINING PROGRAM

## 2022-12-13 PROCEDURE — 3052F PR MOST RECENT HEMOGLOBIN A1C LEVEL 8.0 - < 9.0%: ICD-10-PCS | Mod: CPTII,S$GLB,, | Performed by: STUDENT IN AN ORGANIZED HEALTH CARE EDUCATION/TRAINING PROGRAM

## 2022-12-13 PROCEDURE — 3061F PR NEG MICROALBUMINURIA RESULT DOCUMENTED/REVIEW: ICD-10-PCS | Mod: CPTII,S$GLB,, | Performed by: STUDENT IN AN ORGANIZED HEALTH CARE EDUCATION/TRAINING PROGRAM

## 2022-12-13 PROCEDURE — 3078F PR MOST RECENT DIASTOLIC BLOOD PRESSURE < 80 MM HG: ICD-10-PCS | Mod: CPTII,S$GLB,, | Performed by: STUDENT IN AN ORGANIZED HEALTH CARE EDUCATION/TRAINING PROGRAM

## 2022-12-13 PROCEDURE — 3075F PR MOST RECENT SYSTOLIC BLOOD PRESS GE 130-139MM HG: ICD-10-PCS | Mod: CPTII,S$GLB,, | Performed by: STUDENT IN AN ORGANIZED HEALTH CARE EDUCATION/TRAINING PROGRAM

## 2022-12-13 PROCEDURE — 1159F MED LIST DOCD IN RCRD: CPT | Mod: CPTII,S$GLB,, | Performed by: STUDENT IN AN ORGANIZED HEALTH CARE EDUCATION/TRAINING PROGRAM

## 2022-12-13 PROCEDURE — 3008F BODY MASS INDEX DOCD: CPT | Mod: CPTII,S$GLB,, | Performed by: STUDENT IN AN ORGANIZED HEALTH CARE EDUCATION/TRAINING PROGRAM

## 2022-12-13 PROCEDURE — 99214 PR OFFICE/OUTPT VISIT, EST, LEVL IV, 30-39 MIN: ICD-10-PCS | Mod: S$GLB,,, | Performed by: STUDENT IN AN ORGANIZED HEALTH CARE EDUCATION/TRAINING PROGRAM

## 2022-12-13 PROCEDURE — 1159F PR MEDICATION LIST DOCUMENTED IN MEDICAL RECORD: ICD-10-PCS | Mod: CPTII,S$GLB,, | Performed by: STUDENT IN AN ORGANIZED HEALTH CARE EDUCATION/TRAINING PROGRAM

## 2022-12-13 RX ORDER — SODIUM CHLORIDE 0.9 % (FLUSH) 0.9 %
10 SYRINGE (ML) INJECTION
Status: CANCELLED | OUTPATIENT
Start: 2022-12-27

## 2022-12-13 RX ORDER — HEPARIN 100 UNIT/ML
500 SYRINGE INTRAVENOUS
Status: CANCELLED | OUTPATIENT
Start: 2022-12-27

## 2022-12-13 NOTE — PROGRESS NOTES
Subjective: Hospital Follow UP - s/p splenectomy - vaccines and history of intraabdominal abscess  s/p colectomy       Patient ID: Osman Li Jr. is a 57 y.o. male.    Chief Complaint:: Follow-up      HPI Osman Li is a 57 y.o. male active heavy smoker, with past medical history of HTN, diabetes, and HLD, known to me from prior admission to [facility] 4/21 for acute abdomen secondary to LBO in the setting of large colonic mass s/p resection and splenectomy 4/21. Patient was discharged on levofloxacin, doxycyclin and flagyl PO. Received first 2 doses of meningococcus and pneumonococcus vaccines before discharge. Port-A-Cath placed 5/18. Patient had a recent admission to [facility] for sepsis secondary to intra-abdominal abscess, unable to have it drained by IR, sent home on IV ceftriaxone and flagyl PO for 10 days. He completed antibiotics 7/3.     Patient is here for follow up, wife present. Has had significant weight loss in the last couple of months. Plan to start chemotherapy next week, will re-schedule CT scan for early next week to assess status of prior abscess before starting chemotherapy.     He states he feels good, no abdominal pain, colostomy functioning, no nausea or vomit. No fever or chills.   Recent labs reviewed, normal WBC, CRP 8.5, slightly elevated.    8/23: Interim reviewed, patient scheduled for follow-up, seen via iPad. States he feels ok, some days are good and some days are not; weakness, fatigue, decreased appetite and weight loss. Patient had a repeat CT scan abdomen/pelvis 8/5 which showed rim-enhancing left upper quadrant fluid collection 5.3 x 1.8 cm  suspicious for abscess considering the provided clinical history. Smaller than prior, 9 x 11 cm. Adjacent to this there is masslike hypoattenuation involving the tail of the pancreas. This could is suspicious for pancreatic neoplasm, although phlegmon related to aforementioned abscess is also possible. Patient was started on  ceftriaxone IV on 7/11, still on ceftriaxone, plan to de-access port 8/26. He c/o lower back pain. He denies abdominal pan, fever or chills, he is aware of symptoms suggestive of infection and states he has not experienced them. Seen by Heme/Onc yesterday, on 4th cycle of chemotherapy, plan for NM bone scan to rule out metastasis.   Labs reviewed, CRP trending up, likely from underlying malignancy vs ongoing chemotherapy vs underlying fluid collection.     10/5:  Interim reviewed, patient seen virtually via iPad, wife present.  He is status post 7 cycle of chemotherapy 10/4, still feeling weak and fatigued, appetite is okay, continues to lose weight.  Denies fever or chills.  Had NM scan negative for bone metastases.  He is a participant of a research study at [facility] to check on DNA levels in blood.  [facility] pharmacy contacted, HiB vaccine available.  Patient instructed to come Friday to get his dose for Haemophilus influenzae type B vaccine.    12/13:  Interim reviewed, patient is here for follow-up, wife present.  Patient on his last cycle of chemotherapy, 12.  He is complaining of intermittent abdominal pain around colostomy site, denies fever or chills.  Significant weight loss, around 70 lb since diagnosed in April.  Patient completed vaccinations status post splenectomy.  Following with Heme-Onc outpatient, plan to repeat CT scan next week.  Notes from Heme-Onc reviewed, plan for Keytruda as adjunctive immunotherapy for colon cancer.    Review of patient's allergies indicates:   Allergen Reactions    Penicillins Rash     Past Medical History:   Diagnosis Date    DM (diabetes mellitus)     Hyperlipidemia     Hypertension     Prediabetes      Past Surgical History:   Procedure Laterality Date    CHOLECYSTECTOMY  2011    COLONOSCOPY      2018    COLOSTOMY N/A 4/25/2022    Procedure: CREATION, COLOSTOMY;  Surgeon: Carlton Waddell MD;  Location: [facility];  Service: General;  Laterality: N/A;    INSERTION OF  "TUNNELED CENTRAL VENOUS CATHETER (CVC) WITH SUBCUTANEOUS PORT Right 5/18/2022    Procedure: [id number];  Surgeon: Carlton Waddell MD;  Location: [facility] OR;  Service: General;  Laterality: Right;    MOBILIZATION OF SPLENIC FLEXURE N/A 4/25/2022    Procedure: MOBILIZATION, SPLENIC FLEXURE;  Surgeon: Carlton Waddell MD;  Location: [facility] OR;  Service: General;  Laterality: N/A;    SPLENECTOMY N/A 4/25/2022    Procedure: SPLENECTOMY;  Surgeon: Carlton Waddell MD;  Location: [facility] OR;  Service: General;  Laterality: N/A;    SUBTOTAL COLECTOMY N/A 4/25/2022    Procedure: COLECTOMY, PARTIAL;  Surgeon: Carlton Waddell MD;  Location: [facility] OR;  Service: General;  Laterality: N/A;     Social History     Tobacco Use    Smoking status: Every Day     Packs/day: 0.50     Years: 35.00     Pack years: 17.50     Types: Cigarettes    Smokeless tobacco: Never   Substance Use Topics    Alcohol use: Not Currently        Family History   Problem Relation Age of Onset    Heart disease Father          Review of Systems   Constitutional:  Positive for appetite change and unexpected weight change. Negative for chills and fever.   HENT:  Negative for sinus pain.    Respiratory:  Negative for cough and shortness of breath.    Cardiovascular:  Negative for chest pain and leg swelling.   Gastrointestinal:  Positive for abdominal pain. Negative for diarrhea and nausea.        Colostomy bag   Genitourinary:  Negative for dysuria.   Musculoskeletal:  Negative for back pain.   Neurological:  Negative for headaches.      VAD: R Port-A-Cath accessed, chemotherapy running    Objective:      Blood pressure 130/68, pulse 89, temperature 98.4 °F (36.9 °C), height 6' 2" (1.88 m), weight 80.9 kg (178 lb 4.8 oz), SpO2 97 %. Body mass index is 22.89 kg/m². virtual visit 8/23: looks comfortable on room air, conversant.     Physical Exam  Constitutional:       Appearance: He is normal weight.      Comments: Frail   HENT:      Mouth/Throat:      Mouth: " Mucous membranes are moist.      Pharynx: Oropharynx is clear.   Eyes:      Conjunctiva/sclera: Conjunctivae normal.      Pupils: Pupils are equal, round, and reactive to light.   Cardiovascular:      Rate and Rhythm: Normal rate and regular rhythm.      Pulses: Normal pulses.      Heart sounds: Normal heart sounds.   Pulmonary:      Effort: Pulmonary effort is normal.      Breath sounds: Normal breath sounds.   Abdominal:      General: Bowel sounds are normal.      Palpations: Abdomen is soft.      Tenderness: There is no abdominal tenderness.      Comments: Healed wound, LL colostomy, mildly tender to palpation, no rebound, empty bag   Musculoskeletal:         General: Normal range of motion.      Cervical back: Normal range of motion.      Right lower leg: No edema.      Left lower leg: No edema.   Skin:     General: Skin is warm.      Capillary Refill: Capillary refill takes less than 2 seconds.   Neurological:      General: No focal deficit present.      Mental Status: He is alert and oriented to person, place, and time. Mental status is at baseline.   Psychiatric:         Thought Content: Thought content normal.       VAD: R Port-A-Cath        Recent Diagnostics:  CT scan 9/5/22:  1. Little change of rim enhancing subdiaphragmatic left upper quadrant fluid collection since 8/5/2022.  2. No significant change of hypodense region involving pancreatic tail. Differential diagnosis of this has been previously discussed and remains unchanged. Continued imaging follow-up is recommended. Pancreatic protocol CT or MRI without and with IV contrast is suggested in 3 months. Alternatively, endoscopic ultrasound could be used for further evaluation.  3. Tiny left pleural effusion, unchanged.  4. Unchanged parastomal hernia.    NM scan 9/22:  No evidence of osseous metastasis.  CT scan 8/5:   Rim-enhancing fluid collection within the left upper quadrant at the splenic bed minimally increased in size when compared to the  prior study and suspicious for abscess  Improvement of the masslike hypoattenuation involving the tail of the pancreas most likely secondary to the adjacent inflammatory changes.  Small left pleural effusion with left lower lobe atelectasis  Left lower quadrant ostomy  Prior colectomy.    CT scan 7/8:  Rim-enhancing left upper quadrant fluid collection suspicious for abscess considering the provided clinical history.  Adjacent to this there is masslike hypoattenuation involving the tail of the pancreas. This could is suspicious for pancreatic neoplasm, although phlegmon related to aforementioned abscess is also possible. Dedicated pancreatic protocol imaging and correlation with surgical history is recommended.    CT scan 6/20/22:  1. Postsurgical change of the colon with increased size of air-fluid collection near the operative bed in the left upper abdominal quadrant.  Abscess or contained perforation are considerations.  2. Hepatomegaly.    CT scan 6/29/22:  1. Decreased size of left upper quadrant air-fluid collection.  2. Unchanged size of left pleural effusion.      Micro:  Wound cultures 5/2 E coli, Klebsiella pneumoniae and Candida dubliensis    Pathology:  inal Pathologic Diagnosis 1. Spleen, splenectomy:   - Benign splenic parenchyma   - Negative for malignancy   2. Colon, partial colectomy:   - Invasive colonic adenocarcinoma, poorly differentiated (G3)     - Invades into pericolorectal tissue (pT3)   - Margins uninvolved by invasive carcinoma     - 0.1 cm to the mesenteric margin   - Lymphovascular invasion identified   - No perineural invasion identified   - Seventeen of twenty-seven lymph nodes, positive for metastatic carcinoma   (17/27, pN2b)   - Fibrous serosal adhesions   - See below for results of MSI testing   - CARLOS ENRIQUE/MILLIE Targeted Gene Panel has been ordered and results will be issued in   a supplemental report     CAP Synoptic Checklist for Colonic Neoplasms:     - Procedure: Partial colectomy      - Tumor Site: Splenic flexure     - Histologic Type: Adenocarcinoma     - Histologic Grade: G3, poorly differentiated     - Tumor Size: 5.0 x 4.5 x 2.7 cm     - Tumor Extent: Invades through muscularis propria into pericolorectal   tissue     - Macroscopic Tumor Perforation: Not identified     - Lymphovascular Invasion: Present, small vessel involved     - Perineural Invasion: Not identified     - Number of Tumor Buds: 5 in one hotspot field     - Tumor Bud Score: Intermediate (5-9)     - Type of Polyp in which Invasive Carcinoma Arose: None identified     - Treatment Effect: No known presurgical therapy     - Margins: All margins negative for invasive carcinoma              Assessment and Plan:         1. Poorly differentiated colon cancer s/p colectomy and splenectomy 4/21, on chemotherapy 4th cycle, complicated by recurrent intra-abdominal abscess, unable to drain by IR, latest CT scan 8/5 with slightly increased size of fluid collection at the splenic bed - possible sterile fluid collection    NM scan negative for bone metastasis  MRI abdomen w and w/o contrast in 2 months   Plan for CT scan next week, will discuss with Surgery and Heme/onc  Will call patient with results   RTC in 6 weeks   S/p Menveo and Pneumovac and HiB vaccines       2. Smoker   3. PMHx HTN, diabetes, HLD    PCP follow-up    D/w patient, wife    Malignant neoplasm of splenic flexure    Intra-abdominal abscess

## 2022-12-14 ENCOUNTER — INFUSION (OUTPATIENT)
Dept: INFUSION THERAPY | Facility: HOSPITAL | Age: 57
End: 2022-12-14
Attending: INTERNAL MEDICINE
Payer: COMMERCIAL

## 2022-12-14 VITALS
RESPIRATION RATE: 18 BRPM | SYSTOLIC BLOOD PRESSURE: 131 MMHG | WEIGHT: 178.88 LBS | DIASTOLIC BLOOD PRESSURE: 73 MMHG | HEIGHT: 74 IN | HEART RATE: 86 BPM | TEMPERATURE: 98 F | BODY MASS INDEX: 22.96 KG/M2

## 2022-12-14 DIAGNOSIS — C18.5 MALIGNANT NEOPLASM OF SPLENIC FLEXURE: Primary | ICD-10-CM

## 2022-12-14 PROCEDURE — 63600175 PHARM REV CODE 636 W HCPCS: Performed by: INTERNAL MEDICINE

## 2022-12-14 PROCEDURE — 25000003 PHARM REV CODE 250: Performed by: INTERNAL MEDICINE

## 2022-12-14 PROCEDURE — 96523 IRRIG DRUG DELIVERY DEVICE: CPT

## 2022-12-14 PROCEDURE — A4216 STERILE WATER/SALINE, 10 ML: HCPCS | Performed by: INTERNAL MEDICINE

## 2022-12-14 RX ORDER — SODIUM CHLORIDE 0.9 % (FLUSH) 0.9 %
10 SYRINGE (ML) INJECTION
Status: DISCONTINUED | OUTPATIENT
Start: 2022-12-14 | End: 2022-12-14 | Stop reason: HOSPADM

## 2022-12-14 RX ORDER — HEPARIN 100 UNIT/ML
500 SYRINGE INTRAVENOUS
Status: DISCONTINUED | OUTPATIENT
Start: 2022-12-14 | End: 2022-12-14 | Stop reason: HOSPADM

## 2022-12-14 RX ADMIN — SODIUM CHLORIDE, PRESERVATIVE FREE 10 ML: 5 INJECTION INTRAVENOUS at 11:12

## 2022-12-14 RX ADMIN — HEPARIN 500 UNITS: 100 SYRINGE at 11:12

## 2022-12-14 NOTE — PLAN OF CARE
Problem: Fatigue  Goal: Improved Activity Tolerance  12/14/2022 1137 by Shobha Gordillo RN  Outcome: Met  12/14/2022 1137 by Shobha Gordillo RN  Outcome: Ongoing, Progressing

## 2022-12-19 ENCOUNTER — HOSPITAL ENCOUNTER (OUTPATIENT)
Dept: RADIOLOGY | Facility: HOSPITAL | Age: 57
Discharge: HOME | End: 2022-12-19
Attending: INTERNAL MEDICINE
Payer: COMMERCIAL

## 2022-12-19 DIAGNOSIS — C18.4 MALIGNANT NEOPLASM OF TRANSVERSE COLON: ICD-10-CM

## 2022-12-19 PROCEDURE — 71260 CT THORAX DX C+: CPT | Mod: TC

## 2022-12-19 PROCEDURE — 25500020 PHARM REV CODE 255: Performed by: INTERNAL MEDICINE

## 2022-12-19 PROCEDURE — 74177 CT ABD & PELVIS W/CONTRAST: CPT | Mod: TC

## 2022-12-19 RX ADMIN — IOHEXOL 100 ML: 350 INJECTION, SOLUTION INTRAVENOUS at 09:12

## 2022-12-21 ENCOUNTER — TELEPHONE (OUTPATIENT)
Dept: HEMATOLOGY/ONCOLOGY | Facility: CLINIC | Age: 57
End: 2022-12-21

## 2022-12-21 ENCOUNTER — OFFICE VISIT (OUTPATIENT)
Dept: HEMATOLOGY/ONCOLOGY | Facility: CLINIC | Age: 57
End: 2022-12-21
Payer: COMMERCIAL

## 2022-12-21 VITALS
DIASTOLIC BLOOD PRESSURE: 77 MMHG | HEIGHT: 74 IN | SYSTOLIC BLOOD PRESSURE: 123 MMHG | BODY MASS INDEX: 23.49 KG/M2 | RESPIRATION RATE: 18 BRPM | HEART RATE: 86 BPM | WEIGHT: 183 LBS | TEMPERATURE: 98 F | OXYGEN SATURATION: 99 %

## 2022-12-21 DIAGNOSIS — I10 PRIMARY HYPERTENSION: ICD-10-CM

## 2022-12-21 DIAGNOSIS — R11.0 CHEMOTHERAPY-INDUCED NAUSEA: ICD-10-CM

## 2022-12-21 DIAGNOSIS — T45.1X5A CHEMOTHERAPY-INDUCED NAUSEA: ICD-10-CM

## 2022-12-21 DIAGNOSIS — G62.0 CHEMOTHERAPY-INDUCED NEUROPATHY: ICD-10-CM

## 2022-12-21 DIAGNOSIS — E78.49 OTHER HYPERLIPIDEMIA: ICD-10-CM

## 2022-12-21 DIAGNOSIS — K86.89 PANCREATIC MASS: ICD-10-CM

## 2022-12-21 DIAGNOSIS — C18.4 MALIGNANT NEOPLASM OF TRANSVERSE COLON: Primary | ICD-10-CM

## 2022-12-21 DIAGNOSIS — E11.00 TYPE 2 DIABETES MELLITUS WITH HYPEROSMOLARITY WITHOUT COMA, WITHOUT LONG-TERM CURRENT USE OF INSULIN: ICD-10-CM

## 2022-12-21 DIAGNOSIS — T45.1X5A CHEMOTHERAPY-INDUCED NEUROPATHY: ICD-10-CM

## 2022-12-21 PROCEDURE — 3066F NEPHROPATHY DOC TX: CPT | Mod: CPTII,S$GLB,, | Performed by: INTERNAL MEDICINE

## 2022-12-21 PROCEDURE — 99214 PR OFFICE/OUTPT VISIT, EST, LEVL IV, 30-39 MIN: ICD-10-PCS | Mod: S$GLB,,, | Performed by: INTERNAL MEDICINE

## 2022-12-21 PROCEDURE — 4010F ACE/ARB THERAPY RXD/TAKEN: CPT | Mod: CPTII,S$GLB,, | Performed by: INTERNAL MEDICINE

## 2022-12-21 PROCEDURE — 3078F PR MOST RECENT DIASTOLIC BLOOD PRESSURE < 80 MM HG: ICD-10-PCS | Mod: CPTII,S$GLB,, | Performed by: INTERNAL MEDICINE

## 2022-12-21 PROCEDURE — 99999 PR PBB SHADOW E&M-EST. PATIENT-LVL IV: ICD-10-PCS | Mod: PBBFAC,,, | Performed by: INTERNAL MEDICINE

## 2022-12-21 PROCEDURE — 3078F DIAST BP <80 MM HG: CPT | Mod: CPTII,S$GLB,, | Performed by: INTERNAL MEDICINE

## 2022-12-21 PROCEDURE — 1159F MED LIST DOCD IN RCRD: CPT | Mod: CPTII,S$GLB,, | Performed by: INTERNAL MEDICINE

## 2022-12-21 PROCEDURE — 3008F PR BODY MASS INDEX (BMI) DOCUMENTED: ICD-10-PCS | Mod: CPTII,S$GLB,, | Performed by: INTERNAL MEDICINE

## 2022-12-21 PROCEDURE — 3061F PR NEG MICROALBUMINURIA RESULT DOCUMENTED/REVIEW: ICD-10-PCS | Mod: CPTII,S$GLB,, | Performed by: INTERNAL MEDICINE

## 2022-12-21 PROCEDURE — 1160F RVW MEDS BY RX/DR IN RCRD: CPT | Mod: CPTII,S$GLB,, | Performed by: INTERNAL MEDICINE

## 2022-12-21 PROCEDURE — 3008F BODY MASS INDEX DOCD: CPT | Mod: CPTII,S$GLB,, | Performed by: INTERNAL MEDICINE

## 2022-12-21 PROCEDURE — 3052F PR MOST RECENT HEMOGLOBIN A1C LEVEL 8.0 - < 9.0%: ICD-10-PCS | Mod: CPTII,S$GLB,, | Performed by: INTERNAL MEDICINE

## 2022-12-21 PROCEDURE — 3052F HG A1C>EQUAL 8.0%<EQUAL 9.0%: CPT | Mod: CPTII,S$GLB,, | Performed by: INTERNAL MEDICINE

## 2022-12-21 PROCEDURE — 1159F PR MEDICATION LIST DOCUMENTED IN MEDICAL RECORD: ICD-10-PCS | Mod: CPTII,S$GLB,, | Performed by: INTERNAL MEDICINE

## 2022-12-21 PROCEDURE — 1160F PR REVIEW ALL MEDS BY PRESCRIBER/CLIN PHARMACIST DOCUMENTED: ICD-10-PCS | Mod: CPTII,S$GLB,, | Performed by: INTERNAL MEDICINE

## 2022-12-21 PROCEDURE — 3074F SYST BP LT 130 MM HG: CPT | Mod: CPTII,S$GLB,, | Performed by: INTERNAL MEDICINE

## 2022-12-21 PROCEDURE — 4010F PR ACE/ARB THEARPY RXD/TAKEN: ICD-10-PCS | Mod: CPTII,S$GLB,, | Performed by: INTERNAL MEDICINE

## 2022-12-21 PROCEDURE — 99214 OFFICE O/P EST MOD 30 MIN: CPT | Mod: S$GLB,,, | Performed by: INTERNAL MEDICINE

## 2022-12-21 PROCEDURE — 99999 PR PBB SHADOW E&M-EST. PATIENT-LVL IV: CPT | Mod: PBBFAC,,, | Performed by: INTERNAL MEDICINE

## 2022-12-21 PROCEDURE — 3061F NEG MICROALBUMINURIA REV: CPT | Mod: CPTII,S$GLB,, | Performed by: INTERNAL MEDICINE

## 2022-12-21 PROCEDURE — 3074F PR MOST RECENT SYSTOLIC BLOOD PRESSURE < 130 MM HG: ICD-10-PCS | Mod: CPTII,S$GLB,, | Performed by: INTERNAL MEDICINE

## 2022-12-21 PROCEDURE — 3066F PR DOCUMENTATION OF TREATMENT FOR NEPHROPATHY: ICD-10-PCS | Mod: CPTII,S$GLB,, | Performed by: INTERNAL MEDICINE

## 2022-12-21 NOTE — PROGRESS NOTES
"Service Date:  12/21/22    Chief Complaint: Colon Cancer      Osman Li Jr. is a 57 y.o. 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  A CT scan of the abdomen was done which showed a large obstructive lesion with lymph node involvement.  There was also a 9 mm hypodense liver lesion that could not be characterized.  Patient had a hemicolectomy with ostomy bag placed, and splenectomy.      Completed FOLFOX.  Patient still has CT DNA increasing.  I did a CT scan of his abdomen, which showed a concerning pancreatic mass which has grown in size.    Review of Systems   Constitutional: Negative.    HENT: Negative.     Eyes: Negative.    Respiratory: Negative.     Cardiovascular: Negative.    Gastrointestinal:  Positive for nausea.   Endocrine: Negative.    Genitourinary: Negative.    Musculoskeletal: Negative.    Integumentary:  Negative.   Neurological: Negative.    Hematological: Negative.    Psychiatric/Behavioral: Negative.        Current Outpatient Medications   Medication Instructions    atorvastatin (LIPITOR) 20 mg, Oral, Daily    diclofenac (VOLTAREN) 50 mg, Oral, 2 times daily    enalapriL-hydrochlorothiazide (VASERETIC) 10-25 mg per tablet 1 tablet, Oral, Daily    haemophilus B polysac-tetanus toxoid (ACTHIB, PF,) 10 mcg/0.5 mL injection TO BE ADMINISTERED.    metFORMIN (GLUCOPHAGE) 1,000 mg, Oral, 2 times daily with meals    oxyCODONE (ROXICODONE) 30 mg, Oral, Every 6 hours PRN    OZEMPIC 0.25 mg, Subcutaneous, Every 7 days    pen needle, diabetic 31 gauge x 3/16" Ndle 1 each, Misc.(Non-Drug; Combo Route), Daily    promethazine (PHENERGAN) 12.5 MG Tab TAKE 1 TABLET BY MOUTH EVERY 4 HOURS AS NEEDED    sildenafiL (VIAGRA) 100 mg, Oral, Daily PRN    TOUJEO MAX U-300 SOLOSTAR 26 Units, Subcutaneous, Daily    traZODone (DESYREL) 50 mg, Oral, Nightly        Past Medical History:   Diagnosis Date    DM (diabetes mellitus)     Hyperlipidemia     Hypertension     Prediabetes         Past " "Surgical History:   Procedure Laterality Date    CHOLECYSTECTOMY  2011    COLONOSCOPY      2018    COLOSTOMY N/A 4/25/2022    Procedure: CREATION, COLOSTOMY;  Surgeon: Carlton Waddell MD;  Location: [facility] [place] OR;  Service: General;  Laterality: N/A;    INSERTION OF TUNNELED CENTRAL VENOUS CATHETER (CVC) WITH SUBCUTANEOUS PORT Right 5/18/2022    Procedure: [id number];  Surgeon: Carlton Waddell MD;  Location: [facility] [place] OR;  Service: General;  Laterality: Right;    MOBILIZATION OF SPLENIC FLEXURE N/A 4/25/2022    Procedure: MOBILIZATION, SPLENIC FLEXURE;  Surgeon: Carlton Waddell MD;  Location: [facility] [place] OR;  Service: General;  Laterality: N/A;    SPLENECTOMY N/A 4/25/2022    Procedure: SPLENECTOMY;  Surgeon: Carlton Waddell MD;  Location: [facility] [place] OR;  Service: General;  Laterality: N/A;    SUBTOTAL COLECTOMY N/A 4/25/2022    Procedure: COLECTOMY, PARTIAL;  Surgeon: Carlton Waddell MD;  Location: [facility] [place] OR;  Service: General;  Laterality: N/A;        Family History   Problem Relation Age of Onset    Heart disease Father        Social History     Tobacco Use    Smoking status: Every Day     Packs/day: 0.50     Years: 35.00     Pack years: 17.50     Types: Cigarettes    Smokeless tobacco: Never   Substance Use Topics    Alcohol use: Not Currently    Drug use: Never         Vitals:    12/21/22 0918   BP: 123/77   Pulse: 86   Resp: 18   Temp: 97.7 °F (36.5 °C)        Physical Exam:  /77 (BP Location: Right arm, Patient Position: Sitting, BP Method: Medium (Automatic))   Pulse 86   Temp 97.7 °F (36.5 °C) (Temporal)   Resp 18   Ht 6' 2" (1.88 m)   Wt 83 kg (182 lb 15.7 oz)   SpO2 99%   BMI 23.49 kg/m²     Physical Exam  Vitals and nursing note reviewed.   Constitutional:       Appearance: Normal appearance.   HENT:      Head: Normocephalic and atraumatic.      Nose: Nose normal.      Mouth/Throat:      Mouth: Mucous membranes are moist.      Pharynx: Oropharynx is clear.   Eyes:      Extraocular Movements: Extraocular " movements intact.      Conjunctiva/sclera: Conjunctivae normal.   Cardiovascular:      Rate and Rhythm: Normal rate and regular rhythm.      Heart sounds: Normal heart sounds.   Pulmonary:      Effort: Pulmonary effort is normal.      Breath sounds: Normal breath sounds.   Abdominal:      General: Abdomen is flat. Bowel sounds are normal.      Palpations: Abdomen is soft.      Comments: Ostomy bag in place.   Musculoskeletal:         General: Normal range of motion.      Cervical back: Normal range of motion and neck supple.   Skin:     General: Skin is warm and dry.   Neurological:      General: No focal deficit present.      Mental Status: He is alert and oriented to person, place, and time. Mental status is at baseline.   Psychiatric:         Mood and Affect: Mood normal.        Labs:  Lab Results   Component Value Date    WBC 14.85 (H) 12/12/2022    RBC 4.27 (L) 12/12/2022    HGB 13.7 (L) 12/12/2022    HCT 39.4 (L) 12/12/2022    MCV 92 12/12/2022    MCH 32.1 (H) 12/12/2022    MCHC 34.8 12/12/2022    RDW 20.3 (H) 12/12/2022     12/12/2022    MPV 9.2 12/12/2022    GRAN 9.1 (H) 12/12/2022    GRAN 61.5 12/12/2022    LYMPH 4.0 12/12/2022    LYMPH 26.7 12/12/2022    MONO 1.5 (H) 12/12/2022    MONO 10.0 12/12/2022    EOS 0.1 12/12/2022    BASO 0.08 12/12/2022    EOSINOPHIL 0.8 12/12/2022    BASOPHIL 0.5 12/12/2022     Sodium   Date Value Ref Range Status   12/12/2022 133 (L) 136 - 145 mmol/L Final     Potassium   Date Value Ref Range Status   12/12/2022 4.1 3.5 - 5.1 mmol/L Final     Chloride   Date Value Ref Range Status   12/12/2022 100 95 - 110 mmol/L Final     CO2   Date Value Ref Range Status   12/12/2022 23 23 - 29 mmol/L Final     Glucose   Date Value Ref Range Status   12/12/2022 234 (H) 70 - 110 mg/dL Final     BUN   Date Value Ref Range Status   12/12/2022 9 6 - 20 mg/dL Final     Creatinine   Date Value Ref Range Status   12/12/2022 0.9 0.5 - 1.4 mg/dL Final     Calcium   Date Value Ref Range Status    12/12/2022 10.2 8.7 - 10.5 mg/dL Final     Total Protein   Date Value Ref Range Status   12/12/2022 7.2 6.0 - 8.4 g/dL Final     Albumin   Date Value Ref Range Status   12/12/2022 3.3 (L) 3.5 - 5.2 g/dL Final     Total Bilirubin   Date Value Ref Range Status   12/12/2022 0.8 0.1 - 1.0 mg/dL Final     Comment:     For infants and newborns, interpretation of results should be based  on gestational age, weight and in agreement with clinical  observations.    Premature Infant recommended reference ranges:  Up to 24 hours.............<8.0 mg/dL  Up to 48 hours............<12.0 mg/dL  3-5 days..................<15.0 mg/dL  6-29 days.................<15.0 mg/dL       Alkaline Phosphatase   Date Value Ref Range Status   12/12/2022 116 55 - 135 U/L Final     AST   Date Value Ref Range Status   12/12/2022 20 10 - 40 U/L Final     ALT   Date Value Ref Range Status   12/12/2022 16 10 - 44 U/L Final     Anion Gap   Date Value Ref Range Status   12/12/2022 10 8 - 16 mmol/L Final     eGFR if    Date Value Ref Range Status   07/25/2022 >60 >60 mL/min/1.73 m^2 Final     eGFR if non    Date Value Ref Range Status   07/25/2022 >60 >60 mL/min/1.73 m^2 Final     Comment:     Calculation used to obtain the estimated glomerular filtration  rate (eGFR) is the CKD-EPI equation.          A/P:    Malignant neoplasm of the transverse colon  -poorly differentiated adenocarcinoma  -pT3 N2b  -patient on clinical trial to measure ctDNA, still positive on 12/1/22  -labs reviewed  -FOLFOX 12 cycles complete    Pancreatic mass   -will obtain biopsy, as I am concerned that this is metastatic colon    Back pain  - NM bone scan negative for mets    HTN  - on Enalapril  - follow up with PCP    HLP  - follow up with PCP    DM2  - on Metformin  - follow up with PCP    Tobacco use  - counseled on cessation      Aurash Khoobehi, MD  Hematology and Oncology

## 2022-12-23 DIAGNOSIS — C18.4 MALIGNANT NEOPLASM OF TRANSVERSE COLON: Primary | ICD-10-CM

## 2022-12-23 DIAGNOSIS — K86.89 PANCREATIC MASS: ICD-10-CM

## 2022-12-27 ENCOUNTER — TELEPHONE (OUTPATIENT)
Dept: HEMATOLOGY/ONCOLOGY | Facility: CLINIC | Age: 57
End: 2022-12-27
Payer: COMMERCIAL

## 2022-12-27 ENCOUNTER — PATIENT MESSAGE (OUTPATIENT)
Dept: HEMATOLOGY/ONCOLOGY | Facility: CLINIC | Age: 57
End: 2022-12-27
Payer: COMMERCIAL

## 2022-12-27 NOTE — TELEPHONE ENCOUNTER
Incoming call from Alma at [facility]. Per Alma the pancreas bx was denied and rescheduled it error. This RN told Alma to go ahead and cancel the bx appt and I would send a message to Dr Khoobehi and CAMRYN Escobar to review when they get back in the office.

## 2022-12-28 DIAGNOSIS — C18.4 MALIGNANT NEOPLASM OF TRANSVERSE COLON: Primary | ICD-10-CM

## 2022-12-30 ENCOUNTER — LAB VISIT (OUTPATIENT)
Dept: LAB | Facility: HOSPITAL | Age: 57
End: 2022-12-30
Attending: INTERNAL MEDICINE
Payer: COMMERCIAL

## 2022-12-30 DIAGNOSIS — C18.9 COLON CANCER: ICD-10-CM

## 2022-12-30 DIAGNOSIS — K86.89 SUBCUTANEOUS NODULAR FAT NECROSIS IN PANCREATITIS: Primary | ICD-10-CM

## 2022-12-30 PROCEDURE — 36415 COLL VENOUS BLD VENIPUNCTURE: CPT | Performed by: INTERNAL MEDICINE

## 2022-12-30 PROCEDURE — 86301 IMMUNOASSAY TUMOR CA 19-9: CPT | Performed by: INTERNAL MEDICINE

## 2023-01-02 LAB — CANCER AG19-9 SERPL-ACNC: 21 U/ML (ref 0–35)

## 2023-01-03 ENCOUNTER — TELEPHONE (OUTPATIENT)
Dept: GASTROENTEROLOGY | Facility: CLINIC | Age: 58
End: 2023-01-03
Payer: COMMERCIAL

## 2023-01-04 ENCOUNTER — HOSPITAL ENCOUNTER (OUTPATIENT)
Dept: PREADMISSION TESTING | Facility: HOSPITAL | Age: 58
Discharge: HOME | End: 2023-01-04
Attending: INTERNAL MEDICINE
Payer: COMMERCIAL

## 2023-01-04 DIAGNOSIS — Z01.818 PRE-OP TESTING: Primary | ICD-10-CM

## 2023-01-04 DIAGNOSIS — Z01.818 PRE-OP TESTING: ICD-10-CM

## 2023-01-04 LAB
BASOPHILS # BLD AUTO: 0.11 K/UL (ref 0–0.2)
BASOPHILS NFR BLD: 1.2 % (ref 0–1.9)
DIFFERENTIAL METHOD: ABNORMAL
EOSINOPHIL # BLD AUTO: 0.3 K/UL (ref 0–0.5)
EOSINOPHIL NFR BLD: 3.1 % (ref 0–8)
ERYTHROCYTE [DISTWIDTH] IN BLOOD BY AUTOMATED COUNT: 17 % (ref 11.5–14.5)
HCT VFR BLD AUTO: 39.4 % (ref 40–54)
HGB BLD-MCNC: 13.3 G/DL (ref 14–18)
IMM GRANULOCYTES # BLD AUTO: 0.02 K/UL (ref 0–0.04)
IMM GRANULOCYTES NFR BLD AUTO: 0.2 % (ref 0–0.5)
LYMPHOCYTES # BLD AUTO: 4 K/UL (ref 1–4.8)
LYMPHOCYTES NFR BLD: 44.5 % (ref 18–48)
MCH RBC QN AUTO: 32.4 PG (ref 27–31)
MCHC RBC AUTO-ENTMCNC: 33.8 G/DL (ref 32–36)
MCV RBC AUTO: 96 FL (ref 82–98)
MONOCYTES # BLD AUTO: 1.3 K/UL (ref 0.3–1)
MONOCYTES NFR BLD: 14.8 % (ref 4–15)
NEUTROPHILS # BLD AUTO: 3.3 K/UL (ref 1.8–7.7)
NEUTROPHILS NFR BLD: 36.2 % (ref 38–73)
NRBC BLD-RTO: 0 /100 WBC
PLATELET # BLD AUTO: 318 K/UL (ref 150–450)
PMV BLD AUTO: 9.4 FL (ref 9.2–12.9)
RBC # BLD AUTO: 4.11 M/UL (ref 4.6–6.2)
WBC # BLD AUTO: 9.06 K/UL (ref 3.9–12.7)

## 2023-01-04 PROCEDURE — 93010 EKG 12-LEAD: ICD-10-PCS | Mod: ,,, | Performed by: INTERNAL MEDICINE

## 2023-01-04 PROCEDURE — 85025 COMPLETE CBC W/AUTO DIFF WBC: CPT | Performed by: ANESTHESIOLOGY

## 2023-01-04 PROCEDURE — 93010 ELECTROCARDIOGRAM REPORT: CPT | Mod: ,,, | Performed by: INTERNAL MEDICINE

## 2023-01-04 PROCEDURE — 93005 ELECTROCARDIOGRAM TRACING: CPT | Performed by: INTERNAL MEDICINE

## 2023-01-06 ENCOUNTER — ANESTHESIA EVENT (OUTPATIENT)
Dept: SURGERY | Facility: HOSPITAL | Age: 58
End: 2023-01-06
Payer: COMMERCIAL

## 2023-01-06 ENCOUNTER — ANESTHESIA (OUTPATIENT)
Dept: SURGERY | Facility: HOSPITAL | Age: 58
End: 2023-01-06
Payer: COMMERCIAL

## 2023-01-06 ENCOUNTER — HOSPITAL ENCOUNTER (OUTPATIENT)
Facility: HOSPITAL | Age: 58
Discharge: HOME | End: 2023-01-06
Attending: INTERNAL MEDICINE | Admitting: INTERNAL MEDICINE
Payer: COMMERCIAL

## 2023-01-06 VITALS
RESPIRATION RATE: 16 BRPM | HEART RATE: 68 BPM | SYSTOLIC BLOOD PRESSURE: 168 MMHG | TEMPERATURE: 98 F | OXYGEN SATURATION: 99 % | DIASTOLIC BLOOD PRESSURE: 95 MMHG

## 2023-01-06 VITALS
SYSTOLIC BLOOD PRESSURE: 219 MMHG | HEART RATE: 81 BPM | DIASTOLIC BLOOD PRESSURE: 122 MMHG | OXYGEN SATURATION: 99 % | RESPIRATION RATE: 19 BRPM

## 2023-01-06 DIAGNOSIS — K86.89 PANCREATIC MASS: ICD-10-CM

## 2023-01-06 LAB — GLUCOSE SERPL-MCNC: 114 MG/DL (ref 70–110)

## 2023-01-06 PROCEDURE — 63600175 PHARM REV CODE 636 W HCPCS: Performed by: INTERNAL MEDICINE

## 2023-01-06 PROCEDURE — 96374 THER/PROPH/DIAG INJ IV PUSH: CPT | Performed by: INTERNAL MEDICINE

## 2023-01-06 PROCEDURE — 63600175 PHARM REV CODE 636 W HCPCS: Performed by: NURSE ANESTHETIST, CERTIFIED REGISTERED

## 2023-01-06 PROCEDURE — 82962 GLUCOSE BLOOD TEST: CPT | Performed by: INTERNAL MEDICINE

## 2023-01-06 PROCEDURE — 27202053 HC NEEDLE BIOPSY EUS: Performed by: INTERNAL MEDICINE

## 2023-01-06 PROCEDURE — 43242 EGD US FINE NEEDLE BX/ASPIR: CPT | Performed by: INTERNAL MEDICINE

## 2023-01-06 PROCEDURE — 37000008 HC ANESTHESIA 1ST 15 MINUTES: Performed by: INTERNAL MEDICINE

## 2023-01-06 PROCEDURE — 37000009 HC ANESTHESIA EA ADD 15 MINS: Performed by: INTERNAL MEDICINE

## 2023-01-06 RX ORDER — PROPOFOL 10 MG/ML
VIAL (ML) INTRAVENOUS
Status: DISCONTINUED | OUTPATIENT
Start: 2023-01-06 | End: 2023-01-06

## 2023-01-06 RX ORDER — FENTANYL CITRATE 50 UG/ML
INJECTION, SOLUTION INTRAMUSCULAR; INTRAVENOUS
Status: COMPLETED | OUTPATIENT
Start: 2023-01-06 | End: 2023-01-06

## 2023-01-06 RX ADMIN — PROPOFOL 50 MG: 10 INJECTION, EMULSION INTRAVENOUS at 07:01

## 2023-01-06 RX ADMIN — PROPOFOL 150 MG: 10 INJECTION, EMULSION INTRAVENOUS at 07:01

## 2023-01-06 RX ADMIN — SODIUM CHLORIDE, SODIUM LACTATE, POTASSIUM CHLORIDE, AND CALCIUM CHLORIDE: .6; .31; .03; .02 INJECTION, SOLUTION INTRAVENOUS at 07:01

## 2023-01-06 NOTE — [employer] PATIENT INSTRUCTIONS
Discharge Summary/Instructions after an Endoscopic Procedure  Patient Name: Osman Li  Patient MRN: [id number]  Patient YOB: 1965 Friday, January 6, 2023  Rinku Orozco III, MD  RESTRICTIONS:  During your procedure today, you received medications for sedation.  These   medications may affect your judgment, balance and coordination.  Therefore,   for 24 hours, you have the following restrictions:   - DO NOT drive a car, operate machinery, make legal/financial decisions,   sign important papers or drink alcohol.    ACTIVITY:  Today: no heavy lifting, straining or running due to procedural   sedation/anesthesia.  The following day: return to full activity including work.  DIET:  Eat and drink normally unless instructed otherwise.     TREATMENT FOR COMMON SIDE EFFECTS:  - Mild abdominal pain, nausea, belching, bloating or excessive gas:  rest,   eat lightly and use a heating pad.  - Sore Throat: treat with throat lozenges and/or gargle with warm salt   water.  - Because air was used during the procedure, expelling large amounts of air   from your rectum or belching is normal.  - If a bowel prep was taken, you may not have a bowel movement for 1-3 days.    This is normal.  SYMPTOMS TO WATCH FOR AND REPORT TO YOUR PHYSICIAN:  1. Abdominal pain or bloating, other than gas cramps.  2. Chest pain.  3. Back pain.  4. Signs of infection such as: chills or fever occurring within 24 hours   after the procedure.  5. Rectal bleeding, which would show as bright red, maroon, or black stools.   (A tablespoon of blood from the rectum is not serious, especially if   hemorrhoids are present.)  6. Vomiting.  7. Weakness or dizziness.  GO DIRECTLY TO THE NEAREST EMERGENCY ROOM IF YOU HAVE ANY OF THE FOLLOWING:      Difficulty breathing              Chills and/or fever over 101 F   Persistent vomiting and/or vomiting blood   Severe abdominal pain   Severe chest pain   Black, tarry stools   Bleeding- more than one  tablespoon   Any other symptom or condition that you feel may need urgent attention  Your doctor recommends these additional instructions:  If any biopsies were taken, your doctors clinic will contact you in 1 to 2   weeks with any results.  - Discharge patient to home. Normal CA 19-9 and LAD / mass in region of past   resected colon cancer, favor localized mets over pancreatic cancer.   - Patient has a contact number available for emergencies.  The signs and   symptoms of potential delayed complications were discussed with the   patient.  Return to normal activities tomorrow.  Written discharge   instructions were provided to the patient.   - Resume regular diet.   - Continue present medications.  For questions, problems or results please call your physician - Rinku Orozco III, MD at Work:  [phone].  [facility], EMERGENCY ROOM PHONE NUMBER: [phone]  IF A COMPLICATION OR EMERGENCY SITUATION ARISES AND YOU ARE UNABLE TO REACH   YOUR PHYSICIAN - GO DIRECTLY TO THE EMERGENCY ROOM.  Rinku Orozco III, MD  1/6/2023 7:38:45 AM  This report has been verified and signed electronically.  Dear patient,  As a result of recent federal legislation (The Federal Cures Act), you may   receive lab or pathology results from your procedure in your [facility]   account before your physician is able to contact you. Your physician or   their representative will relay the results to you with their   recommendations at their soonest availability.  Thank you,  [employer]ATION

## 2023-01-06 NOTE — TRANSFER OF CARE
Anesthesia Transfer of Care Note    Patient: Osman Li Jr.    Procedure(s) Performed: Procedure(s) (LRB):  ULTRASOUND, UPPER GI TRACT, ENDOSCOPIC (N/A)    Patient location: GI    Transport from OR: Transported from OR on room air with adequate spontaneous ventilation    Post pain: adequate analgesia    Post assessment: no apparent anesthetic complications    Post vital signs: stable    Complications: none    Transfer of care protocol was followed      Last vitals: There were no vitals taken for this visit.

## 2023-01-06 NOTE — ANESTHESIA POSTPROCEDURE EVALUATION
Anesthesia Post Evaluation    Patient: Osman Li Jr.    Procedure(s) Performed: Procedure(s) (LRB):  ULTRASOUND, UPPER GI TRACT, ENDOSCOPIC (N/A)    Final Anesthesia Type: MAC      Patient location during evaluation: GI PACU  Patient participation: Yes- Able to Participate  Level of consciousness: awake and alert  Post-procedure vital signs: reviewed and stable  Pain management: adequate  Airway patency: patent    PONV status at discharge: No PONV  Anesthetic complications: no      Cardiovascular status: hemodynamically stable  Respiratory status: unassisted, spontaneous ventilation and room air  Hydration status: euvolemic  Follow-up not needed.          Vitals Value Taken Time   /99 01/06/23 0800   Temp 36.8 °C (98.2 °F) 01/06/23 0748   Pulse 69 01/06/23 0809   Resp ** 01/06/23 0812   SpO2 99 % 01/06/23 0809   Vitals shown include unvalidated device data.      No case tracking events are documented in the log.      Pain/Suleiman Score: No data recorded

## 2023-01-06 NOTE — ANESTHESIA PREPROCEDURE EVALUATION
01/06/2023  Osman iL Jr. is a 57 y.o., male.      Patient Active Problem List   Diagnosis    HTN (hypertension)    Type 2 diabetes mellitus    HLD (hyperlipidemia)    Hyponatremia    Anemia    Hypophosphatemia    Malignant neoplasm of splenic flexure    Intra-abdominal abscess    Sepsis    Hypomagnesemia       Past Surgical History:   Procedure Laterality Date    CHOLECYSTECTOMY  2011    COLON SURGERY      COLONOSCOPY      2018    COLOSTOMY N/A 04/25/2022    Procedure: CREATION, COLOSTOMY;  Surgeon: Carlton Waddell MD;  Location: [facility] OR;  Service: General;  Laterality: N/A;    INSERTION OF TUNNELED CENTRAL VENOUS CATHETER (CVC) WITH SUBCUTANEOUS PORT Right 05/18/2022    Procedure: [id number];  Surgeon: Carlton Waddell MD;  Location: [facility] OR;  Service: General;  Laterality: Right;    MOBILIZATION OF SPLENIC FLEXURE N/A 04/25/2022    Procedure: MOBILIZATION, SPLENIC FLEXURE;  Surgeon: Carlton Waddell MD;  Location: [facility] OR;  Service: General;  Laterality: N/A;    SPLENECTOMY N/A 04/25/2022    Procedure: SPLENECTOMY;  Surgeon: Carlton Waddell MD;  Location: [facility] OR;  Service: General;  Laterality: N/A;    SUBTOTAL COLECTOMY N/A 04/25/2022    Procedure: COLECTOMY, PARTIAL;  Surgeon: Carlton Waddell MD;  Location: [facility] OR;  Service: General;  Laterality: N/A;    TONSILLECTOMY          Tobacco Use:  The patient  reports that he has been smoking cigarettes. He has a 17.50 pack-year smoking history. He has never used smokeless tobacco.     Results for orders placed or performed during the hospital encounter of 01/04/23   EKG 12-lead    Collection Time: 01/04/23  2:37 PM    Narrative    Test Reason : Z01.818,Z01.818,    Vent. Rate : 078 BPM     Atrial Rate : 078 BPM     P-R Int : 178 ms          QRS Dur : 104 ms      QT Int : 394 ms       P-R-T Axes : -18 -06 -08 degrees      QTc Int : 449 ms    Normal sinus rhythm  Inferior-posterior infarct (cited on or before 24-APR-2022)  Abnormal ECG  When compared with ECG of 24-APR-2022 16:47,  Nonspecific T wave abnormality no longer evident in Anterior leads  Confirmed by Donnie Hernandez MD ([id number]) on 1/4/2023 6:51:19 PM    Referred By:             Confirmed By:Donnie Hernandez MD             Lab Results   Component Value Date    WBC 9.06 01/04/2023    HGB 13.3 (L) 01/04/2023    HCT 39.4 (L) 01/04/2023    MCV 96 01/04/2023     01/04/2023     BMP  Lab Results   Component Value Date     (L) 12/12/2022    K 4.1 12/12/2022     12/12/2022    CO2 23 12/12/2022    BUN 9 12/12/2022    CREATININE 0.9 12/12/2022    CALCIUM 10.2 12/12/2022    ANIONGAP 10 12/12/2022     (H) 12/12/2022     (H) 11/28/2022     (H) 11/11/2022       No results found for this or any previous visit.            Pre-op Assessment    I have reviewed the Patient Summary Reports.     I have reviewed the Nursing Notes. I have reviewed the NPO Status.   I have reviewed the Medications.     Review of Systems  Anesthesia Hx:  Denies Family Hx of Anesthesia complications.   Denies Personal Hx of Anesthesia complications.   Social:  Smoker    Hematology/Oncology:         -- Anemia: Current/Recent Cancer.   Cardiovascular:   Hypertension hyperlipidemia ECG has been reviewed.    Endocrine:   Diabetes, type 2        Physical Exam  General: Well nourished, Cooperative, Alert and Oriented    Airway:  Mallampati: II   Mouth Opening: Normal  TM Distance: Normal  Tongue: Normal  Neck ROM: Normal ROM    Dental:  Intact    Chest/Lungs:  Clear to auscultation    Heart:  Rate: Normal  Rhythm: Regular Rhythm  Sounds: Normal        Anesthesia Plan  Type of Anesthesia, risks & benefits discussed:    Anesthesia Type: MAC  Intra-op Monitoring Plan: Standard ASA Monitors  Informed Consent: Informed consent signed with the Patient and all parties understand the risks and  agree with anesthesia plan.  All questions answered.   ASA Score: 2    Ready For Surgery From Anesthesia Perspective.     .

## 2023-01-09 ENCOUNTER — PATIENT MESSAGE (OUTPATIENT)
Dept: HEMATOLOGY/ONCOLOGY | Facility: CLINIC | Age: 58
End: 2023-01-09
Payer: COMMERCIAL

## 2023-01-09 DIAGNOSIS — K63.89 COLONIC MASS: ICD-10-CM

## 2023-01-09 RX ORDER — OXYCODONE HYDROCHLORIDE 30 MG/1
30 TABLET ORAL EVERY 6 HOURS PRN
Qty: 60 TABLET | Refills: 0 | Status: SHIPPED | OUTPATIENT
Start: 2023-01-09 | End: 2023-01-12 | Stop reason: SDUPTHER

## 2023-01-12 ENCOUNTER — PATIENT MESSAGE (OUTPATIENT)
Dept: HEMATOLOGY/ONCOLOGY | Facility: CLINIC | Age: 58
End: 2023-01-12
Payer: COMMERCIAL

## 2023-01-12 ENCOUNTER — TELEPHONE (OUTPATIENT)
Dept: FAMILY MEDICINE | Facility: CLINIC | Age: 58
End: 2023-01-12

## 2023-01-12 DIAGNOSIS — K63.89 COLONIC MASS: ICD-10-CM

## 2023-01-12 DIAGNOSIS — E11.00 TYPE 2 DIABETES MELLITUS WITH HYPEROSMOLARITY WITHOUT COMA, WITHOUT LONG-TERM CURRENT USE OF INSULIN: ICD-10-CM

## 2023-01-12 DIAGNOSIS — E78.2 MIXED HYPERLIPIDEMIA: Primary | ICD-10-CM

## 2023-01-12 DIAGNOSIS — Z79.899 ENCOUNTER FOR LONG-TERM (CURRENT) USE OF OTHER MEDICATIONS: ICD-10-CM

## 2023-01-12 RX ORDER — OXYCODONE HYDROCHLORIDE 30 MG/1
30 TABLET ORAL EVERY 6 HOURS PRN
Qty: 60 TABLET | Refills: 0 | Status: SHIPPED | OUTPATIENT
Start: 2023-01-12 | End: 2023-01-30 | Stop reason: SDUPTHER

## 2023-01-13 ENCOUNTER — TELEPHONE (OUTPATIENT)
Dept: HEMATOLOGY/ONCOLOGY | Facility: CLINIC | Age: 58
End: 2023-01-13
Payer: COMMERCIAL

## 2023-01-13 ENCOUNTER — OFFICE VISIT (OUTPATIENT)
Dept: HEMATOLOGY/ONCOLOGY | Facility: CLINIC | Age: 58
End: 2023-01-13
Payer: COMMERCIAL

## 2023-01-13 VITALS
SYSTOLIC BLOOD PRESSURE: 131 MMHG | DIASTOLIC BLOOD PRESSURE: 73 MMHG | HEIGHT: 74 IN | RESPIRATION RATE: 16 BRPM | HEART RATE: 81 BPM | WEIGHT: 186.94 LBS | TEMPERATURE: 98 F | OXYGEN SATURATION: 97 % | BODY MASS INDEX: 23.99 KG/M2

## 2023-01-13 DIAGNOSIS — C78.89 METASTATIC ADENOCARCINOMA TO PANCREAS: ICD-10-CM

## 2023-01-13 DIAGNOSIS — C18.4 MALIGNANT NEOPLASM OF TRANSVERSE COLON: Primary | ICD-10-CM

## 2023-01-13 PROCEDURE — 1159F MED LIST DOCD IN RCRD: CPT | Mod: CPTII,S$GLB,, | Performed by: INTERNAL MEDICINE

## 2023-01-13 PROCEDURE — 3078F PR MOST RECENT DIASTOLIC BLOOD PRESSURE < 80 MM HG: ICD-10-PCS | Mod: CPTII,S$GLB,, | Performed by: INTERNAL MEDICINE

## 2023-01-13 PROCEDURE — 99999 PR PBB SHADOW E&M-EST. PATIENT-LVL IV: CPT | Mod: PBBFAC,,, | Performed by: INTERNAL MEDICINE

## 2023-01-13 PROCEDURE — 3078F DIAST BP <80 MM HG: CPT | Mod: CPTII,S$GLB,, | Performed by: INTERNAL MEDICINE

## 2023-01-13 PROCEDURE — 3008F PR BODY MASS INDEX (BMI) DOCUMENTED: ICD-10-PCS | Mod: CPTII,S$GLB,, | Performed by: INTERNAL MEDICINE

## 2023-01-13 PROCEDURE — 1159F PR MEDICATION LIST DOCUMENTED IN MEDICAL RECORD: ICD-10-PCS | Mod: CPTII,S$GLB,, | Performed by: INTERNAL MEDICINE

## 2023-01-13 PROCEDURE — 99215 OFFICE O/P EST HI 40 MIN: CPT | Mod: S$GLB,,, | Performed by: INTERNAL MEDICINE

## 2023-01-13 PROCEDURE — 1160F RVW MEDS BY RX/DR IN RCRD: CPT | Mod: CPTII,S$GLB,, | Performed by: INTERNAL MEDICINE

## 2023-01-13 PROCEDURE — 99215 PR OFFICE/OUTPT VISIT, EST, LEVL V, 40-54 MIN: ICD-10-PCS | Mod: S$GLB,,, | Performed by: INTERNAL MEDICINE

## 2023-01-13 PROCEDURE — 99999 PR PBB SHADOW E&M-EST. PATIENT-LVL IV: ICD-10-PCS | Mod: PBBFAC,,, | Performed by: INTERNAL MEDICINE

## 2023-01-13 PROCEDURE — 1160F PR REVIEW ALL MEDS BY PRESCRIBER/CLIN PHARMACIST DOCUMENTED: ICD-10-PCS | Mod: CPTII,S$GLB,, | Performed by: INTERNAL MEDICINE

## 2023-01-13 PROCEDURE — 3075F SYST BP GE 130 - 139MM HG: CPT | Mod: CPTII,S$GLB,, | Performed by: INTERNAL MEDICINE

## 2023-01-13 PROCEDURE — 3075F PR MOST RECENT SYSTOLIC BLOOD PRESS GE 130-139MM HG: ICD-10-PCS | Mod: CPTII,S$GLB,, | Performed by: INTERNAL MEDICINE

## 2023-01-13 PROCEDURE — 3008F BODY MASS INDEX DOCD: CPT | Mod: CPTII,S$GLB,, | Performed by: INTERNAL MEDICINE

## 2023-01-13 NOTE — PROGRESS NOTES
"Service Date:  1/13/23    Chief Complaint: post GI biopsy      Osman Li Jr. is a 57 y.o. 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  This occurred shortly after completing treatment.    Patient was part of a clinical trial to measure CT DNA.  Next generation sequencing also showed a high tumor burden.  He is here to discuss his options.    Review of Systems   Constitutional: Negative.    HENT: Negative.     Eyes: Negative.    Respiratory: Negative.     Cardiovascular: Negative.    Gastrointestinal: Negative.    Endocrine: Negative.    Genitourinary: Negative.    Musculoskeletal: Negative.    Integumentary:  Negative.   Neurological:  Positive for numbness.   Hematological: Negative.    Psychiatric/Behavioral: Negative.        Current Outpatient Medications   Medication Instructions    atorvastatin (LIPITOR) 20 mg, Oral, Daily    diclofenac (VOLTAREN) 50 mg, Oral, 2 times daily    enalapriL-hydrochlorothiazide (VASERETIC) 10-25 mg per tablet 1 tablet, Oral, Daily    haemophilus B polysac-tetanus toxoid (ACTHIB, PF,) 10 mcg/0.5 mL injection TO BE ADMINISTERED.    metFORMIN (GLUCOPHAGE) 1,000 mg, Oral, 2 times daily with meals    oxyCODONE (ROXICODONE) 30 mg, Oral, Every 6 hours PRN    OZEMPIC 0.25 mg, Subcutaneous, Every 7 days    pen needle, diabetic 31 gauge x 3/16" Ndle 1 each, Misc.(Non-Drug; Combo Route), Daily    promethazine (PHENERGAN) 12.5 MG Tab TAKE 1 TABLET BY MOUTH EVERY 4 HOURS AS NEEDED    sildenafiL (VIAGRA) 100 mg, Oral, Daily PRN    TOUJEO MAX U-300 SOLOSTAR 26 Units, Subcutaneous, Daily    traZODone (DESYREL) 50 mg, Oral, Nightly        Past Medical History:   Diagnosis Date    Digestive disorder     DM (diabetes mellitus)     Hyperlipidemia     Hypertension     Prediabetes         Past Surgical History:   Procedure Laterality Date    CHOLECYSTECTOMY  2011    COLON SURGERY      COLONOSCOPY   " "   2018    COLOSTOMY N/A 04/25/2022    Procedure: CREATION, COLOSTOMY;  Surgeon: Carlton Waddell MD;  Location: [facility] OR;  Service: General;  Laterality: N/A;    ENDOSCOPIC ULTRASOUND OF UPPER GASTROINTESTINAL TRACT N/A 1/6/2023    Procedure: ULTRASOUND, UPPER GI TRACT, ENDOSCOPIC;  Surgeon: Rinku Orozco III, MD;  Location: [facility] ENDO;  Service: Endoscopy;  Laterality: N/A;    INSERTION OF TUNNELED CENTRAL VENOUS CATHETER (CVC) WITH SUBCUTANEOUS PORT Right 05/18/2022    Procedure: [id number];  Surgeon: Carlton Waddell MD;  Location: [facility] OR;  Service: General;  Laterality: Right;    MOBILIZATION OF SPLENIC FLEXURE N/A 04/25/2022    Procedure: MOBILIZATION, SPLENIC FLEXURE;  Surgeon: Carlton Waddell MD;  Location: [facility] OR;  Service: General;  Laterality: N/A;    SPLENECTOMY N/A 04/25/2022    Procedure: SPLENECTOMY;  Surgeon: Carlton Waddell MD;  Location: [facility] OR;  Service: General;  Laterality: N/A;    SUBTOTAL COLECTOMY N/A 04/25/2022    Procedure: COLECTOMY, PARTIAL;  Surgeon: Carlton Waddell MD;  Location: [facility] OR;  Service: General;  Laterality: N/A;    TONSILLECTOMY          Family History   Problem Relation Age of Onset    Heart disease Father        Social History     Tobacco Use    Smoking status: Every Day     Packs/day: 0.50     Years: 35.00     Pack years: 17.50     Types: Cigarettes    Smokeless tobacco: Never   Substance Use Topics    Alcohol use: Not Currently    Drug use: Never         Vitals:    01/13/23 1058   BP: 131/73   Pulse: 81   Resp: 16   Temp: 97.6 °F (36.4 °C)        Physical Exam:  /73 (BP Location: Right arm, Patient Position: Sitting, BP Method: Medium (Automatic))   Pulse 81   Temp 97.6 °F (36.4 °C) (Temporal)   Resp 16   Ht 6' 2" (1.88 m)   Wt 84.8 kg (186 lb 15.2 oz)   SpO2 97%   BMI 24.00 kg/m²     Physical Exam  Vitals and nursing note reviewed.   Constitutional:       Appearance: Normal appearance.   HENT:      Head: Normocephalic and atraumatic.      " Nose: Nose normal.      Mouth/Throat:      Mouth: Mucous membranes are moist.      Pharynx: Oropharynx is clear.   Eyes:      Extraocular Movements: Extraocular movements intact.      Conjunctiva/sclera: Conjunctivae normal.   Cardiovascular:      Rate and Rhythm: Normal rate and regular rhythm.      Heart sounds: Normal heart sounds.   Pulmonary:      Effort: Pulmonary effort is normal.      Breath sounds: Normal breath sounds.   Abdominal:      General: Abdomen is flat. Bowel sounds are normal.      Palpations: Abdomen is soft.      Comments: Ostomy bag in place.   Musculoskeletal:         General: Normal range of motion.      Cervical back: Normal range of motion and neck supple.   Skin:     General: Skin is warm and dry.   Neurological:      General: No focal deficit present.      Mental Status: He is alert and oriented to person, place, and time. Mental status is at baseline.   Psychiatric:         Mood and Affect: Mood normal.        Labs:  Lab Results   Component Value Date    WBC 9.06 01/04/2023    RBC 4.11 (L) 01/04/2023    HGB 13.3 (L) 01/04/2023    HCT 39.4 (L) 01/04/2023    MCV 96 01/04/2023    MCH 32.4 (H) 01/04/2023    MCHC 33.8 01/04/2023    RDW 17.0 (H) 01/04/2023     01/04/2023    MPV 9.4 01/04/2023    GRAN 3.3 01/04/2023    GRAN 36.2 (L) 01/04/2023    LYMPH 4.0 01/04/2023    LYMPH 44.5 01/04/2023    MONO 1.3 (H) 01/04/2023    MONO 14.8 01/04/2023    EOS 0.3 01/04/2023    BASO 0.11 01/04/2023    EOSINOPHIL 3.1 01/04/2023    BASOPHIL 1.2 01/04/2023     Sodium   Date Value Ref Range Status   12/12/2022 133 (L) 136 - 145 mmol/L Final     Potassium   Date Value Ref Range Status   12/12/2022 4.1 3.5 - 5.1 mmol/L Final     Chloride   Date Value Ref Range Status   12/12/2022 100 95 - 110 mmol/L Final     CO2   Date Value Ref Range Status   12/12/2022 23 23 - 29 mmol/L Final     Glucose   Date Value Ref Range Status   12/12/2022 234 (H) 70 - 110 mg/dL Final     BUN   Date Value Ref Range Status    12/12/2022 9 6 - 20 mg/dL Final     Creatinine   Date Value Ref Range Status   12/12/2022 0.9 0.5 - 1.4 mg/dL Final     Calcium   Date Value Ref Range Status   12/12/2022 10.2 8.7 - 10.5 mg/dL Final     Total Protein   Date Value Ref Range Status   12/12/2022 7.2 6.0 - 8.4 g/dL Final     Albumin   Date Value Ref Range Status   12/12/2022 3.3 (L) 3.5 - 5.2 g/dL Final     Total Bilirubin   Date Value Ref Range Status   12/12/2022 0.8 0.1 - 1.0 mg/dL Final     Comment:     For infants and newborns, interpretation of results should be based  on gestational age, weight and in agreement with clinical  observations.    Premature Infant recommended reference ranges:  Up to 24 hours.............<8.0 mg/dL  Up to 48 hours............<12.0 mg/dL  3-5 days..................<15.0 mg/dL  6-29 days.................<15.0 mg/dL       Alkaline Phosphatase   Date Value Ref Range Status   12/12/2022 116 55 - 135 U/L Final     AST   Date Value Ref Range Status   12/12/2022 20 10 - 40 U/L Final     ALT   Date Value Ref Range Status   12/12/2022 16 10 - 44 U/L Final     Anion Gap   Date Value Ref Range Status   12/12/2022 10 8 - 16 mmol/L Final     eGFR if    Date Value Ref Range Status   07/25/2022 >60 >60 mL/min/1.73 m^2 Final     eGFR if non    Date Value Ref Range Status   07/25/2022 >60 >60 mL/min/1.73 m^2 Final     Comment:     Calculation used to obtain the estimated glomerular filtration  rate (eGFR) is the CKD-EPI equation.          A/P:    Malignant neoplasm of the transverse colon  Recurrence in the pancreas  -poorly differentiated adenocarcinoma  -recurrence right after completing 12 cycles of FOLFOX  -patient on clinical trial to measure ctDNA, showed high tumor mutation burden  -given that the patient has a high tumor mutation burden, despite having MARY, I would like to try to treat him with Keytruda as it is indicated in his next generation sequencing.  I will attempt to obtain  authorization for this treatment.   -return to clinic when ready to start treatment    Pancreatic mass   -confirmed recurrence of colon adenocarcinoma    Back pain  - NM bone scan negative for mets    HTN  - on Enalapril  - follow up with PCP    HLP  - follow up with PCP    DM2  - on Metformin  - follow up with PCP    Tobacco use  - counseled on cessation      Aurash Khoobehi, MD  Hematology and Oncology

## 2023-01-18 ENCOUNTER — PATIENT MESSAGE (OUTPATIENT)
Dept: HEMATOLOGY/ONCOLOGY | Facility: CLINIC | Age: 58
End: 2023-01-18
Payer: COMMERCIAL

## 2023-01-21 LAB
ALBUMIN SERPL-MCNC: 4.1 G/DL (ref 3.6–5.1)
ALBUMIN/GLOB SERPL: 1.5 (CALC) (ref 1–2.5)
ALP SERPL-CCNC: 132 U/L (ref 35–144)
ALT SERPL-CCNC: 9 U/L (ref 9–46)
AST SERPL-CCNC: 23 U/L (ref 10–35)
BILIRUB SERPL-MCNC: 0.5 MG/DL (ref 0.2–1.2)
BUN SERPL-MCNC: 9 MG/DL (ref 7–25)
BUN/CREAT SERPL: ABNORMAL (CALC) (ref 6–22)
CALCIUM SERPL-MCNC: 10.4 MG/DL (ref 8.6–10.3)
CHLORIDE SERPL-SCNC: 99 MMOL/L (ref 98–110)
CHOLEST SERPL-MCNC: 157 MG/DL
CHOLEST/HDLC SERPL: 3.7 (CALC)
CO2 SERPL-SCNC: 30 MMOL/L (ref 20–32)
CREAT SERPL-MCNC: 0.75 MG/DL (ref 0.7–1.3)
EGFR: 105 ML/MIN/1.73M2
GLOBULIN SER CALC-MCNC: 2.8 G/DL (CALC) (ref 1.9–3.7)
GLUCOSE SERPL-MCNC: 79 MG/DL (ref 65–99)
HBA1C MFR BLD: 7 % OF TOTAL HGB
HDLC SERPL-MCNC: 43 MG/DL
LDLC SERPL CALC-MCNC: 99 MG/DL (CALC)
NONHDLC SERPL-MCNC: 114 MG/DL (CALC)
POTASSIUM SERPL-SCNC: 5.3 MMOL/L (ref 3.5–5.3)
PROT SERPL-MCNC: 6.9 G/DL (ref 6.1–8.1)
SODIUM SERPL-SCNC: 135 MMOL/L (ref 135–146)
TRIGL SERPL-MCNC: 65 MG/DL

## 2023-01-24 ENCOUNTER — OFFICE VISIT (OUTPATIENT)
Dept: INFECTIOUS DISEASES | Facility: CLINIC | Age: 58
End: 2023-01-24
Payer: COMMERCIAL

## 2023-01-24 VITALS
TEMPERATURE: 97 F | DIASTOLIC BLOOD PRESSURE: 68 MMHG | SYSTOLIC BLOOD PRESSURE: 136 MMHG | HEIGHT: 74 IN | BODY MASS INDEX: 23.72 KG/M2 | WEIGHT: 184.81 LBS | OXYGEN SATURATION: 97 % | HEART RATE: 80 BPM

## 2023-01-24 DIAGNOSIS — I10 HYPERTENSION, UNSPECIFIED TYPE: ICD-10-CM

## 2023-01-24 DIAGNOSIS — C78.89 METASTATIC ADENOCARCINOMA TO PANCREAS: ICD-10-CM

## 2023-01-24 DIAGNOSIS — C18.5 MALIGNANT NEOPLASM OF SPLENIC FLEXURE: Primary | ICD-10-CM

## 2023-01-24 DIAGNOSIS — F17.200 SMOKER: ICD-10-CM

## 2023-01-24 DIAGNOSIS — K65.1 INTRA-ABDOMINAL ABSCESS: ICD-10-CM

## 2023-01-24 PROCEDURE — 3078F DIAST BP <80 MM HG: CPT | Mod: CPTII,S$GLB,, | Performed by: STUDENT IN AN ORGANIZED HEALTH CARE EDUCATION/TRAINING PROGRAM

## 2023-01-24 PROCEDURE — 1159F MED LIST DOCD IN RCRD: CPT | Mod: CPTII,S$GLB,, | Performed by: STUDENT IN AN ORGANIZED HEALTH CARE EDUCATION/TRAINING PROGRAM

## 2023-01-24 PROCEDURE — 1159F PR MEDICATION LIST DOCUMENTED IN MEDICAL RECORD: ICD-10-PCS | Mod: CPTII,S$GLB,, | Performed by: STUDENT IN AN ORGANIZED HEALTH CARE EDUCATION/TRAINING PROGRAM

## 2023-01-24 PROCEDURE — 3008F BODY MASS INDEX DOCD: CPT | Mod: CPTII,S$GLB,, | Performed by: STUDENT IN AN ORGANIZED HEALTH CARE EDUCATION/TRAINING PROGRAM

## 2023-01-24 PROCEDURE — 3075F SYST BP GE 130 - 139MM HG: CPT | Mod: CPTII,S$GLB,, | Performed by: STUDENT IN AN ORGANIZED HEALTH CARE EDUCATION/TRAINING PROGRAM

## 2023-01-24 PROCEDURE — 3075F PR MOST RECENT SYSTOLIC BLOOD PRESS GE 130-139MM HG: ICD-10-PCS | Mod: CPTII,S$GLB,, | Performed by: STUDENT IN AN ORGANIZED HEALTH CARE EDUCATION/TRAINING PROGRAM

## 2023-01-24 PROCEDURE — 3078F PR MOST RECENT DIASTOLIC BLOOD PRESSURE < 80 MM HG: ICD-10-PCS | Mod: CPTII,S$GLB,, | Performed by: STUDENT IN AN ORGANIZED HEALTH CARE EDUCATION/TRAINING PROGRAM

## 2023-01-24 PROCEDURE — 3051F PR MOST RECENT HEMOGLOBIN A1C LEVEL 7.0 - < 8.0%: ICD-10-PCS | Mod: CPTII,S$GLB,, | Performed by: STUDENT IN AN ORGANIZED HEALTH CARE EDUCATION/TRAINING PROGRAM

## 2023-01-24 PROCEDURE — 3051F HG A1C>EQUAL 7.0%<8.0%: CPT | Mod: CPTII,S$GLB,, | Performed by: STUDENT IN AN ORGANIZED HEALTH CARE EDUCATION/TRAINING PROGRAM

## 2023-01-24 PROCEDURE — 99214 PR OFFICE/OUTPT VISIT, EST, LEVL IV, 30-39 MIN: ICD-10-PCS | Mod: S$GLB,,, | Performed by: STUDENT IN AN ORGANIZED HEALTH CARE EDUCATION/TRAINING PROGRAM

## 2023-01-24 PROCEDURE — 3008F PR BODY MASS INDEX (BMI) DOCUMENTED: ICD-10-PCS | Mod: CPTII,S$GLB,, | Performed by: STUDENT IN AN ORGANIZED HEALTH CARE EDUCATION/TRAINING PROGRAM

## 2023-01-24 PROCEDURE — 99214 OFFICE O/P EST MOD 30 MIN: CPT | Mod: S$GLB,,, | Performed by: STUDENT IN AN ORGANIZED HEALTH CARE EDUCATION/TRAINING PROGRAM

## 2023-01-24 NOTE — PROGRESS NOTES
Subjective: Hospital Follow UP - s/p splenectomy - vaccines and history of intraabdominal abscess  s/p colectomy       Patient ID: Osman Li Jr. is a 57 y.o. male.    Chief Complaint:: Follow-up      HPI Osman Li is a 57 y.o. male active heavy smoker, with past medical history of HTN, diabetes, and HLD, known to me from prior admission to [facility] 4/21 for acute abdomen secondary to LBO in the setting of large colonic mass s/p resection and splenectomy 4/21. Patient was discharged on levofloxacin, doxycyclin and flagyl PO. Received first 2 doses of meningococcus and pneumonococcus vaccines before discharge. Port-A-Cath placed 5/18. Patient had a recent admission to [facility] for sepsis secondary to intra-abdominal abscess, unable to have it drained by IR, sent home on IV ceftriaxone and flagyl PO for 10 days. He completed antibiotics 7/3.     Patient is here for follow up, wife present. Has had significant weight loss in the last couple of months. Plan to start chemotherapy next week, will re-schedule CT scan for early next week to assess status of prior abscess before starting chemotherapy.     He states he feels good, no abdominal pain, colostomy functioning, no nausea or vomit. No fever or chills.   Recent labs reviewed, normal WBC, CRP 8.5, slightly elevated.    8/23: Interim reviewed, patient scheduled for follow-up, seen via iPad. States he feels ok, some days are good and some days are not; weakness, fatigue, decreased appetite and weight loss. Patient had a repeat CT scan abdomen/pelvis 8/5 which showed rim-enhancing left upper quadrant fluid collection 5.3 x 1.8 cm  suspicious for abscess considering the provided clinical history. Smaller than prior, 9 x 11 cm. Adjacent to this there is masslike hypoattenuation involving the tail of the pancreas. This could is suspicious for pancreatic neoplasm, although phlegmon related to aforementioned abscess is also possible. Patient was started on  ceftriaxone IV on 7/11, still on ceftriaxone, plan to de-access port 8/26. He c/o lower back pain. He denies abdominal pan, fever or chills, he is aware of symptoms suggestive of infection and states he has not experienced them. Seen by Heme/Onc yesterday, on 4th cycle of chemotherapy, plan for NM bone scan to rule out metastasis.   Labs reviewed, CRP trending up, likely from underlying malignancy vs ongoing chemotherapy vs underlying fluid collection.     10/5:  Interim reviewed, patient seen virtually via iPad, wife present.  He is status post 7 cycle of chemotherapy 10/4, still feeling weak and fatigued, appetite is okay, continues to lose weight.  Denies fever or chills.  Had NM scan negative for bone metastases.  He is a participant of a research study at [facility] to check on DNA levels in blood.  [facility] pharmacy contacted, HiB vaccine available.  Patient instructed to come Friday to get his dose for Haemophilus influenzae type B vaccine.    12/13:  Interim reviewed, patient is here for follow-up, wife present.  Patient on his last cycle of chemotherapy, 12.  He is complaining of intermittent abdominal pain around colostomy site, denies fever or chills.  Significant weight loss, around 70 lb since diagnosed in April.  Patient completed vaccinations status post splenectomy.  Following with Heme-Onc outpatient, plan to repeat CT scan next week.  Notes from Heme-Onc reviewed, plan for Keytruda as adjunctive immunotherapy for colon cancer.    1/24/23:  Interim reviewed, patient is here for follow-up, wife present.  Patient had repeat CT scan in December which showed a suspicious lesion around pancreas status post EUS by GI/Dr. Orozco, pathology revealed metastatic adenocarcinoma to pancreas.  He is being followed by Heme-Onc regularly, plan to start Keytruda as immunotherapy.  Patient remains on the research study at [facility].  He denies any fever or chills, complaining of left-sided flank pain, states his  appetite has improved, current weight 184 lb.  He continues to smoke a pack a day, not interested in quitting.    Review of patient's allergies indicates:   Allergen Reactions    Penicillins Rash     Past Medical History:   Diagnosis Date    Digestive disorder     DM (diabetes mellitus)     Hyperlipidemia     Hypertension     Prediabetes      Past Surgical History:   Procedure Laterality Date    CHOLECYSTECTOMY  2011    COLON SURGERY      COLONOSCOPY      2018    COLOSTOMY N/A 04/25/2022    Procedure: CREATION, COLOSTOMY;  Surgeon: Carlton Waddell MD;  Location: [facility] OR;  Service: General;  Laterality: N/A;    ENDOSCOPIC ULTRASOUND OF UPPER GASTROINTESTINAL TRACT N/A 1/6/2023    Procedure: ULTRASOUND, UPPER GI TRACT, ENDOSCOPIC;  Surgeon: Rinku Orozco III, MD;  Location: [facility] ENDO;  Service: Endoscopy;  Laterality: N/A;    INSERTION OF TUNNELED CENTRAL VENOUS CATHETER (CVC) WITH SUBCUTANEOUS PORT Right 05/18/2022    Procedure: [id number];  Surgeon: Carlton Waddell MD;  Location: [facility] OR;  Service: General;  Laterality: Right;    MOBILIZATION OF SPLENIC FLEXURE N/A 04/25/2022    Procedure: MOBILIZATION, SPLENIC FLEXURE;  Surgeon: Carlton Waddell MD;  Location: [facility] OR;  Service: General;  Laterality: N/A;    SPLENECTOMY N/A 04/25/2022    Procedure: SPLENECTOMY;  Surgeon: Carlton Waddell MD;  Location: [facility] OR;  Service: General;  Laterality: N/A;    SUBTOTAL COLECTOMY N/A 04/25/2022    Procedure: COLECTOMY, PARTIAL;  Surgeon: Carlton Waddell MD;  Location: [facility] OR;  Service: General;  Laterality: N/A;    TONSILLECTOMY       Social History     Tobacco Use    Smoking status: Every Day     Packs/day: 0.50     Years: 35.00     Pack years: 17.50     Types: Cigarettes    Smokeless tobacco: Never   Substance Use Topics    Alcohol use: Not Currently        Family History   Problem Relation Age of Onset    Heart disease Father          Review of Systems   Constitutional:  Negative for appetite change,  "chills, fever and unexpected weight change.   HENT:  Negative for sinus pain.    Respiratory:  Negative for cough and shortness of breath.    Cardiovascular:  Negative for chest pain and leg swelling.   Gastrointestinal:  Positive for abdominal pain. Negative for diarrhea and nausea.        Colostomy bag   Genitourinary:  Negative for dysuria.   Musculoskeletal:  Negative for back pain.   Neurological:  Negative for headaches.      VAD: R Port-A-Cath not accessed    Objective:      Blood pressure 136/68, pulse 80, temperature 97.2 °F (36.2 °C), height 6' 2" (1.88 m), weight 83.8 kg (184 lb 12.8 oz), SpO2 97 %. Body mass index is 23.73 kg/m². virtual visit 8/23: looks comfortable on room air, conversant.     Physical Exam  Constitutional:       Appearance: He is normal weight.      Comments: Frail   HENT:      Mouth/Throat:      Mouth: Mucous membranes are moist.      Pharynx: Oropharynx is clear.   Eyes:      Conjunctiva/sclera: Conjunctivae normal.      Pupils: Pupils are equal, round, and reactive to light.   Cardiovascular:      Rate and Rhythm: Normal rate and regular rhythm.      Pulses: Normal pulses.      Heart sounds: Normal heart sounds.   Pulmonary:      Effort: Pulmonary effort is normal.      Breath sounds: Normal breath sounds.   Abdominal:      General: Bowel sounds are normal.      Palpations: Abdomen is soft.      Tenderness: There is no abdominal tenderness.      Comments: Healed wound, LL colostomy, mildly tender to palpation, no rebound, stool in bag   Musculoskeletal:         General: Normal range of motion.      Cervical back: Normal range of motion.      Right lower leg: No edema.      Left lower leg: No edema.   Skin:     General: Skin is warm.      Capillary Refill: Capillary refill takes less than 2 seconds.   Neurological:      General: No focal deficit present.      Mental Status: He is alert and oriented to person, place, and time. Mental status is at baseline.   Psychiatric:         " Thought Content: Thought content normal.       VAD: R Port-A-Cath, in place,not accessed, no redness noted, non tender to palpation       Recent Diagnostics:  CT scan 12/19/22:  1. Rim-enhancing left upper quadrant fluid collection reported previously has resolved.  2. Significant detrimental increase in size of ill-defined hypodensity at the pancreatic tail, now measuring up to 5.6 cm in greatest dimension, with development of a mildly prominent low density lymph node dorsal to the pancreatic body. The appearance is suspicious for malignancy, including primary pancreatic malignancy or metastatic lesion. Infection is conceivable, as this is in close proximity to the previously demonstrated rim-enhancing fluid collection, however this is felt significantly less likely. Endoscopic ultrasound may be of benefit, as this may be amenable to endoscopic biopsy.  3. Interval resolution of small left pleural effusion.  4. Previously noted small bowel containing left lower quadrant parastomal hernia is no longer evident.  5. Additional stable findings as above.     CT scan 9/5/22:  1. Little change of rim enhancing subdiaphragmatic left upper quadrant fluid collection since 8/5/2022.  2. No significant change of hypodense region involving pancreatic tail. Differential diagnosis of this has been previously discussed and remains unchanged. Continued imaging follow-up is recommended. Pancreatic protocol CT or MRI without and with IV contrast is suggested in 3 months. Alternatively, endoscopic ultrasound could be used for further evaluation.  3. Tiny left pleural effusion, unchanged.  4. Unchanged parastomal hernia.    NM scan 9/22:  No evidence of osseous metastasis.  CT scan 8/5:   Rim-enhancing fluid collection within the left upper quadrant at the splenic bed minimally increased in size when compared to the prior study and suspicious for abscess  Improvement of the masslike hypoattenuation involving the tail of the pancreas  most likely secondary to the adjacent inflammatory changes.  Small left pleural effusion with left lower lobe atelectasis  Left lower quadrant ostomy  Prior colectomy.    CT scan 7/8:  Rim-enhancing left upper quadrant fluid collection suspicious for abscess considering the provided clinical history.  Adjacent to this there is masslike hypoattenuation involving the tail of the pancreas. This could is suspicious for pancreatic neoplasm, although phlegmon related to aforementioned abscess is also possible. Dedicated pancreatic protocol imaging and correlation with surgical history is recommended.    CT scan 6/20/22:  1. Postsurgical change of the colon with increased size of air-fluid collection near the operative bed in the left upper abdominal quadrant.  Abscess or contained perforation are considerations.  2. Hepatomegaly.    CT scan 6/29/22:  1. Decreased size of left upper quadrant air-fluid collection.  2. Unchanged size of left pleural effusion.      Micro:  Wound cultures 5/2 E coli, Klebsiella pneumoniae and Candida dubliensis    Pathology:  inal Pathologic Diagnosis 1. Spleen, splenectomy:   - Benign splenic parenchyma   - Negative for malignancy   2. Colon, partial colectomy:   - Invasive colonic adenocarcinoma, poorly differentiated (G3)     - Invades into pericolorectal tissue (pT3)   - Margins uninvolved by invasive carcinoma     - 0.1 cm to the mesenteric margin   - Lymphovascular invasion identified   - No perineural invasion identified   - Seventeen of twenty-seven lymph nodes, positive for metastatic carcinoma   (17/27, pN2b)   - Fibrous serosal adhesions   - See below for results of MSI testing   - CARLOS ENRIQUE/MILLIE Targeted Gene Panel has been ordered and results will be issued in   a supplemental report     CAP Synoptic Checklist for Colonic Neoplasms:     - Procedure: Partial colectomy     - Tumor Site: Splenic flexure     - Histologic Type: Adenocarcinoma     - Histologic Grade: G3, poorly differentiated      - Tumor Size: 5.0 x 4.5 x 2.7 cm     - Tumor Extent: Invades through muscularis propria into pericolorectal   tissue     - Macroscopic Tumor Perforation: Not identified     - Lymphovascular Invasion: Present, small vessel involved     - Perineural Invasion: Not identified     - Number of Tumor Buds: 5 in one hotspot field     - Tumor Bud Score: Intermediate (5-9)     - Type of Polyp in which Invasive Carcinoma Arose: None identified     - Treatment Effect: No known presurgical therapy     - Margins: All margins negative for invasive carcinoma              Assessment and Plan:         1. Poorly differentiated metastatic adenocarcinoma of colon to pancreas s/p colectomy and splenectomy 4/21, completed chemotherapt complicated by recurrent intra-abdominal abscess, unable to drain by IR, latest CT scan 12/9 with resolution of abscess and suspicious lesion on pancreas + metastastis    NM scan negative for bone metastasis  Plan for immunotherapy with Keytruda  Heme/Onc following   Alarm symptoms given to patient    S/p Menveo and Pneumovac and HiB vaccines   RTC in 6 months      2. Smoker   3. PMHx HTN, diabetes, HLD    PCP follow-up    D/w patient, wife    Malignant neoplasm of splenic flexure    Metastatic adenocarcinoma to pancreas    Intra-abdominal abscess    Hypertension, unspecified type    Smoker

## 2023-01-25 ENCOUNTER — OFFICE VISIT (OUTPATIENT)
Dept: FAMILY MEDICINE | Facility: CLINIC | Age: 58
End: 2023-01-25
Payer: COMMERCIAL

## 2023-01-25 ENCOUNTER — INFUSION (OUTPATIENT)
Dept: INFUSION THERAPY | Facility: HOSPITAL | Age: 58
End: 2023-01-25
Attending: INTERNAL MEDICINE
Payer: COMMERCIAL

## 2023-01-25 VITALS
WEIGHT: 186.13 LBS | HEIGHT: 74 IN | HEART RATE: 81 BPM | TEMPERATURE: 98 F | RESPIRATION RATE: 16 BRPM | DIASTOLIC BLOOD PRESSURE: 69 MMHG | BODY MASS INDEX: 23.89 KG/M2 | OXYGEN SATURATION: 98 % | SYSTOLIC BLOOD PRESSURE: 133 MMHG

## 2023-01-25 VITALS
SYSTOLIC BLOOD PRESSURE: 132 MMHG | DIASTOLIC BLOOD PRESSURE: 70 MMHG | HEART RATE: 80 BPM | WEIGHT: 180 LBS | BODY MASS INDEX: 23.1 KG/M2 | HEIGHT: 74 IN | OXYGEN SATURATION: 99 %

## 2023-01-25 DIAGNOSIS — E11.69 DM TYPE 2 WITH DIABETIC DYSLIPIDEMIA: ICD-10-CM

## 2023-01-25 DIAGNOSIS — E78.5 DM TYPE 2 WITH DIABETIC DYSLIPIDEMIA: ICD-10-CM

## 2023-01-25 DIAGNOSIS — E78.2 MIXED HYPERLIPIDEMIA: ICD-10-CM

## 2023-01-25 DIAGNOSIS — C18.5 MALIGNANT NEOPLASM OF SPLENIC FLEXURE: Primary | ICD-10-CM

## 2023-01-25 DIAGNOSIS — Z93.3 COLOSTOMY STATUS: ICD-10-CM

## 2023-01-25 DIAGNOSIS — I10 ESSENTIAL HYPERTENSION: Primary | ICD-10-CM

## 2023-01-25 DIAGNOSIS — F17.210 CIGARETTE SMOKER: ICD-10-CM

## 2023-01-25 DIAGNOSIS — C78.89 METASTATIC ADENOCARCINOMA TO PANCREAS: ICD-10-CM

## 2023-01-25 PROCEDURE — A4216 STERILE WATER/SALINE, 10 ML: HCPCS | Performed by: INTERNAL MEDICINE

## 2023-01-25 PROCEDURE — 99214 OFFICE O/P EST MOD 30 MIN: CPT | Mod: 25,S$GLB,, | Performed by: NURSE PRACTITIONER

## 2023-01-25 PROCEDURE — 3078F PR MOST RECENT DIASTOLIC BLOOD PRESSURE < 80 MM HG: ICD-10-PCS | Mod: CPTII,S$GLB,, | Performed by: NURSE PRACTITIONER

## 2023-01-25 PROCEDURE — 4010F PR ACE/ARB THEARPY RXD/TAKEN: ICD-10-PCS | Mod: CPTII,S$GLB,, | Performed by: NURSE PRACTITIONER

## 2023-01-25 PROCEDURE — 96523 IRRIG DRUG DELIVERY DEVICE: CPT

## 2023-01-25 PROCEDURE — 99406 BEHAV CHNG SMOKING 3-10 MIN: CPT | Mod: S$GLB,,, | Performed by: NURSE PRACTITIONER

## 2023-01-25 PROCEDURE — 25000003 PHARM REV CODE 250: Performed by: INTERNAL MEDICINE

## 2023-01-25 PROCEDURE — 3051F HG A1C>EQUAL 7.0%<8.0%: CPT | Mod: CPTII,S$GLB,, | Performed by: NURSE PRACTITIONER

## 2023-01-25 PROCEDURE — 99214 PR OFFICE/OUTPT VISIT, EST, LEVL IV, 30-39 MIN: ICD-10-PCS | Mod: 25,S$GLB,, | Performed by: NURSE PRACTITIONER

## 2023-01-25 PROCEDURE — 3051F PR MOST RECENT HEMOGLOBIN A1C LEVEL 7.0 - < 8.0%: ICD-10-PCS | Mod: CPTII,S$GLB,, | Performed by: NURSE PRACTITIONER

## 2023-01-25 PROCEDURE — 99406 PR TOBACCO USE CESSATION INTERMEDIATE 3-10 MINUTES: ICD-10-PCS | Mod: S$GLB,,, | Performed by: NURSE PRACTITIONER

## 2023-01-25 PROCEDURE — 1160F PR REVIEW ALL MEDS BY PRESCRIBER/CLIN PHARMACIST DOCUMENTED: ICD-10-PCS | Mod: CPTII,S$GLB,, | Performed by: NURSE PRACTITIONER

## 2023-01-25 PROCEDURE — 3075F SYST BP GE 130 - 139MM HG: CPT | Mod: CPTII,S$GLB,, | Performed by: NURSE PRACTITIONER

## 2023-01-25 PROCEDURE — 3078F DIAST BP <80 MM HG: CPT | Mod: CPTII,S$GLB,, | Performed by: NURSE PRACTITIONER

## 2023-01-25 PROCEDURE — 4010F ACE/ARB THERAPY RXD/TAKEN: CPT | Mod: CPTII,S$GLB,, | Performed by: NURSE PRACTITIONER

## 2023-01-25 PROCEDURE — 1160F RVW MEDS BY RX/DR IN RCRD: CPT | Mod: CPTII,S$GLB,, | Performed by: NURSE PRACTITIONER

## 2023-01-25 PROCEDURE — 3008F BODY MASS INDEX DOCD: CPT | Mod: CPTII,S$GLB,, | Performed by: NURSE PRACTITIONER

## 2023-01-25 PROCEDURE — 3008F PR BODY MASS INDEX (BMI) DOCUMENTED: ICD-10-PCS | Mod: CPTII,S$GLB,, | Performed by: NURSE PRACTITIONER

## 2023-01-25 PROCEDURE — 1159F MED LIST DOCD IN RCRD: CPT | Mod: CPTII,S$GLB,, | Performed by: NURSE PRACTITIONER

## 2023-01-25 PROCEDURE — 1159F PR MEDICATION LIST DOCUMENTED IN MEDICAL RECORD: ICD-10-PCS | Mod: CPTII,S$GLB,, | Performed by: NURSE PRACTITIONER

## 2023-01-25 PROCEDURE — 63600175 PHARM REV CODE 636 W HCPCS: Performed by: INTERNAL MEDICINE

## 2023-01-25 PROCEDURE — 3075F PR MOST RECENT SYSTOLIC BLOOD PRESS GE 130-139MM HG: ICD-10-PCS | Mod: CPTII,S$GLB,, | Performed by: NURSE PRACTITIONER

## 2023-01-25 RX ORDER — SODIUM CHLORIDE 0.9 % (FLUSH) 0.9 %
10 SYRINGE (ML) INJECTION
OUTPATIENT
Start: 2023-01-25

## 2023-01-25 RX ORDER — HEPARIN 100 UNIT/ML
500 SYRINGE INTRAVENOUS
OUTPATIENT
Start: 2023-01-25

## 2023-01-25 RX ORDER — METFORMIN HYDROCHLORIDE 1000 MG/1
1000 TABLET ORAL 2 TIMES DAILY WITH MEALS
Qty: 180 TABLET | Refills: 3 | Status: SHIPPED | OUTPATIENT
Start: 2023-01-25 | End: 2023-08-04 | Stop reason: SDUPTHER

## 2023-01-25 RX ORDER — ENALAPRIL MALEATE AND HYDROCHLOROTHIAZIDE 10; 25 MG/1; MG/1
1 TABLET ORAL DAILY
Qty: 90 TABLET | Refills: 3 | Status: SHIPPED | OUTPATIENT
Start: 2023-01-25 | End: 2023-08-04 | Stop reason: SDUPTHER

## 2023-01-25 RX ORDER — HEPARIN 100 UNIT/ML
500 SYRINGE INTRAVENOUS
Status: DISCONTINUED | OUTPATIENT
Start: 2023-01-25 | End: 2023-01-25 | Stop reason: HOSPADM

## 2023-01-25 RX ORDER — SODIUM CHLORIDE 0.9 % (FLUSH) 0.9 %
10 SYRINGE (ML) INJECTION
Status: DISCONTINUED | OUTPATIENT
Start: 2023-01-25 | End: 2023-01-25 | Stop reason: HOSPADM

## 2023-01-25 RX ORDER — ATORVASTATIN CALCIUM 20 MG/1
20 TABLET, FILM COATED ORAL DAILY
Qty: 90 TABLET | Refills: 3 | Status: SHIPPED | OUTPATIENT
Start: 2023-01-25 | End: 2023-08-04 | Stop reason: SDUPTHER

## 2023-01-25 RX ADMIN — HEPARIN 500 UNITS: 100 SYRINGE at 10:01

## 2023-01-25 RX ADMIN — SODIUM CHLORIDE, PRESERVATIVE FREE 10 ML: 5 INJECTION INTRAVENOUS at 10:01

## 2023-01-25 NOTE — PROGRESS NOTES
SUBJECTIVE:    Patient ID: Osman Li Jr. is a 57 y.o. male.    Chief Complaint: Hypertension (Bottles brought//Pt is here for a check up and medication//Decline colo//JULIUS )    Patient here for regular follow-up- DM , HTN, lipids. Patient new to me, previously followed by Richard ALVAREZ    Pt reports overall doing okay.    Diagnosed with colon cancer early 2022, underwent partial colectomy with splenectomy and colostomy by Dr. Waddell and then completed chemo.  Has been followed by Dr. Khoobehi, [facility] with recent diagnosis of metastatic disease to the pancreas.  Dr. Khoobehi recently ordered to begin Keytruda treatments and patient states he's waiting to hear when this can start.  Has a port in place.  Patient reports initially after diagnosis, surgery and treatment lost about 60 lbs though weight has now plateaued and he has maybe even gained a few lb.    History of DM2 on insulin, metformin and Ozempic.  States he has been having trouble getting Ozempic filled due to the medication shortage.  Reports fasting blood sugars have improved recently with fastings down to the 90s to 100, denies any symptomatic hypoglycemia or sugars less than 70.    Patient reports since surgery he has had chronic left-sided abdominal pain near the site of the colostomy.  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  Has good output from the colostomy.  Overall pain is improved since surgery though still has some residual pain. apparently postop developed abdominal abscess requiring IV antibiotics.  Had follow-up with ID yesterday, has been off antibiotics for months now.    Smokes 1 ppd, states really not interested in quitting.  Denies shortness of breath.  Admits to cough in the morning when he 1st wakes up      Telephone on 01/12/2023   Component Date Value Ref Range Status    Glucose 01/20/2023 79  65 - 99 mg/dL Final    BUN 01/20/2023 9  7 - 25 mg/dL Final    Creatinine 01/20/2023 0.75  0.70 - 1.30 mg/dL Final    eGFR 01/20/2023  105  > OR = 60 mL/min/1.73m2 Final    BUN/Creatinine Ratio 01/20/2023 NOT APPLICABLE  6 - 22 (calc) Final    Sodium 01/20/2023 135  135 - 146 mmol/L Final    Potassium 01/20/2023 5.3  3.5 - 5.3 mmol/L Final    Chloride 01/20/2023 99  98 - 110 mmol/L Final    CO2 01/20/2023 30  20 - 32 mmol/L Final    Calcium 01/20/2023 10.4 (H)  8.6 - 10.3 mg/dL Final    Total Protein 01/20/2023 6.9  6.1 - 8.1 g/dL Final    Albumin 01/20/2023 4.1  3.6 - 5.1 g/dL Final    Globulin, Total 01/20/2023 2.8  1.9 - 3.7 g/dL (calc) Final    Albumin/Globulin Ratio 01/20/2023 1.5  1.0 - 2.5 (calc) Final    Total Bilirubin 01/20/2023 0.5  0.2 - 1.2 mg/dL Final    Alkaline Phosphatase 01/20/2023 132  35 - 144 U/L Final    AST 01/20/2023 23  10 - 35 U/L Final    ALT 01/20/2023 9  9 - 46 U/L Final    Cholesterol 01/20/2023 157  <200 mg/dL Final    HDL 01/20/2023 43  > OR = 40 mg/dL Final    Triglycerides 01/20/2023 65  <150 mg/dL Final    LDL Cholesterol 01/20/2023 99  mg/dL (calc) Final    HDL/Cholesterol Ratio 01/20/2023 3.7  <5.0 (calc) Final    Non HDL Chol. (LDL+VLDL) 01/20/2023 114  <130 mg/dL (calc) Final    Hemoglobin A1C 01/20/2023 7.0 (H)  <5.7 % of total Hgb Final   Admission on 01/06/2023, Discharged on 01/06/2023   Component Date Value Ref Range Status    POC Glucose 01/06/2023 114 (H)  70 - 110 Final   Hospital Outpatient Visit on 01/04/2023   Component Date Value Ref Range Status    WBC 01/04/2023 9.06  3.90 - 12.70 K/uL Final    RBC 01/04/2023 4.11 (L)  4.60 - 6.20 M/uL Final    Hemoglobin 01/04/2023 13.3 (L)  14.0 - 18.0 g/dL Final    Hematocrit 01/04/2023 39.4 (L)  40.0 - 54.0 % Final    MCV 01/04/2023 96  82 - 98 fL Final    MCH 01/04/2023 32.4 (H)  27.0 - 31.0 pg Final    MCHC 01/04/2023 33.8  32.0 - 36.0 g/dL Final    RDW 01/04/2023 17.0 (H)  11.5 - 14.5 % Final    Platelets 01/04/2023 318  150 - 450 K/uL Final    MPV 01/04/2023 9.4  9.2 - 12.9 fL Final    Immature Granulocytes 01/04/2023 0.2  0.0 - 0.5 % Final    Gran #  (ANC) 01/04/2023 3.3  1.8 - 7.7 K/uL Final    Immature Grans (Abs) 01/04/2023 0.02  0.00 - 0.04 K/uL Final    Lymph # 01/04/2023 4.0  1.0 - 4.8 K/uL Final    Mono # 01/04/2023 1.3 (H)  0.3 - 1.0 K/uL Final    Eos # 01/04/2023 0.3  0.0 - 0.5 K/uL Final    Baso # 01/04/2023 0.11  0.00 - 0.20 K/uL Final    nRBC 01/04/2023 0  0 /100 WBC Final    Gran % 01/04/2023 36.2 (L)  38.0 - 73.0 % Final    Lymph % 01/04/2023 44.5  18.0 - 48.0 % Final    Mono % 01/04/2023 14.8  4.0 - 15.0 % Final    Eosinophil % 01/04/2023 3.1  0.0 - 8.0 % Final    Basophil % 01/04/2023 1.2  0.0 - 1.9 % Final    Differential Method 01/04/2023 Automated   Final   Lab Visit on 12/30/2022   Component Date Value Ref Range Status    CA 19-9 12/30/2022 21  0 - 35 U/mL Final   Lab Visit on 12/12/2022   Component Date Value Ref Range Status    WBC 12/12/2022 14.85 (H)  3.90 - 12.70 K/uL Final    RBC 12/12/2022 4.27 (L)  4.60 - 6.20 M/uL Final    Hemoglobin 12/12/2022 13.7 (L)  14.0 - 18.0 g/dL Final    Hematocrit 12/12/2022 39.4 (L)  40.0 - 54.0 % Final    MCV 12/12/2022 92  82 - 98 fL Final    MCH 12/12/2022 32.1 (H)  27.0 - 31.0 pg Final    MCHC 12/12/2022 34.8  32.0 - 36.0 g/dL Final    RDW 12/12/2022 20.3 (H)  11.5 - 14.5 % Final    Platelets 12/12/2022 288  150 - 450 K/uL Final    MPV 12/12/2022 9.2  9.2 - 12.9 fL Final    Immature Granulocytes 12/12/2022 0.5  0.0 - 0.5 % Final    Gran # (ANC) 12/12/2022 9.1 (H)  1.8 - 7.7 K/uL Final    Immature Grans (Abs) 12/12/2022 0.07 (H)  0.00 - 0.04 K/uL Final    Lymph # 12/12/2022 4.0  1.0 - 4.8 K/uL Final    Mono # 12/12/2022 1.5 (H)  0.3 - 1.0 K/uL Final    Eos # 12/12/2022 0.1  0.0 - 0.5 K/uL Final    Baso # 12/12/2022 0.08  0.00 - 0.20 K/uL Final    nRBC 12/12/2022 0  0 /100 WBC Final    Gran % 12/12/2022 61.5  38.0 - 73.0 % Final    Lymph % 12/12/2022 26.7  18.0 - 48.0 % Final    Mono % 12/12/2022 10.0  4.0 - 15.0 % Final    Eosinophil % 12/12/2022 0.8  0.0 - 8.0 % Final    Basophil % 12/12/2022 0.5  0.0  - 1.9 % Final    Differential Method 12/12/2022 Automated   Final    Sodium 12/12/2022 133 (L)  136 - 145 mmol/L Final    Potassium 12/12/2022 4.1  3.5 - 5.1 mmol/L Final    Chloride 12/12/2022 100  95 - 110 mmol/L Final    CO2 12/12/2022 23  23 - 29 mmol/L Final    Glucose 12/12/2022 234 (H)  70 - 110 mg/dL Final    BUN 12/12/2022 9  6 - 20 mg/dL Final    Creatinine 12/12/2022 0.9  0.5 - 1.4 mg/dL Final    Calcium 12/12/2022 10.2  8.7 - 10.5 mg/dL Final    Total Protein 12/12/2022 7.2  6.0 - 8.4 g/dL Final    Albumin 12/12/2022 3.3 (L)  3.5 - 5.2 g/dL Final    Total Bilirubin 12/12/2022 0.8  0.1 - 1.0 mg/dL Final    Alkaline Phosphatase 12/12/2022 116  55 - 135 U/L Final    AST 12/12/2022 20  10 - 40 U/L Final    ALT 12/12/2022 16  10 - 44 U/L Final    Anion Gap 12/12/2022 10  8 - 16 mmol/L Final    eGFR 12/12/2022 >60  >60 mL/min/1.73 m^2 Final    Magnesium 12/12/2022 2.0  1.6 - 2.6 mg/dL Final   Lab Visit on 11/30/2022   Component Date Value Ref Range Status    Drug Study Test Name 11/30/2022 Drug Study   Final    Drug Study Specimen Type 11/30/2022 n/a   Final    Drug Study Test Result 11/30/2022 Result sent directly to physician   Final   Lab Visit on 11/28/2022   Component Date Value Ref Range Status    Sodium 11/28/2022 136  136 - 145 mmol/L Final    Potassium 11/28/2022 3.9  3.5 - 5.1 mmol/L Final    Chloride 11/28/2022 98  95 - 110 mmol/L Final    CO2 11/28/2022 26  23 - 29 mmol/L Final    Glucose 11/28/2022 173 (H)  70 - 110 mg/dL Final    BUN 11/28/2022 10  6 - 20 mg/dL Final    Creatinine 11/28/2022 0.8  0.5 - 1.4 mg/dL Final    Calcium 11/28/2022 10.4  8.7 - 10.5 mg/dL Final    Total Protein 11/28/2022 7.0  6.0 - 8.4 g/dL Final    Albumin 11/28/2022 3.4 (L)  3.5 - 5.2 g/dL Final    Total Bilirubin 11/28/2022 0.7  0.1 - 1.0 mg/dL Final    Alkaline Phosphatase 11/28/2022 110  55 - 135 U/L Final    AST 11/28/2022 27  10 - 40 U/L Final    ALT 11/28/2022 17  10 - 44 U/L Final    Anion Gap 11/28/2022 12  8 -  16 mmol/L Final    eGFR 11/28/2022 >60  >60 mL/min/1.73 m^2 Final    WBC 11/28/2022 11.15  3.90 - 12.70 K/uL Final    RBC 11/28/2022 4.32 (L)  4.60 - 6.20 M/uL Final    Hemoglobin 11/28/2022 13.4 (L)  14.0 - 18.0 g/dL Final    Hematocrit 11/28/2022 39.8 (L)  40.0 - 54.0 % Final    MCV 11/28/2022 92  82 - 98 fL Final    MCH 11/28/2022 31.0  27.0 - 31.0 pg Final    MCHC 11/28/2022 33.7  32.0 - 36.0 g/dL Final    RDW 11/28/2022 22.4 (H)  11.5 - 14.5 % Final    Platelets 11/28/2022 261  150 - 450 K/uL Final    MPV 11/28/2022 9.1 (L)  9.2 - 12.9 fL Final    Immature Granulocytes 11/28/2022 0.4  0.0 - 0.5 % Final    Gran # (ANC) 11/28/2022 5.8  1.8 - 7.7 K/uL Final    Immature Grans (Abs) 11/28/2022 0.04  0.00 - 0.04 K/uL Final    Lymph # 11/28/2022 3.8  1.0 - 4.8 K/uL Final    Mono # 11/28/2022 1.3 (H)  0.3 - 1.0 K/uL Final    Eos # 11/28/2022 0.2  0.0 - 0.5 K/uL Final    Baso # 11/28/2022 0.07  0.00 - 0.20 K/uL Final    nRBC 11/28/2022 0  0 /100 WBC Final    Gran % 11/28/2022 51.8  38.0 - 73.0 % Final    Lymph % 11/28/2022 34.0  18.0 - 48.0 % Final    Mono % 11/28/2022 11.6  4.0 - 15.0 % Final    Eosinophil % 11/28/2022 1.6  0.0 - 8.0 % Final    Basophil % 11/28/2022 0.6  0.0 - 1.9 % Final    Differential Method 11/28/2022 Automated   Final   Lab Visit on 11/11/2022   Component Date Value Ref Range Status    WBC 11/11/2022 11.17  3.90 - 12.70 K/uL Final    RBC 11/11/2022 4.98  4.60 - 6.20 M/uL Final    Hemoglobin 11/11/2022 14.5  14.0 - 18.0 g/dL Final    Hematocrit 11/11/2022 43.8  40.0 - 54.0 % Final    MCV 11/11/2022 88  82 - 98 fL Final    MCH 11/11/2022 29.1  27.0 - 31.0 pg Final    MCHC 11/11/2022 33.1  32.0 - 36.0 g/dL Final    RDW 11/11/2022 23.9 (H)  11.5 - 14.5 % Final    Platelets 11/11/2022 343  150 - 450 K/uL Final    MPV 11/11/2022 10.3  9.2 - 12.9 fL Final    Immature Granulocytes 11/11/2022 0.4  0.0 - 0.5 % Final    Gran # (ANC) 11/11/2022 5.0  1.8 - 7.7 K/uL Final    Immature Grans (Abs) 11/11/2022 0.05  (H)  0.00 - 0.04 K/uL Final    Lymph # 11/11/2022 4.3  1.0 - 4.8 K/uL Final    Mono # 11/11/2022 1.5 (H)  0.3 - 1.0 K/uL Final    Eos # 11/11/2022 0.2  0.0 - 0.5 K/uL Final    Baso # 11/11/2022 0.07  0.00 - 0.20 K/uL Final    nRBC 11/11/2022 1 (A)  0 /100 WBC Final    Gran % 11/11/2022 45.2  38.0 - 73.0 % Final    Lymph % 11/11/2022 38.4  18.0 - 48.0 % Final    Mono % 11/11/2022 13.3  4.0 - 15.0 % Final    Eosinophil % 11/11/2022 2.1  0.0 - 8.0 % Final    Basophil % 11/11/2022 0.6  0.0 - 1.9 % Final    Differential Method 11/11/2022 Automated   Final    Sodium 11/11/2022 132 (L)  136 - 145 mmol/L Final    Potassium 11/11/2022 4.2  3.5 - 5.1 mmol/L Final    Chloride 11/11/2022 95  95 - 110 mmol/L Final    CO2 11/11/2022 25  23 - 29 mmol/L Final    Glucose 11/11/2022 191 (H)  70 - 110 mg/dL Final    BUN 11/11/2022 15  6 - 20 mg/dL Final    Creatinine 11/11/2022 0.9  0.5 - 1.4 mg/dL Final    Calcium 11/11/2022 11.1 (H)  8.7 - 10.5 mg/dL Final    Total Protein 11/11/2022 7.6  6.0 - 8.4 g/dL Final    Albumin 11/11/2022 3.5  3.5 - 5.2 g/dL Final    Total Bilirubin 11/11/2022 1.0  0.1 - 1.0 mg/dL Final    Alkaline Phosphatase 11/11/2022 145 (H)  55 - 135 U/L Final    AST 11/11/2022 27  10 - 40 U/L Final    ALT 11/11/2022 17  10 - 44 U/L Final    Anion Gap 11/11/2022 12  8 - 16 mmol/L Final    eGFR 11/11/2022 >60  >60 mL/min/1.73 m^2 Final    Magnesium 11/11/2022 1.8  1.6 - 2.6 mg/dL Final   Lab Visit on 10/28/2022   Component Date Value Ref Range Status    WBC 10/28/2022 10.91  3.90 - 12.70 K/uL Final    RBC 10/28/2022 4.90  4.60 - 6.20 M/uL Final    Hemoglobin 10/28/2022 14.1  14.0 - 18.0 g/dL Final    Hematocrit 10/28/2022 41.5  40.0 - 54.0 % Final    MCV 10/28/2022 85  82 - 98 fL Final    MCH 10/28/2022 28.8  27.0 - 31.0 pg Final    MCHC 10/28/2022 34.0  32.0 - 36.0 g/dL Final    RDW 10/28/2022 22.7 (H)  11.5 - 14.5 % Final    Platelets 10/28/2022 229  150 - 450 K/uL Final    MPV 10/28/2022 9.9  9.2 - 12.9 fL Final     Immature Granulocytes 10/28/2022 0.4  0.0 - 0.5 % Final    Gran # (ANC) 10/28/2022 4.0  1.8 - 7.7 K/uL Final    Immature Grans (Abs) 10/28/2022 0.04  0.00 - 0.04 K/uL Final    Lymph # 10/28/2022 4.9 (H)  1.0 - 4.8 K/uL Final    Mono # 10/28/2022 1.7 (H)  0.3 - 1.0 K/uL Final    Eos # 10/28/2022 0.3  0.0 - 0.5 K/uL Final    Baso # 10/28/2022 0.08  0.00 - 0.20 K/uL Final    nRBC 10/28/2022 1 (A)  0 /100 WBC Final    Gran % 10/28/2022 36.8 (L)  38.0 - 73.0 % Final    Lymph % 10/28/2022 44.5  18.0 - 48.0 % Final    Mono % 10/28/2022 15.3 (H)  4.0 - 15.0 % Final    Eosinophil % 10/28/2022 2.3  0.0 - 8.0 % Final    Basophil % 10/28/2022 0.7  0.0 - 1.9 % Final    Platelet Estimate 10/28/2022 Appears normal   Final    Aniso 10/28/2022 Slight   Final    Poik 10/28/2022 Slight   Final    Ovalocytes 10/28/2022 Occasional   Final    Differential Method 10/28/2022 Automated   Final    Sodium 10/28/2022 135 (L)  136 - 145 mmol/L Final    Potassium 10/28/2022 4.3  3.5 - 5.1 mmol/L Final    Chloride 10/28/2022 96  95 - 110 mmol/L Final    CO2 10/28/2022 27  23 - 29 mmol/L Final    Glucose 10/28/2022 155 (H)  70 - 110 mg/dL Final    BUN 10/28/2022 18  6 - 20 mg/dL Final    Creatinine 10/28/2022 1.0  0.5 - 1.4 mg/dL Final    Calcium 10/28/2022 10.4  8.7 - 10.5 mg/dL Final    Total Protein 10/28/2022 7.3  6.0 - 8.4 g/dL Final    Albumin 10/28/2022 3.5  3.5 - 5.2 g/dL Final    Total Bilirubin 10/28/2022 0.7  0.1 - 1.0 mg/dL Final    Alkaline Phosphatase 10/28/2022 144 (H)  55 - 135 U/L Final    AST 10/28/2022 30  10 - 40 U/L Final    ALT 10/28/2022 20  10 - 44 U/L Final    Anion Gap 10/28/2022 12  8 - 16 mmol/L Final    eGFR 10/28/2022 >60  >60 mL/min/1.73 m^2 Final    Magnesium 10/28/2022 1.3 (L)  1.6 - 2.6 mg/dL Final   Lab Visit on 10/14/2022   Component Date Value Ref Range Status    Microalbumin, Urine 10/14/2022 8.0  ug/mL Final    Creatinine, Urine 10/14/2022 78.0  23.0 - 375.0 mg/dL Final    Microalb/Creat Ratio 10/14/2022 10.3   0.0 - 30.0 ug/mg Final    Specimen UA 10/14/2022 Urine, Clean Catch   Final    Color, UA 10/14/2022 Yellow  Yellow, Straw, Chela Final    Appearance, UA 10/14/2022 Clear  Clear Final    pH, UA 10/14/2022 6.0  5.0 - 8.0 Final    Specific Gravity, UA 10/14/2022 1.015  1.005 - 1.030 Final    Protein, UA 10/14/2022 Negative  Negative Final    Glucose, UA 10/14/2022 3+ (A)  Negative Final    Ketones, UA 10/14/2022 Negative  Negative Final    Bilirubin (UA) 10/14/2022 Negative  Negative Final    Occult Blood UA 10/14/2022 Negative  Negative Final    Nitrite, UA 10/14/2022 Negative  Negative Final    Urobilinogen, UA 10/14/2022 Negative  <2.0 EU/dL Final    Leukocytes, UA 10/14/2022 Negative  Negative Final    Cholesterol 10/14/2022 166  120 - 199 mg/dL Final    Triglycerides 10/14/2022 132  30 - 150 mg/dL Final    HDL 10/14/2022 50  40 - 75 mg/dL Final    LDL Cholesterol 10/14/2022 89.6  63.0 - 159.0 mg/dL Final    HDL/Cholesterol Ratio 10/14/2022 30.1  20.0 - 50.0 % Final    Total Cholesterol/HDL Ratio 10/14/2022 3.3  2.0 - 5.0 Final    Non-HDL Cholesterol 10/14/2022 116  mg/dL Final    TSH 10/14/2022 1.458  0.400 - 4.000 uIU/mL Final    WBC, UA 10/14/2022 1  0 - 5 /hpf Final    Bacteria 10/14/2022 None  None-Occ /hpf Final    Yeast, UA 10/14/2022 None  None Final    Hyaline Casts, UA 10/14/2022 1  0-1/lpf /lpf Final    Microscopic Comment 10/14/2022 SEE COMMENT   Final   There may be more visits with results that are not included.       Past Medical History:   Diagnosis Date    Digestive disorder     DM (diabetes mellitus)     Hyperlipidemia     Hypertension     Prediabetes      Past Surgical History:   Procedure Laterality Date    CHOLECYSTECTOMY  2011    COLON SURGERY      COLONOSCOPY      2018    COLOSTOMY N/A 04/25/2022    Procedure: CREATION, COLOSTOMY;  Surgeon: Carlton Waddell MD;  Location: [facility] OR;  Service: General;  Laterality: N/A;    ENDOSCOPIC ULTRASOUND OF UPPER GASTROINTESTINAL TRACT N/A 1/6/2023     Procedure: ULTRASOUND, UPPER GI TRACT, ENDOSCOPIC;  Surgeon: Rinku Orozco III, MD;  Location[facility] ENDO;  Service: Endoscopy;  Laterality: N/A;    INSERTION OF TUNNELED CENTRAL VENOUS CATHETER (CVC) WITH SUBCUTANEOUS PORT Right 05/18/2022    Procedure: [id number];  Surgeon: Carlton Waddell MD;  Location: [facility] OR;  Service: General;  Laterality: Right;    MOBILIZATION OF SPLENIC FLEXURE N/A 04/25/2022    Procedure: MOBILIZATION, SPLENIC FLEXURE;  Surgeon: Carlton Waddell MD;  Location: [facility] OR;  Service: General;  Laterality: N/A;    SPLENECTOMY N/A 04/25/2022    Procedure: SPLENECTOMY;  Surgeon: Carlton Waddell MD;  Location: [facility] OR;  Service: General;  Laterality: N/A;    SUBTOTAL COLECTOMY N/A 04/25/2022    Procedure: COLECTOMY, PARTIAL;  Surgeon: Carlton Waddell MD;  Location: [facility] OR;  Service: General;  Laterality: N/A;    TONSILLECTOMY       Family History   Problem Relation Age of Onset    Heart disease Father        The 10-year CVD risk score (D'Agostino, et al., 2008) is: 35.1%    Values used to calculate the score:      Age: 57 years      Sex: Male      Diabetic: Yes      Tobacco smoker: Yes      Systolic Blood Pressure: 133 mmHg      Is BP treated: No      HDL Cholesterol: 43 mg/dL      Total Cholesterol: 157 mg/dL     Marital Status:   Alcohol History:  reports that he does not currently use alcohol.  Tobacco History:  reports that he has been smoking cigarettes. He has a 40.00 pack-year smoking history. He has never used smokeless tobacco.  Drug History:  reports no history of drug use.    Health Maintenance Topics with due status: Not Due       Topic Last Completion Date    Eye Exam 03/25/2022    Foot Exam 05/09/2022    Pneumococcal Vaccines (Age 0-64) 07/07/2022    Diabetes Urine Screening 10/14/2022    Lipid Panel 01/20/2023    Hemoglobin A1c 01/20/2023    Low Dose Statin 01/25/2023     Immunization History   Administered Date(s) Administered    COVID-19, vector-nr, [id number],  "PF (Jose Elias) 05/27/2021    HiB PRP-T 10/07/2022    Influenza 11/11/2020    Influenza (FLUBLOK) - Quadrivalent - Recombinant - PF *Preferred* (egg allergy) 10/12/2022    Influenza - Quadrivalent - MDCK 10/16/2019    Influenza - Quadrivalent - MDCK - PF 11/07/2020    Meningococcal Conjugate (MCV4O) 05/02/2022, 07/07/2022    Pneumococcal Conjugate - 13 Valent 05/02/2022    Pneumococcal Polysaccharide - 23 Valent 07/07/2022    Zoster Recombinant 02/06/2021, 04/30/2021       Review of patient's allergies indicates:   Allergen Reactions    Penicillins Rash       Current Outpatient Medications:     insulin glargine U-300 conc (TOUJEO MAX U-300 SOLOSTAR) 300 unit/mL (3 mL) insulin pen, Inject 26 Units into the skin once daily., Disp: 1 pen, Rfl: 5    oxyCODONE (ROXICODONE) 30 MG Tab, Take 1 tablet (30 mg total) by mouth every 6 (six) hours as needed., Disp: 60 tablet, Rfl: 0    pen needle, diabetic 31 gauge x 3/16" Ndle, 1 each by Misc.(Non-Drug; Combo Route) route once daily., Disp: 90 each, Rfl: 3    promethazine (PHENERGAN) 12.5 MG Tab, TAKE 1 TABLET BY MOUTH EVERY 4 HOURS AS NEEDED, Disp: 25 tablet, Rfl: 1    semaglutide (OZEMPIC) 0.25 mg or 0.5 mg(2 mg/1.5 mL) pen injector, Inject 0.25 mg into the skin every 7 days. (Patient taking differently: Inject 0.5 mg into the skin every 7 days.), Disp: 1 pen, Rfl: 5    sildenafiL (VIAGRA) 100 MG tablet, Take 1 tablet (100 mg total) by mouth daily as needed for Erectile Dysfunction., Disp: 30 tablet, Rfl: 1    traZODone (DESYREL) 50 MG tablet, Take 1 tablet (50 mg total) by mouth every evening., Disp: 90 tablet, Rfl: 1    atorvastatin (LIPITOR) 20 MG tablet, Take 1 tablet (20 mg total) by mouth once daily., Disp: 90 tablet, Rfl: 3    diclofenac (VOLTAREN) 50 MG EC tablet, Take 1 tablet (50 mg total) by mouth 2 (two) times daily. (Patient not taking: Reported on 1/25/2023), Disp: 60 tablet, Rfl: 2    enalapriL-hydrochlorothiazide (VASERETIC) 10-25 mg per tablet, Take 1 tablet by " "mouth once daily., Disp: 90 tablet, Rfl: 3    haemophilus B polysac-tetanus toxoid (ACTHIB, PF,) 10 mcg/0.5 mL injection, TO BE ADMINISTERED. (Patient not taking: Reported on 1/13/2023), Disp: 1 each, Rfl: 0    metFORMIN (GLUCOPHAGE) 1000 MG tablet, Take 1 tablet (1,000 mg total) by mouth 2 (two) times daily with meals., Disp: 180 tablet, Rfl: 3  No current facility-administered medications for this visit.    Review of Systems   Constitutional:  Negative for appetite change (reports appetite has improved), chills, fatigue and fever.   HENT:  Negative for sore throat and trouble swallowing.    Respiratory:  Positive for cough. Negative for shortness of breath and wheezing (1st thing in a.m.).    Cardiovascular:  Negative for chest pain, palpitations and leg swelling.   Gastrointestinal:  Positive for abdominal pain (mild discomfort left abdomen around site of colostomy). Negative for constipation, diarrhea, nausea and vomiting.        Colostomy with liquid brown stool   Genitourinary:  Negative for dysuria, frequency and hematuria.   Musculoskeletal:  Negative for back pain and gait problem.   Skin:  Negative for rash.   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 Negative for dizziness, syncope and headaches.   Psychiatric/Behavioral:  Negative for dysphoric mood. The patient is not nervous/anxious.         Objective:      Vitals:    01/25/23 0915   BP: 132/70   Pulse: 80   SpO2: 99%   Weight: 81.6 kg (180 lb)   Height: 6' 2" (1.88 m)     Physical Exam  Vitals reviewed.   Constitutional:       General: He is not in acute distress.     Appearance: He is well-developed.      Comments: Thin white male   HENT:      Head: Normocephalic and atraumatic.      Right Ear: Tympanic membrane and ear canal normal.      Left Ear: Tympanic membrane and ear canal normal.   Neck:      Vascular: No carotid bruit.   Cardiovascular:      Rate and Rhythm: Normal rate and regular " rhythm.      Heart sounds: No murmur heard.  Pulmonary:      Effort: Pulmonary effort is normal. No respiratory distress.      Breath sounds: Normal breath sounds. No wheezing or rales.   Abdominal:      General: There is no distension.      Palpations: Abdomen is soft.      Tenderness: There is abdominal tenderness (mild tenderness around colostomy).      Comments: Colostomy Left mid abdomen with parastomal hernia, small amt of liquid brown stool in pouch   Musculoskeletal:      Cervical back: Neck supple.      Right lower leg: No edema.      Left lower leg: No edema.   Lymphadenopathy:      Cervical: No cervical adenopathy.   Skin:     General: Skin is warm and dry.      Findings: No rash.   Neurological:      General: No focal deficit present.      Mental Status: He is alert and oriented to person, place, and time.      Gait: Gait normal.   Psychiatric:         Mood and Affect: Mood normal.         Assessment:       1. Essential hypertension    2. DM type 2 with diabetic dyslipidemia    3. Mixed hyperlipidemia    4. Metastatic adenocarcinoma to pancreas    5. Cigarette smoker    6. Colostomy status           Plan:       1. Essential hypertension  Comments:  BP well controlled  Orders:  -     enalapriL-hydrochlorothiazide (VASERETIC) 10-25 mg per tablet; Take 1 tablet by mouth once daily.  Dispense: 90 tablet; Refill: 3    2. DM type 2 with diabetic dyslipidemia  Comments:  Recent A1c down to 7.0%, pt cautioned if he continues with further wt loss though I would stop the Ozempic.  Also cautioned on hypoglycemia with insulin and advised if FBS <80 recommend reducing insulin dose by 3-5 units  Orders:  -     metFORMIN (GLUCOPHAGE) 1000 MG tablet; Take 1 tablet (1,000 mg total) by mouth 2 (two) times daily with meals.  Dispense: 180 tablet; Refill: 3    3. Mixed hyperlipidemia  Comments:  labs reviewed with pt. LDL not quite at goal at 99 given DM though given ongoing CA issues will continue current statin  dose  Orders:  -     atorvastatin (LIPITOR) 20 MG tablet; Take 1 tablet (20 mg total) by mouth once daily.  Dispense: 90 tablet; Refill: 3    4. Metastatic adenocarcinoma to pancreas  Comments:  waiting to begin keytruda under care of Dr. Khoobehi    5. Cigarette smoker  Comments:  Smoking cessation counseling provided, patient is not interested in quitting    6. Colostomy status    Assistance with smoking cessation was offered, including:  [x]  Medications  [x]  Counseling  []  Printed Information on Smoking Cessation  [x]  Referral to a Smoking Cessation Program    Patient was counseled regarding smoking for 3-10 minutes.   Follow up in about 4 months (around 5/25/2023) for Diabetes.          Counseled on age and gender appropriate medical preventative services, including cancer screenings, immunizations, overall nutritional health, need for a consistent exercise regimen and an overall push towards maintaining a vigorous and active lifestyle.      1/25/2023 Natalee Wagner NP

## 2023-01-25 NOTE — PLAN OF CARE
Problem: Fall Injury Risk  Goal: Absence of Fall and Fall-Related Injury  Reactivated  Intervention: Identify and Manage Contributors  Flowsheets (Taken 1/25/2023 1124)  Self-Care Promotion: safe use of adaptive equipment encouraged  Medication Review/Management: medications reviewed

## 2023-01-30 ENCOUNTER — PATIENT MESSAGE (OUTPATIENT)
Dept: HEMATOLOGY/ONCOLOGY | Facility: CLINIC | Age: 58
End: 2023-01-30
Payer: COMMERCIAL

## 2023-01-30 DIAGNOSIS — K63.89 COLONIC MASS: ICD-10-CM

## 2023-01-30 RX ORDER — OXYCODONE HYDROCHLORIDE 30 MG/1
30 TABLET ORAL EVERY 6 HOURS PRN
Qty: 60 TABLET | Refills: 0 | Status: SHIPPED | OUTPATIENT
Start: 2023-01-30 | End: 2023-02-02 | Stop reason: SDUPTHER

## 2023-02-01 ENCOUNTER — TELEPHONE (OUTPATIENT)
Dept: HEMATOLOGY/ONCOLOGY | Facility: CLINIC | Age: 58
End: 2023-02-01
Payer: COMMERCIAL

## 2023-02-02 DIAGNOSIS — K63.89 COLONIC MASS: ICD-10-CM

## 2023-02-02 RX ORDER — OXYCODONE HYDROCHLORIDE 30 MG/1
30 TABLET ORAL EVERY 6 HOURS PRN
Qty: 60 TABLET | Refills: 0 | Status: SHIPPED | OUTPATIENT
Start: 2023-02-02 | End: 2023-02-02 | Stop reason: SDUPTHER

## 2023-02-02 RX ORDER — OXYCODONE HYDROCHLORIDE 30 MG/1
30 TABLET ORAL EVERY 6 HOURS PRN
Qty: 60 TABLET | Refills: 0 | Status: SHIPPED | OUTPATIENT
Start: 2023-02-02 | End: 2023-02-03 | Stop reason: SDUPTHER

## 2023-02-03 ENCOUNTER — PATIENT MESSAGE (OUTPATIENT)
Dept: HEMATOLOGY/ONCOLOGY | Facility: CLINIC | Age: 58
End: 2023-02-03
Payer: COMMERCIAL

## 2023-02-03 DIAGNOSIS — K63.89 COLONIC MASS: ICD-10-CM

## 2023-02-03 RX ORDER — OXYCODONE HYDROCHLORIDE 30 MG/1
30 TABLET ORAL EVERY 6 HOURS PRN
Qty: 60 TABLET | Refills: 0 | Status: SHIPPED | OUTPATIENT
Start: 2023-02-03 | End: 2023-02-15 | Stop reason: SDUPTHER

## 2023-02-03 RX ORDER — OXYCODONE HYDROCHLORIDE 30 MG/1
30 TABLET ORAL EVERY 6 HOURS PRN
Qty: 60 TABLET | Refills: 0 | Status: SHIPPED | OUTPATIENT
Start: 2023-02-03 | End: 2023-02-03 | Stop reason: SDUPTHER

## 2023-02-10 ENCOUNTER — LAB VISIT (OUTPATIENT)
Dept: LAB | Facility: HOSPITAL | Age: 58
End: 2023-02-10
Attending: NURSE PRACTITIONER
Payer: COMMERCIAL

## 2023-02-10 DIAGNOSIS — C78.89 METASTATIC ADENOCARCINOMA TO PANCREAS: ICD-10-CM

## 2023-02-10 LAB
ALBUMIN SERPL BCP-MCNC: 3.7 G/DL (ref 3.5–5.2)
ALP SERPL-CCNC: 148 U/L (ref 55–135)
ALT SERPL W/O P-5'-P-CCNC: 11 U/L (ref 10–44)
ANION GAP SERPL CALC-SCNC: 11 MMOL/L (ref 8–16)
AST SERPL-CCNC: 30 U/L (ref 10–40)
BASOPHILS # BLD AUTO: 0.07 K/UL (ref 0–0.2)
BASOPHILS NFR BLD: 0.6 % (ref 0–1.9)
BILIRUB SERPL-MCNC: 0.5 MG/DL (ref 0.1–1)
BUN SERPL-MCNC: 15 MG/DL (ref 6–20)
CALCIUM SERPL-MCNC: 10.6 MG/DL (ref 8.7–10.5)
CEA SERPL-MCNC: 2.5 NG/ML (ref 0–5)
CHLORIDE SERPL-SCNC: 98 MMOL/L (ref 95–110)
CO2 SERPL-SCNC: 27 MMOL/L (ref 23–29)
CREAT SERPL-MCNC: 0.9 MG/DL (ref 0.5–1.4)
DIFFERENTIAL METHOD: ABNORMAL
EOSINOPHIL # BLD AUTO: 0.1 K/UL (ref 0–0.5)
EOSINOPHIL NFR BLD: 0.7 % (ref 0–8)
ERYTHROCYTE [DISTWIDTH] IN BLOOD BY AUTOMATED COUNT: 13.7 % (ref 11.5–14.5)
EST. GFR  (NO RACE VARIABLE): >60 ML/MIN/1.73 M^2
GLUCOSE SERPL-MCNC: 152 MG/DL (ref 70–110)
HCT VFR BLD AUTO: 39.3 % (ref 40–54)
HGB BLD-MCNC: 13.7 G/DL (ref 14–18)
IMM GRANULOCYTES # BLD AUTO: 0.03 K/UL (ref 0–0.04)
IMM GRANULOCYTES NFR BLD AUTO: 0.3 % (ref 0–0.5)
LYMPHOCYTES # BLD AUTO: 3.5 K/UL (ref 1–4.8)
LYMPHOCYTES NFR BLD: 31 % (ref 18–48)
MCH RBC QN AUTO: 31.9 PG (ref 27–31)
MCHC RBC AUTO-ENTMCNC: 34.9 G/DL (ref 32–36)
MCV RBC AUTO: 91 FL (ref 82–98)
MONOCYTES # BLD AUTO: 0.9 K/UL (ref 0.3–1)
MONOCYTES NFR BLD: 8.1 % (ref 4–15)
NEUTROPHILS # BLD AUTO: 6.6 K/UL (ref 1.8–7.7)
NEUTROPHILS NFR BLD: 59.3 % (ref 38–73)
NRBC BLD-RTO: 0 /100 WBC
PLATELET # BLD AUTO: 451 K/UL (ref 150–450)
PMV BLD AUTO: 8.7 FL (ref 9.2–12.9)
POTASSIUM SERPL-SCNC: 4.9 MMOL/L (ref 3.5–5.1)
PROT SERPL-MCNC: 7.8 G/DL (ref 6–8.4)
RBC # BLD AUTO: 4.3 M/UL (ref 4.6–6.2)
SODIUM SERPL-SCNC: 136 MMOL/L (ref 136–145)
WBC # BLD AUTO: 11.17 K/UL (ref 3.9–12.7)

## 2023-02-10 PROCEDURE — 85025 COMPLETE CBC W/AUTO DIFF WBC: CPT | Performed by: INTERNAL MEDICINE

## 2023-02-10 PROCEDURE — 82378 CARCINOEMBRYONIC ANTIGEN: CPT | Performed by: INTERNAL MEDICINE

## 2023-02-10 PROCEDURE — 80053 COMPREHEN METABOLIC PANEL: CPT | Performed by: INTERNAL MEDICINE

## 2023-02-10 PROCEDURE — 36415 COLL VENOUS BLD VENIPUNCTURE: CPT | Performed by: INTERNAL MEDICINE

## 2023-02-13 ENCOUNTER — OFFICE VISIT (OUTPATIENT)
Dept: HEMATOLOGY/ONCOLOGY | Facility: CLINIC | Age: 58
End: 2023-02-13
Payer: COMMERCIAL

## 2023-02-13 ENCOUNTER — INFUSION (OUTPATIENT)
Dept: INFUSION THERAPY | Facility: HOSPITAL | Age: 58
End: 2023-02-13
Attending: INTERNAL MEDICINE
Payer: COMMERCIAL

## 2023-02-13 VITALS
TEMPERATURE: 98 F | RESPIRATION RATE: 16 BRPM | OXYGEN SATURATION: 97 % | BODY MASS INDEX: 23.15 KG/M2 | DIASTOLIC BLOOD PRESSURE: 63 MMHG | WEIGHT: 180.38 LBS | SYSTOLIC BLOOD PRESSURE: 107 MMHG | HEART RATE: 87 BPM | HEIGHT: 74 IN

## 2023-02-13 VITALS
WEIGHT: 180.13 LBS | DIASTOLIC BLOOD PRESSURE: 81 MMHG | HEART RATE: 85 BPM | HEIGHT: 74 IN | OXYGEN SATURATION: 98 % | BODY MASS INDEX: 23.12 KG/M2 | TEMPERATURE: 98 F | SYSTOLIC BLOOD PRESSURE: 140 MMHG | RESPIRATION RATE: 16 BRPM

## 2023-02-13 DIAGNOSIS — E11.00 TYPE 2 DIABETES MELLITUS WITH HYPEROSMOLARITY WITHOUT COMA, WITHOUT LONG-TERM CURRENT USE OF INSULIN: ICD-10-CM

## 2023-02-13 DIAGNOSIS — T45.1X5A CHEMOTHERAPY-INDUCED NEUROPATHY: ICD-10-CM

## 2023-02-13 DIAGNOSIS — C78.89 METASTATIC ADENOCARCINOMA TO PANCREAS: Primary | ICD-10-CM

## 2023-02-13 DIAGNOSIS — I10 PRIMARY HYPERTENSION: ICD-10-CM

## 2023-02-13 DIAGNOSIS — T45.1X5A CHEMOTHERAPY-INDUCED NAUSEA: ICD-10-CM

## 2023-02-13 DIAGNOSIS — E03.2 DRUG-INDUCED HYPOTHYROIDISM: ICD-10-CM

## 2023-02-13 DIAGNOSIS — C18.4 MALIGNANT NEOPLASM OF TRANSVERSE COLON: ICD-10-CM

## 2023-02-13 DIAGNOSIS — R11.0 CHEMOTHERAPY-INDUCED NAUSEA: ICD-10-CM

## 2023-02-13 DIAGNOSIS — C18.5 MALIGNANT NEOPLASM OF SPLENIC FLEXURE: ICD-10-CM

## 2023-02-13 DIAGNOSIS — G62.0 CHEMOTHERAPY-INDUCED NEUROPATHY: ICD-10-CM

## 2023-02-13 LAB — TSH SERPL DL<=0.005 MIU/L-ACNC: 0.99 UIU/ML (ref 0.34–5.6)

## 2023-02-13 PROCEDURE — 99215 PR OFFICE/OUTPT VISIT, EST, LEVL V, 40-54 MIN: ICD-10-PCS | Mod: S$GLB,,, | Performed by: INTERNAL MEDICINE

## 2023-02-13 PROCEDURE — A4216 STERILE WATER/SALINE, 10 ML: HCPCS | Performed by: INTERNAL MEDICINE

## 2023-02-13 PROCEDURE — 3051F HG A1C>EQUAL 7.0%<8.0%: CPT | Mod: CPTII,S$GLB,, | Performed by: INTERNAL MEDICINE

## 2023-02-13 PROCEDURE — 99999 PR PBB SHADOW E&M-EST. PATIENT-LVL II: CPT | Mod: PBBFAC,,, | Performed by: INTERNAL MEDICINE

## 2023-02-13 PROCEDURE — 25000003 PHARM REV CODE 250: Performed by: INTERNAL MEDICINE

## 2023-02-13 PROCEDURE — 63600175 PHARM REV CODE 636 W HCPCS: Mod: JG | Performed by: INTERNAL MEDICINE

## 2023-02-13 PROCEDURE — 99215 OFFICE O/P EST HI 40 MIN: CPT | Mod: S$GLB,,, | Performed by: INTERNAL MEDICINE

## 2023-02-13 PROCEDURE — 1159F PR MEDICATION LIST DOCUMENTED IN MEDICAL RECORD: ICD-10-PCS | Mod: CPTII,S$GLB,, | Performed by: INTERNAL MEDICINE

## 2023-02-13 PROCEDURE — 4010F ACE/ARB THERAPY RXD/TAKEN: CPT | Mod: CPTII,S$GLB,, | Performed by: INTERNAL MEDICINE

## 2023-02-13 PROCEDURE — 1160F PR REVIEW ALL MEDS BY PRESCRIBER/CLIN PHARMACIST DOCUMENTED: ICD-10-PCS | Mod: CPTII,S$GLB,, | Performed by: INTERNAL MEDICINE

## 2023-02-13 PROCEDURE — 1159F MED LIST DOCD IN RCRD: CPT | Mod: CPTII,S$GLB,, | Performed by: INTERNAL MEDICINE

## 2023-02-13 PROCEDURE — 84443 ASSAY THYROID STIM HORMONE: CPT | Performed by: INTERNAL MEDICINE

## 2023-02-13 PROCEDURE — 1160F RVW MEDS BY RX/DR IN RCRD: CPT | Mod: CPTII,S$GLB,, | Performed by: INTERNAL MEDICINE

## 2023-02-13 PROCEDURE — 3051F PR MOST RECENT HEMOGLOBIN A1C LEVEL 7.0 - < 8.0%: ICD-10-PCS | Mod: CPTII,S$GLB,, | Performed by: INTERNAL MEDICINE

## 2023-02-13 PROCEDURE — 96413 CHEMO IV INFUSION 1 HR: CPT

## 2023-02-13 PROCEDURE — 4010F PR ACE/ARB THEARPY RXD/TAKEN: ICD-10-PCS | Mod: CPTII,S$GLB,, | Performed by: INTERNAL MEDICINE

## 2023-02-13 PROCEDURE — 99999 PR PBB SHADOW E&M-EST. PATIENT-LVL II: ICD-10-PCS | Mod: PBBFAC,,, | Performed by: INTERNAL MEDICINE

## 2023-02-13 RX ORDER — HEPARIN 100 UNIT/ML
500 SYRINGE INTRAVENOUS
Status: CANCELLED | OUTPATIENT
Start: 2023-02-13

## 2023-02-13 RX ORDER — SODIUM CHLORIDE 0.9 % (FLUSH) 0.9 %
10 SYRINGE (ML) INJECTION
Status: CANCELLED | OUTPATIENT
Start: 2023-02-13

## 2023-02-13 RX ORDER — EPINEPHRINE 0.3 MG/.3ML
0.3 INJECTION SUBCUTANEOUS ONCE AS NEEDED
Status: CANCELLED | OUTPATIENT
Start: 2023-02-13

## 2023-02-13 RX ORDER — DIPHENHYDRAMINE HYDROCHLORIDE 50 MG/ML
50 INJECTION INTRAMUSCULAR; INTRAVENOUS ONCE AS NEEDED
Status: DISCONTINUED | OUTPATIENT
Start: 2023-02-13 | End: 2023-02-13 | Stop reason: HOSPADM

## 2023-02-13 RX ORDER — EPINEPHRINE 0.3 MG/.3ML
0.3 INJECTION SUBCUTANEOUS ONCE AS NEEDED
Status: DISCONTINUED | OUTPATIENT
Start: 2023-02-13 | End: 2023-02-13 | Stop reason: HOSPADM

## 2023-02-13 RX ORDER — DIPHENHYDRAMINE HYDROCHLORIDE 50 MG/ML
50 INJECTION INTRAMUSCULAR; INTRAVENOUS ONCE AS NEEDED
Status: CANCELLED | OUTPATIENT
Start: 2023-02-13

## 2023-02-13 RX ORDER — SODIUM CHLORIDE 0.9 % (FLUSH) 0.9 %
10 SYRINGE (ML) INJECTION
Status: DISCONTINUED | OUTPATIENT
Start: 2023-02-13 | End: 2023-02-13 | Stop reason: HOSPADM

## 2023-02-13 RX ORDER — HEPARIN 100 UNIT/ML
500 SYRINGE INTRAVENOUS
Status: DISCONTINUED | OUTPATIENT
Start: 2023-02-13 | End: 2023-02-13 | Stop reason: HOSPADM

## 2023-02-13 RX ADMIN — SODIUM CHLORIDE 200 MG: 9 INJECTION, SOLUTION INTRAVENOUS at 12:02

## 2023-02-13 RX ADMIN — HEPARIN 500 UNITS: 100 SYRINGE at 12:02

## 2023-02-13 RX ADMIN — SODIUM CHLORIDE, PRESERVATIVE FREE 10 ML: 5 INJECTION INTRAVENOUS at 12:02

## 2023-02-13 NOTE — PLAN OF CARE
Problem: Fatigue  Goal: Improved Activity Tolerance  Reactivated  Intervention: Promote Improved Energy  Flowsheets (Taken 2/13/2023 1235)  Fatigue Management: frequent rest breaks encouraged  Sleep/Rest Enhancement:   regular sleep/rest pattern promoted   relaxation techniques promoted  Activity Management: Ambulated -L4

## 2023-02-13 NOTE — ADDENDUM NOTE
Addended by: KHOOBEHI, AURASH on: 2/13/2023 03:39 PM     Modules accepted: Orders     Call bell, personal items and telephone within reach/Room bathroom lighting operational/Instruct patient to call for assistance/Physically safe environment: no spills, clutter or unnecessary equipment/Reinforce activity limits and safety measures with patient and family/Stretcher in lowest position, wheels locked, appropriate side rails in place

## 2023-02-13 NOTE — PROGRESS NOTES
"Service Date:  2/13/23    Chief Complaint:  Colon cancer    Osman Li Jr. is a 57 y.o. 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  This occurred shortly after completing treatment.    Patient was part of a clinical trial to measure CT DNA.  Next generation sequencing also showed a high tumor burden.    He will now start on Keytruda as his cancer has a very high tumor mutation burden.  He has MARY however.  I am hoping the Keytruda will help.  He has no complaints to me aside from pain.    Review of Systems   Constitutional: Negative.    HENT: Negative.     Eyes: Negative.    Respiratory: Negative.     Cardiovascular: Negative.    Gastrointestinal: Negative.    Endocrine: Negative.    Genitourinary: Negative.    Musculoskeletal: Negative.    Integumentary:  Negative.   Neurological:  Positive for numbness.   Hematological: Negative.    Psychiatric/Behavioral: Negative.        Current Outpatient Medications   Medication Instructions    atorvastatin (LIPITOR) 20 mg, Oral, Daily    diclofenac (VOLTAREN) 50 mg, Oral, 2 times daily    enalapriL-hydrochlorothiazide (VASERETIC) 10-25 mg per tablet 1 tablet, Oral, Daily    haemophilus B polysac-tetanus toxoid (ACTHIB, PF,) 10 mcg/0.5 mL injection TO BE ADMINISTERED.    metFORMIN (GLUCOPHAGE) 1,000 mg, Oral, 2 times daily with meals    oxyCODONE (ROXICODONE) 30 mg, Oral, Every 6 hours PRN    OZEMPIC 0.25 mg, Subcutaneous, Every 7 days    pen needle, diabetic 31 gauge x 3/16" Ndle 1 each, Misc.(Non-Drug; Combo Route), Daily    promethazine (PHENERGAN) 12.5 MG Tab TAKE 1 TABLET BY MOUTH EVERY 4 HOURS AS NEEDED    sildenafiL (VIAGRA) 100 mg, Oral, Daily PRN    TOUJEO MAX U-300 SOLOSTAR 26 Units, Subcutaneous, Daily    traZODone (DESYREL) 50 mg, Oral, Nightly        Past Medical History:   Diagnosis Date    Digestive disorder     DM (diabetes mellitus)     Hyperlipidemia     " Hypertension     Prediabetes         Past Surgical History:   Procedure Laterality Date    CHOLECYSTECTOMY  2011    COLON SURGERY      COLONOSCOPY      2018    COLOSTOMY N/A 04/25/2022    Procedure: CREATION, COLOSTOMY;  Surgeon: Carlton Waddell MD;  Location: [facility] OR;  Service: General;  Laterality: N/A;    ENDOSCOPIC ULTRASOUND OF UPPER GASTROINTESTINAL TRACT N/A 1/6/2023    Procedure: ULTRASOUND, UPPER GI TRACT, ENDOSCOPIC;  Surgeon: Rinku Orozco III, MD;  Location: [facility];  Service: Endoscopy;  Laterality: N/A;    INSERTION OF TUNNELED CENTRAL VENOUS CATHETER (CVC) WITH SUBCUTANEOUS PORT Right 05/18/2022    Procedure: [id number];  Surgeon: Carlton aWddell MD;  Location: [facility] OR;  Service: General;  Laterality: Right;    MOBILIZATION OF SPLENIC FLEXURE N/A 04/25/2022    Procedure: MOBILIZATION, SPLENIC FLEXURE;  Surgeon: Carlton Waddell MD;  Location: [facility] OR;  Service: General;  Laterality: N/A;    SPLENECTOMY N/A 04/25/2022    Procedure: SPLENECTOMY;  Surgeon: Carlton Waddell MD;  Location: [facility] OR;  Service: General;  Laterality: N/A;    SUBTOTAL COLECTOMY N/A 04/25/2022    Procedure: COLECTOMY, PARTIAL;  Surgeon: Carlton Waddell MD;  Location: [facility];  Service: General;  Laterality: N/A;    TONSILLECTOMY          Family History   Problem Relation Age of Onset    Heart disease Father        Social History     Tobacco Use    Smoking status: Every Day     Packs/day: 1.00     Years: 40.00     Pack years: 40.00     Types: Cigarettes    Smokeless tobacco: Never   Substance Use Topics    Alcohol use: Not Currently    Drug use: Never         There were no vitals filed for this visit.       Physical Exam:  There were no vitals taken for this visit.    Physical Exam  Vitals and nursing note reviewed.   Constitutional:       Appearance: Normal appearance.   HENT:      Head: Normocephalic and atraumatic.      Nose: Nose normal.      Mouth/Throat:      Mouth: Mucous membranes are moist.      Pharynx:  Oropharynx is clear.   Eyes:      Extraocular Movements: Extraocular movements intact.      Conjunctiva/sclera: Conjunctivae normal.   Cardiovascular:      Rate and Rhythm: Normal rate and regular rhythm.      Heart sounds: Normal heart sounds.   Pulmonary:      Effort: Pulmonary effort is normal.      Breath sounds: Normal breath sounds.   Abdominal:      General: Abdomen is flat. Bowel sounds are normal.      Palpations: Abdomen is soft.      Comments: Ostomy bag in place.   Musculoskeletal:         General: Normal range of motion.      Cervical back: Normal range of motion and neck supple.   Skin:     General: Skin is warm and dry.   Neurological:      General: No focal deficit present.      Mental Status: He is alert and oriented to person, place, and time. Mental status is at baseline.   Psychiatric:         Mood and Affect: Mood normal.        Labs:  Lab Results   Component Value Date    WBC 11.17 02/10/2023    RBC 4.30 (L) 02/10/2023    HGB 13.7 (L) 02/10/2023    HCT 39.3 (L) 02/10/2023    MCV 91 02/10/2023    MCH 31.9 (H) 02/10/2023    MCHC 34.9 02/10/2023    RDW 13.7 02/10/2023     (H) 02/10/2023    MPV 8.7 (L) 02/10/2023    GRAN 6.6 02/10/2023    GRAN 59.3 02/10/2023    LYMPH 3.5 02/10/2023    LYMPH 31.0 02/10/2023    MONO 0.9 02/10/2023    MONO 8.1 02/10/2023    EOS 0.1 02/10/2023    BASO 0.07 02/10/2023    EOSINOPHIL 0.7 02/10/2023    BASOPHIL 0.6 02/10/2023     Sodium   Date Value Ref Range Status   02/10/2023 136 136 - 145 mmol/L Final     Potassium   Date Value Ref Range Status   02/10/2023 4.9 3.5 - 5.1 mmol/L Final     Chloride   Date Value Ref Range Status   02/10/2023 98 95 - 110 mmol/L Final     CO2   Date Value Ref Range Status   02/10/2023 27 23 - 29 mmol/L Final     Glucose   Date Value Ref Range Status   02/10/2023 152 (H) 70 - 110 mg/dL Final     BUN   Date Value Ref Range Status   02/10/2023 15 6 - 20 mg/dL Final     Creatinine   Date Value Ref Range Status   02/10/2023 0.9 0.5 - 1.4  mg/dL Final     Calcium   Date Value Ref Range Status   02/10/2023 10.6 (H) 8.7 - 10.5 mg/dL Final     Total Protein   Date Value Ref Range Status   02/10/2023 7.8 6.0 - 8.4 g/dL Final     Albumin   Date Value Ref Range Status   02/10/2023 3.7 3.5 - 5.2 g/dL Final     Total Bilirubin   Date Value Ref Range Status   02/10/2023 0.5 0.1 - 1.0 mg/dL Final     Comment:     For infants and newborns, interpretation of results should be based  on gestational age, weight and in agreement with clinical  observations.    Premature Infant recommended reference ranges:  Up to 24 hours.............<8.0 mg/dL  Up to 48 hours............<12.0 mg/dL  3-5 days..................<15.0 mg/dL  6-29 days.................<15.0 mg/dL       Alkaline Phosphatase   Date Value Ref Range Status   02/10/2023 148 (H) 55 - 135 U/L Final     AST   Date Value Ref Range Status   02/10/2023 30 10 - 40 U/L Final     ALT   Date Value Ref Range Status   02/10/2023 11 10 - 44 U/L Final     Anion Gap   Date Value Ref Range Status   02/10/2023 11 8 - 16 mmol/L Final     eGFR if    Date Value Ref Range Status   07/25/2022 >60 >60 mL/min/1.73 m^2 Final     eGFR if non    Date Value Ref Range Status   07/25/2022 >60 >60 mL/min/1.73 m^2 Final     Comment:     Calculation used to obtain the estimated glomerular filtration  rate (eGFR) is the CKD-EPI equation.          A/P:    Malignant neoplasm of the transverse colon  Recurrence in the pancreas  -poorly differentiated adenocarcinoma  -recurrence right after completing 12 cycles of FOLFOX  -patient on clinical trial to measure ctDNA, showed high tumor mutation burden  -given that the patient has a high tumor mutation burden, despite having MARY, I would like to try to treat him with Keytruda as it is indicated in his next generation sequencing.    -start Keytruda today  -return to clinic in 3 weeks for next treatment    Pancreatic mass   -confirmed recurrence of colon  adenocarcinoma    Back pain  - NM bone scan negative for mets    HTN  - on Enalapril  - follow up with PCP    HLP  - follow up with PCP    DM2  - on Metformin  - follow up with PCP    Tobacco use  - counseled on cessation      Aurash Khoobehi, MD  Hematology and Oncology

## 2023-02-15 ENCOUNTER — PATIENT MESSAGE (OUTPATIENT)
Dept: HEMATOLOGY/ONCOLOGY | Facility: CLINIC | Age: 58
End: 2023-02-15
Payer: COMMERCIAL

## 2023-02-15 DIAGNOSIS — K63.89 COLONIC MASS: ICD-10-CM

## 2023-02-15 DIAGNOSIS — C78.89 METASTATIC ADENOCARCINOMA TO PANCREAS: Primary | ICD-10-CM

## 2023-02-15 RX ORDER — OXYCODONE HYDROCHLORIDE 30 MG/1
30 TABLET ORAL EVERY 6 HOURS PRN
Qty: 120 TABLET | Refills: 0 | Status: SHIPPED | OUTPATIENT
Start: 2023-02-15 | End: 2023-06-21

## 2023-02-15 RX ORDER — MORPHINE SULFATE 15 MG/1
15 TABLET, FILM COATED, EXTENDED RELEASE ORAL 2 TIMES DAILY
Qty: 60 TABLET | Refills: 0 | Status: SHIPPED | OUTPATIENT
Start: 2023-02-15 | End: 2023-03-17 | Stop reason: SDUPTHER

## 2023-03-03 ENCOUNTER — LAB VISIT (OUTPATIENT)
Dept: LAB | Facility: HOSPITAL | Age: 58
End: 2023-03-03
Attending: INTERNAL MEDICINE
Payer: COMMERCIAL

## 2023-03-03 ENCOUNTER — PATIENT MESSAGE (OUTPATIENT)
Dept: HEMATOLOGY/ONCOLOGY | Facility: CLINIC | Age: 58
End: 2023-03-03
Payer: COMMERCIAL

## 2023-03-03 DIAGNOSIS — C78.89 METASTATIC ADENOCARCINOMA TO PANCREAS: ICD-10-CM

## 2023-03-03 LAB
ALBUMIN SERPL BCP-MCNC: 3.4 G/DL (ref 3.5–5.2)
ALP SERPL-CCNC: 149 U/L (ref 55–135)
ALT SERPL W/O P-5'-P-CCNC: 16 U/L (ref 10–44)
ANION GAP SERPL CALC-SCNC: 11 MMOL/L (ref 8–16)
AST SERPL-CCNC: 21 U/L (ref 10–40)
BASOPHILS # BLD AUTO: 0.09 K/UL (ref 0–0.2)
BASOPHILS NFR BLD: 0.9 % (ref 0–1.9)
BILIRUB SERPL-MCNC: 0.4 MG/DL (ref 0.1–1)
BUN SERPL-MCNC: 13 MG/DL (ref 6–20)
CALCIUM SERPL-MCNC: 10.4 MG/DL (ref 8.7–10.5)
CHLORIDE SERPL-SCNC: 97 MMOL/L (ref 95–110)
CO2 SERPL-SCNC: 26 MMOL/L (ref 23–29)
CREAT SERPL-MCNC: 0.8 MG/DL (ref 0.5–1.4)
DIFFERENTIAL METHOD: ABNORMAL
EOSINOPHIL # BLD AUTO: 0.2 K/UL (ref 0–0.5)
EOSINOPHIL NFR BLD: 1.8 % (ref 0–8)
ERYTHROCYTE [DISTWIDTH] IN BLOOD BY AUTOMATED COUNT: 12.8 % (ref 11.5–14.5)
EST. GFR  (NO RACE VARIABLE): >60 ML/MIN/1.73 M^2
GLUCOSE SERPL-MCNC: 206 MG/DL (ref 70–110)
HCT VFR BLD AUTO: 40.1 % (ref 40–54)
HGB BLD-MCNC: 13.6 G/DL (ref 14–18)
IMM GRANULOCYTES # BLD AUTO: 0.03 K/UL (ref 0–0.04)
IMM GRANULOCYTES NFR BLD AUTO: 0.3 % (ref 0–0.5)
LYMPHOCYTES # BLD AUTO: 4.6 K/UL (ref 1–4.8)
LYMPHOCYTES NFR BLD: 45.1 % (ref 18–48)
MCH RBC QN AUTO: 30.4 PG (ref 27–31)
MCHC RBC AUTO-ENTMCNC: 33.9 G/DL (ref 32–36)
MCV RBC AUTO: 90 FL (ref 82–98)
MONOCYTES # BLD AUTO: 0.9 K/UL (ref 0.3–1)
MONOCYTES NFR BLD: 9.2 % (ref 4–15)
NEUTROPHILS # BLD AUTO: 4.4 K/UL (ref 1.8–7.7)
NEUTROPHILS NFR BLD: 42.7 % (ref 38–73)
NRBC BLD-RTO: 0 /100 WBC
PLATELET # BLD AUTO: 581 K/UL (ref 150–450)
PMV BLD AUTO: 8.9 FL (ref 9.2–12.9)
POTASSIUM SERPL-SCNC: 4.6 MMOL/L (ref 3.5–5.1)
PROT SERPL-MCNC: 7.7 G/DL (ref 6–8.4)
RBC # BLD AUTO: 4.47 M/UL (ref 4.6–6.2)
SODIUM SERPL-SCNC: 134 MMOL/L (ref 136–145)
TSH SERPL DL<=0.005 MIU/L-ACNC: 1.77 UIU/ML (ref 0.4–4)
WBC # BLD AUTO: 10.2 K/UL (ref 3.9–12.7)

## 2023-03-03 PROCEDURE — 85025 COMPLETE CBC W/AUTO DIFF WBC: CPT | Performed by: INTERNAL MEDICINE

## 2023-03-03 PROCEDURE — 36415 COLL VENOUS BLD VENIPUNCTURE: CPT | Performed by: INTERNAL MEDICINE

## 2023-03-03 PROCEDURE — 84443 ASSAY THYROID STIM HORMONE: CPT | Performed by: INTERNAL MEDICINE

## 2023-03-03 PROCEDURE — 80053 COMPREHEN METABOLIC PANEL: CPT | Performed by: INTERNAL MEDICINE

## 2023-03-06 ENCOUNTER — OFFICE VISIT (OUTPATIENT)
Dept: HEMATOLOGY/ONCOLOGY | Facility: CLINIC | Age: 58
End: 2023-03-06
Payer: COMMERCIAL

## 2023-03-06 ENCOUNTER — INFUSION (OUTPATIENT)
Dept: INFUSION THERAPY | Facility: HOSPITAL | Age: 58
End: 2023-03-06
Attending: INTERNAL MEDICINE
Payer: COMMERCIAL

## 2023-03-06 VITALS
RESPIRATION RATE: 18 BRPM | WEIGHT: 181.88 LBS | TEMPERATURE: 98 F | DIASTOLIC BLOOD PRESSURE: 82 MMHG | SYSTOLIC BLOOD PRESSURE: 133 MMHG | BODY MASS INDEX: 23.34 KG/M2 | HEART RATE: 87 BPM | OXYGEN SATURATION: 99 % | HEIGHT: 74 IN

## 2023-03-06 VITALS
TEMPERATURE: 97 F | DIASTOLIC BLOOD PRESSURE: 76 MMHG | SYSTOLIC BLOOD PRESSURE: 131 MMHG | RESPIRATION RATE: 10 BRPM | BODY MASS INDEX: 23.34 KG/M2 | HEIGHT: 74 IN | OXYGEN SATURATION: 99 % | HEART RATE: 86 BPM | WEIGHT: 181.88 LBS

## 2023-03-06 DIAGNOSIS — R11.0 CHEMOTHERAPY-INDUCED NAUSEA: ICD-10-CM

## 2023-03-06 DIAGNOSIS — G62.0 CHEMOTHERAPY-INDUCED NEUROPATHY: ICD-10-CM

## 2023-03-06 DIAGNOSIS — C18.4 MALIGNANT NEOPLASM OF TRANSVERSE COLON: Primary | ICD-10-CM

## 2023-03-06 DIAGNOSIS — E11.00 TYPE 2 DIABETES MELLITUS WITH HYPEROSMOLARITY WITHOUT COMA, WITHOUT LONG-TERM CURRENT USE OF INSULIN: ICD-10-CM

## 2023-03-06 DIAGNOSIS — C78.89 METASTATIC ADENOCARCINOMA TO PANCREAS: Primary | ICD-10-CM

## 2023-03-06 DIAGNOSIS — I10 PRIMARY HYPERTENSION: ICD-10-CM

## 2023-03-06 DIAGNOSIS — C18.5 MALIGNANT NEOPLASM OF SPLENIC FLEXURE: ICD-10-CM

## 2023-03-06 DIAGNOSIS — C78.89 METASTATIC ADENOCARCINOMA TO PANCREAS: ICD-10-CM

## 2023-03-06 DIAGNOSIS — T45.1X5A CHEMOTHERAPY-INDUCED NAUSEA: ICD-10-CM

## 2023-03-06 DIAGNOSIS — T45.1X5A CHEMOTHERAPY-INDUCED NEUROPATHY: ICD-10-CM

## 2023-03-06 PROCEDURE — 96413 CHEMO IV INFUSION 1 HR: CPT

## 2023-03-06 PROCEDURE — 1160F RVW MEDS BY RX/DR IN RCRD: CPT | Mod: CPTII,S$GLB,, | Performed by: INTERNAL MEDICINE

## 2023-03-06 PROCEDURE — A4216 STERILE WATER/SALINE, 10 ML: HCPCS | Performed by: INTERNAL MEDICINE

## 2023-03-06 PROCEDURE — 3051F HG A1C>EQUAL 7.0%<8.0%: CPT | Mod: CPTII,S$GLB,, | Performed by: INTERNAL MEDICINE

## 2023-03-06 PROCEDURE — 99215 OFFICE O/P EST HI 40 MIN: CPT | Mod: S$GLB,,, | Performed by: INTERNAL MEDICINE

## 2023-03-06 PROCEDURE — 3075F SYST BP GE 130 - 139MM HG: CPT | Mod: CPTII,S$GLB,, | Performed by: INTERNAL MEDICINE

## 2023-03-06 PROCEDURE — 3051F PR MOST RECENT HEMOGLOBIN A1C LEVEL 7.0 - < 8.0%: ICD-10-PCS | Mod: CPTII,S$GLB,, | Performed by: INTERNAL MEDICINE

## 2023-03-06 PROCEDURE — 3008F BODY MASS INDEX DOCD: CPT | Mod: CPTII,S$GLB,, | Performed by: INTERNAL MEDICINE

## 2023-03-06 PROCEDURE — 4010F PR ACE/ARB THEARPY RXD/TAKEN: ICD-10-PCS | Mod: CPTII,S$GLB,, | Performed by: INTERNAL MEDICINE

## 2023-03-06 PROCEDURE — 3078F DIAST BP <80 MM HG: CPT | Mod: CPTII,S$GLB,, | Performed by: INTERNAL MEDICINE

## 2023-03-06 PROCEDURE — 3075F PR MOST RECENT SYSTOLIC BLOOD PRESS GE 130-139MM HG: ICD-10-PCS | Mod: CPTII,S$GLB,, | Performed by: INTERNAL MEDICINE

## 2023-03-06 PROCEDURE — 99215 PR OFFICE/OUTPT VISIT, EST, LEVL V, 40-54 MIN: ICD-10-PCS | Mod: S$GLB,,, | Performed by: INTERNAL MEDICINE

## 2023-03-06 PROCEDURE — 99999 PR PBB SHADOW E&M-EST. PATIENT-LVL V: CPT | Mod: PBBFAC,,, | Performed by: INTERNAL MEDICINE

## 2023-03-06 PROCEDURE — 25000003 PHARM REV CODE 250: Performed by: INTERNAL MEDICINE

## 2023-03-06 PROCEDURE — 3008F PR BODY MASS INDEX (BMI) DOCUMENTED: ICD-10-PCS | Mod: CPTII,S$GLB,, | Performed by: INTERNAL MEDICINE

## 2023-03-06 PROCEDURE — 4010F ACE/ARB THERAPY RXD/TAKEN: CPT | Mod: CPTII,S$GLB,, | Performed by: INTERNAL MEDICINE

## 2023-03-06 PROCEDURE — 63600175 PHARM REV CODE 636 W HCPCS: Performed by: INTERNAL MEDICINE

## 2023-03-06 PROCEDURE — 1159F PR MEDICATION LIST DOCUMENTED IN MEDICAL RECORD: ICD-10-PCS | Mod: CPTII,S$GLB,, | Performed by: INTERNAL MEDICINE

## 2023-03-06 PROCEDURE — 1159F MED LIST DOCD IN RCRD: CPT | Mod: CPTII,S$GLB,, | Performed by: INTERNAL MEDICINE

## 2023-03-06 PROCEDURE — 99999 PR PBB SHADOW E&M-EST. PATIENT-LVL V: ICD-10-PCS | Mod: PBBFAC,,, | Performed by: INTERNAL MEDICINE

## 2023-03-06 PROCEDURE — 1160F PR REVIEW ALL MEDS BY PRESCRIBER/CLIN PHARMACIST DOCUMENTED: ICD-10-PCS | Mod: CPTII,S$GLB,, | Performed by: INTERNAL MEDICINE

## 2023-03-06 PROCEDURE — 3078F PR MOST RECENT DIASTOLIC BLOOD PRESSURE < 80 MM HG: ICD-10-PCS | Mod: CPTII,S$GLB,, | Performed by: INTERNAL MEDICINE

## 2023-03-06 RX ORDER — EPINEPHRINE 0.3 MG/.3ML
0.3 INJECTION SUBCUTANEOUS ONCE AS NEEDED
Status: CANCELLED | OUTPATIENT
Start: 2023-03-06

## 2023-03-06 RX ORDER — DIPHENHYDRAMINE HYDROCHLORIDE 50 MG/ML
50 INJECTION INTRAMUSCULAR; INTRAVENOUS ONCE AS NEEDED
Status: CANCELLED | OUTPATIENT
Start: 2023-03-06

## 2023-03-06 RX ORDER — SODIUM CHLORIDE 0.9 % (FLUSH) 0.9 %
10 SYRINGE (ML) INJECTION
Status: DISCONTINUED | OUTPATIENT
Start: 2023-03-06 | End: 2023-03-06 | Stop reason: HOSPADM

## 2023-03-06 RX ORDER — HEPARIN 100 UNIT/ML
500 SYRINGE INTRAVENOUS
Status: CANCELLED | OUTPATIENT
Start: 2023-03-06

## 2023-03-06 RX ORDER — HEPARIN 100 UNIT/ML
500 SYRINGE INTRAVENOUS
Status: DISCONTINUED | OUTPATIENT
Start: 2023-03-06 | End: 2023-03-06 | Stop reason: HOSPADM

## 2023-03-06 RX ORDER — SODIUM CHLORIDE 0.9 % (FLUSH) 0.9 %
10 SYRINGE (ML) INJECTION
Status: CANCELLED | OUTPATIENT
Start: 2023-03-06

## 2023-03-06 RX ADMIN — HEPARIN 500 UNITS: 100 SYRINGE at 10:03

## 2023-03-06 RX ADMIN — SODIUM CHLORIDE, PRESERVATIVE FREE 10 ML: 5 INJECTION INTRAVENOUS at 10:03

## 2023-03-06 RX ADMIN — SODIUM CHLORIDE 200 MG: 9 INJECTION, SOLUTION INTRAVENOUS at 10:03

## 2023-03-06 NOTE — PROGRESS NOTES
"Service Date:  3/6/23    Chief Complaint:  Colon cancer    Osman Li Jr. is a 57 y.o. 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  This occurred shortly after completing treatment.    Patient was part of a clinical trial to measure CT DNA.  Next generation sequencing also showed a high tumor burden.    He will now start on Keytruda as his cancer has a very high tumor mutation burden.  He has MARY however.  I am hoping the Keytruda will help.      Here for 2nd cycle. No complaints after 1st.    Review of Systems   Constitutional: Negative.    HENT: Negative.     Eyes: Negative.    Respiratory: Negative.     Cardiovascular: Negative.    Gastrointestinal: Negative.    Endocrine: Negative.    Genitourinary: Negative.    Musculoskeletal: Negative.    Integumentary:  Negative.   Neurological:  Positive for numbness.   Hematological: Negative.    Psychiatric/Behavioral: Negative.        Current Outpatient Medications   Medication Instructions    atorvastatin (LIPITOR) 20 mg, Oral, Daily    diclofenac (VOLTAREN) 50 mg, Oral, 2 times daily    enalapriL-hydrochlorothiazide (VASERETIC) 10-25 mg per tablet 1 tablet, Oral, Daily    haemophilus B polysac-tetanus toxoid (ACTHIB, PF,) 10 mcg/0.5 mL injection TO BE ADMINISTERED.    metFORMIN (GLUCOPHAGE) 1,000 mg, Oral, 2 times daily with meals    morphine (MS CONTIN) 15 mg, Oral, 2 times daily    oxyCODONE (ROXICODONE) 30 mg, Oral, Every 6 hours PRN    OZEMPIC 0.25 mg, Subcutaneous, Every 7 days    pen needle, diabetic 31 gauge x 3/16" Ndle 1 each, Misc.(Non-Drug; Combo Route), Daily    promethazine (PHENERGAN) 12.5 MG Tab TAKE 1 TABLET BY MOUTH EVERY 4 HOURS AS NEEDED    sildenafiL (VIAGRA) 100 mg, Oral, Daily PRN    TOUJEO MAX U-300 SOLOSTAR 26 Units, Subcutaneous, Daily    traZODone (DESYREL) 50 mg, Oral, Nightly        Past Medical History:   Diagnosis Date    Digestive " "disorder     DM (diabetes mellitus)     Hyperlipidemia     Hypertension     Prediabetes         Past Surgical History:   Procedure Laterality Date    CHOLECYSTECTOMY  2011    COLON SURGERY      COLONOSCOPY      2018    COLOSTOMY N/A 04/25/2022    Procedure: CREATION, COLOSTOMY;  Surgeon: Carlton Waddell MD;  Location: [facility] OR;  Service: General;  Laterality: N/A;    ENDOSCOPIC ULTRASOUND OF UPPER GASTROINTESTINAL TRACT N/A 1/6/2023    Procedure: ULTRASOUND, UPPER GI TRACT, ENDOSCOPIC;  Surgeon: Rinku Orozco III, MD;  Location: [facility] ENDO;  Service: Endoscopy;  Laterality: N/A;    INSERTION OF TUNNELED CENTRAL VENOUS CATHETER (CVC) WITH SUBCUTANEOUS PORT Right 05/18/2022    Procedure: [id number];  Surgeon: Carlton Waddell MD;  Location: [facility] OR;  Service: General;  Laterality: Right;    MOBILIZATION OF SPLENIC FLEXURE N/A 04/25/2022    Procedure: MOBILIZATION, SPLENIC FLEXURE;  Surgeon: Carlton Waddell MD;  Location: [facility] OR;  Service: General;  Laterality: N/A;    SPLENECTOMY N/A 04/25/2022    Procedure: SPLENECTOMY;  Surgeon: Carlton Waddell MD;  Location: [facility] OR;  Service: General;  Laterality: N/A;    SUBTOTAL COLECTOMY N/A 04/25/2022    Procedure: COLECTOMY, PARTIAL;  Surgeon: Carlton Waddell MD;  Location: [facility];  Service: General;  Laterality: N/A;    TONSILLECTOMY          Family History   Problem Relation Age of Onset    Heart disease Father        Social History     Tobacco Use    Smoking status: Every Day     Packs/day: 1.00     Years: 40.00     Pack years: 40.00     Types: Cigarettes    Smokeless tobacco: Never   Substance Use Topics    Alcohol use: Not Currently    Drug use: Never         Vitals:    03/06/23 0921   BP: 131/76   Pulse: 86   Resp: 10   Temp: 96.7 °F (35.9 °C)          Physical Exam:  /76 (BP Location: Right arm, Patient Position: Sitting, BP Method: Medium (Automatic))   Pulse 86   Temp 96.7 °F (35.9 °C) (Temporal)   Resp 10   Ht 6' 2" (1.88 m)   Wt 82.5 kg " (181 lb 14.1 oz)   SpO2 99%   BMI 23.35 kg/m²     Physical Exam  Vitals and nursing note reviewed.   Constitutional:       Appearance: Normal appearance.   HENT:      Head: Normocephalic and atraumatic.      Nose: Nose normal.      Mouth/Throat:      Mouth: Mucous membranes are moist.      Pharynx: Oropharynx is clear.   Eyes:      Extraocular Movements: Extraocular movements intact.      Conjunctiva/sclera: Conjunctivae normal.   Cardiovascular:      Rate and Rhythm: Normal rate and regular rhythm.      Heart sounds: Normal heart sounds.   Pulmonary:      Effort: Pulmonary effort is normal.      Breath sounds: Normal breath sounds.   Abdominal:      General: Abdomen is flat. Bowel sounds are normal.      Palpations: Abdomen is soft.      Comments: Ostomy bag in place.   Musculoskeletal:         General: Normal range of motion.      Cervical back: Normal range of motion and neck supple.   Skin:     General: Skin is warm and dry.   Neurological:      General: No focal deficit present.      Mental Status: He is alert and oriented to person, place, and time. Mental status is at baseline.   Psychiatric:         Mood and Affect: Mood normal.        Labs:  Lab Results   Component Value Date    WBC 10.20 03/03/2023    RBC 4.47 (L) 03/03/2023    HGB 13.6 (L) 03/03/2023    HCT 40.1 03/03/2023    MCV 90 03/03/2023    MCH 30.4 03/03/2023    MCHC 33.9 03/03/2023    RDW 12.8 03/03/2023     (H) 03/03/2023    MPV 8.9 (L) 03/03/2023    GRAN 4.4 03/03/2023    GRAN 42.7 03/03/2023    LYMPH 4.6 03/03/2023    LYMPH 45.1 03/03/2023    MONO 0.9 03/03/2023    MONO 9.2 03/03/2023    EOS 0.2 03/03/2023    BASO 0.09 03/03/2023    EOSINOPHIL 1.8 03/03/2023    BASOPHIL 0.9 03/03/2023     Sodium   Date Value Ref Range Status   03/03/2023 134 (L) 136 - 145 mmol/L Final     Potassium   Date Value Ref Range Status   03/03/2023 4.6 3.5 - 5.1 mmol/L Final     Chloride   Date Value Ref Range Status   03/03/2023 97 95 - 110 mmol/L Final      CO2   Date Value Ref Range Status   03/03/2023 26 23 - 29 mmol/L Final     Glucose   Date Value Ref Range Status   03/03/2023 206 (H) 70 - 110 mg/dL Final     BUN   Date Value Ref Range Status   03/03/2023 13 6 - 20 mg/dL Final     Creatinine   Date Value Ref Range Status   03/03/2023 0.8 0.5 - 1.4 mg/dL Final     Calcium   Date Value Ref Range Status   03/03/2023 10.4 8.7 - 10.5 mg/dL Final     Total Protein   Date Value Ref Range Status   03/03/2023 7.7 6.0 - 8.4 g/dL Final     Albumin   Date Value Ref Range Status   03/03/2023 3.4 (L) 3.5 - 5.2 g/dL Final     Total Bilirubin   Date Value Ref Range Status   03/03/2023 0.4 0.1 - 1.0 mg/dL Final     Comment:     For infants and newborns, interpretation of results should be based  on gestational age, weight and in agreement with clinical  observations.    Premature Infant recommended reference ranges:  Up to 24 hours.............<8.0 mg/dL  Up to 48 hours............<12.0 mg/dL  3-5 days..................<15.0 mg/dL  6-29 days.................<15.0 mg/dL       Alkaline Phosphatase   Date Value Ref Range Status   03/03/2023 149 (H) 55 - 135 U/L Final     AST   Date Value Ref Range Status   03/03/2023 21 10 - 40 U/L Final     ALT   Date Value Ref Range Status   03/03/2023 16 10 - 44 U/L Final     Anion Gap   Date Value Ref Range Status   03/03/2023 11 8 - 16 mmol/L Final     eGFR if    Date Value Ref Range Status   07/25/2022 >60 >60 mL/min/1.73 m^2 Final     eGFR if non    Date Value Ref Range Status   07/25/2022 >60 >60 mL/min/1.73 m^2 Final     Comment:     Calculation used to obtain the estimated glomerular filtration  rate (eGFR) is the CKD-EPI equation.          A/P:    Malignant neoplasm of the transverse colon  Recurrence in the pancreas  -poorly differentiated adenocarcinoma  -recurrence right after completing 12 cycles of FOLFOX  -patient on clinical trial to measure ctDNA, showed high tumor mutation burden  -given that the  patient has a high tumor mutation burden, despite having MARY, I would like to try to treat him with Keytruda as it is indicated in his next generation sequencing.    -proceed with Keytruda C2 today  -return to clinic in 3 weeks for next treatment    Pancreatic mass   -confirmed recurrence of colon adenocarcinoma    Back pain  - NM bone scan negative for mets    HTN  - on Enalapril  - follow up with PCP    HLP  - follow up with PCP    DM2  - on Metformin  - follow up with PCP    Tobacco use  - counseled on cessation      Aurash Khoobehi, MD  Hematology and Oncology

## 2023-03-06 NOTE — Clinical Note
Ok for treatment today. Rtc in 3 weeks for next with cbc, cmp, and cea. He lost his job, who can he talk to for financial assistance.

## 2023-03-07 ENCOUNTER — TELEPHONE (OUTPATIENT)
Dept: HEMATOLOGY/ONCOLOGY | Facility: CLINIC | Age: 58
End: 2023-03-07

## 2023-03-07 NOTE — PROGRESS NOTES
Spoke with patient regarding the loss of his job.  He stated that he plans to pay the COBRA premiums to retain his insurance.  [employer] application placed in the mail.  If patient is approved for services the fund might assist with mortgage, car, utility assistance.  Patient also given the number to [employer] to apply for [place] Medicaid.  If patient chooses to complete the application he will return it to me at which time I will [facility] to [facility].

## 2023-03-17 ENCOUNTER — PATIENT MESSAGE (OUTPATIENT)
Dept: HEMATOLOGY/ONCOLOGY | Facility: CLINIC | Age: 58
End: 2023-03-17
Payer: COMMERCIAL

## 2023-03-17 DIAGNOSIS — C78.89 METASTATIC ADENOCARCINOMA TO PANCREAS: ICD-10-CM

## 2023-03-17 RX ORDER — MORPHINE SULFATE 30 MG/1
30 TABLET, FILM COATED, EXTENDED RELEASE ORAL 2 TIMES DAILY
Qty: 60 TABLET | Refills: 0 | Status: SHIPPED | OUTPATIENT
Start: 2023-03-17 | End: 2023-03-20 | Stop reason: SDUPTHER

## 2023-03-20 DIAGNOSIS — C78.89 METASTATIC ADENOCARCINOMA TO PANCREAS: ICD-10-CM

## 2023-03-20 RX ORDER — MORPHINE SULFATE 30 MG/1
30 TABLET, FILM COATED, EXTENDED RELEASE ORAL 2 TIMES DAILY
Qty: 60 TABLET | Refills: 0 | Status: SHIPPED | OUTPATIENT
Start: 2023-03-20 | End: 2023-04-17 | Stop reason: SDUPTHER

## 2023-03-23 ENCOUNTER — TELEPHONE (OUTPATIENT)
Dept: PHARMACY | Facility: CLINIC | Age: 58
End: 2023-03-23
Payer: COMMERCIAL

## 2023-03-24 ENCOUNTER — LAB VISIT (OUTPATIENT)
Dept: LAB | Facility: HOSPITAL | Age: 58
End: 2023-03-24
Attending: INTERNAL MEDICINE
Payer: COMMERCIAL

## 2023-03-24 DIAGNOSIS — C18.4 MALIGNANT NEOPLASM OF TRANSVERSE COLON: ICD-10-CM

## 2023-03-24 DIAGNOSIS — C78.89 METASTATIC ADENOCARCINOMA TO PANCREAS: ICD-10-CM

## 2023-03-24 LAB
ALBUMIN SERPL BCP-MCNC: 3.8 G/DL (ref 3.5–5.2)
ALP SERPL-CCNC: 134 U/L (ref 55–135)
ALT SERPL W/O P-5'-P-CCNC: 29 U/L (ref 10–44)
ANION GAP SERPL CALC-SCNC: 9 MMOL/L (ref 8–16)
AST SERPL-CCNC: 28 U/L (ref 10–40)
BASOPHILS # BLD AUTO: 0.1 K/UL (ref 0–0.2)
BASOPHILS NFR BLD: 0.9 % (ref 0–1.9)
BILIRUB SERPL-MCNC: 0.4 MG/DL (ref 0.1–1)
BUN SERPL-MCNC: 13 MG/DL (ref 6–20)
CALCIUM SERPL-MCNC: 10.2 MG/DL (ref 8.7–10.5)
CEA SERPL-MCNC: 3.2 NG/ML (ref 0–5)
CHLORIDE SERPL-SCNC: 102 MMOL/L (ref 95–110)
CO2 SERPL-SCNC: 27 MMOL/L (ref 23–29)
CREAT SERPL-MCNC: 0.8 MG/DL (ref 0.5–1.4)
DIFFERENTIAL METHOD: ABNORMAL
EOSINOPHIL # BLD AUTO: 0.3 K/UL (ref 0–0.5)
EOSINOPHIL NFR BLD: 2.5 % (ref 0–8)
ERYTHROCYTE [DISTWIDTH] IN BLOOD BY AUTOMATED COUNT: 13.8 % (ref 11.5–14.5)
EST. GFR  (NO RACE VARIABLE): >60 ML/MIN/1.73 M^2
GLUCOSE SERPL-MCNC: 134 MG/DL (ref 70–110)
HCT VFR BLD AUTO: 40.4 % (ref 40–54)
HGB BLD-MCNC: 13.6 G/DL (ref 14–18)
IMM GRANULOCYTES # BLD AUTO: 0.05 K/UL (ref 0–0.04)
IMM GRANULOCYTES NFR BLD AUTO: 0.4 % (ref 0–0.5)
LYMPHOCYTES # BLD AUTO: 5.1 K/UL (ref 1–4.8)
LYMPHOCYTES NFR BLD: 45.6 % (ref 18–48)
MCH RBC QN AUTO: 30.5 PG (ref 27–31)
MCHC RBC AUTO-ENTMCNC: 33.7 G/DL (ref 32–36)
MCV RBC AUTO: 91 FL (ref 82–98)
MONOCYTES # BLD AUTO: 1 K/UL (ref 0.3–1)
MONOCYTES NFR BLD: 8.6 % (ref 4–15)
NEUTROPHILS # BLD AUTO: 4.7 K/UL (ref 1.8–7.7)
NEUTROPHILS NFR BLD: 42 % (ref 38–73)
NRBC BLD-RTO: 0 /100 WBC
PLATELET # BLD AUTO: 403 K/UL (ref 150–450)
PMV BLD AUTO: 9 FL (ref 9.2–12.9)
POTASSIUM SERPL-SCNC: 4.7 MMOL/L (ref 3.5–5.1)
PROT SERPL-MCNC: 7.5 G/DL (ref 6–8.4)
RBC # BLD AUTO: 4.46 M/UL (ref 4.6–6.2)
SODIUM SERPL-SCNC: 138 MMOL/L (ref 136–145)
TSH SERPL DL<=0.005 MIU/L-ACNC: 1.68 UIU/ML (ref 0.4–4)
WBC # BLD AUTO: 11.22 K/UL (ref 3.9–12.7)

## 2023-03-24 PROCEDURE — 80053 COMPREHEN METABOLIC PANEL: CPT | Performed by: INTERNAL MEDICINE

## 2023-03-24 PROCEDURE — 82378 CARCINOEMBRYONIC ANTIGEN: CPT | Performed by: INTERNAL MEDICINE

## 2023-03-24 PROCEDURE — 84443 ASSAY THYROID STIM HORMONE: CPT | Performed by: INTERNAL MEDICINE

## 2023-03-24 PROCEDURE — 36415 COLL VENOUS BLD VENIPUNCTURE: CPT | Performed by: INTERNAL MEDICINE

## 2023-03-24 PROCEDURE — 85025 COMPLETE CBC W/AUTO DIFF WBC: CPT | Performed by: INTERNAL MEDICINE

## 2023-03-27 ENCOUNTER — INFUSION (OUTPATIENT)
Dept: INFUSION THERAPY | Facility: HOSPITAL | Age: 58
End: 2023-03-27
Attending: INTERNAL MEDICINE
Payer: COMMERCIAL

## 2023-03-27 ENCOUNTER — OFFICE VISIT (OUTPATIENT)
Dept: HEMATOLOGY/ONCOLOGY | Facility: CLINIC | Age: 58
End: 2023-03-27
Payer: COMMERCIAL

## 2023-03-27 VITALS
WEIGHT: 191.56 LBS | DIASTOLIC BLOOD PRESSURE: 75 MMHG | HEIGHT: 74 IN | HEART RATE: 80 BPM | TEMPERATURE: 98 F | OXYGEN SATURATION: 99 % | BODY MASS INDEX: 24.58 KG/M2 | SYSTOLIC BLOOD PRESSURE: 129 MMHG | RESPIRATION RATE: 18 BRPM

## 2023-03-27 VITALS
DIASTOLIC BLOOD PRESSURE: 78 MMHG | TEMPERATURE: 98 F | OXYGEN SATURATION: 99 % | WEIGHT: 191.56 LBS | HEIGHT: 74 IN | SYSTOLIC BLOOD PRESSURE: 126 MMHG | BODY MASS INDEX: 24.58 KG/M2 | RESPIRATION RATE: 12 BRPM | HEART RATE: 91 BPM

## 2023-03-27 DIAGNOSIS — C78.89 METASTATIC ADENOCARCINOMA TO PANCREAS: Primary | ICD-10-CM

## 2023-03-27 DIAGNOSIS — I10 PRIMARY HYPERTENSION: ICD-10-CM

## 2023-03-27 DIAGNOSIS — T45.1X5A CHEMOTHERAPY-INDUCED NAUSEA: ICD-10-CM

## 2023-03-27 DIAGNOSIS — R11.0 CHEMOTHERAPY-INDUCED NAUSEA: ICD-10-CM

## 2023-03-27 DIAGNOSIS — C18.4 MALIGNANT NEOPLASM OF TRANSVERSE COLON: Primary | ICD-10-CM

## 2023-03-27 DIAGNOSIS — C78.89 METASTATIC ADENOCARCINOMA TO PANCREAS: ICD-10-CM

## 2023-03-27 DIAGNOSIS — E11.00 TYPE 2 DIABETES MELLITUS WITH HYPEROSMOLARITY WITHOUT COMA, WITHOUT LONG-TERM CURRENT USE OF INSULIN: ICD-10-CM

## 2023-03-27 DIAGNOSIS — T45.1X5A CHEMOTHERAPY-INDUCED NEUROPATHY: ICD-10-CM

## 2023-03-27 DIAGNOSIS — G62.0 CHEMOTHERAPY-INDUCED NEUROPATHY: ICD-10-CM

## 2023-03-27 DIAGNOSIS — C18.5 MALIGNANT NEOPLASM OF SPLENIC FLEXURE: ICD-10-CM

## 2023-03-27 PROCEDURE — A4216 STERILE WATER/SALINE, 10 ML: HCPCS | Performed by: INTERNAL MEDICINE

## 2023-03-27 PROCEDURE — 4010F PR ACE/ARB THEARPY RXD/TAKEN: ICD-10-PCS | Mod: CPTII,S$GLB,, | Performed by: INTERNAL MEDICINE

## 2023-03-27 PROCEDURE — 1159F MED LIST DOCD IN RCRD: CPT | Mod: CPTII,S$GLB,, | Performed by: INTERNAL MEDICINE

## 2023-03-27 PROCEDURE — 1160F PR REVIEW ALL MEDS BY PRESCRIBER/CLIN PHARMACIST DOCUMENTED: ICD-10-PCS | Mod: CPTII,S$GLB,, | Performed by: INTERNAL MEDICINE

## 2023-03-27 PROCEDURE — 3074F PR MOST RECENT SYSTOLIC BLOOD PRESSURE < 130 MM HG: ICD-10-PCS | Mod: CPTII,S$GLB,, | Performed by: INTERNAL MEDICINE

## 2023-03-27 PROCEDURE — 99999 PR PBB SHADOW E&M-EST. PATIENT-LVL IV: CPT | Mod: PBBFAC,,, | Performed by: INTERNAL MEDICINE

## 2023-03-27 PROCEDURE — 99215 PR OFFICE/OUTPT VISIT, EST, LEVL V, 40-54 MIN: ICD-10-PCS | Mod: S$GLB,,, | Performed by: INTERNAL MEDICINE

## 2023-03-27 PROCEDURE — 3051F PR MOST RECENT HEMOGLOBIN A1C LEVEL 7.0 - < 8.0%: ICD-10-PCS | Mod: CPTII,S$GLB,, | Performed by: INTERNAL MEDICINE

## 2023-03-27 PROCEDURE — 99215 OFFICE O/P EST HI 40 MIN: CPT | Mod: S$GLB,,, | Performed by: INTERNAL MEDICINE

## 2023-03-27 PROCEDURE — 3078F DIAST BP <80 MM HG: CPT | Mod: CPTII,S$GLB,, | Performed by: INTERNAL MEDICINE

## 2023-03-27 PROCEDURE — 3078F PR MOST RECENT DIASTOLIC BLOOD PRESSURE < 80 MM HG: ICD-10-PCS | Mod: CPTII,S$GLB,, | Performed by: INTERNAL MEDICINE

## 2023-03-27 PROCEDURE — 1160F RVW MEDS BY RX/DR IN RCRD: CPT | Mod: CPTII,S$GLB,, | Performed by: INTERNAL MEDICINE

## 2023-03-27 PROCEDURE — 3051F HG A1C>EQUAL 7.0%<8.0%: CPT | Mod: CPTII,S$GLB,, | Performed by: INTERNAL MEDICINE

## 2023-03-27 PROCEDURE — 3074F SYST BP LT 130 MM HG: CPT | Mod: CPTII,S$GLB,, | Performed by: INTERNAL MEDICINE

## 2023-03-27 PROCEDURE — 4010F ACE/ARB THERAPY RXD/TAKEN: CPT | Mod: CPTII,S$GLB,, | Performed by: INTERNAL MEDICINE

## 2023-03-27 PROCEDURE — 99999 PR PBB SHADOW E&M-EST. PATIENT-LVL IV: ICD-10-PCS | Mod: PBBFAC,,, | Performed by: INTERNAL MEDICINE

## 2023-03-27 PROCEDURE — 96413 CHEMO IV INFUSION 1 HR: CPT

## 2023-03-27 PROCEDURE — 1159F PR MEDICATION LIST DOCUMENTED IN MEDICAL RECORD: ICD-10-PCS | Mod: CPTII,S$GLB,, | Performed by: INTERNAL MEDICINE

## 2023-03-27 PROCEDURE — 3008F PR BODY MASS INDEX (BMI) DOCUMENTED: ICD-10-PCS | Mod: CPTII,S$GLB,, | Performed by: INTERNAL MEDICINE

## 2023-03-27 PROCEDURE — 3008F BODY MASS INDEX DOCD: CPT | Mod: CPTII,S$GLB,, | Performed by: INTERNAL MEDICINE

## 2023-03-27 PROCEDURE — 25000003 PHARM REV CODE 250: Performed by: INTERNAL MEDICINE

## 2023-03-27 PROCEDURE — 63600175 PHARM REV CODE 636 W HCPCS: Performed by: INTERNAL MEDICINE

## 2023-03-27 RX ORDER — SODIUM CHLORIDE 0.9 % (FLUSH) 0.9 %
10 SYRINGE (ML) INJECTION
Status: CANCELLED | OUTPATIENT
Start: 2023-03-27

## 2023-03-27 RX ORDER — DIPHENHYDRAMINE HYDROCHLORIDE 50 MG/ML
50 INJECTION INTRAMUSCULAR; INTRAVENOUS ONCE AS NEEDED
Status: DISCONTINUED | OUTPATIENT
Start: 2023-03-27 | End: 2023-03-27 | Stop reason: HOSPADM

## 2023-03-27 RX ORDER — DIPHENHYDRAMINE HYDROCHLORIDE 50 MG/ML
50 INJECTION INTRAMUSCULAR; INTRAVENOUS ONCE AS NEEDED
Status: CANCELLED | OUTPATIENT
Start: 2023-03-27

## 2023-03-27 RX ORDER — EPINEPHRINE 0.3 MG/.3ML
0.3 INJECTION SUBCUTANEOUS ONCE AS NEEDED
Status: DISCONTINUED | OUTPATIENT
Start: 2023-03-27 | End: 2023-03-27 | Stop reason: HOSPADM

## 2023-03-27 RX ORDER — HEPARIN 100 UNIT/ML
500 SYRINGE INTRAVENOUS
Status: DISCONTINUED | OUTPATIENT
Start: 2023-03-27 | End: 2023-03-27 | Stop reason: HOSPADM

## 2023-03-27 RX ORDER — EPINEPHRINE 0.3 MG/.3ML
0.3 INJECTION SUBCUTANEOUS ONCE AS NEEDED
Status: CANCELLED | OUTPATIENT
Start: 2023-03-27

## 2023-03-27 RX ORDER — SODIUM CHLORIDE 0.9 % (FLUSH) 0.9 %
10 SYRINGE (ML) INJECTION
Status: DISCONTINUED | OUTPATIENT
Start: 2023-03-27 | End: 2023-03-27 | Stop reason: HOSPADM

## 2023-03-27 RX ORDER — HEPARIN 100 UNIT/ML
500 SYRINGE INTRAVENOUS
Status: CANCELLED | OUTPATIENT
Start: 2023-03-27

## 2023-03-27 RX ADMIN — SODIUM CHLORIDE, PRESERVATIVE FREE 10 ML: 5 INJECTION INTRAVENOUS at 02:03

## 2023-03-27 RX ADMIN — HEPARIN 500 UNITS: 100 SYRINGE at 02:03

## 2023-03-27 RX ADMIN — SODIUM CHLORIDE: 0.9 INJECTION, SOLUTION INTRAVENOUS at 02:03

## 2023-03-27 RX ADMIN — SODIUM CHLORIDE 200 MG: 9 INJECTION, SOLUTION INTRAVENOUS at 02:03

## 2023-03-27 NOTE — PROGRESS NOTES
"Service Date:  3/27/23    Chief Complaint:  Colon cancer    Osman Li Jr. is a 57 y.o. 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  This occurred shortly after completing treatment.    Patient was part of a clinical trial to measure CT DNA.  Next generation sequencing also showed a high tumor burden.    He is now on Keytruda as his cancer has a very high tumor mutation burden.  He has MARY however.  I am hoping the Keytruda will help.      Here for 3rd cycle. No complaints after 2nd aside from some fatigue the day after.  Neuropathy is at baseline..    Review of Systems   Constitutional:  Positive for fatigue.   HENT: Negative.     Eyes: Negative.    Respiratory: Negative.     Cardiovascular: Negative.    Gastrointestinal: Negative.    Endocrine: Negative.    Genitourinary: Negative.    Musculoskeletal: Negative.    Integumentary:  Negative.   Neurological:  Positive for numbness.   Hematological: Negative.    Psychiatric/Behavioral: Negative.        Current Outpatient Medications   Medication Instructions    atorvastatin (LIPITOR) 20 mg, Oral, Daily    diclofenac (VOLTAREN) 50 mg, Oral, 2 times daily    enalapriL-hydrochlorothiazide (VASERETIC) 10-25 mg per tablet 1 tablet, Oral, Daily    haemophilus B polysac-tetanus toxoid (ACTHIB, PF,) 10 mcg/0.5 mL injection TO BE ADMINISTERED.    metFORMIN (GLUCOPHAGE) 1,000 mg, Oral, 2 times daily with meals    morphine (MS CONTIN) 30 mg, Oral, 2 times daily    oxyCODONE (ROXICODONE) 30 mg, Oral, Every 6 hours PRN    OZEMPIC 0.25 mg, Subcutaneous, Every 7 days    pen needle, diabetic 31 gauge x 3/16" Ndle 1 each, Misc.(Non-Drug; Combo Route), Daily    promethazine (PHENERGAN) 12.5 MG Tab TAKE 1 TABLET BY MOUTH EVERY 4 HOURS AS NEEDED    sildenafiL (VIAGRA) 100 mg, Oral, Daily PRN    TOUJEO MAX U-300 SOLOSTAR 26 Units, Subcutaneous, Daily    traZODone (DESYREL) 50 mg, " Oral, Nightly        Past Medical History:   Diagnosis Date    Digestive disorder     DM (diabetes mellitus)     Hyperlipidemia     Hypertension     Prediabetes         Past Surgical History:   Procedure Laterality Date    CHOLECYSTECTOMY  2011    COLON SURGERY      COLONOSCOPY      2018    COLOSTOMY N/A 04/25/2022    Procedure: CREATION, COLOSTOMY;  Surgeon: Carlton Waddell MD;  Location: [facility] OR;  Service: General;  Laterality: N/A;    ENDOSCOPIC ULTRASOUND OF UPPER GASTROINTESTINAL TRACT N/A 1/6/2023    Procedure: ULTRASOUND, UPPER GI TRACT, ENDOSCOPIC;  Surgeon: Rinku Orozco III, MD;  Location: [facility] ENDO;  Service: Endoscopy;  Laterality: N/A;    INSERTION OF TUNNELED CENTRAL VENOUS CATHETER (CVC) WITH SUBCUTANEOUS PORT Right 05/18/2022    Procedure: [id number];  Surgeon: Carlton Waddell MD;  Location: [facility] OR;  Service: General;  Laterality: Right;    MOBILIZATION OF SPLENIC FLEXURE N/A 04/25/2022    Procedure: MOBILIZATION, SPLENIC FLEXURE;  Surgeon: Carlton Waddell MD;  Location: [facility] OR;  Service: General;  Laterality: N/A;    SPLENECTOMY N/A 04/25/2022    Procedure: SPLENECTOMY;  Surgeon: Carlton Waddell MD;  Location: [facility] OR;  Service: General;  Laterality: N/A;    SUBTOTAL COLECTOMY N/A 04/25/2022    Procedure: COLECTOMY, PARTIAL;  Surgeon: Carlton Waddell MD;  Location: [facility];  Service: General;  Laterality: N/A;    TONSILLECTOMY          Family History   Problem Relation Age of Onset    Heart disease Father        Social History     Tobacco Use    Smoking status: Every Day     Packs/day: 1.00     Years: 40.00     Pack years: 40.00     Types: Cigarettes    Smokeless tobacco: Never   Substance Use Topics    Alcohol use: Not Currently    Drug use: Never         Vitals:    03/27/23 1254   BP: 126/78   Pulse: 91   Resp: 12   Temp: 97.7 °F (36.5 °C)          Physical Exam:  /78 (BP Location: Right arm, Patient Position: Sitting, BP Method: Medium (Automatic))   Pulse 91   Temp  "97.7 °F (36.5 °C) (Temporal)   Resp 12   Ht 6' 2" (1.88 m)   Wt 86.9 kg (191 lb 9.3 oz)   SpO2 99%   BMI 24.60 kg/m²     Physical Exam  Vitals and nursing note reviewed.   Constitutional:       Appearance: Normal appearance.   HENT:      Head: Normocephalic and atraumatic.      Nose: Nose normal.      Mouth/Throat:      Mouth: Mucous membranes are moist.      Pharynx: Oropharynx is clear.   Eyes:      Extraocular Movements: Extraocular movements intact.      Conjunctiva/sclera: Conjunctivae normal.   Cardiovascular:      Rate and Rhythm: Normal rate and regular rhythm.      Heart sounds: Normal heart sounds.   Pulmonary:      Effort: Pulmonary effort is normal.      Breath sounds: Normal breath sounds.   Abdominal:      General: Abdomen is flat. Bowel sounds are normal.      Palpations: Abdomen is soft.      Comments: Ostomy bag in place.   Musculoskeletal:         General: Normal range of motion.      Cervical back: Normal range of motion and neck supple.   Skin:     General: Skin is warm and dry.   Neurological:      General: No focal deficit present.      Mental Status: He is alert and oriented to person, place, and time. Mental status is at baseline.   Psychiatric:         Mood and Affect: Mood normal.        Labs:  Lab Results   Component Value Date    WBC 11.22 03/24/2023    RBC 4.46 (L) 03/24/2023    HGB 13.6 (L) 03/24/2023    HCT 40.4 03/24/2023    MCV 91 03/24/2023    MCH 30.5 03/24/2023    MCHC 33.7 03/24/2023    RDW 13.8 03/24/2023     03/24/2023    MPV 9.0 (L) 03/24/2023    GRAN 4.7 03/24/2023    GRAN 42.0 03/24/2023    LYMPH 5.1 (H) 03/24/2023    LYMPH 45.6 03/24/2023    MONO 1.0 03/24/2023    MONO 8.6 03/24/2023    EOS 0.3 03/24/2023    BASO 0.10 03/24/2023    EOSINOPHIL 2.5 03/24/2023    BASOPHIL 0.9 03/24/2023     Sodium   Date Value Ref Range Status   03/24/2023 138 136 - 145 mmol/L Final     Potassium   Date Value Ref Range Status   03/24/2023 4.7 3.5 - 5.1 mmol/L Final     Chloride "   Date Value Ref Range Status   03/24/2023 102 95 - 110 mmol/L Final     CO2   Date Value Ref Range Status   03/24/2023 27 23 - 29 mmol/L Final     Glucose   Date Value Ref Range Status   03/24/2023 134 (H) 70 - 110 mg/dL Final     BUN   Date Value Ref Range Status   03/24/2023 13 6 - 20 mg/dL Final     Creatinine   Date Value Ref Range Status   03/24/2023 0.8 0.5 - 1.4 mg/dL Final     Calcium   Date Value Ref Range Status   03/24/2023 10.2 8.7 - 10.5 mg/dL Final     Total Protein   Date Value Ref Range Status   03/24/2023 7.5 6.0 - 8.4 g/dL Final     Albumin   Date Value Ref Range Status   03/24/2023 3.8 3.5 - 5.2 g/dL Final     Total Bilirubin   Date Value Ref Range Status   03/24/2023 0.4 0.1 - 1.0 mg/dL Final     Comment:     For infants and newborns, interpretation of results should be based  on gestational age, weight and in agreement with clinical  observations.    Premature Infant recommended reference ranges:  Up to 24 hours.............<8.0 mg/dL  Up to 48 hours............<12.0 mg/dL  3-5 days..................<15.0 mg/dL  6-29 days.................<15.0 mg/dL       Alkaline Phosphatase   Date Value Ref Range Status   03/24/2023 134 55 - 135 U/L Final     AST   Date Value Ref Range Status   03/24/2023 28 10 - 40 U/L Final     ALT   Date Value Ref Range Status   03/24/2023 29 10 - 44 U/L Final     Anion Gap   Date Value Ref Range Status   03/24/2023 9 8 - 16 mmol/L Final     eGFR if    Date Value Ref Range Status   07/25/2022 >60 >60 mL/min/1.73 m^2 Final     eGFR if non    Date Value Ref Range Status   07/25/2022 >60 >60 mL/min/1.73 m^2 Final     Comment:     Calculation used to obtain the estimated glomerular filtration  rate (eGFR) is the CKD-EPI equation.          A/P:    Malignant neoplasm of the transverse colon  Recurrence in the pancreas  -poorly differentiated adenocarcinoma  -recurrence right after completing 12 cycles of FOLFOX  -patient on clinical trial to  measure ctDNA, showed high tumor mutation burden  -given that the patient has a high tumor mutation burden, despite having MARY, I would like to try to treat him with Keytruda as it is indicated in his next generation sequencing.    -proceed with Keytruda C3 today  -return to clinic in 3 weeks for next treatment    Pancreatic mass   -confirmed recurrence of colon adenocarcinoma    Back pain  - NM bone scan negative for mets    HTN  - on Enalapril  - follow up with PCP    HLP  - follow up with PCP    DM2  - on Metformin  - follow up with PCP    Tobacco use  - counseled on cessation      Aurash Khoobehi, MD  Hematology and Oncology

## 2023-03-27 NOTE — PLAN OF CARE
Problem: Fatigue  Goal: Improved Activity Tolerance  3/27/2023 1408 by Shobha Gordillo RN  Outcome: Met  3/27/2023 1408 by Shobha Gordillo RN  Outcome: Ongoing, Progressing

## 2023-03-31 ENCOUNTER — TELEPHONE (OUTPATIENT)
Dept: RESEARCH | Facility: HOSPITAL | Age: 58
End: 2023-03-31
Payer: COMMERCIAL

## 2023-04-04 ENCOUNTER — TELEPHONE (OUTPATIENT)
Dept: RESEARCH | Facility: HOSPITAL | Age: 58
End: 2023-04-04
Payer: COMMERCIAL

## 2023-04-11 ENCOUNTER — PATIENT MESSAGE (OUTPATIENT)
Dept: ADMINISTRATIVE | Facility: HOSPITAL | Age: 58
End: 2023-04-11
Payer: COMMERCIAL

## 2023-04-14 ENCOUNTER — LAB VISIT (OUTPATIENT)
Dept: LAB | Facility: HOSPITAL | Age: 58
End: 2023-04-14
Attending: INTERNAL MEDICINE
Payer: COMMERCIAL

## 2023-04-14 DIAGNOSIS — C78.89 METASTATIC ADENOCARCINOMA TO PANCREAS: ICD-10-CM

## 2023-04-14 LAB
ALBUMIN SERPL BCP-MCNC: 4 G/DL (ref 3.5–5.2)
ALP SERPL-CCNC: 119 U/L (ref 55–135)
ALT SERPL W/O P-5'-P-CCNC: 25 U/L (ref 10–44)
ANION GAP SERPL CALC-SCNC: 7 MMOL/L (ref 8–16)
AST SERPL-CCNC: 28 U/L (ref 10–40)
BASOPHILS # BLD AUTO: 0.08 K/UL (ref 0–0.2)
BASOPHILS NFR BLD: 0.7 % (ref 0–1.9)
BILIRUB SERPL-MCNC: 0.5 MG/DL (ref 0.1–1)
BUN SERPL-MCNC: 12 MG/DL (ref 6–20)
CALCIUM SERPL-MCNC: 10.2 MG/DL (ref 8.7–10.5)
CHLORIDE SERPL-SCNC: 103 MMOL/L (ref 95–110)
CO2 SERPL-SCNC: 27 MMOL/L (ref 23–29)
CREAT SERPL-MCNC: 1 MG/DL (ref 0.5–1.4)
DIFFERENTIAL METHOD: ABNORMAL
EOSINOPHIL # BLD AUTO: 0.2 K/UL (ref 0–0.5)
EOSINOPHIL NFR BLD: 1.5 % (ref 0–8)
ERYTHROCYTE [DISTWIDTH] IN BLOOD BY AUTOMATED COUNT: 14.9 % (ref 11.5–14.5)
EST. GFR  (NO RACE VARIABLE): >60 ML/MIN/1.73 M^2
GLUCOSE SERPL-MCNC: 125 MG/DL (ref 70–110)
HCT VFR BLD AUTO: 40.3 % (ref 40–54)
HGB BLD-MCNC: 13.6 G/DL (ref 14–18)
IMM GRANULOCYTES # BLD AUTO: 0.04 K/UL (ref 0–0.04)
IMM GRANULOCYTES NFR BLD AUTO: 0.4 % (ref 0–0.5)
LYMPHOCYTES # BLD AUTO: 4.6 K/UL (ref 1–4.8)
LYMPHOCYTES NFR BLD: 42.3 % (ref 18–48)
MCH RBC QN AUTO: 30.4 PG (ref 27–31)
MCHC RBC AUTO-ENTMCNC: 33.7 G/DL (ref 32–36)
MCV RBC AUTO: 90 FL (ref 82–98)
MONOCYTES # BLD AUTO: 1.2 K/UL (ref 0.3–1)
MONOCYTES NFR BLD: 11.1 % (ref 4–15)
NEUTROPHILS # BLD AUTO: 4.8 K/UL (ref 1.8–7.7)
NEUTROPHILS NFR BLD: 44 % (ref 38–73)
NRBC BLD-RTO: 0 /100 WBC
PLATELET # BLD AUTO: 354 K/UL (ref 150–450)
PMV BLD AUTO: 9.1 FL (ref 9.2–12.9)
POTASSIUM SERPL-SCNC: 4.9 MMOL/L (ref 3.5–5.1)
PROT SERPL-MCNC: 7.2 G/DL (ref 6–8.4)
RBC # BLD AUTO: 4.48 M/UL (ref 4.6–6.2)
SODIUM SERPL-SCNC: 137 MMOL/L (ref 136–145)
TSH SERPL DL<=0.005 MIU/L-ACNC: 2.9 UIU/ML (ref 0.4–4)
WBC # BLD AUTO: 10.88 K/UL (ref 3.9–12.7)

## 2023-04-14 PROCEDURE — 80053 COMPREHEN METABOLIC PANEL: CPT | Performed by: INTERNAL MEDICINE

## 2023-04-14 PROCEDURE — 84443 ASSAY THYROID STIM HORMONE: CPT | Performed by: INTERNAL MEDICINE

## 2023-04-14 PROCEDURE — 85025 COMPLETE CBC W/AUTO DIFF WBC: CPT | Performed by: INTERNAL MEDICINE

## 2023-04-14 PROCEDURE — 36415 COLL VENOUS BLD VENIPUNCTURE: CPT | Performed by: INTERNAL MEDICINE

## 2023-04-17 ENCOUNTER — OFFICE VISIT (OUTPATIENT)
Dept: HEMATOLOGY/ONCOLOGY | Facility: CLINIC | Age: 58
End: 2023-04-17
Payer: COMMERCIAL

## 2023-04-17 ENCOUNTER — INFUSION (OUTPATIENT)
Dept: INFUSION THERAPY | Facility: HOSPITAL | Age: 58
End: 2023-04-17
Attending: INTERNAL MEDICINE
Payer: COMMERCIAL

## 2023-04-17 VITALS
OXYGEN SATURATION: 99 % | DIASTOLIC BLOOD PRESSURE: 80 MMHG | HEIGHT: 74 IN | TEMPERATURE: 97 F | RESPIRATION RATE: 12 BRPM | HEART RATE: 81 BPM | BODY MASS INDEX: 25.64 KG/M2 | SYSTOLIC BLOOD PRESSURE: 144 MMHG | WEIGHT: 199.75 LBS

## 2023-04-17 VITALS
SYSTOLIC BLOOD PRESSURE: 129 MMHG | WEIGHT: 199.75 LBS | DIASTOLIC BLOOD PRESSURE: 77 MMHG | TEMPERATURE: 98 F | BODY MASS INDEX: 25.64 KG/M2 | HEART RATE: 77 BPM | RESPIRATION RATE: 18 BRPM | OXYGEN SATURATION: 96 % | HEIGHT: 74 IN

## 2023-04-17 DIAGNOSIS — C18.5 MALIGNANT NEOPLASM OF SPLENIC FLEXURE: ICD-10-CM

## 2023-04-17 DIAGNOSIS — I10 PRIMARY HYPERTENSION: ICD-10-CM

## 2023-04-17 DIAGNOSIS — T45.1X5A CHEMOTHERAPY-INDUCED NEUROPATHY: ICD-10-CM

## 2023-04-17 DIAGNOSIS — G62.0 CHEMOTHERAPY-INDUCED NEUROPATHY: ICD-10-CM

## 2023-04-17 DIAGNOSIS — E11.00 TYPE 2 DIABETES MELLITUS WITH HYPEROSMOLARITY WITHOUT COMA, WITHOUT LONG-TERM CURRENT USE OF INSULIN: ICD-10-CM

## 2023-04-17 DIAGNOSIS — E78.49 OTHER HYPERLIPIDEMIA: ICD-10-CM

## 2023-04-17 DIAGNOSIS — C18.4 MALIGNANT NEOPLASM OF TRANSVERSE COLON: Primary | ICD-10-CM

## 2023-04-17 DIAGNOSIS — C78.89 METASTATIC ADENOCARCINOMA TO PANCREAS: Primary | ICD-10-CM

## 2023-04-17 DIAGNOSIS — C78.89 METASTATIC ADENOCARCINOMA TO PANCREAS: ICD-10-CM

## 2023-04-17 PROCEDURE — 99999 PR PBB SHADOW E&M-EST. PATIENT-LVL V: ICD-10-PCS | Mod: PBBFAC,,, | Performed by: INTERNAL MEDICINE

## 2023-04-17 PROCEDURE — A4216 STERILE WATER/SALINE, 10 ML: HCPCS | Performed by: INTERNAL MEDICINE

## 2023-04-17 PROCEDURE — 1160F PR REVIEW ALL MEDS BY PRESCRIBER/CLIN PHARMACIST DOCUMENTED: ICD-10-PCS | Mod: CPTII,S$GLB,, | Performed by: INTERNAL MEDICINE

## 2023-04-17 PROCEDURE — 3008F PR BODY MASS INDEX (BMI) DOCUMENTED: ICD-10-PCS | Mod: CPTII,S$GLB,, | Performed by: INTERNAL MEDICINE

## 2023-04-17 PROCEDURE — 1159F MED LIST DOCD IN RCRD: CPT | Mod: CPTII,S$GLB,, | Performed by: INTERNAL MEDICINE

## 2023-04-17 PROCEDURE — 63600175 PHARM REV CODE 636 W HCPCS: Mod: JZ,JG | Performed by: INTERNAL MEDICINE

## 2023-04-17 PROCEDURE — 4010F PR ACE/ARB THEARPY RXD/TAKEN: ICD-10-PCS | Mod: CPTII,S$GLB,, | Performed by: INTERNAL MEDICINE

## 2023-04-17 PROCEDURE — 3079F DIAST BP 80-89 MM HG: CPT | Mod: CPTII,S$GLB,, | Performed by: INTERNAL MEDICINE

## 2023-04-17 PROCEDURE — 4010F ACE/ARB THERAPY RXD/TAKEN: CPT | Mod: CPTII,S$GLB,, | Performed by: INTERNAL MEDICINE

## 2023-04-17 PROCEDURE — 1159F PR MEDICATION LIST DOCUMENTED IN MEDICAL RECORD: ICD-10-PCS | Mod: CPTII,S$GLB,, | Performed by: INTERNAL MEDICINE

## 2023-04-17 PROCEDURE — 3079F PR MOST RECENT DIASTOLIC BLOOD PRESSURE 80-89 MM HG: ICD-10-PCS | Mod: CPTII,S$GLB,, | Performed by: INTERNAL MEDICINE

## 2023-04-17 PROCEDURE — 96413 CHEMO IV INFUSION 1 HR: CPT

## 2023-04-17 PROCEDURE — 99999 PR PBB SHADOW E&M-EST. PATIENT-LVL V: CPT | Mod: PBBFAC,,, | Performed by: INTERNAL MEDICINE

## 2023-04-17 PROCEDURE — 1160F RVW MEDS BY RX/DR IN RCRD: CPT | Mod: CPTII,S$GLB,, | Performed by: INTERNAL MEDICINE

## 2023-04-17 PROCEDURE — 3008F BODY MASS INDEX DOCD: CPT | Mod: CPTII,S$GLB,, | Performed by: INTERNAL MEDICINE

## 2023-04-17 PROCEDURE — 99215 PR OFFICE/OUTPT VISIT, EST, LEVL V, 40-54 MIN: ICD-10-PCS | Mod: S$GLB,,, | Performed by: INTERNAL MEDICINE

## 2023-04-17 PROCEDURE — 3051F PR MOST RECENT HEMOGLOBIN A1C LEVEL 7.0 - < 8.0%: ICD-10-PCS | Mod: CPTII,S$GLB,, | Performed by: INTERNAL MEDICINE

## 2023-04-17 PROCEDURE — 99215 OFFICE O/P EST HI 40 MIN: CPT | Mod: S$GLB,,, | Performed by: INTERNAL MEDICINE

## 2023-04-17 PROCEDURE — 3077F PR MOST RECENT SYSTOLIC BLOOD PRESSURE >= 140 MM HG: ICD-10-PCS | Mod: CPTII,S$GLB,, | Performed by: INTERNAL MEDICINE

## 2023-04-17 PROCEDURE — 3051F HG A1C>EQUAL 7.0%<8.0%: CPT | Mod: CPTII,S$GLB,, | Performed by: INTERNAL MEDICINE

## 2023-04-17 PROCEDURE — 3077F SYST BP >= 140 MM HG: CPT | Mod: CPTII,S$GLB,, | Performed by: INTERNAL MEDICINE

## 2023-04-17 PROCEDURE — 25000003 PHARM REV CODE 250: Performed by: INTERNAL MEDICINE

## 2023-04-17 RX ORDER — SODIUM CHLORIDE 0.9 % (FLUSH) 0.9 %
10 SYRINGE (ML) INJECTION
Status: CANCELLED | OUTPATIENT
Start: 2023-04-17

## 2023-04-17 RX ORDER — MORPHINE SULFATE 30 MG/1
30 TABLET, FILM COATED, EXTENDED RELEASE ORAL 2 TIMES DAILY
Qty: 60 TABLET | Refills: 0 | Status: SHIPPED | OUTPATIENT
Start: 2023-04-17 | End: 2023-05-18 | Stop reason: SDUPTHER

## 2023-04-17 RX ORDER — HEPARIN 100 UNIT/ML
500 SYRINGE INTRAVENOUS
Status: CANCELLED | OUTPATIENT
Start: 2023-04-17

## 2023-04-17 RX ORDER — SODIUM CHLORIDE 0.9 % (FLUSH) 0.9 %
10 SYRINGE (ML) INJECTION
Status: DISCONTINUED | OUTPATIENT
Start: 2023-04-17 | End: 2023-04-17 | Stop reason: HOSPADM

## 2023-04-17 RX ORDER — EPINEPHRINE 0.3 MG/.3ML
0.3 INJECTION SUBCUTANEOUS ONCE AS NEEDED
Status: CANCELLED | OUTPATIENT
Start: 2023-04-17

## 2023-04-17 RX ORDER — EPINEPHRINE 0.3 MG/.3ML
0.3 INJECTION SUBCUTANEOUS ONCE AS NEEDED
Status: DISCONTINUED | OUTPATIENT
Start: 2023-04-17 | End: 2023-04-17 | Stop reason: HOSPADM

## 2023-04-17 RX ORDER — DIPHENHYDRAMINE HYDROCHLORIDE 50 MG/ML
50 INJECTION INTRAMUSCULAR; INTRAVENOUS ONCE AS NEEDED
Status: CANCELLED | OUTPATIENT
Start: 2023-04-17

## 2023-04-17 RX ORDER — HEPARIN 100 UNIT/ML
500 SYRINGE INTRAVENOUS
Status: DISCONTINUED | OUTPATIENT
Start: 2023-04-17 | End: 2023-04-17 | Stop reason: HOSPADM

## 2023-04-17 RX ORDER — DIPHENHYDRAMINE HYDROCHLORIDE 50 MG/ML
50 INJECTION INTRAMUSCULAR; INTRAVENOUS ONCE AS NEEDED
Status: DISCONTINUED | OUTPATIENT
Start: 2023-04-17 | End: 2023-04-17 | Stop reason: HOSPADM

## 2023-04-17 RX ADMIN — HEPARIN 500 UNITS: 100 SYRINGE at 10:04

## 2023-04-17 RX ADMIN — SODIUM CHLORIDE, PRESERVATIVE FREE 10 ML: 5 INJECTION INTRAVENOUS at 10:04

## 2023-04-17 RX ADMIN — SODIUM CHLORIDE 200 MG: 9 INJECTION, SOLUTION INTRAVENOUS at 09:04

## 2023-04-17 NOTE — PLAN OF CARE
Problem: Infection  Goal: Absence of Infection Signs and Symptoms  Outcome: Ongoing, Progressing  Intervention: Prevent or Manage Infection  Flowsheets (Taken 4/17/2023 1014)  Infection Management: aseptic technique maintained

## 2023-04-17 NOTE — PROGRESS NOTES
"Service Date:  4/17/23    Chief Complaint:  Colon cancer    Osman Li Jr. is a 57 y.o. 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  This occurred shortly after completing treatment.    Patient was part of a clinical trial to measure CT DNA.  Next generation sequencing also showed a high tumor burden.    He is now on Keytruda as his cancer has a very high tumor mutation burden.  He has MARY however.  I am hoping the Keytruda will help.      Here for 4th cycle.  States he is doing better.  Needs his pain medication less often.  He takes morphine b.i.d. but is not really needing his breakthrough medication.    Review of Systems   Constitutional:  Positive for fatigue.   HENT: Negative.     Eyes: Negative.    Respiratory: Negative.     Cardiovascular: Negative.    Gastrointestinal: Negative.    Endocrine: Negative.    Genitourinary: Negative.    Musculoskeletal: Negative.    Integumentary:  Negative.   Neurological:  Positive for numbness.   Hematological: Negative.    Psychiatric/Behavioral: Negative.        Current Outpatient Medications   Medication Instructions    atorvastatin (LIPITOR) 20 mg, Oral, Daily    diclofenac (VOLTAREN) 50 mg, Oral, 2 times daily    enalapriL-hydrochlorothiazide (VASERETIC) 10-25 mg per tablet 1 tablet, Oral, Daily    haemophilus B polysac-tetanus toxoid (ACTHIB, PF,) 10 mcg/0.5 mL injection TO BE ADMINISTERED.    metFORMIN (GLUCOPHAGE) 1,000 mg, Oral, 2 times daily with meals    morphine (MS CONTIN) 30 mg, Oral, 2 times daily    oxyCODONE (ROXICODONE) 30 mg, Oral, Every 6 hours PRN    OZEMPIC 0.25 mg, Subcutaneous, Every 7 days    pen needle, diabetic 31 gauge x 3/16" Ndle 1 each, Misc.(Non-Drug; Combo Route), Daily    promethazine (PHENERGAN) 12.5 MG Tab TAKE 1 TABLET BY MOUTH EVERY 4 HOURS AS NEEDED    sildenafiL (VIAGRA) 100 mg, Oral, Daily PRN    TOUJEO MAX U-300 SOLOSTAR 26 " Units, Subcutaneous, Daily    traZODone (DESYREL) 50 mg, Oral, Nightly        Past Medical History:   Diagnosis Date    Digestive disorder     DM (diabetes mellitus)     Hyperlipidemia     Hypertension     Prediabetes         Past Surgical History:   Procedure Laterality Date    CHOLECYSTECTOMY  2011    COLON SURGERY      COLONOSCOPY      2018    COLOSTOMY N/A 04/25/2022    Procedure: CREATION, COLOSTOMY;  Surgeon: Carlton Waddell MD;  Location: [facility] OR;  Service: General;  Laterality: N/A;    ENDOSCOPIC ULTRASOUND OF UPPER GASTROINTESTINAL TRACT N/A 1/6/2023    Procedure: ULTRASOUND, UPPER GI TRACT, ENDOSCOPIC;  Surgeon: Rinku Orozco III, MD;  Location: [facility] ENDO;  Service: Endoscopy;  Laterality: N/A;    INSERTION OF TUNNELED CENTRAL VENOUS CATHETER (CVC) WITH SUBCUTANEOUS PORT Right 05/18/2022    Procedure: [id number];  Surgeon: Carlton Waddell MD;  Location: [facility] OR;  Service: General;  Laterality: Right;    MOBILIZATION OF SPLENIC FLEXURE N/A 04/25/2022    Procedure: MOBILIZATION, SPLENIC FLEXURE;  Surgeon: Carlton Waddell MD;  Location: [facility] OR;  Service: General;  Laterality: N/A;    SPLENECTOMY N/A 04/25/2022    Procedure: SPLENECTOMY;  Surgeon: Carlton Waddell MD;  Location: [facility] OR;  Service: General;  Laterality: N/A;    SUBTOTAL COLECTOMY N/A 04/25/2022    Procedure: COLECTOMY, PARTIAL;  Surgeon: Carlton Waddell MD;  Location: [facility] OR;  Service: General;  Laterality: N/A;    TONSILLECTOMY          Family History   Problem Relation Age of Onset    Heart disease Father        Social History     Tobacco Use    Smoking status: Every Day     Packs/day: 1.00     Years: 40.00     Pack years: 40.00     Types: Cigarettes    Smokeless tobacco: Never   Substance Use Topics    Alcohol use: Not Currently    Drug use: Never         Vitals:    04/17/23 0836   BP: (!) 144/80   Pulse: 81   Resp: 12   Temp: 96.5 °F (35.8 °C)          Physical Exam:  BP (!) 144/80 (BP Location: Left arm, Patient Position:  "Sitting, BP Method: Medium (Automatic))   Pulse 81   Temp 96.5 °F (35.8 °C) (Temporal)   Resp 12   Ht 6' 2" (1.88 m)   Wt 90.6 kg (199 lb 11.8 oz)   SpO2 99%   BMI 25.64 kg/m²     Physical Exam  Vitals and nursing note reviewed.   Constitutional:       Appearance: Normal appearance.   HENT:      Head: Normocephalic and atraumatic.      Nose: Nose normal.      Mouth/Throat:      Mouth: Mucous membranes are moist.      Pharynx: Oropharynx is clear.   Eyes:      Extraocular Movements: Extraocular movements intact.      Conjunctiva/sclera: Conjunctivae normal.   Cardiovascular:      Rate and Rhythm: Normal rate and regular rhythm.      Heart sounds: Normal heart sounds.   Pulmonary:      Effort: Pulmonary effort is normal.      Breath sounds: Normal breath sounds.   Abdominal:      General: Abdomen is flat. Bowel sounds are normal.      Palpations: Abdomen is soft.      Comments: Ostomy bag in place.   Musculoskeletal:         General: Normal range of motion.      Cervical back: Normal range of motion and neck supple.   Skin:     General: Skin is warm and dry.   Neurological:      General: No focal deficit present.      Mental Status: He is alert and oriented to person, place, and time. Mental status is at baseline.   Psychiatric:         Mood and Affect: Mood normal.        Labs:  Lab Results   Component Value Date    WBC 10.88 04/14/2023    RBC 4.48 (L) 04/14/2023    HGB 13.6 (L) 04/14/2023    HCT 40.3 04/14/2023    MCV 90 04/14/2023    MCH 30.4 04/14/2023    MCHC 33.7 04/14/2023    RDW 14.9 (H) 04/14/2023     04/14/2023    MPV 9.1 (L) 04/14/2023    GRAN 4.8 04/14/2023    GRAN 44.0 04/14/2023    LYMPH 4.6 04/14/2023    LYMPH 42.3 04/14/2023    MONO 1.2 (H) 04/14/2023    MONO 11.1 04/14/2023    EOS 0.2 04/14/2023    BASO 0.08 04/14/2023    EOSINOPHIL 1.5 04/14/2023    BASOPHIL 0.7 04/14/2023     Sodium   Date Value Ref Range Status   04/14/2023 137 136 - 145 mmol/L Final     Potassium   Date Value Ref " Range Status   04/14/2023 4.9 3.5 - 5.1 mmol/L Final     Chloride   Date Value Ref Range Status   04/14/2023 103 95 - 110 mmol/L Final     CO2   Date Value Ref Range Status   04/14/2023 27 23 - 29 mmol/L Final     Glucose   Date Value Ref Range Status   04/14/2023 125 (H) 70 - 110 mg/dL Final     BUN   Date Value Ref Range Status   04/14/2023 12 6 - 20 mg/dL Final     Creatinine   Date Value Ref Range Status   04/14/2023 1.0 0.5 - 1.4 mg/dL Final     Calcium   Date Value Ref Range Status   04/14/2023 10.2 8.7 - 10.5 mg/dL Final     Total Protein   Date Value Ref Range Status   04/14/2023 7.2 6.0 - 8.4 g/dL Final     Albumin   Date Value Ref Range Status   04/14/2023 4.0 3.5 - 5.2 g/dL Final     Total Bilirubin   Date Value Ref Range Status   04/14/2023 0.5 0.1 - 1.0 mg/dL Final     Comment:     For infants and newborns, interpretation of results should be based  on gestational age, weight and in agreement with clinical  observations.    Premature Infant recommended reference ranges:  Up to 24 hours.............<8.0 mg/dL  Up to 48 hours............<12.0 mg/dL  3-5 days..................<15.0 mg/dL  6-29 days.................<15.0 mg/dL       Alkaline Phosphatase   Date Value Ref Range Status   04/14/2023 119 55 - 135 U/L Final     AST   Date Value Ref Range Status   04/14/2023 28 10 - 40 U/L Final     ALT   Date Value Ref Range Status   04/14/2023 25 10 - 44 U/L Final     Anion Gap   Date Value Ref Range Status   04/14/2023 7 (L) 8 - 16 mmol/L Final     eGFR if    Date Value Ref Range Status   07/25/2022 >60 >60 mL/min/1.73 m^2 Final     eGFR if non    Date Value Ref Range Status   07/25/2022 >60 >60 mL/min/1.73 m^2 Final     Comment:     Calculation used to obtain the estimated glomerular filtration  rate (eGFR) is the CKD-EPI equation.          A/P:    Malignant neoplasm of the transverse colon  Recurrence in the pancreas  -poorly differentiated adenocarcinoma  -recurrence right  after completing 12 cycles of FOLFOX  -patient on clinical trial to measure ctDNA, showed high tumor mutation burden  -given that the patient has a high tumor mutation burden, despite having MARY, I would like to try to treat him with Keytruda as it is indicated in his next generation sequencing.    -proceed with Keytruda C4 today  -repeat CT chest abdomen pelvis before next treatment   -return to clinic in 3 weeks for next treatment    Pancreatic mass   -confirmed recurrence of colon adenocarcinoma    Back pain  - NM bone scan negative for mets    HTN  - on Enalapril  - follow up with PCP    HLP  - follow up with PCP    DM2  - on Metformin  - follow up with PCP    Tobacco use  - counseled on cessation      Aurash Khoobehi, MD  Hematology and Oncology

## 2023-05-05 ENCOUNTER — HOSPITAL ENCOUNTER (OUTPATIENT)
Dept: RADIOLOGY | Facility: HOSPITAL | Age: 58
Discharge: HOME | End: 2023-05-05
Attending: INTERNAL MEDICINE
Payer: COMMERCIAL

## 2023-05-05 DIAGNOSIS — C78.89 METASTATIC ADENOCARCINOMA TO PANCREAS: ICD-10-CM

## 2023-05-05 PROCEDURE — 71260 CT THORAX DX C+: CPT | Mod: 26,,, | Performed by: RADIOLOGY

## 2023-05-05 PROCEDURE — 25500020 PHARM REV CODE 255

## 2023-05-05 PROCEDURE — 74177 CT ABD & PELVIS W/CONTRAST: CPT | Mod: TC

## 2023-05-05 PROCEDURE — 74177 CT CHEST ABDOMEN PELVIS WITH CONTRAST (XPD): ICD-10-PCS | Mod: 26,,, | Performed by: RADIOLOGY

## 2023-05-05 PROCEDURE — 71260 CT THORAX DX C+: CPT | Mod: TC

## 2023-05-05 PROCEDURE — A9698 NON-RAD CONTRAST MATERIALNOC: HCPCS

## 2023-05-05 PROCEDURE — 71260 CT CHEST ABDOMEN PELVIS WITH CONTRAST (XPD): ICD-10-PCS | Mod: 26,,, | Performed by: RADIOLOGY

## 2023-05-05 PROCEDURE — 74177 CT ABD & PELVIS W/CONTRAST: CPT | Mod: 26,,, | Performed by: RADIOLOGY

## 2023-05-05 RX ADMIN — IOHEXOL 100 ML: 350 INJECTION, SOLUTION INTRAVENOUS at 09:05

## 2023-05-05 RX ADMIN — IOHEXOL 1000 ML: 9 SOLUTION ORAL at 09:05

## 2023-05-08 ENCOUNTER — INFUSION (OUTPATIENT)
Dept: INFUSION THERAPY | Facility: HOSPITAL | Age: 58
End: 2023-05-08
Attending: INTERNAL MEDICINE
Payer: COMMERCIAL

## 2023-05-08 ENCOUNTER — OFFICE VISIT (OUTPATIENT)
Dept: HEMATOLOGY/ONCOLOGY | Facility: CLINIC | Age: 58
End: 2023-05-08
Payer: COMMERCIAL

## 2023-05-08 VITALS
TEMPERATURE: 98 F | RESPIRATION RATE: 18 BRPM | HEART RATE: 80 BPM | OXYGEN SATURATION: 97 % | DIASTOLIC BLOOD PRESSURE: 71 MMHG | SYSTOLIC BLOOD PRESSURE: 130 MMHG | WEIGHT: 199.75 LBS | HEIGHT: 74 IN | BODY MASS INDEX: 25.64 KG/M2

## 2023-05-08 VITALS
TEMPERATURE: 99 F | HEART RATE: 92 BPM | HEIGHT: 74 IN | WEIGHT: 201.06 LBS | SYSTOLIC BLOOD PRESSURE: 112 MMHG | BODY MASS INDEX: 25.8 KG/M2 | RESPIRATION RATE: 12 BRPM | DIASTOLIC BLOOD PRESSURE: 65 MMHG | OXYGEN SATURATION: 97 %

## 2023-05-08 DIAGNOSIS — C18.4 MALIGNANT NEOPLASM OF TRANSVERSE COLON: ICD-10-CM

## 2023-05-08 DIAGNOSIS — C78.89 METASTATIC ADENOCARCINOMA TO PANCREAS: Primary | ICD-10-CM

## 2023-05-08 DIAGNOSIS — I10 PRIMARY HYPERTENSION: ICD-10-CM

## 2023-05-08 DIAGNOSIS — E11.00 TYPE 2 DIABETES MELLITUS WITH HYPEROSMOLARITY WITHOUT COMA, WITHOUT LONG-TERM CURRENT USE OF INSULIN: ICD-10-CM

## 2023-05-08 DIAGNOSIS — C18.5 MALIGNANT NEOPLASM OF SPLENIC FLEXURE: ICD-10-CM

## 2023-05-08 DIAGNOSIS — G62.0 CHEMOTHERAPY-INDUCED NEUROPATHY: ICD-10-CM

## 2023-05-08 DIAGNOSIS — E78.49 OTHER HYPERLIPIDEMIA: ICD-10-CM

## 2023-05-08 DIAGNOSIS — T45.1X5A CHEMOTHERAPY-INDUCED NEUROPATHY: ICD-10-CM

## 2023-05-08 PROCEDURE — 63600175 PHARM REV CODE 636 W HCPCS: Performed by: INTERNAL MEDICINE

## 2023-05-08 PROCEDURE — 1159F MED LIST DOCD IN RCRD: CPT | Mod: CPTII,S$GLB,, | Performed by: INTERNAL MEDICINE

## 2023-05-08 PROCEDURE — 99999 PR PBB SHADOW E&M-EST. PATIENT-LVL V: ICD-10-PCS | Mod: PBBFAC,,, | Performed by: INTERNAL MEDICINE

## 2023-05-08 PROCEDURE — A4216 STERILE WATER/SALINE, 10 ML: HCPCS | Performed by: INTERNAL MEDICINE

## 2023-05-08 PROCEDURE — 3051F HG A1C>EQUAL 7.0%<8.0%: CPT | Mod: CPTII,S$GLB,, | Performed by: INTERNAL MEDICINE

## 2023-05-08 PROCEDURE — 3008F PR BODY MASS INDEX (BMI) DOCUMENTED: ICD-10-PCS | Mod: CPTII,S$GLB,, | Performed by: INTERNAL MEDICINE

## 2023-05-08 PROCEDURE — 3078F DIAST BP <80 MM HG: CPT | Mod: CPTII,S$GLB,, | Performed by: INTERNAL MEDICINE

## 2023-05-08 PROCEDURE — 3074F SYST BP LT 130 MM HG: CPT | Mod: CPTII,S$GLB,, | Performed by: INTERNAL MEDICINE

## 2023-05-08 PROCEDURE — 25000003 PHARM REV CODE 250: Performed by: INTERNAL MEDICINE

## 2023-05-08 PROCEDURE — 99215 PR OFFICE/OUTPT VISIT, EST, LEVL V, 40-54 MIN: ICD-10-PCS | Mod: S$GLB,,, | Performed by: INTERNAL MEDICINE

## 2023-05-08 PROCEDURE — 3008F BODY MASS INDEX DOCD: CPT | Mod: CPTII,S$GLB,, | Performed by: INTERNAL MEDICINE

## 2023-05-08 PROCEDURE — 3078F PR MOST RECENT DIASTOLIC BLOOD PRESSURE < 80 MM HG: ICD-10-PCS | Mod: CPTII,S$GLB,, | Performed by: INTERNAL MEDICINE

## 2023-05-08 PROCEDURE — 99215 OFFICE O/P EST HI 40 MIN: CPT | Mod: S$GLB,,, | Performed by: INTERNAL MEDICINE

## 2023-05-08 PROCEDURE — 4010F PR ACE/ARB THEARPY RXD/TAKEN: ICD-10-PCS | Mod: CPTII,S$GLB,, | Performed by: INTERNAL MEDICINE

## 2023-05-08 PROCEDURE — 3074F PR MOST RECENT SYSTOLIC BLOOD PRESSURE < 130 MM HG: ICD-10-PCS | Mod: CPTII,S$GLB,, | Performed by: INTERNAL MEDICINE

## 2023-05-08 PROCEDURE — 96413 CHEMO IV INFUSION 1 HR: CPT

## 2023-05-08 PROCEDURE — 99999 PR PBB SHADOW E&M-EST. PATIENT-LVL V: CPT | Mod: PBBFAC,,, | Performed by: INTERNAL MEDICINE

## 2023-05-08 PROCEDURE — 3051F PR MOST RECENT HEMOGLOBIN A1C LEVEL 7.0 - < 8.0%: ICD-10-PCS | Mod: CPTII,S$GLB,, | Performed by: INTERNAL MEDICINE

## 2023-05-08 PROCEDURE — 4010F ACE/ARB THERAPY RXD/TAKEN: CPT | Mod: CPTII,S$GLB,, | Performed by: INTERNAL MEDICINE

## 2023-05-08 PROCEDURE — 1159F PR MEDICATION LIST DOCUMENTED IN MEDICAL RECORD: ICD-10-PCS | Mod: CPTII,S$GLB,, | Performed by: INTERNAL MEDICINE

## 2023-05-08 RX ORDER — SODIUM CHLORIDE 0.9 % (FLUSH) 0.9 %
10 SYRINGE (ML) INJECTION
Status: DISCONTINUED | OUTPATIENT
Start: 2023-05-08 | End: 2023-05-08 | Stop reason: HOSPADM

## 2023-05-08 RX ORDER — DIPHENHYDRAMINE HYDROCHLORIDE 50 MG/ML
50 INJECTION INTRAMUSCULAR; INTRAVENOUS ONCE AS NEEDED
Status: DISCONTINUED | OUTPATIENT
Start: 2023-05-08 | End: 2023-05-08 | Stop reason: HOSPADM

## 2023-05-08 RX ORDER — HEPARIN 100 UNIT/ML
500 SYRINGE INTRAVENOUS
Status: CANCELLED | OUTPATIENT
Start: 2023-05-08

## 2023-05-08 RX ORDER — SODIUM CHLORIDE 0.9 % (FLUSH) 0.9 %
10 SYRINGE (ML) INJECTION
Status: CANCELLED | OUTPATIENT
Start: 2023-05-08

## 2023-05-08 RX ORDER — EPINEPHRINE 0.3 MG/.3ML
0.3 INJECTION SUBCUTANEOUS ONCE AS NEEDED
Status: DISCONTINUED | OUTPATIENT
Start: 2023-05-08 | End: 2023-05-08 | Stop reason: HOSPADM

## 2023-05-08 RX ORDER — EPINEPHRINE 0.3 MG/.3ML
0.3 INJECTION SUBCUTANEOUS ONCE AS NEEDED
Status: CANCELLED | OUTPATIENT
Start: 2023-05-08

## 2023-05-08 RX ORDER — DIPHENHYDRAMINE HYDROCHLORIDE 50 MG/ML
50 INJECTION INTRAMUSCULAR; INTRAVENOUS ONCE AS NEEDED
Status: CANCELLED | OUTPATIENT
Start: 2023-05-08

## 2023-05-08 RX ORDER — HEPARIN 100 UNIT/ML
500 SYRINGE INTRAVENOUS
Status: DISCONTINUED | OUTPATIENT
Start: 2023-05-08 | End: 2023-05-08 | Stop reason: HOSPADM

## 2023-05-08 RX ADMIN — SODIUM CHLORIDE 200 MG: 9 INJECTION, SOLUTION INTRAVENOUS at 02:05

## 2023-05-08 RX ADMIN — SODIUM CHLORIDE, PRESERVATIVE FREE 10 ML: 5 INJECTION INTRAVENOUS at 02:05

## 2023-05-08 RX ADMIN — HEPARIN 500 UNITS: 100 SYRINGE at 02:05

## 2023-05-08 NOTE — PROGRESS NOTES
"Service Date:  5/8/23    Chief Complaint:  Colon cancer    Osman Li Jr. is a 58 y.o. 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  This occurred shortly after completing treatment.    Patient was part of a clinical trial to measure CT DNA.  Next generation sequencing also showed a high tumor burden.    He is now on Keytruda as his cancer has a very high tumor mutation burden.  He has MARY however.  I am hoping the Keytruda will help.      Here for 5th cycle.  Doing well.  Latest CT scan shows a response to treatment.  He feels encouraged.    Review of Systems   Constitutional:  Positive for fatigue.   HENT: Negative.     Eyes: Negative.    Respiratory: Negative.     Cardiovascular: Negative.    Gastrointestinal: Negative.    Endocrine: Negative.    Genitourinary: Negative.    Musculoskeletal: Negative.    Integumentary:  Negative.   Neurological:  Positive for numbness.   Hematological: Negative.    Psychiatric/Behavioral: Negative.        Current Outpatient Medications   Medication Instructions    atorvastatin (LIPITOR) 20 mg, Oral, Daily    diclofenac (VOLTAREN) 50 mg, Oral, 2 times daily    enalapriL-hydrochlorothiazide (VASERETIC) 10-25 mg per tablet 1 tablet, Oral, Daily    haemophilus B polysac-tetanus toxoid (ACTHIB, PF,) 10 mcg/0.5 mL injection TO BE ADMINISTERED.    metFORMIN (GLUCOPHAGE) 1,000 mg, Oral, 2 times daily with meals    morphine (MS CONTIN) 30 mg, Oral, 2 times daily    oxyCODONE (ROXICODONE) 30 mg, Oral, Every 6 hours PRN    OZEMPIC 0.25 mg, Subcutaneous, Every 7 days    pen needle, diabetic 31 gauge x 3/16" Ndle 1 each, Misc.(Non-Drug; Combo Route), Daily    promethazine (PHENERGAN) 12.5 MG Tab TAKE 1 TABLET BY MOUTH EVERY 4 HOURS AS NEEDED    sildenafiL (VIAGRA) 100 mg, Oral, Daily PRN    TOUJEO MAX U-300 SOLOSTAR 26 Units, Subcutaneous, Daily    traZODone (DESYREL) 50 mg, Oral, Nightly "        Past Medical History:   Diagnosis Date    Digestive disorder     DM (diabetes mellitus)     Hyperlipidemia     Hypertension     Prediabetes         Past Surgical History:   Procedure Laterality Date    CHOLECYSTECTOMY  2011    COLON SURGERY      COLONOSCOPY      2018    COLOSTOMY N/A 04/25/2022    Procedure: CREATION, COLOSTOMY;  Surgeon: Carlton Waddell MD;  Location: [facility] OR;  Service: General;  Laterality: N/A;    ENDOSCOPIC ULTRASOUND OF UPPER GASTROINTESTINAL TRACT N/A 1/6/2023    Procedure: ULTRASOUND, UPPER GI TRACT, ENDOSCOPIC;  Surgeon: Rinku Orozco III, MD;  Location: [facility] ENDO;  Service: Endoscopy;  Laterality: N/A;    INSERTION OF TUNNELED CENTRAL VENOUS CATHETER (CVC) WITH SUBCUTANEOUS PORT Right 05/18/2022    Procedure: [id number];  Surgeon: Carlton Waddell MD;  Location: [facility] OR;  Service: General;  Laterality: Right;    MOBILIZATION OF SPLENIC FLEXURE N/A 04/25/2022    Procedure: MOBILIZATION, SPLENIC FLEXURE;  Surgeon: Carlton Waddell MD;  Location: [facility] OR;  Service: General;  Laterality: N/A;    SPLENECTOMY N/A 04/25/2022    Procedure: SPLENECTOMY;  Surgeon: Carlton Waddell MD;  Location: [facility] OR;  Service: General;  Laterality: N/A;    SUBTOTAL COLECTOMY N/A 04/25/2022    Procedure: COLECTOMY, PARTIAL;  Surgeon: Carlton Waddell MD;  Location: [facility] OR;  Service: General;  Laterality: N/A;    TONSILLECTOMY          Family History   Problem Relation Age of Onset    Heart disease Father        Social History     Tobacco Use    Smoking status: Every Day     Packs/day: 1.00     Years: 40.00     Pack years: 40.00     Types: Cigarettes    Smokeless tobacco: Never   Substance Use Topics    Alcohol use: Not Currently    Drug use: Never         Vitals:    05/08/23 1256   BP: 112/65   Pulse: 92   Resp: 12   Temp: 98.5 °F (36.9 °C)          Physical Exam:  /65 (BP Location: Right arm, Patient Position: Sitting, BP Method: Medium (Automatic))   Pulse 92   Temp 98.5 °F (36.9 °C)  "(Temporal)   Resp 12   Ht 6' 2" (1.88 m)   Wt 91.2 kg (201 lb 1 oz)   SpO2 97%   BMI 25.81 kg/m²     Physical Exam  Vitals and nursing note reviewed.   Constitutional:       Appearance: Normal appearance.   HENT:      Head: Normocephalic and atraumatic.      Nose: Nose normal.      Mouth/Throat:      Mouth: Mucous membranes are moist.      Pharynx: Oropharynx is clear.   Eyes:      Extraocular Movements: Extraocular movements intact.      Conjunctiva/sclera: Conjunctivae normal.   Cardiovascular:      Rate and Rhythm: Normal rate and regular rhythm.      Heart sounds: Normal heart sounds.   Pulmonary:      Effort: Pulmonary effort is normal.      Breath sounds: Normal breath sounds.   Abdominal:      General: Abdomen is flat. Bowel sounds are normal.      Palpations: Abdomen is soft.      Comments: Ostomy bag in place.   Musculoskeletal:         General: Normal range of motion.      Cervical back: Normal range of motion and neck supple.   Skin:     General: Skin is warm and dry.   Neurological:      General: No focal deficit present.      Mental Status: He is alert and oriented to person, place, and time. Mental status is at baseline.   Psychiatric:         Mood and Affect: Mood normal.        Labs:  Lab Results   Component Value Date    WBC 13.44 (H) 05/05/2023    RBC 4.56 (L) 05/05/2023    HGB 13.7 (L) 05/05/2023    HCT 39.9 (L) 05/05/2023    MCV 88 05/05/2023    MCH 30.0 05/05/2023    MCHC 34.3 05/05/2023    RDW 14.9 (H) 05/05/2023     05/05/2023    MPV 8.6 (L) 05/05/2023    GRAN 6.6 05/05/2023    GRAN 49.2 05/05/2023    LYMPH 5.3 (H) 05/05/2023    LYMPH 39.3 05/05/2023    MONO 1.3 (H) 05/05/2023    MONO 9.4 05/05/2023    EOS 0.2 05/05/2023    BASO 0.11 05/05/2023    EOSINOPHIL 1.1 05/05/2023    BASOPHIL 0.8 05/05/2023     Sodium   Date Value Ref Range Status   05/05/2023 137 136 - 145 mmol/L Final     Potassium   Date Value Ref Range Status   05/05/2023 4.7 3.5 - 5.1 mmol/L Final     Chloride   Date " Value Ref Range Status   05/05/2023 102 95 - 110 mmol/L Final     CO2   Date Value Ref Range Status   05/05/2023 27 23 - 29 mmol/L Final     Glucose   Date Value Ref Range Status   05/05/2023 121 (H) 70 - 110 mg/dL Final     BUN   Date Value Ref Range Status   05/05/2023 14 6 - 20 mg/dL Final     Creatinine   Date Value Ref Range Status   05/05/2023 0.9 0.5 - 1.4 mg/dL Final     Calcium   Date Value Ref Range Status   05/05/2023 10.2 8.7 - 10.5 mg/dL Final     Total Protein   Date Value Ref Range Status   05/05/2023 7.5 6.0 - 8.4 g/dL Final     Albumin   Date Value Ref Range Status   05/05/2023 4.0 3.5 - 5.2 g/dL Final     Total Bilirubin   Date Value Ref Range Status   05/05/2023 0.5 0.1 - 1.0 mg/dL Final     Comment:     For infants and newborns, interpretation of results should be based  on gestational age, weight and in agreement with clinical  observations.    Premature Infant recommended reference ranges:  Up to 24 hours.............<8.0 mg/dL  Up to 48 hours............<12.0 mg/dL  3-5 days..................<15.0 mg/dL  6-29 days.................<15.0 mg/dL       Alkaline Phosphatase   Date Value Ref Range Status   05/05/2023 113 55 - 135 U/L Final     AST   Date Value Ref Range Status   05/05/2023 26 10 - 40 U/L Final     ALT   Date Value Ref Range Status   05/05/2023 23 10 - 44 U/L Final     Anion Gap   Date Value Ref Range Status   05/05/2023 8 8 - 16 mmol/L Final     eGFR if    Date Value Ref Range Status   07/25/2022 >60 >60 mL/min/1.73 m^2 Final     eGFR if non    Date Value Ref Range Status   07/25/2022 >60 >60 mL/min/1.73 m^2 Final     Comment:     Calculation used to obtain the estimated glomerular filtration  rate (eGFR) is the CKD-EPI equation.          A/P:    Malignant neoplasm of the transverse colon  Recurrence in the pancreas  -poorly differentiated adenocarcinoma  -recurrence right after completing 12 cycles of FOLFOX  -patient on clinical trial to measure  ctDNA, showed high tumor mutation burden  -given that the patient has a high tumor mutation burden, despite having MARY, I would like to try to treat him with Keytruda as it is indicated in his next generation sequencing.    -proceed with Keytruda C5 today  -CT chest abdomen pelvis after 3 months of treatment show shrinkage of the pancreatic mass.  -return to clinic in 3 weeks for next treatment    Pancreatic mass   -confirmed recurrence of colon adenocarcinoma    Back pain  - NM bone scan negative for mets    HTN  - on Enalapril  - follow up with PCP    HLP  - follow up with PCP    DM2  - on Metformin  - follow up with PCP    Tobacco use  - counseled on cessation      Aurash Khoobehi, MD  Hematology and Oncology

## 2023-05-08 NOTE — PLAN OF CARE
Problem: Fatigue  Goal: Improved Activity Tolerance  Outcome: Ongoing, Progressing  Intervention: Promote Improved Energy  Flowsheets (Taken 5/8/2023 1413)  Fatigue Management:   paced activity encouraged   fatigue-related activity identified   frequent rest breaks encouraged  Sleep/Rest Enhancement:   relaxation techniques promoted   reading promoted   regular sleep/rest pattern promoted

## 2023-05-18 ENCOUNTER — PATIENT MESSAGE (OUTPATIENT)
Dept: FAMILY MEDICINE | Facility: CLINIC | Age: 58
End: 2023-05-18

## 2023-05-18 ENCOUNTER — PATIENT MESSAGE (OUTPATIENT)
Dept: HEMATOLOGY/ONCOLOGY | Facility: CLINIC | Age: 58
End: 2023-05-18
Payer: COMMERCIAL

## 2023-05-18 DIAGNOSIS — C78.89 METASTATIC ADENOCARCINOMA TO PANCREAS: ICD-10-CM

## 2023-05-18 DIAGNOSIS — F51.01 PRIMARY INSOMNIA: ICD-10-CM

## 2023-05-18 RX ORDER — TRAZODONE HYDROCHLORIDE 50 MG/1
50 TABLET ORAL NIGHTLY
Qty: 90 TABLET | Refills: 3 | Status: SHIPPED | OUTPATIENT
Start: 2023-05-18 | End: 2023-06-01 | Stop reason: SDUPTHER

## 2023-05-18 RX ORDER — MORPHINE SULFATE 30 MG/1
30 TABLET, FILM COATED, EXTENDED RELEASE ORAL 2 TIMES DAILY
Qty: 60 TABLET | Refills: 0 | Status: SHIPPED | OUTPATIENT
Start: 2023-05-18 | End: 2023-06-15 | Stop reason: SDUPTHER

## 2023-05-26 ENCOUNTER — LAB VISIT (OUTPATIENT)
Dept: LAB | Facility: HOSPITAL | Age: 58
End: 2023-05-26
Attending: INTERNAL MEDICINE
Payer: COMMERCIAL

## 2023-05-26 DIAGNOSIS — C78.89 METASTATIC ADENOCARCINOMA TO PANCREAS: ICD-10-CM

## 2023-05-26 LAB
ALBUMIN SERPL BCP-MCNC: 4.3 G/DL (ref 3.5–5.2)
ALP SERPL-CCNC: 112 U/L (ref 55–135)
ALT SERPL W/O P-5'-P-CCNC: 27 U/L (ref 10–44)
ANION GAP SERPL CALC-SCNC: 11 MMOL/L (ref 8–16)
AST SERPL-CCNC: 28 U/L (ref 10–40)
BASOPHILS # BLD AUTO: 0.1 K/UL (ref 0–0.2)
BASOPHILS NFR BLD: 0.8 % (ref 0–1.9)
BILIRUB SERPL-MCNC: 0.4 MG/DL (ref 0.1–1)
BUN SERPL-MCNC: 13 MG/DL (ref 6–20)
CALCIUM SERPL-MCNC: 10.5 MG/DL (ref 8.7–10.5)
CHLORIDE SERPL-SCNC: 100 MMOL/L (ref 95–110)
CO2 SERPL-SCNC: 28 MMOL/L (ref 23–29)
CREAT SERPL-MCNC: 0.9 MG/DL (ref 0.5–1.4)
DIFFERENTIAL METHOD: ABNORMAL
EOSINOPHIL # BLD AUTO: 0.2 K/UL (ref 0–0.5)
EOSINOPHIL NFR BLD: 1.7 % (ref 0–8)
ERYTHROCYTE [DISTWIDTH] IN BLOOD BY AUTOMATED COUNT: 14.4 % (ref 11.5–14.5)
EST. GFR  (NO RACE VARIABLE): >60 ML/MIN/1.73 M^2
GLUCOSE SERPL-MCNC: 91 MG/DL (ref 70–110)
HCT VFR BLD AUTO: 41.9 % (ref 40–54)
HGB BLD-MCNC: 14.1 G/DL (ref 14–18)
IMM GRANULOCYTES # BLD AUTO: 0.03 K/UL (ref 0–0.04)
IMM GRANULOCYTES NFR BLD AUTO: 0.2 % (ref 0–0.5)
LYMPHOCYTES # BLD AUTO: 6.4 K/UL (ref 1–4.8)
LYMPHOCYTES NFR BLD: 48.9 % (ref 18–48)
MCH RBC QN AUTO: 29.5 PG (ref 27–31)
MCHC RBC AUTO-ENTMCNC: 33.7 G/DL (ref 32–36)
MCV RBC AUTO: 88 FL (ref 82–98)
MONOCYTES # BLD AUTO: 1 K/UL (ref 0.3–1)
MONOCYTES NFR BLD: 7.5 % (ref 4–15)
NEUTROPHILS # BLD AUTO: 5.4 K/UL (ref 1.8–7.7)
NEUTROPHILS NFR BLD: 40.9 % (ref 38–73)
NRBC BLD-RTO: 0 /100 WBC
PLATELET # BLD AUTO: 375 K/UL (ref 150–450)
PMV BLD AUTO: 9.2 FL (ref 9.2–12.9)
POTASSIUM SERPL-SCNC: 4.6 MMOL/L (ref 3.5–5.1)
PROT SERPL-MCNC: 7.8 G/DL (ref 6–8.4)
RBC # BLD AUTO: 4.78 M/UL (ref 4.6–6.2)
SODIUM SERPL-SCNC: 139 MMOL/L (ref 136–145)
TSH SERPL DL<=0.005 MIU/L-ACNC: 1.37 UIU/ML (ref 0.4–4)
WBC # BLD AUTO: 13.06 K/UL (ref 3.9–12.7)

## 2023-05-26 PROCEDURE — 84443 ASSAY THYROID STIM HORMONE: CPT | Performed by: INTERNAL MEDICINE

## 2023-05-26 PROCEDURE — 36415 COLL VENOUS BLD VENIPUNCTURE: CPT | Performed by: INTERNAL MEDICINE

## 2023-05-26 PROCEDURE — 85025 COMPLETE CBC W/AUTO DIFF WBC: CPT | Performed by: INTERNAL MEDICINE

## 2023-05-26 PROCEDURE — 80053 COMPREHEN METABOLIC PANEL: CPT | Performed by: INTERNAL MEDICINE

## 2023-05-29 ENCOUNTER — INFUSION (OUTPATIENT)
Dept: INFUSION THERAPY | Facility: HOSPITAL | Age: 58
End: 2023-05-29
Attending: INTERNAL MEDICINE
Payer: COMMERCIAL

## 2023-05-29 ENCOUNTER — OFFICE VISIT (OUTPATIENT)
Dept: HEMATOLOGY/ONCOLOGY | Facility: CLINIC | Age: 58
End: 2023-05-29
Payer: COMMERCIAL

## 2023-05-29 VITALS
TEMPERATURE: 98 F | OXYGEN SATURATION: 97 % | SYSTOLIC BLOOD PRESSURE: 117 MMHG | RESPIRATION RATE: 18 BRPM | HEIGHT: 74 IN | BODY MASS INDEX: 25.72 KG/M2 | DIASTOLIC BLOOD PRESSURE: 70 MMHG | WEIGHT: 200.38 LBS | HEART RATE: 81 BPM

## 2023-05-29 VITALS
TEMPERATURE: 99 F | BODY MASS INDEX: 25.72 KG/M2 | OXYGEN SATURATION: 96 % | HEART RATE: 95 BPM | SYSTOLIC BLOOD PRESSURE: 144 MMHG | WEIGHT: 200.38 LBS | RESPIRATION RATE: 16 BRPM | HEIGHT: 74 IN | DIASTOLIC BLOOD PRESSURE: 76 MMHG

## 2023-05-29 DIAGNOSIS — E11.00 TYPE 2 DIABETES MELLITUS WITH HYPEROSMOLARITY WITHOUT COMA, WITHOUT LONG-TERM CURRENT USE OF INSULIN: ICD-10-CM

## 2023-05-29 DIAGNOSIS — T45.1X5A CHEMOTHERAPY-INDUCED NEUROPATHY: ICD-10-CM

## 2023-05-29 DIAGNOSIS — C78.89 METASTATIC ADENOCARCINOMA TO PANCREAS: Primary | ICD-10-CM

## 2023-05-29 DIAGNOSIS — E78.49 OTHER HYPERLIPIDEMIA: ICD-10-CM

## 2023-05-29 DIAGNOSIS — C18.4 MALIGNANT NEOPLASM OF TRANSVERSE COLON: ICD-10-CM

## 2023-05-29 DIAGNOSIS — C18.5 MALIGNANT NEOPLASM OF SPLENIC FLEXURE: ICD-10-CM

## 2023-05-29 DIAGNOSIS — G62.0 CHEMOTHERAPY-INDUCED NEUROPATHY: ICD-10-CM

## 2023-05-29 DIAGNOSIS — I10 PRIMARY HYPERTENSION: ICD-10-CM

## 2023-05-29 PROCEDURE — 63600175 PHARM REV CODE 636 W HCPCS: Performed by: INTERNAL MEDICINE

## 2023-05-29 PROCEDURE — 3078F DIAST BP <80 MM HG: CPT | Mod: CPTII,S$GLB,, | Performed by: INTERNAL MEDICINE

## 2023-05-29 PROCEDURE — 1160F RVW MEDS BY RX/DR IN RCRD: CPT | Mod: CPTII,S$GLB,, | Performed by: INTERNAL MEDICINE

## 2023-05-29 PROCEDURE — A4216 STERILE WATER/SALINE, 10 ML: HCPCS | Performed by: INTERNAL MEDICINE

## 2023-05-29 PROCEDURE — 3008F BODY MASS INDEX DOCD: CPT | Mod: CPTII,S$GLB,, | Performed by: INTERNAL MEDICINE

## 2023-05-29 PROCEDURE — 1159F PR MEDICATION LIST DOCUMENTED IN MEDICAL RECORD: ICD-10-PCS | Mod: CPTII,S$GLB,, | Performed by: INTERNAL MEDICINE

## 2023-05-29 PROCEDURE — 4010F ACE/ARB THERAPY RXD/TAKEN: CPT | Mod: CPTII,S$GLB,, | Performed by: INTERNAL MEDICINE

## 2023-05-29 PROCEDURE — 1159F MED LIST DOCD IN RCRD: CPT | Mod: CPTII,S$GLB,, | Performed by: INTERNAL MEDICINE

## 2023-05-29 PROCEDURE — 99999 PR PBB SHADOW E&M-EST. PATIENT-LVL IV: ICD-10-PCS | Mod: PBBFAC,,, | Performed by: INTERNAL MEDICINE

## 2023-05-29 PROCEDURE — 3051F PR MOST RECENT HEMOGLOBIN A1C LEVEL 7.0 - < 8.0%: ICD-10-PCS | Mod: CPTII,S$GLB,, | Performed by: INTERNAL MEDICINE

## 2023-05-29 PROCEDURE — 4010F PR ACE/ARB THEARPY RXD/TAKEN: ICD-10-PCS | Mod: CPTII,S$GLB,, | Performed by: INTERNAL MEDICINE

## 2023-05-29 PROCEDURE — 99215 OFFICE O/P EST HI 40 MIN: CPT | Mod: S$GLB,,, | Performed by: INTERNAL MEDICINE

## 2023-05-29 PROCEDURE — 96413 CHEMO IV INFUSION 1 HR: CPT

## 2023-05-29 PROCEDURE — 3078F PR MOST RECENT DIASTOLIC BLOOD PRESSURE < 80 MM HG: ICD-10-PCS | Mod: CPTII,S$GLB,, | Performed by: INTERNAL MEDICINE

## 2023-05-29 PROCEDURE — 99999 PR PBB SHADOW E&M-EST. PATIENT-LVL IV: CPT | Mod: PBBFAC,,, | Performed by: INTERNAL MEDICINE

## 2023-05-29 PROCEDURE — 3051F HG A1C>EQUAL 7.0%<8.0%: CPT | Mod: CPTII,S$GLB,, | Performed by: INTERNAL MEDICINE

## 2023-05-29 PROCEDURE — 3008F PR BODY MASS INDEX (BMI) DOCUMENTED: ICD-10-PCS | Mod: CPTII,S$GLB,, | Performed by: INTERNAL MEDICINE

## 2023-05-29 PROCEDURE — 99215 PR OFFICE/OUTPT VISIT, EST, LEVL V, 40-54 MIN: ICD-10-PCS | Mod: S$GLB,,, | Performed by: INTERNAL MEDICINE

## 2023-05-29 PROCEDURE — 3077F SYST BP >= 140 MM HG: CPT | Mod: CPTII,S$GLB,, | Performed by: INTERNAL MEDICINE

## 2023-05-29 PROCEDURE — 1160F PR REVIEW ALL MEDS BY PRESCRIBER/CLIN PHARMACIST DOCUMENTED: ICD-10-PCS | Mod: CPTII,S$GLB,, | Performed by: INTERNAL MEDICINE

## 2023-05-29 PROCEDURE — 25000003 PHARM REV CODE 250: Performed by: INTERNAL MEDICINE

## 2023-05-29 PROCEDURE — 3077F PR MOST RECENT SYSTOLIC BLOOD PRESSURE >= 140 MM HG: ICD-10-PCS | Mod: CPTII,S$GLB,, | Performed by: INTERNAL MEDICINE

## 2023-05-29 RX ORDER — SODIUM CHLORIDE 0.9 % (FLUSH) 0.9 %
10 SYRINGE (ML) INJECTION
Status: DISCONTINUED | OUTPATIENT
Start: 2023-05-29 | End: 2023-05-29 | Stop reason: HOSPADM

## 2023-05-29 RX ORDER — DIPHENHYDRAMINE HYDROCHLORIDE 50 MG/ML
50 INJECTION INTRAMUSCULAR; INTRAVENOUS ONCE AS NEEDED
Status: CANCELLED | OUTPATIENT
Start: 2023-05-29

## 2023-05-29 RX ORDER — HEPARIN 100 UNIT/ML
500 SYRINGE INTRAVENOUS
Status: DISCONTINUED | OUTPATIENT
Start: 2023-05-29 | End: 2023-05-29 | Stop reason: HOSPADM

## 2023-05-29 RX ORDER — EPINEPHRINE 0.3 MG/.3ML
0.3 INJECTION SUBCUTANEOUS ONCE AS NEEDED
Status: DISCONTINUED | OUTPATIENT
Start: 2023-05-29 | End: 2023-05-29 | Stop reason: HOSPADM

## 2023-05-29 RX ORDER — DIPHENHYDRAMINE HYDROCHLORIDE 50 MG/ML
50 INJECTION INTRAMUSCULAR; INTRAVENOUS ONCE AS NEEDED
Status: DISCONTINUED | OUTPATIENT
Start: 2023-05-29 | End: 2023-05-29 | Stop reason: HOSPADM

## 2023-05-29 RX ORDER — EPINEPHRINE 0.3 MG/.3ML
0.3 INJECTION SUBCUTANEOUS ONCE AS NEEDED
Status: CANCELLED | OUTPATIENT
Start: 2023-05-29

## 2023-05-29 RX ORDER — HEPARIN 100 UNIT/ML
500 SYRINGE INTRAVENOUS
Status: CANCELLED | OUTPATIENT
Start: 2023-05-29

## 2023-05-29 RX ORDER — SODIUM CHLORIDE 0.9 % (FLUSH) 0.9 %
10 SYRINGE (ML) INJECTION
Status: CANCELLED | OUTPATIENT
Start: 2023-05-29

## 2023-05-29 RX ADMIN — SODIUM CHLORIDE, PRESERVATIVE FREE 10 ML: 5 INJECTION INTRAVENOUS at 02:05

## 2023-05-29 RX ADMIN — SODIUM CHLORIDE 200 MG: 9 INJECTION, SOLUTION INTRAVENOUS at 02:05

## 2023-05-29 RX ADMIN — HEPARIN 500 UNITS: 100 SYRINGE at 02:05

## 2023-05-29 NOTE — PROGRESS NOTES
"Service Date:  5/29/23    Chief Complaint:  Colon cancer    Osman Li Jr. is a 58 y.o. 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  This occurred shortly after completing treatment.    Patient was part of a clinical trial to measure CT DNA.  Next generation sequencing also showed a high tumor burden.    He is now on Keytruda as his cancer has a very high tumor mutation burden.  He has MARY however.  I am hoping the Keytruda will help.      Here for 6th cycle.  Doing well.  In better spirits. More active at home. Less abd pain.    Review of Systems   Constitutional:  Positive for fatigue.   HENT: Negative.     Eyes: Negative.    Respiratory: Negative.     Cardiovascular: Negative.    Gastrointestinal: Negative.    Endocrine: Negative.    Genitourinary: Negative.    Musculoskeletal: Negative.    Integumentary:  Negative.   Neurological:  Positive for numbness.   Hematological: Negative.    Psychiatric/Behavioral: Negative.        Current Outpatient Medications   Medication Instructions    atorvastatin (LIPITOR) 20 mg, Oral, Daily    diclofenac (VOLTAREN) 50 mg, Oral, 2 times daily    enalapriL-hydrochlorothiazide (VASERETIC) 10-25 mg per tablet 1 tablet, Oral, Daily    haemophilus B polysac-tetanus toxoid (ACTHIB, PF,) 10 mcg/0.5 mL injection TO BE ADMINISTERED.    metFORMIN (GLUCOPHAGE) 1,000 mg, Oral, 2 times daily with meals    morphine (MS CONTIN) 30 mg, Oral, 2 times daily    oxyCODONE (ROXICODONE) 30 mg, Oral, Every 6 hours PRN    OZEMPIC 0.25 mg, Subcutaneous, Every 7 days    pen needle, diabetic 31 gauge x 3/16" Ndle 1 each, Misc.(Non-Drug; Combo Route), Daily    promethazine (PHENERGAN) 12.5 MG Tab TAKE 1 TABLET BY MOUTH EVERY 4 HOURS AS NEEDED    sildenafiL (VIAGRA) 100 mg, Oral, Daily PRN    TOUJEO MAX U-300 SOLOSTAR 26 Units, Subcutaneous, Daily    traZODone (DESYREL) 50 mg, Oral, Nightly        Past " Medical History:   Diagnosis Date    Digestive disorder     DM (diabetes mellitus)     Hyperlipidemia     Hypertension     Prediabetes         Past Surgical History:   Procedure Laterality Date    CHOLECYSTECTOMY  2011    COLON SURGERY      COLONOSCOPY      2018    COLOSTOMY N/A 04/25/2022    Procedure: CREATION, COLOSTOMY;  Surgeon: Carlton Waddell MD;  Location: [facility] OR;  Service: General;  Laterality: N/A;    ENDOSCOPIC ULTRASOUND OF UPPER GASTROINTESTINAL TRACT N/A 1/6/2023    Procedure: ULTRASOUND, UPPER GI TRACT, ENDOSCOPIC;  Surgeon: Rinku Orozco III, MD;  Location: [facility] ENDO;  Service: Endoscopy;  Laterality: N/A;    INSERTION OF TUNNELED CENTRAL VENOUS CATHETER (CVC) WITH SUBCUTANEOUS PORT Right 05/18/2022    Procedure: [id number];  Surgeon: Carlton Waddell MD;  Location: [facility] OR;  Service: General;  Laterality: Right;    MOBILIZATION OF SPLENIC FLEXURE N/A 04/25/2022    Procedure: MOBILIZATION, SPLENIC FLEXURE;  Surgeon: Carlton Waddell MD;  Location: [facility] OR;  Service: General;  Laterality: N/A;    SPLENECTOMY N/A 04/25/2022    Procedure: SPLENECTOMY;  Surgeon: Carlton Waddell MD;  Location: [facility] OR;  Service: General;  Laterality: N/A;    SUBTOTAL COLECTOMY N/A 04/25/2022    Procedure: COLECTOMY, PARTIAL;  Surgeon: Carlton Waddell MD;  Location: [facility] OR;  Service: General;  Laterality: N/A;    TONSILLECTOMY          Family History   Problem Relation Age of Onset    Heart disease Father        Social History     Tobacco Use    Smoking status: Every Day     Packs/day: 1.00     Years: 40.00     Pack years: 40.00     Types: Cigarettes    Smokeless tobacco: Never   Substance Use Topics    Alcohol use: Not Currently    Drug use: Never         Vitals:    05/29/23 1304   BP: (!) 144/76   Pulse: 95   Resp: 16   Temp: 99.1 °F (37.3 °C)          Physical Exam:  BP (!) 144/76 (BP Location: Left arm, Patient Position: Sitting, BP Method: Medium (Automatic))   Pulse 95   Temp 99.1 °F (37.3 °C)    "Resp 16   Ht 6' 2" (1.88 m)   Wt 90.9 kg (200 lb 6.4 oz)   SpO2 96%   BMI 25.73 kg/m²     Physical Exam  Vitals and nursing note reviewed.   Constitutional:       Appearance: Normal appearance.   HENT:      Head: Normocephalic and atraumatic.      Nose: Nose normal.      Mouth/Throat:      Mouth: Mucous membranes are moist.      Pharynx: Oropharynx is clear.   Eyes:      Extraocular Movements: Extraocular movements intact.      Conjunctiva/sclera: Conjunctivae normal.   Cardiovascular:      Rate and Rhythm: Normal rate and regular rhythm.      Heart sounds: Normal heart sounds.   Pulmonary:      Effort: Pulmonary effort is normal.      Breath sounds: Normal breath sounds.   Abdominal:      General: Abdomen is flat. Bowel sounds are normal.      Palpations: Abdomen is soft.      Comments: Ostomy bag in place.   Musculoskeletal:         General: Normal range of motion.      Cervical back: Normal range of motion and neck supple.   Skin:     General: Skin is warm and dry.   Neurological:      General: No focal deficit present.      Mental Status: He is alert and oriented to person, place, and time. Mental status is at baseline.   Psychiatric:         Mood and Affect: Mood normal.        Labs:  Lab Results   Component Value Date    WBC 13.06 (H) 05/26/2023    RBC 4.78 05/26/2023    HGB 14.1 05/26/2023    HCT 41.9 05/26/2023    MCV 88 05/26/2023    MCH 29.5 05/26/2023    MCHC 33.7 05/26/2023    RDW 14.4 05/26/2023     05/26/2023    MPV 9.2 05/26/2023    GRAN 5.4 05/26/2023    GRAN 40.9 05/26/2023    LYMPH 6.4 (H) 05/26/2023    LYMPH 48.9 (H) 05/26/2023    MONO 1.0 05/26/2023    MONO 7.5 05/26/2023    EOS 0.2 05/26/2023    BASO 0.10 05/26/2023    EOSINOPHIL 1.7 05/26/2023    BASOPHIL 0.8 05/26/2023     Sodium   Date Value Ref Range Status   05/26/2023 139 136 - 145 mmol/L Final     Potassium   Date Value Ref Range Status   05/26/2023 4.6 3.5 - 5.1 mmol/L Final     Chloride   Date Value Ref Range Status "   05/26/2023 100 95 - 110 mmol/L Final     CO2   Date Value Ref Range Status   05/26/2023 28 23 - 29 mmol/L Final     Glucose   Date Value Ref Range Status   05/26/2023 91 70 - 110 mg/dL Final     BUN   Date Value Ref Range Status   05/26/2023 13 6 - 20 mg/dL Final     Creatinine   Date Value Ref Range Status   05/26/2023 0.9 0.5 - 1.4 mg/dL Final     Calcium   Date Value Ref Range Status   05/26/2023 10.5 8.7 - 10.5 mg/dL Final     Total Protein   Date Value Ref Range Status   05/26/2023 7.8 6.0 - 8.4 g/dL Final     Albumin   Date Value Ref Range Status   05/26/2023 4.3 3.5 - 5.2 g/dL Final     Total Bilirubin   Date Value Ref Range Status   05/26/2023 0.4 0.1 - 1.0 mg/dL Final     Comment:     For infants and newborns, interpretation of results should be based  on gestational age, weight and in agreement with clinical  observations.    Premature Infant recommended reference ranges:  Up to 24 hours.............<8.0 mg/dL  Up to 48 hours............<12.0 mg/dL  3-5 days..................<15.0 mg/dL  6-29 days.................<15.0 mg/dL       Alkaline Phosphatase   Date Value Ref Range Status   05/26/2023 112 55 - 135 U/L Final     AST   Date Value Ref Range Status   05/26/2023 28 10 - 40 U/L Final     ALT   Date Value Ref Range Status   05/26/2023 27 10 - 44 U/L Final     Anion Gap   Date Value Ref Range Status   05/26/2023 11 8 - 16 mmol/L Final     eGFR if    Date Value Ref Range Status   07/25/2022 >60 >60 mL/min/1.73 m^2 Final     eGFR if non    Date Value Ref Range Status   07/25/2022 >60 >60 mL/min/1.73 m^2 Final     Comment:     Calculation used to obtain the estimated glomerular filtration  rate (eGFR) is the CKD-EPI equation.          A/P:    Malignant neoplasm of the transverse colon  Recurrence in the pancreas  -poorly differentiated adenocarcinoma  -recurrence right after completing 12 cycles of FOLFOX  -patient on clinical trial to measure ctDNA, showed high tumor  mutation burden  -given that the patient has a high tumor mutation burden, despite having MARY, I would like to try to treat him with Keytruda as it is indicated in his next generation sequencing.    -proceed with Keytruda C6 today  -CT chest abdomen pelvis after 3 months of treatment show shrinkage of the pancreatic mass.  -return to clinic in 3 weeks for next treatment    Pancreatic mass   -confirmed recurrence of colon adenocarcinoma    Back pain  - NM bone scan negative for mets    HTN  - on Enalapril  - follow up with PCP    HLP  - follow up with PCP    DM2  - on Metformin  - follow up with PCP    Tobacco use  - counseled on cessation      Aurash Khoobehi, MD  Hematology and Oncology

## 2023-05-29 NOTE — PLAN OF CARE
Problem: Fatigue  Goal: Improved Activity Tolerance  Outcome: Ongoing, Progressing  Intervention: Promote Improved Energy  Flowsheets (Taken 5/29/2023 8966)  Fatigue Management:   fatigue-related activity identified   paced activity encouraged   frequent rest breaks encouraged  Sleep/Rest Enhancement:   noise level reduced   relaxation techniques promoted   regular sleep/rest pattern promoted  Activity Management:   Up in chair - L3   Ambulated -L4

## 2023-06-01 ENCOUNTER — TELEPHONE (OUTPATIENT)
Dept: FAMILY MEDICINE | Facility: CLINIC | Age: 58
End: 2023-06-01

## 2023-06-01 DIAGNOSIS — Z79.899 ENCOUNTER FOR LONG-TERM (CURRENT) USE OF OTHER MEDICATIONS: ICD-10-CM

## 2023-06-01 DIAGNOSIS — F51.01 PRIMARY INSOMNIA: ICD-10-CM

## 2023-06-01 DIAGNOSIS — E11.69 DM TYPE 2 WITH DIABETIC DYSLIPIDEMIA: Primary | ICD-10-CM

## 2023-06-01 DIAGNOSIS — E78.5 DM TYPE 2 WITH DIABETIC DYSLIPIDEMIA: Primary | ICD-10-CM

## 2023-06-01 DIAGNOSIS — E78.2 MIXED HYPERLIPIDEMIA: ICD-10-CM

## 2023-06-01 RX ORDER — TRAZODONE HYDROCHLORIDE 50 MG/1
50 TABLET ORAL NIGHTLY
Qty: 90 TABLET | Refills: 3 | Status: SHIPPED | OUTPATIENT
Start: 2023-06-01 | End: 2024-05-31

## 2023-06-01 NOTE — TELEPHONE ENCOUNTER
Pt is needing a refill on his Trazodone. Last office visit 01/25/2023. Next office visit 06/21/2023. Pt is wanting this prescription to be sent to a new pharmacy.

## 2023-06-15 DIAGNOSIS — C78.89 METASTATIC ADENOCARCINOMA TO PANCREAS: ICD-10-CM

## 2023-06-15 LAB
CHOLEST SERPL-MCNC: 138 MG/DL
CHOLEST/HDLC SERPL: 2.8 (CALC)
HBA1C MFR BLD: 6.8 % OF TOTAL HGB
HDLC SERPL-MCNC: 49 MG/DL
LDLC SERPL CALC-MCNC: 67 MG/DL (CALC)
NONHDLC SERPL-MCNC: 89 MG/DL (CALC)
TRIGL SERPL-MCNC: 140 MG/DL

## 2023-06-15 RX ORDER — MORPHINE SULFATE 30 MG/1
30 TABLET, FILM COATED, EXTENDED RELEASE ORAL 2 TIMES DAILY
Qty: 60 TABLET | Refills: 0 | Status: SHIPPED | OUTPATIENT
Start: 2023-06-15 | End: 2023-07-18 | Stop reason: SDUPTHER

## 2023-06-19 ENCOUNTER — OFFICE VISIT (OUTPATIENT)
Dept: HEMATOLOGY/ONCOLOGY | Facility: CLINIC | Age: 58
End: 2023-06-19
Payer: COMMERCIAL

## 2023-06-19 ENCOUNTER — LAB VISIT (OUTPATIENT)
Dept: LAB | Facility: HOSPITAL | Age: 58
End: 2023-06-19
Attending: INTERNAL MEDICINE
Payer: COMMERCIAL

## 2023-06-19 ENCOUNTER — INFUSION (OUTPATIENT)
Dept: INFUSION THERAPY | Facility: HOSPITAL | Age: 58
End: 2023-06-19
Attending: INTERNAL MEDICINE
Payer: COMMERCIAL

## 2023-06-19 VITALS
OXYGEN SATURATION: 98 % | SYSTOLIC BLOOD PRESSURE: 120 MMHG | DIASTOLIC BLOOD PRESSURE: 61 MMHG | BODY MASS INDEX: 27.06 KG/M2 | HEIGHT: 72 IN | WEIGHT: 199.75 LBS | RESPIRATION RATE: 18 BRPM | HEART RATE: 84 BPM | TEMPERATURE: 98 F

## 2023-06-19 VITALS
BODY MASS INDEX: 27.27 KG/M2 | HEART RATE: 82 BPM | TEMPERATURE: 98 F | RESPIRATION RATE: 17 BRPM | WEIGHT: 201.31 LBS | DIASTOLIC BLOOD PRESSURE: 66 MMHG | HEIGHT: 72 IN | SYSTOLIC BLOOD PRESSURE: 113 MMHG

## 2023-06-19 DIAGNOSIS — C78.89 METASTATIC ADENOCARCINOMA TO PANCREAS: ICD-10-CM

## 2023-06-19 DIAGNOSIS — E11.00 TYPE 2 DIABETES MELLITUS WITH HYPEROSMOLARITY WITHOUT COMA, WITHOUT LONG-TERM CURRENT USE OF INSULIN: ICD-10-CM

## 2023-06-19 DIAGNOSIS — T45.1X5A CHEMOTHERAPY-INDUCED NAUSEA: ICD-10-CM

## 2023-06-19 DIAGNOSIS — R11.0 CHEMOTHERAPY-INDUCED NAUSEA: ICD-10-CM

## 2023-06-19 DIAGNOSIS — C18.4 MALIGNANT NEOPLASM OF TRANSVERSE COLON: ICD-10-CM

## 2023-06-19 DIAGNOSIS — I10 PRIMARY HYPERTENSION: ICD-10-CM

## 2023-06-19 DIAGNOSIS — C18.5 MALIGNANT NEOPLASM OF SPLENIC FLEXURE: ICD-10-CM

## 2023-06-19 DIAGNOSIS — G62.0 CHEMOTHERAPY-INDUCED NEUROPATHY: ICD-10-CM

## 2023-06-19 DIAGNOSIS — T45.1X5A CHEMOTHERAPY-INDUCED NEUROPATHY: ICD-10-CM

## 2023-06-19 DIAGNOSIS — C18.4 MALIGNANT NEOPLASM OF TRANSVERSE COLON: Primary | ICD-10-CM

## 2023-06-19 DIAGNOSIS — C78.89 METASTATIC ADENOCARCINOMA TO PANCREAS: Primary | ICD-10-CM

## 2023-06-19 DIAGNOSIS — E78.49 OTHER HYPERLIPIDEMIA: ICD-10-CM

## 2023-06-19 LAB
ALBUMIN SERPL BCP-MCNC: 4.1 G/DL (ref 3.5–5.2)
ALP SERPL-CCNC: 108 U/L (ref 55–135)
ALT SERPL W/O P-5'-P-CCNC: 26 U/L (ref 10–44)
ANION GAP SERPL CALC-SCNC: 11 MMOL/L (ref 8–16)
AST SERPL-CCNC: 30 U/L (ref 10–40)
BASOPHILS # BLD AUTO: 0.08 K/UL (ref 0–0.2)
BASOPHILS NFR BLD: 0.7 % (ref 0–1.9)
BILIRUB SERPL-MCNC: 0.7 MG/DL (ref 0.1–1)
BUN SERPL-MCNC: 24 MG/DL (ref 6–20)
CALCIUM SERPL-MCNC: 10.1 MG/DL (ref 8.7–10.5)
CEA SERPL-MCNC: 3.6 NG/ML (ref 0–5)
CHLORIDE SERPL-SCNC: 99 MMOL/L (ref 95–110)
CO2 SERPL-SCNC: 24 MMOL/L (ref 23–29)
CREAT SERPL-MCNC: 1.1 MG/DL (ref 0.5–1.4)
DIFFERENTIAL METHOD: ABNORMAL
EOSINOPHIL # BLD AUTO: 0.2 K/UL (ref 0–0.5)
EOSINOPHIL NFR BLD: 1.5 % (ref 0–8)
ERYTHROCYTE [DISTWIDTH] IN BLOOD BY AUTOMATED COUNT: 13.6 % (ref 11.5–14.5)
EST. GFR  (NO RACE VARIABLE): >60 ML/MIN/1.73 M^2
GLUCOSE SERPL-MCNC: 98 MG/DL (ref 70–110)
HCT VFR BLD AUTO: 39.1 % (ref 40–54)
HGB BLD-MCNC: 13.7 G/DL (ref 14–18)
IMM GRANULOCYTES # BLD AUTO: 0.02 K/UL (ref 0–0.04)
IMM GRANULOCYTES NFR BLD AUTO: 0.2 % (ref 0–0.5)
LYMPHOCYTES # BLD AUTO: 4.4 K/UL (ref 1–4.8)
LYMPHOCYTES NFR BLD: 39.7 % (ref 18–48)
MCH RBC QN AUTO: 30 PG (ref 27–31)
MCHC RBC AUTO-ENTMCNC: 35 G/DL (ref 32–36)
MCV RBC AUTO: 86 FL (ref 82–98)
MONOCYTES # BLD AUTO: 1.1 K/UL (ref 0.3–1)
MONOCYTES NFR BLD: 10.3 % (ref 4–15)
NEUTROPHILS # BLD AUTO: 5.3 K/UL (ref 1.8–7.7)
NEUTROPHILS NFR BLD: 47.6 % (ref 38–73)
NRBC BLD-RTO: 0 /100 WBC
PLATELET # BLD AUTO: 338 K/UL (ref 150–450)
PMV BLD AUTO: 8.7 FL (ref 9.2–12.9)
POTASSIUM SERPL-SCNC: 4 MMOL/L (ref 3.5–5.1)
PROT SERPL-MCNC: 7.5 G/DL (ref 6–8.4)
RBC # BLD AUTO: 4.56 M/UL (ref 4.6–6.2)
SODIUM SERPL-SCNC: 134 MMOL/L (ref 136–145)
WBC # BLD AUTO: 11.1 K/UL (ref 3.9–12.7)

## 2023-06-19 PROCEDURE — 1159F PR MEDICATION LIST DOCUMENTED IN MEDICAL RECORD: ICD-10-PCS | Mod: CPTII,S$GLB,, | Performed by: INTERNAL MEDICINE

## 2023-06-19 PROCEDURE — 1159F MED LIST DOCD IN RCRD: CPT | Mod: CPTII,S$GLB,, | Performed by: INTERNAL MEDICINE

## 2023-06-19 PROCEDURE — 3044F HG A1C LEVEL LT 7.0%: CPT | Mod: CPTII,S$GLB,, | Performed by: INTERNAL MEDICINE

## 2023-06-19 PROCEDURE — 1160F RVW MEDS BY RX/DR IN RCRD: CPT | Mod: CPTII,S$GLB,, | Performed by: INTERNAL MEDICINE

## 2023-06-19 PROCEDURE — 3074F SYST BP LT 130 MM HG: CPT | Mod: CPTII,S$GLB,, | Performed by: INTERNAL MEDICINE

## 2023-06-19 PROCEDURE — 99999 PR PBB SHADOW E&M-EST. PATIENT-LVL IV: CPT | Mod: PBBFAC,,, | Performed by: INTERNAL MEDICINE

## 2023-06-19 PROCEDURE — 3078F DIAST BP <80 MM HG: CPT | Mod: CPTII,S$GLB,, | Performed by: INTERNAL MEDICINE

## 2023-06-19 PROCEDURE — 1160F PR REVIEW ALL MEDS BY PRESCRIBER/CLIN PHARMACIST DOCUMENTED: ICD-10-PCS | Mod: CPTII,S$GLB,, | Performed by: INTERNAL MEDICINE

## 2023-06-19 PROCEDURE — 99999 PR PBB SHADOW E&M-EST. PATIENT-LVL IV: ICD-10-PCS | Mod: PBBFAC,,, | Performed by: INTERNAL MEDICINE

## 2023-06-19 PROCEDURE — 3078F PR MOST RECENT DIASTOLIC BLOOD PRESSURE < 80 MM HG: ICD-10-PCS | Mod: CPTII,S$GLB,, | Performed by: INTERNAL MEDICINE

## 2023-06-19 PROCEDURE — 99215 OFFICE O/P EST HI 40 MIN: CPT | Mod: S$GLB,,, | Performed by: INTERNAL MEDICINE

## 2023-06-19 PROCEDURE — 4010F ACE/ARB THERAPY RXD/TAKEN: CPT | Mod: CPTII,S$GLB,, | Performed by: INTERNAL MEDICINE

## 2023-06-19 PROCEDURE — 3074F PR MOST RECENT SYSTOLIC BLOOD PRESSURE < 130 MM HG: ICD-10-PCS | Mod: CPTII,S$GLB,, | Performed by: INTERNAL MEDICINE

## 2023-06-19 PROCEDURE — 3008F PR BODY MASS INDEX (BMI) DOCUMENTED: ICD-10-PCS | Mod: CPTII,S$GLB,, | Performed by: INTERNAL MEDICINE

## 2023-06-19 PROCEDURE — 96413 CHEMO IV INFUSION 1 HR: CPT

## 2023-06-19 PROCEDURE — 36415 COLL VENOUS BLD VENIPUNCTURE: CPT | Performed by: INTERNAL MEDICINE

## 2023-06-19 PROCEDURE — 3044F PR MOST RECENT HEMOGLOBIN A1C LEVEL <7.0%: ICD-10-PCS | Mod: CPTII,S$GLB,, | Performed by: INTERNAL MEDICINE

## 2023-06-19 PROCEDURE — 99215 PR OFFICE/OUTPT VISIT, EST, LEVL V, 40-54 MIN: ICD-10-PCS | Mod: S$GLB,,, | Performed by: INTERNAL MEDICINE

## 2023-06-19 PROCEDURE — 80053 COMPREHEN METABOLIC PANEL: CPT | Performed by: INTERNAL MEDICINE

## 2023-06-19 PROCEDURE — 25000003 PHARM REV CODE 250: Performed by: INTERNAL MEDICINE

## 2023-06-19 PROCEDURE — A4216 STERILE WATER/SALINE, 10 ML: HCPCS | Performed by: INTERNAL MEDICINE

## 2023-06-19 PROCEDURE — 4010F PR ACE/ARB THEARPY RXD/TAKEN: ICD-10-PCS | Mod: CPTII,S$GLB,, | Performed by: INTERNAL MEDICINE

## 2023-06-19 PROCEDURE — 63600175 PHARM REV CODE 636 W HCPCS: Mod: JZ,JG | Performed by: INTERNAL MEDICINE

## 2023-06-19 PROCEDURE — 3008F BODY MASS INDEX DOCD: CPT | Mod: CPTII,S$GLB,, | Performed by: INTERNAL MEDICINE

## 2023-06-19 PROCEDURE — 82378 CARCINOEMBRYONIC ANTIGEN: CPT | Performed by: INTERNAL MEDICINE

## 2023-06-19 PROCEDURE — 85025 COMPLETE CBC W/AUTO DIFF WBC: CPT | Performed by: INTERNAL MEDICINE

## 2023-06-19 RX ORDER — HEPARIN 100 UNIT/ML
500 SYRINGE INTRAVENOUS
Status: DISCONTINUED | OUTPATIENT
Start: 2023-06-19 | End: 2023-06-19 | Stop reason: HOSPADM

## 2023-06-19 RX ORDER — SODIUM CHLORIDE 0.9 % (FLUSH) 0.9 %
10 SYRINGE (ML) INJECTION
Status: CANCELLED | OUTPATIENT
Start: 2023-06-19

## 2023-06-19 RX ORDER — DIPHENHYDRAMINE HYDROCHLORIDE 50 MG/ML
50 INJECTION INTRAMUSCULAR; INTRAVENOUS ONCE AS NEEDED
Status: CANCELLED | OUTPATIENT
Start: 2023-06-19

## 2023-06-19 RX ORDER — SODIUM CHLORIDE 0.9 % (FLUSH) 0.9 %
10 SYRINGE (ML) INJECTION
Status: DISCONTINUED | OUTPATIENT
Start: 2023-06-19 | End: 2023-06-19 | Stop reason: HOSPADM

## 2023-06-19 RX ORDER — EPINEPHRINE 0.3 MG/.3ML
0.3 INJECTION SUBCUTANEOUS ONCE AS NEEDED
Status: CANCELLED | OUTPATIENT
Start: 2023-06-19

## 2023-06-19 RX ORDER — HEPARIN 100 UNIT/ML
500 SYRINGE INTRAVENOUS
Status: CANCELLED | OUTPATIENT
Start: 2023-06-19

## 2023-06-19 RX ADMIN — SODIUM CHLORIDE 200 MG: 9 INJECTION, SOLUTION INTRAVENOUS at 02:06

## 2023-06-19 RX ADMIN — SODIUM CHLORIDE, PRESERVATIVE FREE 10 ML: 5 INJECTION INTRAVENOUS at 03:06

## 2023-06-19 RX ADMIN — HEPARIN 500 UNITS: 100 SYRINGE at 03:06

## 2023-06-19 NOTE — PROGRESS NOTES
"Service Date:  6/19/23    Chief Complaint:  Colon cancer    Osman Li Jr. is a 58 y.o. 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  This occurred shortly after completing treatment.    Patient was part of a clinical trial to measure CT DNA.  Next generation sequencing also showed a high tumor burden.    He is now on Keytruda as his cancer has a very high tumor mutation burden.  He has MARY however.  So far, Keytruda has been helping    Here for 7th cycle.  Doing well.  In better spirits. More active at home. Less abd pain.    Review of Systems   Constitutional:  Positive for fatigue.   HENT: Negative.     Eyes: Negative.    Respiratory: Negative.     Cardiovascular: Negative.    Gastrointestinal: Negative.    Endocrine: Negative.    Genitourinary: Negative.    Musculoskeletal: Negative.    Integumentary:  Negative.   Neurological:  Positive for numbness.   Hematological: Negative.    Psychiatric/Behavioral: Negative.        Current Outpatient Medications   Medication Instructions    atorvastatin (LIPITOR) 20 mg, Oral, Daily    diclofenac (VOLTAREN) 50 mg, Oral, 2 times daily    enalapriL-hydrochlorothiazide (VASERETIC) 10-25 mg per tablet 1 tablet, Oral, Daily    haemophilus B polysac-tetanus toxoid (ACTHIB, PF,) 10 mcg/0.5 mL injection TO BE ADMINISTERED.    metFORMIN (GLUCOPHAGE) 1,000 mg, Oral, 2 times daily with meals    morphine (MS CONTIN) 30 mg, Oral, 2 times daily    oxyCODONE (ROXICODONE) 30 mg, Oral, Every 6 hours PRN    OZEMPIC 0.25 mg, Subcutaneous, Every 7 days    pen needle, diabetic 31 gauge x 3/16" Ndle 1 each, Misc.(Non-Drug; Combo Route), Daily    promethazine (PHENERGAN) 12.5 MG Tab TAKE 1 TABLET BY MOUTH EVERY 4 HOURS AS NEEDED    sildenafiL (VIAGRA) 100 mg, Oral, Daily PRN    TOUJEO MAX U-300 SOLOSTAR 26 Units, Subcutaneous, Daily    traZODone (DESYREL) 50 mg, Oral, Nightly        Past " Medical History:   Diagnosis Date    Digestive disorder     DM (diabetes mellitus)     Hyperlipidemia     Hypertension     Prediabetes         Past Surgical History:   Procedure Laterality Date    CHOLECYSTECTOMY  2011    COLON SURGERY      COLONOSCOPY      2018    COLOSTOMY N/A 04/25/2022    Procedure: CREATION, COLOSTOMY;  Surgeon: Carlton Waddell MD;  Location: [facility] OR;  Service: General;  Laterality: N/A;    ENDOSCOPIC ULTRASOUND OF UPPER GASTROINTESTINAL TRACT N/A 1/6/2023    Procedure: ULTRASOUND, UPPER GI TRACT, ENDOSCOPIC;  Surgeon: Rinku Orozco III, MD;  Location: [facility] ENDO;  Service: Endoscopy;  Laterality: N/A;    INSERTION OF TUNNELED CENTRAL VENOUS CATHETER (CVC) WITH SUBCUTANEOUS PORT Right 05/18/2022    Procedure: [id number];  Surgeon: Carlton Waddell MD;  Location: [facility] OR;  Service: General;  Laterality: Right;    MOBILIZATION OF SPLENIC FLEXURE N/A 04/25/2022    Procedure: MOBILIZATION, SPLENIC FLEXURE;  Surgeon: Carlton Waddell MD;  Location: [facility] OR;  Service: General;  Laterality: N/A;    SPLENECTOMY N/A 04/25/2022    Procedure: SPLENECTOMY;  Surgeon: Carlton Waddell MD;  Location: [facility] OR;  Service: General;  Laterality: N/A;    SUBTOTAL COLECTOMY N/A 04/25/2022    Procedure: COLECTOMY, PARTIAL;  Surgeon: Carlton Waddell MD;  Location: [facility] OR;  Service: General;  Laterality: N/A;    TONSILLECTOMY          Family History   Problem Relation Age of Onset    Heart disease Father        Social History     Tobacco Use    Smoking status: Every Day     Packs/day: 1.00     Years: 40.00     Pack years: 40.00     Types: Cigarettes    Smokeless tobacco: Never   Substance Use Topics    Alcohol use: Not Currently    Drug use: Never         Vitals:    06/19/23 1037   BP: 120/61   Pulse: 84   Resp: 18   Temp: 98.4 °F (36.9 °C)          Physical Exam:  /61 (BP Location: Right arm, Patient Position: Sitting, BP Method: Medium (Automatic))   Pulse 84   Temp 98.4 °F (36.9 °C) (Temporal)    Resp 18   Ht 6' (1.829 m)   Wt 90.6 kg (199 lb 11.8 oz)   SpO2 98%   BMI 27.09 kg/m²     Physical Exam  Vitals and nursing note reviewed.   Constitutional:       Appearance: Normal appearance.   HENT:      Head: Normocephalic and atraumatic.      Nose: Nose normal.      Mouth/Throat:      Mouth: Mucous membranes are moist.      Pharynx: Oropharynx is clear.   Eyes:      Extraocular Movements: Extraocular movements intact.      Conjunctiva/sclera: Conjunctivae normal.   Cardiovascular:      Rate and Rhythm: Normal rate and regular rhythm.      Heart sounds: Normal heart sounds.   Pulmonary:      Effort: Pulmonary effort is normal.      Breath sounds: Normal breath sounds.   Abdominal:      General: Abdomen is flat. Bowel sounds are normal.      Palpations: Abdomen is soft.      Comments: Ostomy bag in place.   Musculoskeletal:         General: Normal range of motion.      Cervical back: Normal range of motion and neck supple.   Skin:     General: Skin is warm and dry.   Neurological:      General: No focal deficit present.      Mental Status: He is alert and oriented to person, place, and time. Mental status is at baseline.   Psychiatric:         Mood and Affect: Mood normal.        Labs:  Lab Results   Component Value Date    WBC 11.10 06/19/2023    RBC 4.56 (L) 06/19/2023    HGB 13.7 (L) 06/19/2023    HCT 39.1 (L) 06/19/2023    MCV 86 06/19/2023    MCH 30.0 06/19/2023    MCHC 35.0 06/19/2023    RDW 13.6 06/19/2023     06/19/2023    MPV 8.7 (L) 06/19/2023    GRAN 5.3 06/19/2023    GRAN 47.6 06/19/2023    LYMPH 4.4 06/19/2023    LYMPH 39.7 06/19/2023    MONO 1.1 (H) 06/19/2023    MONO 10.3 06/19/2023    EOS 0.2 06/19/2023    BASO 0.08 06/19/2023    EOSINOPHIL 1.5 06/19/2023    BASOPHIL 0.7 06/19/2023     Sodium   Date Value Ref Range Status   06/19/2023 134 (L) 136 - 145 mmol/L Final     Potassium   Date Value Ref Range Status   06/19/2023 4.0 3.5 - 5.1 mmol/L Final     Chloride   Date Value Ref Range  Status   06/19/2023 99 95 - 110 mmol/L Final     CO2   Date Value Ref Range Status   06/19/2023 24 23 - 29 mmol/L Final     Glucose   Date Value Ref Range Status   06/19/2023 98 70 - 110 mg/dL Final     BUN   Date Value Ref Range Status   06/19/2023 24 (H) 6 - 20 mg/dL Final     Creatinine   Date Value Ref Range Status   06/19/2023 1.1 0.5 - 1.4 mg/dL Final     Calcium   Date Value Ref Range Status   06/19/2023 10.1 8.7 - 10.5 mg/dL Final     Total Protein   Date Value Ref Range Status   06/19/2023 7.5 6.0 - 8.4 g/dL Final     Albumin   Date Value Ref Range Status   06/19/2023 4.1 3.5 - 5.2 g/dL Final     Total Bilirubin   Date Value Ref Range Status   06/19/2023 0.7 0.1 - 1.0 mg/dL Final     Comment:     For infants and newborns, interpretation of results should be based  on gestational age, weight and in agreement with clinical  observations.    Premature Infant recommended reference ranges:  Up to 24 hours.............<8.0 mg/dL  Up to 48 hours............<12.0 mg/dL  3-5 days..................<15.0 mg/dL  6-29 days.................<15.0 mg/dL       Alkaline Phosphatase   Date Value Ref Range Status   06/19/2023 108 55 - 135 U/L Final     AST   Date Value Ref Range Status   06/19/2023 30 10 - 40 U/L Final     ALT   Date Value Ref Range Status   06/19/2023 26 10 - 44 U/L Final     Anion Gap   Date Value Ref Range Status   06/19/2023 11 8 - 16 mmol/L Final     eGFR if    Date Value Ref Range Status   07/25/2022 >60 >60 mL/min/1.73 m^2 Final     eGFR if non    Date Value Ref Range Status   07/25/2022 >60 >60 mL/min/1.73 m^2 Final     Comment:     Calculation used to obtain the estimated glomerular filtration  rate (eGFR) is the CKD-EPI equation.          A/P:    Malignant neoplasm of the transverse colon  Recurrence in the pancreas  -poorly differentiated adenocarcinoma  -recurrence right after completing 12 cycles of FOLFOX  -patient on clinical trial to measure ctDNA, showed high  tumor mutation burden  -given that the patient has a high tumor mutation burden, despite having MARY, I would like to try to treat him with Keytruda as it is indicated in his next generation sequencing.    -proceed with Keytruda C7 today  -CT chest abdomen pelvis after 3 months of treatment show shrinkage of the pancreatic mass.  -return to clinic in 3 weeks for next treatment    Pancreatic mass   -confirmed recurrence of colon adenocarcinoma    Back pain  - NM bone scan negative for mets    HTN  - on Enalapril  - follow up with PCP    HLP  - follow up with PCP    DM2  - on Metformin  - follow up with PCP    Tobacco use  - counseled on cessation      Aurash Khoobehi, MD  Hematology and Oncology

## 2023-06-21 ENCOUNTER — OFFICE VISIT (OUTPATIENT)
Dept: FAMILY MEDICINE | Facility: CLINIC | Age: 58
End: 2023-06-21
Payer: COMMERCIAL

## 2023-06-21 VITALS
WEIGHT: 199.81 LBS | SYSTOLIC BLOOD PRESSURE: 134 MMHG | BODY MASS INDEX: 27.06 KG/M2 | HEIGHT: 72 IN | HEART RATE: 90 BPM | OXYGEN SATURATION: 98 % | DIASTOLIC BLOOD PRESSURE: 60 MMHG

## 2023-06-21 DIAGNOSIS — I10 ESSENTIAL HYPERTENSION: ICD-10-CM

## 2023-06-21 DIAGNOSIS — E78.5 DM TYPE 2 WITH DIABETIC DYSLIPIDEMIA: Primary | ICD-10-CM

## 2023-06-21 DIAGNOSIS — E78.2 MIXED HYPERLIPIDEMIA: ICD-10-CM

## 2023-06-21 DIAGNOSIS — F17.210 CIGARETTE SMOKER: ICD-10-CM

## 2023-06-21 DIAGNOSIS — Z93.3 COLOSTOMY STATUS: ICD-10-CM

## 2023-06-21 DIAGNOSIS — C18.4 MALIGNANT NEOPLASM OF TRANSVERSE COLON: ICD-10-CM

## 2023-06-21 DIAGNOSIS — E11.69 DM TYPE 2 WITH DIABETIC DYSLIPIDEMIA: Primary | ICD-10-CM

## 2023-06-21 DIAGNOSIS — C78.89 METASTATIC ADENOCARCINOMA TO PANCREAS: ICD-10-CM

## 2023-06-21 PROCEDURE — 3008F BODY MASS INDEX DOCD: CPT | Mod: CPTII,S$GLB,, | Performed by: NURSE PRACTITIONER

## 2023-06-21 PROCEDURE — 3075F SYST BP GE 130 - 139MM HG: CPT | Mod: CPTII,S$GLB,, | Performed by: NURSE PRACTITIONER

## 2023-06-21 PROCEDURE — 4010F PR ACE/ARB THEARPY RXD/TAKEN: ICD-10-PCS | Mod: CPTII,S$GLB,, | Performed by: NURSE PRACTITIONER

## 2023-06-21 PROCEDURE — 99214 PR OFFICE/OUTPT VISIT, EST, LEVL IV, 30-39 MIN: ICD-10-PCS | Mod: S$GLB,,, | Performed by: NURSE PRACTITIONER

## 2023-06-21 PROCEDURE — 99214 OFFICE O/P EST MOD 30 MIN: CPT | Mod: S$GLB,,, | Performed by: NURSE PRACTITIONER

## 2023-06-21 PROCEDURE — 1159F MED LIST DOCD IN RCRD: CPT | Mod: CPTII,S$GLB,, | Performed by: NURSE PRACTITIONER

## 2023-06-21 PROCEDURE — 3078F DIAST BP <80 MM HG: CPT | Mod: CPTII,S$GLB,, | Performed by: NURSE PRACTITIONER

## 2023-06-21 PROCEDURE — 3078F PR MOST RECENT DIASTOLIC BLOOD PRESSURE < 80 MM HG: ICD-10-PCS | Mod: CPTII,S$GLB,, | Performed by: NURSE PRACTITIONER

## 2023-06-21 PROCEDURE — 3044F HG A1C LEVEL LT 7.0%: CPT | Mod: CPTII,S$GLB,, | Performed by: NURSE PRACTITIONER

## 2023-06-21 PROCEDURE — 3075F PR MOST RECENT SYSTOLIC BLOOD PRESS GE 130-139MM HG: ICD-10-PCS | Mod: CPTII,S$GLB,, | Performed by: NURSE PRACTITIONER

## 2023-06-21 PROCEDURE — 1160F RVW MEDS BY RX/DR IN RCRD: CPT | Mod: CPTII,S$GLB,, | Performed by: NURSE PRACTITIONER

## 2023-06-21 PROCEDURE — 1159F PR MEDICATION LIST DOCUMENTED IN MEDICAL RECORD: ICD-10-PCS | Mod: CPTII,S$GLB,, | Performed by: NURSE PRACTITIONER

## 2023-06-21 PROCEDURE — 3008F PR BODY MASS INDEX (BMI) DOCUMENTED: ICD-10-PCS | Mod: CPTII,S$GLB,, | Performed by: NURSE PRACTITIONER

## 2023-06-21 PROCEDURE — 4010F ACE/ARB THERAPY RXD/TAKEN: CPT | Mod: CPTII,S$GLB,, | Performed by: NURSE PRACTITIONER

## 2023-06-21 PROCEDURE — 3044F PR MOST RECENT HEMOGLOBIN A1C LEVEL <7.0%: ICD-10-PCS | Mod: CPTII,S$GLB,, | Performed by: NURSE PRACTITIONER

## 2023-06-21 PROCEDURE — 1160F PR REVIEW ALL MEDS BY PRESCRIBER/CLIN PHARMACIST DOCUMENTED: ICD-10-PCS | Mod: CPTII,S$GLB,, | Performed by: NURSE PRACTITIONER

## 2023-06-21 RX ORDER — SEMAGLUTIDE 1.34 MG/ML
0.5 INJECTION, SOLUTION SUBCUTANEOUS
Status: CANCELLED | OUTPATIENT
Start: 2023-06-21 | End: 2024-06-20

## 2023-06-21 RX ORDER — INSULIN GLARGINE 300 U/ML
26 INJECTION, SOLUTION SUBCUTANEOUS DAILY
Qty: 2 PEN | Refills: 11 | Status: SHIPPED | OUTPATIENT
Start: 2023-06-21

## 2023-06-21 NOTE — PROGRESS NOTES
SUBJECTIVE:    Patient ID: Osman Li Jr. is a 58 y.o. male.    Chief Complaint: Diabetes (Bottles brought//Pt is here for a 4 month check up and medication refills//foot exam ordered today//Pt is scheduled for Eye exam in 07/2023 with Dr. Cheema//Decline colo//JULIUS )    Patient here for regular follow-up- DM , HTN, lipids.     Pt reports overall doing well. Since last visit has started keytruda for metastatic colon CA under care of Dr. Khoobehi. Reports doing well with keytruda and CT scan last month showed mass at pancreatic tail and lymph nodes decreased in size. Reports now on morphine for chronic abd pain which is helping manage pain better. Reports appetite is good, no weight loss. No issues with colostomy    DM2- Stopped ozempic d/t abd pain and hasn't noticed much change in sugars. Currently on toujeo 26units and metformin.Denies any hypoyglycemic episodes    Still smoking 1ppd, states really not interested in quitting.  Denies cough, wheeze or shortness of breath. Subpleural blebs and 2mm lung nodule on CT scan      Lab Visit on 06/19/2023   Component Date Value Ref Range Status    WBC 06/19/2023 11.10  3.90 - 12.70 K/uL Final    RBC 06/19/2023 4.56 (L)  4.60 - 6.20 M/uL Final    Hemoglobin 06/19/2023 13.7 (L)  14.0 - 18.0 g/dL Final    Hematocrit 06/19/2023 39.1 (L)  40.0 - 54.0 % Final    MCV 06/19/2023 86  82 - 98 fL Final    MCH 06/19/2023 30.0  27.0 - 31.0 pg Final    MCHC 06/19/2023 35.0  32.0 - 36.0 g/dL Final    RDW 06/19/2023 13.6  11.5 - 14.5 % Final    Platelets 06/19/2023 338  150 - 450 K/uL Final    MPV 06/19/2023 8.7 (L)  9.2 - 12.9 fL Final    Immature Granulocytes 06/19/2023 0.2  0.0 - 0.5 % Final    Gran # (ANC) 06/19/2023 5.3  1.8 - 7.7 K/uL Final    Immature Grans (Abs) 06/19/2023 0.02  0.00 - 0.04 K/uL Final    Lymph # 06/19/2023 4.4  1.0 - 4.8 K/uL Final    Mono # 06/19/2023 1.1 (H)  0.3 - 1.0 K/uL Final    Eos # 06/19/2023 0.2  0.0 - 0.5 K/uL Final    Baso # 06/19/2023  0.08  0.00 - 0.20 K/uL Final    nRBC 06/19/2023 0  0 /100 WBC Final    Gran % 06/19/2023 47.6  38.0 - 73.0 % Final    Lymph % 06/19/2023 39.7  18.0 - 48.0 % Final    Mono % 06/19/2023 10.3  4.0 - 15.0 % Final    Eosinophil % 06/19/2023 1.5  0.0 - 8.0 % Final    Basophil % 06/19/2023 0.7  0.0 - 1.9 % Final    Differential Method 06/19/2023 Automated   Final    Sodium 06/19/2023 134 (L)  136 - 145 mmol/L Final    Potassium 06/19/2023 4.0  3.5 - 5.1 mmol/L Final    Chloride 06/19/2023 99  95 - 110 mmol/L Final    CO2 06/19/2023 24  23 - 29 mmol/L Final    Glucose 06/19/2023 98  70 - 110 mg/dL Final    BUN 06/19/2023 24 (H)  6 - 20 mg/dL Final    Creatinine 06/19/2023 1.1  0.5 - 1.4 mg/dL Final    Calcium 06/19/2023 10.1  8.7 - 10.5 mg/dL Final    Total Protein 06/19/2023 7.5  6.0 - 8.4 g/dL Final    Albumin 06/19/2023 4.1  3.5 - 5.2 g/dL Final    Total Bilirubin 06/19/2023 0.7  0.1 - 1.0 mg/dL Final    Alkaline Phosphatase 06/19/2023 108  55 - 135 U/L Final    AST 06/19/2023 30  10 - 40 U/L Final    ALT 06/19/2023 26  10 - 44 U/L Final    Anion Gap 06/19/2023 11  8 - 16 mmol/L Final    eGFR 06/19/2023 >60  >60 mL/min/1.73 m^2 Final    CEA 06/19/2023 3.6  0.0 - 5.0 ng/mL Final   Telephone on 06/01/2023   Component Date Value Ref Range Status    Cholesterol 06/14/2023 138  <200 mg/dL Final    HDL 06/14/2023 49  > OR = 40 mg/dL Final    Triglycerides 06/14/2023 140  <150 mg/dL Final    LDL Cholesterol 06/14/2023 67  mg/dL (calc) Final    HDL/Cholesterol Ratio 06/14/2023 2.8  <5.0 (calc) Final    Non HDL Chol. (LDL+VLDL) 06/14/2023 89  <130 mg/dL (calc) Final    Hemoglobin A1C 06/14/2023 6.8 (H)  <5.7 % of total Hgb Final   Lab Visit on 05/26/2023   Component Date Value Ref Range Status    WBC 05/26/2023 13.06 (H)  3.90 - 12.70 K/uL Final    RBC 05/26/2023 4.78  4.60 - 6.20 M/uL Final    Hemoglobin 05/26/2023 14.1  14.0 - 18.0 g/dL Final    Hematocrit 05/26/2023 41.9  40.0 - 54.0 % Final    MCV 05/26/2023 88  82 - 98 fL  Final    MCH 05/26/2023 29.5  27.0 - 31.0 pg Final    MCHC 05/26/2023 33.7  32.0 - 36.0 g/dL Final    RDW 05/26/2023 14.4  11.5 - 14.5 % Final    Platelets 05/26/2023 375  150 - 450 K/uL Final    MPV 05/26/2023 9.2  9.2 - 12.9 fL Final    Immature Granulocytes 05/26/2023 0.2  0.0 - 0.5 % Final    Gran # (ANC) 05/26/2023 5.4  1.8 - 7.7 K/uL Final    Immature Grans (Abs) 05/26/2023 0.03  0.00 - 0.04 K/uL Final    Lymph # 05/26/2023 6.4 (H)  1.0 - 4.8 K/uL Final    Mono # 05/26/2023 1.0  0.3 - 1.0 K/uL Final    Eos # 05/26/2023 0.2  0.0 - 0.5 K/uL Final    Baso # 05/26/2023 0.10  0.00 - 0.20 K/uL Final    nRBC 05/26/2023 0  0 /100 WBC Final    Gran % 05/26/2023 40.9  38.0 - 73.0 % Final    Lymph % 05/26/2023 48.9 (H)  18.0 - 48.0 % Final    Mono % 05/26/2023 7.5  4.0 - 15.0 % Final    Eosinophil % 05/26/2023 1.7  0.0 - 8.0 % Final    Basophil % 05/26/2023 0.8  0.0 - 1.9 % Final    Differential Method 05/26/2023 Automated   Final    Sodium 05/26/2023 139  136 - 145 mmol/L Final    Potassium 05/26/2023 4.6  3.5 - 5.1 mmol/L Final    Chloride 05/26/2023 100  95 - 110 mmol/L Final    CO2 05/26/2023 28  23 - 29 mmol/L Final    Glucose 05/26/2023 91  70 - 110 mg/dL Final    BUN 05/26/2023 13  6 - 20 mg/dL Final    Creatinine 05/26/2023 0.9  0.5 - 1.4 mg/dL Final    Calcium 05/26/2023 10.5  8.7 - 10.5 mg/dL Final    Total Protein 05/26/2023 7.8  6.0 - 8.4 g/dL Final    Albumin 05/26/2023 4.3  3.5 - 5.2 g/dL Final    Total Bilirubin 05/26/2023 0.4  0.1 - 1.0 mg/dL Final    Alkaline Phosphatase 05/26/2023 112  55 - 135 U/L Final    AST 05/26/2023 28  10 - 40 U/L Final    ALT 05/26/2023 27  10 - 44 U/L Final    Anion Gap 05/26/2023 11  8 - 16 mmol/L Final    eGFR 05/26/2023 >60  >60 mL/min/1.73 m^2 Final    TSH 05/26/2023 1.370  0.400 - 4.000 uIU/mL Final   Lab Visit on 05/05/2023   Component Date Value Ref Range Status    WBC 05/05/2023 13.44 (H)  3.90 - 12.70 K/uL Final    RBC 05/05/2023 4.56 (L)  4.60 - 6.20 M/uL Final     Hemoglobin 05/05/2023 13.7 (L)  14.0 - 18.0 g/dL Final    Hematocrit 05/05/2023 39.9 (L)  40.0 - 54.0 % Final    MCV 05/05/2023 88  82 - 98 fL Final    MCH 05/05/2023 30.0  27.0 - 31.0 pg Final    MCHC 05/05/2023 34.3  32.0 - 36.0 g/dL Final    RDW 05/05/2023 14.9 (H)  11.5 - 14.5 % Final    Platelets 05/05/2023 392  150 - 450 K/uL Final    MPV 05/05/2023 8.6 (L)  9.2 - 12.9 fL Final    Immature Granulocytes 05/05/2023 0.2  0.0 - 0.5 % Final    Gran # (ANC) 05/05/2023 6.6  1.8 - 7.7 K/uL Final    Immature Grans (Abs) 05/05/2023 0.03  0.00 - 0.04 K/uL Final    Lymph # 05/05/2023 5.3 (H)  1.0 - 4.8 K/uL Final    Mono # 05/05/2023 1.3 (H)  0.3 - 1.0 K/uL Final    Eos # 05/05/2023 0.2  0.0 - 0.5 K/uL Final    Baso # 05/05/2023 0.11  0.00 - 0.20 K/uL Final    nRBC 05/05/2023 0  0 /100 WBC Final    Gran % 05/05/2023 49.2  38.0 - 73.0 % Final    Lymph % 05/05/2023 39.3  18.0 - 48.0 % Final    Mono % 05/05/2023 9.4  4.0 - 15.0 % Final    Eosinophil % 05/05/2023 1.1  0.0 - 8.0 % Final    Basophil % 05/05/2023 0.8  0.0 - 1.9 % Final    Differential Method 05/05/2023 Automated   Final    Sodium 05/05/2023 137  136 - 145 mmol/L Final    Potassium 05/05/2023 4.7  3.5 - 5.1 mmol/L Final    Chloride 05/05/2023 102  95 - 110 mmol/L Final    CO2 05/05/2023 27  23 - 29 mmol/L Final    Glucose 05/05/2023 121 (H)  70 - 110 mg/dL Final    BUN 05/05/2023 14  6 - 20 mg/dL Final    Creatinine 05/05/2023 0.9  0.5 - 1.4 mg/dL Final    Calcium 05/05/2023 10.2  8.7 - 10.5 mg/dL Final    Total Protein 05/05/2023 7.5  6.0 - 8.4 g/dL Final    Albumin 05/05/2023 4.0  3.5 - 5.2 g/dL Final    Total Bilirubin 05/05/2023 0.5  0.1 - 1.0 mg/dL Final    Alkaline Phosphatase 05/05/2023 113  55 - 135 U/L Final    AST 05/05/2023 26  10 - 40 U/L Final    ALT 05/05/2023 23  10 - 44 U/L Final    Anion Gap 05/05/2023 8  8 - 16 mmol/L Final    eGFR 05/05/2023 >60  >60 mL/min/1.73 m^2 Final    CEA 05/05/2023 3.5  0.0 - 5.0 ng/mL Final   Lab Visit on 04/14/2023    Component Date Value Ref Range Status    WBC 04/14/2023 10.88  3.90 - 12.70 K/uL Final    RBC 04/14/2023 4.48 (L)  4.60 - 6.20 M/uL Final    Hemoglobin 04/14/2023 13.6 (L)  14.0 - 18.0 g/dL Final    Hematocrit 04/14/2023 40.3  40.0 - 54.0 % Final    MCV 04/14/2023 90  82 - 98 fL Final    MCH 04/14/2023 30.4  27.0 - 31.0 pg Final    MCHC 04/14/2023 33.7  32.0 - 36.0 g/dL Final    RDW 04/14/2023 14.9 (H)  11.5 - 14.5 % Final    Platelets 04/14/2023 354  150 - 450 K/uL Final    MPV 04/14/2023 9.1 (L)  9.2 - 12.9 fL Final    Immature Granulocytes 04/14/2023 0.4  0.0 - 0.5 % Final    Gran # (ANC) 04/14/2023 4.8  1.8 - 7.7 K/uL Final    Immature Grans (Abs) 04/14/2023 0.04  0.00 - 0.04 K/uL Final    Lymph # 04/14/2023 4.6  1.0 - 4.8 K/uL Final    Mono # 04/14/2023 1.2 (H)  0.3 - 1.0 K/uL Final    Eos # 04/14/2023 0.2  0.0 - 0.5 K/uL Final    Baso # 04/14/2023 0.08  0.00 - 0.20 K/uL Final    nRBC 04/14/2023 0  0 /100 WBC Final    Gran % 04/14/2023 44.0  38.0 - 73.0 % Final    Lymph % 04/14/2023 42.3  18.0 - 48.0 % Final    Mono % 04/14/2023 11.1  4.0 - 15.0 % Final    Eosinophil % 04/14/2023 1.5  0.0 - 8.0 % Final    Basophil % 04/14/2023 0.7  0.0 - 1.9 % Final    Differential Method 04/14/2023 Automated   Final    Sodium 04/14/2023 137  136 - 145 mmol/L Final    Potassium 04/14/2023 4.9  3.5 - 5.1 mmol/L Final    Chloride 04/14/2023 103  95 - 110 mmol/L Final    CO2 04/14/2023 27  23 - 29 mmol/L Final    Glucose 04/14/2023 125 (H)  70 - 110 mg/dL Final    BUN 04/14/2023 12  6 - 20 mg/dL Final    Creatinine 04/14/2023 1.0  0.5 - 1.4 mg/dL Final    Calcium 04/14/2023 10.2  8.7 - 10.5 mg/dL Final    Total Protein 04/14/2023 7.2  6.0 - 8.4 g/dL Final    Albumin 04/14/2023 4.0  3.5 - 5.2 g/dL Final    Total Bilirubin 04/14/2023 0.5  0.1 - 1.0 mg/dL Final    Alkaline Phosphatase 04/14/2023 119  55 - 135 U/L Final    AST 04/14/2023 28  10 - 40 U/L Final    ALT 04/14/2023 25  10 - 44 U/L Final    Anion Gap 04/14/2023 7 (L)  8 -  16 mmol/L Final    eGFR 04/14/2023 >60  >60 mL/min/1.73 m^2 Final    TSH 04/14/2023 2.903  0.400 - 4.000 uIU/mL Final   Lab Visit on 03/24/2023   Component Date Value Ref Range Status    TSH 03/24/2023 1.681  0.400 - 4.000 uIU/mL Final    WBC 03/24/2023 11.22  3.90 - 12.70 K/uL Final    RBC 03/24/2023 4.46 (L)  4.60 - 6.20 M/uL Final    Hemoglobin 03/24/2023 13.6 (L)  14.0 - 18.0 g/dL Final    Hematocrit 03/24/2023 40.4  40.0 - 54.0 % Final    MCV 03/24/2023 91  82 - 98 fL Final    MCH 03/24/2023 30.5  27.0 - 31.0 pg Final    MCHC 03/24/2023 33.7  32.0 - 36.0 g/dL Final    RDW 03/24/2023 13.8  11.5 - 14.5 % Final    Platelets 03/24/2023 403  150 - 450 K/uL Final    MPV 03/24/2023 9.0 (L)  9.2 - 12.9 fL Final    Immature Granulocytes 03/24/2023 0.4  0.0 - 0.5 % Final    Gran # (ANC) 03/24/2023 4.7  1.8 - 7.7 K/uL Final    Immature Grans (Abs) 03/24/2023 0.05 (H)  0.00 - 0.04 K/uL Final    Lymph # 03/24/2023 5.1 (H)  1.0 - 4.8 K/uL Final    Mono # 03/24/2023 1.0  0.3 - 1.0 K/uL Final    Eos # 03/24/2023 0.3  0.0 - 0.5 K/uL Final    Baso # 03/24/2023 0.10  0.00 - 0.20 K/uL Final    nRBC 03/24/2023 0  0 /100 WBC Final    Gran % 03/24/2023 42.0  38.0 - 73.0 % Final    Lymph % 03/24/2023 45.6  18.0 - 48.0 % Final    Mono % 03/24/2023 8.6  4.0 - 15.0 % Final    Eosinophil % 03/24/2023 2.5  0.0 - 8.0 % Final    Basophil % 03/24/2023 0.9  0.0 - 1.9 % Final    Differential Method 03/24/2023 Automated   Final    Sodium 03/24/2023 138  136 - 145 mmol/L Final    Potassium 03/24/2023 4.7  3.5 - 5.1 mmol/L Final    Chloride 03/24/2023 102  95 - 110 mmol/L Final    CO2 03/24/2023 27  23 - 29 mmol/L Final    Glucose 03/24/2023 134 (H)  70 - 110 mg/dL Final    BUN 03/24/2023 13  6 - 20 mg/dL Final    Creatinine 03/24/2023 0.8  0.5 - 1.4 mg/dL Final    Calcium 03/24/2023 10.2  8.7 - 10.5 mg/dL Final    Total Protein 03/24/2023 7.5  6.0 - 8.4 g/dL Final    Albumin 03/24/2023 3.8  3.5 - 5.2 g/dL Final    Total Bilirubin 03/24/2023 0.4   0.1 - 1.0 mg/dL Final    Alkaline Phosphatase 03/24/2023 134  55 - 135 U/L Final    AST 03/24/2023 28  10 - 40 U/L Final    ALT 03/24/2023 29  10 - 44 U/L Final    Anion Gap 03/24/2023 9  8 - 16 mmol/L Final    eGFR 03/24/2023 >60  >60 mL/min/1.73 m^2 Final    CEA 03/24/2023 3.2  0.0 - 5.0 ng/mL Final   Lab Visit on 03/03/2023   Component Date Value Ref Range Status    WBC 03/03/2023 10.20  3.90 - 12.70 K/uL Final    RBC 03/03/2023 4.47 (L)  4.60 - 6.20 M/uL Final    Hemoglobin 03/03/2023 13.6 (L)  14.0 - 18.0 g/dL Final    Hematocrit 03/03/2023 40.1  40.0 - 54.0 % Final    MCV 03/03/2023 90  82 - 98 fL Final    MCH 03/03/2023 30.4  27.0 - 31.0 pg Final    MCHC 03/03/2023 33.9  32.0 - 36.0 g/dL Final    RDW 03/03/2023 12.8  11.5 - 14.5 % Final    Platelets 03/03/2023 581 (H)  150 - 450 K/uL Final    MPV 03/03/2023 8.9 (L)  9.2 - 12.9 fL Final    Immature Granulocytes 03/03/2023 0.3  0.0 - 0.5 % Final    Gran # (ANC) 03/03/2023 4.4  1.8 - 7.7 K/uL Final    Immature Grans (Abs) 03/03/2023 0.03  0.00 - 0.04 K/uL Final    Lymph # 03/03/2023 4.6  1.0 - 4.8 K/uL Final    Mono # 03/03/2023 0.9  0.3 - 1.0 K/uL Final    Eos # 03/03/2023 0.2  0.0 - 0.5 K/uL Final    Baso # 03/03/2023 0.09  0.00 - 0.20 K/uL Final    nRBC 03/03/2023 0  0 /100 WBC Final    Gran % 03/03/2023 42.7  38.0 - 73.0 % Final    Lymph % 03/03/2023 45.1  18.0 - 48.0 % Final    Mono % 03/03/2023 9.2  4.0 - 15.0 % Final    Eosinophil % 03/03/2023 1.8  0.0 - 8.0 % Final    Basophil % 03/03/2023 0.9  0.0 - 1.9 % Final    Differential Method 03/03/2023 Automated   Final    Sodium 03/03/2023 134 (L)  136 - 145 mmol/L Final    Potassium 03/03/2023 4.6  3.5 - 5.1 mmol/L Final    Chloride 03/03/2023 97  95 - 110 mmol/L Final    CO2 03/03/2023 26  23 - 29 mmol/L Final    Glucose 03/03/2023 206 (H)  70 - 110 mg/dL Final    BUN 03/03/2023 13  6 - 20 mg/dL Final    Creatinine 03/03/2023 0.8  0.5 - 1.4 mg/dL Final    Calcium 03/03/2023 10.4  8.7 - 10.5 mg/dL Final     Total Protein 03/03/2023 7.7  6.0 - 8.4 g/dL Final    Albumin 03/03/2023 3.4 (L)  3.5 - 5.2 g/dL Final    Total Bilirubin 03/03/2023 0.4  0.1 - 1.0 mg/dL Final    Alkaline Phosphatase 03/03/2023 149 (H)  55 - 135 U/L Final    AST 03/03/2023 21  10 - 40 U/L Final    ALT 03/03/2023 16  10 - 44 U/L Final    Anion Gap 03/03/2023 11  8 - 16 mmol/L Final    eGFR 03/03/2023 >60  >60 mL/min/1.73 m^2 Final    TSH 03/03/2023 1.774  0.400 - 4.000 uIU/mL Final   Infusion on 02/13/2023   Component Date Value Ref Range Status    TSH 02/13/2023 0.990  0.340 - 5.600 uIU/mL Final   Lab Visit on 02/10/2023   Component Date Value Ref Range Status    WBC 02/10/2023 11.17  3.90 - 12.70 K/uL Final    RBC 02/10/2023 4.30 (L)  4.60 - 6.20 M/uL Final    Hemoglobin 02/10/2023 13.7 (L)  14.0 - 18.0 g/dL Final    Hematocrit 02/10/2023 39.3 (L)  40.0 - 54.0 % Final    MCV 02/10/2023 91  82 - 98 fL Final    MCH 02/10/2023 31.9 (H)  27.0 - 31.0 pg Final    MCHC 02/10/2023 34.9  32.0 - 36.0 g/dL Final    RDW 02/10/2023 13.7  11.5 - 14.5 % Final    Platelets 02/10/2023 451 (H)  150 - 450 K/uL Final    MPV 02/10/2023 8.7 (L)  9.2 - 12.9 fL Final    Immature Granulocytes 02/10/2023 0.3  0.0 - 0.5 % Final    Gran # (ANC) 02/10/2023 6.6  1.8 - 7.7 K/uL Final    Immature Grans (Abs) 02/10/2023 0.03  0.00 - 0.04 K/uL Final    Lymph # 02/10/2023 3.5  1.0 - 4.8 K/uL Final    Mono # 02/10/2023 0.9  0.3 - 1.0 K/uL Final    Eos # 02/10/2023 0.1  0.0 - 0.5 K/uL Final    Baso # 02/10/2023 0.07  0.00 - 0.20 K/uL Final    nRBC 02/10/2023 0  0 /100 WBC Final    Gran % 02/10/2023 59.3  38.0 - 73.0 % Final    Lymph % 02/10/2023 31.0  18.0 - 48.0 % Final    Mono % 02/10/2023 8.1  4.0 - 15.0 % Final    Eosinophil % 02/10/2023 0.7  0.0 - 8.0 % Final    Basophil % 02/10/2023 0.6  0.0 - 1.9 % Final    Differential Method 02/10/2023 Automated   Final    Sodium 02/10/2023 136  136 - 145 mmol/L Final    Potassium 02/10/2023 4.9  3.5 - 5.1 mmol/L Final    Chloride 02/10/2023  98  95 - 110 mmol/L Final    CO2 02/10/2023 27  23 - 29 mmol/L Final    Glucose 02/10/2023 152 (H)  70 - 110 mg/dL Final    BUN 02/10/2023 15  6 - 20 mg/dL Final    Creatinine 02/10/2023 0.9  0.5 - 1.4 mg/dL Final    Calcium 02/10/2023 10.6 (H)  8.7 - 10.5 mg/dL Final    Total Protein 02/10/2023 7.8  6.0 - 8.4 g/dL Final    Albumin 02/10/2023 3.7  3.5 - 5.2 g/dL Final    Total Bilirubin 02/10/2023 0.5  0.1 - 1.0 mg/dL Final    Alkaline Phosphatase 02/10/2023 148 (H)  55 - 135 U/L Final    AST 02/10/2023 30  10 - 40 U/L Final    ALT 02/10/2023 11  10 - 44 U/L Final    Anion Gap 02/10/2023 11  8 - 16 mmol/L Final    eGFR 02/10/2023 >60  >60 mL/min/1.73 m^2 Final    CEA 02/10/2023 2.5  0.0 - 5.0 ng/mL Final   Telephone on 01/12/2023   Component Date Value Ref Range Status    Glucose 01/20/2023 79  65 - 99 mg/dL Final    BUN 01/20/2023 9  7 - 25 mg/dL Final    Creatinine 01/20/2023 0.75  0.70 - 1.30 mg/dL Final    eGFR 01/20/2023 105  > OR = 60 mL/min/1.73m2 Final    BUN/Creatinine Ratio 01/20/2023 NOT APPLICABLE  6 - 22 (calc) Final    Sodium 01/20/2023 135  135 - 146 mmol/L Final    Potassium 01/20/2023 5.3  3.5 - 5.3 mmol/L Final    Chloride 01/20/2023 99  98 - 110 mmol/L Final    CO2 01/20/2023 30  20 - 32 mmol/L Final    Calcium 01/20/2023 10.4 (H)  8.6 - 10.3 mg/dL Final    Total Protein 01/20/2023 6.9  6.1 - 8.1 g/dL Final    Albumin 01/20/2023 4.1  3.6 - 5.1 g/dL Final    Globulin, Total 01/20/2023 2.8  1.9 - 3.7 g/dL (calc) Final    Albumin/Globulin Ratio 01/20/2023 1.5  1.0 - 2.5 (calc) Final    Total Bilirubin 01/20/2023 0.5  0.2 - 1.2 mg/dL Final    Alkaline Phosphatase 01/20/2023 132  35 - 144 U/L Final    AST 01/20/2023 23  10 - 35 U/L Final    ALT 01/20/2023 9  9 - 46 U/L Final    Cholesterol 01/20/2023 157  <200 mg/dL Final    HDL 01/20/2023 43  > OR = 40 mg/dL Final    Triglycerides 01/20/2023 65  <150 mg/dL Final    LDL Cholesterol 01/20/2023 99  mg/dL (calc) Final    HDL/Cholesterol Ratio 01/20/2023  3.7  <5.0 (calc) Final    Non HDL Chol. (LDL+VLDL) 01/20/2023 114  <130 mg/dL (calc) Final    Hemoglobin A1C 01/20/2023 7.0 (H)  <5.7 % of total Hgb Final   There may be more visits with results that are not included.       Past Medical History:   Diagnosis Date    Digestive disorder     DM (diabetes mellitus)     Hyperlipidemia     Hypertension     Prediabetes      Past Surgical History:   Procedure Laterality Date    CHOLECYSTECTOMY  2011    COLON SURGERY      COLONOSCOPY      2018    COLOSTOMY N/A 04/25/2022    Procedure: CREATION, COLOSTOMY;  Surgeon: Carlton Waddell MD;  Location: [facility] OR;  Service: General;  Laterality: N/A;    ENDOSCOPIC ULTRASOUND OF UPPER GASTROINTESTINAL TRACT N/A 1/6/2023    Procedure: ULTRASOUND, UPPER GI TRACT, ENDOSCOPIC;  Surgeon: Rinku Orozco III, MD;  Location: [facility] ENDO;  Service: Endoscopy;  Laterality: N/A;    INSERTION OF TUNNELED CENTRAL VENOUS CATHETER (CVC) WITH SUBCUTANEOUS PORT Right 05/18/2022    Procedure: [id number];  Surgeon: Carlton Waddell MD;  Location: [facility] OR;  Service: General;  Laterality: Right;    MOBILIZATION OF SPLENIC FLEXURE N/A 04/25/2022    Procedure: MOBILIZATION, SPLENIC FLEXURE;  Surgeon: Carlton Waddell MD;  Location: [facility] OR;  Service: General;  Laterality: N/A;    SPLENECTOMY N/A 04/25/2022    Procedure: SPLENECTOMY;  Surgeon: Carlton Waddell MD;  Location: [facility] OR;  Service: General;  Laterality: N/A;    SUBTOTAL COLECTOMY N/A 04/25/2022    Procedure: COLECTOMY, PARTIAL;  Surgeon: Carlton Waddell MD;  Location: [facility] OR;  Service: General;  Laterality: N/A;    TONSILLECTOMY       Family History   Problem Relation Age of Onset    Heart disease Father        The 10-year CVD risk score (D'Agostino, et al., 2008) is: 29.8%    Values used to calculate the score:      Age: 58 years      Sex: Male      Diabetic: Yes      Tobacco smoker: Yes      Systolic Blood Pressure: 134 mmHg      Is BP treated: No      HDL Cholesterol: 49 mg/dL       Total Cholesterol: 138 mg/dL     Marital Status:   Alcohol History:  reports that he does not currently use alcohol.  Tobacco History:  reports that he has been smoking cigarettes. He has a 40.00 pack-year smoking history. He has been exposed to tobacco smoke. He has never used smokeless tobacco.  Drug History:  reports no history of drug use.    Health Maintenance Topics with due status: Not Due       Topic Last Completion Date    Pneumococcal Vaccines (Age 0-64) 07/07/2022    Diabetes Urine Screening 10/14/2022    Lipid Panel 06/14/2023    Hemoglobin A1c 06/14/2023    Low Dose Statin 06/21/2023    Foot Exam 06/21/2023     Immunization History   Administered Date(s) Administered    COVID-19, vector-nr, [id number], PF (Jose Elias) 05/27/2021    HiB PRP-T 10/07/2022    Influenza 11/11/2020    Influenza (FLUBLOK) - Quadrivalent - Recombinant - PF *Preferred* (egg allergy) 10/12/2022    Influenza - Quadrivalent - MDCK 10/16/2019    Influenza - Quadrivalent - MDCK - PF 11/07/2020    Meningococcal Conjugate (MCV4O) 05/02/2022, 07/07/2022    Pneumococcal Conjugate - 13 Valent 05/02/2022    Pneumococcal Polysaccharide - 23 Valent 07/07/2022    Zoster Recombinant 02/06/2021, 04/30/2021       Review of patient's allergies indicates:   Allergen Reactions    Penicillins Rash       Current Outpatient Medications:     atorvastatin (LIPITOR) 20 MG tablet, Take 1 tablet (20 mg total) by mouth once daily., Disp: 90 tablet, Rfl: 3    enalapriL-hydrochlorothiazide (VASERETIC) 10-25 mg per tablet, Take 1 tablet by mouth once daily., Disp: 90 tablet, Rfl: 3    haemophilus B polysac-tetanus toxoid (ACTHIB, PF,) 10 mcg/0.5 mL injection, TO BE ADMINISTERED., Disp: 1 each, Rfl: 0    metFORMIN (GLUCOPHAGE) 1000 MG tablet, Take 1 tablet (1,000 mg total) by mouth 2 (two) times daily with meals., Disp: 180 tablet, Rfl: 3    morphine (MS CONTIN) 30 MG 12 hr tablet, Take 1 tablet (30 mg total) by mouth 2 (two) times daily., Disp: 60 tablet,  "Rfl: 0    pen needle, diabetic 31 gauge x 3/16" Ndle, 1 each by Misc.(Non-Drug; Combo Route) route once daily., Disp: 90 each, Rfl: 3    promethazine (PHENERGAN) 12.5 MG Tab, TAKE 1 TABLET BY MOUTH EVERY 4 HOURS AS NEEDED, Disp: 25 tablet, Rfl: 1    sildenafiL (VIAGRA) 100 MG tablet, Take 1 tablet (100 mg total) by mouth daily as needed for Erectile Dysfunction., Disp: 30 tablet, Rfl: 1    traZODone (DESYREL) 50 MG tablet, Take 1 tablet (50 mg total) by mouth every evening., Disp: 90 tablet, Rfl: 3    diclofenac (VOLTAREN) 50 MG EC tablet, Take 1 tablet (50 mg total) by mouth 2 (two) times daily. (Patient not taking: Reported on 6/21/2023), Disp: 60 tablet, Rfl: 2    insulin glargine U-300 conc (TOUJEO MAX U-300 SOLOSTAR) 300 unit/mL (3 mL) insulin pen, Inject 26 Units into the skin once daily., Disp: 2 pen, Rfl: 11    Review of Systems   Constitutional:  Negative for appetite change, chills, fatigue, fever and unexpected weight change.   HENT:  Negative for sore throat and trouble swallowing.    Respiratory:  Negative for cough, shortness of breath and wheezing.    Cardiovascular:  Negative for chest pain, palpitations and leg swelling.   Gastrointestinal:  Positive for abdominal pain (reports chronic left side abd pain improved with morphine). Negative for constipation, diarrhea, nausea and vomiting.        Colostomy with liquid brown stool   Genitourinary:  Negative for dysuria, frequency and hematuria.   Musculoskeletal:  Negative for back pain and gait problem.   Skin:  Negative for rash.   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 Negative for dizziness, syncope and headaches.   Psychiatric/Behavioral:  Negative for dysphoric mood. The patient is not nervous/anxious.         Objective:      Vitals:    06/21/23 0930   BP: 134/60   Pulse: 90   SpO2: 98%   Weight: 90.6 kg (199 lb 12.8 oz)   Height: 6' (1.829 m)     Physical Exam  Vitals reviewed. "   Constitutional:       General: He is not in acute distress.     Appearance: He is well-developed.      Comments: Thin white male   HENT:      Head: Normocephalic and atraumatic.      Right Ear: Tympanic membrane and ear canal normal.      Left Ear: Tympanic membrane and ear canal normal.   Neck:      Vascular: No carotid bruit.   Cardiovascular:      Rate and Rhythm: Normal rate and regular rhythm.      Pulses:           Posterior tibial pulses are 2+ on the right side and 2+ on the left side.      Heart sounds: No murmur heard.  Pulmonary:      Effort: Pulmonary effort is normal. No respiratory distress.      Breath sounds: Normal breath sounds. No wheezing or rales.   Abdominal:      General: There is no distension.      Palpations: Abdomen is soft.      Tenderness: There is abdominal tenderness (mild tenderness around colostomy).      Comments: Colostomy Left mid abdomen with parastomal hernia, small amt of liquid brown stool in pouch   Musculoskeletal:      Cervical back: Neck supple.      Right lower leg: No edema.      Left lower leg: No edema.   Lymphadenopathy:      Cervical: No cervical adenopathy.   Skin:     General: Skin is warm and dry.      Findings: No rash.   Neurological:      General: No focal deficit present.      Mental Status: He is alert and oriented to person, place, and time.      Gait: Gait normal.   Psychiatric:         Mood and Affect: Mood normal.         Assessment:       1. DM type 2 with diabetic dyslipidemia    2. Essential hypertension    3. Mixed hyperlipidemia    4. Metastatic adenocarcinoma to pancreas    5. Cigarette smoker    6. Colostomy status           Plan:       1. DM type 2 with diabetic dyslipidemia  -well controlled with A1C 6.8%. pt has stopped ozempic, continue with current insulin and metformin though cautioned on risk of hypoglycemia  -      DIABETES FOOT EXAM  -     insulin glargine U-300 conc (TOUJEO MAX U-300 SOLOSTAR) 300 unit/mL (3 mL) insulin pen; Inject 26  Units into the skin once daily.  Dispense: 2 pen; Refill: 11    2. Essential hypertension   -BP well controlled    3. Mixed hyperlipidemia   -reviewed recent labs, well controlled    4. Metastatic adenocarcinoma to pancreas   -doing well on keytruda, f/u with Dr. Khoobehi as scheduled    5. Malignant neoplasm of transverse colon    6.Cigarette smoker   -continues to smoke 1ppd, really not interested in quitting despite active cancer diagnosis    7. Colostomy status   -no issues with colostomy    Follow up in about 4 months (around 10/21/2023) for Diabetes.          Counseled on age and gender appropriate medical preventative services, including cancer screenings, immunizations, overall nutritional health, need for a consistent exercise regimen and an overall push towards maintaining a vigorous and active lifestyle.      6/21/2023 Natalee Wagner NP       Home

## 2023-06-22 ENCOUNTER — PATIENT OUTREACH (OUTPATIENT)
Dept: ADMINISTRATIVE | Facility: HOSPITAL | Age: 58
End: 2023-06-22
Payer: COMMERCIAL

## 2023-06-22 ENCOUNTER — PATIENT MESSAGE (OUTPATIENT)
Dept: ADMINISTRATIVE | Facility: HOSPITAL | Age: 58
End: 2023-06-22
Payer: COMMERCIAL

## 2023-06-22 DIAGNOSIS — Z00.00 ROUTINE MEDICAL EXAM: Primary | ICD-10-CM

## 2023-06-22 NOTE — PROGRESS NOTES
Population Health Chart Review & Patient Outreach Details:     Reason for Outreach Encounter:     [x]  Non-Compliant Report   []  Payor Report (Humana, PHN, BCBS, MSSP, MCIP, UHC, etc.)   []  Pre-Visit Chart Review     Updates Requested / Reviewed:     []  Care Everywhere    []     []  External Sources ([facility], [facility], DIS, etc.)   []  Care Team Updated    Patient Outreach Method:    [x]  Telephone Outreach Completed   [x] Successful   [] Left Voicemail   [] Unable to Contact (wrong number, no voicemail)  []  [employer] Portal Outreach Sent  []  Letter Outreach Mailed  []  Fax Sent for External Records  []  External Records Upload    Health Maintenance Topics Addressed and Outreach Outcomes / Actions Taken:        []      Breast Cancer Screening []  Mammo Scheduled      []  External Records Requested     []  Added Reminder to Complete to Upcoming Primary Care Appt Notes     []  Patient Declined     []  Patient Will Call Back to Schedule     []  Patient Will Schedule with External Provider / Order Routed if Applicable             []       Cervical Cancer Screening []  Pap Scheduled      []  External Records Requested     []  Added Reminder to Complete to Upcoming Primary Care Appt Notes     []  Patient Declined     []  Patient Will Call Back to Schedule     []  Patient Will Schedule with External Provider               [x]          Colorectal Cancer Screening []  Colonoscopy Case Request or Referral Placed     []  External Records Requested     []  Added Reminder to Complete to Upcoming Primary Care Appt Notes     []  Patient Declined     []  Patient Will Call Back to Schedule     []  Patient Will Schedule with External Provider     []  Fit Kit Mailed (add the SmartPhrase under additional notes)     []  Reminded Patient to Complete Home Test             []      Diabetic Eye Exam []  Eye Camera Scheduled or Optometry Referral Placed     []  External Records Requested     []  Added Reminder to Complete to  Upcoming Primary Care Appt Notes     []  Patient Declined     []  Patient Will Call Back to Schedule     []  Patient Will Schedule with External Provider             []      Blood Pressure Control []  Primary Care Follow Up Visit Scheduled     []  Remote Blood Pressure Reading Captured     []  Added Reminder to Complete to Upcoming Primary Care Appt Notes     []  Patient Declined     []  Patient Will Call Back / Patient Will Send Portal Message with Reading     []  Patient Will Call Back to Schedule Provider Visit             [x]       HbA1c & Other Labs []  Lab Appt Scheduled for Due Labs     []  Primary Care Follow Up Visit Scheduled      []  Reminded Patient to Complete Home Test     []  Added Reminder to Complete to Upcoming Primary Care Appt Notes     []  Patient Declined     []  Patient Will Call Back to Schedule     []  Patient Will Schedule with External Provider / Order Routed if Applicable           []    Schedule Primary Care Appt []  Primary Care Appt Scheduled     []  Patient Declined     []  Patient Will Call Back to Schedule     []  Pt Established with External Provider & Updated Care Team             []      Medication Adherence []  Primary Care Appointment Scheduled     []  Added Reminder to Upcoming Primary Care Appt Notes     []  Patient Reminded to  Prescription     []  Patient Declined, Provider Notified if Needed     []  Sent Provider Message to Review and/or Add Exclusion to Problem List             []      Osteoporosis Screening []  DXA Appointment Scheduled     []  External Records Requested     []  Added Reminder to Complete to Upcoming Primary Care Appt Notes     []  Patient Declined     []  Patient Will Call Back to Schedule     []  Patient Will Schedule with External Provider / Order Routed if Applicable     Additional Care Coordinator Notes:     [facility] Hepatitis C order placed and linked to lab appt. Pt stated he will find out who did his last Colonoscopy and let me know through the  portal.     Further Action Needed If Patient Returns Outreach:

## 2023-06-26 ENCOUNTER — PATIENT OUTREACH (OUTPATIENT)
Dept: ADMINISTRATIVE | Facility: HOSPITAL | Age: 58
End: 2023-06-26
Payer: COMMERCIAL

## 2023-06-26 NOTE — LETTER
AUTHORIZATION FOR RELEASE OF   CONFIDENTIAL INFORMATION    Dear [facility] Medical Records,    We are seeing Osman Li Jr., date of birth 1965, in the clinic at [address]. Natalee Wagner NP is the patient's PCP. Osman Li Jr. has an outstanding lab/procedure at the time we reviewed his chart. In order to help keep his health information updated, he has authorized us to request the following medical record(s):        (  )  MAMMOGRAM                                      ( X )  COLONOSCOPY      (  )  PAP SMEAR                                          (  )  OUTSIDE LAB RESULTS     (  )  DEXA SCAN                                          (  )  EYE EXAM            (  )  FOOT EXAM                                          (  )  ENTIRE RECORD     (  )  OUTSIDE IMMUNIZATIONS                 (  )  _______________         Please fax records to [facility], Linda T Melerine, NP, at [phone]    Thanks so much and have a great day!    Nikia Villalba LPN [place] [address] [place] [address]  - [phone]   [phone]           Patient Name: Osman Li Jr.  : 1965  Patient Phone #: [phone]

## 2023-06-26 NOTE — PROGRESS NOTES
Population Health Chart Review & Patient Outreach Details:     Reason for Outreach Encounter:     [x]  Non-Compliant Report   []  Payor Report (Humana, PHN, BCBS, MSSP, MCIP, UHC, etc.)   []  Pre-Visit Chart Review     Updates Requested / Reviewed:     []  Care Everywhere    []     []  External Sources ([facility], [facility], DIS, etc.)   []  Care Team Updated    Patient Outreach Method:    []  Telephone Outreach Completed   [] Successful   [] Left Voicemail   [] Unable to Contact (wrong number, no voicemail)  []  [employer] Portal Outreach Sent  []  Letter Outreach Mailed  [x]  Fax Sent for External Records  []  External Records Upload    Health Maintenance Topics Addressed and Outreach Outcomes / Actions Taken:        []      Breast Cancer Screening []  Mammo Scheduled      []  External Records Requested     []  Added Reminder to Complete to Upcoming Primary Care Appt Notes     []  Patient Declined     []  Patient Will Call Back to Schedule     []  Patient Will Schedule with External Provider / Order Routed if Applicable             []       Cervical Cancer Screening []  Pap Scheduled      []  External Records Requested     []  Added Reminder to Complete to Upcoming Primary Care Appt Notes     []  Patient Declined     []  Patient Will Call Back to Schedule     []  Patient Will Schedule with External Provider               [x]          Colorectal Cancer Screening []  Colonoscopy Case Request or Referral Placed     [x]  External Records Requested from [facility] in [place]     []  Added Reminder to Complete to Upcoming Primary Care Appt Notes     []  Patient Declined     []  Patient Will Call Back to Schedule     []  Patient Will Schedule with External Provider     []  Fit Kit Mailed (add the SmartPhrase under additional notes)     []  Reminded Patient to Complete Home Test             []      Diabetic Eye Exam []  Eye Camera Scheduled or Optometry Referral Placed     []  External Records Requested      []  Added Reminder to Complete to Upcoming Primary Care Appt Notes     []  Patient Declined     []  Patient Will Call Back to Schedule     []  Patient Will Schedule with External Provider             []      Blood Pressure Control []  Primary Care Follow Up Visit Scheduled     []  Remote Blood Pressure Reading Captured     []  Added Reminder to Complete to Upcoming Primary Care Appt Notes     []  Patient Declined     []  Patient Will Call Back / Patient Will Send Portal Message with Reading     []  Patient Will Call Back to Schedule Provider Visit             []       HbA1c & Other Labs []  Lab Appt Scheduled for Due Labs     []  Primary Care Follow Up Visit Scheduled      []  Reminded Patient to Complete Home Test     []  Added Reminder to Complete to Upcoming Primary Care Appt Notes     []  Patient Declined     []  Patient Will Call Back to Schedule     []  Patient Will Schedule with External Provider / Order Routed if Applicable           []    Schedule Primary Care Appt []  Primary Care Appt Scheduled     []  Patient Declined     []  Patient Will Call Back to Schedule     []  Pt Established with External Provider & Updated Care Team             []      Medication Adherence []  Primary Care Appointment Scheduled     []  Added Reminder to Upcoming Primary Care Appt Notes     []  Patient Reminded to  Prescription     []  Patient Declined, Provider Notified if Needed     []  Sent Provider Message to Review and/or Add Exclusion to Problem List             []      Osteoporosis Screening []  DXA Appointment Scheduled     []  External Records Requested     []  Added Reminder to Complete to Upcoming Primary Care Appt Notes     []  Patient Declined     []  Patient Will Call Back to Schedule     []  Patient Will Schedule with External Provider / Order Routed if Applicable     Additional Care Coordinator Notes:         Further Action Needed If Patient Returns Outreach:

## 2023-06-28 ENCOUNTER — PATIENT OUTREACH (OUTPATIENT)
Dept: ADMINISTRATIVE | Facility: HOSPITAL | Age: 58
End: 2023-06-28
Payer: COMMERCIAL

## 2023-06-28 NOTE — PROGRESS NOTES
Population Health Chart Review & Patient Outreach Details:     Reason for Outreach Encounter:     []  Non-Compliant Report   []  Payor Report (Humana, PHN, BCBS, MSSP, MCIP, UHC, etc.)   []  Pre-Visit Chart Review     Updates Requested / Reviewed:     []  Care Everywhere    []     []  External Sources ([facility], Quest, DIS, etc.)   [x]  Care Team Updated    Patient Outreach Method:    []  Telephone Outreach Completed   [] Successful   [] Left Voicemail   [] Unable to Contact (wrong number, no voicemail)  []  [employer] Portal Outreach Sent  []  Letter Outreach Mailed  []  Fax Sent for External Records  [x]  External Records Upload    Health Maintenance Topics Addressed and Outreach Outcomes / Actions Taken:        []      Breast Cancer Screening []  Mammo Scheduled      []  External Records Requested     []  Added Reminder to Complete to Upcoming Primary Care Appt Notes     []  Patient Declined     []  Patient Will Call Back to Schedule     []  Patient Will Schedule with External Provider / Order Routed if Applicable             []       Cervical Cancer Screening []  Pap Scheduled      []  External Records Requested     []  Added Reminder to Complete to Upcoming Primary Care Appt Notes     []  Patient Declined     []  Patient Will Call Back to Schedule     []  Patient Will Schedule with External Provider               [x]          Colorectal Cancer Screening []  Colonoscopy Case Request or Referral Placed     []  External Records Requested     []  Added Reminder to Complete to Upcoming Primary Care Appt Notes     []  Patient Declined     []  Patient Will Call Back to Schedule     []  Patient Will Schedule with External Provider     []  Fit Kit Mailed (add the SmartPhrase under additional notes)     []  Reminded Patient to Complete Home Test             []      Diabetic Eye Exam []  Eye Camera Scheduled or Optometry Referral Placed     []  External Records Requested     []  Added Reminder to Complete to  Upcoming Primary Care Appt Notes     []  Patient Declined     []  Patient Will Call Back to Schedule     []  Patient Will Schedule with External Provider             []      Blood Pressure Control []  Primary Care Follow Up Visit Scheduled     []  Remote Blood Pressure Reading Captured     []  Added Reminder to Complete to Upcoming Primary Care Appt Notes     []  Patient Declined     []  Patient Will Call Back / Patient Will Send Portal Message with Reading     []  Patient Will Call Back to Schedule Provider Visit             []       HbA1c & Other Labs []  Lab Appt Scheduled for Due Labs     []  Primary Care Follow Up Visit Scheduled      []  Reminded Patient to Complete Home Test     []  Added Reminder to Complete to Upcoming Primary Care Appt Notes     []  Patient Declined     []  Patient Will Call Back to Schedule     []  Patient Will Schedule with External Provider / Order Routed if Applicable           []    Schedule Primary Care Appt []  Primary Care Appt Scheduled     []  Patient Declined     []  Patient Will Call Back to Schedule     []  Pt Established with External Provider & Updated Care Team             []      Medication Adherence []  Primary Care Appointment Scheduled     []  Added Reminder to Upcoming Primary Care Appt Notes     []  Patient Reminded to  Prescription     []  Patient Declined, Provider Notified if Needed     []  Sent Provider Message to Review and/or Add Exclusion to Problem List             []      Osteoporosis Screening []  DXA Appointment Scheduled     []  External Records Requested     []  Added Reminder to Complete to Upcoming Primary Care Appt Notes     []  Patient Declined     []  Patient Will Call Back to Schedule     []  Patient Will Schedule with External Provider / Order Routed if Applicable     Additional Care Coordinator Notes:     RECORD UPLOAD ONLY   COLONOSCOPY HYPERLINKED    Further Action Needed If Patient Returns Outreach:

## 2023-07-10 ENCOUNTER — LAB VISIT (OUTPATIENT)
Dept: LAB | Facility: HOSPITAL | Age: 58
End: 2023-07-10
Attending: INTERNAL MEDICINE
Payer: COMMERCIAL

## 2023-07-10 ENCOUNTER — INFUSION (OUTPATIENT)
Dept: INFUSION THERAPY | Facility: HOSPITAL | Age: 58
End: 2023-07-10
Attending: INTERNAL MEDICINE
Payer: COMMERCIAL

## 2023-07-10 ENCOUNTER — PATIENT MESSAGE (OUTPATIENT)
Dept: HEMATOLOGY/ONCOLOGY | Facility: CLINIC | Age: 58
End: 2023-07-10

## 2023-07-10 ENCOUNTER — OFFICE VISIT (OUTPATIENT)
Dept: HEMATOLOGY/ONCOLOGY | Facility: CLINIC | Age: 58
End: 2023-07-10
Payer: COMMERCIAL

## 2023-07-10 VITALS
OXYGEN SATURATION: 96 % | HEART RATE: 79 BPM | DIASTOLIC BLOOD PRESSURE: 75 MMHG | HEIGHT: 74 IN | TEMPERATURE: 98 F | RESPIRATION RATE: 18 BRPM | BODY MASS INDEX: 26.08 KG/M2 | WEIGHT: 203.25 LBS | SYSTOLIC BLOOD PRESSURE: 134 MMHG

## 2023-07-10 VITALS
DIASTOLIC BLOOD PRESSURE: 68 MMHG | RESPIRATION RATE: 12 BRPM | TEMPERATURE: 98 F | HEIGHT: 74 IN | SYSTOLIC BLOOD PRESSURE: 131 MMHG | OXYGEN SATURATION: 96 % | HEART RATE: 87 BPM | WEIGHT: 203.25 LBS | BODY MASS INDEX: 26.08 KG/M2

## 2023-07-10 DIAGNOSIS — Z00.00 ROUTINE MEDICAL EXAM: ICD-10-CM

## 2023-07-10 DIAGNOSIS — C18.5 MALIGNANT NEOPLASM OF SPLENIC FLEXURE: ICD-10-CM

## 2023-07-10 DIAGNOSIS — C78.89 METASTATIC ADENOCARCINOMA TO PANCREAS: ICD-10-CM

## 2023-07-10 DIAGNOSIS — C18.4 MALIGNANT NEOPLASM OF TRANSVERSE COLON: Primary | ICD-10-CM

## 2023-07-10 DIAGNOSIS — C78.89 METASTATIC ADENOCARCINOMA TO PANCREAS: Primary | ICD-10-CM

## 2023-07-10 DIAGNOSIS — C18.4 MALIGNANT NEOPLASM OF TRANSVERSE COLON: ICD-10-CM

## 2023-07-10 LAB
ALBUMIN SERPL BCP-MCNC: 4.1 G/DL (ref 3.5–5.2)
ALP SERPL-CCNC: 119 U/L (ref 55–135)
ALT SERPL W/O P-5'-P-CCNC: 20 U/L (ref 10–44)
ANION GAP SERPL CALC-SCNC: 11 MMOL/L (ref 8–16)
AST SERPL-CCNC: 19 U/L (ref 10–40)
BASOPHILS # BLD AUTO: 0.1 K/UL (ref 0–0.2)
BASOPHILS NFR BLD: 0.9 % (ref 0–1.9)
BILIRUB SERPL-MCNC: 0.4 MG/DL (ref 0.1–1)
BUN SERPL-MCNC: 15 MG/DL (ref 6–20)
CALCIUM SERPL-MCNC: 10.1 MG/DL (ref 8.7–10.5)
CEA SERPL-MCNC: 3.6 NG/ML (ref 0–5)
CHLORIDE SERPL-SCNC: 100 MMOL/L (ref 95–110)
CO2 SERPL-SCNC: 27 MMOL/L (ref 23–29)
CREAT SERPL-MCNC: 1 MG/DL (ref 0.5–1.4)
DIFFERENTIAL METHOD: ABNORMAL
EOSINOPHIL # BLD AUTO: 0.2 K/UL (ref 0–0.5)
EOSINOPHIL NFR BLD: 1.6 % (ref 0–8)
ERYTHROCYTE [DISTWIDTH] IN BLOOD BY AUTOMATED COUNT: 13.7 % (ref 11.5–14.5)
EST. GFR  (NO RACE VARIABLE): >60 ML/MIN/1.73 M^2
GLUCOSE SERPL-MCNC: 144 MG/DL (ref 70–110)
HCT VFR BLD AUTO: 43.8 % (ref 40–54)
HGB BLD-MCNC: 14.4 G/DL (ref 14–18)
IMM GRANULOCYTES # BLD AUTO: 0.03 K/UL (ref 0–0.04)
IMM GRANULOCYTES NFR BLD AUTO: 0.3 % (ref 0–0.5)
LYMPHOCYTES # BLD AUTO: 5.3 K/UL (ref 1–4.8)
LYMPHOCYTES NFR BLD: 48.9 % (ref 18–48)
MCH RBC QN AUTO: 29.3 PG (ref 27–31)
MCHC RBC AUTO-ENTMCNC: 32.9 G/DL (ref 32–36)
MCV RBC AUTO: 89 FL (ref 82–98)
MONOCYTES # BLD AUTO: 1 K/UL (ref 0.3–1)
MONOCYTES NFR BLD: 9 % (ref 4–15)
NEUTROPHILS # BLD AUTO: 4.3 K/UL (ref 1.8–7.7)
NEUTROPHILS NFR BLD: 39.3 % (ref 38–73)
NRBC BLD-RTO: 0 /100 WBC
PLATELET # BLD AUTO: 402 K/UL (ref 150–450)
PMV BLD AUTO: 9.3 FL (ref 9.2–12.9)
POTASSIUM SERPL-SCNC: 4.9 MMOL/L (ref 3.5–5.1)
PROT SERPL-MCNC: 7.6 G/DL (ref 6–8.4)
RBC # BLD AUTO: 4.91 M/UL (ref 4.6–6.2)
SODIUM SERPL-SCNC: 138 MMOL/L (ref 136–145)
TSH SERPL DL<=0.005 MIU/L-ACNC: 1.46 UIU/ML (ref 0.4–4)
WBC # BLD AUTO: 10.93 K/UL (ref 3.9–12.7)

## 2023-07-10 PROCEDURE — 3008F BODY MASS INDEX DOCD: CPT | Mod: CPTII,S$GLB,, | Performed by: INTERNAL MEDICINE

## 2023-07-10 PROCEDURE — 84443 ASSAY THYROID STIM HORMONE: CPT | Performed by: INTERNAL MEDICINE

## 2023-07-10 PROCEDURE — 3075F PR MOST RECENT SYSTOLIC BLOOD PRESS GE 130-139MM HG: ICD-10-PCS | Mod: CPTII,S$GLB,, | Performed by: INTERNAL MEDICINE

## 2023-07-10 PROCEDURE — 99215 OFFICE O/P EST HI 40 MIN: CPT | Mod: S$GLB,,, | Performed by: INTERNAL MEDICINE

## 2023-07-10 PROCEDURE — 4010F ACE/ARB THERAPY RXD/TAKEN: CPT | Mod: CPTII,S$GLB,, | Performed by: INTERNAL MEDICINE

## 2023-07-10 PROCEDURE — 3075F SYST BP GE 130 - 139MM HG: CPT | Mod: CPTII,S$GLB,, | Performed by: INTERNAL MEDICINE

## 2023-07-10 PROCEDURE — 63600175 PHARM REV CODE 636 W HCPCS: Performed by: INTERNAL MEDICINE

## 2023-07-10 PROCEDURE — 36415 COLL VENOUS BLD VENIPUNCTURE: CPT | Performed by: NURSE PRACTITIONER

## 2023-07-10 PROCEDURE — 1159F MED LIST DOCD IN RCRD: CPT | Mod: CPTII,S$GLB,, | Performed by: INTERNAL MEDICINE

## 2023-07-10 PROCEDURE — 85025 COMPLETE CBC W/AUTO DIFF WBC: CPT | Performed by: INTERNAL MEDICINE

## 2023-07-10 PROCEDURE — 4010F PR ACE/ARB THEARPY RXD/TAKEN: ICD-10-PCS | Mod: CPTII,S$GLB,, | Performed by: INTERNAL MEDICINE

## 2023-07-10 PROCEDURE — A4216 STERILE WATER/SALINE, 10 ML: HCPCS | Performed by: INTERNAL MEDICINE

## 2023-07-10 PROCEDURE — 99215 PR OFFICE/OUTPT VISIT, EST, LEVL V, 40-54 MIN: ICD-10-PCS | Mod: S$GLB,,, | Performed by: INTERNAL MEDICINE

## 2023-07-10 PROCEDURE — 1159F PR MEDICATION LIST DOCUMENTED IN MEDICAL RECORD: ICD-10-PCS | Mod: CPTII,S$GLB,, | Performed by: INTERNAL MEDICINE

## 2023-07-10 PROCEDURE — 3044F HG A1C LEVEL LT 7.0%: CPT | Mod: CPTII,S$GLB,, | Performed by: INTERNAL MEDICINE

## 2023-07-10 PROCEDURE — 3044F PR MOST RECENT HEMOGLOBIN A1C LEVEL <7.0%: ICD-10-PCS | Mod: CPTII,S$GLB,, | Performed by: INTERNAL MEDICINE

## 2023-07-10 PROCEDURE — 80053 COMPREHEN METABOLIC PANEL: CPT | Performed by: INTERNAL MEDICINE

## 2023-07-10 PROCEDURE — 87522 HEPATITIS C REVRS TRNSCRPJ: CPT | Performed by: NURSE PRACTITIONER

## 2023-07-10 PROCEDURE — 82378 CARCINOEMBRYONIC ANTIGEN: CPT | Performed by: INTERNAL MEDICINE

## 2023-07-10 PROCEDURE — 3008F PR BODY MASS INDEX (BMI) DOCUMENTED: ICD-10-PCS | Mod: CPTII,S$GLB,, | Performed by: INTERNAL MEDICINE

## 2023-07-10 PROCEDURE — 25000003 PHARM REV CODE 250: Performed by: INTERNAL MEDICINE

## 2023-07-10 PROCEDURE — 3078F PR MOST RECENT DIASTOLIC BLOOD PRESSURE < 80 MM HG: ICD-10-PCS | Mod: CPTII,S$GLB,, | Performed by: INTERNAL MEDICINE

## 2023-07-10 PROCEDURE — 3078F DIAST BP <80 MM HG: CPT | Mod: CPTII,S$GLB,, | Performed by: INTERNAL MEDICINE

## 2023-07-10 PROCEDURE — 99999 PR PBB SHADOW E&M-EST. PATIENT-LVL IV: CPT | Mod: PBBFAC,,, | Performed by: INTERNAL MEDICINE

## 2023-07-10 PROCEDURE — 96413 CHEMO IV INFUSION 1 HR: CPT

## 2023-07-10 PROCEDURE — 99999 PR PBB SHADOW E&M-EST. PATIENT-LVL IV: ICD-10-PCS | Mod: PBBFAC,,, | Performed by: INTERNAL MEDICINE

## 2023-07-10 RX ORDER — DIPHENHYDRAMINE HYDROCHLORIDE 50 MG/ML
50 INJECTION INTRAMUSCULAR; INTRAVENOUS ONCE AS NEEDED
Status: CANCELLED | OUTPATIENT
Start: 2023-07-10

## 2023-07-10 RX ORDER — DIPHENHYDRAMINE HYDROCHLORIDE 50 MG/ML
50 INJECTION INTRAMUSCULAR; INTRAVENOUS ONCE AS NEEDED
Status: DISCONTINUED | OUTPATIENT
Start: 2023-07-10 | End: 2023-07-10 | Stop reason: HOSPADM

## 2023-07-10 RX ORDER — HEPARIN 100 UNIT/ML
500 SYRINGE INTRAVENOUS
Status: DISCONTINUED | OUTPATIENT
Start: 2023-07-10 | End: 2023-07-10 | Stop reason: HOSPADM

## 2023-07-10 RX ORDER — EPINEPHRINE 0.3 MG/.3ML
0.3 INJECTION SUBCUTANEOUS ONCE AS NEEDED
Status: CANCELLED | OUTPATIENT
Start: 2023-07-10

## 2023-07-10 RX ORDER — HEPARIN 100 UNIT/ML
500 SYRINGE INTRAVENOUS
Status: CANCELLED | OUTPATIENT
Start: 2023-07-10

## 2023-07-10 RX ORDER — SODIUM CHLORIDE 0.9 % (FLUSH) 0.9 %
10 SYRINGE (ML) INJECTION
Status: DISCONTINUED | OUTPATIENT
Start: 2023-07-10 | End: 2023-07-10 | Stop reason: HOSPADM

## 2023-07-10 RX ORDER — EPINEPHRINE 0.3 MG/.3ML
0.3 INJECTION SUBCUTANEOUS ONCE AS NEEDED
Status: DISCONTINUED | OUTPATIENT
Start: 2023-07-10 | End: 2023-07-10 | Stop reason: HOSPADM

## 2023-07-10 RX ORDER — SODIUM CHLORIDE 0.9 % (FLUSH) 0.9 %
10 SYRINGE (ML) INJECTION
Status: CANCELLED | OUTPATIENT
Start: 2023-07-10

## 2023-07-10 RX ADMIN — HEPARIN 500 UNITS: 100 SYRINGE at 02:07

## 2023-07-10 RX ADMIN — SODIUM CHLORIDE 200 MG: 9 INJECTION, SOLUTION INTRAVENOUS at 02:07

## 2023-07-10 RX ADMIN — SODIUM CHLORIDE, PRESERVATIVE FREE 10 ML: 5 INJECTION INTRAVENOUS at 02:07

## 2023-07-10 NOTE — PLAN OF CARE
Problem: Fatigue  Goal: Improved Activity Tolerance  Outcome: Ongoing, Progressing  Intervention: Promote Improved Energy  Flowsheets (Taken 7/10/2023 1345)  Fatigue Management:   fatigue-related activity identified   paced activity encouraged   frequent rest breaks encouraged  Sleep/Rest Enhancement:   noise level reduced   relaxation techniques promoted   regular sleep/rest pattern promoted  Activity Management:   Ambulated -L4   Up in chair - L3

## 2023-07-10 NOTE — Clinical Note
Ok for treatment. RTC in 3 weeks with labs for next. CT scan before next treatment (end of 2 weeks).

## 2023-07-10 NOTE — PROGRESS NOTES
"Service Date:  7/10/23    Chief Complaint:  Colon cancer    Osman Li Jr. is a 58 y.o. 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  This occurred shortly after completing treatment.    Patient was part of a clinical trial to measure CT DNA.  Next generation sequencing also showed a high tumor burden.    He is now on Keytruda as his cancer has a very high tumor mutation burden.  He has MARY however.  So far, Keytruda has been helping    Here for 8th cycle.  Doing well.  States that he is coasting.     Review of Systems   Constitutional:  Positive for fatigue.   HENT: Negative.     Eyes: Negative.    Respiratory: Negative.     Cardiovascular: Negative.    Gastrointestinal: Negative.    Endocrine: Negative.    Genitourinary: Negative.    Musculoskeletal: Negative.    Integumentary:  Negative.   Neurological:  Positive for numbness.   Hematological: Negative.    Psychiatric/Behavioral: Negative.        Current Outpatient Medications   Medication Instructions    atorvastatin (LIPITOR) 20 mg, Oral, Daily    enalapriL-hydrochlorothiazide (VASERETIC) 10-25 mg per tablet 1 tablet, Oral, Daily    haemophilus B polysac-tetanus toxoid (ACTHIB, PF,) 10 mcg/0.5 mL injection TO BE ADMINISTERED.    metFORMIN (GLUCOPHAGE) 1,000 mg, Oral, 2 times daily with meals    morphine (MS CONTIN) 30 mg, Oral, 2 times daily    pen needle, diabetic 31 gauge x 3/16" Ndle 1 each, Misc.(Non-Drug; Combo Route), Daily    promethazine (PHENERGAN) 12.5 MG Tab TAKE 1 TABLET BY MOUTH EVERY 4 HOURS AS NEEDED    sildenafiL (VIAGRA) 100 mg, Oral, Daily PRN    TOUJEO MAX U-300 SOLOSTAR 26 Units, Subcutaneous, Daily    traZODone (DESYREL) 50 mg, Oral, Nightly        Past Medical History:   Diagnosis Date    Digestive disorder     DM (diabetes mellitus)     Hyperlipidemia     Hypertension     Prediabetes         Past Surgical History:   Procedure " "Laterality Date    CHOLECYSTECTOMY  2011    COLON SURGERY      COLONOSCOPY      2018    COLOSTOMY N/A 04/25/2022    Procedure: CREATION, COLOSTOMY;  Surgeon: Carlton Waddell MD;  Location: [facility] OR;  Service: General;  Laterality: N/A;    ENDOSCOPIC ULTRASOUND OF UPPER GASTROINTESTINAL TRACT N/A 1/6/2023    Procedure: ULTRASOUND, UPPER GI TRACT, ENDOSCOPIC;  Surgeon: Rinku Orozco III, MD;  Location: [facility] ENDO;  Service: Endoscopy;  Laterality: N/A;    INSERTION OF TUNNELED CENTRAL VENOUS CATHETER (CVC) WITH SUBCUTANEOUS PORT Right 05/18/2022    Procedure: [id number];  Surgeon: Carlton Waddell MD;  Location: [facility] OR;  Service: General;  Laterality: Right;    MOBILIZATION OF SPLENIC FLEXURE N/A 04/25/2022    Procedure: MOBILIZATION, SPLENIC FLEXURE;  Surgeon: Carlton Waddell MD;  Location: [facility] OR;  Service: General;  Laterality: N/A;    SPLENECTOMY N/A 04/25/2022    Procedure: SPLENECTOMY;  Surgeon: Carlton Waddell MD;  Location: [facility] OR;  Service: General;  Laterality: N/A;    SUBTOTAL COLECTOMY N/A 04/25/2022    Procedure: COLECTOMY, PARTIAL;  Surgeon: Carlton Waddell MD;  Location: [facility] OR;  Service: General;  Laterality: N/A;    TONSILLECTOMY          Family History   Problem Relation Age of Onset    Heart disease Father        Social History     Tobacco Use    Smoking status: Every Day     Packs/day: 1.00     Years: 40.00     Pack years: 40.00     Types: Cigarettes     Passive exposure: Current    Smokeless tobacco: Never   Substance Use Topics    Alcohol use: Not Currently    Drug use: Never         Vitals:    07/10/23 1310   BP: 131/68   Pulse: 87   Resp: 12   Temp: 98.4 °F (36.9 °C)          Physical Exam:  /68 (BP Location: Right arm, Patient Position: Sitting, BP Method: Medium (Automatic))   Pulse 87   Temp 98.4 °F (36.9 °C) (Temporal)   Resp 12   Ht 6' 2" (1.88 m)   Wt 92.2 kg (203 lb 4.2 oz)   SpO2 96%   BMI 26.10 kg/m²     Physical Exam  Vitals and nursing note reviewed. "   Constitutional:       Appearance: Normal appearance.   HENT:      Head: Normocephalic and atraumatic.      Nose: Nose normal.      Mouth/Throat:      Mouth: Mucous membranes are moist.      Pharynx: Oropharynx is clear.   Eyes:      Extraocular Movements: Extraocular movements intact.      Conjunctiva/sclera: Conjunctivae normal.   Cardiovascular:      Rate and Rhythm: Normal rate and regular rhythm.      Heart sounds: Normal heart sounds.   Pulmonary:      Effort: Pulmonary effort is normal.      Breath sounds: Normal breath sounds.   Abdominal:      General: Abdomen is flat. Bowel sounds are normal.      Palpations: Abdomen is soft.      Comments: Ostomy bag in place.   Musculoskeletal:         General: Normal range of motion.      Cervical back: Normal range of motion and neck supple.   Skin:     General: Skin is warm and dry.   Neurological:      General: No focal deficit present.      Mental Status: He is alert and oriented to person, place, and time. Mental status is at baseline.   Psychiatric:         Mood and Affect: Mood normal.        Labs:  Lab Results   Component Value Date    WBC 10.93 07/10/2023    RBC 4.91 07/10/2023    HGB 14.4 07/10/2023    HCT 43.8 07/10/2023    MCV 89 07/10/2023    MCH 29.3 07/10/2023    MCHC 32.9 07/10/2023    RDW 13.7 07/10/2023     07/10/2023    MPV 9.3 07/10/2023    GRAN 4.3 07/10/2023    GRAN 39.3 07/10/2023    LYMPH 5.3 (H) 07/10/2023    LYMPH 48.9 (H) 07/10/2023    MONO 1.0 07/10/2023    MONO 9.0 07/10/2023    EOS 0.2 07/10/2023    BASO 0.10 07/10/2023    EOSINOPHIL 1.6 07/10/2023    BASOPHIL 0.9 07/10/2023     Sodium   Date Value Ref Range Status   07/10/2023 138 136 - 145 mmol/L Final     Potassium   Date Value Ref Range Status   07/10/2023 4.9 3.5 - 5.1 mmol/L Final     Chloride   Date Value Ref Range Status   07/10/2023 100 95 - 110 mmol/L Final     CO2   Date Value Ref Range Status   07/10/2023 27 23 - 29 mmol/L Final     Glucose   Date Value Ref Range Status    07/10/2023 144 (H) 70 - 110 mg/dL Final     BUN   Date Value Ref Range Status   07/10/2023 15 6 - 20 mg/dL Final     Creatinine   Date Value Ref Range Status   07/10/2023 1.0 0.5 - 1.4 mg/dL Final     Calcium   Date Value Ref Range Status   07/10/2023 10.1 8.7 - 10.5 mg/dL Final     Total Protein   Date Value Ref Range Status   07/10/2023 7.6 6.0 - 8.4 g/dL Final     Albumin   Date Value Ref Range Status   07/10/2023 4.1 3.5 - 5.2 g/dL Final     Total Bilirubin   Date Value Ref Range Status   07/10/2023 0.4 0.1 - 1.0 mg/dL Final     Comment:     For infants and newborns, interpretation of results should be based  on gestational age, weight and in agreement with clinical  observations.    Premature Infant recommended reference ranges:  Up to 24 hours.............<8.0 mg/dL  Up to 48 hours............<12.0 mg/dL  3-5 days..................<15.0 mg/dL  6-29 days.................<15.0 mg/dL       Alkaline Phosphatase   Date Value Ref Range Status   07/10/2023 119 55 - 135 U/L Final     AST   Date Value Ref Range Status   07/10/2023 19 10 - 40 U/L Final     ALT   Date Value Ref Range Status   07/10/2023 20 10 - 44 U/L Final     Anion Gap   Date Value Ref Range Status   07/10/2023 11 8 - 16 mmol/L Final     eGFR if    Date Value Ref Range Status   07/25/2022 >60 >60 mL/min/1.73 m^2 Final     eGFR if non    Date Value Ref Range Status   07/25/2022 >60 >60 mL/min/1.73 m^2 Final     Comment:     Calculation used to obtain the estimated glomerular filtration  rate (eGFR) is the CKD-EPI equation.          A/P:    Malignant neoplasm of the transverse colon  Recurrence in the pancreas  -poorly differentiated adenocarcinoma  -recurrence right after completing 12 cycles of FOLFOX  -patient on clinical trial to measure ctDNA, showed high tumor mutation burden  -given that the patient has a high tumor mutation burden, despite having MARY, I would like to try to treat him with Keytruda as it is  indicated in his next generation sequencing.    -proceed with Keytruda C8 today  -CT chest abdomen pelvis prior to next treatment  -return to clinic in 3 weeks for next treatment    Pancreatic mass   -confirmed recurrence of colon adenocarcinoma    Back pain  - NM bone scan negative for mets    HTN  - on Enalapril  - follow up with PCP    HLP  - follow up with PCP    DM2  - on Metformin  - follow up with PCP    Tobacco use  - counseled on cessation      Aurash Khoobehi, MD  Hematology and Oncology

## 2023-07-11 LAB — HCV IGG SERPL QL IA: NEGATIVE

## 2023-07-17 ENCOUNTER — OFFICE VISIT (OUTPATIENT)
Dept: OPHTHALMOLOGY | Facility: CLINIC | Age: 58
End: 2023-07-17
Payer: COMMERCIAL

## 2023-07-17 DIAGNOSIS — H52.13 MYOPIA, BILATERAL: ICD-10-CM

## 2023-07-17 DIAGNOSIS — E11.9 DIABETES MELLITUS TYPE 2 WITHOUT RETINOPATHY: Primary | ICD-10-CM

## 2023-07-17 PROCEDURE — 92004 PR EYE EXAM, NEW PATIENT,COMPREHESV: ICD-10-PCS | Mod: S$GLB,,, | Performed by: OPHTHALMOLOGY

## 2023-07-17 PROCEDURE — 2023F DILAT RTA XM W/O RTNOPTHY: CPT | Mod: CPTII,S$GLB,, | Performed by: OPHTHALMOLOGY

## 2023-07-17 PROCEDURE — 1160F PR REVIEW ALL MEDS BY PRESCRIBER/CLIN PHARMACIST DOCUMENTED: ICD-10-PCS | Mod: CPTII,S$GLB,, | Performed by: OPHTHALMOLOGY

## 2023-07-17 PROCEDURE — 3044F PR MOST RECENT HEMOGLOBIN A1C LEVEL <7.0%: ICD-10-PCS | Mod: CPTII,S$GLB,, | Performed by: OPHTHALMOLOGY

## 2023-07-17 PROCEDURE — 99999 PR PBB SHADOW E&M-EST. PATIENT-LVL III: CPT | Mod: PBBFAC,,, | Performed by: OPHTHALMOLOGY

## 2023-07-17 PROCEDURE — 92015 DETERMINE REFRACTIVE STATE: CPT | Mod: S$GLB,,, | Performed by: OPHTHALMOLOGY

## 2023-07-17 PROCEDURE — 3044F HG A1C LEVEL LT 7.0%: CPT | Mod: CPTII,S$GLB,, | Performed by: OPHTHALMOLOGY

## 2023-07-17 PROCEDURE — 4010F PR ACE/ARB THEARPY RXD/TAKEN: ICD-10-PCS | Mod: CPTII,S$GLB,, | Performed by: OPHTHALMOLOGY

## 2023-07-17 PROCEDURE — 2023F PR DILATED RETINAL EXAM W/O EVID OF RETINOPATHY: ICD-10-PCS | Mod: CPTII,S$GLB,, | Performed by: OPHTHALMOLOGY

## 2023-07-17 PROCEDURE — 92004 COMPRE OPH EXAM NEW PT 1/>: CPT | Mod: S$GLB,,, | Performed by: OPHTHALMOLOGY

## 2023-07-17 PROCEDURE — 1160F RVW MEDS BY RX/DR IN RCRD: CPT | Mod: CPTII,S$GLB,, | Performed by: OPHTHALMOLOGY

## 2023-07-17 PROCEDURE — 1159F PR MEDICATION LIST DOCUMENTED IN MEDICAL RECORD: ICD-10-PCS | Mod: CPTII,S$GLB,, | Performed by: OPHTHALMOLOGY

## 2023-07-17 PROCEDURE — 92015 PR REFRACTION: ICD-10-PCS | Mod: S$GLB,,, | Performed by: OPHTHALMOLOGY

## 2023-07-17 PROCEDURE — 99999 PR PBB SHADOW E&M-EST. PATIENT-LVL III: ICD-10-PCS | Mod: PBBFAC,,, | Performed by: OPHTHALMOLOGY

## 2023-07-17 PROCEDURE — 1159F MED LIST DOCD IN RCRD: CPT | Mod: CPTII,S$GLB,, | Performed by: OPHTHALMOLOGY

## 2023-07-17 PROCEDURE — 4010F ACE/ARB THERAPY RXD/TAKEN: CPT | Mod: CPTII,S$GLB,, | Performed by: OPHTHALMOLOGY

## 2023-07-17 NOTE — PROGRESS NOTES
HPI    New pt presents for DM exam     States no current ocular complaints. Would like updated RX today, aware of   refraction fee.     No ocular meds     No ocular sx hx     Hemoglobin A1C       Date                     Value               Ref Range             Status                06/14/2023               6.8 (H)             <5.7 % of tota*       Final                         01/20/2023               7.0 (H)             <5.7 % of tota*       Final                          10/19/2021               8.2 (H)             <5.7 % of tota*       Final               Last edited by Madiha Britton on 7/17/2023  8:09 AM.            Assessment /Plan     For exam results, see Encounter Report.    Diabetes mellitus type 2 without retinopathy    Myopia, bilateral      1. Diabetes mellitus type 2 without retinopathy  Diabetes without retinopathy, discussed with patient importance of glucose control and follow up.  Patient voices understanding.    2. Myopia, bilateral  New glasses Rx today    F/u 1 year, routine exam

## 2023-07-18 DIAGNOSIS — C78.89 METASTATIC ADENOCARCINOMA TO PANCREAS: ICD-10-CM

## 2023-07-18 RX ORDER — MORPHINE SULFATE 30 MG/1
30 TABLET, FILM COATED, EXTENDED RELEASE ORAL 2 TIMES DAILY
Qty: 60 TABLET | Refills: 0 | Status: SHIPPED | OUTPATIENT
Start: 2023-07-18 | End: 2023-08-18

## 2023-07-25 ENCOUNTER — PATIENT MESSAGE (OUTPATIENT)
Dept: HEMATOLOGY/ONCOLOGY | Facility: CLINIC | Age: 58
End: 2023-07-25
Payer: COMMERCIAL

## 2023-07-25 ENCOUNTER — OFFICE VISIT (OUTPATIENT)
Dept: INFECTIOUS DISEASES | Facility: CLINIC | Age: 58
End: 2023-07-25
Payer: COMMERCIAL

## 2023-07-25 VITALS
OXYGEN SATURATION: 98 % | HEART RATE: 92 BPM | WEIGHT: 211 LBS | HEIGHT: 74 IN | BODY MASS INDEX: 27.08 KG/M2 | TEMPERATURE: 98 F | SYSTOLIC BLOOD PRESSURE: 138 MMHG | DIASTOLIC BLOOD PRESSURE: 64 MMHG

## 2023-07-25 DIAGNOSIS — I10 HYPERTENSION, UNSPECIFIED TYPE: ICD-10-CM

## 2023-07-25 DIAGNOSIS — C18.5 MALIGNANT NEOPLASM OF SPLENIC FLEXURE: Primary | ICD-10-CM

## 2023-07-25 DIAGNOSIS — E78.49 OTHER HYPERLIPIDEMIA: ICD-10-CM

## 2023-07-25 DIAGNOSIS — F17.200 SMOKER: ICD-10-CM

## 2023-07-25 PROCEDURE — 3044F HG A1C LEVEL LT 7.0%: CPT | Mod: CPTII,S$GLB,, | Performed by: STUDENT IN AN ORGANIZED HEALTH CARE EDUCATION/TRAINING PROGRAM

## 2023-07-25 PROCEDURE — 4010F ACE/ARB THERAPY RXD/TAKEN: CPT | Mod: CPTII,S$GLB,, | Performed by: STUDENT IN AN ORGANIZED HEALTH CARE EDUCATION/TRAINING PROGRAM

## 2023-07-25 PROCEDURE — 99214 OFFICE O/P EST MOD 30 MIN: CPT | Mod: S$GLB,,, | Performed by: STUDENT IN AN ORGANIZED HEALTH CARE EDUCATION/TRAINING PROGRAM

## 2023-07-25 PROCEDURE — 1159F PR MEDICATION LIST DOCUMENTED IN MEDICAL RECORD: ICD-10-PCS | Mod: CPTII,S$GLB,, | Performed by: STUDENT IN AN ORGANIZED HEALTH CARE EDUCATION/TRAINING PROGRAM

## 2023-07-25 PROCEDURE — 3075F PR MOST RECENT SYSTOLIC BLOOD PRESS GE 130-139MM HG: ICD-10-PCS | Mod: CPTII,S$GLB,, | Performed by: STUDENT IN AN ORGANIZED HEALTH CARE EDUCATION/TRAINING PROGRAM

## 2023-07-25 PROCEDURE — 3044F PR MOST RECENT HEMOGLOBIN A1C LEVEL <7.0%: ICD-10-PCS | Mod: CPTII,S$GLB,, | Performed by: STUDENT IN AN ORGANIZED HEALTH CARE EDUCATION/TRAINING PROGRAM

## 2023-07-25 PROCEDURE — 3075F SYST BP GE 130 - 139MM HG: CPT | Mod: CPTII,S$GLB,, | Performed by: STUDENT IN AN ORGANIZED HEALTH CARE EDUCATION/TRAINING PROGRAM

## 2023-07-25 PROCEDURE — 3078F DIAST BP <80 MM HG: CPT | Mod: CPTII,S$GLB,, | Performed by: STUDENT IN AN ORGANIZED HEALTH CARE EDUCATION/TRAINING PROGRAM

## 2023-07-25 PROCEDURE — 3008F BODY MASS INDEX DOCD: CPT | Mod: CPTII,S$GLB,, | Performed by: STUDENT IN AN ORGANIZED HEALTH CARE EDUCATION/TRAINING PROGRAM

## 2023-07-25 PROCEDURE — 99214 PR OFFICE/OUTPT VISIT, EST, LEVL IV, 30-39 MIN: ICD-10-PCS | Mod: S$GLB,,, | Performed by: STUDENT IN AN ORGANIZED HEALTH CARE EDUCATION/TRAINING PROGRAM

## 2023-07-25 PROCEDURE — 1159F MED LIST DOCD IN RCRD: CPT | Mod: CPTII,S$GLB,, | Performed by: STUDENT IN AN ORGANIZED HEALTH CARE EDUCATION/TRAINING PROGRAM

## 2023-07-25 PROCEDURE — 3078F PR MOST RECENT DIASTOLIC BLOOD PRESSURE < 80 MM HG: ICD-10-PCS | Mod: CPTII,S$GLB,, | Performed by: STUDENT IN AN ORGANIZED HEALTH CARE EDUCATION/TRAINING PROGRAM

## 2023-07-25 PROCEDURE — 3008F PR BODY MASS INDEX (BMI) DOCUMENTED: ICD-10-PCS | Mod: CPTII,S$GLB,, | Performed by: STUDENT IN AN ORGANIZED HEALTH CARE EDUCATION/TRAINING PROGRAM

## 2023-07-25 PROCEDURE — 4010F PR ACE/ARB THEARPY RXD/TAKEN: ICD-10-PCS | Mod: CPTII,S$GLB,, | Performed by: STUDENT IN AN ORGANIZED HEALTH CARE EDUCATION/TRAINING PROGRAM

## 2023-07-25 NOTE — PROGRESS NOTES
Subjective: Hospital Follow UP - s/p splenectomy - vaccines and history of intraabdominal abscess  s/p colectomy       Patient ID: Osman Li Jr. is a 58 y.o. male.    Chief Complaint:: Follow-up      HPI Osman Li is a 58 y.o. male active heavy smoker, with past medical history of HTN, diabetes, and HLD, known to me from prior admission to [facility] 4/21 for acute abdomen secondary to LBO in the setting of large colonic mass s/p resection and splenectomy 4/21. Patient was discharged on levofloxacin, doxycyclin and flagyl PO. Received first 2 doses of meningococcus and pneumonococcus vaccines before discharge. Port-A-Cath placed 5/18. Patient had a recent admission to [facility] for sepsis secondary to intra-abdominal abscess, unable to have it drained by IR, sent home on IV ceftriaxone and flagyl PO for 10 days. He completed antibiotics 7/3.     Patient is here for follow up, wife present. Has had significant weight loss in the last couple of months. Plan to start chemotherapy next week, will re-schedule CT scan for early next week to assess status of prior abscess before starting chemotherapy.     He states he feels good, no abdominal pain, colostomy functioning, no nausea or vomit. No fever or chills.   Recent labs reviewed, normal WBC, CRP 8.5, slightly elevated.    8/23: Interim reviewed, patient scheduled for follow-up, seen via iPad. States he feels ok, some days are good and some days are not; weakness, fatigue, decreased appetite and weight loss. Patient had a repeat CT scan abdomen/pelvis 8/5 which showed rim-enhancing left upper quadrant fluid collection 5.3 x 1.8 cm  suspicious for abscess considering the provided clinical history. Smaller than prior, 9 x 11 cm. Adjacent to this there is masslike hypoattenuation involving the tail of the pancreas. This could is suspicious for pancreatic neoplasm, although phlegmon related to aforementioned abscess is also possible. Patient was started on  ceftriaxone IV on 7/11, still on ceftriaxone, plan to de-access port 8/26. He c/o lower back pain. He denies abdominal pan, fever or chills, he is aware of symptoms suggestive of infection and states he has not experienced them. Seen by Heme/Onc yesterday, on 4th cycle of chemotherapy, plan for NM bone scan to rule out metastasis.   Labs reviewed, CRP trending up, likely from underlying malignancy vs ongoing chemotherapy vs underlying fluid collection.     10/5:  Interim reviewed, patient seen virtually via iPad, wife present.  He is status post 7 cycle of chemotherapy 10/4, still feeling weak and fatigued, appetite is okay, continues to lose weight.  Denies fever or chills.  Had NM scan negative for bone metastases.  He is a participant of a research study at [facility] to check on DNA levels in blood.  [facility] pharmacy contacted, HiB vaccine available.  Patient instructed to come Friday to get his dose for Haemophilus influenzae type B vaccine.    12/13:  Interim reviewed, patient is here for follow-up, wife present.  Patient on his last cycle of chemotherapy, 12.  He is complaining of intermittent abdominal pain around colostomy site, denies fever or chills.  Significant weight loss, around 70 lb since diagnosed in April.  Patient completed vaccinations status post splenectomy.  Following with Heme-Onc outpatient, plan to repeat CT scan next week.  Notes from Heme-Onc reviewed, plan for Keytruda as adjunctive immunotherapy for colon cancer.    1/24/23:  Interim reviewed, patient is here for follow-up, wife present.  Patient had repeat CT scan in December which showed a suspicious lesion around pancreas status post EUS by GI/Dr. Orozco, pathology revealed metastatic adenocarcinoma to pancreas.  He is being followed by Heme-Onc regularly, plan to start Keytruda as immunotherapy.  Patient remains on the research study at [facility].  He denies any fever or chills, complaining of left-sided flank pain, states his  appetite has improved, current weight 184 lb.  He continues to smoke a pack a day, not interested in quitting.    7/25: Interim reviewed, patient is here for follow-up, wife present. Seen by Heme/Onc, he is currently on Keytruda, tolerating it, he is c/o 4/10 abdominal pain around ostomy site, denies fever or chills, no nausea or vomit. Has gained 10 lbs since las visit. Plan for CT abdomen/pelvis 7/27.     Review of patient's allergies indicates:   Allergen Reactions    Penicillins Rash     Past Medical History:   Diagnosis Date    Digestive disorder     DM (diabetes mellitus)     Hyperlipidemia     Hypertension     Prediabetes      Past Surgical History:   Procedure Laterality Date    CHOLECYSTECTOMY  2011    COLON SURGERY      COLONOSCOPY      2018    COLOSTOMY N/A 04/25/2022    Procedure: CREATION, COLOSTOMY;  Surgeon: Carlton Waddell MD;  Location: [facility] OR;  Service: General;  Laterality: N/A;    ENDOSCOPIC ULTRASOUND OF UPPER GASTROINTESTINAL TRACT N/A 1/6/2023    Procedure: ULTRASOUND, UPPER GI TRACT, ENDOSCOPIC;  Surgeon: Rinku Orozco III, MD;  Location: [facility] ENDO;  Service: Endoscopy;  Laterality: N/A;    INSERTION OF TUNNELED CENTRAL VENOUS CATHETER (CVC) WITH SUBCUTANEOUS PORT Right 05/18/2022    Procedure: [id number];  Surgeon: Carlton Waddell MD;  Location: [facility] OR;  Service: General;  Laterality: Right;    MOBILIZATION OF SPLENIC FLEXURE N/A 04/25/2022    Procedure: MOBILIZATION, SPLENIC FLEXURE;  Surgeon: Carlton Waddell MD;  Location: [facility] OR;  Service: General;  Laterality: N/A;    SPLENECTOMY N/A 04/25/2022    Procedure: SPLENECTOMY;  Surgeon: Carlton Waddell MD;  Location: [facility] OR;  Service: General;  Laterality: N/A;    SUBTOTAL COLECTOMY N/A 04/25/2022    Procedure: COLECTOMY, PARTIAL;  Surgeon: Carlton Waddell MD;  Location: [facility] OR;  Service: General;  Laterality: N/A;    TONSILLECTOMY       Social History     Tobacco Use    Smoking status: Every Day     Packs/day: 1.00      "Years: 40.00     Pack years: 40.00     Types: Cigarettes     Passive exposure: Current    Smokeless tobacco: Never   Substance Use Topics    Alcohol use: Not Currently        Family History   Problem Relation Age of Onset    Heart disease Father          Review of Systems   Constitutional:  Negative for appetite change, chills, fever and unexpected weight change.   HENT:  Negative for sinus pain.    Respiratory:  Negative for cough and shortness of breath.    Cardiovascular:  Negative for chest pain and leg swelling.   Gastrointestinal:  Positive for abdominal pain. Negative for diarrhea and nausea.        Colostomy bag   Genitourinary:  Negative for dysuria.   Musculoskeletal:  Negative for back pain.   Neurological:  Negative for headaches.      VAD: R Port-A-Cath not accessed    Objective:      Blood pressure 138/64, pulse 92, temperature 98.2 °F (36.8 °C), height 6' 2" (1.88 m), weight 95.7 kg (211 lb), SpO2 98 %. Body mass index is 27.09 kg/m². virtual visit 8/23: looks comfortable on room air, conversant.     Physical Exam  Constitutional:       Appearance: He is normal weight.      Comments: Frail   HENT:      Mouth/Throat:      Mouth: Mucous membranes are moist.      Pharynx: Oropharynx is clear.   Eyes:      Conjunctiva/sclera: Conjunctivae normal.      Pupils: Pupils are equal, round, and reactive to light.   Cardiovascular:      Rate and Rhythm: Normal rate and regular rhythm.      Pulses: Normal pulses.      Heart sounds: Normal heart sounds.   Pulmonary:      Effort: Pulmonary effort is normal.      Breath sounds: Normal breath sounds.   Abdominal:      General: Bowel sounds are normal.      Palpations: Abdomen is soft.      Tenderness: There is no abdominal tenderness.      Comments: Healed wound, LL colostomy, mildly tender to palpation, no rebound, stool in bag   Musculoskeletal:         General: Normal range of motion.      Cervical back: Normal range of motion.      Right lower leg: No edema.      " Left lower leg: No edema.   Skin:     General: Skin is warm.      Capillary Refill: Capillary refill takes less than 2 seconds.   Neurological:      General: No focal deficit present.      Mental Status: He is alert and oriented to person, place, and time. Mental status is at baseline.   Psychiatric:         Thought Content: Thought content normal.       VAD: R Port-A-Cath, in place,not accessed, no redness noted, non tender to palpation       Recent Diagnostics:  CT scan 12/19/22:  1. Rim-enhancing left upper quadrant fluid collection reported previously has resolved.  2. Significant detrimental increase in size of ill-defined hypodensity at the pancreatic tail, now measuring up to 5.6 cm in greatest dimension, with development of a mildly prominent low density lymph node dorsal to the pancreatic body. The appearance is suspicious for malignancy, including primary pancreatic malignancy or metastatic lesion. Infection is conceivable, as this is in close proximity to the previously demonstrated rim-enhancing fluid collection, however this is felt significantly less likely. Endoscopic ultrasound may be of benefit, as this may be amenable to endoscopic biopsy.  3. Interval resolution of small left pleural effusion.  4. Previously noted small bowel containing left lower quadrant parastomal hernia is no longer evident.  5. Additional stable findings as above.     CT scan 9/5/22:  1. Little change of rim enhancing subdiaphragmatic left upper quadrant fluid collection since 8/5/2022.  2. No significant change of hypodense region involving pancreatic tail. Differential diagnosis of this has been previously discussed and remains unchanged. Continued imaging follow-up is recommended. Pancreatic protocol CT or MRI without and with IV contrast is suggested in 3 months. Alternatively, endoscopic ultrasound could be used for further evaluation.  3. Tiny left pleural effusion, unchanged.  4. Unchanged parastomal hernia.    NM scan  9/22:  No evidence of osseous metastasis.  CT scan 8/5:   Rim-enhancing fluid collection within the left upper quadrant at the splenic bed minimally increased in size when compared to the prior study and suspicious for abscess  Improvement of the masslike hypoattenuation involving the tail of the pancreas most likely secondary to the adjacent inflammatory changes.  Small left pleural effusion with left lower lobe atelectasis  Left lower quadrant ostomy  Prior colectomy.    CT scan 7/8:  Rim-enhancing left upper quadrant fluid collection suspicious for abscess considering the provided clinical history.  Adjacent to this there is masslike hypoattenuation involving the tail of the pancreas. This could is suspicious for pancreatic neoplasm, although phlegmon related to aforementioned abscess is also possible. Dedicated pancreatic protocol imaging and correlation with surgical history is recommended.    CT scan 6/20/22:  1. Postsurgical change of the colon with increased size of air-fluid collection near the operative bed in the left upper abdominal quadrant.  Abscess or contained perforation are considerations.  2. Hepatomegaly.    CT scan 6/29/22:  1. Decreased size of left upper quadrant air-fluid collection.  2. Unchanged size of left pleural effusion.      Micro:  Wound cultures 5/2 E coli, Klebsiella pneumoniae and Candida dubliensis    Pathology:  inal Pathologic Diagnosis 1. Spleen, splenectomy:   - Benign splenic parenchyma   - Negative for malignancy   2. Colon, partial colectomy:   - Invasive colonic adenocarcinoma, poorly differentiated (G3)     - Invades into pericolorectal tissue (pT3)   - Margins uninvolved by invasive carcinoma     - 0.1 cm to the mesenteric margin   - Lymphovascular invasion identified   - No perineural invasion identified   - Seventeen of twenty-seven lymph nodes, positive for metastatic carcinoma   (17/27, pN2b)   - Fibrous serosal adhesions   - See below for results of MSI testing   -  CARLOS ENRIQUE/MILLIE Targeted Gene Panel has been ordered and results will be issued in   a supplemental report     CAP Synoptic Checklist for Colonic Neoplasms:     - Procedure: Partial colectomy     - Tumor Site: Splenic flexure     - Histologic Type: Adenocarcinoma     - Histologic Grade: G3, poorly differentiated     - Tumor Size: 5.0 x 4.5 x 2.7 cm     - Tumor Extent: Invades through muscularis propria into pericolorectal   tissue     - Macroscopic Tumor Perforation: Not identified     - Lymphovascular Invasion: Present, small vessel involved     - Perineural Invasion: Not identified     - Number of Tumor Buds: 5 in one hotspot field     - Tumor Bud Score: Intermediate (5-9)     - Type of Polyp in which Invasive Carcinoma Arose: None identified     - Treatment Effect: No known presurgical therapy     - Margins: All margins negative for invasive carcinoma              Assessment and Plan:         1. Poorly differentiated metastatic adenocarcinoma of colon to pancreas s/p colectomy and splenectomy 4/21, completed chemotherapt complicated by recurrent intra-abdominal abscess, unable to drain by IR, latest CT scan 12/9 with resolution of abscess and suspicious lesion on pancreas + metastastis    On Keytruda    NM scan negative for bone metastasis  CT scan to be done 7/27   S/p Menveo and Pneumovac and HiB vaccines   RTC in 6 months      2. Smoker, not interested in quitting     3. PMHx HTN, diabetes, HLD    PCP follow-up    D/w patient, wife    Malignant neoplasm of splenic flexure    Smoker    Other hyperlipidemia    Hypertension, unspecified type

## 2023-07-26 ENCOUNTER — TELEPHONE (OUTPATIENT)
Dept: HEMATOLOGY/ONCOLOGY | Facility: CLINIC | Age: 58
End: 2023-07-26
Payer: COMMERCIAL

## 2023-07-27 ENCOUNTER — HOSPITAL ENCOUNTER (OUTPATIENT)
Dept: RADIOLOGY | Facility: HOSPITAL | Age: 58
Discharge: HOME | End: 2023-07-27
Attending: INTERNAL MEDICINE
Payer: COMMERCIAL

## 2023-07-27 DIAGNOSIS — C18.4 MALIGNANT NEOPLASM OF TRANSVERSE COLON: ICD-10-CM

## 2023-07-27 PROCEDURE — 25500020 PHARM REV CODE 255

## 2023-07-27 PROCEDURE — 74177 CT ABD & PELVIS W/CONTRAST: CPT | Mod: 26,,, | Performed by: RADIOLOGY

## 2023-07-27 PROCEDURE — 71260 CT THORAX DX C+: CPT | Mod: 26,,, | Performed by: RADIOLOGY

## 2023-07-27 PROCEDURE — 71260 CT THORAX DX C+: CPT | Mod: TC

## 2023-07-27 PROCEDURE — 74177 CT CHEST ABDOMEN PELVIS WITH CONTRAST (XPD): ICD-10-PCS | Mod: 26,,, | Performed by: RADIOLOGY

## 2023-07-27 PROCEDURE — 74177 CT ABD & PELVIS W/CONTRAST: CPT | Mod: TC

## 2023-07-27 PROCEDURE — 71260 CT CHEST ABDOMEN PELVIS WITH CONTRAST (XPD): ICD-10-PCS | Mod: 26,,, | Performed by: RADIOLOGY

## 2023-07-27 RX ADMIN — IOHEXOL 100 ML: 350 INJECTION, SOLUTION INTRAVENOUS at 08:07

## 2023-07-28 ENCOUNTER — OFFICE VISIT (OUTPATIENT)
Dept: HEMATOLOGY/ONCOLOGY | Facility: CLINIC | Age: 58
End: 2023-07-28
Payer: COMMERCIAL

## 2023-07-28 VITALS
RESPIRATION RATE: 12 BRPM | SYSTOLIC BLOOD PRESSURE: 134 MMHG | WEIGHT: 207.88 LBS | BODY MASS INDEX: 26.68 KG/M2 | HEIGHT: 74 IN | HEART RATE: 76 BPM | TEMPERATURE: 98 F | OXYGEN SATURATION: 98 % | DIASTOLIC BLOOD PRESSURE: 71 MMHG

## 2023-07-28 DIAGNOSIS — C78.89 METASTATIC ADENOCARCINOMA TO PANCREAS: ICD-10-CM

## 2023-07-28 DIAGNOSIS — E11.00 TYPE 2 DIABETES MELLITUS WITH HYPEROSMOLARITY WITHOUT COMA, WITHOUT LONG-TERM CURRENT USE OF INSULIN: ICD-10-CM

## 2023-07-28 DIAGNOSIS — C18.4 MALIGNANT NEOPLASM OF TRANSVERSE COLON: Primary | ICD-10-CM

## 2023-07-28 DIAGNOSIS — T45.1X5A CHEMOTHERAPY-INDUCED NEUROPATHY: ICD-10-CM

## 2023-07-28 DIAGNOSIS — E78.49 OTHER HYPERLIPIDEMIA: ICD-10-CM

## 2023-07-28 DIAGNOSIS — G62.0 CHEMOTHERAPY-INDUCED NEUROPATHY: ICD-10-CM

## 2023-07-28 DIAGNOSIS — I10 PRIMARY HYPERTENSION: ICD-10-CM

## 2023-07-28 PROCEDURE — 99999 PR PBB SHADOW E&M-EST. PATIENT-LVL IV: ICD-10-PCS | Mod: PBBFAC,,, | Performed by: INTERNAL MEDICINE

## 2023-07-28 PROCEDURE — 3044F HG A1C LEVEL LT 7.0%: CPT | Mod: CPTII,S$GLB,, | Performed by: INTERNAL MEDICINE

## 2023-07-28 PROCEDURE — 3044F PR MOST RECENT HEMOGLOBIN A1C LEVEL <7.0%: ICD-10-PCS | Mod: CPTII,S$GLB,, | Performed by: INTERNAL MEDICINE

## 2023-07-28 PROCEDURE — 3075F SYST BP GE 130 - 139MM HG: CPT | Mod: CPTII,S$GLB,, | Performed by: INTERNAL MEDICINE

## 2023-07-28 PROCEDURE — 4010F PR ACE/ARB THEARPY RXD/TAKEN: ICD-10-PCS | Mod: CPTII,S$GLB,, | Performed by: INTERNAL MEDICINE

## 2023-07-28 PROCEDURE — 3008F PR BODY MASS INDEX (BMI) DOCUMENTED: ICD-10-PCS | Mod: CPTII,S$GLB,, | Performed by: INTERNAL MEDICINE

## 2023-07-28 PROCEDURE — 3008F BODY MASS INDEX DOCD: CPT | Mod: CPTII,S$GLB,, | Performed by: INTERNAL MEDICINE

## 2023-07-28 PROCEDURE — 1160F PR REVIEW ALL MEDS BY PRESCRIBER/CLIN PHARMACIST DOCUMENTED: ICD-10-PCS | Mod: CPTII,S$GLB,, | Performed by: INTERNAL MEDICINE

## 2023-07-28 PROCEDURE — 4010F ACE/ARB THERAPY RXD/TAKEN: CPT | Mod: CPTII,S$GLB,, | Performed by: INTERNAL MEDICINE

## 2023-07-28 PROCEDURE — 99215 OFFICE O/P EST HI 40 MIN: CPT | Mod: S$GLB,,, | Performed by: INTERNAL MEDICINE

## 2023-07-28 PROCEDURE — 1160F RVW MEDS BY RX/DR IN RCRD: CPT | Mod: CPTII,S$GLB,, | Performed by: INTERNAL MEDICINE

## 2023-07-28 PROCEDURE — 3075F PR MOST RECENT SYSTOLIC BLOOD PRESS GE 130-139MM HG: ICD-10-PCS | Mod: CPTII,S$GLB,, | Performed by: INTERNAL MEDICINE

## 2023-07-28 PROCEDURE — 1159F PR MEDICATION LIST DOCUMENTED IN MEDICAL RECORD: ICD-10-PCS | Mod: CPTII,S$GLB,, | Performed by: INTERNAL MEDICINE

## 2023-07-28 PROCEDURE — 3078F PR MOST RECENT DIASTOLIC BLOOD PRESSURE < 80 MM HG: ICD-10-PCS | Mod: CPTII,S$GLB,, | Performed by: INTERNAL MEDICINE

## 2023-07-28 PROCEDURE — 99215 PR OFFICE/OUTPT VISIT, EST, LEVL V, 40-54 MIN: ICD-10-PCS | Mod: S$GLB,,, | Performed by: INTERNAL MEDICINE

## 2023-07-28 PROCEDURE — 3078F DIAST BP <80 MM HG: CPT | Mod: CPTII,S$GLB,, | Performed by: INTERNAL MEDICINE

## 2023-07-28 PROCEDURE — 99999 PR PBB SHADOW E&M-EST. PATIENT-LVL IV: CPT | Mod: PBBFAC,,, | Performed by: INTERNAL MEDICINE

## 2023-07-28 PROCEDURE — 1159F MED LIST DOCD IN RCRD: CPT | Mod: CPTII,S$GLB,, | Performed by: INTERNAL MEDICINE

## 2023-07-28 RX ORDER — HEPARIN 100 UNIT/ML
500 SYRINGE INTRAVENOUS
Status: CANCELLED | OUTPATIENT
Start: 2023-07-31

## 2023-07-28 RX ORDER — DIPHENHYDRAMINE HYDROCHLORIDE 50 MG/ML
50 INJECTION INTRAMUSCULAR; INTRAVENOUS ONCE AS NEEDED
Status: CANCELLED | OUTPATIENT
Start: 2023-07-31

## 2023-07-28 RX ORDER — EPINEPHRINE 0.3 MG/.3ML
0.3 INJECTION SUBCUTANEOUS ONCE AS NEEDED
Status: CANCELLED | OUTPATIENT
Start: 2023-07-31

## 2023-07-28 RX ORDER — SODIUM CHLORIDE 0.9 % (FLUSH) 0.9 %
10 SYRINGE (ML) INJECTION
Status: CANCELLED | OUTPATIENT
Start: 2023-07-31

## 2023-07-28 NOTE — PROGRESS NOTES
"Service Date:  7/28/23    Chief Complaint:  Colon cancer    Osman Li Jr. is a 58 y.o. 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  This occurred shortly after completing treatment.    Patient was part of a clinical trial to measure CT DNA.  Next generation sequencing also showed a high tumor burden.    He is now on Keytruda as his cancer has a very high tumor mutation burden.  He has MARY however.  So far, Keytruda has been helping    Here for 9th cycle.  Doing well.  Here to also follow up on CT scan.  No complaints to me.    Review of Systems   Constitutional:  Positive for fatigue.   HENT: Negative.     Eyes: Negative.    Respiratory: Negative.     Cardiovascular: Negative.    Gastrointestinal: Negative.    Endocrine: Negative.    Genitourinary: Negative.    Musculoskeletal: Negative.    Integumentary:  Negative.   Neurological:  Positive for numbness.   Hematological: Negative.    Psychiatric/Behavioral: Negative.        Current Outpatient Medications   Medication Instructions    atorvastatin (LIPITOR) 20 mg, Oral, Daily    enalapriL-hydrochlorothiazide (VASERETIC) 10-25 mg per tablet 1 tablet, Oral, Daily    haemophilus B polysac-tetanus toxoid (ACTHIB, PF,) 10 mcg/0.5 mL injection TO BE ADMINISTERED.    metFORMIN (GLUCOPHAGE) 1,000 mg, Oral, 2 times daily with meals    morphine (MS CONTIN) 30 mg, Oral, 2 times daily    pen needle, diabetic 31 gauge x 3/16" Ndle 1 each, Misc.(Non-Drug; Combo Route), Daily    promethazine (PHENERGAN) 12.5 MG Tab TAKE 1 TABLET BY MOUTH EVERY 4 HOURS AS NEEDED    sildenafiL (VIAGRA) 100 mg, Oral, Daily PRN    TOUJEO MAX U-300 SOLOSTAR 26 Units, Subcutaneous, Daily    traZODone (DESYREL) 50 mg, Oral, Nightly        Past Medical History:   Diagnosis Date    Digestive disorder     DM (diabetes mellitus)     Hyperlipidemia     Hypertension     Prediabetes         Past Surgical " "History:   Procedure Laterality Date    CHOLECYSTECTOMY  2011    COLON SURGERY      COLONOSCOPY      2018    COLOSTOMY N/A 04/25/2022    Procedure: CREATION, COLOSTOMY;  Surgeon: Carlton Waddell MD;  Location: [facility] OR;  Service: General;  Laterality: N/A;    ENDOSCOPIC ULTRASOUND OF UPPER GASTROINTESTINAL TRACT N/A 1/6/2023    Procedure: ULTRASOUND, UPPER GI TRACT, ENDOSCOPIC;  Surgeon: Rinku Orozco III, MD;  Location: [facility] ENDO;  Service: Endoscopy;  Laterality: N/A;    INSERTION OF TUNNELED CENTRAL VENOUS CATHETER (CVC) WITH SUBCUTANEOUS PORT Right 05/18/2022    Procedure: [id number];  Surgeon: Carlton Waddell MD;  Location: [facility] OR;  Service: General;  Laterality: Right;    MOBILIZATION OF SPLENIC FLEXURE N/A 04/25/2022    Procedure: MOBILIZATION, SPLENIC FLEXURE;  Surgeon: Carlton Waddell MD;  Location: [facility] OR;  Service: General;  Laterality: N/A;    SPLENECTOMY N/A 04/25/2022    Procedure: SPLENECTOMY;  Surgeon: Carlton Waddell MD;  Location: [facility] OR;  Service: General;  Laterality: N/A;    SUBTOTAL COLECTOMY N/A 04/25/2022    Procedure: COLECTOMY, PARTIAL;  Surgeon: Carlton Waddell MD;  Location: [facility] OR;  Service: General;  Laterality: N/A;    TONSILLECTOMY          Family History   Problem Relation Age of Onset    Heart disease Father        Social History     Tobacco Use    Smoking status: Every Day     Packs/day: 1.00     Years: 40.00     Pack years: 40.00     Types: Cigarettes     Passive exposure: Current    Smokeless tobacco: Never   Substance Use Topics    Alcohol use: Not Currently    Drug use: Never         Vitals:    07/28/23 0916   BP: 134/71   Pulse: 76   Resp: 12   Temp: 98.3 °F (36.8 °C)          Physical Exam:  /71 (BP Location: Right arm, Patient Position: Sitting, BP Method: Large (Automatic))   Pulse 76   Temp 98.3 °F (36.8 °C) (Temporal)   Resp 12   Ht 6' 2" (1.88 m)   Wt 94.3 kg (207 lb 14.3 oz)   SpO2 98%   BMI 26.69 kg/m²     Physical Exam  Vitals and " nursing note reviewed.   Constitutional:       Appearance: Normal appearance.   HENT:      Head: Normocephalic and atraumatic.      Nose: Nose normal.      Mouth/Throat:      Mouth: Mucous membranes are moist.      Pharynx: Oropharynx is clear.   Eyes:      Extraocular Movements: Extraocular movements intact.      Conjunctiva/sclera: Conjunctivae normal.   Cardiovascular:      Rate and Rhythm: Normal rate and regular rhythm.      Heart sounds: Normal heart sounds.   Pulmonary:      Effort: Pulmonary effort is normal.      Breath sounds: Normal breath sounds.   Abdominal:      General: Abdomen is flat. Bowel sounds are normal.      Palpations: Abdomen is soft.      Comments: Ostomy bag in place.   Musculoskeletal:         General: Normal range of motion.      Cervical back: Normal range of motion and neck supple.   Skin:     General: Skin is warm and dry.   Neurological:      General: No focal deficit present.      Mental Status: He is alert and oriented to person, place, and time. Mental status is at baseline.   Psychiatric:         Mood and Affect: Mood normal.        Labs:  Lab Results   Component Value Date    WBC 9.95 07/27/2023    RBC 4.70 07/27/2023    HGB 14.1 07/27/2023    HCT 41.1 07/27/2023    MCV 87 07/27/2023    MCH 30.0 07/27/2023    MCHC 34.3 07/27/2023    RDW 13.7 07/27/2023     07/27/2023    MPV 9.6 07/27/2023    GRAN 4.3 07/27/2023    GRAN 43.6 07/27/2023    LYMPH 4.4 07/27/2023    LYMPH 44.5 07/27/2023    MONO 1.0 07/27/2023    MONO 9.7 07/27/2023    EOS 0.1 07/27/2023    BASO 0.08 07/27/2023    EOSINOPHIL 1.1 07/27/2023    BASOPHIL 0.8 07/27/2023     Sodium   Date Value Ref Range Status   07/27/2023 140 136 - 145 mmol/L Final     Potassium   Date Value Ref Range Status   07/27/2023 4.7 3.5 - 5.1 mmol/L Final     Chloride   Date Value Ref Range Status   07/27/2023 104 95 - 110 mmol/L Final     CO2   Date Value Ref Range Status   07/27/2023 27 23 - 29 mmol/L Final     Glucose   Date Value Ref  Range Status   07/27/2023 118 (H) 70 - 110 mg/dL Final     BUN   Date Value Ref Range Status   07/27/2023 12 6 - 20 mg/dL Final     Creatinine   Date Value Ref Range Status   07/27/2023 0.9 0.5 - 1.4 mg/dL Final     Calcium   Date Value Ref Range Status   07/27/2023 10.1 8.7 - 10.5 mg/dL Final     Total Protein   Date Value Ref Range Status   07/27/2023 7.4 6.0 - 8.4 g/dL Final     Albumin   Date Value Ref Range Status   07/27/2023 4.1 3.5 - 5.2 g/dL Final     Total Bilirubin   Date Value Ref Range Status   07/27/2023 0.6 0.1 - 1.0 mg/dL Final     Comment:     For infants and newborns, interpretation of results should be based  on gestational age, weight and in agreement with clinical  observations.    Premature Infant recommended reference ranges:  Up to 24 hours.............<8.0 mg/dL  Up to 48 hours............<12.0 mg/dL  3-5 days..................<15.0 mg/dL  6-29 days.................<15.0 mg/dL       Alkaline Phosphatase   Date Value Ref Range Status   07/27/2023 106 55 - 135 U/L Final     AST   Date Value Ref Range Status   07/27/2023 21 10 - 40 U/L Final     ALT   Date Value Ref Range Status   07/27/2023 20 10 - 44 U/L Final     Anion Gap   Date Value Ref Range Status   07/27/2023 9 8 - 16 mmol/L Final     eGFR if    Date Value Ref Range Status   07/25/2022 >60 >60 mL/min/1.73 m^2 Final     eGFR if non    Date Value Ref Range Status   07/25/2022 >60 >60 mL/min/1.73 m^2 Final     Comment:     Calculation used to obtain the estimated glomerular filtration  rate (eGFR) is the CKD-EPI equation.          A/P:    Malignant neoplasm of the transverse colon  Recurrence in the pancreas  -poorly differentiated adenocarcinoma  -recurrence right after completing 12 cycles of FOLFOX  -patient on clinical trial to measure ctDNA, showed high tumor mutation burden  -given that the patient has a high tumor mutation burden, despite having MARY, now on Keytruda as it is indicated in his next  generation sequencing.    -proceed with Keytruda C9 today  -CT chest abdomen pelvis continues to show a response in the pancreas, but patient does have a retroperitoneal lymph node that did show up which was not previously there.  With the rising CEA, I am concerned that this may be metastatic disease given that the pancreas is responding.  I would like to get a PET scan to further characterize this.  -return to clinic in 3 weeks for next treatment    Pancreatic mass   -confirmed recurrence of colon adenocarcinoma    Back pain  - NM bone scan negative for mets    HTN  - on Enalapril  - follow up with PCP    HLP  - follow up with PCP    DM2  - on Metformin  - follow up with PCP    Tobacco use  - counseled on cessation      Aurash Khoobehi, MD  Hematology and Oncology

## 2023-07-29 DIAGNOSIS — E78.2 MIXED HYPERLIPIDEMIA: ICD-10-CM

## 2023-07-29 DIAGNOSIS — E11.69 DM TYPE 2 WITH DIABETIC DYSLIPIDEMIA: ICD-10-CM

## 2023-07-29 DIAGNOSIS — I10 ESSENTIAL HYPERTENSION: ICD-10-CM

## 2023-07-29 DIAGNOSIS — E78.5 DM TYPE 2 WITH DIABETIC DYSLIPIDEMIA: ICD-10-CM

## 2023-07-29 RX ORDER — ENALAPRIL MALEATE AND HYDROCHLOROTHIAZIDE 10; 25 MG/1; MG/1
1 TABLET ORAL DAILY
Qty: 90 TABLET | Refills: 3 | Status: CANCELLED | OUTPATIENT
Start: 2023-07-29

## 2023-07-29 RX ORDER — ATORVASTATIN CALCIUM 20 MG/1
20 TABLET, FILM COATED ORAL DAILY
Qty: 90 TABLET | Refills: 3 | Status: CANCELLED | OUTPATIENT
Start: 2023-07-29

## 2023-07-29 RX ORDER — METFORMIN HYDROCHLORIDE 1000 MG/1
1000 TABLET ORAL 2 TIMES DAILY WITH MEALS
Qty: 180 TABLET | Refills: 3 | Status: CANCELLED | OUTPATIENT
Start: 2023-07-29

## 2023-07-31 ENCOUNTER — INFUSION (OUTPATIENT)
Dept: INFUSION THERAPY | Facility: HOSPITAL | Age: 58
End: 2023-07-31
Attending: INTERNAL MEDICINE
Payer: COMMERCIAL

## 2023-07-31 VITALS
SYSTOLIC BLOOD PRESSURE: 128 MMHG | DIASTOLIC BLOOD PRESSURE: 73 MMHG | TEMPERATURE: 98 F | WEIGHT: 209.88 LBS | HEART RATE: 74 BPM | BODY MASS INDEX: 26.94 KG/M2 | HEIGHT: 74 IN | RESPIRATION RATE: 18 BRPM | OXYGEN SATURATION: 98 %

## 2023-07-31 DIAGNOSIS — C18.5 MALIGNANT NEOPLASM OF SPLENIC FLEXURE: ICD-10-CM

## 2023-07-31 DIAGNOSIS — C78.89 METASTATIC ADENOCARCINOMA TO PANCREAS: Primary | ICD-10-CM

## 2023-07-31 PROCEDURE — 25000003 PHARM REV CODE 250: Performed by: INTERNAL MEDICINE

## 2023-07-31 PROCEDURE — A4216 STERILE WATER/SALINE, 10 ML: HCPCS | Performed by: INTERNAL MEDICINE

## 2023-07-31 PROCEDURE — 96413 CHEMO IV INFUSION 1 HR: CPT

## 2023-07-31 PROCEDURE — 63600175 PHARM REV CODE 636 W HCPCS: Mod: JZ,JG | Performed by: INTERNAL MEDICINE

## 2023-07-31 RX ORDER — HEPARIN 100 UNIT/ML
500 SYRINGE INTRAVENOUS
Status: DISCONTINUED | OUTPATIENT
Start: 2023-07-31 | End: 2023-07-31 | Stop reason: HOSPADM

## 2023-07-31 RX ORDER — EPINEPHRINE 0.3 MG/.3ML
0.3 INJECTION SUBCUTANEOUS ONCE AS NEEDED
Status: DISCONTINUED | OUTPATIENT
Start: 2023-07-31 | End: 2023-07-31 | Stop reason: HOSPADM

## 2023-07-31 RX ORDER — DIPHENHYDRAMINE HYDROCHLORIDE 50 MG/ML
50 INJECTION INTRAMUSCULAR; INTRAVENOUS ONCE AS NEEDED
Status: DISCONTINUED | OUTPATIENT
Start: 2023-07-31 | End: 2023-07-31 | Stop reason: HOSPADM

## 2023-07-31 RX ORDER — SODIUM CHLORIDE 0.9 % (FLUSH) 0.9 %
10 SYRINGE (ML) INJECTION
Status: DISCONTINUED | OUTPATIENT
Start: 2023-07-31 | End: 2023-07-31 | Stop reason: HOSPADM

## 2023-07-31 RX ADMIN — SODIUM CHLORIDE: 0.9 INJECTION, SOLUTION INTRAVENOUS at 09:07

## 2023-07-31 RX ADMIN — SODIUM CHLORIDE 200 MG: 9 INJECTION, SOLUTION INTRAVENOUS at 09:07

## 2023-07-31 RX ADMIN — SODIUM CHLORIDE, PRESERVATIVE FREE 10 ML: 5 INJECTION INTRAVENOUS at 09:07

## 2023-07-31 RX ADMIN — HEPARIN 500 UNITS: 100 SYRINGE at 09:07

## 2023-07-31 NOTE — TELEPHONE ENCOUNTER
Pt is needing a refill on his Vaseretic, metformin and atorvastatin. Last office visit 06/21/2023. Next office visit 10/24/2023.    All 3 medication were sent to the pt's pharmacy on 01/25/2023 for a 90 day supply with 3 refill. This should last the pt a whole year.

## 2023-07-31 NOTE — PLAN OF CARE
Problem: Activity Intolerance  Goal: Enhanced Capacity and Energy  Outcome: Ongoing, Progressing  Intervention: Optimize Activity Tolerance  Flowsheets (Taken 7/31/2023 0901)  Self-Care Promotion: independence encouraged  Activity Management:   Ambulated -L4   Up in chair - L3  Environmental Support:   calm environment promoted   rest periods encouraged

## 2023-08-01 ENCOUNTER — TELEPHONE (OUTPATIENT)
Dept: HEMATOLOGY/ONCOLOGY | Facility: CLINIC | Age: 58
End: 2023-08-01
Payer: COMMERCIAL

## 2023-08-01 ENCOUNTER — TELEPHONE (OUTPATIENT)
Dept: FAMILY MEDICINE | Facility: CLINIC | Age: 58
End: 2023-08-01

## 2023-08-01 NOTE — TELEPHONE ENCOUNTER
Called pt to notify that PET scan is currently pending auth with his insurance. Will check with dept tomorrow for an update. No answer. Left voicemail.

## 2023-08-01 NOTE — TELEPHONE ENCOUNTER
----- Message from Adela Calhoun MA sent at 6/21/2023 11:01 AM CDT -----  Pt is scheduled for an Eye exam with Dr. Cheema in 07/2023, please request office note

## 2023-08-01 NOTE — LETTER
[address]  [place] [address]  Phone: [phone]   [phone]                        MD Rachel Arriola MD Chequita Williams, MD Matthew Bassett, PA-C Linda Melerine, NP Jodi Powell, NP    Date: 08/01/2023        Patient: Osman Li Jr.  YOB: 1965      Please fax pt's most recent eye exam.         Sincerely,     Adela Calhoun MA    Electronically Signed By: Natalee Wagner NP

## 2023-08-03 ENCOUNTER — HOSPITAL ENCOUNTER (OUTPATIENT)
Dept: RADIOLOGY | Facility: HOSPITAL | Age: 58
Discharge: HOME | End: 2023-08-03
Attending: INTERNAL MEDICINE
Payer: COMMERCIAL

## 2023-08-03 DIAGNOSIS — C78.89 METASTATIC ADENOCARCINOMA TO PANCREAS: ICD-10-CM

## 2023-08-03 DIAGNOSIS — C18.4 MALIGNANT NEOPLASM OF TRANSVERSE COLON: ICD-10-CM

## 2023-08-03 LAB — GLUCOSE SERPL-MCNC: 89 MG/DL (ref 70–110)

## 2023-08-03 PROCEDURE — 78815 NM PET CT ROUTINE: ICD-10-PCS | Mod: 26,PI,, | Performed by: STUDENT IN AN ORGANIZED HEALTH CARE EDUCATION/TRAINING PROGRAM

## 2023-08-03 PROCEDURE — 78815 PET IMAGE W/CT SKULL-THIGH: CPT | Mod: 26,PI,, | Performed by: STUDENT IN AN ORGANIZED HEALTH CARE EDUCATION/TRAINING PROGRAM

## 2023-08-03 PROCEDURE — A9552 F18 FDG: HCPCS | Mod: PN

## 2023-08-03 NOTE — PROGRESS NOTES
PET Imaging Questionnaire    Are you a Diabetic? Recent Blood Sugar level? Yes    Are you anemic? Bone Marrow Stimulation Meds? No    Have you had a CT Scan, if so when & where was your last one? Yes -     Have you had a PET Scan, if so when & where was your last one? No    Chemotherapy or currently on Chemotherapy? Yes    Radiation therapy? No    Surgical History:   Past Surgical History:   Procedure Laterality Date    CHOLECYSTECTOMY  2011    COLON SURGERY      COLONOSCOPY      2018    COLOSTOMY N/A 04/25/2022    Procedure: CREATION, COLOSTOMY;  Surgeon: Carlton Waddell MD;  Location: [facility] OR;  Service: General;  Laterality: N/A;    ENDOSCOPIC ULTRASOUND OF UPPER GASTROINTESTINAL TRACT N/A 1/6/2023    Procedure: ULTRASOUND, UPPER GI TRACT, ENDOSCOPIC;  Surgeon: Rinku Orozco III, MD;  Location: [facility];  Service: Endoscopy;  Laterality: N/A;    INSERTION OF TUNNELED CENTRAL VENOUS CATHETER (CVC) WITH SUBCUTANEOUS PORT Right 05/18/2022    Procedure: [id number];  Surgeon: Carlton Waddell MD;  Location: [facility] OR;  Service: General;  Laterality: Right;    MOBILIZATION OF SPLENIC FLEXURE N/A 04/25/2022    Procedure: MOBILIZATION, SPLENIC FLEXURE;  Surgeon: Carlton Waddell MD;  Location: [facility] OR;  Service: General;  Laterality: N/A;    SPLENECTOMY N/A 04/25/2022    Procedure: SPLENECTOMY;  Surgeon: Carlton Waddell MD;  Location: [facility] OR;  Service: General;  Laterality: N/A;    SUBTOTAL COLECTOMY N/A 04/25/2022    Procedure: COLECTOMY, PARTIAL;  Surgeon: Carlton Waddell MD;  Location: [facility] OR;  Service: General;  Laterality: N/A;    TONSILLECTOMY          Have you been fasting for at least 6 hours? Yes    Is there any chance you may be pregnant or breastfeeding? No    Assay: 12.07 MCi@:7:30   Injection Site:RT AC    Residual: .735 mCi@: 7:32   Technologist: Byron Hernandez Injected:11.33 mCi

## 2023-08-04 ENCOUNTER — PATIENT MESSAGE (OUTPATIENT)
Dept: HEMATOLOGY/ONCOLOGY | Facility: CLINIC | Age: 58
End: 2023-08-04
Payer: COMMERCIAL

## 2023-08-04 ENCOUNTER — PATIENT MESSAGE (OUTPATIENT)
Dept: DERMATOLOGY | Facility: CLINIC | Age: 58
End: 2023-08-04
Payer: COMMERCIAL

## 2023-08-04 DIAGNOSIS — C77.2 METASTASIS TO RETROPERITONEAL LYMPH NODE: Primary | ICD-10-CM

## 2023-08-04 DIAGNOSIS — C77.2 METASTASIS TO RETROPERITONEAL LYMPH NODE: ICD-10-CM

## 2023-08-04 DIAGNOSIS — C18.4 MALIGNANT NEOPLASM OF TRANSVERSE COLON: Primary | ICD-10-CM

## 2023-08-04 RX ORDER — PROMETHAZINE HYDROCHLORIDE 12.5 MG/1
12.5 TABLET ORAL EVERY 4 HOURS PRN
Qty: 25 TABLET | Refills: 1 | Status: SHIPPED | OUTPATIENT
Start: 2023-08-04

## 2023-08-11 ENCOUNTER — DOCUMENTATION ONLY (OUTPATIENT)
Dept: HEMATOLOGY/ONCOLOGY | Facility: CLINIC | Age: 58
End: 2023-08-11

## 2023-08-11 ENCOUNTER — OFFICE VISIT (OUTPATIENT)
Dept: RADIATION ONCOLOGY | Facility: CLINIC | Age: 58
End: 2023-08-11
Payer: COMMERCIAL

## 2023-08-11 VITALS
OXYGEN SATURATION: 98 % | TEMPERATURE: 98 F | SYSTOLIC BLOOD PRESSURE: 163 MMHG | DIASTOLIC BLOOD PRESSURE: 85 MMHG | BODY MASS INDEX: 26.83 KG/M2 | HEART RATE: 75 BPM | WEIGHT: 209 LBS

## 2023-08-11 DIAGNOSIS — C77.2 METASTASIS TO RETROPERITONEAL LYMPH NODE: ICD-10-CM

## 2023-08-11 PROCEDURE — 3044F HG A1C LEVEL LT 7.0%: CPT | Mod: CPTII,S$GLB,, | Performed by: RADIOLOGY

## 2023-08-11 PROCEDURE — 3008F BODY MASS INDEX DOCD: CPT | Mod: CPTII,S$GLB,, | Performed by: RADIOLOGY

## 2023-08-11 PROCEDURE — 99205 OFFICE O/P NEW HI 60 MIN: CPT | Mod: S$GLB,,, | Performed by: RADIOLOGY

## 2023-08-11 PROCEDURE — 99205 PR OFFICE/OUTPT VISIT, NEW, LEVL V, 60-74 MIN: ICD-10-PCS | Mod: S$GLB,,, | Performed by: RADIOLOGY

## 2023-08-11 PROCEDURE — 4010F PR ACE/ARB THEARPY RXD/TAKEN: ICD-10-PCS | Mod: CPTII,S$GLB,, | Performed by: RADIOLOGY

## 2023-08-11 PROCEDURE — 3077F PR MOST RECENT SYSTOLIC BLOOD PRESSURE >= 140 MM HG: ICD-10-PCS | Mod: CPTII,S$GLB,, | Performed by: RADIOLOGY

## 2023-08-11 PROCEDURE — 3077F SYST BP >= 140 MM HG: CPT | Mod: CPTII,S$GLB,, | Performed by: RADIOLOGY

## 2023-08-11 PROCEDURE — 3044F PR MOST RECENT HEMOGLOBIN A1C LEVEL <7.0%: ICD-10-PCS | Mod: CPTII,S$GLB,, | Performed by: RADIOLOGY

## 2023-08-11 PROCEDURE — 1159F PR MEDICATION LIST DOCUMENTED IN MEDICAL RECORD: ICD-10-PCS | Mod: CPTII,S$GLB,, | Performed by: RADIOLOGY

## 2023-08-11 PROCEDURE — 4010F ACE/ARB THERAPY RXD/TAKEN: CPT | Mod: CPTII,S$GLB,, | Performed by: RADIOLOGY

## 2023-08-11 PROCEDURE — 1159F MED LIST DOCD IN RCRD: CPT | Mod: CPTII,S$GLB,, | Performed by: RADIOLOGY

## 2023-08-11 PROCEDURE — 3008F PR BODY MASS INDEX (BMI) DOCUMENTED: ICD-10-PCS | Mod: CPTII,S$GLB,, | Performed by: RADIOLOGY

## 2023-08-11 PROCEDURE — 3079F DIAST BP 80-89 MM HG: CPT | Mod: CPTII,S$GLB,, | Performed by: RADIOLOGY

## 2023-08-11 PROCEDURE — 3079F PR MOST RECENT DIASTOLIC BLOOD PRESSURE 80-89 MM HG: ICD-10-PCS | Mod: CPTII,S$GLB,, | Performed by: RADIOLOGY

## 2023-08-11 NOTE — PROGRESS NOTES
Osman Li Jr.  [id number]  1965 8/11/2023  Khoobehi, Aurash, Md  [address] [place] [address]    Dx: Oligometastatic colon adenoCA    HISTORY OF PRESENT ILLNESS:   Mr. Li is a 58M who was dx'd w/ stage III colon CA in April 2022 for whic he underwent hemicolectomy followed by adj FOLFOX.  Unfortunately, on initial post-tx imaging he was noted to have a mass in his pancreas that was bx-proven to be metastatic colon adenoCA.  Thus he began keytruda which has led to a favorable response in the pancreas thus far, but a solitary lower RP LN has cropped up as enlarged and avid on PET.  Thus the patient has been referred to [facility] for eval/discussion re: targeted RT options for his solitary avid site of disease.    REVIEW OF SYSTEMS:  A complete ROS was performed and the patient denies any acute changes/concerns other than per HPI.    Past Medical History:   Diagnosis Date    Digestive disorder     DM (diabetes mellitus)     Hyperlipidemia     Hypertension     Prediabetes      Past Surgical History:   Procedure Laterality Date    CHOLECYSTECTOMY  2011    COLON SURGERY      COLONOSCOPY      2018    COLOSTOMY N/A 04/25/2022    Procedure: CREATION, COLOSTOMY;  Surgeon: Carlton Waddell MD;  Location: [facility] OR;  Service: General;  Laterality: N/A;    ENDOSCOPIC ULTRASOUND OF UPPER GASTROINTESTINAL TRACT N/A 1/6/2023    Procedure: ULTRASOUND, UPPER GI TRACT, ENDOSCOPIC;  Surgeon: Rinku Orozco III, MD;  Location: [facility];  Service: Endoscopy;  Laterality: N/A;    INSERTION OF TUNNELED CENTRAL VENOUS CATHETER (CVC) WITH SUBCUTANEOUS PORT Right 05/18/2022    Procedure: [id number];  Surgeon: Carlton Waddell MD;  Location: [facility] OR;  Service: General;  Laterality: Right;    MOBILIZATION OF SPLENIC FLEXURE N/A 04/25/2022    Procedure: MOBILIZATION, SPLENIC FLEXURE;  Surgeon: Carlton Waddell MD;  Location: [facility] OR;  Service: General;  Laterality: N/A;    SPLENECTOMY N/A 04/25/2022     "Procedure: SPLENECTOMY;  Surgeon: Carlton Waddell MD;  Location: [facility] OR;  Service: General;  Laterality: N/A;    SUBTOTAL COLECTOMY N/A 04/25/2022    Procedure: COLECTOMY, PARTIAL;  Surgeon: Carlton Waddell MD;  Location: [facility] OR;  Service: General;  Laterality: N/A;    TONSILLECTOMY       Social History     Socioeconomic History    Marital status:    Tobacco Use    Smoking status: Every Day     Current packs/day: 1.00     Average packs/day: 1 pack/day for 40.0 years (40.0 ttl pk-yrs)     Types: Cigarettes     Passive exposure: Current    Smokeless tobacco: Never   Substance and Sexual Activity    Alcohol use: Not Currently    Drug use: Never    Sexual activity: Yes     Partners: Female     Family History   Problem Relation Age of Onset    Heart disease Father        PRIOR HISTORY OF CHEMOTHERAPY OR RADIOTHERAPY: Please see HPI for patients prior oncologic history.    Medication List with Changes/Refills   Current Medications    ATORVASTATIN (LIPITOR) 20 MG TABLET    Take 1 tablet (20 mg total) by mouth once daily.    ENALAPRIL-HYDROCHLOROTHIAZIDE (VASERETIC) 10-25 MG PER TABLET    Take 1 tablet by mouth once daily.    HAEMOPHILUS B POLYSAC-TETANUS TOXOID (ACTHIB, PF,) 10 MCG/0.5 ML INJECTION    TO BE ADMINISTERED.    INSULIN GLARGINE U-300 CONC (TOUJEO MAX U-300 SOLOSTAR) 300 UNIT/ML (3 ML) INSULIN PEN    Inject 26 Units into the skin once daily.    METFORMIN (GLUCOPHAGE) 1000 MG TABLET    Take 1 tablet (1,000 mg total) by mouth 2 (two) times daily with meals.    MORPHINE (MS CONTIN) 30 MG 12 HR TABLET    Take 1 tablet (30 mg total) by mouth 2 (two) times daily.    PEN NEEDLE, DIABETIC 31 GAUGE X 3/16" NDLE    1 each by Misc.(Non-Drug; Combo Route) route once daily.    PROMETHAZINE (PHENERGAN) 12.5 MG TAB    Take 1 tablet (12.5 mg total) by mouth every 4 (four) hours as needed.    SILDENAFIL (VIAGRA) 100 MG TABLET    Take 1 tablet (100 mg total) by mouth daily as needed for Erectile Dysfunction.    " TRAZODONE (DESYREL) 50 MG TABLET    Take 1 tablet (50 mg total) by mouth every evening.     Review of patient's allergies indicates:   Allergen Reactions    Penicillins Rash       QUALITY OF LIFE: 90%- Able to Carry on Normal Activity: Minor Symptoms of Disease    Vitals:    08/11/23 0916   BP: (!) 163/85   Pulse: 75   Temp: 98.3 °F (36.8 °C)   SpO2: 98%   Weight: 94.8 kg (209 lb)   PainSc:   3   PainLoc: Abdomen     Body mass index is 26.83 kg/m².    PHYSICAL EXAM:  GENERAL: alert; in no apparent distress.   HEAD: normocephalic, atraumatic.  EYES: pupils are equal, round, reactive to light and accommodation. Sclera anicteric. Conjunctiva not injected.   NOSE/THROAT: no nasal erythema or rhinorrhea. Oropharynx pink, without erythema, ulcerations or thrush.   NECK: no cervical motion rigidity; supple with no masses.  CHEST: clear to auscultation bilaterally; no wheezes, crackles or rubs. Patient is speaking comfortably on room air with normal work of breathing without using accessory muscles of respiration.  CARDIOVASCULAR: regular rate and rhythm; no murmurs, rubs or gallops.  ABDOMEN: soft, nontender, nondistended. Bowel sounds present.   MUSCULOSKELETAL: no tenderness to palpation along the spine or scapulae. Normal range of motion.  NEUROLOGIC: cranial nerves II-XII intact bilaterally. Strength 5/5 in bilateral upper and lower extremities. No sensory deficits appreciated. Reflexes globally intact. No cerebellar signs. Normal gait.  LYMPHATIC: no cervical, supraclavicular or axillary adenopathy appreciated bilaterally.   EXTREMITIES: no clubbing, cyanosis, edema.  SKIN: no erythema, rashes, ulcerations noted.     REVIEW OF IMAGING/PATHOLOGY/LABS: Please see HPI. All images reviewed personally by dictating physician.       ASSESSMENT: Osamn Li Jr. is a 58 y.o. male with stage IV (oligometastatic) colon adenoCA    PLAN:  After complete review of the patient's chart/hx and eval/discussion w/ the patient  today, I explained that due to recent completion of standard therapy and initial favorable response to keytruda in pancreas disease, it would be reasonable and relatively low risk to provide focused SBRT to the solitary site of RP LAD via a 5 fxn course tailored to limit dose to OARs and minimize potential toxicities while continuing keytruda.    I spent over one hour in consultation with the patient discussing the rationales, risks, benefits, and alternatives to this plan, as well as the potential toxicities of abdominal RT, including but not limited to fatigue, skin irritation/erythema/desquamation, abd pain/cramping, acute/chronic hepatitis and/or pancreatitis, n/v/d, acute and/or chronic kidney damage possibly contributing to a need for dialysis, proctitis, gastritis, bowel or stomach perforation/fistulization requiring surgical repair, spinal cord damage/myelitis, and secondary malignancy.    The patient verbalized understanding of the above plan, risks, benefits, and details, and informed consent was obtained.  We will proceed with RTP-CT imaging with plans to initiate RT w/in the next 1-2 weeks.  Contact info was exchanged, and the patient was encouraged to contact our clinic with any questions, needs, or concerns.    DISPOSITION: RTC FOR CT SIM    I have personally seen and evaluated this patient. Greater than 50% of this time was spent discussing coordination of care and/or counseling.    PHYSICIAN: Chino Corado III, MD    Thank you for the opportunity to meet and consult with Osman iL Jr..   Please feel free to contact me to discuss the above recommendation further.

## 2023-08-17 ENCOUNTER — LAB VISIT (OUTPATIENT)
Dept: LAB | Facility: HOSPITAL | Age: 58
End: 2023-08-17
Attending: INTERNAL MEDICINE
Payer: COMMERCIAL

## 2023-08-17 DIAGNOSIS — C78.89 METASTATIC ADENOCARCINOMA TO PANCREAS: ICD-10-CM

## 2023-08-17 DIAGNOSIS — C18.4 MALIGNANT NEOPLASM OF TRANSVERSE COLON: ICD-10-CM

## 2023-08-17 LAB
ALBUMIN SERPL BCP-MCNC: 4.1 G/DL (ref 3.5–5.2)
ALP SERPL-CCNC: 92 U/L (ref 55–135)
ALT SERPL W/O P-5'-P-CCNC: 17 U/L (ref 10–44)
ANION GAP SERPL CALC-SCNC: 10 MMOL/L (ref 8–16)
AST SERPL-CCNC: 22 U/L (ref 10–40)
BASOPHILS # BLD AUTO: 0.09 K/UL (ref 0–0.2)
BASOPHILS NFR BLD: 0.8 % (ref 0–1.9)
BILIRUB SERPL-MCNC: 0.7 MG/DL (ref 0.1–1)
BUN SERPL-MCNC: 17 MG/DL (ref 6–20)
CALCIUM SERPL-MCNC: 9.9 MG/DL (ref 8.7–10.5)
CHLORIDE SERPL-SCNC: 101 MMOL/L (ref 95–110)
CO2 SERPL-SCNC: 26 MMOL/L (ref 23–29)
CREAT SERPL-MCNC: 1 MG/DL (ref 0.5–1.4)
DIFFERENTIAL METHOD: ABNORMAL
EOSINOPHIL # BLD AUTO: 0.2 K/UL (ref 0–0.5)
EOSINOPHIL NFR BLD: 1.4 % (ref 0–8)
ERYTHROCYTE [DISTWIDTH] IN BLOOD BY AUTOMATED COUNT: 14.1 % (ref 11.5–14.5)
EST. GFR  (NO RACE VARIABLE): >60 ML/MIN/1.73 M^2
GLUCOSE SERPL-MCNC: 122 MG/DL (ref 70–110)
HCT VFR BLD AUTO: 40.3 % (ref 40–54)
HGB BLD-MCNC: 13.5 G/DL (ref 14–18)
IMM GRANULOCYTES # BLD AUTO: 0.02 K/UL (ref 0–0.04)
IMM GRANULOCYTES NFR BLD AUTO: 0.2 % (ref 0–0.5)
LYMPHOCYTES # BLD AUTO: 4.9 K/UL (ref 1–4.8)
LYMPHOCYTES NFR BLD: 43.5 % (ref 18–48)
MCH RBC QN AUTO: 29.3 PG (ref 27–31)
MCHC RBC AUTO-ENTMCNC: 33.5 G/DL (ref 32–36)
MCV RBC AUTO: 88 FL (ref 82–98)
MONOCYTES # BLD AUTO: 1 K/UL (ref 0.3–1)
MONOCYTES NFR BLD: 8.9 % (ref 4–15)
NEUTROPHILS # BLD AUTO: 5.1 K/UL (ref 1.8–7.7)
NEUTROPHILS NFR BLD: 45.2 % (ref 38–73)
NRBC BLD-RTO: 0 /100 WBC
PLATELET # BLD AUTO: 320 K/UL (ref 150–450)
PMV BLD AUTO: 8.6 FL (ref 9.2–12.9)
POTASSIUM SERPL-SCNC: 5 MMOL/L (ref 3.5–5.1)
PROT SERPL-MCNC: 7.4 G/DL (ref 6–8.4)
RBC # BLD AUTO: 4.6 M/UL (ref 4.6–6.2)
SODIUM SERPL-SCNC: 137 MMOL/L (ref 136–145)
WBC # BLD AUTO: 11.22 K/UL (ref 3.9–12.7)

## 2023-08-17 PROCEDURE — 85025 COMPLETE CBC W/AUTO DIFF WBC: CPT | Performed by: INTERNAL MEDICINE

## 2023-08-17 PROCEDURE — 36415 COLL VENOUS BLD VENIPUNCTURE: CPT | Performed by: INTERNAL MEDICINE

## 2023-08-17 PROCEDURE — 80053 COMPREHEN METABOLIC PANEL: CPT | Performed by: INTERNAL MEDICINE

## 2023-08-18 ENCOUNTER — OFFICE VISIT (OUTPATIENT)
Dept: HEMATOLOGY/ONCOLOGY | Facility: CLINIC | Age: 58
End: 2023-08-18
Payer: COMMERCIAL

## 2023-08-18 VITALS
OXYGEN SATURATION: 98 % | SYSTOLIC BLOOD PRESSURE: 127 MMHG | RESPIRATION RATE: 18 BRPM | HEART RATE: 70 BPM | TEMPERATURE: 98 F | HEIGHT: 74 IN | WEIGHT: 208.31 LBS | BODY MASS INDEX: 26.73 KG/M2 | DIASTOLIC BLOOD PRESSURE: 70 MMHG

## 2023-08-18 DIAGNOSIS — G62.0 CHEMOTHERAPY-INDUCED NEUROPATHY: ICD-10-CM

## 2023-08-18 DIAGNOSIS — T45.1X5A CHEMOTHERAPY-INDUCED NEUROPATHY: ICD-10-CM

## 2023-08-18 DIAGNOSIS — E11.00 TYPE 2 DIABETES MELLITUS WITH HYPEROSMOLARITY WITHOUT COMA, WITHOUT LONG-TERM CURRENT USE OF INSULIN: ICD-10-CM

## 2023-08-18 DIAGNOSIS — C18.4 MALIGNANT NEOPLASM OF TRANSVERSE COLON: ICD-10-CM

## 2023-08-18 DIAGNOSIS — C77.2 METASTASIS TO RETROPERITONEAL LYMPH NODE: Primary | ICD-10-CM

## 2023-08-18 DIAGNOSIS — C78.89 METASTATIC ADENOCARCINOMA TO PANCREAS: ICD-10-CM

## 2023-08-18 DIAGNOSIS — I10 PRIMARY HYPERTENSION: ICD-10-CM

## 2023-08-18 DIAGNOSIS — E78.49 OTHER HYPERLIPIDEMIA: ICD-10-CM

## 2023-08-18 PROCEDURE — 1160F RVW MEDS BY RX/DR IN RCRD: CPT | Mod: CPTII,S$GLB,, | Performed by: INTERNAL MEDICINE

## 2023-08-18 PROCEDURE — 3074F PR MOST RECENT SYSTOLIC BLOOD PRESSURE < 130 MM HG: ICD-10-PCS | Mod: CPTII,S$GLB,, | Performed by: INTERNAL MEDICINE

## 2023-08-18 PROCEDURE — 3008F BODY MASS INDEX DOCD: CPT | Mod: CPTII,S$GLB,, | Performed by: INTERNAL MEDICINE

## 2023-08-18 PROCEDURE — 1159F MED LIST DOCD IN RCRD: CPT | Mod: CPTII,S$GLB,, | Performed by: INTERNAL MEDICINE

## 2023-08-18 PROCEDURE — 4010F PR ACE/ARB THEARPY RXD/TAKEN: ICD-10-PCS | Mod: CPTII,S$GLB,, | Performed by: INTERNAL MEDICINE

## 2023-08-18 PROCEDURE — 3044F HG A1C LEVEL LT 7.0%: CPT | Mod: CPTII,S$GLB,, | Performed by: INTERNAL MEDICINE

## 2023-08-18 PROCEDURE — 99999 PR PBB SHADOW E&M-EST. PATIENT-LVL V: ICD-10-PCS | Mod: PBBFAC,,, | Performed by: INTERNAL MEDICINE

## 2023-08-18 PROCEDURE — 99999 PR PBB SHADOW E&M-EST. PATIENT-LVL V: CPT | Mod: PBBFAC,,, | Performed by: INTERNAL MEDICINE

## 2023-08-18 PROCEDURE — 99215 OFFICE O/P EST HI 40 MIN: CPT | Mod: S$GLB,,, | Performed by: INTERNAL MEDICINE

## 2023-08-18 PROCEDURE — 1159F PR MEDICATION LIST DOCUMENTED IN MEDICAL RECORD: ICD-10-PCS | Mod: CPTII,S$GLB,, | Performed by: INTERNAL MEDICINE

## 2023-08-18 PROCEDURE — 3044F PR MOST RECENT HEMOGLOBIN A1C LEVEL <7.0%: ICD-10-PCS | Mod: CPTII,S$GLB,, | Performed by: INTERNAL MEDICINE

## 2023-08-18 PROCEDURE — 3078F DIAST BP <80 MM HG: CPT | Mod: CPTII,S$GLB,, | Performed by: INTERNAL MEDICINE

## 2023-08-18 PROCEDURE — 99215 PR OFFICE/OUTPT VISIT, EST, LEVL V, 40-54 MIN: ICD-10-PCS | Mod: S$GLB,,, | Performed by: INTERNAL MEDICINE

## 2023-08-18 PROCEDURE — 4010F ACE/ARB THERAPY RXD/TAKEN: CPT | Mod: CPTII,S$GLB,, | Performed by: INTERNAL MEDICINE

## 2023-08-18 PROCEDURE — 3008F PR BODY MASS INDEX (BMI) DOCUMENTED: ICD-10-PCS | Mod: CPTII,S$GLB,, | Performed by: INTERNAL MEDICINE

## 2023-08-18 PROCEDURE — 3078F PR MOST RECENT DIASTOLIC BLOOD PRESSURE < 80 MM HG: ICD-10-PCS | Mod: CPTII,S$GLB,, | Performed by: INTERNAL MEDICINE

## 2023-08-18 PROCEDURE — 1160F PR REVIEW ALL MEDS BY PRESCRIBER/CLIN PHARMACIST DOCUMENTED: ICD-10-PCS | Mod: CPTII,S$GLB,, | Performed by: INTERNAL MEDICINE

## 2023-08-18 PROCEDURE — 3074F SYST BP LT 130 MM HG: CPT | Mod: CPTII,S$GLB,, | Performed by: INTERNAL MEDICINE

## 2023-08-18 RX ORDER — EPINEPHRINE 0.3 MG/.3ML
0.3 INJECTION SUBCUTANEOUS ONCE AS NEEDED
Status: CANCELLED | OUTPATIENT
Start: 2023-08-21

## 2023-08-18 RX ORDER — MORPHINE SULFATE 15 MG/1
15 TABLET, FILM COATED, EXTENDED RELEASE ORAL 2 TIMES DAILY
Qty: 60 TABLET | Refills: 0 | Status: SHIPPED | OUTPATIENT
Start: 2023-08-18 | End: 2023-09-08 | Stop reason: SDUPTHER

## 2023-08-18 RX ORDER — HEPARIN 100 UNIT/ML
500 SYRINGE INTRAVENOUS
Status: CANCELLED | OUTPATIENT
Start: 2023-08-21

## 2023-08-18 RX ORDER — DIPHENHYDRAMINE HYDROCHLORIDE 50 MG/ML
50 INJECTION INTRAMUSCULAR; INTRAVENOUS ONCE AS NEEDED
Status: CANCELLED | OUTPATIENT
Start: 2023-08-21

## 2023-08-18 RX ORDER — SODIUM CHLORIDE 0.9 % (FLUSH) 0.9 %
10 SYRINGE (ML) INJECTION
Status: CANCELLED | OUTPATIENT
Start: 2023-08-21

## 2023-08-18 NOTE — PROGRESS NOTES
"Service Date:  8/18/23    Chief Complaint:  Colon cancer    Osman Li Jr. is a 58 y.o. 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  This occurred shortly after completing treatment.    Patient was part of a clinical trial to measure CT DNA.  Next generation sequencing also showed a high tumor burden.    He is now on Keytruda as his cancer has a very high tumor mutation burden.  He has MARY however.  So far, Keytruda has been helping    Here for 10th cycle.  Doing well.  Recent PET scan did show migration to a periaortic lymph node.  Patient is having radiation to this 1 spot as the Keytruda as he has been helping everywhere else.    Review of Systems   Constitutional:  Positive for fatigue.   HENT: Negative.     Eyes: Negative.    Respiratory: Negative.     Cardiovascular: Negative.    Gastrointestinal: Negative.    Endocrine: Negative.    Genitourinary: Negative.    Musculoskeletal: Negative.    Integumentary:  Negative.   Neurological:  Positive for numbness.   Hematological: Negative.    Psychiatric/Behavioral: Negative.          Current Outpatient Medications   Medication Instructions    atorvastatin (LIPITOR) 20 mg, Oral, Daily    enalapriL-hydrochlorothiazide (VASERETIC) 10-25 mg per tablet 1 tablet, Oral, Daily    haemophilus B polysac-tetanus toxoid (ACTHIB, PF,) 10 mcg/0.5 mL injection TO BE ADMINISTERED.    metFORMIN (GLUCOPHAGE) 1,000 mg, Oral, 2 times daily with meals    morphine (MS CONTIN) 15 mg, Oral, 2 times daily    pen needle, diabetic 31 gauge x 3/16" Ndle 1 each, Misc.(Non-Drug; Combo Route), Daily    promethazine (PHENERGAN) 12.5 mg, Oral, Every 4 hours PRN    sildenafiL (VIAGRA) 100 mg, Oral, Daily PRN    TOUJEO MAX U-300 SOLOSTAR 26 Units, Subcutaneous, Daily    traZODone (DESYREL) 50 mg, Oral, Nightly        Past Medical History:   Diagnosis Date    Digestive disorder     DM (diabetes " mellitus)     Hyperlipidemia     Hypertension     Prediabetes         Past Surgical History:   Procedure Laterality Date    CHOLECYSTECTOMY  2011    COLON SURGERY      COLONOSCOPY      2018    COLOSTOMY N/A 04/25/2022    Procedure: CREATION, COLOSTOMY;  Surgeon: Carlton Waddell MD;  Location: [facility] OR;  Service: General;  Laterality: N/A;    ENDOSCOPIC ULTRASOUND OF UPPER GASTROINTESTINAL TRACT N/A 1/6/2023    Procedure: ULTRASOUND, UPPER GI TRACT, ENDOSCOPIC;  Surgeon: Rinku Orozco III, MD;  Location: [facility] ENDO;  Service: Endoscopy;  Laterality: N/A;    INSERTION OF TUNNELED CENTRAL VENOUS CATHETER (CVC) WITH SUBCUTANEOUS PORT Right 05/18/2022    Procedure: [id number];  Surgeon: Carlton Waddell MD;  Location: [facility] OR;  Service: General;  Laterality: Right;    MOBILIZATION OF SPLENIC FLEXURE N/A 04/25/2022    Procedure: MOBILIZATION, SPLENIC FLEXURE;  Surgeon: Carlton Waddell MD;  Location: [facility] OR;  Service: General;  Laterality: N/A;    SPLENECTOMY N/A 04/25/2022    Procedure: SPLENECTOMY;  Surgeon: Carlton Waddell MD;  Location: [facility] OR;  Service: General;  Laterality: N/A;    SUBTOTAL COLECTOMY N/A 04/25/2022    Procedure: COLECTOMY, PARTIAL;  Surgeon: Carlton Waddell MD;  Location: [facility] OR;  Service: General;  Laterality: N/A;    TONSILLECTOMY          Family History   Problem Relation Age of Onset    Heart disease Father     Rectal cancer Sister         half sister       Social History     Tobacco Use    Smoking status: Every Day     Current packs/day: 1.00     Average packs/day: 1 pack/day for 40.0 years (40.0 ttl pk-yrs)     Types: Cigarettes     Passive exposure: Current    Smokeless tobacco: Never   Substance Use Topics    Alcohol use: Not Currently    Drug use: Never         Vitals:    08/18/23 1117   BP: 127/70   Pulse: 70   Resp: 18   Temp: 97.9 °F (36.6 °C)          Physical Exam:  /70 (BP Location: Right arm, Patient Position: Sitting, BP Method: Medium (Automatic))   Pulse 70   " Temp 97.9 °F (36.6 °C) (Temporal)   Resp 18   Ht 6' 2" (1.88 m)   Wt 94.5 kg (208 lb 5.4 oz)   SpO2 98%   BMI 26.75 kg/m²     Physical Exam  Vitals and nursing note reviewed.   Constitutional:       Appearance: Normal appearance.   HENT:      Head: Normocephalic and atraumatic.      Nose: Nose normal.      Mouth/Throat:      Mouth: Mucous membranes are moist.      Pharynx: Oropharynx is clear.   Eyes:      Extraocular Movements: Extraocular movements intact.      Conjunctiva/sclera: Conjunctivae normal.   Cardiovascular:      Rate and Rhythm: Normal rate and regular rhythm.      Heart sounds: Normal heart sounds.   Pulmonary:      Effort: Pulmonary effort is normal.      Breath sounds: Normal breath sounds.   Abdominal:      General: Abdomen is flat. Bowel sounds are normal.      Palpations: Abdomen is soft.      Comments: Ostomy bag in place.   Musculoskeletal:         General: Normal range of motion.      Cervical back: Normal range of motion and neck supple.   Skin:     General: Skin is warm and dry.   Neurological:      General: No focal deficit present.      Mental Status: He is alert and oriented to person, place, and time. Mental status is at baseline.   Psychiatric:         Mood and Affect: Mood normal.          Labs:  Lab Results   Component Value Date    WBC 11.22 08/17/2023    RBC 4.60 08/17/2023    HGB 13.5 (L) 08/17/2023    HCT 40.3 08/17/2023    MCV 88 08/17/2023    MCH 29.3 08/17/2023    MCHC 33.5 08/17/2023    RDW 14.1 08/17/2023     08/17/2023    MPV 8.6 (L) 08/17/2023    GRAN 5.1 08/17/2023    GRAN 45.2 08/17/2023    LYMPH 4.9 (H) 08/17/2023    LYMPH 43.5 08/17/2023    MONO 1.0 08/17/2023    MONO 8.9 08/17/2023    EOS 0.2 08/17/2023    BASO 0.09 08/17/2023    EOSINOPHIL 1.4 08/17/2023    BASOPHIL 0.8 08/17/2023     Sodium   Date Value Ref Range Status   08/17/2023 137 136 - 145 mmol/L Final     Potassium   Date Value Ref Range Status   08/17/2023 5.0 3.5 - 5.1 mmol/L Final "     Chloride   Date Value Ref Range Status   08/17/2023 101 95 - 110 mmol/L Final     CO2   Date Value Ref Range Status   08/17/2023 26 23 - 29 mmol/L Final     Glucose   Date Value Ref Range Status   08/17/2023 122 (H) 70 - 110 mg/dL Final     BUN   Date Value Ref Range Status   08/17/2023 17 6 - 20 mg/dL Final     Creatinine   Date Value Ref Range Status   08/17/2023 1.0 0.5 - 1.4 mg/dL Final     Calcium   Date Value Ref Range Status   08/17/2023 9.9 8.7 - 10.5 mg/dL Final     Total Protein   Date Value Ref Range Status   08/17/2023 7.4 6.0 - 8.4 g/dL Final     Albumin   Date Value Ref Range Status   08/17/2023 4.1 3.5 - 5.2 g/dL Final     Total Bilirubin   Date Value Ref Range Status   08/17/2023 0.7 0.1 - 1.0 mg/dL Final     Comment:     For infants and newborns, interpretation of results should be based  on gestational age, weight and in agreement with clinical  observations.    Premature Infant recommended reference ranges:  Up to 24 hours.............<8.0 mg/dL  Up to 48 hours............<12.0 mg/dL  3-5 days..................<15.0 mg/dL  6-29 days.................<15.0 mg/dL       Alkaline Phosphatase   Date Value Ref Range Status   08/17/2023 92 55 - 135 U/L Final     AST   Date Value Ref Range Status   08/17/2023 22 10 - 40 U/L Final     ALT   Date Value Ref Range Status   08/17/2023 17 10 - 44 U/L Final     Anion Gap   Date Value Ref Range Status   08/17/2023 10 8 - 16 mmol/L Final     eGFR if    Date Value Ref Range Status   07/25/2022 >60 >60 mL/min/1.73 m^2 Final     eGFR if non    Date Value Ref Range Status   07/25/2022 >60 >60 mL/min/1.73 m^2 Final     Comment:     Calculation used to obtain the estimated glomerular filtration  rate (eGFR) is the CKD-EPI equation.          A/P:    Malignant neoplasm of the transverse colon  Recurrence in the pancreas  -poorly differentiated adenocarcinoma  -recurrence right after completing 12 cycles of FOLFOX  -patient on clinical  trial to measure ctDNA, showed high tumor mutation burden  -given that the patient has a high tumor mutation burden, despite having MARY, now on Keytruda as it is indicated in his next generation sequencing.    -proceed with Keytruda C10 today  -Recent PET scan did show migration to a periaortic lymph node.  Patient is having radiation to this 1 spot as the Keytruda as he has been helping everywhere else.  -return to clinic in 3 weeks for next treatment    Pancreatic mass   -confirmed recurrence of colon adenocarcinoma    Back pain  - NM bone scan negative for mets    HTN  - on Enalapril  - follow up with PCP    HLP  - follow up with PCP    DM2  - on Metformin  - follow up with PCP    Tobacco use  - counseled on cessation      Aurash Khoobehi, MD  Hematology and Oncology

## 2023-08-21 ENCOUNTER — INFUSION (OUTPATIENT)
Dept: INFUSION THERAPY | Facility: HOSPITAL | Age: 58
End: 2023-08-21
Attending: INTERNAL MEDICINE
Payer: COMMERCIAL

## 2023-08-21 VITALS
TEMPERATURE: 98 F | DIASTOLIC BLOOD PRESSURE: 65 MMHG | BODY MASS INDEX: 26.82 KG/M2 | OXYGEN SATURATION: 98 % | WEIGHT: 209 LBS | HEIGHT: 74 IN | SYSTOLIC BLOOD PRESSURE: 111 MMHG | HEART RATE: 76 BPM | RESPIRATION RATE: 18 BRPM

## 2023-08-21 DIAGNOSIS — C78.89 METASTATIC ADENOCARCINOMA TO PANCREAS: Primary | ICD-10-CM

## 2023-08-21 DIAGNOSIS — C18.5 MALIGNANT NEOPLASM OF SPLENIC FLEXURE: ICD-10-CM

## 2023-08-21 LAB — TSH SERPL DL<=0.005 MIU/L-ACNC: 2.13 UIU/ML (ref 0.34–5.6)

## 2023-08-21 PROCEDURE — 25000003 PHARM REV CODE 250: Performed by: INTERNAL MEDICINE

## 2023-08-21 PROCEDURE — 63600175 PHARM REV CODE 636 W HCPCS: Performed by: INTERNAL MEDICINE

## 2023-08-21 PROCEDURE — 96413 CHEMO IV INFUSION 1 HR: CPT

## 2023-08-21 PROCEDURE — A4216 STERILE WATER/SALINE, 10 ML: HCPCS | Performed by: INTERNAL MEDICINE

## 2023-08-21 PROCEDURE — 84443 ASSAY THYROID STIM HORMONE: CPT | Performed by: INTERNAL MEDICINE

## 2023-08-21 RX ORDER — SODIUM CHLORIDE 0.9 % (FLUSH) 0.9 %
10 SYRINGE (ML) INJECTION
Status: DISCONTINUED | OUTPATIENT
Start: 2023-08-21 | End: 2023-08-21 | Stop reason: HOSPADM

## 2023-08-21 RX ORDER — DIPHENHYDRAMINE HYDROCHLORIDE 50 MG/ML
50 INJECTION INTRAMUSCULAR; INTRAVENOUS ONCE AS NEEDED
Status: DISCONTINUED | OUTPATIENT
Start: 2023-08-21 | End: 2023-08-21 | Stop reason: HOSPADM

## 2023-08-21 RX ORDER — EPINEPHRINE 0.3 MG/.3ML
0.3 INJECTION SUBCUTANEOUS ONCE AS NEEDED
Status: DISCONTINUED | OUTPATIENT
Start: 2023-08-21 | End: 2023-08-21 | Stop reason: HOSPADM

## 2023-08-21 RX ORDER — HEPARIN 100 UNIT/ML
500 SYRINGE INTRAVENOUS
Status: DISCONTINUED | OUTPATIENT
Start: 2023-08-21 | End: 2023-08-21 | Stop reason: HOSPADM

## 2023-08-21 RX ADMIN — SODIUM CHLORIDE, PRESERVATIVE FREE 10 ML: 5 INJECTION INTRAVENOUS at 11:08

## 2023-08-21 RX ADMIN — SODIUM CHLORIDE 200 MG: 9 INJECTION, SOLUTION INTRAVENOUS at 11:08

## 2023-08-21 RX ADMIN — HEPARIN 500 UNITS: 100 SYRINGE at 11:08

## 2023-08-21 NOTE — PLAN OF CARE
Problem: Fatigue  Goal: Improved Activity Tolerance  Outcome: Ongoing, Progressing  Intervention: Promote Improved Energy  Flowsheets (Taken 8/21/2023 1121)  Fatigue Management:   frequent rest breaks encouraged   fatigue-related activity identified  Sleep/Rest Enhancement:   regular sleep/rest pattern promoted   reading promoted

## 2023-09-01 ENCOUNTER — TREATMENT (OUTPATIENT)
Dept: RADIATION ONCOLOGY | Facility: CLINIC | Age: 58
End: 2023-09-01
Payer: COMMERCIAL

## 2023-09-01 PROCEDURE — 77435 PR  RADN RX DELIV,BODY, MANAGEMENT, PER COURSE: ICD-10-PCS | Mod: S$GLB,,, | Performed by: RADIOLOGY

## 2023-09-01 PROCEDURE — 77435 SBRT MANAGEMENT: CPT | Mod: S$GLB,,, | Performed by: RADIOLOGY

## 2023-09-01 PROCEDURE — 77373 STRTCTC BDY RAD THER TX DLVR: CPT | Mod: S$GLB,,, | Performed by: RADIOLOGY

## 2023-09-01 PROCEDURE — 77336 PR  RADN PHYSICS CONSULT CONTINUING: ICD-10-PCS | Mod: S$GLB,,, | Performed by: RADIOLOGY

## 2023-09-01 PROCEDURE — 77336 RADIATION PHYSICS CONSULT: CPT | Mod: S$GLB,,, | Performed by: RADIOLOGY

## 2023-09-01 PROCEDURE — 77373 PR  RADN RX DELIV,BODY, EACH FRACTION: ICD-10-PCS | Mod: S$GLB,,, | Performed by: RADIOLOGY

## 2023-09-05 ENCOUNTER — OFFICE VISIT (OUTPATIENT)
Dept: DERMATOLOGY | Facility: CLINIC | Age: 58
End: 2023-09-05
Payer: COMMERCIAL

## 2023-09-05 DIAGNOSIS — D48.5 NEOPLASM OF UNCERTAIN BEHAVIOR OF SKIN: Primary | ICD-10-CM

## 2023-09-05 DIAGNOSIS — D23.9 DERMATOFIBROMA: ICD-10-CM

## 2023-09-05 DIAGNOSIS — L57.8 ACTINIC SKIN DAMAGE: ICD-10-CM

## 2023-09-05 DIAGNOSIS — D18.01 CHERRY ANGIOMA: ICD-10-CM

## 2023-09-05 DIAGNOSIS — D22.9 MULTIPLE BENIGN NEVI: ICD-10-CM

## 2023-09-05 DIAGNOSIS — L57.0 ACTINIC KERATOSIS: ICD-10-CM

## 2023-09-05 DIAGNOSIS — L82.1 SEBORRHEIC KERATOSES: ICD-10-CM

## 2023-09-05 PROCEDURE — 1160F PR REVIEW ALL MEDS BY PRESCRIBER/CLIN PHARMACIST DOCUMENTED: ICD-10-PCS | Mod: CPTII,S$GLB,, | Performed by: STUDENT IN AN ORGANIZED HEALTH CARE EDUCATION/TRAINING PROGRAM

## 2023-09-05 PROCEDURE — 88305 TISSUE EXAM BY PATHOLOGIST: CPT | Mod: 26,,, | Performed by: PATHOLOGY

## 2023-09-05 PROCEDURE — 1159F MED LIST DOCD IN RCRD: CPT | Mod: CPTII,S$GLB,, | Performed by: STUDENT IN AN ORGANIZED HEALTH CARE EDUCATION/TRAINING PROGRAM

## 2023-09-05 PROCEDURE — 17000 PR DESTRUCTION(LASER SURGERY,CRYOSURGERY,CHEMOSURGERY),PREMALIGNANT LESIONS,FIRST LESION: ICD-10-PCS | Mod: XS,S$GLB,, | Performed by: STUDENT IN AN ORGANIZED HEALTH CARE EDUCATION/TRAINING PROGRAM

## 2023-09-05 PROCEDURE — 88305 TISSUE EXAM BY PATHOLOGIST: ICD-10-PCS | Mod: 26,,, | Performed by: PATHOLOGY

## 2023-09-05 PROCEDURE — 3044F PR MOST RECENT HEMOGLOBIN A1C LEVEL <7.0%: ICD-10-PCS | Mod: CPTII,S$GLB,, | Performed by: STUDENT IN AN ORGANIZED HEALTH CARE EDUCATION/TRAINING PROGRAM

## 2023-09-05 PROCEDURE — 3044F HG A1C LEVEL LT 7.0%: CPT | Mod: CPTII,S$GLB,, | Performed by: STUDENT IN AN ORGANIZED HEALTH CARE EDUCATION/TRAINING PROGRAM

## 2023-09-05 PROCEDURE — 99203 PR OFFICE/OUTPT VISIT, NEW, LEVL III, 30-44 MIN: ICD-10-PCS | Mod: 25,S$GLB,, | Performed by: STUDENT IN AN ORGANIZED HEALTH CARE EDUCATION/TRAINING PROGRAM

## 2023-09-05 PROCEDURE — 17000 DESTRUCT PREMALG LESION: CPT | Mod: XS,S$GLB,, | Performed by: STUDENT IN AN ORGANIZED HEALTH CARE EDUCATION/TRAINING PROGRAM

## 2023-09-05 PROCEDURE — 88305 TISSUE EXAM BY PATHOLOGIST: CPT | Performed by: PATHOLOGY

## 2023-09-05 PROCEDURE — 4010F ACE/ARB THERAPY RXD/TAKEN: CPT | Mod: CPTII,S$GLB,, | Performed by: STUDENT IN AN ORGANIZED HEALTH CARE EDUCATION/TRAINING PROGRAM

## 2023-09-05 PROCEDURE — 1160F RVW MEDS BY RX/DR IN RCRD: CPT | Mod: CPTII,S$GLB,, | Performed by: STUDENT IN AN ORGANIZED HEALTH CARE EDUCATION/TRAINING PROGRAM

## 2023-09-05 PROCEDURE — 17003 DESTRUCT PREMALG LES 2-14: CPT | Mod: S$GLB,,, | Performed by: STUDENT IN AN ORGANIZED HEALTH CARE EDUCATION/TRAINING PROGRAM

## 2023-09-05 PROCEDURE — 1159F PR MEDICATION LIST DOCUMENTED IN MEDICAL RECORD: ICD-10-PCS | Mod: CPTII,S$GLB,, | Performed by: STUDENT IN AN ORGANIZED HEALTH CARE EDUCATION/TRAINING PROGRAM

## 2023-09-05 PROCEDURE — 11102 PR TANGENTIAL BIOPSY, SKIN, SINGLE LESION: ICD-10-PCS | Mod: S$GLB,,, | Performed by: STUDENT IN AN ORGANIZED HEALTH CARE EDUCATION/TRAINING PROGRAM

## 2023-09-05 PROCEDURE — 99203 OFFICE O/P NEW LOW 30 MIN: CPT | Mod: 25,S$GLB,, | Performed by: STUDENT IN AN ORGANIZED HEALTH CARE EDUCATION/TRAINING PROGRAM

## 2023-09-05 PROCEDURE — 4010F PR ACE/ARB THEARPY RXD/TAKEN: ICD-10-PCS | Mod: CPTII,S$GLB,, | Performed by: STUDENT IN AN ORGANIZED HEALTH CARE EDUCATION/TRAINING PROGRAM

## 2023-09-05 PROCEDURE — 11102 TANGNTL BX SKIN SINGLE LES: CPT | Mod: S$GLB,,, | Performed by: STUDENT IN AN ORGANIZED HEALTH CARE EDUCATION/TRAINING PROGRAM

## 2023-09-05 PROCEDURE — 17003 DESTRUCTION, PREMALIGNANT LESIONS; SECOND THROUGH 14 LESIONS: ICD-10-PCS | Mod: S$GLB,,, | Performed by: STUDENT IN AN ORGANIZED HEALTH CARE EDUCATION/TRAINING PROGRAM

## 2023-09-05 NOTE — PATIENT INSTRUCTIONS
CRYOSURGERY      Your doctor has used a method called cryosurgery to treat your skin condition. Cryosurgery refers to the use of very cold substances to treat a variety of skin conditions such as warts, pre-skin cancers, molluscum contagiosum, sun spots, and several benign growths. The substance we use in cryosurgery is liquid nitrogen and is so cold (-195 degrees Celsius) that is burns when administered.     Following treatment in the office, the skin may immediately burn and become red. You may find the area around the lesion is affected as well. It is sometimes necessary to treat not only the lesion, but a small area of the surrounding normal skin to achieve a good response.     A blister, and even a blood filled blister, may form after treatment.   This is a normal response. If the blister is painful, it is acceptable to sterilize a needle and with rubbing alcohol and gently pop the blister. It is important that you gently wash the area with soap and warm water as the blister fluid may contain wart virus if a wart was treated. Do no remove the roof of the blister.     The area treated can take anywhere from 1-3 weeks to heal. Healing time depends on the kind skin lesion treated, the location, and how aggressively the lesion was treated. It is recommended that the areas treated are covered with Vaseline or bacitracin ointment and a band-aid. If a band-aid is not practical, just ointment applied several times per day will do. Keeping these areas moist will speed the healing time.    Treatment with liquid nitrogen can leave a scar. In dark skin, it may be a light or dark scar, in light skin it may be a white or pink scar. These will generally fade with time, but may never go away completely.     If you have any concerns after your treatment, please feel free to call the office.       [address] [place] [address]/ [phone] FAX/ [email or web address]  Shave Biopsy Wound Care    Your  doctor has performed a shave biopsy today.  A band aid and vaseline ointment has been placed over the site.  This should remain in place for 24 hours.  It is recommended that you keep the area dry for the first 24 hours.  After 24 hours, you may remove the band aid and wash the area with warm soap and water and apply Vaseline jelly.  Many patients prefer to use Neosporin or Bacitracin ointment.  This is acceptable; however, know that you can develop an allergy to this medication even if you have used it safely for years.  It is important to keep the area moist.  Letting it dry out and get air slows healing time, and will worsen the scar.  Band aid is optional after first 24 hours.      If you notice increasing redness, tenderness, pain, or yellow drainage at the biopsy site, please notify your doctor.  These are signs of an infection.    If your biopsy site is bleeding, apply firm pressure for 15 minutes straight.  Repeat for another 15 minutes, if it is still bleeding.   If the surgical site continues to bleed, then please contact your doctor.      [address] [place] [address]/ [phone] FAX/ [email or web address]

## 2023-09-05 NOTE — PROGRESS NOTES
Subjective:      Patient ID:  Osman Li Jr. is a 58 y.o. male who presents for   Chief Complaint   Patient presents with    Spot     nose     New patient     Patient here today for spot on nose x years. Used to go away and come back but it has now stayed. Will bleed sometimes. No treatment.    Hx of colon cancer w/ mets to pancreas and lymph nodes- keytruda and other chemo        Review of Systems   Constitutional:  Negative for fever, chills and fatigue.   Respiratory:  Negative for cough and shortness of breath.    Gastrointestinal:  Negative for nausea and vomiting.   Skin:  Positive for activity-related sunscreen use. Negative for daily sunscreen use.   Hematologic/Lymphatic: Does not bruise/bleed easily.       Objective:   Physical Exam   Constitutional: He appears well-developed and well-nourished. No distress.   Neurological: He is alert and oriented to person, place, and time. He is not disoriented.   Psychiatric: He has a normal mood and affect.   Skin:   Areas Examined (abnormalities noted in diagram):   Scalp / Hair Palpated and Inspected  Head / Face Inspection Performed  Neck Inspection Performed  Chest / Axilla Inspection Performed  Abdomen Inspection Performed  Back Inspection Performed  RUE Inspected  LUE Inspection Performed  Nails and Digits Inspection Performed                     Diagram Legend     Erythematous scaling macule/papule c/w actinic keratosis       Vascular papule c/w angioma      Pigmented verrucoid papule/plaque c/w seborrheic keratosis      Yellow umbilicated papule c/w sebaceous hyperplasia      Irregularly shaped tan macule c/w lentigo     1-2 mm smooth white papules consistent with Milia      Movable subcutaneous cyst with punctum c/w epidermal inclusion cyst      Subcutaneous movable cyst c/w pilar cyst      Firm pink to brown papule c/w dermatofibroma      Pedunculated fleshy papule(s) c/w skin tag(s)      Evenly pigmented macule c/w junctional nevus     Mildly  variegated pigmented, slightly irregular-bordered macule c/w mildly atypical nevus      Flesh colored to evenly pigmented papule c/w intradermal nevus       Pink pearly papule/plaque c/w basal cell carcinoma      Erythematous hyperkeratotic cursted plaque c/w SCC      Surgical scar with no sign of skin cancer recurrence      Open and closed comedones      Inflammatory papules and pustules      Verrucoid papule consistent consistent with wart     Erythematous eczematous patches and plaques     Dystrophic onycholytic nail with subungual debris c/w onychomycosis     Umbilicated papule    Erythematous-base heme-crusted tan verrucoid plaque consistent with inflamed seborrheic keratosis     Erythematous Silvery Scaling Plaque c/w Psoriasis     See annotation        Assessment / Plan:      Pathology Orders:       Normal Orders This Visit    Specimen to Pathology, Dermatology     Questions:    Procedure Type: Dermatology and skin neoplasms    Number of Specimens: 1    ------------------------: -------------------------    Spec 1 Procedure: Biopsy    Spec 1 Clinical Impression: r/o bcc    Spec 1 Source: nasal tip    Release to patient:           Neoplasm of uncertain behavior of skin  -     Specimen to Pathology, Dermatology  Shave biopsy procedure note:    Shave biopsy performed after verbal consent including risk of infection, scar, recurrence, need for additional treatment of site. Area prepped with alcohol, anesthetized with approximately 1.0cc of 2% lidocaine with epinephrine. Lesional tissue shaved with razor blade. Hemostasis achieved with application of aluminum chloride followed by hyfrecation. No complications. Dressing applied. Wound care explained.    Actinic keratosis  Cryosurgery Procedure Note    Verbal consent from the patient is obtained and the patient is aware of the precancerous quality and need for treatment of these lesions. Liquid nitrogen cryosurgery is applied to the 3 actinic keratoses, as detailed  in the physical exam, to produce a freeze injury. The patient is aware that blisters may form and is instructed on wound care with gentle cleansing and use of vaseline ointment to keep moist until healed. The patient is supplied a handout on cryosurgery and is instructed to call if lesions do not completely resolve.    Seborrheic keratoses  These are benign inherited growths without a malignant potential. Reassurance given to patient. No treatment is necessary.     Dermatofibroma  This is a benign scar-like lesion secondary to minor trauma. No treatment required.     Multiple benign nevi  Careful dermoscopy evaluation of nevi performed   Monitor for new mole or moles that are becoming bigger, darker, irritated, or developing irregular borders.     Actinic skin damage  Area(s) of previous NMSC evaluated with no signs of recurrence.  Upper body skin examination performed today including at least 9 points as noted in physical examination. Suspicious lesions noted.  Patient instructed in importance in daily broad spectrum sun protection of at least spf 30. Mineral sunscreen ingredients preferred (Zinc +/- Titanium) and can be found OTC.   Patient encouraged to wear hat for all outdoor exposure.   Also discussed sun avoidance and use of protective clothing.    Beltran angioma  This is a benign vascular lesion. Reassurance given. No treatment required.          F/u pending biopsy results    No follow-ups on file.

## 2023-09-07 ENCOUNTER — LAB VISIT (OUTPATIENT)
Dept: LAB | Facility: HOSPITAL | Age: 58
End: 2023-09-07
Attending: INTERNAL MEDICINE
Payer: COMMERCIAL

## 2023-09-07 DIAGNOSIS — C78.89 METASTATIC ADENOCARCINOMA TO PANCREAS: ICD-10-CM

## 2023-09-07 DIAGNOSIS — C18.4 MALIGNANT NEOPLASM OF TRANSVERSE COLON: ICD-10-CM

## 2023-09-07 DIAGNOSIS — C77.2 METASTASIS TO RETROPERITONEAL LYMPH NODE: ICD-10-CM

## 2023-09-07 LAB
ALBUMIN SERPL BCP-MCNC: 4.1 G/DL (ref 3.5–5.2)
ALBUMIN SERPL BCP-MCNC: 4.1 G/DL (ref 3.5–5.2)
ALP SERPL-CCNC: 87 U/L (ref 55–135)
ALP SERPL-CCNC: 87 U/L (ref 55–135)
ALT SERPL W/O P-5'-P-CCNC: 18 U/L (ref 10–44)
ALT SERPL W/O P-5'-P-CCNC: 18 U/L (ref 10–44)
ANION GAP SERPL CALC-SCNC: 11 MMOL/L (ref 8–16)
ANION GAP SERPL CALC-SCNC: 11 MMOL/L (ref 8–16)
AST SERPL-CCNC: 20 U/L (ref 10–40)
AST SERPL-CCNC: 20 U/L (ref 10–40)
BASOPHILS # BLD AUTO: 0.09 K/UL (ref 0–0.2)
BASOPHILS # BLD AUTO: 0.09 K/UL (ref 0–0.2)
BASOPHILS NFR BLD: 0.8 % (ref 0–1.9)
BASOPHILS NFR BLD: 0.8 % (ref 0–1.9)
BILIRUB SERPL-MCNC: 0.7 MG/DL (ref 0.1–1)
BILIRUB SERPL-MCNC: 0.7 MG/DL (ref 0.1–1)
BUN SERPL-MCNC: 15 MG/DL (ref 6–20)
BUN SERPL-MCNC: 15 MG/DL (ref 6–20)
CALCIUM SERPL-MCNC: 10.1 MG/DL (ref 8.7–10.5)
CALCIUM SERPL-MCNC: 10.1 MG/DL (ref 8.7–10.5)
CEA SERPL-MCNC: 3.3 NG/ML (ref 0–5)
CHLORIDE SERPL-SCNC: 101 MMOL/L (ref 95–110)
CHLORIDE SERPL-SCNC: 101 MMOL/L (ref 95–110)
CO2 SERPL-SCNC: 26 MMOL/L (ref 23–29)
CO2 SERPL-SCNC: 26 MMOL/L (ref 23–29)
CREAT SERPL-MCNC: 1.1 MG/DL (ref 0.5–1.4)
CREAT SERPL-MCNC: 1.1 MG/DL (ref 0.5–1.4)
DIFFERENTIAL METHOD: ABNORMAL
DIFFERENTIAL METHOD: ABNORMAL
EOSINOPHIL # BLD AUTO: 0.1 K/UL (ref 0–0.5)
EOSINOPHIL # BLD AUTO: 0.1 K/UL (ref 0–0.5)
EOSINOPHIL NFR BLD: 0.8 % (ref 0–8)
EOSINOPHIL NFR BLD: 0.8 % (ref 0–8)
ERYTHROCYTE [DISTWIDTH] IN BLOOD BY AUTOMATED COUNT: 14.2 % (ref 11.5–14.5)
ERYTHROCYTE [DISTWIDTH] IN BLOOD BY AUTOMATED COUNT: 14.2 % (ref 11.5–14.5)
EST. GFR  (NO RACE VARIABLE): >60 ML/MIN/1.73 M^2
EST. GFR  (NO RACE VARIABLE): >60 ML/MIN/1.73 M^2
GLUCOSE SERPL-MCNC: 146 MG/DL (ref 70–110)
GLUCOSE SERPL-MCNC: 146 MG/DL (ref 70–110)
HCT VFR BLD AUTO: 41.4 % (ref 40–54)
HCT VFR BLD AUTO: 41.4 % (ref 40–54)
HGB BLD-MCNC: 13.5 G/DL (ref 14–18)
HGB BLD-MCNC: 13.5 G/DL (ref 14–18)
IMM GRANULOCYTES # BLD AUTO: 0.04 K/UL (ref 0–0.04)
IMM GRANULOCYTES # BLD AUTO: 0.04 K/UL (ref 0–0.04)
IMM GRANULOCYTES NFR BLD AUTO: 0.4 % (ref 0–0.5)
IMM GRANULOCYTES NFR BLD AUTO: 0.4 % (ref 0–0.5)
LYMPHOCYTES # BLD AUTO: 3.6 K/UL (ref 1–4.8)
LYMPHOCYTES # BLD AUTO: 3.6 K/UL (ref 1–4.8)
LYMPHOCYTES NFR BLD: 32 % (ref 18–48)
LYMPHOCYTES NFR BLD: 32 % (ref 18–48)
MCH RBC QN AUTO: 28.8 PG (ref 27–31)
MCH RBC QN AUTO: 28.8 PG (ref 27–31)
MCHC RBC AUTO-ENTMCNC: 32.6 G/DL (ref 32–36)
MCHC RBC AUTO-ENTMCNC: 32.6 G/DL (ref 32–36)
MCV RBC AUTO: 88 FL (ref 82–98)
MCV RBC AUTO: 88 FL (ref 82–98)
MONOCYTES # BLD AUTO: 0.9 K/UL (ref 0.3–1)
MONOCYTES # BLD AUTO: 0.9 K/UL (ref 0.3–1)
MONOCYTES NFR BLD: 7.7 % (ref 4–15)
MONOCYTES NFR BLD: 7.7 % (ref 4–15)
NEUTROPHILS # BLD AUTO: 6.5 K/UL (ref 1.8–7.7)
NEUTROPHILS # BLD AUTO: 6.5 K/UL (ref 1.8–7.7)
NEUTROPHILS NFR BLD: 58.3 % (ref 38–73)
NEUTROPHILS NFR BLD: 58.3 % (ref 38–73)
NRBC BLD-RTO: 0 /100 WBC
NRBC BLD-RTO: 0 /100 WBC
PLATELET # BLD AUTO: 373 K/UL (ref 150–450)
PLATELET # BLD AUTO: 373 K/UL (ref 150–450)
PMV BLD AUTO: 9.5 FL (ref 9.2–12.9)
PMV BLD AUTO: 9.5 FL (ref 9.2–12.9)
POTASSIUM SERPL-SCNC: 4.6 MMOL/L (ref 3.5–5.1)
POTASSIUM SERPL-SCNC: 4.6 MMOL/L (ref 3.5–5.1)
PROT SERPL-MCNC: 7.3 G/DL (ref 6–8.4)
PROT SERPL-MCNC: 7.3 G/DL (ref 6–8.4)
RBC # BLD AUTO: 4.69 M/UL (ref 4.6–6.2)
RBC # BLD AUTO: 4.69 M/UL (ref 4.6–6.2)
SODIUM SERPL-SCNC: 138 MMOL/L (ref 136–145)
SODIUM SERPL-SCNC: 138 MMOL/L (ref 136–145)
TSH SERPL DL<=0.005 MIU/L-ACNC: 0.82 UIU/ML (ref 0.4–4)
TSH SERPL DL<=0.005 MIU/L-ACNC: 0.82 UIU/ML (ref 0.4–4)
WBC # BLD AUTO: 11.11 K/UL (ref 3.9–12.7)
WBC # BLD AUTO: 11.11 K/UL (ref 3.9–12.7)

## 2023-09-07 PROCEDURE — 36415 COLL VENOUS BLD VENIPUNCTURE: CPT | Performed by: INTERNAL MEDICINE

## 2023-09-07 PROCEDURE — 84443 ASSAY THYROID STIM HORMONE: CPT | Performed by: INTERNAL MEDICINE

## 2023-09-07 PROCEDURE — 80053 COMPREHEN METABOLIC PANEL: CPT | Performed by: INTERNAL MEDICINE

## 2023-09-07 PROCEDURE — 82378 CARCINOEMBRYONIC ANTIGEN: CPT | Performed by: INTERNAL MEDICINE

## 2023-09-07 PROCEDURE — 85025 COMPLETE CBC W/AUTO DIFF WBC: CPT | Performed by: INTERNAL MEDICINE

## 2023-09-08 ENCOUNTER — OFFICE VISIT (OUTPATIENT)
Dept: HEMATOLOGY/ONCOLOGY | Facility: CLINIC | Age: 58
End: 2023-09-08
Payer: COMMERCIAL

## 2023-09-08 VITALS
SYSTOLIC BLOOD PRESSURE: 145 MMHG | OXYGEN SATURATION: 99 % | HEART RATE: 78 BPM | TEMPERATURE: 99 F | HEIGHT: 74 IN | WEIGHT: 209 LBS | BODY MASS INDEX: 26.82 KG/M2 | DIASTOLIC BLOOD PRESSURE: 74 MMHG | RESPIRATION RATE: 18 BRPM

## 2023-09-08 DIAGNOSIS — E11.00 TYPE 2 DIABETES MELLITUS WITH HYPEROSMOLARITY WITHOUT COMA, WITHOUT LONG-TERM CURRENT USE OF INSULIN: ICD-10-CM

## 2023-09-08 DIAGNOSIS — C18.4 MALIGNANT NEOPLASM OF TRANSVERSE COLON: Primary | ICD-10-CM

## 2023-09-08 DIAGNOSIS — C77.2 METASTASIS TO RETROPERITONEAL LYMPH NODE: ICD-10-CM

## 2023-09-08 DIAGNOSIS — I10 PRIMARY HYPERTENSION: ICD-10-CM

## 2023-09-08 DIAGNOSIS — G62.0 CHEMOTHERAPY-INDUCED NEUROPATHY: ICD-10-CM

## 2023-09-08 DIAGNOSIS — C78.89 METASTATIC ADENOCARCINOMA TO PANCREAS: ICD-10-CM

## 2023-09-08 DIAGNOSIS — T45.1X5A CHEMOTHERAPY-INDUCED NEUROPATHY: ICD-10-CM

## 2023-09-08 DIAGNOSIS — E78.49 OTHER HYPERLIPIDEMIA: ICD-10-CM

## 2023-09-08 LAB
FINAL PATHOLOGIC DIAGNOSIS: NORMAL
GROSS: NORMAL
Lab: NORMAL
MICROSCOPIC EXAM: NORMAL

## 2023-09-08 PROCEDURE — 1160F PR REVIEW ALL MEDS BY PRESCRIBER/CLIN PHARMACIST DOCUMENTED: ICD-10-PCS | Mod: CPTII,S$GLB,, | Performed by: INTERNAL MEDICINE

## 2023-09-08 PROCEDURE — 1159F PR MEDICATION LIST DOCUMENTED IN MEDICAL RECORD: ICD-10-PCS | Mod: CPTII,S$GLB,, | Performed by: INTERNAL MEDICINE

## 2023-09-08 PROCEDURE — 99999 PR PBB SHADOW E&M-EST. PATIENT-LVL IV: CPT | Mod: PBBFAC,,, | Performed by: INTERNAL MEDICINE

## 2023-09-08 PROCEDURE — 99215 PR OFFICE/OUTPT VISIT, EST, LEVL V, 40-54 MIN: ICD-10-PCS | Mod: S$GLB,,, | Performed by: INTERNAL MEDICINE

## 2023-09-08 PROCEDURE — 3044F HG A1C LEVEL LT 7.0%: CPT | Mod: CPTII,S$GLB,, | Performed by: INTERNAL MEDICINE

## 2023-09-08 PROCEDURE — 3077F SYST BP >= 140 MM HG: CPT | Mod: CPTII,S$GLB,, | Performed by: INTERNAL MEDICINE

## 2023-09-08 PROCEDURE — 99215 OFFICE O/P EST HI 40 MIN: CPT | Mod: S$GLB,,, | Performed by: INTERNAL MEDICINE

## 2023-09-08 PROCEDURE — 3078F PR MOST RECENT DIASTOLIC BLOOD PRESSURE < 80 MM HG: ICD-10-PCS | Mod: CPTII,S$GLB,, | Performed by: INTERNAL MEDICINE

## 2023-09-08 PROCEDURE — 1159F MED LIST DOCD IN RCRD: CPT | Mod: CPTII,S$GLB,, | Performed by: INTERNAL MEDICINE

## 2023-09-08 PROCEDURE — 3044F PR MOST RECENT HEMOGLOBIN A1C LEVEL <7.0%: ICD-10-PCS | Mod: CPTII,S$GLB,, | Performed by: INTERNAL MEDICINE

## 2023-09-08 PROCEDURE — 4010F PR ACE/ARB THEARPY RXD/TAKEN: ICD-10-PCS | Mod: CPTII,S$GLB,, | Performed by: INTERNAL MEDICINE

## 2023-09-08 PROCEDURE — 3008F PR BODY MASS INDEX (BMI) DOCUMENTED: ICD-10-PCS | Mod: CPTII,S$GLB,, | Performed by: INTERNAL MEDICINE

## 2023-09-08 PROCEDURE — 3078F DIAST BP <80 MM HG: CPT | Mod: CPTII,S$GLB,, | Performed by: INTERNAL MEDICINE

## 2023-09-08 PROCEDURE — 4010F ACE/ARB THERAPY RXD/TAKEN: CPT | Mod: CPTII,S$GLB,, | Performed by: INTERNAL MEDICINE

## 2023-09-08 PROCEDURE — 3008F BODY MASS INDEX DOCD: CPT | Mod: CPTII,S$GLB,, | Performed by: INTERNAL MEDICINE

## 2023-09-08 PROCEDURE — 99999 PR PBB SHADOW E&M-EST. PATIENT-LVL IV: ICD-10-PCS | Mod: PBBFAC,,, | Performed by: INTERNAL MEDICINE

## 2023-09-08 PROCEDURE — 3077F PR MOST RECENT SYSTOLIC BLOOD PRESSURE >= 140 MM HG: ICD-10-PCS | Mod: CPTII,S$GLB,, | Performed by: INTERNAL MEDICINE

## 2023-09-08 PROCEDURE — 1160F RVW MEDS BY RX/DR IN RCRD: CPT | Mod: CPTII,S$GLB,, | Performed by: INTERNAL MEDICINE

## 2023-09-08 RX ORDER — SODIUM CHLORIDE 0.9 % (FLUSH) 0.9 %
10 SYRINGE (ML) INJECTION
Status: CANCELLED | OUTPATIENT
Start: 2023-09-11

## 2023-09-08 RX ORDER — EPINEPHRINE 0.3 MG/.3ML
0.3 INJECTION SUBCUTANEOUS ONCE AS NEEDED
Status: CANCELLED | OUTPATIENT
Start: 2023-09-11

## 2023-09-08 RX ORDER — MORPHINE SULFATE 30 MG/1
30 TABLET, FILM COATED, EXTENDED RELEASE ORAL 2 TIMES DAILY
Qty: 60 TABLET | Refills: 0 | Status: SHIPPED | OUTPATIENT
Start: 2023-09-08 | End: 2023-10-10 | Stop reason: SDUPTHER

## 2023-09-08 RX ORDER — HEPARIN 100 UNIT/ML
500 SYRINGE INTRAVENOUS
Status: CANCELLED | OUTPATIENT
Start: 2023-09-11

## 2023-09-08 RX ORDER — DIPHENHYDRAMINE HYDROCHLORIDE 50 MG/ML
50 INJECTION INTRAMUSCULAR; INTRAVENOUS ONCE AS NEEDED
Status: CANCELLED | OUTPATIENT
Start: 2023-09-11

## 2023-09-08 NOTE — PROGRESS NOTES
"Service Date:  9/8/23    Chief Complaint:  Colon cancer    Osman Li Jr. is a 58 y.o. 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  This occurred shortly after completing treatment.    Patient was part of a clinical trial to measure CT DNA.  Next generation sequencing also showed a high tumor burden.    He is now on Keytruda as his cancer has a very high tumor mutation burden.  He has MARY however.  So far, Keytruda has been helping    Here for 11 cycle.  Doing well.  Recent PET scan did show migration to a periaortic lymph node.  Patient had radiation to this spot.  Responded well.  CEA has trended down finally.    Review of Systems   Constitutional:  Positive for fatigue.   HENT: Negative.     Eyes: Negative.    Respiratory: Negative.     Cardiovascular: Negative.    Gastrointestinal: Negative.    Endocrine: Negative.    Genitourinary: Negative.    Musculoskeletal: Negative.    Integumentary:  Negative.   Neurological:  Positive for numbness.   Hematological: Negative.    Psychiatric/Behavioral: Negative.          Current Outpatient Medications   Medication Instructions    atorvastatin (LIPITOR) 20 mg, Oral, Daily    enalapriL-hydrochlorothiazide (VASERETIC) 10-25 mg per tablet 1 tablet, Oral, Daily    haemophilus B polysac-tetanus toxoid (ACTHIB, PF,) 10 mcg/0.5 mL injection TO BE ADMINISTERED.    metFORMIN (GLUCOPHAGE) 1,000 mg, Oral, 2 times daily with meals    morphine (MS CONTIN) 30 mg, Oral, 2 times daily    pen needle, diabetic 31 gauge x 3/16" Ndle 1 each, Misc.(Non-Drug; Combo Route), Daily    promethazine (PHENERGAN) 12.5 mg, Oral, Every 4 hours PRN    sildenafiL (VIAGRA) 100 mg, Oral, Daily PRN    TOUJEO MAX U-300 SOLOSTAR 26 Units, Subcutaneous, Daily    traZODone (DESYREL) 50 mg, Oral, Nightly        Past Medical History:   Diagnosis Date    Digestive disorder     DM (diabetes mellitus)     " Hyperlipidemia     Hypertension     Prediabetes         Past Surgical History:   Procedure Laterality Date    CHOLECYSTECTOMY  2011    COLON SURGERY      COLONOSCOPY      2018    COLOSTOMY N/A 04/25/2022    Procedure: CREATION, COLOSTOMY;  Surgeon: Carlton Waddell MD;  Location: [facility] OR;  Service: General;  Laterality: N/A;    ENDOSCOPIC ULTRASOUND OF UPPER GASTROINTESTINAL TRACT N/A 1/6/2023    Procedure: ULTRASOUND, UPPER GI TRACT, ENDOSCOPIC;  Surgeon: Rinku Orozco III, MD;  Location: [facility] ENDO;  Service: Endoscopy;  Laterality: N/A;    INSERTION OF TUNNELED CENTRAL VENOUS CATHETER (CVC) WITH SUBCUTANEOUS PORT Right 05/18/2022    Procedure: [id number];  Surgeon: Carlton Waddell MD;  Location: [facility] OR;  Service: General;  Laterality: Right;    MOBILIZATION OF SPLENIC FLEXURE N/A 04/25/2022    Procedure: MOBILIZATION, SPLENIC FLEXURE;  Surgeon: Carlton Waddell MD;  Location: [facility] OR;  Service: General;  Laterality: N/A;    SPLENECTOMY N/A 04/25/2022    Procedure: SPLENECTOMY;  Surgeon: Carlton Waddell MD;  Location: [facility] OR;  Service: General;  Laterality: N/A;    SUBTOTAL COLECTOMY N/A 04/25/2022    Procedure: COLECTOMY, PARTIAL;  Surgeon: Carlton Waddell MD;  Location: [facility] OR;  Service: General;  Laterality: N/A;    TONSILLECTOMY          Family History   Problem Relation Age of Onset    Heart disease Father     Rectal cancer Sister         half sister       Social History     Tobacco Use    Smoking status: Every Day     Current packs/day: 1.00     Average packs/day: 1 pack/day for 40.0 years (40.0 ttl pk-yrs)     Types: Cigarettes     Passive exposure: Current    Smokeless tobacco: Never   Substance Use Topics    Alcohol use: Not Currently    Drug use: Never         Vitals:    09/08/23 1116   BP: (!) 145/74   Pulse: 78   Resp: 18   Temp: 98.7 °F (37.1 °C)          Physical Exam:  BP (!) 145/74 (BP Location: Right arm, Patient Position: Sitting, BP Method: Large (Automatic))   Pulse 78   Temp  "98.7 °F (37.1 °C) (Temporal)   Resp 18   Ht 6' 2" (1.88 m)   Wt 94.8 kg (208 lb 15.9 oz)   SpO2 99%   BMI 26.83 kg/m²     Physical Exam  Vitals and nursing note reviewed.   Constitutional:       Appearance: Normal appearance.   HENT:      Head: Normocephalic and atraumatic.      Nose: Nose normal.      Mouth/Throat:      Mouth: Mucous membranes are moist.      Pharynx: Oropharynx is clear.   Eyes:      Extraocular Movements: Extraocular movements intact.      Conjunctiva/sclera: Conjunctivae normal.   Cardiovascular:      Rate and Rhythm: Normal rate and regular rhythm.      Heart sounds: Normal heart sounds.   Pulmonary:      Effort: Pulmonary effort is normal.      Breath sounds: Normal breath sounds.   Abdominal:      General: Abdomen is flat. Bowel sounds are normal.      Palpations: Abdomen is soft.      Comments: Ostomy bag in place.   Musculoskeletal:         General: Normal range of motion.      Cervical back: Normal range of motion and neck supple.   Skin:     General: Skin is warm and dry.   Neurological:      General: No focal deficit present.      Mental Status: He is alert and oriented to person, place, and time. Mental status is at baseline.   Psychiatric:         Mood and Affect: Mood normal.          Labs:  Lab Results   Component Value Date    WBC 11.11 09/07/2023    WBC 11.11 09/07/2023    RBC 4.69 09/07/2023    RBC 4.69 09/07/2023    HGB 13.5 (L) 09/07/2023    HGB 13.5 (L) 09/07/2023    HCT 41.4 09/07/2023    HCT 41.4 09/07/2023    MCV 88 09/07/2023    MCV 88 09/07/2023    MCH 28.8 09/07/2023    MCH 28.8 09/07/2023    MCHC 32.6 09/07/2023    MCHC 32.6 09/07/2023    RDW 14.2 09/07/2023    RDW 14.2 09/07/2023     09/07/2023     09/07/2023    MPV 9.5 09/07/2023    MPV 9.5 09/07/2023    GRAN 6.5 09/07/2023    GRAN 58.3 09/07/2023    GRAN 6.5 09/07/2023    GRAN 58.3 09/07/2023    LYMPH 3.6 09/07/2023    LYMPH 32.0 09/07/2023    LYMPH 3.6 09/07/2023    LYMPH 32.0 09/07/2023    MONO " 0.9 09/07/2023    MONO 7.7 09/07/2023    MONO 0.9 09/07/2023    MONO 7.7 09/07/2023    EOS 0.1 09/07/2023    EOS 0.1 09/07/2023    BASO 0.09 09/07/2023    BASO 0.09 09/07/2023    EOSINOPHIL 0.8 09/07/2023    EOSINOPHIL 0.8 09/07/2023    BASOPHIL 0.8 09/07/2023    BASOPHIL 0.8 09/07/2023     Sodium   Date Value Ref Range Status   09/07/2023 138 136 - 145 mmol/L Final   09/07/2023 138 136 - 145 mmol/L Final     Potassium   Date Value Ref Range Status   09/07/2023 4.6 3.5 - 5.1 mmol/L Final   09/07/2023 4.6 3.5 - 5.1 mmol/L Final     Chloride   Date Value Ref Range Status   09/07/2023 101 95 - 110 mmol/L Final   09/07/2023 101 95 - 110 mmol/L Final     CO2   Date Value Ref Range Status   09/07/2023 26 23 - 29 mmol/L Final   09/07/2023 26 23 - 29 mmol/L Final     Glucose   Date Value Ref Range Status   09/07/2023 146 (H) 70 - 110 mg/dL Final   09/07/2023 146 (H) 70 - 110 mg/dL Final     BUN   Date Value Ref Range Status   09/07/2023 15 6 - 20 mg/dL Final   09/07/2023 15 6 - 20 mg/dL Final     Creatinine   Date Value Ref Range Status   09/07/2023 1.1 0.5 - 1.4 mg/dL Final   09/07/2023 1.1 0.5 - 1.4 mg/dL Final     Calcium   Date Value Ref Range Status   09/07/2023 10.1 8.7 - 10.5 mg/dL Final   09/07/2023 10.1 8.7 - 10.5 mg/dL Final     Total Protein   Date Value Ref Range Status   09/07/2023 7.3 6.0 - 8.4 g/dL Final   09/07/2023 7.3 6.0 - 8.4 g/dL Final     Albumin   Date Value Ref Range Status   09/07/2023 4.1 3.5 - 5.2 g/dL Final   09/07/2023 4.1 3.5 - 5.2 g/dL Final     Total Bilirubin   Date Value Ref Range Status   09/07/2023 0.7 0.1 - 1.0 mg/dL Final     Comment:     For infants and newborns, interpretation of results should be based  on gestational age, weight and in agreement with clinical  observations.    Premature Infant recommended reference ranges:  Up to 24 hours.............<8.0 mg/dL  Up to 48 hours............<12.0 mg/dL  3-5 days..................<15.0 mg/dL  6-29 days.................<15.0 mg/dL      09/07/2023 0.7 0.1 - 1.0 mg/dL Final     Comment:     For infants and newborns, interpretation of results should be based  on gestational age, weight and in agreement with clinical  observations.    Premature Infant recommended reference ranges:  Up to 24 hours.............<8.0 mg/dL  Up to 48 hours............<12.0 mg/dL  3-5 days..................<15.0 mg/dL  6-29 days.................<15.0 mg/dL       Alkaline Phosphatase   Date Value Ref Range Status   09/07/2023 87 55 - 135 U/L Final   09/07/2023 87 55 - 135 U/L Final     AST   Date Value Ref Range Status   09/07/2023 20 10 - 40 U/L Final   09/07/2023 20 10 - 40 U/L Final     ALT   Date Value Ref Range Status   09/07/2023 18 10 - 44 U/L Final   09/07/2023 18 10 - 44 U/L Final     Anion Gap   Date Value Ref Range Status   09/07/2023 11 8 - 16 mmol/L Final   09/07/2023 11 8 - 16 mmol/L Final     eGFR if    Date Value Ref Range Status   07/25/2022 >60 >60 mL/min/1.73 m^2 Final     eGFR if non    Date Value Ref Range Status   07/25/2022 >60 >60 mL/min/1.73 m^2 Final     Comment:     Calculation used to obtain the estimated glomerular filtration  rate (eGFR) is the CKD-EPI equation.          A/P:    Malignant neoplasm of the transverse colon  Recurrence in the pancreas  -poorly differentiated adenocarcinoma  -recurrence right after completing 12 cycles of FOLFOX  -patient on clinical trial to measure ctDNA, showed high tumor mutation burden  -given that the patient has a high tumor mutation burden, despite having MARY, now on Keytruda as it is indicated in his next generation sequencing.    -proceed with Keytruda C10 today  -Recent PET scan did show migration to a periaortic lymph node.  Patient is having radiation to this 1 spot as the Keytruda as he has been helping everywhere else.  -return to clinic in 3 weeks for next treatment    Pancreatic mass   -confirmed recurrence of colon adenocarcinoma    Back pain  - NM bone scan  negative for mets  -increase morphine dose to 30 mg as patient feels 15 mg not lasting long enough    HTN  - on Enalapril  - follow up with PCP    HLP  - follow up with PCP    DM2  - on Metformin  - follow up with PCP    Tobacco use  - counseled on cessation      Aurash Khoobehi, MD  Hematology and Oncology

## 2023-09-11 ENCOUNTER — INFUSION (OUTPATIENT)
Dept: INFUSION THERAPY | Facility: HOSPITAL | Age: 58
End: 2023-09-11
Attending: INTERNAL MEDICINE
Payer: COMMERCIAL

## 2023-09-11 ENCOUNTER — TELEPHONE (OUTPATIENT)
Dept: DERMATOLOGY | Facility: CLINIC | Age: 58
End: 2023-09-11
Payer: COMMERCIAL

## 2023-09-11 VITALS
RESPIRATION RATE: 18 BRPM | HEART RATE: 78 BPM | HEIGHT: 74 IN | OXYGEN SATURATION: 97 % | BODY MASS INDEX: 27.04 KG/M2 | SYSTOLIC BLOOD PRESSURE: 123 MMHG | TEMPERATURE: 97 F | DIASTOLIC BLOOD PRESSURE: 67 MMHG | WEIGHT: 210.69 LBS

## 2023-09-11 DIAGNOSIS — C44.311 BASAL CELL CARCINOMA (BCC) OF NASAL TIP: Primary | ICD-10-CM

## 2023-09-11 DIAGNOSIS — C18.5 MALIGNANT NEOPLASM OF SPLENIC FLEXURE: ICD-10-CM

## 2023-09-11 DIAGNOSIS — C78.89 METASTATIC ADENOCARCINOMA TO PANCREAS: Primary | ICD-10-CM

## 2023-09-11 PROCEDURE — A4216 STERILE WATER/SALINE, 10 ML: HCPCS | Performed by: INTERNAL MEDICINE

## 2023-09-11 PROCEDURE — 96413 CHEMO IV INFUSION 1 HR: CPT

## 2023-09-11 PROCEDURE — 63600175 PHARM REV CODE 636 W HCPCS: Performed by: INTERNAL MEDICINE

## 2023-09-11 PROCEDURE — 25000003 PHARM REV CODE 250: Performed by: INTERNAL MEDICINE

## 2023-09-11 RX ORDER — SODIUM CHLORIDE 0.9 % (FLUSH) 0.9 %
10 SYRINGE (ML) INJECTION
Status: DISCONTINUED | OUTPATIENT
Start: 2023-09-11 | End: 2023-09-11 | Stop reason: HOSPADM

## 2023-09-11 RX ORDER — DIPHENHYDRAMINE HYDROCHLORIDE 50 MG/ML
50 INJECTION INTRAMUSCULAR; INTRAVENOUS ONCE AS NEEDED
Status: DISCONTINUED | OUTPATIENT
Start: 2023-09-11 | End: 2023-09-11 | Stop reason: HOSPADM

## 2023-09-11 RX ORDER — EPINEPHRINE 0.3 MG/.3ML
0.3 INJECTION SUBCUTANEOUS ONCE AS NEEDED
Status: DISCONTINUED | OUTPATIENT
Start: 2023-09-11 | End: 2023-09-11 | Stop reason: HOSPADM

## 2023-09-11 RX ORDER — HEPARIN 100 UNIT/ML
500 SYRINGE INTRAVENOUS
Status: DISCONTINUED | OUTPATIENT
Start: 2023-09-11 | End: 2023-09-11 | Stop reason: HOSPADM

## 2023-09-11 RX ADMIN — HEPARIN 500 UNITS: 100 SYRINGE at 02:09

## 2023-09-11 RX ADMIN — SODIUM CHLORIDE, PRESERVATIVE FREE 10 ML: 5 INJECTION INTRAVENOUS at 02:09

## 2023-09-11 RX ADMIN — SODIUM CHLORIDE 200 MG: 9 INJECTION, SOLUTION INTRAVENOUS at 02:09

## 2023-09-11 NOTE — PLAN OF CARE
Problem: Fatigue  Goal: Improved Activity Tolerance  Outcome: Ongoing, Progressing  Intervention: Promote Improved Energy  Flowsheets (Taken 9/11/2023 4111)  Fatigue Management:   fatigue-related activity identified   paced activity encouraged   frequent rest breaks encouraged  Sleep/Rest Enhancement:   noise level reduced   relaxation techniques promoted   regular sleep/rest pattern promoted  Activity Management:   Ambulated -L4   Up in chair - L3

## 2023-09-14 ENCOUNTER — PATIENT MESSAGE (OUTPATIENT)
Dept: FAMILY MEDICINE | Facility: CLINIC | Age: 58
End: 2023-09-14

## 2023-09-14 DIAGNOSIS — E11.00 TYPE 2 DIABETES MELLITUS WITH HYPEROSMOLARITY WITHOUT COMA, WITHOUT LONG-TERM CURRENT USE OF INSULIN: ICD-10-CM

## 2023-09-14 RX ORDER — PEN NEEDLE, DIABETIC 30 GX3/16"
1 NEEDLE, DISPOSABLE MISCELLANEOUS DAILY
Qty: 90 EACH | Refills: 3 | Status: SHIPPED | OUTPATIENT
Start: 2023-09-14

## 2023-09-19 ENCOUNTER — CLINICAL SUPPORT (OUTPATIENT)
Dept: RADIATION ONCOLOGY | Facility: CLINIC | Age: 58
End: 2023-09-19
Payer: COMMERCIAL

## 2023-09-19 DIAGNOSIS — C77.2 METASTASIS TO RETROPERITONEAL LYMPH NODE: Primary | ICD-10-CM

## 2023-09-19 NOTE — PROGRESS NOTES
"DIAGNOSIS: Oligometastatic colon adenoCA    TREATMENT      Contacted patient today for 3 week checkup, however his phone # was "out of service" w/ no vmail or other numbers available.    A/P  1.  Will try to contact again via [employer]  2.  Follow-up with Dr. MORTON as directed  3.  Return to clinic prn  " Called pt, advised her to proceed to ER. Pt asks to wait until tomorrow. Pt was advised to go to ER now  & to call OB to inform them on her way. Pt verbalizes understanding.   Pt states she will go to [facility].

## 2023-09-28 ENCOUNTER — LAB VISIT (OUTPATIENT)
Dept: LAB | Facility: HOSPITAL | Age: 58
End: 2023-09-28
Attending: INTERNAL MEDICINE
Payer: COMMERCIAL

## 2023-09-28 DIAGNOSIS — C77.2 METASTASIS TO RETROPERITONEAL LYMPH NODE: ICD-10-CM

## 2023-09-28 DIAGNOSIS — C18.4 MALIGNANT NEOPLASM OF TRANSVERSE COLON: ICD-10-CM

## 2023-09-28 DIAGNOSIS — C78.89 METASTATIC ADENOCARCINOMA TO PANCREAS: ICD-10-CM

## 2023-09-28 LAB
ALBUMIN SERPL BCP-MCNC: 4.1 G/DL (ref 3.5–5.2)
ALP SERPL-CCNC: 99 U/L (ref 55–135)
ALT SERPL W/O P-5'-P-CCNC: 19 U/L (ref 10–44)
ANION GAP SERPL CALC-SCNC: 10 MMOL/L (ref 8–16)
AST SERPL-CCNC: 20 U/L (ref 10–40)
BASOPHILS # BLD AUTO: 0.09 K/UL (ref 0–0.2)
BASOPHILS NFR BLD: 0.8 % (ref 0–1.9)
BILIRUB SERPL-MCNC: 0.5 MG/DL (ref 0.1–1)
BUN SERPL-MCNC: 13 MG/DL (ref 6–20)
CALCIUM SERPL-MCNC: 10.6 MG/DL (ref 8.7–10.5)
CEA SERPL-MCNC: 3.2 NG/ML (ref 0–5)
CHLORIDE SERPL-SCNC: 99 MMOL/L (ref 95–110)
CO2 SERPL-SCNC: 28 MMOL/L (ref 23–29)
CREAT SERPL-MCNC: 1.1 MG/DL (ref 0.5–1.4)
DIFFERENTIAL METHOD: ABNORMAL
EOSINOPHIL # BLD AUTO: 0.2 K/UL (ref 0–0.5)
EOSINOPHIL NFR BLD: 1.6 % (ref 0–8)
ERYTHROCYTE [DISTWIDTH] IN BLOOD BY AUTOMATED COUNT: 14.2 % (ref 11.5–14.5)
EST. GFR  (NO RACE VARIABLE): >60 ML/MIN/1.73 M^2
GLUCOSE SERPL-MCNC: 176 MG/DL (ref 70–110)
HCT VFR BLD AUTO: 40.9 % (ref 40–54)
HGB BLD-MCNC: 13.6 G/DL (ref 14–18)
IMM GRANULOCYTES # BLD AUTO: 0.04 K/UL (ref 0–0.04)
IMM GRANULOCYTES NFR BLD AUTO: 0.3 % (ref 0–0.5)
LYMPHOCYTES # BLD AUTO: 4.8 K/UL (ref 1–4.8)
LYMPHOCYTES NFR BLD: 40.8 % (ref 18–48)
MCH RBC QN AUTO: 29.5 PG (ref 27–31)
MCHC RBC AUTO-ENTMCNC: 33.3 G/DL (ref 32–36)
MCV RBC AUTO: 89 FL (ref 82–98)
MONOCYTES # BLD AUTO: 1 K/UL (ref 0.3–1)
MONOCYTES NFR BLD: 8.1 % (ref 4–15)
NEUTROPHILS # BLD AUTO: 5.7 K/UL (ref 1.8–7.7)
NEUTROPHILS NFR BLD: 48.4 % (ref 38–73)
NRBC BLD-RTO: 0 /100 WBC
PLATELET # BLD AUTO: 393 K/UL (ref 150–450)
PMV BLD AUTO: 9.5 FL (ref 9.2–12.9)
POTASSIUM SERPL-SCNC: 5 MMOL/L (ref 3.5–5.1)
PROT SERPL-MCNC: 7.2 G/DL (ref 6–8.4)
RBC # BLD AUTO: 4.61 M/UL (ref 4.6–6.2)
SODIUM SERPL-SCNC: 137 MMOL/L (ref 136–145)
WBC # BLD AUTO: 11.84 K/UL (ref 3.9–12.7)

## 2023-09-28 PROCEDURE — 85025 COMPLETE CBC W/AUTO DIFF WBC: CPT | Performed by: INTERNAL MEDICINE

## 2023-09-28 PROCEDURE — 36415 COLL VENOUS BLD VENIPUNCTURE: CPT | Performed by: INTERNAL MEDICINE

## 2023-09-28 PROCEDURE — 80053 COMPREHEN METABOLIC PANEL: CPT | Performed by: INTERNAL MEDICINE

## 2023-09-28 PROCEDURE — 82378 CARCINOEMBRYONIC ANTIGEN: CPT | Performed by: INTERNAL MEDICINE

## 2023-09-29 ENCOUNTER — OFFICE VISIT (OUTPATIENT)
Dept: HEMATOLOGY/ONCOLOGY | Facility: CLINIC | Age: 58
End: 2023-09-29
Payer: COMMERCIAL

## 2023-09-29 VITALS
HEIGHT: 74 IN | RESPIRATION RATE: 18 BRPM | SYSTOLIC BLOOD PRESSURE: 157 MMHG | WEIGHT: 211.63 LBS | HEART RATE: 75 BPM | TEMPERATURE: 98 F | BODY MASS INDEX: 27.16 KG/M2 | OXYGEN SATURATION: 98 % | DIASTOLIC BLOOD PRESSURE: 75 MMHG

## 2023-09-29 DIAGNOSIS — C18.4 MALIGNANT NEOPLASM OF TRANSVERSE COLON: Primary | ICD-10-CM

## 2023-09-29 DIAGNOSIS — G62.0 CHEMOTHERAPY-INDUCED NEUROPATHY: ICD-10-CM

## 2023-09-29 DIAGNOSIS — C78.89 METASTATIC ADENOCARCINOMA TO PANCREAS: ICD-10-CM

## 2023-09-29 DIAGNOSIS — E83.52 HYPERCALCEMIA: ICD-10-CM

## 2023-09-29 DIAGNOSIS — T45.1X5A CHEMOTHERAPY-INDUCED NEUROPATHY: ICD-10-CM

## 2023-09-29 DIAGNOSIS — I10 PRIMARY HYPERTENSION: ICD-10-CM

## 2023-09-29 DIAGNOSIS — E11.00 TYPE 2 DIABETES MELLITUS WITH HYPEROSMOLARITY WITHOUT COMA, WITHOUT LONG-TERM CURRENT USE OF INSULIN: ICD-10-CM

## 2023-09-29 DIAGNOSIS — C77.2 METASTASIS TO RETROPERITONEAL LYMPH NODE: ICD-10-CM

## 2023-09-29 DIAGNOSIS — E78.49 OTHER HYPERLIPIDEMIA: ICD-10-CM

## 2023-09-29 PROCEDURE — 3044F HG A1C LEVEL LT 7.0%: CPT | Mod: CPTII,S$GLB,, | Performed by: INTERNAL MEDICINE

## 2023-09-29 PROCEDURE — 4010F ACE/ARB THERAPY RXD/TAKEN: CPT | Mod: CPTII,S$GLB,, | Performed by: INTERNAL MEDICINE

## 2023-09-29 PROCEDURE — 3044F PR MOST RECENT HEMOGLOBIN A1C LEVEL <7.0%: ICD-10-PCS | Mod: CPTII,S$GLB,, | Performed by: INTERNAL MEDICINE

## 2023-09-29 PROCEDURE — 1160F PR REVIEW ALL MEDS BY PRESCRIBER/CLIN PHARMACIST DOCUMENTED: ICD-10-PCS | Mod: CPTII,S$GLB,, | Performed by: INTERNAL MEDICINE

## 2023-09-29 PROCEDURE — 1159F PR MEDICATION LIST DOCUMENTED IN MEDICAL RECORD: ICD-10-PCS | Mod: CPTII,S$GLB,, | Performed by: INTERNAL MEDICINE

## 2023-09-29 PROCEDURE — 3078F PR MOST RECENT DIASTOLIC BLOOD PRESSURE < 80 MM HG: ICD-10-PCS | Mod: CPTII,S$GLB,, | Performed by: INTERNAL MEDICINE

## 2023-09-29 PROCEDURE — 4010F PR ACE/ARB THEARPY RXD/TAKEN: ICD-10-PCS | Mod: CPTII,S$GLB,, | Performed by: INTERNAL MEDICINE

## 2023-09-29 PROCEDURE — 3077F SYST BP >= 140 MM HG: CPT | Mod: CPTII,S$GLB,, | Performed by: INTERNAL MEDICINE

## 2023-09-29 PROCEDURE — 3077F PR MOST RECENT SYSTOLIC BLOOD PRESSURE >= 140 MM HG: ICD-10-PCS | Mod: CPTII,S$GLB,, | Performed by: INTERNAL MEDICINE

## 2023-09-29 PROCEDURE — 3008F PR BODY MASS INDEX (BMI) DOCUMENTED: ICD-10-PCS | Mod: CPTII,S$GLB,, | Performed by: INTERNAL MEDICINE

## 2023-09-29 PROCEDURE — 99215 OFFICE O/P EST HI 40 MIN: CPT | Mod: S$GLB,,, | Performed by: INTERNAL MEDICINE

## 2023-09-29 PROCEDURE — 99215 PR OFFICE/OUTPT VISIT, EST, LEVL V, 40-54 MIN: ICD-10-PCS | Mod: S$GLB,,, | Performed by: INTERNAL MEDICINE

## 2023-09-29 PROCEDURE — 1159F MED LIST DOCD IN RCRD: CPT | Mod: CPTII,S$GLB,, | Performed by: INTERNAL MEDICINE

## 2023-09-29 PROCEDURE — 99999 PR PBB SHADOW E&M-EST. PATIENT-LVL IV: CPT | Mod: PBBFAC,,, | Performed by: INTERNAL MEDICINE

## 2023-09-29 PROCEDURE — 99999 PR PBB SHADOW E&M-EST. PATIENT-LVL IV: ICD-10-PCS | Mod: PBBFAC,,, | Performed by: INTERNAL MEDICINE

## 2023-09-29 PROCEDURE — 3078F DIAST BP <80 MM HG: CPT | Mod: CPTII,S$GLB,, | Performed by: INTERNAL MEDICINE

## 2023-09-29 PROCEDURE — 1160F RVW MEDS BY RX/DR IN RCRD: CPT | Mod: CPTII,S$GLB,, | Performed by: INTERNAL MEDICINE

## 2023-09-29 PROCEDURE — 3008F BODY MASS INDEX DOCD: CPT | Mod: CPTII,S$GLB,, | Performed by: INTERNAL MEDICINE

## 2023-09-29 RX ORDER — SODIUM CHLORIDE 0.9 % (FLUSH) 0.9 %
10 SYRINGE (ML) INJECTION
Status: CANCELLED | OUTPATIENT
Start: 2023-10-02

## 2023-09-29 RX ORDER — DIPHENHYDRAMINE HYDROCHLORIDE 50 MG/ML
50 INJECTION INTRAMUSCULAR; INTRAVENOUS ONCE AS NEEDED
Status: CANCELLED | OUTPATIENT
Start: 2023-10-02

## 2023-09-29 RX ORDER — EPINEPHRINE 0.3 MG/.3ML
0.3 INJECTION SUBCUTANEOUS ONCE AS NEEDED
Status: CANCELLED | OUTPATIENT
Start: 2023-10-02

## 2023-09-29 RX ORDER — HEPARIN 100 UNIT/ML
500 SYRINGE INTRAVENOUS
Status: CANCELLED | OUTPATIENT
Start: 2023-10-02

## 2023-09-29 NOTE — PROGRESS NOTES
"Service Date:  9/29/23    Chief Complaint:  Colon cancer    Osman Li Jr. is a 58 y.o. 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  This occurred shortly after completing treatment.    Patient was part of a clinical trial to measure CT DNA.  Next generation sequencing also showed a high tumor burden.    He is now on Keytruda as his cancer has a very high tumor mutation burden.  He has MARY however.  So far, Keytruda has been helping    Here for 12 cycle.  Doing well.  Recent PET scan did show migration to a periaortic lymph node.  Patient had radiation to this spot.  Responded well.  CEA has trended down since then.    Review of Systems   Constitutional:  Positive for fatigue.   HENT: Negative.     Eyes: Negative.    Respiratory: Negative.     Cardiovascular: Negative.    Gastrointestinal: Negative.    Endocrine: Negative.    Genitourinary: Negative.    Musculoskeletal: Negative.    Integumentary:  Negative.   Neurological:  Positive for numbness.   Hematological: Negative.    Psychiatric/Behavioral: Negative.          Current Outpatient Medications   Medication Instructions    atorvastatin (LIPITOR) 20 mg, Oral, Daily    enalapriL-hydrochlorothiazide (VASERETIC) 10-25 mg per tablet 1 tablet, Oral, Daily    haemophilus B polysac-tetanus toxoid (ACTHIB, PF,) 10 mcg/0.5 mL injection TO BE ADMINISTERED.    metFORMIN (GLUCOPHAGE) 1,000 mg, Oral, 2 times daily with meals    morphine (MS CONTIN) 30 mg, Oral, 2 times daily    pen needle, diabetic 31 gauge x 3/16" Ndle 1 each, Misc.(Non-Drug; Combo Route), Daily    promethazine (PHENERGAN) 12.5 mg, Oral, Every 4 hours PRN    sildenafiL (VIAGRA) 100 mg, Oral, Daily PRN    TOUJEO MAX U-300 SOLOSTAR 26 Units, Subcutaneous, Daily    traZODone (DESYREL) 50 mg, Oral, Nightly        Past Medical History:   Diagnosis Date    Digestive disorder     DM (diabetes mellitus)     " Hyperlipidemia     Hypertension     Prediabetes         Past Surgical History:   Procedure Laterality Date    CHOLECYSTECTOMY  2011    COLON SURGERY      COLONOSCOPY      2018    COLOSTOMY N/A 04/25/2022    Procedure: CREATION, COLOSTOMY;  Surgeon: Carlton Waddell MD;  Location: [facility] OR;  Service: General;  Laterality: N/A;    ENDOSCOPIC ULTRASOUND OF UPPER GASTROINTESTINAL TRACT N/A 1/6/2023    Procedure: ULTRASOUND, UPPER GI TRACT, ENDOSCOPIC;  Surgeon: Rinku Orozco III, MD;  Location: [facility] ENDO;  Service: Endoscopy;  Laterality: N/A;    INSERTION OF TUNNELED CENTRAL VENOUS CATHETER (CVC) WITH SUBCUTANEOUS PORT Right 05/18/2022    Procedure: [id number];  Surgeon: Carlton Waddell MD;  Location: [facility] OR;  Service: General;  Laterality: Right;    MOBILIZATION OF SPLENIC FLEXURE N/A 04/25/2022    Procedure: MOBILIZATION, SPLENIC FLEXURE;  Surgeon: Carlton Waddell MD;  Location: [facility] OR;  Service: General;  Laterality: N/A;    SPLENECTOMY N/A 04/25/2022    Procedure: SPLENECTOMY;  Surgeon: Carlton Waddell MD;  Location: [facility] OR;  Service: General;  Laterality: N/A;    SUBTOTAL COLECTOMY N/A 04/25/2022    Procedure: COLECTOMY, PARTIAL;  Surgeon: Carlton Waddell MD;  Location: [facility] OR;  Service: General;  Laterality: N/A;    TONSILLECTOMY          Family History   Problem Relation Age of Onset    Heart disease Father     Rectal cancer Sister         half sister       Social History     Tobacco Use    Smoking status: Every Day     Current packs/day: 1.00     Average packs/day: 1 pack/day for 40.0 years (40.0 ttl pk-yrs)     Types: Cigarettes     Passive exposure: Current    Smokeless tobacco: Never   Substance Use Topics    Alcohol use: Not Currently    Drug use: Never         Vitals:    09/29/23 0918   BP: (!) 157/75   Pulse: 75   Resp: 18   Temp: 98.4 °F (36.9 °C)          Physical Exam:  BP (!) 157/75 (BP Location: Right arm, Patient Position: Sitting, BP Method: Large (Automatic))   Pulse 75   Temp  "98.4 °F (36.9 °C) (Temporal)   Resp 18   Ht 6' 2" (1.88 m)   Wt 96 kg (211 lb 10.3 oz)   SpO2 98%   BMI 27.17 kg/m²     Physical Exam  Vitals and nursing note reviewed.   Constitutional:       Appearance: Normal appearance.   HENT:      Head: Normocephalic and atraumatic.      Nose: Nose normal.      Mouth/Throat:      Mouth: Mucous membranes are moist.      Pharynx: Oropharynx is clear.   Eyes:      Extraocular Movements: Extraocular movements intact.      Conjunctiva/sclera: Conjunctivae normal.   Cardiovascular:      Rate and Rhythm: Normal rate and regular rhythm.      Heart sounds: Normal heart sounds.   Pulmonary:      Effort: Pulmonary effort is normal.      Breath sounds: Normal breath sounds.   Abdominal:      General: Abdomen is flat. Bowel sounds are normal.      Palpations: Abdomen is soft.      Comments: Ostomy bag in place.   Musculoskeletal:         General: Normal range of motion.      Cervical back: Normal range of motion and neck supple.   Skin:     General: Skin is warm and dry.   Neurological:      General: No focal deficit present.      Mental Status: He is alert and oriented to person, place, and time. Mental status is at baseline.   Psychiatric:         Mood and Affect: Mood normal.          Labs:  Lab Results   Component Value Date    WBC 11.84 09/28/2023    RBC 4.61 09/28/2023    HGB 13.6 (L) 09/28/2023    HCT 40.9 09/28/2023    MCV 89 09/28/2023    MCH 29.5 09/28/2023    MCHC 33.3 09/28/2023    RDW 14.2 09/28/2023     09/28/2023    MPV 9.5 09/28/2023    GRAN 5.7 09/28/2023    GRAN 48.4 09/28/2023    LYMPH 4.8 09/28/2023    LYMPH 40.8 09/28/2023    MONO 1.0 09/28/2023    MONO 8.1 09/28/2023    EOS 0.2 09/28/2023    BASO 0.09 09/28/2023    EOSINOPHIL 1.6 09/28/2023    BASOPHIL 0.8 09/28/2023     Sodium   Date Value Ref Range Status   09/28/2023 137 136 - 145 mmol/L Final     Potassium   Date Value Ref Range Status   09/28/2023 5.0 3.5 - 5.1 mmol/L Final     Chloride   Date Value " Ref Range Status   09/28/2023 99 95 - 110 mmol/L Final     CO2   Date Value Ref Range Status   09/28/2023 28 23 - 29 mmol/L Final     Glucose   Date Value Ref Range Status   09/28/2023 176 (H) 70 - 110 mg/dL Final     BUN   Date Value Ref Range Status   09/28/2023 13 6 - 20 mg/dL Final     Creatinine   Date Value Ref Range Status   09/28/2023 1.1 0.5 - 1.4 mg/dL Final     Calcium   Date Value Ref Range Status   09/28/2023 10.6 (H) 8.7 - 10.5 mg/dL Final     Total Protein   Date Value Ref Range Status   09/28/2023 7.2 6.0 - 8.4 g/dL Final     Albumin   Date Value Ref Range Status   09/28/2023 4.1 3.5 - 5.2 g/dL Final     Total Bilirubin   Date Value Ref Range Status   09/28/2023 0.5 0.1 - 1.0 mg/dL Final     Comment:     For infants and newborns, interpretation of results should be based  on gestational age, weight and in agreement with clinical  observations.    Premature Infant recommended reference ranges:  Up to 24 hours.............<8.0 mg/dL  Up to 48 hours............<12.0 mg/dL  3-5 days..................<15.0 mg/dL  6-29 days.................<15.0 mg/dL       Alkaline Phosphatase   Date Value Ref Range Status   09/28/2023 99 55 - 135 U/L Final     AST   Date Value Ref Range Status   09/28/2023 20 10 - 40 U/L Final     ALT   Date Value Ref Range Status   09/28/2023 19 10 - 44 U/L Final     Anion Gap   Date Value Ref Range Status   09/28/2023 10 8 - 16 mmol/L Final     eGFR if    Date Value Ref Range Status   07/25/2022 >60 >60 mL/min/1.73 m^2 Final     eGFR if non    Date Value Ref Range Status   07/25/2022 >60 >60 mL/min/1.73 m^2 Final     Comment:     Calculation used to obtain the estimated glomerular filtration  rate (eGFR) is the CKD-EPI equation.          A/P:    Malignant neoplasm of the transverse colon  Recurrence in the pancreas  -poorly differentiated adenocarcinoma  -recurrence right after completing 12 cycles of FOLFOX  -patient on clinical trial to measure ctDNA,  showed high tumor mutation burden  -given that the patient has a high tumor mutation burden, despite having MARY, now on Keytruda as it is indicated in his next generation sequencing.    -proceed with Keytruda C12 today  -Recent PET scan did show migration to a periaortic lymph node.  Patient had radiation to this 1 spot as the Keytruda as he has been helping everywhere else.  -return to clinic in 3 weeks for next treatment    Hypercalcemia  -will monitor for now    Pancreatic mass   -confirmed recurrence of colon adenocarcinoma    Back pain  - NM bone scan negative for mets  -increase morphine dose to 30 mg as patient feels 15 mg not lasting long enough    HTN  - on Enalapril  - follow up with PCP    HLP  - follow up with PCP    DM2  - on Metformin  - follow up with PCP    Tobacco use  - counseled on cessation      Aurash Khoobehi, MD  Hematology and Oncology

## 2023-10-02 ENCOUNTER — INFUSION (OUTPATIENT)
Dept: INFUSION THERAPY | Facility: HOSPITAL | Age: 58
End: 2023-10-02
Attending: INTERNAL MEDICINE
Payer: COMMERCIAL

## 2023-10-02 VITALS
OXYGEN SATURATION: 96 % | HEIGHT: 74 IN | DIASTOLIC BLOOD PRESSURE: 75 MMHG | RESPIRATION RATE: 18 BRPM | BODY MASS INDEX: 27.58 KG/M2 | WEIGHT: 214.88 LBS | TEMPERATURE: 98 F | HEART RATE: 74 BPM | SYSTOLIC BLOOD PRESSURE: 124 MMHG

## 2023-10-02 DIAGNOSIS — C78.89 METASTATIC ADENOCARCINOMA TO PANCREAS: Primary | ICD-10-CM

## 2023-10-02 DIAGNOSIS — C18.5 MALIGNANT NEOPLASM OF SPLENIC FLEXURE: ICD-10-CM

## 2023-10-02 PROCEDURE — 25000003 PHARM REV CODE 250: Performed by: INTERNAL MEDICINE

## 2023-10-02 PROCEDURE — 96413 CHEMO IV INFUSION 1 HR: CPT

## 2023-10-02 PROCEDURE — A4216 STERILE WATER/SALINE, 10 ML: HCPCS | Performed by: INTERNAL MEDICINE

## 2023-10-02 PROCEDURE — 63600175 PHARM REV CODE 636 W HCPCS: Performed by: INTERNAL MEDICINE

## 2023-10-02 RX ORDER — SODIUM CHLORIDE 0.9 % (FLUSH) 0.9 %
10 SYRINGE (ML) INJECTION
Status: DISCONTINUED | OUTPATIENT
Start: 2023-10-02 | End: 2023-10-02 | Stop reason: HOSPADM

## 2023-10-02 RX ORDER — DIPHENHYDRAMINE HYDROCHLORIDE 50 MG/ML
50 INJECTION INTRAMUSCULAR; INTRAVENOUS ONCE AS NEEDED
Status: DISCONTINUED | OUTPATIENT
Start: 2023-10-02 | End: 2023-10-02 | Stop reason: HOSPADM

## 2023-10-02 RX ORDER — EPINEPHRINE 0.3 MG/.3ML
0.3 INJECTION SUBCUTANEOUS ONCE AS NEEDED
Status: DISCONTINUED | OUTPATIENT
Start: 2023-10-02 | End: 2023-10-02 | Stop reason: HOSPADM

## 2023-10-02 RX ORDER — HEPARIN 100 UNIT/ML
500 SYRINGE INTRAVENOUS
Status: DISCONTINUED | OUTPATIENT
Start: 2023-10-02 | End: 2023-10-02 | Stop reason: HOSPADM

## 2023-10-02 RX ADMIN — SODIUM CHLORIDE, PRESERVATIVE FREE 10 ML: 5 INJECTION INTRAVENOUS at 02:10

## 2023-10-02 RX ADMIN — SODIUM CHLORIDE 200 MG: 9 INJECTION, SOLUTION INTRAVENOUS at 02:10

## 2023-10-02 RX ADMIN — HEPARIN 500 UNITS: 100 SYRINGE at 02:10

## 2023-10-02 NOTE — PLAN OF CARE
Problem: Fatigue  Goal: Improved Activity Tolerance  Outcome: Ongoing, Progressing  Intervention: Promote Improved Energy  Flowsheets (Taken 10/2/2023 5723)  Fatigue Management:   fatigue-related activity identified   paced activity encouraged   frequent rest breaks encouraged  Sleep/Rest Enhancement:   noise level reduced   relaxation techniques promoted   regular sleep/rest pattern promoted  Activity Management:   Ambulated -L4   Up in chair - L3

## 2023-10-09 ENCOUNTER — PATIENT MESSAGE (OUTPATIENT)
Dept: HEMATOLOGY/ONCOLOGY | Facility: CLINIC | Age: 58
End: 2023-10-09
Payer: COMMERCIAL

## 2023-10-10 DIAGNOSIS — G62.0 CHEMOTHERAPY-INDUCED NEUROPATHY: ICD-10-CM

## 2023-10-10 DIAGNOSIS — E11.00 TYPE 2 DIABETES MELLITUS WITH HYPEROSMOLARITY WITHOUT COMA, WITHOUT LONG-TERM CURRENT USE OF INSULIN: ICD-10-CM

## 2023-10-10 DIAGNOSIS — E78.49 OTHER HYPERLIPIDEMIA: ICD-10-CM

## 2023-10-10 DIAGNOSIS — T45.1X5A CHEMOTHERAPY-INDUCED NEUROPATHY: ICD-10-CM

## 2023-10-10 DIAGNOSIS — C78.89 METASTATIC ADENOCARCINOMA TO PANCREAS: ICD-10-CM

## 2023-10-10 DIAGNOSIS — C77.2 METASTASIS TO RETROPERITONEAL LYMPH NODE: ICD-10-CM

## 2023-10-10 DIAGNOSIS — I10 PRIMARY HYPERTENSION: ICD-10-CM

## 2023-10-10 DIAGNOSIS — C18.4 MALIGNANT NEOPLASM OF TRANSVERSE COLON: ICD-10-CM

## 2023-10-10 RX ORDER — MORPHINE SULFATE 30 MG/1
30 TABLET, FILM COATED, EXTENDED RELEASE ORAL 2 TIMES DAILY
Qty: 60 TABLET | Refills: 0 | Status: SHIPPED | OUTPATIENT
Start: 2023-10-10 | End: 2023-11-06 | Stop reason: SDUPTHER

## 2023-10-18 ENCOUNTER — PROCEDURE VISIT (OUTPATIENT)
Dept: DERMATOLOGY | Facility: CLINIC | Age: 58
End: 2023-10-18
Payer: COMMERCIAL

## 2023-10-18 ENCOUNTER — LAB VISIT (OUTPATIENT)
Dept: LAB | Facility: HOSPITAL | Age: 58
End: 2023-10-18
Attending: INTERNAL MEDICINE
Payer: COMMERCIAL

## 2023-10-18 VITALS
HEIGHT: 74 IN | HEART RATE: 74 BPM | WEIGHT: 214.94 LBS | BODY MASS INDEX: 27.59 KG/M2 | SYSTOLIC BLOOD PRESSURE: 141 MMHG | DIASTOLIC BLOOD PRESSURE: 82 MMHG

## 2023-10-18 DIAGNOSIS — C44.311 BASAL CELL CARCINOMA (BCC) OF NASAL TIP: ICD-10-CM

## 2023-10-18 DIAGNOSIS — C18.4 MALIGNANT NEOPLASM OF TRANSVERSE COLON: ICD-10-CM

## 2023-10-18 LAB
ALBUMIN SERPL BCP-MCNC: 4.2 G/DL (ref 3.5–5.2)
ALP SERPL-CCNC: 102 U/L (ref 55–135)
ALT SERPL W/O P-5'-P-CCNC: 20 U/L (ref 10–44)
ANION GAP SERPL CALC-SCNC: 11 MMOL/L (ref 8–16)
AST SERPL-CCNC: 19 U/L (ref 10–40)
BASOPHILS # BLD AUTO: 0.11 K/UL (ref 0–0.2)
BASOPHILS NFR BLD: 0.9 % (ref 0–1.9)
BILIRUB SERPL-MCNC: 0.4 MG/DL (ref 0.1–1)
BUN SERPL-MCNC: 14 MG/DL (ref 6–20)
CALCIUM SERPL-MCNC: 10.7 MG/DL (ref 8.7–10.5)
CHLORIDE SERPL-SCNC: 105 MMOL/L (ref 95–110)
CO2 SERPL-SCNC: 25 MMOL/L (ref 23–29)
CREAT SERPL-MCNC: 1 MG/DL (ref 0.5–1.4)
DIFFERENTIAL METHOD: ABNORMAL
EOSINOPHIL # BLD AUTO: 0.1 K/UL (ref 0–0.5)
EOSINOPHIL NFR BLD: 1 % (ref 0–8)
ERYTHROCYTE [DISTWIDTH] IN BLOOD BY AUTOMATED COUNT: 14 % (ref 11.5–14.5)
EST. GFR  (NO RACE VARIABLE): >60 ML/MIN/1.73 M^2
GLUCOSE SERPL-MCNC: 94 MG/DL (ref 70–110)
HCT VFR BLD AUTO: 43.2 % (ref 40–54)
HGB BLD-MCNC: 14.2 G/DL (ref 14–18)
IMM GRANULOCYTES # BLD AUTO: 0.05 K/UL (ref 0–0.04)
IMM GRANULOCYTES NFR BLD AUTO: 0.4 % (ref 0–0.5)
LYMPHOCYTES # BLD AUTO: 6.2 K/UL (ref 1–4.8)
LYMPHOCYTES NFR BLD: 48.8 % (ref 18–48)
MCH RBC QN AUTO: 29.7 PG (ref 27–31)
MCHC RBC AUTO-ENTMCNC: 32.9 G/DL (ref 32–36)
MCV RBC AUTO: 90 FL (ref 82–98)
MONOCYTES # BLD AUTO: 1.1 K/UL (ref 0.3–1)
MONOCYTES NFR BLD: 8.4 % (ref 4–15)
NEUTROPHILS # BLD AUTO: 5.1 K/UL (ref 1.8–7.7)
NEUTROPHILS NFR BLD: 40.5 % (ref 38–73)
NRBC BLD-RTO: 0 /100 WBC
PLATELET # BLD AUTO: 374 K/UL (ref 150–450)
PLATELET BLD QL SMEAR: ABNORMAL
PMV BLD AUTO: 10.3 FL (ref 9.2–12.9)
POTASSIUM SERPL-SCNC: 4.7 MMOL/L (ref 3.5–5.1)
PROT SERPL-MCNC: 7.7 G/DL (ref 6–8.4)
RBC # BLD AUTO: 4.78 M/UL (ref 4.6–6.2)
SODIUM SERPL-SCNC: 141 MMOL/L (ref 136–145)
TSH SERPL DL<=0.005 MIU/L-ACNC: 2.06 UIU/ML (ref 0.4–4)
WBC # BLD AUTO: 12.64 K/UL (ref 3.9–12.7)

## 2023-10-18 PROCEDURE — 17311 MOHS 1 STAGE H/N/HF/G: CPT | Mod: S$GLB,,, | Performed by: DERMATOLOGY

## 2023-10-18 PROCEDURE — 13152 PR RECMPL WND LID,NOS,EAR 2.6-7.5 CM: ICD-10-PCS | Mod: 51,S$GLB,, | Performed by: DERMATOLOGY

## 2023-10-18 PROCEDURE — 17311: ICD-10-PCS | Mod: S$GLB,,, | Performed by: DERMATOLOGY

## 2023-10-18 PROCEDURE — 13152 CMPLX RPR E/N/E/L 2.6-7.5 CM: CPT | Mod: 51,S$GLB,, | Performed by: DERMATOLOGY

## 2023-10-18 PROCEDURE — 17312 MOHS ADDL STAGE: CPT | Mod: S$GLB,,, | Performed by: DERMATOLOGY

## 2023-10-18 PROCEDURE — 85025 COMPLETE CBC W/AUTO DIFF WBC: CPT | Performed by: INTERNAL MEDICINE

## 2023-10-18 PROCEDURE — 80053 COMPREHEN METABOLIC PANEL: CPT | Performed by: INTERNAL MEDICINE

## 2023-10-18 PROCEDURE — 17312: ICD-10-PCS | Mod: S$GLB,,, | Performed by: DERMATOLOGY

## 2023-10-18 PROCEDURE — 84443 ASSAY THYROID STIM HORMONE: CPT | Performed by: INTERNAL MEDICINE

## 2023-10-18 PROCEDURE — 36415 COLL VENOUS BLD VENIPUNCTURE: CPT | Mod: PO | Performed by: INTERNAL MEDICINE

## 2023-10-19 NOTE — PROGRESS NOTES
MOHS MICROGRAPHIC SURGERY OPERATIVE NOTE  Name:  Osman Li Jr.  Date: 10/18/2023  Patient : 1965  Attending Surgeon: Olga Siddiqi MD  Assistants: Ayaka Perdomo - CAMRYN, Ada Ware - Surgical Technician, Pepper Norwood - Histology Technician, and Naa García - Histology Technician  Anesthetic Agent: 1% Lidocaine with 1:200,000 epinephrine  Clinical Diagnosis: basal cell carcinoma   Operation: Mohs Micrographic Surgery  Location: nasal tip  Indications: Location in mask areas of face including central face, nose, eyelids, eyebrows, lips, chin, preauricular, temple, and ear.  Surgical Preparation: povidone-iodine     Description of Operation:  The nature and purpose of the procedure, associated risks and alternative treatments were explained to the patient in detail. All patient questions were answered completely. An informed operative consent and photography permit were obtained. The tumor location was then identified and marked with agreement by the patient of the correct location. The patient was positioned, prepped, and draped in the usual sterile manner. Local anesthesia was obtained with 1 cc(s) of 1% Lidocaine with 1:200,000 epinephrine. The lesion pre-operatively measures 0.5 by 0.5 cm.     1ST STAGE:  A 2+ mm rim of normal appearing skin was marked circumferentially around the lesion after scraping with a curette to define the margin. The area thus outlined was excised at a 45 degree angle. Hemostasis was obtained with electrodesiccation. The specimen was oriented, mapped, and subdivided into at least two sections. Sections were then chromacoded and submitted for horizontal frozen sections. The patient tolerated the procedure well and there were no complications. Upon microscopic examination of the processed horizontal frozen sections of this stage:  Tumor was present at the margin of the specimen; these areas of residual tumor were marked on the reference map with red  maglai, pinpointing the location in which further tissue excision was necessary.  Tumor type noted on stage 1: Nodular basal cell carcinoma: Nodular tumor in dermis composed of basaloid cells exhibiting peripheral palisading and retraction artifact.     SUBSEQUENT STAGES:  The patient returned to the operating room for additional stages of tumor removal, and prepped in the manner described above. Surgery was directed to the areas having residual tumor, with thin layers of tissue being excised from these regions. A new reference map was prepared during the surgery to maintain precise orientation as described above. Hemostasis was achieved and the excised tissue was processed for microscopic analysis.  These sections were then examined by the Mohs surgeon, and areas of persistent tumor were indicated on the reference map. This process was repeated until the frozen horizontal sections of STAGE 2 revealed no further tumor cells and tumor eradication was considered to be  complete.  Tumor type noted on subsequent stages: No tumor seen.     SUMMARY:  The tumor was extirpated in 2 Mohs Stages resulting in a final defect measuring 0.9 by 0.8 cm². The final defect extended deep to subcutaneous fat  The patient tolerated the procedure well and no complications were noted.     PHOTOS:      Olga Siddiqi MD    Complex Linear Closure Operative Note  Name: Osman Li Jr.  YOB: 1965  Date: 10/18/2023  Attending Surgeon: Olga Siddiqi MD FAAD  Assistants:  Ayaka Perdomo - CAMRYN and Ada Ware - Surgical Technician  Clinical Diagnosis: A 0.9 by 0.8 cm defect secondary to Mohs Micrographic Surgery  Location: nasal tip  Operation: Complex linear closure  Surgical Preparation: broad scrub with povidone-iodine    Description of Operation:  The nature and purpose of the procedure, associated risks and alternative treatments were explained to the patient in detail. All patient questions were  answered completely. In order to minimize tension on the closure and avoid compromising the anatomic contour of the cosmetic region and maximize functional capacity, a complex linear closure was performed. An informed operative consent and photography permit were obtained. The patient was positioned, prepped, and draped in the usual sterile manner. Local anesthesia was obtained with 3 cc(s) of 1% Lidocaine with 1:200,000 epinephrine.    The defect edges were debeveled with a scalpel blade. The circular defect was widely undermined in the deep subcutaneous plane at least 100% of the defect diameter to allow for maximum tissue movement. Hemostasis was achieved with spot electrodessication. The defect was closed first utilizing multiple 4-0 Vicryl interrupted buried subcutaneous sutures. This resulted in excellent apposition of the dermis and epidermis, however two redundant areas of tissue were created on opposite sides of the defect. Utilizing a #15 scalpel blade, two Burows triangles were excised to ensure a flat scar. Hemostasis was obtained with spot electrodessication. The skin edges throughout further fastened superficially with 5-0 Nylon. The final length of the repair was 3.2 cm. The patient tolerated the procedure well and there were no complications.    Polysporin, non-adherent gauze and a pressure dressing were applied to wound after gentle cleansing with saline.    Patient instructed in and provided with instructions for post-op care, will RTC in 7 days for suture removal    Post op meds: None    Photos:      Olga Siddiqi MD

## 2023-10-20 ENCOUNTER — OFFICE VISIT (OUTPATIENT)
Dept: HEMATOLOGY/ONCOLOGY | Facility: CLINIC | Age: 58
End: 2023-10-20
Payer: COMMERCIAL

## 2023-10-20 VITALS
WEIGHT: 210.13 LBS | RESPIRATION RATE: 18 BRPM | TEMPERATURE: 99 F | SYSTOLIC BLOOD PRESSURE: 139 MMHG | OXYGEN SATURATION: 98 % | HEIGHT: 74 IN | BODY MASS INDEX: 26.97 KG/M2 | DIASTOLIC BLOOD PRESSURE: 80 MMHG | HEART RATE: 80 BPM

## 2023-10-20 DIAGNOSIS — C77.2 METASTASIS TO RETROPERITONEAL LYMPH NODE: ICD-10-CM

## 2023-10-20 DIAGNOSIS — I10 PRIMARY HYPERTENSION: ICD-10-CM

## 2023-10-20 DIAGNOSIS — C18.4 MALIGNANT NEOPLASM OF TRANSVERSE COLON: ICD-10-CM

## 2023-10-20 DIAGNOSIS — C78.89 METASTATIC ADENOCARCINOMA TO PANCREAS: Primary | ICD-10-CM

## 2023-10-20 DIAGNOSIS — G62.0 CHEMOTHERAPY-INDUCED NEUROPATHY: ICD-10-CM

## 2023-10-20 DIAGNOSIS — E83.52 HYPERCALCEMIA: ICD-10-CM

## 2023-10-20 DIAGNOSIS — E11.00 TYPE 2 DIABETES MELLITUS WITH HYPEROSMOLARITY WITHOUT COMA, WITHOUT LONG-TERM CURRENT USE OF INSULIN: ICD-10-CM

## 2023-10-20 DIAGNOSIS — E78.49 OTHER HYPERLIPIDEMIA: ICD-10-CM

## 2023-10-20 DIAGNOSIS — T45.1X5A CHEMOTHERAPY-INDUCED NEUROPATHY: ICD-10-CM

## 2023-10-20 PROCEDURE — 1160F PR REVIEW ALL MEDS BY PRESCRIBER/CLIN PHARMACIST DOCUMENTED: ICD-10-PCS | Mod: CPTII,S$GLB,, | Performed by: INTERNAL MEDICINE

## 2023-10-20 PROCEDURE — 4010F ACE/ARB THERAPY RXD/TAKEN: CPT | Mod: CPTII,S$GLB,, | Performed by: INTERNAL MEDICINE

## 2023-10-20 PROCEDURE — 99215 PR OFFICE/OUTPT VISIT, EST, LEVL V, 40-54 MIN: ICD-10-PCS | Mod: S$GLB,,, | Performed by: INTERNAL MEDICINE

## 2023-10-20 PROCEDURE — 99999 PR PBB SHADOW E&M-EST. PATIENT-LVL IV: ICD-10-PCS | Mod: PBBFAC,,, | Performed by: INTERNAL MEDICINE

## 2023-10-20 PROCEDURE — 99215 OFFICE O/P EST HI 40 MIN: CPT | Mod: S$GLB,,, | Performed by: INTERNAL MEDICINE

## 2023-10-20 PROCEDURE — 3008F BODY MASS INDEX DOCD: CPT | Mod: CPTII,S$GLB,, | Performed by: INTERNAL MEDICINE

## 2023-10-20 PROCEDURE — 1159F MED LIST DOCD IN RCRD: CPT | Mod: CPTII,S$GLB,, | Performed by: INTERNAL MEDICINE

## 2023-10-20 PROCEDURE — 99999 PR PBB SHADOW E&M-EST. PATIENT-LVL IV: CPT | Mod: PBBFAC,,, | Performed by: INTERNAL MEDICINE

## 2023-10-20 PROCEDURE — 1159F PR MEDICATION LIST DOCUMENTED IN MEDICAL RECORD: ICD-10-PCS | Mod: CPTII,S$GLB,, | Performed by: INTERNAL MEDICINE

## 2023-10-20 PROCEDURE — 3079F PR MOST RECENT DIASTOLIC BLOOD PRESSURE 80-89 MM HG: ICD-10-PCS | Mod: CPTII,S$GLB,, | Performed by: INTERNAL MEDICINE

## 2023-10-20 PROCEDURE — 3044F PR MOST RECENT HEMOGLOBIN A1C LEVEL <7.0%: ICD-10-PCS | Mod: CPTII,S$GLB,, | Performed by: INTERNAL MEDICINE

## 2023-10-20 PROCEDURE — 1160F RVW MEDS BY RX/DR IN RCRD: CPT | Mod: CPTII,S$GLB,, | Performed by: INTERNAL MEDICINE

## 2023-10-20 PROCEDURE — 3008F PR BODY MASS INDEX (BMI) DOCUMENTED: ICD-10-PCS | Mod: CPTII,S$GLB,, | Performed by: INTERNAL MEDICINE

## 2023-10-20 PROCEDURE — 4010F PR ACE/ARB THEARPY RXD/TAKEN: ICD-10-PCS | Mod: CPTII,S$GLB,, | Performed by: INTERNAL MEDICINE

## 2023-10-20 PROCEDURE — 3075F PR MOST RECENT SYSTOLIC BLOOD PRESS GE 130-139MM HG: ICD-10-PCS | Mod: CPTII,S$GLB,, | Performed by: INTERNAL MEDICINE

## 2023-10-20 PROCEDURE — 3075F SYST BP GE 130 - 139MM HG: CPT | Mod: CPTII,S$GLB,, | Performed by: INTERNAL MEDICINE

## 2023-10-20 PROCEDURE — 3079F DIAST BP 80-89 MM HG: CPT | Mod: CPTII,S$GLB,, | Performed by: INTERNAL MEDICINE

## 2023-10-20 PROCEDURE — 3044F HG A1C LEVEL LT 7.0%: CPT | Mod: CPTII,S$GLB,, | Performed by: INTERNAL MEDICINE

## 2023-10-20 RX ORDER — DIPHENHYDRAMINE HYDROCHLORIDE 50 MG/ML
50 INJECTION INTRAMUSCULAR; INTRAVENOUS ONCE AS NEEDED
Status: CANCELLED | OUTPATIENT
Start: 2023-10-23

## 2023-10-20 RX ORDER — SODIUM CHLORIDE 0.9 % (FLUSH) 0.9 %
10 SYRINGE (ML) INJECTION
Status: CANCELLED | OUTPATIENT
Start: 2023-10-23

## 2023-10-20 RX ORDER — EPINEPHRINE 0.3 MG/.3ML
0.3 INJECTION SUBCUTANEOUS ONCE AS NEEDED
Status: CANCELLED | OUTPATIENT
Start: 2023-10-23

## 2023-10-20 RX ORDER — HEPARIN 100 UNIT/ML
500 SYRINGE INTRAVENOUS
Status: CANCELLED | OUTPATIENT
Start: 2023-10-23

## 2023-10-20 NOTE — PROGRESS NOTES
"Service Date:  10/20/23    Chief Complaint:  Colon cancer    Osman Li Jr. is a 58 y.o. 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  This occurred shortly after completing treatment.    Patient was part of a clinical trial to measure CT DNA.  Next generation sequencing also showed a high tumor burden.    He is now on Keytruda as his cancer has a very high tumor mutation burden.  He has MARY however.  So far, Keytruda has been helping    Here for 13 cycle.  Doing well.  Recent PET scan did show migration to a periaortic lymph node.  Patient had radiation to this spot.  Responded well.  CEA has trended down since then. No new complaints to me today.    Review of Systems   Constitutional:  Positive for fatigue.   HENT: Negative.     Eyes: Negative.    Respiratory: Negative.     Cardiovascular: Negative.    Gastrointestinal: Negative.    Endocrine: Negative.    Genitourinary: Negative.    Musculoskeletal: Negative.    Integumentary:  Negative.   Neurological:  Positive for numbness.   Hematological: Negative.    Psychiatric/Behavioral: Negative.          Current Outpatient Medications   Medication Instructions    atorvastatin (LIPITOR) 20 mg, Oral, Daily    enalapriL-hydrochlorothiazide (VASERETIC) 10-25 mg per tablet 1 tablet, Oral, Daily    haemophilus B polysac-tetanus toxoid (ACTHIB, PF,) 10 mcg/0.5 mL injection TO BE ADMINISTERED.    metFORMIN (GLUCOPHAGE) 1,000 mg, Oral, 2 times daily with meals    morphine (MS CONTIN) 30 mg, Oral, 2 times daily    pen needle, diabetic 31 gauge x 3/16" Ndle 1 each, Misc.(Non-Drug; Combo Route), Daily    promethazine (PHENERGAN) 12.5 mg, Oral, Every 4 hours PRN    sildenafiL (VIAGRA) 100 mg, Oral, Daily PRN    TOUJEO MAX U-300 SOLOSTAR 26 Units, Subcutaneous, Daily    traZODone (DESYREL) 50 mg, Oral, Nightly        Past Medical History:   Diagnosis Date    Digestive disorder  "    DM (diabetes mellitus)     Hyperlipidemia     Hypertension     Prediabetes         Past Surgical History:   Procedure Laterality Date    CHOLECYSTECTOMY  2011    COLON SURGERY      COLONOSCOPY      2018    COLOSTOMY N/A 04/25/2022    Procedure: CREATION, COLOSTOMY;  Surgeon: Carlton Waddell MD;  Location: [facility] OR;  Service: General;  Laterality: N/A;    ENDOSCOPIC ULTRASOUND OF UPPER GASTROINTESTINAL TRACT N/A 1/6/2023    Procedure: ULTRASOUND, UPPER GI TRACT, ENDOSCOPIC;  Surgeon: Rinku Orozco III, MD;  Location: [facility] ENDO;  Service: Endoscopy;  Laterality: N/A;    INSERTION OF TUNNELED CENTRAL VENOUS CATHETER (CVC) WITH SUBCUTANEOUS PORT Right 05/18/2022    Procedure: [id number]-I-CATH;  Surgeon: Carlton Waddell MD;  Location: [facility] OR;  Service: General;  Laterality: Right;    MOBILIZATION OF SPLENIC FLEXURE N/A 04/25/2022    Procedure: MOBILIZATION, SPLENIC FLEXURE;  Surgeon: Carlton Waddell MD;  Location: [facility] OR;  Service: General;  Laterality: N/A;    SPLENECTOMY N/A 04/25/2022    Procedure: SPLENECTOMY;  Surgeon: Carlton Waddell MD;  Location: [facility] OR;  Service: General;  Laterality: N/A;    SUBTOTAL COLECTOMY N/A 04/25/2022    Procedure: COLECTOMY, PARTIAL;  Surgeon: Carlton Waddell MD;  Location: [facility] OR;  Service: General;  Laterality: N/A;    TONSILLECTOMY          Family History   Problem Relation Age of Onset    Heart disease Father     Rectal cancer Sister         half sister       Social History     Tobacco Use    Smoking status: Every Day     Current packs/day: 1.00     Average packs/day: 1 pack/day for 40.0 years (40.0 ttl pk-yrs)     Types: Cigarettes     Passive exposure: Current    Smokeless tobacco: Never   Substance Use Topics    Alcohol use: Not Currently    Drug use: Never         Vitals:    10/20/23 0916   BP: 139/80   Pulse: 80   Resp: 18   Temp: 98.7 °F (37.1 °C)          Physical Exam:  /80 (BP Location: Left arm, Patient Position: Sitting, BP Method: Small  "(Automatic))   Pulse 80   Temp 98.7 °F (37.1 °C) (Temporal)   Resp 18   Ht 6' 2" (1.88 m)   Wt 95.3 kg (210 lb 1.6 oz)   SpO2 98%   BMI 26.98 kg/m²     Physical Exam  Vitals and nursing note reviewed.   Constitutional:       Appearance: Normal appearance.   HENT:      Head: Normocephalic and atraumatic.      Nose: Nose normal.      Mouth/Throat:      Mouth: Mucous membranes are moist.      Pharynx: Oropharynx is clear.   Eyes:      Extraocular Movements: Extraocular movements intact.      Conjunctiva/sclera: Conjunctivae normal.   Cardiovascular:      Rate and Rhythm: Normal rate and regular rhythm.      Heart sounds: Normal heart sounds.   Pulmonary:      Effort: Pulmonary effort is normal.      Breath sounds: Normal breath sounds.   Abdominal:      General: Abdomen is flat. Bowel sounds are normal.      Palpations: Abdomen is soft.      Comments: Ostomy bag in place.   Musculoskeletal:         General: Normal range of motion.      Cervical back: Normal range of motion and neck supple.   Skin:     General: Skin is warm and dry.   Neurological:      General: No focal deficit present.      Mental Status: He is alert and oriented to person, place, and time. Mental status is at baseline.   Psychiatric:         Mood and Affect: Mood normal.          Labs:  Lab Results   Component Value Date    WBC 12.64 10/18/2023    RBC 4.78 10/18/2023    HGB 14.2 10/18/2023    HCT 43.2 10/18/2023    MCV 90 10/18/2023    MCH 29.7 10/18/2023    MCHC 32.9 10/18/2023    RDW 14.0 10/18/2023     10/18/2023    MPV 10.3 10/18/2023    GRAN 5.1 10/18/2023    GRAN 40.5 10/18/2023    LYMPH 6.2 (H) 10/18/2023    LYMPH 48.8 (H) 10/18/2023    MONO 1.1 (H) 10/18/2023    MONO 8.4 10/18/2023    EOS 0.1 10/18/2023    BASO 0.11 10/18/2023    EOSINOPHIL 1.0 10/18/2023    BASOPHIL 0.9 10/18/2023     Sodium   Date Value Ref Range Status   10/18/2023 141 136 - 145 mmol/L Final     Potassium   Date Value Ref Range Status   10/18/2023 4.7 3.5 - " 5.1 mmol/L Final     Chloride   Date Value Ref Range Status   10/18/2023 105 95 - 110 mmol/L Final     CO2   Date Value Ref Range Status   10/18/2023 25 23 - 29 mmol/L Final     Glucose   Date Value Ref Range Status   10/18/2023 94 70 - 110 mg/dL Final     BUN   Date Value Ref Range Status   10/18/2023 14 6 - 20 mg/dL Final     Creatinine   Date Value Ref Range Status   10/18/2023 1.0 0.5 - 1.4 mg/dL Final     Calcium   Date Value Ref Range Status   10/18/2023 10.7 (H) 8.7 - 10.5 mg/dL Final     Total Protein   Date Value Ref Range Status   10/18/2023 7.7 6.0 - 8.4 g/dL Final     Albumin   Date Value Ref Range Status   10/18/2023 4.2 3.5 - 5.2 g/dL Final     Total Bilirubin   Date Value Ref Range Status   10/18/2023 0.4 0.1 - 1.0 mg/dL Final     Comment:     For infants and newborns, interpretation of results should be based  on gestational age, weight and in agreement with clinical  observations.    Premature Infant recommended reference ranges:  Up to 24 hours.............<8.0 mg/dL  Up to 48 hours............<12.0 mg/dL  3-5 days..................<15.0 mg/dL  6-29 days.................<15.0 mg/dL       Alkaline Phosphatase   Date Value Ref Range Status   10/18/2023 102 55 - 135 U/L Final     AST   Date Value Ref Range Status   10/18/2023 19 10 - 40 U/L Final     ALT   Date Value Ref Range Status   10/18/2023 20 10 - 44 U/L Final     Anion Gap   Date Value Ref Range Status   10/18/2023 11 8 - 16 mmol/L Final     eGFR if    Date Value Ref Range Status   07/25/2022 >60 >60 mL/min/1.73 m^2 Final     eGFR if non    Date Value Ref Range Status   07/25/2022 >60 >60 mL/min/1.73 m^2 Final     Comment:     Calculation used to obtain the estimated glomerular filtration  rate (eGFR) is the CKD-EPI equation.          A/P:    Malignant neoplasm of the transverse colon  Recurrence in the pancreas  -poorly differentiated adenocarcinoma  -recurrence right after completing 12 cycles of  FOLFOX  -patient on clinical trial to measure ctDNA, showed high tumor mutation burden  -given that the patient has a high tumor mutation burden, despite having MARY, now on Keytruda as it is indicated in his next generation sequencing.    -proceed with Keytruda C12 today  -Recent PET scan did show migration to a periaortic lymph node.  Patient had radiation to this 1 spot as the Keytruda as he has been helping everywhere else. I will now repeat PET scan in view of rising calcium levels  -RTC in 3 weeks with labs, PET, and for next treatment    Hypercalcemia  -will monitor for now    Pancreatic mass   -confirmed recurrence of colon adenocarcinoma    Back pain  - NM bone scan negative for mets  -increase morphine dose to 30 mg as patient feels 15 mg not lasting long enough    HTN  - on Enalapril  - follow up with PCP    HLP  - follow up with PCP    DM2  - on Metformin  - follow up with PCP    Tobacco use  - counseled on cessation      Aurash Khoobehi, MD  Hematology and Oncology

## 2023-10-23 ENCOUNTER — INFUSION (OUTPATIENT)
Dept: INFUSION THERAPY | Facility: HOSPITAL | Age: 58
End: 2023-10-23
Attending: INTERNAL MEDICINE
Payer: COMMERCIAL

## 2023-10-23 ENCOUNTER — PATIENT MESSAGE (OUTPATIENT)
Dept: HEMATOLOGY/ONCOLOGY | Facility: CLINIC | Age: 58
End: 2023-10-23
Payer: COMMERCIAL

## 2023-10-23 VITALS
TEMPERATURE: 98 F | HEART RATE: 82 BPM | DIASTOLIC BLOOD PRESSURE: 79 MMHG | RESPIRATION RATE: 18 BRPM | SYSTOLIC BLOOD PRESSURE: 133 MMHG | BODY MASS INDEX: 27.75 KG/M2 | HEIGHT: 74 IN | WEIGHT: 216.25 LBS

## 2023-10-23 DIAGNOSIS — C18.5 MALIGNANT NEOPLASM OF SPLENIC FLEXURE: ICD-10-CM

## 2023-10-23 DIAGNOSIS — C78.89 METASTATIC ADENOCARCINOMA TO PANCREAS: Primary | ICD-10-CM

## 2023-10-23 PROCEDURE — 96413 CHEMO IV INFUSION 1 HR: CPT

## 2023-10-23 PROCEDURE — 63600175 PHARM REV CODE 636 W HCPCS: Performed by: INTERNAL MEDICINE

## 2023-10-23 PROCEDURE — A4216 STERILE WATER/SALINE, 10 ML: HCPCS | Performed by: INTERNAL MEDICINE

## 2023-10-23 PROCEDURE — 25000003 PHARM REV CODE 250: Performed by: INTERNAL MEDICINE

## 2023-10-23 RX ORDER — EPINEPHRINE 0.3 MG/.3ML
0.3 INJECTION SUBCUTANEOUS ONCE AS NEEDED
Status: DISCONTINUED | OUTPATIENT
Start: 2023-10-23 | End: 2023-10-23 | Stop reason: HOSPADM

## 2023-10-23 RX ORDER — SODIUM CHLORIDE 0.9 % (FLUSH) 0.9 %
10 SYRINGE (ML) INJECTION
Status: DISCONTINUED | OUTPATIENT
Start: 2023-10-23 | End: 2023-10-23 | Stop reason: HOSPADM

## 2023-10-23 RX ORDER — DIPHENHYDRAMINE HYDROCHLORIDE 50 MG/ML
50 INJECTION INTRAMUSCULAR; INTRAVENOUS ONCE AS NEEDED
Status: DISCONTINUED | OUTPATIENT
Start: 2023-10-23 | End: 2023-10-23 | Stop reason: HOSPADM

## 2023-10-23 RX ORDER — HEPARIN 100 UNIT/ML
500 SYRINGE INTRAVENOUS
Status: DISCONTINUED | OUTPATIENT
Start: 2023-10-23 | End: 2023-10-23 | Stop reason: HOSPADM

## 2023-10-23 RX ADMIN — HEPARIN 500 UNITS: 100 SYRINGE at 02:10

## 2023-10-23 RX ADMIN — SODIUM CHLORIDE 200 MG: 9 INJECTION, SOLUTION INTRAVENOUS at 02:10

## 2023-10-23 RX ADMIN — SODIUM CHLORIDE, PRESERVATIVE FREE 10 ML: 5 INJECTION INTRAVENOUS at 02:10

## 2023-10-23 NOTE — PLAN OF CARE
Problem: Fatigue  Goal: Improved Activity Tolerance  10/23/2023 1400 by Shobha Gordillo, RN  Outcome: Met  10/23/2023 1400 by Shobha Gordillo, RN  Outcome: Ongoing, Progressing

## 2023-10-24 ENCOUNTER — OFFICE VISIT (OUTPATIENT)
Dept: FAMILY MEDICINE | Facility: CLINIC | Age: 58
End: 2023-10-24
Payer: COMMERCIAL

## 2023-10-24 ENCOUNTER — PATIENT MESSAGE (OUTPATIENT)
Dept: HEMATOLOGY/ONCOLOGY | Facility: CLINIC | Age: 58
End: 2023-10-24
Payer: COMMERCIAL

## 2023-10-24 VITALS
OXYGEN SATURATION: 98 % | DIASTOLIC BLOOD PRESSURE: 76 MMHG | HEIGHT: 74 IN | BODY MASS INDEX: 27.7 KG/M2 | HEART RATE: 80 BPM | WEIGHT: 215.81 LBS | SYSTOLIC BLOOD PRESSURE: 130 MMHG

## 2023-10-24 DIAGNOSIS — C18.4 MALIGNANT NEOPLASM OF TRANSVERSE COLON: ICD-10-CM

## 2023-10-24 DIAGNOSIS — E11.69 DM TYPE 2 WITH DIABETIC DYSLIPIDEMIA: Primary | ICD-10-CM

## 2023-10-24 DIAGNOSIS — E78.2 MIXED HYPERLIPIDEMIA: ICD-10-CM

## 2023-10-24 DIAGNOSIS — I10 ESSENTIAL HYPERTENSION: ICD-10-CM

## 2023-10-24 DIAGNOSIS — N52.9 ERECTILE DYSFUNCTION, UNSPECIFIED ERECTILE DYSFUNCTION TYPE: ICD-10-CM

## 2023-10-24 DIAGNOSIS — E78.5 DM TYPE 2 WITH DIABETIC DYSLIPIDEMIA: Primary | ICD-10-CM

## 2023-10-24 LAB — HBA1C MFR BLD: 7.4 %

## 2023-10-24 PROCEDURE — 3075F PR MOST RECENT SYSTOLIC BLOOD PRESS GE 130-139MM HG: ICD-10-PCS | Mod: CPTII,S$GLB,, | Performed by: NURSE PRACTITIONER

## 2023-10-24 PROCEDURE — 3008F PR BODY MASS INDEX (BMI) DOCUMENTED: ICD-10-PCS | Mod: CPTII,S$GLB,, | Performed by: NURSE PRACTITIONER

## 2023-10-24 PROCEDURE — 4010F PR ACE/ARB THEARPY RXD/TAKEN: ICD-10-PCS | Mod: CPTII,S$GLB,, | Performed by: NURSE PRACTITIONER

## 2023-10-24 PROCEDURE — 3075F SYST BP GE 130 - 139MM HG: CPT | Mod: CPTII,S$GLB,, | Performed by: NURSE PRACTITIONER

## 2023-10-24 PROCEDURE — 4010F ACE/ARB THERAPY RXD/TAKEN: CPT | Mod: CPTII,S$GLB,, | Performed by: NURSE PRACTITIONER

## 2023-10-24 PROCEDURE — 3051F PR MOST RECENT HEMOGLOBIN A1C LEVEL 7.0 - < 8.0%: ICD-10-PCS | Mod: CPTII,S$GLB,, | Performed by: NURSE PRACTITIONER

## 2023-10-24 PROCEDURE — 3008F BODY MASS INDEX DOCD: CPT | Mod: CPTII,S$GLB,, | Performed by: NURSE PRACTITIONER

## 2023-10-24 PROCEDURE — 1160F RVW MEDS BY RX/DR IN RCRD: CPT | Mod: CPTII,S$GLB,, | Performed by: NURSE PRACTITIONER

## 2023-10-24 PROCEDURE — 99214 PR OFFICE/OUTPT VISIT, EST, LEVL IV, 30-39 MIN: ICD-10-PCS | Mod: S$GLB,,, | Performed by: NURSE PRACTITIONER

## 2023-10-24 PROCEDURE — 83036 HEMOGLOBIN GLYCOSYLATED A1C: CPT | Mod: QW,,, | Performed by: NURSE PRACTITIONER

## 2023-10-24 PROCEDURE — 99214 OFFICE O/P EST MOD 30 MIN: CPT | Mod: S$GLB,,, | Performed by: NURSE PRACTITIONER

## 2023-10-24 PROCEDURE — 1159F MED LIST DOCD IN RCRD: CPT | Mod: CPTII,S$GLB,, | Performed by: NURSE PRACTITIONER

## 2023-10-24 PROCEDURE — 3078F PR MOST RECENT DIASTOLIC BLOOD PRESSURE < 80 MM HG: ICD-10-PCS | Mod: CPTII,S$GLB,, | Performed by: NURSE PRACTITIONER

## 2023-10-24 PROCEDURE — 1160F PR REVIEW ALL MEDS BY PRESCRIBER/CLIN PHARMACIST DOCUMENTED: ICD-10-PCS | Mod: CPTII,S$GLB,, | Performed by: NURSE PRACTITIONER

## 2023-10-24 PROCEDURE — 3051F HG A1C>EQUAL 7.0%<8.0%: CPT | Mod: CPTII,S$GLB,, | Performed by: NURSE PRACTITIONER

## 2023-10-24 PROCEDURE — 83036 POCT HEMOGLOBIN A1C: ICD-10-PCS | Mod: QW,,, | Performed by: NURSE PRACTITIONER

## 2023-10-24 PROCEDURE — 1159F PR MEDICATION LIST DOCUMENTED IN MEDICAL RECORD: ICD-10-PCS | Mod: CPTII,S$GLB,, | Performed by: NURSE PRACTITIONER

## 2023-10-24 PROCEDURE — 3078F DIAST BP <80 MM HG: CPT | Mod: CPTII,S$GLB,, | Performed by: NURSE PRACTITIONER

## 2023-10-24 RX ORDER — ATORVASTATIN CALCIUM 20 MG/1
20 TABLET, FILM COATED ORAL DAILY
Qty: 90 TABLET | Refills: 3 | Status: SHIPPED | OUTPATIENT
Start: 2023-10-24

## 2023-10-24 RX ORDER — ENALAPRIL MALEATE AND HYDROCHLOROTHIAZIDE 10; 25 MG/1; MG/1
1 TABLET ORAL DAILY
Qty: 90 TABLET | Refills: 3 | Status: SHIPPED | OUTPATIENT
Start: 2023-10-24

## 2023-10-24 RX ORDER — METFORMIN HYDROCHLORIDE 1000 MG/1
1000 TABLET ORAL 2 TIMES DAILY WITH MEALS
Qty: 180 TABLET | Refills: 3 | Status: SHIPPED | OUTPATIENT
Start: 2023-10-24

## 2023-10-24 RX ORDER — SILDENAFIL 100 MG/1
100 TABLET, FILM COATED ORAL DAILY PRN
Qty: 30 TABLET | Refills: 3 | Status: SHIPPED | OUTPATIENT
Start: 2023-10-24

## 2023-10-24 NOTE — PROGRESS NOTES
SUBJECTIVE:    Patient ID: Osman Li Jr. is a 58 y.o. male.    Chief Complaint: Diabetes (Bottles brought//Pt is here for a check up and medication refills//Decline flu vaccine//Decline colo//JULIUS )    Patient here for regular follow-up- DM , HTN, lipids.     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 Khoobehi. On last PET scan found to have metastatic periaortic lymph node so underwent 5 radiation treatments. 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 No issues with colostomy    Reports most FBS running in the 120s but occasionally will see FBS up to 140-150s. Denies any significant change in diet. Is regaining his weight he lost after surgery.  Had previously been on Ozempic though stopped it due to abdominal pain    Recently had Mohs procedure to skin cancer on his nose, has f/u appt tomorrow    Still smoking 1ppd, states really not interested in quitting.  Denies cough, wheeze or shortness of breath. Subpleural blebs and 2mm lung nodule on CT scan        Office Visit on 10/24/2023   Component Date Value Ref Range Status    Hemoglobin A1C, POC 10/24/2023 7.4  % Final   Lab Visit on 10/18/2023   Component Date Value Ref Range Status    WBC 10/18/2023 12.64  3.90 - 12.70 K/uL Final    RBC 10/18/2023 4.78  4.60 - 6.20 M/uL Final    Hemoglobin 10/18/2023 14.2  14.0 - 18.0 g/dL Final    Hematocrit 10/18/2023 43.2  40.0 - 54.0 % Final    MCV 10/18/2023 90  82 - 98 fL Final    MCH 10/18/2023 29.7  27.0 - 31.0 pg Final    MCHC 10/18/2023 32.9  32.0 - 36.0 g/dL Final    RDW 10/18/2023 14.0  11.5 - 14.5 % Final    Platelets 10/18/2023 374  150 - 450 K/uL Final    MPV 10/18/2023 10.3  9.2 - 12.9 fL Final    Immature Granulocytes 10/18/2023 0.4  0.0 - 0.5 % Final    Gran # (ANC) 10/18/2023 5.1  1.8 - 7.7 K/uL Final    Immature Grans (Abs) 10/18/2023 0.05 (H)  0.00 - 0.04 K/uL  Final    Lymph # 10/18/2023 6.2 (H)  1.0 - 4.8 K/uL Final    Mono # 10/18/2023 1.1 (H)  0.3 - 1.0 K/uL Final    Eos # 10/18/2023 0.1  0.0 - 0.5 K/uL Final    Baso # 10/18/2023 0.11  0.00 - 0.20 K/uL Final    nRBC 10/18/2023 0  0 /100 WBC Final    Gran % 10/18/2023 40.5  38.0 - 73.0 % Final    Lymph % 10/18/2023 48.8 (H)  18.0 - 48.0 % Final    Mono % 10/18/2023 8.4  4.0 - 15.0 % Final    Eosinophil % 10/18/2023 1.0  0.0 - 8.0 % Final    Basophil % 10/18/2023 0.9  0.0 - 1.9 % Final    Platelet Estimate 10/18/2023 Appears normal   Final    Differential Method 10/18/2023 Automated   Final    Sodium 10/18/2023 141  136 - 145 mmol/L Final    Potassium 10/18/2023 4.7  3.5 - 5.1 mmol/L Final    Chloride 10/18/2023 105  95 - 110 mmol/L Final    CO2 10/18/2023 25  23 - 29 mmol/L Final    Glucose 10/18/2023 94  70 - 110 mg/dL Final    BUN 10/18/2023 14  6 - 20 mg/dL Final    Creatinine 10/18/2023 1.0  0.5 - 1.4 mg/dL Final    Calcium 10/18/2023 10.7 (H)  8.7 - 10.5 mg/dL Final    Total Protein 10/18/2023 7.7  6.0 - 8.4 g/dL Final    Albumin 10/18/2023 4.2  3.5 - 5.2 g/dL Final    Total Bilirubin 10/18/2023 0.4  0.1 - 1.0 mg/dL Final    Alkaline Phosphatase 10/18/2023 102  55 - 135 U/L Final    AST 10/18/2023 19  10 - 40 U/L Final    ALT 10/18/2023 20  10 - 44 U/L Final    eGFR 10/18/2023 >60.0  >60 mL/min/1.73 m^2 Final    Anion Gap 10/18/2023 11  8 - 16 mmol/L Final    TSH 10/18/2023 2.061  0.400 - 4.000 uIU/mL Final   Lab Visit on 09/28/2023   Component Date Value Ref Range Status    WBC 09/28/2023 11.84  3.90 - 12.70 K/uL Final    RBC 09/28/2023 4.61  4.60 - 6.20 M/uL Final    Hemoglobin 09/28/2023 13.6 (L)  14.0 - 18.0 g/dL Final    Hematocrit 09/28/2023 40.9  40.0 - 54.0 % Final    MCV 09/28/2023 89  82 - 98 fL Final    MCH 09/28/2023 29.5  27.0 - 31.0 pg Final    MCHC 09/28/2023 33.3  32.0 - 36.0 g/dL Final    RDW 09/28/2023 14.2  11.5 - 14.5 % Final    Platelets 09/28/2023 393  150 - 450 K/uL Final    MPV 09/28/2023  9.5  9.2 - 12.9 fL Final    Immature Granulocytes 09/28/2023 0.3  0.0 - 0.5 % Final    Gran # (ANC) 09/28/2023 5.7  1.8 - 7.7 K/uL Final    Immature Grans (Abs) 09/28/2023 0.04  0.00 - 0.04 K/uL Final    Lymph # 09/28/2023 4.8  1.0 - 4.8 K/uL Final    Mono # 09/28/2023 1.0  0.3 - 1.0 K/uL Final    Eos # 09/28/2023 0.2  0.0 - 0.5 K/uL Final    Baso # 09/28/2023 0.09  0.00 - 0.20 K/uL Final    nRBC 09/28/2023 0  0 /100 WBC Final    Gran % 09/28/2023 48.4  38.0 - 73.0 % Final    Lymph % 09/28/2023 40.8  18.0 - 48.0 % Final    Mono % 09/28/2023 8.1  4.0 - 15.0 % Final    Eosinophil % 09/28/2023 1.6  0.0 - 8.0 % Final    Basophil % 09/28/2023 0.8  0.0 - 1.9 % Final    Differential Method 09/28/2023 Automated   Final    Sodium 09/28/2023 137  136 - 145 mmol/L Final    Potassium 09/28/2023 5.0  3.5 - 5.1 mmol/L Final    Chloride 09/28/2023 99  95 - 110 mmol/L Final    CO2 09/28/2023 28  23 - 29 mmol/L Final    Glucose 09/28/2023 176 (H)  70 - 110 mg/dL Final    BUN 09/28/2023 13  6 - 20 mg/dL Final    Creatinine 09/28/2023 1.1  0.5 - 1.4 mg/dL Final    Calcium 09/28/2023 10.6 (H)  8.7 - 10.5 mg/dL Final    Total Protein 09/28/2023 7.2  6.0 - 8.4 g/dL Final    Albumin 09/28/2023 4.1  3.5 - 5.2 g/dL Final    Total Bilirubin 09/28/2023 0.5  0.1 - 1.0 mg/dL Final    Alkaline Phosphatase 09/28/2023 99  55 - 135 U/L Final    AST 09/28/2023 20  10 - 40 U/L Final    ALT 09/28/2023 19  10 - 44 U/L Final    eGFR 09/28/2023 >60  >60 mL/min/1.73 m^2 Final    Anion Gap 09/28/2023 10  8 - 16 mmol/L Final    CEA 09/28/2023 3.2  0.0 - 5.0 ng/mL Final   Lab Visit on 09/07/2023   Component Date Value Ref Range Status    WBC 09/07/2023 11.11  3.90 - 12.70 K/uL Final    RBC 09/07/2023 4.69  4.60 - 6.20 M/uL Final    Hemoglobin 09/07/2023 13.5 (L)  14.0 - 18.0 g/dL Final    Hematocrit 09/07/2023 41.4  40.0 - 54.0 % Final    MCV 09/07/2023 88  82 - 98 fL Final    MCH 09/07/2023 28.8  27.0 - 31.0 pg Final    MCHC 09/07/2023 32.6  32.0 - 36.0  g/dL Final    RDW 09/07/2023 14.2  11.5 - 14.5 % Final    Platelets 09/07/2023 373  150 - 450 K/uL Final    MPV 09/07/2023 9.5  9.2 - 12.9 fL Final    Immature Granulocytes 09/07/2023 0.4  0.0 - 0.5 % Final    Gran # (ANC) 09/07/2023 6.5  1.8 - 7.7 K/uL Final    Immature Grans (Abs) 09/07/2023 0.04  0.00 - 0.04 K/uL Final    Lymph # 09/07/2023 3.6  1.0 - 4.8 K/uL Final    Mono # 09/07/2023 0.9  0.3 - 1.0 K/uL Final    Eos # 09/07/2023 0.1  0.0 - 0.5 K/uL Final    Baso # 09/07/2023 0.09  0.00 - 0.20 K/uL Final    nRBC 09/07/2023 0  0 /100 WBC Final    Gran % 09/07/2023 58.3  38.0 - 73.0 % Final    Lymph % 09/07/2023 32.0  18.0 - 48.0 % Final    Mono % 09/07/2023 7.7  4.0 - 15.0 % Final    Eosinophil % 09/07/2023 0.8  0.0 - 8.0 % Final    Basophil % 09/07/2023 0.8  0.0 - 1.9 % Final    Differential Method 09/07/2023 Automated   Final    Sodium 09/07/2023 138  136 - 145 mmol/L Final    Potassium 09/07/2023 4.6  3.5 - 5.1 mmol/L Final    Chloride 09/07/2023 101  95 - 110 mmol/L Final    CO2 09/07/2023 26  23 - 29 mmol/L Final    Glucose 09/07/2023 146 (H)  70 - 110 mg/dL Final    BUN 09/07/2023 15  6 - 20 mg/dL Final    Creatinine 09/07/2023 1.1  0.5 - 1.4 mg/dL Final    Calcium 09/07/2023 10.1  8.7 - 10.5 mg/dL Final    Total Protein 09/07/2023 7.3  6.0 - 8.4 g/dL Final    Albumin 09/07/2023 4.1  3.5 - 5.2 g/dL Final    Total Bilirubin 09/07/2023 0.7  0.1 - 1.0 mg/dL Final    Alkaline Phosphatase 09/07/2023 87  55 - 135 U/L Final    AST 09/07/2023 20  10 - 40 U/L Final    ALT 09/07/2023 18  10 - 44 U/L Final    eGFR 09/07/2023 >60  >60 mL/min/1.73 m^2 Final    Anion Gap 09/07/2023 11  8 - 16 mmol/L Final    TSH 09/07/2023 0.825  0.400 - 4.000 uIU/mL Final    WBC 09/07/2023 11.11  3.90 - 12.70 K/uL Final    RBC 09/07/2023 4.69  4.60 - 6.20 M/uL Final    Hemoglobin 09/07/2023 13.5 (L)  14.0 - 18.0 g/dL Final    Hematocrit 09/07/2023 41.4  40.0 - 54.0 % Final    MCV 09/07/2023 88  82 - 98 fL Final    MCH 09/07/2023 28.8   27.0 - 31.0 pg Final    MCHC 09/07/2023 32.6  32.0 - 36.0 g/dL Final    RDW 09/07/2023 14.2  11.5 - 14.5 % Final    Platelets 09/07/2023 373  150 - 450 K/uL Final    MPV 09/07/2023 9.5  9.2 - 12.9 fL Final    Immature Granulocytes 09/07/2023 0.4  0.0 - 0.5 % Final    Gran # (ANC) 09/07/2023 6.5  1.8 - 7.7 K/uL Final    Immature Grans (Abs) 09/07/2023 0.04  0.00 - 0.04 K/uL Final    Lymph # 09/07/2023 3.6  1.0 - 4.8 K/uL Final    Mono # 09/07/2023 0.9  0.3 - 1.0 K/uL Final    Eos # 09/07/2023 0.1  0.0 - 0.5 K/uL Final    Baso # 09/07/2023 0.09  0.00 - 0.20 K/uL Final    nRBC 09/07/2023 0  0 /100 WBC Final    Gran % 09/07/2023 58.3  38.0 - 73.0 % Final    Lymph % 09/07/2023 32.0  18.0 - 48.0 % Final    Mono % 09/07/2023 7.7  4.0 - 15.0 % Final    Eosinophil % 09/07/2023 0.8  0.0 - 8.0 % Final    Basophil % 09/07/2023 0.8  0.0 - 1.9 % Final    Differential Method 09/07/2023 Automated   Final    Sodium 09/07/2023 138  136 - 145 mmol/L Final    Potassium 09/07/2023 4.6  3.5 - 5.1 mmol/L Final    Chloride 09/07/2023 101  95 - 110 mmol/L Final    CO2 09/07/2023 26  23 - 29 mmol/L Final    Glucose 09/07/2023 146 (H)  70 - 110 mg/dL Final    BUN 09/07/2023 15  6 - 20 mg/dL Final    Creatinine 09/07/2023 1.1  0.5 - 1.4 mg/dL Final    Calcium 09/07/2023 10.1  8.7 - 10.5 mg/dL Final    Total Protein 09/07/2023 7.3  6.0 - 8.4 g/dL Final    Albumin 09/07/2023 4.1  3.5 - 5.2 g/dL Final    Total Bilirubin 09/07/2023 0.7  0.1 - 1.0 mg/dL Final    Alkaline Phosphatase 09/07/2023 87  55 - 135 U/L Final    AST 09/07/2023 20  10 - 40 U/L Final    ALT 09/07/2023 18  10 - 44 U/L Final    eGFR 09/07/2023 >60  >60 mL/min/1.73 m^2 Final    Anion Gap 09/07/2023 11  8 - 16 mmol/L Final    TSH 09/07/2023 0.825  0.400 - 4.000 uIU/mL Final    CEA 09/07/2023 3.3  0.0 - 5.0 ng/mL Final   Office Visit on 09/05/2023   Component Date Value Ref Range Status    Final Pathologic Diagnosis 09/05/2023    Final                    Value:1. Skin, nasal tip,  shave biopsy:  - BASAL CELL CARCINOMA.  - THE TUMOR EXTENDS TO THE DEEP AND LATERAL BIOPSY MARGINS.    This lesion is skin cancer. You will be contacted regarding treatment.      Gross 09/05/2023    Final                    Value:Pathology ID:  [id number]  Patient ID:  [id number]  The specimen is received in formalin labeled &quot;nasal tip&quot;.  The specimen is a shave biopsy of tan skin measuring 0.7 x 0.6 cm .  The specimen is bisected,  inked blue at the resection margin and submitted entirely in cassette [id number] Barreto      Microscopic Exam 09/05/2023 Sections show a basaloid tumor within the dermis exhibiting peripheral palisading, retraction artifact and apoptosis.   Final    Disclaimer 09/05/2023 Unless the case is a 'gross only' or additional testing only, the final diagnosis for each specimen is based on a microscopic examination of appropriate tissue sections.   Final   Infusion on 08/21/2023   Component Date Value Ref Range Status    TSH 08/21/2023 2.130  0.340 - 5.600 uIU/mL Final   Lab Visit on 08/17/2023   Component Date Value Ref Range Status    WBC 08/17/2023 11.22  3.90 - 12.70 K/uL Final    RBC 08/17/2023 4.60  4.60 - 6.20 M/uL Final    Hemoglobin 08/17/2023 13.5 (L)  14.0 - 18.0 g/dL Final    Hematocrit 08/17/2023 40.3  40.0 - 54.0 % Final    MCV 08/17/2023 88  82 - 98 fL Final    MCH 08/17/2023 29.3  27.0 - 31.0 pg Final    MCHC 08/17/2023 33.5  32.0 - 36.0 g/dL Final    RDW 08/17/2023 14.1  11.5 - 14.5 % Final    Platelets 08/17/2023 320  150 - 450 K/uL Final    MPV 08/17/2023 8.6 (L)  9.2 - 12.9 fL Final    Immature Granulocytes 08/17/2023 0.2  0.0 - 0.5 % Final    Gran # (ANC) 08/17/2023 5.1  1.8 - 7.7 K/uL Final    Immature Grans (Abs) 08/17/2023 0.02  0.00 - 0.04 K/uL Final    Lymph # 08/17/2023 4.9 (H)  1.0 - 4.8 K/uL Final    Mono # 08/17/2023 1.0  0.3 - 1.0 K/uL Final    Eos # 08/17/2023 0.2  0.0 - 0.5 K/uL Final    Baso # 08/17/2023 0.09  0.00 - 0.20 K/uL Final    nRBC  08/17/2023 0  0 /100 WBC Final    Gran % 08/17/2023 45.2  38.0 - 73.0 % Final    Lymph % 08/17/2023 43.5  18.0 - 48.0 % Final    Mono % 08/17/2023 8.9  4.0 - 15.0 % Final    Eosinophil % 08/17/2023 1.4  0.0 - 8.0 % Final    Basophil % 08/17/2023 0.8  0.0 - 1.9 % Final    Differential Method 08/17/2023 Automated   Final    Sodium 08/17/2023 137  136 - 145 mmol/L Final    Potassium 08/17/2023 5.0  3.5 - 5.1 mmol/L Final    Chloride 08/17/2023 101  95 - 110 mmol/L Final    CO2 08/17/2023 26  23 - 29 mmol/L Final    Glucose 08/17/2023 122 (H)  70 - 110 mg/dL Final    BUN 08/17/2023 17  6 - 20 mg/dL Final    Creatinine 08/17/2023 1.0  0.5 - 1.4 mg/dL Final    Calcium 08/17/2023 9.9  8.7 - 10.5 mg/dL Final    Total Protein 08/17/2023 7.4  6.0 - 8.4 g/dL Final    Albumin 08/17/2023 4.1  3.5 - 5.2 g/dL Final    Total Bilirubin 08/17/2023 0.7  0.1 - 1.0 mg/dL Final    Alkaline Phosphatase 08/17/2023 92  55 - 135 U/L Final    AST 08/17/2023 22  10 - 40 U/L Final    ALT 08/17/2023 17  10 - 44 U/L Final    eGFR 08/17/2023 >60  >60 mL/min/1.73 m^2 Final    Anion Gap 08/17/2023 10  8 - 16 mmol/L Final   Hospital Outpatient Visit on 08/03/2023   Component Date Value Ref Range Status    POC Glucose 08/03/2023 89  70 - 110 MG/DL Final   Lab Visit on 07/27/2023   Component Date Value Ref Range Status    WBC 07/27/2023 9.95  3.90 - 12.70 K/uL Final    RBC 07/27/2023 4.70  4.60 - 6.20 M/uL Final    Hemoglobin 07/27/2023 14.1  14.0 - 18.0 g/dL Final    Hematocrit 07/27/2023 41.1  40.0 - 54.0 % Final    MCV 07/27/2023 87  82 - 98 fL Final    MCH 07/27/2023 30.0  27.0 - 31.0 pg Final    MCHC 07/27/2023 34.3  32.0 - 36.0 g/dL Final    RDW 07/27/2023 13.7  11.5 - 14.5 % Final    Platelets 07/27/2023 360  150 - 450 K/uL Final    MPV 07/27/2023 9.6  9.2 - 12.9 fL Final    Immature Granulocytes 07/27/2023 0.3  0.0 - 0.5 % Final    Gran # (ANC) 07/27/2023 4.3  1.8 - 7.7 K/uL Final    Immature Grans (Abs) 07/27/2023 0.03  0.00 - 0.04 K/uL Final     Lymph # 07/27/2023 4.4  1.0 - 4.8 K/uL Final    Mono # 07/27/2023 1.0  0.3 - 1.0 K/uL Final    Eos # 07/27/2023 0.1  0.0 - 0.5 K/uL Final    Baso # 07/27/2023 0.08  0.00 - 0.20 K/uL Final    nRBC 07/27/2023 0  0 /100 WBC Final    Gran % 07/27/2023 43.6  38.0 - 73.0 % Final    Lymph % 07/27/2023 44.5  18.0 - 48.0 % Final    Mono % 07/27/2023 9.7  4.0 - 15.0 % Final    Eosinophil % 07/27/2023 1.1  0.0 - 8.0 % Final    Basophil % 07/27/2023 0.8  0.0 - 1.9 % Final    Differential Method 07/27/2023 Automated   Final    Sodium 07/27/2023 140  136 - 145 mmol/L Final    Potassium 07/27/2023 4.7  3.5 - 5.1 mmol/L Final    Chloride 07/27/2023 104  95 - 110 mmol/L Final    CO2 07/27/2023 27  23 - 29 mmol/L Final    Glucose 07/27/2023 118 (H)  70 - 110 mg/dL Final    BUN 07/27/2023 12  6 - 20 mg/dL Final    Creatinine 07/27/2023 0.9  0.5 - 1.4 mg/dL Final    Calcium 07/27/2023 10.1  8.7 - 10.5 mg/dL Final    Total Protein 07/27/2023 7.4  6.0 - 8.4 g/dL Final    Albumin 07/27/2023 4.1  3.5 - 5.2 g/dL Final    Total Bilirubin 07/27/2023 0.6  0.1 - 1.0 mg/dL Final    Alkaline Phosphatase 07/27/2023 106  55 - 135 U/L Final    AST 07/27/2023 21  10 - 40 U/L Final    ALT 07/27/2023 20  10 - 44 U/L Final    eGFR 07/27/2023 >60  >60 mL/min/1.73 m^2 Final    Anion Gap 07/27/2023 9  8 - 16 mmol/L Final    CEA 07/27/2023 4.0  0.0 - 5.0 ng/mL Final   Lab Visit on 07/10/2023   Component Date Value Ref Range Status    TSH 07/10/2023 1.459  0.400 - 4.000 uIU/mL Final    WBC 07/10/2023 10.93  3.90 - 12.70 K/uL Final    RBC 07/10/2023 4.91  4.60 - 6.20 M/uL Final    Hemoglobin 07/10/2023 14.4  14.0 - 18.0 g/dL Final    Hematocrit 07/10/2023 43.8  40.0 - 54.0 % Final    MCV 07/10/2023 89  82 - 98 fL Final    MCH 07/10/2023 29.3  27.0 - 31.0 pg Final    MCHC 07/10/2023 32.9  32.0 - 36.0 g/dL Final    RDW 07/10/2023 13.7  11.5 - 14.5 % Final    Platelets 07/10/2023 402  150 - 450 K/uL Final    MPV 07/10/2023 9.3  9.2 - 12.9 fL Final    Immature  Granulocytes 07/10/2023 0.3  0.0 - 0.5 % Final    Gran # (ANC) 07/10/2023 4.3  1.8 - 7.7 K/uL Final    Immature Grans (Abs) 07/10/2023 0.03  0.00 - 0.04 K/uL Final    Lymph # 07/10/2023 5.3 (H)  1.0 - 4.8 K/uL Final    Mono # 07/10/2023 1.0  0.3 - 1.0 K/uL Final    Eos # 07/10/2023 0.2  0.0 - 0.5 K/uL Final    Baso # 07/10/2023 0.10  0.00 - 0.20 K/uL Final    nRBC 07/10/2023 0  0 /100 WBC Final    Gran % 07/10/2023 39.3  38.0 - 73.0 % Final    Lymph % 07/10/2023 48.9 (H)  18.0 - 48.0 % Final    Mono % 07/10/2023 9.0  4.0 - 15.0 % Final    Eosinophil % 07/10/2023 1.6  0.0 - 8.0 % Final    Basophil % 07/10/2023 0.9  0.0 - 1.9 % Final    Differential Method 07/10/2023 Automated   Final    Sodium 07/10/2023 138  136 - 145 mmol/L Final    Potassium 07/10/2023 4.9  3.5 - 5.1 mmol/L Final    Chloride 07/10/2023 100  95 - 110 mmol/L Final    CO2 07/10/2023 27  23 - 29 mmol/L Final    Glucose 07/10/2023 144 (H)  70 - 110 mg/dL Final    BUN 07/10/2023 15  6 - 20 mg/dL Final    Creatinine 07/10/2023 1.0  0.5 - 1.4 mg/dL Final    Calcium 07/10/2023 10.1  8.7 - 10.5 mg/dL Final    Total Protein 07/10/2023 7.6  6.0 - 8.4 g/dL Final    Albumin 07/10/2023 4.1  3.5 - 5.2 g/dL Final    Total Bilirubin 07/10/2023 0.4  0.1 - 1.0 mg/dL Final    Alkaline Phosphatase 07/10/2023 119  55 - 135 U/L Final    AST 07/10/2023 19  10 - 40 U/L Final    ALT 07/10/2023 20  10 - 44 U/L Final    eGFR 07/10/2023 >60  >60 mL/min/1.73 m^2 Final    Anion Gap 07/10/2023 11  8 - 16 mmol/L Final    CEA 07/10/2023 3.6  0.0 - 5.0 ng/mL Final    Hepatitis C Ab 07/10/2023 Negative  Negative Final   There may be more visits with results that are not included.       Past Medical History:   Diagnosis Date    Digestive disorder     DM (diabetes mellitus)     Hyperlipidemia     Hypertension     Prediabetes      Past Surgical History:   Procedure Laterality Date    CHOLECYSTECTOMY  2011    COLON SURGERY      COLONOSCOPY      2018    COLOSTOMY N/A 04/25/2022     Procedure: CREATION, COLOSTOMY;  Surgeon: Carlton Waddell MD;  Location: [facility] OR;  Service: General;  Laterality: N/A;    ENDOSCOPIC ULTRASOUND OF UPPER GASTROINTESTINAL TRACT N/A 01/06/2023    Procedure: ULTRASOUND, UPPER GI TRACT, ENDOSCOPIC;  Surgeon: Rinku Orozco III, MD;  Location: [facility] ENDO;  Service: Endoscopy;  Laterality: N/A;    INSERTION OF TUNNELED CENTRAL VENOUS CATHETER (CVC) WITH SUBCUTANEOUS PORT Right 05/18/2022    Procedure: [id number];  Surgeon: Carlton Waddell MD;  Location: [facility] OR;  Service: General;  Laterality: Right;    MOBILIZATION OF SPLENIC FLEXURE N/A 04/25/2022    Procedure: MOBILIZATION, SPLENIC FLEXURE;  Surgeon: Carlton Waddell MD;  Location: [facility] OR;  Service: General;  Laterality: N/A;    SPLENECTOMY N/A 04/25/2022    Procedure: SPLENECTOMY;  Surgeon: Carlton Waddell MD;  Location: [facility] OR;  Service: General;  Laterality: N/A;    SUBTOTAL COLECTOMY N/A 04/25/2022    Procedure: COLECTOMY, PARTIAL;  Surgeon: Carlton Waddell MD;  Location: [facility] OR;  Service: General;  Laterality: N/A;    TONSILLECTOMY      TUMOR REMOVAL  10/18/2023    on top of the nose     Family History   Problem Relation Age of Onset    Heart disease Father     Rectal cancer Sister         half sister       Tests to Keep You Healthy    Eye Exam: Met on 7/17/2023  Colon Cancer Screening: DUE  Last Blood Pressure <= 139/89 (10/24/2023): Yes  Last HbA1c < 8 (10/24/2023): Yes  Tobacco Cessation: NO      The 10-year CVD risk score (RANDALL'Agoino et al., 2008) is: 28.4%    Values used to calculate the score:      Age: 58 years      Sex: Male      Diabetic: Yes      Tobacco smoker: Yes      Systolic Blood Pressure: 130 mmHg      Is BP treated: No      HDL Cholesterol: 49 mg/dL      Total Cholesterol: 138 mg/dL     Marital Status:   Alcohol History:  reports that he does not currently use alcohol.  Tobacco History:  reports that he has been smoking cigarettes. He has a 40.0 pack-year smoking history.  "He has been exposed to tobacco smoke. He has never used smokeless tobacco.  Drug History:  reports no history of drug use.    Health Maintenance Topics with due status: Not Due       Topic Last Completion Date    Pneumococcal Vaccines (Age 0-64) 07/07/2022    Lipid Panel 06/14/2023    Foot Exam 06/21/2023    Eye Exam 07/17/2023    Low Dose Statin 10/24/2023    Hemoglobin A1c 10/24/2023     Immunization History   Administered Date(s) Administered    COVID-19, vector-nr, [id number], PF (Jose Elias) 05/27/2021    HiB PRP-T 10/07/2022    Influenza 11/11/2020    Influenza (FLUBLOK) - Quadrivalent - Recombinant - PF *Preferred* (egg allergy) 10/12/2022    Influenza - Quadrivalent - MDCK 10/16/2019    Influenza - Quadrivalent - MDCK - PF 11/07/2020    Pneumococcal Conjugate - 13 Valent 05/02/2022    Pneumococcal Polysaccharide - 23 Valent 07/07/2022    Zoster Recombinant 02/06/2021, 04/30/2021    meningococcal Conjugate (MCV4O) 05/02/2022, 07/07/2022       Review of patient's allergies indicates:   Allergen Reactions    Penicillins Rash       Current Outpatient Medications:     insulin glargine U-300 conc (TOUJEO MAX U-300 SOLOSTAR) 300 unit/mL (3 mL) insulin pen, Inject 26 Units into the skin once daily., Disp: 2 pen, Rfl: 11    morphine (MS CONTIN) 30 MG 12 hr tablet, Take 1 tablet (30 mg total) by mouth 2 (two) times daily., Disp: 60 tablet, Rfl: 0    pen needle, diabetic 31 gauge x 3/16" Ndle, 1 each by Misc.(Non-Drug; Combo Route) route once daily., Disp: 90 each, Rfl: 3    promethazine (PHENERGAN) 12.5 MG Tab, Take 1 tablet (12.5 mg total) by mouth every 4 (four) hours as needed., Disp: 25 tablet, Rfl: 1    traZODone (DESYREL) 50 MG tablet, Take 1 tablet (50 mg total) by mouth every evening., Disp: 90 tablet, Rfl: 3    atorvastatin (LIPITOR) 20 MG tablet, Take 1 tablet (20 mg total) by mouth once daily., Disp: 90 tablet, Rfl: 3    enalapriL-hydrochlorothiazide (VASERETIC) 10-25 mg per tablet, Take 1 tablet by mouth once " "daily., Disp: 90 tablet, Rfl: 3    haemophilus B polysac-tetanus toxoid (ACTHIB, PF,) 10 mcg/0.5 mL injection, TO BE ADMINISTERED. (Patient not taking: Reported on 10/24/2023), Disp: 1 each, Rfl: 0    metFORMIN (GLUCOPHAGE) 1000 MG tablet, Take 1 tablet (1,000 mg total) by mouth 2 (two) times daily with meals., Disp: 180 tablet, Rfl: 3    sildenafiL (VIAGRA) 100 MG tablet, Take 1 tablet (100 mg total) by mouth daily as needed for Erectile Dysfunction., Disp: 30 tablet, Rfl: 3  No current facility-administered medications for this visit.    Review of Systems   Constitutional:  Negative for appetite change, chills, fatigue, fever and unexpected weight change.   HENT:  Negative for sore throat and trouble swallowing.    Respiratory:  Negative for cough, shortness of breath and wheezing.    Cardiovascular:  Negative for chest pain, palpitations and leg swelling.   Gastrointestinal:  Positive for abdominal pain (reports chronic left side abd pain improved with morphine). Negative for constipation, diarrhea, nausea and vomiting.        Colostomy with liquid brown stool   Genitourinary:  Negative for dysuria, frequency and hematuria.   Musculoskeletal:  Negative for back pain and gait problem.   Skin:  Negative for rash.   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 Negative for dizziness, syncope and headaches.   Psychiatric/Behavioral:  Negative for dysphoric mood. The patient is not nervous/anxious.           Objective:      Vitals:    10/24/23 0829   BP: 130/76   Pulse: 80   SpO2: 98%   Weight: 97.9 kg (215 lb 12.8 oz)   Height: 6' 2" (1.88 m)     Physical Exam  Vitals reviewed.   Constitutional:       General: He is not in acute distress.     Appearance: He is well-developed.      Comments: Thin white male   HENT:      Head: Normocephalic and atraumatic.        Right Ear: Tympanic membrane and ear canal normal.      Left Ear: Tympanic membrane and ear canal normal. "   Neck:      Vascular: No carotid bruit.   Cardiovascular:      Rate and Rhythm: Normal rate and regular rhythm.      Pulses:           Posterior tibial pulses are 2+ on the right side and 2+ on the left side.      Heart sounds: No murmur heard.  Pulmonary:      Effort: Pulmonary effort is normal. No respiratory distress.      Breath sounds: Normal breath sounds. No wheezing or rales.   Abdominal:      General: There is no distension.      Palpations: Abdomen is soft.      Tenderness: There is abdominal tenderness (mild tenderness around colostomy).      Comments: Colostomy Left mid abdomen with parastomal hernia, small amt of liquid brown stool in pouch   Musculoskeletal:      Cervical back: Neck supple.      Right lower leg: No edema.      Left lower leg: No edema.   Lymphadenopathy:      Cervical: No cervical adenopathy.   Skin:     General: Skin is warm and dry.      Findings: No rash.   Neurological:      General: No focal deficit present.      Mental Status: He is alert and oriented to person, place, and time.      Gait: Gait normal.   Psychiatric:         Mood and Affect: Mood normal.           Assessment:       1. DM type 2 with diabetic dyslipidemia    2. Essential hypertension    3. Mixed hyperlipidemia    4. Malignant neoplasm of transverse colon    5. Erectile dysfunction, unspecified erectile dysfunction type           Plan:       1. DM type 2 with diabetic dyslipidemia  -A1C up to 7.4% off ozempic. Advised may titrate toujeo dose up to keep FBS <130. Encouraged diabetic diet  -     Hemoglobin A1C, POCT  -     metFORMIN (GLUCOPHAGE) 1000 MG tablet; Take 1 tablet (1,000 mg total) by mouth 2 (two) times daily with meals.  Dispense: 180 tablet; Refill: 3    2. Essential hypertension  -BP well controlled  -     enalapriL-hydrochlorothiazide (VASERETIC) 10-25 mg per tablet; Take 1 tablet by mouth once daily.  Dispense: 90 tablet; Refill: 3    3. Mixed hyperlipidemia  -well controlled on last labs  -      atorvastatin (LIPITOR) 20 MG tablet; Take 1 tablet (20 mg total) by mouth once daily.  Dispense: 90 tablet; Refill: 3    4. Malignant neoplasm of transverse colon   -stable on Keytruda, recently had radiation to metastatic lymph node, follow-up with Dr. Khoobehi as scheduled    5. Erectile dysfunction, unspecified erectile dysfunction type  -     sildenafiL (VIAGRA) 100 MG tablet; Take 1 tablet (100 mg total) by mouth daily as needed for Erectile Dysfunction.  Dispense: 30 tablet; Refill: 3      Follow up in about 4 months (around 2/24/2024).          Counseled on age and gender appropriate medical preventative services, including cancer screenings, immunizations, overall nutritional health, need for a consistent exercise regimen and an overall push towards maintaining a vigorous and active lifestyle.      10/24/2023 Natalee Wagner NP

## 2023-10-25 ENCOUNTER — CLINICAL SUPPORT (OUTPATIENT)
Dept: DERMATOLOGY | Facility: CLINIC | Age: 58
End: 2023-10-25
Payer: COMMERCIAL

## 2023-10-25 DIAGNOSIS — Z48.02 VISIT FOR SUTURE REMOVAL: Primary | ICD-10-CM

## 2023-10-25 PROCEDURE — 99999 PR PBB SHADOW E&M-EST. PATIENT-LVL II: CPT | Mod: PBBFAC,,,

## 2023-10-25 PROCEDURE — 99024 POSTOP FOLLOW-UP VISIT: CPT | Mod: S$GLB,,, | Performed by: DERMATOLOGY

## 2023-10-25 PROCEDURE — 99999 PR PBB SHADOW E&M-EST. PATIENT-LVL II: ICD-10-PCS | Mod: PBBFAC,,,

## 2023-10-25 PROCEDURE — 99024 PR POST-OP FOLLOW-UP VISIT: ICD-10-PCS | Mod: S$GLB,,, | Performed by: DERMATOLOGY

## 2023-10-25 NOTE — PATIENT INSTRUCTIONS
Silicone Scar Treatment    Silicone gel sheeting is a simple and inexpensive treatment that has been clinically proven to aid in superficial wound healing following surgical procedures,         and can even improve the appearance of older scars.   The consistent use of silicone gel sheets helps to provide the best possible cosmetic outcome post-operatively.     How to use Silicone Gel Sheets:  Any brand is fine, generic drugstore brands should be equally effective. They are also available on [employer]  Cut the Silicone gel sheet to a size slightly larger than the scar and apply overnight to the affected area.   The sheets can also be worn during the daytime, if you are amenable to doing so.  Remove sheet in the AM and rinse it off in the sink, some sheets can be re-used for up to 3 weeks.  Paper tape can be used to keep sheets in place while sleeping, if necessary  Sheeting should be used for at least 8 weeks for maximum scar improvement.  Over the counter brands include ScarAway, [facility] brand, [facility] brand, etc.    Silicone Gels:  Silicone gels provide the benefits of silicone without the need for a visible bandage during the day  They dry quickly (within 5-10 minutes) and provide an invisible barrier that can last all day  Over the counter brands include Biocorneum, ScarAway, etc.

## 2023-10-25 NOTE — PROGRESS NOTES
Mohs Surgery Suture Removal Note   Patient Name: Osman Li Jr.  Patient : 1965  Date of Service: 10/30/2023    CC: Pt. Presents for removal of sutures on the 10/25/2023 today  Subjective: patient reports wound healing as expected     Objective: Wound well healed, sutures clean/dry/intact. No evidence of any complications noted.     Visit For Suture Removal:  - Suture removal today  - Steri strips/mastisol were not applied  - Patient counseled on silicone gel/silicone sheeting and their use in the management of post-operative scars  - Handout on silicone gel/silicone gel sheeting was provided  - Patient to follow up with their referring provider in 3 months for further skin evaluation    Mariia Kinsey

## 2023-10-30 ENCOUNTER — PATIENT MESSAGE (OUTPATIENT)
Dept: HEMATOLOGY/ONCOLOGY | Facility: CLINIC | Age: 58
End: 2023-10-30
Payer: COMMERCIAL

## 2023-10-30 DIAGNOSIS — C18.4 MALIGNANT NEOPLASM OF TRANSVERSE COLON: Primary | ICD-10-CM

## 2023-11-01 ENCOUNTER — HOSPITAL ENCOUNTER (OUTPATIENT)
Dept: RADIOLOGY | Facility: HOSPITAL | Age: 58
Discharge: HOME | End: 2023-11-01
Attending: NURSE PRACTITIONER
Payer: COMMERCIAL

## 2023-11-01 DIAGNOSIS — C18.4 MALIGNANT NEOPLASM OF TRANSVERSE COLON: ICD-10-CM

## 2023-11-01 PROCEDURE — 25500020 PHARM REV CODE 255: Performed by: NURSE PRACTITIONER

## 2023-11-01 PROCEDURE — 74177 CT ABD & PELVIS W/CONTRAST: CPT | Mod: TC

## 2023-11-01 PROCEDURE — 71260 CT THORAX DX C+: CPT | Mod: TC

## 2023-11-01 RX ADMIN — IOHEXOL 100 ML: 350 INJECTION, SOLUTION INTRAVENOUS at 09:11

## 2023-11-06 ENCOUNTER — PATIENT MESSAGE (OUTPATIENT)
Dept: HEMATOLOGY/ONCOLOGY | Facility: CLINIC | Age: 58
End: 2023-11-06
Payer: COMMERCIAL

## 2023-11-06 DIAGNOSIS — C78.89 METASTATIC ADENOCARCINOMA TO PANCREAS: ICD-10-CM

## 2023-11-06 DIAGNOSIS — E78.49 OTHER HYPERLIPIDEMIA: ICD-10-CM

## 2023-11-06 DIAGNOSIS — G62.0 CHEMOTHERAPY-INDUCED NEUROPATHY: ICD-10-CM

## 2023-11-06 DIAGNOSIS — C77.2 METASTASIS TO RETROPERITONEAL LYMPH NODE: ICD-10-CM

## 2023-11-06 DIAGNOSIS — I10 PRIMARY HYPERTENSION: ICD-10-CM

## 2023-11-06 DIAGNOSIS — T45.1X5A CHEMOTHERAPY-INDUCED NEUROPATHY: ICD-10-CM

## 2023-11-06 DIAGNOSIS — E11.00 TYPE 2 DIABETES MELLITUS WITH HYPEROSMOLARITY WITHOUT COMA, WITHOUT LONG-TERM CURRENT USE OF INSULIN: ICD-10-CM

## 2023-11-06 DIAGNOSIS — C18.4 MALIGNANT NEOPLASM OF TRANSVERSE COLON: ICD-10-CM

## 2023-11-06 RX ORDER — MORPHINE SULFATE 30 MG/1
30 TABLET, FILM COATED, EXTENDED RELEASE ORAL 2 TIMES DAILY
Qty: 60 TABLET | Refills: 0 | Status: SHIPPED | OUTPATIENT
Start: 2023-11-06 | End: 2023-12-05 | Stop reason: SDUPTHER

## 2023-11-09 ENCOUNTER — LAB VISIT (OUTPATIENT)
Dept: LAB | Facility: HOSPITAL | Age: 58
End: 2023-11-09
Attending: INTERNAL MEDICINE
Payer: COMMERCIAL

## 2023-11-09 DIAGNOSIS — C78.89 METASTATIC ADENOCARCINOMA TO PANCREAS: ICD-10-CM

## 2023-11-09 DIAGNOSIS — C77.2 METASTASIS TO RETROPERITONEAL LYMPH NODE: ICD-10-CM

## 2023-11-09 DIAGNOSIS — C18.4 MALIGNANT NEOPLASM OF TRANSVERSE COLON: ICD-10-CM

## 2023-11-09 LAB
ALBUMIN SERPL BCP-MCNC: 4.2 G/DL (ref 3.5–5.2)
ALP SERPL-CCNC: 97 U/L (ref 55–135)
ALT SERPL W/O P-5'-P-CCNC: 18 U/L (ref 10–44)
ANION GAP SERPL CALC-SCNC: 11 MMOL/L (ref 8–16)
AST SERPL-CCNC: 21 U/L (ref 10–40)
BASOPHILS # BLD AUTO: 0.08 K/UL (ref 0–0.2)
BASOPHILS NFR BLD: 0.7 % (ref 0–1.9)
BILIRUB SERPL-MCNC: 0.6 MG/DL (ref 0.1–1)
BUN SERPL-MCNC: 15 MG/DL (ref 6–20)
CALCIUM SERPL-MCNC: 10.2 MG/DL (ref 8.7–10.5)
CEA SERPL-MCNC: 3.7 NG/ML (ref 0–5)
CHLORIDE SERPL-SCNC: 99 MMOL/L (ref 95–110)
CO2 SERPL-SCNC: 27 MMOL/L (ref 23–29)
CREAT SERPL-MCNC: 1.1 MG/DL (ref 0.5–1.4)
DIFFERENTIAL METHOD: NORMAL
EOSINOPHIL # BLD AUTO: 0.1 K/UL (ref 0–0.5)
EOSINOPHIL NFR BLD: 0.9 % (ref 0–8)
ERYTHROCYTE [DISTWIDTH] IN BLOOD BY AUTOMATED COUNT: 13.8 % (ref 11.5–14.5)
EST. GFR  (NO RACE VARIABLE): >60 ML/MIN/1.73 M^2
GLUCOSE SERPL-MCNC: 142 MG/DL (ref 70–110)
HCT VFR BLD AUTO: 42.7 % (ref 40–54)
HGB BLD-MCNC: 14.3 G/DL (ref 14–18)
IMM GRANULOCYTES # BLD AUTO: 0.03 K/UL (ref 0–0.04)
IMM GRANULOCYTES NFR BLD AUTO: 0.3 % (ref 0–0.5)
LYMPHOCYTES # BLD AUTO: 4.7 K/UL (ref 1–4.8)
LYMPHOCYTES NFR BLD: 39.3 % (ref 18–48)
MCH RBC QN AUTO: 29.6 PG (ref 27–31)
MCHC RBC AUTO-ENTMCNC: 33.5 G/DL (ref 32–36)
MCV RBC AUTO: 88 FL (ref 82–98)
MONOCYTES # BLD AUTO: 1 K/UL (ref 0.3–1)
MONOCYTES NFR BLD: 8.7 % (ref 4–15)
NEUTROPHILS # BLD AUTO: 6 K/UL (ref 1.8–7.7)
NEUTROPHILS NFR BLD: 50.1 % (ref 38–73)
NRBC BLD-RTO: 0 /100 WBC
PLATELET # BLD AUTO: 411 K/UL (ref 150–450)
PMV BLD AUTO: 9.3 FL (ref 9.2–12.9)
POTASSIUM SERPL-SCNC: 4.8 MMOL/L (ref 3.5–5.1)
PROT SERPL-MCNC: 7.7 G/DL (ref 6–8.4)
RBC # BLD AUTO: 4.83 M/UL (ref 4.6–6.2)
SODIUM SERPL-SCNC: 137 MMOL/L (ref 136–145)
WBC # BLD AUTO: 11.86 K/UL (ref 3.9–12.7)

## 2023-11-09 PROCEDURE — 82378 CARCINOEMBRYONIC ANTIGEN: CPT | Performed by: INTERNAL MEDICINE

## 2023-11-09 PROCEDURE — 36415 COLL VENOUS BLD VENIPUNCTURE: CPT | Performed by: INTERNAL MEDICINE

## 2023-11-09 PROCEDURE — 80053 COMPREHEN METABOLIC PANEL: CPT | Performed by: INTERNAL MEDICINE

## 2023-11-09 PROCEDURE — 85025 COMPLETE CBC W/AUTO DIFF WBC: CPT | Performed by: INTERNAL MEDICINE

## 2023-11-10 ENCOUNTER — OFFICE VISIT (OUTPATIENT)
Dept: HEMATOLOGY/ONCOLOGY | Facility: CLINIC | Age: 58
End: 2023-11-10
Payer: COMMERCIAL

## 2023-11-10 VITALS
DIASTOLIC BLOOD PRESSURE: 70 MMHG | BODY MASS INDEX: 27.53 KG/M2 | HEIGHT: 74 IN | HEART RATE: 78 BPM | OXYGEN SATURATION: 96 % | TEMPERATURE: 98 F | WEIGHT: 214.5 LBS | RESPIRATION RATE: 18 BRPM | SYSTOLIC BLOOD PRESSURE: 141 MMHG

## 2023-11-10 DIAGNOSIS — C77.2 METASTASIS TO RETROPERITONEAL LYMPH NODE: Primary | ICD-10-CM

## 2023-11-10 DIAGNOSIS — G62.0 CHEMOTHERAPY-INDUCED NEUROPATHY: ICD-10-CM

## 2023-11-10 DIAGNOSIS — I10 PRIMARY HYPERTENSION: ICD-10-CM

## 2023-11-10 DIAGNOSIS — C18.4 MALIGNANT NEOPLASM OF TRANSVERSE COLON: ICD-10-CM

## 2023-11-10 DIAGNOSIS — E78.49 OTHER HYPERLIPIDEMIA: ICD-10-CM

## 2023-11-10 DIAGNOSIS — E11.00 TYPE 2 DIABETES MELLITUS WITH HYPEROSMOLARITY WITHOUT COMA, WITHOUT LONG-TERM CURRENT USE OF INSULIN: ICD-10-CM

## 2023-11-10 DIAGNOSIS — T45.1X5A CHEMOTHERAPY-INDUCED NEUROPATHY: ICD-10-CM

## 2023-11-10 DIAGNOSIS — C78.89 METASTATIC ADENOCARCINOMA TO PANCREAS: ICD-10-CM

## 2023-11-10 PROCEDURE — 3077F SYST BP >= 140 MM HG: CPT | Mod: CPTII,S$GLB,, | Performed by: INTERNAL MEDICINE

## 2023-11-10 PROCEDURE — 3051F PR MOST RECENT HEMOGLOBIN A1C LEVEL 7.0 - < 8.0%: ICD-10-PCS | Mod: CPTII,S$GLB,, | Performed by: INTERNAL MEDICINE

## 2023-11-10 PROCEDURE — 1160F PR REVIEW ALL MEDS BY PRESCRIBER/CLIN PHARMACIST DOCUMENTED: ICD-10-PCS | Mod: CPTII,S$GLB,, | Performed by: INTERNAL MEDICINE

## 2023-11-10 PROCEDURE — 3078F PR MOST RECENT DIASTOLIC BLOOD PRESSURE < 80 MM HG: ICD-10-PCS | Mod: CPTII,S$GLB,, | Performed by: INTERNAL MEDICINE

## 2023-11-10 PROCEDURE — 3008F BODY MASS INDEX DOCD: CPT | Mod: CPTII,S$GLB,, | Performed by: INTERNAL MEDICINE

## 2023-11-10 PROCEDURE — 4010F PR ACE/ARB THEARPY RXD/TAKEN: ICD-10-PCS | Mod: CPTII,S$GLB,, | Performed by: INTERNAL MEDICINE

## 2023-11-10 PROCEDURE — 3008F PR BODY MASS INDEX (BMI) DOCUMENTED: ICD-10-PCS | Mod: CPTII,S$GLB,, | Performed by: INTERNAL MEDICINE

## 2023-11-10 PROCEDURE — 3078F DIAST BP <80 MM HG: CPT | Mod: CPTII,S$GLB,, | Performed by: INTERNAL MEDICINE

## 2023-11-10 PROCEDURE — 99999 PR PBB SHADOW E&M-EST. PATIENT-LVL IV: ICD-10-PCS | Mod: PBBFAC,,, | Performed by: INTERNAL MEDICINE

## 2023-11-10 PROCEDURE — 99215 OFFICE O/P EST HI 40 MIN: CPT | Mod: S$GLB,,, | Performed by: INTERNAL MEDICINE

## 2023-11-10 PROCEDURE — 1159F PR MEDICATION LIST DOCUMENTED IN MEDICAL RECORD: ICD-10-PCS | Mod: CPTII,S$GLB,, | Performed by: INTERNAL MEDICINE

## 2023-11-10 PROCEDURE — 3051F HG A1C>EQUAL 7.0%<8.0%: CPT | Mod: CPTII,S$GLB,, | Performed by: INTERNAL MEDICINE

## 2023-11-10 PROCEDURE — 3077F PR MOST RECENT SYSTOLIC BLOOD PRESSURE >= 140 MM HG: ICD-10-PCS | Mod: CPTII,S$GLB,, | Performed by: INTERNAL MEDICINE

## 2023-11-10 PROCEDURE — 1160F RVW MEDS BY RX/DR IN RCRD: CPT | Mod: CPTII,S$GLB,, | Performed by: INTERNAL MEDICINE

## 2023-11-10 PROCEDURE — 1159F MED LIST DOCD IN RCRD: CPT | Mod: CPTII,S$GLB,, | Performed by: INTERNAL MEDICINE

## 2023-11-10 PROCEDURE — 99215 PR OFFICE/OUTPT VISIT, EST, LEVL V, 40-54 MIN: ICD-10-PCS | Mod: S$GLB,,, | Performed by: INTERNAL MEDICINE

## 2023-11-10 PROCEDURE — 99999 PR PBB SHADOW E&M-EST. PATIENT-LVL IV: CPT | Mod: PBBFAC,,, | Performed by: INTERNAL MEDICINE

## 2023-11-10 PROCEDURE — 4010F ACE/ARB THERAPY RXD/TAKEN: CPT | Mod: CPTII,S$GLB,, | Performed by: INTERNAL MEDICINE

## 2023-11-10 RX ORDER — DIPHENHYDRAMINE HYDROCHLORIDE 50 MG/ML
50 INJECTION INTRAMUSCULAR; INTRAVENOUS ONCE AS NEEDED
Status: CANCELLED | OUTPATIENT
Start: 2023-11-13

## 2023-11-10 RX ORDER — HEPARIN 100 UNIT/ML
500 SYRINGE INTRAVENOUS
Status: CANCELLED | OUTPATIENT
Start: 2023-11-13

## 2023-11-10 RX ORDER — SODIUM CHLORIDE 0.9 % (FLUSH) 0.9 %
10 SYRINGE (ML) INJECTION
Status: CANCELLED | OUTPATIENT
Start: 2023-11-13

## 2023-11-10 RX ORDER — EPINEPHRINE 0.3 MG/.3ML
0.3 INJECTION SUBCUTANEOUS ONCE AS NEEDED
Status: CANCELLED | OUTPATIENT
Start: 2023-11-13

## 2023-11-10 NOTE — PROGRESS NOTES
"Service Date:  11/10/23    Chief Complaint:  Colon cancer    Osman Li Jr. is a 58 y.o. 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  This occurred shortly after completing treatment.    Patient was part of a clinical trial to measure CT DNA.  Next generation sequencing also showed a high tumor burden.    He is now on Keytruda as his cancer has a very high tumor mutation burden.  He has MARY however.  So far, Keytruda has been helping    Here for 14 cycle.  Doing well.  Recent CT scan shows no new evidence of disease, with a smaller lesion on the pancreatic tail and stability of the retroperitoneal lymph node that was radiated.  Patient has no new complaints.    Review of Systems   Constitutional:  Positive for fatigue.   HENT: Negative.     Eyes: Negative.    Respiratory: Negative.     Cardiovascular: Negative.    Gastrointestinal: Negative.    Endocrine: Negative.    Genitourinary: Negative.    Musculoskeletal: Negative.    Integumentary:  Negative.   Neurological:  Positive for numbness.   Hematological: Negative.    Psychiatric/Behavioral: Negative.          Current Outpatient Medications   Medication Instructions    atorvastatin (LIPITOR) 20 mg, Oral, Daily    enalapriL-hydrochlorothiazide (VASERETIC) 10-25 mg per tablet 1 tablet, Oral, Daily    metFORMIN (GLUCOPHAGE) 1,000 mg, Oral, 2 times daily with meals    morphine (MS CONTIN) 30 mg, Oral, 2 times daily    pen needle, diabetic 31 gauge x 3/16" Ndle 1 each, Misc.(Non-Drug; Combo Route), Daily    promethazine (PHENERGAN) 12.5 mg, Oral, Every 4 hours PRN    sildenafiL (VIAGRA) 100 mg, Oral, Daily PRN    TOUJEO MAX U-300 SOLOSTAR 26 Units, Subcutaneous, Daily    traZODone (DESYREL) 50 mg, Oral, Nightly        Past Medical History:   Diagnosis Date    Digestive disorder     DM (diabetes mellitus)     Hyperlipidemia     Hypertension     Prediabetes     "     Past Surgical History:   Procedure Laterality Date    CHOLECYSTECTOMY  2011    COLON SURGERY      COLONOSCOPY      2018    COLOSTOMY N/A 04/25/2022    Procedure: CREATION, COLOSTOMY;  Surgeon: Carlton Waddell MD;  Location: [facility] OR;  Service: General;  Laterality: N/A;    ENDOSCOPIC ULTRASOUND OF UPPER GASTROINTESTINAL TRACT N/A 01/06/2023    Procedure: ULTRASOUND, UPPER GI TRACT, ENDOSCOPIC;  Surgeon: Rinku Orozco III, MD;  Location: [facility] ENDO;  Service: Endoscopy;  Laterality: N/A;    INSERTION OF TUNNELED CENTRAL VENOUS CATHETER (CVC) WITH SUBCUTANEOUS PORT Right 05/18/2022    Procedure: [id number];  Surgeon: Carlton Waddell MD;  Location: [facility] OR;  Service: General;  Laterality: Right;    MOBILIZATION OF SPLENIC FLEXURE N/A 04/25/2022    Procedure: MOBILIZATION, SPLENIC FLEXURE;  Surgeon: Carlton Waddell MD;  Location: [facility] OR;  Service: General;  Laterality: N/A;    SPLENECTOMY N/A 04/25/2022    Procedure: SPLENECTOMY;  Surgeon: Carlton Waddell MD;  Location: [facility] OR;  Service: General;  Laterality: N/A;    SUBTOTAL COLECTOMY N/A 04/25/2022    Procedure: COLECTOMY, PARTIAL;  Surgeon: Carlton Waddell MD;  Location: [facility] OR;  Service: General;  Laterality: N/A;    TONSILLECTOMY      TUMOR REMOVAL  10/18/2023    on top of the nose        Family History   Problem Relation Age of Onset    Heart disease Father     Rectal cancer Sister         half sister       Social History     Tobacco Use    Smoking status: Every Day     Current packs/day: 1.00     Average packs/day: 1 pack/day for 40.0 years (40.0 ttl pk-yrs)     Types: Cigarettes     Passive exposure: Current    Smokeless tobacco: Never   Substance Use Topics    Alcohol use: Not Currently    Drug use: Never         Vitals:    11/10/23 0842   BP: (!) 141/70   Pulse: 78   Resp: 18   Temp: 98.2 °F (36.8 °C)          Physical Exam:  BP (!) 141/70 (BP Location: Left arm, Patient Position: Sitting, BP Method: Large (Automatic))   Pulse 78   Temp  "98.2 °F (36.8 °C) (Temporal)   Resp 18   Ht 6' 2" (1.88 m)   Wt 97.3 kg (214 lb 8.1 oz)   SpO2 96%   BMI 27.54 kg/m²     Physical Exam  Vitals and nursing note reviewed.   Constitutional:       Appearance: Normal appearance.   HENT:      Head: Normocephalic and atraumatic.      Nose: Nose normal.      Mouth/Throat:      Mouth: Mucous membranes are moist.      Pharynx: Oropharynx is clear.   Eyes:      Extraocular Movements: Extraocular movements intact.      Conjunctiva/sclera: Conjunctivae normal.   Cardiovascular:      Rate and Rhythm: Normal rate and regular rhythm.      Heart sounds: Normal heart sounds.   Pulmonary:      Effort: Pulmonary effort is normal.      Breath sounds: Normal breath sounds.   Abdominal:      General: Abdomen is flat. Bowel sounds are normal.      Palpations: Abdomen is soft.      Comments: Ostomy bag in place.   Musculoskeletal:         General: Normal range of motion.      Cervical back: Normal range of motion and neck supple.   Skin:     General: Skin is warm and dry.   Neurological:      General: No focal deficit present.      Mental Status: He is alert and oriented to person, place, and time. Mental status is at baseline.   Psychiatric:         Mood and Affect: Mood normal.          Labs:  Lab Results   Component Value Date    WBC 11.86 11/09/2023    RBC 4.83 11/09/2023    HGB 14.3 11/09/2023    HCT 42.7 11/09/2023    MCV 88 11/09/2023    MCH 29.6 11/09/2023    MCHC 33.5 11/09/2023    RDW 13.8 11/09/2023     11/09/2023    MPV 9.3 11/09/2023    GRAN 6.0 11/09/2023    GRAN 50.1 11/09/2023    LYMPH 4.7 11/09/2023    LYMPH 39.3 11/09/2023    MONO 1.0 11/09/2023    MONO 8.7 11/09/2023    EOS 0.1 11/09/2023    BASO 0.08 11/09/2023    EOSINOPHIL 0.9 11/09/2023    BASOPHIL 0.7 11/09/2023     Sodium   Date Value Ref Range Status   11/09/2023 137 136 - 145 mmol/L Final     Potassium   Date Value Ref Range Status   11/09/2023 4.8 3.5 - 5.1 mmol/L Final     Chloride   Date Value Ref " Range Status   11/09/2023 99 95 - 110 mmol/L Final     CO2   Date Value Ref Range Status   11/09/2023 27 23 - 29 mmol/L Final     Glucose   Date Value Ref Range Status   11/09/2023 142 (H) 70 - 110 mg/dL Final     BUN   Date Value Ref Range Status   11/09/2023 15 6 - 20 mg/dL Final     Creatinine   Date Value Ref Range Status   11/09/2023 1.1 0.5 - 1.4 mg/dL Final     Calcium   Date Value Ref Range Status   11/09/2023 10.2 8.7 - 10.5 mg/dL Final     Total Protein   Date Value Ref Range Status   11/09/2023 7.7 6.0 - 8.4 g/dL Final     Albumin   Date Value Ref Range Status   11/09/2023 4.2 3.5 - 5.2 g/dL Final     Total Bilirubin   Date Value Ref Range Status   11/09/2023 0.6 0.1 - 1.0 mg/dL Final     Comment:     For infants and newborns, interpretation of results should be based  on gestational age, weight and in agreement with clinical  observations.    Premature Infant recommended reference ranges:  Up to 24 hours.............<8.0 mg/dL  Up to 48 hours............<12.0 mg/dL  3-5 days..................<15.0 mg/dL  6-29 days.................<15.0 mg/dL       Alkaline Phosphatase   Date Value Ref Range Status   11/09/2023 97 55 - 135 U/L Final     AST   Date Value Ref Range Status   11/09/2023 21 10 - 40 U/L Final     ALT   Date Value Ref Range Status   11/09/2023 18 10 - 44 U/L Final     Anion Gap   Date Value Ref Range Status   11/09/2023 11 8 - 16 mmol/L Final     eGFR if    Date Value Ref Range Status   07/25/2022 >60 >60 mL/min/1.73 m^2 Final     eGFR if non    Date Value Ref Range Status   07/25/2022 >60 >60 mL/min/1.73 m^2 Final     Comment:     Calculation used to obtain the estimated glomerular filtration  rate (eGFR) is the CKD-EPI equation.          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    -proceed with Keytruda C14 today  -Recent CT scan shows no new evidence of disease, with a smaller lesion on the pancreatic tail and stability of the retroperitoneal lymph node that was radiated.  Given his overall good response to treatment, I will refer him to surgical oncology at [facility] to see about a metastasectomy given the oligometastasis.  I believe the pancreatic tail would be feasible possibly, but I am concerned about the retroperitoneal lymph node.  I am not sure if this would require an RPLND.  I will refer to surgery Oncology to help with this decision-making.  -RTC in 3 weeks with labs for next treatment    Hypercalcemia  -resolved    Pancreatic mass   -confirmed recurrence of colon adenocarcinoma    Back pain  - NM bone scan negative for mets  -increase morphine dose to 30 mg as patient feels 15 mg not lasting long enough    HTN  - on Enalapril  - follow up with PCP    HLP  - follow up with PCP    DM2  - on Metformin  - follow up with PCP    Tobacco use  - counseled on cessation      Aurash Khoobehi, MD  Hematology and Oncology

## 2023-11-13 ENCOUNTER — INFUSION (OUTPATIENT)
Dept: INFUSION THERAPY | Facility: HOSPITAL | Age: 58
End: 2023-11-13
Attending: INTERNAL MEDICINE
Payer: COMMERCIAL

## 2023-11-13 VITALS
TEMPERATURE: 98 F | DIASTOLIC BLOOD PRESSURE: 77 MMHG | BODY MASS INDEX: 28.09 KG/M2 | WEIGHT: 218.88 LBS | SYSTOLIC BLOOD PRESSURE: 139 MMHG | HEART RATE: 77 BPM | HEIGHT: 74 IN | OXYGEN SATURATION: 97 % | RESPIRATION RATE: 18 BRPM

## 2023-11-13 DIAGNOSIS — C78.89 METASTATIC ADENOCARCINOMA TO PANCREAS: ICD-10-CM

## 2023-11-13 DIAGNOSIS — C18.5 MALIGNANT NEOPLASM OF SPLENIC FLEXURE: Primary | ICD-10-CM

## 2023-11-13 PROCEDURE — 25000003 PHARM REV CODE 250: Performed by: INTERNAL MEDICINE

## 2023-11-13 PROCEDURE — A4216 STERILE WATER/SALINE, 10 ML: HCPCS | Performed by: INTERNAL MEDICINE

## 2023-11-13 PROCEDURE — 63600175 PHARM REV CODE 636 W HCPCS: Mod: JZ,JG | Performed by: INTERNAL MEDICINE

## 2023-11-13 PROCEDURE — 96413 CHEMO IV INFUSION 1 HR: CPT

## 2023-11-13 RX ORDER — DIPHENHYDRAMINE HYDROCHLORIDE 50 MG/ML
50 INJECTION INTRAMUSCULAR; INTRAVENOUS ONCE AS NEEDED
Status: DISCONTINUED | OUTPATIENT
Start: 2023-11-13 | End: 2023-11-13 | Stop reason: HOSPADM

## 2023-11-13 RX ORDER — SODIUM CHLORIDE 0.9 % (FLUSH) 0.9 %
10 SYRINGE (ML) INJECTION
Status: DISCONTINUED | OUTPATIENT
Start: 2023-11-13 | End: 2023-11-13 | Stop reason: HOSPADM

## 2023-11-13 RX ORDER — HEPARIN 100 UNIT/ML
500 SYRINGE INTRAVENOUS
Status: DISCONTINUED | OUTPATIENT
Start: 2023-11-13 | End: 2023-11-13 | Stop reason: HOSPADM

## 2023-11-13 RX ORDER — EPINEPHRINE 0.3 MG/.3ML
0.3 INJECTION SUBCUTANEOUS ONCE AS NEEDED
Status: DISCONTINUED | OUTPATIENT
Start: 2023-11-13 | End: 2023-11-13 | Stop reason: HOSPADM

## 2023-11-13 RX ADMIN — SODIUM CHLORIDE, PRESERVATIVE FREE 10 ML: 5 INJECTION INTRAVENOUS at 02:11

## 2023-11-13 RX ADMIN — SODIUM CHLORIDE 200 MG: 9 INJECTION, SOLUTION INTRAVENOUS at 02:11

## 2023-11-13 RX ADMIN — HEPARIN 500 UNITS: 100 SYRINGE at 02:11

## 2023-11-13 NOTE — PLAN OF CARE
Problem: Fatigue  Goal: Improved Activity Tolerance  Outcome: Ongoing, Progressing  Intervention: Promote Improved Energy  Flowsheets (Taken 11/13/2023 4903)  Fatigue Management:   fatigue-related activity identified   paced activity encouraged   frequent rest breaks encouraged  Sleep/Rest Enhancement:   noise level reduced   regular sleep/rest pattern promoted   relaxation techniques promoted  Activity Management:   Ambulated -L4   Up in chair - L3

## 2023-11-14 ENCOUNTER — TELEPHONE (OUTPATIENT)
Dept: SURGERY | Facility: CLINIC | Age: 58
End: 2023-11-14
Payer: COMMERCIAL

## 2023-11-30 ENCOUNTER — LAB VISIT (OUTPATIENT)
Dept: LAB | Facility: HOSPITAL | Age: 58
End: 2023-11-30
Attending: INTERNAL MEDICINE
Payer: COMMERCIAL

## 2023-11-30 DIAGNOSIS — C18.4 MALIGNANT NEOPLASM OF TRANSVERSE COLON: ICD-10-CM

## 2023-11-30 DIAGNOSIS — C78.89 METASTATIC ADENOCARCINOMA TO PANCREAS: ICD-10-CM

## 2023-11-30 DIAGNOSIS — C77.2 METASTASIS TO RETROPERITONEAL LYMPH NODE: ICD-10-CM

## 2023-11-30 LAB
ALBUMIN SERPL BCP-MCNC: 4.1 G/DL (ref 3.5–5.2)
ALBUMIN SERPL BCP-MCNC: 4.1 G/DL (ref 3.5–5.2)
ALP SERPL-CCNC: 97 U/L (ref 55–135)
ALP SERPL-CCNC: 97 U/L (ref 55–135)
ALT SERPL W/O P-5'-P-CCNC: 19 U/L (ref 10–44)
ALT SERPL W/O P-5'-P-CCNC: 19 U/L (ref 10–44)
ANION GAP SERPL CALC-SCNC: 9 MMOL/L (ref 8–16)
ANION GAP SERPL CALC-SCNC: 9 MMOL/L (ref 8–16)
AST SERPL-CCNC: 17 U/L (ref 10–40)
AST SERPL-CCNC: 17 U/L (ref 10–40)
BASOPHILS # BLD AUTO: 0.11 K/UL (ref 0–0.2)
BASOPHILS # BLD AUTO: 0.11 K/UL (ref 0–0.2)
BASOPHILS NFR BLD: 0.9 % (ref 0–1.9)
BASOPHILS NFR BLD: 0.9 % (ref 0–1.9)
BILIRUB SERPL-MCNC: 0.5 MG/DL (ref 0.1–1)
BILIRUB SERPL-MCNC: 0.5 MG/DL (ref 0.1–1)
BUN SERPL-MCNC: 13 MG/DL (ref 6–20)
BUN SERPL-MCNC: 13 MG/DL (ref 6–20)
CALCIUM SERPL-MCNC: 9.9 MG/DL (ref 8.7–10.5)
CALCIUM SERPL-MCNC: 9.9 MG/DL (ref 8.7–10.5)
CEA SERPL-MCNC: 3.7 NG/ML (ref 0–5)
CHLORIDE SERPL-SCNC: 102 MMOL/L (ref 95–110)
CHLORIDE SERPL-SCNC: 102 MMOL/L (ref 95–110)
CO2 SERPL-SCNC: 28 MMOL/L (ref 23–29)
CO2 SERPL-SCNC: 28 MMOL/L (ref 23–29)
CREAT SERPL-MCNC: 1 MG/DL (ref 0.5–1.4)
CREAT SERPL-MCNC: 1 MG/DL (ref 0.5–1.4)
DIFFERENTIAL METHOD: ABNORMAL
DIFFERENTIAL METHOD: ABNORMAL
EOSINOPHIL # BLD AUTO: 0.1 K/UL (ref 0–0.5)
EOSINOPHIL # BLD AUTO: 0.1 K/UL (ref 0–0.5)
EOSINOPHIL NFR BLD: 0.7 % (ref 0–8)
EOSINOPHIL NFR BLD: 0.7 % (ref 0–8)
ERYTHROCYTE [DISTWIDTH] IN BLOOD BY AUTOMATED COUNT: 13.7 % (ref 11.5–14.5)
ERYTHROCYTE [DISTWIDTH] IN BLOOD BY AUTOMATED COUNT: 13.7 % (ref 11.5–14.5)
EST. GFR  (NO RACE VARIABLE): >60 ML/MIN/1.73 M^2
EST. GFR  (NO RACE VARIABLE): >60 ML/MIN/1.73 M^2
GLUCOSE SERPL-MCNC: 112 MG/DL (ref 70–110)
GLUCOSE SERPL-MCNC: 112 MG/DL (ref 70–110)
HCT VFR BLD AUTO: 42.3 % (ref 40–54)
HCT VFR BLD AUTO: 42.3 % (ref 40–54)
HGB BLD-MCNC: 14.3 G/DL (ref 14–18)
HGB BLD-MCNC: 14.3 G/DL (ref 14–18)
IMM GRANULOCYTES # BLD AUTO: 0.05 K/UL (ref 0–0.04)
IMM GRANULOCYTES # BLD AUTO: 0.05 K/UL (ref 0–0.04)
IMM GRANULOCYTES NFR BLD AUTO: 0.4 % (ref 0–0.5)
IMM GRANULOCYTES NFR BLD AUTO: 0.4 % (ref 0–0.5)
LYMPHOCYTES # BLD AUTO: 4.3 K/UL (ref 1–4.8)
LYMPHOCYTES # BLD AUTO: 4.3 K/UL (ref 1–4.8)
LYMPHOCYTES NFR BLD: 35.8 % (ref 18–48)
LYMPHOCYTES NFR BLD: 35.8 % (ref 18–48)
MCH RBC QN AUTO: 30.2 PG (ref 27–31)
MCH RBC QN AUTO: 30.2 PG (ref 27–31)
MCHC RBC AUTO-ENTMCNC: 33.8 G/DL (ref 32–36)
MCHC RBC AUTO-ENTMCNC: 33.8 G/DL (ref 32–36)
MCV RBC AUTO: 89 FL (ref 82–98)
MCV RBC AUTO: 89 FL (ref 82–98)
MONOCYTES # BLD AUTO: 1 K/UL (ref 0.3–1)
MONOCYTES # BLD AUTO: 1 K/UL (ref 0.3–1)
MONOCYTES NFR BLD: 8.6 % (ref 4–15)
MONOCYTES NFR BLD: 8.6 % (ref 4–15)
NEUTROPHILS # BLD AUTO: 6.5 K/UL (ref 1.8–7.7)
NEUTROPHILS # BLD AUTO: 6.5 K/UL (ref 1.8–7.7)
NEUTROPHILS NFR BLD: 53.6 % (ref 38–73)
NEUTROPHILS NFR BLD: 53.6 % (ref 38–73)
NRBC BLD-RTO: 0 /100 WBC
NRBC BLD-RTO: 0 /100 WBC
PLATELET # BLD AUTO: 388 K/UL (ref 150–450)
PLATELET # BLD AUTO: 388 K/UL (ref 150–450)
PMV BLD AUTO: 8.9 FL (ref 9.2–12.9)
PMV BLD AUTO: 8.9 FL (ref 9.2–12.9)
POTASSIUM SERPL-SCNC: 5.2 MMOL/L (ref 3.5–5.1)
POTASSIUM SERPL-SCNC: 5.2 MMOL/L (ref 3.5–5.1)
PROT SERPL-MCNC: 7.5 G/DL (ref 6–8.4)
PROT SERPL-MCNC: 7.5 G/DL (ref 6–8.4)
RBC # BLD AUTO: 4.73 M/UL (ref 4.6–6.2)
RBC # BLD AUTO: 4.73 M/UL (ref 4.6–6.2)
SODIUM SERPL-SCNC: 139 MMOL/L (ref 136–145)
SODIUM SERPL-SCNC: 139 MMOL/L (ref 136–145)
TSH SERPL DL<=0.005 MIU/L-ACNC: 1.09 UIU/ML (ref 0.4–4)
WBC # BLD AUTO: 12.13 K/UL (ref 3.9–12.7)
WBC # BLD AUTO: 12.13 K/UL (ref 3.9–12.7)

## 2023-11-30 PROCEDURE — 36415 COLL VENOUS BLD VENIPUNCTURE: CPT | Performed by: INTERNAL MEDICINE

## 2023-11-30 PROCEDURE — 82378 CARCINOEMBRYONIC ANTIGEN: CPT | Performed by: INTERNAL MEDICINE

## 2023-11-30 PROCEDURE — 84443 ASSAY THYROID STIM HORMONE: CPT | Performed by: INTERNAL MEDICINE

## 2023-11-30 PROCEDURE — 80053 COMPREHEN METABOLIC PANEL: CPT | Performed by: INTERNAL MEDICINE

## 2023-11-30 PROCEDURE — 85025 COMPLETE CBC W/AUTO DIFF WBC: CPT | Performed by: INTERNAL MEDICINE

## 2023-12-01 ENCOUNTER — OFFICE VISIT (OUTPATIENT)
Dept: HEMATOLOGY/ONCOLOGY | Facility: CLINIC | Age: 58
End: 2023-12-01
Payer: COMMERCIAL

## 2023-12-01 VITALS
BODY MASS INDEX: 28.15 KG/M2 | TEMPERATURE: 98 F | HEART RATE: 77 BPM | SYSTOLIC BLOOD PRESSURE: 141 MMHG | DIASTOLIC BLOOD PRESSURE: 76 MMHG | WEIGHT: 219.38 LBS | HEIGHT: 74 IN | RESPIRATION RATE: 18 BRPM | OXYGEN SATURATION: 99 %

## 2023-12-01 DIAGNOSIS — I10 PRIMARY HYPERTENSION: ICD-10-CM

## 2023-12-01 DIAGNOSIS — C18.4 MALIGNANT NEOPLASM OF TRANSVERSE COLON: Primary | ICD-10-CM

## 2023-12-01 DIAGNOSIS — C77.2 METASTASIS TO RETROPERITONEAL LYMPH NODE: ICD-10-CM

## 2023-12-01 DIAGNOSIS — C78.89 METASTATIC ADENOCARCINOMA TO PANCREAS: ICD-10-CM

## 2023-12-01 PROCEDURE — 1159F PR MEDICATION LIST DOCUMENTED IN MEDICAL RECORD: ICD-10-PCS | Mod: CPTII,S$GLB,, | Performed by: INTERNAL MEDICINE

## 2023-12-01 PROCEDURE — 99999 PR PBB SHADOW E&M-EST. PATIENT-LVL V: CPT | Mod: PBBFAC,,, | Performed by: INTERNAL MEDICINE

## 2023-12-01 PROCEDURE — 1160F RVW MEDS BY RX/DR IN RCRD: CPT | Mod: CPTII,S$GLB,, | Performed by: INTERNAL MEDICINE

## 2023-12-01 PROCEDURE — 3008F BODY MASS INDEX DOCD: CPT | Mod: CPTII,S$GLB,, | Performed by: INTERNAL MEDICINE

## 2023-12-01 PROCEDURE — 3078F PR MOST RECENT DIASTOLIC BLOOD PRESSURE < 80 MM HG: ICD-10-PCS | Mod: CPTII,S$GLB,, | Performed by: INTERNAL MEDICINE

## 2023-12-01 PROCEDURE — 3077F SYST BP >= 140 MM HG: CPT | Mod: CPTII,S$GLB,, | Performed by: INTERNAL MEDICINE

## 2023-12-01 PROCEDURE — 4010F ACE/ARB THERAPY RXD/TAKEN: CPT | Mod: CPTII,S$GLB,, | Performed by: INTERNAL MEDICINE

## 2023-12-01 PROCEDURE — 1159F MED LIST DOCD IN RCRD: CPT | Mod: CPTII,S$GLB,, | Performed by: INTERNAL MEDICINE

## 2023-12-01 PROCEDURE — 99999 PR PBB SHADOW E&M-EST. PATIENT-LVL V: ICD-10-PCS | Mod: PBBFAC,,, | Performed by: INTERNAL MEDICINE

## 2023-12-01 PROCEDURE — 3077F PR MOST RECENT SYSTOLIC BLOOD PRESSURE >= 140 MM HG: ICD-10-PCS | Mod: CPTII,S$GLB,, | Performed by: INTERNAL MEDICINE

## 2023-12-01 PROCEDURE — 1160F PR REVIEW ALL MEDS BY PRESCRIBER/CLIN PHARMACIST DOCUMENTED: ICD-10-PCS | Mod: CPTII,S$GLB,, | Performed by: INTERNAL MEDICINE

## 2023-12-01 PROCEDURE — 3051F HG A1C>EQUAL 7.0%<8.0%: CPT | Mod: CPTII,S$GLB,, | Performed by: INTERNAL MEDICINE

## 2023-12-01 PROCEDURE — 3051F PR MOST RECENT HEMOGLOBIN A1C LEVEL 7.0 - < 8.0%: ICD-10-PCS | Mod: CPTII,S$GLB,, | Performed by: INTERNAL MEDICINE

## 2023-12-01 PROCEDURE — 3078F DIAST BP <80 MM HG: CPT | Mod: CPTII,S$GLB,, | Performed by: INTERNAL MEDICINE

## 2023-12-01 PROCEDURE — 99215 OFFICE O/P EST HI 40 MIN: CPT | Mod: S$GLB,,, | Performed by: INTERNAL MEDICINE

## 2023-12-01 PROCEDURE — 4010F PR ACE/ARB THEARPY RXD/TAKEN: ICD-10-PCS | Mod: CPTII,S$GLB,, | Performed by: INTERNAL MEDICINE

## 2023-12-01 PROCEDURE — 99215 PR OFFICE/OUTPT VISIT, EST, LEVL V, 40-54 MIN: ICD-10-PCS | Mod: S$GLB,,, | Performed by: INTERNAL MEDICINE

## 2023-12-01 PROCEDURE — 3008F PR BODY MASS INDEX (BMI) DOCUMENTED: ICD-10-PCS | Mod: CPTII,S$GLB,, | Performed by: INTERNAL MEDICINE

## 2023-12-01 RX ORDER — DIPHENHYDRAMINE HYDROCHLORIDE 50 MG/ML
50 INJECTION INTRAMUSCULAR; INTRAVENOUS ONCE AS NEEDED
Status: CANCELLED | OUTPATIENT
Start: 2023-12-04

## 2023-12-01 RX ORDER — HEPARIN 100 UNIT/ML
500 SYRINGE INTRAVENOUS
Status: CANCELLED | OUTPATIENT
Start: 2023-12-04

## 2023-12-01 RX ORDER — EPINEPHRINE 0.3 MG/.3ML
0.3 INJECTION SUBCUTANEOUS ONCE AS NEEDED
Status: CANCELLED | OUTPATIENT
Start: 2023-12-04

## 2023-12-01 RX ORDER — SODIUM CHLORIDE 0.9 % (FLUSH) 0.9 %
10 SYRINGE (ML) INJECTION
Status: CANCELLED | OUTPATIENT
Start: 2023-12-04

## 2023-12-01 NOTE — PROGRESS NOTES
"Service Date:  12/1/23    Chief Complaint:  Colon cancer    Osman Li Jr. is a 58 y.o. 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  This occurred very shortly after completing treatment.    Patient was part of a clinical trial to measure CT DNA.  His CT DNA started to go up while he was on FOLFOX, which prompted the scan, which showed the progression.  Next generation sequencing also showed a high tumor burden.    He is now on Keytruda as his cancer has a very high tumor mutation burden.  So far, Keytruda has been helping    Here for cycle 15.  Doing well.  No new complaints.  Last scan showed no new evidence of disease.  He has an appointment with Dr. Goodwin on 12/13/23.      Review of Systems   Constitutional:  Positive for fatigue.   HENT: Negative.     Eyes: Negative.    Respiratory: Negative.     Cardiovascular: Negative.    Gastrointestinal: Negative.    Endocrine: Negative.    Genitourinary: Negative.    Musculoskeletal: Negative.    Integumentary:  Negative.   Neurological:  Positive for numbness.   Hematological: Negative.    Psychiatric/Behavioral: Negative.          Current Outpatient Medications   Medication Instructions    atorvastatin (LIPITOR) 20 mg, Oral, Daily    enalapriL-hydrochlorothiazide (VASERETIC) 10-25 mg per tablet 1 tablet, Oral, Daily    metFORMIN (GLUCOPHAGE) 1,000 mg, Oral, 2 times daily with meals    morphine (MS CONTIN) 30 mg, Oral, 2 times daily    pen needle, diabetic 31 gauge x 3/16" Ndle 1 each, Misc.(Non-Drug; Combo Route), Daily    promethazine (PHENERGAN) 12.5 mg, Oral, Every 4 hours PRN    sildenafiL (VIAGRA) 100 mg, Oral, Daily PRN    TOUJEO MAX U-300 SOLOSTAR 26 Units, Subcutaneous, Daily    traZODone (DESYREL) 50 mg, Oral, Nightly        Past Medical History:   Diagnosis Date    Digestive disorder     DM (diabetes mellitus)     Hyperlipidemia     Hypertension     " Prediabetes         Past Surgical History:   Procedure Laterality Date    CHOLECYSTECTOMY  2011    COLON SURGERY      COLONOSCOPY      2018    COLOSTOMY N/A 04/25/2022    Procedure: CREATION, COLOSTOMY;  Surgeon: Carlton Waddell MD;  Location: [facility] OR;  Service: General;  Laterality: N/A;    ENDOSCOPIC ULTRASOUND OF UPPER GASTROINTESTINAL TRACT N/A 01/06/2023    Procedure: ULTRASOUND, UPPER GI TRACT, ENDOSCOPIC;  Surgeon: Rinku Orozco III, MD;  Location: [facility] ENDO;  Service: Endoscopy;  Laterality: N/A;    INSERTION OF TUNNELED CENTRAL VENOUS CATHETER (CVC) WITH SUBCUTANEOUS PORT Right 05/18/2022    Procedure: [id number];  Surgeon: Carlton Waddell MD;  Location: [facility] OR;  Service: General;  Laterality: Right;    MOBILIZATION OF SPLENIC FLEXURE N/A 04/25/2022    Procedure: MOBILIZATION, SPLENIC FLEXURE;  Surgeon: Carlton Waddell MD;  Location: [facility] OR;  Service: General;  Laterality: N/A;    SPLENECTOMY N/A 04/25/2022    Procedure: SPLENECTOMY;  Surgeon: Carlton Waddell MD;  Location: [facility] OR;  Service: General;  Laterality: N/A;    SUBTOTAL COLECTOMY N/A 04/25/2022    Procedure: COLECTOMY, PARTIAL;  Surgeon: Carlton Waddell MD;  Location: [facility] OR;  Service: General;  Laterality: N/A;    TONSILLECTOMY      TUMOR REMOVAL  10/18/2023    on top of the nose        Family History   Problem Relation Age of Onset    Heart disease Father     Rectal cancer Sister         half sister       Social History     Tobacco Use    Smoking status: Every Day     Current packs/day: 1.00     Average packs/day: 1 pack/day for 40.0 years (40.0 ttl pk-yrs)     Types: Cigarettes     Passive exposure: Current    Smokeless tobacco: Never   Substance Use Topics    Alcohol use: Not Currently    Drug use: Never         Vitals:    12/01/23 1017   BP: (!) 141/76   Pulse: 77   Resp: 18   Temp: 97.6 °F (36.4 °C)          Physical Exam:  BP (!) 141/76 (BP Location: Right arm, Patient Position: Sitting, BP Method: Large (Automatic))    "Pulse 77   Temp 97.6 °F (36.4 °C) (Temporal)   Resp 18   Ht 6' 2" (1.88 m)   Wt 99.5 kg (219 lb 5.7 oz)   SpO2 99%   BMI 28.16 kg/m²     Physical Exam  Vitals and nursing note reviewed.   Constitutional:       Appearance: Normal appearance.   HENT:      Head: Normocephalic and atraumatic.      Nose: Nose normal.      Mouth/Throat:      Mouth: Mucous membranes are moist.      Pharynx: Oropharynx is clear.   Eyes:      Extraocular Movements: Extraocular movements intact.      Conjunctiva/sclera: Conjunctivae normal.   Cardiovascular:      Rate and Rhythm: Normal rate and regular rhythm.      Heart sounds: Normal heart sounds.   Pulmonary:      Effort: Pulmonary effort is normal.      Breath sounds: Normal breath sounds.   Abdominal:      General: Abdomen is flat. Bowel sounds are normal.      Palpations: Abdomen is soft.      Comments: Ostomy bag in place.   Musculoskeletal:         General: Normal range of motion.      Cervical back: Normal range of motion and neck supple.   Skin:     General: Skin is warm and dry.   Neurological:      General: No focal deficit present.      Mental Status: He is alert and oriented to person, place, and time. Mental status is at baseline.   Psychiatric:         Mood and Affect: Mood normal.          Labs:  Lab Results   Component Value Date    WBC 12.13 11/30/2023    WBC 12.13 11/30/2023    RBC 4.73 11/30/2023    RBC 4.73 11/30/2023    HGB 14.3 11/30/2023    HGB 14.3 11/30/2023    HCT 42.3 11/30/2023    HCT 42.3 11/30/2023    MCV 89 11/30/2023    MCV 89 11/30/2023    MCH 30.2 11/30/2023    MCH 30.2 11/30/2023    MCHC 33.8 11/30/2023    MCHC 33.8 11/30/2023    RDW 13.7 11/30/2023    RDW 13.7 11/30/2023     11/30/2023     11/30/2023    MPV 8.9 (L) 11/30/2023    MPV 8.9 (L) 11/30/2023    GRAN 6.5 11/30/2023    GRAN 53.6 11/30/2023    GRAN 6.5 11/30/2023    GRAN 53.6 11/30/2023    LYMPH 4.3 11/30/2023    LYMPH 35.8 11/30/2023    LYMPH 4.3 11/30/2023    LYMPH 35.8 " 11/30/2023    MONO 1.0 11/30/2023    MONO 8.6 11/30/2023    MONO 1.0 11/30/2023    MONO 8.6 11/30/2023    EOS 0.1 11/30/2023    EOS 0.1 11/30/2023    BASO 0.11 11/30/2023    BASO 0.11 11/30/2023    EOSINOPHIL 0.7 11/30/2023    EOSINOPHIL 0.7 11/30/2023    BASOPHIL 0.9 11/30/2023    BASOPHIL 0.9 11/30/2023     Sodium   Date Value Ref Range Status   11/30/2023 139 136 - 145 mmol/L Final   11/30/2023 139 136 - 145 mmol/L Final     Potassium   Date Value Ref Range Status   11/30/2023 5.2 (H) 3.5 - 5.1 mmol/L Final   11/30/2023 5.2 (H) 3.5 - 5.1 mmol/L Final     Chloride   Date Value Ref Range Status   11/30/2023 102 95 - 110 mmol/L Final   11/30/2023 102 95 - 110 mmol/L Final     CO2   Date Value Ref Range Status   11/30/2023 28 23 - 29 mmol/L Final   11/30/2023 28 23 - 29 mmol/L Final     Glucose   Date Value Ref Range Status   11/30/2023 112 (H) 70 - 110 mg/dL Final   11/30/2023 112 (H) 70 - 110 mg/dL Final     BUN   Date Value Ref Range Status   11/30/2023 13 6 - 20 mg/dL Final   11/30/2023 13 6 - 20 mg/dL Final     Creatinine   Date Value Ref Range Status   11/30/2023 1.0 0.5 - 1.4 mg/dL Final   11/30/2023 1.0 0.5 - 1.4 mg/dL Final     Calcium   Date Value Ref Range Status   11/30/2023 9.9 8.7 - 10.5 mg/dL Final   11/30/2023 9.9 8.7 - 10.5 mg/dL Final     Total Protein   Date Value Ref Range Status   11/30/2023 7.5 6.0 - 8.4 g/dL Final   11/30/2023 7.5 6.0 - 8.4 g/dL Final     Albumin   Date Value Ref Range Status   11/30/2023 4.1 3.5 - 5.2 g/dL Final   11/30/2023 4.1 3.5 - 5.2 g/dL Final     Total Bilirubin   Date Value Ref Range Status   11/30/2023 0.5 0.1 - 1.0 mg/dL Final     Comment:     For infants and newborns, interpretation of results should be based  on gestational age, weight and in agreement with clinical  observations.    Premature Infant recommended reference ranges:  Up to 24 hours.............<8.0 mg/dL  Up to 48 hours............<12.0 mg/dL  3-5 days..................<15.0 mg/dL  6-29  days.................<15.0 mg/dL     11/30/2023 0.5 0.1 - 1.0 mg/dL Final     Comment:     For infants and newborns, interpretation of results should be based  on gestational age, weight and in agreement with clinical  observations.    Premature Infant recommended reference ranges:  Up to 24 hours.............<8.0 mg/dL  Up to 48 hours............<12.0 mg/dL  3-5 days..................<15.0 mg/dL  6-29 days.................<15.0 mg/dL       Alkaline Phosphatase   Date Value Ref Range Status   11/30/2023 97 55 - 135 U/L Final   11/30/2023 97 55 - 135 U/L Final     AST   Date Value Ref Range Status   11/30/2023 17 10 - 40 U/L Final   11/30/2023 17 10 - 40 U/L Final     ALT   Date Value Ref Range Status   11/30/2023 19 10 - 44 U/L Final   11/30/2023 19 10 - 44 U/L Final     Anion Gap   Date Value Ref Range Status   11/30/2023 9 8 - 16 mmol/L Final   11/30/2023 9 8 - 16 mmol/L Final     eGFR if    Date Value Ref Range Status   07/25/2022 >60 >60 mL/min/1.73 m^2 Final     eGFR if non    Date Value Ref Range Status   07/25/2022 >60 >60 mL/min/1.73 m^2 Final     Comment:     Calculation used to obtain the estimated glomerular filtration  rate (eGFR) is the CKD-EPI equation.          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    -proceed with Keytruda C15 today  -Recent CT scan shows no new evidence of disease, with a smaller lesion on the pancreatic tail and stability of the retroperitoneal lymph node that was radiated.  Given his overall good response to treatment, I will refer  him to surgical oncology at [facility] to see about a metastasectomy given the oligometastasis.  I believe the pancreatic tail would be feasible possibly, but I am concerned about the retroperitoneal lymph node.  I am not sure if this would require an RPLND.  He will see Dr. Goodwin on 12/13/23  -RTC in 3 weeks with labs for next treatment    Hypercalcemia  -resolved    Pancreatic mass   -confirmed recurrence of colon adenocarcinoma    Back pain  - NM bone scan negative for mets  -increase morphine dose to 30 mg as patient feels 15 mg not lasting long enough    HTN  - on Enalapril  - follow up with PCP    HLP  - follow up with PCP    DM2  - on Metformin  - follow up with PCP    Tobacco use  - counseled on cessation      Aurash Khoobehi, MD  Hematology and Oncology

## 2023-12-04 ENCOUNTER — INFUSION (OUTPATIENT)
Dept: INFUSION THERAPY | Facility: HOSPITAL | Age: 58
End: 2023-12-04
Attending: INTERNAL MEDICINE
Payer: COMMERCIAL

## 2023-12-04 VITALS
BODY MASS INDEX: 28.08 KG/M2 | OXYGEN SATURATION: 97 % | HEART RATE: 76 BPM | WEIGHT: 218.81 LBS | RESPIRATION RATE: 16 BRPM | TEMPERATURE: 98 F | DIASTOLIC BLOOD PRESSURE: 76 MMHG | SYSTOLIC BLOOD PRESSURE: 147 MMHG | HEIGHT: 74 IN

## 2023-12-04 DIAGNOSIS — C78.89 METASTATIC ADENOCARCINOMA TO PANCREAS: ICD-10-CM

## 2023-12-04 DIAGNOSIS — C18.5 MALIGNANT NEOPLASM OF SPLENIC FLEXURE: Primary | ICD-10-CM

## 2023-12-04 PROCEDURE — 25000003 PHARM REV CODE 250: Performed by: INTERNAL MEDICINE

## 2023-12-04 PROCEDURE — 96413 CHEMO IV INFUSION 1 HR: CPT

## 2023-12-04 PROCEDURE — 63600175 PHARM REV CODE 636 W HCPCS: Performed by: INTERNAL MEDICINE

## 2023-12-04 RX ORDER — SODIUM CHLORIDE 0.9 % (FLUSH) 0.9 %
10 SYRINGE (ML) INJECTION
Status: DISCONTINUED | OUTPATIENT
Start: 2023-12-04 | End: 2023-12-04 | Stop reason: HOSPADM

## 2023-12-04 RX ORDER — HEPARIN 100 UNIT/ML
500 SYRINGE INTRAVENOUS
Status: DISCONTINUED | OUTPATIENT
Start: 2023-12-04 | End: 2023-12-04 | Stop reason: HOSPADM

## 2023-12-04 RX ADMIN — HEPARIN 500 UNITS: 100 SYRINGE at 02:12

## 2023-12-04 RX ADMIN — SODIUM CHLORIDE: 9 INJECTION, SOLUTION INTRAVENOUS at 02:12

## 2023-12-04 RX ADMIN — SODIUM CHLORIDE 200 MG: 9 INJECTION, SOLUTION INTRAVENOUS at 02:12

## 2023-12-05 ENCOUNTER — PATIENT MESSAGE (OUTPATIENT)
Dept: HEMATOLOGY/ONCOLOGY | Facility: CLINIC | Age: 58
End: 2023-12-05
Payer: COMMERCIAL

## 2023-12-05 DIAGNOSIS — T45.1X5A CHEMOTHERAPY-INDUCED NEUROPATHY: ICD-10-CM

## 2023-12-05 DIAGNOSIS — E11.00 TYPE 2 DIABETES MELLITUS WITH HYPEROSMOLARITY WITHOUT COMA, WITHOUT LONG-TERM CURRENT USE OF INSULIN: ICD-10-CM

## 2023-12-05 DIAGNOSIS — I10 PRIMARY HYPERTENSION: ICD-10-CM

## 2023-12-05 DIAGNOSIS — C18.4 MALIGNANT NEOPLASM OF TRANSVERSE COLON: ICD-10-CM

## 2023-12-05 DIAGNOSIS — G62.0 CHEMOTHERAPY-INDUCED NEUROPATHY: ICD-10-CM

## 2023-12-05 DIAGNOSIS — C78.89 METASTATIC ADENOCARCINOMA TO PANCREAS: ICD-10-CM

## 2023-12-05 DIAGNOSIS — E78.49 OTHER HYPERLIPIDEMIA: ICD-10-CM

## 2023-12-05 DIAGNOSIS — C77.2 METASTASIS TO RETROPERITONEAL LYMPH NODE: ICD-10-CM

## 2023-12-05 RX ORDER — MORPHINE SULFATE 30 MG/1
30 TABLET, FILM COATED, EXTENDED RELEASE ORAL 2 TIMES DAILY
Qty: 60 TABLET | Refills: 0 | Status: SHIPPED | OUTPATIENT
Start: 2023-12-05 | End: 2024-01-04 | Stop reason: SDUPTHER

## 2023-12-11 NOTE — PROGRESS NOTES
Osman Li Jr. is a 58 y.o. 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  This occurred very shortly after completing treatment.    He underwent open laparotomy for obstructing colon mass 4/2022 that resulted in kind of a long yazan's with a transverse colostomy and splenectomy due to adhesions/disease involvement at the pancreatic tail:  Findings: Large colonic mass that was obstructing at the splenic flexure and densely adherent to the splenic hilum.  Palpable abnormalities within the tail of the pancreas       Final path: pT3 pN2b (17 of 27 nodes positive), G3, MSI-L  Gene: BRAF   DNA change: c.1783T>C   Amino Acid change: p.F595L ([id number])     Interestingly on his STRATA report there is no BRAF mutation, but he is TMB-H.  Patient was part of a clinical trial to measure CT DNA.  His CT DNA started to go up while he was on FOLFOX, which prompted the scan, which showed the progression at the end of his 6 month adjuvant course.  Next generation sequencing also showed a high tumor burden. He was changed to single agent Keytruda 1/2023 and has since had 15 cycles and is doing well.    He has at least two sites of disease, his pancreatic tail which is biopsy proven by EUS and RPLN that was PET avid in August.       Both of these areas have responded well and are poorly defined on recent CT imaging 11/2023. His CEA has never been elevated, and I do not see a recent ctDNA study.    A/P  Pretty interesting case of a 57yo M, obstructing high risk colon cancer resected with metastatic progression on FOLFOX but responsive to single agent PDL-1 now for about 12 months. I think all the things are on the table here. His jennifer-pancreatic tumor I can sell as locoregional/residual disease, and this is clearable with a tough distal pancreatectomy. The more challenging philosophical target is this RP node, made more  challenging by the fact that its responded so well I doubt we'll find it and will need to do a L RPLND. All very accomplishable in this healthy male, but how it impacts his clinical course beyond just continuing ICI is not clear.    In the end we are afforded the benefit of time here given how well he's doing. Will tan this over, present at [facility] and discuss with Dr. Khoobehi. I think a repeat PET here could help us. If he still has residual disease on PET, I would favor moving forward with resection. If there is no pet avid disease, the sell is a bit harder.    I spent 60 minutes with Mr. Li, with >50% of time spent face to face and additional time preparing to see the patient (eg, review of tests), obtaining and/or reviewing separately obtained history, documenting clinical information in the electronic or other health record, independently interpreting results (not separately reported) and communicating results to the patient/family/caregiver, or Care coordination (not separately reported).       Bo Goodwin MD  Upper GI / Hepatobiliary Surgical Oncology  [facility] [place], [place]  Office: [phone]  Fax: [phone]

## 2023-12-13 ENCOUNTER — OFFICE VISIT (OUTPATIENT)
Dept: SURGERY | Facility: CLINIC | Age: 58
End: 2023-12-13
Payer: COMMERCIAL

## 2023-12-13 VITALS
DIASTOLIC BLOOD PRESSURE: 80 MMHG | WEIGHT: 216.19 LBS | BODY MASS INDEX: 27.74 KG/M2 | OXYGEN SATURATION: 96 % | HEIGHT: 74 IN | HEART RATE: 86 BPM | SYSTOLIC BLOOD PRESSURE: 139 MMHG

## 2023-12-13 DIAGNOSIS — C18.2 CANCER OF ASCENDING COLON METASTATIC TO INTRA-ABDOMINAL LYMPH NODE: Primary | ICD-10-CM

## 2023-12-13 DIAGNOSIS — C77.2 CANCER OF ASCENDING COLON METASTATIC TO INTRA-ABDOMINAL LYMPH NODE: Primary | ICD-10-CM

## 2023-12-13 PROCEDURE — 3051F HG A1C>EQUAL 7.0%<8.0%: CPT | Mod: CPTII,S$GLB,, | Performed by: SURGERY

## 2023-12-13 PROCEDURE — 3079F DIAST BP 80-89 MM HG: CPT | Mod: CPTII,S$GLB,, | Performed by: SURGERY

## 2023-12-13 PROCEDURE — 4010F ACE/ARB THERAPY RXD/TAKEN: CPT | Mod: CPTII,S$GLB,, | Performed by: SURGERY

## 2023-12-13 PROCEDURE — 1159F MED LIST DOCD IN RCRD: CPT | Mod: CPTII,S$GLB,, | Performed by: SURGERY

## 2023-12-13 PROCEDURE — 3008F BODY MASS INDEX DOCD: CPT | Mod: CPTII,S$GLB,, | Performed by: SURGERY

## 2023-12-13 PROCEDURE — 99999 PR PBB SHADOW E&M-EST. PATIENT-LVL III: CPT | Mod: PBBFAC,,, | Performed by: SURGERY

## 2023-12-13 PROCEDURE — 3008F PR BODY MASS INDEX (BMI) DOCUMENTED: ICD-10-PCS | Mod: CPTII,S$GLB,, | Performed by: SURGERY

## 2023-12-13 PROCEDURE — 1159F PR MEDICATION LIST DOCUMENTED IN MEDICAL RECORD: ICD-10-PCS | Mod: CPTII,S$GLB,, | Performed by: SURGERY

## 2023-12-13 PROCEDURE — 3075F PR MOST RECENT SYSTOLIC BLOOD PRESS GE 130-139MM HG: ICD-10-PCS | Mod: CPTII,S$GLB,, | Performed by: SURGERY

## 2023-12-13 PROCEDURE — 3051F PR MOST RECENT HEMOGLOBIN A1C LEVEL 7.0 - < 8.0%: ICD-10-PCS | Mod: CPTII,S$GLB,, | Performed by: SURGERY

## 2023-12-13 PROCEDURE — 99999 PR PBB SHADOW E&M-EST. PATIENT-LVL III: ICD-10-PCS | Mod: PBBFAC,,, | Performed by: SURGERY

## 2023-12-13 PROCEDURE — 3079F PR MOST RECENT DIASTOLIC BLOOD PRESSURE 80-89 MM HG: ICD-10-PCS | Mod: CPTII,S$GLB,, | Performed by: SURGERY

## 2023-12-13 PROCEDURE — 3075F SYST BP GE 130 - 139MM HG: CPT | Mod: CPTII,S$GLB,, | Performed by: SURGERY

## 2023-12-13 PROCEDURE — 4010F PR ACE/ARB THEARPY RXD/TAKEN: ICD-10-PCS | Mod: CPTII,S$GLB,, | Performed by: SURGERY

## 2023-12-13 PROCEDURE — 99215 OFFICE O/P EST HI 40 MIN: CPT | Mod: S$GLB,,, | Performed by: SURGERY

## 2023-12-13 PROCEDURE — 99215 PR OFFICE/OUTPT VISIT, EST, LEVL V, 40-54 MIN: ICD-10-PCS | Mod: S$GLB,,, | Performed by: SURGERY

## 2023-12-14 ENCOUNTER — TELEPHONE (OUTPATIENT)
Dept: SURGERY | Facility: CLINIC | Age: 58
End: 2023-12-14
Payer: COMMERCIAL

## 2023-12-14 NOTE — TELEPHONE ENCOUNTER
Call placed to pt. PET scheduled per pt request. Time date location and fasting instructions provided. Clinic follow up scheduled per pt request (early am). Time date and location provided. Instructed pt to contact the office for any questions or concerns. Pt verbalized understanding.

## 2023-12-18 ENCOUNTER — TELEPHONE (OUTPATIENT)
Dept: HEMATOLOGY/ONCOLOGY | Facility: CLINIC | Age: 58
End: 2023-12-18
Payer: COMMERCIAL

## 2023-12-18 NOTE — TELEPHONE ENCOUNTER
----- Message from Naya Monzon, Patient Care Assistant sent at 12/18/2023 10:07 AM CST -----  Type: Needs Medical Advice  Who Called:  christiano  Best Call Back Number: [phone]   Additional Information: christiano from [employer] access stats she needs page 6 from renewal package and needs it signed and faxed back at  [phone] fax  case id [id number]

## 2023-12-21 ENCOUNTER — DOCUMENTATION ONLY (OUTPATIENT)
Dept: HEMATOLOGY/ONCOLOGY | Facility: CLINIC | Age: 58
End: 2023-12-21
Payer: COMMERCIAL

## 2023-12-21 ENCOUNTER — TELEPHONE (OUTPATIENT)
Dept: HEMATOLOGY/ONCOLOGY | Facility: CLINIC | Age: 58
End: 2023-12-21
Payer: COMMERCIAL

## 2023-12-21 ENCOUNTER — LAB VISIT (OUTPATIENT)
Dept: LAB | Facility: HOSPITAL | Age: 58
End: 2023-12-21
Attending: INTERNAL MEDICINE
Payer: COMMERCIAL

## 2023-12-21 DIAGNOSIS — C78.89 METASTATIC ADENOCARCINOMA TO PANCREAS: ICD-10-CM

## 2023-12-21 DIAGNOSIS — C77.2 METASTASIS TO RETROPERITONEAL LYMPH NODE: ICD-10-CM

## 2023-12-21 DIAGNOSIS — C18.4 MALIGNANT NEOPLASM OF TRANSVERSE COLON: ICD-10-CM

## 2023-12-21 LAB
ALBUMIN SERPL BCP-MCNC: 4.1 G/DL (ref 3.5–5.2)
ALP SERPL-CCNC: 86 U/L (ref 55–135)
ALT SERPL W/O P-5'-P-CCNC: 23 U/L (ref 10–44)
ANION GAP SERPL CALC-SCNC: 10 MMOL/L (ref 8–16)
AST SERPL-CCNC: 22 U/L (ref 10–40)
BASOPHILS # BLD AUTO: 0.09 K/UL (ref 0–0.2)
BASOPHILS NFR BLD: 0.8 % (ref 0–1.9)
BILIRUB SERPL-MCNC: 0.7 MG/DL (ref 0.1–1)
BUN SERPL-MCNC: 11 MG/DL (ref 6–20)
CALCIUM SERPL-MCNC: 10 MG/DL (ref 8.7–10.5)
CEA SERPL-MCNC: 2.9 NG/ML (ref 0–5)
CHLORIDE SERPL-SCNC: 99 MMOL/L (ref 95–110)
CO2 SERPL-SCNC: 28 MMOL/L (ref 23–29)
CREAT SERPL-MCNC: 1 MG/DL (ref 0.5–1.4)
DIFFERENTIAL METHOD: NORMAL
EOSINOPHIL # BLD AUTO: 0.1 K/UL (ref 0–0.5)
EOSINOPHIL NFR BLD: 0.9 % (ref 0–8)
ERYTHROCYTE [DISTWIDTH] IN BLOOD BY AUTOMATED COUNT: 13.9 % (ref 11.5–14.5)
EST. GFR  (NO RACE VARIABLE): >60 ML/MIN/1.73 M^2
GLUCOSE SERPL-MCNC: 107 MG/DL (ref 70–110)
HCT VFR BLD AUTO: 43.4 % (ref 40–54)
HGB BLD-MCNC: 14.6 G/DL (ref 14–18)
IMM GRANULOCYTES # BLD AUTO: 0.04 K/UL (ref 0–0.04)
IMM GRANULOCYTES NFR BLD AUTO: 0.4 % (ref 0–0.5)
LYMPHOCYTES # BLD AUTO: 4.6 K/UL (ref 1–4.8)
LYMPHOCYTES NFR BLD: 41.9 % (ref 18–48)
MCH RBC QN AUTO: 29.7 PG (ref 27–31)
MCHC RBC AUTO-ENTMCNC: 33.6 G/DL (ref 32–36)
MCV RBC AUTO: 88 FL (ref 82–98)
MONOCYTES # BLD AUTO: 1 K/UL (ref 0.3–1)
MONOCYTES NFR BLD: 8.9 % (ref 4–15)
NEUTROPHILS # BLD AUTO: 5.2 K/UL (ref 1.8–7.7)
NEUTROPHILS NFR BLD: 47.1 % (ref 38–73)
NRBC BLD-RTO: 0 /100 WBC
PLATELET # BLD AUTO: 387 K/UL (ref 150–450)
PMV BLD AUTO: 9.4 FL (ref 9.2–12.9)
POTASSIUM SERPL-SCNC: 5 MMOL/L (ref 3.5–5.1)
PROT SERPL-MCNC: 7.4 G/DL (ref 6–8.4)
RBC # BLD AUTO: 4.92 M/UL (ref 4.6–6.2)
SODIUM SERPL-SCNC: 137 MMOL/L (ref 136–145)
WBC # BLD AUTO: 11.07 K/UL (ref 3.9–12.7)

## 2023-12-21 PROCEDURE — 82378 CARCINOEMBRYONIC ANTIGEN: CPT | Performed by: INTERNAL MEDICINE

## 2023-12-21 PROCEDURE — 36415 COLL VENOUS BLD VENIPUNCTURE: CPT | Performed by: INTERNAL MEDICINE

## 2023-12-21 PROCEDURE — 80053 COMPREHEN METABOLIC PANEL: CPT | Performed by: INTERNAL MEDICINE

## 2023-12-21 PROCEDURE — 85025 COMPLETE CBC W/AUTO DIFF WBC: CPT | Performed by: INTERNAL MEDICINE

## 2023-12-21 NOTE — TELEPHONE ENCOUNTER
Spoke with Emely at [employer] program and instructed that I faxed the required paperwork this morning with confirmation. She stated that their faxes are backed up and that they will get through them today.     ----- Message from Denisha Smith sent at 12/21/2023 11:43 AM CST -----  Contact: Suzette  ,Type:  Needs Medical Advice    Who Called: Suzette with [employer]     Would the patient rather a call back or a response via [facility]? Call     Best Call Back Number: [phone]    Additional Information: Suzette with [employer] would like to speak with the nurse in regards to medication and missing the forms for the service page.     Please call to advise

## 2023-12-21 NOTE — PROGRESS NOTES
[facility]     Colorectal Liver Metastasis Tumor Board Note      Date: 12/21/2023    Case Overview: Mr. 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 He has received 12 cycles of FOLFOX and is now S/p cycle 15 pembrolizumab.     Participants: medical oncology, surgical oncology, colorectal surgery, transplant surgeons, interventional radiology, and oncology navigator     Imaging Reviewed: CT CAP     Radiology Review: Most recent imaging demonstrates a prominent left RP lymph node corresponding to the hypermetabolic focus on PET/CT from 8/3/23, decreased in size compared to prior.  No new areas of disease seen.    Orders/Referrals: none at this time.     Board Recommendations: Continue immunotherapy. Proceed with PET/CT 1/8/24 as scheduled. Can re-evaluate surgical options following restaging scans.

## 2023-12-21 NOTE — PROGRESS NOTES
Completed and signed Healthcare Provider Information portion of the [employer] application faxed to [phone] per [employer] request.

## 2023-12-22 ENCOUNTER — PATIENT MESSAGE (OUTPATIENT)
Dept: HEMATOLOGY/ONCOLOGY | Facility: CLINIC | Age: 58
End: 2023-12-22

## 2023-12-22 ENCOUNTER — OFFICE VISIT (OUTPATIENT)
Dept: HEMATOLOGY/ONCOLOGY | Facility: CLINIC | Age: 58
End: 2023-12-22
Payer: COMMERCIAL

## 2023-12-22 VITALS
HEART RATE: 82 BPM | BODY MASS INDEX: 28.21 KG/M2 | WEIGHT: 219.81 LBS | RESPIRATION RATE: 12 BRPM | OXYGEN SATURATION: 97 % | TEMPERATURE: 98 F | SYSTOLIC BLOOD PRESSURE: 147 MMHG | DIASTOLIC BLOOD PRESSURE: 80 MMHG | HEIGHT: 74 IN

## 2023-12-22 DIAGNOSIS — G62.0 CHEMOTHERAPY-INDUCED NEUROPATHY: ICD-10-CM

## 2023-12-22 DIAGNOSIS — C78.89 METASTATIC ADENOCARCINOMA TO PANCREAS: ICD-10-CM

## 2023-12-22 DIAGNOSIS — C77.2 METASTASIS TO RETROPERITONEAL LYMPH NODE: ICD-10-CM

## 2023-12-22 DIAGNOSIS — T45.1X5A CHEMOTHERAPY-INDUCED NEUROPATHY: ICD-10-CM

## 2023-12-22 DIAGNOSIS — C18.4 MALIGNANT NEOPLASM OF TRANSVERSE COLON: Primary | ICD-10-CM

## 2023-12-22 DIAGNOSIS — E78.49 OTHER HYPERLIPIDEMIA: ICD-10-CM

## 2023-12-22 DIAGNOSIS — I10 PRIMARY HYPERTENSION: ICD-10-CM

## 2023-12-22 DIAGNOSIS — E11.00 TYPE 2 DIABETES MELLITUS WITH HYPEROSMOLARITY WITHOUT COMA, WITHOUT LONG-TERM CURRENT USE OF INSULIN: ICD-10-CM

## 2023-12-22 PROCEDURE — 3051F HG A1C>EQUAL 7.0%<8.0%: CPT | Mod: CPTII,S$GLB,, | Performed by: INTERNAL MEDICINE

## 2023-12-22 PROCEDURE — 3079F PR MOST RECENT DIASTOLIC BLOOD PRESSURE 80-89 MM HG: ICD-10-PCS | Mod: CPTII,S$GLB,, | Performed by: INTERNAL MEDICINE

## 2023-12-22 PROCEDURE — 3077F PR MOST RECENT SYSTOLIC BLOOD PRESSURE >= 140 MM HG: ICD-10-PCS | Mod: CPTII,S$GLB,, | Performed by: INTERNAL MEDICINE

## 2023-12-22 PROCEDURE — 3079F DIAST BP 80-89 MM HG: CPT | Mod: CPTII,S$GLB,, | Performed by: INTERNAL MEDICINE

## 2023-12-22 PROCEDURE — 3077F SYST BP >= 140 MM HG: CPT | Mod: CPTII,S$GLB,, | Performed by: INTERNAL MEDICINE

## 2023-12-22 PROCEDURE — 99999 PR PBB SHADOW E&M-EST. PATIENT-LVL IV: CPT | Mod: PBBFAC,,, | Performed by: INTERNAL MEDICINE

## 2023-12-22 PROCEDURE — 1160F RVW MEDS BY RX/DR IN RCRD: CPT | Mod: CPTII,S$GLB,, | Performed by: INTERNAL MEDICINE

## 2023-12-22 PROCEDURE — 1159F MED LIST DOCD IN RCRD: CPT | Mod: CPTII,S$GLB,, | Performed by: INTERNAL MEDICINE

## 2023-12-22 PROCEDURE — 1160F PR REVIEW ALL MEDS BY PRESCRIBER/CLIN PHARMACIST DOCUMENTED: ICD-10-PCS | Mod: CPTII,S$GLB,, | Performed by: INTERNAL MEDICINE

## 2023-12-22 PROCEDURE — 4010F PR ACE/ARB THEARPY RXD/TAKEN: ICD-10-PCS | Mod: CPTII,S$GLB,, | Performed by: INTERNAL MEDICINE

## 2023-12-22 PROCEDURE — 99215 OFFICE O/P EST HI 40 MIN: CPT | Mod: S$GLB,,, | Performed by: INTERNAL MEDICINE

## 2023-12-22 PROCEDURE — 99215 PR OFFICE/OUTPT VISIT, EST, LEVL V, 40-54 MIN: ICD-10-PCS | Mod: S$GLB,,, | Performed by: INTERNAL MEDICINE

## 2023-12-22 PROCEDURE — 99999 PR PBB SHADOW E&M-EST. PATIENT-LVL IV: ICD-10-PCS | Mod: PBBFAC,,, | Performed by: INTERNAL MEDICINE

## 2023-12-22 PROCEDURE — 3051F PR MOST RECENT HEMOGLOBIN A1C LEVEL 7.0 - < 8.0%: ICD-10-PCS | Mod: CPTII,S$GLB,, | Performed by: INTERNAL MEDICINE

## 2023-12-22 PROCEDURE — 3008F PR BODY MASS INDEX (BMI) DOCUMENTED: ICD-10-PCS | Mod: CPTII,S$GLB,, | Performed by: INTERNAL MEDICINE

## 2023-12-22 PROCEDURE — 1159F PR MEDICATION LIST DOCUMENTED IN MEDICAL RECORD: ICD-10-PCS | Mod: CPTII,S$GLB,, | Performed by: INTERNAL MEDICINE

## 2023-12-22 PROCEDURE — 3008F BODY MASS INDEX DOCD: CPT | Mod: CPTII,S$GLB,, | Performed by: INTERNAL MEDICINE

## 2023-12-22 PROCEDURE — 4010F ACE/ARB THERAPY RXD/TAKEN: CPT | Mod: CPTII,S$GLB,, | Performed by: INTERNAL MEDICINE

## 2023-12-22 RX ORDER — EPINEPHRINE 0.3 MG/.3ML
0.3 INJECTION SUBCUTANEOUS ONCE AS NEEDED
Status: CANCELLED | OUTPATIENT
Start: 2023-12-26

## 2023-12-22 RX ORDER — SODIUM CHLORIDE 0.9 % (FLUSH) 0.9 %
10 SYRINGE (ML) INJECTION
Status: CANCELLED | OUTPATIENT
Start: 2023-12-26

## 2023-12-22 RX ORDER — DIPHENHYDRAMINE HYDROCHLORIDE 50 MG/ML
50 INJECTION INTRAMUSCULAR; INTRAVENOUS ONCE AS NEEDED
Status: CANCELLED | OUTPATIENT
Start: 2023-12-26

## 2023-12-22 RX ORDER — HEPARIN 100 UNIT/ML
500 SYRINGE INTRAVENOUS
Status: CANCELLED | OUTPATIENT
Start: 2023-12-26

## 2023-12-22 NOTE — PROGRESS NOTES
"Service Date:  12/22/23    Chief Complaint:  Colon cancer    Osman Li Jr. is a 58 y.o. 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  This occurred very shortly after completing treatment.    Patient was part of a clinical trial to measure CT DNA.  His CT DNA started to go up while he was on FOLFOX, which prompted the scan, which showed the progression.  Next generation sequencing also showed a high tumor burden.    He is now on Keytruda as his cancer has a very high tumor mutation burden.  So far, Keytruda has been helping    Here for cycle 16.  Doing well.  No new complaints.  Last scan showed no new evidence of disease. Has met with Dr Goodwin      Review of Systems   Constitutional:  Positive for fatigue.   HENT: Negative.     Eyes: Negative.    Respiratory: Negative.     Cardiovascular: Negative.    Gastrointestinal: Negative.    Endocrine: Negative.    Genitourinary: Negative.    Musculoskeletal: Negative.    Integumentary:  Negative.   Neurological:  Positive for numbness.   Hematological: Negative.    Psychiatric/Behavioral: Negative.          Current Outpatient Medications   Medication Instructions    atorvastatin (LIPITOR) 20 mg, Oral, Daily    enalapriL-hydrochlorothiazide (VASERETIC) 10-25 mg per tablet 1 tablet, Oral, Daily    metFORMIN (GLUCOPHAGE) 1,000 mg, Oral, 2 times daily with meals    morphine (MS CONTIN) 30 mg, Oral, 2 times daily    pen needle, diabetic 31 gauge x 3/16" Ndle 1 each, Misc.(Non-Drug; Combo Route), Daily    promethazine (PHENERGAN) 12.5 mg, Oral, Every 4 hours PRN    sildenafiL (VIAGRA) 100 mg, Oral, Daily PRN    TOUJEO MAX U-300 SOLOSTAR 26 Units, Subcutaneous, Daily    traZODone (DESYREL) 50 mg, Oral, Nightly        Past Medical History:   Diagnosis Date    Digestive disorder     DM (diabetes mellitus)     Hyperlipidemia     Hypertension     Prediabetes         Past Surgical " History:   Procedure Laterality Date    CHOLECYSTECTOMY  2011    COLON SURGERY      COLONOSCOPY      2018    COLOSTOMY N/A 04/25/2022    Procedure: CREATION, COLOSTOMY;  Surgeon: Carlton Waddell MD;  Location: [facility] OR;  Service: General;  Laterality: N/A;    ENDOSCOPIC ULTRASOUND OF UPPER GASTROINTESTINAL TRACT N/A 01/06/2023    Procedure: ULTRASOUND, UPPER GI TRACT, ENDOSCOPIC;  Surgeon: Rinku Orozco III, MD;  Location: [facility] ENDO;  Service: Endoscopy;  Laterality: N/A;    INSERTION OF TUNNELED CENTRAL VENOUS CATHETER (CVC) WITH SUBCUTANEOUS PORT Right 05/18/2022    Procedure: [id number]-[id number];  Surgeon: Carlton Waddell MD;  Location: [facility] OR;  Service: General;  Laterality: Right;    MOBILIZATION OF SPLENIC FLEXURE N/A 04/25/2022    Procedure: MOBILIZATION, SPLENIC FLEXURE;  Surgeon: Carlton Waddell MD;  Location: [facility] OR;  Service: General;  Laterality: N/A;    SPLENECTOMY N/A 04/25/2022    Procedure: SPLENECTOMY;  Surgeon: Carlton Waddell MD;  Location: [facility] OR;  Service: General;  Laterality: N/A;    SUBTOTAL COLECTOMY N/A 04/25/2022    Procedure: COLECTOMY, PARTIAL;  Surgeon: Carlton Waddell MD;  Location: [facility] OR;  Service: General;  Laterality: N/A;    TONSILLECTOMY      TUMOR REMOVAL  10/18/2023    on top of the nose        Family History   Problem Relation Age of Onset    Heart disease Father     Rectal cancer Sister         half sister       Social History     Tobacco Use    Smoking status: Every Day     Current packs/day: 1.00     Average packs/day: 1 pack/day for 40.0 years (40.0 ttl pk-yrs)     Types: Cigarettes     Passive exposure: Current    Smokeless tobacco: Never   Substance Use Topics    Alcohol use: Not Currently    Drug use: Never         Vitals:    12/22/23 0841   BP: (!) 147/80   Pulse: 82   Resp: 12   Temp: 98 °F (36.7 °C)          Physical Exam:  BP (!) 147/80 (BP Location: Right arm, Patient Position: Sitting, BP Method: Medium (Automatic))   Pulse 82   Temp 98 °F (36.7 °C)  "(Temporal)   Resp 12   Ht 6' 2" (1.88 m)   Wt 99.7 kg (219 lb 12.8 oz)   SpO2 97%   BMI 28.22 kg/m²     Physical Exam  Vitals and nursing note reviewed.   Constitutional:       Appearance: Normal appearance.   HENT:      Head: Normocephalic and atraumatic.      Nose: Nose normal.      Mouth/Throat:      Mouth: Mucous membranes are moist.      Pharynx: Oropharynx is clear.   Eyes:      Extraocular Movements: Extraocular movements intact.      Conjunctiva/sclera: Conjunctivae normal.   Cardiovascular:      Rate and Rhythm: Normal rate and regular rhythm.      Heart sounds: Normal heart sounds.   Pulmonary:      Effort: Pulmonary effort is normal.      Breath sounds: Normal breath sounds.   Abdominal:      General: Abdomen is flat. Bowel sounds are normal.      Palpations: Abdomen is soft.      Comments: Ostomy bag in place.   Musculoskeletal:         General: Normal range of motion.      Cervical back: Normal range of motion and neck supple.   Skin:     General: Skin is warm and dry.   Neurological:      General: No focal deficit present.      Mental Status: He is alert and oriented to person, place, and time. Mental status is at baseline.   Psychiatric:         Mood and Affect: Mood normal.          Labs:  Lab Results   Component Value Date    WBC 11.07 12/21/2023    RBC 4.92 12/21/2023    HGB 14.6 12/21/2023    HCT 43.4 12/21/2023    MCV 88 12/21/2023    MCH 29.7 12/21/2023    MCHC 33.6 12/21/2023    RDW 13.9 12/21/2023     12/21/2023    MPV 9.4 12/21/2023    GRAN 5.2 12/21/2023    GRAN 47.1 12/21/2023    LYMPH 4.6 12/21/2023    LYMPH 41.9 12/21/2023    MONO 1.0 12/21/2023    MONO 8.9 12/21/2023    EOS 0.1 12/21/2023    BASO 0.09 12/21/2023    EOSINOPHIL 0.9 12/21/2023    BASOPHIL 0.8 12/21/2023     Sodium   Date Value Ref Range Status   12/21/2023 137 136 - 145 mmol/L Final     Potassium   Date Value Ref Range Status   12/21/2023 5.0 3.5 - 5.1 mmol/L Final     Chloride   Date Value Ref Range Status "   12/21/2023 99 95 - 110 mmol/L Final     CO2   Date Value Ref Range Status   12/21/2023 28 23 - 29 mmol/L Final     Glucose   Date Value Ref Range Status   12/21/2023 107 70 - 110 mg/dL Final     BUN   Date Value Ref Range Status   12/21/2023 11 6 - 20 mg/dL Final     Creatinine   Date Value Ref Range Status   12/21/2023 1.0 0.5 - 1.4 mg/dL Final     Calcium   Date Value Ref Range Status   12/21/2023 10.0 8.7 - 10.5 mg/dL Final     Total Protein   Date Value Ref Range Status   12/21/2023 7.4 6.0 - 8.4 g/dL Final     Albumin   Date Value Ref Range Status   12/21/2023 4.1 3.5 - 5.2 g/dL Final     Total Bilirubin   Date Value Ref Range Status   12/21/2023 0.7 0.1 - 1.0 mg/dL Final     Comment:     For infants and newborns, interpretation of results should be based  on gestational age, weight and in agreement with clinical  observations.    Premature Infant recommended reference ranges:  Up to 24 hours.............<8.0 mg/dL  Up to 48 hours............<12.0 mg/dL  3-5 days..................<15.0 mg/dL  6-29 days.................<15.0 mg/dL       Alkaline Phosphatase   Date Value Ref Range Status   12/21/2023 86 55 - 135 U/L Final     AST   Date Value Ref Range Status   12/21/2023 22 10 - 40 U/L Final     ALT   Date Value Ref Range Status   12/21/2023 23 10 - 44 U/L Final     Anion Gap   Date Value Ref Range Status   12/21/2023 10 8 - 16 mmol/L Final     eGFR if    Date Value Ref Range Status   07/25/2022 >60 >60 mL/min/1.73 m^2 Final     eGFR if non    Date Value Ref Range Status   07/25/2022 >60 >60 mL/min/1.73 m^2 Final     Comment:     Calculation used to obtain the estimated glomerular filtration  rate (eGFR) is the CKD-EPI equation.          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    -proceed with Keytruda C16 today  -Recent CT scan shows no new evidence of disease, with a smaller lesion on the pancreatic tail and stability of the retroperitoneal lymph node that was radiated.  Has met with Dr. Goodwin who has ordered a repeat PET scan and plans to discuss his case at tumor board to decide on proceeding with surgery, which will also be dependent on the PET scan results.  -RTC in 3 weeks with labs for next treatment    Hypercalcemia  -resolved    Pancreatic mass   -confirmed recurrence of colon adenocarcinoma    Back pain  - NM bone scan negative for mets  -increase morphine dose to 30 mg as patient feels 15 mg not lasting long enough    HTN  - on Enalapril  - follow up with PCP    HLP  - follow up with PCP    DM2  - on Metformin  - follow up with PCP    Tobacco use  - counseled on cessation      Aurash Khoobehi, MD  Hematology and Oncology

## 2023-12-26 ENCOUNTER — INFUSION (OUTPATIENT)
Dept: INFUSION THERAPY | Facility: HOSPITAL | Age: 58
End: 2023-12-26
Attending: INTERNAL MEDICINE
Payer: COMMERCIAL

## 2023-12-26 VITALS
DIASTOLIC BLOOD PRESSURE: 75 MMHG | TEMPERATURE: 98 F | WEIGHT: 224.81 LBS | HEIGHT: 74 IN | HEART RATE: 78 BPM | RESPIRATION RATE: 16 BRPM | BODY MASS INDEX: 28.85 KG/M2 | SYSTOLIC BLOOD PRESSURE: 126 MMHG | OXYGEN SATURATION: 98 %

## 2023-12-26 DIAGNOSIS — C78.89 METASTATIC ADENOCARCINOMA TO PANCREAS: ICD-10-CM

## 2023-12-26 DIAGNOSIS — C18.5 MALIGNANT NEOPLASM OF SPLENIC FLEXURE: Primary | ICD-10-CM

## 2023-12-26 PROCEDURE — 25000003 PHARM REV CODE 250: Performed by: INTERNAL MEDICINE

## 2023-12-26 PROCEDURE — 63600175 PHARM REV CODE 636 W HCPCS: Mod: JZ,JG | Performed by: INTERNAL MEDICINE

## 2023-12-26 PROCEDURE — 96413 CHEMO IV INFUSION 1 HR: CPT

## 2023-12-26 RX ORDER — SODIUM CHLORIDE 0.9 % (FLUSH) 0.9 %
10 SYRINGE (ML) INJECTION
Status: DISCONTINUED | OUTPATIENT
Start: 2023-12-26 | End: 2023-12-26 | Stop reason: HOSPADM

## 2023-12-26 RX ORDER — HEPARIN 100 UNIT/ML
500 SYRINGE INTRAVENOUS
Status: DISCONTINUED | OUTPATIENT
Start: 2023-12-26 | End: 2023-12-26 | Stop reason: HOSPADM

## 2023-12-26 RX ADMIN — HEPARIN 500 UNITS: 100 SYRINGE at 02:12

## 2023-12-26 RX ADMIN — SODIUM CHLORIDE 200 MG: 9 INJECTION, SOLUTION INTRAVENOUS at 02:12

## 2024-01-04 ENCOUNTER — PATIENT MESSAGE (OUTPATIENT)
Dept: HEMATOLOGY/ONCOLOGY | Facility: CLINIC | Age: 59
End: 2024-01-04
Payer: COMMERCIAL

## 2024-01-04 DIAGNOSIS — C77.2 METASTASIS TO RETROPERITONEAL LYMPH NODE: ICD-10-CM

## 2024-01-04 DIAGNOSIS — C18.4 MALIGNANT NEOPLASM OF TRANSVERSE COLON: ICD-10-CM

## 2024-01-04 DIAGNOSIS — T45.1X5A CHEMOTHERAPY-INDUCED NEUROPATHY: ICD-10-CM

## 2024-01-04 DIAGNOSIS — E11.00 TYPE 2 DIABETES MELLITUS WITH HYPEROSMOLARITY WITHOUT COMA, WITHOUT LONG-TERM CURRENT USE OF INSULIN: ICD-10-CM

## 2024-01-04 DIAGNOSIS — C78.89 METASTATIC ADENOCARCINOMA TO PANCREAS: ICD-10-CM

## 2024-01-04 DIAGNOSIS — E78.49 OTHER HYPERLIPIDEMIA: ICD-10-CM

## 2024-01-04 DIAGNOSIS — G62.0 CHEMOTHERAPY-INDUCED NEUROPATHY: ICD-10-CM

## 2024-01-04 DIAGNOSIS — I10 PRIMARY HYPERTENSION: ICD-10-CM

## 2024-01-04 RX ORDER — MORPHINE SULFATE 30 MG/1
30 TABLET, FILM COATED, EXTENDED RELEASE ORAL 2 TIMES DAILY
Qty: 60 TABLET | Refills: 0 | Status: SHIPPED | OUTPATIENT
Start: 2024-01-04 | End: 2024-02-14 | Stop reason: SDUPTHER

## 2024-01-08 ENCOUNTER — HOSPITAL ENCOUNTER (OUTPATIENT)
Dept: RADIOLOGY | Facility: HOSPITAL | Age: 59
Discharge: HOME | End: 2024-01-08
Attending: SURGERY
Payer: COMMERCIAL

## 2024-01-08 DIAGNOSIS — C77.2 CANCER OF ASCENDING COLON METASTATIC TO INTRA-ABDOMINAL LYMPH NODE: ICD-10-CM

## 2024-01-08 DIAGNOSIS — C18.2 CANCER OF ASCENDING COLON METASTATIC TO INTRA-ABDOMINAL LYMPH NODE: ICD-10-CM

## 2024-01-08 LAB — GLUCOSE SERPL-MCNC: 126 MG/DL (ref 70–110)

## 2024-01-08 PROCEDURE — 78815 PET IMAGE W/CT SKULL-THIGH: CPT | Mod: 26,PS,, | Performed by: RADIOLOGY

## 2024-01-08 PROCEDURE — A9552 F18 FDG: HCPCS | Mod: PN

## 2024-01-08 PROCEDURE — 78815 PET IMAGE W/CT SKULL-THIGH: CPT | Mod: TC,PN

## 2024-01-08 NOTE — PROGRESS NOTES
PET Imaging Questionnaire    Are you a Diabetic? Recent Blood Sugar level? Yes    Are you anemic? Bone Marrow Stimulation Meds? No    Have you had a CT Scan, if so when & where was your last one? Yes -     Have you had a PET Scan, if so when & where was your last one? Yes -     Chemotherapy or currently on Chemotherapy? Yes    Radiation therapy? Yes    Surgical History:   Past Surgical History:   Procedure Laterality Date    CHOLECYSTECTOMY  2011    COLON SURGERY      COLONOSCOPY      2018    COLOSTOMY N/A 04/25/2022    Procedure: CREATION, COLOSTOMY;  Surgeon: Carlton Waddell MD;  Location: [facility] OR;  Service: General;  Laterality: N/A;    ENDOSCOPIC ULTRASOUND OF UPPER GASTROINTESTINAL TRACT N/A 01/06/2023    Procedure: ULTRASOUND, UPPER GI TRACT, ENDOSCOPIC;  Surgeon: Rinku Orozco III, MD;  Location: [facility];  Service: Endoscopy;  Laterality: N/A;    INSERTION OF TUNNELED CENTRAL VENOUS CATHETER (CVC) WITH SUBCUTANEOUS PORT Right 05/18/2022    Procedure: [id number];  Surgeon: Carlton Waddell MD;  Location: [facility] OR;  Service: General;  Laterality: Right;    MOBILIZATION OF SPLENIC FLEXURE N/A 04/25/2022    Procedure: MOBILIZATION, SPLENIC FLEXURE;  Surgeon: Carlton Waddell MD;  Location: [facility] OR;  Service: General;  Laterality: N/A;    SPLENECTOMY N/A 04/25/2022    Procedure: SPLENECTOMY;  Surgeon: Carlton Waddell MD;  Location: [facility] OR;  Service: General;  Laterality: N/A;    SUBTOTAL COLECTOMY N/A 04/25/2022    Procedure: COLECTOMY, PARTIAL;  Surgeon: Carlton Waddell MD;  Location: [facility] OR;  Service: General;  Laterality: N/A;    TONSILLECTOMY      TUMOR REMOVAL  10/18/2023    on top of the nose        Have you been fasting for at least 6 hours? Yes    Is there any chance you may be pregnant or breastfeeding? No    Assay: 13.0 MCi@:8.07   Injection Site:RT AC    Residual: 0.7 mCi@: 8.12   Technologist: Ben Hernandez Injected:12.3 mCi

## 2024-01-09 NOTE — PROGRESS NOTES
Osman Li Jr. is a 58 y.o. 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  This occurred very shortly after completing treatment.     He underwent open laparotomy for obstructing colon mass 4/2022 that resulted in kind of a long yazan's with a transverse colostomy and splenectomy due to adhesions/disease involvement at the pancreatic tail:  Findings: Large colonic mass that was obstructing at the splenic flexure and densely adherent to the splenic hilum.  Palpable abnormalities within the tail of the pancreas        Final path: pT3 pN2b (17 of 27 nodes positive), G3, MSI-L  Gene: BRAF   DNA change: c.1783T>C   Amino Acid change: p.F595L ([id number])      Interestingly on his STRATA report there is no BRAF mutation, but he is TMB-H.  Patient was part of a clinical trial to measure CT DNA.  His CT DNA started to go up while he was on FOLFOX, which prompted the scan, which showed the progression at the end of his 6 month adjuvant course.  Next generation sequencing also showed a high tumor burden. He was changed to single agent Keytruda 1/2023 and has since had 15 cycles and is doing well.     He has at least two sites of disease, his pancreatic tail which is biopsy proven by EUS and RPLN that was PET avid in August.        Both of these areas have responded well and are poorly defined on recent CT imaging 11/2023. His CEA has never been elevated, and I do not see a recent ctDNA study.     A/P  Pretty interesting case of a 59yo M, obstructing high risk colon cancer resected with metastatic progression on FOLFOX but responsive to single agent PDL-1 now for about 12 months. I think all the things are on the table here. His jennifer-pancreatic tumor I can sell as locoregional/residual disease, and this is clearable with a tough distal pancreatectomy. The more challenging philosophical target is this RP node, made more  "challenging by the fact that its responded so well I doubt we'll find it and will need to do a L RPLND. All very accomplishable in this healthy male, but how it impacts his clinical course beyond just continuing ICI is not clear.     In the end we are afforded the benefit of time here given how well he's doing. Will tan this over, present at [facility] and discuss with Dr. Khoobehi. I think a repeat PET here could help us. If he still has residual disease on PET, I would favor moving forward with resection. If there is no pet avid disease, the sell is a bit harder.  ---------------------------------  1/10/2024:    Loki returns with updated PET imaging 1/2024:  COMPARISON:  08/03/2023     FINDINGS:  Head and Neck:  Brain demonstrates normal metabolism.  However, FDG-PET has an approximate 60% sensitivity for brain metastases which are best detected by MRI with gadolinium.  Physiologic uptake is in the neck.  Chest:  No FDG avid pulmonary lesions are present. No enlarged or FDG avid lymph nodes are in the chest.  Abdomen and Pelvis:  Physiologic uptake is in the liver, spleen, urinary system and bowel. No enlarged or FDG avid lymph nodes are in the abdomen or pelvis. Adrenal glands are normal.  Musculoskeletal:  No FDG avid osseous lesions are present.  Impression:  Positive therapy response with resolution of the metastatic retroperitoneal lymph node compared to the prior examination.  No FDG avid foci are present on today's exam.    On my review, I see no clear evidence of active disease which is great. There is no established pathway for patients with MSI-H or TMB-H tumors who have complete clinical response to ICI. Will discuss with Dr. Khoobehi, but I am leaning towards completing his checkpoint therapy and then transitioning to close surveillance, reserving local therapy options for focal recurrence. The addition of this para-aortic node makes me feel like any "blind" resection in this case is unlikely to add " significant local or systemic control benefit. A reasonable question here would be can we/how can we use ctDNA for him, given its previous utility following his disease. He is off study but stage IV, so we should try and see if we can get this approved. Will follow along with his imaging.    I spent 30 minutes with Mr. Li, with >50% of time spent face to face and additional time preparing to see the patient (eg, review of tests), obtaining and/or reviewing separately obtained history, documenting clinical information in the electronic or other health record, independently interpreting results (not separately reported) and communicating results to the patient/family/caregiver, or Care coordination (not separately reported).         Bo Goodwin MD  Upper GI / Hepatobiliary Surgical Oncology  [facility] [place], [place]  Office: [phone]  Fax: [phone]

## 2024-01-10 ENCOUNTER — OFFICE VISIT (OUTPATIENT)
Dept: SURGERY | Facility: CLINIC | Age: 59
End: 2024-01-10
Payer: COMMERCIAL

## 2024-01-10 VITALS
OXYGEN SATURATION: 98 % | WEIGHT: 221.13 LBS | BODY MASS INDEX: 28.38 KG/M2 | HEART RATE: 86 BPM | HEIGHT: 74 IN | DIASTOLIC BLOOD PRESSURE: 81 MMHG | SYSTOLIC BLOOD PRESSURE: 129 MMHG

## 2024-01-10 DIAGNOSIS — C78.89 METASTATIC ADENOCARCINOMA TO PANCREAS: ICD-10-CM

## 2024-01-10 DIAGNOSIS — C77.2 METASTASIS TO RETROPERITONEAL LYMPH NODE: ICD-10-CM

## 2024-01-10 DIAGNOSIS — C18.4 MALIGNANT NEOPLASM OF TRANSVERSE COLON: ICD-10-CM

## 2024-01-10 DIAGNOSIS — C18.5 MALIGNANT NEOPLASM OF SPLENIC FLEXURE: Primary | ICD-10-CM

## 2024-01-10 PROCEDURE — 99214 OFFICE O/P EST MOD 30 MIN: CPT | Mod: S$GLB,,, | Performed by: SURGERY

## 2024-01-10 PROCEDURE — 99999 PR PBB SHADOW E&M-EST. PATIENT-LVL III: CPT | Mod: PBBFAC,,, | Performed by: SURGERY

## 2024-01-10 NOTE — Clinical Note
Ruma escobdeo, his pet looks great, no active disease and that paraaortic node resolved. I think we should probably just press on with his checkpoint until you think you've completed treatment and watch him. I would offer him resection if he recurs Let me know what you think. I might also think about trying to get another ctDNA study approved by his insurance, just to use as a marker.  Bo

## 2024-01-11 ENCOUNTER — TELEPHONE (OUTPATIENT)
Dept: HEMATOLOGY/ONCOLOGY | Facility: CLINIC | Age: 59
End: 2024-01-11
Payer: COMMERCIAL

## 2024-01-11 NOTE — TELEPHONE ENCOUNTER
----- Message from Audrey Bagley RN sent at 1/11/2024  9:52 AM CST -----  Good morning    Per Dr. Goodwin's request, a referral has been placed for consideration of ostomy reversal. Please contact pt to schedule an appt.    Thanks!  Audrey

## 2024-01-11 NOTE — TELEPHONE ENCOUNTER
Called & spoke with pt, scheduled with Dr. Lance in February. Offered sooner appt but pt states he is busy this month & prefers to wait until mid February. Appt details reviewed & appt confirmed.

## 2024-01-12 ENCOUNTER — PATIENT MESSAGE (OUTPATIENT)
Dept: HEMATOLOGY/ONCOLOGY | Facility: CLINIC | Age: 59
End: 2024-01-12

## 2024-01-12 ENCOUNTER — LAB VISIT (OUTPATIENT)
Dept: LAB | Facility: HOSPITAL | Age: 59
End: 2024-01-12
Attending: INTERNAL MEDICINE
Payer: COMMERCIAL

## 2024-01-12 ENCOUNTER — OFFICE VISIT (OUTPATIENT)
Dept: HEMATOLOGY/ONCOLOGY | Facility: CLINIC | Age: 59
End: 2024-01-12
Payer: COMMERCIAL

## 2024-01-12 VITALS
OXYGEN SATURATION: 98 % | RESPIRATION RATE: 18 BRPM | SYSTOLIC BLOOD PRESSURE: 137 MMHG | HEIGHT: 74 IN | BODY MASS INDEX: 28.38 KG/M2 | WEIGHT: 221.13 LBS | TEMPERATURE: 99 F | DIASTOLIC BLOOD PRESSURE: 75 MMHG | HEART RATE: 76 BPM

## 2024-01-12 DIAGNOSIS — C77.2 METASTASIS TO RETROPERITONEAL LYMPH NODE: ICD-10-CM

## 2024-01-12 DIAGNOSIS — E11.00 TYPE 2 DIABETES MELLITUS WITH HYPEROSMOLARITY WITHOUT COMA, WITHOUT LONG-TERM CURRENT USE OF INSULIN: ICD-10-CM

## 2024-01-12 DIAGNOSIS — E78.49 OTHER HYPERLIPIDEMIA: ICD-10-CM

## 2024-01-12 DIAGNOSIS — C78.89 METASTATIC ADENOCARCINOMA TO PANCREAS: ICD-10-CM

## 2024-01-12 DIAGNOSIS — I10 PRIMARY HYPERTENSION: ICD-10-CM

## 2024-01-12 DIAGNOSIS — C18.4 MALIGNANT NEOPLASM OF TRANSVERSE COLON: ICD-10-CM

## 2024-01-12 DIAGNOSIS — C18.4 MALIGNANT NEOPLASM OF TRANSVERSE COLON: Primary | ICD-10-CM

## 2024-01-12 LAB
ALBUMIN SERPL BCP-MCNC: 4 G/DL (ref 3.5–5.2)
ALP SERPL-CCNC: 89 U/L (ref 55–135)
ALT SERPL W/O P-5'-P-CCNC: 20 U/L (ref 10–44)
ANION GAP SERPL CALC-SCNC: 9 MMOL/L (ref 8–16)
AST SERPL-CCNC: 20 U/L (ref 10–40)
BASOPHILS # BLD AUTO: 0.09 K/UL (ref 0–0.2)
BASOPHILS NFR BLD: 0.8 % (ref 0–1.9)
BILIRUB SERPL-MCNC: 0.7 MG/DL (ref 0.1–1)
BUN SERPL-MCNC: 11 MG/DL (ref 6–20)
CALCIUM SERPL-MCNC: 9.4 MG/DL (ref 8.7–10.5)
CHLORIDE SERPL-SCNC: 99 MMOL/L (ref 95–110)
CO2 SERPL-SCNC: 28 MMOL/L (ref 23–29)
CREAT SERPL-MCNC: 1.1 MG/DL (ref 0.5–1.4)
DIFFERENTIAL METHOD BLD: ABNORMAL
EOSINOPHIL # BLD AUTO: 0.1 K/UL (ref 0–0.5)
EOSINOPHIL NFR BLD: 1.1 % (ref 0–8)
ERYTHROCYTE [DISTWIDTH] IN BLOOD BY AUTOMATED COUNT: 13.8 % (ref 11.5–14.5)
EST. GFR  (NO RACE VARIABLE): >60 ML/MIN/1.73 M^2
GLUCOSE SERPL-MCNC: 144 MG/DL (ref 70–110)
HCT VFR BLD AUTO: 41.6 % (ref 40–54)
HGB BLD-MCNC: 14.1 G/DL (ref 14–18)
IMM GRANULOCYTES # BLD AUTO: 0.04 K/UL (ref 0–0.04)
IMM GRANULOCYTES NFR BLD AUTO: 0.4 % (ref 0–0.5)
LYMPHOCYTES # BLD AUTO: 4 K/UL (ref 1–4.8)
LYMPHOCYTES NFR BLD: 35.3 % (ref 18–48)
MCH RBC QN AUTO: 29.7 PG (ref 27–31)
MCHC RBC AUTO-ENTMCNC: 33.9 G/DL (ref 32–36)
MCV RBC AUTO: 88 FL (ref 82–98)
MONOCYTES # BLD AUTO: 1.1 K/UL (ref 0.3–1)
MONOCYTES NFR BLD: 9.4 % (ref 4–15)
NEUTROPHILS # BLD AUTO: 6.1 K/UL (ref 1.8–7.7)
NEUTROPHILS NFR BLD: 53 % (ref 38–73)
NRBC BLD-RTO: 0 /100 WBC
PLATELET # BLD AUTO: 378 K/UL (ref 150–450)
PMV BLD AUTO: 8.8 FL (ref 9.2–12.9)
POTASSIUM SERPL-SCNC: 4.4 MMOL/L (ref 3.5–5.1)
PROT SERPL-MCNC: 7.2 G/DL (ref 6–8.4)
RBC # BLD AUTO: 4.74 M/UL (ref 4.6–6.2)
SODIUM SERPL-SCNC: 136 MMOL/L (ref 136–145)
TSH SERPL DL<=0.005 MIU/L-ACNC: 1.78 UIU/ML (ref 0.4–4)
WBC # BLD AUTO: 11.4 K/UL (ref 3.9–12.7)

## 2024-01-12 PROCEDURE — 36415 COLL VENOUS BLD VENIPUNCTURE: CPT | Performed by: INTERNAL MEDICINE

## 2024-01-12 PROCEDURE — 99999 PR PBB SHADOW E&M-EST. PATIENT-LVL IV: CPT | Mod: PBBFAC,,, | Performed by: INTERNAL MEDICINE

## 2024-01-12 PROCEDURE — 84443 ASSAY THYROID STIM HORMONE: CPT | Performed by: INTERNAL MEDICINE

## 2024-01-12 PROCEDURE — 80053 COMPREHEN METABOLIC PANEL: CPT | Performed by: INTERNAL MEDICINE

## 2024-01-12 PROCEDURE — 85025 COMPLETE CBC W/AUTO DIFF WBC: CPT | Performed by: INTERNAL MEDICINE

## 2024-01-12 PROCEDURE — 99215 OFFICE O/P EST HI 40 MIN: CPT | Mod: S$GLB,,, | Performed by: INTERNAL MEDICINE

## 2024-01-12 RX ORDER — EPINEPHRINE 0.3 MG/.3ML
0.3 INJECTION SUBCUTANEOUS ONCE AS NEEDED
Status: CANCELLED | OUTPATIENT
Start: 2024-01-16

## 2024-01-12 RX ORDER — SODIUM CHLORIDE 0.9 % (FLUSH) 0.9 %
10 SYRINGE (ML) INJECTION
Status: CANCELLED | OUTPATIENT
Start: 2024-01-16

## 2024-01-12 RX ORDER — HEPARIN 100 UNIT/ML
500 SYRINGE INTRAVENOUS
Status: CANCELLED | OUTPATIENT
Start: 2024-01-16

## 2024-01-12 RX ORDER — DIPHENHYDRAMINE HYDROCHLORIDE 50 MG/ML
50 INJECTION, SOLUTION INTRAMUSCULAR; INTRAVENOUS ONCE AS NEEDED
Status: CANCELLED | OUTPATIENT
Start: 2024-01-16

## 2024-01-12 NOTE — PROGRESS NOTES
"Service Date:  1/12/24    Chief Complaint:  Colon cancer    Osman Li Jr. is a 58 y.o. 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  This occurred very shortly after completing treatment.    Patient was part of a clinical trial to measure CT DNA.  His CT DNA started to go up while he was on FOLFOX, which prompted the scan, which showed the progression.  Next generation sequencing also showed a high tumor burden.    He is now on Keytruda as his cancer has a very high tumor mutation burden.  So far, Keytruda has been helping    Here for cycle 17.  Doing well.  No new complaints.  PET scan showed no active disease.  Therefore was decided to not pursue surgery at this time.  He has met with Dr. Goodwin.  There is consideration for reversing his colectomy.    Review of Systems   Constitutional:  Positive for fatigue.   HENT: Negative.     Eyes: Negative.    Respiratory: Negative.     Cardiovascular: Negative.    Gastrointestinal: Negative.    Endocrine: Negative.    Genitourinary: Negative.    Musculoskeletal: Negative.    Integumentary:  Negative.   Neurological:  Positive for numbness.   Hematological: Negative.    Psychiatric/Behavioral: Negative.          Current Outpatient Medications   Medication Instructions    atorvastatin (LIPITOR) 20 mg, Oral, Daily    enalapriL-hydrochlorothiazide (VASERETIC) 10-25 mg per tablet 1 tablet, Oral, Daily    metFORMIN (GLUCOPHAGE) 1,000 mg, Oral, 2 times daily with meals    morphine (MS CONTIN) 30 mg, Oral, 2 times daily    pen needle, diabetic 31 gauge x 3/16" Ndle 1 each, Misc.(Non-Drug; Combo Route), Daily    promethazine (PHENERGAN) 12.5 mg, Oral, Every 4 hours PRN    sildenafiL (VIAGRA) 100 mg, Oral, Daily PRN    TOUJEO MAX U-300 SOLOSTAR 26 Units, Subcutaneous, Daily    traZODone (DESYREL) 50 mg, Oral, Nightly        Past Medical History:   Diagnosis Date    Digestive " disorder     DM (diabetes mellitus)     Hyperlipidemia     Hypertension     Prediabetes         Past Surgical History:   Procedure Laterality Date    CHOLECYSTECTOMY  2011    COLON SURGERY      COLONOSCOPY      2018    COLOSTOMY N/A 04/25/2022    Procedure: CREATION, COLOSTOMY;  Surgeon: Carlton Waddell MD;  Location: [facility] OR;  Service: General;  Laterality: N/A;    ENDOSCOPIC ULTRASOUND OF UPPER GASTROINTESTINAL TRACT N/A 01/06/2023    Procedure: ULTRASOUND, UPPER GI TRACT, ENDOSCOPIC;  Surgeon: Rinku Orozco III, MD;  Location: [facility] ENDO;  Service: Endoscopy;  Laterality: N/A;    INSERTION OF TUNNELED CENTRAL VENOUS CATHETER (CVC) WITH SUBCUTANEOUS PORT Right 05/18/2022    Procedure: [id number];  Surgeon: Carlton Waddell MD;  Location: [facility] OR;  Service: General;  Laterality: Right;    MOBILIZATION OF SPLENIC FLEXURE N/A 04/25/2022    Procedure: MOBILIZATION, SPLENIC FLEXURE;  Surgeon: Carlton Waddell MD;  Location: [facility] OR;  Service: General;  Laterality: N/A;    SPLENECTOMY N/A 04/25/2022    Procedure: SPLENECTOMY;  Surgeon: Carlton Waddell MD;  Location: [facility] OR;  Service: General;  Laterality: N/A;    SUBTOTAL COLECTOMY N/A 04/25/2022    Procedure: COLECTOMY, PARTIAL;  Surgeon: Carlton Waddell MD;  Location: [facility] OR;  Service: General;  Laterality: N/A;    TONSILLECTOMY      TUMOR REMOVAL  10/18/2023    on top of the nose        Family History   Problem Relation Age of Onset    Heart disease Father     Rectal cancer Sister         half sister       Social History     Tobacco Use    Smoking status: Every Day     Current packs/day: 1.00     Average packs/day: 1 pack/day for 40.0 years (40.0 ttl pk-yrs)     Types: Cigarettes     Passive exposure: Current    Smokeless tobacco: Never   Substance Use Topics    Alcohol use: Not Currently    Drug use: Never         Vitals:    01/12/24 0912   BP: 137/75   Pulse: 76   Resp: 18   Temp: 98.8 °F (37.1 °C)          Physical Exam:  /75 (BP Location:  "Right arm, Patient Position: Sitting, BP Method: Large (Automatic))   Pulse 76   Temp 98.8 °F (37.1 °C) (Temporal)   Resp 18   Ht 6' 2" (1.88 m)   Wt 100.3 kg (221 lb 1.9 oz)   SpO2 98%   BMI 28.39 kg/m²     Physical Exam  Vitals and nursing note reviewed.   Constitutional:       Appearance: Normal appearance.   HENT:      Head: Normocephalic and atraumatic.      Nose: Nose normal.      Mouth/Throat:      Mouth: Mucous membranes are moist.      Pharynx: Oropharynx is clear.   Eyes:      Extraocular Movements: Extraocular movements intact.      Conjunctiva/sclera: Conjunctivae normal.   Cardiovascular:      Rate and Rhythm: Normal rate and regular rhythm.      Heart sounds: Normal heart sounds.   Pulmonary:      Effort: Pulmonary effort is normal.      Breath sounds: Normal breath sounds.   Abdominal:      General: Abdomen is flat. Bowel sounds are normal.      Palpations: Abdomen is soft.      Comments: Ostomy bag in place.   Musculoskeletal:         General: Normal range of motion.      Cervical back: Normal range of motion and neck supple.   Skin:     General: Skin is warm and dry.   Neurological:      General: No focal deficit present.      Mental Status: He is alert and oriented to person, place, and time. Mental status is at baseline.   Psychiatric:         Mood and Affect: Mood normal.          Labs:  Lab Results   Component Value Date    WBC 11.40 01/12/2024    RBC 4.74 01/12/2024    HGB 14.1 01/12/2024    HCT 41.6 01/12/2024    MCV 88 01/12/2024    MCH 29.7 01/12/2024    MCHC 33.9 01/12/2024    RDW 13.8 01/12/2024     01/12/2024    MPV 8.8 (L) 01/12/2024    GRAN 6.1 01/12/2024    GRAN 53.0 01/12/2024    LYMPH 4.0 01/12/2024    LYMPH 35.3 01/12/2024    MONO 1.1 (H) 01/12/2024    MONO 9.4 01/12/2024    EOS 0.1 01/12/2024    BASO 0.09 01/12/2024    EOSINOPHIL 1.1 01/12/2024    BASOPHIL 0.8 01/12/2024     Sodium   Date Value Ref Range Status   01/12/2024 136 136 - 145 mmol/L Final     Potassium "   Date Value Ref Range Status   01/12/2024 4.4 3.5 - 5.1 mmol/L Final     Chloride   Date Value Ref Range Status   01/12/2024 99 95 - 110 mmol/L Final     CO2   Date Value Ref Range Status   01/12/2024 28 23 - 29 mmol/L Final     Glucose   Date Value Ref Range Status   01/12/2024 144 (H) 70 - 110 mg/dL Final     BUN   Date Value Ref Range Status   01/12/2024 11 6 - 20 mg/dL Final     Creatinine   Date Value Ref Range Status   01/12/2024 1.1 0.5 - 1.4 mg/dL Final     Calcium   Date Value Ref Range Status   01/12/2024 9.4 8.7 - 10.5 mg/dL Final     Total Protein   Date Value Ref Range Status   01/12/2024 7.2 6.0 - 8.4 g/dL Final     Albumin   Date Value Ref Range Status   01/12/2024 4.0 3.5 - 5.2 g/dL Final     Total Bilirubin   Date Value Ref Range Status   01/12/2024 0.7 0.1 - 1.0 mg/dL Final     Comment:     For infants and newborns, interpretation of results should be based  on gestational age, weight and in agreement with clinical  observations.    Premature Infant recommended reference ranges:  Up to 24 hours.............<8.0 mg/dL  Up to 48 hours............<12.0 mg/dL  3-5 days..................<15.0 mg/dL  6-29 days.................<15.0 mg/dL       Alkaline Phosphatase   Date Value Ref Range Status   01/12/2024 89 55 - 135 U/L Final     AST   Date Value Ref Range Status   01/12/2024 20 10 - 40 U/L Final     ALT   Date Value Ref Range Status   01/12/2024 20 10 - 44 U/L Final     Anion Gap   Date Value Ref Range Status   01/12/2024 9 8 - 16 mmol/L Final     eGFR if    Date Value Ref Range Status   07/25/2022 >60 >60 mL/min/1.73 m^2 Final     eGFR if non    Date Value Ref Range Status   07/25/2022 >60 >60 mL/min/1.73 m^2 Final     Comment:     Calculation used to obtain the estimated glomerular filtration  rate (eGFR) is the CKD-EPI equation.          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    -proceed with Keytruda C17 today  -Recent CT scan shows no new evidence of disease, with a smaller lesion on the pancreatic tail and stability of the retroperitoneal lymph node that was radiated.  PET scan shows no evidence of disease, so it was decided to forego surgery.  I will order CT DNA testing.  -RTC in 3 weeks with labs for next treatment    Pancreatic mass   -confirmed recurrence of colon adenocarcinoma    Back pain/pain from stoma  - NM bone scan negative for mets  -now on morphine dose to 30 mg bid    HTN  - on Enalapril  - follow up with PCP    HLP  - follow up with PCP    DM2  - on Metformin  - follow up with PCP    Tobacco use  - counseled on cessation      Aurash Khoobehi, MD  Hematology and Oncology

## 2024-01-16 ENCOUNTER — PATIENT MESSAGE (OUTPATIENT)
Dept: HEMATOLOGY/ONCOLOGY | Facility: CLINIC | Age: 59
End: 2024-01-16
Payer: COMMERCIAL

## 2024-01-16 ENCOUNTER — INFUSION (OUTPATIENT)
Dept: INFUSION THERAPY | Facility: HOSPITAL | Age: 59
End: 2024-01-16
Attending: INTERNAL MEDICINE
Payer: COMMERCIAL

## 2024-01-16 VITALS
DIASTOLIC BLOOD PRESSURE: 73 MMHG | TEMPERATURE: 98 F | WEIGHT: 225.19 LBS | HEART RATE: 75 BPM | SYSTOLIC BLOOD PRESSURE: 127 MMHG | RESPIRATION RATE: 18 BRPM | OXYGEN SATURATION: 99 % | HEIGHT: 74 IN | BODY MASS INDEX: 28.9 KG/M2

## 2024-01-16 DIAGNOSIS — C78.89 METASTATIC ADENOCARCINOMA TO PANCREAS: ICD-10-CM

## 2024-01-16 DIAGNOSIS — C18.5 MALIGNANT NEOPLASM OF SPLENIC FLEXURE: Primary | ICD-10-CM

## 2024-01-16 PROCEDURE — 25000003 PHARM REV CODE 250: Performed by: INTERNAL MEDICINE

## 2024-01-16 PROCEDURE — 96413 CHEMO IV INFUSION 1 HR: CPT

## 2024-01-16 PROCEDURE — 63600175 PHARM REV CODE 636 W HCPCS: Performed by: INTERNAL MEDICINE

## 2024-01-16 PROCEDURE — A4216 STERILE WATER/SALINE, 10 ML: HCPCS | Performed by: INTERNAL MEDICINE

## 2024-01-16 RX ORDER — SODIUM CHLORIDE 0.9 % (FLUSH) 0.9 %
10 SYRINGE (ML) INJECTION
Status: DISCONTINUED | OUTPATIENT
Start: 2024-01-16 | End: 2024-01-16 | Stop reason: HOSPADM

## 2024-01-16 RX ORDER — DIPHENHYDRAMINE HYDROCHLORIDE 50 MG/ML
50 INJECTION INTRAMUSCULAR; INTRAVENOUS ONCE AS NEEDED
Status: DISCONTINUED | OUTPATIENT
Start: 2024-01-16 | End: 2024-01-16 | Stop reason: HOSPADM

## 2024-01-16 RX ORDER — EPINEPHRINE 0.3 MG/.3ML
0.3 INJECTION SUBCUTANEOUS ONCE AS NEEDED
Status: DISCONTINUED | OUTPATIENT
Start: 2024-01-16 | End: 2024-01-16 | Stop reason: HOSPADM

## 2024-01-16 RX ORDER — HEPARIN 100 UNIT/ML
500 SYRINGE INTRAVENOUS
Status: DISCONTINUED | OUTPATIENT
Start: 2024-01-16 | End: 2024-01-16 | Stop reason: HOSPADM

## 2024-01-16 RX ADMIN — SODIUM CHLORIDE, PRESERVATIVE FREE 10 ML: 5 INJECTION INTRAVENOUS at 02:01

## 2024-01-16 RX ADMIN — SODIUM CHLORIDE: 0.9 INJECTION, SOLUTION INTRAVENOUS at 02:01

## 2024-01-16 RX ADMIN — SODIUM CHLORIDE 200 MG: 9 INJECTION, SOLUTION INTRAVENOUS at 02:01

## 2024-01-16 RX ADMIN — HEPARIN 500 UNITS: 100 SYRINGE at 02:01

## 2024-01-16 NOTE — PLAN OF CARE
Problem: Fall Injury Risk  Goal: Absence of Fall and Fall-Related Injury  Outcome: Ongoing, Progressing  Intervention: Identify and Manage Contributors  Flowsheets (Taken 1/16/2024 1347)  Self-Care Promotion: independence encouraged  Medication Review/Management: medications reviewed  Intervention: Promote Injury-Free Environment  Flowsheets (Taken 1/16/2024 1347)  Safety Promotion/Fall Prevention: medications reviewed

## 2024-01-18 ENCOUNTER — TELEPHONE (OUTPATIENT)
Dept: HEMATOLOGY/ONCOLOGY | Facility: CLINIC | Age: 59
End: 2024-01-18
Payer: COMMERCIAL

## 2024-01-18 NOTE — TELEPHONE ENCOUNTER
Called and spoke with patient and instructed a Guardant is needed. Patient stated that he will be in the office at 9:00 am 1/19/24 for lab draw.

## 2024-01-23 ENCOUNTER — DOCUMENTATION ONLY (OUTPATIENT)
Dept: HEMATOLOGY/ONCOLOGY | Facility: CLINIC | Age: 59
End: 2024-01-23
Payer: COMMERCIAL

## 2024-01-25 ENCOUNTER — OFFICE VISIT (OUTPATIENT)
Dept: INFECTIOUS DISEASES | Facility: CLINIC | Age: 59
End: 2024-01-25
Payer: COMMERCIAL

## 2024-01-25 VITALS
HEIGHT: 74 IN | HEART RATE: 86 BPM | DIASTOLIC BLOOD PRESSURE: 64 MMHG | TEMPERATURE: 97 F | SYSTOLIC BLOOD PRESSURE: 128 MMHG | WEIGHT: 217.81 LBS | BODY MASS INDEX: 27.95 KG/M2

## 2024-01-25 DIAGNOSIS — Z93.3 COLOSTOMY STATUS: ICD-10-CM

## 2024-01-25 DIAGNOSIS — I10 HYPERTENSION, UNSPECIFIED TYPE: ICD-10-CM

## 2024-01-25 DIAGNOSIS — C78.89 METASTATIC ADENOCARCINOMA TO PANCREAS: ICD-10-CM

## 2024-01-25 DIAGNOSIS — E78.49 OTHER HYPERLIPIDEMIA: ICD-10-CM

## 2024-01-25 DIAGNOSIS — C18.5 MALIGNANT NEOPLASM OF SPLENIC FLEXURE: Primary | ICD-10-CM

## 2024-01-25 PROCEDURE — 99214 OFFICE O/P EST MOD 30 MIN: CPT | Mod: S$GLB,,, | Performed by: STUDENT IN AN ORGANIZED HEALTH CARE EDUCATION/TRAINING PROGRAM

## 2024-01-25 NOTE — PROGRESS NOTES
Subjective: Hospital Follow UP - s/p splenectomy - vaccines and history of intraabdominal abscess  s/p colectomy       Patient ID: Osman Li Jr. is a 58 y.o. male.    Chief Complaint:: Follow-up    HPI Osman Li is a 58 y.o. male active heavy smoker, with past medical history of HTN, diabetes, and HLD, known to me from prior admission to [facility] 4/21 for acute abdomen secondary to LBO in the setting of large colonic mass s/p resection and splenectomy 4/21. Patient was discharged on levofloxacin, doxycyclin and flagyl PO. Received first 2 doses of meningococcus and pneumonococcus vaccines before discharge. Port-A-Cath placed 5/18. Patient had a recent admission to [facility] for sepsis secondary to intra-abdominal abscess, unable to have it drained by IR, sent home on IV ceftriaxone and flagyl PO for 10 days. He completed antibiotics 7/3.     Patient is here for follow up, wife present. Has had significant weight loss in the last couple of months. Plan to start chemotherapy next week, will re-schedule CT scan for early next week to assess status of prior abscess before starting chemotherapy.     He states he feels good, no abdominal pain, colostomy functioning, no nausea or vomit. No fever or chills.   Recent labs reviewed, normal WBC, CRP 8.5, slightly elevated.    8/23: Interim reviewed, patient scheduled for follow-up, seen via iPad. States he feels ok, some days are good and some days are not; weakness, fatigue, decreased appetite and weight loss. Patient had a repeat CT scan abdomen/pelvis 8/5 which showed rim-enhancing left upper quadrant fluid collection 5.3 x 1.8 cm  suspicious for abscess considering the provided clinical history. Smaller than prior, 9 x 11 cm. Adjacent to this there is masslike hypoattenuation involving the tail of the pancreas. This could is suspicious for pancreatic neoplasm, although phlegmon related to aforementioned abscess is also possible. Patient was started on  ceftriaxone IV on 7/11, still on ceftriaxone, plan to de-access port 8/26. He c/o lower back pain. He denies abdominal pan, fever or chills, he is aware of symptoms suggestive of infection and states he has not experienced them. Seen by Heme/Onc yesterday, on 4th cycle of chemotherapy, plan for NM bone scan to rule out metastasis.   Labs reviewed, CRP trending up, likely from underlying malignancy vs ongoing chemotherapy vs underlying fluid collection.     10/5:  Interim reviewed, patient seen virtually via iPad, wife present.  He is status post 7 cycle of chemotherapy 10/4, still feeling weak and fatigued, appetite is okay, continues to lose weight.  Denies fever or chills.  Had NM scan negative for bone metastases.  He is a participant of a research study at [facility] to check on DNA levels in blood.  [facility] pharmacy contacted, HiB vaccine available.  Patient instructed to come Friday to get his dose for Haemophilus influenzae type B vaccine.    12/13:  Interim reviewed, patient is here for follow-up, wife present.  Patient on his last cycle of chemotherapy, 12.  He is complaining of intermittent abdominal pain around colostomy site, denies fever or chills.  Significant weight loss, around 70 lb since diagnosed in April.  Patient completed vaccinations status post splenectomy.  Following with Heme-Onc outpatient, plan to repeat CT scan next week.  Notes from Heme-Onc reviewed, plan for Keytruda as adjunctive immunotherapy for colon cancer.    1/24/23:  Interim reviewed, patient is here for follow-up, wife present.  Patient had repeat CT scan in December which showed a suspicious lesion around pancreas status post EUS by GI/Dr. Orozco, pathology revealed metastatic adenocarcinoma to pancreas.  He is being followed by Heme-Onc regularly, plan to start Keytruda as immunotherapy.  Patient remains on the research study at [facility].  He denies any fever or chills, complaining of left-sided flank pain, states his  appetite has improved, current weight 184 lb.  He continues to smoke a pack a day, not interested in quitting.    7/25: Interim reviewed, patient is here for follow-up, wife present. Seen by Heme/Onc, he is currently on Keytruda, tolerating it, he is c/o 4/10 abdominal pain around ostomy site, denies fever or chills, no nausea or vomit. Has gained 10 lbs since las visit. Plan for CT abdomen/pelvis 7/27.     1/25/24: Interim reviewed, patient is here for follow-up. He reports he feels better. Seen by Heme/Onc completed 12 cycles of immunotherapy/Keytruda, as per last PET scan, prior intra-abdominal metastatic lymph node resolved.  Patient a hopeful about his prognosis.  He will see colorectal surgeon in [place] to see if he can reverse his colostomy.  He continues to smoke a pack a day, denies any constitutional symptoms, appetite reports is better.  He is feeling better overall.      Review of patient's allergies indicates:   Allergen Reactions    Penicillins Rash     Past Medical History:   Diagnosis Date    Digestive disorder     DM (diabetes mellitus)     Hyperlipidemia     Hypertension     Prediabetes      Past Surgical History:   Procedure Laterality Date    CHOLECYSTECTOMY  2011    COLON SURGERY      COLONOSCOPY      2018    COLOSTOMY N/A 04/25/2022    Procedure: CREATION, COLOSTOMY;  Surgeon: Carlton Waddell MD;  Location: [facility] OR;  Service: General;  Laterality: N/A;    ENDOSCOPIC ULTRASOUND OF UPPER GASTROINTESTINAL TRACT N/A 01/06/2023    Procedure: ULTRASOUND, UPPER GI TRACT, ENDOSCOPIC;  Surgeon: Rinku Orozco III, MD;  Location: [facility];  Service: Endoscopy;  Laterality: N/A;    INSERTION OF TUNNELED CENTRAL VENOUS CATHETER (CVC) WITH SUBCUTANEOUS PORT Right 05/18/2022    Procedure: [id number];  Surgeon: Carlton Waddell MD;  Location: [facility] OR;  Service: General;  Laterality: Right;    MOBILIZATION OF SPLENIC FLEXURE N/A 04/25/2022    Procedure: MOBILIZATION, SPLENIC FLEXURE;   "Surgeon: Carlton Waddell MD;  Location: [facility] OR;  Service: General;  Laterality: N/A;    SPLENECTOMY N/A 04/25/2022    Procedure: SPLENECTOMY;  Surgeon: Carlton Waddell MD;  Location: [facility] OR;  Service: General;  Laterality: N/A;    SUBTOTAL COLECTOMY N/A 04/25/2022    Procedure: COLECTOMY, PARTIAL;  Surgeon: Carlton Waddell MD;  Location: [facility] OR;  Service: General;  Laterality: N/A;    TONSILLECTOMY      TUMOR REMOVAL  10/18/2023    on top of the nose     Social History     Tobacco Use    Smoking status: Every Day     Current packs/day: 1.00     Average packs/day: 1 pack/day for 40.0 years (40.0 ttl pk-yrs)     Types: Cigarettes     Passive exposure: Current    Smokeless tobacco: Never   Substance Use Topics    Alcohol use: Not Currently        Family History   Problem Relation Age of Onset    Heart disease Father     Rectal cancer Sister         half sister         Review of Systems   Constitutional:  Negative for appetite change, chills, fever and unexpected weight change.   HENT:  Negative for sinus pain.    Respiratory:  Negative for cough and shortness of breath.    Cardiovascular:  Negative for chest pain and leg swelling.   Gastrointestinal:  Positive for abdominal pain. Negative for diarrhea and nausea.        Colostomy bag   Genitourinary:  Negative for dysuria.   Musculoskeletal:  Negative for back pain.   Neurological:  Negative for headaches.        VAD: R Port-A-Cath not accessed    Objective:      Blood pressure 128/64, pulse 86, temperature 97.2 °F (36.2 °C), height 6' 2" (1.88 m), weight 98.8 kg (217 lb 12.8 oz), SpO2 (P) 98 %. Body mass index is 27.96 kg/m². virtual visit 8/23: looks comfortable on room air, conversant.     Physical Exam  Constitutional:       Appearance: He is normal weight.      Comments: Frail   HENT:      Mouth/Throat:      Mouth: Mucous membranes are moist.      Pharynx: Oropharynx is clear.   Eyes:      Conjunctiva/sclera: Conjunctivae normal.      Pupils: Pupils are equal, " round, and reactive to light.   Cardiovascular:      Rate and Rhythm: Normal rate and regular rhythm.      Pulses: Normal pulses.      Heart sounds: Normal heart sounds.   Pulmonary:      Effort: Pulmonary effort is normal.      Breath sounds: Normal breath sounds.   Abdominal:      General: Bowel sounds are normal.      Palpations: Abdomen is soft.      Tenderness: There is no abdominal tenderness.      Comments: Healed wound, LL colostomy, mildly tender to palpation, no rebound, stool in bag   Musculoskeletal:         General: Normal range of motion.      Cervical back: Normal range of motion.      Right lower leg: No edema.      Left lower leg: No edema.   Skin:     General: Skin is warm.      Capillary Refill: Capillary refill takes less than 2 seconds.   Neurological:      General: No focal deficit present.      Mental Status: He is alert and oriented to person, place, and time. Mental status is at baseline.   Psychiatric:         Thought Content: Thought content normal.         VAD: R Port-A-Cath, in place,not accessed, no redness noted, non tender to palpation       Recent Diagnostics:  PET CT scan 1/8/24:  Impression:   Positive therapy response with resolution of the metastatic retroperitoneal lymph node compared to the prior examination.  No FDG avid foci are present on today's exam.    CT scan 11/1/23:  IMPRESSION:  1. Prior colonic resection with left lower abdominal quadrant colostomy.  2. Top normal size retroperitoneal lymph node, decreased in size from the previous exam.  3. Ill-defined fat stranding with relative hypoenhancement along the pancreatic tail, decreased in conspicuity/size from the previous exam. No ductal dilation, discrete mass or peripancreatic collection is identified.  4. Additional and incidental findings as above.       CT scan 12/19/22:  1. Rim-enhancing left upper quadrant fluid collection reported previously has resolved.  2. Significant detrimental increase in size of  ill-defined hypodensity at the pancreatic tail, now measuring up to 5.6 cm in greatest dimension, with development of a mildly prominent low density lymph node dorsal to the pancreatic body. The appearance is suspicious for malignancy, including primary pancreatic malignancy or metastatic lesion. Infection is conceivable, as this is in close proximity to the previously demonstrated rim-enhancing fluid collection, however this is felt significantly less likely. Endoscopic ultrasound may be of benefit, as this may be amenable to endoscopic biopsy.  3. Interval resolution of small left pleural effusion.  4. Previously noted small bowel containing left lower quadrant parastomal hernia is no longer evident.  5. Additional stable findings as above.     CT scan 9/5/22:  1. Little change of rim enhancing subdiaphragmatic left upper quadrant fluid collection since 8/5/2022.  2. No significant change of hypodense region involving pancreatic tail. Differential diagnosis of this has been previously discussed and remains unchanged. Continued imaging follow-up is recommended. Pancreatic protocol CT or MRI without and with IV contrast is suggested in 3 months. Alternatively, endoscopic ultrasound could be used for further evaluation.  3. Tiny left pleural effusion, unchanged.  4. Unchanged parastomal hernia.    NM scan 9/22:  No evidence of osseous metastasis.  CT scan 8/5:   Rim-enhancing fluid collection within the left upper quadrant at the splenic bed minimally increased in size when compared to the prior study and suspicious for abscess  Improvement of the masslike hypoattenuation involving the tail of the pancreas most likely secondary to the adjacent inflammatory changes.  Small left pleural effusion with left lower lobe atelectasis  Left lower quadrant ostomy  Prior colectomy.    CT scan 7/8:  Rim-enhancing left upper quadrant fluid collection suspicious for abscess considering the provided clinical history.  Adjacent to  this there is masslike hypoattenuation involving the tail of the pancreas. This could is suspicious for pancreatic neoplasm, although phlegmon related to aforementioned abscess is also possible. Dedicated pancreatic protocol imaging and correlation with surgical history is recommended.    CT scan 6/20/22:  1. Postsurgical change of the colon with increased size of air-fluid collection near the operative bed in the left upper abdominal quadrant.  Abscess or contained perforation are considerations.  2. Hepatomegaly.    CT scan 6/29/22:  1. Decreased size of left upper quadrant air-fluid collection.  2. Unchanged size of left pleural effusion.      Micro:  Wound cultures 5/2 E coli, Klebsiella pneumoniae and Candida dubliensis    Pathology:  inal Pathologic Diagnosis 1. Spleen, splenectomy:   - Benign splenic parenchyma   - Negative for malignancy   2. Colon, partial colectomy:   - Invasive colonic adenocarcinoma, poorly differentiated (G3)     - Invades into pericolorectal tissue (pT3)   - Margins uninvolved by invasive carcinoma     - 0.1 cm to the mesenteric margin   - Lymphovascular invasion identified   - No perineural invasion identified   - Seventeen of twenty-seven lymph nodes, positive for metastatic carcinoma   (17/27, pN2b)   - Fibrous serosal adhesions   - See below for results of MSI testing   - CARLOS ENRIQUE/MILLIE Targeted Gene Panel has been ordered and results will be issued in   a supplemental report     CAP Synoptic Checklist for Colonic Neoplasms:     - Procedure: Partial colectomy     - Tumor Site: Splenic flexure     - Histologic Type: Adenocarcinoma     - Histologic Grade: G3, poorly differentiated     - Tumor Size: 5.0 x 4.5 x 2.7 cm     - Tumor Extent: Invades through muscularis propria into pericolorectal   tissue     - Macroscopic Tumor Perforation: Not identified     - Lymphovascular Invasion: Present, small vessel involved     - Perineural Invasion: Not identified     - Number of Tumor Buds: 5 in one  hotspot field     - Tumor Bud Score: Intermediate (5-9)     - Type of Polyp in which Invasive Carcinoma Arose: None identified     - Treatment Effect: No known presurgical therapy     - Margins: All margins negative for invasive carcinoma              Assessment and Plan:         1. Poorly differentiated metastatic adenocarcinoma of colon to pancreas s/p colectomy and splenectomy 4/21, completed chemotherapt complicated by recurrent intra-abdominal abscess, unable to drain by IR, latest CT scan 12/9 with resolution of abscess and suspicious lesion on pancreas + metastastis, as per last PET scan metastatic retroperitoneal lymph node resolved   Completed Keytruda   NM scan negative for bone metastasis  CT scan to be done 7/27   S/p Menveo and Pneumovac and HiB vaccines   RTC as needed     2. Smoker, not interested in quitting     3. PMHx HTN, diabetes, HLD    PCP follow-up    D/w patient, wife    Malignant neoplasm of splenic flexure    Metastatic adenocarcinoma to pancreas    Other hyperlipidemia    Hypertension, unspecified type    Colostomy status

## 2024-01-29 ENCOUNTER — TELEPHONE (OUTPATIENT)
Dept: HEMATOLOGY/ONCOLOGY | Facility: CLINIC | Age: 59
End: 2024-01-29
Payer: COMMERCIAL

## 2024-02-05 ENCOUNTER — TELEPHONE (OUTPATIENT)
Dept: HEMATOLOGY/ONCOLOGY | Facility: CLINIC | Age: 59
End: 2024-02-05
Payer: COMMERCIAL

## 2024-02-05 ENCOUNTER — LAB VISIT (OUTPATIENT)
Dept: LAB | Facility: HOSPITAL | Age: 59
End: 2024-02-05
Attending: INTERNAL MEDICINE
Payer: COMMERCIAL

## 2024-02-05 DIAGNOSIS — C78.89 METASTATIC ADENOCARCINOMA TO PANCREAS: ICD-10-CM

## 2024-02-05 DIAGNOSIS — C18.4 MALIGNANT NEOPLASM OF TRANSVERSE COLON: ICD-10-CM

## 2024-02-05 DIAGNOSIS — C77.2 METASTASIS TO RETROPERITONEAL LYMPH NODE: ICD-10-CM

## 2024-02-05 LAB
ALBUMIN SERPL BCP-MCNC: 4 G/DL (ref 3.5–5.2)
ALP SERPL-CCNC: 81 U/L (ref 55–135)
ALT SERPL W/O P-5'-P-CCNC: 17 U/L (ref 10–44)
ANION GAP SERPL CALC-SCNC: 8 MMOL/L (ref 8–16)
AST SERPL-CCNC: 19 U/L (ref 10–40)
BASOPHILS # BLD AUTO: 0.1 K/UL (ref 0–0.2)
BASOPHILS NFR BLD: 0.8 % (ref 0–1.9)
BILIRUB SERPL-MCNC: 0.5 MG/DL (ref 0.1–1)
BUN SERPL-MCNC: 14 MG/DL (ref 6–20)
CALCIUM SERPL-MCNC: 9.9 MG/DL (ref 8.7–10.5)
CEA SERPL-MCNC: 3.3 NG/ML (ref 0–5)
CHLORIDE SERPL-SCNC: 105 MMOL/L (ref 95–110)
CO2 SERPL-SCNC: 26 MMOL/L (ref 23–29)
CREAT SERPL-MCNC: 1 MG/DL (ref 0.5–1.4)
DIFFERENTIAL METHOD BLD: ABNORMAL
EOSINOPHIL # BLD AUTO: 0.1 K/UL (ref 0–0.5)
EOSINOPHIL NFR BLD: 1 % (ref 0–8)
ERYTHROCYTE [DISTWIDTH] IN BLOOD BY AUTOMATED COUNT: 13.9 % (ref 11.5–14.5)
EST. GFR  (NO RACE VARIABLE): >60 ML/MIN/1.73 M^2
GLUCOSE SERPL-MCNC: 121 MG/DL (ref 70–110)
HCT VFR BLD AUTO: 41.2 % (ref 40–54)
HGB BLD-MCNC: 13.8 G/DL (ref 14–18)
IMM GRANULOCYTES # BLD AUTO: 0.04 K/UL (ref 0–0.04)
IMM GRANULOCYTES NFR BLD AUTO: 0.3 % (ref 0–0.5)
LYMPHOCYTES # BLD AUTO: 4.7 K/UL (ref 1–4.8)
LYMPHOCYTES NFR BLD: 38.9 % (ref 18–48)
MCH RBC QN AUTO: 30 PG (ref 27–31)
MCHC RBC AUTO-ENTMCNC: 33.5 G/DL (ref 32–36)
MCV RBC AUTO: 90 FL (ref 82–98)
MONOCYTES # BLD AUTO: 1.1 K/UL (ref 0.3–1)
MONOCYTES NFR BLD: 9.2 % (ref 4–15)
NEUTROPHILS # BLD AUTO: 6 K/UL (ref 1.8–7.7)
NEUTROPHILS NFR BLD: 49.8 % (ref 38–73)
NRBC BLD-RTO: 0 /100 WBC
PLATELET # BLD AUTO: 371 K/UL (ref 150–450)
PMV BLD AUTO: 8.9 FL (ref 9.2–12.9)
POTASSIUM SERPL-SCNC: 4.8 MMOL/L (ref 3.5–5.1)
PROT SERPL-MCNC: 7 G/DL (ref 6–8.4)
RBC # BLD AUTO: 4.6 M/UL (ref 4.6–6.2)
SODIUM SERPL-SCNC: 139 MMOL/L (ref 136–145)
WBC # BLD AUTO: 12.15 K/UL (ref 3.9–12.7)

## 2024-02-05 PROCEDURE — 85025 COMPLETE CBC W/AUTO DIFF WBC: CPT | Performed by: INTERNAL MEDICINE

## 2024-02-05 PROCEDURE — 82378 CARCINOEMBRYONIC ANTIGEN: CPT | Performed by: INTERNAL MEDICINE

## 2024-02-05 PROCEDURE — 36415 COLL VENOUS BLD VENIPUNCTURE: CPT | Performed by: INTERNAL MEDICINE

## 2024-02-05 PROCEDURE — 80053 COMPREHEN METABOLIC PANEL: CPT | Performed by: INTERNAL MEDICINE

## 2024-02-06 ENCOUNTER — PATIENT MESSAGE (OUTPATIENT)
Dept: HEMATOLOGY/ONCOLOGY | Facility: CLINIC | Age: 59
End: 2024-02-06

## 2024-02-06 ENCOUNTER — TELEPHONE (OUTPATIENT)
Dept: FAMILY MEDICINE | Facility: CLINIC | Age: 59
End: 2024-02-06
Payer: COMMERCIAL

## 2024-02-06 ENCOUNTER — OFFICE VISIT (OUTPATIENT)
Dept: HEMATOLOGY/ONCOLOGY | Facility: CLINIC | Age: 59
End: 2024-02-06
Payer: COMMERCIAL

## 2024-02-06 ENCOUNTER — INFUSION (OUTPATIENT)
Dept: INFUSION THERAPY | Facility: HOSPITAL | Age: 59
End: 2024-02-06
Attending: INTERNAL MEDICINE
Payer: COMMERCIAL

## 2024-02-06 VITALS
RESPIRATION RATE: 18 BRPM | OXYGEN SATURATION: 98 % | DIASTOLIC BLOOD PRESSURE: 80 MMHG | TEMPERATURE: 98 F | HEIGHT: 74 IN | WEIGHT: 223.75 LBS | HEART RATE: 78 BPM | BODY MASS INDEX: 28.71 KG/M2 | SYSTOLIC BLOOD PRESSURE: 151 MMHG

## 2024-02-06 VITALS
HEART RATE: 78 BPM | TEMPERATURE: 98 F | DIASTOLIC BLOOD PRESSURE: 80 MMHG | OXYGEN SATURATION: 98 % | BODY MASS INDEX: 28.71 KG/M2 | SYSTOLIC BLOOD PRESSURE: 151 MMHG | RESPIRATION RATE: 18 BRPM | HEIGHT: 74 IN | WEIGHT: 223.75 LBS

## 2024-02-06 DIAGNOSIS — C78.89 METASTATIC ADENOCARCINOMA TO PANCREAS: ICD-10-CM

## 2024-02-06 DIAGNOSIS — T45.1X5A CHEMOTHERAPY-INDUCED NEUROPATHY: ICD-10-CM

## 2024-02-06 DIAGNOSIS — C18.4 MALIGNANT NEOPLASM OF TRANSVERSE COLON: Primary | ICD-10-CM

## 2024-02-06 DIAGNOSIS — Z79.899 ENCOUNTER FOR LONG-TERM (CURRENT) USE OF OTHER MEDICATIONS: ICD-10-CM

## 2024-02-06 DIAGNOSIS — E78.5 DM TYPE 2 WITH DIABETIC DYSLIPIDEMIA: Primary | ICD-10-CM

## 2024-02-06 DIAGNOSIS — I10 ESSENTIAL HYPERTENSION: ICD-10-CM

## 2024-02-06 DIAGNOSIS — E03.9 ACQUIRED HYPOTHYROIDISM: ICD-10-CM

## 2024-02-06 DIAGNOSIS — G62.0 CHEMOTHERAPY-INDUCED NEUROPATHY: ICD-10-CM

## 2024-02-06 DIAGNOSIS — E78.2 MIXED HYPERLIPIDEMIA: ICD-10-CM

## 2024-02-06 DIAGNOSIS — E11.69 DM TYPE 2 WITH DIABETIC DYSLIPIDEMIA: Primary | ICD-10-CM

## 2024-02-06 DIAGNOSIS — C18.5 MALIGNANT NEOPLASM OF SPLENIC FLEXURE: Primary | ICD-10-CM

## 2024-02-06 PROCEDURE — 99999 PR PBB SHADOW E&M-EST. PATIENT-LVL III: CPT | Mod: PBBFAC,,, | Performed by: INTERNAL MEDICINE

## 2024-02-06 PROCEDURE — 63600175 PHARM REV CODE 636 W HCPCS: Mod: JZ,JG | Performed by: INTERNAL MEDICINE

## 2024-02-06 PROCEDURE — 96413 CHEMO IV INFUSION 1 HR: CPT

## 2024-02-06 PROCEDURE — 25000003 PHARM REV CODE 250: Performed by: INTERNAL MEDICINE

## 2024-02-06 PROCEDURE — A4216 STERILE WATER/SALINE, 10 ML: HCPCS | Performed by: INTERNAL MEDICINE

## 2024-02-06 PROCEDURE — 99215 OFFICE O/P EST HI 40 MIN: CPT | Mod: S$GLB,,, | Performed by: INTERNAL MEDICINE

## 2024-02-06 RX ORDER — SODIUM CHLORIDE 0.9 % (FLUSH) 0.9 %
10 SYRINGE (ML) INJECTION
Status: CANCELLED | OUTPATIENT
Start: 2024-02-06

## 2024-02-06 RX ORDER — HEPARIN 100 UNIT/ML
500 SYRINGE INTRAVENOUS
Status: CANCELLED | OUTPATIENT
Start: 2024-02-06

## 2024-02-06 RX ORDER — DIPHENHYDRAMINE HYDROCHLORIDE 50 MG/ML
50 INJECTION INTRAMUSCULAR; INTRAVENOUS ONCE AS NEEDED
Status: CANCELLED | OUTPATIENT
Start: 2024-02-06

## 2024-02-06 RX ORDER — EPINEPHRINE 0.3 MG/.3ML
0.3 INJECTION SUBCUTANEOUS ONCE AS NEEDED
Status: CANCELLED | OUTPATIENT
Start: 2024-02-06

## 2024-02-06 RX ORDER — HEPARIN 100 UNIT/ML
500 SYRINGE INTRAVENOUS
Status: DISCONTINUED | OUTPATIENT
Start: 2024-02-06 | End: 2024-02-06 | Stop reason: HOSPADM

## 2024-02-06 RX ORDER — EPINEPHRINE 0.3 MG/.3ML
0.3 INJECTION SUBCUTANEOUS ONCE AS NEEDED
Status: DISCONTINUED | OUTPATIENT
Start: 2024-02-06 | End: 2024-02-06 | Stop reason: HOSPADM

## 2024-02-06 RX ORDER — SODIUM CHLORIDE 0.9 % (FLUSH) 0.9 %
10 SYRINGE (ML) INJECTION
Status: DISCONTINUED | OUTPATIENT
Start: 2024-02-06 | End: 2024-02-06 | Stop reason: HOSPADM

## 2024-02-06 RX ORDER — DIPHENHYDRAMINE HYDROCHLORIDE 50 MG/ML
50 INJECTION INTRAMUSCULAR; INTRAVENOUS ONCE AS NEEDED
Status: DISCONTINUED | OUTPATIENT
Start: 2024-02-06 | End: 2024-02-06 | Stop reason: HOSPADM

## 2024-02-06 RX ADMIN — Medication 10 ML: at 02:02

## 2024-02-06 RX ADMIN — HEPARIN 500 UNITS: 100 SYRINGE at 02:02

## 2024-02-06 RX ADMIN — SODIUM CHLORIDE: 9 INJECTION, SOLUTION INTRAVENOUS at 01:02

## 2024-02-06 RX ADMIN — SODIUM CHLORIDE 200 MG: 9 INJECTION, SOLUTION INTRAVENOUS at 01:02

## 2024-02-06 NOTE — PLAN OF CARE
Problem: Fall Injury Risk  Goal: Absence of Fall and Fall-Related Injury  Outcome: Ongoing, Progressing  Intervention: Identify and Manage Contributors  Flowsheets (Taken 2/6/2024 1306)  Self-Care Promotion: independence encouraged  Medication Review/Management: medications reviewed  Intervention: Promote Injury-Free Environment  Flowsheets (Taken 2/6/2024 1306)  Safety Promotion/Fall Prevention: medications reviewed

## 2024-02-06 NOTE — PROGRESS NOTES
"Service Date:  2/6/24    Chief Complaint:  Colon cancer    Osman Li Jr. is a 58 y.o. 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  This occurred very shortly after completing treatment.    Patient was part of a clinical trial to measure CT DNA.  His CT DNA started to go up while he was on FOLFOX, which prompted the scan, which showed the progression.  Next generation sequencing also showed a high tumor burden.    He is now on Keytruda as his cancer has a very high tumor mutation burden.  So far, Keytruda has been helping    Here for cycle 17.  Doing well.  No new complaints.  PET scan showed no active disease.  Therefore was decided to not pursue surgery at this time.  He has met with Dr. Goodwin.  There is consideration for reversing his colectomy.    Review of Systems   Constitutional:  Positive for fatigue.   HENT: Negative.     Eyes: Negative.    Respiratory: Negative.     Cardiovascular: Negative.    Gastrointestinal: Negative.    Endocrine: Negative.    Genitourinary: Negative.    Musculoskeletal: Negative.    Integumentary:  Negative.   Neurological:  Positive for numbness.   Hematological: Negative.    Psychiatric/Behavioral: Negative.          Current Outpatient Medications   Medication Instructions    atorvastatin (LIPITOR) 20 mg, Oral, Daily    enalapriL-hydrochlorothiazide (VASERETIC) 10-25 mg per tablet 1 tablet, Oral, Daily    metFORMIN (GLUCOPHAGE) 1,000 mg, Oral, 2 times daily with meals    morphine (MS CONTIN) 30 mg, Oral, 2 times daily    pen needle, diabetic 31 gauge x 3/16" Ndle 1 each, Misc.(Non-Drug; Combo Route), Daily    promethazine (PHENERGAN) 12.5 mg, Oral, Every 4 hours PRN    sildenafiL (VIAGRA) 100 mg, Oral, Daily PRN    TOUJEO MAX U-300 SOLOSTAR 26 Units, Subcutaneous, Daily    traZODone (DESYREL) 50 mg, Oral, Nightly        Past Medical History:   Diagnosis Date    " Digestive disorder     DM (diabetes mellitus)     Hyperlipidemia     Hypertension     Prediabetes         Past Surgical History:   Procedure Laterality Date    CHOLECYSTECTOMY  2011    COLON SURGERY      COLONOSCOPY      2018    COLOSTOMY N/A 04/25/2022    Procedure: CREATION, COLOSTOMY;  Surgeon: Carlton Waddell MD;  Location: [facility] OR;  Service: General;  Laterality: N/A;    ENDOSCOPIC ULTRASOUND OF UPPER GASTROINTESTINAL TRACT N/A 01/06/2023    Procedure: ULTRASOUND, UPPER GI TRACT, ENDOSCOPIC;  Surgeon: Rinku Orozco III, MD;  Location: [facility] ENDO;  Service: Endoscopy;  Laterality: N/A;    INSERTION OF TUNNELED CENTRAL VENOUS CATHETER (CVC) WITH SUBCUTANEOUS PORT Right 05/18/2022    Procedure: [id number];  Surgeon: Carlton Waddell MD;  Location: [facility] OR;  Service: General;  Laterality: Right;    MOBILIZATION OF SPLENIC FLEXURE N/A 04/25/2022    Procedure: MOBILIZATION, SPLENIC FLEXURE;  Surgeon: Carlton Waddell MD;  Location: [facility] OR;  Service: General;  Laterality: N/A;    SPLENECTOMY N/A 04/25/2022    Procedure: SPLENECTOMY;  Surgeon: Carlton Waddell MD;  Location: [facility] OR;  Service: General;  Laterality: N/A;    SUBTOTAL COLECTOMY N/A 04/25/2022    Procedure: COLECTOMY, PARTIAL;  Surgeon: Carlton Waddell MD;  Location: [facility] OR;  Service: General;  Laterality: N/A;    TONSILLECTOMY      TUMOR REMOVAL  10/18/2023    on top of the nose        Family History   Problem Relation Age of Onset    Heart disease Father     Rectal cancer Sister         half sister       Social History     Tobacco Use    Smoking status: Every Day     Current packs/day: 1.00     Average packs/day: 1 pack/day for 40.0 years (40.0 ttl pk-yrs)     Types: Cigarettes     Passive exposure: Current    Smokeless tobacco: Never   Substance Use Topics    Alcohol use: Not Currently    Drug use: Never         There were no vitals filed for this visit.         Physical Exam:  There were no vitals taken for this  visit.    Physical Exam  Vitals and nursing note reviewed.   Constitutional:       Appearance: Normal appearance.   HENT:      Head: Normocephalic and atraumatic.      Nose: Nose normal.      Mouth/Throat:      Mouth: Mucous membranes are moist.      Pharynx: Oropharynx is clear.   Eyes:      Extraocular Movements: Extraocular movements intact.      Conjunctiva/sclera: Conjunctivae normal.   Cardiovascular:      Rate and Rhythm: Normal rate and regular rhythm.      Heart sounds: Normal heart sounds.   Pulmonary:      Effort: Pulmonary effort is normal.      Breath sounds: Normal breath sounds.   Abdominal:      General: Abdomen is flat. Bowel sounds are normal.      Palpations: Abdomen is soft.      Comments: Ostomy bag in place.   Musculoskeletal:         General: Normal range of motion.      Cervical back: Normal range of motion and neck supple.   Skin:     General: Skin is warm and dry.   Neurological:      General: No focal deficit present.      Mental Status: He is alert and oriented to person, place, and time. Mental status is at baseline.   Psychiatric:         Mood and Affect: Mood normal.        Labs:  Lab Results   Component Value Date    WBC 12.15 02/05/2024    RBC 4.60 02/05/2024    HGB 13.8 (L) 02/05/2024    HCT 41.2 02/05/2024    MCV 90 02/05/2024    MCH 30.0 02/05/2024    MCHC 33.5 02/05/2024    RDW 13.9 02/05/2024     02/05/2024    MPV 8.9 (L) 02/05/2024    GRAN 6.0 02/05/2024    GRAN 49.8 02/05/2024    LYMPH 4.7 02/05/2024    LYMPH 38.9 02/05/2024    MONO 1.1 (H) 02/05/2024    MONO 9.2 02/05/2024    EOS 0.1 02/05/2024    BASO 0.10 02/05/2024    EOSINOPHIL 1.0 02/05/2024    BASOPHIL 0.8 02/05/2024     Sodium   Date Value Ref Range Status   02/05/2024 139 136 - 145 mmol/L Final     Potassium   Date Value Ref Range Status   02/05/2024 4.8 3.5 - 5.1 mmol/L Final     Chloride   Date Value Ref Range Status   02/05/2024 105 95 - 110 mmol/L Final     CO2   Date Value Ref Range Status   02/05/2024 26  23 - 29 mmol/L Final     Glucose   Date Value Ref Range Status   02/05/2024 121 (H) 70 - 110 mg/dL Final     BUN   Date Value Ref Range Status   02/05/2024 14 6 - 20 mg/dL Final     Creatinine   Date Value Ref Range Status   02/05/2024 1.0 0.5 - 1.4 mg/dL Final     Calcium   Date Value Ref Range Status   02/05/2024 9.9 8.7 - 10.5 mg/dL Final     Total Protein   Date Value Ref Range Status   02/05/2024 7.0 6.0 - 8.4 g/dL Final     Albumin   Date Value Ref Range Status   02/05/2024 4.0 3.5 - 5.2 g/dL Final     Total Bilirubin   Date Value Ref Range Status   02/05/2024 0.5 0.1 - 1.0 mg/dL Final     Comment:     For infants and newborns, interpretation of results should be based  on gestational age, weight and in agreement with clinical  observations.    Premature Infant recommended reference ranges:  Up to 24 hours.............<8.0 mg/dL  Up to 48 hours............<12.0 mg/dL  3-5 days..................<15.0 mg/dL  6-29 days.................<15.0 mg/dL       Alkaline Phosphatase   Date Value Ref Range Status   02/05/2024 81 55 - 135 U/L Final     AST   Date Value Ref Range Status   02/05/2024 19 10 - 40 U/L Final     ALT   Date Value Ref Range Status   02/05/2024 17 10 - 44 U/L Final     Anion Gap   Date Value Ref Range Status   02/05/2024 8 8 - 16 mmol/L Final     eGFR if    Date Value Ref Range Status   07/25/2022 >60 >60 mL/min/1.73 m^2 Final     eGFR if non    Date Value Ref Range Status   07/25/2022 >60 >60 mL/min/1.73 m^2 Final     Comment:     Calculation used to obtain the estimated glomerular filtration  rate (eGFR) is the CKD-EPI equation.          A/P:    Malignant neoplasm of the transverse colon  Recurrence in the pancreas  Metastasis to retroperitoneal LN s/p radiation therapy  -poorly differentiated adenocarcinoma  -progressed right after completing 12 cycles of FOLFOX  -patient was on clinical trial to measure ctDNA, showed high tumor mutation burden  -given that the  patient has a high tumor mutation burden, despite having MARY, now on Keytruda as it is indicated in his next generation sequencing.    -proceed with Keytruda C17 today  -Recent CT scan shows no new evidence of disease, with a smaller lesion on the pancreatic tail and stability of the retroperitoneal lymph node that was radiated.  PET scan shows no evidence of disease, so it was decided to forego surgery.  I will order CT DNA testing.  -RTC in 3 weeks with labs for next treatment    Pancreatic mass   -confirmed recurrence of colon adenocarcinoma    Back pain/pain from stoma  - NM bone scan negative for mets  -now on morphine dose to 30 mg bid    HTN  - on Enalapril  - follow up with PCP    HLP  - follow up with PCP    DM2  - on Metformin  - follow up with PCP    Tobacco use  - counseled on cessation

## 2024-02-14 ENCOUNTER — PATIENT MESSAGE (OUTPATIENT)
Dept: HEMATOLOGY/ONCOLOGY | Facility: CLINIC | Age: 59
End: 2024-02-14
Payer: COMMERCIAL

## 2024-02-14 DIAGNOSIS — E78.49 OTHER HYPERLIPIDEMIA: ICD-10-CM

## 2024-02-14 DIAGNOSIS — T45.1X5A CHEMOTHERAPY-INDUCED NEUROPATHY: ICD-10-CM

## 2024-02-14 DIAGNOSIS — C78.89 METASTATIC ADENOCARCINOMA TO PANCREAS: ICD-10-CM

## 2024-02-14 DIAGNOSIS — C77.2 METASTASIS TO RETROPERITONEAL LYMPH NODE: ICD-10-CM

## 2024-02-14 DIAGNOSIS — E11.00 TYPE 2 DIABETES MELLITUS WITH HYPEROSMOLARITY WITHOUT COMA, WITHOUT LONG-TERM CURRENT USE OF INSULIN: ICD-10-CM

## 2024-02-14 DIAGNOSIS — I10 PRIMARY HYPERTENSION: ICD-10-CM

## 2024-02-14 DIAGNOSIS — G62.0 CHEMOTHERAPY-INDUCED NEUROPATHY: ICD-10-CM

## 2024-02-14 DIAGNOSIS — C18.4 MALIGNANT NEOPLASM OF TRANSVERSE COLON: ICD-10-CM

## 2024-02-14 RX ORDER — MORPHINE SULFATE 30 MG/1
30 TABLET, FILM COATED, EXTENDED RELEASE ORAL 2 TIMES DAILY
Qty: 60 TABLET | Refills: 0 | Status: SHIPPED | OUTPATIENT
Start: 2024-02-14 | End: 2024-03-27 | Stop reason: SDUPTHER

## 2024-02-19 NOTE — PROGRESS NOTES
[facility]  Colon and Rectal Surgery    [address]  [place], [place]  Tel: [phone]  Fax: [phone]  [email or web address]   MD Payam Reid MD Brian Kann, MD W. Forrest Johnston, MD Matthew Giglia, MD Jennifer Paruch, MD William Kethman, MD Danielle Kay, MD     Patient name: Loki Li Jr.   YOB: 1965   MRN: [id number]    Dear Carissa Goodwin and Khoobehi,    It was a pleasure seeing Mr. Li in the Colon and Rectal Surgery clinic here at [facility].     As you know, Mr. Li is a 58 year old man with a history of HTN, HLD, and DM, Stage IV transverse colon adenocarcinoma who presents for evaluation of colostomy takedown . He underwent a splenic flexure resection, end colostomy and splenectomy with Dr. Waddell > FOLFOX > progression (pancreatic tail - biopsy proven and RPLN PET avidity) > Keytruda since 1/2023. He was seen by Dr. Goodwin in 1/2024 (note reviewed) - his plan was to follow-up the results of his PET CT, decision was made to complete Keytruda and continue surveillance - if recurrence occurs then move forward with resection. He is doing well - fatigue only around the time of Keytruda for about 1 week after, he denies any nausea, vomiting, fevers, or chills. His weight is stable but about 30 lbs less than originally. He denies fecal incontinence.     PET CT - 1/8/2024  Positive therapy response with resolution of the metastatic retroperitoneal lymph node compared to the prior examination.  No FDG avid foci are present on today's exam.     Last colonoscopy: 2018 (Complete) - repeat in 5 years  Two sessile polyps 5-6 mm in descending colon, polypectomy performed (HP)  Two sessile polyps 5-7 mm in the sigmoid colon, polypectomy performed (HP)  18 diminutive recto sigmoid polyps were ablated    Oncology History   Malignant neoplasm of splenic flexure   4/20/2022 Surgery    Partial colectomy, splenectomy, end  colostomy - pancreas appeared to be involved at that time    1. Spleen, splenectomy:   - Benign splenic parenchyma   - Negative for malignancy   2. Colon, partial colectomy:   - Invasive colonic adenocarcinoma, poorly differentiated (G3)     - Invades into pericolorectal tissue (pT3)   - Margins uninvolved by invasive carcinoma     - 0.1 cm to the mesenteric margin   - Lymphovascular invasion identified   - No perineural invasion identified   - Seventeen of twenty-seven lymph nodes, positive for metastatic carcinoma   (17/27, pN2b)   - Fibrous serosal adhesions   - See below for results of MSI testing   - CARLOS ENRIQUE/MILLIE Targeted Gene Panel has been ordered and results will be issued in   a supplemental report     CAP Synoptic Checklist for Colonic Neoplasms:     - Procedure: Partial colectomy     - Tumor Site: Splenic flexure     - Histologic Type: Adenocarcinoma     - Histologic Grade: G3, poorly differentiated     - Tumor Size: 5.0 x 4.5 x 2.7 cm     - Tumor Extent: Invades through muscularis propria into pericolorectal   tissue    - Macroscopic Tumor Perforation: Not identified     - Lymphovascular Invasion: Present, small vessel involved     - Perineural Invasion: Not identified     - Number of Tumor Buds: 5 in one hotspot field     - Tumor Bud Score: Intermediate (5-9)     - Type of Polyp in which Invasive Carcinoma Arose: None identified     - Treatment Effect: No known presurgical therapy     - Margins: All margins negative for invasive carcinoma       - Closest Margin to Invasive Carcinoma: 0.1 cm to mesenteric margin     - Regional Lymph Nodes:       - Number of Lymph Nodes with Tumor: 17       - Number of Lymph Nodes Examined: 27       - Tumor Deposits: Not identified     - Distant Metastasis: Not applicable     - Pathologic Stage Classification: pT3 pN2b     - Additional Findings: Fibrous serosal adhesions; benign spleen     - Special Studies: See below for results of MSI testing; CARLOS ENRIQUE/MILLIE panel has         been  ordered and results will be issued in a supplemental report     - Designate Block for Future Studies: [id number]   Immunohistochemistry (IHC) Testing for Mismatch Repair (MMR) Proteins:   MLH1 - Intact nuclear expression   MSH2 - Intact nuclear expression   MSH6 - Intact nuclear expression   PMS2 - Intact nuclear expression      5/5/2022 Initial Diagnosis    Malignant neoplasm of transverse colon     7/12/2022 - 12/14/2022 Chemotherapy    Treatment Summary   Plan Name: OP mFOLFOX 6 Q2W  Treatment Goal: Curative  Status: Inactive  Start Date: 7/12/2022  End Date: 12/14/2022  Provider: Aurash Khoobehi, MD  Chemotherapy: fluorouraciL injection 930 mg, 400 mg/m2 = 930 mg, Intravenous, Clinic/HOD 1 time, 12 of 12 cycles  Administration: 930 mg (7/12/2022), 930 mg (7/26/2022), 930 mg (8/9/2022), 930 mg (8/23/2022), 930 mg (9/6/2022), 930 mg (9/19/2022), 835 mg (10/3/2022), 835 mg (10/17/2022), 825 mg (10/31/2022), 820 mg (11/14/2022), 830 mg (11/28/2022), 825 mg (12/12/2022)  oxaliplatin (ELOXATIN) 85 mg/m2 = 197 mg in dextrose 5 % 539.4 mL chemo infusion, 85 mg/m2 = 197 mg, Intravenous, Clinic/HOD 1 time, 10 of 10 cycles  Dose modification: 68 mg/m2 (original dose 85 mg/m2, Cycle 8, Reason: MD Discretion)  Administration: 197 mg (7/12/2022), 197 mg (7/26/2022), 197 mg (8/9/2022), 197 mg (8/23/2022), 197 mg (9/6/2022), 197 mg (9/19/2022), 178 mg (10/3/2022), 142 mg (10/17/2022), 139 mg (11/14/2022)     2/13/2023 -  Chemotherapy    Treatment Summary   Plan Name: OP PEMBROLIZUMAB 200MG Q3W  Treatment Goal: Curative  Status: Active  Start Date: 2/13/2023  End Date: 1/28/2025 (Planned)  Provider: Aurash Khoobehi, MD  Chemotherapy: [No matching medication found in this treatment plan]     Metastatic adenocarcinoma to pancreas   1/13/2023 Initial Diagnosis    Metastatic adenocarcinoma to pancreas     2/13/2023 -  Chemotherapy    Treatment Summary   Plan Name: OP PEMBROLIZUMAB 200MG Q3W  Treatment Goal: Curative  Status:  Active  Start Date: 2/13/2023  End Date: 1/28/2025 (Planned)  Provider: Aurash Khoobehi, MD  Chemotherapy: [No matching medication found in this treatment plan]         The patient was informed of the availability of a certified  without charge. A certified  was not necessary for this visit.    Review of Systems  See pertinent review of systems above    Past Medical History:   Diagnosis Date    Digestive disorder     DM (diabetes mellitus)     Hyperlipidemia     Hypertension     Prediabetes      Past Surgical History:   Procedure Laterality Date    CHOLECYSTECTOMY  2011    COLON SURGERY      COLONOSCOPY      2018    COLOSTOMY N/A 04/25/2022    Procedure: CREATION, COLOSTOMY;  Surgeon: Carlton Waddell MD;  Location: [facility] OR;  Service: General;  Laterality: N/A;    ENDOSCOPIC ULTRASOUND OF UPPER GASTROINTESTINAL TRACT N/A 01/06/2023    Procedure: ULTRASOUND, UPPER GI TRACT, ENDOSCOPIC;  Surgeon: Rinku Orozco III, MD;  Location: [facility] ENDO;  Service: Endoscopy;  Laterality: N/A;    INSERTION OF TUNNELED CENTRAL VENOUS CATHETER (CVC) WITH SUBCUTANEOUS PORT Right 05/18/2022    Procedure: [id number];  Surgeon: Carlton Waddell MD;  Location: [facility] OR;  Service: General;  Laterality: Right;    MOBILIZATION OF SPLENIC FLEXURE N/A 04/25/2022    Procedure: MOBILIZATION, SPLENIC FLEXURE;  Surgeon: Carlton Waddell MD;  Location: [facility] OR;  Service: General;  Laterality: N/A;    SPLENECTOMY N/A 04/25/2022    Procedure: SPLENECTOMY;  Surgeon: Carlton Waddell MD;  Location: [facility] OR;  Service: General;  Laterality: N/A;    SUBTOTAL COLECTOMY N/A 04/25/2022    Procedure: COLECTOMY, PARTIAL;  Surgeon: Carlton Waddell MD;  Location: [facility] OR;  Service: General;  Laterality: N/A;    TONSILLECTOMY      TUMOR REMOVAL  10/18/2023    on top of the nose     Family History   Problem Relation Age of Onset    Heart disease Father     Rectal cancer Sister         half sister     Social History  "    Tobacco Use    Smoking status: Every Day     Current packs/day: 1.00     Average packs/day: 1 pack/day for 40.0 years (40.0 ttl pk-yrs)     Types: Cigarettes     Passive exposure: Current    Smokeless tobacco: Never   Substance Use Topics    Alcohol use: Not Currently    Drug use: Never     Review of patient's allergies indicates:   Allergen Reactions    Penicillins Rash       Current Outpatient Medications on File Prior to Visit   Medication Sig Dispense Refill    atorvastatin (LIPITOR) 20 MG tablet Take 1 tablet (20 mg total) by mouth once daily. 90 tablet 3    enalapriL-hydrochlorothiazide (VASERETIC) 10-25 mg per tablet Take 1 tablet by mouth once daily. 90 tablet 3    insulin glargine U-300 conc (TOUJEO MAX U-300 SOLOSTAR) 300 unit/mL (3 mL) insulin pen Inject 26 Units into the skin once daily. 2 pen 11    metFORMIN (GLUCOPHAGE) 1000 MG tablet Take 1 tablet (1,000 mg total) by mouth 2 (two) times daily with meals. 180 tablet 3    morphine (MS CONTIN) 30 MG 12 hr tablet Take 1 tablet (30 mg total) by mouth 2 (two) times daily. 60 tablet 0    pen needle, diabetic 31 gauge x 3/16" Ndle 1 each by Misc.(Non-Drug; Combo Route) route once daily. 90 each 3    promethazine (PHENERGAN) 12.5 MG Tab Take 1 tablet (12.5 mg total) by mouth every 4 (four) hours as needed. (Patient taking differently: Take 12.5 mg by mouth every 4 (four) hours as needed (as needed).) 25 tablet 1    sildenafiL (VIAGRA) 100 MG tablet Take 1 tablet (100 mg total) by mouth daily as needed for Erectile Dysfunction. (Patient taking differently: Take 100 mg by mouth daily as needed for Erectile Dysfunction (as needed).) 30 tablet 3    traZODone (DESYREL) 50 MG tablet Take 1 tablet (50 mg total) by mouth every evening. 90 tablet 3     No current facility-administered medications on file prior to visit.     Physical Examination  BP (!) 166/86   Pulse 90   Wt 99 kg (218 lb 4.1 oz)   BMI 28.02 kg/m²      Constitutional: well developed, no cough, " no dyspnea, alert, and no acute distress    Head: Normocephalic, no lesions, without obvious abnormality  Eye: Normal external eye, conjunctiva, and lids  Cardiovascular: regular rate and regular rhythm  Respiratory: normal air entry  Gastrointestinal: soft, non-tender, ostomy in place with parastomal hernia  Musculoskeletal: full range of motion without pain  Neurologic: alert, oriented, normal speech, no focal findings or movement disorder noted  Psychiatric: appropriate, normal mood    Assessment and Plan of Care    Thank you again for referring Mr. Li to my care. In summary, Mr. Li is a 58 year old man presenting with Stage IV splenic flexure adenocarcinoma, metastatic to retroperitoneal node and pancreatic tail with excellent response now to Keytruda. He is here today to discuss consideration of colostomy takedown. We discussed treatment options and have provided the following recommendations:    Long discussion today - would not recommend parastomal hernia repair, would prefer colostomy takedown - discussed anatomy at length and reviewed imaging  Discussed options - currently on Keytruda, discussed care with Carissa Goodwin and Khoobehi  Recommend complete colonoscopy - will attempt to survey the distal segment of colon although this may not be possible given emergent nature of his prior resection. Will perform this with two enemas and an oral bowel preparation.  Will present at the [facility] MDT for discussion but suspect that pending timing with immunotherapy we will pursue ostomy takedown    Please do not hesitate to contact me if you have any questions.      Farhan Lance MD, FACS, FASCRS  Department of Colon & Rectal Surgery  [facility]

## 2024-02-19 NOTE — H&P (VIEW-ONLY)
[facility]  Colon and Rectal Surgery    [address]  [place], [place]  Tel: [phone]  Fax: [phone]  [email or web address]   MD Payam Reid MD Brian Kann, MD W. Forrest Johnston, MD Matthew Giglia, MD Jennifer Paruch, MD William Kethman, MD Danielle Kay, MD     Patient name: Loki Li Jr.   YOB: 1965   MRN: [id number]    Dear Carissa Goodwin and Khoobehi,    It was a pleasure seeing Mr. Li in the Colon and Rectal Surgery clinic here at [facility].     As you know, Mr. Li is a 58 year old man with a history of HTN, HLD, and DM, Stage IV transverse colon adenocarcinoma who presents for evaluation of colostomy takedown . He underwent a splenic flexure resection, end colostomy and splenectomy with Dr. Waddell > FOLFOX > progression (pancreatic tail - biopsy proven and RPLN PET avidity) > Keytruda since 1/2023. He was seen by Dr. Goodwin in 1/2024 (note reviewed) - his plan was to follow-up the results of his PET CT, decision was made to complete Keytruda and continue surveillance - if recurrence occurs then move forward with resection. He is doing well - fatigue only around the time of Keytruda for about 1 week after, he denies any nausea, vomiting, fevers, or chills. His weight is stable but about 30 lbs less than originally. He denies fecal incontinence.     PET CT - 1/8/2024  Positive therapy response with resolution of the metastatic retroperitoneal lymph node compared to the prior examination.  No FDG avid foci are present on today's exam.     Last colonoscopy: 2018 (Complete) - repeat in 5 years  Two sessile polyps 5-6 mm in descending colon, polypectomy performed (HP)  Two sessile polyps 5-7 mm in the sigmoid colon, polypectomy performed (HP)  18 diminutive recto sigmoid polyps were ablated    Oncology History   Malignant neoplasm of splenic flexure   4/20/2022 Surgery    Partial colectomy, splenectomy, end  colostomy - pancreas appeared to be involved at that time    1. Spleen, splenectomy:   - Benign splenic parenchyma   - Negative for malignancy   2. Colon, partial colectomy:   - Invasive colonic adenocarcinoma, poorly differentiated (G3)     - Invades into pericolorectal tissue (pT3)   - Margins uninvolved by invasive carcinoma     - 0.1 cm to the mesenteric margin   - Lymphovascular invasion identified   - No perineural invasion identified   - Seventeen of twenty-seven lymph nodes, positive for metastatic carcinoma   (17/27, pN2b)   - Fibrous serosal adhesions   - See below for results of MSI testing   - CARLOS ENRIQUE/MILLIE Targeted Gene Panel has been ordered and results will be issued in   a supplemental report     CAP Synoptic Checklist for Colonic Neoplasms:     - Procedure: Partial colectomy     - Tumor Site: Splenic flexure     - Histologic Type: Adenocarcinoma     - Histologic Grade: G3, poorly differentiated     - Tumor Size: 5.0 x 4.5 x 2.7 cm     - Tumor Extent: Invades through muscularis propria into pericolorectal   tissue    - Macroscopic Tumor Perforation: Not identified     - Lymphovascular Invasion: Present, small vessel involved     - Perineural Invasion: Not identified     - Number of Tumor Buds: 5 in one hotspot field     - Tumor Bud Score: Intermediate (5-9)     - Type of Polyp in which Invasive Carcinoma Arose: None identified     - Treatment Effect: No known presurgical therapy     - Margins: All margins negative for invasive carcinoma       - Closest Margin to Invasive Carcinoma: 0.1 cm to mesenteric margin     - Regional Lymph Nodes:       - Number of Lymph Nodes with Tumor: 17       - Number of Lymph Nodes Examined: 27       - Tumor Deposits: Not identified     - Distant Metastasis: Not applicable     - Pathologic Stage Classification: pT3 pN2b     - Additional Findings: Fibrous serosal adhesions; benign spleen     - Special Studies: See below for results of MSI testing; CARLOS ENRIQUE/MILLIE panel has         been  ordered and results will be issued in a supplemental report     - Designate Block for Future Studies: [id number]   Immunohistochemistry (IHC) Testing for Mismatch Repair (MMR) Proteins:   MLH1 - Intact nuclear expression   MSH2 - Intact nuclear expression   MSH6 - Intact nuclear expression   PMS2 - Intact nuclear expression      5/5/2022 Initial Diagnosis    Malignant neoplasm of transverse colon     7/12/2022 - 12/14/2022 Chemotherapy    Treatment Summary   Plan Name: OP mFOLFOX 6 Q2W  Treatment Goal: Curative  Status: Inactive  Start Date: 7/12/2022  End Date: 12/14/2022  Provider: Aurash Khoobehi, MD  Chemotherapy: fluorouraciL injection 930 mg, 400 mg/m2 = 930 mg, Intravenous, Clinic/HOD 1 time, 12 of 12 cycles  Administration: 930 mg (7/12/2022), 930 mg (7/26/2022), 930 mg (8/9/2022), 930 mg (8/23/2022), 930 mg (9/6/2022), 930 mg (9/19/2022), 835 mg (10/3/2022), 835 mg (10/17/2022), 825 mg (10/31/2022), 820 mg (11/14/2022), 830 mg (11/28/2022), 825 mg (12/12/2022)  oxaliplatin (ELOXATIN) 85 mg/m2 = 197 mg in dextrose 5 % 539.4 mL chemo infusion, 85 mg/m2 = 197 mg, Intravenous, Clinic/HOD 1 time, 10 of 10 cycles  Dose modification: 68 mg/m2 (original dose 85 mg/m2, Cycle 8, Reason: MD Discretion)  Administration: 197 mg (7/12/2022), 197 mg (7/26/2022), 197 mg (8/9/2022), 197 mg (8/23/2022), 197 mg (9/6/2022), 197 mg (9/19/2022), 178 mg (10/3/2022), 142 mg (10/17/2022), 139 mg (11/14/2022)     2/13/2023 -  Chemotherapy    Treatment Summary   Plan Name: OP PEMBROLIZUMAB 200MG Q3W  Treatment Goal: Curative  Status: Active  Start Date: 2/13/2023  End Date: 1/28/2025 (Planned)  Provider: Aurash Khoobehi, MD  Chemotherapy: [No matching medication found in this treatment plan]     Metastatic adenocarcinoma to pancreas   1/13/2023 Initial Diagnosis    Metastatic adenocarcinoma to pancreas     2/13/2023 -  Chemotherapy    Treatment Summary   Plan Name: OP PEMBROLIZUMAB 200MG Q3W  Treatment Goal: Curative  Status:  Active  Start Date: 2/13/2023  End Date: 1/28/2025 (Planned)  Provider: Aurash Khoobehi, MD  Chemotherapy: [No matching medication found in this treatment plan]         The patient was informed of the availability of a certified  without charge. A certified  was not necessary for this visit.    Review of Systems  See pertinent review of systems above    Past Medical History:   Diagnosis Date    Digestive disorder     DM (diabetes mellitus)     Hyperlipidemia     Hypertension     Prediabetes      Past Surgical History:   Procedure Laterality Date    CHOLECYSTECTOMY  2011    COLON SURGERY      COLONOSCOPY      2018    COLOSTOMY N/A 04/25/2022    Procedure: CREATION, COLOSTOMY;  Surgeon: Carlton Waddell MD;  Location: [facility] OR;  Service: General;  Laterality: N/A;    ENDOSCOPIC ULTRASOUND OF UPPER GASTROINTESTINAL TRACT N/A 01/06/2023    Procedure: ULTRASOUND, UPPER GI TRACT, ENDOSCOPIC;  Surgeon: Rinku Orozco III, MD;  Location: [facility] ENDO;  Service: Endoscopy;  Laterality: N/A;    INSERTION OF TUNNELED CENTRAL VENOUS CATHETER (CVC) WITH SUBCUTANEOUS PORT Right 05/18/2022    Procedure: [id number];  Surgeon: Carlton Waddell MD;  Location: [facility] OR;  Service: General;  Laterality: Right;    MOBILIZATION OF SPLENIC FLEXURE N/A 04/25/2022    Procedure: MOBILIZATION, SPLENIC FLEXURE;  Surgeon: Carlton Waddell MD;  Location: [facility] OR;  Service: General;  Laterality: N/A;    SPLENECTOMY N/A 04/25/2022    Procedure: SPLENECTOMY;  Surgeon: Carlton Waddell MD;  Location: [facility] OR;  Service: General;  Laterality: N/A;    SUBTOTAL COLECTOMY N/A 04/25/2022    Procedure: COLECTOMY, PARTIAL;  Surgeon: Carlton Waddell MD;  Location: [facility] OR;  Service: General;  Laterality: N/A;    TONSILLECTOMY      TUMOR REMOVAL  10/18/2023    on top of the nose     Family History   Problem Relation Age of Onset    Heart disease Father     Rectal cancer Sister         half sister     Social History  "    Tobacco Use    Smoking status: Every Day     Current packs/day: 1.00     Average packs/day: 1 pack/day for 40.0 years (40.0 ttl pk-yrs)     Types: Cigarettes     Passive exposure: Current    Smokeless tobacco: Never   Substance Use Topics    Alcohol use: Not Currently    Drug use: Never     Review of patient's allergies indicates:   Allergen Reactions    Penicillins Rash       Current Outpatient Medications on File Prior to Visit   Medication Sig Dispense Refill    atorvastatin (LIPITOR) 20 MG tablet Take 1 tablet (20 mg total) by mouth once daily. 90 tablet 3    enalapriL-hydrochlorothiazide (VASERETIC) 10-25 mg per tablet Take 1 tablet by mouth once daily. 90 tablet 3    insulin glargine U-300 conc (TOUJEO MAX U-300 SOLOSTAR) 300 unit/mL (3 mL) insulin pen Inject 26 Units into the skin once daily. 2 pen 11    metFORMIN (GLUCOPHAGE) 1000 MG tablet Take 1 tablet (1,000 mg total) by mouth 2 (two) times daily with meals. 180 tablet 3    morphine (MS CONTIN) 30 MG 12 hr tablet Take 1 tablet (30 mg total) by mouth 2 (two) times daily. 60 tablet 0    pen needle, diabetic 31 gauge x 3/16" Ndle 1 each by Misc.(Non-Drug; Combo Route) route once daily. 90 each 3    promethazine (PHENERGAN) 12.5 MG Tab Take 1 tablet (12.5 mg total) by mouth every 4 (four) hours as needed. (Patient taking differently: Take 12.5 mg by mouth every 4 (four) hours as needed (as needed).) 25 tablet 1    sildenafiL (VIAGRA) 100 MG tablet Take 1 tablet (100 mg total) by mouth daily as needed for Erectile Dysfunction. (Patient taking differently: Take 100 mg by mouth daily as needed for Erectile Dysfunction (as needed).) 30 tablet 3    traZODone (DESYREL) 50 MG tablet Take 1 tablet (50 mg total) by mouth every evening. 90 tablet 3     No current facility-administered medications on file prior to visit.     Physical Examination  BP (!) 166/86   Pulse 90   Wt 99 kg (218 lb 4.1 oz)   BMI 28.02 kg/m²      Constitutional: well developed, no cough, " no dyspnea, alert, and no acute distress    Head: Normocephalic, no lesions, without obvious abnormality  Eye: Normal external eye, conjunctiva, and lids  Cardiovascular: regular rate and regular rhythm  Respiratory: normal air entry  Gastrointestinal: soft, non-tender, ostomy in place with parastomal hernia  Musculoskeletal: full range of motion without pain  Neurologic: alert, oriented, normal speech, no focal findings or movement disorder noted  Psychiatric: appropriate, normal mood    Assessment and Plan of Care    Thank you again for referring Mr. Li to my care. In summary, Mr. Li is a 58 year old man presenting with Stage IV splenic flexure adenocarcinoma, metastatic to retroperitoneal node and pancreatic tail with excellent response now to Keytruda. He is here today to discuss consideration of colostomy takedown. We discussed treatment options and have provided the following recommendations:    Long discussion today - would not recommend parastomal hernia repair, would prefer colostomy takedown - discussed anatomy at length and reviewed imaging  Discussed options - currently on Keytruda, discussed care with Carissa Goodwin and Khoobehi  Recommend complete colonoscopy - will attempt to survey the distal segment of colon although this may not be possible given emergent nature of his prior resection. Will perform this with two enemas and an oral bowel preparation.  Will present at the [facility] MDT for discussion but suspect that pending timing with immunotherapy we will pursue ostomy takedown    Please do not hesitate to contact me if you have any questions.      Farhan Lance MD, FACS, FASCRS  Department of Colon & Rectal Surgery  [facility]

## 2024-02-20 ENCOUNTER — OFFICE VISIT (OUTPATIENT)
Dept: SURGERY | Facility: CLINIC | Age: 59
End: 2024-02-20
Payer: COMMERCIAL

## 2024-02-20 VITALS
WEIGHT: 218.25 LBS | DIASTOLIC BLOOD PRESSURE: 86 MMHG | HEART RATE: 90 BPM | BODY MASS INDEX: 28.02 KG/M2 | SYSTOLIC BLOOD PRESSURE: 166 MMHG

## 2024-02-20 DIAGNOSIS — C18.5 MALIGNANT NEOPLASM OF SPLENIC FLEXURE: ICD-10-CM

## 2024-02-20 PROCEDURE — 99999 PR PBB SHADOW E&M-EST. PATIENT-LVL III: CPT | Mod: PBBFAC,,, | Performed by: STUDENT IN AN ORGANIZED HEALTH CARE EDUCATION/TRAINING PROGRAM

## 2024-02-20 PROCEDURE — 99204 OFFICE O/P NEW MOD 45 MIN: CPT | Mod: S$GLB,,, | Performed by: STUDENT IN AN ORGANIZED HEALTH CARE EDUCATION/TRAINING PROGRAM

## 2024-02-21 ENCOUNTER — TELEPHONE (OUTPATIENT)
Dept: ENDOSCOPY | Facility: HOSPITAL | Age: 59
End: 2024-02-21
Payer: COMMERCIAL

## 2024-02-21 NOTE — TELEPHONE ENCOUNTER
"Telephoned pt regarding scheduling colonoscopy with Dr. Lance.  Spoke with pt.  24 and 3/19/24 offered, however these dates do not work for pt.  Message sent to Dr. Lance for assistance with coordinating a date for pt.  Informed pt I will telephoned him upon Dr. Lance's response.  Pt verbalized understanding.     From: Farhan Lance MD   Sent: 2024   3:25 PM CST   To: [facility]     Procedure: Colonoscopy     Diagnosis: Surveillance colonoscopy - Hx of colon cancer     Procedure Timin-4 weeks     *If within 4 weeks selected, please ca as high priority*     *If greater than 12 weeks, please select "5-12 weeks" and delay sending until 3 months prior to requested date*     Provider: Myself     Location: [address]     Additional Scheduling Information: No scheduling concerns     Prep Specifications: Standard prep, will need 2 enemas day of procedure     Is the patient taking a GLP-1 Agonist: no     Have you attached a patient to this message: yes     "

## 2024-02-23 ENCOUNTER — TELEPHONE (OUTPATIENT)
Dept: SURGERY | Facility: CLINIC | Age: 59
End: 2024-02-23
Payer: COMMERCIAL

## 2024-02-23 LAB
ALBUMIN/CREAT UR: 10 MCG/MG CREAT
APPEARANCE UR: CLEAR
BACTERIA #/AREA URNS HPF: NORMAL /HPF
BACTERIA UR CULT: NORMAL
BILIRUB UR QL STRIP: NEGATIVE
CHOLEST SERPL-MCNC: 127 MG/DL
CHOLEST/HDLC SERPL: 2.7 (CALC)
COLOR UR: YELLOW
CREAT UR-MCNC: 98 MG/DL (ref 20–320)
GLUCOSE UR QL STRIP: NEGATIVE
HBA1C MFR BLD: 7.3 % OF TOTAL HGB
HDLC SERPL-MCNC: 47 MG/DL
HGB UR QL STRIP: NEGATIVE
HYALINE CASTS #/AREA URNS LPF: NORMAL /LPF
KETONES UR QL STRIP: NEGATIVE
LDLC SERPL CALC-MCNC: 68 MG/DL (CALC)
LEUKOCYTE ESTERASE UR QL STRIP: NEGATIVE
MICROALBUMIN UR-MCNC: 1 MG/DL
NITRITE UR QL STRIP: NEGATIVE
NONHDLC SERPL-MCNC: 80 MG/DL (CALC)
PH UR STRIP: 6.5 [PH] (ref 5–8)
PROT UR QL STRIP: NEGATIVE
RBC #/AREA URNS HPF: NORMAL /HPF
SERVICE CMNT-IMP: NORMAL
SP GR UR STRIP: 1.02 (ref 1–1.03)
SQUAMOUS #/AREA URNS HPF: NORMAL /HPF
TRIGL SERPL-MCNC: 50 MG/DL
WBC #/AREA URNS HPF: NORMAL /HPF

## 2024-02-23 NOTE — TELEPHONE ENCOUNTER
Call placed to pt. Spoke to pt spouse. Clinic appt scheduled for 3/6. Time date location provided. Pt spouse inquiring if it would be beneficial to schedule clinic appt after pt's colonoscopy in April. Will keep appt on 3/6 as scheduled and if  would prefer to wait until after colonoscopy, will reach out to pt and or spouse to reschedule. Pt spouse verbalized understanding.

## 2024-02-26 ENCOUNTER — TELEPHONE (OUTPATIENT)
Dept: HEMATOLOGY/ONCOLOGY | Facility: CLINIC | Age: 59
End: 2024-02-26

## 2024-02-26 ENCOUNTER — LAB VISIT (OUTPATIENT)
Dept: LAB | Facility: HOSPITAL | Age: 59
End: 2024-02-26
Attending: INTERNAL MEDICINE
Payer: COMMERCIAL

## 2024-02-26 ENCOUNTER — OFFICE VISIT (OUTPATIENT)
Dept: FAMILY MEDICINE | Facility: CLINIC | Age: 59
End: 2024-02-26
Payer: COMMERCIAL

## 2024-02-26 ENCOUNTER — OFFICE VISIT (OUTPATIENT)
Dept: HEMATOLOGY/ONCOLOGY | Facility: CLINIC | Age: 59
End: 2024-02-26
Payer: COMMERCIAL

## 2024-02-26 VITALS
WEIGHT: 220.69 LBS | HEART RATE: 78 BPM | HEIGHT: 74 IN | DIASTOLIC BLOOD PRESSURE: 60 MMHG | BODY MASS INDEX: 28.32 KG/M2 | OXYGEN SATURATION: 100 % | RESPIRATION RATE: 18 BRPM | TEMPERATURE: 98 F | SYSTOLIC BLOOD PRESSURE: 137 MMHG

## 2024-02-26 VITALS
SYSTOLIC BLOOD PRESSURE: 138 MMHG | HEART RATE: 85 BPM | WEIGHT: 221.38 LBS | BODY MASS INDEX: 28.41 KG/M2 | OXYGEN SATURATION: 100 % | HEIGHT: 74 IN | DIASTOLIC BLOOD PRESSURE: 62 MMHG

## 2024-02-26 DIAGNOSIS — G62.0 CHEMOTHERAPY-INDUCED NEUROPATHY: ICD-10-CM

## 2024-02-26 DIAGNOSIS — I10 ESSENTIAL HYPERTENSION: ICD-10-CM

## 2024-02-26 DIAGNOSIS — E03.9 ACQUIRED HYPOTHYROIDISM: ICD-10-CM

## 2024-02-26 DIAGNOSIS — E78.2 MIXED HYPERLIPIDEMIA: ICD-10-CM

## 2024-02-26 DIAGNOSIS — F17.210 CIGARETTE SMOKER: ICD-10-CM

## 2024-02-26 DIAGNOSIS — C78.89 METASTATIC ADENOCARCINOMA TO PANCREAS: ICD-10-CM

## 2024-02-26 DIAGNOSIS — C77.2 METASTASIS TO RETROPERITONEAL LYMPH NODE: ICD-10-CM

## 2024-02-26 DIAGNOSIS — C18.4 MALIGNANT NEOPLASM OF TRANSVERSE COLON: ICD-10-CM

## 2024-02-26 DIAGNOSIS — E11.69 DM TYPE 2 WITH DIABETIC DYSLIPIDEMIA: Primary | ICD-10-CM

## 2024-02-26 DIAGNOSIS — Z93.3 COLOSTOMY STATUS: ICD-10-CM

## 2024-02-26 DIAGNOSIS — C18.4 MALIGNANT NEOPLASM OF TRANSVERSE COLON: Primary | ICD-10-CM

## 2024-02-26 DIAGNOSIS — E78.5 DM TYPE 2 WITH DIABETIC DYSLIPIDEMIA: Primary | ICD-10-CM

## 2024-02-26 DIAGNOSIS — T45.1X5A CHEMOTHERAPY-INDUCED NEUROPATHY: ICD-10-CM

## 2024-02-26 LAB
ALBUMIN SERPL BCP-MCNC: 4 G/DL (ref 3.5–5.2)
ALP SERPL-CCNC: 80 U/L (ref 55–135)
ALT SERPL W/O P-5'-P-CCNC: 15 U/L (ref 10–44)
ANION GAP SERPL CALC-SCNC: 7 MMOL/L (ref 8–16)
AST SERPL-CCNC: 20 U/L (ref 10–40)
BASOPHILS # BLD AUTO: 0.07 K/UL (ref 0–0.2)
BASOPHILS NFR BLD: 0.6 % (ref 0–1.9)
BILIRUB SERPL-MCNC: 0.5 MG/DL (ref 0.1–1)
BUN SERPL-MCNC: 15 MG/DL (ref 6–20)
CALCIUM SERPL-MCNC: 9.4 MG/DL (ref 8.7–10.5)
CHLORIDE SERPL-SCNC: 104 MMOL/L (ref 95–110)
CO2 SERPL-SCNC: 27 MMOL/L (ref 23–29)
CREAT SERPL-MCNC: 1.1 MG/DL (ref 0.5–1.4)
DIFFERENTIAL METHOD BLD: ABNORMAL
EOSINOPHIL # BLD AUTO: 0.1 K/UL (ref 0–0.5)
EOSINOPHIL NFR BLD: 0.8 % (ref 0–8)
ERYTHROCYTE [DISTWIDTH] IN BLOOD BY AUTOMATED COUNT: 13.9 % (ref 11.5–14.5)
EST. GFR  (NO RACE VARIABLE): >60 ML/MIN/1.73 M^2
GLUCOSE SERPL-MCNC: 101 MG/DL (ref 70–110)
HCT VFR BLD AUTO: 41.1 % (ref 40–54)
HGB BLD-MCNC: 13.8 G/DL (ref 14–18)
IMM GRANULOCYTES # BLD AUTO: 0.03 K/UL (ref 0–0.04)
IMM GRANULOCYTES NFR BLD AUTO: 0.3 % (ref 0–0.5)
LYMPHOCYTES # BLD AUTO: 4 K/UL (ref 1–4.8)
LYMPHOCYTES NFR BLD: 36.5 % (ref 18–48)
MCH RBC QN AUTO: 29.7 PG (ref 27–31)
MCHC RBC AUTO-ENTMCNC: 33.6 G/DL (ref 32–36)
MCV RBC AUTO: 89 FL (ref 82–98)
MONOCYTES # BLD AUTO: 1.1 K/UL (ref 0.3–1)
MONOCYTES NFR BLD: 9.7 % (ref 4–15)
NEUTROPHILS # BLD AUTO: 5.7 K/UL (ref 1.8–7.7)
NEUTROPHILS NFR BLD: 52.1 % (ref 38–73)
NRBC BLD-RTO: 0 /100 WBC
PLATELET # BLD AUTO: 359 K/UL (ref 150–450)
PMV BLD AUTO: 8.8 FL (ref 9.2–12.9)
POTASSIUM SERPL-SCNC: 5.1 MMOL/L (ref 3.5–5.1)
PROT SERPL-MCNC: 7.1 G/DL (ref 6–8.4)
RBC # BLD AUTO: 4.64 M/UL (ref 4.6–6.2)
SODIUM SERPL-SCNC: 138 MMOL/L (ref 136–145)
TSH SERPL DL<=0.005 MIU/L-ACNC: 1.14 UIU/ML (ref 0.4–4)
WBC # BLD AUTO: 10.95 K/UL (ref 3.9–12.7)

## 2024-02-26 PROCEDURE — 99396 PREV VISIT EST AGE 40-64: CPT | Mod: 25,S$GLB,, | Performed by: NURSE PRACTITIONER

## 2024-02-26 PROCEDURE — 84443 ASSAY THYROID STIM HORMONE: CPT | Performed by: INTERNAL MEDICINE

## 2024-02-26 PROCEDURE — 99215 OFFICE O/P EST HI 40 MIN: CPT | Mod: S$GLB,,, | Performed by: INTERNAL MEDICINE

## 2024-02-26 PROCEDURE — 85025 COMPLETE CBC W/AUTO DIFF WBC: CPT | Performed by: INTERNAL MEDICINE

## 2024-02-26 PROCEDURE — 36415 COLL VENOUS BLD VENIPUNCTURE: CPT | Performed by: INTERNAL MEDICINE

## 2024-02-26 PROCEDURE — 80053 COMPREHEN METABOLIC PANEL: CPT | Performed by: INTERNAL MEDICINE

## 2024-02-26 PROCEDURE — 99999 PR PBB SHADOW E&M-EST. PATIENT-LVL III: CPT | Mod: PBBFAC,,, | Performed by: INTERNAL MEDICINE

## 2024-02-26 PROCEDURE — 99406 BEHAV CHNG SMOKING 3-10 MIN: CPT | Mod: S$GLB,,, | Performed by: NURSE PRACTITIONER

## 2024-02-26 RX ORDER — EPINEPHRINE 0.3 MG/.3ML
0.3 INJECTION SUBCUTANEOUS ONCE AS NEEDED
Status: CANCELLED | OUTPATIENT
Start: 2024-02-27

## 2024-02-26 RX ORDER — SODIUM CHLORIDE 0.9 % (FLUSH) 0.9 %
10 SYRINGE (ML) INJECTION
Status: CANCELLED | OUTPATIENT
Start: 2024-02-27

## 2024-02-26 RX ORDER — HEPARIN 100 UNIT/ML
500 SYRINGE INTRAVENOUS
Status: CANCELLED | OUTPATIENT
Start: 2024-02-27

## 2024-02-26 RX ORDER — DIPHENHYDRAMINE HYDROCHLORIDE 50 MG/ML
50 INJECTION INTRAMUSCULAR; INTRAVENOUS ONCE AS NEEDED
Status: CANCELLED | OUTPATIENT
Start: 2024-02-27

## 2024-02-26 NOTE — PROGRESS NOTES
"Service Date:  2/26/24    Chief Complaint:  Colon cancer    Osman Li Jr. is a 58 y.o. 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  This occurred very shortly after completing treatment.    Patient was part of a clinical trial to measure CT DNA.  His CT DNA started to go up while he was on FOLFOX, which prompted the scan, which showed the progression.  Next generation sequencing also showed a high tumor burden.    He is now on Keytruda as his cancer has a very high tumor mutation burden.  So far, Keytruda has been helping    Here for cycle 19.  Doing well.  No new complaints.  PET scan showed no active disease.  Therefore was decided to not pursue surgery at this time.  He has met with Dr. Goodwin.  There is consideration for reversing his colectomy.  He will be presented at the Colorectal surgery tumor board on Wednesday.    Review of Systems   Constitutional:  Positive for fatigue.   HENT: Negative.     Eyes: Negative.    Respiratory: Negative.     Cardiovascular: Negative.    Gastrointestinal: Negative.    Endocrine: Negative.    Genitourinary: Negative.    Musculoskeletal: Negative.    Integumentary:  Negative.   Neurological:  Positive for numbness.   Hematological: Negative.    Psychiatric/Behavioral: Negative.          Current Outpatient Medications   Medication Instructions    atorvastatin (LIPITOR) 20 mg, Oral, Daily    enalapriL-hydrochlorothiazide (VASERETIC) 10-25 mg per tablet 1 tablet, Oral, Daily    metFORMIN (GLUCOPHAGE) 1,000 mg, Oral, 2 times daily with meals    morphine (MS CONTIN) 30 mg, Oral, 2 times daily    pen needle, diabetic 31 gauge x 3/16" Ndle 1 each, Misc.(Non-Drug; Combo Route), Daily    promethazine (PHENERGAN) 12.5 mg, Oral, Every 4 hours PRN    sildenafiL (VIAGRA) 100 mg, Oral, Daily PRN    TOUJEO MAX U-300 SOLOSTAR 26 Units, Subcutaneous, Daily    traZODone (DESYREL) 50 mg, " Oral, Nightly        Past Medical History:   Diagnosis Date    Colon cancer     Digestive disorder     DM (diabetes mellitus)     Hyperlipidemia     Hypertension     Prediabetes         Past Surgical History:   Procedure Laterality Date    ADENOIDECTOMY  1972    CHOLECYSTECTOMY  2011    COLON SURGERY      COLONOSCOPY      2018    COLOSTOMY N/A 04/25/2022    Procedure: CREATION, COLOSTOMY;  Surgeon: Carlton Waddell MD;  Location: [facility] OR;  Service: General;  Laterality: N/A;    ENDOSCOPIC ULTRASOUND OF UPPER GASTROINTESTINAL TRACT N/A 01/06/2023    Procedure: ULTRASOUND, UPPER GI TRACT, ENDOSCOPIC;  Surgeon: Rinku Orozco III, MD;  Location: [facility] ENDO;  Service: Endoscopy;  Laterality: N/A;    INSERTION OF TUNNELED CENTRAL VENOUS CATHETER (CVC) WITH SUBCUTANEOUS PORT Right 05/18/2022    Procedure: [id number];  Surgeon: Carlton Waddell MD;  Location: [facility] OR;  Service: General;  Laterality: Right;    MOBILIZATION OF SPLENIC FLEXURE N/A 04/25/2022    Procedure: MOBILIZATION, SPLENIC FLEXURE;  Surgeon: Carlton Waddell MD;  Location: [facility] OR;  Service: General;  Laterality: N/A;    SPLENECTOMY N/A 04/25/2022    Procedure: SPLENECTOMY;  Surgeon: Carlton Waddell MD;  Location: [facility] OR;  Service: General;  Laterality: N/A;    SUBTOTAL COLECTOMY N/A 04/25/2022    Procedure: COLECTOMY, PARTIAL;  Surgeon: Carlton Waddell MD;  Location: [facility] OR;  Service: General;  Laterality: N/A;    TONSILLECTOMY      TUMOR REMOVAL  10/18/2023    on top of the nose    VASECTOMY  1994        Family History   Problem Relation Age of Onset    Heart disease Father     Diabetes Father     Rectal cancer Sister         half sister    Alcohol abuse Paternal Grandfather     Cancer Sister        Social History     Tobacco Use    Smoking status: Every Day     Current packs/day: 1.00     Average packs/day: 1 pack/day for 40.0 years (40.0 ttl pk-yrs)     Types: Cigarettes     Passive exposure: Current    Smokeless tobacco: Never  "  Substance Use Topics    Alcohol use: Not Currently    Drug use: Never         Vitals:    02/26/24 1054   BP: 137/60   Pulse: 78   Resp: 18   Temp: 98.4 °F (36.9 °C)          Physical Exam:  /60 (BP Location: Left arm, Patient Position: Sitting, BP Method: Large (Automatic))   Pulse 78   Temp 98.4 °F (36.9 °C) (Temporal)   Resp 18   Ht 6' 2" (1.88 m)   Wt 100.1 kg (220 lb 10.9 oz)   SpO2 100%   BMI 28.33 kg/m²     Physical Exam  Vitals and nursing note reviewed.   Constitutional:       Appearance: Normal appearance.   HENT:      Head: Normocephalic and atraumatic.      Nose: Nose normal.      Mouth/Throat:      Mouth: Mucous membranes are moist.      Pharynx: Oropharynx is clear.   Eyes:      Extraocular Movements: Extraocular movements intact.      Conjunctiva/sclera: Conjunctivae normal.   Cardiovascular:      Rate and Rhythm: Normal rate and regular rhythm.      Heart sounds: Normal heart sounds.   Pulmonary:      Effort: Pulmonary effort is normal.      Breath sounds: Normal breath sounds.   Abdominal:      General: Abdomen is flat. Bowel sounds are normal.      Palpations: Abdomen is soft.      Comments: Ostomy bag in place.   Musculoskeletal:         General: Normal range of motion.      Cervical back: Normal range of motion and neck supple.   Skin:     General: Skin is warm and dry.   Neurological:      General: No focal deficit present.      Mental Status: He is alert and oriented to person, place, and time. Mental status is at baseline.   Psychiatric:         Mood and Affect: Mood normal.          Labs:  Lab Results   Component Value Date    WBC 10.95 02/26/2024    RBC 4.64 02/26/2024    HGB 13.8 (L) 02/26/2024    HCT 41.1 02/26/2024    MCV 89 02/26/2024    MCH 29.7 02/26/2024    MCHC 33.6 02/26/2024    RDW 13.9 02/26/2024     02/26/2024    MPV 8.8 (L) 02/26/2024    GRAN 5.7 02/26/2024    GRAN 52.1 02/26/2024    LYMPH 4.0 02/26/2024    LYMPH 36.5 02/26/2024    MONO 1.1 (H) 02/26/2024    " MONO 9.7 02/26/2024    EOS 0.1 02/26/2024    BASO 0.07 02/26/2024    EOSINOPHIL 0.8 02/26/2024    BASOPHIL 0.6 02/26/2024     Sodium   Date Value Ref Range Status   02/26/2024 138 136 - 145 mmol/L Final     Potassium   Date Value Ref Range Status   02/26/2024 5.1 3.5 - 5.1 mmol/L Final     Chloride   Date Value Ref Range Status   02/26/2024 104 95 - 110 mmol/L Final     CO2   Date Value Ref Range Status   02/26/2024 27 23 - 29 mmol/L Final     Glucose   Date Value Ref Range Status   02/26/2024 101 70 - 110 mg/dL Final     BUN   Date Value Ref Range Status   02/26/2024 15 6 - 20 mg/dL Final     Creatinine   Date Value Ref Range Status   02/26/2024 1.1 0.5 - 1.4 mg/dL Final     Calcium   Date Value Ref Range Status   02/26/2024 9.4 8.7 - 10.5 mg/dL Final     Total Protein   Date Value Ref Range Status   02/26/2024 7.1 6.0 - 8.4 g/dL Final     Albumin   Date Value Ref Range Status   02/26/2024 4.0 3.5 - 5.2 g/dL Final     Total Bilirubin   Date Value Ref Range Status   02/26/2024 0.5 0.1 - 1.0 mg/dL Final     Comment:     For infants and newborns, interpretation of results should be based  on gestational age, weight and in agreement with clinical  observations.    Premature Infant recommended reference ranges:  Up to 24 hours.............<8.0 mg/dL  Up to 48 hours............<12.0 mg/dL  3-5 days..................<15.0 mg/dL  6-29 days.................<15.0 mg/dL       Alkaline Phosphatase   Date Value Ref Range Status   02/26/2024 80 55 - 135 U/L Final     AST   Date Value Ref Range Status   02/26/2024 20 10 - 40 U/L Final     ALT   Date Value Ref Range Status   02/26/2024 15 10 - 44 U/L Final     Anion Gap   Date Value Ref Range Status   02/26/2024 7 (L) 8 - 16 mmol/L Final     eGFR if    Date Value Ref Range Status   07/25/2022 >60 >60 mL/min/1.73 m^2 Final     eGFR if non    Date Value Ref Range Status   07/25/2022 >60 >60 mL/min/1.73 m^2 Final     Comment:     Calculation used to  obtain the estimated glomerular filtration  rate (eGFR) is the CKD-EPI equation.          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    -proceed with Keytruda C19 today  -Recent CT scan shows no new evidence of disease, with a smaller lesion on the pancreatic tail and stability of the retroperitoneal lymph node that was radiated.  PET scan shows no evidence of disease, so it was decided to forego surgery.  Testing for ctDNA negative  -tumor board to help determine length of Keytruda given findings.    Pancreatic mass   -confirmed recurrence of colon adenocarcinoma    Back pain/pain from stoma  - NM bone scan negative for mets  -now on morphine dose to 30 mg bid    HTN  - on Enalapril  - follow up with PCP    HLP  - follow up with PCP    DM2  - on Metformin  - follow up with PCP    Tobacco use  - counseled on cessation      Aurash Khoobehi, MD  Hematology and Oncology

## 2024-02-26 NOTE — PROGRESS NOTES
SUBJECTIVE:    Patient ID: Osman Li Jr. is a 58 y.o. male.    Chief Complaint: Diabetes (No bottles//pt is here for a check up//JULIUS )    Patient here for regular follow-up- DM , HTN, lipids.     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 Khoobehi. Reports had PET scan in January which looked good- previous retroperitoneal lymph node enlargement has resolved    Saw colon surgery last week- plans for cscope in April and then hopefully can plan for colostomy reversal    Reports FBS 90-120s, denies any hypoglycemic episodes. Has titrated toujeo up to 40units.  Patient reports was on Jardiance a couple years ago with his old PCP, not sure why it was stopped.  Was most recently tried on Ozempic though did not tolerate due to GI complaints    Still smoking 1ppd, states really not interested in quitting.  Denies cough, wheeze or shortness of breath. Subpleural blebs and 2mm lung nodule on CT scan last year though CT of chest in November doesn't mention any blebs/nodules    Diabetes  He has type 2 diabetes mellitus. No MedicAlert identification noted. The initial diagnosis of diabetes was made 10 years ago. Pertinent negatives for hypoglycemia include no confusion, dizziness, headaches, hunger, mood changes, nervousness/anxiousness, pallor, seizures, sleepiness, speech difficulty, sweats or tremors. Pertinent negatives for diabetes include no blurred vision, no chest pain, no fatigue, no foot paresthesias, no foot ulcerations, no polydipsia, no polyphagia, no polyuria, no visual change, no weakness and no weight loss. Pertinent negatives for hypoglycemia complications include no blackouts, no hospitalization, no nocturnal hypoglycemia, no required assistance and no required glucagon injection. Symptoms are stable. Pertinent negatives for diabetic complications include no autonomic neuropathy, CVA, heart disease, impotence,  nephropathy, peripheral neuropathy, PVD or retinopathy. Risk factors for coronary artery disease include dyslipidemia, hypertension, tobacco exposure, diabetes mellitus and male sex. Current diabetic treatment includes insulin injections and oral agent (monotherapy). He is compliant with treatment most of the time. He is currently taking insulin pre-breakfast. Insulin injections are given by patient. Rotation sites for injection include the thighs. His weight is increasing steadily. He is following a generally healthy diet. When asked about meal planning, he reported none and avoidance of concentrated sweets. Meal planning includes none and avoidance of concentrated sweets. He has not had a previous visit with a dietitian. He participates in exercise intermittently. He monitors blood glucose at home 1-2 x per day. He monitors urine at home <1 x per month. Blood glucose monitoring compliance is good. His home blood glucose trend is fluctuating minimally. He does not see a podiatrist.Eye exam is current.       Lab Visit on 02/26/2024   Component Date Value Ref Range Status    WBC 02/26/2024 10.95  3.90 - 12.70 K/uL Final    RBC 02/26/2024 4.64  4.60 - 6.20 M/uL Final    Hemoglobin 02/26/2024 13.8 (L)  14.0 - 18.0 g/dL Final    Hematocrit 02/26/2024 41.1  40.0 - 54.0 % Final    MCV 02/26/2024 89  82 - 98 fL Final    MCH 02/26/2024 29.7  27.0 - 31.0 pg Final    MCHC 02/26/2024 33.6  32.0 - 36.0 g/dL Final    RDW 02/26/2024 13.9  11.5 - 14.5 % Final    Platelets 02/26/2024 359  150 - 450 K/uL Final    MPV 02/26/2024 8.8 (L)  9.2 - 12.9 fL Final    Immature Granulocytes 02/26/2024 0.3  0.0 - 0.5 % Final    Gran # (ANC) 02/26/2024 5.7  1.8 - 7.7 K/uL Final    Immature Grans (Abs) 02/26/2024 0.03  0.00 - 0.04 K/uL Final    Lymph # 02/26/2024 4.0  1.0 - 4.8 K/uL Final    Mono # 02/26/2024 1.1 (H)  0.3 - 1.0 K/uL Final    Eos # 02/26/2024 0.1  0.0 - 0.5 K/uL Final    Baso # 02/26/2024 0.07  0.00 - 0.20 K/uL Final    nRBC  02/26/2024 0  0 /100 WBC Final    Gran % 02/26/2024 52.1  38.0 - 73.0 % Final    Lymph % 02/26/2024 36.5  18.0 - 48.0 % Final    Mono % 02/26/2024 9.7  4.0 - 15.0 % Final    Eosinophil % 02/26/2024 0.8  0.0 - 8.0 % Final    Basophil % 02/26/2024 0.6  0.0 - 1.9 % Final    Differential Method 02/26/2024 Automated   Final    Sodium 02/26/2024 138  136 - 145 mmol/L Final    Potassium 02/26/2024 5.1  3.5 - 5.1 mmol/L Final    Chloride 02/26/2024 104  95 - 110 mmol/L Final    CO2 02/26/2024 27  23 - 29 mmol/L Final    Glucose 02/26/2024 101  70 - 110 mg/dL Final    BUN 02/26/2024 15  6 - 20 mg/dL Final    Creatinine 02/26/2024 1.1  0.5 - 1.4 mg/dL Final    Calcium 02/26/2024 9.4  8.7 - 10.5 mg/dL Final    Total Protein 02/26/2024 7.1  6.0 - 8.4 g/dL Final    Albumin 02/26/2024 4.0  3.5 - 5.2 g/dL Final    Total Bilirubin 02/26/2024 0.5  0.1 - 1.0 mg/dL Final    Alkaline Phosphatase 02/26/2024 80  55 - 135 U/L Final    AST 02/26/2024 20  10 - 40 U/L Final    ALT 02/26/2024 15  10 - 44 U/L Final    eGFR 02/26/2024 >60  >60 mL/min/1.73 m^2 Final    Anion Gap 02/26/2024 7 (L)  8 - 16 mmol/L Final    TSH 02/26/2024 1.143  0.400 - 4.000 uIU/mL Final   Telephone on 02/06/2024   Component Date Value Ref Range Status    Hemoglobin A1C 02/22/2024 7.3 (H)  <5.7 % of total Hgb Final    Cholesterol 02/22/2024 127  <200 mg/dL Final    HDL 02/22/2024 47  > OR = 40 mg/dL Final    Triglycerides 02/22/2024 50  <150 mg/dL Final    LDL Cholesterol 02/22/2024 68  mg/dL (calc) Final    HDL/Cholesterol Ratio 02/22/2024 2.7  <5.0 (calc) Final    Non HDL Chol. (LDL+VLDL) 02/22/2024 80  <130 mg/dL (calc) Final    Creatinine, Urine 02/22/2024 98  20 - 320 mg/dL Final    Microalb, Ur 02/22/2024 1.0  See Note: mg/dL Final    Microalb/Creat Ratio 02/22/2024 10  <30 mcg/mg creat Final    Color, UA 02/22/2024 YELLOW  YELLOW Final    Appearance, UA 02/22/2024 CLEAR  CLEAR Final    Specific Gravity, UA 02/22/2024 1.016  1.001 - 1.035 Final    pH, UA  02/22/2024 6.5  5.0 - 8.0 Final    Glucose, UA 02/22/2024 NEGATIVE  NEGATIVE Final    Bilirubin, UA 02/22/2024 NEGATIVE  NEGATIVE Final    Ketones, UA 02/22/2024 NEGATIVE  NEGATIVE Final    Occult Blood UA 02/22/2024 NEGATIVE  NEGATIVE Final    Protein, UA 02/22/2024 NEGATIVE  NEGATIVE Final    Nitrite, UA 02/22/2024 NEGATIVE  NEGATIVE Final    Leukocytes, UA 02/22/2024 NEGATIVE  NEGATIVE Final    WBC Casts, UA 02/22/2024 NONE SEEN  < OR = 5 /HPF Final    RBC Casts, UA 02/22/2024 NONE SEEN  < OR = 2 /HPF Final    Squam Epithel, UA 02/22/2024 NONE SEEN  < OR = 5 /HPF Final    Bacteria, UA 02/22/2024 NONE SEEN  NONE SEEN /HPF Final    Hyaline Casts, UA 02/22/2024 NONE SEEN  NONE SEEN /LPF Final    Service Cmt: 02/22/2024    Final    Reflexive Urine Culture 02/22/2024    Final   Lab Visit on 02/05/2024   Component Date Value Ref Range Status    WBC 02/05/2024 12.15  3.90 - 12.70 K/uL Final    RBC 02/05/2024 4.60  4.60 - 6.20 M/uL Final    Hemoglobin 02/05/2024 13.8 (L)  14.0 - 18.0 g/dL Final    Hematocrit 02/05/2024 41.2  40.0 - 54.0 % Final    MCV 02/05/2024 90  82 - 98 fL Final    MCH 02/05/2024 30.0  27.0 - 31.0 pg Final    MCHC 02/05/2024 33.5  32.0 - 36.0 g/dL Final    RDW 02/05/2024 13.9  11.5 - 14.5 % Final    Platelets 02/05/2024 371  150 - 450 K/uL Final    MPV 02/05/2024 8.9 (L)  9.2 - 12.9 fL Final    Immature Granulocytes 02/05/2024 0.3  0.0 - 0.5 % Final    Gran # (ANC) 02/05/2024 6.0  1.8 - 7.7 K/uL Final    Immature Grans (Abs) 02/05/2024 0.04  0.00 - 0.04 K/uL Final    Lymph # 02/05/2024 4.7  1.0 - 4.8 K/uL Final    Mono # 02/05/2024 1.1 (H)  0.3 - 1.0 K/uL Final    Eos # 02/05/2024 0.1  0.0 - 0.5 K/uL Final    Baso # 02/05/2024 0.10  0.00 - 0.20 K/uL Final    nRBC 02/05/2024 0  0 /100 WBC Final    Gran % 02/05/2024 49.8  38.0 - 73.0 % Final    Lymph % 02/05/2024 38.9  18.0 - 48.0 % Final    Mono % 02/05/2024 9.2  4.0 - 15.0 % Final    Eosinophil % 02/05/2024 1.0  0.0 - 8.0 % Final    Basophil %  02/05/2024 0.8  0.0 - 1.9 % Final    Differential Method 02/05/2024 Automated   Final    Sodium 02/05/2024 139  136 - 145 mmol/L Final    Potassium 02/05/2024 4.8  3.5 - 5.1 mmol/L Final    Chloride 02/05/2024 105  95 - 110 mmol/L Final    CO2 02/05/2024 26  23 - 29 mmol/L Final    Glucose 02/05/2024 121 (H)  70 - 110 mg/dL Final    BUN 02/05/2024 14  6 - 20 mg/dL Final    Creatinine 02/05/2024 1.0  0.5 - 1.4 mg/dL Final    Calcium 02/05/2024 9.9  8.7 - 10.5 mg/dL Final    Total Protein 02/05/2024 7.0  6.0 - 8.4 g/dL Final    Albumin 02/05/2024 4.0  3.5 - 5.2 g/dL Final    Total Bilirubin 02/05/2024 0.5  0.1 - 1.0 mg/dL Final    Alkaline Phosphatase 02/05/2024 81  55 - 135 U/L Final    AST 02/05/2024 19  10 - 40 U/L Final    ALT 02/05/2024 17  10 - 44 U/L Final    eGFR 02/05/2024 >60  >60 mL/min/1.73 m^2 Final    Anion Gap 02/05/2024 8  8 - 16 mmol/L Final    CEA 02/05/2024 3.3  0.0 - 5.0 ng/mL Final   Lab Visit on 01/12/2024   Component Date Value Ref Range Status    WBC 01/12/2024 11.40  3.90 - 12.70 K/uL Final    RBC 01/12/2024 4.74  4.60 - 6.20 M/uL Final    Hemoglobin 01/12/2024 14.1  14.0 - 18.0 g/dL Final    Hematocrit 01/12/2024 41.6  40.0 - 54.0 % Final    MCV 01/12/2024 88  82 - 98 fL Final    MCH 01/12/2024 29.7  27.0 - 31.0 pg Final    MCHC 01/12/2024 33.9  32.0 - 36.0 g/dL Final    RDW 01/12/2024 13.8  11.5 - 14.5 % Final    Platelets 01/12/2024 378  150 - 450 K/uL Final    MPV 01/12/2024 8.8 (L)  9.2 - 12.9 fL Final    Immature Granulocytes 01/12/2024 0.4  0.0 - 0.5 % Final    Gran # (ANC) 01/12/2024 6.1  1.8 - 7.7 K/uL Final    Immature Grans (Abs) 01/12/2024 0.04  0.00 - 0.04 K/uL Final    Lymph # 01/12/2024 4.0  1.0 - 4.8 K/uL Final    Mono # 01/12/2024 1.1 (H)  0.3 - 1.0 K/uL Final    Eos # 01/12/2024 0.1  0.0 - 0.5 K/uL Final    Baso # 01/12/2024 0.09  0.00 - 0.20 K/uL Final    nRBC 01/12/2024 0  0 /100 WBC Final    Gran % 01/12/2024 53.0  38.0 - 73.0 % Final    Lymph % 01/12/2024 35.3  18.0 -  48.0 % Final    Mono % 01/12/2024 9.4  4.0 - 15.0 % Final    Eosinophil % 01/12/2024 1.1  0.0 - 8.0 % Final    Basophil % 01/12/2024 0.8  0.0 - 1.9 % Final    Differential Method 01/12/2024 Automated   Final    Sodium 01/12/2024 136  136 - 145 mmol/L Final    Potassium 01/12/2024 4.4  3.5 - 5.1 mmol/L Final    Chloride 01/12/2024 99  95 - 110 mmol/L Final    CO2 01/12/2024 28  23 - 29 mmol/L Final    Glucose 01/12/2024 144 (H)  70 - 110 mg/dL Final    BUN 01/12/2024 11  6 - 20 mg/dL Final    Creatinine 01/12/2024 1.1  0.5 - 1.4 mg/dL Final    Calcium 01/12/2024 9.4  8.7 - 10.5 mg/dL Final    Total Protein 01/12/2024 7.2  6.0 - 8.4 g/dL Final    Albumin 01/12/2024 4.0  3.5 - 5.2 g/dL Final    Total Bilirubin 01/12/2024 0.7  0.1 - 1.0 mg/dL Final    Alkaline Phosphatase 01/12/2024 89  55 - 135 U/L Final    AST 01/12/2024 20  10 - 40 U/L Final    ALT 01/12/2024 20  10 - 44 U/L Final    eGFR 01/12/2024 >60  >60 mL/min/1.73 m^2 Final    Anion Gap 01/12/2024 9  8 - 16 mmol/L Final    TSH 01/12/2024 1.778  0.400 - 4.000 uIU/mL Final   Hospital Outpatient Visit on 01/08/2024   Component Date Value Ref Range Status    POC Glucose 01/08/2024 126 (A)  70 - 110 MG/DL Final   Lab Visit on 12/21/2023   Component Date Value Ref Range Status    WBC 12/21/2023 11.07  3.90 - 12.70 K/uL Final    RBC 12/21/2023 4.92  4.60 - 6.20 M/uL Final    Hemoglobin 12/21/2023 14.6  14.0 - 18.0 g/dL Final    Hematocrit 12/21/2023 43.4  40.0 - 54.0 % Final    MCV 12/21/2023 88  82 - 98 fL Final    MCH 12/21/2023 29.7  27.0 - 31.0 pg Final    MCHC 12/21/2023 33.6  32.0 - 36.0 g/dL Final    RDW 12/21/2023 13.9  11.5 - 14.5 % Final    Platelets 12/21/2023 387  150 - 450 K/uL Final    MPV 12/21/2023 9.4  9.2 - 12.9 fL Final    Immature Granulocytes 12/21/2023 0.4  0.0 - 0.5 % Final    Gran # (ANC) 12/21/2023 5.2  1.8 - 7.7 K/uL Final    Immature Grans (Abs) 12/21/2023 0.04  0.00 - 0.04 K/uL Final    Lymph # 12/21/2023 4.6  1.0 - 4.8 K/uL Final    Mono #  12/21/2023 1.0  0.3 - 1.0 K/uL Final    Eos # 12/21/2023 0.1  0.0 - 0.5 K/uL Final    Baso # 12/21/2023 0.09  0.00 - 0.20 K/uL Final    nRBC 12/21/2023 0  0 /100 WBC Final    Gran % 12/21/2023 47.1  38.0 - 73.0 % Final    Lymph % 12/21/2023 41.9  18.0 - 48.0 % Final    Mono % 12/21/2023 8.9  4.0 - 15.0 % Final    Eosinophil % 12/21/2023 0.9  0.0 - 8.0 % Final    Basophil % 12/21/2023 0.8  0.0 - 1.9 % Final    Differential Method 12/21/2023 Automated   Final    Sodium 12/21/2023 137  136 - 145 mmol/L Final    Potassium 12/21/2023 5.0  3.5 - 5.1 mmol/L Final    Chloride 12/21/2023 99  95 - 110 mmol/L Final    CO2 12/21/2023 28  23 - 29 mmol/L Final    Glucose 12/21/2023 107  70 - 110 mg/dL Final    BUN 12/21/2023 11  6 - 20 mg/dL Final    Creatinine 12/21/2023 1.0  0.5 - 1.4 mg/dL Final    Calcium 12/21/2023 10.0  8.7 - 10.5 mg/dL Final    Total Protein 12/21/2023 7.4  6.0 - 8.4 g/dL Final    Albumin 12/21/2023 4.1  3.5 - 5.2 g/dL Final    Total Bilirubin 12/21/2023 0.7  0.1 - 1.0 mg/dL Final    Alkaline Phosphatase 12/21/2023 86  55 - 135 U/L Final    AST 12/21/2023 22  10 - 40 U/L Final    ALT 12/21/2023 23  10 - 44 U/L Final    eGFR 12/21/2023 >60  >60 mL/min/1.73 m^2 Final    Anion Gap 12/21/2023 10  8 - 16 mmol/L Final    CEA 12/21/2023 2.9  0.0 - 5.0 ng/mL Final   Lab Visit on 11/30/2023   Component Date Value Ref Range Status    WBC 11/30/2023 12.13  3.90 - 12.70 K/uL Final    RBC 11/30/2023 4.73  4.60 - 6.20 M/uL Final    Hemoglobin 11/30/2023 14.3  14.0 - 18.0 g/dL Final    Hematocrit 11/30/2023 42.3  40.0 - 54.0 % Final    MCV 11/30/2023 89  82 - 98 fL Final    MCH 11/30/2023 30.2  27.0 - 31.0 pg Final    MCHC 11/30/2023 33.8  32.0 - 36.0 g/dL Final    RDW 11/30/2023 13.7  11.5 - 14.5 % Final    Platelets 11/30/2023 388  150 - 450 K/uL Final    MPV 11/30/2023 8.9 (L)  9.2 - 12.9 fL Final    Immature Granulocytes 11/30/2023 0.4  0.0 - 0.5 % Final    Gran # (ANC) 11/30/2023 6.5  1.8 - 7.7 K/uL Final     Immature Grans (Abs) 11/30/2023 0.05 (H)  0.00 - 0.04 K/uL Final    Lymph # 11/30/2023 4.3  1.0 - 4.8 K/uL Final    Mono # 11/30/2023 1.0  0.3 - 1.0 K/uL Final    Eos # 11/30/2023 0.1  0.0 - 0.5 K/uL Final    Baso # 11/30/2023 0.11  0.00 - 0.20 K/uL Final    nRBC 11/30/2023 0  0 /100 WBC Final    Gran % 11/30/2023 53.6  38.0 - 73.0 % Final    Lymph % 11/30/2023 35.8  18.0 - 48.0 % Final    Mono % 11/30/2023 8.6  4.0 - 15.0 % Final    Eosinophil % 11/30/2023 0.7  0.0 - 8.0 % Final    Basophil % 11/30/2023 0.9  0.0 - 1.9 % Final    Differential Method 11/30/2023 Automated   Final    Sodium 11/30/2023 139  136 - 145 mmol/L Final    Potassium 11/30/2023 5.2 (H)  3.5 - 5.1 mmol/L Final    Chloride 11/30/2023 102  95 - 110 mmol/L Final    CO2 11/30/2023 28  23 - 29 mmol/L Final    Glucose 11/30/2023 112 (H)  70 - 110 mg/dL Final    BUN 11/30/2023 13  6 - 20 mg/dL Final    Creatinine 11/30/2023 1.0  0.5 - 1.4 mg/dL Final    Calcium 11/30/2023 9.9  8.7 - 10.5 mg/dL Final    Total Protein 11/30/2023 7.5  6.0 - 8.4 g/dL Final    Albumin 11/30/2023 4.1  3.5 - 5.2 g/dL Final    Total Bilirubin 11/30/2023 0.5  0.1 - 1.0 mg/dL Final    Alkaline Phosphatase 11/30/2023 97  55 - 135 U/L Final    AST 11/30/2023 17  10 - 40 U/L Final    ALT 11/30/2023 19  10 - 44 U/L Final    eGFR 11/30/2023 >60  >60 mL/min/1.73 m^2 Final    Anion Gap 11/30/2023 9  8 - 16 mmol/L Final    TSH 11/30/2023 1.088  0.400 - 4.000 uIU/mL Final    Sodium 11/30/2023 139  136 - 145 mmol/L Final    Potassium 11/30/2023 5.2 (H)  3.5 - 5.1 mmol/L Final    Chloride 11/30/2023 102  95 - 110 mmol/L Final    CO2 11/30/2023 28  23 - 29 mmol/L Final    Glucose 11/30/2023 112 (H)  70 - 110 mg/dL Final    BUN 11/30/2023 13  6 - 20 mg/dL Final    Creatinine 11/30/2023 1.0  0.5 - 1.4 mg/dL Final    Calcium 11/30/2023 9.9  8.7 - 10.5 mg/dL Final    Total Protein 11/30/2023 7.5  6.0 - 8.4 g/dL Final    Albumin 11/30/2023 4.1  3.5 - 5.2 g/dL Final    Total Bilirubin 11/30/2023  0.5  0.1 - 1.0 mg/dL Final    Alkaline Phosphatase 11/30/2023 97  55 - 135 U/L Final    AST 11/30/2023 17  10 - 40 U/L Final    ALT 11/30/2023 19  10 - 44 U/L Final    eGFR 11/30/2023 >60  >60 mL/min/1.73 m^2 Final    Anion Gap 11/30/2023 9  8 - 16 mmol/L Final    WBC 11/30/2023 12.13  3.90 - 12.70 K/uL Final    RBC 11/30/2023 4.73  4.60 - 6.20 M/uL Final    Hemoglobin 11/30/2023 14.3  14.0 - 18.0 g/dL Final    Hematocrit 11/30/2023 42.3  40.0 - 54.0 % Final    MCV 11/30/2023 89  82 - 98 fL Final    MCH 11/30/2023 30.2  27.0 - 31.0 pg Final    MCHC 11/30/2023 33.8  32.0 - 36.0 g/dL Final    RDW 11/30/2023 13.7  11.5 - 14.5 % Final    Platelets 11/30/2023 388  150 - 450 K/uL Final    MPV 11/30/2023 8.9 (L)  9.2 - 12.9 fL Final    Immature Granulocytes 11/30/2023 0.4  0.0 - 0.5 % Final    Gran # (ANC) 11/30/2023 6.5  1.8 - 7.7 K/uL Final    Immature Grans (Abs) 11/30/2023 0.05 (H)  0.00 - 0.04 K/uL Final    Lymph # 11/30/2023 4.3  1.0 - 4.8 K/uL Final    Mono # 11/30/2023 1.0  0.3 - 1.0 K/uL Final    Eos # 11/30/2023 0.1  0.0 - 0.5 K/uL Final    Baso # 11/30/2023 0.11  0.00 - 0.20 K/uL Final    nRBC 11/30/2023 0  0 /100 WBC Final    Gran % 11/30/2023 53.6  38.0 - 73.0 % Final    Lymph % 11/30/2023 35.8  18.0 - 48.0 % Final    Mono % 11/30/2023 8.6  4.0 - 15.0 % Final    Eosinophil % 11/30/2023 0.7  0.0 - 8.0 % Final    Basophil % 11/30/2023 0.9  0.0 - 1.9 % Final    Differential Method 11/30/2023 Automated   Final    CEA 11/30/2023 3.7  0.0 - 5.0 ng/mL Final   Lab Visit on 11/09/2023   Component Date Value Ref Range Status    WBC 11/09/2023 11.86  3.90 - 12.70 K/uL Final    RBC 11/09/2023 4.83  4.60 - 6.20 M/uL Final    Hemoglobin 11/09/2023 14.3  14.0 - 18.0 g/dL Final    Hematocrit 11/09/2023 42.7  40.0 - 54.0 % Final    MCV 11/09/2023 88  82 - 98 fL Final    MCH 11/09/2023 29.6  27.0 - 31.0 pg Final    MCHC 11/09/2023 33.5  32.0 - 36.0 g/dL Final    RDW 11/09/2023 13.8  11.5 - 14.5 % Final    Platelets 11/09/2023  411  150 - 450 K/uL Final    MPV 11/09/2023 9.3  9.2 - 12.9 fL Final    Immature Granulocytes 11/09/2023 0.3  0.0 - 0.5 % Final    Gran # (ANC) 11/09/2023 6.0  1.8 - 7.7 K/uL Final    Immature Grans (Abs) 11/09/2023 0.03  0.00 - 0.04 K/uL Final    Lymph # 11/09/2023 4.7  1.0 - 4.8 K/uL Final    Mono # 11/09/2023 1.0  0.3 - 1.0 K/uL Final    Eos # 11/09/2023 0.1  0.0 - 0.5 K/uL Final    Baso # 11/09/2023 0.08  0.00 - 0.20 K/uL Final    nRBC 11/09/2023 0  0 /100 WBC Final    Gran % 11/09/2023 50.1  38.0 - 73.0 % Final    Lymph % 11/09/2023 39.3  18.0 - 48.0 % Final    Mono % 11/09/2023 8.7  4.0 - 15.0 % Final    Eosinophil % 11/09/2023 0.9  0.0 - 8.0 % Final    Basophil % 11/09/2023 0.7  0.0 - 1.9 % Final    Differential Method 11/09/2023 Automated   Final    Sodium 11/09/2023 137  136 - 145 mmol/L Final    Potassium 11/09/2023 4.8  3.5 - 5.1 mmol/L Final    Chloride 11/09/2023 99  95 - 110 mmol/L Final    CO2 11/09/2023 27  23 - 29 mmol/L Final    Glucose 11/09/2023 142 (H)  70 - 110 mg/dL Final    BUN 11/09/2023 15  6 - 20 mg/dL Final    Creatinine 11/09/2023 1.1  0.5 - 1.4 mg/dL Final    Calcium 11/09/2023 10.2  8.7 - 10.5 mg/dL Final    Total Protein 11/09/2023 7.7  6.0 - 8.4 g/dL Final    Albumin 11/09/2023 4.2  3.5 - 5.2 g/dL Final    Total Bilirubin 11/09/2023 0.6  0.1 - 1.0 mg/dL Final    Alkaline Phosphatase 11/09/2023 97  55 - 135 U/L Final    AST 11/09/2023 21  10 - 40 U/L Final    ALT 11/09/2023 18  10 - 44 U/L Final    eGFR 11/09/2023 >60  >60 mL/min/1.73 m^2 Final    Anion Gap 11/09/2023 11  8 - 16 mmol/L Final    CEA 11/09/2023 3.7  0.0 - 5.0 ng/mL Final   Office Visit on 10/24/2023   Component Date Value Ref Range Status    Hemoglobin A1C, POC 10/24/2023 7.4  % Final   Lab Visit on 10/18/2023   Component Date Value Ref Range Status    WBC 10/18/2023 12.64  3.90 - 12.70 K/uL Final    RBC 10/18/2023 4.78  4.60 - 6.20 M/uL Final    Hemoglobin 10/18/2023 14.2  14.0 - 18.0 g/dL Final    Hematocrit  10/18/2023 43.2  40.0 - 54.0 % Final    MCV 10/18/2023 90  82 - 98 fL Final    MCH 10/18/2023 29.7  27.0 - 31.0 pg Final    MCHC 10/18/2023 32.9  32.0 - 36.0 g/dL Final    RDW 10/18/2023 14.0  11.5 - 14.5 % Final    Platelets 10/18/2023 374  150 - 450 K/uL Final    MPV 10/18/2023 10.3  9.2 - 12.9 fL Final    Immature Granulocytes 10/18/2023 0.4  0.0 - 0.5 % Final    Gran # (ANC) 10/18/2023 5.1  1.8 - 7.7 K/uL Final    Immature Grans (Abs) 10/18/2023 0.05 (H)  0.00 - 0.04 K/uL Final    Lymph # 10/18/2023 6.2 (H)  1.0 - 4.8 K/uL Final    Mono # 10/18/2023 1.1 (H)  0.3 - 1.0 K/uL Final    Eos # 10/18/2023 0.1  0.0 - 0.5 K/uL Final    Baso # 10/18/2023 0.11  0.00 - 0.20 K/uL Final    nRBC 10/18/2023 0  0 /100 WBC Final    Gran % 10/18/2023 40.5  38.0 - 73.0 % Final    Lymph % 10/18/2023 48.8 (H)  18.0 - 48.0 % Final    Mono % 10/18/2023 8.4  4.0 - 15.0 % Final    Eosinophil % 10/18/2023 1.0  0.0 - 8.0 % Final    Basophil % 10/18/2023 0.9  0.0 - 1.9 % Final    Platelet Estimate 10/18/2023 Appears normal   Final    Differential Method 10/18/2023 Automated   Final    Sodium 10/18/2023 141  136 - 145 mmol/L Final    Potassium 10/18/2023 4.7  3.5 - 5.1 mmol/L Final    Chloride 10/18/2023 105  95 - 110 mmol/L Final    CO2 10/18/2023 25  23 - 29 mmol/L Final    Glucose 10/18/2023 94  70 - 110 mg/dL Final    BUN 10/18/2023 14  6 - 20 mg/dL Final    Creatinine 10/18/2023 1.0  0.5 - 1.4 mg/dL Final    Calcium 10/18/2023 10.7 (H)  8.7 - 10.5 mg/dL Final    Total Protein 10/18/2023 7.7  6.0 - 8.4 g/dL Final    Albumin 10/18/2023 4.2  3.5 - 5.2 g/dL Final    Total Bilirubin 10/18/2023 0.4  0.1 - 1.0 mg/dL Final    Alkaline Phosphatase 10/18/2023 102  55 - 135 U/L Final    AST 10/18/2023 19  10 - 40 U/L Final    ALT 10/18/2023 20  10 - 44 U/L Final    eGFR 10/18/2023 >60.0  >60 mL/min/1.73 m^2 Final    Anion Gap 10/18/2023 11  8 - 16 mmol/L Final    TSH 10/18/2023 2.061  0.400 - 4.000 uIU/mL Final   There may be more visits with  results that are not included.       Past Medical History:   Diagnosis Date    Colon cancer     Digestive disorder     DM (diabetes mellitus)     Hyperlipidemia     Hypertension     Prediabetes      Past Surgical History:   Procedure Laterality Date    ADENOIDECTOMY  1972    CHOLECYSTECTOMY  2011    COLON SURGERY      COLONOSCOPY      2018    COLOSTOMY N/A 04/25/2022    Procedure: CREATION, COLOSTOMY;  Surgeon: Carlton Waddell MD;  Location: [facility] OR;  Service: General;  Laterality: N/A;    ENDOSCOPIC ULTRASOUND OF UPPER GASTROINTESTINAL TRACT N/A 01/06/2023    Procedure: ULTRASOUND, UPPER GI TRACT, ENDOSCOPIC;  Surgeon: Rinku Orozco III, MD;  Location: [facility] ENDO;  Service: Endoscopy;  Laterality: N/A;    INSERTION OF TUNNELED CENTRAL VENOUS CATHETER (CVC) WITH SUBCUTANEOUS PORT Right 05/18/2022    Procedure: [id number];  Surgeon: Carlton Waddell MD;  Location: [facility] OR;  Service: General;  Laterality: Right;    MOBILIZATION OF SPLENIC FLEXURE N/A 04/25/2022    Procedure: MOBILIZATION, SPLENIC FLEXURE;  Surgeon: Carlton Waddell MD;  Location: [facility] OR;  Service: General;  Laterality: N/A;    SPLENECTOMY N/A 04/25/2022    Procedure: SPLENECTOMY;  Surgeon: Carlton Waddell MD;  Location: [facility] OR;  Service: General;  Laterality: N/A;    SUBTOTAL COLECTOMY N/A 04/25/2022    Procedure: COLECTOMY, PARTIAL;  Surgeon: Carlton Waddell MD;  Location: [facility] OR;  Service: General;  Laterality: N/A;    TONSILLECTOMY      TUMOR REMOVAL  10/18/2023    on top of the nose    VASECTOMY  1994     Family History   Problem Relation Age of Onset    Heart disease Father     Diabetes Father     Rectal cancer Sister         half sister    Alcohol abuse Paternal Grandfather     Cancer Sister        Tests to Keep You Healthy    Eye Exam: Met on 7/17/2023  Colon Cancer Screening: DUE  Last Blood Pressure <= 139/89 (2/26/2024): Yes  Last HbA1c < 8 (02/22/2024): Yes  Tobacco Cessation: NO      The 10-year CVD risk score (Denise  et al., 2008) is: 29.5%    Values used to calculate the score:      Age: 58 years      Sex: Male      Diabetic: Yes      Tobacco smoker: Yes      Systolic Blood Pressure: 137 mmHg      Is BP treated: No      HDL Cholesterol: 47 mg/dL      Total Cholesterol: 127 mg/dL     Marital Status:   Alcohol History:  reports that he does not currently use alcohol.  Tobacco History:  reports that he has been smoking cigarettes. He has a 40.0 pack-year smoking history. He has been exposed to tobacco smoke. He has never used smokeless tobacco.  Drug History:  reports no history of drug use.    Health Maintenance Topics with due status: Not Due       Topic Last Completion Date    Pneumococcal Vaccines (Age 0-64) 07/07/2022    Foot Exam 06/21/2023    Eye Exam 07/17/2023    Diabetes Urine Screening 02/22/2024    Lipid Panel 02/22/2024    Hemoglobin A1c 02/22/2024    Low Dose Statin 02/26/2024     Immunization History   Administered Date(s) Administered    COVID-19, vector-nr, rS-Ad26, PF ([employer]) 05/27/2021    HiB PRP-T 10/07/2022    Influenza 11/11/2020    Influenza (FLUBLOK) - Quadrivalent - Recombinant - PF *Preferred* (egg allergy) 10/12/2022    Influenza - Quadrivalent - MDCK 10/16/2019    Influenza - Quadrivalent - MDCK - PF 11/07/2020    Meningococcal Conjugate (MCV4O) 2 Vial (2mo-55yr) 05/02/2022, 07/07/2022    Pneumococcal Conjugate - 13 Valent 05/02/2022    Pneumococcal Polysaccharide - 23 Valent 07/07/2022    Zoster Recombinant 02/06/2021, 04/30/2021       Review of patient's allergies indicates:   Allergen Reactions    Penicillins Rash       Current Outpatient Medications:     atorvastatin (LIPITOR) 20 MG tablet, Take 1 tablet (20 mg total) by mouth once daily., Disp: 90 tablet, Rfl: 3    enalapriL-hydrochlorothiazide (VASERETIC) 10-25 mg per tablet, Take 1 tablet by mouth once daily., Disp: 90 tablet, Rfl: 3    insulin glargine U-300 conc (TOUJEO MAX U-300 SOLOSTAR) 300 unit/mL (3 mL) insulin pen, Inject 26  "Units into the skin once daily. (Patient taking differently: Inject 40 Units into the skin once daily.), Disp: 2 pen, Rfl: 11    metFORMIN (GLUCOPHAGE) 1000 MG tablet, Take 1 tablet (1,000 mg total) by mouth 2 (two) times daily with meals., Disp: 180 tablet, Rfl: 3    morphine (MS CONTIN) 30 MG 12 hr tablet, Take 1 tablet (30 mg total) by mouth 2 (two) times daily., Disp: 60 tablet, Rfl: 0    pen needle, diabetic 31 gauge x 3/16" Ndle, 1 each by Misc.(Non-Drug; Combo Route) route once daily., Disp: 90 each, Rfl: 3    promethazine (PHENERGAN) 12.5 MG Tab, Take 1 tablet (12.5 mg total) by mouth every 4 (four) hours as needed. (Patient taking differently: Take 12.5 mg by mouth every 4 (four) hours as needed (as needed).), Disp: 25 tablet, Rfl: 1    sildenafiL (VIAGRA) 100 MG tablet, Take 1 tablet (100 mg total) by mouth daily as needed for Erectile Dysfunction. (Patient taking differently: Take 100 mg by mouth daily as needed for Erectile Dysfunction (as needed).), Disp: 30 tablet, Rfl: 3    traZODone (DESYREL) 50 MG tablet, Take 1 tablet (50 mg total) by mouth every evening., Disp: 90 tablet, Rfl: 3    Review of Systems   Constitutional:  Negative for appetite change, chills, fatigue, fever, unexpected weight change and weight loss.   HENT:  Negative for sore throat and trouble swallowing.    Eyes:  Negative for blurred vision.   Respiratory:  Negative for cough, shortness of breath and wheezing.    Cardiovascular:  Negative for chest pain, palpitations and leg swelling.   Gastrointestinal:  Positive for abdominal pain (reports chronic left side abd pain improved with morphine). Negative for constipation, diarrhea, nausea and vomiting.        Colostomy with liquid brown stool   Endocrine: Negative for polydipsia, polyphagia and polyuria.   Genitourinary:  Negative for dysuria, frequency, hematuria and impotence.   Musculoskeletal:  Negative for back pain and gait problem.   Skin:  Negative for pallor and rash. " "  Neurological:  Positive for numbness (numbness and tingling to fingertips and toes since chemo). Negative for dizziness, tremors, seizures, syncope, speech difficulty, weakness and headaches.   Psychiatric/Behavioral:  Negative for confusion and dysphoric mood. The patient is not nervous/anxious.           Objective:      Vitals:    02/26/24 0854   BP: 138/62   Pulse: 85   SpO2: 100%   Weight: 100.4 kg (221 lb 6.4 oz)   Height: 6' 2" (1.88 m)     Physical Exam  Vitals reviewed.   Constitutional:       General: He is not in acute distress.     Appearance: He is well-developed.      Comments: Healthy-appearing white male   HENT:      Head: Normocephalic and atraumatic.      Right Ear: Tympanic membrane and ear canal normal.      Left Ear: Tympanic membrane and ear canal normal.   Neck:      Vascular: No carotid bruit.   Cardiovascular:      Rate and Rhythm: Normal rate and regular rhythm.      Heart sounds: No murmur heard.  Pulmonary:      Effort: Pulmonary effort is normal. No respiratory distress.      Breath sounds: Normal breath sounds. No wheezing or rales.   Abdominal:      General: There is no distension.      Palpations: Abdomen is soft.      Tenderness: There is no abdominal tenderness.      Comments: Colostomy Left mid abdomen with parastomal hernia, small amt of liquid brown stool in pouch   Musculoskeletal:      Cervical back: Neck supple.      Right lower leg: No edema.      Left lower leg: No edema.   Lymphadenopathy:      Cervical: No cervical adenopathy.   Skin:     General: Skin is warm and dry.      Findings: No rash.   Neurological:      General: No focal deficit present.      Mental Status: He is alert and oriented to person, place, and time.      Gait: Gait normal.   Psychiatric:         Mood and Affect: Mood normal.           Assessment:       1. DM type 2 with diabetic dyslipidemia    2. Essential hypertension    3. Mixed hyperlipidemia    4. Malignant neoplasm of transverse colon    5. " Metastatic adenocarcinoma to pancreas    6. Colostomy status    7. Cigarette smoker           Plan:       1. DM type 2 with diabetic dyslipidemia   -A1c slightly improved to 7.3%, discussed with patient we could resume Jardiance though he would like to continue with current medication and continue to work on diet.  Advised if A1c rises any further would definitely recommend adding Jardiance    2. Essential hypertension   -BP well controlled    3. Mixed hyperlipidemia   -reviewed recent labs with patient, well controlled    4. Malignant neoplasm of transverse colon   -overall doing well on Keytruda, plans for colonoscopy in April and hopes of colostomy reversal    5. Metastatic adenocarcinoma to pancreas   -last PET scan [facility]UTH    6. Colostomy status   -no issues with colostomy    7. Cigarette smoker   -smoking cessation counseling provided.  Patient is not interested in quitting though cautioned on risks of worsening lung disease as well as cardiovascular disease  Assistance with smoking cessation was offered, including:  [x]  Medications  [x]  Counseling  []  Printed Information on Smoking Cessation  [x]  Referral to a Smoking Cessation Program    Patient was counseled regarding smoking for 3-10 minutes.     Follow up in about 4 months (around 6/26/2024) for Diabetes.          Counseled on age and gender appropriate medical preventative services, including cancer screenings, immunizations, overall nutritional health, need for a consistent exercise regimen and an overall push towards maintaining a vigorous and active lifestyle.      2/26/2024 Natalee Wagner NP

## 2024-02-26 NOTE — TELEPHONE ENCOUNTER
----- Message from Aurash Khoobehi, MD sent at 2/26/2024 11:17 AM CST -----  Ok for treatment. RTC in 3 weeks with labs

## 2024-02-27 ENCOUNTER — INFUSION (OUTPATIENT)
Dept: INFUSION THERAPY | Facility: HOSPITAL | Age: 59
End: 2024-02-27
Attending: INTERNAL MEDICINE
Payer: COMMERCIAL

## 2024-02-27 ENCOUNTER — PATIENT MESSAGE (OUTPATIENT)
Dept: ENDOSCOPY | Facility: HOSPITAL | Age: 59
End: 2024-02-27
Payer: COMMERCIAL

## 2024-02-27 ENCOUNTER — TUMOR BOARD CONFERENCE (OUTPATIENT)
Dept: ONCOLOGY | Facility: CLINIC | Age: 59
End: 2024-02-27

## 2024-02-27 ENCOUNTER — TELEPHONE (OUTPATIENT)
Dept: ENDOSCOPY | Facility: HOSPITAL | Age: 59
End: 2024-02-27
Payer: COMMERCIAL

## 2024-02-27 VITALS
HEIGHT: 74 IN | OXYGEN SATURATION: 98 % | HEART RATE: 72 BPM | BODY MASS INDEX: 28.13 KG/M2 | SYSTOLIC BLOOD PRESSURE: 114 MMHG | DIASTOLIC BLOOD PRESSURE: 72 MMHG | TEMPERATURE: 98 F | RESPIRATION RATE: 16 BRPM | WEIGHT: 219.19 LBS

## 2024-02-27 DIAGNOSIS — Z85.038 HISTORY OF COLON CANCER: ICD-10-CM

## 2024-02-27 DIAGNOSIS — C78.89 METASTATIC ADENOCARCINOMA TO PANCREAS: ICD-10-CM

## 2024-02-27 DIAGNOSIS — Z12.11 SCREEN FOR COLON CANCER: Primary | ICD-10-CM

## 2024-02-27 DIAGNOSIS — C18.4 MALIGNANT NEOPLASM OF TRANSVERSE COLON: Primary | ICD-10-CM

## 2024-02-27 PROCEDURE — 96413 CHEMO IV INFUSION 1 HR: CPT

## 2024-02-27 PROCEDURE — A4216 STERILE WATER/SALINE, 10 ML: HCPCS | Performed by: INTERNAL MEDICINE

## 2024-02-27 PROCEDURE — 25000003 PHARM REV CODE 250: Performed by: INTERNAL MEDICINE

## 2024-02-27 PROCEDURE — 63600175 PHARM REV CODE 636 W HCPCS: Mod: JZ,JG | Performed by: INTERNAL MEDICINE

## 2024-02-27 RX ORDER — SODIUM CHLORIDE 0.9 % (FLUSH) 0.9 %
10 SYRINGE (ML) INJECTION
Status: DISCONTINUED | OUTPATIENT
Start: 2024-02-27 | End: 2024-02-27 | Stop reason: HOSPADM

## 2024-02-27 RX ORDER — DIPHENHYDRAMINE HYDROCHLORIDE 50 MG/ML
50 INJECTION INTRAMUSCULAR; INTRAVENOUS ONCE AS NEEDED
Status: DISCONTINUED | OUTPATIENT
Start: 2024-02-27 | End: 2024-02-27 | Stop reason: HOSPADM

## 2024-02-27 RX ORDER — HEPARIN 100 UNIT/ML
500 SYRINGE INTRAVENOUS
Status: DISCONTINUED | OUTPATIENT
Start: 2024-02-27 | End: 2024-02-27 | Stop reason: HOSPADM

## 2024-02-27 RX ORDER — EPINEPHRINE 0.3 MG/.3ML
0.3 INJECTION SUBCUTANEOUS ONCE AS NEEDED
Status: DISCONTINUED | OUTPATIENT
Start: 2024-02-27 | End: 2024-02-27 | Stop reason: HOSPADM

## 2024-02-27 RX ADMIN — SODIUM CHLORIDE 200 MG: 9 INJECTION, SOLUTION INTRAVENOUS at 08:02

## 2024-02-27 RX ADMIN — HEPARIN 500 UNITS: 100 SYRINGE at 08:02

## 2024-02-27 RX ADMIN — SODIUM CHLORIDE, PRESERVATIVE FREE 10 ML: 5 INJECTION INTRAVENOUS at 08:02

## 2024-02-27 NOTE — TELEPHONE ENCOUNTER
"Spoke to pt to schedule procedure(s) Colonoscopy       Physician to perform procedure(s) Dr. BARBRA Lance  Date of Procedure (s) 3/5/24  Arrival Time 10:00 AM  Time of Procedure(s) 11:00 AM   Location of Procedure(s) [place] [address] Floor  Type of Rx Prep sent to patient: PEG plus 2 enemas  Instructions provided to patient via [facility]    Patient was informed on the following information and verbalized understanding. Screening questionnaire reviewed with patient and complete. If procedure requires anesthesia, a responsible adult needs to be present to accompany the patient home, patient cannot drive after receiving anesthesia. Appointment details are tentative, especially check-in time. Patient will receive a prep-op call 7 days prior to confirm check-in time for procedure. If applicable the patient should contact their pharmacy to verify Rx for procedure prep is ready for pick-up. Patient was advised to call the scheduling department at [phone] if pharmacy states no Rx is available. Patient was advised to call the endoscopy scheduling department if any questions or concerns arise.       Endoscopy Scheduling Department      From: Farhan Lance MD   Sent: 2024   3:25 PM CST   To: [facility]     Procedure: Colonoscopy     Diagnosis: Surveillance colonoscopy - Hx of colon cancer     Procedure Timin-4 weeks     *If within 4 weeks selected, please ca as high priority*     *If greater than 12 weeks, please select "5-12 weeks" and delay sending until 3 months prior to requested date*     Provider: Myself     Location: [address]     Additional Scheduling Information: No scheduling concerns     Prep Specifications: Standard prep, will need 2 enemas day of procedure     Is the patient taking a GLP-1 Agonist: no     Have you attached a patient to this message: yes   "

## 2024-02-27 NOTE — PLAN OF CARE
Problem: Activity Intolerance  Goal: Enhanced Capacity and Energy  Outcome: Ongoing, Progressing  Intervention: Optimize Activity Tolerance  Flowsheets (Taken 2/27/2024 0808)  Self-Care Promotion: independence encouraged  Activity Management: Ambulated -L4  Environmental Support:   calm environment promoted   rest periods encouraged

## 2024-02-28 ENCOUNTER — TELEPHONE (OUTPATIENT)
Dept: ENDOSCOPY | Facility: HOSPITAL | Age: 59
End: 2024-02-28
Payer: COMMERCIAL

## 2024-03-01 ENCOUNTER — TELEPHONE (OUTPATIENT)
Dept: SURGERY | Facility: CLINIC | Age: 59
End: 2024-03-01
Payer: COMMERCIAL

## 2024-03-01 NOTE — TELEPHONE ENCOUNTER
Discussed tumor board recommendations - plan for colonoscopy and for him to meet again with Dr. Goodwin    Suspect we will schedule colostomy takedown shortly thereafter timed with Keytruda dosing      Farhan Lance MD, FACS, FASCRS  Department of Colon & Rectal Surgery  [facility]

## 2024-03-05 ENCOUNTER — HOSPITAL ENCOUNTER (OUTPATIENT)
Facility: HOSPITAL | Age: 59
Discharge: HOME | End: 2024-03-05
Attending: STUDENT IN AN ORGANIZED HEALTH CARE EDUCATION/TRAINING PROGRAM | Admitting: STUDENT IN AN ORGANIZED HEALTH CARE EDUCATION/TRAINING PROGRAM
Payer: COMMERCIAL

## 2024-03-05 ENCOUNTER — ANESTHESIA EVENT (OUTPATIENT)
Dept: ENDOSCOPY | Facility: HOSPITAL | Age: 59
End: 2024-03-05
Payer: COMMERCIAL

## 2024-03-05 ENCOUNTER — ANESTHESIA (OUTPATIENT)
Dept: ENDOSCOPY | Facility: HOSPITAL | Age: 59
End: 2024-03-05
Payer: COMMERCIAL

## 2024-03-05 VITALS
RESPIRATION RATE: 17 BRPM | OXYGEN SATURATION: 98 % | DIASTOLIC BLOOD PRESSURE: 64 MMHG | WEIGHT: 215 LBS | HEART RATE: 74 BPM | HEIGHT: 74 IN | SYSTOLIC BLOOD PRESSURE: 117 MMHG | BODY MASS INDEX: 27.59 KG/M2 | TEMPERATURE: 98 F

## 2024-03-05 LAB — POCT GLUCOSE: 75 MG/DL (ref 70–110)

## 2024-03-05 PROCEDURE — 37000008 HC ANESTHESIA 1ST 15 MINUTES: Performed by: STUDENT IN AN ORGANIZED HEALTH CARE EDUCATION/TRAINING PROGRAM

## 2024-03-05 PROCEDURE — G0105 COLORECTAL SCRN; HI RISK IND: HCPCS | Performed by: STUDENT IN AN ORGANIZED HEALTH CARE EDUCATION/TRAINING PROGRAM

## 2024-03-05 PROCEDURE — 25000003 PHARM REV CODE 250: Performed by: NURSE ANESTHETIST, CERTIFIED REGISTERED

## 2024-03-05 PROCEDURE — E9220 PRA ENDO ANESTHESIA: HCPCS | Mod: ,,, | Performed by: NURSE ANESTHETIST, CERTIFIED REGISTERED

## 2024-03-05 PROCEDURE — 63600175 PHARM REV CODE 636 W HCPCS: Performed by: NURSE ANESTHETIST, CERTIFIED REGISTERED

## 2024-03-05 PROCEDURE — G0105 COLORECTAL SCRN; HI RISK IND: HCPCS | Mod: ,,, | Performed by: STUDENT IN AN ORGANIZED HEALTH CARE EDUCATION/TRAINING PROGRAM

## 2024-03-05 PROCEDURE — 37000009 HC ANESTHESIA EA ADD 15 MINS: Performed by: STUDENT IN AN ORGANIZED HEALTH CARE EDUCATION/TRAINING PROGRAM

## 2024-03-05 RX ORDER — SODIUM CHLORIDE 9 MG/ML
INJECTION, SOLUTION INTRAVENOUS CONTINUOUS
Status: DISCONTINUED | OUTPATIENT
Start: 2024-03-05 | End: 2024-03-05 | Stop reason: HOSPADM

## 2024-03-05 RX ORDER — LIDOCAINE HYDROCHLORIDE 20 MG/ML
INJECTION INTRAVENOUS
Status: DISCONTINUED | OUTPATIENT
Start: 2024-03-05 | End: 2024-03-05

## 2024-03-05 RX ORDER — PROPOFOL 10 MG/ML
VIAL (ML) INTRAVENOUS
Status: DISCONTINUED | OUTPATIENT
Start: 2024-03-05 | End: 2024-03-05

## 2024-03-05 RX ORDER — PROPOFOL 10 MG/ML
VIAL (ML) INTRAVENOUS CONTINUOUS PRN
Status: DISCONTINUED | OUTPATIENT
Start: 2024-03-05 | End: 2024-03-05

## 2024-03-05 RX ADMIN — PROPOFOL 70 MG: 10 INJECTION, EMULSION INTRAVENOUS at 10:03

## 2024-03-05 RX ADMIN — LIDOCAINE HYDROCHLORIDE 30 MG: 20 INJECTION INTRAVENOUS at 10:03

## 2024-03-05 RX ADMIN — PROPOFOL 25 MG: 10 INJECTION, EMULSION INTRAVENOUS at 10:03

## 2024-03-05 RX ADMIN — SODIUM CHLORIDE: 0.9 INJECTION, SOLUTION INTRAVENOUS at 10:03

## 2024-03-05 RX ADMIN — PROPOFOL 150 MCG/KG/MIN: 10 INJECTION, EMULSION INTRAVENOUS at 10:03

## 2024-03-05 NOTE — PROGRESS NOTES
Osman Li Jr. is a 58 y.o. 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  This occurred very shortly after completing treatment.     He underwent open laparotomy for obstructing colon mass 4/2022 that resulted in kind of a long yazan's with a transverse colostomy and splenectomy due to adhesions/disease involvement at the pancreatic tail:  Findings: Large colonic mass that was obstructing at the splenic flexure and densely adherent to the splenic hilum.  Palpable abnormalities within the tail of the pancreas        Final path: pT3 pN2b (17 of 27 nodes positive), G3, MSI-L  Gene: BRAF   DNA change: c.1783T>C   Amino Acid change: p.F595L ([id number])      Interestingly on his STRATA report there is no BRAF mutation, but he is TMB-H.  Patient was part of a clinical trial to measure CT DNA.  His CT DNA started to go up while he was on FOLFOX, which prompted the scan, which showed the progression at the end of his 6 month adjuvant course.  Next generation sequencing also showed a high tumor burden. He was changed to single agent Keytruda 1/2023 and has since had 15 cycles and is doing well.     He has at least two sites of disease, his pancreatic tail which is biopsy proven by EUS and RPLN that was PET avid in August.        Both of these areas have responded well and are poorly defined on recent CT imaging 11/2023. His CEA has never been elevated, and I do not see a recent ctDNA study.     A/P  Pretty interesting case of a 59yo M, obstructing high risk colon cancer resected with metastatic progression on FOLFOX but responsive to single agent PDL-1 now for about 12 months. I think all the things are on the table here. His jennifer-pancreatic tumor I can sell as locoregional/residual disease, and this is clearable with a tough distal pancreatectomy. The more challenging philosophical target is this RP node, made more  "challenging by the fact that its responded so well I doubt we'll find it and will need to do a L RPLND. All very accomplishable in this healthy male, but how it impacts his clinical course beyond just continuing ICI is not clear.     In the end we are afforded the benefit of time here given how well he's doing. Will tan this over, present at [facility] [place] [place] and discuss with Dr. Khoobehi. I think a repeat PET here could help us. If he still has residual disease on PET, I would favor moving forward with resection. If there is no pet avid disease, the sell is a bit harder.  ---------------------------------  1/10/2024:     Loki returns with updated PET imaging 1/2024:  COMPARISON:  08/03/2023     FINDINGS:  Head and Neck:  Brain demonstrates normal metabolism.  However, FDG-PET has an approximate 60% sensitivity for brain metastases which are best detected by MRI with gadolinium.  Physiologic uptake is in the neck.  Chest:  No FDG avid pulmonary lesions are present. No enlarged or FDG avid lymph nodes are in the chest.  Abdomen and Pelvis:  Physiologic uptake is in the liver, spleen, urinary system and bowel. No enlarged or FDG avid lymph nodes are in the abdomen or pelvis. Adrenal glands are normal.  Musculoskeletal:  No FDG avid osseous lesions are present.  Impression:  Positive therapy response with resolution of the metastatic retroperitoneal lymph node compared to the prior examination.  No FDG avid foci are present on today's exam.     On my review, I see no clear evidence of active disease which is great. There is no established pathway for patients with MSI-H or TMB-H tumors who have complete clinical response to ICI. Will discuss with Dr. Khoobehi, but I am leaning towards completing his checkpoint therapy and then transitioning to close surveillance, reserving local therapy options for focal recurrence. The addition of this para-aortic node makes me feel like any "blind" resection in this case is unlikely to add " significant local or systemic control benefit. A reasonable question here would be can we/how can we use ctDNA for him, given its previous utility following his disease. He is off study but stage IV, so we should try and see if we can get this approved. Will follow along with his imaging.  ----------------------------------------  3/6/2024:    Loki returns after meeting with Dr. Lance regarding potential ileostomy reversal.  I have discussed his case in detail with both Dr. Lance and Dr. Khoobehi.  Loki has ostomy reversal options but on review of his imaging his distal colon staple line looks pretty socked into the distal pancreas and proximal jejunum in the area of previous residual disease.  Although this is responded well to systemic immunotherapy I expect that reversal could potentially involve distal pancreatectomy and small bowel resection and so he has been sent back to me to discuss a cooperative case.  I do think it is reasonable to reverse his ostomy, he has had a great response on immunotherapy and it is a significant impediment to his quality of life. I bet we can do this without pancreatic resection, but will be prepared should the case develop that way. We discussed the potential sequela of distal pancreatectomy and the increased risk of pancreatectomy and small bowel resection in the setting of ostomy reversal. Will coordinate with Dr. Lance on timing.    I spent 20 minutes with Mr. Li, with >50% of time spent face to face and additional time preparing to see the patient (eg, review of tests), obtaining and/or reviewing separately obtained history, documenting clinical information in the electronic or other health record, independently interpreting results (not separately reported) and communicating results to the patient/family/caregiver, or Care coordination (not separately reported).         Bo Goodwin MD  Upper GI / Hepatobiliary Surgical Oncology  [facility]  [place]  Office: [phone]  Fax: [phone]

## 2024-03-05 NOTE — ANESTHESIA POSTPROCEDURE EVALUATION
Anesthesia Post Evaluation    Patient: Osman Li Jr.    Procedure(s) Performed: Procedure(s) (LRB):  COLONOSCOPY (N/A)    Final Anesthesia Type: general      Patient location during evaluation: PACU  Patient participation: Yes- Able to Participate  Level of consciousness: awake and alert  Post-procedure vital signs: reviewed and stable  Pain management: adequate  Airway patency: patent    PONV status at discharge: No PONV  Anesthetic complications: no      Cardiovascular status: hemodynamically stable  Respiratory status: unassisted, spontaneous ventilation and room air  Hydration status: euvolemic  Follow-up not needed.            Vitals Value Taken Time   /64 03/05/24 1139   Temp  03/05/24 1422   Pulse 74 03/05/24 1139   Resp 17 03/05/24 1139   SpO2 98 % 03/05/24 1139         No case tracking events are documented in the log.      Pain/Suleiman Score: Suleiman Score: 9 (3/5/2024 11:22 AM)

## 2024-03-05 NOTE — TRANSFER OF CARE
"Anesthesia Transfer of Care Note    Patient: Osman Li Jr.    Procedure(s) Performed: Procedure(s) (LRB):  COLONOSCOPY (N/A)    Patient location: PACU    Anesthesia Type: general    Transport from OR: Transported from OR on room air with adequate spontaneous ventilation    Post pain: adequate analgesia    Post assessment: no apparent anesthetic complications and tolerated procedure well    Post vital signs: stable    Level of consciousness: awake, alert and oriented    Nausea/Vomiting: no nausea/vomiting    Complications: none    Transfer of care protocol was followed      Last vitals: Visit Vitals  BP (!) 157/78 (BP Location: Left arm)   Pulse 82   Temp 36.7 °C (98.1 °F)   Resp 16   Ht 6' 2" (1.88 m)   Wt 97.5 kg (215 lb)   SpO2 98%   BMI 27.60 kg/m²     "

## 2024-03-05 NOTE — ANESTHESIA RELEASE NOTE
"Anesthesia Release from PACU Note    Patient: Osman Li Jr.    Procedure(s) Performed: Procedure(s) (LRB):  COLONOSCOPY (N/A)    Anesthesia type: general    Post pain: Adequate analgesia    Post assessment: no apparent anesthetic complications and tolerated procedure well    Last Vitals: Visit Vitals  /64 (BP Location: Left arm, Patient Position: Lying)   Pulse 74   Temp 36.4 °C (97.5 °F) (Temporal)   Resp 17   Ht 6' 2" (1.88 m)   Wt 97.5 kg (215 lb)   SpO2 98%   BMI 27.60 kg/m²       Post vital signs: stable    Level of consciousness: awake and alert     Nausea/Vomiting: no nausea/no vomiting    Complications: none    Airway Patency: patent    Respiratory: unassisted, spontaneous ventilation, room air    Cardiovascular: stable and blood pressure at baseline    Hydration: euvolemic  "

## 2024-03-05 NOTE — INTERVAL H&P NOTE
The patient has been examined and the H&P has been reviewed:    I concur with the findings and no changes have occurred since H&P was written.    Procedure risks, benefits and alternative options discussed and understood by patient/family.    Active Hospital Problems    Diagnosis  POA    Metastatic adenocarcinoma to pancreas [C78.89]  Yes    Malignant neoplasm of transverse colon [C18.4]  Yes    HTN (hypertension) [I10]  Yes    Type 2 diabetes mellitus [E11.9]  Yes    HLD (hyperlipidemia) [E78.5]  Yes      Resolved Hospital Problems   No resolved problems to display.

## 2024-03-05 NOTE — ANESTHESIA PREPROCEDURE EVALUATION
03/05/2024  Osman Li Jr. is a 58 y.o., male.  Past Medical History:   Diagnosis Date    Colon cancer     Digestive disorder     DM (diabetes mellitus)     Hyperlipidemia     Hypertension     Prediabetes      Past Surgical History:   Procedure Laterality Date    ADENOIDECTOMY  1972    CHOLECYSTECTOMY  2011    COLON SURGERY      COLONOSCOPY      2018    COLOSTOMY N/A 04/25/2022    Procedure: CREATION, COLOSTOMY;  Surgeon: Carlton Waddell MD;  Location: [facility] OR;  Service: General;  Laterality: N/A;    ENDOSCOPIC ULTRASOUND OF UPPER GASTROINTESTINAL TRACT N/A 01/06/2023    Procedure: ULTRASOUND, UPPER GI TRACT, ENDOSCOPIC;  Surgeon: Rinku Orozco III, MD;  Location: [facility] ENDO;  Service: Endoscopy;  Laterality: N/A;    INSERTION OF TUNNELED CENTRAL VENOUS CATHETER (CVC) WITH SUBCUTANEOUS PORT Right 05/18/2022    Procedure: [id number];  Surgeon: Carlton Waddell MD;  Location: [facility] OR;  Service: General;  Laterality: Right;    MOBILIZATION OF SPLENIC FLEXURE N/A 04/25/2022    Procedure: MOBILIZATION, SPLENIC FLEXURE;  Surgeon: Carlton Waddell MD;  Location: [facility] OR;  Service: General;  Laterality: N/A;    SPLENECTOMY N/A 04/25/2022    Procedure: SPLENECTOMY;  Surgeon: Carlton Waddell MD;  Location: [facility] OR;  Service: General;  Laterality: N/A;    SUBTOTAL COLECTOMY N/A 04/25/2022    Procedure: COLECTOMY, PARTIAL;  Surgeon: Carlton Waddell MD;  Location: [facility] OR;  Service: General;  Laterality: N/A;    TONSILLECTOMY      TUMOR REMOVAL  10/18/2023    on top of the nose    VASECTOMY  1994         Pre-op Assessment    I have reviewed the Patient Summary Reports.    I have reviewed the NPO Status.   I have reviewed the Medications.     Review of Systems  Anesthesia Hx:  No problems with previous Anesthesia   History of prior surgery of interest to airway management or  planning:          Denies Family Hx of Anesthesia complications.    Denies Personal Hx of Anesthesia complications.                    Social:  Smoker 1 PPD      Cardiovascular:  Exercise tolerance: good   Hypertension                                        Pulmonary:  Pulmonary Normal                       Renal/:  Renal/ Normal                 Hepatic/GI:  Bowel Prep.                Endocrine:  Diabetes, type 2               Physical Exam  General: Well nourished    Airway:  Mallampati: I   Mouth Opening: Normal  TM Distance: Normal  Tongue: Normal    Dental:  Intact    Chest/Lungs:  Normal Respiratory Rate    Heart:  Rate: Normal    Anesthesia Plan  Type of Anesthesia, risks & benefits discussed:    Anesthesia Type: Gen Natural Airway  Intra-op Monitoring Plan: Standard ASA Monitors  Induction:  IV  Informed Consent: Informed consent signed with the Patient and all parties understand the risks and agree with anesthesia plan.  All questions answered.   ASA Score: 2  Day of Surgery Review of History & Physical: H&P Update referred to the surgeon/provider.    Ready For Surgery From Anesthesia Perspective.   .

## 2024-03-05 NOTE — PROVATION PATIENT INSTRUCTIONS
Discharge Summary/Instructions after an Endoscopic Procedure  Patient Name: Osman Li  Patient MRN: [id number]  Patient YOB: 1965 Tuesday, March 5, 2024  Farhan Lance MD  Dear patient,  As a result of recent federal legislation (The Federal Cures Act), you may   receive lab or pathology results from your procedure in your [facility]   account before your physician is able to contact you. Your physician or   their representative will relay the results to you with their   recommendations at their soonest availability.  Thank you,  RESTRICTIONS:  During your procedure today, you received medications for sedation.  These   medications may affect your judgment, balance and coordination.  Therefore,   for 24 hours, you have the following restrictions:   - DO NOT drive a car, operate machinery, make legal/financial decisions,   sign important papers or drink alcohol.    ACTIVITY:  Today: no heavy lifting, straining or running due to procedural   sedation/anesthesia.  The following day: return to full activity including work.  DIET:  Eat and drink normally unless instructed otherwise.     TREATMENT FOR COMMON SIDE EFFECTS:  - Mild abdominal pain, nausea, belching, bloating or excessive gas:  rest,   eat lightly and use a heating pad.  - Sore Throat: treat with throat lozenges and/or gargle with warm salt   water.  - Because air was used during the procedure, expelling large amounts of air   from your rectum or belching is normal.  - If a bowel prep was taken, you may not have a bowel movement for 1-3 days.    This is normal.  SYMPTOMS TO WATCH FOR AND REPORT TO YOUR PHYSICIAN:  1. Abdominal pain or bloating, other than gas cramps.  2. Chest pain.  3. Back pain.  4. Signs of infection such as: chills or fever occurring within 24 hours   after the procedure.  5. Rectal bleeding, which would show as bright red, maroon, or black stools.   (A tablespoon of blood from the rectum is not serious, especially  if   hemorrhoids are present.)  6. Vomiting.  7. Weakness or dizziness.  GO DIRECTLY TO THE NEAREST EMERGENCY ROOM IF YOU HAVE ANY OF THE FOLLOWING:      Difficulty breathing              Chills and/or fever over 101 F   Persistent vomiting and/or vomiting blood   Severe abdominal pain   Severe chest pain   Black, tarry stools   Bleeding- more than one tablespoon   Any other symptom or condition that you feel may need urgent attention  Your doctor recommends these additional instructions:  If any biopsies were taken, your doctors clinic will contact you in 1 to 2   weeks with any results.  - Discharge patient to home.   - High fiber diet.   - Continue present medications.   - Repeat colonoscopy in 1 year for surveillance.   - Return to referring physician as previously scheduled.  For questions, problems or results please call your physician - Farhan Lance MD at Work:  [phone].  [facility] EMERGENCY ROOM PHONE NUMBER: [phone]  IF A COMPLICATION OR EMERGENCY SITUATION ARISES AND YOU ARE UNABLE TO REACH   YOUR PHYSICIAN - GO DIRECTLY TO THE EMERGENCY ROOM.  MD Farhna Ames MD  3/5/2024 11:08:46 AM  This report has been verified and signed electronically.  Dear patient,  As a result of recent federal legislation (The Federal Cures Act), you may   receive lab or pathology results from your procedure in your [facility]   account before your physician is able to contact you. Your physician or   their representative will relay the results to you with their   recommendations at their soonest availability.  Thank you,  PROVATION

## 2024-03-06 ENCOUNTER — OFFICE VISIT (OUTPATIENT)
Dept: HEMATOLOGY/ONCOLOGY | Facility: CLINIC | Age: 59
End: 2024-03-06
Payer: COMMERCIAL

## 2024-03-06 VITALS
BODY MASS INDEX: 28.21 KG/M2 | WEIGHT: 219.81 LBS | HEIGHT: 74 IN | DIASTOLIC BLOOD PRESSURE: 88 MMHG | SYSTOLIC BLOOD PRESSURE: 136 MMHG

## 2024-03-06 DIAGNOSIS — C78.7 METASTATIC COLON CANCER TO LIVER: Primary | ICD-10-CM

## 2024-03-06 DIAGNOSIS — C18.9 METASTATIC COLON CANCER TO LIVER: Primary | ICD-10-CM

## 2024-03-06 PROCEDURE — 99213 OFFICE O/P EST LOW 20 MIN: CPT | Mod: S$GLB,,, | Performed by: SURGERY

## 2024-03-06 PROCEDURE — 99999 PR PBB SHADOW E&M-EST. PATIENT-LVL III: CPT | Mod: PBBFAC,,, | Performed by: SURGERY

## 2024-03-18 ENCOUNTER — LAB VISIT (OUTPATIENT)
Dept: LAB | Facility: HOSPITAL | Age: 59
End: 2024-03-18
Attending: INTERNAL MEDICINE
Payer: COMMERCIAL

## 2024-03-18 ENCOUNTER — PATIENT MESSAGE (OUTPATIENT)
Dept: SURGERY | Facility: CLINIC | Age: 59
End: 2024-03-18
Payer: COMMERCIAL

## 2024-03-18 ENCOUNTER — OFFICE VISIT (OUTPATIENT)
Dept: HEMATOLOGY/ONCOLOGY | Facility: CLINIC | Age: 59
End: 2024-03-18
Payer: COMMERCIAL

## 2024-03-18 ENCOUNTER — PATIENT MESSAGE (OUTPATIENT)
Dept: HEMATOLOGY/ONCOLOGY | Facility: CLINIC | Age: 59
End: 2024-03-18

## 2024-03-18 VITALS
DIASTOLIC BLOOD PRESSURE: 80 MMHG | OXYGEN SATURATION: 98 % | RESPIRATION RATE: 12 BRPM | SYSTOLIC BLOOD PRESSURE: 136 MMHG | WEIGHT: 220.88 LBS | BODY MASS INDEX: 28.35 KG/M2 | TEMPERATURE: 98 F | HEIGHT: 74 IN | HEART RATE: 80 BPM

## 2024-03-18 DIAGNOSIS — C18.4 MALIGNANT NEOPLASM OF TRANSVERSE COLON: Primary | ICD-10-CM

## 2024-03-18 DIAGNOSIS — C78.89 METASTATIC ADENOCARCINOMA TO PANCREAS: ICD-10-CM

## 2024-03-18 DIAGNOSIS — C77.2 METASTASIS TO RETROPERITONEAL LYMPH NODE: ICD-10-CM

## 2024-03-18 DIAGNOSIS — C18.4 MALIGNANT NEOPLASM OF TRANSVERSE COLON: ICD-10-CM

## 2024-03-18 LAB
ALBUMIN SERPL BCP-MCNC: 4.1 G/DL (ref 3.5–5.2)
ALP SERPL-CCNC: 90 U/L (ref 55–135)
ALT SERPL W/O P-5'-P-CCNC: 22 U/L (ref 10–44)
ANION GAP SERPL CALC-SCNC: 8 MMOL/L (ref 8–16)
AST SERPL-CCNC: 20 U/L (ref 10–40)
BASOPHILS # BLD AUTO: 0.11 K/UL (ref 0–0.2)
BASOPHILS NFR BLD: 1 % (ref 0–1.9)
BILIRUB SERPL-MCNC: 0.5 MG/DL (ref 0.1–1)
BUN SERPL-MCNC: 13 MG/DL (ref 6–20)
CALCIUM SERPL-MCNC: 10.6 MG/DL (ref 8.7–10.5)
CEA SERPL-MCNC: 3.3 NG/ML (ref 0–5)
CHLORIDE SERPL-SCNC: 100 MMOL/L (ref 95–110)
CO2 SERPL-SCNC: 28 MMOL/L (ref 23–29)
CREAT SERPL-MCNC: 1.1 MG/DL (ref 0.5–1.4)
DIFFERENTIAL METHOD BLD: ABNORMAL
EOSINOPHIL # BLD AUTO: 0.1 K/UL (ref 0–0.5)
EOSINOPHIL NFR BLD: 1.3 % (ref 0–8)
ERYTHROCYTE [DISTWIDTH] IN BLOOD BY AUTOMATED COUNT: 13.7 % (ref 11.5–14.5)
EST. GFR  (NO RACE VARIABLE): >60 ML/MIN/1.73 M^2
GLUCOSE SERPL-MCNC: 200 MG/DL (ref 70–110)
HCT VFR BLD AUTO: 42.6 % (ref 40–54)
HGB BLD-MCNC: 14.3 G/DL (ref 14–18)
IMM GRANULOCYTES # BLD AUTO: 0.03 K/UL (ref 0–0.04)
IMM GRANULOCYTES NFR BLD AUTO: 0.3 % (ref 0–0.5)
LYMPHOCYTES # BLD AUTO: 4.5 K/UL (ref 1–4.8)
LYMPHOCYTES NFR BLD: 41.4 % (ref 18–48)
MCH RBC QN AUTO: 30 PG (ref 27–31)
MCHC RBC AUTO-ENTMCNC: 33.6 G/DL (ref 32–36)
MCV RBC AUTO: 90 FL (ref 82–98)
MONOCYTES # BLD AUTO: 0.9 K/UL (ref 0.3–1)
MONOCYTES NFR BLD: 8.4 % (ref 4–15)
NEUTROPHILS # BLD AUTO: 5.2 K/UL (ref 1.8–7.7)
NEUTROPHILS NFR BLD: 47.6 % (ref 38–73)
NRBC BLD-RTO: 0 /100 WBC
PLATELET # BLD AUTO: 385 K/UL (ref 150–450)
PMV BLD AUTO: 9 FL (ref 9.2–12.9)
POTASSIUM SERPL-SCNC: 4.9 MMOL/L (ref 3.5–5.1)
PROT SERPL-MCNC: 7.4 G/DL (ref 6–8.4)
RBC # BLD AUTO: 4.76 M/UL (ref 4.6–6.2)
SODIUM SERPL-SCNC: 136 MMOL/L (ref 136–145)
TSH SERPL DL<=0.005 MIU/L-ACNC: 1.1 UIU/ML (ref 0.4–4)
WBC # BLD AUTO: 10.96 K/UL (ref 3.9–12.7)

## 2024-03-18 PROCEDURE — 82378 CARCINOEMBRYONIC ANTIGEN: CPT | Performed by: INTERNAL MEDICINE

## 2024-03-18 PROCEDURE — 36415 COLL VENOUS BLD VENIPUNCTURE: CPT | Performed by: INTERNAL MEDICINE

## 2024-03-18 PROCEDURE — 99999 PR PBB SHADOW E&M-EST. PATIENT-LVL III: CPT | Mod: PBBFAC,,, | Performed by: INTERNAL MEDICINE

## 2024-03-18 PROCEDURE — 99215 OFFICE O/P EST HI 40 MIN: CPT | Mod: S$GLB,,, | Performed by: INTERNAL MEDICINE

## 2024-03-18 PROCEDURE — 80053 COMPREHEN METABOLIC PANEL: CPT | Performed by: INTERNAL MEDICINE

## 2024-03-18 PROCEDURE — 85025 COMPLETE CBC W/AUTO DIFF WBC: CPT | Performed by: INTERNAL MEDICINE

## 2024-03-18 PROCEDURE — 84443 ASSAY THYROID STIM HORMONE: CPT | Performed by: INTERNAL MEDICINE

## 2024-03-18 RX ORDER — HEPARIN 100 UNIT/ML
500 SYRINGE INTRAVENOUS
Status: CANCELLED | OUTPATIENT
Start: 2024-03-19

## 2024-03-18 RX ORDER — SODIUM CHLORIDE 0.9 % (FLUSH) 0.9 %
10 SYRINGE (ML) INJECTION
Status: CANCELLED | OUTPATIENT
Start: 2024-03-19

## 2024-03-18 RX ORDER — DIPHENHYDRAMINE HYDROCHLORIDE 50 MG/ML
50 INJECTION INTRAMUSCULAR; INTRAVENOUS ONCE AS NEEDED
Status: CANCELLED | OUTPATIENT
Start: 2024-03-19

## 2024-03-18 RX ORDER — EPINEPHRINE 0.3 MG/.3ML
0.3 INJECTION SUBCUTANEOUS ONCE AS NEEDED
Status: CANCELLED | OUTPATIENT
Start: 2024-03-19

## 2024-03-18 NOTE — PROGRESS NOTES
"Service Date:  3/18/24    Chief Complaint:  Colon cancer    Osman Li Jr. is a 58 y.o. 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  This occurred very shortly after completing treatment.    Patient was part of a clinical trial to measure CT DNA.  His CT DNA started to go up while he was on FOLFOX, which prompted the scan, which showed the progression.  Next generation sequencing also showed a high tumor burden.    He is now on Keytruda as his cancer has a very high tumor mutation burden.  So far, Keytruda has been helping    Here for cycle 20.  Doing well.  No new complaints.  PET scan showed no active disease.  Therefore was decided to not pursue surgery at this time.  He has met with Dr. Goodwin.  There is consideration for reversing his colectomy.  This will be in collaboration with Dr. Lance.    After meeting with tumor board, the recommendation is to continue Keytruda for this year at least to complete 2 years and possibly indefinitely.    Review of Systems   Constitutional:  Positive for fatigue.   HENT: Negative.     Eyes: Negative.    Respiratory: Negative.     Cardiovascular: Negative.    Gastrointestinal: Negative.    Endocrine: Negative.    Genitourinary: Negative.    Musculoskeletal: Negative.    Integumentary:  Negative.   Neurological:  Positive for numbness.   Hematological: Negative.    Psychiatric/Behavioral: Negative.          Current Outpatient Medications   Medication Instructions    atorvastatin (LIPITOR) 20 mg, Oral, Daily    enalapriL-hydrochlorothiazide (VASERETIC) 10-25 mg per tablet 1 tablet, Oral, Daily    metFORMIN (GLUCOPHAGE) 1,000 mg, Oral, 2 times daily with meals    morphine (MS CONTIN) 30 mg, Oral, 2 times daily    pen needle, diabetic 31 gauge x 3/16" Ndle 1 each, Misc.(Non-Drug; Combo Route), Daily    promethazine (PHENERGAN) 12.5 mg, Oral, Every 4 hours PRN    sildenafiL " (VIAGRA) 100 mg, Oral, Daily PRN    TOUJEO MAX U-300 SOLOSTAR 26 Units, Subcutaneous, Daily    traZODone (DESYREL) 50 mg, Oral, Nightly        Past Medical History:   Diagnosis Date    Colon cancer     Digestive disorder     DM (diabetes mellitus)     Hyperlipidemia     Hypertension     Prediabetes         Past Surgical History:   Procedure Laterality Date    ADENOIDECTOMY  1972    CHOLECYSTECTOMY  2011    COLON SURGERY      COLONOSCOPY      2018    COLONOSCOPY N/A 3/5/2024    Procedure: COLONOSCOPY;  Surgeon: Farhan Lance MD;  [facility]  Service: Endoscopy;  Laterality: N/A;  2/27/24-Kethman pt, 1-4wks, port, PEG plus 2 enemas morning of procedure, instr portal-DS  2/28/24- LVM for precall - ERW  pt confirmed procedure. cf    COLOSTOMY N/A 04/25/2022    Procedure: CREATION, COLOSTOMY;  Surgeon: Carlton Waddell MD;  Location: [facility] OR;  Service: General;  Laterality: N/A;    ENDOSCOPIC ULTRASOUND OF UPPER GASTROINTESTINAL TRACT N/A 01/06/2023    Procedure: ULTRASOUND, UPPER GI TRACT, ENDOSCOPIC;  Surgeon: Rinku Orozco III, MD;  Location: [facility];  Service: Endoscopy;  Laterality: N/A;    INSERTION OF TUNNELED CENTRAL VENOUS CATHETER (CVC) WITH SUBCUTANEOUS PORT Right 05/18/2022    Procedure: [id number];  Surgeon: Carlton Waddell MD;  Location: [facility] OR;  Service: General;  Laterality: Right;    MOBILIZATION OF SPLENIC FLEXURE N/A 04/25/2022    Procedure: MOBILIZATION, SPLENIC FLEXURE;  Surgeon: Carlton Waddell MD;  Location: [facility] OR;  Service: General;  Laterality: N/A;    SPLENECTOMY N/A 04/25/2022    Procedure: SPLENECTOMY;  Surgeon: Carlton Waddell MD;  Location: [facility] OR;  Service: General;  Laterality: N/A;    SUBTOTAL COLECTOMY N/A 04/25/2022    Procedure: COLECTOMY, PARTIAL;  Surgeon: Carlton Waddell MD;  Location: [facility] OR;  Service: General;  Laterality: N/A;    TONSILLECTOMY      TUMOR REMOVAL  10/18/2023    on top of the nose    VASECTOMY  1994        Family  "History   Problem Relation Age of Onset    Heart disease Father     Diabetes Father     Rectal cancer Other         half-sister       Social History     Tobacco Use    Smoking status: Every Day     Current packs/day: 1.00     Average packs/day: 1 pack/day for 40.0 years (40.0 ttl pk-yrs)     Types: Cigarettes     Passive exposure: Current    Smokeless tobacco: Never   Substance Use Topics    Alcohol use: Not Currently    Drug use: Never         Vitals:    03/18/24 1059   BP: 136/80   Pulse: 80   Resp: 12   Temp: 97.9 °F (36.6 °C)          Physical Exam:  /80 (BP Location: Right arm, Patient Position: Sitting, BP Method: Medium (Automatic))   Pulse 80   Temp 97.9 °F (36.6 °C) (Temporal)   Resp 12   Ht 6' 2" (1.88 m)   Wt 100.2 kg (220 lb 14.4 oz)   SpO2 98%   BMI 28.36 kg/m²     Physical Exam  Vitals and nursing note reviewed.   Constitutional:       Appearance: Normal appearance.   HENT:      Head: Normocephalic and atraumatic.      Nose: Nose normal.      Mouth/Throat:      Mouth: Mucous membranes are moist.      Pharynx: Oropharynx is clear.   Eyes:      Extraocular Movements: Extraocular movements intact.      Conjunctiva/sclera: Conjunctivae normal.   Cardiovascular:      Rate and Rhythm: Normal rate and regular rhythm.      Heart sounds: Normal heart sounds.   Pulmonary:      Effort: Pulmonary effort is normal.      Breath sounds: Normal breath sounds.   Abdominal:      General: Abdomen is flat. Bowel sounds are normal.      Palpations: Abdomen is soft.      Comments: Ostomy bag in place.   Musculoskeletal:         General: Normal range of motion.      Cervical back: Normal range of motion and neck supple.   Skin:     General: Skin is warm and dry.   Neurological:      General: No focal deficit present.      Mental Status: He is alert and oriented to person, place, and time. Mental status is at baseline.   Psychiatric:         Mood and Affect: Mood normal.          Labs:  Lab Results   Component " Value Date    WBC 10.96 03/18/2024    RBC 4.76 03/18/2024    HGB 14.3 03/18/2024    HCT 42.6 03/18/2024    MCV 90 03/18/2024    MCH 30.0 03/18/2024    MCHC 33.6 03/18/2024    RDW 13.7 03/18/2024     03/18/2024    MPV 9.0 (L) 03/18/2024    GRAN 5.2 03/18/2024    GRAN 47.6 03/18/2024    LYMPH 4.5 03/18/2024    LYMPH 41.4 03/18/2024    MONO 0.9 03/18/2024    MONO 8.4 03/18/2024    EOS 0.1 03/18/2024    BASO 0.11 03/18/2024    EOSINOPHIL 1.3 03/18/2024    BASOPHIL 1.0 03/18/2024     Sodium   Date Value Ref Range Status   03/18/2024 136 136 - 145 mmol/L Final     Potassium   Date Value Ref Range Status   03/18/2024 4.9 3.5 - 5.1 mmol/L Final     Chloride   Date Value Ref Range Status   03/18/2024 100 95 - 110 mmol/L Final     CO2   Date Value Ref Range Status   03/18/2024 28 23 - 29 mmol/L Final     Glucose   Date Value Ref Range Status   03/18/2024 200 (H) 70 - 110 mg/dL Final     BUN   Date Value Ref Range Status   03/18/2024 13 6 - 20 mg/dL Final     Creatinine   Date Value Ref Range Status   03/18/2024 1.1 0.5 - 1.4 mg/dL Final     Calcium   Date Value Ref Range Status   03/18/2024 10.6 (H) 8.7 - 10.5 mg/dL Final     Total Protein   Date Value Ref Range Status   03/18/2024 7.4 6.0 - 8.4 g/dL Final     Albumin   Date Value Ref Range Status   03/18/2024 4.1 3.5 - 5.2 g/dL Final     Total Bilirubin   Date Value Ref Range Status   03/18/2024 0.5 0.1 - 1.0 mg/dL Final     Comment:     For infants and newborns, interpretation of results should be based  on gestational age, weight and in agreement with clinical  observations.    Premature Infant recommended reference ranges:  Up to 24 hours.............<8.0 mg/dL  Up to 48 hours............<12.0 mg/dL  3-5 days..................<15.0 mg/dL  6-29 days.................<15.0 mg/dL       Alkaline Phosphatase   Date Value Ref Range Status   03/18/2024 90 55 - 135 U/L Final     AST   Date Value Ref Range Status   03/18/2024 20 10 - 40 U/L Final     ALT   Date Value Ref  Range Status   03/18/2024 22 10 - 44 U/L Final     Anion Gap   Date Value Ref Range Status   03/18/2024 8 8 - 16 mmol/L Final     eGFR if    Date Value Ref Range Status   07/25/2022 >60 >60 mL/min/1.73 m^2 Final     eGFR if non    Date Value Ref Range Status   07/25/2022 >60 >60 mL/min/1.73 m^2 Final     Comment:     Calculation used to obtain the estimated glomerular filtration  rate (eGFR) is the CKD-EPI equation.          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    -proceed with Keytruda C20 today  -Recent CT scan shows no new evidence of disease, with a smaller lesion on the pancreatic tail and stability of the retroperitoneal lymph node that was radiated.  PET scan shows no evidence of disease, so it was decided to forego surgery.  Testing for ctDNA negative  -return to clinic in 3 weeks for next treatment.  I am okay with him proceeding with reversal of his ostomy we can stop Keytruda if needed for that.  -of note, patient has had a colonoscopy which did show colitis with no biopsies taken.  I am not sure if this is related to the Keytruda, but given that his symptoms of diarrhea are very limited, I will continue the treatment despite this.    Pancreatic mass   -confirmed recurrence of colon adenocarcinoma    Back pain/pain from stoma  - NM bone scan negative for mets  -now on morphine dose to 30 mg bid    HTN  - on Enalapril  - follow up with PCP    HLP  - follow up with PCP    DM2  - on Metformin  - follow up with PCP    Tobacco use  - counseled on  cessation      Aurash Khoobehi, MD  Hematology and Oncology

## 2024-03-19 ENCOUNTER — INFUSION (OUTPATIENT)
Dept: INFUSION THERAPY | Facility: HOSPITAL | Age: 59
End: 2024-03-19
Attending: INTERNAL MEDICINE
Payer: COMMERCIAL

## 2024-03-19 VITALS
RESPIRATION RATE: 18 BRPM | TEMPERATURE: 98 F | HEIGHT: 74 IN | SYSTOLIC BLOOD PRESSURE: 137 MMHG | WEIGHT: 220.44 LBS | BODY MASS INDEX: 28.29 KG/M2 | HEART RATE: 74 BPM | DIASTOLIC BLOOD PRESSURE: 85 MMHG

## 2024-03-19 DIAGNOSIS — C18.4 MALIGNANT NEOPLASM OF TRANSVERSE COLON: Primary | ICD-10-CM

## 2024-03-19 DIAGNOSIS — C78.89 METASTATIC ADENOCARCINOMA TO PANCREAS: ICD-10-CM

## 2024-03-19 PROCEDURE — 63600175 PHARM REV CODE 636 W HCPCS: Mod: JZ,JG | Performed by: INTERNAL MEDICINE

## 2024-03-19 PROCEDURE — 25000003 PHARM REV CODE 250: Performed by: INTERNAL MEDICINE

## 2024-03-19 PROCEDURE — 96413 CHEMO IV INFUSION 1 HR: CPT

## 2024-03-19 PROCEDURE — A4216 STERILE WATER/SALINE, 10 ML: HCPCS | Performed by: INTERNAL MEDICINE

## 2024-03-19 RX ORDER — DIPHENHYDRAMINE HYDROCHLORIDE 50 MG/ML
50 INJECTION INTRAMUSCULAR; INTRAVENOUS ONCE AS NEEDED
Status: DISCONTINUED | OUTPATIENT
Start: 2024-03-19 | End: 2024-03-19 | Stop reason: HOSPADM

## 2024-03-19 RX ORDER — EPINEPHRINE 0.3 MG/.3ML
0.3 INJECTION SUBCUTANEOUS ONCE AS NEEDED
Status: DISCONTINUED | OUTPATIENT
Start: 2024-03-19 | End: 2024-03-19 | Stop reason: HOSPADM

## 2024-03-19 RX ORDER — HEPARIN 100 UNIT/ML
500 SYRINGE INTRAVENOUS
Status: DISCONTINUED | OUTPATIENT
Start: 2024-03-19 | End: 2024-03-19 | Stop reason: HOSPADM

## 2024-03-19 RX ORDER — SODIUM CHLORIDE 0.9 % (FLUSH) 0.9 %
10 SYRINGE (ML) INJECTION
Status: DISCONTINUED | OUTPATIENT
Start: 2024-03-19 | End: 2024-03-19 | Stop reason: HOSPADM

## 2024-03-19 RX ADMIN — HEPARIN 500 UNITS: 100 SYRINGE at 09:03

## 2024-03-19 RX ADMIN — Medication 10 ML: at 09:03

## 2024-03-19 RX ADMIN — SODIUM CHLORIDE: 9 INJECTION, SOLUTION INTRAVENOUS at 09:03

## 2024-03-19 RX ADMIN — SODIUM CHLORIDE 200 MG: 9 INJECTION, SOLUTION INTRAVENOUS at 09:03

## 2024-03-19 NOTE — PLAN OF CARE
Problem: Fatigue  Goal: Improved Activity Tolerance  3/19/2024 0939 by Shobha Gordillo, RN  Outcome: Met  3/19/2024 0939 by Shobha Gordillo, RN  Outcome: Ongoing, Progressing

## 2024-03-27 ENCOUNTER — PATIENT MESSAGE (OUTPATIENT)
Dept: HEMATOLOGY/ONCOLOGY | Facility: CLINIC | Age: 59
End: 2024-03-27
Payer: COMMERCIAL

## 2024-03-27 DIAGNOSIS — T45.1X5A CHEMOTHERAPY-INDUCED NEUROPATHY: ICD-10-CM

## 2024-03-27 DIAGNOSIS — I10 PRIMARY HYPERTENSION: ICD-10-CM

## 2024-03-27 DIAGNOSIS — G62.0 CHEMOTHERAPY-INDUCED NEUROPATHY: ICD-10-CM

## 2024-03-27 DIAGNOSIS — C77.2 METASTASIS TO RETROPERITONEAL LYMPH NODE: ICD-10-CM

## 2024-03-27 DIAGNOSIS — E11.00 TYPE 2 DIABETES MELLITUS WITH HYPEROSMOLARITY WITHOUT COMA, WITHOUT LONG-TERM CURRENT USE OF INSULIN: ICD-10-CM

## 2024-03-27 DIAGNOSIS — E78.49 OTHER HYPERLIPIDEMIA: ICD-10-CM

## 2024-03-27 DIAGNOSIS — C18.4 MALIGNANT NEOPLASM OF TRANSVERSE COLON: ICD-10-CM

## 2024-03-27 DIAGNOSIS — C78.89 METASTATIC ADENOCARCINOMA TO PANCREAS: ICD-10-CM

## 2024-03-27 RX ORDER — MORPHINE SULFATE 30 MG/1
30 TABLET, FILM COATED, EXTENDED RELEASE ORAL 2 TIMES DAILY
Qty: 60 TABLET | Refills: 0 | Status: ON HOLD | OUTPATIENT
Start: 2024-03-27 | End: 2024-05-29 | Stop reason: HOSPADM

## 2024-04-08 ENCOUNTER — LAB VISIT (OUTPATIENT)
Dept: LAB | Facility: HOSPITAL | Age: 59
End: 2024-04-08
Attending: INTERNAL MEDICINE
Payer: COMMERCIAL

## 2024-04-08 ENCOUNTER — OFFICE VISIT (OUTPATIENT)
Dept: HEMATOLOGY/ONCOLOGY | Facility: CLINIC | Age: 59
End: 2024-04-08
Payer: COMMERCIAL

## 2024-04-08 ENCOUNTER — PATIENT MESSAGE (OUTPATIENT)
Dept: HEMATOLOGY/ONCOLOGY | Facility: CLINIC | Age: 59
End: 2024-04-08
Payer: COMMERCIAL

## 2024-04-08 VITALS
OXYGEN SATURATION: 100 % | HEART RATE: 83 BPM | SYSTOLIC BLOOD PRESSURE: 155 MMHG | BODY MASS INDEX: 28.94 KG/M2 | TEMPERATURE: 98 F | WEIGHT: 225.5 LBS | DIASTOLIC BLOOD PRESSURE: 77 MMHG | RESPIRATION RATE: 18 BRPM | HEIGHT: 74 IN

## 2024-04-08 DIAGNOSIS — C77.2 METASTASIS TO RETROPERITONEAL LYMPH NODE: ICD-10-CM

## 2024-04-08 DIAGNOSIS — C18.4 MALIGNANT NEOPLASM OF TRANSVERSE COLON: ICD-10-CM

## 2024-04-08 DIAGNOSIS — C78.89 METASTATIC ADENOCARCINOMA TO PANCREAS: ICD-10-CM

## 2024-04-08 DIAGNOSIS — C18.4 MALIGNANT NEOPLASM OF TRANSVERSE COLON: Primary | ICD-10-CM

## 2024-04-08 LAB
ALBUMIN SERPL BCP-MCNC: 3.9 G/DL (ref 3.5–5.2)
ALP SERPL-CCNC: 88 U/L (ref 55–135)
ALT SERPL W/O P-5'-P-CCNC: 20 U/L (ref 10–44)
ANION GAP SERPL CALC-SCNC: 10 MMOL/L (ref 8–16)
AST SERPL-CCNC: 20 U/L (ref 10–40)
BASOPHILS # BLD AUTO: 0.11 K/UL (ref 0–0.2)
BASOPHILS NFR BLD: 0.9 % (ref 0–1.9)
BILIRUB SERPL-MCNC: 0.5 MG/DL (ref 0.1–1)
BUN SERPL-MCNC: 17 MG/DL (ref 6–20)
CALCIUM SERPL-MCNC: 9.9 MG/DL (ref 8.7–10.5)
CEA SERPL-MCNC: 3.6 NG/ML (ref 0–5)
CHLORIDE SERPL-SCNC: 101 MMOL/L (ref 95–110)
CO2 SERPL-SCNC: 27 MMOL/L (ref 23–29)
CREAT SERPL-MCNC: 1.3 MG/DL (ref 0.5–1.4)
DIFFERENTIAL METHOD BLD: ABNORMAL
EOSINOPHIL # BLD AUTO: 0.1 K/UL (ref 0–0.5)
EOSINOPHIL NFR BLD: 1 % (ref 0–8)
ERYTHROCYTE [DISTWIDTH] IN BLOOD BY AUTOMATED COUNT: 13.7 % (ref 11.5–14.5)
EST. GFR  (NO RACE VARIABLE): >60 ML/MIN/1.73 M^2
GLUCOSE SERPL-MCNC: 143 MG/DL (ref 70–110)
HCT VFR BLD AUTO: 42 % (ref 40–54)
HGB BLD-MCNC: 14.1 G/DL (ref 14–18)
IMM GRANULOCYTES # BLD AUTO: 0.05 K/UL (ref 0–0.04)
IMM GRANULOCYTES NFR BLD AUTO: 0.4 % (ref 0–0.5)
LYMPHOCYTES # BLD AUTO: 5.2 K/UL (ref 1–4.8)
LYMPHOCYTES NFR BLD: 40.7 % (ref 18–48)
MCH RBC QN AUTO: 29.6 PG (ref 27–31)
MCHC RBC AUTO-ENTMCNC: 33.6 G/DL (ref 32–36)
MCV RBC AUTO: 88 FL (ref 82–98)
MONOCYTES # BLD AUTO: 1.1 K/UL (ref 0.3–1)
MONOCYTES NFR BLD: 8.8 % (ref 4–15)
NEUTROPHILS # BLD AUTO: 6.1 K/UL (ref 1.8–7.7)
NEUTROPHILS NFR BLD: 48.2 % (ref 38–73)
NRBC BLD-RTO: 0 /100 WBC
PLATELET # BLD AUTO: 383 K/UL (ref 150–450)
PMV BLD AUTO: 9.2 FL (ref 9.2–12.9)
POTASSIUM SERPL-SCNC: 4.9 MMOL/L (ref 3.5–5.1)
PROT SERPL-MCNC: 7.1 G/DL (ref 6–8.4)
RBC # BLD AUTO: 4.76 M/UL (ref 4.6–6.2)
SODIUM SERPL-SCNC: 138 MMOL/L (ref 136–145)
WBC # BLD AUTO: 12.74 K/UL (ref 3.9–12.7)

## 2024-04-08 PROCEDURE — 85025 COMPLETE CBC W/AUTO DIFF WBC: CPT | Performed by: INTERNAL MEDICINE

## 2024-04-08 PROCEDURE — 36415 COLL VENOUS BLD VENIPUNCTURE: CPT | Performed by: INTERNAL MEDICINE

## 2024-04-08 PROCEDURE — 99215 OFFICE O/P EST HI 40 MIN: CPT | Mod: S$GLB,,, | Performed by: INTERNAL MEDICINE

## 2024-04-08 PROCEDURE — 82378 CARCINOEMBRYONIC ANTIGEN: CPT | Performed by: INTERNAL MEDICINE

## 2024-04-08 PROCEDURE — 99999 PR PBB SHADOW E&M-EST. PATIENT-LVL III: CPT | Mod: PBBFAC,,, | Performed by: INTERNAL MEDICINE

## 2024-04-08 PROCEDURE — 80053 COMPREHEN METABOLIC PANEL: CPT | Performed by: INTERNAL MEDICINE

## 2024-04-08 RX ORDER — DIPHENHYDRAMINE HYDROCHLORIDE 50 MG/ML
50 INJECTION INTRAMUSCULAR; INTRAVENOUS ONCE AS NEEDED
Status: CANCELLED | OUTPATIENT
Start: 2024-04-09

## 2024-04-08 RX ORDER — SODIUM CHLORIDE 0.9 % (FLUSH) 0.9 %
10 SYRINGE (ML) INJECTION
Status: CANCELLED | OUTPATIENT
Start: 2024-04-09

## 2024-04-08 RX ORDER — EPINEPHRINE 0.3 MG/.3ML
0.3 INJECTION SUBCUTANEOUS ONCE AS NEEDED
Status: CANCELLED | OUTPATIENT
Start: 2024-04-09

## 2024-04-08 RX ORDER — HEPARIN 100 UNIT/ML
500 SYRINGE INTRAVENOUS
Status: CANCELLED | OUTPATIENT
Start: 2024-04-09

## 2024-04-08 NOTE — PROGRESS NOTES
"Service Date:  4/8/24    Chief Complaint:  Colon cancer    Osman Li Jr. is a 58 y.o. 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  This occurred very shortly after completing treatment.    Patient was part of a clinical trial to measure CT DNA.  His CT DNA started to go up while he was on FOLFOX, which prompted the scan, which showed the progression.  Next generation sequencing also showed a high tumor burden.    He is now on Keytruda as his cancer has a very high tumor mutation burden.  So far, Keytruda has been helping    Here for cycle 21.  Doing well.  No new complaints.  PET scan showed no active disease.  Therefore was decided to not pursue surgery at this time.  He has met with Dr. Goodwin.  There is consideration for reversing his colectomy.  This will be in collaboration with Dr. Lance.      Review of Systems   Constitutional:  Positive for fatigue.   HENT: Negative.     Eyes: Negative.    Respiratory: Negative.     Cardiovascular: Negative.    Gastrointestinal: Negative.    Endocrine: Negative.    Genitourinary: Negative.    Musculoskeletal: Negative.    Integumentary:  Negative.   Neurological:  Positive for numbness.   Hematological: Negative.    Psychiatric/Behavioral: Negative.          Current Outpatient Medications   Medication Instructions    atorvastatin (LIPITOR) 20 mg, Oral, Daily    enalapriL-hydrochlorothiazide (VASERETIC) 10-25 mg per tablet 1 tablet, Oral, Daily    metFORMIN (GLUCOPHAGE) 1,000 mg, Oral, 2 times daily with meals    morphine (MS CONTIN) 30 mg, Oral, 2 times daily    pen needle, diabetic 31 gauge x 3/16" Ndle 1 each, Misc.(Non-Drug; Combo Route), Daily    promethazine (PHENERGAN) 12.5 mg, Oral, Every 4 hours PRN    sildenafiL (VIAGRA) 100 mg, Oral, Daily PRN    TOUJEO MAX U-300 SOLOSTAR 26 Units, Subcutaneous, Daily    traZODone (DESYREL) 50 mg, Oral, Nightly        Past " Medical History:   Diagnosis Date    Colon cancer     Digestive disorder     DM (diabetes mellitus)     Hyperlipidemia     Hypertension     Prediabetes         Past Surgical History:   Procedure Laterality Date    ADENOIDECTOMY  1972    CHOLECYSTECTOMY  2011    COLON SURGERY      COLONOSCOPY      2018    COLONOSCOPY N/A 3/5/2024    Procedure: COLONOSCOPY;  Surgeon: Farhan Lance MD;  [facility]  Service: Endoscopy;  Laterality: N/A;  2/27/24-Kethman pt, 1-4wks, port, PEG plus 2 enemas morning of procedure, instr portal-DS  2/28/24- LVM for precall - ERW  pt confirmed procedure. cf    COLOSTOMY N/A 04/25/2022    Procedure: CREATION, COLOSTOMY;  Surgeon: Carlton Waddell MD;  Location: [facility] OR;  Service: General;  Laterality: N/A;    ENDOSCOPIC ULTRASOUND OF UPPER GASTROINTESTINAL TRACT N/A 01/06/2023    Procedure: ULTRASOUND, UPPER GI TRACT, ENDOSCOPIC;  Surgeon: Rinku Orozco III, MD;  Location: [facility];  Service: Endoscopy;  Laterality: N/A;    INSERTION OF TUNNELED CENTRAL VENOUS CATHETER (CVC) WITH SUBCUTANEOUS PORT Right 05/18/2022    Procedure: [id number];  Surgeon: Carlton Waddell MD;  Location: [facility] OR;  Service: General;  Laterality: Right;    MOBILIZATION OF SPLENIC FLEXURE N/A 04/25/2022    Procedure: MOBILIZATION, SPLENIC FLEXURE;  Surgeon: Carlton Waddell MD;  Location: [facility] OR;  Service: General;  Laterality: N/A;    SPLENECTOMY N/A 04/25/2022    Procedure: SPLENECTOMY;  Surgeon: Carlton Waddell MD;  Location: [facility] OR;  Service: General;  Laterality: N/A;    SUBTOTAL COLECTOMY N/A 04/25/2022    Procedure: COLECTOMY, PARTIAL;  Surgeon: Carlton Waddell MD;  Location: [facility] OR;  Service: General;  Laterality: N/A;    TONSILLECTOMY      TUMOR REMOVAL  10/18/2023    on top of the nose    VASECTOMY  1994        Family History   Problem Relation Age of Onset    Heart disease Father     Diabetes Father     Rectal cancer Other         half-sister       Social History  "    Tobacco Use    Smoking status: Every Day     Current packs/day: 1.00     Average packs/day: 1 pack/day for 40.0 years (40.0 ttl pk-yrs)     Types: Cigarettes     Passive exposure: Current    Smokeless tobacco: Never   Substance Use Topics    Alcohol use: Not Currently    Drug use: Never         Vitals:    04/08/24 1040   BP: (!) 155/77   Pulse: 83   Resp: 18   Temp: 97.9 °F (36.6 °C)          Physical Exam:  BP (!) 155/77 (BP Location: Left arm, Patient Position: Sitting, BP Method: Large (Automatic))   Pulse 83   Temp 97.9 °F (36.6 °C) (Temporal)   Resp 18   Ht 6' 2" (1.88 m)   Wt 102.3 kg (225 lb 8.5 oz)   SpO2 100%   BMI 28.96 kg/m²     Physical Exam  Vitals and nursing note reviewed.   Constitutional:       Appearance: Normal appearance.   HENT:      Head: Normocephalic and atraumatic.      Nose: Nose normal.      Mouth/Throat:      Mouth: Mucous membranes are moist.      Pharynx: Oropharynx is clear.   Eyes:      Extraocular Movements: Extraocular movements intact.      Conjunctiva/sclera: Conjunctivae normal.   Cardiovascular:      Rate and Rhythm: Normal rate and regular rhythm.      Heart sounds: Normal heart sounds.   Pulmonary:      Effort: Pulmonary effort is normal.      Breath sounds: Normal breath sounds.   Abdominal:      General: Abdomen is flat. Bowel sounds are normal.      Palpations: Abdomen is soft.      Comments: Ostomy bag in place.   Musculoskeletal:         General: Normal range of motion.      Cervical back: Normal range of motion and neck supple.   Skin:     General: Skin is warm and dry.   Neurological:      General: No focal deficit present.      Mental Status: He is alert and oriented to person, place, and time. Mental status is at baseline.   Psychiatric:         Mood and Affect: Mood normal.          Labs:  Lab Results   Component Value Date    WBC 12.74 (H) 04/08/2024    RBC 4.76 04/08/2024    HGB 14.1 04/08/2024    HCT 42.0 04/08/2024    MCV 88 04/08/2024    MCH 29.6 " 04/08/2024    MCHC 33.6 04/08/2024    RDW 13.7 04/08/2024     04/08/2024    MPV 9.2 04/08/2024    GRAN 6.1 04/08/2024    GRAN 48.2 04/08/2024    LYMPH 5.2 (H) 04/08/2024    LYMPH 40.7 04/08/2024    MONO 1.1 (H) 04/08/2024    MONO 8.8 04/08/2024    EOS 0.1 04/08/2024    BASO 0.11 04/08/2024    EOSINOPHIL 1.0 04/08/2024    BASOPHIL 0.9 04/08/2024     Sodium   Date Value Ref Range Status   04/08/2024 138 136 - 145 mmol/L Final     Potassium   Date Value Ref Range Status   04/08/2024 4.9 3.5 - 5.1 mmol/L Final     Chloride   Date Value Ref Range Status   04/08/2024 101 95 - 110 mmol/L Final     CO2   Date Value Ref Range Status   04/08/2024 27 23 - 29 mmol/L Final     Glucose   Date Value Ref Range Status   04/08/2024 143 (H) 70 - 110 mg/dL Final     BUN   Date Value Ref Range Status   04/08/2024 17 6 - 20 mg/dL Final     Creatinine   Date Value Ref Range Status   04/08/2024 1.3 0.5 - 1.4 mg/dL Final     Calcium   Date Value Ref Range Status   04/08/2024 9.9 8.7 - 10.5 mg/dL Final     Total Protein   Date Value Ref Range Status   04/08/2024 7.1 6.0 - 8.4 g/dL Final     Albumin   Date Value Ref Range Status   04/08/2024 3.9 3.5 - 5.2 g/dL Final     Total Bilirubin   Date Value Ref Range Status   04/08/2024 0.5 0.1 - 1.0 mg/dL Final     Comment:     For infants and newborns, interpretation of results should be based  on gestational age, weight and in agreement with clinical  observations.    Premature Infant recommended reference ranges:  Up to 24 hours.............<8.0 mg/dL  Up to 48 hours............<12.0 mg/dL  3-5 days..................<15.0 mg/dL  6-29 days.................<15.0 mg/dL       Alkaline Phosphatase   Date Value Ref Range Status   04/08/2024 88 55 - 135 U/L Final     AST   Date Value Ref Range Status   04/08/2024 20 10 - 40 U/L Final     ALT   Date Value Ref Range Status   04/08/2024 20 10 - 44 U/L Final     Anion Gap   Date Value Ref Range Status   04/08/2024 10 8 - 16 mmol/L Final     eGFR if     Date Value Ref Range Status   07/25/2022 >60 >60 mL/min/1.73 m^2 Final     eGFR if non    Date Value Ref Range Status   07/25/2022 >60 >60 mL/min/1.73 m^2 Final     Comment:     Calculation used to obtain the estimated glomerular filtration  rate (eGFR) is the CKD-EPI equation.          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    -proceed with Keytruda cycle 21 today  -Recent CT scan shows no new evidence of disease, with a smaller lesion on the pancreatic tail and stability of the retroperitoneal lymph node that was radiated.  PET scan shows no evidence of disease, so it was decided to forego surgery.  Testing for ctDNA negative  -today will be his last treatment, then we will hold for his surgery on May 13 and then resume every 6 weeks dose afterwards.    Pancreatic mass   -confirmed recurrence of colon adenocarcinoma    Back pain/pain from stoma  - NM bone scan negative for mets  -now on morphine dose to 30 mg bid    HTN  - on Enalapril  - follow up with PCP    HLP  - follow up with PCP    DM2  - on Metformin  - follow up with PCP    Tobacco use  - counseled on cessation      Aurash Khoobehi, MD  Hematology and Oncology

## 2024-04-09 ENCOUNTER — INFUSION (OUTPATIENT)
Dept: INFUSION THERAPY | Facility: HOSPITAL | Age: 59
End: 2024-04-09
Attending: INTERNAL MEDICINE
Payer: COMMERCIAL

## 2024-04-09 VITALS
BODY MASS INDEX: 28.8 KG/M2 | OXYGEN SATURATION: 97 % | TEMPERATURE: 98 F | DIASTOLIC BLOOD PRESSURE: 82 MMHG | SYSTOLIC BLOOD PRESSURE: 158 MMHG | HEIGHT: 74 IN | RESPIRATION RATE: 18 BRPM | HEART RATE: 77 BPM | WEIGHT: 224.38 LBS

## 2024-04-09 DIAGNOSIS — C18.4 MALIGNANT NEOPLASM OF TRANSVERSE COLON: Primary | ICD-10-CM

## 2024-04-09 DIAGNOSIS — C78.89 METASTATIC ADENOCARCINOMA TO PANCREAS: ICD-10-CM

## 2024-04-09 PROCEDURE — 63600175 PHARM REV CODE 636 W HCPCS: Mod: JZ,JG | Performed by: INTERNAL MEDICINE

## 2024-04-09 PROCEDURE — 96413 CHEMO IV INFUSION 1 HR: CPT

## 2024-04-09 PROCEDURE — 25000003 PHARM REV CODE 250: Performed by: INTERNAL MEDICINE

## 2024-04-09 PROCEDURE — A4216 STERILE WATER/SALINE, 10 ML: HCPCS | Performed by: INTERNAL MEDICINE

## 2024-04-09 RX ORDER — HEPARIN 100 UNIT/ML
500 SYRINGE INTRAVENOUS
Status: DISCONTINUED | OUTPATIENT
Start: 2024-04-09 | End: 2024-04-09 | Stop reason: HOSPADM

## 2024-04-09 RX ORDER — SODIUM CHLORIDE 0.9 % (FLUSH) 0.9 %
10 SYRINGE (ML) INJECTION
Status: DISCONTINUED | OUTPATIENT
Start: 2024-04-09 | End: 2024-04-09 | Stop reason: HOSPADM

## 2024-04-09 RX ORDER — EPINEPHRINE 0.3 MG/.3ML
0.3 INJECTION SUBCUTANEOUS ONCE AS NEEDED
Status: DISCONTINUED | OUTPATIENT
Start: 2024-04-09 | End: 2024-04-09 | Stop reason: HOSPADM

## 2024-04-09 RX ORDER — DIPHENHYDRAMINE HYDROCHLORIDE 50 MG/ML
50 INJECTION INTRAMUSCULAR; INTRAVENOUS ONCE AS NEEDED
Status: DISCONTINUED | OUTPATIENT
Start: 2024-04-09 | End: 2024-04-09 | Stop reason: HOSPADM

## 2024-04-09 RX ADMIN — Medication 10 ML: at 10:04

## 2024-04-09 RX ADMIN — SODIUM CHLORIDE 200 MG: 9 INJECTION, SOLUTION INTRAVENOUS at 09:04

## 2024-04-09 RX ADMIN — HEPARIN 500 UNITS: 100 SYRINGE at 10:04

## 2024-04-09 RX ADMIN — SODIUM CHLORIDE: 9 INJECTION, SOLUTION INTRAVENOUS at 09:04

## 2024-04-09 NOTE — PLAN OF CARE
Problem: Fatigue  Goal: Improved Activity Tolerance  Outcome: Ongoing, Progressing  Intervention: Promote Improved Energy  Flowsheets (Taken 4/9/2024 7888)  Fatigue Management: frequent rest breaks encouraged  Sleep/Rest Enhancement: regular sleep/rest pattern promoted  Activity Management:   Ambulated -L4   Up in chair - L3

## 2024-04-12 ENCOUNTER — PATIENT MESSAGE (OUTPATIENT)
Dept: FAMILY MEDICINE | Facility: CLINIC | Age: 59
End: 2024-04-12
Payer: COMMERCIAL

## 2024-04-12 NOTE — TELEPHONE ENCOUNTER
Pt is needing a refill on her Enalapril, Atorvastatin, Metformin and Toujeo. Last office visit 02/26/2024. Next office visit 06/27/2024.         Spoke with pt in regards to message sent. Pt stated that he spoke with his pharmacy, they stated that they will change the prescription back to a 90 day supply. No medication are needing a refill at this time.

## 2024-04-18 NOTE — PROGRESS NOTES
[facility]  Colon and Rectal Surgery    [address]  [place], [place]  Tel: [phone]  Fax: [phone]  [email or web address]   MD Payam Reid MD Brian Kann, MD W. Forrest Johnston, MD Matthew Giglia, MD Jennifer Paruch, MD William Kethman, MD Danielle Kay, MD     Patient name: Loki Li Jr.   YOB: 1965   MRN: [id number]    Dear Carissa Goodwin and Khoobehi,    It was a pleasure seeing Mr. Li in the Colon and Rectal Surgery clinic here at [facility].     As you know, Mr. Li is a 58 year old man with a history of HTN, HLD, and DM, Stage IV transverse colon adenocarcinoma who presents for evaluation of colostomy takedown . He underwent a splenic flexure resection, end colostomy and splenectomy with Dr. Waddell > FOLFOX > progression (pancreatic tail - biopsy proven and RPLN PET avidity) > Keytruda since 1/2023. He was seen by Dr. Goodwin in 1/2024 (note reviewed) - his plan was to follow-up the results of his PET CT, decision was made to complete Keytruda and continue surveillance - if recurrence occurs then move forward with resection. He is doing well - fatigue only around the time of Keytruda for about 1 week after, he denies any nausea, vomiting, fevers, or chills. His weight is stable but about 30 lbs less than originally. He denies fecal incontinence.     PET CT - 1/8/2024  Positive therapy response with resolution of the metastatic retroperitoneal lymph node compared to the prior examination.  No FDG avid foci are present on today's exam.     Last colonoscopy: 2018 (Complete) - repeat in 5 years  Two sessile polyps 5-6 mm in descending colon, polypectomy performed (HP)  Two sessile polyps 5-7 mm in the sigmoid colon, polypectomy performed (HP)  18 diminutive recto sigmoid polyps were ablated    4/19/2024  Here for pre-operative consultation for colostomy takedown and likely distal pancreatectomy. He is doing  well - no major changes since the last time I saw him. He is ready for surgery.    Colonoscopy - 3/5/2024  The perianal and digital rectal examinations were normal. Pertinent negatives include normal sphincter tone.   Diffuse mild inflammation characterized by altered vascularity, friability and mucus was found in the rectum, in the sigmoid colon and in the descending colon. No biopsies or other specimens were collected for this exam. Estimated blood loss was minimal.   There was evidence of a widely patent end colostomy in the transverse colon. This was characterized by healthy appearing mucosa.   The exam was otherwise without abnormality.     Oncology History   Malignant neoplasm of transverse colon   4/20/2022 Surgery    Partial colectomy, splenectomy, end colostomy - pancreas appeared to be involved at that time    1. Spleen, splenectomy:   - Benign splenic parenchyma   - Negative for malignancy   2. Colon, partial colectomy:   - Invasive colonic adenocarcinoma, poorly differentiated (G3)     - Invades into pericolorectal tissue (pT3)   - Margins uninvolved by invasive carcinoma     - 0.1 cm to the mesenteric margin   - Lymphovascular invasion identified   - No perineural invasion identified   - Seventeen of twenty-seven lymph nodes, positive for metastatic carcinoma   (17/27, pN2b)   - Fibrous serosal adhesions   - See below for results of MSI testing   - CARLOS ENRIQUE/MILLIE Targeted Gene Panel has been ordered and results will be issued in   a supplemental report     CAP Synoptic Checklist for Colonic Neoplasms:     - Procedure: Partial colectomy     - Tumor Site: Splenic flexure     - Histologic Type: Adenocarcinoma     - Histologic Grade: G3, poorly differentiated     - Tumor Size: 5.0 x 4.5 x 2.7 cm     - Tumor Extent: Invades through muscularis propria into pericolorectal   tissue    - Macroscopic Tumor Perforation: Not identified     - Lymphovascular Invasion: Present, small vessel involved     - Perineural  Invasion: Not identified     - Number of Tumor Buds: 5 in one hotspot field     - Tumor Bud Score: Intermediate (5-9)     - Type of Polyp in which Invasive Carcinoma Arose: None identified     - Treatment Effect: No known presurgical therapy     - Margins: All margins negative for invasive carcinoma       - Closest Margin to Invasive Carcinoma: 0.1 cm to mesenteric margin     - Regional Lymph Nodes:       - Number of Lymph Nodes with Tumor: 17       - Number of Lymph Nodes Examined: 27       - Tumor Deposits: Not identified     - Distant Metastasis: Not applicable     - Pathologic Stage Classification: pT3 pN2b     - Additional Findings: Fibrous serosal adhesions; benign spleen     - Special Studies: See below for results of MSI testing; CARLOS ENRIQUE/MILLIE panel has         been ordered and results will be issued in a supplemental report     - Designate Block for Future Studies: [id number]   Immunohistochemistry (IHC) Testing for Mismatch Repair (MMR) Proteins:   MLH1 - Intact nuclear expression   MSH2 - Intact nuclear expression   MSH6 - Intact nuclear expression   PMS2 - Intact nuclear expression      5/5/2022 Initial Diagnosis    Malignant neoplasm of transverse colon     7/12/2022 - 12/14/2022 Chemotherapy    Treatment Summary   Plan Name: OP mFOLFOX 6 Q2W  Treatment Goal: Curative  Status: Inactive  Start Date: 7/12/2022  End Date: 12/14/2022  Provider: Aurash Khoobehi, MD  Chemotherapy: fluorouraciL injection 930 mg, 400 mg/m2 = 930 mg, Intravenous, Clinic/HOD 1 time, 12 of 12 cycles  Administration: 930 mg (7/12/2022), 930 mg (7/26/2022), 930 mg (8/9/2022), 930 mg (8/23/2022), 930 mg (9/6/2022), 930 mg (9/19/2022), 835 mg (10/3/2022), 835 mg (10/17/2022), 825 mg (10/31/2022), 820 mg (11/14/2022), 830 mg (11/28/2022), 825 mg (12/12/2022)  oxaliplatin (ELOXATIN) 85 mg/m2 = 197 mg in dextrose 5 % 539.4 mL chemo infusion, 85 mg/m2 = 197 mg, Intravenous, Clinic/HOD 1 time, 10 of 10 cycles  Dose modification: 68 mg/m2  (original dose 85 mg/m2, Cycle 8, Reason: MD Discretion)  Administration: 197 mg (7/12/2022), 197 mg (7/26/2022), 197 mg (8/9/2022), 197 mg (8/23/2022), 197 mg (9/6/2022), 197 mg (9/19/2022), 178 mg (10/3/2022), 142 mg (10/17/2022), 139 mg (11/14/2022)     2/13/2023 -  Chemotherapy    Treatment Summary   Plan Name: OP PEMBROLIZUMAB 200MG Q3W  Treatment Goal: Curative  Status: Active  Start Date: 2/13/2023  End Date: 1/28/2025 (Planned)  Provider: Aurash Khoobehi, MD  Chemotherapy: [No matching medication found in this treatment plan]     2/26/2024 Cancer Staged    Staging form: Colon and Rectum, AJCC 8th Edition  - Pathologic: Stage IVB (pT3, pN2b, cM1b)     Metastatic adenocarcinoma to pancreas   1/13/2023 Initial Diagnosis    Metastatic adenocarcinoma to pancreas     2/13/2023 -  Chemotherapy    Treatment Summary   Plan Name: OP PEMBROLIZUMAB 200MG Q3W  Treatment Goal: Curative  Status: Active  Start Date: 2/13/2023  End Date: 1/28/2025 (Planned)  Provider: Aurash Khoobehi, MD  Chemotherapy: [No matching medication found in this treatment plan]         The patient was informed of the availability of a certified  without charge. A certified  was not necessary for this visit.    Review of Systems  See pertinent review of systems above    Past Medical History:   Diagnosis Date    Colon cancer     Digestive disorder     DM (diabetes mellitus)     Hyperlipidemia     Hypertension     Prediabetes      Past Surgical History:   Procedure Laterality Date    ADENOIDECTOMY  1972    CHOLECYSTECTOMY  2011    COLON SURGERY      COLONOSCOPY      2018    COLONOSCOPY N/A 3/5/2024    Procedure: COLONOSCOPY;  Surgeon: Farhan Lance MD;  Location: [address]);  Service: Endoscopy;  Laterality: N/A;  2/27/24-Kethman pt, 1-4wks, port, PEG plus 2 enemas morning of procedure, instr portal-DS  2/28/24- LVM for precall - ERW  pt confirmed procedure. cf    COLOSTOMY N/A 04/25/2022    Procedure:  CREATION, COLOSTOMY;  Surgeon: Carlton Waddell MD;  Location: [facility] OR;  Service: General;  Laterality: N/A;    ENDOSCOPIC ULTRASOUND OF UPPER GASTROINTESTINAL TRACT N/A 01/06/2023    Procedure: ULTRASOUND, UPPER GI TRACT, ENDOSCOPIC;  Surgeon: Rinku Orozco III, MD;  Location: [facility] ENDO;  Service: Endoscopy;  Laterality: N/A;    INSERTION OF TUNNELED CENTRAL VENOUS CATHETER (CVC) WITH SUBCUTANEOUS PORT Right 05/18/2022    Procedure: [id number];  Surgeon: Carlton Waddell MD;  Location: [facility] OR;  Service: General;  Laterality: Right;    MOBILIZATION OF SPLENIC FLEXURE N/A 04/25/2022    Procedure: MOBILIZATION, SPLENIC FLEXURE;  Surgeon: Carlton Waddell MD;  Location: [facility] OR;  Service: General;  Laterality: N/A;    SPLENECTOMY N/A 04/25/2022    Procedure: SPLENECTOMY;  Surgeon: Carltno Waddell MD;  Location: [facility] OR;  Service: General;  Laterality: N/A;    SUBTOTAL COLECTOMY N/A 04/25/2022    Procedure: COLECTOMY, PARTIAL;  Surgeon: Carlton Waddell MD;  Location: [facility] OR;  Service: General;  Laterality: N/A;    TONSILLECTOMY      TUMOR REMOVAL  10/18/2023    on top of the nose    VASECTOMY  1994     Family History   Problem Relation Name Age of Onset    Heart disease Father Loki Loya     Diabetes Father Loki Loya     Rectal cancer Other Padmini         half-sister     Social History     Tobacco Use    Smoking status: Every Day     Current packs/day: 1.00     Average packs/day: 1 pack/day for 40.0 years (40.0 ttl pk-yrs)     Types: Cigarettes     Passive exposure: Current    Smokeless tobacco: Never   Substance Use Topics    Alcohol use: Not Currently    Drug use: Never     Review of patient's allergies indicates:   Allergen Reactions    Penicillins Rash       Current Outpatient Medications on File Prior to Visit   Medication Sig Dispense Refill    atorvastatin (LIPITOR) 20 MG tablet Take 1 tablet (20 mg total) by mouth once daily. 90 tablet 3    enalapriL-hydrochlorothiazide (VASERETIC) 10-25 mg per tablet  "Take 1 tablet by mouth once daily. 90 tablet 3    insulin glargine U-300 conc (TOUJEO MAX U-300 SOLOSTAR) 300 unit/mL (3 mL) insulin pen Inject 26 Units into the skin once daily. 2 pen 11    metFORMIN (GLUCOPHAGE) 1000 MG tablet Take 1 tablet (1,000 mg total) by mouth 2 (two) times daily with meals. 180 tablet 3    morphine (MS CONTIN) 30 MG 12 hr tablet Take 1 tablet (30 mg total) by mouth 2 (two) times daily. 60 tablet 0    pen needle, diabetic 31 gauge x 3/16" Ndle 1 each by Misc.(Non-Drug; Combo Route) route once daily. 90 each 3    promethazine (PHENERGAN) 12.5 MG Tab Take 1 tablet (12.5 mg total) by mouth every 4 (four) hours as needed. (Patient taking differently: Take 12.5 mg by mouth every 4 (four) hours as needed (as needed).) 25 tablet 1    sildenafiL (VIAGRA) 100 MG tablet Take 1 tablet (100 mg total) by mouth daily as needed for Erectile Dysfunction. (Patient taking differently: Take 100 mg by mouth daily as needed for Erectile Dysfunction (as needed).) 30 tablet 3    traZODone (DESYREL) 50 MG tablet Take 1 tablet (50 mg total) by mouth every evening. 90 tablet 3     No current facility-administered medications on file prior to visit.     Physical Examination  BP (!) 156/80 (BP Location: Right arm, Patient Position: Sitting, BP Method: Large (Automatic))   Pulse 80   Resp 18   Ht 6' 2" (1.88 m)   Wt 102 kg (224 lb 13.9 oz)   BMI 28.87 kg/m²      Constitutional: well developed, no cough, no dyspnea, alert, and no acute distress    Head: Normocephalic, no lesions, without obvious abnormality  Eye: Normal external eye, conjunctiva, and lids  Cardiovascular: regular rate and regular rhythm  Respiratory: normal air entry  Gastrointestinal: soft, non-tender, ostomy in place with parastomal hernia  Musculoskeletal: full range of motion without pain  Neurologic: alert, oriented, normal speech, no focal findings or movement disorder noted  Psychiatric: appropriate, normal mood    Assessment and Plan of " Care    Thank you again for referring Mr. Li to my care. In summary, Mr. Li is a 58 year old man presenting with Stage IV splenic flexure adenocarcinoma, metastatic to retroperitoneal node and pancreatic tail with excellent response now to Keytruda. He is here today to discuss consideration of colostomy takedown - scheduled with Dr. Goodwin on 5/13/2024. We discussed treatment options and have provided the following recommendations:    Long discussion today - would not recommend parastomal hernia repair, would prefer colostomy takedown - discussed anatomy at length and reviewed imaging  Discussed care previously at length with Carissa Goodwin and Khoobehi  The patient and I have discussed the indications for their surgery. We discussed the role for segmental resection to remove the colostomy and likely distal staple line (adjacent to pancreas). The expected hospital course and recovery have been discussed, as well as the potential risks, including: Bleeding, risk of blood transfusion, infection, hernia formation, need for additional procedure, ileus, anastomotic leak, need for reoperation, injury to nearby structures, including bladder or ureters, permanent or temporary stoma creation. They had the opportunity to ask questions - consent was signed.    Please do not hesitate to contact me if you have any questions.      Farhan Lance MD, FACS, FASCRS  Department of Colon & Rectal Surgery  [facility]

## 2024-04-19 ENCOUNTER — OFFICE VISIT (OUTPATIENT)
Dept: SURGERY | Facility: CLINIC | Age: 59
End: 2024-04-19
Payer: COMMERCIAL

## 2024-04-19 VITALS
RESPIRATION RATE: 18 BRPM | HEIGHT: 74 IN | HEART RATE: 80 BPM | BODY MASS INDEX: 28.86 KG/M2 | DIASTOLIC BLOOD PRESSURE: 80 MMHG | SYSTOLIC BLOOD PRESSURE: 156 MMHG | WEIGHT: 224.88 LBS

## 2024-04-19 DIAGNOSIS — C18.5 MALIGNANT NEOPLASM OF SPLENIC FLEXURE: Primary | ICD-10-CM

## 2024-04-19 PROCEDURE — 99999 PR PBB SHADOW E&M-EST. PATIENT-LVL III: CPT | Mod: PBBFAC,,, | Performed by: STUDENT IN AN ORGANIZED HEALTH CARE EDUCATION/TRAINING PROGRAM

## 2024-04-19 PROCEDURE — 99214 OFFICE O/P EST MOD 30 MIN: CPT | Mod: S$GLB,,, | Performed by: STUDENT IN AN ORGANIZED HEALTH CARE EDUCATION/TRAINING PROGRAM

## 2024-05-01 ENCOUNTER — PATIENT MESSAGE (OUTPATIENT)
Dept: SURGERY | Facility: CLINIC | Age: 59
End: 2024-05-01
Payer: COMMERCIAL

## 2024-05-01 RX ORDER — CIPROFLOXACIN 500 MG/1
500 TABLET ORAL 2 TIMES DAILY
Qty: 2 TABLET | Refills: 0 | Status: SHIPPED | OUTPATIENT
Start: 2024-05-01 | End: 2024-05-03

## 2024-05-01 RX ORDER — METRONIDAZOLE 500 MG/1
500 TABLET ORAL 2 TIMES DAILY
Qty: 2 TABLET | Refills: 0 | Status: SHIPPED | OUTPATIENT
Start: 2024-05-01 | End: 2024-05-03

## 2024-05-07 NOTE — ANESTHESIA POSTPROCEDURE EVALUATION
Anesthesia Post Evaluation    Patient: Osman Li Jr.    Procedure(s) Performed: Procedure(s) (LRB):  [id number] (Right)    Final Anesthesia Type: general      Patient location during evaluation: PACU  Patient participation: Yes- Able to Participate  Level of consciousness: awake and alert and oriented  Post-procedure vital signs: reviewed and stable  Pain management: adequate  Airway patency: patent    PONV status at discharge: No PONV  Anesthetic complications: no      Cardiovascular status: blood pressure returned to baseline  Respiratory status: unassisted, spontaneous ventilation and room air  Hydration status: euvolemic  Follow-up not needed.          Vitals Value Taken Time   /58 05/18/22 0940   Temp  05/18/22 0943   Pulse 84 05/18/22 0942   Resp 18 05/18/22 0942   SpO2 94 % 05/18/22 0942   Vitals shown include unvalidated device data.      No case tracking events are documented in the log.      Pain/Suleiman Score: No data recorded      
PAST MEDICAL HISTORY:  HTN (hypertension)

## 2024-05-09 ENCOUNTER — TELEPHONE (OUTPATIENT)
Dept: SURGERY | Facility: CLINIC | Age: 59
End: 2024-05-09
Payer: COMMERCIAL

## 2024-05-10 NOTE — TELEPHONE ENCOUNTER
Left pt a vmail with his arrival time of 0500am for sx on Monday. RN asked pt to call back and leave a vmail or to send a portal message confirming receipt.

## 2024-05-12 ENCOUNTER — ANESTHESIA EVENT (OUTPATIENT)
Dept: SURGERY | Facility: HOSPITAL | Age: 59
DRG: 330 | End: 2024-05-12
Payer: COMMERCIAL

## 2024-05-13 ENCOUNTER — ANESTHESIA (OUTPATIENT)
Dept: SURGERY | Facility: HOSPITAL | Age: 59
DRG: 330 | End: 2024-05-13
Payer: COMMERCIAL

## 2024-05-13 ENCOUNTER — HOSPITAL ENCOUNTER (INPATIENT)
Facility: HOSPITAL | Age: 59
LOS: 5 days | Discharge: HOME | DRG: 330 | End: 2024-05-18
Attending: STUDENT IN AN ORGANIZED HEALTH CARE EDUCATION/TRAINING PROGRAM | Admitting: STUDENT IN AN ORGANIZED HEALTH CARE EDUCATION/TRAINING PROGRAM
Payer: COMMERCIAL

## 2024-05-13 DIAGNOSIS — Z93.3 COLOSTOMY IN PLACE: ICD-10-CM

## 2024-05-13 DIAGNOSIS — Z93.3 COLOSTOMY STATUS: Primary | ICD-10-CM

## 2024-05-13 LAB
ABO + RH BLD: NORMAL
BLD GP AB SCN CELLS X3 SERPL QL: NORMAL
ERYTHROCYTE [DISTWIDTH] IN BLOOD BY AUTOMATED COUNT: 13.7 % (ref 11.5–14.5)
HCT VFR BLD AUTO: 40.4 % (ref 40–54)
HGB BLD-MCNC: 14 G/DL (ref 14–18)
MCH RBC QN AUTO: 30.4 PG (ref 27–31)
MCHC RBC AUTO-ENTMCNC: 34.7 G/DL (ref 32–36)
MCV RBC AUTO: 88 FL (ref 82–98)
PLATELET # BLD AUTO: 327 K/UL (ref 150–450)
PMV BLD AUTO: 9 FL (ref 9.2–12.9)
POCT GLUCOSE: 139 MG/DL (ref 70–110)
POCT GLUCOSE: 144 MG/DL (ref 70–110)
POCT GLUCOSE: 156 MG/DL (ref 70–110)
POCT GLUCOSE: 173 MG/DL (ref 70–110)
POCT GLUCOSE: 174 MG/DL (ref 70–110)
POCT GLUCOSE: 177 MG/DL (ref 70–110)
POCT GLUCOSE: 199 MG/DL (ref 70–110)
POCT GLUCOSE: 214 MG/DL (ref 70–110)
RBC # BLD AUTO: 4.6 M/UL (ref 4.6–6.2)
SPECIMEN OUTDATE: NORMAL
WBC # BLD AUTO: 19.64 K/UL (ref 3.9–12.7)

## 2024-05-13 PROCEDURE — 25000003 PHARM REV CODE 250: Performed by: STUDENT IN AN ORGANIZED HEALTH CARE EDUCATION/TRAINING PROGRAM

## 2024-05-13 PROCEDURE — 88304 TISSUE EXAM BY PATHOLOGIST: CPT | Performed by: PATHOLOGY

## 2024-05-13 PROCEDURE — 0WQF0ZZ REPAIR ABDOMINAL WALL, OPEN APPROACH: ICD-10-PCS | Performed by: STUDENT IN AN ORGANIZED HEALTH CARE EDUCATION/TRAINING PROGRAM

## 2024-05-13 PROCEDURE — 52005 CYSTO W/URTRL CATHJ: CPT | Mod: ,,, | Performed by: UROLOGY

## 2024-05-13 PROCEDURE — D9220A PRA ANESTHESIA: Mod: CRNA,,, | Performed by: STUDENT IN AN ORGANIZED HEALTH CARE EDUCATION/TRAINING PROGRAM

## 2024-05-13 PROCEDURE — 0DJD8ZZ INSPECTION OF LOWER INTESTINAL TRACT, VIA NATURAL OR ARTIFICIAL OPENING ENDOSCOPIC: ICD-10-PCS | Performed by: STUDENT IN AN ORGANIZED HEALTH CARE EDUCATION/TRAINING PROGRAM

## 2024-05-13 PROCEDURE — 36000709 HC OR TIME LEV III EA ADD 15 MIN: Performed by: STUDENT IN AN ORGANIZED HEALTH CARE EDUCATION/TRAINING PROGRAM

## 2024-05-13 PROCEDURE — C1769 GUIDE WIRE: HCPCS | Performed by: STUDENT IN AN ORGANIZED HEALTH CARE EDUCATION/TRAINING PROGRAM

## 2024-05-13 PROCEDURE — 63600175 PHARM REV CODE 636 W HCPCS: Performed by: NURSE PRACTITIONER

## 2024-05-13 PROCEDURE — 88307 TISSUE EXAM BY PATHOLOGIST: CPT | Mod: 26,,, | Performed by: PATHOLOGY

## 2024-05-13 PROCEDURE — 71000015 HC POSTOP RECOV 1ST HR: Performed by: STUDENT IN AN ORGANIZED HEALTH CARE EDUCATION/TRAINING PROGRAM

## 2024-05-13 PROCEDURE — 63600175 PHARM REV CODE 636 W HCPCS: Performed by: STUDENT IN AN ORGANIZED HEALTH CARE EDUCATION/TRAINING PROGRAM

## 2024-05-13 PROCEDURE — 82962 GLUCOSE BLOOD TEST: CPT | Performed by: STUDENT IN AN ORGANIZED HEALTH CARE EDUCATION/TRAINING PROGRAM

## 2024-05-13 PROCEDURE — 0DBM0ZZ EXCISION OF DESCENDING COLON, OPEN APPROACH: ICD-10-PCS | Performed by: STUDENT IN AN ORGANIZED HEALTH CARE EDUCATION/TRAINING PROGRAM

## 2024-05-13 PROCEDURE — 27201423 OPTIME MED/SURG SUP & DEVICES STERILE SUPPLY: Performed by: STUDENT IN AN ORGANIZED HEALTH CARE EDUCATION/TRAINING PROGRAM

## 2024-05-13 PROCEDURE — 20600001 HC STEP DOWN PRIVATE ROOM

## 2024-05-13 PROCEDURE — S4991 NICOTINE PATCH NONLEGEND: HCPCS

## 2024-05-13 PROCEDURE — 63600175 PHARM REV CODE 636 W HCPCS

## 2024-05-13 PROCEDURE — 25000003 PHARM REV CODE 250: Performed by: NURSE PRACTITIONER

## 2024-05-13 PROCEDURE — 44625 REPAIR BOWEL OPENING: CPT | Mod: 22,,, | Performed by: STUDENT IN AN ORGANIZED HEALTH CARE EDUCATION/TRAINING PROGRAM

## 2024-05-13 PROCEDURE — C1758 CATHETER, URETERAL: HCPCS | Performed by: STUDENT IN AN ORGANIZED HEALTH CARE EDUCATION/TRAINING PROGRAM

## 2024-05-13 PROCEDURE — C1765 ADHESION BARRIER: HCPCS | Performed by: STUDENT IN AN ORGANIZED HEALTH CARE EDUCATION/TRAINING PROGRAM

## 2024-05-13 PROCEDURE — 88305 TISSUE EXAM BY PATHOLOGIST: CPT | Mod: 26,,, | Performed by: PATHOLOGY

## 2024-05-13 PROCEDURE — 94761 N-INVAS EAR/PLS OXIMETRY MLT: CPT

## 2024-05-13 PROCEDURE — 36000708 HC OR TIME LEV III 1ST 15 MIN: Performed by: STUDENT IN AN ORGANIZED HEALTH CARE EDUCATION/TRAINING PROGRAM

## 2024-05-13 PROCEDURE — 85027 COMPLETE CBC AUTOMATED: CPT | Performed by: STUDENT IN AN ORGANIZED HEALTH CARE EDUCATION/TRAINING PROGRAM

## 2024-05-13 PROCEDURE — 37000008 HC ANESTHESIA 1ST 15 MINUTES: Performed by: STUDENT IN AN ORGANIZED HEALTH CARE EDUCATION/TRAINING PROGRAM

## 2024-05-13 PROCEDURE — 25000003 PHARM REV CODE 250

## 2024-05-13 PROCEDURE — 71000033 HC RECOVERY, INTIAL HOUR: Performed by: STUDENT IN AN ORGANIZED HEALTH CARE EDUCATION/TRAINING PROGRAM

## 2024-05-13 PROCEDURE — 37000009 HC ANESTHESIA EA ADD 15 MINS: Performed by: STUDENT IN AN ORGANIZED HEALTH CARE EDUCATION/TRAINING PROGRAM

## 2024-05-13 PROCEDURE — 0DN80ZZ RELEASE SMALL INTESTINE, OPEN APPROACH: ICD-10-PCS | Performed by: STUDENT IN AN ORGANIZED HEALTH CARE EDUCATION/TRAINING PROGRAM

## 2024-05-13 PROCEDURE — 4A1BXSH MONITORING OF GASTROINTESTINAL VASCULAR PERFUSION USING INDOCYANINE GREEN DYE, EXTERNAL APPROACH: ICD-10-PCS | Performed by: STUDENT IN AN ORGANIZED HEALTH CARE EDUCATION/TRAINING PROGRAM

## 2024-05-13 PROCEDURE — 86850 RBC ANTIBODY SCREEN: CPT | Performed by: NURSE PRACTITIONER

## 2024-05-13 PROCEDURE — 36415 COLL VENOUS BLD VENIPUNCTURE: CPT | Performed by: STUDENT IN AN ORGANIZED HEALTH CARE EDUCATION/TRAINING PROGRAM

## 2024-05-13 PROCEDURE — D9220A PRA ANESTHESIA: Mod: ANES,,, | Performed by: STUDENT IN AN ORGANIZED HEALTH CARE EDUCATION/TRAINING PROGRAM

## 2024-05-13 PROCEDURE — 0DNE0ZZ RELEASE LARGE INTESTINE, OPEN APPROACH: ICD-10-PCS | Performed by: STUDENT IN AN ORGANIZED HEALTH CARE EDUCATION/TRAINING PROGRAM

## 2024-05-13 PROCEDURE — 27201037 HC PRESSURE MONITORING SET UP

## 2024-05-13 PROCEDURE — 63600175 PHARM REV CODE 636 W HCPCS: Mod: JZ,JG | Performed by: NURSE PRACTITIONER

## 2024-05-13 PROCEDURE — 88307 TISSUE EXAM BY PATHOLOGIST: CPT | Performed by: PATHOLOGY

## 2024-05-13 PROCEDURE — 71000016 HC POSTOP RECOV ADDL HR: Performed by: STUDENT IN AN ORGANIZED HEALTH CARE EDUCATION/TRAINING PROGRAM

## 2024-05-13 PROCEDURE — 99900035 HC TECH TIME PER 15 MIN (STAT)

## 2024-05-13 DEVICE — BARRIER SEPRAFILM ADHESION: Type: IMPLANTABLE DEVICE | Site: ABDOMEN | Status: FUNCTIONAL

## 2024-05-13 RX ORDER — HEPARIN SODIUM 5000 [USP'U]/ML
5000 INJECTION, SOLUTION INTRAVENOUS; SUBCUTANEOUS EVERY 8 HOURS
Status: COMPLETED | OUTPATIENT
Start: 2024-05-13 | End: 2024-05-13

## 2024-05-13 RX ORDER — SODIUM CHLORIDE 0.9 % (FLUSH) 0.9 %
10 SYRINGE (ML) INJECTION
Status: DISCONTINUED | OUTPATIENT
Start: 2024-05-13 | End: 2024-05-13 | Stop reason: HOSPADM

## 2024-05-13 RX ORDER — NALOXONE HCL 0.4 MG/ML
0.02 VIAL (ML) INJECTION
Status: DISCONTINUED | OUTPATIENT
Start: 2024-05-13 | End: 2024-05-15

## 2024-05-13 RX ORDER — LIDOCAINE HYDROCHLORIDE 20 MG/ML
INJECTION, SOLUTION EPIDURAL; INFILTRATION; INTRACAUDAL; PERINEURAL
Status: DISCONTINUED | OUTPATIENT
Start: 2024-05-13 | End: 2024-05-13

## 2024-05-13 RX ORDER — ACETAMINOPHEN 10 MG/ML
INJECTION, SOLUTION INTRAVENOUS
Status: DISCONTINUED | OUTPATIENT
Start: 2024-05-13 | End: 2024-05-13

## 2024-05-13 RX ORDER — HYDROMORPHONE HCL IN 0.9% NACL 6 MG/30 ML
PATIENT CONTROLLED ANALGESIA SYRINGE INTRAVENOUS CONTINUOUS
Status: DISCONTINUED | OUTPATIENT
Start: 2024-05-13 | End: 2024-05-15

## 2024-05-13 RX ORDER — DEXAMETHASONE SODIUM PHOSPHATE 4 MG/ML
INJECTION, SOLUTION INTRA-ARTICULAR; INTRALESIONAL; INTRAMUSCULAR; INTRAVENOUS; SOFT TISSUE
Status: DISCONTINUED | OUTPATIENT
Start: 2024-05-13 | End: 2024-05-13

## 2024-05-13 RX ORDER — ACETAMINOPHEN 10 MG/ML
1000 INJECTION, SOLUTION INTRAVENOUS EVERY 8 HOURS
Qty: 300 ML | Refills: 0 | Status: COMPLETED | OUTPATIENT
Start: 2024-05-13 | End: 2024-05-14

## 2024-05-13 RX ORDER — SODIUM CHLORIDE, SODIUM LACTATE, POTASSIUM CHLORIDE, CALCIUM CHLORIDE 600; 310; 30; 20 MG/100ML; MG/100ML; MG/100ML; MG/100ML
INJECTION, SOLUTION INTRAVENOUS CONTINUOUS
Status: DISCONTINUED | OUTPATIENT
Start: 2024-05-13 | End: 2024-05-15

## 2024-05-13 RX ORDER — MUPIROCIN 20 MG/G
1 OINTMENT TOPICAL
Status: COMPLETED | OUTPATIENT
Start: 2024-05-13 | End: 2024-05-13

## 2024-05-13 RX ORDER — ONDANSETRON HYDROCHLORIDE 2 MG/ML
4 INJECTION, SOLUTION INTRAVENOUS EVERY 6 HOURS PRN
Status: DISCONTINUED | OUTPATIENT
Start: 2024-05-13 | End: 2024-05-18 | Stop reason: HOSPADM

## 2024-05-13 RX ORDER — HYDROMORPHONE HYDROCHLORIDE 1 MG/ML
INJECTION, SOLUTION INTRAMUSCULAR; INTRAVENOUS; SUBCUTANEOUS
Status: DISCONTINUED | OUTPATIENT
Start: 2024-05-13 | End: 2024-05-13

## 2024-05-13 RX ORDER — PROCHLORPERAZINE EDISYLATE 5 MG/ML
5 INJECTION INTRAMUSCULAR; INTRAVENOUS EVERY 6 HOURS PRN
Status: DISCONTINUED | OUTPATIENT
Start: 2024-05-13 | End: 2024-05-18 | Stop reason: HOSPADM

## 2024-05-13 RX ORDER — ACETAMINOPHEN 650 MG/20.3ML
975 LIQUID ORAL
Status: COMPLETED | OUTPATIENT
Start: 2024-05-13 | End: 2024-05-13

## 2024-05-13 RX ORDER — METRONIDAZOLE 500 MG/100ML
500 INJECTION, SOLUTION INTRAVENOUS
Status: COMPLETED | OUTPATIENT
Start: 2024-05-13 | End: 2024-05-13

## 2024-05-13 RX ORDER — LABETALOL HYDROCHLORIDE 5 MG/ML
INJECTION, SOLUTION INTRAVENOUS
Status: DISCONTINUED | OUTPATIENT
Start: 2024-05-13 | End: 2024-05-13

## 2024-05-13 RX ORDER — ROCURONIUM BROMIDE 10 MG/ML
INJECTION, SOLUTION INTRAVENOUS
Status: DISCONTINUED | OUTPATIENT
Start: 2024-05-13 | End: 2024-05-13

## 2024-05-13 RX ORDER — PROPOFOL 10 MG/ML
VIAL (ML) INTRAVENOUS
Status: DISCONTINUED | OUTPATIENT
Start: 2024-05-13 | End: 2024-05-13

## 2024-05-13 RX ORDER — GABAPENTIN 300 MG/1
300 CAPSULE ORAL 3 TIMES DAILY
Status: DISCONTINUED | OUTPATIENT
Start: 2024-05-13 | End: 2024-05-13

## 2024-05-13 RX ORDER — IBUPROFEN 200 MG
1 TABLET ORAL DAILY
Status: DISCONTINUED | OUTPATIENT
Start: 2024-05-14 | End: 2024-05-13

## 2024-05-13 RX ORDER — MIDAZOLAM HYDROCHLORIDE 1 MG/ML
INJECTION INTRAMUSCULAR; INTRAVENOUS
Status: DISCONTINUED | OUTPATIENT
Start: 2024-05-13 | End: 2024-05-13

## 2024-05-13 RX ORDER — IBUPROFEN 200 MG
1 TABLET ORAL DAILY
Status: DISCONTINUED | OUTPATIENT
Start: 2024-05-13 | End: 2024-05-18 | Stop reason: HOSPADM

## 2024-05-13 RX ORDER — LIDOCAINE HYDROCHLORIDE ANHYDROUS AND DEXTROSE MONOHYDRATE .8; 5 G/100ML; G/100ML
INJECTION, SOLUTION INTRAVENOUS CONTINUOUS PRN
Status: DISCONTINUED | OUTPATIENT
Start: 2024-05-13 | End: 2024-05-13

## 2024-05-13 RX ORDER — HYDROMORPHONE HYDROCHLORIDE 1 MG/ML
0.5 INJECTION, SOLUTION INTRAMUSCULAR; INTRAVENOUS; SUBCUTANEOUS EVERY 5 MIN PRN
Status: DISCONTINUED | OUTPATIENT
Start: 2024-05-13 | End: 2024-05-13 | Stop reason: HOSPADM

## 2024-05-13 RX ORDER — GABAPENTIN 300 MG/1
300 CAPSULE ORAL 3 TIMES DAILY
Status: DISCONTINUED | OUTPATIENT
Start: 2024-05-13 | End: 2024-05-18 | Stop reason: HOSPADM

## 2024-05-13 RX ORDER — ACETAMINOPHEN 500 MG
1000 TABLET ORAL EVERY 8 HOURS
Status: DISCONTINUED | OUTPATIENT
Start: 2024-05-14 | End: 2024-05-18 | Stop reason: HOSPADM

## 2024-05-13 RX ORDER — SODIUM CHLORIDE 9 MG/ML
INJECTION, SOLUTION INTRAVENOUS
Status: DISCONTINUED | OUTPATIENT
Start: 2024-05-13 | End: 2024-05-13

## 2024-05-13 RX ORDER — IBUPROFEN 200 MG
800 TABLET ORAL EVERY 8 HOURS
Status: DISCONTINUED | OUTPATIENT
Start: 2024-05-14 | End: 2024-05-13

## 2024-05-13 RX ORDER — SODIUM CHLORIDE 9 MG/ML
INJECTION, SOLUTION INTRAVENOUS CONTINUOUS
Status: DISCONTINUED | OUTPATIENT
Start: 2024-05-13 | End: 2024-05-13

## 2024-05-13 RX ORDER — KETAMINE HCL IN 0.9 % NACL 50 MG/5 ML
SYRINGE (ML) INTRAVENOUS
Status: DISCONTINUED | OUTPATIENT
Start: 2024-05-13 | End: 2024-05-13

## 2024-05-13 RX ORDER — ONDANSETRON HYDROCHLORIDE 2 MG/ML
INJECTION, SOLUTION INTRAVENOUS
Status: DISCONTINUED | OUTPATIENT
Start: 2024-05-13 | End: 2024-05-13

## 2024-05-13 RX ORDER — GLUCAGON 1 MG
1 KIT INJECTION
Status: DISCONTINUED | OUTPATIENT
Start: 2024-05-13 | End: 2024-05-18 | Stop reason: HOSPADM

## 2024-05-13 RX ORDER — TRIPROLIDINE/PSEUDOEPHEDRINE 2.5MG-60MG
600 TABLET ORAL
Status: COMPLETED | OUTPATIENT
Start: 2024-05-13 | End: 2024-05-13

## 2024-05-13 RX ORDER — FENTANYL CITRATE 50 UG/ML
INJECTION, SOLUTION INTRAMUSCULAR; INTRAVENOUS
Status: DISCONTINUED | OUTPATIENT
Start: 2024-05-13 | End: 2024-05-13

## 2024-05-13 RX ORDER — GABAPENTIN 300 MG/1
300 CAPSULE ORAL
Status: COMPLETED | OUTPATIENT
Start: 2024-05-13 | End: 2024-05-13

## 2024-05-13 RX ORDER — HYDROMORPHONE HYDROCHLORIDE 1 MG/ML
INJECTION, SOLUTION INTRAMUSCULAR; INTRAVENOUS; SUBCUTANEOUS
Status: COMPLETED
Start: 2024-05-13 | End: 2024-05-13

## 2024-05-13 RX ORDER — LIDOCAINE HYDROCHLORIDE 10 MG/ML
1 INJECTION, SOLUTION EPIDURAL; INFILTRATION; INTRACAUDAL; PERINEURAL
Status: DISCONTINUED | OUTPATIENT
Start: 2024-05-13 | End: 2024-05-13

## 2024-05-13 RX ORDER — INSULIN ASPART 100 [IU]/ML
0-10 INJECTION, SOLUTION INTRAVENOUS; SUBCUTANEOUS EVERY 6 HOURS PRN
Status: DISCONTINUED | OUTPATIENT
Start: 2024-05-13 | End: 2024-05-14

## 2024-05-13 RX ADMIN — GABAPENTIN 300 MG: 300 CAPSULE ORAL at 09:05

## 2024-05-13 RX ADMIN — ROCURONIUM BROMIDE 20 MG: 10 INJECTION, SOLUTION INTRAVENOUS at 11:05

## 2024-05-13 RX ADMIN — METHOCARBAMOL 500 MG: 100 INJECTION, SOLUTION INTRAMUSCULAR; INTRAVENOUS at 09:05

## 2024-05-13 RX ADMIN — Medication 20 MG: at 07:05

## 2024-05-13 RX ADMIN — ROCURONIUM BROMIDE 30 MG: 10 INJECTION, SOLUTION INTRAVENOUS at 09:05

## 2024-05-13 RX ADMIN — ROCURONIUM BROMIDE 50 MG: 10 INJECTION, SOLUTION INTRAVENOUS at 07:05

## 2024-05-13 RX ADMIN — DEXAMETHASONE SODIUM PHOSPHATE 8 MG: 4 INJECTION, SOLUTION INTRAMUSCULAR; INTRAVENOUS at 07:05

## 2024-05-13 RX ADMIN — FENTANYL CITRATE 50 MCG: 50 INJECTION INTRAMUSCULAR; INTRAVENOUS at 09:05

## 2024-05-13 RX ADMIN — HYDROMORPHONE HYDROCHLORIDE 0.5 MG: 1 INJECTION, SOLUTION INTRAMUSCULAR; INTRAVENOUS; SUBCUTANEOUS at 01:05

## 2024-05-13 RX ADMIN — LABETALOL HYDROCHLORIDE 5 MG: 5 INJECTION, SOLUTION INTRAVENOUS at 12:05

## 2024-05-13 RX ADMIN — IBUPROFEN 800 MG: 800 INJECTION INTRAVENOUS at 02:05

## 2024-05-13 RX ADMIN — HYDROMORPHONE HYDROCHLORIDE 0.2 MG: 1 INJECTION, SOLUTION INTRAMUSCULAR; INTRAVENOUS; SUBCUTANEOUS at 10:05

## 2024-05-13 RX ADMIN — SODIUM CHLORIDE, POTASSIUM CHLORIDE, SODIUM LACTATE AND CALCIUM CHLORIDE: 600; 310; 30; 20 INJECTION, SOLUTION INTRAVENOUS at 02:05

## 2024-05-13 RX ADMIN — GABAPENTIN 300 MG: 300 CAPSULE ORAL at 05:05

## 2024-05-13 RX ADMIN — SODIUM CHLORIDE: 0.9 INJECTION, SOLUTION INTRAVENOUS at 07:05

## 2024-05-13 RX ADMIN — MUPIROCIN 1 G: 20 OINTMENT TOPICAL at 05:05

## 2024-05-13 RX ADMIN — LIDOCAINE HYDROCHLORIDE 80 MG: 20 INJECTION, SOLUTION EPIDURAL; INFILTRATION; INTRACAUDAL at 07:05

## 2024-05-13 RX ADMIN — FENTANYL CITRATE 50 MCG: 50 INJECTION INTRAMUSCULAR; INTRAVENOUS at 08:05

## 2024-05-13 RX ADMIN — HYDROMORPHONE HYDROCHLORIDE 0.2 MG: 1 INJECTION, SOLUTION INTRAMUSCULAR; INTRAVENOUS; SUBCUTANEOUS at 09:05

## 2024-05-13 RX ADMIN — LABETALOL HYDROCHLORIDE 5 MG: 5 INJECTION, SOLUTION INTRAVENOUS at 11:05

## 2024-05-13 RX ADMIN — ONDANSETRON 4 MG: 2 INJECTION INTRAMUSCULAR; INTRAVENOUS at 12:05

## 2024-05-13 RX ADMIN — Medication 10 MG: at 08:05

## 2024-05-13 RX ADMIN — ACETAMINOPHEN 1000 MG: 10 INJECTION, SOLUTION INTRAVENOUS at 07:05

## 2024-05-13 RX ADMIN — ROCURONIUM BROMIDE 20 MG: 10 INJECTION, SOLUTION INTRAVENOUS at 12:05

## 2024-05-13 RX ADMIN — SUGAMMADEX 200 MG: 100 INJECTION, SOLUTION INTRAVENOUS at 01:05

## 2024-05-13 RX ADMIN — LIDOCAINE HYDROCHLORIDE ANHYDROUS AND DEXTROSE MONOHYDRATE 0.03 MG/KG/MIN: .8; 5 INJECTION, SOLUTION INTRAVENOUS at 07:05

## 2024-05-13 RX ADMIN — IBUPROFEN 600 MG: 100 SUSPENSION ORAL at 05:05

## 2024-05-13 RX ADMIN — METRONIDAZOLE 500 MG: 500 INJECTION, SOLUTION INTRAVENOUS at 07:05

## 2024-05-13 RX ADMIN — HYDROMORPHONE HYDROCHLORIDE 0.4 MG: 1 INJECTION, SOLUTION INTRAMUSCULAR; INTRAVENOUS; SUBCUTANEOUS at 01:05

## 2024-05-13 RX ADMIN — ROCURONIUM BROMIDE 30 MG: 10 INJECTION, SOLUTION INTRAVENOUS at 08:05

## 2024-05-13 RX ADMIN — MIDAZOLAM HYDROCHLORIDE 2 MG: 1 INJECTION, SOLUTION INTRAMUSCULAR; INTRAVENOUS at 07:05

## 2024-05-13 RX ADMIN — SODIUM CHLORIDE, SODIUM GLUCONATE, SODIUM ACETATE, POTASSIUM CHLORIDE, MAGNESIUM CHLORIDE, SODIUM PHOSPHATE, DIBASIC, AND POTASSIUM PHOSPHATE: .53; .5; .37; .037; .03; .012; .00082 INJECTION, SOLUTION INTRAVENOUS at 11:05

## 2024-05-13 RX ADMIN — METHOCARBAMOL 500 MG: 100 INJECTION, SOLUTION INTRAMUSCULAR; INTRAVENOUS at 04:05

## 2024-05-13 RX ADMIN — ACETAMINOPHEN 1000 MG: 10 INJECTION, SOLUTION INTRAVENOUS at 11:05

## 2024-05-13 RX ADMIN — NICOTINE 1 PATCH: 14 PATCH, EXTENDED RELEASE TRANSDERMAL at 09:05

## 2024-05-13 RX ADMIN — ROCURONIUM BROMIDE 20 MG: 10 INJECTION, SOLUTION INTRAVENOUS at 10:05

## 2024-05-13 RX ADMIN — Medication: at 02:05

## 2024-05-13 RX ADMIN — HEPARIN SODIUM 5000 UNITS: 5000 INJECTION INTRAVENOUS; SUBCUTANEOUS at 05:05

## 2024-05-13 RX ADMIN — Medication: at 07:05

## 2024-05-13 RX ADMIN — PROPOFOL 150 MG: 10 INJECTION, EMULSION INTRAVENOUS at 07:05

## 2024-05-13 RX ADMIN — GABAPENTIN 300 MG: 300 CAPSULE ORAL at 02:05

## 2024-05-13 RX ADMIN — LABETALOL HYDROCHLORIDE 5 MG: 5 INJECTION, SOLUTION INTRAVENOUS at 01:05

## 2024-05-13 RX ADMIN — Medication 20 MG: at 10:05

## 2024-05-13 RX ADMIN — Medication 20 MG: at 09:05

## 2024-05-13 RX ADMIN — GENTAMICIN SULFATE 447.2 MG: 40 INJECTION, SOLUTION INTRAMUSCULAR; INTRAVENOUS at 06:05

## 2024-05-13 RX ADMIN — FENTANYL CITRATE 100 MCG: 50 INJECTION INTRAMUSCULAR; INTRAVENOUS at 07:05

## 2024-05-13 RX ADMIN — ROCURONIUM BROMIDE 20 MG: 10 INJECTION, SOLUTION INTRAVENOUS at 08:05

## 2024-05-13 RX ADMIN — ACETAMINOPHEN 976.6 MG: 650 SOLUTION ORAL at 05:05

## 2024-05-13 RX ADMIN — Medication 20 MG: at 11:05

## 2024-05-13 NOTE — ANESTHESIA POSTPROCEDURE EVALUATION
Anesthesia Post Evaluation    Patient: Osman Li Jr.    Procedure(s) Performed: Procedure(s) (LRB):  CLOSURE, COLOSTOMY (eras high/lithotomy) (N/A)  SIGMOIDOSCOPY, FLEXIBLE (N/A)  INSERTION, CATHETER, URETER, BILATERAL (N/A)  LYSIS, ADHESIONS (N/A)    Final Anesthesia Type: general      Patient location during evaluation: DOSC  Patient participation: Yes- Able to Participate  Level of consciousness: awake and alert and oriented  Post-procedure vital signs: reviewed and stable  Pain management: pain needs to be addressed  Airway patency: patent    PONV status at discharge: No PONV  Anesthetic complications: no      Cardiovascular status: hemodynamically stable  Respiratory status: unassisted  Hydration status: euvolemic  Follow-up not needed.              Vitals Value Taken Time   /66 05/13/24 1547   Temp  05/13/24 1556   Pulse 80 05/13/24 1555   Resp 22 05/13/24 1555   SpO2 98 % 05/13/24 1555   Vitals shown include unfiled device data.      Event Time   Out of Recovery 14:00:00         Pain/Suleiman Score: Pain Rating Prior to Med Admin: 7 (5/13/2024  3:45 PM)  Pain Rating Post Med Admin: 7 (5/13/2024  3:45 PM)  Suleiman Score: 9 (5/13/2024  3:45 PM)

## 2024-05-13 NOTE — NURSING
Nurses Note -- 4 Eyes      5/13/2024   5:40 PM      Skin assessed during: Admit      [] No Altered Skin Integrity Present    []Prevention Measures Documented      [] Yes- Altered Skin Integrity Present or Discovered   [] LDA Added if Not in Epic (Describe Wound)   [x] New Altered Skin Integrity was Present on Admit and Documented in LDA   [] Wound Image Taken    Wound Care Consulted? No    Attending Nurse:  CAMRYN Tse    Second RN/Staff Member:   CAMRYN Tabares

## 2024-05-13 NOTE — H&P
The patient has been examined and the H&P has been reviewed:     I concur with the findings and no changes have occurred since H&P was written.     Surgery risks, benefits and alternative options discussed and understood by patient/family.      Tc Zelaya, MDR-4  General Surgery      ____________________________________________________________________________    3/6/24    Osman Li Jr. is a 58 y.o. 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  This occurred very shortly after completing treatment.     He underwent open laparotomy for obstructing colon mass 4/2022 that resulted in kind of a long yazan's with a transverse colostomy and splenectomy due to adhesions/disease involvement at the pancreatic tail:  Findings: Large colonic mass that was obstructing at the splenic flexure and densely adherent to the splenic hilum.  Palpable abnormalities within the tail of the pancreas        Final path: pT3 pN2b (17 of 27 nodes positive), G3, MSI-L  Gene: BRAF   DNA change: c.1783T>C   Amino Acid change: p.F595L ([id number])      Interestingly on his STRATA report there is no BRAF mutation, but he is TMB-H.  Patient was part of a clinical trial to measure CT DNA.  His CT DNA started to go up while he was on FOLFOX, which prompted the scan, which showed the progression at the end of his 6 month adjuvant course.  Next generation sequencing also showed a high tumor burden. He was changed to single agent Keytruda 1/2023 and has since had 15 cycles and is doing well.     He has at least two sites of disease, his pancreatic tail which is biopsy proven by EUS and RPLN that was PET avid in August.        Both of these areas have responded well and are poorly defined on recent CT imaging 11/2023. His CEA has never been elevated, and I do not see a recent ctDNA study.     A/P  Pretty interesting case of a 57yo M, obstructing  high risk colon cancer resected with metastatic progression on FOLFOX but responsive to single agent PDL-1 now for about 12 months. I think all the things are on the table here. His jennifer-pancreatic tumor I can sell as locoregional/residual disease, and this is clearable with a tough distal pancreatectomy. The more challenging philosophical target is this RP node, made more challenging by the fact that its responded so well I doubt we'll find it and will need to do a L RPLND. All very accomplishable in this healthy male, but how it impacts his clinical course beyond just continuing ICI is not clear.     In the end we are afforded the benefit of time here given how well he's doing. Will tan this over, present at [facility] and discuss with Dr. Khoobehi. I think a repeat PET here could help us. If he still has residual disease on PET, I would favor moving forward with resection. If there is no pet avid disease, the sell is a bit harder.  ---------------------------------  1/10/2024:     Loki returns with updated PET imaging 1/2024:  COMPARISON:  08/03/2023     FINDINGS:  Head and Neck:  Brain demonstrates normal metabolism.  However, FDG-PET has an approximate 60% sensitivity for brain metastases which are best detected by MRI with gadolinium.  Physiologic uptake is in the neck.  Chest:  No FDG avid pulmonary lesions are present. No enlarged or FDG avid lymph nodes are in the chest.  Abdomen and Pelvis:  Physiologic uptake is in the liver, spleen, urinary system and bowel. No enlarged or FDG avid lymph nodes are in the abdomen or pelvis. Adrenal glands are normal.  Musculoskeletal:  No FDG avid osseous lesions are present.  Impression:  Positive therapy response with resolution of the metastatic retroperitoneal lymph node compared to the prior examination.  No FDG avid foci are present on today's exam.     On my review, I see no clear evidence of active disease which is great. There is no established pathway for patients  "with MSI-H or TMB-H tumors who have complete clinical response to ICI. Will discuss with Dr. Khoobehi, but I am leaning towards completing his checkpoint therapy and then transitioning to close surveillance, reserving local therapy options for focal recurrence. The addition of this para-aortic node makes me feel like any "blind" resection in this case is unlikely to add significant local or systemic control benefit. A reasonable question here would be can we/how can we use ctDNA for him, given its previous utility following his disease. He is off study but stage IV, so we should try and see if we can get this approved. Will follow along with his imaging.  ----------------------------------------  3/6/2024:     Loki returns after meeting with Dr. Lance regarding potential ileostomy reversal.  I have discussed his case in detail with both Dr. Lance and Dr. Khoobehi.  Loki has ostomy reversal options but on review of his imaging his distal colon staple line looks pretty socked into the distal pancreas and proximal jejunum in the area of previous residual disease.  Although this is responded well to systemic immunotherapy I expect that reversal could potentially involve distal pancreatectomy and small bowel resection and so he has been sent back to me to discuss a cooperative case.  I do think it is reasonable to reverse his ostomy, he has had a great response on immunotherapy and it is a significant impediment to his quality of life.  We discussed the potential sequela of distal pancreatectomy and the increased risk of pancreatectomy and small bowel resection in the setting of ostomy reversal. Will coordinate with Dr. Lance on timing.     "

## 2024-05-13 NOTE — NURSING
Verified with CAMRYN Jurado on [facility] that pt's PCA is infusing to match the order set.

## 2024-05-13 NOTE — ANESTHESIA PREPROCEDURE EVALUATION
"        Pre-operative evaluation for Procedure(s) (LRB):  CLOSURE, COLOSTOMY (eras high/lithotomy) (N/A)  SIGMOIDOSCOPY, FLEXIBLE (N/A)  INSERTION, CATHETER, URETER, BILATERAL (N/A)  PANCREATECTOMY, DISTAL, SUBTOTAL (N/A)    Osman Li Jr. is a 59 y.o. male with pmh HTN, HLD, and DM, Stage IV transverse colon adenocarcinoma who presents for evaluation of colostomy takedown     Past Medical History:   Diagnosis Date    Colon cancer     Digestive disorder     DM (diabetes mellitus)     Hyperlipidemia     Hypertension     Prediabetes        Review of patient's allergies indicates:   Allergen Reactions    Penicillins Rash       No current facility-administered medications on file prior to encounter.     Current Outpatient Medications on File Prior to Encounter   Medication Sig Dispense Refill    atorvastatin (LIPITOR) 20 MG tablet Take 1 tablet (20 mg total) by mouth once daily. (Patient taking differently: Take 20 mg by mouth every evening.) 90 tablet 3    enalapriL-hydrochlorothiazide (VASERETIC) 10-25 mg per tablet Take 1 tablet by mouth once daily. (Patient taking differently: Take 1 tablet by mouth every morning.) 90 tablet 3    insulin glargine U-300 conc (TOUJEO MAX U-300 SOLOSTAR) 300 unit/mL (3 mL) insulin pen Inject 26 Units into the skin once daily. 2 pen 11    metFORMIN (GLUCOPHAGE) 1000 MG tablet Take 1 tablet (1,000 mg total) by mouth 2 (two) times daily with meals. 180 tablet 3    traZODone (DESYREL) 50 MG tablet Take 1 tablet (50 mg total) by mouth every evening. 90 tablet 3    pen needle, diabetic 31 gauge x 3/16" Ndle 1 each by Misc.(Non-Drug; Combo Route) route once daily. 90 each 3    promethazine (PHENERGAN) 12.5 MG Tab Take 1 tablet (12.5 mg total) by mouth every 4 (four) hours as needed. (Patient taking differently: Take 12.5 mg by mouth every 4 (four) hours as needed (as needed).) 25 tablet 1    sildenafiL (VIAGRA) 100 MG tablet Take 1 tablet (100 mg total) by mouth daily as needed " "for Erectile Dysfunction. (Patient taking differently: Take 100 mg by mouth daily as needed for Erectile Dysfunction (as needed).) 30 tablet 3       Past Surgical History:   Procedure Laterality Date    ADENOIDECTOMY  1972    CHOLECYSTECTOMY  2011    COLON SURGERY      COLONOSCOPY      2018    COLONOSCOPY N/A 3/5/2024    Procedure: COLONOSCOPY;  Surgeon: Farhan Lance MD;  Location: [facility] ENDO (4TH FLR);  Service: Endoscopy;  Laterality: N/A;  2/27/24-[facility] pt, 1-4wks, port, PEG plus 2 enemas morning of procedure, instr portal-DS  2/28/24- LVM for precall - ERW  pt confirmed procedure. cf    COLOSTOMY N/A 04/25/2022    Procedure: CREATION, COLOSTOMY;  Surgeon: Carlton Waddell MD;  Location: [facility] OR;  Service: General;  Laterality: N/A;    ENDOSCOPIC ULTRASOUND OF UPPER GASTROINTESTINAL TRACT N/A 01/06/2023    Procedure: ULTRASOUND, UPPER GI TRACT, ENDOSCOPIC;  Surgeon: Rinku Orozco III, MD;  Location: [facility];  Service: Endoscopy;  Laterality: N/A;    INSERTION OF TUNNELED CENTRAL VENOUS CATHETER (CVC) WITH SUBCUTANEOUS PORT Right 05/18/2022    Procedure: [id number];  Surgeon: Carlton Waddell MD;  Location: [facility] OR;  Service: General;  Laterality: Right;    MOBILIZATION OF SPLENIC FLEXURE N/A 04/25/2022    Procedure: MOBILIZATION, SPLENIC FLEXURE;  Surgeon: Carlton Waddell MD;  Location: [facility] OR;  Service: General;  Laterality: N/A;    SPLENECTOMY N/A 04/25/2022    Procedure: SPLENECTOMY;  Surgeon: Carlton Waddell MD;  Location: [facility] OR;  Service: General;  Laterality: N/A;    SUBTOTAL COLECTOMY N/A 04/25/2022    Procedure: COLECTOMY, PARTIAL;  Surgeon: Carlton Waddell MD;  Location: [facility] OR;  Service: General;  Laterality: N/A;    TONSILLECTOMY      TUMOR REMOVAL  10/18/2023    on top of the nose    VASECTOMY  1994       CBC: No results for input(s): "WBC", "HGB", "HCT", "PLT" in the last 72 hours.    CMP: No results for input(s): "NA", "K", "CL", "CO2", "BUN", "CREATININE", "GLU", "MG", " ""PHOS", "CALCIUM", "ALBUMIN", "PROT", "ALKPHOS", "ALT", "AST", "BILITOT" in the last 72 hours.    COAGS  No results for input(s): "PT", "INR", "PROTIME", "APTT" in the last 72 hours.    BP Readings from Last 3 Encounters:   24 (!) 153/83   24 (!) 156/80   24 (!) 158/82       Diagnostic Studies:      EKD Echo:  No results found for this or any previous visit.                                                                                                       2024  Osman Li Jr. is a 59 y.o., male.      Pre-op Assessment    I have reviewed the Patient Summary Reports.     I have reviewed the Nursing Notes. I have reviewed the NPO Status.   I have reviewed the Medications.     Review of Systems  Anesthesia Hx:               Denies Personal Hx of Anesthesia complications.                    Cardiovascular:     Hypertension                                        Endocrine:  Diabetes, type 2, using insulin               Physical Exam  General: Alert, Cooperative and Oriented    Airway:  Mallampati: II   Mouth Opening: Normal  Neck ROM: Normal ROM      Anesthesia Plan  Type of Anesthesia, risks & benefits discussed:    Anesthesia Type: Gen ETT  Intra-op Monitoring Plan: Standard ASA Monitors and Art Line  Post Op Pain Control Plan: multimodal analgesia  Induction:  IV  Airway Plan: Direct  Informed Consent: Informed consent signed with the Patient and all parties understand the risks and agree with anesthesia plan.  All questions answered. Patient consented to blood products? Yes  ASA Score: 3  Day of Surgery Review of History & Physical: H&P Update referred to the surgeon/provider.    Ready For Surgery From Anesthesia Perspective.     .      "

## 2024-05-13 NOTE — TRANSFER OF CARE
Anesthesia Transfer of Care Note    Patient: Osman Li Jr.    Procedure(s) Performed: Procedure(s) (LRB):  CLOSURE, COLOSTOMY (eras high/lithotomy) (N/A)  SIGMOIDOSCOPY, FLEXIBLE (N/A)  INSERTION, CATHETER, URETER, BILATERAL (N/A)  LYSIS, ADHESIONS (N/A)    Patient location: PACU    Anesthesia Type: general    Transport from OR: Transported from OR on 6-10 L/min O2 by face mask with adequate spontaneous ventilation    Post pain: pain needs to be addressed    Post assessment: no apparent anesthetic complications and tolerated procedure well    Post vital signs: stable    Level of consciousness: awake, alert and oriented    Nausea/Vomiting: no nausea/vomiting    Complications: none    Transfer of care protocol was followedComments: Dilauded and labetalol given in PACU, see medication flowsheet      Last vitals: Visit Vitals  BP (!) 157/86   Pulse 75   Temp 36.1 °C (97 °F) (Temporal)   Resp 12   Wt 100.2 kg (220 lb 14.4 oz)   SpO2 100%   BMI 28.36 kg/m²

## 2024-05-13 NOTE — NURSING TRANSFER
Nursing Transfer Note      5/13/2024   4:11 PM    Nurse giving handoff:Reina  Nurse receiving handoff:Dedrick    Reason patient is being transferred: post op    Transfer To: 1046    Transfer via bed    Transfer with none     Transported by PCT    Transfer Vital Signs: see flowsheet      Telemetry: none  Order for Tele Monitor? No    Additional Lines: Macias Catheter    4eyes on Skin: yes    Medicines sent: insulin pen, PCA pump    Any special needs or follow-up needed: none    Patient belongings transferred with patient: No    Chart send with patient: Yes    Notified: spouse    Patient reassessed at: 5/13/2024 1645  1  Upon arrival to floor: patient oriented to room, call bell in reach, and bed in lowest position

## 2024-05-13 NOTE — OP NOTE
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  Cystoscopy with bilateral ureteral catheter placement    Specimen(s): none    Staff Surgeon: Travis Edwards MD    Assistant Surgeon: Brandie Villa MD    Anesthesia: General endotracheal anesthesia    Indications: Osman Li Jr. is a 59 y.o. 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  Dr. Lance has requested intra-operative ureteral catheters to allow for early intra-operative identification and repair of any injuries.      Findings:   - Normal cystoscopy.   - Bilateral ureteral catheters placed in normal fashion.    Estimated Blood Loss: min    Drains:   1.  bilateral 5 Fr ureteral catheters  2.  16 Fr blas catheter    Procedure in Detail: Upon entering the room the patient was under general anesthesia.  The patient was then placed in the dorsal lithotomy position and prepped and draped in the usual sterile fashion. Preoperative antibiotics were administered per the primary surgeon preference.  Timeout was performed.      A 22 Fr cystoscope was inserted into the urethra and formal cystourethroscopy was performed. The urethra was normal.  The right and left ureteral orifices were in the normal anatomic position. There were no mucosal abnormalities. A 5 Fr ureteral catheter was then inserted into the right ureteral orifice and advanced up to the level of the renal pelvis. The cystoscope was then removed leaving the ureteral catheter in place.     The cystoscope was then reinserted alongside the ureteral catheter and a 5 Fr ureteral catheter was advanced into the left ureteral orifice and advanced to the level of the  left renal pelvis. The cystoscope was then removed, leaving the ureteral catheter in place.    A 16 Fr blas catheter was inserted and the balloon was filled with 10mL of sterile water. The ureteral catheters were secured in the standard fashion. There were no complications with the procedure and the patient tolerated our procedure well.     The case was then turned over to the primary surgeon.   I was present for critical portion of procedure.   Brandie Villa MD

## 2024-05-13 NOTE — BRIEF OP NOTE
[facility] - Surgery ([facility])  Brief Operative Note    SUMMARY     Surgery Date: 5/13/2024     Surgeons and Role:  Panel 1:     * Farhan Lance MD - Primary     * Bruna Balderrama MD - Resident - Assisting  Panel 2:     * Travis Edwards MD - Primary     * Brandie Villa MD - Resident - Assisting        Pre-op Diagnosis:  Colostomy status [Z93.3]    Post-op Diagnosis:  Post-Op Diagnosis Codes:     * Colostomy status [Z93.3]    Procedure(s) (LRB):  CLOSURE, COLOSTOMY (eras high/lithotomy) (N/A)  SIGMOIDOSCOPY, FLEXIBLE (N/A)  INSERTION, CATHETER, URETER, BILATERAL (N/A)  LYSIS, ADHESIONS (N/A)    Anesthesia: General    Implants:  Implant Name Type Inv. Item Serial No.  Lot No. LRB No. Used Action   BARRIER SEPRAFILM ADHESION - [id number]  BAR[employer] SEPRAFILM ADHESION  [employer]  N/A 1 Implanted       Operative Findings: Colostomy take down and colocolonic reanastomosis created in a side to side, antiperistaltic fashion. Extensive lysis of adhesions required, however did not require pancreatic resection to achieve adequate colonic mobilization. See Op note for full details.      Estimated Blood Loss: * No values recorded between 5/13/2024  7:55 AM and 5/13/2024  1:40 PM *    Estimated Blood Loss has been documented.         Specimens:   Specimen (24h ago, onward)       Start     Ordered    05/13/24 1155  Specimen to Pathology, Surgery General Surgery  Once        Comments: Pre-op Diagnosis: Colostomy status [Z93.3]Procedure(s):CLOSURE, COLOSTOMY (eras high/lithotomy)SIGMOIDOSCOPY, FLEXIBLEINSERTION, CATHETER, URETER, BILATERALNumber of specimens: 2Name of specimens: 1- Ostomy with Peristomal Hernia Sac-perm2-Descending colon-perm     References:    Click here for ordering Quick Tip   Question Answer Comment   Procedure Type: General Surgery    Specimen Class: Known or suspected malignancy    Release to patient Immediate        05/13/24 1203                    [id number]

## 2024-05-13 NOTE — H&P
[facility]  Colon and Rectal Surgery    [address]  [place], [place]  Tel: [phone]  Fax: [phone]  [email or web address]   MD Payam Reid MD Brian Kann, MD W. Forrest Johnston, MD Matthew Giglia, MD Jennifer Paruch, MD William Kethman, MD Danielle Kay, MD     Patient name: Loki Li Jr.   YOB: 1965   MRN: [id number]    Dear Carissa Goodwin and Khoobehi,    It was a pleasure seeing Mr. Li in the Colon and Rectal Surgery clinic here at [facility].     As you know, Mr. Li is a 58 year old man with a history of HTN, HLD, and DM, Stage IV transverse colon adenocarcinoma who presents for evaluation of colostomy takedown . He underwent a splenic flexure resection, end colostomy and splenectomy with Dr. Waddell > FOLFOX > progression (pancreatic tail - biopsy proven and RPLN PET avidity) > Keytruda since 1/2023. He was seen by Dr. Goodwin in 1/2024 (note reviewed) - his plan was to follow-up the results of his PET CT, decision was made to complete Keytruda and continue surveillance - if recurrence occurs then move forward with resection. He is doing well - fatigue only around the time of Keytruda for about 1 week after, he denies any nausea, vomiting, fevers, or chills. His weight is stable but about 30 lbs less than originally. He denies fecal incontinence.     PET CT - 1/8/2024  Positive therapy response with resolution of the metastatic retroperitoneal lymph node compared to the prior examination.  No FDG avid foci are present on today's exam.     Last colonoscopy: 2018 (Complete) - repeat in 5 years  Two sessile polyps 5-6 mm in descending colon, polypectomy performed (HP)  Two sessile polyps 5-7 mm in the sigmoid colon, polypectomy performed (HP)  18 diminutive recto sigmoid polyps were ablated    4/19/2024  Here for pre-operative consultation for colostomy takedown and likely distal pancreatectomy. He is doing  well - no major changes since the last time I saw him. He is ready for surgery.    Colonoscopy - 3/5/2024  The perianal and digital rectal examinations were normal. Pertinent negatives include normal sphincter tone.   Diffuse mild inflammation characterized by altered vascularity, friability and mucus was found in the rectum, in the sigmoid colon and in the descending colon. No biopsies or other specimens were collected for this exam. Estimated blood loss was minimal.   There was evidence of a widely patent end colostomy in the transverse colon. This was characterized by healthy appearing mucosa.   The exam was otherwise without abnormality.     5/13/2024  Here for colostomy takedown. No changes since the last time he was seen.    Oncology History   Malignant neoplasm of transverse colon   4/20/2022 Surgery    Partial colectomy, splenectomy, end colostomy - pancreas appeared to be involved at that time    1. Spleen, splenectomy:   - Benign splenic parenchyma   - Negative for malignancy   2. Colon, partial colectomy:   - Invasive colonic adenocarcinoma, poorly differentiated (G3)     - Invades into pericolorectal tissue (pT3)   - Margins uninvolved by invasive carcinoma     - 0.1 cm to the mesenteric margin   - Lymphovascular invasion identified   - No perineural invasion identified   - Seventeen of twenty-seven lymph nodes, positive for metastatic carcinoma   (17/27, pN2b)   - Fibrous serosal adhesions   - See below for results of MSI testing   - CARLOS ENRIQUE/MILLIE Targeted Gene Panel has been ordered and results will be issued in   a supplemental report     CAP Synoptic Checklist for Colonic Neoplasms:     - Procedure: Partial colectomy     - Tumor Site: Splenic flexure     - Histologic Type: Adenocarcinoma     - Histologic Grade: G3, poorly differentiated     - Tumor Size: 5.0 x 4.5 x 2.7 cm     - Tumor Extent: Invades through muscularis propria into pericolorectal   tissue    - Macroscopic Tumor Perforation: Not identified      - Lymphovascular Invasion: Present, small vessel involved     - Perineural Invasion: Not identified     - Number of Tumor Buds: 5 in one hotspot field     - Tumor Bud Score: Intermediate (5-9)     - Type of Polyp in which Invasive Carcinoma Arose: None identified     - Treatment Effect: No known presurgical therapy     - Margins: All margins negative for invasive carcinoma       - Closest Margin to Invasive Carcinoma: 0.1 cm to mesenteric margin     - Regional Lymph Nodes:       - Number of Lymph Nodes with Tumor: 17       - Number of Lymph Nodes Examined: 27       - Tumor Deposits: Not identified     - Distant Metastasis: Not applicable     - Pathologic Stage Classification: pT3 pN2b     - Additional Findings: Fibrous serosal adhesions; benign spleen     - Special Studies: See below for results of MSI testing; CARLOS ENRIQUE/MILLIE panel has         been ordered and results will be issued in a supplemental report     - Designate Block for Future Studies: [id number]   Immunohistochemistry (IHC) Testing for Mismatch Repair (MMR) Proteins:   MLH1 - Intact nuclear expression   MSH2 - Intact nuclear expression   MSH6 - Intact nuclear expression   PMS2 - Intact nuclear expression      5/5/2022 Initial Diagnosis    Malignant neoplasm of transverse colon     7/12/2022 - 12/14/2022 Chemotherapy    Treatment Summary   Plan Name: OP mFOLFOX 6 Q2W  Treatment Goal: Curative  Status: Inactive  Start Date: 7/12/2022  End Date: 12/14/2022  Provider: Aurash Khoobehi, MD  Chemotherapy: fluorouraciL injection 930 mg, 400 mg/m2 = 930 mg, Intravenous, Clinic/HOD 1 time, 12 of 12 cycles  Administration: 930 mg (7/12/2022), 930 mg (7/26/2022), 930 mg (8/9/2022), 930 mg (8/23/2022), 930 mg (9/6/2022), 930 mg (9/19/2022), 835 mg (10/3/2022), 835 mg (10/17/2022), 825 mg (10/31/2022), 820 mg (11/14/2022), 830 mg (11/28/2022), 825 mg (12/12/2022)  oxaliplatin (ELOXATIN) 85 mg/m2 = 197 mg in dextrose 5 % 539.4 mL chemo infusion, 85 mg/m2 = 197 mg,  Intravenous, Clinic/HOD 1 time, 10 of 10 cycles  Dose modification: 68 mg/m2 (original dose 85 mg/m2, Cycle 8, Reason: MD Discretion)  Administration: 197 mg (7/12/2022), 197 mg (7/26/2022), 197 mg (8/9/2022), 197 mg (8/23/2022), 197 mg (9/6/2022), 197 mg (9/19/2022), 178 mg (10/3/2022), 142 mg (10/17/2022), 139 mg (11/14/2022)     2/13/2023 -  Chemotherapy    Treatment Summary   Plan Name: OP PEMBROLIZUMAB 200MG Q3W  Treatment Goal: Curative  Status: Active  Start Date: 2/13/2023  End Date: 1/28/2025 (Planned)  Provider: Aurash Khoobehi, MD  Chemotherapy: [No matching medication found in this treatment plan]     2/26/2024 Cancer Staged    Staging form: Colon and Rectum, AJCC 8th Edition  - Pathologic: Stage IVB (pT3, pN2b, cM1b)     Metastatic adenocarcinoma to pancreas   1/13/2023 Initial Diagnosis    Metastatic adenocarcinoma to pancreas     2/13/2023 -  Chemotherapy    Treatment Summary   Plan Name: OP PEMBROLIZUMAB 200MG Q3W  Treatment Goal: Curative  Status: Active  Start Date: 2/13/2023  End Date: 1/28/2025 (Planned)  Provider: Aurash Khoobehi, MD  Chemotherapy: [No matching medication found in this treatment plan]         The patient was informed of the availability of a certified  without charge. A certified  was not necessary for this visit.    Review of Systems  See pertinent review of systems above    Past Medical History:   Diagnosis Date    Colon cancer     Digestive disorder     DM (diabetes mellitus)     Hyperlipidemia     Hypertension     Prediabetes      Past Surgical History:   Procedure Laterality Date    ADENOIDECTOMY  1972    CHOLECYSTECTOMY  2011    COLON SURGERY      COLONOSCOPY      2018    COLONOSCOPY N/A 3/5/2024    Procedure: COLONOSCOPY;  Surgeon: Farhan Lance MD;  Location: [address]);  Service: Endoscopy;  Laterality: N/A;  2/27/24-Casi pt, 1-4wks, port, PEG plus 2 enemas morning of procedure, instr portal-DS  2/28/24- LVM for precall  - ERW  pt confirmed procedure. cf    COLOSTOMY N/A 04/25/2022    Procedure: CREATION, COLOSTOMY;  Surgeon: Carlton Waddell MD;  Location: [facility] OR;  Service: General;  Laterality: N/A;    ENDOSCOPIC ULTRASOUND OF UPPER GASTROINTESTINAL TRACT N/A 01/06/2023    Procedure: ULTRASOUND, UPPER GI TRACT, ENDOSCOPIC;  Surgeon: Rinku Orozco III, MD;  Location: [facility] ENDO;  Service: Endoscopy;  Laterality: N/A;    INSERTION OF TUNNELED CENTRAL VENOUS CATHETER (CVC) WITH SUBCUTANEOUS PORT Right 05/18/2022    Procedure: [id number]-[id number];  Surgeon: Carlton Waddell MD;  Location: [facility] OR;  Service: General;  Laterality: Right;    MOBILIZATION OF SPLENIC FLEXURE N/A 04/25/2022    Procedure: MOBILIZATION, SPLENIC FLEXURE;  Surgeon: Carlton Waddell MD;  Location: [facility] OR;  Service: General;  Laterality: N/A;    SPLENECTOMY N/A 04/25/2022    Procedure: SPLENECTOMY;  Surgeon: Carlton Waddell MD;  Location: [facility] OR;  Service: General;  Laterality: N/A;    SUBTOTAL COLECTOMY N/A 04/25/2022    Procedure: COLECTOMY, PARTIAL;  Surgeon: Carlton Waddell MD;  Location: [facility] OR;  Service: General;  Laterality: N/A;    TONSILLECTOMY      TUMOR REMOVAL  10/18/2023    on top of the nose    VASECTOMY  1994     Family History   Problem Relation Name Age of Onset    Heart disease Father Loki Sr     Diabetes Father Loki Sr     Rectal cancer Other Padmini         half-sister     Social History     Tobacco Use    Smoking status: Every Day     Current packs/day: 1.00     Average packs/day: 1 pack/day for 40.0 years (40.0 ttl pk-yrs)     Types: Cigarettes     Passive exposure: Current    Smokeless tobacco: Never   Substance Use Topics    Alcohol use: Not Currently    Drug use: Never     Review of patient's allergies indicates:   Allergen Reactions    Penicillins Rash       No current facility-administered medications on file prior to encounter.     Current Outpatient Medications on File Prior to Encounter   Medication Sig Dispense Refill     "atorvastatin (LIPITOR) 20 MG tablet Take 1 tablet (20 mg total) by mouth once daily. (Patient taking differently: Take 20 mg by mouth every evening.) 90 tablet 3    enalapriL-hydrochlorothiazide (VASERETIC) 10-25 mg per tablet Take 1 tablet by mouth once daily. (Patient taking differently: Take 1 tablet by mouth every morning.) 90 tablet 3    insulin glargine U-300 conc (TOUJEO MAX U-300 SOLOSTAR) 300 unit/mL (3 mL) insulin pen Inject 26 Units into the skin once daily. 2 pen 11    metFORMIN (GLUCOPHAGE) 1000 MG tablet Take 1 tablet (1,000 mg total) by mouth 2 (two) times daily with meals. 180 tablet 3    traZODone (DESYREL) 50 MG tablet Take 1 tablet (50 mg total) by mouth every evening. 90 tablet 3    pen needle, diabetic 31 gauge x 3/16" Ndle 1 each by Misc.(Non-Drug; Combo Route) route once daily. 90 each 3    promethazine (PHENERGAN) 12.5 MG Tab Take 1 tablet (12.5 mg total) by mouth every 4 (four) hours as needed. (Patient taking differently: Take 12.5 mg by mouth every 4 (four) hours as needed (as needed).) 25 tablet 1    sildenafiL (VIAGRA) 100 MG tablet Take 1 tablet (100 mg total) by mouth daily as needed for Erectile Dysfunction. (Patient taking differently: Take 100 mg by mouth daily as needed for Erectile Dysfunction (as needed).) 30 tablet 3     Physical Examination  BP (!) 153/83 (BP Location: Right arm, Patient Position: Lying)   Pulse 75   Resp 18   Wt 100.2 kg (220 lb 14.4 oz)   SpO2 99%   BMI 28.36 kg/m²      Constitutional: well developed, no cough, no dyspnea, alert, and no acute distress    Head: Normocephalic, no lesions, without obvious abnormality  Eye: Normal external eye, conjunctiva, and lids  Cardiovascular: regular rate and regular rhythm  Respiratory: normal air entry  Gastrointestinal: soft, non-tender, ostomy in place with parastomal hernia  Musculoskeletal: full range of motion without pain  Neurologic: alert, oriented, normal speech, no focal findings or movement disorder " noted  Psychiatric: appropriate, normal mood    Assessment and Plan of Care    Thank you again for referring Mr. Li to my care. In summary, Mr. 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 He is here today to discuss consideration of colostomy takedown - plan to proceed with surgery. We discussed treatment options and have provided the following recommendations:    The patient and I have discussed the indications for their surgery. We discussed the role for segmental resection to remove the colostomy and likely distal staple line (adjacent to pancreas). The expected hospital course and recovery have been discussed, as well as the potential risks, including: Bleeding, risk of blood transfusion, infection, hernia formation, need for additional procedure, ileus, anastomotic leak, need for reoperation, injury to nearby structures, including bladder or ureters, permanent or temporary stoma creation. They had the opportunity to ask questions - consent was signed.    Please do not hesitate to contact me if you have any questions.      Farhan Lance MD, FACS, FASCRS  Department of Colon & Rectal Surgery  [facility]

## 2024-05-13 NOTE — OP NOTE
[facility]  Colon and Rectal Surgery    [address]  [place], [place]  Tel: [phone]  Fax: [phone]  [email or web address]   MD Payam Reid MD Brian Kann, MD W. Forrest Johnston, MD Matthew Giglia, MD Jennifer Paruch, MD William Kethman, MD Danielle Kay, MD     Patient name: Loki Li Jr.   YOB: 1965   MRN: [id number]  Date of surgery: 05/13/2024    Operative Report    Pre-operative diagnosis: Metastatic colon cancer, attention to colostomy  Post-operative diagnosis: Same as above    Procedure:  Open colostomy takedown with proximal and distal colectomy  Repair of parastomal hernia  Lysis of adhesions for >120 minutes  Flexible sigmoidoscopy    Findings:  Dense adhesions of small bowel to colon, small bowel to small bowel, small bowel to diaphragm and colon to distal pancreas and proximal small bowel - lysis of adhesions for 120 minutes  60 mm side-to-side transverse to descending colon anastomosis, ICG demonstrating adequate perfusion of proximal and distal bowel, and leak test demonstrating patency of the anastomosis, hemostasis and no leak  Anastomosis placed posteriorly, small bowel brought to right abdomen, drain and omentum placed over dissected distal pancreas  Seprafilm placed    Surgeon: Farhan Lance MD  Assistant: Bruna Balderrama MD    Indication: Mr. Li is a 59 year old man with a history of HTN, HLD, and DM, Stage IV transverse colon adenocarcinoma who presents for evaluation of colostomy takedown. He underwent a splenic flexure resection, end colostomy and splenectomy with Dr. Waddell > FOLFOX > progression (pancreatic tail - biopsy proven and RPLN PET avidity) > Keytruda since 1/2023. He was seen by Dr. Goodwin in 1/2024 (note reviewed) - his plan was to follow-up the results of his PET CT, decision was made to complete Keytruda and continue surveillance. He developed significant improvement in burden of  metastatic disease on Keytruda - he was presented at our tumor board and it was decided that he could undergo palliative takedown of his colostomy. The benefits, risks, and alternatives were discussed with the patient, they were given the opportunity to ask questions and they elected to proceed with operative intervention after signing written consent.    Procedure:  After pre-operative assessment and review of informed consent, the patient was taken to the operating room and received general anesthesia. Pre-operative antibiotics were administered and the patient was placed in lithotomy position. The abdomen and perineum was prepped and draped in the usual sterile fashion and a timeout was performed according to [facility] guidelines.      Urology was asked to perform a cystoscopy and bilateral ureteral catheter placement.     A midline laparotomy was performed with electrocautery and fascia incised sharply. There was no injury to underlying abdominal contents during entry. We then methodically performed an extensive lysis of adhesions as described above for approximately 120 minutes - we did this first inferiorly and superiorly and the bilaterally. The left colostomy was carefully dissected noting a moderate parastomal hernia was small bowel. Once this was complete we made a circumferential incision along the mucocutaneous junction with electrocautery through to the dermis. The subcutaneous tissue was sharply  from the colon and its mesentery down through the hernia sac into the abdomen. This was performed until the colon could be freely brought through the ostomy site. The colon was brought through into the abdomen and omentum cleared to proximal transverse colon by dissecting at the colon-omental junction. This allowed us to enter into the lesser sac and revealed small bowel that was densely adherent to the diaphragm and splenic bed. The colon mesentery proximal to the ostomy was  dissected circumferentially and colon divided with ROSIO blue load 75 mm stapler. The mesentery was divided with Ligasure and the staple line was over sewn with 3-0 Vicryl. Attention was then turned to additional lysis of adhesions and mobilization of the sigmoid and descending colon. The small bowel was carefully dissected free from the diaphragm being careful not to injure this portion of the bowel - this was later inspected carefully. We then performed a sigmoid mobilization by sharply dividing the lateral abdominal wall attachments. This was extended superiorly by divided the lateral abdominal wall attachments. The colon mesentery was medialized superiorly to where it was densely adherent to the distal pancreas. We carefully circumferentially dissected the colon and distal staple line leaving only its inflammatory attachments to the pancrease. We then divided these attachments with Ligasure being careful to ensure hemostasis. There was a 1 cm corner of the anastomosis that was identified after mobilization of the colon from the retroperitoneum - this was removed separately. Once complete, the proximal and distal colon was adequately medialized such that an overlapping side-to-side anastomosis could be performed. We divided approximately 3-4 cm of descending colon with a ROSIO 75 mm blue load stapler and the intervening mesentery was divided with Ligasure. This was over sewn with 3-0 Vicryl. We then performed an ICG assessment of the proximal and distal colon by injection of ICG - it was well-perfused. We placed two 3-0 Vicryl stay sutures to align 7 cm length of mid-transverse and proximal descending colon. Distal colotomies were made and an isoperistaltic colo-colonic anastomosis was performed with a EndoGIA purple load 60 mm stapler. The colotomy was then approximated in two layers with 3-0 PDS and 3-0 Vicryl. We then performed a flexible sigmoidoscopy with proximal occlusion of the colon and the anastomosis was  placed under normal saline - the leak test was reassuring, see additional assessment above. Once complete we ran the small bowel from ileocecal valve to duodenum. Hemostasis was verified.    The left lower quadrant ostomy peritoneum was approximated with 0-PDS running suture. We then anteriorly approximated the anterior sheath similarly with a running 0-PDS suture. The abdomen was again explored, small bowel ran to ensure no missed enterotomies or serosal injuries, and hemostasis was verified. The small bowel was brought under the anastomotic mesentery and placed in the right abdomen. A 19-Fr Genaro drain was placed along the pancreatic dissection bed and omentum placed overlying. Seprafilm was then placed. The midline fascia was then approximated with running loop 1-PDS suture with intervening 0-Vicryl figure-of-eight internal retention sutures. The subcutaneous tissue was thoroughly irrigated and skin approximated with interrupted 3-0 Vicryl suture. The ostomy site was approximated with 3-0 Vicryl purse string suture.    Prior to closure and after final closure instrument, needle, and sponge counts were correct. The ureteral catheters were removed intact after the case.    The procedure was completed without complication and was well-tolerated. The patient was then brought to the post-anesthesia care unit in stable condition. I was present for the entire operation and discussed the case with family at its conclusion..    Complications: None  Estimated blood loss: 100 mL  Disposition: PACU      Farhan Lance MD, FACS, FASCRS  Department of Colon & Rectal Surgery  [facility]

## 2024-05-13 NOTE — ANESTHESIA PROCEDURE NOTES
Intubation    Date/Time: 5/13/2024 7:39 AM    Performed by: Serena Shaw CRNA  Authorized by: Jesús Spring MD    Intubation:     Induction:  Intravenous    Intubated:  Postinduction    Mask Ventilation:  Easy mask    Attempts:  1    Attempted By:  CRNA    Method of Intubation:  Video laryngoscopy    Blade:  Rodriguez 3    Laryngeal View Grade: Grade I - full view of cords      Difficult Airway Encountered?: No      Complications:  None    Airway Device:  Oral endotracheal tube    Airway Device Size:  7.5    Style/Cuff Inflation:  Cuffed (inflated to minimal occlusive pressure)    Tube secured:  24    Secured at:  The lips    Placement Verified By:  Capnometry    Complicating Factors:  Large/floppy epiglottis    Findings Post-Intubation:  BS equal bilateral and atraumatic/condition of teeth unchanged

## 2024-05-14 LAB
ANION GAP SERPL CALC-SCNC: 9 MMOL/L (ref 8–16)
BASOPHILS # BLD AUTO: 0.06 K/UL (ref 0–0.2)
BASOPHILS NFR BLD: 0.3 % (ref 0–1.9)
BUN SERPL-MCNC: 19 MG/DL (ref 6–20)
CALCIUM SERPL-MCNC: 8.9 MG/DL (ref 8.7–10.5)
CHLORIDE SERPL-SCNC: 105 MMOL/L (ref 95–110)
CO2 SERPL-SCNC: 21 MMOL/L (ref 23–29)
CREAT SERPL-MCNC: 1.2 MG/DL (ref 0.5–1.4)
DIFFERENTIAL METHOD BLD: ABNORMAL
EOSINOPHIL # BLD AUTO: 0 K/UL (ref 0–0.5)
EOSINOPHIL NFR BLD: 0.2 % (ref 0–8)
ERYTHROCYTE [DISTWIDTH] IN BLOOD BY AUTOMATED COUNT: 14.1 % (ref 11.5–14.5)
EST. GFR  (NO RACE VARIABLE): >60 ML/MIN/1.73 M^2
GLUCOSE SERPL-MCNC: 173 MG/DL (ref 70–110)
HCT VFR BLD AUTO: 40.7 % (ref 40–54)
HGB BLD-MCNC: 13.5 G/DL (ref 14–18)
IMM GRANULOCYTES # BLD AUTO: 0.08 K/UL (ref 0–0.04)
IMM GRANULOCYTES NFR BLD AUTO: 0.4 % (ref 0–0.5)
LYMPHOCYTES # BLD AUTO: 3 K/UL (ref 1–4.8)
LYMPHOCYTES NFR BLD: 14.6 % (ref 18–48)
MAGNESIUM SERPL-MCNC: 2 MG/DL (ref 1.6–2.6)
MCH RBC QN AUTO: 29.7 PG (ref 27–31)
MCHC RBC AUTO-ENTMCNC: 33.2 G/DL (ref 32–36)
MCV RBC AUTO: 90 FL (ref 82–98)
MONOCYTES # BLD AUTO: 1.5 K/UL (ref 0.3–1)
MONOCYTES NFR BLD: 7.4 % (ref 4–15)
NEUTROPHILS # BLD AUTO: 15.8 K/UL (ref 1.8–7.7)
NEUTROPHILS NFR BLD: 77.1 % (ref 38–73)
NRBC BLD-RTO: 0 /100 WBC
PHOSPHATE SERPL-MCNC: 3.9 MG/DL (ref 2.7–4.5)
PLATELET # BLD AUTO: 310 K/UL (ref 150–450)
PMV BLD AUTO: 9.8 FL (ref 9.2–12.9)
POCT GLUCOSE: 150 MG/DL (ref 70–110)
POCT GLUCOSE: 157 MG/DL (ref 70–110)
POCT GLUCOSE: 175 MG/DL (ref 70–110)
POCT GLUCOSE: 184 MG/DL (ref 70–110)
POCT GLUCOSE: 205 MG/DL (ref 70–110)
POTASSIUM SERPL-SCNC: 5.1 MMOL/L (ref 3.5–5.1)
RBC # BLD AUTO: 4.54 M/UL (ref 4.6–6.2)
SODIUM SERPL-SCNC: 135 MMOL/L (ref 136–145)
WBC # BLD AUTO: 20.49 K/UL (ref 3.9–12.7)

## 2024-05-14 PROCEDURE — 25000003 PHARM REV CODE 250

## 2024-05-14 PROCEDURE — 84100 ASSAY OF PHOSPHORUS: CPT | Performed by: STUDENT IN AN ORGANIZED HEALTH CARE EDUCATION/TRAINING PROGRAM

## 2024-05-14 PROCEDURE — 20600001 HC STEP DOWN PRIVATE ROOM

## 2024-05-14 PROCEDURE — 36415 COLL VENOUS BLD VENIPUNCTURE: CPT | Performed by: STUDENT IN AN ORGANIZED HEALTH CARE EDUCATION/TRAINING PROGRAM

## 2024-05-14 PROCEDURE — 83735 ASSAY OF MAGNESIUM: CPT | Performed by: STUDENT IN AN ORGANIZED HEALTH CARE EDUCATION/TRAINING PROGRAM

## 2024-05-14 PROCEDURE — 63600175 PHARM REV CODE 636 W HCPCS: Performed by: STUDENT IN AN ORGANIZED HEALTH CARE EDUCATION/TRAINING PROGRAM

## 2024-05-14 PROCEDURE — 85025 COMPLETE CBC W/AUTO DIFF WBC: CPT | Performed by: STUDENT IN AN ORGANIZED HEALTH CARE EDUCATION/TRAINING PROGRAM

## 2024-05-14 PROCEDURE — 97530 THERAPEUTIC ACTIVITIES: CPT

## 2024-05-14 PROCEDURE — 80048 BASIC METABOLIC PNL TOTAL CA: CPT | Performed by: STUDENT IN AN ORGANIZED HEALTH CARE EDUCATION/TRAINING PROGRAM

## 2024-05-14 PROCEDURE — 25000003 PHARM REV CODE 250: Performed by: STUDENT IN AN ORGANIZED HEALTH CARE EDUCATION/TRAINING PROGRAM

## 2024-05-14 PROCEDURE — S4991 NICOTINE PATCH NONLEGEND: HCPCS

## 2024-05-14 PROCEDURE — 97165 OT EVAL LOW COMPLEX 30 MIN: CPT

## 2024-05-14 PROCEDURE — 97161 PT EVAL LOW COMPLEX 20 MIN: CPT

## 2024-05-14 RX ORDER — HEPARIN SODIUM 5000 [USP'U]/ML
5000 INJECTION, SOLUTION INTRAVENOUS; SUBCUTANEOUS EVERY 8 HOURS
Status: DISCONTINUED | OUTPATIENT
Start: 2024-05-14 | End: 2024-05-17

## 2024-05-14 RX ORDER — INSULIN ASPART 100 [IU]/ML
0-10 INJECTION, SOLUTION INTRAVENOUS; SUBCUTANEOUS
Status: DISCONTINUED | OUTPATIENT
Start: 2024-05-14 | End: 2024-05-18 | Stop reason: HOSPADM

## 2024-05-14 RX ADMIN — METHOCARBAMOL 500 MG: 100 INJECTION, SOLUTION INTRAMUSCULAR; INTRAVENOUS at 09:05

## 2024-05-14 RX ADMIN — HEPARIN SODIUM 5000 UNITS: 5000 INJECTION INTRAVENOUS; SUBCUTANEOUS at 10:05

## 2024-05-14 RX ADMIN — Medication: at 11:05

## 2024-05-14 RX ADMIN — INSULIN ASPART 1 UNITS: 100 INJECTION, SOLUTION INTRAVENOUS; SUBCUTANEOUS at 08:05

## 2024-05-14 RX ADMIN — Medication: at 03:05

## 2024-05-14 RX ADMIN — GABAPENTIN 300 MG: 300 CAPSULE ORAL at 08:05

## 2024-05-14 RX ADMIN — ACETAMINOPHEN 1000 MG: 500 TABLET ORAL at 09:05

## 2024-05-14 RX ADMIN — SODIUM CHLORIDE, POTASSIUM CHLORIDE, SODIUM LACTATE AND CALCIUM CHLORIDE: 600; 310; 30; 20 INJECTION, SOLUTION INTRAVENOUS at 06:05

## 2024-05-14 RX ADMIN — ACETAMINOPHEN 1000 MG: 10 INJECTION, SOLUTION INTRAVENOUS at 06:05

## 2024-05-14 RX ADMIN — METHOCARBAMOL 500 MG: 100 INJECTION, SOLUTION INTRAMUSCULAR; INTRAVENOUS at 01:05

## 2024-05-14 RX ADMIN — METHOCARBAMOL 500 MG: 100 INJECTION, SOLUTION INTRAMUSCULAR; INTRAVENOUS at 06:05

## 2024-05-14 RX ADMIN — ACETAMINOPHEN 1000 MG: 10 INJECTION, SOLUTION INTRAVENOUS at 01:05

## 2024-05-14 RX ADMIN — NICOTINE 1 PATCH: 14 PATCH, EXTENDED RELEASE TRANSDERMAL at 08:05

## 2024-05-14 RX ADMIN — INSULIN ASPART 2 UNITS: 100 INJECTION, SOLUTION INTRAVENOUS; SUBCUTANEOUS at 05:05

## 2024-05-14 RX ADMIN — INSULIN ASPART 4 UNITS: 100 INJECTION, SOLUTION INTRAVENOUS; SUBCUTANEOUS at 01:05

## 2024-05-14 RX ADMIN — GABAPENTIN 300 MG: 300 CAPSULE ORAL at 04:05

## 2024-05-14 RX ADMIN — Medication: at 07:05

## 2024-05-14 RX ADMIN — INSULIN ASPART 1 UNITS: 100 INJECTION, SOLUTION INTRAVENOUS; SUBCUTANEOUS at 01:05

## 2024-05-14 RX ADMIN — HEPARIN SODIUM 5000 UNITS: 5000 INJECTION INTRAVENOUS; SUBCUTANEOUS at 01:05

## 2024-05-14 NOTE — PT/OT/SLP EVAL
Physical Therapy Co-Evaluation and Co-Treatment    Patient Name:  Osman Li Jr.   MRN:  [id number]  Admit Date: 5/13/2024  Admitting Diagnosis:  Malignant neoplasm of transverse colon   Length of Stay: 1 days  Recent Surgery: Procedure(s) (LRB):  CLOSURE, COLOSTOMY (eras high/lithotomy) (N/A)  SIGMOIDOSCOPY, FLEXIBLE (N/A)  INSERTION, CATHETER, URETER, BILATERAL (N/A)  LYSIS, ADHESIONS (N/A) 1 Day Post-Op    Recommendations:     Discharge Recommendations: No Therapy Indicated  Discharge Equipment Recommendations: none   Barriers to discharge: None    Appropriate transfer level with nursing staff: ambulation with SBA and use of IV pole    Plan:     During this hospitalization, patient to be seen 4 x/week to address the identified rehab impairments via gait training, therapeutic activities, therapeutic exercises and progress towards the established goals.  Plan of Care Expires:  06/13/24  Plan of Care Reviewed with: patient, spouse    Assessment     Osman Li Jr. is a 59 y.o. male admitted with a medical diagnosis of Malignant neoplasm of transverse colon. Pt tolerated initial evaluation well today. Patient is s/p colostomy take-down on 5/13/2024. Pt demonstrated good functional strength and tolerance to upright activity on this date, requiring no physical assist for transfers and ambulation. VS stable throughout session. Pt did have generalized unsteadiness when standing requiring CGA to SBA with use of UE support on IV pole throughout ambulation trial for patient safety. Pt with increased post-op pain as well as decreased tolerance to functional activity limiting mobility upon evaluation this date. Pt will continue to benefit from skilled PT services during this hospital admit to continue to improve transfer ability and efficiency as well as continue to progress pt's ambulation distance and cardiopulmonary endurance to maximize pt's functional independence and return to PLOF.     Problem  List: impaired endurance, impaired self care skills, impaired functional mobility, impaired balance, gait instability, pain, impaired skin, edema.  Rehab Prognosis: Good; patient would benefit from acute skilled PT services to address these deficits and reach maximum level of function.      Goals:   Multidisciplinary Problems       Physical Therapy Goals          Problem: Physical Therapy    Goal Priority Disciplines Outcome Goal Variances Interventions   Physical Therapy Goal     PT, PT/OT Progressing     Description: Goals to be met by: 2024     Patient will increase functional independence with mobility by performin. Sit to stand transfer with Modified [place]  2. Gait  x 250 feet with Supervision using RW or LRAD.   3. Ascend/descend 17 stair with right Handrails Stand-by Assistance using RW or LRAD.   4. Lower extremity exercise program x30 reps per handout, with independence                         Subjective     RN notified prior to session. Wife present upon PT entrance into room. Patient agreeable to PT evaluation.    Chief Complaint: No chief complaint on file.  Patient/Family Comments/goals: go home  Pain/Comfort:  Pain Rating 1: 7/10  Location 1: abdomen  Pain Addressed 1: Reposition, Distraction (PCA pump)    Social History:  Residence: Patient lives with their elderly in-laws and spouse in a 2 story house with number of outside stair(s): threshold to enter . Pt's bed/bath is on the 2nd floor with 17 steps and RHR. Pt's primary bathroom has a tub/shower. Pt can stay on the 1st floor if needed which has a tub as well as walk in shower with a bench,  Equipment Owned (not using): walker, rolling, cane, straight, crutches, axillary, wheelchair, raised toilet, grab bar, shower chair  Equipment Used: none  Prior level of function:  Prior to admission, patient was independent  Work: Disability.   Drive: yes.   Managing Medicines/Managing Home: yes.   Hobbies: fishing  Assistance Upon  Discharge: family    Objective:     Additional staff present: OT; OT for co-evaluation due to suspected patient need for two skilled therapists to appropriately assess patient's functional deficits as well as ensure patient safety, accommodate for limited activity tolerance, and provide appropriate, skilled assistance to maximize functional potential during evaluation.    Patient found HOB elevated with: peripheral IV, PCA, Condom Catheter     General Precautions: Standard, Cardiac fall   Orthopedic Precautions:N/A   Braces: N/A   Body mass index is 28.36 kg/m².  Oxygen Device: Room Air  Vitals: /74 (BP Location: Right arm, Patient Position: Sitting)   Pulse 87   Temp 98.6 °F (37 °C) (Oral)   Resp 20   Wt 100.2 kg (220 lb 14.4 oz)   SpO2 (!) 94%   BMI 28.36 kg/m²     Exams:  Cognition:   Alert and Cooperative   Patient is oriented to Person, Place, Time, Situation  Command following: Follows multistep verbal commands  Fluency: clear/fluent  Hearing: Intact  Vision:  Intact  Skin Integrity: Visible skin intact  Postural Assessment: slouched posture and rounded shoulders  Physical Exam:    Left UE Left LE Right UE Right LE   Edema absent absent absent absent   ROM AROM WFL AROM WFL AROM WFL AROM WFL   Strength within normal limits within normal limits within normal limits within normal limits   Sensation intact to light touch intact to light touch intact to light touch intact to light touch   Coordination normal normal normal normal     Outcome Measures:  AM-PAC 6 CLICK MOBILITY  Turning over in bed (including adjusting bedclothes, sheets and blankets)?: 4  Sitting down on and standing up from a chair with arms (e.g., wheelchair, bedside commode, etc.): 3  Moving from lying on back to sitting on the side of the bed?: 4  Moving to and from a bed to a chair (including a wheelchair)?: 3  Need to walk in hospital room?: 3  Climbing 3-5 steps with a railing?: 3  Basic Mobility Total Score: 20     Functional  Mobility:    Bed Mobility:   Pt found/returned to bedside chair    Transfers:   Sit <> Stand Transfer: stand by assistance with no AD. [id number] from bedside chair    Standing Balance:  Static Standing Balance: Good- : able to maintain standing balance against minimal resistance  Dynamic Standing Balance: Good- : able to stand independently unsupported and weight shift across midline minimally  Assistance Level Required: Stand-by Assistance  Patient used:  BUE support on IV pole        Gait:   Patient ambulated: 200 ft   Patient required: contact guard assistance progressing to stand-by assistance  Patient used:   BUE support on IV pole    Gait Pattern observed: reciprocal gait  Gait Deviation(s): narrow base of support, decreased foot clearance, decreased nicole, and shuffle gait  Impairments due to: pain and decreased endurance  all lines remained intact throughout ambulation trial  Comments: Patient required cues for upright posture to increase independence and safety. Patient required cues ~ 25% of the time. Pt able to progress from CGA to SBA as gait trial progressed with improving foot clearance and scanning of environment.    Education:  Time provided for education, counseling and discussion of health disposition in regards to patient's current status  All questions answered within PT scope of practice and to patient's satisfaction  PT role in POC to address current functional deficits  Pt educated on proper body mechanics, safety techniques, and energy conservation with PT facilitation and cueing throughout session  Call nursing/pct to transfer to chair/use bathroom. Pt stated understanding.  Whiteboard updated with therapist name and pt's current mobility status documented above  Safe to perform step transfer to/from chair/bedside commode SBA and HHA or IV pole w/ nursing/PCT present  Pt to ambulate 3x/day SBA and IV pole or HHA with nsg/family in order to maintain functional mobility  Importance of OOB  tolerance prn hrs/day to improve lung ventilation and expansion as well as strengthen postural musculature    Patient left up in chair with all lines intact, call button in reach, and wife present.    History:     Past Medical History:   Diagnosis Date    Colon cancer     Digestive disorder     DM (diabetes mellitus)     Hyperlipidemia     Hypertension     Prediabetes        Past Surgical History:   Procedure Laterality Date    ADENOIDECTOMY  1972    CHOLECYSTECTOMY  2011    CLOSURE, COLOSTOMY N/A 5/13/2024    Procedure: CLOSURE, COLOSTOMY (eras high/lithotomy);  Surgeon: Farhan Lance MD;  Location: [facility] 2ND FLR;  Service: Colon and Rectal;  Laterality: N/A;  CONSENTS IN [facility]    COLON SURGERY      COLONOSCOPY      2018    COLONOSCOPY N/A 3/5/2024    Procedure: COLONOSCOPY;  Surgeon: Farhan Lance MD;  Location: [facility] (4TH FLR);  Service: Endoscopy;  Laterality: N/A;  2/27/24-[facility] pt, 1-4wks, port, PEG plus 2 enemas morning of procedure, instr portal-DS  2/28/24- LVM for precall - ERW  pt confirmed procedure. cf    COLOSTOMY N/A 04/25/2022    Procedure: CREATION, COLOSTOMY;  Surgeon: Carlton Waddell MD;  Location: [facility];  Service: General;  Laterality: N/A;    ENDOSCOPIC ULTRASOUND OF UPPER GASTROINTESTINAL TRACT N/A 01/06/2023    Procedure: ULTRASOUND, UPPER GI TRACT, ENDOSCOPIC;  Surgeon: Rinku Orozco III, MD;  Location: [facility];  Service: Endoscopy;  Laterality: N/A;    FLEXIBLE SIGMOIDOSCOPY N/A 5/13/2024    Procedure: SIGMOIDOSCOPY, FLEXIBLE;  Surgeon: Farhan Lance MD;  Location: [facility] 2ND FLR;  Service: Colon and Rectal;  Laterality: N/A;    INSERTION OF TUNNELED CENTRAL VENOUS CATHETER (CVC) WITH SUBCUTANEOUS PORT Right 05/18/2022    Procedure: [id number];  Surgeon: Carlton Waddell MD;  Location: [facility];  Service: General;  Laterality: Right;    INSERTION OF URETERAL CATHETER N/A 5/13/2024    Procedure: INSERTION, CATHETER, URETER, BILATERAL;  Surgeon:  Travis Edwards MD;  Location: [facility] 2ND FLR;  Service: Urology;  Laterality: N/A;    LYSIS OF ADHESIONS N/A 5/13/2024    Procedure: LYSIS, ADHESIONS;  Surgeon: Farhan Lance MD;  Location: [facility] OR 2ND FLR;  Service: Colon and Rectal;  Laterality: N/A;    MOBILIZATION OF SPLENIC FLEXURE N/A 04/25/2022    Procedure: MOBILIZATION, SPLENIC FLEXURE;  Surgeon: Carlton Waddell MD;  Location: [facility];  Service: General;  Laterality: N/A;    SPLENECTOMY N/A 04/25/2022    Procedure: SPLENECTOMY;  Surgeon: Carlton Waddell MD;  Location: [facility];  Service: General;  Laterality: N/A;    SUBTOTAL COLECTOMY N/A 04/25/2022    Procedure: COLECTOMY, PARTIAL;  Surgeon: Carlton Waddell MD;  Location: [facility] OR;  Service: General;  Laterality: N/A;    TONSILLECTOMY      TUMOR REMOVAL  10/18/2023    on top of the nose    VASECTOMY  1994       Family History   Problem Relation Name Age of Onset    Heart disease Father Loki Sr     Diabetes Father Loki Sr     Rectal cancer Other Padmini         half-sister       Social History     Socioeconomic History    Marital status:    Tobacco Use    Smoking status: Every Day     Current packs/day: 1.00     Average packs/day: 1 pack/day for 40.0 years (40.0 ttl pk-yrs)     Types: Cigarettes     Passive exposure: Current    Smokeless tobacco: Never   Substance and Sexual Activity    Alcohol use: Not Currently    Drug use: Never    Sexual activity: Yes     Partners: Female     Social Determinants of Health     Financial Resource Strain: Low Risk  (11/24/2023)    Overall Financial Resource Strain (CARDIA)     Difficulty of Paying Living Expenses: Not very hard   Food Insecurity: No Food Insecurity (11/24/2023)    Hunger Vital Sign     Worried About Running Out of Food in the Last Year: Never true     Ran Out of Food in the Last Year: Never true   Transportation Needs: No Transportation Needs (11/24/2023)    PRAPARE - Transportation     Lack of Transportation (Medical): No     Lack of  Transportation (Non-Medical): No   Physical Activity: Insufficiently Active (11/24/2023)    Exercise Vital Sign     Days of Exercise per Week: 2 days     Minutes of Exercise per Session: 40 min   Stress: No Stress Concern Present (11/24/2023)    [place] [facility] [employer] - Occupational Stress Questionnaire     Feeling of Stress : Not at all   Housing Stability: Unknown (11/24/2023)    Housing Stability Vital Sign     Unable to Pay for Housing in the Last Year: No     Unstable Housing in the Last Year: No       Time Tracking:     PT Received On: 05/14/24  PT Start Time: 1038     PT Stop Time: 1053  PT Total Time (min): 15 min     Billable Minutes: Evaluation 7 minutes and Therapeutic Activity 8 minutes    5/14/2024

## 2024-05-14 NOTE — ASSESSMENT & PLAN NOTE
Mr. Osman Li is a 59 year old male s/p colostomy takedown, side-side transverse to descending colocolonic anastomosis, and flex sig on 5/13. Doing well post op, but high risk of ileus given adhesions.    -CLD  -mIVF to 50ml/hr  -MMPC with PCA  -SSI  -prn anti-emetics  -DVT ppx: SQH  -OOB/IS    Dispo: JAIRO

## 2024-05-14 NOTE — PROGRESS NOTES
[facility]  Colorectal Surgery  Progress Note    Patient Name: Osman Li Jr.  MRN: [id number]  Admission Date: 5/13/2024  Hospital Length of Stay: 1 days  Attending Physician: Farhan Lance MD    Subjective:     Interval History: NAEON. AF, HDS. Pain controlled. No n/v, no flatus or BM. Drain character now thin serosanguinous.    Post-Op Info:  Procedure(s) (LRB):  CLOSURE, COLOSTOMY (eras high/lithotomy) (N/A)  SIGMOIDOSCOPY, FLEXIBLE (N/A)  INSERTION, CATHETER, URETER, BILATERAL (N/A)  LYSIS, ADHESIONS (N/A)   1 Day Post-Op      Medications:  Continuous Infusions:   hydromorphone in 0.9 % NaCl 6 mg/30 ml   Intravenous Continuous   New Syringe/Bag at 05/14/24 0720    lactated ringers   Intravenous Continuous 50 mL/hr at 05/14/24 0712 Rate Change at 05/14/24 0712     Scheduled Meds:   acetaminophen  1,000 mg Intravenous Q8H    Followed by    acetaminophen  1,000 mg Oral Q8H    gabapentin  300 mg Oral TID    heparin (porcine)  5,000 Units Subcutaneous Q8H    methocarbamol (ROBAXIN) IVPB  500 mg Intravenous Q8H    nicotine  1 patch Transdermal Daily     PRN Meds:   acetaminophen 1,000 mg/100 mL (10 mg/mL) injection 1,000 mg    Followed by    acetaminophen tablet 1,000 mg    gabapentin capsule 300 mg    heparin (porcine) injection 5,000 Units    methocarbamoL (ROBAXIN) 500 mg in dextrose 5 % (D5W) 100 mL IVPB    nicotine 14 mg/24 hr 1 patch        Objective:     Vital Signs (Most Recent):  Temp: 98.3 °F (36.8 °C) (05/14/24 0736)  Pulse: 83 (05/14/24 0736)  Resp: 20 (05/14/24 0736)  BP: 113/66 (05/14/24 0736)  SpO2: (!) 92 % (05/14/24 0736) Vital Signs (24h Range):  Temp:  [97 °F (36.1 °C)-98.8 °F (37.1 °C)] 98.3 °F (36.8 °C)  Pulse:  [71-83] 83  Resp:  [11-20] 20  SpO2:  [92 %-100 %] 92 %  BP: ()/(54-86) 113/66     Intake/Output - Last 3 Shifts         05/12 0700  05/13 0659 05/13 0700  05/14 0659 05/14 0700  05/15 0659    IV Piggyback  2500     Total Intake(mL/kg)  2500 (25)     Urine  (mL/kg/hr)  1370 (0.6)     Drains  130     Total Output  1500     Net  +1000                     Physical Exam  Constitutional:       General: He is not in acute distress.     Appearance: He is not ill-appearing.   HENT:      Head: Normocephalic and atraumatic.      Nose: Nose normal.      Mouth/Throat:      Mouth: Mucous membranes are moist.      Pharynx: Oropharynx is clear.   Eyes:      Extraocular Movements: Extraocular movements intact.      Conjunctiva/sclera: Conjunctivae normal.   Cardiovascular:      Rate and Rhythm: Normal rate and regular rhythm.      Pulses: Normal pulses.   Pulmonary:      Effort: Pulmonary effort is normal.   Abdominal:      General: Abdomen is flat.      Palpations: Abdomen is soft.      Tenderness: There is abdominal tenderness (appropriately TTP).      Comments: Incisions dressed with island dressings with thin non-bloody strikethrough  Abdominal binder in place  DAWIT drain with thin SS output   Musculoskeletal:         General: Normal range of motion.      Cervical back: Normal range of motion.   Skin:     General: Skin is warm and dry.      Capillary Refill: Capillary refill takes less than 2 seconds.   Neurological:      Mental Status: He is alert and oriented to person, place, and time.              Significant Labs:  BMP (Last 3 Results):   Recent Labs   Lab 05/14/24  0325   *   *   K 5.1      CO2 21*   BUN 19   CREATININE 1.2   CALCIUM 8.9   MG 2.0     CBC (Last 3 Results):   Recent Labs   Lab 05/13/24  1614 05/14/24  0326   WBC 19.64* 20.49*   RBC 4.60 4.54*   HGB 14.0 13.5*   HCT 40.4 40.7    310   MCV 88 90   MCH 30.4 29.7   MCHC 34.7 33.2       Significant Diagnostics:  I have reviewed all pertinent imaging results/findings within the past 24 hours.  Assessment/Plan:     * Malignant neoplasm of transverse colon  Mr. Osmna Li is a 59 year old male s/p colostomy takedown, side-side transverse to descending colocolonic anastomosis, and flex sig  on 5/13. Doing well post op, but high risk of ileus given adhesions.    -CLD  -mIVF to 50ml/hr  -MMPC with PCA  -SSI  -prn anti-emetics  -DVT ppx: SQH  -OOB/IS    Dispo: JAIRO Hicks MD  [facility]  [facility]

## 2024-05-14 NOTE — RESPIRATORY THERAPY
"RAPID RESPONSE RESPIRATORY THERAPY ETCO2 CHECK         Time of visit: 951     Code Status: Prior   : 1965  Bed: 1046/1046 A:   MRN: [id number]  Time spent at the bedside: < 15 min    SITUATION    Evaluated patient for: ETCo2 compliance    BACKGROUND    Why is the patient in the hospital?: Malignant neoplasm of transverse colon    Patient has a past medical history of Colon cancer, Digestive disorder, DM (diabetes mellitus), Hyperlipidemia, Hypertension, and Prediabetes.    24 Hours Vitals Range:  Temp:  [97 °F (36.1 °C)-98.8 °F (37.1 °C)]   Pulse:  [71-83]   Resp:  [11-20]   BP: ()/(54-86)   SpO2:  [92 %-100 %]     Labs:    Recent Labs     24  0325   *   K 5.1      CO2 21*   BUN 19   CREATININE 1.2   *   PHOS 3.9   MG 2.0        No results for input(s): "PH", "PCO2", "PO2", "HCO3", "POCSATURATED", "BE" in the last 72 hours.    ASSESSMENT/INTERVENTIONS      Last VS   Temp: 98.3 °F (36.8 °C) ( 0736)  Pulse: 83 ( 07)  Resp: 18 ( 1140)  BP: 113/66 ( 07)  SpO2: 92 % (736)    Level of Consciousness: Level of Consciousness (AVPU): alert  Respiratory Effort: Respiratory Effort: Normal, Unlabored Expansion/Accessory Muscle Usage: Expansion/Accessory Muscles/Retractions: no use of accessory muscles, no retractions, expansion symmetric  All Lung Field Breath Sounds:    Is the ETCO2 monitor on? No  Is the patient wearing a cannula? No  Are ETCO2 orders placed? Yes  Is the patient on a PCA pump? Yes  ETCO2 monitored:    Ambu at bedside:      Active Orders   Respiratory Care    END TIDAL CO2 MONITOR Q12H     Frequency: Q12H     Number of Occurrences: Until Specified    Incentive spirometry     Frequency: Q4H     Number of Occurrences: Until Specified     Order Comments: Q4 while awake until discharge.  Educate patient in use; Keep incentive spirometer within reach; and Document incentive spirometer volume every 4 hours while awake.    ((10 sets per hour (3-5 " inspirations per set)) on original order)      Oxygen Continuous     Frequency: Continuous     Number of Occurrences: Until Specified     Order Questions:      Device type: Low flow      Device: Nasal Cannula (1- 5 Liters)      LPM: 0-5      Titrate O2 per Oxygen Titration Protocol: Yes      To maintain SpO2 goal of: >= 90%      Notify MD of: Inability to achieve desired SpO2; Sudden change in patient status and requires 20% increase in FiO2; Patient requires >60% FiO2    Pulse Oximetry Q Shift     Frequency: Q Shift     Number of Occurrences: Until Specified    SMOKING CESSATION EDUCATION PER RESPIRATORY Once     Frequency: Once     Number of Occurrences: 1 Occurrences       RECOMMENDATIONS    We recommend: RRT Recs: Continue POC per primary team.      FOLLOW-UP    Please call back the Rapid Response RT, Marry Shultz RRT at x [phone] for any questions or concerns.

## 2024-05-14 NOTE — SUBJECTIVE & OBJECTIVE
Subjective:     Interval History: NAEON. AF, HDS. Pain controlled. No n/v, no flatus or BM. Drain character now thin serosanguinous.    Post-Op Info:  Procedure(s) (LRB):  CLOSURE, COLOSTOMY (eras high/lithotomy) (N/A)  SIGMOIDOSCOPY, FLEXIBLE (N/A)  INSERTION, CATHETER, URETER, BILATERAL (N/A)  LYSIS, ADHESIONS (N/A)   1 Day Post-Op      Medications:  Continuous Infusions:   hydromorphone in 0.9 % NaCl 6 mg/30 ml   Intravenous Continuous   New Syringe/Bag at 05/14/24 0720    lactated ringers   Intravenous Continuous 50 mL/hr at 05/14/24 0712 Rate Change at 05/14/24 0712     Scheduled Meds:   acetaminophen  1,000 mg Intravenous Q8H    Followed by    acetaminophen  1,000 mg Oral Q8H    gabapentin  300 mg Oral TID    heparin (porcine)  5,000 Units Subcutaneous Q8H    methocarbamol (ROBAXIN) IVPB  500 mg Intravenous Q8H    nicotine  1 patch Transdermal Daily     PRN Meds:   acetaminophen 1,000 mg/100 mL (10 mg/mL) injection 1,000 mg    Followed by    acetaminophen tablet 1,000 mg    gabapentin capsule 300 mg    heparin (porcine) injection 5,000 Units    methocarbamoL (ROBAXIN) 500 mg in dextrose 5 % (D5W) 100 mL IVPB    nicotine 14 mg/24 hr 1 patch        Objective:     Vital Signs (Most Recent):  Temp: 98.3 °F (36.8 °C) (05/14/24 0736)  Pulse: 83 (05/14/24 0736)  Resp: 20 (05/14/24 0736)  BP: 113/66 (05/14/24 0736)  SpO2: (!) 92 % (05/14/24 0736) Vital Signs (24h Range):  Temp:  [97 °F (36.1 °C)-98.8 °F (37.1 °C)] 98.3 °F (36.8 °C)  Pulse:  [71-83] 83  Resp:  [11-20] 20  SpO2:  [92 %-100 %] 92 %  BP: ()/(54-86) 113/66     Intake/Output - Last 3 Shifts         05/12 0700  05/13 0659 05/13 0700  05/14 0659 05/14 0700  05/15 0659    IV Piggyback  2500     Total Intake(mL/kg)  2500 (25)     Urine (mL/kg/hr)  1370 (0.6)     Drains  130     Total Output  1500     Net  +1000                     Physical Exam  Constitutional:       General: He is not in acute distress.     Appearance: He is not ill-appearing.   HENT:       Head: Normocephalic and atraumatic.      Nose: Nose normal.      Mouth/Throat:      Mouth: Mucous membranes are moist.      Pharynx: Oropharynx is clear.   Eyes:      Extraocular Movements: Extraocular movements intact.      Conjunctiva/sclera: Conjunctivae normal.   Cardiovascular:      Rate and Rhythm: Normal rate and regular rhythm.      Pulses: Normal pulses.   Pulmonary:      Effort: Pulmonary effort is normal.   Abdominal:      General: Abdomen is flat.      Palpations: Abdomen is soft.      Tenderness: There is abdominal tenderness (appropriately TTP).      Comments: Incisions dressed with island dressings with thin non-bloody strikethrough  Abdominal binder in place  DAWIT drain with thin SS output   Musculoskeletal:         General: Normal range of motion.      Cervical back: Normal range of motion.   Skin:     General: Skin is warm and dry.      Capillary Refill: Capillary refill takes less than 2 seconds.   Neurological:      Mental Status: He is alert and oriented to person, place, and time.              Significant Labs:  BMP (Last 3 Results):   Recent Labs   Lab 05/14/24  0325   *   *   K 5.1      CO2 21*   BUN 19   CREATININE 1.2   CALCIUM 8.9   MG 2.0     CBC (Last 3 Results):   Recent Labs   Lab 05/13/24  1614 05/14/24  0326   WBC 19.64* 20.49*   RBC 4.60 4.54*   HGB 14.0 13.5*   HCT 40.4 40.7    310   MCV 88 90   MCH 30.4 29.7   MCHC 34.7 33.2       Significant Diagnostics:  I have reviewed all pertinent imaging results/findings within the past 24 hours.

## 2024-05-14 NOTE — PLAN OF CARE
Problem: Occupational Therapy  Goal: Occupational Therapy Goal  Description: Goals to be met by: 6/3/2024     Patient will increase functional independence with ADLs by performing:    UE Dressing with Set-up Assistance.  Grooming while standing at sink with Modified [place].  Toileting from toilet with Modified [place] for hygiene and clothing management.   Supine to sit with supine with HOB flat and no handrails  Stand pivot transfers with Modified [place].  Toilet transfer to toilet with Modified [place].    Outcome: Progressing   Patient's goals are set.

## 2024-05-14 NOTE — PT/OT/SLP EVAL
Occupational Therapy   Evaluation/Treatment    Name: Osman Li Jr.  MRN: [id number]  Admitting Diagnosis: Malignant neoplasm of transverse colon  Recent Surgery: Procedure(s) (LRB):  CLOSURE, COLOSTOMY (eras high/lithotomy) (N/A)  SIGMOIDOSCOPY, FLEXIBLE (N/A)  INSERTION, CATHETER, URETER, BILATERAL (N/A)  LYSIS, ADHESIONS (N/A) 1 Day Post-Op    Recommendations:     Discharge Recommendations: No Therapy Indicated  Discharge Equipment Recommendations:  none  Barriers to discharge:  None    Assessment:     Osman Li Jr. is a 59 y.o. male with a medical diagnosis of Malignant neoplasm of transverse colon. Performance deficits affecting function: weakness, impaired endurance, impaired self care skills, impaired functional mobility, gait instability, impaired balance, edema, impaired skin, pain. Patient agreed to therapy and was motivated to participate in OT evaluation. VSS throughout session. Patient would benefit from continued skilled acute OT 3x/wk to improve functional mobility, increase independence with ADLs, and address established goals.     Rehab Prognosis: Good; patient would benefit from acute skilled OT services to address these deficits and reach maximum level of function.       Plan:     Patient to be seen 3 x/week to address the above listed problems via self-care/home management, therapeutic activities, therapeutic exercises  Plan of Care Expires: 06/14/24  Plan of Care Reviewed with: patient, spouse    Subjective     Chief Complaint: None  Patient/Family Comments/goals: patient agreed to therapy    Occupational Profile:  Living Environment: Patient lives with elderly in laws and wife in a 2 SH with a threshold step to enter. Patient has a bedroom and bathroom on 2nd floor with 17 DONNA and R HR. Patient primary bathroom has a tub shower combo. Patient can stay on the first floor if needed that has a tub shower combo and WIS shower with built in bench. Patient was independent with  ADLs and functional mobility PTA. Equipment Owned (not using): walker, rolling, cane, straight, crutches, axillary, wheelchair, raised toilet, grab bar, shower chair     Pain/Comfort:  Pain Rating 1: 7/10  Location - Side 1: Bilateral  Location - Orientation 1: generalized  Location 1: abdomen  Pain Addressed 1: Reposition, Distraction (PCA pump)  Pain Rating Post-Intervention 1:  (patient did not rate)    Patients cultural, spiritual, Baptism conflicts given the current situation: no    Objective:     Communicated with: NSG prior to session.  Patient found up in chair with peripheral IV, PCA, Condom Catheter upon OT entry to room.    General Precautions: Standard, fall  Orthopedic Precautions: N/A  Braces: N/A  Respiratory Status: Room air    Occupational Performance:    Functional Mobility/Transfers:  Patient completed Sit <> Stand Transfer with stand by assistance  with  no assistive device   Functional Mobility: Patient ambulated from bedside chair<>hallway for assessing household mobility distances in order to improve independence with transfers with CGA progressing to SBA, but patient did utilize B UE support on IV pole during ambulation.     Activities of Daily Living:  Upper Body Dressing: minimum assistance Donning back gown sitting in bedside chair due to lines  Lower Body Dressing: supervision [place] and donning socks sitting in bedside chair only  Toileting: total assistance due to catheter    Cognitive/Visual Perceptual:  Cognitive/Psychosocial Skills:     -       Oriented to: Person, Place, Time, and Situation   -       Follows Commands/attention:Follows multistep  commands  -       Communication: clear/fluent  -       Memory: No Deficits noted  -       Safety awareness/insight to disability: intact   -       Mood/Affect/Coping skills/emotional control: Appropriate to situation  Visual/Perceptual:      -Intact      Physical Exam:  Upper Extremity Range of Motion:     -       Right Upper Extremity:  WFL  -       Left Upper Extremity: WFL  Upper Extremity Strength:    -       Right Upper Extremity: WFL  -       Left Upper Extremity: WFL   Strength:    -       Right Upper Extremity: WFL  -       Left Upper Extremity: WFL  Fine Motor Coordination:    -       Intact  Gross motor coordination:   WFL    AMPAC 6 Click ADL:  AMPAC Total Score: 20    Treatment & Education:  Role of OT and POC  ADL retraining  Functional mobility training  Safety  Discharge planning  Importance EOB/OOB activity    Co-treatment performed due to patient's multiple deficits requiring two skilled therapists to appropriately and safely assess patient's strength and endurance while facilitating functional tasks in addition to accommodating for patient's activity tolerance.     Patient left up in chair with all lines intact, call button in reach, spouse present, and all needs met.     GOALS:   Multidisciplinary Problems       Occupational Therapy Goals          Problem: Occupational Therapy    Goal Priority Disciplines Outcome Interventions   Occupational Therapy Goal     OT, PT/OT Progressing    Description: Goals to be met by: 6/3/2024     Patient will increase functional independence with ADLs by performing:    UE Dressing with Set-up Assistance.  Grooming while standing at sink with Modified [place].  Toileting from toilet with Modified [place] for hygiene and clothing management.   Supine to sit with supine with HOB flat and no handrails  Stand pivot transfers with Modified [place].  Toilet transfer to toilet with Modified [place].                         History:     Past Medical History:   Diagnosis Date    Colon cancer     Digestive disorder     DM (diabetes mellitus)     Hyperlipidemia     Hypertension     Prediabetes          Past Surgical History:   Procedure Laterality Date    ADENOIDECTOMY  1972    CHOLECYSTECTOMY  2011    CLOSURE, COLOSTOMY N/A 5/13/2024    Procedure: CLOSURE, COLOSTOMY (eras  high/lithotomy);  Surgeon: Farhan Lance MD;  Location: [facility] OR 2ND FLR;  Service: Colon and Rectal;  Laterality: N/A;  CONSENTS IN [facility]    COLON SURGERY      COLONOSCOPY      2018    COLONOSCOPY N/A 3/5/2024    Procedure: COLONOSCOPY;  Surgeon: Farhan Lance MD;  Location: [facility] ENDO (4TH FLR);  Service: Endoscopy;  Laterality: N/A;  2/27/24-[facility] pt, 1-4wks, port, PEG plus 2 enemas morning of procedure, instr portal-DS  2/28/24- LVM for precall - ERW  pt confirmed procedure. cf    COLOSTOMY N/A 04/25/2022    Procedure: CREATION, COLOSTOMY;  Surgeon: Carlton Waddell MD;  Location: [facility] OR;  Service: General;  Laterality: N/A;    ENDOSCOPIC ULTRASOUND OF UPPER GASTROINTESTINAL TRACT N/A 01/06/2023    Procedure: ULTRASOUND, UPPER GI TRACT, ENDOSCOPIC;  Surgeon: Rinku Orozco III, MD;  Location: [facility] ENDO;  Service: Endoscopy;  Laterality: N/A;    FLEXIBLE SIGMOIDOSCOPY N/A 5/13/2024    Procedure: SIGMOIDOSCOPY, FLEXIBLE;  Surgeon: Farhan Lance MD;  Location: [facility] OR 2ND FLR;  Service: Colon and Rectal;  Laterality: N/A;    INSERTION OF TUNNELED CENTRAL VENOUS CATHETER (CVC) WITH SUBCUTANEOUS PORT Right 05/18/2022    Procedure: [id number];  Surgeon: Carlton Waddell MD;  Location: [facility] OR;  Service: General;  Laterality: Right;    INSERTION OF URETERAL CATHETER N/A 5/13/2024    Procedure: INSERTION, CATHETER, URETER, BILATERAL;  Surgeon: Travis Edwards MD;  Location: [facility] OR 2ND FLR;  Service: Urology;  Laterality: N/A;    LYSIS OF ADHESIONS N/A 5/13/2024    Procedure: LYSIS, ADHESIONS;  Surgeon: Farhan Lance MD;  Location: [facility] OR 2ND FLR;  Service: Colon and Rectal;  Laterality: N/A;    MOBILIZATION OF SPLENIC FLEXURE N/A 04/25/2022    Procedure: MOBILIZATION, SPLENIC FLEXURE;  Surgeon: Carlton Waddell MD;  Location: [facility] OR;  Service: General;  Laterality: N/A;    SPLENECTOMY N/A 04/25/2022    Procedure: SPLENECTOMY;  Surgeon: Carlton Waddell MD;  Location: [facility]  OR;  Service: General;  Laterality: N/A;    SUBTOTAL COLECTOMY N/A 04/25/2022    Procedure: COLECTOMY, PARTIAL;  Surgeon: Carlton Waddell MD;  Location: [facility] OR;  Service: General;  Laterality: N/A;    TONSILLECTOMY      TUMOR REMOVAL  10/18/2023    on top of the nose    VASECTOMY  1994       Time Tracking:     OT Date of Treatment: 05/14/24  OT Start Time: 1038  OT Stop Time: 1053  OT Total Time (min): 15 min    Billable Minutes:Evaluation 7  Self Care/Home Management 8    5/14/2024

## 2024-05-14 NOTE — PLAN OF CARE
Post-Acute Therapy Recommendation: no further therapy indicated      Evaluation completed today. PT goals appropriate.    Please continue Progressive Mobility Protocol as appropriate.    Appropriate transfer level with nursing staff: ambulation with SBA     2024    Problem: Physical Therapy  Goal: Physical Therapy Goal  Description: Goals to be met by: 2024     Patient will increase functional independence with mobility by performin. Sit to stand transfer with Modified [place]  2. Gait  x 250 feet with Supervision using RW or LRAD.   3. Ascend/descend 17 stair with right Handrails Stand-by Assistance using RW or LRAD.   4. Lower extremity exercise program x30 reps per handout, with independence    Outcome: Progressing

## 2024-05-14 NOTE — PLAN OF CARE
MSW met with the patient at the bedside. The patient's wife Jina is also at the bedside.     Patient is alert and oriented with no communication barriers.     Patient denies having DME at home.     Patients PCP is correct on the face sheet. Patient choice pharmacy is bedside delivery.     Patients' family will transport the patient home at discharge.     CM team will continue to follow.     05/14/24 [id number]   Discharge Assessment   Assessment Type Discharge Planning Assessment   Confirmed/corrected address, phone number and insurance Yes   Confirmed Demographics Correct on Facesheet   Source of Information patient;family;health record   People in Home significant other   Do you expect to return to your current living situation? Yes   Do you have help at home or someone to help you manage your care at home? Yes   Who are your caregiver(s) and their phone number(s)? Family   Prior to hospitilization cognitive status: Alert/Oriented   Current cognitive status: Alert/Oriented   Walking or Climbing Stairs Difficulty no   Dressing/Bathing Difficulty no   Equipment Currently Used at Home none   Readmission within 30 days? No   Patient currently being followed by outpatient case management? No   Do you currently have service(s) that help you manage your care at home? No   Do you take prescription medications? Yes   Do you have prescription coverage? Yes   Do you have any problems affording any of your prescribed medications? No   Is the patient taking medications as prescribed? yes   How do you get to doctors appointments? car, drives self;family or friend will provide   Are you on dialysis? No  (diabetic)   Do you take coumadin? No   Discharge Plan A Home with family   Discharge Plan B Home Health   DME Needed Upon Discharge  none   Discharge Plan discussed with: Patient;Spouse/sig other   Transition of Care Barriers None   Physical Activity   On average, how many days per week do you engage in moderate to strenuous exercise  (like a brisk walk)? 5 days   On average, how many minutes do you engage in exercise at this level? 30 min   Financial Resource Strain   How hard is it for you to pay for the very basics like food, housing, medical care, and heating? Not hard   Housing Stability   In the last 12 months, was there a time when you were not able to pay the mortgage or rent on time? N   At any time in the past 12 months, were you homeless or living in a shelter (including now)? N   Transportation Needs   Has the lack of transportation kept you from medical appointments, meetings, work or from getting things needed for daily living? No   Food Insecurity   Within the past 12 months, you worried that your food would run out before you got the money to buy more. Never true   Within the past 12 months, the food you bought just didn't last and you didn't have money to get more. Never true   Stress   Do you feel stress - tense, restless, nervous, or anxious, or unable to sleep at night because your mind is troubled all the time - these days? Only a littl   Social Isolation   How often do you feel lonely or isolated from those around you?  Never   Alcohol Use   Q1: How often do you have a drink containing alcohol? 2-3 per wk   Q2: How many drinks containing alcohol do you have on a typical day when you are drinking? None   Q3: How often do you have six or more drinks on one occasion? Never   Utilities   In the past 12 months has the electric, gas, oil, or water company threatened to shut off services in your home? No   Health Literacy   How often do you need to have someone help you when you read instructions, pamphlets, or other written material from your doctor or pharmacy? Never

## 2024-05-14 NOTE — CARE UPDATE
I have reviewed the chart of Osman Li Jr. who is hospitalized for the following:    Active Hospital Problems    Diagnosis    *Malignant neoplasm of transverse colon    Metastatic adenocarcinoma to pancreas    Hyponatremia     Na 135 on admission  - trend      Type 2 diabetes mellitus    HTN (hypertension)    HLD (hyperlipidemia)        Rickie Barrientos PA-C  Unit Based DONTA

## 2024-05-14 NOTE — PLAN OF CARE
[facility] Plan of Care Note    Dx:   Colostomy status [Z93.3]  Colostomy in place [Z93.3]    Shift Events: No significant events overnight    Goals of Care: Comfort, safety, blas care    Neuro: AAO    Vital Signs: /64 (BP Location: Right arm, Patient Position: Lying)   Pulse 83   Temp 98.8 °F (37.1 °C) (Oral)   Resp 18   Wt 100.2 kg (220 lb 14.4 oz)   SpO2 97%   BMI 28.36 kg/m²     Respiratory: RA    Diet: Diet NPO    Is patient tolerating current diet? Yes    GTTS: LR @ 75, PCA    Urine Output/Bowel Movement:   I/O this shift:  In: -   Out: 175 [Urine:175]  Last Bowel Movement: 05/12/24    Drains/Tubes/Tube Feeds (include total output/shift):   I/O this shift:  In: -   Out: 175 [Urine:175]    Lines: PIV x2    Accuchecks: Q6    Skin: MLI, ostomy takedown site, DAWIT drain site    Fall Risk Score: Per charting    Activity level? Edge of bed/ standing with assist    Any scheduled procedures? None    Any safety concerns? Fall precautions    Other: None

## 2024-05-15 LAB
ANION GAP SERPL CALC-SCNC: 11 MMOL/L (ref 8–16)
BASOPHILS # BLD AUTO: 0.07 K/UL (ref 0–0.2)
BASOPHILS NFR BLD: 0.5 % (ref 0–1.9)
BUN SERPL-MCNC: 11 MG/DL (ref 6–20)
CALCIUM SERPL-MCNC: 9 MG/DL (ref 8.7–10.5)
CHLORIDE SERPL-SCNC: 100 MMOL/L (ref 95–110)
CO2 SERPL-SCNC: 21 MMOL/L (ref 23–29)
CREAT SERPL-MCNC: 0.9 MG/DL (ref 0.5–1.4)
DIFFERENTIAL METHOD BLD: ABNORMAL
EOSINOPHIL # BLD AUTO: 0.3 K/UL (ref 0–0.5)
EOSINOPHIL NFR BLD: 2.3 % (ref 0–8)
ERYTHROCYTE [DISTWIDTH] IN BLOOD BY AUTOMATED COUNT: 13.9 % (ref 11.5–14.5)
EST. GFR  (NO RACE VARIABLE): >60 ML/MIN/1.73 M^2
GLUCOSE SERPL-MCNC: 127 MG/DL (ref 70–110)
HCT VFR BLD AUTO: 34.1 % (ref 40–54)
HGB BLD-MCNC: 11.5 G/DL (ref 14–18)
IMM GRANULOCYTES # BLD AUTO: 0.07 K/UL (ref 0–0.04)
IMM GRANULOCYTES NFR BLD AUTO: 0.5 % (ref 0–0.5)
LYMPHOCYTES # BLD AUTO: 3 K/UL (ref 1–4.8)
LYMPHOCYTES NFR BLD: 20.5 % (ref 18–48)
MAGNESIUM SERPL-MCNC: 1.8 MG/DL (ref 1.6–2.6)
MCH RBC QN AUTO: 29.9 PG (ref 27–31)
MCHC RBC AUTO-ENTMCNC: 33.7 G/DL (ref 32–36)
MCV RBC AUTO: 89 FL (ref 82–98)
MONOCYTES # BLD AUTO: 1 K/UL (ref 0.3–1)
MONOCYTES NFR BLD: 6.8 % (ref 4–15)
NEUTROPHILS # BLD AUTO: 10.2 K/UL (ref 1.8–7.7)
NEUTROPHILS NFR BLD: 69.4 % (ref 38–73)
NRBC BLD-RTO: 0 /100 WBC
PHOSPHATE SERPL-MCNC: 1.6 MG/DL (ref 2.7–4.5)
PLATELET # BLD AUTO: 277 K/UL (ref 150–450)
PMV BLD AUTO: 9.7 FL (ref 9.2–12.9)
POCT GLUCOSE: 134 MG/DL (ref 70–110)
POCT GLUCOSE: 141 MG/DL (ref 70–110)
POCT GLUCOSE: 146 MG/DL (ref 70–110)
POCT GLUCOSE: 146 MG/DL (ref 70–110)
POTASSIUM SERPL-SCNC: 4.2 MMOL/L (ref 3.5–5.1)
RBC # BLD AUTO: 3.84 M/UL (ref 4.6–6.2)
SODIUM SERPL-SCNC: 132 MMOL/L (ref 136–145)
WBC # BLD AUTO: 14.68 K/UL (ref 3.9–12.7)

## 2024-05-15 PROCEDURE — 36415 COLL VENOUS BLD VENIPUNCTURE: CPT | Performed by: STUDENT IN AN ORGANIZED HEALTH CARE EDUCATION/TRAINING PROGRAM

## 2024-05-15 PROCEDURE — 99900035 HC TECH TIME PER 15 MIN (STAT)

## 2024-05-15 PROCEDURE — 80048 BASIC METABOLIC PNL TOTAL CA: CPT | Performed by: STUDENT IN AN ORGANIZED HEALTH CARE EDUCATION/TRAINING PROGRAM

## 2024-05-15 PROCEDURE — 63600175 PHARM REV CODE 636 W HCPCS: Performed by: STUDENT IN AN ORGANIZED HEALTH CARE EDUCATION/TRAINING PROGRAM

## 2024-05-15 PROCEDURE — 97116 GAIT TRAINING THERAPY: CPT

## 2024-05-15 PROCEDURE — 25000003 PHARM REV CODE 250: Performed by: STUDENT IN AN ORGANIZED HEALTH CARE EDUCATION/TRAINING PROGRAM

## 2024-05-15 PROCEDURE — 84100 ASSAY OF PHOSPHORUS: CPT | Performed by: STUDENT IN AN ORGANIZED HEALTH CARE EDUCATION/TRAINING PROGRAM

## 2024-05-15 PROCEDURE — 20600001 HC STEP DOWN PRIVATE ROOM

## 2024-05-15 PROCEDURE — 85025 COMPLETE CBC W/AUTO DIFF WBC: CPT | Performed by: STUDENT IN AN ORGANIZED HEALTH CARE EDUCATION/TRAINING PROGRAM

## 2024-05-15 PROCEDURE — 83735 ASSAY OF MAGNESIUM: CPT | Performed by: STUDENT IN AN ORGANIZED HEALTH CARE EDUCATION/TRAINING PROGRAM

## 2024-05-15 PROCEDURE — 25000003 PHARM REV CODE 250

## 2024-05-15 PROCEDURE — 27000221 HC OXYGEN, UP TO 24 HOURS

## 2024-05-15 PROCEDURE — S4991 NICOTINE PATCH NONLEGEND: HCPCS

## 2024-05-15 RX ORDER — MORPHINE SULFATE 15 MG/1
15 TABLET, FILM COATED, EXTENDED RELEASE ORAL 2 TIMES DAILY
Status: DISCONTINUED | OUTPATIENT
Start: 2024-05-15 | End: 2024-05-18 | Stop reason: HOSPADM

## 2024-05-15 RX ORDER — OXYCODONE HYDROCHLORIDE 5 MG/1
5 TABLET ORAL EVERY 6 HOURS PRN
Status: DISCONTINUED | OUTPATIENT
Start: 2024-05-15 | End: 2024-05-16

## 2024-05-15 RX ORDER — MORPHINE SULFATE 15 MG/1
15 TABLET, FILM COATED, EXTENDED RELEASE ORAL 2 TIMES DAILY
Status: DISCONTINUED | OUTPATIENT
Start: 2024-05-15 | End: 2024-05-15

## 2024-05-15 RX ORDER — OXYCODONE HYDROCHLORIDE 10 MG/1
10 TABLET ORAL EVERY 4 HOURS PRN
Status: DISCONTINUED | OUTPATIENT
Start: 2024-05-15 | End: 2024-05-15

## 2024-05-15 RX ORDER — OXYCODONE HYDROCHLORIDE 5 MG/1
5 TABLET ORAL EVERY 4 HOURS PRN
Status: DISCONTINUED | OUTPATIENT
Start: 2024-05-15 | End: 2024-05-15

## 2024-05-15 RX ORDER — TRAZODONE HYDROCHLORIDE 50 MG/1
50 TABLET ORAL NIGHTLY PRN
Status: DISCONTINUED | OUTPATIENT
Start: 2024-05-15 | End: 2024-05-18 | Stop reason: HOSPADM

## 2024-05-15 RX ORDER — IBUPROFEN 400 MG/1
800 TABLET ORAL EVERY 6 HOURS
Status: DISCONTINUED | OUTPATIENT
Start: 2024-05-15 | End: 2024-05-18 | Stop reason: HOSPADM

## 2024-05-15 RX ADMIN — HEPARIN SODIUM 5000 UNITS: 5000 INJECTION INTRAVENOUS; SUBCUTANEOUS at 02:05

## 2024-05-15 RX ADMIN — MORPHINE SULFATE 15 MG: 15 TABLET, EXTENDED RELEASE ORAL at 01:05

## 2024-05-15 RX ADMIN — MORPHINE SULFATE 15 MG: 15 TABLET, EXTENDED RELEASE ORAL at 08:05

## 2024-05-15 RX ADMIN — GABAPENTIN 300 MG: 300 CAPSULE ORAL at 02:05

## 2024-05-15 RX ADMIN — GABAPENTIN 300 MG: 300 CAPSULE ORAL at 08:05

## 2024-05-15 RX ADMIN — HEPARIN SODIUM 5000 UNITS: 5000 INJECTION INTRAVENOUS; SUBCUTANEOUS at 09:05

## 2024-05-15 RX ADMIN — METHOCARBAMOL 500 MG: 100 INJECTION, SOLUTION INTRAMUSCULAR; INTRAVENOUS at 09:05

## 2024-05-15 RX ADMIN — IBUPROFEN 800 MG: 400 TABLET ORAL at 05:05

## 2024-05-15 RX ADMIN — ACETAMINOPHEN 1000 MG: 500 TABLET ORAL at 05:05

## 2024-05-15 RX ADMIN — ACETAMINOPHEN 1000 MG: 500 TABLET ORAL at 09:05

## 2024-05-15 RX ADMIN — IBUPROFEN 800 MG: 400 TABLET ORAL at 11:05

## 2024-05-15 RX ADMIN — METHOCARBAMOL 500 MG: 100 INJECTION, SOLUTION INTRAMUSCULAR; INTRAVENOUS at 02:05

## 2024-05-15 RX ADMIN — ACETAMINOPHEN 1000 MG: 500 TABLET ORAL at 02:05

## 2024-05-15 RX ADMIN — HEPARIN SODIUM 5000 UNITS: 5000 INJECTION INTRAVENOUS; SUBCUTANEOUS at 05:05

## 2024-05-15 RX ADMIN — SODIUM CHLORIDE, POTASSIUM CHLORIDE, SODIUM LACTATE AND CALCIUM CHLORIDE: 600; 310; 30; 20 INJECTION, SOLUTION INTRAVENOUS at 01:05

## 2024-05-15 RX ADMIN — OXYCODONE 5 MG: 5 TABLET ORAL at 03:05

## 2024-05-15 RX ADMIN — METHOCARBAMOL 500 MG: 100 INJECTION, SOLUTION INTRAMUSCULAR; INTRAVENOUS at 05:05

## 2024-05-15 RX ADMIN — NICOTINE 1 PATCH: 14 PATCH, EXTENDED RELEASE TRANSDERMAL at 08:05

## 2024-05-15 RX ADMIN — OXYCODONE HYDROCHLORIDE 10 MG: 10 TABLET ORAL at 09:05

## 2024-05-15 RX ADMIN — Medication: at 12:05

## 2024-05-15 RX ADMIN — DIBASIC SODIUM PHOSPHATE, MONOBASIC POTASSIUM PHOSPHATE AND MONOBASIC SODIUM PHOSPHATE 2 TABLET: 852; 155; 130 TABLET ORAL at 08:05

## 2024-05-15 NOTE — RESPIRATORY THERAPY
"RAPID RESPONSE RESPIRATORY THERAPY ETCO2 CHECK         Time of visit: 820     Code Status: Prior   : 1965  Bed: 1046/1046 A:   MRN: [id number]  Time spent at the bedside: < 15 min    SITUATION    Evaluated patient for: ETCo2 compliance    BACKGROUND    Why is the patient in the hospital?: Malignant neoplasm of transverse colon    Patient has a past medical history of Colon cancer, Digestive disorder, DM (diabetes mellitus), Hyperlipidemia, Hypertension, and Prediabetes.    24 Hours Vitals Range:  Temp:  [97.6 °F (36.4 °C)-99.3 °F (37.4 °C)]   Pulse:  [74-89]   Resp:  [16-20]   BP: (130-159)/(65-80)   SpO2:  [94 %-98 %]     Labs:    Recent Labs     24  0325 05/15/24  0420   * 132*   K 5.1 4.2    100   CO2 21* 21*   BUN 19 11   CREATININE 1.2 0.9   * 127*   PHOS 3.9 1.6*   MG 2.0 1.8        No results for input(s): "PH", "PCO2", "PO2", "HCO3", "POCSATURATED", "BE" in the last 72 hours.    ASSESSMENT/INTERVENTIONS      Last VS   Temp: 98.5 °F (36.9 °C) (05/15 0806)  Pulse: 75 (05/15 0820)  Resp: 16 (05/15 0921)  BP: 152/80 (05/15 0806)  SpO2: 97 % (05/15 0820)    Level of Consciousness: Level of Consciousness (AVPU): alert  Respiratory Effort: Respiratory Effort: Normal, Unlabored Expansion/Accessory Muscle Usage: Expansion/Accessory Muscles/Retractions: no use of accessory muscles, no retractions, expansion symmetric  All Lung Field Breath Sounds:    Is the ETCO2 monitor on? Yes  Is the patient wearing a cannula? Yes  Are ETCO2 orders placed? Yes  Is the patient on a PCA pump? Yes  ETCO2 monitored: ETCO2 (mmHg): 42 mmHg  Ambu at bedside:      Active Orders   Respiratory Care    Incentive spirometry     Frequency: Q4H     Number of Occurrences: Until Specified     Order Comments: Q4 while awake until discharge.  Educate patient in use; Keep incentive spirometer within reach; and Document incentive spirometer volume every 4 hours while awake.    ((10 sets per hour (3-5 inspirations per " set)) on original order)      Pulse Oximetry Q Shift     Frequency: Q Shift     Number of Occurrences: Until Specified       RECOMMENDATIONS    We recommend: RRT Recs: Continue POC per primary team.      FOLLOW-UP    Please call back the Rapid Response RT, Naa Clark RRT at x [phone] for any questions or concerns.

## 2024-05-15 NOTE — PROGRESS NOTES
[facility]  Colorectal Surgery  Progress Note    Patient Name: Osman Li Jr.  MRN: [id number]  Admission Date: 5/13/2024  Hospital Length of Stay: 2 days  Attending Physician: Farhan Lance MD    Subjective:     Interval History: No acute events. Tolerated clear liquids yesterday. Still not yet passing gas, no bowel movements.     Post-Op Info:  Procedure(s) (LRB):  CLOSURE, COLOSTOMY (eras high/lithotomy) (N/A)  SIGMOIDOSCOPY, FLEXIBLE (N/A)  INSERTION, CATHETER, URETER, BILATERAL (N/A)  LYSIS, ADHESIONS (N/A)   2 Days Post-Op      Medications:  Continuous Infusions:  Scheduled Meds:   acetaminophen  1,000 mg Oral Q8H    gabapentin  300 mg Oral TID    heparin (porcine)  5,000 Units Subcutaneous Q8H    ibuprofen  800 mg Oral Q6H    insulin aspart U-100  0-10 Units Subcutaneous QID (WM & HS)    methocarbamol (ROBAXIN) IVPB  500 mg Intravenous Q8H    nicotine  1 patch Transdermal Daily     PRN Meds:   acetaminophen tablet 1,000 mg    gabapentin capsule 300 mg    heparin (porcine) injection 5,000 Units    ibuprofen tablet 800 mg    insulin aspart U-100 pen 0-10 Units    methocarbamoL (ROBAXIN) 500 mg in dextrose 5 % (D5W) 100 mL IVPB    nicotine 14 mg/24 hr 1 patch        Objective:     Vital Signs (Most Recent):  Temp: 98.5 °F (36.9 °C) (05/15/24 0806)  Pulse: 74 (05/15/24 0806)  Resp: 16 (05/15/24 0832)  BP: (!) 152/80 (05/15/24 0806)  SpO2: 98 % (05/15/24 0806) Vital Signs (24h Range):  Temp:  [97.6 °F (36.4 °C)-99.3 °F (37.4 °C)] 98.5 °F (36.9 °C)  Pulse:  [74-89] 74  Resp:  [16-20] 16  SpO2:  [94 %-98 %] 98 %  BP: (130-159)/(65-80) 152/80     Intake/Output - Last 3 Shifts         05/13 0700  05/14 0659 05/14 0700  05/15 0659 05/15 0700  05/16 0659    P.O.  1800     I.V. (mL/kg)  2086.2 (20.8)     IV Piggyback 2500 743.3     Total Intake(mL/kg) 2500 (25) 4629.5 (46.2)     Urine (mL/kg/hr) 1370 (0.6) 2050 (0.9)     Drains 130 10     Total Output 1500 2060     Net +1000 +2569.5                      Physical Exam  Constitutional:       General: He is not in acute distress.     Appearance: He is not ill-appearing.   HENT:      Head: Normocephalic and atraumatic.      Nose: Nose normal.      Mouth/Throat:      Mouth: Mucous membranes are moist.      Pharynx: Oropharynx is clear.   Eyes:      Extraocular Movements: Extraocular movements intact.      Conjunctiva/sclera: Conjunctivae normal.   Cardiovascular:      Rate and Rhythm: Normal rate and regular rhythm.      Pulses: Normal pulses.   Pulmonary:      Effort: Pulmonary effort is normal.   Abdominal:      General: Abdomen is flat.      Palpations: Abdomen is soft.      Tenderness: There is abdominal tenderness (appropriately TTP).      Comments: Incisions dressed with island dressings with thin non-bloody strikethrough  Abdominal binder in place  DAWIT drain with thin SS output   Musculoskeletal:         General: Normal range of motion.      Cervical back: Normal range of motion.   Skin:     General: Skin is warm and dry.      Capillary Refill: Capillary refill takes less than 2 seconds.   Neurological:      Mental Status: He is alert and oriented to person, place, and time.              Significant Labs:  BMP:   Recent Labs   Lab 05/15/24  0420   *   *   K 4.2      CO2 21*   BUN 11   CREATININE 0.9   CALCIUM 9.0   MG 1.8     CBC:   Recent Labs   Lab 05/15/24  0420   WBC 14.68*   RBC 3.84*   HGB 11.5*   HCT 34.1*      MCV 89   MCH 29.9   MCHC 33.7       Significant Diagnostics:  I have reviewed all pertinent imaging results/findings within the past 24 hours.  Assessment/Plan:     * Malignant neoplasm of transverse colon  Mr. Osman Li is a 59 year old male s/p colostomy takedown, side-side transverse to descending colocolonic anastomosis, and flex sig on 5/13. Doing well post op, but high risk of ileus given adhesions.    -CLD  -mIVF to 50ml/hr  -Transition PCA to PO pain medications  -Added Ibuprofen to multimodal  regimen  -D/C Macias, follow up PM void  -Packing in ostomy site changed, tolerated well  -SSI  -prn anti-emetics  -DVT ppx: SQH  -OOB/IS    Dispo: JAIRO Balderrama MD  Colorectal Surgery  [facility]

## 2024-05-15 NOTE — PLAN OF CARE
Problem: Adult Inpatient Plan of Care  Goal: Plan of Care Review  Outcome: Progressing  Goal: Patient-Specific Goal (Individualized)  Outcome: Progressing  Goal: Absence of Hospital-Acquired Illness or Injury  Outcome: Progressing  Goal: Optimal Comfort and Wellbeing  Outcome: Progressing  Goal: Readiness for Transition of Care  Outcome: Progressing     Problem: Diabetes Comorbidity  Goal: Blood Glucose Level Within Targeted Range  Outcome: Progressing     Problem: Sepsis/Septic Shock  Goal: Optimal Coping  Outcome: Progressing  Goal: Absence of Bleeding  Outcome: Progressing  Goal: Blood Glucose Level Within Targeted Range  Outcome: Progressing  Goal: Absence of Infection Signs and Symptoms  Outcome: Progressing  Goal: Optimal Nutrition Intake  Outcome: Progressing     Problem: Infection  Goal: Absence of Infection Signs and Symptoms  Outcome: Progressing     Problem: Wound  Goal: Optimal Coping  Outcome: Progressing  Goal: Optimal Functional Ability  Outcome: Progressing  Goal: Absence of Infection Signs and Symptoms  Outcome: Progressing  Goal: Improved Oral Intake  Outcome: Progressing  Goal: Optimal Pain Control and Function  Outcome: Progressing  Goal: Skin Health and Integrity  Outcome: Progressing  Goal: Optimal Wound Healing  Outcome: Progressing     Problem: Fall Injury Risk  Goal: Absence of Fall and Fall-Related Injury  Outcome: Progressing     Problem: Skin Injury Risk Increased  Goal: Skin Health and Integrity  Outcome: Progressing

## 2024-05-15 NOTE — ASSESSMENT & PLAN NOTE
Mr. Osman Li is a 59 year old male s/p colostomy takedown, side-side transverse to descending colocolonic anastomosis, and flex sig on 5/13. Doing well post op, but high risk of ileus given adhesions.    -CLD  -mIVF to 50ml/hr  -Transition PCA to PO pain medications  -Added Ibuprofen to multimodal regimen  -D/C [place], follow up PM void  -Packing in ostomy site changed, tolerated well  -SSI  -prn anti-emetics  -DVT ppx: SQH  -OOB/IS    Dispo: JAIRO

## 2024-05-15 NOTE — PLAN OF CARE
Problem: Physical Therapy  Goal: Physical Therapy Goal  Description: Goals to be met by: 2024     Patient will increase functional independence with mobility by performin. Sit to stand transfer with Modified [place]- Met  2. Gait  x 250 feet with Supervision using RW or LRAD. - Met  3. Ascend/descend 17 stair with right Handrails Stand-by Assistance using RW or LRAD. -Met  4. Lower extremity exercise program x30 reps per handout, with independence- Not met    Outcome: Adequate for Care Transition     Pt has met most of his goals and is able to now ambulate with his family to continue his mobility. He has no further skilled needs for PT at this time and will be discharged from PT.

## 2024-05-15 NOTE — SUBJECTIVE & OBJECTIVE
Subjective:     Interval History: No acute events. Tolerated clear liquids yesterday. Still not yet passing gas, no bowel movements.     Post-Op Info:  Procedure(s) (LRB):  CLOSURE, COLOSTOMY (eras high/lithotomy) (N/A)  SIGMOIDOSCOPY, FLEXIBLE (N/A)  INSERTION, CATHETER, URETER, BILATERAL (N/A)  LYSIS, ADHESIONS (N/A)   2 Days Post-Op      Medications:  Continuous Infusions:  Scheduled Meds:   acetaminophen  1,000 mg Oral Q8H    gabapentin  300 mg Oral TID    heparin (porcine)  5,000 Units Subcutaneous Q8H    ibuprofen  800 mg Oral Q6H    insulin aspart U-100  0-10 Units Subcutaneous QID (WM & HS)    methocarbamol (ROBAXIN) IVPB  500 mg Intravenous Q8H    nicotine  1 patch Transdermal Daily     PRN Meds:   acetaminophen tablet 1,000 mg    gabapentin capsule 300 mg    heparin (porcine) injection 5,000 Units    ibuprofen tablet 800 mg    insulin aspart U-100 pen 0-10 Units    methocarbamoL (ROBAXIN) 500 mg in dextrose 5 % (D5W) 100 mL IVPB    nicotine 14 mg/24 hr 1 patch        Objective:     Vital Signs (Most Recent):  Temp: 98.5 °F (36.9 °C) (05/15/24 0806)  Pulse: 74 (05/15/24 0806)  Resp: 16 (05/15/24 0832)  BP: (!) 152/80 (05/15/24 0806)  SpO2: 98 % (05/15/24 0806) Vital Signs (24h Range):  Temp:  [97.6 °F (36.4 °C)-99.3 °F (37.4 °C)] 98.5 °F (36.9 °C)  Pulse:  [74-89] 74  Resp:  [16-20] 16  SpO2:  [94 %-98 %] 98 %  BP: (130-159)/(65-80) 152/80     Intake/Output - Last 3 Shifts         05/13 0700  05/14 0659 05/14 0700  05/15 0659 05/15 0700  05/16 0659    P.O.  1800     I.V. (mL/kg)  2086.2 (20.8)     IV Piggyback 2500 743.3     Total Intake(mL/kg) 2500 (25) 4629.5 (46.2)     Urine (mL/kg/hr) 1370 (0.6) 2050 (0.9)     Drains 130 10     Total Output 1500 2060     Net +1000 +2569.5                     Physical Exam  Constitutional:       General: He is not in acute distress.     Appearance: He is not ill-appearing.   HENT:      Head: Normocephalic and atraumatic.      Nose: Nose normal.      Mouth/Throat:       Mouth: Mucous membranes are moist.      Pharynx: Oropharynx is clear.   Eyes:      Extraocular Movements: Extraocular movements intact.      Conjunctiva/sclera: Conjunctivae normal.   Cardiovascular:      Rate and Rhythm: Normal rate and regular rhythm.      Pulses: Normal pulses.   Pulmonary:      Effort: Pulmonary effort is normal.   Abdominal:      General: Abdomen is flat.      Palpations: Abdomen is soft.      Tenderness: There is abdominal tenderness (appropriately TTP).      Comments: Incisions dressed with island dressings with thin non-bloody strikethrough  Abdominal binder in place  DAWIT drain with thin SS output   Musculoskeletal:         General: Normal range of motion.      Cervical back: Normal range of motion.   Skin:     General: Skin is warm and dry.      Capillary Refill: Capillary refill takes less than 2 seconds.   Neurological:      Mental Status: He is alert and oriented to person, place, and time.              Significant Labs:  BMP:   Recent Labs   Lab 05/15/24  0420   *   *   K 4.2      CO2 21*   BUN 11   CREATININE 0.9   CALCIUM 9.0   MG 1.8     CBC:   Recent Labs   Lab 05/15/24  0420   WBC 14.68*   RBC 3.84*   HGB 11.5*   HCT 34.1*      MCV 89   MCH 29.9   MCHC 33.7       Significant Diagnostics:  I have reviewed all pertinent imaging results/findings within the past 24 hours.

## 2024-05-15 NOTE — PLAN OF CARE
..[facility] Plan of Care Note  Dx colon ca    Shift Events: none    Goals of Care: increase mobility, po pain meds    Neuro: 4    Vital Signs: wdl    Respiratory: etco2 monitoring, 2L overnight for suspected MARLYS    Diet: dm clears    Is patient tolerating current diet? yes    GTTS: lr 50    Urine Output/Bowel Movement: blas- dark [place]oston 825cc, lbm pta    Drains/Tubes/Tube Feeds (include total output/shift): kervin    Lines: piv      Accuchecks:achs    Skin: ml abd- island drsg, ostomy takedown site gauze, tape    Fall Risk Score: 11    Activity level? sb    Any scheduled procedures? no    Any safety concerns? no    Other: n/a

## 2024-05-15 NOTE — PT/OT/SLP PROGRESS
"Physical Therapy Treatment and Discharge Summary    Patient Name:  Osman Li Jr.   MRN:  [id number]    Recommendations:     Discharge Recommendations: No Therapy Indicated  Discharge Equipment Recommendations: none  Barriers to discharge: None    Assessment:     Osman Li Jr. is a 59 y.o. male admitted with a medical diagnosis of Malignant neoplasm of transverse colon.  He presents with the following impairments/functional limitations: impaired endurance, pain Pt has progressed very well with PT and was able to meet and exceed all of his mobility goals today. He is ambulating community distances of 500' in hallway with Supervision.  He was also able to meet his stair goal today.  He does not need any further skilled PT while in the hospital at this time and is able to amb 3-4 x day with his wife or other family member.      Rehab Prognosis: Good; patient does not need any further  acute skilled PT services to address these deficits and reach maximum level of function.    Recent Surgery: Procedure(s) (LRB):  CLOSURE, COLOSTOMY (eras high/lithotomy) (N/A)  SIGMOIDOSCOPY, FLEXIBLE (N/A)  INSERTION, CATHETER, URETER, BILATERAL (N/A)  LYSIS, ADHESIONS (N/A) 2 Days Post-Op    Plan:     During this hospitalization, patient to be to be d/c from PT with pt to amb with his wife while remaining in the hospital  to address the identified rehab impairments via  walking program with his wife while hospitalized to maintain his mobility.     Plan of Care Expires:  06/13/24    Subjective     Chief Complaint: "The pain meds were slow coming today so I am hurting, but we are going to walk"  Patient/Family Comments/goals: to go home and recover  Pain/Comfort:  Pain Rating 1: 8/10  Location - Orientation 1: generalized  Location 1: abdomen  Pain Addressed 1: Pre-medicate for activity, Reposition, Distraction  Pain Rating Post-Intervention 1: 8/10      Objective:     Communicated with RN after session.  Patient " found up in chair with peripheral IV, DAWIT drain upon PT entry to room. Pt's wife present in room.     General Precautions: Standard, fall  Orthopedic Precautions: N/A  Braces: N/A  Respiratory Status: Room air     Functional Mobility:  Transfers:     Sit to Stand:  modified independence with no AD  Gait: Pt amb 500'  with no AD and Supervision on level surfaces. Pt with decrease nicole but no LOB and no dizziness.   Balance: Stand dynamic:  Supervision  Stairs:  Pt ascended/descended 1 flight(s) with No Assistive Device with right handrail with Stand-by Assistance.       AM-PAC 6 CLICK MOBILITY  Turning over in bed (including adjusting bedclothes, sheets and blankets)?: 4  Sitting down on and standing up from a chair with arms (e.g., wheelchair, bedside commode, etc.): 4  Moving from lying on back to sitting on the side of the bed?: 4  Moving to and from a bed to a chair (including a wheelchair)?: 4  Need to walk in hospital room?: 4  Climbing 3-5 steps with a railing?: 3  Basic Mobility Total Score: 23       Treatment & Education:  Pt  and his wife ed on importance of cont mobility and ambulation in the hallway with his wife and they verbalized good understanding back to PT.   Pt agreeable to cont to amb with his wife in the hallway.  White board updated in pt's room with therapist and current mobility/transfer level and assist required.  Pt's nurse, Bridget, called and updated on pt's mobility status and d/c from PT .     Patient left up in chair with all lines intact, call button in reach, RN, Bridget, notified, and his wife present..    GOALS:   Multidisciplinary Problems       Physical Therapy Goals          Problem: Physical Therapy    Goal Priority Disciplines Outcome Goal Variances Interventions   Physical Therapy Goal     PT, PT/OT Adequate for Care Transition     Description: Goals to be met by: 2024     Patient will increase functional independence with mobility by performin. Sit to stand  transfer with Modified Coffee- Met  2. Gait  x 250 feet with Supervision using RW or LRAD. - Met  3. Ascend/descend 17 stair with right Handrails Stand-by Assistance using RW or LRAD. -Met  4. Lower extremity exercise program x30 reps per handout, with independence- Not met                         Time Tracking:     PT Received On: 05/15/24  PT Start Time: 1006     PT Stop Time: 1016  PT Total Time (min): 10 min     Billable Minutes: Gait Training 10    Treatment Type: Treatment  PT/PTA: PT     Number of PTA visits since last PT visit: 0     05/15/2024

## 2024-05-16 LAB
AMYLASE, BODY FLUID: 12 U/L
ANION GAP SERPL CALC-SCNC: 11 MMOL/L (ref 8–16)
BASOPHILS # BLD AUTO: 0.04 K/UL (ref 0–0.2)
BASOPHILS NFR BLD: 0.4 % (ref 0–1.9)
BODY FLUID SOURCE AMYLASE: NORMAL
BUN SERPL-MCNC: 8 MG/DL (ref 6–20)
CALCIUM SERPL-MCNC: 9.4 MG/DL (ref 8.7–10.5)
CHLORIDE SERPL-SCNC: 102 MMOL/L (ref 95–110)
CO2 SERPL-SCNC: 24 MMOL/L (ref 23–29)
CREAT SERPL-MCNC: 0.9 MG/DL (ref 0.5–1.4)
CRP SERPL-MCNC: 147.7 MG/L (ref 0–8.2)
DIFFERENTIAL METHOD BLD: ABNORMAL
EOSINOPHIL # BLD AUTO: 0.4 K/UL (ref 0–0.5)
EOSINOPHIL NFR BLD: 3.7 % (ref 0–8)
ERYTHROCYTE [DISTWIDTH] IN BLOOD BY AUTOMATED COUNT: 13.6 % (ref 11.5–14.5)
EST. GFR  (NO RACE VARIABLE): >60 ML/MIN/1.73 M^2
GLUCOSE SERPL-MCNC: 131 MG/DL (ref 70–110)
HCT VFR BLD AUTO: 33.9 % (ref 40–54)
HGB BLD-MCNC: 11.5 G/DL (ref 14–18)
IMM GRANULOCYTES # BLD AUTO: 0.05 K/UL (ref 0–0.04)
IMM GRANULOCYTES NFR BLD AUTO: 0.4 % (ref 0–0.5)
LYMPHOCYTES # BLD AUTO: 1.9 K/UL (ref 1–4.8)
LYMPHOCYTES NFR BLD: 16.8 % (ref 18–48)
MAGNESIUM SERPL-MCNC: 1.8 MG/DL (ref 1.6–2.6)
MCH RBC QN AUTO: 29.9 PG (ref 27–31)
MCHC RBC AUTO-ENTMCNC: 33.9 G/DL (ref 32–36)
MCV RBC AUTO: 88 FL (ref 82–98)
MONOCYTES # BLD AUTO: 0.9 K/UL (ref 0.3–1)
MONOCYTES NFR BLD: 7.8 % (ref 4–15)
NEUTROPHILS # BLD AUTO: 8.1 K/UL (ref 1.8–7.7)
NEUTROPHILS NFR BLD: 70.9 % (ref 38–73)
NRBC BLD-RTO: 0 /100 WBC
PHOSPHATE SERPL-MCNC: 2 MG/DL (ref 2.7–4.5)
PLATELET # BLD AUTO: 285 K/UL (ref 150–450)
PMV BLD AUTO: 10.3 FL (ref 9.2–12.9)
POCT GLUCOSE: 119 MG/DL (ref 70–110)
POCT GLUCOSE: 133 MG/DL (ref 70–110)
POCT GLUCOSE: 135 MG/DL (ref 70–110)
POCT GLUCOSE: 140 MG/DL (ref 70–110)
POTASSIUM SERPL-SCNC: 4.1 MMOL/L (ref 3.5–5.1)
RBC # BLD AUTO: 3.84 M/UL (ref 4.6–6.2)
SODIUM SERPL-SCNC: 137 MMOL/L (ref 136–145)
WBC # BLD AUTO: 11.34 K/UL (ref 3.9–12.7)

## 2024-05-16 PROCEDURE — S4991 NICOTINE PATCH NONLEGEND: HCPCS

## 2024-05-16 PROCEDURE — 25000003 PHARM REV CODE 250

## 2024-05-16 PROCEDURE — 83735 ASSAY OF MAGNESIUM: CPT | Performed by: STUDENT IN AN ORGANIZED HEALTH CARE EDUCATION/TRAINING PROGRAM

## 2024-05-16 PROCEDURE — 25000003 PHARM REV CODE 250: Performed by: STUDENT IN AN ORGANIZED HEALTH CARE EDUCATION/TRAINING PROGRAM

## 2024-05-16 PROCEDURE — 86140 C-REACTIVE PROTEIN: CPT | Performed by: STUDENT IN AN ORGANIZED HEALTH CARE EDUCATION/TRAINING PROGRAM

## 2024-05-16 PROCEDURE — 80048 BASIC METABOLIC PNL TOTAL CA: CPT | Performed by: STUDENT IN AN ORGANIZED HEALTH CARE EDUCATION/TRAINING PROGRAM

## 2024-05-16 PROCEDURE — 85025 COMPLETE CBC W/AUTO DIFF WBC: CPT | Performed by: STUDENT IN AN ORGANIZED HEALTH CARE EDUCATION/TRAINING PROGRAM

## 2024-05-16 PROCEDURE — 25000003 PHARM REV CODE 250: Performed by: NURSE PRACTITIONER

## 2024-05-16 PROCEDURE — 63600175 PHARM REV CODE 636 W HCPCS: Performed by: STUDENT IN AN ORGANIZED HEALTH CARE EDUCATION/TRAINING PROGRAM

## 2024-05-16 PROCEDURE — 82150 ASSAY OF AMYLASE: CPT | Performed by: STUDENT IN AN ORGANIZED HEALTH CARE EDUCATION/TRAINING PROGRAM

## 2024-05-16 PROCEDURE — 97535 SELF CARE MNGMENT TRAINING: CPT | Mod: CO

## 2024-05-16 PROCEDURE — 84100 ASSAY OF PHOSPHORUS: CPT | Performed by: STUDENT IN AN ORGANIZED HEALTH CARE EDUCATION/TRAINING PROGRAM

## 2024-05-16 PROCEDURE — 20600001 HC STEP DOWN PRIVATE ROOM

## 2024-05-16 RX ORDER — OXYCODONE HYDROCHLORIDE 5 MG/1
5 TABLET ORAL EVERY 4 HOURS PRN
Status: DISCONTINUED | OUTPATIENT
Start: 2024-05-16 | End: 2024-05-16

## 2024-05-16 RX ORDER — POLYETHYLENE GLYCOL 3350 17 G/17G
17 POWDER, FOR SOLUTION ORAL DAILY
Status: DISCONTINUED | OUTPATIENT
Start: 2024-05-16 | End: 2024-05-18 | Stop reason: HOSPADM

## 2024-05-16 RX ORDER — OXYCODONE HYDROCHLORIDE 5 MG/1
5 TABLET ORAL EVERY 4 HOURS PRN
Status: DISCONTINUED | OUTPATIENT
Start: 2024-05-16 | End: 2024-05-18 | Stop reason: HOSPADM

## 2024-05-16 RX ORDER — OXYCODONE HYDROCHLORIDE 10 MG/1
10 TABLET ORAL EVERY 4 HOURS PRN
Status: DISCONTINUED | OUTPATIENT
Start: 2024-05-16 | End: 2024-05-18 | Stop reason: HOSPADM

## 2024-05-16 RX ADMIN — IBUPROFEN 800 MG: 400 TABLET ORAL at 11:05

## 2024-05-16 RX ADMIN — OXYCODONE HYDROCHLORIDE 10 MG: 10 TABLET ORAL at 01:05

## 2024-05-16 RX ADMIN — IBUPROFEN 800 MG: 400 TABLET ORAL at 12:05

## 2024-05-16 RX ADMIN — OXYCODONE HYDROCHLORIDE 10 MG: 10 TABLET ORAL at 09:05

## 2024-05-16 RX ADMIN — GABAPENTIN 300 MG: 300 CAPSULE ORAL at 08:05

## 2024-05-16 RX ADMIN — POLYETHYLENE GLYCOL 3350 17 G: 17 POWDER, FOR SOLUTION ORAL at 09:05

## 2024-05-16 RX ADMIN — HEPARIN SODIUM 5000 UNITS: 5000 INJECTION INTRAVENOUS; SUBCUTANEOUS at 03:05

## 2024-05-16 RX ADMIN — OXYCODONE HYDROCHLORIDE 10 MG: 10 TABLET ORAL at 05:05

## 2024-05-16 RX ADMIN — IBUPROFEN 800 MG: 400 TABLET ORAL at 05:05

## 2024-05-16 RX ADMIN — NICOTINE 1 PATCH: 14 PATCH, EXTENDED RELEASE TRANSDERMAL at 08:05

## 2024-05-16 RX ADMIN — DIBASIC SODIUM PHOSPHATE, MONOBASIC POTASSIUM PHOSPHATE AND MONOBASIC SODIUM PHOSPHATE 2 TABLET: 852; 155; 130 TABLET ORAL at 09:05

## 2024-05-16 RX ADMIN — MORPHINE SULFATE 15 MG: 15 TABLET, EXTENDED RELEASE ORAL at 08:05

## 2024-05-16 RX ADMIN — ACETAMINOPHEN 1000 MG: 500 TABLET ORAL at 03:05

## 2024-05-16 RX ADMIN — HEPARIN SODIUM 5000 UNITS: 5000 INJECTION INTRAVENOUS; SUBCUTANEOUS at 05:05

## 2024-05-16 RX ADMIN — GABAPENTIN 300 MG: 300 CAPSULE ORAL at 03:05

## 2024-05-16 RX ADMIN — HEPARIN SODIUM 5000 UNITS: 5000 INJECTION INTRAVENOUS; SUBCUTANEOUS at 09:05

## 2024-05-16 RX ADMIN — METHOCARBAMOL 500 MG: 100 INJECTION, SOLUTION INTRAMUSCULAR; INTRAVENOUS at 03:05

## 2024-05-16 RX ADMIN — METHOCARBAMOL 500 MG: 100 INJECTION, SOLUTION INTRAMUSCULAR; INTRAVENOUS at 05:05

## 2024-05-16 RX ADMIN — OXYCODONE 5 MG: 5 TABLET ORAL at 04:05

## 2024-05-16 RX ADMIN — ACETAMINOPHEN 1000 MG: 500 TABLET ORAL at 09:05

## 2024-05-16 RX ADMIN — METHOCARBAMOL 500 MG: 100 INJECTION, SOLUTION INTRAMUSCULAR; INTRAVENOUS at 09:05

## 2024-05-16 RX ADMIN — ACETAMINOPHEN 1000 MG: 500 TABLET ORAL at 06:05

## 2024-05-16 NOTE — SUBJECTIVE & OBJECTIVE
Subjective:     Interval History: No acute events.Not yet passing gas no bowel movements. Pain well controlled on current regimen. Has been ambulating well. Tolerating CLD, no nausea, belching has resolved.      Post-Op Info:  Procedure(s) (LRB):  CLOSURE, COLOSTOMY (eras high/lithotomy) (N/A)  SIGMOIDOSCOPY, FLEXIBLE (N/A)  INSERTION, CATHETER, URETER, BILATERAL (N/A)  LYSIS, ADHESIONS (N/A)   3 Days Post-Op      Medications:  Continuous Infusions:  Scheduled Meds:   acetaminophen  1,000 mg Oral Q8H    gabapentin  300 mg Oral TID    heparin (porcine)  5,000 Units Subcutaneous Q8H    ibuprofen  800 mg Oral Q6H    insulin aspart U-100  0-10 Units Subcutaneous QID (WM & HS)    morphine  15 mg Oral BID    nicotine  1 patch Transdermal Daily     PRN Meds:   acetaminophen tablet 1,000 mg    gabapentin capsule 300 mg    heparin (porcine) injection 5,000 Units    ibuprofen tablet 800 mg    insulin aspart U-100 pen 0-10 Units    morphine 12 hr tablet 15 mg    nicotine 14 mg/24 hr 1 patch        Objective:     Vital Signs (Most Recent):  Temp: 98.7 °F (37.1 °C) (05/16/24 0736)  Pulse: 64 (05/16/24 0736)  Resp: 12 (05/16/24 0736)  BP: (!) 155/74 (05/16/24 0736)  SpO2: 96 % (05/16/24 0736) Vital Signs (24h Range):  Temp:  [97.6 °F (36.4 °C)-99.1 °F (37.3 °C)] 98.7 °F (37.1 °C)  Pulse:  [64-84] 64  Resp:  [12-20] 12  SpO2:  [95 %-98 %] 96 %  BP: (136-165)/(74-85) 155/74     Intake/Output - Last 3 Shifts         05/14 0700  05/15 0659 05/15 0700  05/16 0659 05/16 0700 05/17 0659    P.O. 1800 960     I.V. (mL/kg) 2086.2 (20.8) 180.8 (1.8)     IV Piggyback 743.3 188.3     Total Intake(mL/kg) 4629.5 (46.2) 1329.2 (13.3)     Urine (mL/kg/hr) 2050 (0.9) 400 (0.2)     Drains 10 15     Total Output 2060 415     Net +2569.5 +914.2            Urine Occurrence  5 x     Stool Occurrence  0 x              Physical Exam  Constitutional:       General: He is not in acute distress.     Appearance: He is not ill-appearing.   HENT:      Head:  Normocephalic and atraumatic.      Nose: Nose normal.      Mouth/Throat:      Mouth: Mucous membranes are moist.      Pharynx: Oropharynx is clear.   Eyes:      Extraocular Movements: Extraocular movements intact.      Conjunctiva/sclera: Conjunctivae normal.   Cardiovascular:      Rate and Rhythm: Normal rate and regular rhythm.      Pulses: Normal pulses.   Pulmonary:      Effort: Pulmonary effort is normal.   Abdominal:      General: Abdomen is flat.      Palpations: Abdomen is soft.      Tenderness: There is no abdominal tenderness.      Comments: Incisions dressed with island dressings with thin non-bloody strikethrough  Abdominal binder in place  DAWIT drain with thin SS output   Musculoskeletal:         General: Normal range of motion.      Cervical back: Normal range of motion.   Skin:     General: Skin is warm and dry.      Capillary Refill: Capillary refill takes less than 2 seconds.   Neurological:      Mental Status: He is alert and oriented to person, place, and time.            Significant Labs:  BMP:   Recent Labs   Lab 05/16/24  0550   *      K 4.1      CO2 24   BUN 8   CREATININE 0.9   CALCIUM 9.4   MG 1.8     CBC:   Recent Labs   Lab 05/16/24  0550   WBC 11.34   RBC 3.84*   HGB 11.5*   HCT 33.9*      MCV 88   MCH 29.9   MCHC 33.9       Significant Diagnostics:  I have reviewed all pertinent imaging results/findings within the past 24 hours.

## 2024-05-16 NOTE — PLAN OF CARE
Problem: Adult Inpatient Plan of Care  Goal: Plan of Care Review  5/16/2024 0227 by Angelina Gross RN  Outcome: Progressing  5/16/2024 0226 by Angelina Gross RN  Outcome: Progressing  Goal: Patient-Specific Goal (Individualized)  5/16/2024 0227 by Angelina Gross RN  Outcome: Progressing  5/16/2024 0226 by Angelina Gross RN  Outcome: Progressing  Goal: Absence of Hospital-Acquired Illness or Injury  5/16/2024 0227 by Angelina Gross RN  Outcome: Progressing  5/16/2024 0226 by Angelina Gross RN  Outcome: Progressing  Goal: Optimal Comfort and Wellbeing  5/16/2024 0227 by Angelina Gross RN  Outcome: Progressing  5/16/2024 0226 by Angelina Gross RN  Outcome: Progressing  Goal: Readiness for Transition of Care  5/16/2024 0227 by Angelina Gross RN  Outcome: Progressing  5/16/2024 0226 by Angelina Gross RN  Outcome: Progressing     Problem: Diabetes Comorbidity  Goal: Blood Glucose Level Within Targeted Range  5/16/2024 0227 by Angelina Gross RN  Outcome: Progressing  5/16/2024 0226 by Angelina Gross RN  Outcome: Progressing     Problem: Sepsis/Septic Shock  Goal: Optimal Coping  5/16/2024 0227 by Angelina Gross RN  Outcome: Progressing  5/16/2024 0226 by Angelina Gross RN  Outcome: Progressing  Goal: Absence of Bleeding  5/16/2024 0227 by Angelina Gross RN  Outcome: Progressing  5/16/2024 0226 by Angelina Gross RN  Outcome: Progressing  Goal: Blood Glucose Level Within Targeted Range  5/16/2024 0227 by Angelina Gross RN  Outcome: Progressing  5/16/2024 0226 by Angelina Gross RN  Outcome: Progressing  Goal: Absence of Infection Signs and Symptoms  5/16/2024 0227 by Angelina Gross RN  Outcome: Progressing  5/16/2024 0226 by Angelina Gross RN  Outcome: Progressing  Goal: Optimal Nutrition Intake  5/16/2024 0227 by Kolve, Angelina, RN  Outcome: Progressing  5/16/2024 0226 by Angelina Gross, RN  Outcome: Progressing     Problem: Infection  Goal: Absence of Infection Signs and  Symptoms  5/16/2024 0227 by Angelina Gross RN  Outcome: Progressing  5/16/2024 0226 by Angelina Gross RN  Outcome: Progressing     Problem: Wound  Goal: Optimal Coping  5/16/2024 0227 by Angelina Gross RN  Outcome: Progressing  5/16/2024 0226 by Angelina Gross RN  Outcome: Progressing  Goal: Optimal Functional Ability  5/16/2024 0227 by Angelina Gross RN  Outcome: Progressing  5/16/2024 0226 by Angelina Gross RN  Outcome: Progressing  Goal: Absence of Infection Signs and Symptoms  5/16/2024 0227 by Angelina Gross RN  Outcome: Progressing  5/16/2024 0226 by Angelina Gross RN  Outcome: Progressing  Goal: Improved Oral Intake  5/16/2024 0227 by Angelina Gross RN  Outcome: Progressing  5/16/2024 0226 by Angelina Gross RN  Outcome: Progressing  Goal: Optimal Pain Control and Function  5/16/2024 0227 by Angelina Gross RN  Outcome: Progressing  5/16/2024 0226 by Angelina Gross RN  Outcome: Progressing  Goal: Skin Health and Integrity  5/16/2024 0227 by Angelina Gross RN  Outcome: Progressing  5/16/2024 0226 by Angelina Gross RN  Outcome: Progressing  Goal: Optimal Wound Healing  5/16/2024 0227 by Angelina Gross RN  Outcome: Progressing  5/16/2024 0226 by Angelina Gross RN  Outcome: Progressing     Problem: Fall Injury Risk  Goal: Absence of Fall and Fall-Related Injury  5/16/2024 0227 by Angelina Gross RN  Outcome: Progressing  5/16/2024 0226 by Angelina Gross RN  Outcome: Progressing     Problem: Skin Injury Risk Increased  Goal: Skin Health and Integrity  5/16/2024 0227 by Angelina Gross RN  Outcome: Progressing  5/16/2024 0226 by Angelina Gross RN  Outcome: Progressing

## 2024-05-16 NOTE — PT/OT/SLP PROGRESS
Occupational Therapy   Treatment    Name: Osman Li Jr.  MRN: [id number]  Admitting Diagnosis:  Malignant neoplasm of transverse colon  3 Days Post-Op    Recommendations:     Discharge Recommendations: No Therapy Indicated  Discharge Equipment Recommendations:  none  Barriers to discharge:  None    Assessment:     Osman Li Jr. is a 59 y.o. male with a medical diagnosis of Malignant neoplasm of transverse colon.  He presents with the fallowing performance deficits affecting function are pain, impaired endurance. Patient agreeable to tx session, patient is demonstrating progress with functional transfers, bed mobility, and self-care task, however; patient continues to have slight limited activity tolerance due to pain and weakness from recent surgical procedure. Based upon observation and re-assess patient during tx session.Patient does not indicate further acute intensity therapy intervention at this time.    Rehab Prognosis:  Patient has progress with mobility and self-care task and does not indicate that need for OT intervention. OT to plan to discharged patient   Plan:     Patient to be seen 3 x/week to address the above listed problems via self-care/home management, therapeutic activities, therapeutic exercises  Plan of Care Expires: 06/14/24  Plan of Care Reviewed with: patient, spouse    Subjective     Chief Complaint: abdomen discomfort   Patient/Family Comments/goals: to return to PLOF  Pain/Comfort:  Pain Rating 1: 0/10    Objective:     Communicated with: Nurse prior to session.  Patient found  using the bathroom (I)  with DAWIT drain, peripheral IV upon OT entry to room.  A client care conference was completed by the OTR and the DANIELS prior to treatment by the DANIELS to discuss the patient's POC and current status.   General Precautions: Standard, fall    Orthopedic Precautions:N/A  Braces: N/A  Respiratory Status: Room air     Occupational Performance:      Functional  Mobility/Transfers:  Patient completed Sit <> Stand Transfer with independence  with  no assistive device and hand-held assist   Patient completed Bed <> Chair Transfer using Step Transfer technique with independence with no assistive device and hand-held assist  Functional Mobility: patient ambulated from bathroom<>bedside chair (I) with HHA    Activities of Daily Living:  Upper Body Dressing: independence to don/doff back gown in standing   Lower Body Dressing: independence to don/doff socks   Toileting: independence (I) with pericare hygiene task       [facility] 6 Click ADL: 23    Treatment & Education:  Discussed OT POC and progress  Educated patient on the importance to continue to perform exercises in order to reduce stiffness and promote joint mobility and blood flow  Addressed patient's questions and concerns within DANIELS scope of practice      Patient left up in chair with all lines intact, call button in reach, and spouse present    GOALS:   Multidisciplinary Problems       Occupational Therapy Goals          Problem: Occupational Therapy    Goal Priority Disciplines Outcome Interventions   Occupational Therapy Goal     OT, PT/OT Progressing    Description: Goals to be met by: 6/3/2024     Patient will increase functional independence with ADLs by performing:    UE Dressing with Set-up Assistance.  Grooming while standing at sink with Modified [place].  Toileting from toilet with Modified [place] for hygiene and clothing management.   Supine to sit with supine with HOB flat and no handrails  Stand pivot transfers with Modified [place].  Toilet transfer to toilet with Modified [place].                         Time Tracking:     OT Date of Treatment: 05/16/24  OT Start Time: 1434  OT Stop Time: 1442  OT Total Time (min): 8 min    Billable Minutes:Self Care/Home Management 8    OT/CHIDI: CHIDI     Number of CHIDI visits since last OT visit: 1    5/16/2024

## 2024-05-16 NOTE — PROGRESS NOTES
[facility]  Colorectal Surgery  Progress Note    Patient Name: Osman Li Jr.  MRN: [id number]  Admission Date: 5/13/2024  Hospital Length of Stay: 3 days  Attending Physician: Farhan Lance MD    Subjective:     Interval History: No acute events.Not yet passing gas no bowel movements. Pain well controlled on current regimen. Has been ambulating well. Tolerating CLD, no nausea, belching has resolved.      Post-Op Info:  Procedure(s) (LRB):  CLOSURE, COLOSTOMY (eras high/lithotomy) (N/A)  SIGMOIDOSCOPY, FLEXIBLE (N/A)  INSERTION, CATHETER, URETER, BILATERAL (N/A)  LYSIS, ADHESIONS (N/A)   3 Days Post-Op      Medications:  Continuous Infusions:  Scheduled Meds:   acetaminophen  1,000 mg Oral Q8H    gabapentin  300 mg Oral TID    heparin (porcine)  5,000 Units Subcutaneous Q8H    ibuprofen  800 mg Oral Q6H    insulin aspart U-100  0-10 Units Subcutaneous QID (WM & HS)    morphine  15 mg Oral BID    nicotine  1 patch Transdermal Daily     PRN Meds:   acetaminophen tablet 1,000 mg    gabapentin capsule 300 mg    heparin (porcine) injection 5,000 Units    ibuprofen tablet 800 mg    insulin aspart U-100 pen 0-10 Units    morphine 12 hr tablet 15 mg    nicotine 14 mg/24 hr 1 patch        Objective:     Vital Signs (Most Recent):  Temp: 98.7 °F (37.1 °C) (05/16/24 0736)  Pulse: 64 (05/16/24 0736)  Resp: 12 (05/16/24 0736)  BP: (!) 155/74 (05/16/24 0736)  SpO2: 96 % (05/16/24 0736) Vital Signs (24h Range):  Temp:  [97.6 °F (36.4 °C)-99.1 °F (37.3 °C)] 98.7 °F (37.1 °C)  Pulse:  [64-84] 64  Resp:  [12-20] 12  SpO2:  [95 %-98 %] 96 %  BP: (136-165)/(74-85) 155/74     Intake/Output - Last 3 Shifts         05/14 0700  05/15 0659 05/15 0700  05/16 0659 05/16 0700 05/17 0659    P.O. 1800 960     I.V. (mL/kg) 2086.2 (20.8) 180.8 (1.8)     IV Piggyback 743.3 188.3     Total Intake(mL/kg) 4629.5 (46.2) 1329.2 (13.3)     Urine (mL/kg/hr) 2050 (0.9) 400 (0.2)     Drains 10 15     Total Output 2060 415     Net  +2569.5 +914.2            Urine Occurrence  5 x     Stool Occurrence  0 x              Physical Exam  Constitutional:       General: He is not in acute distress.     Appearance: He is not ill-appearing.   HENT:      Head: Normocephalic and atraumatic.      Nose: Nose normal.      Mouth/Throat:      Mouth: Mucous membranes are moist.      Pharynx: Oropharynx is clear.   Eyes:      Extraocular Movements: Extraocular movements intact.      Conjunctiva/sclera: Conjunctivae normal.   Cardiovascular:      Rate and Rhythm: Normal rate and regular rhythm.      Pulses: Normal pulses.   Pulmonary:      Effort: Pulmonary effort is normal.   Abdominal:      General: Abdomen is flat.      Palpations: Abdomen is soft.      Tenderness: There is no abdominal tenderness.      Comments: Incisions dressed with island dressings with thin non-bloody strikethrough  Abdominal binder in place  DAWIT drain with thin SS output   Musculoskeletal:         General: Normal range of motion.      Cervical back: Normal range of motion.   Skin:     General: Skin is warm and dry.      Capillary Refill: Capillary refill takes less than 2 seconds.   Neurological:      Mental Status: He is alert and oriented to person, place, and time.            Significant Labs:  BMP:   Recent Labs   Lab 05/16/24  0550   *      K 4.1      CO2 24   BUN 8   CREATININE 0.9   CALCIUM 9.4   MG 1.8     CBC:   Recent Labs   Lab 05/16/24  0550   WBC 11.34   RBC 3.84*   HGB 11.5*   HCT 33.9*      MCV 88   MCH 29.9   MCHC 33.9       Significant Diagnostics:  I have reviewed all pertinent imaging results/findings within the past 24 hours.  Assessment/Plan:     * Malignant neoplasm of transverse colon  Mr. Osman Li is a 59 year old male s/p colostomy takedown, side-side transverse to descending colocolonic anastomosis, and flex sig on 5/13. Doing well post op, but high risk of ileus given adhesions.    -CLD  -D/C mIVF  -Await ROBF   -Added Ibuprofen  to multimodal regimen  -SSI  -prn anti-emetics  -DVT ppx: SQH  -OOB/IS    Dispo: JAIRO Balderrama MD  Colorectal Surgery  [facility]

## 2024-05-16 NOTE — ASSESSMENT & PLAN NOTE
Mr. Osman Li is a 59 year old male s/p colostomy takedown, side-side transverse to descending colocolonic anastomosis, and flex sig on 5/13. Doing well post op, but high risk of ileus given adhesions.    -CLD  -D/C mIVF  -Await ROBF   -Added Ibuprofen to multimodal regimen  -SSI  -prn anti-emetics  -DVT ppx: SQH  -OOB/IS    Dispo: JAIRO

## 2024-05-16 NOTE — PLAN OF CARE
Problem: Adult Inpatient Plan of Care  Goal: Plan of Care Review  5/16/2024 0226 by Angelina Gross RN  Outcome: Progressing  5/16/2024 0226 by Angelina Gross RN  Outcome: Progressing  Goal: Patient-Specific Goal (Individualized)  5/16/2024 0226 by Angelina Gross RN  Outcome: Progressing  5/16/2024 0226 by Angelina Gross RN  Outcome: Progressing  Goal: Absence of Hospital-Acquired Illness or Injury  5/16/2024 0226 by Angelina Gross RN  Outcome: Progressing  5/16/2024 0226 by Angelina Gross RN  Outcome: Progressing  Goal: Optimal Comfort and Wellbeing  5/16/2024 0226 by Angelina Gross RN  Outcome: Progressing  5/16/2024 0226 by Angelina Gross RN  Outcome: Progressing  Goal: Readiness for Transition of Care  5/16/2024 0226 by Angelina Gross RN  Outcome: Progressing  5/16/2024 0226 by Angelina Gross RN  Outcome: Progressing     Problem: Diabetes Comorbidity  Goal: Blood Glucose Level Within Targeted Range  5/16/2024 0226 by Angelian Gross RN  Outcome: Progressing  5/16/2024 0226 by Angelina Gross RN  Outcome: Progressing     Problem: Sepsis/Septic Shock  Goal: Optimal Coping  5/16/2024 0226 by Angelina Gross RN  Outcome: Progressing  5/16/2024 0226 by Angelina Gross RN  Outcome: Progressing  Goal: Absence of Bleeding  5/16/2024 0226 by Angelina Gross RN  Outcome: Progressing  5/16/2024 0226 by Angelina Gross RN  Outcome: Progressing  Goal: Blood Glucose Level Within Targeted Range  5/16/2024 0226 by Angelina Gross RN  Outcome: Progressing  5/16/2024 0226 by Angelina Gross RN  Outcome: Progressing  Goal: Absence of Infection Signs and Symptoms  5/16/2024 0226 by Angelina Gross RN  Outcome: Progressing  5/16/2024 0226 by Angelina Gross RN  Outcome: Progressing  Goal: Optimal Nutrition Intake  5/16/2024 0226 by Kolve, Angelina, RN  Outcome: Progressing  5/16/2024 0226 by Angelina Gross, RN  Outcome: Progressing     Problem: Wound  Goal: Optimal Coping  5/16/2024 0226 by Ginny,  CAMRYN Alfaro  Outcome: Progressing  5/16/2024 0226 by Angelina Gross RN  Outcome: Progressing  Goal: Optimal Functional Ability  5/16/2024 0226 by Angelina Gross RN  Outcome: Progressing  5/16/2024 0226 by Angelina Gross RN  Outcome: Progressing  Goal: Absence of Infection Signs and Symptoms  5/16/2024 0226 by Angelina Gross RN  Outcome: Progressing  5/16/2024 0226 by Angelina Gross RN  Outcome: Progressing  Goal: Improved Oral Intake  5/16/2024 0226 by Angelina Gross RN  Outcome: Progressing  5/16/2024 0226 by Angelina Gross RN  Outcome: Progressing  Goal: Optimal Pain Control and Function  5/16/2024 0226 by Angelina Gross RN  Outcome: Progressing  5/16/2024 0226 by Angelina Gross RN  Outcome: Progressing  Goal: Skin Health and Integrity  5/16/2024 0226 by Angelina Gross RN  Outcome: Progressing  5/16/2024 0226 by Angelina Gross RN  Outcome: Progressing  Goal: Optimal Wound Healing  5/16/2024 0226 by Angelina Gross RN  Outcome: Progressing  5/16/2024 0226 by Angelina Gross RN  Outcome: Progressing     Problem: Infection  Goal: Absence of Infection Signs and Symptoms  5/16/2024 0226 by Angelina Gross RN  Outcome: Progressing  5/16/2024 0226 by Angelina Gross RN  Outcome: Progressing     Problem: Fall Injury Risk  Goal: Absence of Fall and Fall-Related Injury  5/16/2024 0226 by Angelina Gross RN  Outcome: Progressing  5/16/2024 0226 by Angelina Gross RN  Outcome: Progressing     Problem: Skin Injury Risk Increased  Goal: Skin Health and Integrity  5/16/2024 0226 by Angelina Gross RN  Outcome: Progressing  5/16/2024 0226 by Angelina Gross RN  Outcome: Progressing

## 2024-05-16 NOTE — PROGRESS NOTES
[facility] Plan of Care Note  Dx colon ca     Shift Events: none     Goals of Care: increase mobility, po pain meds     Neuro: 4     Vital Signs: wdl     Respiratory: RA     Diet: dm clears     Is patient tolerating current diet? yes     GTTS: sl     Urine Output/Bowel Movement: see flowsheet, lbm pta     Drains/Tubes/Tube Feeds (include total output/shift): kervin     Lines: piv        Accuchecks:achs     Skin: ml abd, dermabond, LLQ guaze tape     Fall Risk Score: 7     Activity level? sb     Any scheduled procedures? no     Any safety concerns? no     Other: n/a

## 2024-05-17 LAB
ANION GAP SERPL CALC-SCNC: 12 MMOL/L (ref 8–16)
BASOPHILS # BLD AUTO: 0.04 K/UL (ref 0–0.2)
BASOPHILS NFR BLD: 0.4 % (ref 0–1.9)
BUN SERPL-MCNC: 6 MG/DL (ref 6–20)
CALCIUM SERPL-MCNC: 9.2 MG/DL (ref 8.7–10.5)
CHLORIDE SERPL-SCNC: 102 MMOL/L (ref 95–110)
CO2 SERPL-SCNC: 23 MMOL/L (ref 23–29)
CREAT SERPL-MCNC: 0.8 MG/DL (ref 0.5–1.4)
CRP SERPL-MCNC: 76.5 MG/L (ref 0–8.2)
DIFFERENTIAL METHOD BLD: ABNORMAL
EOSINOPHIL # BLD AUTO: 0.4 K/UL (ref 0–0.5)
EOSINOPHIL NFR BLD: 4.9 % (ref 0–8)
ERYTHROCYTE [DISTWIDTH] IN BLOOD BY AUTOMATED COUNT: 13.5 % (ref 11.5–14.5)
EST. GFR  (NO RACE VARIABLE): >60 ML/MIN/1.73 M^2
GLUCOSE SERPL-MCNC: 112 MG/DL (ref 70–110)
HCT VFR BLD AUTO: 35.6 % (ref 40–54)
HGB BLD-MCNC: 12.2 G/DL (ref 14–18)
IMM GRANULOCYTES # BLD AUTO: 0.04 K/UL (ref 0–0.04)
IMM GRANULOCYTES NFR BLD AUTO: 0.4 % (ref 0–0.5)
LYMPHOCYTES # BLD AUTO: 2.1 K/UL (ref 1–4.8)
LYMPHOCYTES NFR BLD: 23.6 % (ref 18–48)
MAGNESIUM SERPL-MCNC: 1.7 MG/DL (ref 1.6–2.6)
MCH RBC QN AUTO: 29.8 PG (ref 27–31)
MCHC RBC AUTO-ENTMCNC: 34.3 G/DL (ref 32–36)
MCV RBC AUTO: 87 FL (ref 82–98)
MONOCYTES # BLD AUTO: 0.9 K/UL (ref 0.3–1)
MONOCYTES NFR BLD: 10.1 % (ref 4–15)
NEUTROPHILS # BLD AUTO: 5.5 K/UL (ref 1.8–7.7)
NEUTROPHILS NFR BLD: 60.6 % (ref 38–73)
NRBC BLD-RTO: 0 /100 WBC
PHOSPHATE SERPL-MCNC: 3 MG/DL (ref 2.7–4.5)
PLATELET # BLD AUTO: 319 K/UL (ref 150–450)
PMV BLD AUTO: 9.9 FL (ref 9.2–12.9)
POCT GLUCOSE: 156 MG/DL (ref 70–110)
POCT GLUCOSE: 220 MG/DL (ref 70–110)
POCT GLUCOSE: 239 MG/DL (ref 70–110)
POTASSIUM SERPL-SCNC: 3.4 MMOL/L (ref 3.5–5.1)
RBC # BLD AUTO: 4.09 M/UL (ref 4.6–6.2)
SODIUM SERPL-SCNC: 137 MMOL/L (ref 136–145)
WBC # BLD AUTO: 9.02 K/UL (ref 3.9–12.7)

## 2024-05-17 PROCEDURE — 25000003 PHARM REV CODE 250: Performed by: STUDENT IN AN ORGANIZED HEALTH CARE EDUCATION/TRAINING PROGRAM

## 2024-05-17 PROCEDURE — 86140 C-REACTIVE PROTEIN: CPT | Performed by: STUDENT IN AN ORGANIZED HEALTH CARE EDUCATION/TRAINING PROGRAM

## 2024-05-17 PROCEDURE — 80048 BASIC METABOLIC PNL TOTAL CA: CPT | Performed by: STUDENT IN AN ORGANIZED HEALTH CARE EDUCATION/TRAINING PROGRAM

## 2024-05-17 PROCEDURE — 63600175 PHARM REV CODE 636 W HCPCS: Performed by: STUDENT IN AN ORGANIZED HEALTH CARE EDUCATION/TRAINING PROGRAM

## 2024-05-17 PROCEDURE — 83735 ASSAY OF MAGNESIUM: CPT | Performed by: STUDENT IN AN ORGANIZED HEALTH CARE EDUCATION/TRAINING PROGRAM

## 2024-05-17 PROCEDURE — 25000003 PHARM REV CODE 250: Performed by: NURSE PRACTITIONER

## 2024-05-17 PROCEDURE — 85025 COMPLETE CBC W/AUTO DIFF WBC: CPT | Performed by: STUDENT IN AN ORGANIZED HEALTH CARE EDUCATION/TRAINING PROGRAM

## 2024-05-17 PROCEDURE — 84100 ASSAY OF PHOSPHORUS: CPT | Performed by: STUDENT IN AN ORGANIZED HEALTH CARE EDUCATION/TRAINING PROGRAM

## 2024-05-17 PROCEDURE — 36415 COLL VENOUS BLD VENIPUNCTURE: CPT | Performed by: STUDENT IN AN ORGANIZED HEALTH CARE EDUCATION/TRAINING PROGRAM

## 2024-05-17 PROCEDURE — 25000003 PHARM REV CODE 250

## 2024-05-17 PROCEDURE — 20600001 HC STEP DOWN PRIVATE ROOM

## 2024-05-17 PROCEDURE — S4991 NICOTINE PATCH NONLEGEND: HCPCS

## 2024-05-17 RX ORDER — ENOXAPARIN SODIUM 100 MG/ML
40 INJECTION SUBCUTANEOUS EVERY 24 HOURS
Status: DISCONTINUED | OUTPATIENT
Start: 2024-05-17 | End: 2024-05-18 | Stop reason: HOSPADM

## 2024-05-17 RX ORDER — POTASSIUM CHLORIDE 20 MEQ/1
40 TABLET, EXTENDED RELEASE ORAL ONCE
Status: COMPLETED | OUTPATIENT
Start: 2024-05-17 | End: 2024-05-17

## 2024-05-17 RX ADMIN — IBUPROFEN 800 MG: 400 TABLET ORAL at 05:05

## 2024-05-17 RX ADMIN — GABAPENTIN 300 MG: 300 CAPSULE ORAL at 09:05

## 2024-05-17 RX ADMIN — POLYETHYLENE GLYCOL 3350 17 G: 17 POWDER, FOR SOLUTION ORAL at 08:05

## 2024-05-17 RX ADMIN — METHOCARBAMOL 500 MG: 100 INJECTION, SOLUTION INTRAMUSCULAR; INTRAVENOUS at 01:05

## 2024-05-17 RX ADMIN — METHOCARBAMOL 500 MG: 100 INJECTION, SOLUTION INTRAMUSCULAR; INTRAVENOUS at 09:05

## 2024-05-17 RX ADMIN — INSULIN ASPART 4 UNITS: 100 INJECTION, SOLUTION INTRAVENOUS; SUBCUTANEOUS at 12:05

## 2024-05-17 RX ADMIN — IBUPROFEN 800 MG: 400 TABLET ORAL at 11:05

## 2024-05-17 RX ADMIN — POTASSIUM CHLORIDE 40 MEQ: 1500 TABLET, EXTENDED RELEASE ORAL at 08:05

## 2024-05-17 RX ADMIN — METHOCARBAMOL 500 MG: 100 INJECTION, SOLUTION INTRAMUSCULAR; INTRAVENOUS at 05:05

## 2024-05-17 RX ADMIN — OXYCODONE HYDROCHLORIDE 10 MG: 10 TABLET ORAL at 09:05

## 2024-05-17 RX ADMIN — OXYCODONE HYDROCHLORIDE 10 MG: 10 TABLET ORAL at 05:05

## 2024-05-17 RX ADMIN — GABAPENTIN 300 MG: 300 CAPSULE ORAL at 02:05

## 2024-05-17 RX ADMIN — GABAPENTIN 300 MG: 300 CAPSULE ORAL at 08:05

## 2024-05-17 RX ADMIN — ACETAMINOPHEN 1000 MG: 500 TABLET ORAL at 05:05

## 2024-05-17 RX ADMIN — INSULIN ASPART 2 UNITS: 100 INJECTION, SOLUTION INTRAVENOUS; SUBCUTANEOUS at 05:05

## 2024-05-17 RX ADMIN — INSULIN ASPART 4 UNITS: 100 INJECTION, SOLUTION INTRAVENOUS; SUBCUTANEOUS at 09:05

## 2024-05-17 RX ADMIN — MORPHINE SULFATE 15 MG: 15 TABLET, EXTENDED RELEASE ORAL at 09:05

## 2024-05-17 RX ADMIN — ENOXAPARIN SODIUM 40 MG: 40 INJECTION SUBCUTANEOUS at 05:05

## 2024-05-17 RX ADMIN — HEPARIN SODIUM 5000 UNITS: 5000 INJECTION INTRAVENOUS; SUBCUTANEOUS at 01:05

## 2024-05-17 RX ADMIN — OXYCODONE HYDROCHLORIDE 10 MG: 10 TABLET ORAL at 01:05

## 2024-05-17 RX ADMIN — MORPHINE SULFATE 15 MG: 15 TABLET, EXTENDED RELEASE ORAL at 08:05

## 2024-05-17 RX ADMIN — NICOTINE 1 PATCH: 14 PATCH, EXTENDED RELEASE TRANSDERMAL at 08:05

## 2024-05-17 RX ADMIN — HEPARIN SODIUM 5000 UNITS: 5000 INJECTION INTRAVENOUS; SUBCUTANEOUS at 05:05

## 2024-05-17 RX ADMIN — ACETAMINOPHEN 1000 MG: 500 TABLET ORAL at 09:05

## 2024-05-17 NOTE — SUBJECTIVE & OBJECTIVE
Subjective:     Interval History: NAEON. AF, HDS. Passing flatus. No nausea or belching this am. Tolerating CLD. Ambulating. Pain controlled.    Post-Op Info:  Procedure(s) (LRB):  CLOSURE, COLOSTOMY (eras high/lithotomy) (N/A)  SIGMOIDOSCOPY, FLEXIBLE (N/A)  INSERTION, CATHETER, URETER, BILATERAL (N/A)  LYSIS, ADHESIONS (N/A)   4 Days Post-Op      Medications:  Continuous Infusions:  Scheduled Meds:   acetaminophen  1,000 mg Oral Q8H    gabapentin  300 mg Oral TID    heparin (porcine)  5,000 Units Subcutaneous Q8H    ibuprofen  800 mg Oral Q6H    insulin aspart U-100  0-10 Units Subcutaneous QID (WM & HS)    methocarbamol (ROBAXIN) IVPB  500 mg Intravenous Q8H    morphine  15 mg Oral BID    nicotine  1 patch Transdermal Daily    polyethylene glycol  17 g Oral Daily    potassium chloride  40 mEq Oral Once     PRN Meds:   acetaminophen tablet 1,000 mg    gabapentin capsule 300 mg    heparin (porcine) injection 5,000 Units    ibuprofen tablet 800 mg    insulin aspart U-100 pen 0-10 Units    methocarbamoL (ROBAXIN) 500 mg in dextrose 5 % (D5W) 100 mL IVPB    morphine 12 hr tablet 15 mg    nicotine 14 mg/24 hr 1 patch    polyethylene glycol packet 17 g    potassium chloride SA CR tablet 40 mEq        Objective:     Vital Signs (Most Recent):  Temp: 97.8 °F (36.6 °C) (05/17/24 0422)  Pulse: 60 (05/17/24 0422)  Resp: 16 (05/17/24 0516)  BP: (!) 170/87 (05/17/24 0422)  SpO2: (!) 94 % (05/17/24 0422) Vital Signs (24h Range):  Temp:  [97.4 °F (36.3 °C)-98.2 °F (36.8 °C)] 97.8 °F (36.6 °C)  Pulse:  [54-69] 60  Resp:  [12-18] 16  SpO2:  [94 %-98 %] 94 %  BP: (154-170)/(80-87) 170/87     Intake/Output - Last 3 Shifts         05/15 0700  05/16 0659 05/16 0700  05/17 0659 05/17 0700  05/18 0659    P.O. 960 650     I.V. (mL/kg) 180.8 (1.8)      IV Piggyback 188.3      Total Intake(mL/kg) 1329.2 (13.3) 650 (6.5)     Urine (mL/kg/hr) 400 (0.2) 0 (0)     Drains 15 6     Total Output 415 6     Net +914.2 +644            Urine  Occurrence 5 x 4 x     Stool Occurrence 0 x               Physical Exam  Constitutional:       General: He is not in acute distress.     Appearance: He is not ill-appearing.   HENT:      Head: Normocephalic and atraumatic.      Nose: Nose normal.      Mouth/Throat:      Mouth: Mucous membranes are moist.      Pharynx: Oropharynx is clear.   Eyes:      Extraocular Movements: Extraocular movements intact.      Conjunctiva/sclera: Conjunctivae normal.   Cardiovascular:      Rate and Rhythm: Normal rate and regular rhythm.      Pulses: Normal pulses.   Pulmonary:      Effort: Pulmonary effort is normal.   Abdominal:      General: Abdomen is flat.      Palpations: Abdomen is soft.      Tenderness: There is no abdominal tenderness.      Comments: Incisions dressed with island dressings with thin non-bloody strikethrough  Abdominal binder in place  DAWIT drain with thin SS output   Musculoskeletal:         General: Normal range of motion.      Cervical back: Normal range of motion.   Skin:     General: Skin is warm and dry.      Capillary Refill: Capillary refill takes less than 2 seconds.   Neurological:      Mental Status: He is alert and oriented to person, place, and time.              Significant Labs:  BMP (Last 3 Results):   Recent Labs   Lab 05/15/24  0420 05/16/24  0550 05/17/24  0504   * 131* 112*   * 137 137   K 4.2 4.1 3.4*    102 102   CO2 21* 24 23   BUN 11 8 6   CREATININE 0.9 0.9 0.8   CALCIUM 9.0 9.4 9.2   MG 1.8 1.8 1.7     CBC (Last 3 Results):   Recent Labs   Lab 05/15/24  0420 05/16/24  0550 05/17/24  0504   WBC 14.68* 11.34 9.02   RBC 3.84* 3.84* 4.09*   HGB 11.5* 11.5* 12.2*   HCT 34.1* 33.9* 35.6*    285 319   MCV 89 88 87   MCH 29.9 29.9 29.8   MCHC 33.7 33.9 34.3     CRP (Last 3 Results):   Recent Labs   Lab 05/16/24  0550 05/17/24  0504   .7* 76.5*       Significant Diagnostics:  I have reviewed all pertinent imaging results/findings within the past 24 hours.

## 2024-05-17 NOTE — NURSING
[facility] Plan of Care Note  Dx Final diagnoses:  [Z93.3] Colostomy in place      Shift Events passing plenty of gas. Tolerating low fiber diet without issue. Ambulating hallways independently. No measurable drain output. Pain under control with PRNs.     Neuro: A&O$    Vital Signs: VSS    Respiratory: RA    Diet: low fiber/residue     Urine Output/Bowel Movement: adequate

## 2024-05-17 NOTE — ASSESSMENT & PLAN NOTE
Mr. Osman Li is a 59 year old male s/p colostomy takedown, side-side transverse to descending colocolonic anastomosis, and flex sig on 5/13. Doing well post op, but high risk of ileus given adhesions.    -LRD  -passing flatus, awaiting BM  -MMPC  -SSI  -prn anti-emetics  -DVT ppx: SQH  -OOB/IS    Dispo: JAIRO

## 2024-05-17 NOTE — PROGRESS NOTES
[facility] Plan of Care Note  Dx colon ca     Shift Events: none     Goals of Care: dc     Neuro: 4     Vital Signs: wdl     Respiratory: RA     Diet: dm clears     Is patient tolerating current diet? yes     GTTS: sl     Urine Output/Bowel Movement: see flowsheet, lbm pta     Drains/Tubes/Tube Feeds (include total output/shift): kervin     Lines: piv        Accuchecks:achs     Skin: ml abd, dermabond, LLQ guaze tape     Fall Risk Score: 7     Activity level? sb     Any scheduled procedures? no     Any safety concerns? no     Other: n/a

## 2024-05-17 NOTE — PLAN OF CARE
Problem: Occupational Therapy  Goal: Occupational Therapy Goal  Description: Goals to be met by: 6/3/2024     Patient will increase functional independence with ADLs by performing:    UE Dressing with Set-up Assistance. Met 5/16  Grooming while standing at sink with Modified [place].Met 5/16  Toileting from toilet with Modified [place] for hygiene and clothing management. Met 5/16  Supine to sit with supine with HOB flat and no handrails Met 5/16  Stand pivot transfers with Modified [place]. Met 5/16  Toilet transfer to toilet with Modified [place]. Met 5/16    Outcome: Adequate for Care Transition  Patient is safe to be discharge from acute OT services at this time at this level of care and has no further needs at this level of care.  Please re-consult if situation changes.

## 2024-05-17 NOTE — PROGRESS NOTES
[facility]  Colorectal Surgery  Progress Note    Patient Name: Osman Li Jr.  MRN: [id number]  Admission Date: 5/13/2024  Hospital Length of Stay: 4 days  Attending Physician: Farhan Lance MD    Subjective:     Interval History: NAEON. AF, HDS. Passing flatus. No nausea or belching this am. Tolerating CLD. Ambulating. Pain controlled.    Post-Op Info:  Procedure(s) (LRB):  CLOSURE, COLOSTOMY (eras high/lithotomy) (N/A)  SIGMOIDOSCOPY, FLEXIBLE (N/A)  INSERTION, CATHETER, URETER, BILATERAL (N/A)  LYSIS, ADHESIONS (N/A)   4 Days Post-Op      Medications:  Continuous Infusions:  Scheduled Meds:   acetaminophen  1,000 mg Oral Q8H    gabapentin  300 mg Oral TID    heparin (porcine)  5,000 Units Subcutaneous Q8H    ibuprofen  800 mg Oral Q6H    insulin aspart U-100  0-10 Units Subcutaneous QID (WM & HS)    methocarbamol (ROBAXIN) IVPB  500 mg Intravenous Q8H    morphine  15 mg Oral BID    nicotine  1 patch Transdermal Daily    polyethylene glycol  17 g Oral Daily    potassium chloride  40 mEq Oral Once     PRN Meds:   acetaminophen tablet 1,000 mg    gabapentin capsule 300 mg    heparin (porcine) injection 5,000 Units    ibuprofen tablet 800 mg    insulin aspart U-100 pen 0-10 Units    methocarbamoL (ROBAXIN) 500 mg in dextrose 5 % (D5W) 100 mL IVPB    morphine 12 hr tablet 15 mg    nicotine 14 mg/24 hr 1 patch    polyethylene glycol packet 17 g    potassium chloride SA CR tablet 40 mEq        Objective:     Vital Signs (Most Recent):  Temp: 97.8 °F (36.6 °C) (05/17/24 0422)  Pulse: 60 (05/17/24 0422)  Resp: 16 (05/17/24 0516)  BP: (!) 170/87 (05/17/24 0422)  SpO2: (!) 94 % (05/17/24 0422) Vital Signs (24h Range):  Temp:  [97.4 °F (36.3 °C)-98.2 °F (36.8 °C)] 97.8 °F (36.6 °C)  Pulse:  [54-69] 60  Resp:  [12-18] 16  SpO2:  [94 %-98 %] 94 %  BP: (154-170)/(80-87) 170/87     Intake/Output - Last 3 Shifts         05/15 0700  05/16 0659 05/16 0700  05/17 0659 05/17 0700  05/18 0659    P.O. 960 650      I.V. (mL/kg) 180.8 (1.8)      IV Piggyback 188.3      Total Intake(mL/kg) 1329.2 (13.3) 650 (6.5)     Urine (mL/kg/hr) 400 (0.2) 0 (0)     Drains 15 6     Total Output 415 6     Net +914.2 +644            Urine Occurrence 5 x 4 x     Stool Occurrence 0 x               Physical Exam  Constitutional:       General: He is not in acute distress.     Appearance: He is not ill-appearing.   HENT:      Head: Normocephalic and atraumatic.      Nose: Nose normal.      Mouth/Throat:      Mouth: Mucous membranes are moist.      Pharynx: Oropharynx is clear.   Eyes:      Extraocular Movements: Extraocular movements intact.      Conjunctiva/sclera: Conjunctivae normal.   Cardiovascular:      Rate and Rhythm: Normal rate and regular rhythm.      Pulses: Normal pulses.   Pulmonary:      Effort: Pulmonary effort is normal.   Abdominal:      General: Abdomen is flat.      Palpations: Abdomen is soft.      Tenderness: There is no abdominal tenderness.      Comments: Incisions dressed with island dressings with thin non-bloody strikethrough  Abdominal binder in place  DAWIT drain with thin SS output   Musculoskeletal:         General: Normal range of motion.      Cervical back: Normal range of motion.   Skin:     General: Skin is warm and dry.      Capillary Refill: Capillary refill takes less than 2 seconds.   Neurological:      Mental Status: He is alert and oriented to person, place, and time.              Significant Labs:  BMP (Last 3 Results):   Recent Labs   Lab 05/15/24  0420 05/16/24  0550 05/17/24  0504   * 131* 112*   * 137 137   K 4.2 4.1 3.4*    102 102   CO2 21* 24 23   BUN 11 8 6   CREATININE 0.9 0.9 0.8   CALCIUM 9.0 9.4 9.2   MG 1.8 1.8 1.7     CBC (Last 3 Results):   Recent Labs   Lab 05/15/24  0420 05/16/24  0550 05/17/24  0504   WBC 14.68* 11.34 9.02   RBC 3.84* 3.84* 4.09*   HGB 11.5* 11.5* 12.2*   HCT 34.1* 33.9* 35.6*    285 319   MCV 89 88 87   MCH 29.9 29.9 29.8   MCHC 33.7 33.9 34.3      CRP (Last 3 Results):   Recent Labs   Lab 05/16/24  0550 05/17/24  0504   .7* 76.5*       Significant Diagnostics:  I have reviewed all pertinent imaging results/findings within the past 24 hours.  Assessment/Plan:     * Malignant neoplasm of transverse colon  Mr. Osman Li is a 59 year old male s/p colostomy takedown, side-side transverse to descending colocolonic anastomosis, and flex sig on 5/13. Doing well post op, but high risk of ileus given adhesions.    -LRD  -passing flatus, awaiting BM  -MMPC  -SSI  -prn anti-emetics  -DVT ppx: SQH  -OOB/IS    Dispo: JAIRO Hicks MD  Colorectal Surgery  [facility]

## 2024-05-17 NOTE — PT/OT/SLP DISCHARGE
Occupational Therapy Discharge Summary    Osman Li Jr.  MRN: [id number]   Principal Problem: Malignant neoplasm of transverse colon      Patient Discharged from acute Occupational Therapy on 5/17/2024.  Please refer to prior OT note dated 5/16/2024 for functional status.    Assessment:      Patient is safe to be discharge from acute OT services at this time at this level of care and has no further needs at this level of care.  Please re-consult if situation changes   Objective:     GOALS:   Multidisciplinary Problems       Occupational Therapy Goals          Problem: Occupational Therapy    Goal Priority Disciplines Outcome Interventions   Occupational Therapy Goal     OT, PT/OT Adequate for Care Transition    Description: Goals to be met by: 6/3/2024     Patient will increase functional independence with ADLs by performing:    UE Dressing with Set-up Assistance. Met 5/16  Grooming while standing at sink with Modified [place].Met 5/16  Toileting from toilet with Modified [place] for hygiene and clothing management. Met 5/16  Supine to sit with supine with HOB flat and no handrails Met 5/16  Stand pivot transfers with Modified [place]. Met 5/16  Toilet transfer to toilet with Modified [place]. Met 5/16                         Reasons for Discontinuation of Therapy Services  Therapist determines that the patient will no longer benefit from therapy services at this level of care and is safe to mobilize and perform ADLs on his own and with staff/family members if needed.     Plan:     Patient Discharged to: In house and has no further OT needs at this time at this level of care. Please re-consult if situation changes.     5/17/2024

## 2024-05-17 NOTE — PLAN OF CARE
05/17/24 1547   Discharge Reassessment   Assessment Type Discharge Planning Reassessment   Did the patient's condition or plan change since previous assessment? No   Discharge Plan discussed with: Patient   Communicated ONIEL with patient/caregiver Yes   Discharge Plan A Home with family   DME Needed Upon Discharge  none   Transition of Care Barriers None   Why the patient remains in the hospital Requires continued medical care   Post-Acute Status   Hospital Resources/Appts/Education Provided Provided patient/caregiver with written discharge plan information   Patient choice form signed by patient/caregiver List with quality metrics by geographic area provided   Discharge Delays None known at this time         Pt not ready for discharge due to: Not medically ready  SW will remain available for families in [facility].  Currently pt has d/c plans in progress at this time.    Discharge Plan A and Plan B have been determined by review of patient's clinical status, future medical and therapeutic needs, and coverage/benefits for post-acute care in coordination with multidisciplinary team members.     Tiffany Gleason LCSW  Case Management/[facility]  [phone]

## 2024-05-18 VITALS
HEART RATE: 63 BPM | TEMPERATURE: 98 F | SYSTOLIC BLOOD PRESSURE: 152 MMHG | WEIGHT: 220.88 LBS | RESPIRATION RATE: 18 BRPM | OXYGEN SATURATION: 96 % | BODY MASS INDEX: 28.35 KG/M2 | HEIGHT: 74 IN | DIASTOLIC BLOOD PRESSURE: 79 MMHG

## 2024-05-18 PROBLEM — E87.1 HYPONATREMIA: Status: RESOLVED | Noted: 2022-04-24 | Resolved: 2024-05-18

## 2024-05-18 LAB
AMYLASE, BODY FLUID: 6 U/L
ANION GAP SERPL CALC-SCNC: 10 MMOL/L (ref 8–16)
BASOPHILS # BLD AUTO: 0.05 K/UL (ref 0–0.2)
BASOPHILS NFR BLD: 0.7 % (ref 0–1.9)
BODY FLUID SOURCE AMYLASE: NORMAL
BUN SERPL-MCNC: 7 MG/DL (ref 6–20)
CALCIUM SERPL-MCNC: 9.2 MG/DL (ref 8.7–10.5)
CHLORIDE SERPL-SCNC: 101 MMOL/L (ref 95–110)
CO2 SERPL-SCNC: 25 MMOL/L (ref 23–29)
CREAT SERPL-MCNC: 0.9 MG/DL (ref 0.5–1.4)
CRP SERPL-MCNC: 45.4 MG/L (ref 0–8.2)
DIFFERENTIAL METHOD BLD: ABNORMAL
EOSINOPHIL # BLD AUTO: 0.4 K/UL (ref 0–0.5)
EOSINOPHIL NFR BLD: 4.8 % (ref 0–8)
ERYTHROCYTE [DISTWIDTH] IN BLOOD BY AUTOMATED COUNT: 13.8 % (ref 11.5–14.5)
EST. GFR  (NO RACE VARIABLE): >60 ML/MIN/1.73 M^2
GLUCOSE SERPL-MCNC: 106 MG/DL (ref 70–110)
HCT VFR BLD AUTO: 33.1 % (ref 40–54)
HGB BLD-MCNC: 11.4 G/DL (ref 14–18)
IMM GRANULOCYTES # BLD AUTO: 0.04 K/UL (ref 0–0.04)
IMM GRANULOCYTES NFR BLD AUTO: 0.5 % (ref 0–0.5)
LYMPHOCYTES # BLD AUTO: 2.5 K/UL (ref 1–4.8)
LYMPHOCYTES NFR BLD: 33.5 % (ref 18–48)
MAGNESIUM SERPL-MCNC: 1.9 MG/DL (ref 1.6–2.6)
MCH RBC QN AUTO: 30.1 PG (ref 27–31)
MCHC RBC AUTO-ENTMCNC: 34.4 G/DL (ref 32–36)
MCV RBC AUTO: 87 FL (ref 82–98)
MONOCYTES # BLD AUTO: 1 K/UL (ref 0.3–1)
MONOCYTES NFR BLD: 12.9 % (ref 4–15)
NEUTROPHILS # BLD AUTO: 3.6 K/UL (ref 1.8–7.7)
NEUTROPHILS NFR BLD: 47.6 % (ref 38–73)
NRBC BLD-RTO: 0 /100 WBC
PHOSPHATE SERPL-MCNC: 4.2 MG/DL (ref 2.7–4.5)
PLATELET # BLD AUTO: 332 K/UL (ref 150–450)
PMV BLD AUTO: 9.8 FL (ref 9.2–12.9)
POCT GLUCOSE: 172 MG/DL (ref 70–110)
POTASSIUM SERPL-SCNC: 4.3 MMOL/L (ref 3.5–5.1)
RBC # BLD AUTO: 3.79 M/UL (ref 4.6–6.2)
SODIUM SERPL-SCNC: 136 MMOL/L (ref 136–145)
WBC # BLD AUTO: 7.47 K/UL (ref 3.9–12.7)

## 2024-05-18 PROCEDURE — 36415 COLL VENOUS BLD VENIPUNCTURE: CPT | Performed by: STUDENT IN AN ORGANIZED HEALTH CARE EDUCATION/TRAINING PROGRAM

## 2024-05-18 PROCEDURE — 25000003 PHARM REV CODE 250: Performed by: STUDENT IN AN ORGANIZED HEALTH CARE EDUCATION/TRAINING PROGRAM

## 2024-05-18 PROCEDURE — S4991 NICOTINE PATCH NONLEGEND: HCPCS

## 2024-05-18 PROCEDURE — 85025 COMPLETE CBC W/AUTO DIFF WBC: CPT | Performed by: STUDENT IN AN ORGANIZED HEALTH CARE EDUCATION/TRAINING PROGRAM

## 2024-05-18 PROCEDURE — 83735 ASSAY OF MAGNESIUM: CPT | Performed by: STUDENT IN AN ORGANIZED HEALTH CARE EDUCATION/TRAINING PROGRAM

## 2024-05-18 PROCEDURE — 86140 C-REACTIVE PROTEIN: CPT | Performed by: STUDENT IN AN ORGANIZED HEALTH CARE EDUCATION/TRAINING PROGRAM

## 2024-05-18 PROCEDURE — 25000003 PHARM REV CODE 250

## 2024-05-18 PROCEDURE — 84100 ASSAY OF PHOSPHORUS: CPT | Performed by: STUDENT IN AN ORGANIZED HEALTH CARE EDUCATION/TRAINING PROGRAM

## 2024-05-18 PROCEDURE — 82150 ASSAY OF AMYLASE: CPT | Performed by: STUDENT IN AN ORGANIZED HEALTH CARE EDUCATION/TRAINING PROGRAM

## 2024-05-18 PROCEDURE — 25000003 PHARM REV CODE 250: Performed by: NURSE PRACTITIONER

## 2024-05-18 PROCEDURE — 83690 ASSAY OF LIPASE: CPT | Performed by: STUDENT IN AN ORGANIZED HEALTH CARE EDUCATION/TRAINING PROGRAM

## 2024-05-18 PROCEDURE — 80048 BASIC METABOLIC PNL TOTAL CA: CPT | Performed by: STUDENT IN AN ORGANIZED HEALTH CARE EDUCATION/TRAINING PROGRAM

## 2024-05-18 PROCEDURE — 63600175 PHARM REV CODE 636 W HCPCS: Performed by: STUDENT IN AN ORGANIZED HEALTH CARE EDUCATION/TRAINING PROGRAM

## 2024-05-18 RX ORDER — OXYCODONE HYDROCHLORIDE 5 MG/1
5 TABLET ORAL EVERY 4 HOURS PRN
Qty: 30 TABLET | Refills: 0 | Status: ON HOLD | OUTPATIENT
Start: 2024-05-18 | End: 2024-05-21

## 2024-05-18 RX ADMIN — POLYETHYLENE GLYCOL 3350 17 G: 17 POWDER, FOR SOLUTION ORAL at 08:05

## 2024-05-18 RX ADMIN — GABAPENTIN 300 MG: 300 CAPSULE ORAL at 08:05

## 2024-05-18 RX ADMIN — ACETAMINOPHEN 1000 MG: 500 TABLET ORAL at 05:05

## 2024-05-18 RX ADMIN — MORPHINE SULFATE 15 MG: 15 TABLET, EXTENDED RELEASE ORAL at 08:05

## 2024-05-18 RX ADMIN — OXYCODONE HYDROCHLORIDE 10 MG: 10 TABLET ORAL at 09:05

## 2024-05-18 RX ADMIN — OXYCODONE HYDROCHLORIDE 10 MG: 10 TABLET ORAL at 01:05

## 2024-05-18 RX ADMIN — METHOCARBAMOL 500 MG: 100 INJECTION, SOLUTION INTRAMUSCULAR; INTRAVENOUS at 05:05

## 2024-05-18 RX ADMIN — OXYCODONE HYDROCHLORIDE 10 MG: 10 TABLET ORAL at 05:05

## 2024-05-18 RX ADMIN — IBUPROFEN 800 MG: 400 TABLET ORAL at 05:05

## 2024-05-18 RX ADMIN — INSULIN ASPART 2 UNITS: 100 INJECTION, SOLUTION INTRAVENOUS; SUBCUTANEOUS at 08:05

## 2024-05-18 RX ADMIN — NICOTINE 1 PATCH: 14 PATCH, EXTENDED RELEASE TRANSDERMAL at 08:05

## 2024-05-18 NOTE — ASSESSMENT & PLAN NOTE
Mr. Osman Li is a 59 year old male s/p colostomy takedown, side-side transverse to descending colocolonic anastomosis, and flex sig on 5/13. Doing well post op, but high risk of ileus given adhesions.    -LRD  -passing flatus, awaiting BM  -MMPC  -SSI  -prn anti-emetics  -DVT ppx: SQH  -OOB/IS  -Drain amylase 6 this am from 12  -Fu drain lipase; will remove DAWIT before discharge if normal    Dispo: DC today

## 2024-05-18 NOTE — SUBJECTIVE & OBJECTIVE
Subjective:     Interval History: NAEON. AF. HDS. Tolerating diet and having bowel function. Drain with slight increase in output, drain amylase and lipase ordered. Drain amylase 6 this morning. Pain controlled.    Post-Op Info:  Procedure(s) (LRB):  CLOSURE, COLOSTOMY (eras high/lithotomy) (N/A)  SIGMOIDOSCOPY, FLEXIBLE (N/A)  INSERTION, CATHETER, URETER, BILATERAL (N/A)  LYSIS, ADHESIONS (N/A)   5 Days Post-Op      Medications:  Continuous Infusions:  Scheduled Meds:   acetaminophen  1,000 mg Oral Q8H    enoxparin  40 mg Subcutaneous Q24H (prophylaxis, 1700)    gabapentin  300 mg Oral TID    ibuprofen  800 mg Oral Q6H    insulin aspart U-100  0-10 Units Subcutaneous QID (WM & HS)    methocarbamol (ROBAXIN) IVPB  500 mg Intravenous Q8H    morphine  15 mg Oral BID    nicotine  1 patch Transdermal Daily    polyethylene glycol  17 g Oral Daily     PRN Meds:   acetaminophen tablet 1,000 mg    enoxaparin injection 40 mg    gabapentin capsule 300 mg    ibuprofen tablet 800 mg    insulin aspart U-100 pen 0-10 Units    methocarbamoL (ROBAXIN) 500 mg in dextrose 5 % (D5W) 100 mL IVPB    morphine 12 hr tablet 15 mg    nicotine 14 mg/24 hr 1 patch    polyethylene glycol packet 17 g        Objective:     Vital Signs (Most Recent):  Temp: 97.8 °F (36.6 °C) (05/18/24 0722)  Pulse: 63 (05/18/24 0722)  Resp: 18 (05/18/24 0830)  BP: (!) 152/79 (05/18/24 0722)  SpO2: 96 % (05/18/24 0722) Vital Signs (24h Range):  Temp:  [97.6 °F (36.4 °C)-98.4 °F (36.9 °C)] 97.8 °F (36.6 °C)  Pulse:  [63-68] 63  Resp:  [17-20] 18  SpO2:  [93 %-98 %] 96 %  BP: (136-161)/(67-86) 152/79     Intake/Output - Last 3 Shifts         05/16 0700  05/17 0659 05/17 0700  05/18 0659 05/18 0700 05/19 0659    P.O. 650 1320     I.V. (mL/kg)       IV Piggyback  100     Total Intake(mL/kg) 650 (6.5) 1420 (14.2)     Urine (mL/kg/hr) 0 (0) 3 (0)     Drains 6 22     Stool  0     Total Output 6 25     Net +644 +1395            Urine Occurrence 4 x 3 x     Stool  Occurrence  2 x              Physical Exam  Constitutional:       General: He is not in acute distress.     Appearance: He is not ill-appearing.   HENT:      Head: Normocephalic and atraumatic.      Nose: Nose normal.      Mouth/Throat:      Mouth: Mucous membranes are moist.      Pharynx: Oropharynx is clear.   Eyes:      Extraocular Movements: Extraocular movements intact.      Conjunctiva/sclera: Conjunctivae normal.   Cardiovascular:      Rate and Rhythm: Normal rate and regular rhythm.      Pulses: Normal pulses.   Pulmonary:      Effort: Pulmonary effort is normal.   Abdominal:      General: Abdomen is flat.      Palpations: Abdomen is soft.      Tenderness: There is no abdominal tenderness.      Comments: Incisions dressed with island dressings with thin non-bloody strikethrough  Abdominal binder in place  DAWIT drain with thin SS output   Musculoskeletal:         General: Normal range of motion.      Cervical back: Normal range of motion.   Skin:     General: Skin is warm and dry.      Capillary Refill: Capillary refill takes less than 2 seconds.   Neurological:      Mental Status: He is alert and oriented to person, place, and time.          Significant Labs:  BMP (Last 3 Results):   Recent Labs   Lab 05/16/24  0550 05/17/24  0504 05/18/24  0408   * 112* 106    137 136   K 4.1 3.4* 4.3    102 101   CO2 24 23 25   BUN 8 6 7   CREATININE 0.9 0.8 0.9   CALCIUM 9.4 9.2 9.2   MG 1.8 1.7 1.9     CBC (Last 3 Results):   Recent Labs   Lab 05/16/24  0550 05/17/24  0504 05/18/24  0408   WBC 11.34 9.02 7.47   RBC 3.84* 4.09* 3.79*   HGB 11.5* 12.2* 11.4*   HCT 33.9* 35.6* 33.1*    319 332   MCV 88 87 87   MCH 29.9 29.8 30.1   MCHC 33.9 34.3 34.4     CRP (Last 3 Results):   Recent Labs   Lab 05/16/24  0550 05/17/24  0504 05/18/24  0408   .7* 76.5* 45.4*       Significant Diagnostics:  I have reviewed all pertinent imaging results/findings within the past 24 hours.

## 2024-05-18 NOTE — HOSPITAL COURSE
Mr. Li is a 60yo male hx of Stage IV colon cancer who underwent splenic flexure resection with end ileostomy who presented for colostomy takedown, side-side transverse to descending colocolonic anastomosis, and flex sig on 5/13. Postop he did well. His pain was controlled, he ambulated on his own, he had return of bowel function and he tolerated a diet. His drain was checked for amylase and lipase as his colon was very adherent to the pancreas. Drain studies were resassuring of no pancreatic leak. He was discharged home in good condition.

## 2024-05-18 NOTE — PLAN OF CARE
[facility] Plan of Care Note  Dx colon ca     Shift Events: none     Goals of Care: increase mobility, po pain meds     Neuro: 4     Vital Signs: wdl     Respiratory: etco2 monitoring, 2L overnight for suspected MARLYS     Diet: low residue low fiber     Is patient tolerating current diet? yes     GTTS: none     Urine Output/Bowel Movement: voiding lbm 05/18/24     Drains/Tubes/Tube Feeds (include total output/shift): kervin     Lines: piv        Accuchecks:achs     Skin: ml abd- island drsg, ostomy takedown site gauze, tape     Fall Risk Score: 11     Activity level? sb     Any scheduled procedures? no     Any safety concerns? no     Other: n/a

## 2024-05-18 NOTE — PROGRESS NOTES
[facility]  Colorectal Surgery  Progress Note    Patient Name: Osman Li Jr.  MRN: [id number]  Admission Date: 5/13/2024  Hospital Length of Stay: 5 days  Attending Physician: Farhan Lance MD    Subjective:     Interval History: NAEON. AF. HDS. Tolerating diet and having bowel function. Drain with slight increase in output, drain amylase and lipase ordered. Drain amylase 6 this morning. Pain controlled.    Post-Op Info:  Procedure(s) (LRB):  CLOSURE, COLOSTOMY (eras high/lithotomy) (N/A)  SIGMOIDOSCOPY, FLEXIBLE (N/A)  INSERTION, CATHETER, URETER, BILATERAL (N/A)  LYSIS, ADHESIONS (N/A)   5 Days Post-Op      Medications:  Continuous Infusions:  Scheduled Meds:   acetaminophen  1,000 mg Oral Q8H    enoxparin  40 mg Subcutaneous Q24H (prophylaxis, 1700)    gabapentin  300 mg Oral TID    ibuprofen  800 mg Oral Q6H    insulin aspart U-100  0-10 Units Subcutaneous QID (WM & HS)    methocarbamol (ROBAXIN) IVPB  500 mg Intravenous Q8H    morphine  15 mg Oral BID    nicotine  1 patch Transdermal Daily    polyethylene glycol  17 g Oral Daily     PRN Meds:   acetaminophen tablet 1,000 mg    enoxaparin injection 40 mg    gabapentin capsule 300 mg    ibuprofen tablet 800 mg    insulin aspart U-100 pen 0-10 Units    methocarbamoL (ROBAXIN) 500 mg in dextrose 5 % (D5W) 100 mL IVPB    morphine 12 hr tablet 15 mg    nicotine 14 mg/24 hr 1 patch    polyethylene glycol packet 17 g        Objective:     Vital Signs (Most Recent):  Temp: 97.8 °F (36.6 °C) (05/18/24 0722)  Pulse: 63 (05/18/24 0722)  Resp: 18 (05/18/24 0830)  BP: (!) 152/79 (05/18/24 0722)  SpO2: 96 % (05/18/24 0722) Vital Signs (24h Range):  Temp:  [97.6 °F (36.4 °C)-98.4 °F (36.9 °C)] 97.8 °F (36.6 °C)  Pulse:  [63-68] 63  Resp:  [17-20] 18  SpO2:  [93 %-98 %] 96 %  BP: (136-161)/(67-86) 152/79     Intake/Output - Last 3 Shifts         05/16 0700 05/17 0659 05/17 0700 05/18 0659 05/18 0700 05/19 0659    P.O. 604 5930     I.V. (mL/kg)        IV Piggyback  100     Total Intake(mL/kg) 650 (6.5) 1420 (14.2)     Urine (mL/kg/hr) 0 (0) 3 (0)     Drains 6 22     Stool  0     Total Output 6 25     Net +644 +1395            Urine Occurrence 4 x 3 x     Stool Occurrence  2 x              Physical Exam  Constitutional:       General: He is not in acute distress.     Appearance: He is not ill-appearing.   HENT:      Head: Normocephalic and atraumatic.      Nose: Nose normal.      Mouth/Throat:      Mouth: Mucous membranes are moist.      Pharynx: Oropharynx is clear.   Eyes:      Extraocular Movements: Extraocular movements intact.      Conjunctiva/sclera: Conjunctivae normal.   Cardiovascular:      Rate and Rhythm: Normal rate and regular rhythm.      Pulses: Normal pulses.   Pulmonary:      Effort: Pulmonary effort is normal.   Abdominal:      General: Abdomen is flat.      Palpations: Abdomen is soft.      Tenderness: There is no abdominal tenderness.      Comments: Incisions dressed with island dressings with thin non-bloody strikethrough  Abdominal binder in place  DAWIT drain with thin SS output   Musculoskeletal:         General: Normal range of motion.      Cervical back: Normal range of motion.   Skin:     General: Skin is warm and dry.      Capillary Refill: Capillary refill takes less than 2 seconds.   Neurological:      Mental Status: He is alert and oriented to person, place, and time.          Significant Labs:  BMP (Last 3 Results):   Recent Labs   Lab 05/16/24  0550 05/17/24  0504 05/18/24  0408   * 112* 106    137 136   K 4.1 3.4* 4.3    102 101   CO2 24 23 25   BUN 8 6 7   CREATININE 0.9 0.8 0.9   CALCIUM 9.4 9.2 9.2   MG 1.8 1.7 1.9     CBC (Last 3 Results):   Recent Labs   Lab 05/16/24  0550 05/17/24  0504 05/18/24  0408   WBC 11.34 9.02 7.47   RBC 3.84* 4.09* 3.79*   HGB 11.5* 12.2* 11.4*   HCT 33.9* 35.6* 33.1*    319 332   MCV 88 87 87   MCH 29.9 29.8 30.1   MCHC 33.9 34.3 34.4     CRP (Last 3 Results):   Recent Labs    Lab 05/16/24  0550 05/17/24  0504 05/18/24  0408   .7* 76.5* 45.4*       Significant Diagnostics:  I have reviewed all pertinent imaging results/findings within the past 24 hours.  Assessment/Plan:     * Malignant neoplasm of transverse colon  Mr. Osman Li is a 59 year old male s/p colostomy takedown, side-side transverse to descending colocolonic anastomosis, and flex sig on 5/13. Doing well post op, but high risk of ileus given adhesions.    -LRD  -passing flatus, awaiting BM  -MMPC  -SSI  -prn anti-emetics  -DVT ppx: SQH  -OOB/IS  -Drain amylase 6 this am from 12  -Fu drain lipase; will remove DAWIT before discharge if normal    Dispo: DC today          Hardik Hicks MD  Colorectal Surgery  [facility]

## 2024-05-18 NOTE — DISCHARGE SUMMARY
[facility]  Colorectal Surgery  Discharge Summary      Patient Name: Osman Li Jr.  MRN: [id number]  Admission Date: 5/13/2024  Hospital Length of Stay: 5 days  Discharge Date and Time:  05/18/2024 9:33 AM  Attending Physician: Farhan Lance MD   Discharging Provider: Corona Randall MD  Primary Care Provider: Natalee Wagner NP     HPI:  No notes on file    Procedure(s) (LRB):  CLOSURE, COLOSTOMY (eras high/lithotomy) (N/A)  SIGMOIDOSCOPY, FLEXIBLE (N/A)  INSERTION, CATHETER, URETER, BILATERAL (N/A)  LYSIS, ADHESIONS (N/A)     Hospital Course:  Mr. Li is a 60yo male hx of Stage IV colon cancer who underwent splenic flexure resection with end ileostomy who presented for colostomy takedown, side-side transverse to descending colocolonic anastomosis, and flex sig on 5/13. Postop he did well. His pain was controlled, he ambulated on his own, he had return of bowel function and he tolerated a diet. His drain was checked for amylase and lipase as his colon was very adherent to the pancreas. Drain studies were resassuring of no pancreatic leak. He was discharged home in good condition.     Goals of Care Treatment Preferences:  Code Status: Full Code          Significant Diagnostic Studies: N/A    Pending Diagnostic Studies:       Procedure Component Value Units Date/Time    C-reactive protein [[id number]] Collected: 05/17/24 0504    Order Status: Sent Lab Status: No result     Specimen: Blood     Lipase, Body Fluid Abdominal Fluid [[id number]] Collected: 05/18/24 0836    Order Status: Sent Lab Status: In process Updated: 05/18/24 0847    Specimen: Body Fluid     Specimen to Pathology, Surgery General Surgery [[id number]] Collected: 05/13/24 1203    Order Status: Sent Lab Status: In process Updated: 05/14/24 0556    Specimen: Tissue           Final Active Diagnoses:    Diagnosis Date Noted POA    PRINCIPAL PROBLEM:  Malignant neoplasm of transverse colon [C18.4] 05/05/2022 Yes     "Metastatic adenocarcinoma to pancreas [C78.89] 01/13/2023 Yes    Type 2 diabetes mellitus [E11.9] 04/24/2022 Yes    HTN (hypertension) [I10] 04/24/2022 Yes    HLD (hyperlipidemia) [E78.5] 04/24/2022 Yes      Problems Resolved During this Admission:    Diagnosis Date Noted Date Resolved POA    Hyponatremia [E87.1] 04/24/2022 05/18/2024 Yes      Discharged Condition: good    Disposition: Home or Self Care    Follow Up: 2 weeks    Patient Instructions:   No discharge procedures on file.  Medications:  Reconciled Home Medications:      Medication List        START taking these medications      oxyCODONE 5 MG immediate release tablet  Commonly known as: ROXICODONE  Take 1 tablet (5 mg total) by mouth every 4 (four) hours as needed.            CHANGE how you take these medications      atorvastatin 20 MG tablet  Commonly known as: LIPITOR  Take 1 tablet (20 mg total) by mouth once daily.  What changed: when to take this     enalapriL-hydrochlorothiazide 10-25 mg per tablet  Commonly known as: VASERETIC  Take 1 tablet by mouth once daily.  What changed: when to take this            CONTINUE taking these medications      BD ULTRA-FINE MINI PEN NEEDLE 31 gauge x 3/16" Ndle  Generic drug: pen needle, diabetic  1 each by Misc.(Non-Drug; Combo Route) route once daily.     metFORMIN 1000 MG tablet  Commonly known as: GLUCOPHAGE  Take 1 tablet (1,000 mg total) by mouth 2 (two) times daily with meals.     morphine 30 MG 12 hr tablet  Commonly known as: MS CONTIN  Take 1 tablet (30 mg total) by mouth 2 (two) times daily.     promethazine 12.5 MG Tab  Commonly known as: PHENERGAN  Take 1 tablet (12.5 mg total) by mouth every 4 (four) hours as needed.     sildenafiL 100 MG tablet  Commonly known as: VIAGRA  Take 1 tablet (100 mg total) by mouth daily as needed for Erectile Dysfunction.     TOUJEO MAX U-300 SOLOSTAR 300 unit/mL (3 mL) insulin pen  Generic drug: insulin glargine U-300 conc  Inject 26 Units into the skin once daily.   "   traZODone 50 MG tablet  Commonly known as: DESYREL  Take 1 tablet (50 mg total) by mouth every evening.              Corona Randall MD  Colorectal Surgery  [facility]

## 2024-05-18 NOTE — PLAN OF CARE
[facility]  Discharge Final Note    Primary Care Provider: Natalee Wagner NP    Expected Discharge Date: 5/18/2024    Final Discharge Note (most recent)       Final Note - 05/18/24 1154          Final Note    Hospital Resources/Appts/Education Provided Provided patient/caregiver with written discharge plan information        Post-Acute Status    Post-Acute Authorization Other     Other Status No Post-Acute Service Needs     Discharge Delays None known at this time                     Important Message from Medicare             Patient medically ready for discharge to home  Family/patient aware of discharge.    Future Appointments   Date Time Provider Department [place]   5/27/2024  8:50 AM LAB, [facility] [place] [facility]   5/27/2024 11:40 AM Khoobehi, Aurash, MD [facility]ON O at [facility]   5/28/2024  9:00 AM CHAIR 26 [facility] CHEMO [facility] Ben   5/29/2024 10:00 AM Farhan Lance MD [facility]   6/18/2024  9:00 AM CHAIR 16 [facility] CHEMO [facility] Ben   6/27/2024  8:20 AM Natalee Wagner NP HC OFFAM O at [facility] [place]   7/9/2024  9:00 AM CHAIR 11 [facility] CHEMO [facility] Ben   7/30/2024  9:00 AM CHAIR 21 [facility] CHEMO [facility] Ben Martinez LMSW  - [facility]

## 2024-05-20 LAB
FINAL PATHOLOGIC DIAGNOSIS: NORMAL
GROSS: NORMAL
Lab: NORMAL

## 2024-05-21 ENCOUNTER — HOSPITAL ENCOUNTER (EMERGENCY)
Facility: HOSPITAL | Age: 59
Discharge: ANOTHER HOSPITAL | End: 2024-05-21
Attending: EMERGENCY MEDICINE
Payer: COMMERCIAL

## 2024-05-21 ENCOUNTER — ANESTHESIA (OUTPATIENT)
Dept: SURGERY | Facility: HOSPITAL | Age: 59
DRG: 329 | End: 2024-05-21
Payer: COMMERCIAL

## 2024-05-21 ENCOUNTER — ANESTHESIA EVENT (OUTPATIENT)
Dept: SURGERY | Facility: HOSPITAL | Age: 59
DRG: 329 | End: 2024-05-21
Payer: COMMERCIAL

## 2024-05-21 ENCOUNTER — HOSPITAL ENCOUNTER (INPATIENT)
Facility: HOSPITAL | Age: 59
LOS: 8 days | Discharge: HOME | DRG: 329 | End: 2024-05-29
Attending: STUDENT IN AN ORGANIZED HEALTH CARE EDUCATION/TRAINING PROGRAM | Admitting: STUDENT IN AN ORGANIZED HEALTH CARE EDUCATION/TRAINING PROGRAM
Payer: COMMERCIAL

## 2024-05-21 ENCOUNTER — TELEPHONE (OUTPATIENT)
Dept: SURGERY | Facility: CLINIC | Age: 59
End: 2024-05-21
Payer: COMMERCIAL

## 2024-05-21 VITALS
OXYGEN SATURATION: 96 % | BODY MASS INDEX: 28.23 KG/M2 | WEIGHT: 220 LBS | DIASTOLIC BLOOD PRESSURE: 71 MMHG | TEMPERATURE: 99 F | SYSTOLIC BLOOD PRESSURE: 137 MMHG | RESPIRATION RATE: 18 BRPM | HEART RATE: 104 BPM

## 2024-05-21 DIAGNOSIS — E11.9 TYPE 2 DIABETES MELLITUS WITHOUT COMPLICATION, WITH LONG-TERM CURRENT USE OF INSULIN: ICD-10-CM

## 2024-05-21 DIAGNOSIS — E11.69 DM TYPE 2 WITH DIABETIC DYSLIPIDEMIA: ICD-10-CM

## 2024-05-21 DIAGNOSIS — R50.9 FEVER, UNSPECIFIED FEVER CAUSE: Primary | ICD-10-CM

## 2024-05-21 DIAGNOSIS — K91.89 LARGE BOWEL ANASTOMOTIC LEAK: ICD-10-CM

## 2024-05-21 DIAGNOSIS — K56.609 SMALL BOWEL OBSTRUCTION: ICD-10-CM

## 2024-05-21 DIAGNOSIS — C18.4 MALIGNANT NEOPLASM OF TRANSVERSE COLON: Primary | ICD-10-CM

## 2024-05-21 DIAGNOSIS — E78.5 DM TYPE 2 WITH DIABETIC DYSLIPIDEMIA: ICD-10-CM

## 2024-05-21 DIAGNOSIS — K56.609 SBO (SMALL BOWEL OBSTRUCTION): ICD-10-CM

## 2024-05-21 DIAGNOSIS — R10.32 LLQ ABDOMINAL PAIN: ICD-10-CM

## 2024-05-21 DIAGNOSIS — K56.609 INTESTINAL OBSTRUCTION, UNSPECIFIED CAUSE, UNSPECIFIED WHETHER PARTIAL OR COMPLETE: ICD-10-CM

## 2024-05-21 DIAGNOSIS — Z79.4 TYPE 2 DIABETES MELLITUS WITHOUT COMPLICATION, WITH LONG-TERM CURRENT USE OF INSULIN: ICD-10-CM

## 2024-05-21 DIAGNOSIS — E78.5 HYPERLIPIDEMIA, UNSPECIFIED HYPERLIPIDEMIA TYPE: ICD-10-CM

## 2024-05-21 LAB
ABO + RH BLD: NORMAL
ALBUMIN SERPL BCP-MCNC: 2.3 G/DL (ref 3.5–5.2)
ALBUMIN SERPL BCP-MCNC: 3.4 G/DL (ref 3.5–5.2)
ALP SERPL-CCNC: 113 U/L (ref 55–135)
ALP SERPL-CCNC: 92 U/L (ref 55–135)
ALT SERPL W/O P-5'-P-CCNC: 27 U/L (ref 10–44)
ALT SERPL W/O P-5'-P-CCNC: 29 U/L (ref 10–44)
ANION GAP SERPL CALC-SCNC: 11 MMOL/L (ref 8–16)
ANION GAP SERPL CALC-SCNC: 11 MMOL/L (ref 8–16)
AST SERPL-CCNC: 20 U/L (ref 10–40)
AST SERPL-CCNC: 22 U/L (ref 10–40)
BASOPHILS # BLD AUTO: 0.02 K/UL (ref 0–0.2)
BASOPHILS # BLD AUTO: 0.05 K/UL (ref 0–0.2)
BASOPHILS NFR BLD: 0.1 % (ref 0–1.9)
BASOPHILS NFR BLD: 0.2 % (ref 0–1.9)
BILIRUB DIRECT SERPL-MCNC: 0.4 MG/DL (ref 0.1–0.3)
BILIRUB SERPL-MCNC: 0.7 MG/DL (ref 0.1–1)
BILIRUB SERPL-MCNC: 0.8 MG/DL (ref 0.1–1)
BILIRUB UR QL STRIP: NEGATIVE
BLD GP AB SCN CELLS X3 SERPL QL: NORMAL
BUN SERPL-MCNC: 12 MG/DL (ref 6–20)
BUN SERPL-MCNC: 15 MG/DL (ref 6–20)
CALCIUM SERPL-MCNC: 8.4 MG/DL (ref 8.7–10.5)
CALCIUM SERPL-MCNC: 8.9 MG/DL (ref 8.7–10.5)
CHLORIDE SERPL-SCNC: 102 MMOL/L (ref 95–110)
CHLORIDE SERPL-SCNC: 99 MMOL/L (ref 95–110)
CLARITY UR: CLEAR
CO2 SERPL-SCNC: 23 MMOL/L (ref 23–29)
CO2 SERPL-SCNC: 26 MMOL/L (ref 23–29)
COLOR UR: YELLOW
CREAT SERPL-MCNC: 1.1 MG/DL (ref 0.5–1.4)
CREAT SERPL-MCNC: 1.3 MG/DL (ref 0.5–1.4)
DIFFERENTIAL METHOD BLD: ABNORMAL
DIFFERENTIAL METHOD BLD: ABNORMAL
EOSINOPHIL # BLD AUTO: 0 K/UL (ref 0–0.5)
EOSINOPHIL # BLD AUTO: 0 K/UL (ref 0–0.5)
EOSINOPHIL NFR BLD: 0 % (ref 0–8)
EOSINOPHIL NFR BLD: 0 % (ref 0–8)
ERYTHROCYTE [DISTWIDTH] IN BLOOD BY AUTOMATED COUNT: 14.3 % (ref 11.5–14.5)
ERYTHROCYTE [DISTWIDTH] IN BLOOD BY AUTOMATED COUNT: 14.6 % (ref 11.5–14.5)
EST. GFR  (NO RACE VARIABLE): >60 ML/MIN/1.73 M^2
EST. GFR  (NO RACE VARIABLE): >60 ML/MIN/1.73 M^2
GIANT PLATELETS BLD QL SMEAR: PRESENT
GLUCOSE SERPL-MCNC: 163 MG/DL (ref 70–110)
GLUCOSE SERPL-MCNC: 288 MG/DL (ref 70–110)
GLUCOSE UR QL STRIP: ABNORMAL
HCT VFR BLD AUTO: 39 % (ref 40–54)
HCT VFR BLD AUTO: 40.1 % (ref 40–54)
HGB BLD-MCNC: 13.4 G/DL (ref 14–18)
HGB BLD-MCNC: 14 G/DL (ref 14–18)
HGB UR QL STRIP: ABNORMAL
IMM GRANULOCYTES # BLD AUTO: 0.14 K/UL (ref 0–0.04)
IMM GRANULOCYTES # BLD AUTO: 0.22 K/UL (ref 0–0.04)
IMM GRANULOCYTES NFR BLD AUTO: 0.4 % (ref 0–0.5)
IMM GRANULOCYTES NFR BLD AUTO: 1 % (ref 0–0.5)
KETONES UR QL STRIP: ABNORMAL
LACTATE SERPL-SCNC: 1.3 MMOL/L (ref 0.5–1.9)
LEUKOCYTE ESTERASE UR QL STRIP: NEGATIVE
LIPASE FLD-CCNC: 4 U/L
LIPASE SERPL-CCNC: 3 U/L (ref 4–60)
LYMPHOCYTES # BLD AUTO: 0.9 K/UL (ref 1–4.8)
LYMPHOCYTES # BLD AUTO: 1.1 K/UL (ref 1–4.8)
LYMPHOCYTES NFR BLD: 2.7 % (ref 18–48)
LYMPHOCYTES NFR BLD: 4.6 % (ref 18–48)
MAGNESIUM SERPL-MCNC: 1.7 MG/DL (ref 1.6–2.6)
MCH RBC QN AUTO: 30 PG (ref 27–31)
MCH RBC QN AUTO: 30.4 PG (ref 27–31)
MCHC RBC AUTO-ENTMCNC: 34.4 G/DL (ref 32–36)
MCHC RBC AUTO-ENTMCNC: 34.9 G/DL (ref 32–36)
MCV RBC AUTO: 87 FL (ref 82–98)
MCV RBC AUTO: 87 FL (ref 82–98)
MONOCYTES # BLD AUTO: 0.8 K/UL (ref 0.3–1)
MONOCYTES # BLD AUTO: 0.9 K/UL (ref 0.3–1)
MONOCYTES NFR BLD: 2.7 % (ref 4–15)
MONOCYTES NFR BLD: 3.3 % (ref 4–15)
NEUTROPHILS # BLD AUTO: 20.7 K/UL (ref 1.8–7.7)
NEUTROPHILS # BLD AUTO: 30.8 K/UL (ref 1.8–7.7)
NEUTROPHILS NFR BLD: 90.9 % (ref 38–73)
NEUTROPHILS NFR BLD: 94.1 % (ref 38–73)
NITRITE UR QL STRIP: NEGATIVE
NRBC BLD-RTO: 0 /100 WBC
NRBC BLD-RTO: 0 /100 WBC
OHS QRS DURATION: 140 MS
OHS QTC CALCULATION: 505 MS
PH UR STRIP: 6 [PH] (ref 5–8)
PHOSPHATE SERPL-MCNC: 4.2 MG/DL (ref 2.7–4.5)
PLATELET # BLD AUTO: 459 K/UL (ref 150–450)
PLATELET # BLD AUTO: 471 K/UL (ref 150–450)
PLATELET BLD QL SMEAR: ABNORMAL
PLATELET BLD QL SMEAR: ABNORMAL
PMV BLD AUTO: 9.1 FL (ref 9.2–12.9)
PMV BLD AUTO: 9.3 FL (ref 9.2–12.9)
POCT GLUCOSE: 273 MG/DL (ref 70–110)
POCT GLUCOSE: 309 MG/DL (ref 70–110)
POTASSIUM SERPL-SCNC: 3.2 MMOL/L (ref 3.5–5.1)
POTASSIUM SERPL-SCNC: 4.9 MMOL/L (ref 3.5–5.1)
PROT SERPL-MCNC: 5.3 G/DL (ref 6–8.4)
PROT SERPL-MCNC: 6.2 G/DL (ref 6–8.4)
PROT UR QL STRIP: ABNORMAL
RBC # BLD AUTO: 4.47 M/UL (ref 4.6–6.2)
RBC # BLD AUTO: 4.61 M/UL (ref 4.6–6.2)
SODIUM SERPL-SCNC: 136 MMOL/L (ref 136–145)
SODIUM SERPL-SCNC: 136 MMOL/L (ref 136–145)
SP GR UR STRIP: 1.01 (ref 1–1.03)
SPECIMEN OUTDATE: NORMAL
URN SPEC COLLECT METH UR: ABNORMAL
UROBILINOGEN UR STRIP-ACNC: NEGATIVE EU/DL
WBC # BLD AUTO: 22.8 K/UL (ref 3.9–12.7)
WBC # BLD AUTO: 32.75 K/UL (ref 3.9–12.7)

## 2024-05-21 PROCEDURE — 63600175 PHARM REV CODE 636 W HCPCS: Mod: JZ,JG | Performed by: EMERGENCY MEDICINE

## 2024-05-21 PROCEDURE — 99285 EMERGENCY DEPT VISIT HI MDM: CPT | Mod: 25

## 2024-05-21 PROCEDURE — C1758 CATHETER, URETERAL: HCPCS | Performed by: STUDENT IN AN ORGANIZED HEALTH CARE EDUCATION/TRAINING PROGRAM

## 2024-05-21 PROCEDURE — 96376 TX/PRO/DX INJ SAME DRUG ADON: CPT

## 2024-05-21 PROCEDURE — 82962 GLUCOSE BLOOD TEST: CPT | Performed by: STUDENT IN AN ORGANIZED HEALTH CARE EDUCATION/TRAINING PROGRAM

## 2024-05-21 PROCEDURE — 63600175 PHARM REV CODE 636 W HCPCS: Performed by: NURSE ANESTHETIST, CERTIFIED REGISTERED

## 2024-05-21 PROCEDURE — 37000009 HC ANESTHESIA EA ADD 15 MINS: Performed by: STUDENT IN AN ORGANIZED HEALTH CARE EDUCATION/TRAINING PROGRAM

## 2024-05-21 PROCEDURE — 88305 TISSUE EXAM BY PATHOLOGIST: CPT | Mod: 26,,, | Performed by: PATHOLOGY

## 2024-05-21 PROCEDURE — 25000003 PHARM REV CODE 250: Performed by: NURSE ANESTHETIST, CERTIFIED REGISTERED

## 2024-05-21 PROCEDURE — 36415 COLL VENOUS BLD VENIPUNCTURE: CPT | Performed by: SURGERY

## 2024-05-21 PROCEDURE — 0D1B0Z4 BYPASS ILEUM TO CUTANEOUS, OPEN APPROACH: ICD-10-PCS | Performed by: STUDENT IN AN ORGANIZED HEALTH CARE EDUCATION/TRAINING PROGRAM

## 2024-05-21 PROCEDURE — 0T778DZ DILATION OF LEFT URETER WITH INTRALUMINAL DEVICE, VIA NATURAL OR ARTIFICIAL OPENING ENDOSCOPIC: ICD-10-PCS | Performed by: UROLOGY

## 2024-05-21 PROCEDURE — 25000003 PHARM REV CODE 250: Performed by: SURGERY

## 2024-05-21 PROCEDURE — 83605 ASSAY OF LACTIC ACID: CPT | Performed by: EMERGENCY MEDICINE

## 2024-05-21 PROCEDURE — 96361 HYDRATE IV INFUSION ADD-ON: CPT | Mod: 59

## 2024-05-21 PROCEDURE — 80053 COMPREHEN METABOLIC PANEL: CPT | Performed by: EMERGENCY MEDICINE

## 2024-05-21 PROCEDURE — 52005 CYSTO W/URTRL CATHJ: CPT | Mod: ,,, | Performed by: UROLOGY

## 2024-05-21 PROCEDURE — 27000221 HC OXYGEN, UP TO 24 HOURS

## 2024-05-21 PROCEDURE — 81003 URINALYSIS AUTO W/O SCOPE: CPT | Performed by: EMERGENCY MEDICINE

## 2024-05-21 PROCEDURE — 36000709 HC OR TIME LEV III EA ADD 15 MIN: Performed by: STUDENT IN AN ORGANIZED HEALTH CARE EDUCATION/TRAINING PROGRAM

## 2024-05-21 PROCEDURE — 71000033 HC RECOVERY, INTIAL HOUR: Performed by: STUDENT IN AN ORGANIZED HEALTH CARE EDUCATION/TRAINING PROGRAM

## 2024-05-21 PROCEDURE — 43752 NASAL/OROGASTRIC W/TUBE PLMT: CPT

## 2024-05-21 PROCEDURE — C1765 ADHESION BARRIER: HCPCS | Performed by: STUDENT IN AN ORGANIZED HEALTH CARE EDUCATION/TRAINING PROGRAM

## 2024-05-21 PROCEDURE — 93010 ELECTROCARDIOGRAM REPORT: CPT | Mod: ,,, | Performed by: GENERAL PRACTICE

## 2024-05-21 PROCEDURE — 27201423 OPTIME MED/SURG SUP & DEVICES STERILE SUPPLY: Performed by: STUDENT IN AN ORGANIZED HEALTH CARE EDUCATION/TRAINING PROGRAM

## 2024-05-21 PROCEDURE — 71000015 HC POSTOP RECOV 1ST HR: Performed by: STUDENT IN AN ORGANIZED HEALTH CARE EDUCATION/TRAINING PROGRAM

## 2024-05-21 PROCEDURE — 37000008 HC ANESTHESIA 1ST 15 MINUTES: Performed by: STUDENT IN AN ORGANIZED HEALTH CARE EDUCATION/TRAINING PROGRAM

## 2024-05-21 PROCEDURE — 87040 BLOOD CULTURE FOR BACTERIA: CPT | Performed by: EMERGENCY MEDICINE

## 2024-05-21 PROCEDURE — 20600001 HC STEP DOWN PRIVATE ROOM

## 2024-05-21 PROCEDURE — D9220A PRA ANESTHESIA: Mod: ANES,,, | Performed by: ANESTHESIOLOGY

## 2024-05-21 PROCEDURE — D9220A PRA ANESTHESIA: Mod: CRNA,,, | Performed by: NURSE ANESTHETIST, CERTIFIED REGISTERED

## 2024-05-21 PROCEDURE — 88305 TISSUE EXAM BY PATHOLOGIST: CPT | Performed by: PATHOLOGY

## 2024-05-21 PROCEDURE — 63600175 PHARM REV CODE 636 W HCPCS: Performed by: STUDENT IN AN ORGANIZED HEALTH CARE EDUCATION/TRAINING PROGRAM

## 2024-05-21 PROCEDURE — 83690 ASSAY OF LIPASE: CPT | Performed by: EMERGENCY MEDICINE

## 2024-05-21 PROCEDURE — 36000708 HC OR TIME LEV III 1ST 15 MIN: Performed by: STUDENT IN AN ORGANIZED HEALTH CARE EDUCATION/TRAINING PROGRAM

## 2024-05-21 PROCEDURE — 88307 TISSUE EXAM BY PATHOLOGIST: CPT | Performed by: PATHOLOGY

## 2024-05-21 PROCEDURE — 63600175 PHARM REV CODE 636 W HCPCS: Performed by: SURGERY

## 2024-05-21 PROCEDURE — 0DBE0ZZ EXCISION OF LARGE INTESTINE, OPEN APPROACH: ICD-10-PCS | Performed by: STUDENT IN AN ORGANIZED HEALTH CARE EDUCATION/TRAINING PROGRAM

## 2024-05-21 PROCEDURE — 36415 COLL VENOUS BLD VENIPUNCTURE: CPT | Performed by: EMERGENCY MEDICINE

## 2024-05-21 PROCEDURE — 84100 ASSAY OF PHOSPHORUS: CPT | Performed by: SURGERY

## 2024-05-21 PROCEDURE — 25000003 PHARM REV CODE 250: Performed by: EMERGENCY MEDICINE

## 2024-05-21 PROCEDURE — C1729 CATH, DRAINAGE: HCPCS | Performed by: STUDENT IN AN ORGANIZED HEALTH CARE EDUCATION/TRAINING PROGRAM

## 2024-05-21 PROCEDURE — 93005 ELECTROCARDIOGRAM TRACING: CPT | Performed by: GENERAL PRACTICE

## 2024-05-21 PROCEDURE — 80048 BASIC METABOLIC PNL TOTAL CA: CPT | Performed by: SURGERY

## 2024-05-21 PROCEDURE — 96375 TX/PRO/DX INJ NEW DRUG ADDON: CPT

## 2024-05-21 PROCEDURE — 27201037 HC PRESSURE MONITORING SET UP

## 2024-05-21 PROCEDURE — 80076 HEPATIC FUNCTION PANEL: CPT | Performed by: SURGERY

## 2024-05-21 PROCEDURE — 85025 COMPLETE CBC W/AUTO DIFF WBC: CPT | Mod: 91 | Performed by: SURGERY

## 2024-05-21 PROCEDURE — 25500020 PHARM REV CODE 255: Performed by: EMERGENCY MEDICINE

## 2024-05-21 PROCEDURE — 86850 RBC ANTIBODY SCREEN: CPT | Performed by: STUDENT IN AN ORGANIZED HEALTH CARE EDUCATION/TRAINING PROGRAM

## 2024-05-21 PROCEDURE — 71000016 HC POSTOP RECOV ADDL HR: Performed by: STUDENT IN AN ORGANIZED HEALTH CARE EDUCATION/TRAINING PROGRAM

## 2024-05-21 PROCEDURE — 88307 TISSUE EXAM BY PATHOLOGIST: CPT | Mod: 26,,, | Performed by: PATHOLOGY

## 2024-05-21 PROCEDURE — 85025 COMPLETE CBC W/AUTO DIFF WBC: CPT | Performed by: EMERGENCY MEDICINE

## 2024-05-21 PROCEDURE — 36415 COLL VENOUS BLD VENIPUNCTURE: CPT | Performed by: STUDENT IN AN ORGANIZED HEALTH CARE EDUCATION/TRAINING PROGRAM

## 2024-05-21 PROCEDURE — 96365 THER/PROPH/DIAG IV INF INIT: CPT | Mod: 59

## 2024-05-21 PROCEDURE — 94761 N-INVAS EAR/PLS OXIMETRY MLT: CPT

## 2024-05-21 PROCEDURE — C1769 GUIDE WIRE: HCPCS | Performed by: STUDENT IN AN ORGANIZED HEALTH CARE EDUCATION/TRAINING PROGRAM

## 2024-05-21 PROCEDURE — 83735 ASSAY OF MAGNESIUM: CPT | Performed by: SURGERY

## 2024-05-21 PROCEDURE — 0DJD8ZZ INSPECTION OF LOWER INTESTINAL TRACT, VIA NATURAL OR ARTIFICIAL OPENING ENDOSCOPIC: ICD-10-PCS | Performed by: STUDENT IN AN ORGANIZED HEALTH CARE EDUCATION/TRAINING PROGRAM

## 2024-05-21 PROCEDURE — 63600175 PHARM REV CODE 636 W HCPCS

## 2024-05-21 PROCEDURE — 44150 REMOVAL OF COLON: CPT | Mod: 78,,, | Performed by: STUDENT IN AN ORGANIZED HEALTH CARE EDUCATION/TRAINING PROGRAM

## 2024-05-21 DEVICE — BARRIER SEPRAFILM ADHESION: Type: IMPLANTABLE DEVICE | Site: ABDOMEN | Status: FUNCTIONAL

## 2024-05-21 RX ORDER — ACETAMINOPHEN 10 MG/ML
1000 INJECTION, SOLUTION INTRAVENOUS EVERY 8 HOURS
Status: DISPENSED | OUTPATIENT
Start: 2024-05-21 | End: 2024-05-22

## 2024-05-21 RX ORDER — METRONIDAZOLE 500 MG/100ML
500 INJECTION, SOLUTION INTRAVENOUS
Status: COMPLETED | OUTPATIENT
Start: 2024-05-21 | End: 2024-05-21

## 2024-05-21 RX ORDER — MORPHINE SULFATE 2 MG/ML
7 INJECTION, SOLUTION INTRAMUSCULAR; INTRAVENOUS
Status: COMPLETED | OUTPATIENT
Start: 2024-05-21 | End: 2024-05-21

## 2024-05-21 RX ORDER — ENOXAPARIN SODIUM 100 MG/ML
40 INJECTION SUBCUTANEOUS EVERY 24 HOURS
Status: DISCONTINUED | OUTPATIENT
Start: 2024-05-21 | End: 2024-05-21

## 2024-05-21 RX ORDER — ROCURONIUM BROMIDE 10 MG/ML
INJECTION, SOLUTION INTRAVENOUS
Status: DISCONTINUED | OUTPATIENT
Start: 2024-05-21 | End: 2024-05-21

## 2024-05-21 RX ORDER — CIPROFLOXACIN 2 MG/ML
INJECTION, SOLUTION INTRAVENOUS
Status: DISCONTINUED | OUTPATIENT
Start: 2024-05-21 | End: 2024-05-21

## 2024-05-21 RX ORDER — MORPHINE SULFATE 2 MG/ML
6 INJECTION, SOLUTION INTRAMUSCULAR; INTRAVENOUS
Status: COMPLETED | OUTPATIENT
Start: 2024-05-21 | End: 2024-05-21

## 2024-05-21 RX ORDER — HYDROMORPHONE HYDROCHLORIDE 1 MG/ML
0.5 INJECTION, SOLUTION INTRAMUSCULAR; INTRAVENOUS; SUBCUTANEOUS EVERY 4 HOURS PRN
Status: DISCONTINUED | OUTPATIENT
Start: 2024-05-21 | End: 2024-05-21

## 2024-05-21 RX ORDER — ONDANSETRON HYDROCHLORIDE 2 MG/ML
4 INJECTION, SOLUTION INTRAVENOUS EVERY 12 HOURS PRN
Status: DISCONTINUED | OUTPATIENT
Start: 2024-05-21 | End: 2024-05-27

## 2024-05-21 RX ORDER — LABETALOL HYDROCHLORIDE 5 MG/ML
INJECTION, SOLUTION INTRAVENOUS
Status: DISCONTINUED | OUTPATIENT
Start: 2024-05-21 | End: 2024-05-21

## 2024-05-21 RX ORDER — ONDANSETRON HYDROCHLORIDE 2 MG/ML
4 INJECTION, SOLUTION INTRAVENOUS EVERY 6 HOURS PRN
Status: DISCONTINUED | OUTPATIENT
Start: 2024-05-21 | End: 2024-05-21

## 2024-05-21 RX ORDER — ONDANSETRON HYDROCHLORIDE 2 MG/ML
INJECTION, SOLUTION INTRAVENOUS
Status: DISCONTINUED | OUTPATIENT
Start: 2024-05-21 | End: 2024-05-21

## 2024-05-21 RX ORDER — SODIUM CHLORIDE 9 MG/ML
INJECTION, SOLUTION INTRAVENOUS CONTINUOUS
Status: ACTIVE | OUTPATIENT
Start: 2024-05-21 | End: 2024-05-22

## 2024-05-21 RX ORDER — FENTANYL CITRATE 50 UG/ML
INJECTION, SOLUTION INTRAMUSCULAR; INTRAVENOUS
Status: DISCONTINUED | OUTPATIENT
Start: 2024-05-21 | End: 2024-05-21

## 2024-05-21 RX ORDER — METRONIDAZOLE 500 MG/100ML
INJECTION, SOLUTION INTRAVENOUS
Status: DISCONTINUED | OUTPATIENT
Start: 2024-05-21 | End: 2024-05-21

## 2024-05-21 RX ORDER — LIDOCAINE HYDROCHLORIDE 20 MG/ML
JELLY TOPICAL
Status: COMPLETED | OUTPATIENT
Start: 2024-05-21 | End: 2024-05-21

## 2024-05-21 RX ORDER — HALOPERIDOL 5 MG/ML
0.5 INJECTION INTRAMUSCULAR EVERY 10 MIN PRN
Status: DISCONTINUED | OUTPATIENT
Start: 2024-05-21 | End: 2024-05-21

## 2024-05-21 RX ORDER — GLUCAGON 1 MG
1 KIT INJECTION
Status: DISCONTINUED | OUTPATIENT
Start: 2024-05-21 | End: 2024-05-29

## 2024-05-21 RX ORDER — SODIUM CHLORIDE 0.9 % (FLUSH) 0.9 %
10 SYRINGE (ML) INJECTION
Status: DISCONTINUED | OUTPATIENT
Start: 2024-05-21 | End: 2024-05-21

## 2024-05-21 RX ORDER — MIDAZOLAM HYDROCHLORIDE 1 MG/ML
INJECTION INTRAMUSCULAR; INTRAVENOUS
Status: DISCONTINUED | OUTPATIENT
Start: 2024-05-21 | End: 2024-05-21

## 2024-05-21 RX ORDER — HYDROMORPHONE HYDROCHLORIDE 1 MG/ML
1 INJECTION, SOLUTION INTRAMUSCULAR; INTRAVENOUS; SUBCUTANEOUS EVERY 4 HOURS PRN
Status: DISCONTINUED | OUTPATIENT
Start: 2024-05-21 | End: 2024-05-21

## 2024-05-21 RX ORDER — HYDROMORPHONE HYDROCHLORIDE 1 MG/ML
INJECTION, SOLUTION INTRAMUSCULAR; INTRAVENOUS; SUBCUTANEOUS
Status: DISCONTINUED | OUTPATIENT
Start: 2024-05-21 | End: 2024-05-21

## 2024-05-21 RX ORDER — INSULIN ASPART 100 [IU]/ML
1-10 INJECTION, SOLUTION INTRAVENOUS; SUBCUTANEOUS EVERY 6 HOURS PRN
Status: DISCONTINUED | OUTPATIENT
Start: 2024-05-21 | End: 2024-05-22

## 2024-05-21 RX ORDER — HYDROMORPHONE HCL IN 0.9% NACL 6 MG/30 ML
PATIENT CONTROLLED ANALGESIA SYRINGE INTRAVENOUS CONTINUOUS
Status: DISCONTINUED | OUTPATIENT
Start: 2024-05-21 | End: 2024-05-27

## 2024-05-21 RX ORDER — KETAMINE HCL IN 0.9 % NACL 50 MG/5 ML
SYRINGE (ML) INTRAVENOUS
Status: DISCONTINUED | OUTPATIENT
Start: 2024-05-21 | End: 2024-05-21

## 2024-05-21 RX ORDER — METRONIDAZOLE 500 MG/100ML
500 INJECTION, SOLUTION INTRAVENOUS
Status: DISPENSED | OUTPATIENT
Start: 2024-05-21 | End: 2024-05-25

## 2024-05-21 RX ORDER — IBUPROFEN 400 MG/1
800 TABLET ORAL EVERY 8 HOURS
Status: DISCONTINUED | OUTPATIENT
Start: 2024-05-22 | End: 2024-05-22

## 2024-05-21 RX ORDER — LIDOCAINE HYDROCHLORIDE 20 MG/ML
INJECTION INTRAVENOUS
Status: DISCONTINUED | OUTPATIENT
Start: 2024-05-21 | End: 2024-05-21

## 2024-05-21 RX ORDER — CIPROFLOXACIN 2 MG/ML
400 INJECTION, SOLUTION INTRAVENOUS
Status: COMPLETED | OUTPATIENT
Start: 2024-05-21 | End: 2024-05-25

## 2024-05-21 RX ORDER — CIPROFLOXACIN 2 MG/ML
400 INJECTION, SOLUTION INTRAVENOUS
Status: COMPLETED | OUTPATIENT
Start: 2024-05-21 | End: 2024-05-21

## 2024-05-21 RX ORDER — ONDANSETRON HYDROCHLORIDE 2 MG/ML
4 INJECTION, SOLUTION INTRAVENOUS
Status: COMPLETED | OUTPATIENT
Start: 2024-05-21 | End: 2024-05-21

## 2024-05-21 RX ORDER — SODIUM CHLORIDE 0.9 % (FLUSH) 0.9 %
10 SYRINGE (ML) INJECTION
Status: DISCONTINUED | OUTPATIENT
Start: 2024-05-21 | End: 2024-05-29 | Stop reason: HOSPADM

## 2024-05-21 RX ORDER — PROPOFOL 10 MG/ML
VIAL (ML) INTRAVENOUS
Status: DISCONTINUED | OUTPATIENT
Start: 2024-05-21 | End: 2024-05-21

## 2024-05-21 RX ORDER — HYDROMORPHONE HYDROCHLORIDE 1 MG/ML
0.2 INJECTION, SOLUTION INTRAMUSCULAR; INTRAVENOUS; SUBCUTANEOUS EVERY 5 MIN PRN
Status: DISCONTINUED | OUTPATIENT
Start: 2024-05-21 | End: 2024-05-22

## 2024-05-21 RX ORDER — DEXAMETHASONE SODIUM PHOSPHATE 4 MG/ML
INJECTION, SOLUTION INTRA-ARTICULAR; INTRALESIONAL; INTRAMUSCULAR; INTRAVENOUS; SOFT TISSUE
Status: DISCONTINUED | OUTPATIENT
Start: 2024-05-21 | End: 2024-05-21

## 2024-05-21 RX ORDER — OXYCODONE HYDROCHLORIDE 5 MG/1
5 TABLET ORAL
Status: DISCONTINUED | OUTPATIENT
Start: 2024-05-21 | End: 2024-05-22

## 2024-05-21 RX ORDER — HEPARIN SODIUM 5000 [USP'U]/ML
5000 INJECTION, SOLUTION INTRAVENOUS; SUBCUTANEOUS EVERY 8 HOURS
Status: DISCONTINUED | OUTPATIENT
Start: 2024-05-21 | End: 2024-05-23

## 2024-05-21 RX ORDER — MUPIROCIN 20 MG/G
OINTMENT TOPICAL 2 TIMES DAILY
Status: COMPLETED | OUTPATIENT
Start: 2024-05-21 | End: 2024-05-23

## 2024-05-21 RX ORDER — ACETAMINOPHEN 500 MG
1000 TABLET ORAL EVERY 8 HOURS
Status: DISCONTINUED | OUTPATIENT
Start: 2024-05-22 | End: 2024-05-23

## 2024-05-21 RX ORDER — ACETAMINOPHEN 500 MG
1000 TABLET ORAL
Status: COMPLETED | OUTPATIENT
Start: 2024-05-21 | End: 2024-05-21

## 2024-05-21 RX ORDER — SODIUM CHLORIDE, SODIUM LACTATE, POTASSIUM CHLORIDE, CALCIUM CHLORIDE 600; 310; 30; 20 MG/100ML; MG/100ML; MG/100ML; MG/100ML
INJECTION, SOLUTION INTRAVENOUS CONTINUOUS
Status: DISCONTINUED | OUTPATIENT
Start: 2024-05-21 | End: 2024-05-21

## 2024-05-21 RX ORDER — SUCCINYLCHOLINE CHLORIDE 20 MG/ML
INJECTION INTRAMUSCULAR; INTRAVENOUS
Status: DISCONTINUED | OUTPATIENT
Start: 2024-05-21 | End: 2024-05-21

## 2024-05-21 RX ORDER — NALOXONE HCL 0.4 MG/ML
0.02 VIAL (ML) INJECTION
Status: DISCONTINUED | OUTPATIENT
Start: 2024-05-21 | End: 2024-05-27

## 2024-05-21 RX ORDER — ACETAMINOPHEN 10 MG/ML
INJECTION, SOLUTION INTRAVENOUS
Status: DISCONTINUED | OUTPATIENT
Start: 2024-05-21 | End: 2024-05-21

## 2024-05-21 RX ADMIN — METHOCARBAMOL 1000 MG: 100 INJECTION, SOLUTION INTRAMUSCULAR; INTRAVENOUS at 10:05

## 2024-05-21 RX ADMIN — MORPHINE SULFATE 7 MG: 2 INJECTION, SOLUTION INTRAMUSCULAR; INTRAVENOUS at 02:05

## 2024-05-21 RX ADMIN — ROCURONIUM BROMIDE 20 MG: 10 INJECTION, SOLUTION INTRAVENOUS at 11:05

## 2024-05-21 RX ADMIN — HYDROMORPHONE HYDROCHLORIDE 0.2 MG: 1 INJECTION, SOLUTION INTRAMUSCULAR; INTRAVENOUS; SUBCUTANEOUS at 02:05

## 2024-05-21 RX ADMIN — FENTANYL CITRATE 50 MCG: 50 INJECTION, SOLUTION INTRAMUSCULAR; INTRAVENOUS at 08:05

## 2024-05-21 RX ADMIN — ROCURONIUM BROMIDE 30 MG: 10 INJECTION, SOLUTION INTRAVENOUS at 11:05

## 2024-05-21 RX ADMIN — HYDROMORPHONE HYDROCHLORIDE 0.4 MG: 1 INJECTION, SOLUTION INTRAMUSCULAR; INTRAVENOUS; SUBCUTANEOUS at 01:05

## 2024-05-21 RX ADMIN — SODIUM CHLORIDE, POTASSIUM CHLORIDE, SODIUM LACTATE AND CALCIUM CHLORIDE 2994 ML: 600; 310; 30; 20 INJECTION, SOLUTION INTRAVENOUS at 02:05

## 2024-05-21 RX ADMIN — IOHEXOL 100 ML: 350 INJECTION, SOLUTION INTRAVENOUS at 03:05

## 2024-05-21 RX ADMIN — CIPROFLOXACIN 400 MG: 2 INJECTION, SOLUTION INTRAVENOUS at 12:05

## 2024-05-21 RX ADMIN — SUCCINYLCHOLINE CHLORIDE 180 MG: 20 INJECTION, SOLUTION INTRAMUSCULAR; INTRAVENOUS at 08:05

## 2024-05-21 RX ADMIN — HYDROMORPHONE HYDROCHLORIDE 0.2 MG: 1 INJECTION, SOLUTION INTRAMUSCULAR; INTRAVENOUS; SUBCUTANEOUS at 01:05

## 2024-05-21 RX ADMIN — Medication: at 03:05

## 2024-05-21 RX ADMIN — ONDANSETRON 4 MG: 2 INJECTION INTRAMUSCULAR; INTRAVENOUS at 02:05

## 2024-05-21 RX ADMIN — SUGAMMADEX 200 MG: 100 INJECTION, SOLUTION INTRAVENOUS at 02:05

## 2024-05-21 RX ADMIN — Medication 10 MG: at 11:05

## 2024-05-21 RX ADMIN — ROCURONIUM BROMIDE 30 MG: 10 INJECTION, SOLUTION INTRAVENOUS at 09:05

## 2024-05-21 RX ADMIN — Medication 10 MG: at 10:05

## 2024-05-21 RX ADMIN — METRONIDAZOLE 500 MG: 500 INJECTION, SOLUTION INTRAVENOUS at 01:05

## 2024-05-21 RX ADMIN — CIPROFLOXACIN 400 MG: 2 INJECTION, SOLUTION INTRAVENOUS at 05:05

## 2024-05-21 RX ADMIN — CIPROFLOXACIN 400 MG: 400 INJECTION, SOLUTION INTRAVENOUS at 05:05

## 2024-05-21 RX ADMIN — MUPIROCIN: 20 OINTMENT TOPICAL at 09:05

## 2024-05-21 RX ADMIN — FENTANYL CITRATE 50 MCG: 50 INJECTION, SOLUTION INTRAMUSCULAR; INTRAVENOUS at 11:05

## 2024-05-21 RX ADMIN — METRONIDAZOLE 500 MG: 5 INJECTION, SOLUTION INTRAVENOUS at 11:05

## 2024-05-21 RX ADMIN — ROCURONIUM BROMIDE 20 MG: 10 INJECTION, SOLUTION INTRAVENOUS at 10:05

## 2024-05-21 RX ADMIN — Medication 20 MG: at 08:05

## 2024-05-21 RX ADMIN — ROCURONIUM BROMIDE 45 MG: 10 INJECTION, SOLUTION INTRAVENOUS at 08:05

## 2024-05-21 RX ADMIN — IBUPROFEN 800 MG: 800 INJECTION INTRAVENOUS at 10:05

## 2024-05-21 RX ADMIN — FENTANYL CITRATE 25 MCG: 50 INJECTION, SOLUTION INTRAMUSCULAR; INTRAVENOUS at 10:05

## 2024-05-21 RX ADMIN — IBUPROFEN 800 MG: 800 INJECTION INTRAVENOUS at 03:05

## 2024-05-21 RX ADMIN — ACETAMINOPHEN 1000 MG: 500 TABLET ORAL at 01:05

## 2024-05-21 RX ADMIN — DEXAMETHASONE SODIUM PHOSPHATE 4 MG: 4 INJECTION, SOLUTION INTRAMUSCULAR; INTRAVENOUS at 08:05

## 2024-05-21 RX ADMIN — FENTANYL CITRATE 25 MCG: 50 INJECTION, SOLUTION INTRAMUSCULAR; INTRAVENOUS at 09:05

## 2024-05-21 RX ADMIN — INSULIN ASPART 6 UNITS: 100 INJECTION, SOLUTION INTRAVENOUS; SUBCUTANEOUS at 04:05

## 2024-05-21 RX ADMIN — SODIUM CHLORIDE, SODIUM ACETATE ANHYDROUS, SODIUM GLUCONATE, POTASSIUM CHLORIDE, AND MAGNESIUM CHLORIDE: 526; 222; 502; 37; 30 INJECTION, SOLUTION INTRAVENOUS at 08:05

## 2024-05-21 RX ADMIN — HEPARIN SODIUM 5000 UNITS: 5000 INJECTION INTRAVENOUS; SUBCUTANEOUS at 09:05

## 2024-05-21 RX ADMIN — LABETALOL HYDROCHLORIDE 5 MG: 5 INJECTION, SOLUTION INTRAVENOUS at 01:05

## 2024-05-21 RX ADMIN — LIDOCAINE HYDROCHLORIDE 10 ML: 20 JELLY TOPICAL at 05:05

## 2024-05-21 RX ADMIN — MORPHINE SULFATE 6 MG: 2 INJECTION, SOLUTION INTRAMUSCULAR; INTRAVENOUS at 05:05

## 2024-05-21 RX ADMIN — PROPOFOL 160 MG: 10 INJECTION, EMULSION INTRAVENOUS at 08:05

## 2024-05-21 RX ADMIN — LABETALOL HYDROCHLORIDE 5 MG: 5 INJECTION, SOLUTION INTRAVENOUS at 02:05

## 2024-05-21 RX ADMIN — Medication: at 11:05

## 2024-05-21 RX ADMIN — ROCURONIUM BROMIDE 5 MG: 10 INJECTION, SOLUTION INTRAVENOUS at 08:05

## 2024-05-21 RX ADMIN — SODIUM CHLORIDE: 9 INJECTION, SOLUTION INTRAVENOUS at 03:05

## 2024-05-21 RX ADMIN — LIDOCAINE HYDROCHLORIDE 100 MG: 20 INJECTION INTRAVENOUS at 08:05

## 2024-05-21 RX ADMIN — INSULIN ASPART 4 UNITS: 100 INJECTION, SOLUTION INTRAVENOUS; SUBCUTANEOUS at 11:05

## 2024-05-21 RX ADMIN — ACETAMINOPHEN 1000 MG: 10 INJECTION, SOLUTION INTRAVENOUS at 12:05

## 2024-05-21 RX ADMIN — ACETAMINOPHEN 1000 MG: 10 INJECTION, SOLUTION INTRAVENOUS at 09:05

## 2024-05-21 RX ADMIN — MIDAZOLAM HYDROCHLORIDE 2 MG: 2 INJECTION, SOLUTION INTRAMUSCULAR; INTRAVENOUS at 08:05

## 2024-05-21 RX ADMIN — METRONIDAZOLE 500 MG: 5 INJECTION, SOLUTION INTRAVENOUS at 06:05

## 2024-05-21 RX ADMIN — ONDANSETRON 4 MG: 2 INJECTION INTRAMUSCULAR; INTRAVENOUS at 01:05

## 2024-05-21 NOTE — ASSESSMENT & PLAN NOTE
Osman Li Jr. is a 59 y.o. 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 He now presents to the hospital with concern for internal hernia with closed loop bowel obstruction    - Discussed the risks, benefits, and alternatives to ex lap with possible bowel resection, possible ostomy, possible abthera, flex sig and all indicated procedures including but not limited to bleeding, infection, damage to surrounding structures, and need for further surgery. All questions and concerns were addressed to the patient's satisfaction. Informed consent obtained and placed in the chart. Also obtained consent for blood.  - To OR for Class A Ex-lap  - NPO  - mIVF  - NGT to LIWS  - PRN pain and nausea meds  - DVT ppx

## 2024-05-21 NOTE — HPI
Osman Li Jr. is a 59 y.o. male with a history of HTN, HLD, and DM, Stage IV transverse colon adenocarcinoma who was recently discharged from the hospital on 5/18/24. 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 Post operatively he developed an ileus which improved with conservative management. He was able to be discharged on POD#5. At that time he was tolerating a regular diet, ambulating, voiding spontaneously, having BMs, and had adequate pain control.     Unfortunately he represented to [facility] early on 5/21/24 with worsening left lower quadrant pain and nonbilious, nonbloody vomiting. Upon arrival there he was febrile to 103 and tachycardic, but HDS on RA. Labs with an elevated WBC to 22.8 and slight anemia but otherwise unremarkable. CT A/P was concerning for an internal hernia with closed loop obstruction and so [place] [facility] [place].

## 2024-05-21 NOTE — OP NOTE
[facility]  Colon and Rectal Surgery    [address]  [place], [place]  Tel: [phone]  Fax: [phone]  [email or web address]   MD Payam Reid MD Brian Kann, MD W. Forrest Johnston, MD Matthew Giglia, MD Jennifer Paruch, MD William Kethman, MD Danielle Kay, MD     Patient name: Loki Li Jr.   YOB: 1965   MRN: [id number]  Date of surgery: 05/21/2024    Operative Report    Pre-operative diagnosis: Small bowel obstruction, internal hernia  Post-operative diagnosis:  Possible internal hernia, likely ileus with anastomotic complication    Procedure:  Open completion colectomy with ileorectal anastomosis  Diverting loop ileostomy  Flexible sigmoidoscopy    Findings:  Dilated loop of small bowel in splenic bed - this is similar to prior dissection and was anterior to anastomosis and proximal transverse colon, there was a distal loop of small bowel under the mesentery but this was easily reduced  Purulence noted along lateral aspect of anastomosis, 1 mm anastomotic defect identified by flexible sigmoidoscopy, unable to perform distal transverse to rectal anastomosis due to length (this is similar to prior findings at index operation), derotation attempted, however, ascending colon was secondarily inflamed and felt not suitable for an adequate and healthy anastomosis, completion total colectomy performed with ileorectal anastomosis (intact rings, negative air leak test, intact and hemostatic on flexible sigmoidoscopy)  Diverting loop ileostomy in previous stoma site - proximal is superior, distal is inferior  19 Fr bernardo drain in right upper quadrant extending to left upper quadrant, left paracolic gutter, and pelvis   Seprafilm placed around the loop ileostomy and underlying midline fascia    Surgeon: Farhan Lance MD  Assistant: Vitaly Villa MD and Belem Vernon MD    Indication:  Mr. Li is a 59 year old man with a  history of HTN, HLD, and DM, Stage IV transverse colon adenocarcinoma who presents for evaluation of colostomy takedown. He underwent a splenic flexure resection, end colostomy and splenectomy with Dr. Waddell > FOLFOX > progression (pancreatic tail - biopsy proven and RPLN PET avidity) > Keytruda since 1/2023. He was seen by Dr. Goodwin in 1/2024 (note reviewed) - his plan was to follow-up the results of his PET CT, decision was made to complete Keytruda and continue surveillance. He developed significant improvement in burden of metastatic disease on Keytruda - he was presented at our tumor board and it was decided that he could undergo palliative takedown of his colostomy. This was performed on 5/13/2024 - he was discharged on 5/18/2024 after an uneventful recovery, tolerating a diet, his pain was well-controlled, and he was having bowel function - he had a down trending CRP of 45. He presented today with sudden onset left abdominal pain, vomiting, fever, and tachycardia - CT was concerning for internal hernia and closed loop obstruction. He was transferred to [facility] for planned surgical intervention. The benefits, risks, and alternatives were discussed with the patient and his wife, they were given the opportunity to ask questions and elected to proceed with emergent operative intervention after signing written consent.    Procedure:  After pre-operative assessment and review of informed consent, the patient was taken to the operating room and received general anesthesia. A blas catheter was then placed under strict sterile precautions. Pre-operative antibiotics were administered and redosed according to prior administered antibiotics and the patient was placed in lithotomy position. The abdomen was prepped and draped in the usual sterile fashion and a timeout was performed according to [facility] Quality and Safety guidelines.      The midline laparotomy skin incision was reopened and vicryl sutures  removed. The fascial suture was then carefully excised taking care not to further damage the underlying fascia. There were mild abdominal wall inflammatory adhesions to omentum and small bowel but these were easily  A wound protector was then placed and Lawrence retractor utilized to better evaluate the abdomen. We then performed a careful abdominal exploration as described above. There was a loop of proximal jejunum that was again edematous and dilated (similar to prior case) that was anterior to the anastomosis and the colon mesentery. This bowel was dilated and edematous and so we took great care to protect it as we carefully circumferentially freed the inflammatory adhesions to this loop, allowing us to better evaluate the anastomosis.There was loop of bowel that was just distal to this that appeared to track under the colon mesentery but it was able to be easily reduced. We then turned our attention to inspection of the anastomosis as I was not satisfied with this finding as an explanation of his clinic symptoms - the imaging was also re-reviewed in light of our findings. Beginning at the distal descending we carefully medialized the anastomosis and in doing so identified purulence just lateral to the distal anastomosis - there was no feculence noted. We then performed an endoscopic evaluation with the anastomosis under saline. We identified a small defect at the what appeared to be the common colotomy closure site (previously approximated in two layers). At this point, Dr. Carbone and I discussed the case and again reviewed the imaging. I felt that given ongoing inflammation in this area as well as concern for future development of an internal hernia (at the mesocolic defect) that repair and proximal diversion was less ideal and so I decided to perform a resection of this anastomosis. He was hemodynamically stable at this point, not on pressor support and he was making adequate urine and I felt that  it was safe to proceed with attempt at restoring intestinal continuity, although diversion would likely be required.     We began by making a window in the descending mesocolon just distal to the anastomosis. This was divided with ROSIO 100 mm blue load stapler. The mesocolon was then divided proximally with Ligasure. This allowed us to carefully mobilized the anastomosis and distal transverse colon and to better evaluate the proximal transverse colon as a conduit for a colorectal anastomosis. We had previously mobilized the transverse mesocolon back to the middle colic vasculature and were unable to achieve adequate length at the index operation. There was perhaps an additional 1 cm of peritoneum that could be incised, however, this again was not adequate to allow for a tension free colorectal anastomosis. We then mobilized the proximal transverse colon and hepatic flexure by continuing to separate the omentum and stomach from the transverse mesocolon. This was performed carefully to identify and protect the duodenum. Once this mobilization was complete, we again assessed and confirmed that we did not have adequate length. The middle colic pedicle was then divided with Ligasure with proximal double seal and distal division and the colon allowed to demarcate. We completed our mobilization of the right colon utilizing a top-down approach at first, however, due to adhesions of small bowel to right pelvic side wall we did transition to a bottom-up mobilization taking care to identify and protect the right ureter and small bowel mesentery. Once completely mobilized, we attempted to identify a suitable portion of ascending, well-perfused colon to perform a de-rotated colorectal anastomosis. Unfortunately, this aspect of bowel did not appear suitable due to edema and secondary inflammation and so the ileocolic pedicle was divided with twice proximal seal and distal division. At this point, we decided that an ileorectal  anastomosis would be necessary and so attention was turned to the distal ileum. A portion of distal ileal mesentery was cleared and an enterotomy was made. The bowel was sized and seemed only appropriate for 25 mm anvil - this was placed proximally through the antimesenteric aspect of the bowel. The distal ileum was divided with ROSIO 100 mm blue load stapler to include our enterotomy with the specimen. The remaining small bowel mesentery was divided with Ligasure leading to our prior mesocolic division. The specimen was sent for review.     At this point, attention was then turned to the remaining sigmoid colon. We had previously mobilized this down to the pelvic inlet, however, additional dissection was necessary to divide the upper rectum. There was quite significant inflammation in this area and we were having difficulty clearly identifying the left ureter and so Urology was asked to place a temporary left ureteral catheter. After placement we continued with a lateral to medial mobilization of the sigmoid colon and rectum taking care to protect the left ureter. This allowed us to enter the retrorectal plane posteriorly. We then connected this dissection by medially incising the sigmoid mesocolon just posterior to the superior hemorrhoidal artery. A mid-mesenteric division of the sigmoid mesentery was performed with Ligasure and the rectum posteriorly was cleared with Ligasure in preparation for rectal transection. This was performed with the green load Contour stapler. The remaining sigmoid colon was then sent for pathology review. Two 3-0 Vicryl stay sutures were placed bilaterally along the rectal staple line for retraction and to aid in performance of the anastomosis.    Prior to anastomotic construction, we performed a thorough assessment of our mesenteric edges for hemostasis and we ran the small bowel twice. There was some inflammatory rind on the small bowel but there were no identified serosal injuries or  enterotomies. The abdomen was irrigated thoroughly with warm saline. We then performed a side-to-end ileorectal anastomosis with the 25 mm EEA stapler. The anastomotic rings were intact and sent for review and a flexible sigmoidoscopy was performed with proximal occlusion. No leak was identified, the anastomosis was well-perfused and it was hemostatic.         After completion of our anastomosis, the retractor and wound protector was removed and he was taken out of lithotomy - the ureteral catheter was removed intact. The posterior and anterior sutures of the prior colostomy site were removed and segment of proximal ileum selected for the diverting loop ileostomy. A single 0-PDS suture was placed inferiorly to approximate the defect as it was much wider than our desired defect size. The small bowel was then brought through and ostomy alisha placed under the bowel through the mesentery. This was secured with 3-0 Vicryl suture. A 19 Fr Genaro drain was placed (through the same prior drain site) and secured with 2-0 Nylon. Seprafilm was then placed around the diverting loop ileostomy and underlying the midline fascia.     The fascia was then approximated with interrupted figure-of-eight 0-PDS suture. The subcutaneous tissue was thoroughly irrigated and skin approximated with interrupted 3-0 Vicryl suture. The ostomy was then matured in standard fashion with 3-0 Vicryl suture and an ostomy appliance and dry dressing were applied.     Prior to closure and after final closure instrument, needle, and sponge counts were correct.    The procedure was completed without complication and was well-tolerated. The patient was then brought to the post-anesthesia care unit in guarded condition. I was present for the entire operation and the findings were discussed with family at the end of the case.    This procedure was not performed to treat colon cancer through resection    Complications: None  Estimated blood loss: 100  mL  Disposition: PACU      Farhan Lance MD, FACS, FASCRS  Department of Colon & Rectal Surgery  [facility]

## 2024-05-21 NOTE — TRANSFER OF CARE
Anesthesia Transfer of Care Note    Patient: Osman Li Jr.    Procedure(s) Performed: Procedure(s) (LRB):  LAPAROTOMY, EXPLORATORY (N/A)  SIGMOIDOSCOPY, FLEXIBLE  INSERTION, CATHETER, URETER (Left)  COLECTOMY, COMPLETION, ABDOMINAL (N/A)  CREATION, ILEOSTOMY, DIVERTING LOOP (N/A)  ANASTOMOSIS, ILEORECTAL (N/A)    Patient location: PACU    Anesthesia Type: general    Transport from OR: Transported from OR on 6-10 L/min O2 by face mask with adequate spontaneous ventilation    Post pain: adequate analgesia    Post assessment: no apparent anesthetic complications and tolerated procedure well    Post vital signs: stable    Level of consciousness: awake    Nausea/Vomiting: no nausea/vomiting    Complications: none    Transfer of care protocol was followed      Last vitals: Visit Vitals  Temp 36.4 °C (97.5 °F)   Wt 99.8 kg (220 lb 0.3 oz)   BMI 28.24 kg/m²

## 2024-05-21 NOTE — ANESTHESIA PREPROCEDURE EVALUATION
"[facility]  Anesthesia Pre-Operative Evaluation         Patient Name/: Osman Li Jr., 1965  MRN: [id number]    SUBJECTIVE:     Pre-operative evaluation for Procedure(s) (LRB):  LAPAROTOMY, EXPLORATORY, possible bowel resection, possible open abdomen, flex sig (N/A)     2024    Osman Li Jr. is a 59 y.o. male w/ a significant PMHx of HTN, HLD, DM, and metastatic colon adenocarcinoma s/p colostomy reversal with reanastomosis  on  who presented due to fevers and vomiting with imaging findings concerned for bowel obstruction and free air.    Patient now presents for the above procedure(s).    NPO: 5pm yesterday, but vomited  NGT in place with bilious output  18g PIV L Posterior Hand    CT A/P:   "Closed loop bowel obstruction left upper quadrant involving the proximal jejunum with [id number] zones of transition best seen on series 5 images 37 through 47.  Suspect internal hernia with bowel loops extending into the splenectomy site of the left upper quadrant through the omentum for the left colon.  Twisting of mesenteric vessels/architectural distortion seen also suggesting internal hernia.  Obstructed small bowel loops in the closed loop obstruction demonstrate mucosal hyperenhancement and wall thickening with mild distension. Small volume pneumoperitoneum with free air seen in the peritoneal cavity as well as in the suspected internal hernia sac.  Recommend surgical consultation."    ________________________________________  No results found for this or any previous visit.    ________________________________________    Prev airway: 24. Easy mask. VL, loco 3, Grade I, 7.5 ETT, large/floppy epiglottis.    LDA: None documented.     Drips: None documented.      Patient Active Problem List   Diagnosis    HTN (hypertension)    Type 2 diabetes mellitus    HLD (hyperlipidemia)    Anemia    Hypophosphatemia    Malignant neoplasm of transverse colon    " "Intra-abdominal abscess    Sepsis    Hypomagnesemia    Metastatic adenocarcinoma to pancreas    Colostomy status       Review of patient's allergies indicates:   Allergen Reactions    Penicillins Rash       Current Inpatient Medications:       No current facility-administered medications on file prior to encounter.     Current Outpatient Medications on File Prior to Encounter   Medication Sig Dispense Refill    atorvastatin (LIPITOR) 20 MG tablet Take 1 tablet (20 mg total) by mouth once daily. (Patient taking differently: Take 20 mg by mouth every evening.) 90 tablet 3    enalapriL-hydrochlorothiazide (VASERETIC) 10-25 mg per tablet Take 1 tablet by mouth once daily. (Patient taking differently: Take 1 tablet by mouth every morning.) 90 tablet 3    insulin glargine U-300 conc (TOUJEO MAX U-300 SOLOSTAR) 300 unit/mL (3 mL) insulin pen Inject 26 Units into the skin once daily. 2 pen 11    metFORMIN (GLUCOPHAGE) 1000 MG tablet Take 1 tablet (1,000 mg total) by mouth 2 (two) times daily with meals. 180 tablet 3    morphine (MS CONTIN) 30 MG 12 hr tablet Take 1 tablet (30 mg total) by mouth 2 (two) times daily. (Patient taking differently: Take 30 mg by mouth 2 (two) times daily. PATIENT TAKING 15MG 5/9/2024) 60 tablet 0    oxyCODONE (ROXICODONE) 5 MG immediate release tablet Take 1 tablet (5 mg total) by mouth every 4 (four) hours as needed. 30 tablet 0    pen needle, diabetic 31 gauge x 3/16" Ndle 1 each by Misc.(Non-Drug; Combo Route) route once daily. 90 each 3    promethazine (PHENERGAN) 12.5 MG Tab Take 1 tablet (12.5 mg total) by mouth every 4 (four) hours as needed. (Patient taking differently: Take 12.5 mg by mouth every 4 (four) hours as needed (as needed).) 25 tablet 1    sildenafiL (VIAGRA) 100 MG tablet Take 1 tablet (100 mg total) by mouth daily as needed for Erectile Dysfunction. (Patient taking differently: Take 100 mg by mouth daily as needed for Erectile Dysfunction (as needed).) 30 tablet 3    " traZODone (DESYREL) 50 MG tablet Take 1 tablet (50 mg total) by mouth every evening. 90 tablet 3       Past Surgical History:   Procedure Laterality Date    ADENOIDECTOMY  1972    CHOLECYSTECTOMY  2011    CLOSURE, COLOSTOMY N/A 5/13/2024    Procedure: CLOSURE, COLOSTOMY (eras high/lithotomy);  Surgeon: Farhan Lance MD;  [facility];  Service: Colon and Rectal;  Laterality: N/A;  CONSENTS IN [facility]    COLON SURGERY      COLONOSCOPY      2018    COLONOSCOPY N/A 3/5/2024    Procedure: COLONOSCOPY;  Surgeon: Farhan Lance MD;  Location: [facility] (4TH FLR);  Service: Endoscopy;  Laterality: N/A;  2/27/24-Kethman pt, 1-4wks, port, PEG plus 2 enemas morning of procedure, instr portal-DS  2/28/24- LVM for precall - ERW  pt confirmed procedure. cf    COLOSTOMY N/A 04/25/2022    Procedure: CREATION, COLOSTOMY;  Surgeon: Carlton Waddell MD;  Location: [facility] OR;  Service: General;  Laterality: N/A;    ENDOSCOPIC ULTRASOUND OF UPPER GASTROINTESTINAL TRACT N/A 01/06/2023    Procedure: ULTRASOUND, UPPER GI TRACT, ENDOSCOPIC;  Surgeon: Rinku Orozco III, MD;  Location: [facility];  Service: Endoscopy;  Laterality: N/A;    FLEXIBLE SIGMOIDOSCOPY N/A 5/13/2024    Procedure: SIGMOIDOSCOPY, FLEXIBLE;  Surgeon: Farhan Lance MD;  [facility];  Service: Colon and Rectal;  Laterality: N/A;    INSERTION OF TUNNELED CENTRAL VENOUS CATHETER (CVC) WITH SUBCUTANEOUS PORT Right 05/18/2022    Procedure: [id number];  Surgeon: Carlton Waddell MD;  Location: [facility] OR;  Service: General;  Laterality: Right;    INSERTION OF URETERAL CATHETER N/A 5/13/2024    Procedure: INSERTION, CATHETER, URETER, BILATERAL;  Surgeon: Travis Edwards MD;  [facility];  Service: Urology;  Laterality: N/A;    LYSIS OF ADHESIONS N/A 5/13/2024    Procedure: LYSIS, ADHESIONS;  Surgeon: Farhan Lance MD;  Location: [facility] OR 2ND FLR;  Service: Colon and Rectal;  Laterality: N/A;     MOBILIZATION OF SPLENIC FLEXURE N/A 04/25/2022    Procedure: MOBILIZATION, SPLENIC FLEXURE;  Surgeon: Carlton Waddell MD;  Location: [facility] OR;  Service: General;  Laterality: N/A;    SPLENECTOMY N/A 04/25/2022    Procedure: SPLENECTOMY;  Surgeon: Carlton Waddell MD;  Location: [facility] OR;  Service: General;  Laterality: N/A;    SUBTOTAL COLECTOMY N/A 04/25/2022    Procedure: COLECTOMY, PARTIAL;  Surgeon: Carlton Waddell MD;  Location: [facility] OR;  Service: General;  Laterality: N/A;    TONSILLECTOMY      TUMOR REMOVAL  10/18/2023    on top of the nose    VASECTOMY  1994       Social History:  Tobacco Use: High Risk (5/13/2024)    Patient History     Smoking Tobacco Use: Every Day     Smokeless Tobacco Use: Never     Passive Exposure: Current       Alcohol Use: Not At Risk (5/14/2024)    AUDIT-C     Frequency of Alcohol Consumption: 2-3 times a week     Average Number of Drinks: Patient does not drink     Frequency of Binge Drinking: Never       OBJECTIVE:     Vital Signs Range:  BMI Readings from Last 1 Encounters:   05/21/24 28.23 kg/m²       Temp:  [37.1 °C (98.7 °F)-39.4 °C (103 °F)]   Pulse:  []   Resp:  [14-20]   BP: (137-164)/(66-75)   SpO2:  [94 %-97 %]        Significant Labs:        Component Value Date/Time    WBC 22.80 (H) 05/21/2024 0156    HGB 13.4 (L) 05/21/2024 0156    HCT 39.0 (L) 05/21/2024 0156     (H) 05/21/2024 0156     05/21/2024 0156    K 3.2 (L) 05/21/2024 0156    CL 99 05/21/2024 0156    CO2 26 05/21/2024 0156     (H) 05/21/2024 0156    BUN 12 05/21/2024 0156    CREATININE 1.1 05/21/2024 0156    MG 1.9 05/18/2024 0408    PHOS 4.2 05/18/2024 0408    CALCIUM 8.9 05/21/2024 0156    ALBUMIN 3.4 (L) 05/21/2024 0156    PROT 6.2 05/21/2024 0156    ALKPHOS 113 05/21/2024 0156    BILITOT 0.8 05/21/2024 0156    AST 20 05/21/2024 0156    ALT 27 05/21/2024 0156    INR 1.0 06/21/2022 0451    HGBA1C 7.3 (H) 02/22/2024 0847        Please see Results Review for additional labs.      Diagnostic Studies: No relevant studies.    EKG:   Results for orders placed or performed during the hospital encounter of 05/21/24   EKG 12-lead    Collection Time: 05/21/24  1:48 AM   Result Value Ref Range    QRS Duration 140 ms    [facility] QTC Calculation 505 ms    Narrative    Test Reason : R10.32,    Vent. Rate : 127 BPM     Atrial Rate : 127 BPM     P-R Int : 116 ms          QRS Dur : 140 ms      QT Int : 348 ms       P-R-T Axes : 036 018 006 degrees     QTc Int : 505 ms    Sinus tachycardia with frequent Premature ventricular complexes  Possible Left atrial enlargement  Right bundle branch block  T wave abnormality, consider inferior ischemia  Abnormal ECG  When compared with ECG of 04-JAN-2023 14:37,  Premature ventricular complexes are now Present  Vent. rate has increased BY  49 BPM  Right bundle branch block is now Present  Criteria for Inferior-posterior infarct are no longer Present    Referred By: [email or web address]   SELF           Confirmed By:        ECHO:  See subjective, if available.      ASSESSMENT/PLAN:                                                                                                                  05/21/2024  Osman Li Jr. is a 59 y.o., male.      Pre-op Assessment    I have reviewed the Patient Summary Reports.     I have reviewed the Nursing Notes. I have reviewed the NPO Status.   I have reviewed the Medications.     Review of Systems  Anesthesia Hx:  No problems with previous Anesthesia   History of prior surgery of interest to airway management or planning:          Denies Family Hx of Anesthesia complications.    Denies Personal Hx of Anesthesia complications.                    Hematology/Oncology:                      Current/Recent Cancer.            Oncology Comments: Colon adenocarcinoma     Cardiovascular:     Hypertension       Denies Angina.     hyperlipidemia   ECG has been reviewed.                          Pulmonary:       Denies Shortness of breath.   Denies Recent URI.                 Renal/:  Renal/ Normal                 Hepatic/GI:      Denies GERD. Denies Liver Disease.  Bowel obstruction          Neurological:    Denies CVA.    Denies Seizures.                                Endocrine:  Diabetes, type 2, using insulin               Physical Exam  General: Alert, Cooperative and Oriented  NGT in place  Airway:  Mallampati: I   Mouth Opening: Normal  Neck ROM: Normal ROM    Dental:  Intact        Anesthesia Plan  Type of Anesthesia, risks & benefits discussed:    Anesthesia Type: Gen ETT  Intra-op Monitoring Plan: Standard ASA Monitors  Post Op Pain Control Plan: multimodal analgesia and IV/PO Opioids PRN  Induction:  IV and rapid sequence  Airway Plan: Video, Post-Induction  Informed Consent: Informed consent signed with the Patient and all parties understand the risks and agree with anesthesia plan.  All questions answered.   ASA Score: 3 Emergent  Day of Surgery Review of History & Physical: H&P Update referred to the surgeon/provider.    Ready For Surgery From Anesthesia Perspective.     .

## 2024-05-21 NOTE — SUBJECTIVE & OBJECTIVE
"PTA Medications   Medication Sig    atorvastatin (LIPITOR) 20 MG tablet Take 1 tablet (20 mg total) by mouth once daily. (Patient taking differently: Take 20 mg by mouth every evening.)    enalapriL-hydrochlorothiazide (VASERETIC) 10-25 mg per tablet Take 1 tablet by mouth once daily. (Patient taking differently: Take 1 tablet by mouth every morning.)    insulin glargine U-300 conc (TOUJEO MAX U-300 SOLOSTAR) 300 unit/mL (3 mL) insulin pen Inject 26 Units into the skin once daily.    metFORMIN (GLUCOPHAGE) 1000 MG tablet Take 1 tablet (1,000 mg total) by mouth 2 (two) times daily with meals.    morphine (MS CONTIN) 30 MG 12 hr tablet Take 1 tablet (30 mg total) by mouth 2 (two) times daily. (Patient taking differently: Take 30 mg by mouth 2 (two) times daily. PATIENT TAKING 15MG 5/9/2024)    oxyCODONE (ROXICODONE) 5 MG immediate release tablet Take 1 tablet (5 mg total) by mouth every 4 (four) hours as needed.    pen needle, diabetic 31 gauge x 3/16" Ndle 1 each by Misc.(Non-Drug; Combo Route) route once daily.    promethazine (PHENERGAN) 12.5 MG Tab Take 1 tablet (12.5 mg total) by mouth every 4 (four) hours as needed. (Patient taking differently: Take 12.5 mg by mouth every 4 (four) hours as needed (as needed).)    sildenafiL (VIAGRA) 100 MG tablet Take 1 tablet (100 mg total) by mouth daily as needed for Erectile Dysfunction. (Patient taking differently: Take 100 mg by mouth daily as needed for Erectile Dysfunction (as needed).)    traZODone (DESYREL) 50 MG tablet Take 1 tablet (50 mg total) by mouth every evening.       Review of patient's allergies indicates:   Allergen Reactions    Penicillins Rash       Past Medical History:   Diagnosis Date    Colon cancer     Digestive disorder     DM (diabetes mellitus)     Hyperlipidemia     Hypertension     Prediabetes      Past Surgical History:   Procedure Laterality Date    ADENOIDECTOMY  1972    CHOLECYSTECTOMY  2011    CLOSURE, COLOSTOMY N/A 5/13/2024    Procedure: " CLOSURE, COLOSTOMY (eras high/lithotomy);  Surgeon: Farhan Lance MD;  Location: [facility] OR 2ND FLR;  Service: Colon and Rectal;  Laterality: N/A;  CONSENTS IN [facility]    COLON SURGERY      COLONOSCOPY      2018    COLONOSCOPY N/A 3/5/2024    Procedure: COLONOSCOPY;  Surgeon: Farhan Lance MD;  Location: [facility] ENDO (4TH FLR);  Service: Endoscopy;  Laterality: N/A;  2/27/24-K[facility]man pt, 1-4wks, port, PEG plus 2 enemas morning of procedure, instr portal-DS  2/28/24- LVM for precall - ERW  pt confirmed procedure. cf    COLOSTOMY N/A 04/25/2022    Procedure: CREATION, COLOSTOMY;  Surgeon: Carlton Waddell MD;  Location: [facility] OR;  Service: General;  Laterality: N/A;    ENDOSCOPIC ULTRASOUND OF UPPER GASTROINTESTINAL TRACT N/A 01/06/2023    Procedure: ULTRASOUND, UPPER GI TRACT, ENDOSCOPIC;  Surgeon: Rinku Orozco III, MD;  Location: [facility] ENDO;  Service: Endoscopy;  Laterality: N/A;    FLEXIBLE SIGMOIDOSCOPY N/A 5/13/2024    Procedure: SIGMOIDOSCOPY, FLEXIBLE;  Surgeon: Farhan Lance MD;  Location: [facility] OR 2ND FLR;  Service: Colon and Rectal;  Laterality: N/A;    INSERTION OF TUNNELED CENTRAL VENOUS CATHETER (CVC) WITH SUBCUTANEOUS PORT Right 05/18/2022    Procedure: [id number];  Surgeon: Carlton Waddell MD;  Location: [facility] OR;  Service: General;  Laterality: Right;    INSERTION OF URETERAL CATHETER N/A 5/13/2024    Procedure: INSERTION, CATHETER, URETER, BILATERAL;  Surgeon: Travis Edwards MD;  Location: [facility] OR 2ND FLR;  Service: Urology;  Laterality: N/A;    LYSIS OF ADHESIONS N/A 5/13/2024    Procedure: LYSIS, ADHESIONS;  Surgeon: Farhan Lance MD;  Location: [facility] OR 2ND FLR;  Service: Colon and Rectal;  Laterality: N/A;    MOBILIZATION OF SPLENIC FLEXURE N/A 04/25/2022    Procedure: MOBILIZATION, SPLENIC FLEXURE;  Surgeon: Carlton Waddell MD;  Location: [facility] OR;  Service: General;  Laterality: N/A;    SPLENECTOMY N/A 04/25/2022    Procedure: SPLENECTOMY;  Surgeon: Carlton KAUR  MD Bairon;  Location: [facility] OR;  Service: General;  Laterality: N/A;    SUBTOTAL COLECTOMY N/A 04/25/2022    Procedure: COLECTOMY, PARTIAL;  Surgeon: Carlton Waddell MD;  Location: [facility] OR;  Service: General;  Laterality: N/A;    TONSILLECTOMY      TUMOR REMOVAL  10/18/2023    on top of the nose    VASECTOMY  1994     Family History       Problem Relation (Age of Onset)    Diabetes Father    Heart disease Father    Rectal cancer Other          Tobacco Use    Smoking status: Every Day     Current packs/day: 1.00     Average packs/day: 1 pack/day for 40.0 years (40.0 ttl pk-yrs)     Types: Cigarettes     Passive exposure: Current    Smokeless tobacco: Never   Substance and Sexual Activity    Alcohol use: Not Currently    Drug use: Never    Sexual activity: Yes     Partners: Female     Review of Systems   Constitutional:  Positive for fever. Negative for chills, diaphoresis and unexpected weight change.   HENT:  Negative for sinus pressure and sore throat.    Eyes:  Negative for photophobia and visual disturbance.   Respiratory:  Positive for cough. Negative for shortness of breath.    Cardiovascular:  Negative for chest pain, palpitations and leg swelling.   Gastrointestinal:  Positive for abdominal pain, nausea and vomiting. Negative for abdominal distention, blood in stool and diarrhea.   Musculoskeletal:  Negative for arthralgias and myalgias.   Skin:  Positive for wound. Negative for rash.   Neurological:  Negative for syncope and headaches.   Psychiatric/Behavioral:  Negative for confusion and hallucinations.      Objective:     Vital Signs (Most Recent):    Vital Signs (24h Range):  Temp:  [98.7 °F (37.1 °C)-103 °F (39.4 °C)] 98.7 °F (37.1 °C)  Pulse:  [] 104  Resp:  [14-20] 18  SpO2:  [94 %-97 %] 96 %  BP: (137-164)/(66-75) 137/71        There is no height or weight on file to calculate BMI.     Physical Exam  Vitals and nursing note reviewed.   Constitutional:       General: He is not in acute  distress.     Appearance: He is well-developed. He is obese. He is not ill-appearing, toxic-appearing or diaphoretic.   HENT:      Nose:      Comments: NGT in place with thin bilious output     Mouth/Throat:      Pharynx: Oropharynx is clear. No oropharyngeal exudate.   Eyes:      General: No scleral icterus.     Extraocular Movements: Extraocular movements intact.   Cardiovascular:      Rate and Rhythm: Normal rate and regular rhythm.   Pulmonary:      Effort: Pulmonary effort is normal. No respiratory distress.   Abdominal:      General: There is no distension.      Palpations: Abdomen is soft.      Tenderness: There is abdominal tenderness (Mostly in the LLQ).   Musculoskeletal:         General: No deformity. Normal range of motion.   Skin:     General: Skin is warm and dry.      Coloration: Skin is not jaundiced.   Neurological:      General: No focal deficit present.      Mental Status: He is alert.      Cranial Nerves: No cranial nerve deficit.   Psychiatric:         Mood and Affect: Mood normal.         Behavior: Behavior normal.            Significant Labs:  CBC:   Recent Labs   Lab 05/21/24  0156   WBC 22.80*   RBC 4.47*   HGB 13.4*   HCT 39.0*   *   MCV 87   MCH 30.0   MCHC 34.4     CMP:   Recent Labs   Lab 05/21/24  0156   *   CALCIUM 8.9   ALBUMIN 3.4*   PROT 6.2      K 3.2*   CO2 26   CL 99   BUN 12   CREATININE 1.1   ALKPHOS 113   ALT 27   AST 20   BILITOT 0.8     CRP:   Recent Labs   Lab 05/18/24  0408   CRP 45.4*     All pertinent lab results within the last 24 hours have been reviewed.     Significant Diagnostics:  I have reviewed all pertinent imaging results/findings within the past 24 hours.     CT A/P 5/21/24  Impression:     Closed loop bowel obstruction left upper quadrant involving the proximal jejunum with [id number] zones of transition best seen on series 5 images 37 through 47.  Suspect internal hernia with bowel loops extending into the splenectomy site of the left  upper quadrant through the omentum for the left colon.  Twisting of mesenteric vessels/architectural distortion seen also suggesting internal hernia.  Obstructed small bowel loops in the closed loop obstruction demonstrate mucosal hyperenhancement and wall thickening with mild distension. Small volume pneumoperitoneum with free air seen in the peritoneal cavity as well as in the suspected internal hernia sac.  Recommend surgical consultation.     Postop change prior partial colon resection left upper quadrant with apparent colocolic anastomosis.     Prior cholecystectomy.  Mild intra and extrahepatic bile duct dilatation.     Hypoenhancement of the pancreatic tail with adjacent fat stranding calcification, similar to prior.

## 2024-05-21 NOTE — PROGRESS NOTES
Radiology technician at bedside to perform post op xrays. Post op labs drawn and sent to lab per md orders.

## 2024-05-21 NOTE — BRIEF OP NOTE
Isai Sanchez - [facility]  Brief Operative Note    SUMMARY     Surgery Date: 5/21/2024     Surgeons and Role:  Panel 1:     * Farhan Lance MD - Primary     * Belem Vernon MD - Resident - Assisting     * Summer Calixto MD - Resident - Assisting     * Vitaly Villa MD - Fellow  Panel 2:     * Carlton Santos MD - Primary     * Wilfrid Landaverde MD - Resident - Assisting        Pre-op Diagnosis:  SBO (small bowel obstruction) [K56.609]    Post-op Diagnosis:  Post-Op Diagnosis Codes:     * SBO (small bowel obstruction) [K56.609]    Procedure(s) (LRB):  LAPAROTOMY, EXPLORATORY (N/A)  SIGMOIDOSCOPY, FLEXIBLE  INSERTION, CATHETER, URETER (Left)  COLECTOMY, COMPLETION, ABDOMINAL (N/A)  CREATION, ILEOSTOMY, DIVERTING LOOP (N/A)  ANASTOMOSIS, ILEORECTAL (N/A)    Anesthesia: General    Implants:  * No implants in log *    Operative Findings: See op note for full details. Dilated loop of small bowel in splenic bed - this is similar to prior dissection and was anterior to anastomosis and proximal transverse colon, there was a distal loop of small bowel under the mesentery but this was easily reduced, purulence noted along lateral aspect of anastomosis, 1 mm anastomotic leak identified by leak test, unable to perform distal transverse to rectal anastomosis due to length (this is similar to prior findings at index operation), derotation attempted, however, ascending colon was inflamed and felt not suitable to adequate, healthy anastomosis, completion total colectomy performed with ileorectal anastomosis (intact rings, negative air leak test, intact and hemostatic on flex sig), diverting loop ileostomy in previous stoma site, 19Fr bernardo drain in LUQ, left paracolic gutter, and pelvis    Estimated Blood Loss: 100 mL    Estimated Blood Loss has been documented.         Specimens:   Specimen (24h ago, onward)       Start     Ordered    05/21/24 1320  Specimen to Pathology, Surgery Other (CRS)  Once        Comments: Pre-op  Diagnosis: SBO (small bowel obstruction) [K56.609]Procedure(s):LAPAROTOMY, EXPLORATORYSIGMOIDOSCOPY, FLEXIBLEINSERTION, CATHETER, URETERCOLECTOMY, COMPLETION, ABDOMINALCREATION, ILEOSTOMY, DIVERTING LOOPANASTOMOSIS, ILEORECTAL Number of specimens: 3Name of specimens: 1. total abdominal colon - perm 2. sigmoid colon - perm 3. anastomotic donuts - perm     References:    Click here for ordering Quick Tip   Question Answer Comment   Procedure Type: Other CRS   Release to patient Immediate        05/21/24 1319                    [id number]

## 2024-05-21 NOTE — ANESTHESIA PROCEDURE NOTES
Intubation    Date/Time: 5/21/2024 8:52 AM    Performed by: Kasey Gann CRNA  Authorized by: Pete Thomas MD    Intubation:     Induction:  Intravenous    Intubated:  Postinduction    Mask Ventilation:  Easy mask    Attempts:  1    Attempted By:  CRNA    Method of Intubation:  Video laryngoscopy    Blade:  Rodriguez 4    Laryngeal View Grade: Grade I - full view of cords      Difficult Airway Encountered?: No      Complications:  None    Airway Device:  Oral endotracheal tube    Airway Device Size:  7.5    Style/Cuff Inflation:  Cuffed (inflated to minimal occlusive pressure)    Inflation Amount (mL):  7    Tube secured:  22    Secured at:  The lips    Placement Verified By:  Capnometry    Complicating Factors:  None    Findings Post-Intubation:  BS equal bilateral and atraumatic/condition of teeth unchanged

## 2024-05-21 NOTE — OP NOTE
Isai Sanchez - [facility]  Operative Note    Date: 05/21/2024    Pre-Op Diagnosis:   Patient Active Problem List    Diagnosis Date Noted    Colostomy status 01/25/2023    Metastatic adenocarcinoma to pancreas 01/13/2023    Hypomagnesemia 10/31/2022    Intra-abdominal abscess 06/20/2022    Sepsis 06/20/2022    Malignant neoplasm of transverse colon 05/05/2022    Hypophosphatemia 04/28/2022    HTN (hypertension) 04/24/2022    Type 2 diabetes mellitus 04/24/2022    HLD (hyperlipidemia) 04/24/2022    Anemia 04/24/2022        Post-Op Diagnosis: Same     Procedure(s) Performed:   1.  Cystoscopy with left ureteral catheter placement    Specimen(s): None    Staff Surgeon: Carlton Santos MD    Assistant Surgeon: Wilfrid Landaverde MD     Anesthesia: General endotracheal anesthesia    Indications: Osman Li Jr. is a 59 y.o. male that Dr. Lance has requested intra-operative ureteral catheters to allow for early intra-operative identification and repair of any injuries.  Appears to have developed an internal hernia underneath his transverse and descending mesocolon with bowel wall edema and hyperenhancement - appears to have closed loop obstruction. Class A surgery by CRS    Findings:  Left ureteral catheters placed without complication  16 Fr Blas catheter     Estimated Blood Loss: min    Drains:   1.  left 5 Fr ureteral catheters  2.  16 Fr blas catheter, 10 cc in the balloon    Procedure in Detail: Upon entering the room the patient was under general anesthesia.  The patient was then placed in the dorsal lithotomy position and prepped and draped in the usual sterile fashion. Preoperative antibiotics were administered per the primary surgeon preference.  Timeout was performed.      A 17 Fr cystoscope was inserted into the urethra and formal cystourethroscopy was performed. The urethra was normal.  The right and left ureteral orifices were in the normal anatomic position.  There were no mucosal  abnormalities.  A 5 Fr ureteral catheter was then inserted into the left ureteral orifice and advanced up to the level of the renal pelvis. The cystoscope was then removed leaving the ureteral catheter in place.     A 16 Fr blas catheter was inserted and the balloon was filled with 10mL of sterile water. The ureteral catheters were not secured in the usual fashion per request of Dr. Lance. There were no complications with the procedure and the patient tolerated our procedure well.     The case was then turned over to the primary surgeon.     Wilfrid Landaverde MD

## 2024-05-21 NOTE — CARE UPDATE
Colorectal Surgery    Called Mrs. Li to let her know of our plans to take Mr. Li to the operating room. I informed her of the possible outcomes including the need to spend the night in the ICU should we need to do a second look. She understands and is in agreement. She is en route from [place].     Phone #[phone]    Belem Vernon MD  General Surgery, PGY-5  [phone]

## 2024-05-21 NOTE — NURSING TRANSFER
Nursing Transfer Note      5/21/2024   5:09 PM    Nurse giving handoff:Reina  Nurse receiving handoff:Carlton    Reason patient is being transferred: post op    Transfer To: 1004    Transfer via bed    Transfer with cardiac monitoring    Transported by PCT    Transfer Vital Signs: see flowsheet    Tele: none  Order for Tele Monitor? No    Additional Lines: Macias Catheter    4eyes on Skin: yes    Medicines sent: insulin pen    Any special needs or follow-up needed: none    Patient belongings transferred with patient: Yes underwear and wedding band    Chart send with patient: Yes    Notified: spouse    Patient reassessed at: 5/21/2024 1720  1  Upon arrival to floor: patient oriented to room, call bell in reach, and bed in lowest position

## 2024-05-21 NOTE — ED PROVIDER NOTES
"Chief complaint:  Fever (Started at 9pm, hx of colon cancer and had colostomy reversed last week. Ems reports 93% RA), Vomiting, generalized weakness, and Abdominal Pain (LLQ)      HPI:  Osman Li Jr. is a 59 y.o. male with hx htn, DM, metastatic colon CA s/p colostomy takedown and colocolonic anastamosis with b/l ureteral ureteral and Macias cath POD 7  presenting with 3 days of worsening left lower quadrant pain in the setting of fever to 103 new this evening and nonbilious, nonbloody vomiting also beginning this evening.  No diarrhea.  No drainage or change in wounds.  No radiation or migration of left-sided abdominal pain present since surgery but worsening.  There is no flank pain.  No urinary complaints such as frequency, urgency, dysuria, hematuria.  No respiratory symptoms such as cough or congestion.    ROS: As per HPI and below:  No chest pain, dyspnea, neck pain, headache, rash, hematemesis, flank pain.    Review of patient's allergies indicates:   Allergen Reactions    Penicillins Rash       Discharge Medication List as of 5/21/2024  6:20 AM        CONTINUE these medications which have NOT CHANGED    Details   atorvastatin (LIPITOR) 20 MG tablet Take 1 tablet (20 mg total) by mouth once daily., Starting Tue 10/24/2023, Normal      enalapriL-hydrochlorothiazide (VASERETIC) 10-25 mg per tablet Take 1 tablet by mouth once daily., Starting Tue 10/24/2023, Normal      insulin glargine U-300 conc (TOUJEO MAX U-300 SOLOSTAR) 300 unit/mL (3 mL) insulin pen Inject 26 Units into the skin once daily., Starting Wed 6/21/2023, Normal      metFORMIN (GLUCOPHAGE) 1000 MG tablet Take 1 tablet (1,000 mg total) by mouth 2 (two) times daily with meals., Starting Tue 10/24/2023, Normal      morphine (MS CONTIN) 30 MG 12 hr tablet Take 1 tablet (30 mg total) by mouth 2 (two) times daily., Starting Wed 3/27/2024, Normal      pen needle, diabetic 31 gauge x 3/16" Ndle 1 each by Misc.(Non-Drug; Combo Route) route " once daily., Starting Thu 9/14/2023, Normal      promethazine (PHENERGAN) 12.5 MG Tab Take 1 tablet (12.5 mg total) by mouth every 4 (four) hours as needed., Starting Fri 8/4/2023, Normal      sildenafiL (VIAGRA) 100 MG tablet Take 1 tablet (100 mg total) by mouth daily as needed for Erectile Dysfunction., Starting Tue 10/24/2023, Normal      traZODone (DESYREL) 50 MG tablet Take 1 tablet (50 mg total) by mouth every evening., Starting Thu 6/1/2023, Until Fri 5/31/2024, Normal      oxyCODONE (ROXICODONE) 5 MG immediate release tablet Take 1 tablet (5 mg total) by mouth every 4 (four) hours as needed., Starting Sat 5/18/2024, Normal             PMH:  As per HPI and below:  Past Medical History:   Diagnosis Date    Colon cancer     Digestive disorder     DM (diabetes mellitus)     Hyperlipidemia     Hypertension     Prediabetes      Past Surgical History:   Procedure Laterality Date    ADENOIDECTOMY  1972    CHOLECYSTECTOMY  2011    CLOSURE, COLOSTOMY N/A 5/13/2024    Procedure: CLOSURE, COLOSTOMY (eras high/lithotomy);  Surgeon: Farhan Lance MD;  Location: [facility] 2ND FLR;  Service: Colon and Rectal;  Laterality: N/A;  CONSENTS IN [facility]    COLON SURGERY      COLONOSCOPY      2018    COLONOSCOPY N/A 3/5/2024    Procedure: COLONOSCOPY;  Surgeon: Farhan Lance MD;  Location: [facility] (4TH FLR);  Service: Endoscopy;  Laterality: N/A;  2/27/24-Tyehman pt, 1-4wks, port, PEG plus 2 enemas morning of procedure, instr portal-DS  2/28/24- LVM for precall - ERW  pt confirmed procedure. cf    COLOSTOMY N/A 04/25/2022    Procedure: CREATION, COLOSTOMY;  Surgeon: Carlton Waddell MD;  Location: [facility];  Service: General;  Laterality: N/A;    ENDOSCOPIC ULTRASOUND OF UPPER GASTROINTESTINAL TRACT N/A 01/06/2023    Procedure: ULTRASOUND, UPPER GI TRACT, ENDOSCOPIC;  Surgeon: Rinku Orozco III, MD;  Location: [facility];  Service: Endoscopy;  Laterality: N/A;    FLEXIBLE SIGMOIDOSCOPY N/A 5/13/2024    Procedure:  SIGMOIDOSCOPY, FLEXIBLE;  Surgeon: Farhan Lance MD;  Location: [facility] 2ND FLR;  Service: Colon and Rectal;  Laterality: N/A;    INSERTION OF TUNNELED CENTRAL VENOUS CATHETER (CVC) WITH SUBCUTANEOUS PORT Right 05/18/2022    Procedure: [id number];  Surgeon: Carlton Waddell MD;  Location: [facility];  Service: General;  Laterality: Right;    INSERTION OF URETERAL CATHETER N/A 5/13/2024    Procedure: INSERTION, CATHETER, URETER, BILATERAL;  Surgeon: Travis Edwards MD;  Location: [facility] 2ND FLR;  Service: Urology;  Laterality: N/A;    LYSIS OF ADHESIONS N/A 5/13/2024    Procedure: LYSIS, ADHESIONS;  Surgeon: Farhan Lance MD;  Location: [facility] 2ND FLR;  Service: Colon and Rectal;  Laterality: N/A;    MOBILIZATION OF SPLENIC FLEXURE N/A 04/25/2022    Procedure: MOBILIZATION, SPLENIC FLEXURE;  Surgeon: Carlton Waddell MD;  Location: [facility];  Service: General;  Laterality: N/A;    SPLENECTOMY N/A 04/25/2022    Procedure: SPLENECTOMY;  Surgeon: Carlton Waddell MD;  Location: [facility];  Service: General;  Laterality: N/A;    SUBTOTAL COLECTOMY N/A 04/25/2022    Procedure: COLECTOMY, PARTIAL;  Surgeon: Carlton Waddell MD;  Location: [facility] OR;  Service: General;  Laterality: N/A;    TONSILLECTOMY      TUMOR REMOVAL  10/18/2023    on top of the nose    VASECTOMY  1994       Social History     Socioeconomic History    Marital status:    Tobacco Use    Smoking status: Every Day     Current packs/day: 1.00     Average packs/day: 1 pack/day for 40.0 years (40.0 ttl pk-yrs)     Types: Cigarettes     Passive exposure: Current    Smokeless tobacco: Never   Substance and Sexual Activity    Alcohol use: Not Currently    Drug use: Never    Sexual activity: Yes     Partners: Female     Social Determinants of Health     Financial Resource Strain: Low Risk  (5/14/2024)    Overall Financial Resource Strain (CARDIA)     Difficulty of Paying Living Expenses: Not hard at all   Food Insecurity: No Food  Insecurity (5/14/2024)    Hunger Vital Sign     Worried About Running Out of Food in the Last Year: Never true     Ran Out of Food in the Last Year: Never true   Transportation Needs: No Transportation Needs (5/14/2024)    TRANSPORTATION NEEDS     Transportation : No   Physical Activity: Sufficiently Active (5/14/2024)    Exercise Vital Sign     Days of Exercise per Week: 5 days     Minutes of Exercise per Session: 30 min   Stress: No Stress Concern Present (5/14/2024)    [employer] - Occupational Stress Questionnaire     Feeling of Stress : Only a little   Housing Stability: Low Risk  (5/14/2024)    Housing Stability Vital Sign     Unable to Pay for Housing in the Last Year: No     Homeless in the Last Year: No       Family History   Problem Relation Name Age of Onset    Heart disease Father Loki Loya     Diabetes Father Loki Loya     Rectal cancer Other Padmini         half-sister       Physical Exam:    Vitals:    05/21/24 0555   BP: 137/71   Pulse: 104   Resp: 18   Temp:      GENERAL:  No apparent distress.  Alert.    HEENT:  Moist mucous membranes.  Normocephalic and atraumatic.    NECK:  No swelling.  Midline trachea.   CARDIOVASCULAR:  Tachycardic with regular rhythm.  2+ radial pulses.  No murmur auscultated    PULMONARY:  Lungs clear to auscultation bilaterally.  No wheezes, rales, or rhonci.  Unlabored respirations.  ABDOMEN:  Left upper and left lower quadrant abdominal tenderness with voluntary guarding present.  No involuntary guarding or distention.  Well-healing midline incision with no erythema, drainage, dehiscence.  EXTREMITIES:  Warm and well perfused.  Brisk capillary refill.  No peripheral edema.  NEUROLOGICAL:  Normal mental status.  Appropriate and conversant.    SKIN:  No rashes or ecchymoses.    BACK:  Atraumatic.  No CVA tenderness to palpation.      Labs Reviewed   CBC W/ AUTO DIFFERENTIAL - Abnormal; Notable for the following components:       Result Value    WBC  "22.80 (*)     RBC 4.47 (*)     Hemoglobin 13.4 (*)     Hematocrit 39.0 (*)     Platelets 459 (*)     Immature Granulocytes 1.0 (*)     Gran # (ANC) 20.7 (*)     Immature Grans (Abs) 0.22 (*)     Gran % 90.9 (*)     Lymph % 4.6 (*)     Mono % 3.3 (*)     Platelet Estimate Increased (*)     All other components within normal limits   COMPREHENSIVE METABOLIC PANEL - Abnormal; Notable for the following components:    Potassium 3.2 (*)     Glucose 163 (*)     Albumin 3.4 (*)     All other components within normal limits   URINALYSIS, REFLEX TO URINE CULTURE - Abnormal; Notable for the following components:    Protein, UA Trace (*)     Glucose, UA 2+ (*)     Ketones, UA Trace (*)     All other components within normal limits    Narrative:     Specimen Source->Urine   LIPASE - Abnormal; Notable for the following components:    Lipase 3 (*)     All other components within normal limits   CULTURE, BLOOD    Narrative:     Aerobic and anaerobic   CULTURE, BLOOD    Narrative:     Aerobic and anaerobic   LACTIC ACID, PLASMA       Discharge Medication List as of 5/21/2024  6:20 AM        CONTINUE these medications which have NOT CHANGED    Details   atorvastatin (LIPITOR) 20 MG tablet Take 1 tablet (20 mg total) by mouth once daily., Starting Tue 10/24/2023, Normal      enalapriL-hydrochlorothiazide (VASERETIC) 10-25 mg per tablet Take 1 tablet by mouth once daily., Starting Tue 10/24/2023, Normal      insulin glargine U-300 conc (TOUJEO MAX U-300 SOLOSTAR) 300 unit/mL (3 mL) insulin pen Inject 26 Units into the skin once daily., Starting Wed 6/21/2023, Normal      metFORMIN (GLUCOPHAGE) 1000 MG tablet Take 1 tablet (1,000 mg total) by mouth 2 (two) times daily with meals., Starting Tue 10/24/2023, Normal      morphine (MS CONTIN) 30 MG 12 hr tablet Take 1 tablet (30 mg total) by mouth 2 (two) times daily., Starting Wed 3/27/2024, Normal      pen needle, diabetic 31 gauge x 3/16" Ndle 1 each by Misc.(Non-Drug; Combo Route) route " once daily., Starting Thu 9/14/2023, Normal      promethazine (PHENERGAN) 12.5 MG Tab Take 1 tablet (12.5 mg total) by mouth every 4 (four) hours as needed., Starting Fri 8/4/2023, Normal      sildenafiL (VIAGRA) 100 MG tablet Take 1 tablet (100 mg total) by mouth daily as needed for Erectile Dysfunction., Starting Tue 10/24/2023, Normal      traZODone (DESYREL) 50 MG tablet Take 1 tablet (50 mg total) by mouth every evening., Starting Thu 6/1/2023, Until Fri 5/31/2024, Normal      oxyCODONE (ROXICODONE) 5 MG immediate release tablet Take 1 tablet (5 mg total) by mouth every 4 (four) hours as needed., Starting Sat 5/18/2024, Normal             Orders Placed This Encounter   Procedures    X-Ray Chest AP Portable    CT Abdomen Pelvis With IV Contrast NO Oral Contrast    X-Ray Chest AP Portable    CBC auto differential    Comprehensive metabolic panel    Lactic acid, plasma #1    Urinalysis, Reflex to Urine Culture Urine, Clean Catch    Lipase    NG tube - insert    EKG 12-lead    Saline lock IV    PFC Facilitated Request       Imaging Results              X-Ray Chest AP Portable (Edited Result - FINAL)  Result time 05/21/24 21:04:03      Addendum (preliminary) 1 of 1 by Mario Olea MD (05/21/24 21:04:03)      Request for over read.    Technique: Single frontal view of the chest.    Comparison: 05/21/2024.    History: NG tube evaluation.    Findings:    The NG tube tip is within the proximal aspect of the stomach with the tip in the region of the fundus.  There is a partially visualized right-sided chest port.    The distribution of the bowel gas pattern is unchanged.  There is no evidence of pneumatosis.  No portal venous air is identified.  There is no gross pneumoperitoneum.    Impression:    NG tube tip in the left upper abdominal quadrant within the stomach.    Interpretation concordant with preliminary interpretation.      Electronically signed by: Mario Olea MD  Date:    05/21/2024  Time:    21:04                  Final result by Shyam Hickman MD (05/21/24 06:24:27)                   Impression:      Nasogastric tube LEFT upper quadrant region of stomach.      Electronically signed by: Shyam Hickman MD  Date:    05/21/2024  Time:    06:24               Narrative:    EXAMINATION:  XR CHEST AP PORTABLE    CLINICAL HISTORY:  Unspecified intestinal obstruction, unspecified as to partial versus complete obstruction    TECHNIQUE:  Single frontal view of the chest was performed.    COMPARISON:  05/21/2024    FINDINGS:  Limited examination chest for purposes of NG tube placement demonstrates NG tube in the LEFT upper quadrant region of the stomach.  RIGHT-sided central venous catheter with tip at level of superior vena cava.  Lung bases are clear.  Visualized portion of the abdomen demonstrates nonspecific bowel gas pattern.                                        CT Abdomen Pelvis With IV Contrast NO Oral Contrast (Final result)  Result time 05/21/24 04:32:27      Final result by Manuel Haskins MD (05/21/24 04:32:27)                   Impression:      Closed loop bowel obstruction left upper quadrant involving the proximal jejunum with [id number] zones of transition best seen on series 5 images 37 through 47.  Suspect internal hernia with bowel loops extending into the splenectomy site of the left upper quadrant through the omentum for the left colon.  Twisting of mesenteric vessels/architectural distortion seen also suggesting internal hernia.  Obstructed small bowel loops in the closed loop obstruction demonstrate mucosal hyperenhancement and wall thickening with mild distension. Small volume pneumoperitoneum with free air seen in the peritoneal cavity as well as in the suspected internal hernia sac.  Recommend surgical consultation.    Postop change prior partial colon resection left upper quadrant with apparent colocolic anastomosis.    Prior cholecystectomy.  Mild intra and extrahepatic bile duct  dilatation.    Hypoenhancement of the pancreatic tail with adjacent fat stranding calcification, similar to prior.    Additional findings as above.    This report was flagged in Epic as abnormal.    The critical information above was relayed directly by Manuel Haskins MD by [facility] secure chat to Fredrick Rock on 5/21/2024 at 04:30.      Electronically signed by: Manuel Haskins MD  Date:    05/21/2024  Time:    04:32               Narrative:    EXAMINATION:  CT ABDOMEN PELVIS WITH IV CONTRAST    CLINICAL HISTORY:  LLQ abdominal pain;Recent colostomy reversal, now worsened LLQ pain and fever;    TECHNIQUE:  Low dose axial images, sagittal and coronal reformations were obtained from the lung bases to the pubic symphysis following the IV administration of 100 mL of Omnipaque 350 .  Oral contrast was not administered.    COMPARISON:  11/01/2023.    FINDINGS:  Abdomen:    - Lower thorax:Unremarkable.    - Lung bases: Subsegmental atelectasis right lung base.    - Liver: No focal mass.    - Gallbladder: Surgically absent.    - Bile Ducts: Mild intra and extrahepatic bile duct dilatation with common duct measuring up to 9 mm.    - Spleen: Absent.    - Kidneys: No mass or hydronephrosis.    - Adrenals: Unremarkable.    - Pancreas: Hypoenhancement of the pancreatic tail with adjacent soft tissue stranding and calcification, similar to prior.    - Retroperitoneum:  No significant adenopathy.    - Vascular: No abdominal aortic aneurysm.    - Abdominal wall:  Postop changes abdominal wall consistent with prior left lower quadrant ostomy and prior abdominal surgery.    Pelvis:    No pelvic mass or lymphadenopathy.  Small amount of free fluid present in the pelvis and about the liver.    Bowel/Mesentery:    Closed loop bowel obstruction left upper quadrant involving the proximal jejunum with [id number] zones of transition best seen on series 5 images 37 through 47.  Suspect internal hernia with bowel loops extending into  the splenectomy site of the left upper quadrant through the omentum for the left colon.  Twisting of mesenteric vessels/architectural distortion seen also suggesting internal hernia.  Obstructed small bowel loops in the closed loop obstruction demonstrate mucosal hyperenhancement and wall thickening with mild distension.  Small volume pneumoperitoneum with free air seen in the peritoneal cavity as well as in the suspected internal hernia sac.  Postop change prior partial colon resection left upper quadrant with apparent colocolic anastomosis.    Bones:  No acute osseous abnormality and no suspicious lytic or blastic lesion.                                       X-Ray Chest AP Portable (Final result)  Result time 05/21/24 02:58:18      Final result by Manuel Haskins MD (05/21/24 02:58:18)                   Impression:      As above.      Electronically signed by: Manuel Haskins MD  Date:    05/21/2024  Time:    02:58               Narrative:    EXAMINATION:  XR CHEST AP PORTABLE    CLINICAL HISTORY:  Sepsis;    TECHNIQUE:  Single frontal view of the chest was performed.    COMPARISON:  06/20/2022.    FINDINGS:  Right-sided port catheter tip overlies the SVC.    Platelike atelectasis right lung base.    Heart and lungs otherwise appear unchanged when allowing for differences in technique and positioning.                                      ED Course as of 05/22/24 0302   Tue May 21, 2024   0155 EKG:  Sinus tachycardia, rate of 127, wide QRS with RBBB.  Normal axis.  Frequent PVCs.  Nonspecific ST/T changes without significant ST elevation or depression.  (Independently interpreted by me) [MR]   0240 CXR:  No confluent infiltrate. (Independently interpreted by me) [MR]   0438 Discussed with Dr. Weiss on call for gen surgery here who recommended contacting surgeon (Dr. Lance, CRS, Isai Sanchez) who did surgery a week ago to see if they wished to continue his care given recent complication with apparent closed  loop bowel obstruction and internal hernia along with recent fever.  NG tube ordered.  PFC request placed. [RM]   0507 Pt accepted to [facility], colorectal surgery by on call MD Dr Juárez to Dr Lance service this am. They reports beds are available.   Audrey Valentine M.D.  5:09 AM 5/21/2024   [RM]      ED Course User Index  [MR] Fredrick Rock MD  [RM] Audrey Valentine MD       MDM:    59 y.o. male with worsening left lower quadrant pain in the setting of prior colostomy reversal with reanastomosis as well as bilateral ureteral stent placement in the setting of new fever and emesis.  Workup undertaken for consideration of sepsis along with abdominal imaging to assess for complication of surgery such as perforation, obstruction, abscess.  Antipyretic and IV fluids initially ordered with culture sent.  Laboratory sent to assess for end-organ dysfunction including lactic acid inflammatory marker and CBC.  Analgesia and antiemetic along with IV fluids also ordered as well.    Patient to be signed out pending potential transfer for what appears to be closed loop bowel obstruction with internal hernia.  Presence of fever raises possibility for perforation, although residual free air may be normal finding in the setting of recent surgery.  No sign of abscess.  No alternative source of infection evident.  Patient's symptoms are well controlled with analgesia and antiemetic at present.  Lactic acid is normal and I doubt severe sepsis or bowel ischemia at this time.  I did speak with General surgery here who recommended reaching out to his recent surgeon for continuity of care with contact request placed through transfer center.  Antibiotics IV for initial dose written for intra-abdominal coverage given recent fever pending more definitive assessment by surgery.    Diagnoses:    1. Fever  2. LLQ abdominal pain  3. Closed loop bowel obstruction       Fredrick Rock MD  05/21/24 0442    The chart has  been refreshed to update the ED course after my care of the patient ended.         Fredrick Rock MD  05/22/24 [id number]

## 2024-05-22 ENCOUNTER — PATIENT MESSAGE (OUTPATIENT)
Dept: HEMATOLOGY/ONCOLOGY | Facility: CLINIC | Age: 59
End: 2024-05-22
Payer: COMMERCIAL

## 2024-05-22 LAB
ALBUMIN SERPL BCP-MCNC: 1.8 G/DL (ref 3.5–5.2)
ALP SERPL-CCNC: 94 U/L (ref 55–135)
ALT SERPL W/O P-5'-P-CCNC: 19 U/L (ref 10–44)
ANION GAP SERPL CALC-SCNC: 11 MMOL/L (ref 8–16)
AST SERPL-CCNC: 18 U/L (ref 10–40)
BASOPHILS # BLD AUTO: 0.04 K/UL (ref 0–0.2)
BASOPHILS NFR BLD: 0.1 % (ref 0–1.9)
BILIRUB SERPL-MCNC: 0.5 MG/DL (ref 0.1–1)
BODY FLUID SOURCE, CREATININE: NORMAL
BUN SERPL-MCNC: 19 MG/DL (ref 6–20)
CALCIUM SERPL-MCNC: 7.3 MG/DL (ref 8.7–10.5)
CHLORIDE SERPL-SCNC: 91 MMOL/L (ref 95–110)
CO2 SERPL-SCNC: 22 MMOL/L (ref 23–29)
CREAT FLD-MCNC: 1.2 MG/DL
CREAT SERPL-MCNC: 1.2 MG/DL (ref 0.5–1.4)
DIFFERENTIAL METHOD BLD: ABNORMAL
EOSINOPHIL # BLD AUTO: 0 K/UL (ref 0–0.5)
EOSINOPHIL NFR BLD: 0 % (ref 0–8)
ERYTHROCYTE [DISTWIDTH] IN BLOOD BY AUTOMATED COUNT: 14.6 % (ref 11.5–14.5)
EST. GFR  (NO RACE VARIABLE): >60 ML/MIN/1.73 M^2
ESTIMATED AVG GLUCOSE: 140 MG/DL (ref 68–131)
GLUCOSE SERPL-MCNC: 602 MG/DL (ref 70–110)
HBA1C MFR BLD: 6.5 % (ref 4–5.6)
HCT VFR BLD AUTO: 32.5 % (ref 40–54)
HGB BLD-MCNC: 10.8 G/DL (ref 14–18)
IMM GRANULOCYTES # BLD AUTO: 0.13 K/UL (ref 0–0.04)
IMM GRANULOCYTES NFR BLD AUTO: 0.4 % (ref 0–0.5)
LYMPHOCYTES # BLD AUTO: 1.8 K/UL (ref 1–4.8)
LYMPHOCYTES NFR BLD: 5.8 % (ref 18–48)
MAGNESIUM SERPL-MCNC: 1.6 MG/DL (ref 1.6–2.6)
MCH RBC QN AUTO: 30.2 PG (ref 27–31)
MCHC RBC AUTO-ENTMCNC: 33.2 G/DL (ref 32–36)
MCV RBC AUTO: 91 FL (ref 82–98)
MONOCYTES # BLD AUTO: 1.3 K/UL (ref 0.3–1)
MONOCYTES NFR BLD: 4.3 % (ref 4–15)
NEUTROPHILS # BLD AUTO: 27.4 K/UL (ref 1.8–7.7)
NEUTROPHILS NFR BLD: 89.4 % (ref 38–73)
NRBC BLD-RTO: 0 /100 WBC
PHOSPHATE SERPL-MCNC: 3.1 MG/DL (ref 2.7–4.5)
PLATELET # BLD AUTO: 394 K/UL (ref 150–450)
PMV BLD AUTO: 9.3 FL (ref 9.2–12.9)
POCT GLUCOSE: 103 MG/DL (ref 70–110)
POCT GLUCOSE: 106 MG/DL (ref 70–110)
POCT GLUCOSE: 161 MG/DL (ref 70–110)
POCT GLUCOSE: 180 MG/DL (ref 70–110)
POCT GLUCOSE: 213 MG/DL (ref 70–110)
POTASSIUM SERPL-SCNC: 3.8 MMOL/L (ref 3.5–5.1)
PROT SERPL-MCNC: 4.4 G/DL (ref 6–8.4)
RBC # BLD AUTO: 3.58 M/UL (ref 4.6–6.2)
SODIUM SERPL-SCNC: 124 MMOL/L (ref 136–145)
WBC # BLD AUTO: 30.73 K/UL (ref 3.9–12.7)

## 2024-05-22 PROCEDURE — 25000003 PHARM REV CODE 250: Performed by: SURGERY

## 2024-05-22 PROCEDURE — 85025 COMPLETE CBC W/AUTO DIFF WBC: CPT | Performed by: SURGERY

## 2024-05-22 PROCEDURE — 82570 ASSAY OF URINE CREATININE: CPT | Performed by: STUDENT IN AN ORGANIZED HEALTH CARE EDUCATION/TRAINING PROGRAM

## 2024-05-22 PROCEDURE — 83735 ASSAY OF MAGNESIUM: CPT | Performed by: SURGERY

## 2024-05-22 PROCEDURE — 36415 COLL VENOUS BLD VENIPUNCTURE: CPT | Performed by: SURGERY

## 2024-05-22 PROCEDURE — 20600001 HC STEP DOWN PRIVATE ROOM

## 2024-05-22 PROCEDURE — 63600175 PHARM REV CODE 636 W HCPCS: Performed by: STUDENT IN AN ORGANIZED HEALTH CARE EDUCATION/TRAINING PROGRAM

## 2024-05-22 PROCEDURE — 83036 HEMOGLOBIN GLYCOSYLATED A1C: CPT | Performed by: PHYSICIAN ASSISTANT

## 2024-05-22 PROCEDURE — 99900035 HC TECH TIME PER 15 MIN (STAT)

## 2024-05-22 PROCEDURE — 80053 COMPREHEN METABOLIC PANEL: CPT | Performed by: SURGERY

## 2024-05-22 PROCEDURE — 63600175 PHARM REV CODE 636 W HCPCS: Performed by: PHYSICIAN ASSISTANT

## 2024-05-22 PROCEDURE — 63600175 PHARM REV CODE 636 W HCPCS: Performed by: SURGERY

## 2024-05-22 PROCEDURE — 99222 1ST HOSP IP/OBS MODERATE 55: CPT | Mod: ,,, | Performed by: PHYSICIAN ASSISTANT

## 2024-05-22 PROCEDURE — 36415 COLL VENOUS BLD VENIPUNCTURE: CPT | Performed by: PHYSICIAN ASSISTANT

## 2024-05-22 PROCEDURE — 94761 N-INVAS EAR/PLS OXIMETRY MLT: CPT

## 2024-05-22 PROCEDURE — 27000221 HC OXYGEN, UP TO 24 HOURS

## 2024-05-22 PROCEDURE — 84100 ASSAY OF PHOSPHORUS: CPT | Performed by: SURGERY

## 2024-05-22 RX ORDER — INSULIN GLARGINE 100 [IU]/ML
20 INJECTION, SOLUTION SUBCUTANEOUS NIGHTLY
Status: DISCONTINUED | OUTPATIENT
Start: 2024-05-22 | End: 2024-05-22

## 2024-05-22 RX ORDER — INSULIN GLARGINE 100 [IU]/ML
20 INJECTION, SOLUTION SUBCUTANEOUS DAILY
Status: DISCONTINUED | OUTPATIENT
Start: 2024-05-22 | End: 2024-05-22

## 2024-05-22 RX ORDER — INSULIN ASPART 100 [IU]/ML
1-10 INJECTION, SOLUTION INTRAVENOUS; SUBCUTANEOUS EVERY 4 HOURS PRN
Status: DISCONTINUED | OUTPATIENT
Start: 2024-05-22 | End: 2024-05-29

## 2024-05-22 RX ORDER — INSULIN GLARGINE 100 [IU]/ML
20 INJECTION, SOLUTION SUBCUTANEOUS DAILY
Status: DISCONTINUED | OUTPATIENT
Start: 2024-05-22 | End: 2024-05-29 | Stop reason: HOSPADM

## 2024-05-22 RX ORDER — IBUPROFEN 600 MG/1
600 TABLET ORAL EVERY 8 HOURS
Status: DISCONTINUED | OUTPATIENT
Start: 2024-05-23 | End: 2024-05-23

## 2024-05-22 RX ORDER — PANTOPRAZOLE SODIUM 40 MG/10ML
40 INJECTION, POWDER, LYOPHILIZED, FOR SOLUTION INTRAVENOUS DAILY
Status: DISCONTINUED | OUTPATIENT
Start: 2024-05-23 | End: 2024-05-27

## 2024-05-22 RX ORDER — TAMSULOSIN HYDROCHLORIDE 0.4 MG/1
0.8 CAPSULE ORAL DAILY
Status: DISCONTINUED | OUTPATIENT
Start: 2024-05-23 | End: 2024-05-29 | Stop reason: HOSPADM

## 2024-05-22 RX ADMIN — ACETAMINOPHEN 1000 MG: 10 INJECTION, SOLUTION INTRAVENOUS at 05:05

## 2024-05-22 RX ADMIN — IBUPROFEN 800 MG: 800 INJECTION INTRAVENOUS at 06:05

## 2024-05-22 RX ADMIN — Medication: at 09:05

## 2024-05-22 RX ADMIN — Medication: at 06:05

## 2024-05-22 RX ADMIN — METHOCARBAMOL 1000 MG: 100 INJECTION, SOLUTION INTRAMUSCULAR; INTRAVENOUS at 05:05

## 2024-05-22 RX ADMIN — Medication: at 02:05

## 2024-05-22 RX ADMIN — METRONIDAZOLE 500 MG: 5 INJECTION, SOLUTION INTRAVENOUS at 09:05

## 2024-05-22 RX ADMIN — MUPIROCIN: 20 OINTMENT TOPICAL at 09:05

## 2024-05-22 RX ADMIN — SODIUM CHLORIDE: 9 INJECTION, SOLUTION INTRAVENOUS at 02:05

## 2024-05-22 RX ADMIN — INSULIN GLARGINE 20 UNITS: 100 INJECTION, SOLUTION SUBCUTANEOUS at 11:05

## 2024-05-22 RX ADMIN — METHOCARBAMOL 1000 MG: 100 INJECTION, SOLUTION INTRAMUSCULAR; INTRAVENOUS at 04:05

## 2024-05-22 RX ADMIN — METHOCARBAMOL 1000 MG: 100 INJECTION, SOLUTION INTRAMUSCULAR; INTRAVENOUS at 09:05

## 2024-05-22 RX ADMIN — HEPARIN SODIUM 5000 UNITS: 5000 INJECTION INTRAVENOUS; SUBCUTANEOUS at 03:05

## 2024-05-22 RX ADMIN — METRONIDAZOLE 500 MG: 5 INJECTION, SOLUTION INTRAVENOUS at 03:05

## 2024-05-22 RX ADMIN — ACETAMINOPHEN 1000 MG: 500 TABLET ORAL at 09:05

## 2024-05-22 RX ADMIN — HEPARIN SODIUM 5000 UNITS: 5000 INJECTION INTRAVENOUS; SUBCUTANEOUS at 09:05

## 2024-05-22 RX ADMIN — CIPROFLOXACIN 400 MG: 2 INJECTION, SOLUTION INTRAVENOUS at 03:05

## 2024-05-22 RX ADMIN — INSULIN ASPART 4 UNITS: 100 INJECTION, SOLUTION INTRAVENOUS; SUBCUTANEOUS at 05:05

## 2024-05-22 RX ADMIN — HEPARIN SODIUM 5000 UNITS: 5000 INJECTION INTRAVENOUS; SUBCUTANEOUS at 05:05

## 2024-05-22 RX ADMIN — METRONIDAZOLE 500 MG: 5 INJECTION, SOLUTION INTRAVENOUS at 11:05

## 2024-05-22 NOTE — CONSULTS
[facility] - [facility]  Endocrinology  Diabetes Consult Note    Consult Requested by: Farhan Lance MD   Reason for admit: Large bowel anastomotic leak    HISTORY OF PRESENT ILLNESS:  Reason for Consult: Management of T2DM, Hyperglycemia     Surgical Procedure and Date: s/p 5/21-Open completion colectomy with ileorectal anastomosis, Diverting loop ileostomy, Flexible sigmoidoscopy    Diabetes diagnosis year: >5 years ago    Home Diabetes Medications:  Toujeo 50 units.  Metformin    How often checking glucose at home? 1-3 x day   BG readings on regimen: 100s  Hypoglycemia on the regimen?  No  Missed doses on regimen?  No    Diabetes Complications include:     Hyperglycemia    Complicating diabetes co morbidities:   HLD, HTN      HPI:   Patient is a 59 y.o. male with with a history of HTN, HLD, and DM, Stage IV transverse colon adenocarcinoma who was recently discharged from the hospital on 5/18/24. 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 Post operatively he developed an ileus which improved with conservative management. He was able to be discharged on POD#5. At that time he was tolerating a regular diet, ambulating, voiding spontaneously, having BMs, and had adequate pain control.      Unfortunately he represented to [facility] early on 5/21/24 with worsening left lower quadrant pain and nonbilious, nonbloody vomiting. Upon arrival there he was febrile to 103 and tachycardic, but HDS on RA. Labs with an elevated WBC to 22.8 and slight anemia but otherwise unremarkable. CT A/P was concerning for an internal hernia with closed loop obstruction and so he was transferred to [facility] for higher level of care. S/p completion colectomy, ileorectal anastomosis, diverting loop ileostomy, and flexible sigmoidoscopy on 5/21/24.         Interval HPI:   No acute  events overnight. Patient in room 1004/1004 A. Blood glucose stable. BG above goal on current insulin regimen (SSI ). Steroid use- Dexamethasone  periop.   1 Day Post-Op  Renal function- Normal   Vasopressors-  None     Diet NPO     Eating:   NPO  Nausea: No  Hypoglycemia and intervention: No  Fever: No  TPN and/or TF: No      PMH, PSH, FH, SH updated and reviewed     ROS:    Review of Systems   Constitutional:  Negative for chills, fatigue and fever.   Respiratory:  Negative for cough and shortness of breath.        Current Medications and/or Treatments Impacting Glycemic Control  Immunotherapy:    Immunosuppressants       None          Steroids:   Hormones (From admission, onward)      None          Pressors:    Autonomic Drugs (From admission, onward)      None          Hyperglycemia/Diabetes Medications:   Antihyperglycemics (From admission, onward)      Start     Stop Route Frequency Ordered    05/22/24 1032  insulin aspart U-100 pen 1-10 Units         -- SubQ Every 4 hours PRN 05/22/24 0932    05/22/24 0945  insulin glargine U-100 (Lantus) pen 20 Units         -- SubQ Daily 05/22/24 0936             PHYSICAL EXAMINATION:  Vitals:    05/22/24 1119   BP: 128/62   Pulse: 87   Resp: 18   Temp: 98 °F (36.7 °C)     Body mass index is 28.24 kg/m².     Physical Exam  Constitutional:       General: He is not in acute distress.     Appearance: He is not ill-appearing.   HENT:      Head: Normocephalic and atraumatic.      Right Ear: External ear normal.      Left Ear: External ear normal.   Pulmonary:      Effort: No respiratory distress.   Psychiatric:         Mood and Affect: Mood normal.         Behavior: Behavior normal.            Labs Reviewed and Include   Recent Labs   Lab 05/21/24  1411   *   CALCIUM 8.4*   ALBUMIN 2.3*   PROT 5.3*      K 4.9   CO2 23      BUN 15   CREATININE 1.3   ALKPHOS 92   ALT 29   AST 22   BILITOT 0.7     Lab Results   Component Value Date    WBC 30.73 (H) 05/22/2024     "HGB 10.8 (L) 05/22/2024    HCT 32.5 (L) 05/22/2024    MCV 91 05/22/2024     05/22/2024     No results for input(s): "TSH", "FREET4" in the last 168 hours.  Lab Results   Component Value Date    HGBA1C 6.5 (H) 05/22/2024       Nutritional status:   Body mass index is 28.24 kg/m².  Lab Results   Component Value Date    ALBUMIN 2.3 (L) 05/21/2024    ALBUMIN 3.4 (L) 05/21/2024    ALBUMIN 3.9 04/08/2024     No results found for: "PREALBUMIN"    Estimated Creatinine Clearance: 76.2 mL/min (based on SCr of 1.3 mg/dL).    Accu-Checks  Recent Labs     05/21/24  1524 05/21/24  2334 05/22/24  0553 05/22/24  1117   POCTGLUCOSE 273* 309* 213* 161*        ASSESSMENT and PLAN    Cardiac/Vascular  HLD (hyperlipidemia)  Managed per primary team  Optimize bg control        Endocrine  Type 2 diabetes mellitus  BG goal: 140-180  T2DM S/p completion colectomy, ileorectal anastomosis, diverting loop ileostomy, and flexible sigmoidoscopy on 5/21/24. Periop dex likely causing elevations.  BG above goal.  NPO. Over basal at home. Will start on basal  at 0.4 WBD and monitor w/ q4 checks    - Start Lantus 20 units daily   - Adjust Novolog Moderate dose correction with ISF 25 starting at 150    - POCT Glucose every 4 hours  - Hypoglycemia protocol in place      ** Please notify Endocrine for any change and/or advance in diet**  ** Please call Endocrine for any BG related issues **     Discharge Planning:   TBD. Please notify endocrinology prior to discharge.        GI  * Large bowel anastomotic leak  Managed per primary team  Optimize bg control            Plan discussed with patient, family, and RN at bedside.     Carlos Allen PA-C  Endocrinology  [facility]  "

## 2024-05-22 NOTE — HPI
Reason for Consult: Management of T2DM, Hyperglycemia     Surgical Procedure and Date: s/p 5/21-Open completion colectomy with ileorectal anastomosis, Diverting loop ileostomy, Flexible sigmoidoscopy    Diabetes diagnosis year: >5 years ago    Home Diabetes Medications:  Toujeo 50 units.  Metformin    How often checking glucose at home? 1-3 x day   BG readings on regimen: 100s  Hypoglycemia on the regimen?  No  Missed doses on regimen?  No    Diabetes Complications include:     Hyperglycemia    Complicating diabetes co morbidities:   HLD, HTN      HPI:   Patient is a 59 y.o. male with with a history of HTN, HLD, and DM, Stage IV transverse colon adenocarcinoma who was recently discharged from the hospital on 5/18/24. 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 Post operatively he developed an ileus which improved with conservative management. He was able to be discharged on POD#5. At that time he was tolerating a regular diet, ambulating, voiding spontaneously, having BMs, and had adequate pain control.      Unfortunately he represented to [facility] early on 5/21/24 with worsening left lower quadrant pain and nonbilious, nonbloody vomiting. Upon arrival there he was febrile to 103 and tachycardic, but HDS on RA. Labs with an elevated WBC to 22.8 and slight anemia but otherwise unremarkable. CT A/P was concerning for an internal hernia with closed loop obstruction and so he was transferred to [place] for higher level of care. S/p completion colectomy, ileorectal anastomosis, diverting loop ileostomy, and flexible sigmoidoscopy on 5/21/24.

## 2024-05-22 NOTE — ASSESSMENT & PLAN NOTE
Osman Li Jr. is a 59 y.o. 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 He was transferred to the hospital with concern for internal hernia with closed loop bowel obstruction now s/p ex-lap where he was found to have a perforation near his anastomosis. He is s/p completion colectomy, ileorectal anastomosis, diverting loop ileostomy, and flexible sigmoidoscopy on 5/21/24.    - Periop Cipro/flagyl  - MM pain meds: mary tylenol, ibuprofen, robaxin, dPCA  - NPO  - mIVF  - NGT to LIWS  - DAWIT to bulb suction  - PRN pain and nausea meds  - PT/OT  - DVT ppx

## 2024-05-22 NOTE — RESPIRATORY THERAPY
"RAPID RESPONSE RESPIRATORY THERAPY ETCO2 CHECK         Time of visit: 955     Code Status: Full Code   : 1965  Bed: 1004/1004 A:   MRN: [id number]  Time spent at the bedside: < 15 min    SITUATION    Evaluated patient for: ETCo2 compliance    BACKGROUND    Why is the patient in the hospital?: Large bowel anastomotic leak    Patient has a past medical history of Colon cancer, Digestive disorder, DM (diabetes mellitus), Hyperlipidemia, Hypertension, and Prediabetes.    24 Hours Vitals Range:  Temp:  [96.8 °F (36 °C)-98.1 °F (36.7 °C)]   Pulse:  [80-90]   Resp:  [10-20]   BP: (101-169)/(56-86)   SpO2:  [93 %-97 %]     Labs:    Recent Labs     24  0156 24  1411    136   K 3.2* 4.9   CL 99 102   CO2 26 23   BUN 12 15   CREATININE 1.1 1.3   * 288*   PHOS  --  4.2   MG  --  1.7        No results for input(s): "PH", "PCO2", "PO2", "HCO3", "POCSATURATED", "BE" in the last 72 hours.    ASSESSMENT/INTERVENTIONS      Last VS   Temp: 97.7 °F (36.5 °C) (728)  Pulse: 86 (728)  Resp: 18 (728)  BP: 118/56 (728)  SpO2: 94 % (728)    Level of Consciousness: Level of Consciousness (AVPU): alert  Respiratory Effort: Unlabored  Is the ETCO2 monitor on? Yes  Is the patient wearing a cannula? Yes  Are ETCO2 orders placed? Yes  Is the patient on a PCA pump? Yes  ETCO2 monitored: ETCO2 (mmHg): 42 mmHg  Ambu at bedside: Yes    Active Orders   Respiratory Care    END TIDAL CO2 MONITOR Q12H     Frequency: Q12H     Number of Occurrences: Until Specified    Incentive spirometry     Frequency: Q4H     Number of Occurrences: Until Specified     Order Comments: Q4 while awake until discharge.  Educate patient in use; Keep incentive spirometer within reach; and Document incentive spirometer volume every 4 hours while awake.    ((10 sets per hour (3-5 inspirations per set)) on original order)      Oxygen Continuous     Frequency: Continuous     Number of Occurrences: Until Specified    "  Order Questions:      Device type: Low flow      Device: Nasal Cannula (1- 5 Liters)      LPM: 1      Titrate O2 per Oxygen Titration Protocol: Yes      To maintain SpO2 goal of: >= 90%      Notify MD of: Inability to achieve desired SpO2; Sudden change in patient status and requires 20% increase in FiO2; Patient requires >60% FiO2    SMOKING CESSATION EDUCATION PER RESPIRATORY Once     Frequency: Once     Number of Occurrences: 1 Occurrences       RECOMMENDATIONS    We recommend: RRT Recs: Continue POC per primary team.      FOLLOW-UP    Please call back the Rapid Response RT, Onesimo Martinez, RRT at x [phone] for any questions or concerns.

## 2024-05-22 NOTE — PT/OT/SLP PROGRESS
"Physical Therapy Missed Treatment Note      Patient Name:  Osman Li Jr.   MRN:  [id number]  Admitting Diagnosis:  Large bowel anastomotic leak   Recent Surgery: Procedure(s) (LRB):  LAPAROTOMY, EXPLORATORY (N/A)  SIGMOIDOSCOPY, FLEXIBLE  INSERTION, CATHETER, URETER (Left)  COLECTOMY, COMPLETION, ABDOMINAL (N/A)  CREATION, ILEOSTOMY, DIVERTING LOOP (N/A)  ANASTOMOSIS, ILEORECTAL (N/A) 1 Day Post-Op  Admit Date: 5/21/2024  Length of Stay: 1 days    Patient not seen today due to pt declined due to nausea, NG tube. PT offered to return in PM but pt stated "you should come check back tomorrow." Will follow-up for progressive mobility pending continued medical stability and patient participation.    5/22/2024    "

## 2024-05-22 NOTE — ASSESSMENT & PLAN NOTE
BG goal: 140-180  T2DM S/p completion colectomy, ileorectal anastomosis, diverting loop ileostomy, and flexible sigmoidoscopy on 5/21/24. Periop dex likely causing elevations.  BG above goal.  NPO. Over basal at home. Will start on basal  at 0.4 WBD and monitor w/ q4 checks    - Start Lantus 20 units daily   - Adjust Novolog Moderate dose correction with ISF 25 starting at 150    - POCT Glucose every 4 hours  - Hypoglycemia protocol in place      ** Please notify Endocrine for any change and/or advance in diet**  ** Please call Endocrine for any BG related issues **     Discharge Planning:   TBD. Please notify endocrinology prior to discharge.

## 2024-05-22 NOTE — ANESTHESIA POSTPROCEDURE EVALUATION
Anesthesia Post Evaluation    Patient: Osman Li Jr.    Procedure(s) Performed: Procedure(s) (LRB):  LAPAROTOMY, EXPLORATORY (N/A)  SIGMOIDOSCOPY, FLEXIBLE  INSERTION, CATHETER, URETER (Left)  COLECTOMY, COMPLETION, ABDOMINAL (N/A)  CREATION, ILEOSTOMY, DIVERTING LOOP (N/A)  ANASTOMOSIS, ILEORECTAL (N/A)    Final Anesthesia Type: general      Patient location during evaluation: PACU  Patient participation: Yes- Able to Participate  Level of consciousness: awake and alert  Post-procedure vital signs: reviewed and stable  Pain management: adequate  Airway patency: patent    PONV status at discharge: No PONV  Anesthetic complications: no      Cardiovascular status: blood pressure returned to baseline  Respiratory status: unassisted  Hydration status: euvolemic  Follow-up not needed.              Vitals Value Taken Time   /56 05/22/24 0728   Temp 36.5 °C (97.7 °F) 05/22/24 0728   Pulse 86 05/22/24 0728   Resp 18 05/22/24 0728   SpO2 94 % 05/22/24 0728         Event Time   Out of Recovery 14:45:00         Pain/Suleiman Score: Pain Rating Prior to Med Admin: 8 (5/22/2024  6:05 AM)  Pain Rating Post Med Admin: 7 (5/22/2024  6:15 AM)  Suleiman Score: 10 (5/21/2024  5:20 PM)

## 2024-05-22 NOTE — CARE UPDATE
I have reviewed the chart of Osman Li Jr. who is hospitalized for the following:    Active Hospital Problems    Diagnosis    *Large bowel anastomotic leak    Intra-abdominal abscess    Sepsis    Malignant neoplasm of transverse colon    HTN (hypertension)    Type 2 diabetes mellitus    HLD (hyperlipidemia)    Anemia    Hypokalemia     3.2 on admission  - trend and replete          Rickie Barrientos PA-C  Unit Based DONTA

## 2024-05-22 NOTE — PLAN OF CARE
[facility] - [facility]  Initial Discharge Assessment       Primary Care Provider: Natalee Wagner NP    Admission Diagnosis: SBO (small bowel obstruction) [K56.609]    Admission Date: 5/21/2024  Expected Discharge Date: 5/27/2024    Transition of Care Barriers: None    Payor: AETNA / Plan: AETNA CHOICE POS / Product Type: Commercial /     Extended Emergency Contact Information  Primary Emergency Contact: KEMARMISTYI  [address] [place] [address] [place]  Mobile Phone: [phone]  Relation: Spouse  Preferred language: English   needed? No    Discharge Plan A: Home, Home with family         [facility]  [address]  [facility] [address]  Phone: [phone] Fax: [phone]    [facility]  [address] [address]  Phone: [phone] Fax: [phone]      Initial Assessment (most recent)       Adult Discharge Assessment - 05/22/24 1111          Discharge Assessment    Assessment Type Discharge Planning Assessment     Confirmed/corrected address, phone number and insurance Yes     Confirmed Demographics Correct on Facesheet     Source of Information patient;family     Communicated ONIEL with patient/caregiver Yes     Reason For Admission Large bowel anastomotic leak     People in Home spouse     Do you expect to return to your current living situation? Yes     Do you have help at home or someone to help you manage your care at home? Yes     Who are your caregiver(s) and their phone number(s)? Jason Li  [phone]     Prior to hospitilization cognitive status: Alert/Oriented     Current cognitive status: Alert/Oriented     Walking or Climbing Stairs Difficulty no     Dressing/Bathing Difficulty no     Home Layout Bedroom on 2nd floor     Equipment Currently Used at Home none     Readmission within 30 days? No     Do you currently have service(s) that help you manage your care at home? No      Do you take prescription medications? Yes     Do you have prescription coverage? Yes     Coverage Payor: AETNA - AETNA CHOICE POS -     Do you have any problems affording any of your prescribed medications? No     Is the patient taking medications as prescribed? yes     Who is going to help you get home at discharge? Jina Li     How do you get to doctors appointments? car, drives self;family or friend will provide     Are you on dialysis? No     Do you take coumadin? No     Discharge Plan A Home;Home with family     DME Needed Upon Discharge  none     Discharge Plan discussed with: Patient     Transition of Care Barriers None        Physical Activity    On average, how many days per week do you engage in moderate to strenuous exercise (like a brisk walk)? 0 days     On average, how many minutes do you engage in exercise at this level? 0 min        Financial Resource Strain    How hard is it for you to pay for the very basics like food, housing, medical care, and heating? Not hard at all        Housing Stability    In the last 12 months, was there a time when you were not able to pay the mortgage or rent on time? No     At any time in the past 12 months, were you homeless or living in a shelter (including now)? No        Transportation Needs    Has the lack of transportation kept you from medical appointments, meetings, work or from getting things needed for daily living? No        Food Insecurity    Within the past 12 months, you worried that your food would run out before you got the money to buy more. Never true     Within the past 12 months, the food you bought just didn't last and you didn't have money to get more. Never true        Stress    Do you feel stress - tense, restless, nervous, or anxious, or unable to sleep at night because your mind is troubled all the time - these days? Not at all        Social Isolation    How often do you feel lonely or isolated from those around you?  Never        Alcohol  Use    Q1: How often do you have a drink containing alcohol? Never     Q2: How many drinks containing alcohol do you have on a typical day when you are drinking? Patient does not drink     Q3: How often do you have six or more drinks on one occasion? Never        Utilities    In the past 12 months has the electric, gas, oil, or water company threatened to shut off services in your home? No        Health Literacy    How often do you need to have someone help you when you read instructions, pamphlets, or other written material from your doctor or pharmacy? Never                      The SW met with the patient at bedside to complete the DPA. The SW placed name and contact information on the blackboard in the patient's room. Use preferred pharmacy / bedside delivery for any necessary medications at the time of discharge. The patient is independent with all ADLs. The patient is not on Dialysis or Coumadin. The Patient does not use DME or home oxygen, The patient's wife will provide assistance to the patient upon discharge. The patient's wife  will provide transportation upon discharge. SW will continue to follow for course of hospitalization.        Discharge Plan A and Plan B have been determined by review of patient's clinical status, future medical and therapeutic needs, and coverage/benefits for post-acute care in coordination with multidisciplinary team members.    Raya Gil MSW, REW  [facility]  Case Management Dept.

## 2024-05-22 NOTE — SUBJECTIVE & OBJECTIVE
Interval HPI:   No acute events overnight. Patient in room 1004/1004 A. Blood glucose stable. BG above goal on current insulin regimen (SSI ). Steroid use- Dexamethasone  periop.   1 Day Post-Op  Renal function- Normal   Vasopressors-  None     Diet NPO     Eating:   NPO  Nausea: No  Hypoglycemia and intervention: No  Fever: No  TPN and/or TF: No      PMH, PSH, FH, SH updated and reviewed     ROS:    Review of Systems   Constitutional:  Negative for chills, fatigue and fever.   Respiratory:  Negative for cough and shortness of breath.        Current Medications and/or Treatments Impacting Glycemic Control  Immunotherapy:    Immunosuppressants       None          Steroids:   Hormones (From admission, onward)      None          Pressors:    Autonomic Drugs (From admission, onward)      None          Hyperglycemia/Diabetes Medications:   Antihyperglycemics (From admission, onward)      Start     Stop Route Frequency Ordered    05/22/24 1032  insulin aspart U-100 pen 1-10 Units         -- SubQ Every 4 hours PRN 05/22/24 0932    05/22/24 0945  insulin glargine U-100 (Lantus) pen 20 Units         -- SubQ Daily 05/22/24 0936             PHYSICAL EXAMINATION:  Vitals:    05/22/24 1119   BP: 128/62   Pulse: 87   Resp: 18   Temp: 98 °F (36.7 °C)     Body mass index is 28.24 kg/m².     Physical Exam  Constitutional:       General: He is not in acute distress.     Appearance: He is not ill-appearing.   HENT:      Head: Normocephalic and atraumatic.      Right Ear: External ear normal.      Left Ear: External ear normal.   Pulmonary:      Effort: No respiratory distress.   Psychiatric:         Mood and Affect: Mood normal.         Behavior: Behavior normal.

## 2024-05-22 NOTE — NURSING
[facility] Plan of Care Note    Dx:   SBO (small bowel obstruction) [K56.609]    Shift Events: new PIV    Goals of Care: drain care, blas care, ostomy care, NG tube; manage pain    Neuro: AO x4    Vital Signs: /69   Pulse 85   Temp 98.1 °F (36.7 °C) (Oral)   Resp 19   Wt 99.8 kg (220 lb 0.3 oz)   SpO2 95%   BMI 28.24 kg/m²     Respiratory: 2L NC    Diet: Diet NPO      Is patient tolerating current diet? yes    GTTS: NS @ 75ml/hr; PCA pump    Urine Output/Bowel Movement:   I/O this shift:  In: 1445.8 [IV Piggyback:1445.8]  Out: 1680 [Urine:800; Drains:880]  Last Bowel Movement: 05/18/24      Drains/Tubes/Tube Feeds (include total output/shift):   I/O this shift:  In: 1445.8 [IV Piggyback:1445.8]  Out: 1680 [Urine:800; Drains:880]      Lines: 20g Left hand      Accuchecks:Q6     Skin: midline incision; DAWIT drain RLQ; ileostomy LLQ    Fall Risk Score: 14    Activity level? Stand by assist    Any scheduled procedures? none    Any safety concerns? fall

## 2024-05-22 NOTE — PROGRESS NOTES
[facility]  Colorectal Surgery  Progress Note    Patient Name: Osman Li Jr.  MRN: [id number]  Admission Date: 5/21/2024  Hospital Length of Stay: 1 days  Attending Physician: Farhan Lance MD    Subjective:     Interval History: Taken to the OR yesterday due to concern for closed loop obstruction. Found to have a perforation at his anastomosis s/p completion colectomy, ileorectal anastomosis, DLI, and flex sig. Did well overnight. Reports pain at the midline incision. Has not yet gotten out of bed. NGT with about 450 mL bilious output. No ostomy output yet.     Post-Op Info:  Procedure(s) (LRB):  LAPAROTOMY, EXPLORATORY (N/A)  SIGMOIDOSCOPY, FLEXIBLE  INSERTION, CATHETER, URETER (Left)  COLECTOMY, COMPLETION, ABDOMINAL (N/A)  CREATION, ILEOSTOMY, DIVERTING LOOP (N/A)  ANASTOMOSIS, ILEORECTAL (N/A)   1 Day Post-Op      Medications:  Continuous Infusions:   sodium chloride 0.9%   Intravenous Continuous 75 mL/hr at 05/21/24 1506 New Bag at 05/21/24 1506    hydromorphone in 0.9 % NaCl 6 mg/30 ml   Intravenous Continuous   New Syringe/Bag at 05/22/24 0605     Scheduled Meds:   acetaminophen  1,000 mg Intravenous Q8H    Followed by    acetaminophen  1,000 mg Oral Q8H    ciprofloxacin  400 mg Intravenous Q12H    heparin (porcine)  5,000 Units Subcutaneous Q8H    ibuprofen  800 mg Intravenous Q8H    Followed by    ibuprofen  800 mg Oral Q8H    methocarbamol (ROBAXIN) IVPB  1,000 mg Intravenous Q8H    metronidazole  500 mg Intravenous Q8H    mupirocin   Nasal BID     PRN Meds:   acetaminophen 1,000 mg/100 mL (10 mg/mL) injection 1,000 mg    Followed by    acetaminophen tablet 1,000 mg    ciprofloxacin (CIPRO)400mg/200ml D5W IVPB 400 mg    heparin (porcine) injection 5,000 Units    ibuprofen 800 mg in dextrose 5 % 250 mL (Vial-Mate)    Followed by    ibuprofen tablet 800 mg    methocarbamoL (ROBAXIN) 1,000 mg in dextrose 5 % (D5W) 100 mL IVPB    metronidazole IVPB 500 mg    mupirocin 2 % ointment         Objective:     Vital Signs (Most Recent):  Temp: 98.1 °F (36.7 °C) (05/22/24 0317)  Pulse: 85 (05/22/24 0317)  Resp: 19 (05/22/24 0605)  BP: 115/69 (05/22/24 0317)  SpO2: 95 % (05/22/24 0500) Vital Signs (24h Range):  Temp:  [96.8 °F (36 °C)-98.1 °F (36.7 °C)] 98.1 °F (36.7 °C)  Pulse:  [80-90] 85  Resp:  [10-20] 19  SpO2:  [93 %-97 %] 95 %  BP: (101-169)/(63-86) 115/69     Intake/Output - Last 3 Shifts         05/20 0700  05/21 0659 05/21 0700 05/22 0659    I.V. (mL/kg)  3500 (35.1)    IV Piggyback  1845.8    Total Intake(mL/kg)  5345.8 (53.6)    Urine (mL/kg/hr)  1875    Drains  1000    Stool  0    Total Output  2875    Net  +2470.8                   Physical Exam  Vitals and nursing note reviewed.   Constitutional:       General: He is not in acute distress.     Appearance: He is well-developed. He is not ill-appearing or diaphoretic.   HENT:      Head: Normocephalic and atraumatic.      Nose:      Comments: NGT with bilious output     Mouth/Throat:      Pharynx: Oropharynx is clear. No oropharyngeal exudate.   Eyes:      General: No scleral icterus.     Extraocular Movements: Extraocular movements intact.   Cardiovascular:      Rate and Rhythm: Normal rate and regular rhythm.   Pulmonary:      Effort: Pulmonary effort is normal. No respiratory distress.   Abdominal:      Comments: Midline incision with island dressing in place clean and dry  Ostomy without any output  DAWIT drain x1 with SS output  Soft, non-distended, appropriately tender   Musculoskeletal:         General: No deformity. Normal range of motion.   Skin:     General: Skin is warm and dry.   Neurological:      General: No focal deficit present.      Mental Status: He is alert.      Cranial Nerves: No cranial nerve deficit.   Psychiatric:         Mood and Affect: Mood normal.         Behavior: Behavior normal.              Significant Labs:  CBC (Last 3 Results):   Recent Labs   Lab 05/18/24  0408 05/21/24  0156 05/21/24  1411   WBC 7.47 22.80*  32.75*   RBC 3.79* 4.47* 4.61   HGB 11.4* 13.4* 14.0   HCT 33.1* 39.0* 40.1    459* 471*   MCV 87 87 87   MCH 30.1 30.0 30.4   MCHC 34.4 34.4 34.9     CMP (Last 3 Results):   Recent Labs   Lab 05/18/24  0408 05/21/24  0156 05/21/24  1411    163* 288*   CALCIUM 9.2 8.9 8.4*   ALBUMIN  --  3.4* 2.3*   PROT  --  6.2 5.3*    136 136   K 4.3 3.2* 4.9   CO2 25 26 23    99 102   BUN 7 12 15   CREATININE 0.9 1.1 1.3   ALKPHOS  --  113 92   ALT  --  27 29   AST  --  20 22   BILITOT  --  0.8 0.7       Significant Diagnostics:  I have reviewed all pertinent imaging results/findings within the past 24 hours.  Assessment/Plan:     Malignant neoplasm of transverse colon  Osman Li Jr. is a 59 y.o. 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 He was transferred to the hospital with concern for internal hernia with closed loop bowel obstruction now s/p ex-lap where he was found to have a perforation near his anastomosis. He is s/p completion colectomy, ileorectal anastomosis, diverting loop ileostomy, and flexible sigmoidoscopy on 5/21/24.    - Periop Cipro/flagyl  - MM pain meds: mary tylenol, ibuprofen, robaxin, dPCA  - NPO  - mIVF  - NGT to LIWS  - DAWIT to bulb suction  - PRN pain and nausea meds  - PT/OT  - DVT ppx          Belem Vernon MD  Colorectal Surgery  [facility]

## 2024-05-22 NOTE — SUBJECTIVE & OBJECTIVE
Subjective:     Interval History: Taken to the OR yesterday due to concern for closed loop obstruction. Found to have a perforation at his anastomosis s/p completion colectomy, ileorectal anastomosis, DLI, and flex sig. Did well overnight. Reports pain at the midline incision. Has not yet gotten out of bed. NGT with about 450 mL bilious output. No ostomy output yet.     Post-Op Info:  Procedure(s) (LRB):  LAPAROTOMY, EXPLORATORY (N/A)  SIGMOIDOSCOPY, FLEXIBLE  INSERTION, CATHETER, URETER (Left)  COLECTOMY, COMPLETION, ABDOMINAL (N/A)  CREATION, ILEOSTOMY, DIVERTING LOOP (N/A)  ANASTOMOSIS, ILEORECTAL (N/A)   1 Day Post-Op      Medications:  Continuous Infusions:   sodium chloride 0.9%   Intravenous Continuous 75 mL/hr at 05/21/24 1506 New Bag at 05/21/24 1506    hydromorphone in 0.9 % NaCl 6 mg/30 ml   Intravenous Continuous   New Syringe/Bag at 05/22/24 0605     Scheduled Meds:   acetaminophen  1,000 mg Intravenous Q8H    Followed by    acetaminophen  1,000 mg Oral Q8H    ciprofloxacin  400 mg Intravenous Q12H    heparin (porcine)  5,000 Units Subcutaneous Q8H    ibuprofen  800 mg Intravenous Q8H    Followed by    ibuprofen  800 mg Oral Q8H    methocarbamol (ROBAXIN) IVPB  1,000 mg Intravenous Q8H    metronidazole  500 mg Intravenous Q8H    mupirocin   Nasal BID     PRN Meds:   acetaminophen 1,000 mg/100 mL (10 mg/mL) injection 1,000 mg    Followed by    acetaminophen tablet 1,000 mg    ciprofloxacin (CIPRO)400mg/200ml D5W IVPB 400 mg    heparin (porcine) injection 5,000 Units    ibuprofen 800 mg in dextrose 5 % 250 mL (Vial-Mate)    Followed by    ibuprofen tablet 800 mg    methocarbamoL (ROBAXIN) 1,000 mg in dextrose 5 % (D5W) 100 mL IVPB    metronidazole IVPB 500 mg    mupirocin 2 % ointment        Objective:     Vital Signs (Most Recent):  Temp: 98.1 °F (36.7 °C) (05/22/24 0317)  Pulse: 85 (05/22/24 0317)  Resp: 19 (05/22/24 0605)  BP: 115/69 (05/22/24 0317)  SpO2: 95 % (05/22/24 0500) Vital Signs (24h  Range):  Temp:  [96.8 °F (36 °C)-98.1 °F (36.7 °C)] 98.1 °F (36.7 °C)  Pulse:  [80-90] 85  Resp:  [10-20] 19  SpO2:  [93 %-97 %] 95 %  BP: (101-169)/(63-86) 115/69     Intake/Output - Last 3 Shifts         05/20 0700  05/21 0659 05/21 0700  05/22 0659    I.V. (mL/kg)  3500 (35.1)    IV Piggyback  1845.8    Total Intake(mL/kg)  5345.8 (53.6)    Urine (mL/kg/hr)  1875    Drains  1000    Stool  0    Total Output  2875    Net  +2470.8                   Physical Exam  Vitals and nursing note reviewed.   Constitutional:       General: He is not in acute distress.     Appearance: He is well-developed. He is not ill-appearing or diaphoretic.   HENT:      Head: Normocephalic and atraumatic.      Nose:      Comments: NGT with bilious output     Mouth/Throat:      Pharynx: Oropharynx is clear. No oropharyngeal exudate.   Eyes:      General: No scleral icterus.     Extraocular Movements: Extraocular movements intact.   Cardiovascular:      Rate and Rhythm: Normal rate and regular rhythm.   Pulmonary:      Effort: Pulmonary effort is normal. No respiratory distress.   Abdominal:      Comments: Midline incision with island dressing in place clean and dry  Ostomy without any output  DAWIT drain x1 with SS output  Soft, non-distended, appropriately tender   Musculoskeletal:         General: No deformity. Normal range of motion.   Skin:     General: Skin is warm and dry.   Neurological:      General: No focal deficit present.      Mental Status: He is alert.      Cranial Nerves: No cranial nerve deficit.   Psychiatric:         Mood and Affect: Mood normal.         Behavior: Behavior normal.              Significant Labs:  CBC (Last 3 Results):   Recent Labs   Lab 05/18/24  0408 05/21/24  0156 05/21/24  1411   WBC 7.47 22.80* 32.75*   RBC 3.79* 4.47* 4.61   HGB 11.4* 13.4* 14.0   HCT 33.1* 39.0* 40.1    459* 471*   MCV 87 87 87   MCH 30.1 30.0 30.4   MCHC 34.4 34.4 34.9     CMP (Last 3 Results):   Recent Labs   Lab 05/18/24  6689  05/21/24  0156 05/21/24  1411    163* 288*   CALCIUM 9.2 8.9 8.4*   ALBUMIN  --  3.4* 2.3*   PROT  --  6.2 5.3*    136 136   K 4.3 3.2* 4.9   CO2 25 26 23    99 102   BUN 7 12 15   CREATININE 0.9 1.1 1.3   ALKPHOS  --  113 92   ALT  --  27 29   AST  --  20 22   BILITOT  --  0.8 0.7       Significant Diagnostics:  I have reviewed all pertinent imaging results/findings within the past 24 hours.

## 2024-05-23 ENCOUNTER — PATIENT OUTREACH (OUTPATIENT)
Dept: ADMINISTRATIVE | Facility: CLINIC | Age: 59
End: 2024-05-23
Payer: COMMERCIAL

## 2024-05-23 LAB
ALBUMIN SERPL BCP-MCNC: 2.1 G/DL (ref 3.5–5.2)
ALP SERPL-CCNC: 138 U/L (ref 55–135)
ALT SERPL W/O P-5'-P-CCNC: 19 U/L (ref 10–44)
ANION GAP SERPL CALC-SCNC: 8 MMOL/L (ref 8–16)
AST SERPL-CCNC: 21 U/L (ref 10–40)
BASOPHILS # BLD AUTO: 0.06 K/UL (ref 0–0.2)
BASOPHILS NFR BLD: 0.2 % (ref 0–1.9)
BILIRUB SERPL-MCNC: 0.3 MG/DL (ref 0.1–1)
BUN SERPL-MCNC: 19 MG/DL (ref 6–20)
CALCIUM SERPL-MCNC: 8.9 MG/DL (ref 8.7–10.5)
CHLORIDE SERPL-SCNC: 97 MMOL/L (ref 95–110)
CO2 SERPL-SCNC: 30 MMOL/L (ref 23–29)
CREAT SERPL-MCNC: 1 MG/DL (ref 0.5–1.4)
DIFFERENTIAL METHOD BLD: ABNORMAL
EOSINOPHIL # BLD AUTO: 0.3 K/UL (ref 0–0.5)
EOSINOPHIL NFR BLD: 0.9 % (ref 0–8)
ERYTHROCYTE [DISTWIDTH] IN BLOOD BY AUTOMATED COUNT: 14.6 % (ref 11.5–14.5)
EST. GFR  (NO RACE VARIABLE): >60 ML/MIN/1.73 M^2
FINAL PATHOLOGIC DIAGNOSIS: NORMAL
GLUCOSE SERPL-MCNC: 71 MG/DL (ref 70–110)
GROSS: NORMAL
HCT VFR BLD AUTO: 34.8 % (ref 40–54)
HGB BLD-MCNC: 11.4 G/DL (ref 14–18)
IMM GRANULOCYTES # BLD AUTO: 0.39 K/UL (ref 0–0.04)
IMM GRANULOCYTES NFR BLD AUTO: 1.1 % (ref 0–0.5)
LYMPHOCYTES # BLD AUTO: 2.5 K/UL (ref 1–4.8)
LYMPHOCYTES NFR BLD: 7.4 % (ref 18–48)
Lab: NORMAL
MAGNESIUM SERPL-MCNC: 2 MG/DL (ref 1.6–2.6)
MCH RBC QN AUTO: 29.6 PG (ref 27–31)
MCHC RBC AUTO-ENTMCNC: 32.8 G/DL (ref 32–36)
MCV RBC AUTO: 90 FL (ref 82–98)
MONOCYTES # BLD AUTO: 1.4 K/UL (ref 0.3–1)
MONOCYTES NFR BLD: 4.2 % (ref 4–15)
NEUTROPHILS # BLD AUTO: 29.4 K/UL (ref 1.8–7.7)
NEUTROPHILS NFR BLD: 86.2 % (ref 38–73)
NRBC BLD-RTO: 0 /100 WBC
PHOSPHATE SERPL-MCNC: 2.2 MG/DL (ref 2.7–4.5)
PLATELET # BLD AUTO: 454 K/UL (ref 150–450)
PLATELET BLD QL SMEAR: ABNORMAL
PMV BLD AUTO: 9.6 FL (ref 9.2–12.9)
POCT GLUCOSE: 100 MG/DL (ref 70–110)
POCT GLUCOSE: 109 MG/DL (ref 70–110)
POCT GLUCOSE: 122 MG/DL (ref 70–110)
POCT GLUCOSE: 129 MG/DL (ref 70–110)
POTASSIUM SERPL-SCNC: 4.1 MMOL/L (ref 3.5–5.1)
PROT SERPL-MCNC: 5.4 G/DL (ref 6–8.4)
RBC # BLD AUTO: 3.85 M/UL (ref 4.6–6.2)
SODIUM SERPL-SCNC: 135 MMOL/L (ref 136–145)
WBC # BLD AUTO: 34.15 K/UL (ref 3.9–12.7)

## 2024-05-23 PROCEDURE — S4991 NICOTINE PATCH NONLEGEND: HCPCS | Performed by: STUDENT IN AN ORGANIZED HEALTH CARE EDUCATION/TRAINING PROGRAM

## 2024-05-23 PROCEDURE — 99232 SBSQ HOSP IP/OBS MODERATE 35: CPT | Mod: ,,, | Performed by: NURSE PRACTITIONER

## 2024-05-23 PROCEDURE — 97161 PT EVAL LOW COMPLEX 20 MIN: CPT

## 2024-05-23 PROCEDURE — 36415 COLL VENOUS BLD VENIPUNCTURE: CPT | Performed by: SURGERY

## 2024-05-23 PROCEDURE — 63600175 PHARM REV CODE 636 W HCPCS: Performed by: STUDENT IN AN ORGANIZED HEALTH CARE EDUCATION/TRAINING PROGRAM

## 2024-05-23 PROCEDURE — 25000003 PHARM REV CODE 250: Performed by: SURGERY

## 2024-05-23 PROCEDURE — 97116 GAIT TRAINING THERAPY: CPT

## 2024-05-23 PROCEDURE — 20600001 HC STEP DOWN PRIVATE ROOM

## 2024-05-23 PROCEDURE — 80053 COMPREHEN METABOLIC PANEL: CPT | Performed by: SURGERY

## 2024-05-23 PROCEDURE — 63600175 PHARM REV CODE 636 W HCPCS: Performed by: PHYSICIAN ASSISTANT

## 2024-05-23 PROCEDURE — C9113 INJ PANTOPRAZOLE SODIUM, VIA: HCPCS | Performed by: STUDENT IN AN ORGANIZED HEALTH CARE EDUCATION/TRAINING PROGRAM

## 2024-05-23 PROCEDURE — 27000221 HC OXYGEN, UP TO 24 HOURS

## 2024-05-23 PROCEDURE — 84100 ASSAY OF PHOSPHORUS: CPT | Performed by: SURGERY

## 2024-05-23 PROCEDURE — 63600175 PHARM REV CODE 636 W HCPCS: Performed by: SURGERY

## 2024-05-23 PROCEDURE — 85025 COMPLETE CBC W/AUTO DIFF WBC: CPT | Performed by: SURGERY

## 2024-05-23 PROCEDURE — 83735 ASSAY OF MAGNESIUM: CPT | Performed by: SURGERY

## 2024-05-23 PROCEDURE — 99900035 HC TECH TIME PER 15 MIN (STAT)

## 2024-05-23 PROCEDURE — 25000003 PHARM REV CODE 250: Performed by: STUDENT IN AN ORGANIZED HEALTH CARE EDUCATION/TRAINING PROGRAM

## 2024-05-23 PROCEDURE — 94761 N-INVAS EAR/PLS OXIMETRY MLT: CPT

## 2024-05-23 RX ORDER — ENOXAPARIN SODIUM 100 MG/ML
40 INJECTION SUBCUTANEOUS EVERY 24 HOURS
Status: DISCONTINUED | OUTPATIENT
Start: 2024-05-23 | End: 2024-05-29 | Stop reason: HOSPADM

## 2024-05-23 RX ORDER — KETOROLAC TROMETHAMINE 15 MG/ML
15 INJECTION, SOLUTION INTRAMUSCULAR; INTRAVENOUS EVERY 8 HOURS
Status: COMPLETED | OUTPATIENT
Start: 2024-05-23 | End: 2024-05-26

## 2024-05-23 RX ORDER — IBUPROFEN 200 MG
1 TABLET ORAL DAILY
Status: DISCONTINUED | OUTPATIENT
Start: 2024-05-23 | End: 2024-05-29 | Stop reason: HOSPADM

## 2024-05-23 RX ORDER — ACETAMINOPHEN 650 MG/20.3ML
1000 LIQUID ORAL EVERY 8 HOURS
Status: DISCONTINUED | OUTPATIENT
Start: 2024-05-23 | End: 2024-05-27

## 2024-05-23 RX ORDER — GABAPENTIN 250 MG/5ML
125 SOLUTION ORAL EVERY 8 HOURS
Status: DISCONTINUED | OUTPATIENT
Start: 2024-05-23 | End: 2024-05-23

## 2024-05-23 RX ORDER — SODIUM CHLORIDE, SODIUM LACTATE, POTASSIUM CHLORIDE, CALCIUM CHLORIDE 600; 310; 30; 20 MG/100ML; MG/100ML; MG/100ML; MG/100ML
INJECTION, SOLUTION INTRAVENOUS CONTINUOUS
Status: DISCONTINUED | OUTPATIENT
Start: 2024-05-23 | End: 2024-05-23

## 2024-05-23 RX ORDER — METHOCARBAMOL 500 MG/1
500 TABLET, FILM COATED ORAL EVERY 8 HOURS
Status: DISCONTINUED | OUTPATIENT
Start: 2024-05-24 | End: 2024-05-27

## 2024-05-23 RX ORDER — SCOLOPAMINE TRANSDERMAL SYSTEM 1 MG/1
1 PATCH, EXTENDED RELEASE TRANSDERMAL
Status: DISCONTINUED | OUTPATIENT
Start: 2024-05-23 | End: 2024-05-29 | Stop reason: HOSPADM

## 2024-05-23 RX ORDER — GABAPENTIN 250 MG/5ML
250 SOLUTION ORAL EVERY 8 HOURS
Status: DISCONTINUED | OUTPATIENT
Start: 2024-05-23 | End: 2024-05-27

## 2024-05-23 RX ORDER — SODIUM,POTASSIUM PHOSPHATES 280-250MG
2 POWDER IN PACKET (EA) ORAL
Status: CANCELLED | OUTPATIENT
Start: 2024-05-23 | End: 2024-05-24

## 2024-05-23 RX ORDER — DEXTROSE MONOHYDRATE AND SODIUM CHLORIDE 5; .9 G/100ML; G/100ML
INJECTION, SOLUTION INTRAVENOUS CONTINUOUS
Status: DISCONTINUED | OUTPATIENT
Start: 2024-05-23 | End: 2024-05-27

## 2024-05-23 RX ORDER — TRIPROLIDINE/PSEUDOEPHEDRINE 2.5MG-60MG
600 TABLET ORAL 3 TIMES DAILY
Status: DISCONTINUED | OUTPATIENT
Start: 2024-05-23 | End: 2024-05-23

## 2024-05-23 RX ADMIN — METHOCARBAMOL 500 MG: 100 INJECTION, SOLUTION INTRAMUSCULAR; INTRAVENOUS at 10:05

## 2024-05-23 RX ADMIN — PANTOPRAZOLE SODIUM 40 MG: 40 INJECTION, POWDER, FOR SOLUTION INTRAVENOUS at 08:05

## 2024-05-23 RX ADMIN — ACETAMINOPHEN 999.01 MG: 650 SOLUTION ORAL at 01:05

## 2024-05-23 RX ADMIN — MUPIROCIN: 20 OINTMENT TOPICAL at 10:05

## 2024-05-23 RX ADMIN — SODIUM CHLORIDE, POTASSIUM CHLORIDE, SODIUM LACTATE AND CALCIUM CHLORIDE: 600; 310; 30; 20 INJECTION, SOLUTION INTRAVENOUS at 08:05

## 2024-05-23 RX ADMIN — IBUPROFEN 600 MG: 100 SUSPENSION ORAL at 01:05

## 2024-05-23 RX ADMIN — METRONIDAZOLE 500 MG: 5 INJECTION, SOLUTION INTRAVENOUS at 03:05

## 2024-05-23 RX ADMIN — CIPROFLOXACIN 400 MG: 2 INJECTION, SOLUTION INTRAVENOUS at 03:05

## 2024-05-23 RX ADMIN — IBUPROFEN 600 MG: 600 TABLET, FILM COATED ORAL at 06:05

## 2024-05-23 RX ADMIN — DEXTROSE AND SODIUM CHLORIDE: 5; 900 INJECTION, SOLUTION INTRAVENOUS at 09:05

## 2024-05-23 RX ADMIN — TAMSULOSIN HYDROCHLORIDE 0.8 MG: 0.4 CAPSULE ORAL at 08:05

## 2024-05-23 RX ADMIN — METRONIDAZOLE 500 MG: 5 INJECTION, SOLUTION INTRAVENOUS at 08:05

## 2024-05-23 RX ADMIN — METRONIDAZOLE 500 MG: 5 INJECTION, SOLUTION INTRAVENOUS at 10:05

## 2024-05-23 RX ADMIN — ENOXAPARIN SODIUM 40 MG: 40 INJECTION SUBCUTANEOUS at 04:05

## 2024-05-23 RX ADMIN — NICOTINE 1 PATCH: 14 PATCH, EXTENDED RELEASE TRANSDERMAL at 08:05

## 2024-05-23 RX ADMIN — Medication: at 10:05

## 2024-05-23 RX ADMIN — ONDANSETRON 4 MG: 2 INJECTION INTRAMUSCULAR; INTRAVENOUS at 06:05

## 2024-05-23 RX ADMIN — GABAPENTIN 125 MG: 250 SOLUTION ORAL at 01:05

## 2024-05-23 RX ADMIN — Medication: at 04:05

## 2024-05-23 RX ADMIN — METHOCARBAMOL 1000 MG: 100 INJECTION, SOLUTION INTRAMUSCULAR; INTRAVENOUS at 06:05

## 2024-05-23 RX ADMIN — SCOPALAMINE 1 PATCH: 1 PATCH, EXTENDED RELEASE TRANSDERMAL at 08:05

## 2024-05-23 RX ADMIN — KETOROLAC TROMETHAMINE 15 MG: 15 INJECTION, SOLUTION INTRAMUSCULAR; INTRAVENOUS at 09:05

## 2024-05-23 RX ADMIN — CIPROFLOXACIN 400 MG: 2 INJECTION, SOLUTION INTRAVENOUS at 04:05

## 2024-05-23 RX ADMIN — ACETAMINOPHEN 999.01 MG: 650 SOLUTION ORAL at 09:05

## 2024-05-23 RX ADMIN — HEPARIN SODIUM 5000 UNITS: 5000 INJECTION INTRAVENOUS; SUBCUTANEOUS at 06:05

## 2024-05-23 RX ADMIN — GABAPENTIN 250 MG: 250 SOLUTION ORAL at 09:05

## 2024-05-23 RX ADMIN — MUPIROCIN: 20 OINTMENT TOPICAL at 08:05

## 2024-05-23 RX ADMIN — INSULIN GLARGINE 20 UNITS: 100 INJECTION, SOLUTION SUBCUTANEOUS at 08:05

## 2024-05-23 RX ADMIN — Medication: at 02:05

## 2024-05-23 RX ADMIN — ACETAMINOPHEN 1000 MG: 500 TABLET ORAL at 06:05

## 2024-05-23 NOTE — SUBJECTIVE & OBJECTIVE
Subjective:     Interval History: No acute events. AF and VSS on 2L NC. Feeling better this morning. Was able to get out of bed for a little bit yesterday, but has not yet worked with PT/OT. Reports pain at the ostomy is controlled, but not as much with the midline incision. NG with 1350 mL of bile tinged output in the last 24 hours and no notable ostomy output yet. Glen drain remains benign SS. Requesting nicotine patch.    Post-Op Info:  Procedure(s) (LRB):  LAPAROTOMY, EXPLORATORY (N/A)  SIGMOIDOSCOPY, FLEXIBLE  INSERTION, CATHETER, URETER (Left)  COLECTOMY, COMPLETION, ABDOMINAL (N/A)  CREATION, ILEOSTOMY, DIVERTING LOOP (N/A)  ANASTOMOSIS, ILEORECTAL (N/A)   2 Days Post-Op      Medications:  Continuous Infusions:   hydromorphone in 0.9 % NaCl 6 mg/30 ml   Intravenous Continuous   New Syringe/Bag at 05/23/24 0431    lactated ringers   Intravenous Continuous         Scheduled Meds:   acetaminophen  1,000 mg Oral Q8H    ciprofloxacin  400 mg Intravenous Q12H    gabapentin  125 mg Per NG tube Q8H    heparin (porcine)  5,000 Units Subcutaneous Q8H    ibuprofen  600 mg Oral Q8H    insulin glargine U-100  20 Units Subcutaneous Daily    methocarbamol (ROBAXIN) IVPB  1,000 mg Intravenous Q8H    metronidazole  500 mg Intravenous Q8H    mupirocin   Nasal BID    nicotine  1 patch Transdermal Daily    pantoprazole  40 mg Intravenous Daily    scopolamine  1 patch Transdermal Q3 Days    tamsulosin  0.8 mg Oral Daily     PRN Meds:   acetaminophen tablet 1,000 mg    ciprofloxacin (CIPRO)400mg/200ml D5W IVPB 400 mg    gabapentin 250 mg/5 mL (5 mL) solution 125 mg    heparin (porcine) injection 5,000 Units    ibuprofen tablet 600 mg    insulin glargine U-100 (Lantus) pen 20 Units    methocarbamoL (ROBAXIN) 1,000 mg in dextrose 5 % (D5W) 100 mL IVPB    metronidazole IVPB 500 mg    mupirocin 2 % ointment    nicotine 14 mg/24 hr 1 patch    pantoprazole injection 40 mg    scopolamine 1.3-1.5 mg (1 mg over 3 days) 1 patch     tamsulosin 24 hr capsule 0.8 mg        Objective:     Vital Signs (Most Recent):  Temp: 98.4 °F (36.9 °C) (05/23/24 0726)  Pulse: 89 (05/23/24 0726)  Resp: 20 (05/23/24 0726)  BP: (!) 147/69 (05/23/24 0726)  SpO2: 98 % (05/23/24 0726) Vital Signs (24h Range):  Temp:  [97.8 °F (36.6 °C)-98.9 °F (37.2 °C)] 98.4 °F (36.9 °C)  Pulse:  [87-94] 89  Resp:  [10-20] 20  SpO2:  [94 %-98 %] 98 %  BP: (128-152)/(62-75) 147/69     Intake/Output - Last 3 Shifts         05/21 0700 05/22 0659 05/22 0700 05/23 0659 05/23 0700 05/24 0659    I.V. (mL/kg) 3500 (35.1)      Other  0     NG/GT  600     IV Piggyback 1845.8      Total Intake(mL/kg) 5345.8 (53.6) 600 (6)     Urine (mL/kg/hr) 1875 1500 (0.6)     Drains 1000 1700     Stool 0 0     Total Output 2875 3200     Net +2470.8 -2600            Stool Occurrence  0 x              Physical Exam  Vitals and nursing note reviewed.   Constitutional:       General: He is not in acute distress.     Appearance: He is well-developed. He is not ill-appearing or diaphoretic.   HENT:      Head: Normocephalic and atraumatic.      Nose:      Comments: NGT with bile tinged output     Mouth/Throat:      Pharynx: Oropharynx is clear. No oropharyngeal exudate.   Eyes:      General: No scleral icterus.     Extraocular Movements: Extraocular movements intact.   Cardiovascular:      Rate and Rhythm: Normal rate and regular rhythm.   Pulmonary:      Effort: Pulmonary effort is normal. No respiratory distress.   Abdominal:      Comments: Midline incision with island dressing in place clean and dry  Ostomy without any output  DAWIT drain x1 with SS output  Soft, non-distended, appropriately tender   Musculoskeletal:         General: No deformity. Normal range of motion.   Skin:     General: Skin is warm and dry.   Neurological:      General: No focal deficit present.      Mental Status: He is alert.      Cranial Nerves: No cranial nerve deficit.   Psychiatric:         Mood and Affect: Mood normal.          Behavior: Behavior normal.            Significant Labs:  CBC (Last 3 Results):   Recent Labs   Lab 05/21/24  1411 05/22/24  0435 05/23/24  0242   WBC 32.75* 30.73* 34.15*   RBC 4.61 3.58* 3.85*   HGB 14.0 10.8* 11.4*   HCT 40.1 32.5* 34.8*   * 394 454*   MCV 87 91 90   MCH 30.4 30.2 29.6   MCHC 34.9 33.2 32.8     CMP (Last 3 Results):   Recent Labs   Lab 05/21/24  1411 05/22/24  0435 05/23/24  0242   * 602* 71   CALCIUM 8.4* 7.3* 8.9   ALBUMIN 2.3* 1.8* 2.1*   PROT 5.3* 4.4* 5.4*    124* 135*   K 4.9 3.8 4.1   CO2 23 22* 30*    91* 97   BUN 15 19 19   CREATININE 1.3 1.2 1.0   ALKPHOS 92 94 138*   ALT 29 19 19   AST 22 18 21   BILITOT 0.7 0.5 0.3     All pertinent lab results within the last 24 hours have been reviewed.     Significant Diagnostics:  I have reviewed all pertinent imaging results/findings within the past 24 hours.

## 2024-05-23 NOTE — RESPIRATORY THERAPY
"RAPID RESPONSE RESPIRATORY THERAPY ETCO2 CHECK         Time of visit: 752     Code Status: Full Code   : 1965  Bed: 1004/1004 A:   MRN: [id number]  Time spent at the bedside: < 15 min    SITUATION    Evaluated patient for: ETCo2 compliance    BACKGROUND    Why is the patient in the hospital?: Large bowel anastomotic leak    Patient has a past medical history of Colon cancer, Digestive disorder, DM (diabetes mellitus), Hyperlipidemia, Hypertension, and Prediabetes.    24 Hours Vitals Range:  Temp:  [97.8 °F (36.6 °C)-98.9 °F (37.2 °C)]   Pulse:  [89-94]   Resp:  [10-20]   BP: (134-152)/(69-75)   SpO2:  [94 %-98 %]     Labs:    Recent Labs     24  1411 24  0435 24  0242    124* 135*   K 4.9 3.8 4.1    91* 97   CO2 23 22* 30*   BUN 15 19 19   CREATININE 1.3 1.2 1.0   * 602* 71   PHOS 4.2 3.1 2.2*   MG 1.7 1.6 2.0        No results for input(s): "PH", "PCO2", "PO2", "HCO3", "POCSATURATED", "BE" in the last 72 hours.    ASSESSMENT/INTERVENTIONS      Last VS   Temp: 98.4 °F (36.9 °C) (726)  Pulse: 89 (726)  Resp: 20 (726)  BP: 147/69 (726)  SpO2: 98 % (726)    Level of Consciousness: Level of Consciousness (AVPU): alert  Respiratory Effort:   Expansion/Accessory Muscle Usage:    All Lung Field Breath Sounds:    Is the ETCO2 monitor on? Yes  Is the patient wearing a cannula? Yes  Are ETCO2 orders placed? Yes  Is the patient on a PCA pump? Yes  ETCO2 monitored: ETCO2 (mmHg): 42 mmHg  Ambu at bedside:      Active Orders   Respiratory Care    END TIDAL CO2 MONITOR Q12H     Frequency: Q12H     Number of Occurrences: Until Specified    Incentive spirometry     Frequency: Q4H     Number of Occurrences: Until Specified     Order Comments: Q4 while awake until discharge.  Educate patient in use; Keep incentive spirometer within reach; and Document incentive spirometer volume every 4 hours while awake.    ((10 sets per hour (3-5 inspirations per set)) on " original order)      Oxygen Continuous     Frequency: Continuous     Number of Occurrences: Until Specified     Order Questions:      Device type: Low flow      Device: Nasal Cannula (1- 5 Liters)      LPM: 1      Titrate O2 per Oxygen Titration Protocol: Yes      To maintain SpO2 goal of: >= 90%      Notify MD of: Inability to achieve desired SpO2; Sudden change in patient status and requires 20% increase in FiO2; Patient requires >60% FiO2    SMOKING CESSATION EDUCATION PER RESPIRATORY Once     Frequency: Once     Number of Occurrences: 1 Occurrences       RECOMMENDATIONS    We recommend: RRT Recs: Continue POC per primary team.      FOLLOW-UP    Please call back the Rapid Response RT, Marry Shultz, RRT at x [phone] for any questions or concerns.

## 2024-05-23 NOTE — SUBJECTIVE & OBJECTIVE
"Interval HPI:   Overnight events: NAEON. Remains in GISSU. POD 2. BG stable on current SQ insulin regimen. Diet NPO    Eating:   NPO  Nausea: No  Hypoglycemia and intervention: No  Fever: No  TPN and/or TF: No  If yes, type of TF/TPN and rate: n/a    BP (!) 147/69 (BP Location: Right arm, Patient Position: Lying)   Pulse 89   Temp 98.4 °F (36.9 °C) (Oral)   Resp 20   Wt 99.8 kg (220 lb 0.3 oz)   SpO2 98%   BMI 28.24 kg/m²     Labs Reviewed and Include    Recent Labs   Lab 05/23/24  0242   GLU 71   CALCIUM 8.9   ALBUMIN 2.1*   PROT 5.4*   *   K 4.1   CO2 30*   CL 97   BUN 19   CREATININE 1.0   ALKPHOS 138*   ALT 19   AST 21   BILITOT 0.3     Lab Results   Component Value Date    WBC 34.15 (H) 05/23/2024    HGB 11.4 (L) 05/23/2024    HCT 34.8 (L) 05/23/2024    MCV 90 05/23/2024     (H) 05/23/2024     No results for input(s): "TSH", "FREET4" in the last 168 hours.  Lab Results   Component Value Date    HGBA1C 6.5 (H) 05/22/2024       Nutritional status:   Body mass index is 28.24 kg/m².  Lab Results   Component Value Date    ALBUMIN 2.1 (L) 05/23/2024    ALBUMIN 1.8 (L) 05/22/2024    ALBUMIN 2.3 (L) 05/21/2024     No results found for: "PREALBUMIN"    Estimated Creatinine Clearance: 99.1 mL/min (based on SCr of 1 mg/dL).    Accu-Checks  Recent Labs     05/21/24  1524 05/21/24  2334 05/22/24  0553 05/22/24  1117 05/22/24  1709 05/22/24  2139 05/22/24  2336 05/23/24  0408   POCTGLUCOSE 273* 309* 213* 161* 180* 106 103 109       Current Medications and/or Treatments Impacting Glycemic Control  Immunotherapy:    Immunosuppressants       None          Steroids:   Hormones (From admission, onward)      None          Pressors:    Autonomic Drugs (From admission, onward)      None          Hyperglycemia/Diabetes Medications:   Antihyperglycemics (From admission, onward)      Start     Stop Route Frequency Ordered    05/22/24 1032  insulin aspart U-100 pen 1-10 Units         -- SubQ Every 4 hours PRN 05/22/24 " 0932    05/22/24 0945  insulin glargine U-100 (Lantus) pen 20 Units         -- SubQ Daily 05/22/24 0936

## 2024-05-23 NOTE — PROGRESS NOTES
[facility]  Colorectal Surgery  Progress Note    Patient Name: Osman Li Jr.  MRN: [id number]  Admission Date: 5/21/2024  Hospital Length of Stay: 2 days  Attending Physician: Farhan Lance MD    Subjective:     Interval History: No acute events. AF and VSS on 2L NC. Feeling better this morning. Was able to get out of bed for a little bit yesterday, but has not yet worked with PT/OT. Reports pain at the ostomy is controlled, but not as much with the midline incision. NG with 1350 mL of bile tinged output in the last 24 hours and no notable ostomy output yet. Glen drain remains benign SS. Requesting nicotine patch.    Post-Op Info:  Procedure(s) (LRB):  LAPAROTOMY, EXPLORATORY (N/A)  SIGMOIDOSCOPY, FLEXIBLE  INSERTION, CATHETER, URETER (Left)  COLECTOMY, COMPLETION, ABDOMINAL (N/A)  CREATION, ILEOSTOMY, DIVERTING LOOP (N/A)  ANASTOMOSIS, ILEORECTAL (N/A)   2 Days Post-Op      Medications:  Continuous Infusions:   hydromorphone in 0.9 % NaCl 6 mg/30 ml   Intravenous Continuous   New Syringe/Bag at 05/23/24 0431    lactated ringers   Intravenous Continuous         Scheduled Meds:   acetaminophen  1,000 mg Oral Q8H    ciprofloxacin  400 mg Intravenous Q12H    gabapentin  125 mg Per NG tube Q8H    heparin (porcine)  5,000 Units Subcutaneous Q8H    ibuprofen  600 mg Oral Q8H    insulin glargine U-100  20 Units Subcutaneous Daily    methocarbamol (ROBAXIN) IVPB  1,000 mg Intravenous Q8H    metronidazole  500 mg Intravenous Q8H    mupirocin   Nasal BID    nicotine  1 patch Transdermal Daily    pantoprazole  40 mg Intravenous Daily    scopolamine  1 patch Transdermal Q3 Days    tamsulosin  0.8 mg Oral Daily     PRN Meds:   acetaminophen tablet 1,000 mg    ciprofloxacin (CIPRO)400mg/200ml D5W IVPB 400 mg    gabapentin 250 mg/5 mL (5 mL) solution 125 mg    heparin (porcine) injection 5,000 Units    ibuprofen tablet 600 mg    insulin glargine U-100 (Lantus) pen 20 Units    methocarbamoL (ROBAXIN) 1,000 mg  in dextrose 5 % (D5W) 100 mL IVPB    metronidazole IVPB 500 mg    mupirocin 2 % ointment    nicotine 14 mg/24 hr 1 patch    pantoprazole injection 40 mg    scopolamine 1.3-1.5 mg (1 mg over 3 days) 1 patch    tamsulosin 24 hr capsule 0.8 mg        Objective:     Vital Signs (Most Recent):  Temp: 98.4 °F (36.9 °C) (05/23/24 0726)  Pulse: 89 (05/23/24 0726)  Resp: 20 (05/23/24 0726)  BP: (!) 147/69 (05/23/24 0726)  SpO2: 98 % (05/23/24 0726) Vital Signs (24h Range):  Temp:  [97.8 °F (36.6 °C)-98.9 °F (37.2 °C)] 98.4 °F (36.9 °C)  Pulse:  [87-94] 89  Resp:  [10-20] 20  SpO2:  [94 %-98 %] 98 %  BP: (128-152)/(62-75) 147/69     Intake/Output - Last 3 Shifts         05/21 0700  05/22 0659 05/22 0700  05/23 0659 05/23 0700  05/24 0659    I.V. (mL/kg) 3500 (35.1)      Other  0     NG/GT  600     IV Piggyback 1845.8      Total Intake(mL/kg) 5345.8 (53.6) 600 (6)     Urine (mL/kg/hr) 1875 1500 (0.6)     Drains 1000 1700     Stool 0 0     Total Output 2875 3200     Net +2470.8 -2600            Stool Occurrence  0 x              Physical Exam  Vitals and nursing note reviewed.   Constitutional:       General: He is not in acute distress.     Appearance: He is well-developed. He is not ill-appearing or diaphoretic.   HENT:      Head: Normocephalic and atraumatic.      Nose:      Comments: NGT with bile tinged output     Mouth/Throat:      Pharynx: Oropharynx is clear. No oropharyngeal exudate.   Eyes:      General: No scleral icterus.     Extraocular Movements: Extraocular movements intact.   Cardiovascular:      Rate and Rhythm: Normal rate and regular rhythm.   Pulmonary:      Effort: Pulmonary effort is normal. No respiratory distress.   Abdominal:      Comments: Midline incision with island dressing in place clean and dry  Ostomy without any output  DAWIT drain x1 with SS output  Soft, non-distended, appropriately tender   Musculoskeletal:         General: No deformity. Normal range of motion.   Skin:     General: Skin is warm  and dry.   Neurological:      General: No focal deficit present.      Mental Status: He is alert.      Cranial Nerves: No cranial nerve deficit.   Psychiatric:         Mood and Affect: Mood normal.         Behavior: Behavior normal.            Significant Labs:  CBC (Last 3 Results):   Recent Labs   Lab 05/21/24  1411 05/22/24  0435 05/23/24  0242   WBC 32.75* 30.73* 34.15*   RBC 4.61 3.58* 3.85*   HGB 14.0 10.8* 11.4*   HCT 40.1 32.5* 34.8*   * 394 454*   MCV 87 91 90   MCH 30.4 30.2 29.6   MCHC 34.9 33.2 32.8     CMP (Last 3 Results):   Recent Labs   Lab 05/21/24  1411 05/22/24  0435 05/23/24  0242   * 602* 71   CALCIUM 8.4* 7.3* 8.9   ALBUMIN 2.3* 1.8* 2.1*   PROT 5.3* 4.4* 5.4*    124* 135*   K 4.9 3.8 4.1   CO2 23 22* 30*    91* 97   BUN 15 19 19   CREATININE 1.3 1.2 1.0   ALKPHOS 92 94 138*   ALT 29 19 19   AST 22 18 21   BILITOT 0.7 0.5 0.3     All pertinent lab results within the last 24 hours have been reviewed.     Significant Diagnostics:  I have reviewed all pertinent imaging results/findings within the past 24 hours.  Assessment/Plan:     Malignant neoplasm of transverse colon  Osman Li Jr. is a 59 y.o. 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 He was transferred to the hospital with concern for internal hernia with closed loop bowel obstruction now s/p ex-lap where he was found to have a perforation near his anastomosis. He is s/p completion colectomy, ileorectal anastomosis, diverting loop ileostomy, and flexible sigmoidoscopy on 5/21/24.    - Periop Cipro/flagyl x 4 days (day 2/4)  - MM pain meds: mary tylenol, ibuprofen, robaxin, dPCA. Add gabapentin elixer today. NGT has been to suction even after medication administration. Will place nursing  communication to clamp for 30 minutes after administration to allow for absorption  - NPO  - mIVF  - NGT to SARAHI  - DAWIT to bulb suction  - PRN nausea meds. Add scop patch today  - Add nicotine patch  - PT/OT  - DVT ppx          Belem Vernon MD  Colorectal Surgery  [facility]

## 2024-05-23 NOTE — PT/OT/SLP EVAL
Physical Therapy Evaluation    Patient Name:  Osman Li Jr.   MRN:  [id number]    Recommendations:     Discharge Recommendations: No Therapy Indicated   Discharge Equipment Recommendations: none   Barriers to discharge: None    Assessment:     Osman Li Jr. is a 59 y.o. male admitted with a medical diagnosis of Large bowel anastomotic leak.  He presents with the following impairments/functional limitations: impaired endurance, impaired self care skills, impaired functional mobility, weakness, gait instability, impaired balance, pain Pt. cooperative and tolerated treatment well. Pt. progressing with mobility.    Rehab Prognosis: Good; patient would benefit from acute skilled PT services to address these deficits and reach maximum level of function.    Recent Surgery: Procedure(s) (LRB):  LAPAROTOMY, EXPLORATORY (N/A)  SIGMOIDOSCOPY, FLEXIBLE  INSERTION, CATHETER, URETER (Left)  COLECTOMY, COMPLETION, ABDOMINAL (N/A)  CREATION, ILEOSTOMY, DIVERTING LOOP (N/A)  ANASTOMOSIS, ILEORECTAL (N/A) 2 Days Post-Op    Plan:     During this hospitalization, patient to be seen 4 x/week to address the identified rehab impairments via therapeutic activities, gait training, therapeutic exercises and progress toward the following goals:    Plan of Care Expires:  06/22/24    Subjective     Chief Complaint: abd. discomfort  Patient/Family Comments/goals: pt. Agreeable to PT  Pain/Comfort:  Pain Rating 1: 7/10  Location - Orientation 1: generalized  Location 1: abdomen  Pain Addressed 1: Reposition, Pre-medicate for activity, Distraction, Cessation of Activity  Pain Rating Post-Intervention 1:  (pt. did not rate)    Patients cultural, spiritual, Synagogue conflicts given the current situation: no    Living Environment:  Pt. Lives with spouse and elderly in-laws in 2-story home with 17 steps to bedroom/bathroom upstairs with (R) handrail.  Prior to admission, patients level of function was indep.  Equipment used  at home: walker, rolling, cane, straight, crutches, wheelchair, raised toilet.  Upon discharge, patient will have assistance from spouse.    Objective:     Communicated with nursing prior to session.  Patient found up in chair with NG tube, peripheral IV, blas catheter  upon PT entry to room.    General Precautions: Standard, fall  Orthopedic Precautions:N/A   Braces: N/A  Respiratory Status: Nasal cannula, flow 2 L/min    Exams:  RLE ROM: WFL  RLE Strength: WFL  LLE ROM: WFL  LLE Strength: WFL    Functional Mobility:  Transfers:     Sit to Stand:  stand by assistance with rolling walker  Bed to Chair: stand by assistance with  rolling walker  using  Stand Pivot  Gait: 200' with RW and 50' without AD with SBA. Pt. Amb. With slow, steady gait without LOB.  Balance: fair+      AM-PAC 6 CLICK MOBILITY  Total Score:18       Treatment & Education:  Discussed therapy needs, goals, safety with mobility, and POC.    Patient left up in chair with all lines intact and call button in reach.    GOALS:   Multidisciplinary Problems       Physical Therapy Goals          Problem: Physical Therapy    Goal Priority Disciplines Outcome Goal Variances Interventions   Physical Therapy Goal     PT, PT/OT Progressing     Description: Goals to be met by: 2024     Patient will increase functional independence with mobility by performin. Supine to sit with Set-up Stafford  2. Sit to supine with Set-up Stafford  3. Sit to stand transfer with Supervision  4. Bed to chair transfer with Supervision using No Assistive Device  5. Gait  x 300 feet with Supervision using No Assistive Device.   6. Ascend/descend 20 stairs with right Handrail Stand-by Assistance using No Assistive Device.   7. Lower extremity exercise program x15 reps per handout, with supervision                         History:     Past Medical History:   Diagnosis Date    Colon cancer     Digestive disorder     DM (diabetes mellitus)     Hyperlipidemia      Hypertension     Prediabetes        Past Surgical History:   Procedure Laterality Date    ADENOIDECTOMY  1972    ANASTOMOSIS OF ILEUM TO RECTUM N/A 5/21/2024    Procedure: ANASTOMOSIS, ILEORECTAL;  Surgeon: Farhan Lance MD;  [facility];  Service: Colon and Rectal;  Laterality: N/A;    CHOLECYSTECTOMY  2011    CLOSURE, COLOSTOMY N/A 5/13/2024    Procedure: CLOSURE, COLOSTOMY (eras high/lithotomy);  Surgeon: Farhan Lance MD;  [facility];  Service: Colon and Rectal;  Laterality: N/A;  CONSENTS IN [facility]    COLON SURGERY      COLONOSCOPY      2018    COLONOSCOPY N/A 3/5/2024    Procedure: COLONOSCOPY;  Surgeon: Farhan Lance MD;  Location: [facility] (4TH FLR);  Service: Endoscopy;  Laterality: N/A;  2/27/24-K[facility]man pt, 1-4wks, port, PEG plus 2 enemas morning of procedure, instr portal-DS  2/28/24- LVM for precall - ERW  pt confirmed procedure. cf    COLOSTOMY N/A 04/25/2022    Procedure: CREATION, COLOSTOMY;  Surgeon: Carlton Waddell MD;  Location: [facility] OR;  Service: General;  Laterality: N/A;    ENDOSCOPIC ULTRASOUND OF UPPER GASTROINTESTINAL TRACT N/A 01/06/2023    Procedure: ULTRASOUND, UPPER GI TRACT, ENDOSCOPIC;  Surgeon: Rinku Orozco III, MD;  Location: [facility];  Service: Endoscopy;  Laterality: N/A;    FLEXIBLE SIGMOIDOSCOPY N/A 5/13/2024    Procedure: SIGMOIDOSCOPY, FLEXIBLE;  Surgeon: Farhan Lance MD;  [facility];  Service: Colon and Rectal;  Laterality: N/A;    FLEXIBLE SIGMOIDOSCOPY  5/21/2024    Procedure: SIGMOIDOSCOPY, FLEXIBLE;  Surgeon: Farhan Lance MD;  [facility];  Service: Colon and Rectal;;    ILEOSTOMY N/A 5/21/2024    Procedure: CREATION, ILEOSTOMY, DIVERTING LOOP;  Surgeon: Farhan Lance MD;  [facility];  Service: Colon and Rectal;  Laterality: N/A;    INSERTION OF TUNNELED CENTRAL VENOUS CATHETER (CVC) WITH SUBCUTANEOUS PORT Right 05/18/2022    Procedure:  HLOZFOROH-DBLU-F-CATH;  Surgeon: Carlton Waddell MD;  Location: [facility];  Service: General;  Laterality: Right;    INSERTION OF URETERAL CATHETER N/A 5/13/2024    Procedure: INSERTION, CATHETER, URETER, BILATERAL;  Surgeon: Travis Edwards MD;  Location: [facility] OR 2ND FLR;  Service: Urology;  Laterality: N/A;    INSERTION OF URETERAL CATHETER Left 5/21/2024    Procedure: INSERTION, CATHETER, URETER;  Surgeon: Carlton Santos MD;  Location: [facility] OR 2ND FLR;  Service: Urology;  Laterality: Left;    LAPAROTOMY, EXPLORATORY N/A 5/21/2024    Procedure: LAPAROTOMY, EXPLORATORY;  Surgeon: Farhan Lance MD;  Location: [facility] OR 2ND FLR;  Service: Colon and Rectal;  Laterality: N/A;    LYSIS OF ADHESIONS N/A 5/13/2024    Procedure: LYSIS, ADHESIONS;  Surgeon: Farhan Lance MD;  Location: [facility] OR 2ND FLR;  Service: Colon and Rectal;  Laterality: N/A;    MOBILIZATION OF SPLENIC FLEXURE N/A 04/25/2022    Procedure: MOBILIZATION, SPLENIC FLEXURE;  Surgeon: Carlton Waddell MD;  Location: [facility] OR;  Service: General;  Laterality: N/A;    SPLENECTOMY N/A 04/25/2022    Procedure: SPLENECTOMY;  Surgeon: Carlton Waddell MD;  Location: [facility] OR;  Service: General;  Laterality: N/A;    SUBTOTAL COLECTOMY N/A 04/25/2022    Procedure: COLECTOMY, PARTIAL;  Surgeon: Carlton Waddell MD;  Location: [facility] OR;  Service: General;  Laterality: N/A;    TONSILLECTOMY      TOTAL ABDOMINAL COLECTOMY N/A 5/21/2024    Procedure: COLECTOMY, COMPLETION, ABDOMINAL;  Surgeon: Farhan Lance MD;  Location: [facility] OR 2ND FLR;  Service: Colon and Rectal;  Laterality: N/A;    TUMOR REMOVAL  10/18/2023    on top of the nose    VASECTOMY  1994       Time Tracking:     PT Received On: 05/23/24  PT Start Time: 1335     PT Stop Time: 1353  PT Total Time (min): 18 min     Billable Minutes: Evaluation 10 and Gait Training 8      05/23/2024

## 2024-05-23 NOTE — PLAN OF CARE
Problem: Physical Therapy  Goal: Physical Therapy Goal  Description: Goals to be met by: 2024     Patient will increase functional independence with mobility by performin. Supine to sit with Set-up Riley  2. Sit to supine with Set-up Riley  3. Sit to stand transfer with Supervision  4. Bed to chair transfer with Supervision using No Assistive Device  5. Gait  x 300 feet with Supervision using No Assistive Device.   6. Ascend/descend 20 stairs with right Handrail Stand-by Assistance using No Assistive Device.   7. Lower extremity exercise program x15 reps per handout, with supervision    Outcome: Progressing

## 2024-05-23 NOTE — PROGRESS NOTES
[facility]  Endocrinology  Progress Note    Admit Date: 5/21/2024     Reason for Consult: Management of T2DM, Hyperglycemia     Surgical Procedure and Date: s/p 5/21-Open completion colectomy with ileorectal anastomosis, Diverting loop ileostomy, Flexible sigmoidoscopy    Diabetes diagnosis year: >5 years ago    Home Diabetes Medications:  Toujeo 50 units.  Metformin    How often checking glucose at home? 1-3 x day   BG readings on regimen: 100s  Hypoglycemia on the regimen?  No  Missed doses on regimen?  No    Diabetes Complications include:     Hyperglycemia    Complicating diabetes co morbidities:   HLD, HTN      HPI:   Patient is a 59 y.o. male with with a history of HTN, HLD, and DM, Stage IV transverse colon adenocarcinoma who was recently discharged from the hospital on 5/18/24. 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 Post operatively he developed an ileus which improved with conservative management. He was able to be discharged on POD#5. At that time he was tolerating a regular diet, ambulating, voiding spontaneously, having BMs, and had adequate pain control.      Unfortunately [facility]24 with worsening left lower quadrant pain and nonbilious, nonbloody vomiting. Upon arrival there he was febrile to 103 and tachycardic, but HDS on RA. Labs with an elevated WBC to 22.8 and slight anemia but otherwise unremarkable. CT A/P was concerning for an internal hernia with closed loop obstruction and so he was transferred to [facility] for higher level of care. S/p completion colectomy, ileorectal anastomosis, diverting loop ileostomy, and flexible sigmoidoscopy on 5/21/24.         Interval HPI:   Overnight events: NAEON. Remains in [facility]. POD 2. BG stable on current SQ insulin regimen. Diet NPO    Eating:    "NPO  Nausea: No  Hypoglycemia and intervention: No  Fever: No  TPN and/or TF: No  If yes, type of TF/TPN and rate: n/a    BP (!) 147/69 (BP Location: Right arm, Patient Position: Lying)   Pulse 89   Temp 98.4 °F (36.9 °C) (Oral)   Resp 20   Wt 99.8 kg (220 lb 0.3 oz)   SpO2 98%   BMI 28.24 kg/m²     Labs Reviewed and Include    Recent Labs   Lab 05/23/24  0242   GLU 71   CALCIUM 8.9   ALBUMIN 2.1*   PROT 5.4*   *   K 4.1   CO2 30*   CL 97   BUN 19   CREATININE 1.0   ALKPHOS 138*   ALT 19   AST 21   BILITOT 0.3     Lab Results   Component Value Date    WBC 34.15 (H) 05/23/2024    HGB 11.4 (L) 05/23/2024    HCT 34.8 (L) 05/23/2024    MCV 90 05/23/2024     (H) 05/23/2024     No results for input(s): "TSH", "FREET4" in the last 168 hours.  Lab Results   Component Value Date    HGBA1C 6.5 (H) 05/22/2024       Nutritional status:   Body mass index is 28.24 kg/m².  Lab Results   Component Value Date    ALBUMIN 2.1 (L) 05/23/2024    ALBUMIN 1.8 (L) 05/22/2024    ALBUMIN 2.3 (L) 05/21/2024     No results found for: "PREALBUMIN"    Estimated Creatinine Clearance: 99.1 mL/min (based on SCr of 1 mg/dL).    Accu-Checks  Recent Labs     05/21/24  1524 05/21/24  2334 05/22/24  0553 05/22/24  1117 05/22/24  1709 05/22/24  2139 05/22/24  2336 05/23/24  0408   POCTGLUCOSE 273* 309* 213* 161* 180* 106 103 109       Current Medications and/or Treatments Impacting Glycemic Control  Immunotherapy:    Immunosuppressants       None          Steroids:   Hormones (From admission, onward)      None          Pressors:    Autonomic Drugs (From admission, onward)      None          Hyperglycemia/Diabetes Medications:   Antihyperglycemics (From admission, onward)      Start     Stop Route Frequency Ordered    05/22/24 1032  insulin aspart U-100 pen 1-10 Units         -- SubQ Every 4 hours PRN 05/22/24 0932    05/22/24 0945  insulin glargine U-100 (Lantus) pen 20 Units         -- SubQ Daily 05/22/24 0936            ASSESSMENT " and PLAN    Cardiac/Vascular  HLD (hyperlipidemia)  Managed per primary team  Optimize bg control        Endocrine  Type 2 diabetes mellitus  BG goal: 140-180  T2DM S/p completion colectomy, ileorectal anastomosis, diverting loop ileostomy, and flexible sigmoidoscopy on 5/21/24. Periop dex likely causing elevations.  BG above goal.  NPO. Over basal at home. Will start on basal  at 0.4 WBD and monitor w/ q4 checks    - Continue Lantus 20 units daily   - Novolog Moderate dose correction with ISF 25 starting at 150    - POCT Glucose every 4 hours  - Hypoglycemia protocol in place      ** Please notify Endocrine for any change and/or advance in diet**  ** Please call Endocrine for any BG related issues **     Discharge Planning:   TBD. Please notify endocrinology prior to discharge.        GI  * Large bowel anastomotic leak  Managed per primary team  Optimize bg control            Kasey Braga, NP  Endocrinology  [facility]

## 2024-05-23 NOTE — NURSING
[facility] Plan of Care Note    Dx:   SBO (small bowel obstruction) [K56.609]    Shift Events: provider came to bedside to talk with pt about pain control     Goals of Care: drain care, blas care, ostomy care, NG tube; manage pain    Neuro: AO x4    Vital Signs: BP (!) 148/71 (Patient Position: Lying)   Pulse 94   Temp 97.9 °F (36.6 °C) (Oral)   Resp 10   Wt 99.8 kg (220 lb 0.3 oz)   SpO2 96%   BMI 28.24 kg/m²     Respiratory: 2L NC    Diet: Diet NPO      Is patient tolerating current diet? yes    GTTS:  PCA pump    Urine Output/Bowel Movement:   I/O this shift:  In: 0   Out: 2080 [Urine:600; Drains:1480]  Last Bowel Movement: 05/18/24      Drains/Tubes/Tube Feeds (include total output/shift):   I/O this shift:  In: 0   Out: 2080 [Urine:600; Drains:1480]      Lines: 20g Left hand      Accuchecks:Q4    Skin: midline incision; DAWIT drain RLQ; ileostomy LLQ    Fall Risk Score: 14    Activity level? Stand by assist    Any scheduled procedures? none    Any safety concerns? fall

## 2024-05-23 NOTE — PROGRESS NOTES
[facility]  Wound Care    Patient Name:  Osman Li Jr.   MRN:  [id number]  Date: 5/23/2024  Diagnosis: Large bowel anastomotic leak    History:     Past Medical History:   Diagnosis Date    Colon cancer     Digestive disorder     DM (diabetes mellitus)     Hyperlipidemia     Hypertension     Prediabetes        Social History     Socioeconomic History    Marital status:    Tobacco Use    Smoking status: Every Day     Current packs/day: 1.00     Average packs/day: 1 pack/day for 40.0 years (40.0 ttl pk-yrs)     Types: Cigarettes     Passive exposure: Current    Smokeless tobacco: Never   Substance and Sexual Activity    Alcohol use: Not Currently    Drug use: Never    Sexual activity: Yes     Partners: Female     Social Determinants of Health     Financial Resource Strain: Low Risk  (5/22/2024)    Overall Financial Resource Strain (CARDIA)     Difficulty of Paying Living Expenses: Not hard at all   Food Insecurity: No Food Insecurity (5/22/2024)    Hunger Vital Sign     Worried About Running Out of Food in the Last Year: Never true     Ran Out of Food in the Last Year: Never true   Transportation Needs: No Transportation Needs (5/22/2024)    TRANSPORTATION NEEDS     Transportation : No   Physical Activity: Inactive (5/22/2024)    Exercise Vital Sign     Days of Exercise per Week: 0 days     Minutes of Exercise per Session: 0 min   Stress: No Stress Concern Present (5/22/2024)    [employer] - Occupational Stress Questionnaire     Feeling of Stress : Not at all   Housing Stability: Low Risk  (5/22/2024)    Housing Stability Vital Sign     Unable to Pay for Housing in the Last Year: No     Homeless in the Last Year: No       Precautions:     Allergies as of 05/21/2024 - Reviewed 05/21/2024   Allergen Reaction Noted    Penicillins Rash 08/11/2020       WO Assessment Details/Treatment   Patient seen for wound care consultation.   Reviewed chart for this encounter.    See Flow Sheet for findings.    Patient seen for ostomy care. The ostomy pouch is intact, with a good seal. The patient is independent in ostomy care. He has a new stoma with a alisha/bridge in place. Wound care will help the patient select an appropriate pouch for the new stoma. The patient used a Coloplast flat pouch previously. Supplies at the bedside. Wound care will follow-up.     RECOMMENDATIONS:  - Ostomy: bedside nursing to empty when it is 1/2 full; change 2x/week  - Turning every 2 hours  - Bedside nursing to maintain pressure injury prevention interventions     05/23/24 0755        Ileostomy 05/21/24 1314 Loop LLQ   Placement Date/Time: 05/21/24 1314   Present Prior to Hospital Arrival?: No  Inserted by: MD  Ileostomy Type: (c) Loop  Location: LLQ   Stoma Appearance bridge in place   Appliance 1-piece;no leakage;intact   Stoma Function bowel sweat   Tolerance independent w/ stoma care;independent w/ appliance change     05/23/2024

## 2024-05-23 NOTE — PLAN OF CARE
Problem: Adult Inpatient Plan of Care  Goal: Patient-Specific Goal (Individualized)  Outcome: Progressing  Goal: Absence of Hospital-Acquired Illness or Injury  Outcome: Progressing  Goal: Readiness for Transition of Care  Outcome: Progressing

## 2024-05-23 NOTE — ASSESSMENT & PLAN NOTE
BG goal: 140-180  T2DM S/p completion colectomy, ileorectal anastomosis, diverting loop ileostomy, and flexible sigmoidoscopy on 5/21/24. Periop dex likely causing elevations.  BG above goal.  NPO. Over basal at home. Will start on basal  at 0.4 WBD and monitor w/ q4 checks    - Continue Lantus 20 units daily   - Novolog Moderate dose correction with ISF 25 starting at 150    - POCT Glucose every 4 hours  - Hypoglycemia protocol in place      ** Please notify Endocrine for any change and/or advance in diet**  ** Please call Endocrine for any BG related issues **     Discharge Planning:   TBD. Please notify endocrinology prior to discharge.

## 2024-05-23 NOTE — ASSESSMENT & PLAN NOTE
Osman Li Jr. is a 59 y.o. 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 He was transferred to the hospital with concern for internal hernia with closed loop bowel obstruction now s/p ex-lap where he was found to have a perforation near his anastomosis. He is s/p completion colectomy, ileorectal anastomosis, diverting loop ileostomy, and flexible sigmoidoscopy on 5/21/24.    - Periop Cipro/flagyl x 4 days (day 2/4)  - MM pain meds: mary tylenol, ibuprofen, robaxin, dPCA. Add gabapentin elixer today. NGT has been to suction even after medication administration. Will place nursing communication to clamp for 30 minutes after administration to allow for absorption  - NPO  - mIVF  - NGT to LIWS  - DAWIT to bulb suction  - PRN nausea meds. Add scop patch today  - Add nicotine patch  - PT/OT  - DVT ppx

## 2024-05-24 LAB
ALBUMIN SERPL BCP-MCNC: 2 G/DL (ref 3.5–5.2)
ALP SERPL-CCNC: 109 U/L (ref 55–135)
ALT SERPL W/O P-5'-P-CCNC: 22 U/L (ref 10–44)
ANION GAP SERPL CALC-SCNC: 6 MMOL/L (ref 8–16)
AST SERPL-CCNC: 18 U/L (ref 10–40)
BASOPHILS # BLD AUTO: 0.06 K/UL (ref 0–0.2)
BASOPHILS NFR BLD: 0.3 % (ref 0–1.9)
BILIRUB SERPL-MCNC: 0.4 MG/DL (ref 0.1–1)
BUN SERPL-MCNC: 16 MG/DL (ref 6–20)
CALCIUM SERPL-MCNC: 9 MG/DL (ref 8.7–10.5)
CHLORIDE SERPL-SCNC: 99 MMOL/L (ref 95–110)
CO2 SERPL-SCNC: 30 MMOL/L (ref 23–29)
CREAT SERPL-MCNC: 0.9 MG/DL (ref 0.5–1.4)
CRP SERPL-MCNC: 150.7 MG/L (ref 0–8.2)
DIFFERENTIAL METHOD BLD: ABNORMAL
EOSINOPHIL # BLD AUTO: 0.5 K/UL (ref 0–0.5)
EOSINOPHIL NFR BLD: 2.3 % (ref 0–8)
ERYTHROCYTE [DISTWIDTH] IN BLOOD BY AUTOMATED COUNT: 14.2 % (ref 11.5–14.5)
EST. GFR  (NO RACE VARIABLE): >60 ML/MIN/1.73 M^2
GLUCOSE SERPL-MCNC: 97 MG/DL (ref 70–110)
HCT VFR BLD AUTO: 31.9 % (ref 40–54)
HGB BLD-MCNC: 10.9 G/DL (ref 14–18)
IMM GRANULOCYTES # BLD AUTO: 0.15 K/UL (ref 0–0.04)
IMM GRANULOCYTES NFR BLD AUTO: 0.7 % (ref 0–0.5)
LYMPHOCYTES # BLD AUTO: 2.5 K/UL (ref 1–4.8)
LYMPHOCYTES NFR BLD: 12 % (ref 18–48)
MAGNESIUM SERPL-MCNC: 2 MG/DL (ref 1.6–2.6)
MCH RBC QN AUTO: 30 PG (ref 27–31)
MCHC RBC AUTO-ENTMCNC: 34.2 G/DL (ref 32–36)
MCV RBC AUTO: 88 FL (ref 82–98)
MONOCYTES # BLD AUTO: 1.2 K/UL (ref 0.3–1)
MONOCYTES NFR BLD: 5.7 % (ref 4–15)
NEUTROPHILS # BLD AUTO: 16.4 K/UL (ref 1.8–7.7)
NEUTROPHILS NFR BLD: 79 % (ref 38–73)
NRBC BLD-RTO: 0 /100 WBC
PHOSPHATE SERPL-MCNC: 2.8 MG/DL (ref 2.7–4.5)
PLATELET # BLD AUTO: 520 K/UL (ref 150–450)
PMV BLD AUTO: 9.5 FL (ref 9.2–12.9)
POCT GLUCOSE: 106 MG/DL (ref 70–110)
POCT GLUCOSE: 107 MG/DL (ref 70–110)
POCT GLUCOSE: 110 MG/DL (ref 70–110)
POCT GLUCOSE: 112 MG/DL (ref 70–110)
POCT GLUCOSE: 122 MG/DL (ref 70–110)
POCT GLUCOSE: 140 MG/DL (ref 70–110)
POCT GLUCOSE: 145 MG/DL (ref 70–110)
POCT GLUCOSE: 81 MG/DL (ref 70–110)
POTASSIUM SERPL-SCNC: 3.8 MMOL/L (ref 3.5–5.1)
PROT SERPL-MCNC: 5.3 G/DL (ref 6–8.4)
RBC # BLD AUTO: 3.63 M/UL (ref 4.6–6.2)
SODIUM SERPL-SCNC: 135 MMOL/L (ref 136–145)
WBC # BLD AUTO: 20.74 K/UL (ref 3.9–12.7)

## 2024-05-24 PROCEDURE — S4991 NICOTINE PATCH NONLEGEND: HCPCS | Performed by: STUDENT IN AN ORGANIZED HEALTH CARE EDUCATION/TRAINING PROGRAM

## 2024-05-24 PROCEDURE — 63600175 PHARM REV CODE 636 W HCPCS: Mod: JZ,JG | Performed by: SURGERY

## 2024-05-24 PROCEDURE — 99900035 HC TECH TIME PER 15 MIN (STAT)

## 2024-05-24 PROCEDURE — 85025 COMPLETE CBC W/AUTO DIFF WBC: CPT | Performed by: SURGERY

## 2024-05-24 PROCEDURE — 36573 INSJ PICC RS&I 5 YR+: CPT

## 2024-05-24 PROCEDURE — B4185 PARENTERAL SOL 10 GM LIPIDS: HCPCS | Performed by: STUDENT IN AN ORGANIZED HEALTH CARE EDUCATION/TRAINING PROGRAM

## 2024-05-24 PROCEDURE — 63600175 PHARM REV CODE 636 W HCPCS: Mod: JZ,JG | Performed by: STUDENT IN AN ORGANIZED HEALTH CARE EDUCATION/TRAINING PROGRAM

## 2024-05-24 PROCEDURE — 84100 ASSAY OF PHOSPHORUS: CPT | Performed by: SURGERY

## 2024-05-24 PROCEDURE — 99232 SBSQ HOSP IP/OBS MODERATE 35: CPT | Mod: ,,, | Performed by: NURSE PRACTITIONER

## 2024-05-24 PROCEDURE — 76937 US GUIDE VASCULAR ACCESS: CPT

## 2024-05-24 PROCEDURE — C1751 CATH, INF, PER/CENT/MIDLINE: HCPCS

## 2024-05-24 PROCEDURE — 36415 COLL VENOUS BLD VENIPUNCTURE: CPT | Performed by: SURGERY

## 2024-05-24 PROCEDURE — 80053 COMPREHEN METABOLIC PANEL: CPT | Performed by: SURGERY

## 2024-05-24 PROCEDURE — 63600175 PHARM REV CODE 636 W HCPCS: Performed by: STUDENT IN AN ORGANIZED HEALTH CARE EDUCATION/TRAINING PROGRAM

## 2024-05-24 PROCEDURE — A4217 STERILE WATER/SALINE, 500 ML: HCPCS | Performed by: STUDENT IN AN ORGANIZED HEALTH CARE EDUCATION/TRAINING PROGRAM

## 2024-05-24 PROCEDURE — C9113 INJ PANTOPRAZOLE SODIUM, VIA: HCPCS | Performed by: STUDENT IN AN ORGANIZED HEALTH CARE EDUCATION/TRAINING PROGRAM

## 2024-05-24 PROCEDURE — 20600001 HC STEP DOWN PRIVATE ROOM

## 2024-05-24 PROCEDURE — 97116 GAIT TRAINING THERAPY: CPT | Mod: CQ

## 2024-05-24 PROCEDURE — 02HV33Z INSERTION OF INFUSION DEVICE INTO SUPERIOR VENA CAVA, PERCUTANEOUS APPROACH: ICD-10-PCS | Performed by: STUDENT IN AN ORGANIZED HEALTH CARE EDUCATION/TRAINING PROGRAM

## 2024-05-24 PROCEDURE — 86140 C-REACTIVE PROTEIN: CPT | Performed by: SURGERY

## 2024-05-24 PROCEDURE — A4216 STERILE WATER/SALINE, 10 ML: HCPCS | Performed by: STUDENT IN AN ORGANIZED HEALTH CARE EDUCATION/TRAINING PROGRAM

## 2024-05-24 PROCEDURE — 25000003 PHARM REV CODE 250: Performed by: STUDENT IN AN ORGANIZED HEALTH CARE EDUCATION/TRAINING PROGRAM

## 2024-05-24 PROCEDURE — 83735 ASSAY OF MAGNESIUM: CPT | Performed by: SURGERY

## 2024-05-24 RX ORDER — SODIUM CHLORIDE 0.9 % (FLUSH) 0.9 %
10 SYRINGE (ML) INJECTION EVERY 6 HOURS
Status: DISCONTINUED | OUTPATIENT
Start: 2024-05-24 | End: 2024-05-29 | Stop reason: HOSPADM

## 2024-05-24 RX ORDER — SILODOSIN 4 MG/1
4 CAPSULE ORAL DAILY
Status: CANCELLED | OUTPATIENT
Start: 2024-05-24

## 2024-05-24 RX ORDER — SODIUM CHLORIDE 0.9 % (FLUSH) 0.9 %
10 SYRINGE (ML) INJECTION
Status: DISCONTINUED | OUTPATIENT
Start: 2024-05-24 | End: 2024-05-29 | Stop reason: HOSPADM

## 2024-05-24 RX ADMIN — KETOROLAC TROMETHAMINE 15 MG: 15 INJECTION, SOLUTION INTRAMUSCULAR; INTRAVENOUS at 01:05

## 2024-05-24 RX ADMIN — METRONIDAZOLE 500 MG: 5 INJECTION, SOLUTION INTRAVENOUS at 11:05

## 2024-05-24 RX ADMIN — KETOROLAC TROMETHAMINE 15 MG: 15 INJECTION, SOLUTION INTRAMUSCULAR; INTRAVENOUS at 06:05

## 2024-05-24 RX ADMIN — GABAPENTIN 250 MG: 250 SOLUTION ORAL at 01:05

## 2024-05-24 RX ADMIN — I.V. FAT EMULSION 250 ML: 20 EMULSION INTRAVENOUS at 09:05

## 2024-05-24 RX ADMIN — NICOTINE 1 PATCH: 14 PATCH, EXTENDED RELEASE TRANSDERMAL at 08:05

## 2024-05-24 RX ADMIN — Medication 10 ML: at 05:05

## 2024-05-24 RX ADMIN — Medication: at 01:05

## 2024-05-24 RX ADMIN — ENOXAPARIN SODIUM 40 MG: 40 INJECTION SUBCUTANEOUS at 04:05

## 2024-05-24 RX ADMIN — PANTOPRAZOLE SODIUM 40 MG: 40 INJECTION, POWDER, FOR SOLUTION INTRAVENOUS at 08:05

## 2024-05-24 RX ADMIN — Medication 10 ML: at 12:05

## 2024-05-24 RX ADMIN — DEXTROSE AND SODIUM CHLORIDE: 5; 900 INJECTION, SOLUTION INTRAVENOUS at 09:05

## 2024-05-24 RX ADMIN — INSULIN GLARGINE 20 UNITS: 100 INJECTION, SOLUTION SUBCUTANEOUS at 08:05

## 2024-05-24 RX ADMIN — METHOCARBAMOL 500 MG: 100 INJECTION, SOLUTION INTRAMUSCULAR; INTRAVENOUS at 08:05

## 2024-05-24 RX ADMIN — METHOCARBAMOL 500 MG: 100 INJECTION, SOLUTION INTRAMUSCULAR; INTRAVENOUS at 01:05

## 2024-05-24 RX ADMIN — ACETAMINOPHEN 999.01 MG: 650 SOLUTION ORAL at 01:05

## 2024-05-24 RX ADMIN — GABAPENTIN 250 MG: 250 SOLUTION ORAL at 06:05

## 2024-05-24 RX ADMIN — CIPROFLOXACIN 400 MG: 2 INJECTION, SOLUTION INTRAVENOUS at 05:05

## 2024-05-24 RX ADMIN — METHOCARBAMOL 500 MG: 500 TABLET ORAL at 09:05

## 2024-05-24 RX ADMIN — ACETAMINOPHEN 999.01 MG: 650 SOLUTION ORAL at 06:05

## 2024-05-24 RX ADMIN — CIPROFLOXACIN 400 MG: 2 INJECTION, SOLUTION INTRAVENOUS at 03:05

## 2024-05-24 RX ADMIN — MAGNESIUM SULFATE HEPTAHYDRATE: 500 INJECTION, SOLUTION INTRAMUSCULAR; INTRAVENOUS at 09:05

## 2024-05-24 RX ADMIN — KETOROLAC TROMETHAMINE 15 MG: 15 INJECTION, SOLUTION INTRAMUSCULAR; INTRAVENOUS at 09:05

## 2024-05-24 RX ADMIN — GABAPENTIN 250 MG: 250 SOLUTION ORAL at 09:05

## 2024-05-24 RX ADMIN — METRONIDAZOLE 500 MG: 5 INJECTION, SOLUTION INTRAVENOUS at 08:05

## 2024-05-24 RX ADMIN — TAMSULOSIN HYDROCHLORIDE 0.8 MG: 0.4 CAPSULE ORAL at 08:05

## 2024-05-24 RX ADMIN — METRONIDAZOLE 500 MG: 5 INJECTION, SOLUTION INTRAVENOUS at 03:05

## 2024-05-24 NOTE — PROGRESS NOTES
Pt seen for ileostomy care follow-up.  Chart reviewed for this encounter.    Pt found lying in bed, agreeable to care at this time. Pouch intact w/o signs of leakage. Pt expresses no needs at this time, all questions/concerns addressed. Supplies at bedside.

## 2024-05-24 NOTE — PLAN OF CARE
Problem: Adult Inpatient Plan of Care  Goal: Plan of Care Review  Outcome: Progressing  Goal: Patient-Specific Goal (Individualized)  Outcome: Progressing  Goal: Absence of Hospital-Acquired Illness or Injury  Outcome: Progressing  Goal: Optimal Comfort and Wellbeing  Outcome: Progressing  Goal: Readiness for Transition of Care  Outcome: Progressing     Problem: Diabetes Comorbidity  Goal: Blood Glucose Level Within Targeted Range  Outcome: Progressing     Problem: Sepsis/Septic Shock  Goal: Optimal Coping  Outcome: Progressing  Goal: Absence of Bleeding  Outcome: Progressing  Goal: Blood Glucose Level Within Targeted Range  Outcome: Progressing  Goal: Absence of Infection Signs and Symptoms  Outcome: Progressing  Goal: Optimal Nutrition Intake  Outcome: Progressing     Problem: Wound  Goal: Optimal Coping  Outcome: Progressing  Goal: Optimal Functional Ability  Outcome: Progressing  Goal: Absence of Infection Signs and Symptoms  Outcome: Progressing  Goal: Improved Oral Intake  Outcome: Progressing  Goal: Optimal Pain Control and Function  Outcome: Progressing  Goal: Skin Health and Integrity  Outcome: Progressing  Goal: Optimal Wound Healing  Outcome: Progressing     Problem: Infection  Goal: Absence of Infection Signs and Symptoms  Outcome: Progressing     Problem: Fall Injury Risk  Goal: Absence of Fall and Fall-Related Injury  Outcome: Progressing

## 2024-05-24 NOTE — PLAN OF CARE
05/24/24 1135   Discharge Reassessment   Assessment Type Discharge Planning Reassessment   Did the patient's condition or plan change since previous assessment? No   Discharge Plan discussed with: Patient   Communicated ONIEL with patient/caregiver Yes   Why the patient remains in the hospital Requires continued medical care   Post-Acute Status   Discharge Delays None known at this time     Pt is not ready to discharge requires continued medical care.     Discharge Plan A and Plan B have been determined by review of patient's clinical status, future medical and therapeutic needs, and coverage/benefits for post-acute care in coordination with multidisciplinary team members.    .msw

## 2024-05-24 NOTE — CONSULTS
Double lumen PICC to Left Basilic vein.  45 cm in length, 32 cm arm circumference and 0 cm exposed.   Lot # [id number].

## 2024-05-24 NOTE — PLAN OF CARE
Recommendations     1. TPN rec: D300g, AA 1.2 g/kg (120 g), + IV lipids 500 mL. GIR: 2.09.   - Provides 2000 kcal, 120 g pro.   - Recommend providing adequate fluid for adequate macronutrients or per MD.   - Recommend GIR: <4 mg/kg/min.   - Macronutrient distribution: 51% CHO, 24% PRO, 25% FAT)     2. Please re-consult as needed.     Goals: Meet % EEN by RD f/u.  Nutrition Goal Status: new  Communication of RD Recs: other (comment) (POC)

## 2024-05-24 NOTE — PROGRESS NOTES
[facility]  Colorectal Surgery  Progress Note    Patient Name: Osman Li Jr.  MRN: [id number]  Admission Date: 5/21/2024  Hospital Length of Stay: 3 days  Attending Physician: Farhan Lance MD    Subjective:     Interval History: Af and VSS. Progressing appropriately. NGT with decreasing output and appears more gastric than bilious this morning. DAWIT drain more serous. Still no ostomy output yet.    Post-Op Info:  Procedure(s) (LRB):  LAPAROTOMY, EXPLORATORY (N/A)  SIGMOIDOSCOPY, FLEXIBLE  INSERTION, CATHETER, URETER (Left)  COLECTOMY, COMPLETION, ABDOMINAL (N/A)  CREATION, ILEOSTOMY, DIVERTING LOOP (N/A)  ANASTOMOSIS, ILEORECTAL (N/A)   3 Days Post-Op      Medications:  Continuous Infusions:   dextrose 5 % and 0.9 % NaCl   Intravenous Continuous 75 mL/hr at 05/23/24 0944 New Bag at 05/23/24 0944    hydromorphone in 0.9 % NaCl 6 mg/30 ml   Intravenous Continuous   New Syringe/Bag at 05/23/24 5981    Standard Custom Day One ADULT TPN for patient with NO electrolyte abnormality and NO renal dysfunction (CENTRAL)   Intravenous Continuous         Scheduled Meds:   acetaminophen  999.0148 mg Per NG tube Q8H    ciprofloxacin  400 mg Intravenous Q12H    enoxparin  40 mg Subcutaneous Q24H (prophylaxis, 1700)    fat emulsion 20%  250 mL Intravenous Daily    gabapentin  250 mg Per NG tube Q8H    insulin glargine U-100  20 Units Subcutaneous Daily    ketorolac  15 mg Intravenous Q8H    methocarbamol (ROBAXIN) IVPB  500 mg Intravenous Q8H    Followed by    methocarbamoL  500 mg Per NG tube Q8H    metronidazole  500 mg Intravenous Q8H    nicotine  1 patch Transdermal Daily    pantoprazole  40 mg Intravenous Daily    scopolamine  1 patch Transdermal Q3 Days    tamsulosin  0.8 mg Oral Daily     PRN Meds:   acetaminophen oral solution 999.0148 mg    ciprofloxacin (CIPRO)400mg/200ml D5W IVPB 400 mg    enoxaparin injection 40 mg    fat emulsion 20% infusion 250 mL    gabapentin 250 mg/5 mL (5 mL) solution 250 mg     insulin glargine U-100 (Lantus) pen 20 Units    ketorolac injection 15 mg    methocarbamoL (ROBAXIN) 500 mg in dextrose 5 % (D5W) 100 mL IVPB    Followed by    methocarbamoL tablet 500 mg    metronidazole IVPB 500 mg    nicotine 14 mg/24 hr 1 patch    pantoprazole injection 40 mg    scopolamine 1.3-1.5 mg (1 mg over 3 days) 1 patch    tamsulosin 24 hr capsule 0.8 mg        Objective:     Vital Signs (Most Recent):  Temp: 98.3 °F (36.8 °C) (05/24/24 0432)  Pulse: 80 (05/24/24 0432)  Resp: 18 (05/24/24 0432)  BP: (!) 156/76 (05/24/24 0432)  SpO2: 97 % (05/24/24 0432) Vital Signs (24h Range):  Temp:  [98.1 °F (36.7 °C)-98.4 °F (36.9 °C)] 98.3 °F (36.8 °C)  Pulse:  [74-84] 80  Resp:  [12-20] 18  SpO2:  [97 %-98 %] 97 %  BP: (134-156)/(69-83) 156/76     Intake/Output - Last 3 Shifts         05/22 0700  05/23 0659 05/23 0700  05/24 0659 05/24 0700  05/25 0659    I.V. (mL/kg)       Other 0 0     NG/      IV Piggyback       Total Intake(mL/kg) 600 (6) 0 (0)     Urine (mL/kg/hr) 1500 (0.6) 1405 (0.6)     Drains 1700 655     Stool 0 0     Total Output 3200 2060     Net -2600 -2060            Stool Occurrence 0 x 0 x              Physical Exam  Vitals and nursing note reviewed.   Constitutional:       General: He is not in acute distress.     Appearance: He is well-developed. He is not ill-appearing or diaphoretic.   HENT:      Head: Normocephalic and atraumatic.      Nose:      Comments: NGT with brown gastric secretions     Mouth/Throat:      Pharynx: Oropharynx is clear. No oropharyngeal exudate.   Eyes:      General: No scleral icterus.     Extraocular Movements: Extraocular movements intact.   Cardiovascular:      Rate and Rhythm: Normal rate and regular rhythm.   Pulmonary:      Effort: Pulmonary effort is normal. No respiratory distress.   Abdominal:      Comments: Midline incision with island dressing in place clean and dry  Ostomy without any output  DAWIT drain x1 with SS output  Soft, non-distended,  appropriately tender   Musculoskeletal:         General: No deformity. Normal range of motion.   Skin:     General: Skin is warm and dry.   Neurological:      General: No focal deficit present.      Mental Status: He is alert.      Cranial Nerves: No cranial nerve deficit.   Psychiatric:         Mood and Affect: Mood normal.         Behavior: Behavior normal.              Significant Labs:  CBC (Last 3 Results):   Recent Labs   Lab 05/22/24 0435 05/23/24  0242 05/24/24  0243   WBC 30.73* 34.15* 20.74*   RBC 3.58* 3.85* 3.63*   HGB 10.8* 11.4* 10.9*   HCT 32.5* 34.8* 31.9*    454* 520*   MCV 91 90 88   MCH 30.2 29.6 30.0   MCHC 33.2 32.8 34.2     CMP (Last 3 Results):   Recent Labs   Lab 05/22/24 0435 05/23/24 0242 05/24/24  0242   * 71 97   CALCIUM 7.3* 8.9 9.0   ALBUMIN 1.8* 2.1* 2.0*   PROT 4.4* 5.4* 5.3*   * 135* 135*   K 3.8 4.1 3.8   CO2 22* 30* 30*   CL 91* 97 99   BUN 19 19 16   CREATININE 1.2 1.0 0.9   ALKPHOS 94 138* 109   ALT 19 19 22   AST 18 21 18   BILITOT 0.5 0.3 0.4       Significant Diagnostics:  I have reviewed all pertinent imaging results/findings within the past 24 hours.  Assessment/Plan:     Malignant neoplasm of transverse colon  Osman Li Jr. is a 59 y.o. 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 He was transferred to the hospital with concern for internal hernia with closed loop bowel obstruction now s/p ex-lap where he was found to have a perforation near his anastomosis. He is s/p completion colectomy, ileorectal anastomosis, diverting loop ileostomy, and flexible sigmoidoscopy on 5/21/24.    - Periop Cipro/flagyl x 4 days (day 3/4)  - MM pain meds: mary tylenol, ibuprofen, gabapentin, and robaxin, dPCA. Decrease the dose of the dPCA today  -  NPO  - mIVF  - NGT to LIWS. May be able to remove this later today  - DAWIT to bulb suction  - PRN nausea meds. Add scop patch today  - Nicotine patch  - PT/OT  - DVT ppx          Belem Vernon MD  Colorectal Surgery  [facility]

## 2024-05-24 NOTE — SUBJECTIVE & OBJECTIVE
"Interval HPI:   Overnight events: DEO. Remains in GISSU. POD 3. BG stable and within goal ranges on current SQ insulin regimen. Diet NPO  Standard Custom Day One ADULT TPN for patient with NO electrolyte abnormality and NO renal dysfunction (CENTRAL)    Eating:   NPO  Nausea: No  Hypoglycemia and intervention: No  Fever: No  TPN and/or TF: Yes  If yes, type of TF/TPN and rate: TPN at 42 ml/hr (to start this evening)     BP (!) 165/83   Pulse 71   Temp 98.2 °F (36.8 °C) (Oral)   Resp 18   Ht 6' 2" (1.88 m)   Wt 99.8 kg (220 lb 0.3 oz)   SpO2 98%   BMI 28.25 kg/m²     Labs Reviewed and Include    Recent Labs   Lab 05/24/24  0242   GLU 97   CALCIUM 9.0   ALBUMIN 2.0*   PROT 5.3*   *   K 3.8   CO2 30*   CL 99   BUN 16   CREATININE 0.9   ALKPHOS 109   ALT 22   AST 18   BILITOT 0.4     Lab Results   Component Value Date    WBC 20.74 (H) 05/24/2024    HGB 10.9 (L) 05/24/2024    HCT 31.9 (L) 05/24/2024    MCV 88 05/24/2024     (H) 05/24/2024     No results for input(s): "TSH", "FREET4" in the last 168 hours.  Lab Results   Component Value Date    HGBA1C 6.5 (H) 05/22/2024       Nutritional status:   Body mass index is 28.25 kg/m².  Lab Results   Component Value Date    ALBUMIN 2.0 (L) 05/24/2024    ALBUMIN 2.1 (L) 05/23/2024    ALBUMIN 1.8 (L) 05/22/2024     No results found for: "PREALBUMIN"    Estimated Creatinine Clearance: 110.1 mL/min (based on SCr of 0.9 mg/dL).    Accu-Checks  Recent Labs     05/22/24  0553 05/22/24  1117 05/22/24  1709 05/22/24  2139 05/22/24  2336 05/23/24  0408 05/23/24  1157 05/23/24  1742 05/23/24  2149 05/24/24  0005   POCTGLUCOSE 213* 161* 180* 106 103 109 100 129* 122* 112*       Current Medications and/or Treatments Impacting Glycemic Control  Immunotherapy:    Immunosuppressants       None          Steroids:   Hormones (From admission, onward)      None          Pressors:    Autonomic Drugs (From admission, onward)      None          Hyperglycemia/Diabetes Medications: "   Antihyperglycemics (From admission, onward)      Start     Stop Route Frequency Ordered    05/22/24 1032  insulin aspart U-100 pen 1-10 Units         -- SubQ Every 4 hours PRN 05/22/24 0932    05/22/24 0945  insulin glargine U-100 (Lantus) pen 20 Units         -- SubQ Daily 05/22/24 0936

## 2024-05-24 NOTE — RESPIRATORY THERAPY
"RAPID RESPONSE RESPIRATORY THERAPY ETCO2 CHECK         Time of visit: 910     Code Status: Full Code   : 1965  Bed: 1004/1004 A:   MRN: [id number]  Time spent at the bedside: < 15 min    SITUATION    Evaluated patient for: ETCo2 compliance    BACKGROUND    Why is the patient in the hospital?: Large bowel anastomotic leak    Patient has a past medical history of Colon cancer, Digestive disorder, DM (diabetes mellitus), Hyperlipidemia, Hypertension, and Prediabetes.    24 Hours Vitals Range:  Temp:  [98.1 °F (36.7 °C)-98.4 °F (36.9 °C)]   Pulse:  [71-84]   Resp:  [12-20]   BP: (134-165)/(69-83)   SpO2:  [97 %-98 %]     Labs:    Recent Labs     24  0435 24   * 135* 135*   K 3.8 4.1 3.8   CL 91* 97 99   CO2 22* 30* 30*   BUN 19 19 16   CREATININE 1.2 1.0 0.9   * 71 97   PHOS 3.1 2.2* 2.8   MG 1.6 2.0 2.0        No results for input(s): "PH", "PCO2", "PO2", "HCO3", "POCSATURATED", "BE" in the last 72 hours.    ASSESSMENT/INTERVENTIONS      Last VS   Temp: 98.2 °F (36.8 °C) (818)  Pulse: 71 (818)  Resp: 18 (818)  BP: 165/83 (818)  SpO2: 98 % (818)    Level of Consciousness: Level of Consciousness (AVPU): alert  Respiratory Effort: Respiratory Effort: Normal, Unlabored Expansion/Accessory Muscle Usage: Expansion/Accessory Muscles/Retractions: no use of accessory muscles, no retractions, expansion symmetric  All Lung Field Breath Sounds:    Is the ETCO2 monitor on? Yes  Is the patient wearing a cannula? Yes  Are ETCO2 orders placed? Yes  Is the patient on a PCA pump? Yes  ETCO2 monitored: ETCO2 (mmHg): 48 mmHg  Ambu at bedside:      Active Orders   Respiratory Care    END TIDAL CO2 MONITOR Q12H     Frequency: Q12H     Number of Occurrences: Until Specified    Incentive spirometry     Frequency: Q4H     Number of Occurrences: Until Specified     Order Comments: Q4 while awake until discharge.  Educate patient in use; Keep incentive " spirometer within reach; and Document incentive spirometer volume every 4 hours while awake.    ((10 sets per hour (3-5 inspirations per set)) on original order)      Oxygen Continuous     Frequency: Continuous     Number of Occurrences: Until Specified     Order Questions:      Device type: Low flow      Device: Nasal Cannula (1- 5 Liters)      LPM: 1      Titrate O2 per Oxygen Titration Protocol: Yes      To maintain SpO2 goal of: >= 90%      Notify MD of: Inability to achieve desired SpO2; Sudden change in patient status and requires 20% increase in FiO2; Patient requires >60% FiO2    SMOKING CESSATION EDUCATION PER RESPIRATORY Once     Frequency: Once     Number of Occurrences: 1 Occurrences       RECOMMENDATIONS    We recommend: RRT Recs: Continue POC per primary team.      FOLLOW-UP    Please call back the Rapid Response RT, Marry Shultz, RRT at x [phone] for any questions or concerns.

## 2024-05-24 NOTE — ASSESSMENT & PLAN NOTE
BG goal: 140-180  T2DM S/p completion colectomy, ileorectal anastomosis, diverting loop ileostomy, and flexible sigmoidoscopy on 5/21/24. TPN to be initiated at 42 ml/hr. Will consider scheduled Novolog with TPN if prandial excursions are noted.     - Continue Lantus 20 units daily   - Novolog Moderate dose correction with ISF 25 starting at 150    - POCT Glucose every 4 hours  - Hypoglycemia protocol in place    ** Please notify Endocrine for any change and/or advance in diet**  ** Please call Endocrine for any BG related issues **     Discharge Planning:   TBD. Please notify endocrinology prior to discharge.

## 2024-05-24 NOTE — ASSESSMENT & PLAN NOTE
Osman Li Jr. is a 59 y.o. 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 He was transferred to the hospital with concern for internal hernia with closed loop bowel obstruction now s/p ex-lap where he was found to have a perforation near his anastomosis. He is s/p completion colectomy, ileorectal anastomosis, diverting loop ileostomy, and flexible sigmoidoscopy on 5/21/24.    - Periop Cipro/flagyl x 4 days (day 3/4)  - MM pain meds: mary tylenol, ibuprofen, gabapentin, and robaxin, dPCA. Decrease the dose of the dPCA today  - NPO  - mIVF  - NGT to LIWS. May be able to remove this later today  - DAWIT to bulb suction  - PRN nausea meds. Add scop patch today  - Nicotine patch  - PT/OT  - DVT ppx

## 2024-05-24 NOTE — NURSING
"[facility] Plan of Care Note    Dx:   SBO (small bowel obstruction) [K56.609]    Shift Events: blas taken out at 0500 per standing order     Goals of Care: drain care, blas care, ostomy care, NG tube; manage pain    Neuro: AO x4    Vital Signs: /71 (BP Location: Right arm, Patient Position: Lying)   Pulse 74   Temp 98.1 °F (36.7 °C) (Oral)   Resp 18   Ht 6' 2" (1.88 m)   Wt 99.8 kg (220 lb 0.3 oz)   SpO2 97%   BMI 28.25 kg/m²     Respiratory: 2L NC    Diet: Diet NPO      Is patient tolerating current diet? yes    GTTS:  PCA pump    Urine Output/Bowel Movement:   No intake/output data recorded.  Last Bowel Movement: 05/18/24      Drains/Tubes/Tube Feeds (include total output/shift):   No intake/output data recorded.      Lines: 20g Left hand      Accuchecks:Q4    Skin: midline incision; DAWIT drain RLQ; ileostomy LLQ    Fall Risk Score: 14    Activity level? Stand by assist    Any scheduled procedures? none    Any safety concerns? fall      "

## 2024-05-24 NOTE — PT/OT/SLP PROGRESS
Physical Therapy Treatment    Patient Name:  Osman Li Jr.   MRN:  [id number]    Recommendations:     Discharge Recommendations: No Therapy Indicated  Discharge Equipment Recommendations: none  Barriers to discharge: None    Assessment:     Osman Li Jr. is a 59 y.o. male admitted with a medical diagnosis of Large bowel anastomotic leak.  He presents with the following impairments/functional limitations: impaired endurance, impaired self care skills, weakness, pain. Pt met gait goal today, however unable to practice stairs due to complexity of lines.     Rehab Prognosis: Good; patient would benefit from acute skilled PT services to address these deficits and reach maximum level of function.    Recent Surgery: Procedure(s) (LRB):  LAPAROTOMY, EXPLORATORY (N/A)  SIGMOIDOSCOPY, FLEXIBLE  INSERTION, CATHETER, URETER (Left)  COLECTOMY, COMPLETION, ABDOMINAL (N/A)  CREATION, ILEOSTOMY, DIVERTING LOOP (N/A)  ANASTOMOSIS, ILEORECTAL (N/A) 3 Days Post-Op    Plan:     During this hospitalization, patient to be seen 4 x/week to address the identified rehab impairments via therapeutic activities, gait training, therapeutic exercises and progress toward the following goals:    Plan of Care Expires:  06/22/24    Subjective     Chief Complaint: pt agreeable   Patient/Family Comments/goals: to go home  Pain/Comfort:  Pain Rating 1: 6/10  Location 1: abdomen  Pain Addressed 1: Pre-medicate for activity  Pain Rating Post-Intervention 1: 6/10      Objective:     Communicated with NSG prior to session.  Patient found HOB elevated with NG tube, peripheral IV, PCA, oxygen upon PT entry to room.     General Precautions: Standard, fall  Orthopedic Precautions: N/A  Braces: N/A  Respiratory Status: nasal cannula      Functional Mobility:  Bed Mobility:     Supine to Sit: contact guard assistance  Sit to Supine: stand by assistance  Transfers:     Sit to Stand:  stand by assistance with no AD  Gait: 300 feet with no  AD supervision with flexed posture and decreased speed.       AM-PAC 6 CLICK MOBILITY  Turning over in bed (including adjusting bedclothes, sheets and blankets)?: 4  Sitting down on and standing up from a chair with arms (e.g., wheelchair, bedside commode, etc.): 4  Moving from lying on back to sitting on the side of the bed?: 3  Moving to and from a bed to a chair (including a wheelchair)?: 4  Need to walk in hospital room?: 3  Climbing 3-5 steps with a railing?: 3  Basic Mobility Total Score: 21       Treatment & Education:  Bedside table in front of patient and area set up for function, convenience, and safety. RN aware of patient's mobility needs and status. Questions/concerns addressed within PTA scope of practice; patient  with no further questions. Time was provided for active listening, discussion of health disposition, and discussion of safe discharge.    Patient left HOB elevated with all lines intact, call button in reach, and family present..    GOALS:   Multidisciplinary Problems       Physical Therapy Goals          Problem: Physical Therapy    Goal Priority Disciplines Outcome Goal Variances Interventions   Physical Therapy Goal     PT, PT/OT Progressing     Description: Goals to be met by: 2024     Patient will increase functional independence with mobility by performin. Supine to sit with Set-up [place]  2. Sit to supine with Set-up [place]  3. Sit to stand transfer with Supervision  4. Bed to chair transfer with Supervision using No Assistive Device  5. Gait  x 300 feet with Supervision using No Assistive Device.   6. Ascend/descend 20 stairs with right Handrail Stand-by Assistance using No Assistive Device.   7. Lower extremity exercise program x15 reps per handout, with supervision                         Time Tracking:     PT Received On: 24  PT Start Time: 1346     PT Stop Time: 1409  PT Total Time (min): 23 min     Billable Minutes: Gait Training 23    Treatment  Type: Treatment  PT/PTA: PTA     Number of PTA visits since last PT visit: 1 05/24/2024

## 2024-05-24 NOTE — PROCEDURES
"Osman Li Jr. is a 59 y.o. male patient.    Temp: 98.2 °F (36.8 °C) (05/24/24 0818)  Pulse: 71 (05/24/24 0818)  Resp: 18 (05/24/24 0818)  BP: (!) 165/83 (05/24/24 0818)  SpO2: 98 % (05/24/24 0818)  Weight: 99.8 kg (220 lb 0.3 oz) (05/23/24 1153)  Height: 6' 2" (188 cm) (05/23/24 1153)    PICC  Date/Time: 5/24/2024 10:37 AM  Performed by: Renny Silver RN  Assisting provider: Jessica Adrian RN  Consent Done: Yes  Time out: Immediately prior to procedure a time out was called to verify the correct patient, procedure, equipment, support staff and site/side marked as required  Indications: med administration  Anesthesia: local infiltration  Local anesthetic: lidocaine 1% without epinephrine  Anesthetic Total (mL): 4  Description of findings: PICC line placement  Preparation: skin prepped with ChloraPrep  Skin prep agent dried: skin prep agent completely dried prior to procedure  Sterile barriers: all five maximum sterile barriers used - cap, mask, sterile gown, sterile gloves, and large sterile sheet  Hand hygiene: hand hygiene performed prior to central venous catheter insertion  Location details: left basilic  Catheter type: double lumen  Catheter size: 5 Fr  Catheter Length: 45cm    Ultrasound guidance: yes  Vessel Caliber: large and patent, compressibility normal  Needle advanced into vessel with real time Ultrasound guidance.  Guidewire confirmed in vessel.  Image recorded and saved.  Sterile sheath used.  Number of attempts: 1  Post-procedure: blood return through all ports, chlorhexidine patch and sterile dressing applied  Technical procedures used: 3CG  Estimated blood loss (mL): 0  Specimens: No    Assessment: placement verified by x-ray  Complications: none          Name K CAMRYN Adrian  5/24/2024    "

## 2024-05-24 NOTE — NURSING
[facility] Plan of Care Note     Dx: Large bowel anastomotic leak     Shift Events: None      Goals of Care: NG tube, drain care, pain management with PCA, blood glucose monitoring, & safety.      Neuro: AAO x 4     Vital Signs: NL     Respiratory: 2L NC     Diet: NPO     Is patient tolerating current diet? Yes     GTTS: PCA pump     Urine Output/Bowel Movement: Adequate urine output per urinal, LBM 5/24/24 per ostomy.     Drains/Tubes/Tube Feeds (include total output/shift):  R NG tube, R DAWIT drain LLQ Ileostomy      Lines: 20g Left hand, 20g L FA, ADDY PICC     Accuchecks:Q4     Skin: ML abdominal incision; DAWIT drain RLQ; Ileostomy LLQ     Fall Risk Score: 12     Activity level? Stand by assist     Any scheduled procedures? None     Any safety concerns? Falls

## 2024-05-24 NOTE — CONSULTS
"  [facility]  Adult Nutrition  Consult Note    SUMMARY     Recommendations    1. TPN rec: D300g, AA 1.2 g/kg (120 g), + IV lipids 500 mL. GIR: 2.09.   - Provides 2000 kcal, 120 g pro.   - Recommend providing adequate fluid for adequate macronutrients or per MD.   - Recommend GIR: <4 mg/kg/min.   -  Macronutrient distribution: 51% CHO, 24% PRO, 25% FAT)    2. Please re-consult as needed.    Goals: Meet % EEN by RD f/u.  Nutrition Goal Status: new  Communication of RD Recs: other (comment) (POC)    Assessment and Plan    Nutrition Problem  Inadequate oral intake    Related to (etiology):   Altered GI function    Signs and Symptoms (as evidenced by):   NPO status  Large bowel anastomotic leak     Interventions/Recommendations (treatment strategy):  Collaboration of nutrition care with other providers  PN    Nutrition Diagnosis Status:   New    Reason for Assessment    Reason For Assessment: consult  Diagnosis: other (see comments) (Large bowel anastomotic leak)  Relevant Medical History: HTN, DMT2, HLD, anemia  Interdisciplinary Rounds: did not attend  General Information Comments: Ostomy takedown noted 5/13/24. Weight stable per chart review ( lbs). Unable to perform NFPE at this time, will follow up. Patient is NPO status. Consult for TPN recommendation. 250 mL fat emulsion 0% infusion ordered (providing 500 kcal).   Nutrition Discharge Planning: PN, pending clinical course    Nutrition Risk Screen    Nutrition Risk Screen: no indicators present    Nutrition/Diet History    Spiritual, Cultural Beliefs, Yazdanism Practices, Values that Affect Care: no    Anthropometrics    Temp: 98.3 °F (36.8 °C)  Height: 6' 2" (188 cm)  Height (inches): 74 in  Weight Method: Bed Scale  Weight: 99.8 kg (220 lb 0.3 oz)  Weight (lb): 220.02 lb  Ideal Body Weight (IBW), Male: 190 lb  % Ideal Body Weight, Male (lb): 115.8 %  BMI (Calculated): 28.2  BMI Grade: 25 - 29.9 - overweight     Lab/Procedures/Meds    Pertinent " Labs Reviewed: reviewed  Pertinent Labs Comments: (5/24) Na 135, Tpro 5.3, Alb 2, .7  Pertinent Medications Reviewed: reviewed    Estimated/Assessed Needs    Weight Used For Calorie Calculations: 99.8 kg (220 lb 0.3 oz)  Energy Calorie Requirements (kcal): 20-30 kcal/kg  Energy Need Method: Kcal/kg  Protein Requirements: 1-2 g/kg (100-200 g)  Weight Used For Protein Calculations: 99.8 kg (220 lb 0.3 oz)  Fluid Requirements (mL): 30-40 mL/kg/d or per MD  Estimated Fluid Requirement Method: other (see comments) (ASPEN)  RDA Method (mL): 20  CHO Requirement: 250 g      Nutrition Prescription Ordered    Current Diet Order: NPO    Evaluation of Received Nutrient/Fluid Intake    I/O: -2.2 L since admit  % Intake of Estimated Energy Needs: 0 - 25 %  % Meal Intake: NPO    Nutrition Risk    Level of Risk/Frequency of Follow-up:  (1x/wk, please re-consult if needed sooner)       Monitor and Evaluation    Food and Nutrient Intake: parenteral nutrition intake  Food and Nutrient Adminstration: enteral and parenteral nutrition administration  Anthropometric Measurements: height/length, weight, weight change, body mass index  Biochemical Data, Medical Tests and Procedures: electrolyte and renal panel, gastrointestinal profile, glucose/endocrine profile, inflammatory profile, lipid profile  Nutrition-Focused Physical Findings: overall appearance, extremities, muscles and bones, head and eyes, skin       Nutrition Follow-Up    RD Follow-up?: Yes     Attending Only

## 2024-05-24 NOTE — SUBJECTIVE & OBJECTIVE
Subjective:     Interval History: Af and VSS. Progressing appropriately. NGT with decreasing output and appears more gastric than bilious this morning. DAWIT drain more serous. Still no ostomy output yet.    Post-Op Info:  Procedure(s) (LRB):  LAPAROTOMY, EXPLORATORY (N/A)  SIGMOIDOSCOPY, FLEXIBLE  INSERTION, CATHETER, URETER (Left)  COLECTOMY, COMPLETION, ABDOMINAL (N/A)  CREATION, ILEOSTOMY, DIVERTING LOOP (N/A)  ANASTOMOSIS, ILEORECTAL (N/A)   3 Days Post-Op      Medications:  Continuous Infusions:   dextrose 5 % and 0.9 % NaCl   Intravenous Continuous 75 mL/hr at 05/23/24 0944 New Bag at 05/23/24 0944    hydromorphone in 0.9 % NaCl 6 mg/30 ml   Intravenous Continuous   New Syringe/Bag at 05/23/24 9208    Standard Custom Day One ADULT TPN for patient with NO electrolyte abnormality and NO renal dysfunction (CENTRAL)   Intravenous Continuous         Scheduled Meds:   acetaminophen  999.0148 mg Per NG tube Q8H    ciprofloxacin  400 mg Intravenous Q12H    enoxparin  40 mg Subcutaneous Q24H (prophylaxis, 1700)    fat emulsion 20%  250 mL Intravenous Daily    gabapentin  250 mg Per NG tube Q8H    insulin glargine U-100  20 Units Subcutaneous Daily    ketorolac  15 mg Intravenous Q8H    methocarbamol (ROBAXIN) IVPB  500 mg Intravenous Q8H    Followed by    methocarbamoL  500 mg Per NG tube Q8H    metronidazole  500 mg Intravenous Q8H    nicotine  1 patch Transdermal Daily    pantoprazole  40 mg Intravenous Daily    scopolamine  1 patch Transdermal Q3 Days    tamsulosin  0.8 mg Oral Daily     PRN Meds:   acetaminophen oral solution 999.0148 mg    ciprofloxacin (CIPRO)400mg/200ml D5W IVPB 400 mg    enoxaparin injection 40 mg    fat emulsion 20% infusion 250 mL    gabapentin 250 mg/5 mL (5 mL) solution 250 mg    insulin glargine U-100 (Lantus) pen 20 Units    ketorolac injection 15 mg    methocarbamoL (ROBAXIN) 500 mg in dextrose 5 % (D5W) 100 mL IVPB    Followed by    methocarbamoL tablet 500 mg    metronidazole IVPB 500  mg    nicotine 14 mg/24 hr 1 patch    pantoprazole injection 40 mg    scopolamine 1.3-1.5 mg (1 mg over 3 days) 1 patch    tamsulosin 24 hr capsule 0.8 mg        Objective:     Vital Signs (Most Recent):  Temp: 98.3 °F (36.8 °C) (05/24/24 0432)  Pulse: 80 (05/24/24 0432)  Resp: 18 (05/24/24 0432)  BP: (!) 156/76 (05/24/24 0432)  SpO2: 97 % (05/24/24 0432) Vital Signs (24h Range):  Temp:  [98.1 °F (36.7 °C)-98.4 °F (36.9 °C)] 98.3 °F (36.8 °C)  Pulse:  [74-84] 80  Resp:  [12-20] 18  SpO2:  [97 %-98 %] 97 %  BP: (134-156)/(69-83) 156/76     Intake/Output - Last 3 Shifts         05/22 0700 05/23 0659 05/23 0700  05/24 0659 05/24 0700  05/25 0659    I.V. (mL/kg)       Other 0 0     NG/      IV Piggyback       Total Intake(mL/kg) 600 (6) 0 (0)     Urine (mL/kg/hr) 1500 (0.6) 1405 (0.6)     Drains 1700 655     Stool 0 0     Total Output 3200 2060     Net -2600 -2060            Stool Occurrence 0 x 0 x              Physical Exam  Vitals and nursing note reviewed.   Constitutional:       General: He is not in acute distress.     Appearance: He is well-developed. He is not ill-appearing or diaphoretic.   HENT:      Head: Normocephalic and atraumatic.      Nose:      Comments: NGT with brown gastric secretions     Mouth/Throat:      Pharynx: Oropharynx is clear. No oropharyngeal exudate.   Eyes:      General: No scleral icterus.     Extraocular Movements: Extraocular movements intact.   Cardiovascular:      Rate and Rhythm: Normal rate and regular rhythm.   Pulmonary:      Effort: Pulmonary effort is normal. No respiratory distress.   Abdominal:      Comments: Midline incision with island dressing in place clean and dry  Ostomy without any output  DAWIT drain x1 with SS output  Soft, non-distended, appropriately tender   Musculoskeletal:         General: No deformity. Normal range of motion.   Skin:     General: Skin is warm and dry.   Neurological:      General: No focal deficit present.      Mental Status: He is alert.       Cranial Nerves: No cranial nerve deficit.   Psychiatric:         Mood and Affect: Mood normal.         Behavior: Behavior normal.              Significant Labs:  CBC (Last 3 Results):   Recent Labs   Lab 05/22/24 0435 05/23/24 0242 05/24/24 0243   WBC 30.73* 34.15* 20.74*   RBC 3.58* 3.85* 3.63*   HGB 10.8* 11.4* 10.9*   HCT 32.5* 34.8* 31.9*    454* 520*   MCV 91 90 88   MCH 30.2 29.6 30.0   MCHC 33.2 32.8 34.2     CMP (Last 3 Results):   Recent Labs   Lab 05/22/24 0435 05/23/24 0242 05/24/24 0242   * 71 97   CALCIUM 7.3* 8.9 9.0   ALBUMIN 1.8* 2.1* 2.0*   PROT 4.4* 5.4* 5.3*   * 135* 135*   K 3.8 4.1 3.8   CO2 22* 30* 30*   CL 91* 97 99   BUN 19 19 16   CREATININE 1.2 1.0 0.9   ALKPHOS 94 138* 109   ALT 19 19 22   AST 18 21 18   BILITOT 0.5 0.3 0.4       Significant Diagnostics:  I have reviewed all pertinent imaging results/findings within the past 24 hours.

## 2024-05-24 NOTE — PROGRESS NOTES
[facility] - [facility]  Endocrinology  Progress Note    Admit Date: 5/21/2024     Reason for Consult: Management of T2DM, Hyperglycemia     Surgical Procedure and Date: s/p 5/21-Open completion colectomy with ileorectal anastomosis, Diverting loop ileostomy, Flexible sigmoidoscopy    Diabetes diagnosis year: >5 years ago    Home Diabetes Medications:  Toujeo 50 units.  Metformin    How often checking glucose at home? 1-3 x day   BG readings on regimen: 100s  Hypoglycemia on the regimen?  No  Missed doses on regimen?  No    Diabetes Complications include:     Hyperglycemia    Complicating diabetes co morbidities:   HLD, HTN      HPI:   Patient is a 59 y.o. male with with a history of HTN, HLD, and DM, Stage IV transverse colon adenocarcinoma who was recently discharged from the hospital on 5/18/24. 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 Post operatively he developed an ileus which improved with conservative management. He was able to be discharged on POD#5. At that time he was tolerating a regular diet, ambulating, voiding spontaneously, having BMs, and had adequate pain control.      Unfortunately he represented to [facility] early on 5/21/24 with worsening left lower quadrant pain and nonbilious, nonbloody vomiting. Upon arrival there he was febrile to 103 and tachycardic, but HDS on RA. Labs with an elevated WBC to 22.8 and slight anemia but otherwise unremarkable. CT A/P was concerning for an internal hernia with closed loop obstruction and so he was transferred to [facility] for higher level of care. S/p completion colectomy, ileorectal anastomosis, diverting loop ileostomy, and flexible sigmoidoscopy on 5/21/24.         Interval HPI:   Overnight events: NAEON. Remains in [facility]. POD 3. BG stable and within goal ranges on current SQ insulin regimen. Diet  "NPO  Standard Custom Day One ADULT TPN for patient with NO electrolyte abnormality and NO renal dysfunction (CENTRAL)    Eating:   NPO  Nausea: No  Hypoglycemia and intervention: No  Fever: No  TPN and/or TF: Yes  If yes, type of TF/TPN and rate: TPN at 42 ml/hr (to start this evening)     BP (!) 165/83   Pulse 71   Temp 98.2 °F (36.8 °C) (Oral)   Resp 18   Ht 6' 2" (1.88 m)   Wt 99.8 kg (220 lb 0.3 oz)   SpO2 98%   BMI 28.25 kg/m²     Labs Reviewed and Include    Recent Labs   Lab 05/24/24  0242   GLU 97   CALCIUM 9.0   ALBUMIN 2.0*   PROT 5.3*   *   K 3.8   CO2 30*   CL 99   BUN 16   CREATININE 0.9   ALKPHOS 109   ALT 22   AST 18   BILITOT 0.4     Lab Results   Component Value Date    WBC 20.74 (H) 05/24/2024    HGB 10.9 (L) 05/24/2024    HCT 31.9 (L) 05/24/2024    MCV 88 05/24/2024     (H) 05/24/2024     No results for input(s): "TSH", "FREET4" in the last 168 hours.  Lab Results   Component Value Date    HGBA1C 6.5 (H) 05/22/2024       Nutritional status:   Body mass index is 28.25 kg/m².  Lab Results   Component Value Date    ALBUMIN 2.0 (L) 05/24/2024    ALBUMIN 2.1 (L) 05/23/2024    ALBUMIN 1.8 (L) 05/22/2024     No results found for: "PREALBUMIN"    Estimated Creatinine Clearance: 110.1 mL/min (based on SCr of 0.9 mg/dL).    Accu-Checks  Recent Labs     05/22/24  0553 05/22/24  1117 05/22/24  1709 05/22/24  2139 05/22/24  2336 05/23/24  0408 05/23/24  1157 05/23/24  1742 05/23/24  2149 05/24/24  0005   POCTGLUCOSE 213* 161* 180* 106 103 109 100 129* 122* 112*       Current Medications and/or Treatments Impacting Glycemic Control  Immunotherapy:    Immunosuppressants       None          Steroids:   Hormones (From admission, onward)      None          Pressors:    Autonomic Drugs (From admission, onward)      None          Hyperglycemia/Diabetes Medications:   Antihyperglycemics (From admission, onward)      Start     Stop Route Frequency Ordered    05/22/24 1032  insulin aspart U-100 pen " 1-10 Units         -- SubQ Every 4 hours PRN 05/22/24 0932    05/22/24 0945  insulin glargine U-100 (Lantus) pen 20 Units         -- SubQ Daily 05/22/24 0936            ASSESSMENT and PLAN    Cardiac/Vascular  HLD (hyperlipidemia)  Managed per primary team  Optimize bg control        Endocrine  Type 2 diabetes mellitus  BG goal: 140-180  T2DM S/p completion colectomy, ileorectal anastomosis, diverting loop ileostomy, and flexible sigmoidoscopy on 5/21/24. TPN to be initiated at 42 ml/hr. Will consider scheduled Novolog with TPN if prandial excursions are noted.     - Continue Lantus 20 units daily   - Novolog Moderate dose correction with ISF 25 starting at 150    - POCT Glucose every 4 hours  - Hypoglycemia protocol in place    ** Please notify Endocrine for any change and/or advance in diet**  ** Please call Endocrine for any BG related issues **     Discharge Planning:   TBD. Please notify endocrinology prior to discharge.        GI  * Large bowel anastomotic leak  Managed per primary team  Optimize bg control            Kasey Braga NP  Endocrinology  [facility]

## 2024-05-25 LAB
ALBUMIN SERPL BCP-MCNC: 2 G/DL (ref 3.5–5.2)
ALP SERPL-CCNC: 124 U/L (ref 55–135)
ALT SERPL W/O P-5'-P-CCNC: 16 U/L (ref 10–44)
ANION GAP SERPL CALC-SCNC: 8 MMOL/L (ref 8–16)
AST SERPL-CCNC: 20 U/L (ref 10–40)
BASOPHILS # BLD AUTO: 0.05 K/UL (ref 0–0.2)
BASOPHILS NFR BLD: 0.4 % (ref 0–1.9)
BILIRUB SERPL-MCNC: 0.3 MG/DL (ref 0.1–1)
BUN SERPL-MCNC: 14 MG/DL (ref 6–20)
CALCIUM SERPL-MCNC: 8.8 MG/DL (ref 8.7–10.5)
CHLORIDE SERPL-SCNC: 98 MMOL/L (ref 95–110)
CO2 SERPL-SCNC: 28 MMOL/L (ref 23–29)
CREAT SERPL-MCNC: 0.8 MG/DL (ref 0.5–1.4)
CRP SERPL-MCNC: 70.5 MG/L (ref 0–8.2)
DIFFERENTIAL METHOD BLD: ABNORMAL
EOSINOPHIL # BLD AUTO: 0.6 K/UL (ref 0–0.5)
EOSINOPHIL NFR BLD: 5.5 % (ref 0–8)
ERYTHROCYTE [DISTWIDTH] IN BLOOD BY AUTOMATED COUNT: 13.9 % (ref 11.5–14.5)
EST. GFR  (NO RACE VARIABLE): >60 ML/MIN/1.73 M^2
GLUCOSE SERPL-MCNC: 137 MG/DL (ref 70–110)
HCT VFR BLD AUTO: 28.9 % (ref 40–54)
HGB BLD-MCNC: 9.8 G/DL (ref 14–18)
IMM GRANULOCYTES # BLD AUTO: 0.08 K/UL (ref 0–0.04)
IMM GRANULOCYTES NFR BLD AUTO: 0.7 % (ref 0–0.5)
LYMPHOCYTES # BLD AUTO: 1.9 K/UL (ref 1–4.8)
LYMPHOCYTES NFR BLD: 16 % (ref 18–48)
MAGNESIUM SERPL-MCNC: 1.8 MG/DL (ref 1.6–2.6)
MCH RBC QN AUTO: 29.7 PG (ref 27–31)
MCHC RBC AUTO-ENTMCNC: 33.9 G/DL (ref 32–36)
MCV RBC AUTO: 88 FL (ref 82–98)
MONOCYTES # BLD AUTO: 1.3 K/UL (ref 0.3–1)
MONOCYTES NFR BLD: 11.2 % (ref 4–15)
NEUTROPHILS # BLD AUTO: 7.7 K/UL (ref 1.8–7.7)
NEUTROPHILS NFR BLD: 66.2 % (ref 38–73)
NRBC BLD-RTO: 0 /100 WBC
PHOSPHATE SERPL-MCNC: 2.9 MG/DL (ref 2.7–4.5)
PLATELET # BLD AUTO: 515 K/UL (ref 150–450)
PMV BLD AUTO: 9.7 FL (ref 9.2–12.9)
POCT GLUCOSE: 122 MG/DL (ref 70–110)
POCT GLUCOSE: 141 MG/DL (ref 70–110)
POCT GLUCOSE: 141 MG/DL (ref 70–110)
POCT GLUCOSE: 187 MG/DL (ref 70–110)
POCT GLUCOSE: 211 MG/DL (ref 70–110)
POCT GLUCOSE: 67 MG/DL (ref 70–110)
POCT GLUCOSE: 85 MG/DL (ref 70–110)
POTASSIUM SERPL-SCNC: 3 MMOL/L (ref 3.5–5.1)
PROT SERPL-MCNC: 5.1 G/DL (ref 6–8.4)
RBC # BLD AUTO: 3.3 M/UL (ref 4.6–6.2)
SODIUM SERPL-SCNC: 134 MMOL/L (ref 136–145)
TRIGL SERPL-MCNC: 129 MG/DL (ref 30–150)
WBC # BLD AUTO: 11.6 K/UL (ref 3.9–12.7)

## 2024-05-25 PROCEDURE — B4185 PARENTERAL SOL 10 GM LIPIDS: HCPCS

## 2024-05-25 PROCEDURE — S4991 NICOTINE PATCH NONLEGEND: HCPCS | Performed by: STUDENT IN AN ORGANIZED HEALTH CARE EDUCATION/TRAINING PROGRAM

## 2024-05-25 PROCEDURE — A4216 STERILE WATER/SALINE, 10 ML: HCPCS | Performed by: STUDENT IN AN ORGANIZED HEALTH CARE EDUCATION/TRAINING PROGRAM

## 2024-05-25 PROCEDURE — 25000003 PHARM REV CODE 250

## 2024-05-25 PROCEDURE — 85025 COMPLETE CBC W/AUTO DIFF WBC: CPT | Performed by: SURGERY

## 2024-05-25 PROCEDURE — 99232 SBSQ HOSP IP/OBS MODERATE 35: CPT | Mod: ,,, | Performed by: NURSE PRACTITIONER

## 2024-05-25 PROCEDURE — 63600175 PHARM REV CODE 636 W HCPCS: Performed by: STUDENT IN AN ORGANIZED HEALTH CARE EDUCATION/TRAINING PROGRAM

## 2024-05-25 PROCEDURE — 25000003 PHARM REV CODE 250: Performed by: STUDENT IN AN ORGANIZED HEALTH CARE EDUCATION/TRAINING PROGRAM

## 2024-05-25 PROCEDURE — 83735 ASSAY OF MAGNESIUM: CPT | Performed by: SURGERY

## 2024-05-25 PROCEDURE — 63600175 PHARM REV CODE 636 W HCPCS

## 2024-05-25 PROCEDURE — 84478 ASSAY OF TRIGLYCERIDES: CPT | Performed by: STUDENT IN AN ORGANIZED HEALTH CARE EDUCATION/TRAINING PROGRAM

## 2024-05-25 PROCEDURE — 99900035 HC TECH TIME PER 15 MIN (STAT)

## 2024-05-25 PROCEDURE — A4217 STERILE WATER/SALINE, 500 ML: HCPCS

## 2024-05-25 PROCEDURE — 25000003 PHARM REV CODE 250: Performed by: SURGERY

## 2024-05-25 PROCEDURE — 63600175 PHARM REV CODE 636 W HCPCS: Mod: JZ,JG | Performed by: SURGERY

## 2024-05-25 PROCEDURE — 63600175 PHARM REV CODE 636 W HCPCS: Performed by: PHYSICIAN ASSISTANT

## 2024-05-25 PROCEDURE — 20600001 HC STEP DOWN PRIVATE ROOM

## 2024-05-25 PROCEDURE — C9113 INJ PANTOPRAZOLE SODIUM, VIA: HCPCS | Performed by: STUDENT IN AN ORGANIZED HEALTH CARE EDUCATION/TRAINING PROGRAM

## 2024-05-25 PROCEDURE — 27000221 HC OXYGEN, UP TO 24 HOURS

## 2024-05-25 PROCEDURE — 84100 ASSAY OF PHOSPHORUS: CPT | Performed by: SURGERY

## 2024-05-25 PROCEDURE — 80053 COMPREHEN METABOLIC PANEL: CPT | Performed by: SURGERY

## 2024-05-25 PROCEDURE — 86140 C-REACTIVE PROTEIN: CPT | Performed by: SURGERY

## 2024-05-25 RX ADMIN — Medication 10 ML: at 12:05

## 2024-05-25 RX ADMIN — ACETAMINOPHEN 999.01 MG: 650 SOLUTION ORAL at 02:05

## 2024-05-25 RX ADMIN — TAMSULOSIN HYDROCHLORIDE 0.8 MG: 0.4 CAPSULE ORAL at 08:05

## 2024-05-25 RX ADMIN — Medication 10 ML: at 11:05

## 2024-05-25 RX ADMIN — KETOROLAC TROMETHAMINE 15 MG: 15 INJECTION, SOLUTION INTRAMUSCULAR; INTRAVENOUS at 10:05

## 2024-05-25 RX ADMIN — PANTOPRAZOLE SODIUM 40 MG: 40 INJECTION, POWDER, FOR SOLUTION INTRAVENOUS at 08:05

## 2024-05-25 RX ADMIN — Medication: at 04:05

## 2024-05-25 RX ADMIN — Medication: at 11:05

## 2024-05-25 RX ADMIN — DEXTROSE MONOHYDRATE 125 ML: 100 INJECTION, SOLUTION INTRAVENOUS at 11:05

## 2024-05-25 RX ADMIN — MAGNESIUM SULFATE HEPTAHYDRATE: 500 INJECTION, SOLUTION INTRAMUSCULAR; INTRAVENOUS at 10:05

## 2024-05-25 RX ADMIN — INSULIN ASPART 4 UNITS: 100 INJECTION, SOLUTION INTRAVENOUS; SUBCUTANEOUS at 08:05

## 2024-05-25 RX ADMIN — METHOCARBAMOL 500 MG: 500 TABLET ORAL at 10:05

## 2024-05-25 RX ADMIN — KETOROLAC TROMETHAMINE 15 MG: 15 INJECTION, SOLUTION INTRAMUSCULAR; INTRAVENOUS at 02:05

## 2024-05-25 RX ADMIN — METRONIDAZOLE 500 MG: 5 INJECTION, SOLUTION INTRAVENOUS at 08:05

## 2024-05-25 RX ADMIN — ACETAMINOPHEN 999.01 MG: 650 SOLUTION ORAL at 06:05

## 2024-05-25 RX ADMIN — Medication 10 ML: at 06:05

## 2024-05-25 RX ADMIN — METHOCARBAMOL 500 MG: 500 TABLET ORAL at 06:05

## 2024-05-25 RX ADMIN — INSULIN ASPART 1 UNITS: 100 INJECTION, SOLUTION INTRAVENOUS; SUBCUTANEOUS at 06:05

## 2024-05-25 RX ADMIN — KETOROLAC TROMETHAMINE 15 MG: 15 INJECTION, SOLUTION INTRAMUSCULAR; INTRAVENOUS at 06:05

## 2024-05-25 RX ADMIN — GABAPENTIN 250 MG: 250 SOLUTION ORAL at 10:05

## 2024-05-25 RX ADMIN — Medication 10 ML: at 05:05

## 2024-05-25 RX ADMIN — ENOXAPARIN SODIUM 40 MG: 40 INJECTION SUBCUTANEOUS at 05:05

## 2024-05-25 RX ADMIN — GABAPENTIN 250 MG: 250 SOLUTION ORAL at 06:05

## 2024-05-25 RX ADMIN — GABAPENTIN 250 MG: 250 SOLUTION ORAL at 02:05

## 2024-05-25 RX ADMIN — CIPROFLOXACIN 400 MG: 2 INJECTION, SOLUTION INTRAVENOUS at 03:05

## 2024-05-25 RX ADMIN — I.V. FAT EMULSION 250 ML: 20 EMULSION INTRAVENOUS at 10:05

## 2024-05-25 RX ADMIN — INSULIN GLARGINE 20 UNITS: 100 INJECTION, SOLUTION SUBCUTANEOUS at 08:05

## 2024-05-25 RX ADMIN — ACETAMINOPHEN 999.01 MG: 650 SOLUTION ORAL at 10:05

## 2024-05-25 RX ADMIN — METHOCARBAMOL 500 MG: 500 TABLET ORAL at 02:05

## 2024-05-25 RX ADMIN — NICOTINE 1 PATCH: 14 PATCH, EXTENDED RELEASE TRANSDERMAL at 08:05

## 2024-05-25 NOTE — PROGRESS NOTES
[facility] - [facility]  Endocrinology  Progress Note    Admit Date: 5/21/2024     Reason for Consult: Management of T2DM, Hyperglycemia     Surgical Procedure and Date: s/p 5/21-Open completion colectomy with ileorectal anastomosis, Diverting loop ileostomy, Flexible sigmoidoscopy    Diabetes diagnosis year: >5 years ago    Home Diabetes Medications:  Toujeo 50 units.  Metformin    How often checking glucose at home? 1-3 x day   BG readings on regimen: 100s  Hypoglycemia on the regimen?  No  Missed doses on regimen?  No    Diabetes Complications include:     Hyperglycemia    Complicating diabetes co morbidities:   HLD, HTN      HPI:   Patient is a 59 y.o. male with with a history of HTN, HLD, and DM, Stage IV transverse colon adenocarcinoma who was recently discharged from the hospital on 5/18/24. 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 Post operatively he developed an ileus which improved with conservative management. He was able to be discharged on POD#5. At that time he was tolerating a regular diet, ambulating, voiding spontaneously, having BMs, and had adequate pain control.      Unfortunately he represented to [facility] early on 5/21/24 with worsening left lower quadrant pain and nonbilious, nonbloody vomiting. Upon arrival there he was febrile to 103 and tachycardic, but HDS on RA. Labs with an elevated WBC to 22.8 and slight anemia but otherwise unremarkable. CT A/P was concerning for an internal hernia with closed loop obstruction and so he was transferred to [facility] for higher level of care. S/p completion colectomy, ileorectal anastomosis, diverting loop ileostomy, and flexible sigmoidoscopy on 5/21/24.         Interval HPI:   Overnight events: NAEON. Remains in [facility]. POD 4. BG reasonably controlled on current SQ insulin regimen. TPN infusing at  "42 ml/hr. Diet NPO  Standard Custom Day One ADULT TPN for patient with NO electrolyte abnormality and NO renal dysfunction (CENTRAL)  TPN ADULT CENTRAL LINE CUSTOM    Eating:   NPO  Nausea: No  Hypoglycemia and intervention: No  Fever: No  TPN and/or TF: Yes  If yes, type of TF/TPN and rate: TPN at 42 ml/hr     BP (!) 160/75 (BP Location: Right arm, Patient Position: Lying)   Pulse 69   Temp 98.4 °F (36.9 °C) (Oral)   Resp 18   Ht 6' 2" (1.88 m)   Wt 99.8 kg (220 lb 0.3 oz)   SpO2 96%   BMI 28.25 kg/m²     Labs Reviewed and Include    Recent Labs   Lab 05/25/24  0352   *   CALCIUM 8.8   ALBUMIN 2.0*   PROT 5.1*   *   K 3.0*   CO2 28   CL 98   BUN 14   CREATININE 0.8   ALKPHOS 124   ALT 16   AST 20   BILITOT 0.3     Lab Results   Component Value Date    WBC 11.60 05/25/2024    HGB 9.8 (L) 05/25/2024    HCT 28.9 (L) 05/25/2024    MCV 88 05/25/2024     (H) 05/25/2024     No results for input(s): "TSH", "FREET4" in the last 168 hours.  Lab Results   Component Value Date    HGBA1C 6.5 (H) 05/22/2024       Nutritional status:   Body mass index is 28.25 kg/m².  Lab Results   Component Value Date    ALBUMIN 2.0 (L) 05/25/2024    ALBUMIN 2.0 (L) 05/24/2024    ALBUMIN 2.1 (L) 05/23/2024     No results found for: "PREALBUMIN"    Estimated Creatinine Clearance: 123.9 mL/min (based on SCr of 0.8 mg/dL).    Accu-Checks  Recent Labs     05/24/24  0818 05/24/24  0928 05/24/24  1153 05/24/24  1245 05/24/24  1613 05/24/24  1935 05/24/24  2318 05/25/24  0605 05/25/24  0801 05/25/24  0835   POCTGLUCOSE 140* 145* 106 122* 110 81 107 187* 211* 85       Current Medications and/or Treatments Impacting Glycemic Control  Immunotherapy:    Immunosuppressants       None          Steroids:   Hormones (From admission, onward)      None          Pressors:    Autonomic Drugs (From admission, onward)      None          Hyperglycemia/Diabetes Medications:   Antihyperglycemics (From admission, onward)      Start     Stop " Route Frequency Ordered    05/22/24 1032  insulin aspart U-100 pen 1-10 Units         -- SubQ Every 4 hours PRN 05/22/24 0932    05/22/24 0945  insulin glargine U-100 (Lantus) pen 20 Units         -- SubQ Daily 05/22/24 0936            ASSESSMENT and PLAN    Cardiac/Vascular  HLD (hyperlipidemia)  Managed per primary team  Optimize bg control        Endocrine  Type 2 diabetes mellitus  BG goal: 140-180  T2DM S/p completion colectomy, ileorectal anastomosis, diverting loop ileostomy, and flexible sigmoidoscopy on 5/21/24. TPN to be initiated at 42 ml/hr. Will consider scheduled Novolog with TPN if prandial excursions are noted.     - Continue Lantus 20 units daily   - Novolog Moderate dose correction with ISF 25 starting at 150    - POCT Glucose every 4 hours  - Hypoglycemia protocol in place    ** Please notify Endocrine for any change and/or advance in diet**  ** Please call Endocrine for any BG related issues **     Discharge Planning:   TBD. Please notify endocrinology prior to discharge.        GI  * Large bowel anastomotic leak  Managed per primary team  Optimize bg control            Kasey Braga, NP  Endocrinology  [facility]

## 2024-05-25 NOTE — PROGRESS NOTES
[facility]  Colorectal Surgery  Progress Note    Patient Name: Osman Li Jr.  MRN: [id number]  Admission Date: 5/21/2024  Hospital Length of Stay: 4 days  Attending Physician: Farhan Lance MD    Subjective:     Interval History: No acute events overnight, pain controlled with PCA, NGT with bilious output, DAWIT drain bland, ostomy with output. CRP 70     Post-Op Info:  Procedure(s) (LRB):  LAPAROTOMY, EXPLORATORY (N/A)  SIGMOIDOSCOPY, FLEXIBLE  INSERTION, CATHETER, URETER (Left)  COLECTOMY, COMPLETION, ABDOMINAL (N/A)  CREATION, ILEOSTOMY, DIVERTING LOOP (N/A)  ANASTOMOSIS, ILEORECTAL (N/A)   4 Days Post-Op      Medications:  Continuous Infusions:   dextrose 5 % and 0.9 % NaCl   Intravenous Continuous 75 mL/hr at 05/24/24 2142 New Bag at 05/24/24 2142    hydromorphone in 0.9 % NaCl 6 mg/30 ml   Intravenous Continuous   New Syringe/Bag at 05/25/24 0429    Standard Custom Day One ADULT TPN for patient with NO electrolyte abnormality and NO renal dysfunction (CENTRAL)   Intravenous Continuous 42 mL/hr at 05/24/24 2140 New Bag at 05/24/24 2140     Scheduled Meds:   acetaminophen  999.0148 mg Per NG tube Q8H    enoxparin  40 mg Subcutaneous Q24H (prophylaxis, 1700)    fat emulsion 20%  250 mL Intravenous Daily    gabapentin  250 mg Per NG tube Q8H    insulin glargine U-100  20 Units Subcutaneous Daily    ketorolac  15 mg Intravenous Q8H    methocarbamoL  500 mg Per NG tube Q8H    metronidazole  500 mg Intravenous Q8H    nicotine  1 patch Transdermal Daily    pantoprazole  40 mg Intravenous Daily    scopolamine  1 patch Transdermal Q3 Days    sodium chloride 0.9%  10 mL Intravenous Q6H    tamsulosin  0.8 mg Oral Daily     PRN Meds:   acetaminophen oral solution 999.0148 mg    enoxaparin injection 40 mg    fat emulsion 20% infusion 250 mL    gabapentin 250 mg/5 mL (5 mL) solution 250 mg    insulin glargine U-100 (Lantus) pen 20 Units    ketorolac injection 15 mg    methocarbamoL tablet 500 mg     metronidazole IVPB 500 mg    nicotine 14 mg/24 hr 1 patch    pantoprazole injection 40 mg    scopolamine 1.3-1.5 mg (1 mg over 3 days) 1 patch    sodium chloride 0.9% flush 10 mL    tamsulosin 24 hr capsule 0.8 mg        Objective:     Vital Signs (Most Recent):  Temp: 99 °F (37.2 °C) (05/25/24 0358)  Pulse: 99 (05/25/24 0358)  Resp: 16 (05/25/24 0429)  BP: (!) 159/70 (05/25/24 0358)  SpO2: 99 % (05/25/24 0358) Vital Signs (24h Range):  Temp:  [98.2 °F (36.8 °C)-99 °F (37.2 °C)] 99 °F (37.2 °C)  Pulse:  [70-99] 99  Resp:  [16-18] 16  SpO2:  [96 %-99 %] 99 %  BP: (142-165)/(66-83) 159/70     Intake/Output - Last 3 Shifts         05/23 0700  05/24 0659 05/24 0700  05/25 0659    I.V. (mL/kg)  20 (0.2)    Other 0     Total Intake(mL/kg) 0 (0) 20 (0.2)    Urine (mL/kg/hr) 1405 (0.6) 850 (0.4)    Drains 655 580    Stool 0 20    Total Output 2060 1450    Net -2060 -1430          Stool Occurrence 0 x 1 x             Physical Exam  Vitals and nursing note reviewed.   Constitutional:       General: He is not in acute distress.     Appearance: He is well-developed. He is not ill-appearing or diaphoretic.   HENT:      Head: Normocephalic and atraumatic.      Nose:      Comments: NGT with brown gastric secretions     Mouth/Throat:      Pharynx: Oropharynx is clear. No oropharyngeal exudate.   Eyes:      General: No scleral icterus.     Extraocular Movements: Extraocular movements intact.   Cardiovascular:      Rate and Rhythm: Normal rate and regular rhythm.   Pulmonary:      Effort: Pulmonary effort is normal. No respiratory distress.   Abdominal:      Comments: Midline incision with island dressing in place clean and dry  Ostomy without any output  DAWIT drain x1 with SS output  Soft, non-distended, appropriately tender   Musculoskeletal:         General: No deformity. Normal range of motion.   Skin:     General: Skin is warm and dry.   Neurological:      General: No focal deficit present.      Mental Status: He is alert.       Cranial Nerves: No cranial nerve deficit.   Psychiatric:         Mood and Affect: Mood normal.         Behavior: Behavior normal.              Significant Labs:  All pertinent lab results within the last 24 hours have been reviewed.     Significant Diagnostics:  I have reviewed all pertinent imaging results/findings within the past 24 hours.  Assessment/Plan:     Malignant neoplasm of transverse colon  Osman Li Jr. is a 59 y.o. 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 He was transferred to the hospital with concern for internal hernia with closed loop bowel obstruction now s/p ex-lap where he was found to have a perforation near his anastomosis. He is s/p completion colectomy, ileorectal anastomosis, diverting loop ileostomy, and flexible sigmoidoscopy on 5/21/24.    - Periop Cipro/flagyl x 4 days  - MM pain meds: mary tylenol, ibuprofen, gabapentin, and robaxin, dPCA  - NPO  - mIVF  - TPN  - NGT, potentially remove today   - DAWIT to bulb suction  - PRN nausea meds.Scop patch  - Nicotine patch  - PT/OT  - DVT ppx          Keyshawn Whitaker MD  Colorectal Surgery  [facility]

## 2024-05-25 NOTE — SUBJECTIVE & OBJECTIVE
"Interval HPI:   Overnight events: NAEON. Remains in GISSU. POD 4. BG reasonably controlled on current SQ insulin regimen. TPN infusing at 42 ml/hr. Diet NPO  Standard Custom Day One ADULT TPN for patient with NO electrolyte abnormality and NO renal dysfunction (CENTRAL)  TPN ADULT CENTRAL LINE CUSTOM    Eating:   NPO  Nausea: No  Hypoglycemia and intervention: No  Fever: No  TPN and/or TF: Yes  If yes, type of TF/TPN and rate: TPN at 42 ml/hr     BP (!) 160/75 (BP Location: Right arm, Patient Position: Lying)   Pulse 69   Temp 98.4 °F (36.9 °C) (Oral)   Resp 18   Ht 6' 2" (1.88 m)   Wt 99.8 kg (220 lb 0.3 oz)   SpO2 96%   BMI 28.25 kg/m²     Labs Reviewed and Include    Recent Labs   Lab 05/25/24  0352   *   CALCIUM 8.8   ALBUMIN 2.0*   PROT 5.1*   *   K 3.0*   CO2 28   CL 98   BUN 14   CREATININE 0.8   ALKPHOS 124   ALT 16   AST 20   BILITOT 0.3     Lab Results   Component Value Date    WBC 11.60 05/25/2024    HGB 9.8 (L) 05/25/2024    HCT 28.9 (L) 05/25/2024    MCV 88 05/25/2024     (H) 05/25/2024     No results for input(s): "TSH", "FREET4" in the last 168 hours.  Lab Results   Component Value Date    HGBA1C 6.5 (H) 05/22/2024       Nutritional status:   Body mass index is 28.25 kg/m².  Lab Results   Component Value Date    ALBUMIN 2.0 (L) 05/25/2024    ALBUMIN 2.0 (L) 05/24/2024    ALBUMIN 2.1 (L) 05/23/2024     No results found for: "PREALBUMIN"    Estimated Creatinine Clearance: 123.9 mL/min (based on SCr of 0.8 mg/dL).    Accu-Checks  Recent Labs     05/24/24  0818 05/24/24  0928 05/24/24  1153 05/24/24  1245 05/24/24  1613 05/24/24  1935 05/24/24  2318 05/25/24  0605 05/25/24  0801 05/25/24  0835   POCTGLUCOSE 140* 145* 106 122* 110 81 107 187* 211* 85       Current Medications and/or Treatments Impacting Glycemic Control  Immunotherapy:    Immunosuppressants       None          Steroids:   Hormones (From admission, onward)      None          Pressors:    Autonomic Drugs (From " admission, onward)      None          Hyperglycemia/Diabetes Medications:   Antihyperglycemics (From admission, onward)      Start     Stop Route Frequency Ordered    05/22/24 1032  insulin aspart U-100 pen 1-10 Units         -- SubQ Every 4 hours PRN 05/22/24 0932    05/22/24 0945  insulin glargine U-100 (Lantus) pen 20 Units         -- SubQ Daily 05/22/24 0936

## 2024-05-25 NOTE — SUBJECTIVE & OBJECTIVE
Subjective:     Interval History: No acute events overnight, pain controlled with PCA, NGT with bilious output, DAWIT drain bland, ostomy with output. CRP 70     Post-Op Info:  Procedure(s) (LRB):  LAPAROTOMY, EXPLORATORY (N/A)  SIGMOIDOSCOPY, FLEXIBLE  INSERTION, CATHETER, URETER (Left)  COLECTOMY, COMPLETION, ABDOMINAL (N/A)  CREATION, ILEOSTOMY, DIVERTING LOOP (N/A)  ANASTOMOSIS, ILEORECTAL (N/A)   4 Days Post-Op      Medications:  Continuous Infusions:   dextrose 5 % and 0.9 % NaCl   Intravenous Continuous 75 mL/hr at 05/24/24 2142 New Bag at 05/24/24 2142    hydromorphone in 0.9 % NaCl 6 mg/30 ml   Intravenous Continuous   New Syringe/Bag at 05/25/24 0429    Standard Custom Day One ADULT TPN for patient with NO electrolyte abnormality and NO renal dysfunction (CENTRAL)   Intravenous Continuous 42 mL/hr at 05/24/24 2140 New Bag at 05/24/24 2140     Scheduled Meds:   acetaminophen  999.0148 mg Per NG tube Q8H    enoxparin  40 mg Subcutaneous Q24H (prophylaxis, 1700)    fat emulsion 20%  250 mL Intravenous Daily    gabapentin  250 mg Per NG tube Q8H    insulin glargine U-100  20 Units Subcutaneous Daily    ketorolac  15 mg Intravenous Q8H    methocarbamoL  500 mg Per NG tube Q8H    metronidazole  500 mg Intravenous Q8H    nicotine  1 patch Transdermal Daily    pantoprazole  40 mg Intravenous Daily    scopolamine  1 patch Transdermal Q3 Days    sodium chloride 0.9%  10 mL Intravenous Q6H    tamsulosin  0.8 mg Oral Daily     PRN Meds:   acetaminophen oral solution 999.0148 mg    enoxaparin injection 40 mg    fat emulsion 20% infusion 250 mL    gabapentin 250 mg/5 mL (5 mL) solution 250 mg    insulin glargine U-100 (Lantus) pen 20 Units    ketorolac injection 15 mg    methocarbamoL tablet 500 mg    metronidazole IVPB 500 mg    nicotine 14 mg/24 hr 1 patch    pantoprazole injection 40 mg    scopolamine 1.3-1.5 mg (1 mg over 3 days) 1 patch    sodium chloride 0.9% flush 10 mL    tamsulosin 24 hr capsule 0.8 mg         Objective:     Vital Signs (Most Recent):  Temp: 99 °F (37.2 °C) (05/25/24 0358)  Pulse: 99 (05/25/24 0358)  Resp: 16 (05/25/24 0429)  BP: (!) 159/70 (05/25/24 0358)  SpO2: 99 % (05/25/24 0358) Vital Signs (24h Range):  Temp:  [98.2 °F (36.8 °C)-99 °F (37.2 °C)] 99 °F (37.2 °C)  Pulse:  [70-99] 99  Resp:  [16-18] 16  SpO2:  [96 %-99 %] 99 %  BP: (142-165)/(66-83) 159/70     Intake/Output - Last 3 Shifts         05/23 0700  05/24 0659 05/24 0700 05/25 0659    I.V. (mL/kg)  20 (0.2)    Other 0     Total Intake(mL/kg) 0 (0) 20 (0.2)    Urine (mL/kg/hr) 1405 (0.6) 850 (0.4)    Drains 655 580    Stool 0 20    Total Output 2060 1450    Net -2060 -1430          Stool Occurrence 0 x 1 x             Physical Exam  Vitals and nursing note reviewed.   Constitutional:       General: He is not in acute distress.     Appearance: He is well-developed. He is not ill-appearing or diaphoretic.   HENT:      Head: Normocephalic and atraumatic.      Nose:      Comments: NGT with brown gastric secretions     Mouth/Throat:      Pharynx: Oropharynx is clear. No oropharyngeal exudate.   Eyes:      General: No scleral icterus.     Extraocular Movements: Extraocular movements intact.   Cardiovascular:      Rate and Rhythm: Normal rate and regular rhythm.   Pulmonary:      Effort: Pulmonary effort is normal. No respiratory distress.   Abdominal:      Comments: Midline incision with island dressing in place clean and dry  Ostomy without any output  DAWIT drain x1 with SS output  Soft, non-distended, appropriately tender   Musculoskeletal:         General: No deformity. Normal range of motion.   Skin:     General: Skin is warm and dry.   Neurological:      General: No focal deficit present.      Mental Status: He is alert.      Cranial Nerves: No cranial nerve deficit.   Psychiatric:         Mood and Affect: Mood normal.         Behavior: Behavior normal.              Significant Labs:  All pertinent lab results within the last 24 hours have  been reviewed.     Significant Diagnostics:  I have reviewed all pertinent imaging results/findings within the past 24 hours.

## 2024-05-25 NOTE — PROGRESS NOTES
[facility]  Wound Care    Patient Name:  Osman Li Jr.   MRN:  [id number]  Date: 5/25/2024  Diagnosis: Large bowel anastomotic leak    History:     Past Medical History:   Diagnosis Date    Colon cancer     Digestive disorder     DM (diabetes mellitus)     Hyperlipidemia     Hypertension     Prediabetes        Social History     Socioeconomic History    Marital status:    Tobacco Use    Smoking status: Every Day     Current packs/day: 1.00     Average packs/day: 1 pack/day for 40.0 years (40.0 ttl pk-yrs)     Types: Cigarettes     Passive exposure: Current    Smokeless tobacco: Never   Substance and Sexual Activity    Alcohol use: Not Currently    Drug use: Never    Sexual activity: Yes     Partners: Female     Social Determinants of Health     Financial Resource Strain: Low Risk  (5/22/2024)    Overall Financial Resource Strain (CARDIA)     Difficulty of Paying Living Expenses: Not hard at all   Food Insecurity: No Food Insecurity (5/22/2024)    Hunger Vital Sign     Worried About Running Out of Food in the Last Year: Never true     Ran Out of Food in the Last Year: Never true   Transportation Needs: No Transportation Needs (5/22/2024)    TRANSPORTATION NEEDS     Transportation : No   Physical Activity: Inactive (5/22/2024)    Exercise Vital Sign     Days of Exercise per Week: 0 days     Minutes of Exercise per Session: 0 min   Stress: No Stress Concern Present (5/22/2024)    [employer] - Occupational Stress Questionnaire     Feeling of Stress : Not at all   Housing Stability: Low Risk  (5/22/2024)    Housing Stability Vital Sign     Unable to Pay for Housing in the Last Year: No     Homeless in the Last Year: No       Precautions:     Allergies as of 05/21/2024 - Reviewed 05/21/2024   Allergen Reaction Noted    Penicillins Rash 08/11/2020       WO Assessment Details/Treatment     Patient seen for wound care consultation.   Reviewed chart for this encounter.    See Flow Sheet for findings.      RECOMMENDATIONS: Patient is Aox4 and agreeable to inpatient ostomy care. No current needs or concerns at this time. Pouch clean, dry, and intact. Will continue to follow daily until discharge for pouching needs.    Discussed POC with patient and primary nurse.   See EMR for orders & patient education.    Discussed nutrition and the role of protein in wound healing with the patient. Instructed patient to optimize protein for wound healing.    Bedside nursing to continue care & monitoring.  Bedside nursing to maintain pressure injury prevention interventions.            05/25/24 1330   WOCN Assessment   WOCN Total Time (mins) 30   Visit Date 05/25/24   Visit Time 1330   Consult Type New   WOCN Speciality Ostomy   Intervention assessed;chart review;coordination of care   Teaching on-going        Ileostomy 05/21/24 1314 Loop LLQ   Placement Date/Time: 05/21/24 1314   Present Prior to Hospital Arrival?: No  Inserted by: MD  Ileostomy Type: (c) Loop  Location: LLQ   Appliance 1-piece;leakage;no leakage   Tolerance no signs/symptoms of discomfort       Orders placed.   Travis FRASERN, RN  05/25/2024

## 2024-05-25 NOTE — RESPIRATORY THERAPY
"RAPID RESPONSE RESPIRATORY THERAPY ETCO2 CHECK         Time of visit: 931     Code Status: Full Code   : 1965  Bed: 1004/1004 A:   MRN: [id number]  Time spent at the bedside: < 15 min    SITUATION    Evaluated patient for: ETCo2 compliance    BACKGROUND    Why is the patient in the hospital?: Large bowel anastomotic leak    Patient has a past medical history of Colon cancer, Digestive disorder, DM (diabetes mellitus), Hyperlipidemia, Hypertension, and Prediabetes.    24 Hours Vitals Range:  Temp:  [98.2 °F (36.8 °C)-99 °F (37.2 °C)]   Pulse:  [69-99]   Resp:  [16-18]   BP: (142-164)/(66-75)   SpO2:  [96 %-99 %]     Labs:    Recent Labs     24  02424  0352   * 135* 134*   K 4.1 3.8 3.0*   CL 97 99 98   CO2 30* 30* 28   BUN 19 16 14   CREATININE 1.0 0.9 0.8   GLU 71 97 137*   PHOS 2.2* 2.8 2.9   MG 2.0 2.0 1.8        No results for input(s): "PH", "PCO2", "PO2", "HCO3", "POCSATURATED", "BE" in the last 72 hours.    ASSESSMENT/INTERVENTIONS      Last VS   Temp: 98.4 °F (36.9 °C) ( 0804)  Pulse: 69 ( 08)  Resp: 18 ( 08)  BP: 160/75 ( 08)  SpO2: 96 % ( 08)    Level of Consciousness: Level of Consciousness (AVPU): responds to voice  Respiratory Effort: Respiratory Effort: Normal, Unlabored Expansion/Accessory Muscle Usage: Expansion/Accessory Muscles/Retractions: no use of accessory muscles, no retractions, expansion symmetric  Is the ETCO2 monitor on? Yes  Is the patient wearing a cannula? Yes  Are ETCO2 orders placed? Yes  Is the patient on a PCA pump? Yes  ETCO2 monitored: ETCO2 (mmHg): 45 mmHg  Ambu at bedside: Yes    Active Orders   Respiratory Care    END TIDAL CO2 MONITOR Q12H     Frequency: Q12H     Number of Occurrences: Until Specified    Incentive spirometry     Frequency: Q4H     Number of Occurrences: Until Specified     Order Comments: Q4 while awake until discharge.  Educate patient in use; Keep incentive spirometer within reach; and " Document incentive spirometer volume every 4 hours while awake.    ((10 sets per hour (3-5 inspirations per set)) on original order)      Oxygen Continuous     Frequency: Continuous     Number of Occurrences: Until Specified     Order Questions:      Device type: Low flow      Device: Nasal Cannula (1- 5 Liters)      LPM: 1      Titrate O2 per Oxygen Titration Protocol: Yes      To maintain SpO2 goal of: >= 90%      Notify MD of: Inability to achieve desired SpO2; Sudden change in patient status and requires 20% increase in FiO2; Patient requires >60% FiO2    SMOKING CESSATION EDUCATION PER RESPIRATORY Once     Frequency: Once     Number of Occurrences: 1 Occurrences       RECOMMENDATIONS    We recommend: RRT Recs: Continue POC per primary team.      FOLLOW-UP    Please call back the Rapid Response RT, Onesimo Martinez RRT at x [phone] for any questions or concerns.

## 2024-05-25 NOTE — ASSESSMENT & PLAN NOTE
Osman Li Jr. is a 59 y.o. 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 He was transferred to the hospital with concern for internal hernia with closed loop bowel obstruction now s/p ex-lap where he was found to have a perforation near his anastomosis. He is s/p completion colectomy, ileorectal anastomosis, diverting loop ileostomy, and flexible sigmoidoscopy on 5/21/24.    - Periop Cipro/flagyl x 4 days  - MM pain meds: mary tylenol, ibuprofen, gabapentin, and robaxin, dPCA  - NPO  - mIVF  - TPN  - NGT, potentially remove today   - DAWIT to bulb suction  - PRN nausea meds.Scop patch  - Nicotine patch  - PT/OT  - DVT ppx

## 2024-05-25 NOTE — NURSING
[facility] Plan of Care Note     Dx: Large bowel anastomotic leak     Shift Events: None      Goals of Care: NG tube, drain care, pain management with PCA, blood glucose monitoring, & safety.      Neuro: AAO x 4     Vital Signs: WNL     Respiratory: 2 LNC     Diet: NPO     Is patient tolerating current diet? Yes     GTTS: PCA pump, TPN @ 42 ml/hr.     Urine Output/Bowel Movemen5: Adequate urine output per urinal, LBM 5/24/24 per ostomy.     Drains/Tubes/Tube Feeds (include total output/shift):  R NG tube, R DAWIT drain LLQ Ileostomy      Lines: 20g L FA, ADDY PICC     Accuchecks: Q4H     Skin: ML abdominal incision; DAWIT drain RLQ; Ileostomy LLQ     Fall Risk Score: 12     Activity level? Stand by assist     Any scheduled procedures? None     Any safety concerns? Falls

## 2024-05-25 NOTE — NURSING
"[facility] Plan of Care Note    Dx:   SBO (small bowel obstruction) [K56.609]    Shift Events: TPN started   Goals of Care: drain care, blas care, ostomy care, NG tube; manage pain    Neuro: AO x4    Vital Signs: BP (!) 159/70 (BP Location: Right arm)   Pulse 99   Temp 99 °F (37.2 °C) (Oral)   Resp 16   Ht 6' 2" (1.88 m)   Wt 99.8 kg (220 lb 0.3 oz)   SpO2 99%   BMI 28.25 kg/m²     Respiratory: 2L NC    Diet: Diet NPO      Is patient tolerating current diet? yes    GTTS:  PCA pump    Urine Output/Bowel Movement:   I/O this shift:  In: 10 [I.V.:10]  Out: 400 [Urine:400]  Last Bowel Movement: 05/24/24      Drains/Tubes/Tube Feeds (include total output/shift):   I/O this shift:  In: 10 [I.V.:10]  Out: 400 [Urine:400]      Lines: 20g Left hand      Accuchecks:Q4    Skin: midline incision; DAWIT drain RLQ; ileostomy LLQ    Fall Risk Score: 14    Activity level? Stand by assist    Any scheduled procedures? none    Any safety concerns? fall      "

## 2024-05-26 LAB
ALBUMIN SERPL BCP-MCNC: 2 G/DL (ref 3.5–5.2)
ALP SERPL-CCNC: 168 U/L (ref 55–135)
ALT SERPL W/O P-5'-P-CCNC: 13 U/L (ref 10–44)
ANION GAP SERPL CALC-SCNC: 6 MMOL/L (ref 8–16)
AST SERPL-CCNC: 18 U/L (ref 10–40)
BACTERIA BLD CULT: NORMAL
BACTERIA BLD CULT: NORMAL
BASOPHILS # BLD AUTO: 0.08 K/UL (ref 0–0.2)
BASOPHILS NFR BLD: 0.5 % (ref 0–1.9)
BILIRUB SERPL-MCNC: 0.3 MG/DL (ref 0.1–1)
BUN SERPL-MCNC: 11 MG/DL (ref 6–20)
CALCIUM SERPL-MCNC: 8.9 MG/DL (ref 8.7–10.5)
CHLORIDE SERPL-SCNC: 99 MMOL/L (ref 95–110)
CO2 SERPL-SCNC: 28 MMOL/L (ref 23–29)
CREAT SERPL-MCNC: 0.8 MG/DL (ref 0.5–1.4)
CRP SERPL-MCNC: 56.3 MG/L (ref 0–8.2)
DIFFERENTIAL METHOD BLD: ABNORMAL
EOSINOPHIL # BLD AUTO: 0.6 K/UL (ref 0–0.5)
EOSINOPHIL NFR BLD: 3.4 % (ref 0–8)
ERYTHROCYTE [DISTWIDTH] IN BLOOD BY AUTOMATED COUNT: 13.8 % (ref 11.5–14.5)
EST. GFR  (NO RACE VARIABLE): >60 ML/MIN/1.73 M^2
GLUCOSE SERPL-MCNC: 199 MG/DL (ref 70–110)
HCT VFR BLD AUTO: 30.8 % (ref 40–54)
HGB BLD-MCNC: 10.1 G/DL (ref 14–18)
IMM GRANULOCYTES # BLD AUTO: 0.11 K/UL (ref 0–0.04)
IMM GRANULOCYTES NFR BLD AUTO: 0.7 % (ref 0–0.5)
LYMPHOCYTES # BLD AUTO: 2.9 K/UL (ref 1–4.8)
LYMPHOCYTES NFR BLD: 17.8 % (ref 18–48)
MAGNESIUM SERPL-MCNC: 1.9 MG/DL (ref 1.6–2.6)
MCH RBC QN AUTO: 29.4 PG (ref 27–31)
MCHC RBC AUTO-ENTMCNC: 32.8 G/DL (ref 32–36)
MCV RBC AUTO: 90 FL (ref 82–98)
MONOCYTES # BLD AUTO: 1.6 K/UL (ref 0.3–1)
MONOCYTES NFR BLD: 9.9 % (ref 4–15)
NEUTROPHILS # BLD AUTO: 10.9 K/UL (ref 1.8–7.7)
NEUTROPHILS NFR BLD: 67.7 % (ref 38–73)
NRBC BLD-RTO: 0 /100 WBC
PHOSPHATE SERPL-MCNC: 2.7 MG/DL (ref 2.7–4.5)
PLATELET # BLD AUTO: 629 K/UL (ref 150–450)
PMV BLD AUTO: 9.9 FL (ref 9.2–12.9)
POCT GLUCOSE: 118 MG/DL (ref 70–110)
POCT GLUCOSE: 180 MG/DL (ref 70–110)
POCT GLUCOSE: 195 MG/DL (ref 70–110)
POCT GLUCOSE: 203 MG/DL (ref 70–110)
POCT GLUCOSE: 218 MG/DL (ref 70–110)
POCT GLUCOSE: 219 MG/DL (ref 70–110)
POCT GLUCOSE: 232 MG/DL (ref 70–110)
POTASSIUM SERPL-SCNC: 3.1 MMOL/L (ref 3.5–5.1)
PROT SERPL-MCNC: 5.3 G/DL (ref 6–8.4)
RBC # BLD AUTO: 3.44 M/UL (ref 4.6–6.2)
SODIUM SERPL-SCNC: 133 MMOL/L (ref 136–145)
WBC # BLD AUTO: 16.09 K/UL (ref 3.9–12.7)

## 2024-05-26 PROCEDURE — 25000003 PHARM REV CODE 250

## 2024-05-26 PROCEDURE — A4217 STERILE WATER/SALINE, 500 ML: HCPCS

## 2024-05-26 PROCEDURE — 63600175 PHARM REV CODE 636 W HCPCS: Performed by: STUDENT IN AN ORGANIZED HEALTH CARE EDUCATION/TRAINING PROGRAM

## 2024-05-26 PROCEDURE — B4185 PARENTERAL SOL 10 GM LIPIDS: HCPCS

## 2024-05-26 PROCEDURE — 83735 ASSAY OF MAGNESIUM: CPT | Performed by: SURGERY

## 2024-05-26 PROCEDURE — 85025 COMPLETE CBC W/AUTO DIFF WBC: CPT | Performed by: SURGERY

## 2024-05-26 PROCEDURE — A4216 STERILE WATER/SALINE, 10 ML: HCPCS | Performed by: STUDENT IN AN ORGANIZED HEALTH CARE EDUCATION/TRAINING PROGRAM

## 2024-05-26 PROCEDURE — 25000003 PHARM REV CODE 250: Performed by: STUDENT IN AN ORGANIZED HEALTH CARE EDUCATION/TRAINING PROGRAM

## 2024-05-26 PROCEDURE — 94761 N-INVAS EAR/PLS OXIMETRY MLT: CPT

## 2024-05-26 PROCEDURE — 84100 ASSAY OF PHOSPHORUS: CPT | Performed by: SURGERY

## 2024-05-26 PROCEDURE — 80053 COMPREHEN METABOLIC PANEL: CPT | Performed by: SURGERY

## 2024-05-26 PROCEDURE — 99900035 HC TECH TIME PER 15 MIN (STAT)

## 2024-05-26 PROCEDURE — S4991 NICOTINE PATCH NONLEGEND: HCPCS | Performed by: STUDENT IN AN ORGANIZED HEALTH CARE EDUCATION/TRAINING PROGRAM

## 2024-05-26 PROCEDURE — 27000221 HC OXYGEN, UP TO 24 HOURS

## 2024-05-26 PROCEDURE — C9113 INJ PANTOPRAZOLE SODIUM, VIA: HCPCS | Performed by: STUDENT IN AN ORGANIZED HEALTH CARE EDUCATION/TRAINING PROGRAM

## 2024-05-26 PROCEDURE — 36415 COLL VENOUS BLD VENIPUNCTURE: CPT | Performed by: SURGERY

## 2024-05-26 PROCEDURE — 99232 SBSQ HOSP IP/OBS MODERATE 35: CPT | Mod: ,,, | Performed by: NURSE PRACTITIONER

## 2024-05-26 PROCEDURE — 20600001 HC STEP DOWN PRIVATE ROOM

## 2024-05-26 PROCEDURE — 63600175 PHARM REV CODE 636 W HCPCS

## 2024-05-26 PROCEDURE — 86140 C-REACTIVE PROTEIN: CPT | Performed by: SURGERY

## 2024-05-26 RX ORDER — POTASSIUM CHLORIDE 7.45 MG/ML
10 INJECTION INTRAVENOUS
Status: COMPLETED | OUTPATIENT
Start: 2024-05-26 | End: 2024-05-26

## 2024-05-26 RX ORDER — HYDRALAZINE HYDROCHLORIDE 20 MG/ML
10 INJECTION INTRAMUSCULAR; INTRAVENOUS EVERY 6 HOURS PRN
Status: DISCONTINUED | OUTPATIENT
Start: 2024-05-26 | End: 2024-05-29 | Stop reason: HOSPADM

## 2024-05-26 RX ADMIN — POTASSIUM CHLORIDE 10 MEQ: 7.46 INJECTION, SOLUTION INTRAVENOUS at 02:05

## 2024-05-26 RX ADMIN — INSULIN ASPART 2 UNITS: 100 INJECTION, SOLUTION INTRAVENOUS; SUBCUTANEOUS at 04:05

## 2024-05-26 RX ADMIN — GABAPENTIN 250 MG: 250 SOLUTION ORAL at 01:05

## 2024-05-26 RX ADMIN — INSULIN ASPART 4 UNITS: 100 INJECTION, SOLUTION INTRAVENOUS; SUBCUTANEOUS at 12:05

## 2024-05-26 RX ADMIN — ACETAMINOPHEN 999.01 MG: 650 SOLUTION ORAL at 01:05

## 2024-05-26 RX ADMIN — METHOCARBAMOL 500 MG: 500 TABLET ORAL at 05:05

## 2024-05-26 RX ADMIN — INSULIN ASPART 1 UNITS: 100 INJECTION, SOLUTION INTRAVENOUS; SUBCUTANEOUS at 08:05

## 2024-05-26 RX ADMIN — METHOCARBAMOL 500 MG: 500 TABLET ORAL at 09:05

## 2024-05-26 RX ADMIN — METHOCARBAMOL 500 MG: 500 TABLET ORAL at 01:05

## 2024-05-26 RX ADMIN — KETOROLAC TROMETHAMINE 15 MG: 15 INJECTION, SOLUTION INTRAMUSCULAR; INTRAVENOUS at 01:05

## 2024-05-26 RX ADMIN — POTASSIUM CHLORIDE 10 MEQ: 7.46 INJECTION, SOLUTION INTRAVENOUS at 12:05

## 2024-05-26 RX ADMIN — ACETAMINOPHEN 999.01 MG: 650 SOLUTION ORAL at 09:05

## 2024-05-26 RX ADMIN — POTASSIUM CHLORIDE 10 MEQ: 7.46 INJECTION, SOLUTION INTRAVENOUS at 01:05

## 2024-05-26 RX ADMIN — GABAPENTIN 250 MG: 250 SOLUTION ORAL at 05:05

## 2024-05-26 RX ADMIN — Medication 10 ML: at 05:05

## 2024-05-26 RX ADMIN — GABAPENTIN 250 MG: 250 SOLUTION ORAL at 09:05

## 2024-05-26 RX ADMIN — ENOXAPARIN SODIUM 40 MG: 40 INJECTION SUBCUTANEOUS at 04:05

## 2024-05-26 RX ADMIN — ACETAMINOPHEN 999.01 MG: 650 SOLUTION ORAL at 05:05

## 2024-05-26 RX ADMIN — I.V. FAT EMULSION 250 ML: 20 EMULSION INTRAVENOUS at 10:05

## 2024-05-26 RX ADMIN — INSULIN GLARGINE 20 UNITS: 100 INJECTION, SOLUTION SUBCUTANEOUS at 08:05

## 2024-05-26 RX ADMIN — SCOPALAMINE 1 PATCH: 1 PATCH, EXTENDED RELEASE TRANSDERMAL at 09:05

## 2024-05-26 RX ADMIN — Medication 10 ML: at 12:05

## 2024-05-26 RX ADMIN — Medication: at 07:05

## 2024-05-26 RX ADMIN — NICOTINE 1 PATCH: 14 PATCH, EXTENDED RELEASE TRANSDERMAL at 08:05

## 2024-05-26 RX ADMIN — INSULIN ASPART 4 UNITS: 100 INJECTION, SOLUTION INTRAVENOUS; SUBCUTANEOUS at 08:05

## 2024-05-26 RX ADMIN — PANTOPRAZOLE SODIUM 40 MG: 40 INJECTION, POWDER, FOR SOLUTION INTRAVENOUS at 08:05

## 2024-05-26 RX ADMIN — ENALAPRIL MALEATE 10 MG: 1 SOLUTION ORAL at 10:05

## 2024-05-26 RX ADMIN — POTASSIUM CHLORIDE 10 MEQ: 7.46 INJECTION, SOLUTION INTRAVENOUS at 03:05

## 2024-05-26 RX ADMIN — MAGNESIUM SULFATE HEPTAHYDRATE: 500 INJECTION, SOLUTION INTRAMUSCULAR; INTRAVENOUS at 10:05

## 2024-05-26 RX ADMIN — POTASSIUM CHLORIDE 10 MEQ: 7.46 INJECTION, SOLUTION INTRAVENOUS at 05:05

## 2024-05-26 RX ADMIN — TAMSULOSIN HYDROCHLORIDE 0.8 MG: 0.4 CAPSULE ORAL at 08:05

## 2024-05-26 RX ADMIN — POTASSIUM CHLORIDE 10 MEQ: 7.46 INJECTION, SOLUTION INTRAVENOUS at 04:05

## 2024-05-26 RX ADMIN — KETOROLAC TROMETHAMINE 15 MG: 15 INJECTION, SOLUTION INTRAMUSCULAR; INTRAVENOUS at 05:05

## 2024-05-26 NOTE — SUBJECTIVE & OBJECTIVE
Subjective:     Interval History: No acute events overnight, pain controlled with PCA, NGT with 500 output last shift, DAWIT drain bland, ostomy with output    Post-Op Info:  Procedure(s) (LRB):  LAPAROTOMY, EXPLORATORY (N/A)  SIGMOIDOSCOPY, FLEXIBLE  INSERTION, CATHETER, URETER (Left)  COLECTOMY, COMPLETION, ABDOMINAL (N/A)  CREATION, ILEOSTOMY, DIVERTING LOOP (N/A)  ANASTOMOSIS, ILEORECTAL (N/A)   5 Days Post-Op      Medications:  Continuous Infusions:   dextrose 5 % and 0.9 % NaCl   Intravenous Continuous 75 mL/hr at 05/24/24 2142 New Bag at 05/24/24 2142    hydromorphone in 0.9 % NaCl 6 mg/30 ml   Intravenous Continuous   New Syringe/Bag at 05/25/24 2319    TPN ADULT CENTRAL LINE CUSTOM   Intravenous Continuous 75 mL/hr at 05/25/24 2259 New Bag at 05/25/24 2259    TPN ADULT CENTRAL LINE CUSTOM   Intravenous Continuous         Scheduled Meds:   acetaminophen  999.0148 mg Per NG tube Q8H    enoxparin  40 mg Subcutaneous Q24H (prophylaxis, 1700)    fat emulsion 20%  250 mL Intravenous Daily    fat emulsion 20%  250 mL Intravenous Daily    gabapentin  250 mg Per NG tube Q8H    insulin glargine U-100  20 Units Subcutaneous Daily    ketorolac  15 mg Intravenous Q8H    methocarbamoL  500 mg Per NG tube Q8H    nicotine  1 patch Transdermal Daily    pantoprazole  40 mg Intravenous Daily    scopolamine  1 patch Transdermal Q3 Days    sodium chloride 0.9%  10 mL Intravenous Q6H    tamsulosin  0.8 mg Oral Daily     PRN Meds:   acetaminophen oral solution 999.0148 mg    enoxaparin injection 40 mg    fat emulsion 20% infusion 250 mL    fat emulsion 20% infusion 250 mL    gabapentin 250 mg/5 mL (5 mL) solution 250 mg    insulin glargine U-100 (Lantus) pen 20 Units    ketorolac injection 15 mg    methocarbamoL tablet 500 mg    nicotine 14 mg/24 hr 1 patch    pantoprazole injection 40 mg    scopolamine 1.3-1.5 mg (1 mg over 3 days) 1 patch    sodium chloride 0.9% flush 10 mL    tamsulosin 24 hr capsule 0.8 mg        Objective:      Vital Signs (Most Recent):  Temp: 98.9 °F (37.2 °C) (05/26/24 0428)  Pulse: 73 (05/26/24 0428)  Resp: 18 (05/26/24 0428)  BP: (!) 171/79 (05/26/24 0428)  SpO2: 96 % (05/26/24 0428) Vital Signs (24h Range):  Temp:  [97.6 °F (36.4 °C)-98.9 °F (37.2 °C)] 98.9 °F (37.2 °C)  Pulse:  [69-77] 73  Resp:  [17-18] 18  SpO2:  [96 %-98 %] 96 %  BP: (135-177)/(68-81) 171/79     Intake/Output - Last 3 Shifts         05/24 0700  05/25 0659 05/25 0700  05/26 0659 05/26 0700  05/27 0659    I.V. (mL/kg) 20 (0.2)      Other       NG/GT  60     Total Intake(mL/kg) 20 (0.2) 60 (0.6)     Urine (mL/kg/hr) 850 (0.4) 2050 (0.9)     Drains 1280 722     Stool 20 200     Total Output 2150 2972     Net -2130 -2912            Stool Occurrence 1 x 1 x 0 x             Physical Exam  Vitals and nursing note reviewed.   Constitutional:       General: He is not in acute distress.     Appearance: He is well-developed. He is not ill-appearing or diaphoretic.   HENT:      Head: Normocephalic and atraumatic.      Nose:      Comments: NGT with brown gastric secretions     Mouth/Throat:      Pharynx: Oropharynx is clear. No oropharyngeal exudate.   Eyes:      General: No scleral icterus.     Extraocular Movements: Extraocular movements intact.   Cardiovascular:      Rate and Rhythm: Normal rate and regular rhythm.   Pulmonary:      Effort: Pulmonary effort is normal. No respiratory distress.   Abdominal:      Comments: Midline incision with gauze in place  Ostomy with output  DAWIT drain x1 with SS output  Soft, non-distended, appropriately tender   Musculoskeletal:         General: No deformity. Normal range of motion.   Skin:     General: Skin is warm and dry.   Neurological:      General: No focal deficit present.      Mental Status: He is alert.      Cranial Nerves: No cranial nerve deficit.   Psychiatric:         Mood and Affect: Mood normal.         Behavior: Behavior normal.              Significant Labs:  All pertinent lab results within the last  24 hours have been reviewed.     Significant Diagnostics:  I have reviewed all pertinent imaging results/findings within the past 24 hours.

## 2024-05-26 NOTE — PROGRESS NOTES
[facility]  Colorectal Surgery  Progress Note    Patient Name: Osman Li Jr.  MRN: [id number]  Admission Date: 5/21/2024  Hospital Length of Stay: 5 days  Attending Physician: Farhan Lance MD    Subjective:     Interval History: No acute events overnight, pain controlled with PCA, NGT with 500 output last shift, DAWIT drain bland, ostomy with output    Post-Op Info:  Procedure(s) (LRB):  LAPAROTOMY, EXPLORATORY (N/A)  SIGMOIDOSCOPY, FLEXIBLE  INSERTION, CATHETER, URETER (Left)  COLECTOMY, COMPLETION, ABDOMINAL (N/A)  CREATION, ILEOSTOMY, DIVERTING LOOP (N/A)  ANASTOMOSIS, ILEORECTAL (N/A)   5 Days Post-Op      Medications:  Continuous Infusions:   dextrose 5 % and 0.9 % NaCl   Intravenous Continuous 75 mL/hr at 05/24/24 2142 New Bag at 05/24/24 2142    hydromorphone in 0.9 % NaCl 6 mg/30 ml   Intravenous Continuous   New Syringe/Bag at 05/25/24 2319    TPN ADULT CENTRAL LINE CUSTOM   Intravenous Continuous 75 mL/hr at 05/25/24 2259 New Bag at 05/25/24 2259    TPN ADULT CENTRAL LINE CUSTOM   Intravenous Continuous         Scheduled Meds:   acetaminophen  999.0148 mg Per NG tube Q8H    enoxparin  40 mg Subcutaneous Q24H (prophylaxis, 1700)    fat emulsion 20%  250 mL Intravenous Daily    fat emulsion 20%  250 mL Intravenous Daily    gabapentin  250 mg Per NG tube Q8H    insulin glargine U-100  20 Units Subcutaneous Daily    ketorolac  15 mg Intravenous Q8H    methocarbamoL  500 mg Per NG tube Q8H    nicotine  1 patch Transdermal Daily    pantoprazole  40 mg Intravenous Daily    scopolamine  1 patch Transdermal Q3 Days    sodium chloride 0.9%  10 mL Intravenous Q6H    tamsulosin  0.8 mg Oral Daily     PRN Meds:   acetaminophen oral solution 999.0148 mg    enoxaparin injection 40 mg    fat emulsion 20% infusion 250 mL    fat emulsion 20% infusion 250 mL    gabapentin 250 mg/5 mL (5 mL) solution 250 mg    insulin glargine U-100 (Lantus) pen 20 Units    ketorolac injection 15 mg    methocarbamoL  tablet 500 mg    nicotine 14 mg/24 hr 1 patch    pantoprazole injection 40 mg    scopolamine 1.3-1.5 mg (1 mg over 3 days) 1 patch    sodium chloride 0.9% flush 10 mL    tamsulosin 24 hr capsule 0.8 mg        Objective:     Vital Signs (Most Recent):  Temp: 98.9 °F (37.2 °C) (05/26/24 0428)  Pulse: 73 (05/26/24 0428)  Resp: 18 (05/26/24 0428)  BP: (!) 171/79 (05/26/24 0428)  SpO2: 96 % (05/26/24 0428) Vital Signs (24h Range):  Temp:  [97.6 °F (36.4 °C)-98.9 °F (37.2 °C)] 98.9 °F (37.2 °C)  Pulse:  [69-77] 73  Resp:  [17-18] 18  SpO2:  [96 %-98 %] 96 %  BP: (135-177)/(68-81) 171/79     Intake/Output - Last 3 Shifts         05/24 0700  05/25 0659 05/25 0700  05/26 0659 05/26 0700  05/27 0659    I.V. (mL/kg) 20 (0.2)      Other       NG/GT  60     Total Intake(mL/kg) 20 (0.2) 60 (0.6)     Urine (mL/kg/hr) 850 (0.4) 2050 (0.9)     Drains 1280 722     Stool 20 200     Total Output 2150 2972     Net -2130 -2912            Stool Occurrence 1 x 1 x 0 x             Physical Exam  Vitals and nursing note reviewed.   Constitutional:       General: He is not in acute distress.     Appearance: He is well-developed. He is not ill-appearing or diaphoretic.   HENT:      Head: Normocephalic and atraumatic.      Nose:      Comments: NGT with brown gastric secretions     Mouth/Throat:      Pharynx: Oropharynx is clear. No oropharyngeal exudate.   Eyes:      General: No scleral icterus.     Extraocular Movements: Extraocular movements intact.   Cardiovascular:      Rate and Rhythm: Normal rate and regular rhythm.   Pulmonary:      Effort: Pulmonary effort is normal. No respiratory distress.   Abdominal:      Comments: Midline incision with gauze in place  Ostomy with output  DAWIT drain x1 with SS output  Soft, non-distended, appropriately tender   Musculoskeletal:         General: No deformity. Normal range of motion.   Skin:     General: Skin is warm and dry.   Neurological:      General: No focal deficit present.      Mental Status: He  is alert.      Cranial Nerves: No cranial nerve deficit.   Psychiatric:         Mood and Affect: Mood normal.         Behavior: Behavior normal.              Significant Labs:  All pertinent lab results within the last 24 hours have been reviewed.     Significant Diagnostics:  I have reviewed all pertinent imaging results/findings within the past 24 hours.  Assessment/Plan:     Malignant neoplasm of transverse colon  Osman Li Jr. is a 59 y.o. 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 He was transferred to the hospital with concern for internal hernia with closed loop bowel obstruction now s/p ex-lap where he was found to have a perforation near his anastomosis. He is s/p completion colectomy, ileorectal anastomosis, diverting loop ileostomy, and flexible sigmoidoscopy on 5/21/24.    - Periop Cipro/flagyl x 4 days  - MM pain meds: mary tylenol, ibuprofen, gabapentin, and robaxin, dPCA  - NPO  - mIVF  - TPN  - NGT, potentially remove today   - DAWIT to bulb suction  - PRN nausea meds.Scop patch  - Nicotine patch  - PT/OT  - DVT ppx          Keyshawn Whitaker MD  Colorectal Surgery  [facility]

## 2024-05-26 NOTE — RESPIRATORY THERAPY
"RAPID RESPONSE RESPIRATORY THERAPY ETCO2 CHECK         Time of visit: 935     Code Status: Full Code   : 1965  Bed: 1004/1004 A:   MRN: [id number]  Time spent at the bedside: < 15 min    SITUATION    Evaluated patient for: ETCo2 compliance    BACKGROUND    Why is the patient in the hospital?: Large bowel anastomotic leak    Patient has a past medical history of Colon cancer, Digestive disorder, DM (diabetes mellitus), Hyperlipidemia, Hypertension, and Prediabetes.    24 Hours Vitals Range:  Temp:  [97.6 °F (36.4 °C)-98.9 °F (37.2 °C)]   Pulse:  [69-77]   Resp:  [17-18]   BP: (135-177)/(68-81)   SpO2:  [96 %-98 %]     Labs:    Recent Labs     24  0242 24  0352 24  0557   * 134* 133*   K 3.8 3.0* 3.1*   CL 99 98 99   CO2 30* 28 28   BUN 16 14 11   CREATININE 0.9 0.8 0.8   GLU 97 137* 199*   PHOS 2.8 2.9 2.7   MG 2.0 1.8 1.9        No results for input(s): "PH", "PCO2", "PO2", "HCO3", "POCSATURATED", "BE" in the last 72 hours.    ASSESSMENT/INTERVENTIONS      Last VS   Temp: 98.6 °F (37 °C) ( 08)  Pulse: 69 (817)  Resp: 18 (817)  BP: 163/76 (817)  SpO2: 98 % (815)    Level of Consciousness: Level of Consciousness (AVPU): alert  Respiratory Effort: Respiratory Effort: Normal, Unlabored Expansion/Accessory Muscle Usage: Expansion/Accessory Muscles/Retractions: no retractions, no use of accessory muscles  Is the ETCO2 monitor on? Yes  Is the patient wearing a cannula? Yes  Are ETCO2 orders placed? Yes  Is the patient on a PCA pump? Yes  ETCO2 monitored: ETCO2 (mmHg): 40 mmHg  Ambu at bedside: Yes    Active Orders   Respiratory Care    END TIDAL CO2 MONITOR Q12H     Frequency: Q12H     Number of Occurrences: Until Specified    Incentive spirometry     Frequency: Q4H     Number of Occurrences: Until Specified     Order Comments: Q4 while awake until discharge.  Educate patient in use; Keep incentive spirometer within reach; and Document incentive spirometer " volume every 4 hours while awake.    ((10 sets per hour (3-5 inspirations per set)) on original order)      Oxygen Continuous     Frequency: Continuous     Number of Occurrences: Until Specified     Order Questions:      Device type: Low flow      Device: Nasal Cannula (1- 5 Liters)      LPM: 1      Titrate O2 per Oxygen Titration Protocol: Yes      To maintain SpO2 goal of: >= 90%      Notify MD of: Inability to achieve desired SpO2; Sudden change in patient status and requires 20% increase in FiO2; Patient requires >60% FiO2    SMOKING CESSATION EDUCATION PER RESPIRATORY Once     Frequency: Once     Number of Occurrences: 1 Occurrences       RECOMMENDATIONS    We recommend: RRT Recs: Continue POC per primary team.      FOLLOW-UP    Please call back the Rapid Response RT, Onesimo Martinez RRT at x [phone] for any questions or concerns.

## 2024-05-26 NOTE — NURSING
[facility] Plan of Care Note     Dx: Large bowel anastomotic leak     Shift Events: NG tube removed.     Goals of Care: Drain care, pain management with PCA, blood glucose monitoring, & safety.      Neuro: AAO x 4     Vital Signs: WNL     Respiratory: 2 LNC     Diet: NPO     Is patient tolerating current diet? Yes     GTTS: PCA pump, TPN @ 75 ml/hr.     Urine Output/Bowel Movemen5: Adequate urine output per urinal, LBM 5/26/24 per ostomy.     Drains/Tubes/Tube Feeds (include total output/shift):  R NG tube, R DAWIT drain LLQ Ileostomy      Lines: 20g L FA, ADDY PICC     Accuchecks: Q4H     Skin: ML abdominal incision; DAWIT drain RLQ; Ileostomy LLQ     Fall Risk Score: 12     Activity level? Stand by assist     Any scheduled procedures? None     Any safety concerns? Falls

## 2024-05-26 NOTE — PLAN OF CARE
Problem: Adult Inpatient Plan of Care  Goal: Plan of Care Review  Outcome: Progressing  Goal: Patient-Specific Goal (Individualized)  Outcome: Progressing  Goal: Absence of Hospital-Acquired Illness or Injury  Outcome: Progressing  Goal: Optimal Comfort and Wellbeing  Outcome: Progressing  Goal: Readiness for Transition of Care  Outcome: Progressing   [facility] Plan of Care Note     Dx: Large bowel anastomotic leak   Shift Events: None    Goals of Care: NG tube, drain care, pain management with PCA, blood glucose monitoring, & safety.    Neuro: AAO x 4   Vital Signs: WNL   Respiratory: 2 LNC   Diet: NPO   Is patient tolerating current diet? Yes   GTTS: PCA pump, TPN @ 75 ml/hr.   Urine Output/Bowel Movemen5: Adequate urine output per urinal, LBM 5/24/24 per ostomy.   Drains/Tubes/Tube Feeds (include total output/shift):  R NG tube, R DAWIT drain LLQ Ileostomy    Lines: 20g L FA, ADDY PICC   Accuchecks: Q4H   Skin: ML abdominal incision; DAWIT drain RLQ; Ileostomy LLQ   Fall Risk Score: 12   Activity level? Stand by assist   Any scheduled procedures? None   Any safety concerns? Falls

## 2024-05-26 NOTE — PROGRESS NOTES
[facility]  Endocrinology  Progress Note    Admit Date: 5/21/2024     Reason for Consult: Management of T2DM, Hyperglycemia     Surgical Procedure and Date: s/p 5/21-Open completion colectomy with ileorectal anastomosis, Diverting loop ileostomy, Flexible sigmoidoscopy    Diabetes diagnosis year: >5 years ago    Home Diabetes Medications:  Toujeo 50 units.  Metformin    How often checking glucose at home? 1-3 x day   BG readings on regimen: 100s  Hypoglycemia on the regimen?  No  Missed doses on regimen?  No    Diabetes Complications include:     Hyperglycemia    Complicating diabetes co morbidities:   HLD, HTN      HPI:   Patient is a 59 y.o. male with with a history of HTN, HLD, and DM, Stage IV transverse colon adenocarcinoma who was recently discharged from the hospital on 5/18/24. 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 Post operatively he developed an ileus which improved with conservative management. He was able to be discharged on POD#5. At that time he was tolerating a regular diet, ambulating, voiding spontaneously, having BMs, and had adequate pain control.      Unfortunately he represented to [facility] early on 5/21/24 with worsening left lower quadrant pain and nonbilious, nonbloody vomiting. Upon arrival there he was febrile to 103 and tachycardic, but HDS on RA. Labs with an elevated WBC to 22.8 and slight anemia but otherwise unremarkable. CT A/P was concerning for an internal hernia with closed loop obstruction and so he was transferred to [facility] for higher level of care. S/p completion colectomy, ileorectal anastomosis, diverting loop ileostomy, and flexible sigmoidoscopy on 5/21/24.         Interval HPI:   Overnight events: Remains in [facility]. POD 5. BG variable on current SQ insulin regimen (118-232). TPN at 75 ml/hr.   Eating:  "  NPO  Nausea: No  Hypoglycemia and intervention: No  Fever: No  TPN and/or TF: Yes  If yes, type of TF/TPN and rate: TPN at 75 ml/hr    BP (!) 163/76 (BP Location: Right arm, Patient Position: Lying)   Pulse 69   Temp 98.6 °F (37 °C) (Oral)   Resp 18   Ht 6' 2" (1.88 m)   Wt 99.8 kg (220 lb 0.3 oz)   SpO2 98%   BMI 28.25 kg/m²     Labs Reviewed and Include    Recent Labs   Lab 05/26/24  0557   *   CALCIUM 8.9   ALBUMIN 2.0*   PROT 5.3*   *   K 3.1*   CO2 28   CL 99   BUN 11   CREATININE 0.8   ALKPHOS 168*   ALT 13   AST 18   BILITOT 0.3     Lab Results   Component Value Date    WBC 16.09 (H) 05/26/2024    HGB 10.1 (L) 05/26/2024    HCT 30.8 (L) 05/26/2024    MCV 90 05/26/2024     (H) 05/26/2024     No results for input(s): "TSH", "FREET4" in the last 168 hours.  Lab Results   Component Value Date    HGBA1C 6.5 (H) 05/22/2024       Nutritional status:   Body mass index is 28.25 kg/m².  Lab Results   Component Value Date    ALBUMIN 2.0 (L) 05/26/2024    ALBUMIN 2.0 (L) 05/25/2024    ALBUMIN 2.0 (L) 05/24/2024     No results found for: "PREALBUMIN"    Estimated Creatinine Clearance: 123.9 mL/min (based on SCr of 0.8 mg/dL).    Accu-Checks  Recent Labs     05/25/24  0605 05/25/24  0801 05/25/24  0835 05/25/24  1151 05/25/24  1210 05/25/24  1624 05/25/24  2108 05/25/24  2331 05/26/24  0433 05/26/24  0815   POCTGLUCOSE 187* 211* 85 67* 141* 141* 122* 118* 203* 232*       Current Medications and/or Treatments Impacting Glycemic Control  Immunotherapy:    Immunosuppressants       None          Steroids:   Hormones (From admission, onward)      None          Pressors:    Autonomic Drugs (From admission, onward)      None          Hyperglycemia/Diabetes Medications:   Antihyperglycemics (From admission, onward)      Start     Stop Route Frequency Ordered    05/22/24 1032  insulin aspart U-100 pen 1-10 Units         -- SubQ Every 4 hours PRN 05/22/24 0932    05/22/24 0945  insulin glargine U-100 " (Lantus) pen 20 Units         -- SubQ Daily 05/22/24 0936            ASSESSMENT and PLAN    Cardiac/Vascular  HLD (hyperlipidemia)  Managed per primary team  Optimize bg control        Endocrine  Type 2 diabetes mellitus  BG goal: 140-180  T2DM S/p completion colectomy, ileorectal anastomosis, diverting loop ileostomy, and flexible sigmoidoscopy on 5/21/24. TPN to be initiated at 42 ml/hr. Will consider scheduled Novolog with TPN if prandial excursions are noted.     - Continue Lantus 20 units daily   - Novolog Moderate dose correction with ISF 25 starting at 150    - POCT Glucose every 4 hours  - Hypoglycemia protocol in place    ** Please notify Endocrine for any change and/or advance in diet**  ** Please call Endocrine for any BG related issues **     Discharge Planning:   TBD. Please notify endocrinology prior to discharge.        GI  * Large bowel anastomotic leak  Managed per primary team  Optimize bg control            Kasey Braga NP  Endocrinology  [facility]

## 2024-05-26 NOTE — SUBJECTIVE & OBJECTIVE
"Interval HPI:   Overnight events: Remains in [facility]. POD 5. BG variable on current SQ insulin regimen (118-232). TPN at 75 ml/hr.   Eating:   NPO  Nausea: No  Hypoglycemia and intervention: No  Fever: No  TPN and/or TF: Yes  If yes, type of TF/TPN and rate: TPN at 75 ml/hr    BP (!) 163/76 (BP Location: Right arm, Patient Position: Lying)   Pulse 69   Temp 98.6 °F (37 °C) (Oral)   Resp 18   Ht 6' 2" (1.88 m)   Wt 99.8 kg (220 lb 0.3 oz)   SpO2 98%   BMI 28.25 kg/m²     Labs Reviewed and Include    Recent Labs   Lab 05/26/24  0557   *   CALCIUM 8.9   ALBUMIN 2.0*   PROT 5.3*   *   K 3.1*   CO2 28   CL 99   BUN 11   CREATININE 0.8   ALKPHOS 168*   ALT 13   AST 18   BILITOT 0.3     Lab Results   Component Value Date    WBC 16.09 (H) 05/26/2024    HGB 10.1 (L) 05/26/2024    HCT 30.8 (L) 05/26/2024    MCV 90 05/26/2024     (H) 05/26/2024     No results for input(s): "TSH", "FREET4" in the last 168 hours.  Lab Results   Component Value Date    HGBA1C 6.5 (H) 05/22/2024       Nutritional status:   Body mass index is 28.25 kg/m².  Lab Results   Component Value Date    ALBUMIN 2.0 (L) 05/26/2024    ALBUMIN 2.0 (L) 05/25/2024    ALBUMIN 2.0 (L) 05/24/2024     No results found for: "PREALBUMIN"    Estimated Creatinine Clearance: 123.9 mL/min (based on SCr of 0.8 mg/dL).    Accu-Checks  Recent Labs     05/25/24  0605 05/25/24  0801 05/25/24  0835 05/25/24  1151 05/25/24  1210 05/25/24  1624 05/25/24  2108 05/25/24  2331 05/26/24  0433 05/26/24  0815   POCTGLUCOSE 187* 211* 85 67* 141* 141* 122* 118* 203* 232*       Current Medications and/or Treatments Impacting Glycemic Control  Immunotherapy:    Immunosuppressants       None          Steroids:   Hormones (From admission, onward)      None          Pressors:    Autonomic Drugs (From admission, onward)      None          Hyperglycemia/Diabetes Medications:   Antihyperglycemics (From admission, onward)      Start     Stop Route Frequency Ordered    " 05/22/24 1032  insulin aspart U-100 pen 1-10 Units         -- SubQ Every 4 hours PRN 05/22/24 0932    05/22/24 0945  insulin glargine U-100 (Lantus) pen 20 Units         -- SubQ Daily 05/22/24 0936

## 2024-05-27 LAB
ALBUMIN SERPL BCP-MCNC: 2.1 G/DL (ref 3.5–5.2)
ALP SERPL-CCNC: 228 U/L (ref 55–135)
ALT SERPL W/O P-5'-P-CCNC: 15 U/L (ref 10–44)
ANION GAP SERPL CALC-SCNC: 6 MMOL/L (ref 8–16)
AST SERPL-CCNC: 22 U/L (ref 10–40)
BACTERIA #/AREA URNS AUTO: ABNORMAL /HPF
BASOPHILS # BLD AUTO: 0.08 K/UL (ref 0–0.2)
BASOPHILS NFR BLD: 0.4 % (ref 0–1.9)
BILIRUB SERPL-MCNC: 0.3 MG/DL (ref 0.1–1)
BILIRUB UR QL STRIP: NEGATIVE
BUN SERPL-MCNC: 12 MG/DL (ref 6–20)
CALCIUM SERPL-MCNC: 8.6 MG/DL (ref 8.7–10.5)
CHLORIDE SERPL-SCNC: 101 MMOL/L (ref 95–110)
CLARITY UR REFRACT.AUTO: ABNORMAL
CO2 SERPL-SCNC: 29 MMOL/L (ref 23–29)
COLOR UR AUTO: YELLOW
CREAT SERPL-MCNC: 0.8 MG/DL (ref 0.5–1.4)
CRP SERPL-MCNC: 44.8 MG/L (ref 0–8.2)
DIFFERENTIAL METHOD BLD: ABNORMAL
EOSINOPHIL # BLD AUTO: 0.7 K/UL (ref 0–0.5)
EOSINOPHIL NFR BLD: 4 % (ref 0–8)
ERYTHROCYTE [DISTWIDTH] IN BLOOD BY AUTOMATED COUNT: 13.9 % (ref 11.5–14.5)
EST. GFR  (NO RACE VARIABLE): >60 ML/MIN/1.73 M^2
GLUCOSE SERPL-MCNC: 166 MG/DL (ref 70–110)
GLUCOSE UR QL STRIP: ABNORMAL
HCT VFR BLD AUTO: 30.4 % (ref 40–54)
HGB BLD-MCNC: 10 G/DL (ref 14–18)
HGB UR QL STRIP: ABNORMAL
IMM GRANULOCYTES # BLD AUTO: 0.18 K/UL (ref 0–0.04)
IMM GRANULOCYTES NFR BLD AUTO: 1 % (ref 0–0.5)
KETONES UR QL STRIP: NEGATIVE
LEUKOCYTE ESTERASE UR QL STRIP: ABNORMAL
LYMPHOCYTES # BLD AUTO: 2.8 K/UL (ref 1–4.8)
LYMPHOCYTES NFR BLD: 15.3 % (ref 18–48)
MAGNESIUM SERPL-MCNC: 1.9 MG/DL (ref 1.6–2.6)
MCH RBC QN AUTO: 29.4 PG (ref 27–31)
MCHC RBC AUTO-ENTMCNC: 32.9 G/DL (ref 32–36)
MCV RBC AUTO: 89 FL (ref 82–98)
MICROSCOPIC COMMENT: ABNORMAL
MONOCYTES # BLD AUTO: 1.7 K/UL (ref 0.3–1)
MONOCYTES NFR BLD: 9.2 % (ref 4–15)
NEUTROPHILS # BLD AUTO: 12.7 K/UL (ref 1.8–7.7)
NEUTROPHILS NFR BLD: 70.1 % (ref 38–73)
NITRITE UR QL STRIP: POSITIVE
NRBC BLD-RTO: 0 /100 WBC
PH UR STRIP: 6 [PH] (ref 5–8)
PHOSPHATE SERPL-MCNC: 2.5 MG/DL (ref 2.7–4.5)
PLATELET # BLD AUTO: 679 K/UL (ref 150–450)
PMV BLD AUTO: 10.2 FL (ref 9.2–12.9)
POCT GLUCOSE: 146 MG/DL (ref 70–110)
POCT GLUCOSE: 155 MG/DL (ref 70–110)
POCT GLUCOSE: 173 MG/DL (ref 70–110)
POCT GLUCOSE: 177 MG/DL (ref 70–110)
POCT GLUCOSE: 210 MG/DL (ref 70–110)
POCT GLUCOSE: 217 MG/DL (ref 70–110)
POTASSIUM SERPL-SCNC: 3.5 MMOL/L (ref 3.5–5.1)
PROT SERPL-MCNC: 5.3 G/DL (ref 6–8.4)
PROT UR QL STRIP: ABNORMAL
RBC # BLD AUTO: 3.4 M/UL (ref 4.6–6.2)
RBC #/AREA URNS AUTO: 31 /HPF (ref 0–4)
SODIUM SERPL-SCNC: 136 MMOL/L (ref 136–145)
SP GR UR STRIP: 1.02 (ref 1–1.03)
URN SPEC COLLECT METH UR: ABNORMAL
WBC # BLD AUTO: 18.17 K/UL (ref 3.9–12.7)
WBC #/AREA URNS AUTO: >100 /HPF (ref 0–5)
YEAST UR QL AUTO: ABNORMAL

## 2024-05-27 PROCEDURE — 85025 COMPLETE CBC W/AUTO DIFF WBC: CPT | Performed by: SURGERY

## 2024-05-27 PROCEDURE — 25000003 PHARM REV CODE 250: Performed by: STUDENT IN AN ORGANIZED HEALTH CARE EDUCATION/TRAINING PROGRAM

## 2024-05-27 PROCEDURE — 99232 SBSQ HOSP IP/OBS MODERATE 35: CPT | Mod: ,,, | Performed by: NURSE PRACTITIONER

## 2024-05-27 PROCEDURE — 87077 CULTURE AEROBIC IDENTIFY: CPT | Performed by: STUDENT IN AN ORGANIZED HEALTH CARE EDUCATION/TRAINING PROGRAM

## 2024-05-27 PROCEDURE — 80053 COMPREHEN METABOLIC PANEL: CPT | Performed by: SURGERY

## 2024-05-27 PROCEDURE — 97116 GAIT TRAINING THERAPY: CPT | Mod: CQ

## 2024-05-27 PROCEDURE — 87086 URINE CULTURE/COLONY COUNT: CPT | Performed by: STUDENT IN AN ORGANIZED HEALTH CARE EDUCATION/TRAINING PROGRAM

## 2024-05-27 PROCEDURE — 86140 C-REACTIVE PROTEIN: CPT | Performed by: SURGERY

## 2024-05-27 PROCEDURE — S4991 NICOTINE PATCH NONLEGEND: HCPCS | Performed by: STUDENT IN AN ORGANIZED HEALTH CARE EDUCATION/TRAINING PROGRAM

## 2024-05-27 PROCEDURE — 84100 ASSAY OF PHOSPHORUS: CPT | Performed by: SURGERY

## 2024-05-27 PROCEDURE — 63600175 PHARM REV CODE 636 W HCPCS: Performed by: SURGERY

## 2024-05-27 PROCEDURE — A4217 STERILE WATER/SALINE, 500 ML: HCPCS | Performed by: STUDENT IN AN ORGANIZED HEALTH CARE EDUCATION/TRAINING PROGRAM

## 2024-05-27 PROCEDURE — 81001 URINALYSIS AUTO W/SCOPE: CPT | Performed by: STUDENT IN AN ORGANIZED HEALTH CARE EDUCATION/TRAINING PROGRAM

## 2024-05-27 PROCEDURE — 94761 N-INVAS EAR/PLS OXIMETRY MLT: CPT

## 2024-05-27 PROCEDURE — 99900035 HC TECH TIME PER 15 MIN (STAT)

## 2024-05-27 PROCEDURE — 87186 SC STD MICRODIL/AGAR DIL: CPT | Performed by: STUDENT IN AN ORGANIZED HEALTH CARE EDUCATION/TRAINING PROGRAM

## 2024-05-27 PROCEDURE — 83735 ASSAY OF MAGNESIUM: CPT | Performed by: SURGERY

## 2024-05-27 PROCEDURE — 63600175 PHARM REV CODE 636 W HCPCS: Performed by: PHYSICIAN ASSISTANT

## 2024-05-27 PROCEDURE — 63600175 PHARM REV CODE 636 W HCPCS: Performed by: NURSE PRACTITIONER

## 2024-05-27 PROCEDURE — C9113 INJ PANTOPRAZOLE SODIUM, VIA: HCPCS | Performed by: STUDENT IN AN ORGANIZED HEALTH CARE EDUCATION/TRAINING PROGRAM

## 2024-05-27 PROCEDURE — 63600175 PHARM REV CODE 636 W HCPCS: Performed by: STUDENT IN AN ORGANIZED HEALTH CARE EDUCATION/TRAINING PROGRAM

## 2024-05-27 PROCEDURE — 36415 COLL VENOUS BLD VENIPUNCTURE: CPT | Performed by: SURGERY

## 2024-05-27 PROCEDURE — 20600001 HC STEP DOWN PRIVATE ROOM

## 2024-05-27 PROCEDURE — A4216 STERILE WATER/SALINE, 10 ML: HCPCS | Performed by: STUDENT IN AN ORGANIZED HEALTH CARE EDUCATION/TRAINING PROGRAM

## 2024-05-27 PROCEDURE — B4185 PARENTERAL SOL 10 GM LIPIDS: HCPCS | Performed by: STUDENT IN AN ORGANIZED HEALTH CARE EDUCATION/TRAINING PROGRAM

## 2024-05-27 PROCEDURE — 87088 URINE BACTERIA CULTURE: CPT | Performed by: STUDENT IN AN ORGANIZED HEALTH CARE EDUCATION/TRAINING PROGRAM

## 2024-05-27 RX ORDER — ENALAPRIL MALEATE 5 MG/1
10 TABLET ORAL DAILY
Status: DISCONTINUED | OUTPATIENT
Start: 2024-05-28 | End: 2024-05-29 | Stop reason: HOSPADM

## 2024-05-27 RX ORDER — HYDROMORPHONE HYDROCHLORIDE 1 MG/ML
0.2 INJECTION, SOLUTION INTRAMUSCULAR; INTRAVENOUS; SUBCUTANEOUS EVERY 4 HOURS PRN
Status: DISCONTINUED | OUTPATIENT
Start: 2024-05-27 | End: 2024-05-29 | Stop reason: HOSPADM

## 2024-05-27 RX ORDER — OXYCODONE HYDROCHLORIDE 5 MG/1
5 TABLET ORAL EVERY 4 HOURS PRN
Status: DISCONTINUED | OUTPATIENT
Start: 2024-05-27 | End: 2024-05-27

## 2024-05-27 RX ORDER — INSULIN ASPART 100 [IU]/ML
3 INJECTION, SOLUTION INTRAVENOUS; SUBCUTANEOUS
Status: DISCONTINUED | OUTPATIENT
Start: 2024-05-27 | End: 2024-05-27

## 2024-05-27 RX ORDER — ONDANSETRON HYDROCHLORIDE 2 MG/ML
4 INJECTION, SOLUTION INTRAVENOUS EVERY 6 HOURS PRN
Status: DISCONTINUED | OUTPATIENT
Start: 2024-05-27 | End: 2024-05-29 | Stop reason: HOSPADM

## 2024-05-27 RX ORDER — OXYCODONE HYDROCHLORIDE 10 MG/1
10 TABLET ORAL EVERY 4 HOURS PRN
Status: DISCONTINUED | OUTPATIENT
Start: 2024-05-27 | End: 2024-05-29 | Stop reason: HOSPADM

## 2024-05-27 RX ORDER — METHOCARBAMOL 500 MG/1
500 TABLET, FILM COATED ORAL EVERY 8 HOURS
Status: DISCONTINUED | OUTPATIENT
Start: 2024-05-27 | End: 2024-05-29 | Stop reason: HOSPADM

## 2024-05-27 RX ORDER — DEXTROSE MONOHYDRATE 100 MG/ML
INJECTION, SOLUTION INTRAVENOUS CONTINUOUS PRN
Status: DISCONTINUED | OUTPATIENT
Start: 2024-05-27 | End: 2024-05-27

## 2024-05-27 RX ORDER — OXYCODONE HYDROCHLORIDE 5 MG/1
5 TABLET ORAL EVERY 4 HOURS PRN
Status: DISCONTINUED | OUTPATIENT
Start: 2024-05-27 | End: 2024-05-29 | Stop reason: HOSPADM

## 2024-05-27 RX ORDER — GABAPENTIN 300 MG/1
300 CAPSULE ORAL 3 TIMES DAILY
Status: DISCONTINUED | OUTPATIENT
Start: 2024-05-27 | End: 2024-05-29 | Stop reason: HOSPADM

## 2024-05-27 RX ORDER — ACETAMINOPHEN 500 MG
1000 TABLET ORAL EVERY 8 HOURS
Status: DISCONTINUED | OUTPATIENT
Start: 2024-05-27 | End: 2024-05-29 | Stop reason: HOSPADM

## 2024-05-27 RX ORDER — HEPARIN 100 UNIT/ML
10 SYRINGE INTRAVENOUS
Status: DISCONTINUED | OUTPATIENT
Start: 2024-05-27 | End: 2024-05-29 | Stop reason: HOSPADM

## 2024-05-27 RX ORDER — INSULIN ASPART 100 [IU]/ML
2 INJECTION, SOLUTION INTRAVENOUS; SUBCUTANEOUS
Status: DISCONTINUED | OUTPATIENT
Start: 2024-05-27 | End: 2024-05-28

## 2024-05-27 RX ORDER — CIPROFLOXACIN 500 MG/1
500 TABLET ORAL EVERY 12 HOURS
Status: DISCONTINUED | OUTPATIENT
Start: 2024-05-27 | End: 2024-05-29 | Stop reason: HOSPADM

## 2024-05-27 RX ORDER — PANTOPRAZOLE SODIUM 40 MG/1
40 TABLET, DELAYED RELEASE ORAL DAILY
Status: DISCONTINUED | OUTPATIENT
Start: 2024-05-27 | End: 2024-05-29 | Stop reason: HOSPADM

## 2024-05-27 RX ADMIN — METHOCARBAMOL 500 MG: 500 TABLET ORAL at 05:05

## 2024-05-27 RX ADMIN — OXYCODONE HYDROCHLORIDE 10 MG: 10 TABLET ORAL at 10:05

## 2024-05-27 RX ADMIN — Medication 10 ML: at 02:05

## 2024-05-27 RX ADMIN — INSULIN ASPART 2 UNITS: 100 INJECTION, SOLUTION INTRAVENOUS; SUBCUTANEOUS at 04:05

## 2024-05-27 RX ADMIN — INSULIN ASPART 2 UNITS: 100 INJECTION, SOLUTION INTRAVENOUS; SUBCUTANEOUS at 08:05

## 2024-05-27 RX ADMIN — GABAPENTIN 300 MG: 300 CAPSULE ORAL at 02:05

## 2024-05-27 RX ADMIN — GABAPENTIN 300 MG: 300 CAPSULE ORAL at 08:05

## 2024-05-27 RX ADMIN — CIPROFLOXACIN HYDROCHLORIDE 500 MG: 500 TABLET, FILM COATED ORAL at 08:05

## 2024-05-27 RX ADMIN — OXYCODONE 5 MG: 5 TABLET ORAL at 12:05

## 2024-05-27 RX ADMIN — ACETAMINOPHEN 1000 MG: 500 TABLET ORAL at 10:05

## 2024-05-27 RX ADMIN — POTASSIUM PHOSPHATE, MONOBASIC AND POTASSIUM PHOSPHATE, DIBASIC 15 MMOL: 224; 236 INJECTION, SOLUTION, CONCENTRATE INTRAVENOUS at 04:05

## 2024-05-27 RX ADMIN — METHOCARBAMOL 500 MG: 500 TABLET ORAL at 02:05

## 2024-05-27 RX ADMIN — HYDROMORPHONE HYDROCHLORIDE 0.2 MG: 1 INJECTION, SOLUTION INTRAMUSCULAR; INTRAVENOUS; SUBCUTANEOUS at 04:05

## 2024-05-27 RX ADMIN — Medication 10 ML: at 05:05

## 2024-05-27 RX ADMIN — ENALAPRIL MALEATE 10 MG: 1 SOLUTION ORAL at 10:05

## 2024-05-27 RX ADMIN — GABAPENTIN 250 MG: 250 SOLUTION ORAL at 05:05

## 2024-05-27 RX ADMIN — METHOCARBAMOL 500 MG: 500 TABLET ORAL at 10:05

## 2024-05-27 RX ADMIN — ACETAMINOPHEN 999.01 MG: 650 SOLUTION ORAL at 05:05

## 2024-05-27 RX ADMIN — INSULIN ASPART 2 UNITS: 100 INJECTION, SOLUTION INTRAVENOUS; SUBCUTANEOUS at 12:05

## 2024-05-27 RX ADMIN — ONDANSETRON 4 MG: 2 INJECTION INTRAMUSCULAR; INTRAVENOUS at 12:05

## 2024-05-27 RX ADMIN — INSULIN GLARGINE 20 UNITS: 100 INJECTION, SOLUTION SUBCUTANEOUS at 10:05

## 2024-05-27 RX ADMIN — INSULIN ASPART 4 UNITS: 100 INJECTION, SOLUTION INTRAVENOUS; SUBCUTANEOUS at 08:05

## 2024-05-27 RX ADMIN — GABAPENTIN 300 MG: 300 CAPSULE ORAL at 10:05

## 2024-05-27 RX ADMIN — I.V. FAT EMULSION 250 ML: 20 EMULSION INTRAVENOUS at 10:05

## 2024-05-27 RX ADMIN — ENOXAPARIN SODIUM 40 MG: 40 INJECTION SUBCUTANEOUS at 04:05

## 2024-05-27 RX ADMIN — ACETAMINOPHEN 1000 MG: 500 TABLET ORAL at 02:05

## 2024-05-27 RX ADMIN — TAMSULOSIN HYDROCHLORIDE 0.8 MG: 0.4 CAPSULE ORAL at 08:05

## 2024-05-27 RX ADMIN — OXYCODONE HYDROCHLORIDE 10 MG: 10 TABLET ORAL at 06:05

## 2024-05-27 RX ADMIN — NICOTINE 1 PATCH: 14 PATCH, EXTENDED RELEASE TRANSDERMAL at 08:05

## 2024-05-27 RX ADMIN — MAGNESIUM SULFATE HEPTAHYDRATE: 500 INJECTION, SOLUTION INTRAMUSCULAR; INTRAVENOUS at 10:05

## 2024-05-27 RX ADMIN — PANTOPRAZOLE SODIUM 40 MG: 40 INJECTION, POWDER, FOR SOLUTION INTRAVENOUS at 08:05

## 2024-05-27 RX ADMIN — Medication 10 ML: at 06:05

## 2024-05-27 NOTE — PROGRESS NOTES
[facility]  Wound Care    Patient Name:  Osman Li Jr.   MRN:  [id number]  Date: 5/27/2024  Diagnosis: Large bowel anastomotic leak    History:     Past Medical History:   Diagnosis Date    Colon cancer     Digestive disorder     DM (diabetes mellitus)     Hyperlipidemia     Hypertension     Prediabetes        Social History     Socioeconomic History    Marital status:    Tobacco Use    Smoking status: Every Day     Current packs/day: 1.00     Average packs/day: 1 pack/day for 40.0 years (40.0 ttl pk-yrs)     Types: Cigarettes     Passive exposure: Current    Smokeless tobacco: Never   Substance and Sexual Activity    Alcohol use: Not Currently    Drug use: Never    Sexual activity: Yes     Partners: Female     Social Determinants of Health     Financial Resource Strain: Low Risk  (5/22/2024)    Overall Financial Resource Strain (CARDIA)     Difficulty of Paying Living Expenses: Not hard at all   Food Insecurity: No Food Insecurity (5/22/2024)    Hunger Vital Sign     Worried About Running Out of Food in the Last Year: Never true     Ran Out of Food in the Last Year: Never true   Transportation Needs: No Transportation Needs (5/22/2024)    TRANSPORTATION NEEDS     Transportation : No   Physical Activity: Inactive (5/22/2024)    Exercise Vital Sign     Days of Exercise per Week: 0 days     Minutes of Exercise per Session: 0 min   Stress: No Stress Concern Present (5/22/2024)    [employer] [place] [employer] - Occupational Stress Questionnaire     Feeling of Stress : Not at all   Housing Stability: Low Risk  (5/22/2024)    Housing Stability Vital Sign     Unable to Pay for Housing in the Last Year: No     Homeless in the Last Year: No       Precautions:     Allergies as of 05/21/2024 - Reviewed 05/21/2024   Allergen Reaction Noted    Penicillins Rash 08/11/2020       WO Assessment Details/Treatment   Patient seen for ostomy care. The ostomy pouch is intact, with a good seal. The  patient is independent in ostomy care and changed the pouch yesterday. All questions/concerns answered. Supplies at the bedside for discharge. Wound care will sign off, re-consult as needed.      05/27/2024

## 2024-05-27 NOTE — ASSESSMENT & PLAN NOTE
Osman Li Jr. is a 59 y.o. 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 He was transferred to the hospital with concern for internal hernia with closed loop bowel obstruction now s/p ex-lap where he was found to have a perforation near his anastomosis. He is s/p completion colectomy, ileorectal anastomosis, diverting loop ileostomy, and flexible sigmoidoscopy on 5/21/24.    - Periop Cipro/flagyl x 4 days complete  - MM PO pain meds. Tylenol, gabapentin, and robaxin. Change dPCA to oxycodone  - Start on CLD  - TPN reordered today  - DAWIT to bulb suction  - Flush port today  - PRN nausea meds. Increase frequency of zofran.Scop patch  - Nicotine patch  - PT/OT  - DVT ppx

## 2024-05-27 NOTE — PROGRESS NOTES
[facility]  Colorectal Surgery  Progress Note    Patient Name: Osman Li Jr.  MRN: [id number]  Admission Date: 5/21/2024  Hospital Length of Stay: 6 days  Attending Physician: Farhan Lance MD    Subjective:     Interval History: No acute events. Remains AF and HDS. Labs with slight rise in WBC from 16 -> 18 but otherwise unremarkable. CRP in process. Having intermittent nausea which is controlled with PRN meds. Ileostomy still with some output.     Post-Op Info:  Procedure(s) (LRB):  LAPAROTOMY, EXPLORATORY (N/A)  SIGMOIDOSCOPY, FLEXIBLE  INSERTION, CATHETER, URETER (Left)  COLECTOMY, COMPLETION, ABDOMINAL (N/A)  CREATION, ILEOSTOMY, DIVERTING LOOP (N/A)  ANASTOMOSIS, ILEORECTAL (N/A)   6 Days Post-Op      Medications:  Continuous Infusions:   hydromorphone in 0.9 % NaCl 6 mg/30 ml   Intravenous Continuous   New Syringe/Bag at 05/26/24 1938    TPN ADULT CENTRAL LINE CUSTOM   Intravenous Continuous 75 mL/hr at 05/26/24 2206 New Bag at 05/26/24 2206     Scheduled Meds:   acetaminophen  999.0148 mg Per NG tube Q8H    enalapril  10 mg Oral Daily    enoxparin  40 mg Subcutaneous Q24H (prophylaxis, 1700)    fat emulsion 20%  250 mL Intravenous Daily    gabapentin  250 mg Per NG tube Q8H    insulin glargine U-100  20 Units Subcutaneous Daily    methocarbamoL  500 mg Per NG tube Q8H    nicotine  1 patch Transdermal Daily    pantoprazole  40 mg Intravenous Daily    scopolamine  1 patch Transdermal Q3 Days    sodium chloride 0.9%  10 mL Intravenous Q6H    tamsulosin  0.8 mg Oral Daily     PRN Meds:   acetaminophen oral solution 999.0148 mg    enalapril 1 mg/mL oral solution    enoxaparin injection 40 mg    fat emulsion 20% infusion 250 mL    gabapentin 250 mg/5 mL (5 mL) solution 250 mg    insulin glargine U-100 (Lantus) pen 20 Units    methocarbamoL tablet 500 mg    nicotine 14 mg/24 hr 1 patch    pantoprazole injection 40 mg    scopolamine 1.3-1.5 mg (1 mg over 3 days) 1 patch    sodium chloride  0.9% flush 10 mL    tamsulosin 24 hr capsule 0.8 mg        Objective:     Vital Signs (Most Recent):  Temp: 96.8 °F (36 °C) (05/27/24 0736)  Pulse: 76 (05/27/24 0836)  Resp: 12 (05/27/24 0836)  BP: 137/67 (05/27/24 0736)  SpO2: 98 % (05/27/24 0736) Vital Signs (24h Range):  Temp:  [96.8 °F (36 °C)-98.9 °F (37.2 °C)] 96.8 °F (36 °C)  Pulse:  [69-83] 76  Resp:  [12-20] 12  SpO2:  [96 %-100 %] 98 %  BP: (137-193)/(67-87) 137/67     Intake/Output - Last 3 Shifts         05/25 0700  05/26 0659 05/26 0700  05/27 0659 05/27 0700 05/28 0659    P.O.  120     I.V. (mL/kg)       NG/GT 60      Total Intake(mL/kg) 60 (0.6) 120 (1.2)     Urine (mL/kg/hr) 2050 (0.9) 1560 (0.7)     Drains 722 40     Stool 200 351     Total Output 2972 1951     Net -2912 -1831            Stool Occurrence 1 x 0 x              Physical Exam  Vitals and nursing note reviewed.   Constitutional:       General: He is not in acute distress.     Appearance: He is well-developed. He is not ill-appearing or diaphoretic.   HENT:      Head: Normocephalic and atraumatic.      Mouth/Throat:      Pharynx: Oropharynx is clear. No oropharyngeal exudate.   Eyes:      General: No scleral icterus.     Extraocular Movements: Extraocular movements intact.   Cardiovascular:      Rate and Rhythm: Normal rate and regular rhythm.   Pulmonary:      Effort: Pulmonary effort is normal. No respiratory distress.   Abdominal:      Comments: Midline incision with gauze in place  Ostomy with output  DAWIT drain x1 with SS output  Soft, non-distended, appropriately tender   Musculoskeletal:         General: No deformity. Normal range of motion.   Skin:     General: Skin is warm and dry.   Neurological:      General: No focal deficit present.      Mental Status: He is alert.      Cranial Nerves: No cranial nerve deficit.   Psychiatric:         Mood and Affect: Mood normal.         Behavior: Behavior normal.              Significant Labs:  CBC:   Recent Labs   Lab 05/27/24  0522   WBC  18.17*   RBC 3.40*   HGB 10.0*   HCT 30.4*   *   MCV 89   MCH 29.4   MCHC 32.9     CMP:   Recent Labs   Lab 05/27/24  0522   *   CALCIUM 8.6*   ALBUMIN 2.1*   PROT 5.3*      K 3.5   CO2 29      BUN 12   CREATININE 0.8   ALKPHOS 228*   ALT 15   AST 22   BILITOT 0.3       Significant Diagnostics:  I have reviewed all pertinent imaging results/findings within the past 24 hours.  Assessment/Plan:     Malignant neoplasm of transverse colon  Osman Li Jr. is a 59 y.o. 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 He was transferred to the hospital with concern for internal hernia with closed loop bowel obstruction now s/p ex-lap where he was found to have a perforation near his anastomosis. He is s/p completion colectomy, ileorectal anastomosis, diverting loop ileostomy, and flexible sigmoidoscopy on 5/21/24.    - Periop Cipro/flagyl x 4 days complete  - MM PO pain meds. Tylenol, gabapentin, and robaxin. Change dPCA to oxycodone  - Start on CLD  - TPN reordered today  - DAWIT to bulb suction  - Flush port today  - PRN nausea meds. Increase frequency of zofran.Scop patch  - Nicotine patch  - PT/OT  - DVT ppx          Belem Vernon MD  Colorectal Surgery  [facility]

## 2024-05-27 NOTE — PROGRESS NOTES
[facility]  Endocrinology  Progress Note    Admit Date: 5/21/2024     Reason for Consult: Management of T2DM, Hyperglycemia     Surgical Procedure and Date: s/p 5/21-Open completion colectomy with ileorectal anastomosis, Diverting loop ileostomy, Flexible sigmoidoscopy    Diabetes diagnosis year: >5 years ago    Home Diabetes Medications:  Toujeo 50 units.  Metformin    How often checking glucose at home? 1-3 x day   BG readings on regimen: 100s  Hypoglycemia on the regimen?  No  Missed doses on regimen?  No    Diabetes Complications include:     Hyperglycemia    Complicating diabetes co morbidities:   HLD, HTN      HPI:   Patient is a 59 y.o. male with with a history of HTN, HLD, and DM, Stage IV transverse colon adenocarcinoma who was recently discharged from the hospital on 5/18/24. 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 Post operatively he developed an ileus which improved with conservative management. He was able to be discharged on POD#5. At that time he was tolerating a regular diet, ambulating, voiding spontaneously, having BMs, and had adequate pain control.      Unfortunately he represented to [facility] early on 5/21/24 with worsening left lower quadrant pain and nonbilious, nonbloody vomiting. Upon arrival there he was febrile to 103 and tachycardic, but HDS on RA. Labs with an elevated WBC to 22.8 and slight anemia but otherwise unremarkable. CT A/P was concerning for an internal hernia with closed loop obstruction and so [place] [facility] [place]. S/p completion colectomy, ileorectal anastomosis, diverting loop ileostomy, and flexible sigmoidoscopy on 5/21/24.         Interval HPI:   Overnight events: Remains in [facility]. POD 6. BG at higher end or slightly above goal ranges on current scheduled insulin  "(basal and correction scale). Remains on TPN at 75 ml/hr. TPN ADULT CENTRAL LINE CUSTOM  Diet Clear Liquid  TPN ADULT CENTRAL LINE CUSTOM    Eating:    clear liquids   Nausea: No  Hypoglycemia and intervention: No  Fever: No  TPN and/or TF: TPN   If yes, type of TF/TPN and rate: TPN at 75 ml/hr     /67 (BP Location: Right arm, Patient Position: Lying)   Pulse 76   Temp 96.8 °F (36 °C)   Resp 12   Ht 6' 2" (1.88 m)   Wt 99.8 kg (220 lb 0.3 oz)   SpO2 98%   BMI 28.25 kg/m²     Labs Reviewed and Include    Recent Labs   Lab 05/27/24  0522   *   CALCIUM 8.6*   ALBUMIN 2.1*   PROT 5.3*      K 3.5   CO2 29      BUN 12   CREATININE 0.8   ALKPHOS 228*   ALT 15   AST 22   BILITOT 0.3     Lab Results   Component Value Date    WBC 18.17 (H) 05/27/2024    HGB 10.0 (L) 05/27/2024    HCT 30.4 (L) 05/27/2024    MCV 89 05/27/2024     (H) 05/27/2024     No results for input(s): "TSH", "FREET4" in the last 168 hours.  Lab Results   Component Value Date    HGBA1C 6.5 (H) 05/22/2024       Nutritional status:   Body mass index is 28.25 kg/m².  Lab Results   Component Value Date    ALBUMIN 2.1 (L) 05/27/2024    ALBUMIN 2.0 (L) 05/26/2024    ALBUMIN 2.0 (L) 05/25/2024     No results found for: "PREALBUMIN"    Estimated Creatinine Clearance: 123.9 mL/min (based on SCr of 0.8 mg/dL).    Accu-Checks  Recent Labs     05/25/24  2108 05/25/24  2331 05/26/24  0433 05/26/24  0815 05/26/24  1237 05/26/24  1604 05/26/24  1925 05/26/24  2323 05/27/24  0410 05/27/24  0739   POCTGLUCOSE 122* 118* 203* 232* 218* 195* 180* 219* 173* 210*       Current Medications and/or Treatments Impacting Glycemic Control  Immunotherapy:    Immunosuppressants       None          Steroids:   Hormones (From admission, onward)      None          Pressors:    Autonomic Drugs (From admission, onward)      None          Hyperglycemia/Diabetes Medications:   Antihyperglycemics (From admission, onward)      Start     Stop Route Frequency " Ordered    05/22/24 1032  insulin aspart U-100 pen 1-10 Units         -- SubQ Every 4 hours PRN 05/22/24 0932    05/22/24 0945  insulin glargine U-100 (Lantus) pen 20 Units         -- SubQ Daily 05/22/24 0936            ASSESSMENT and PLAN    Cardiac/Vascular  HLD (hyperlipidemia)  Managed per primary team  Optimize bg control        Endocrine  Type 2 diabetes mellitus  BG goal: 140-180  T2DM S/p completion colectomy, ileorectal anastomosis, diverting loop ileostomy, and flexible sigmoidoscopy on 5/21/24. TPN at 75 ml/hr. BG excursions noted with TPN therapy- responding well to prn correction scale     - Continue Lantus 20 units daily   - Start Novolog 2 units q 4 hrs while on TPN (HOLD if TPN is stopped or BG < 100)   - Novolog Moderate dose correction with ISF 25 starting at 150    - POCT Glucose every 4 hours  - Hypoglycemia protocol in place    ** Please notify Endocrine for any change and/or advance in diet**  ** Please call Endocrine for any BG related issues **     Discharge Planning:   TBD. Please notify endocrinology prior to discharge.        GI  * Large bowel anastomotic leak  Managed per primary team  Optimize bg control            Kasey Braga, NP  Endocrinology  [facility]

## 2024-05-27 NOTE — SUBJECTIVE & OBJECTIVE
Subjective:     Interval History: No acute events. Remains AF and HDS. Labs with slight rise in WBC from 16 -> 18 but otherwise unremarkable. CRP in process. Having intermittent nausea which is controlled with PRN meds. Ileostomy still with some output.     Post-Op Info:  Procedure(s) (LRB):  LAPAROTOMY, EXPLORATORY (N/A)  SIGMOIDOSCOPY, FLEXIBLE  INSERTION, CATHETER, URETER (Left)  COLECTOMY, COMPLETION, ABDOMINAL (N/A)  CREATION, ILEOSTOMY, DIVERTING LOOP (N/A)  ANASTOMOSIS, ILEORECTAL (N/A)   6 Days Post-Op      Medications:  Continuous Infusions:   hydromorphone in 0.9 % NaCl 6 mg/30 ml   Intravenous Continuous   New Syringe/Bag at 05/26/24 1938    TPN ADULT CENTRAL LINE CUSTOM   Intravenous Continuous 75 mL/hr at 05/26/24 2206 New Bag at 05/26/24 2206     Scheduled Meds:   acetaminophen  999.0148 mg Per NG tube Q8H    enalapril  10 mg Oral Daily    enoxparin  40 mg Subcutaneous Q24H (prophylaxis, 1700)    fat emulsion 20%  250 mL Intravenous Daily    gabapentin  250 mg Per NG tube Q8H    insulin glargine U-100  20 Units Subcutaneous Daily    methocarbamoL  500 mg Per NG tube Q8H    nicotine  1 patch Transdermal Daily    pantoprazole  40 mg Intravenous Daily    scopolamine  1 patch Transdermal Q3 Days    sodium chloride 0.9%  10 mL Intravenous Q6H    tamsulosin  0.8 mg Oral Daily     PRN Meds:   acetaminophen oral solution 999.0148 mg    enalapril 1 mg/mL oral solution    enoxaparin injection 40 mg    fat emulsion 20% infusion 250 mL    gabapentin 250 mg/5 mL (5 mL) solution 250 mg    insulin glargine U-100 (Lantus) pen 20 Units    methocarbamoL tablet 500 mg    nicotine 14 mg/24 hr 1 patch    pantoprazole injection 40 mg    scopolamine 1.3-1.5 mg (1 mg over 3 days) 1 patch    sodium chloride 0.9% flush 10 mL    tamsulosin 24 hr capsule 0.8 mg        Objective:     Vital Signs (Most Recent):  Temp: 96.8 °F (36 °C) (05/27/24 0736)  Pulse: 76 (05/27/24 0836)  Resp: 12 (05/27/24 0836)  BP: 137/67 (05/27/24  0736)  SpO2: 98 % (05/27/24 0736) Vital Signs (24h Range):  Temp:  [96.8 °F (36 °C)-98.9 °F (37.2 °C)] 96.8 °F (36 °C)  Pulse:  [69-83] 76  Resp:  [12-20] 12  SpO2:  [96 %-100 %] 98 %  BP: (137-193)/(67-87) 137/67     Intake/Output - Last 3 Shifts         05/25 0700 05/26 0659 05/26 0700 05/27 0659 05/27 0700 05/28 0659    P.O.  120     I.V. (mL/kg)       NG/GT 60      Total Intake(mL/kg) 60 (0.6) 120 (1.2)     Urine (mL/kg/hr) 2050 (0.9) 1560 (0.7)     Drains 722 40     Stool 200 351     Total Output 2972 1951     Net -2912 -1831            Stool Occurrence 1 x 0 x              Physical Exam  Vitals and nursing note reviewed.   Constitutional:       General: He is not in acute distress.     Appearance: He is well-developed. He is not ill-appearing or diaphoretic.   HENT:      Head: Normocephalic and atraumatic.      Mouth/Throat:      Pharynx: Oropharynx is clear. No oropharyngeal exudate.   Eyes:      General: No scleral icterus.     Extraocular Movements: Extraocular movements intact.   Cardiovascular:      Rate and Rhythm: Normal rate and regular rhythm.   Pulmonary:      Effort: Pulmonary effort is normal. No respiratory distress.   Abdominal:      Comments: Midline incision with gauze in place  Ostomy with output  DAWIT drain x1 with SS output  Soft, non-distended, appropriately tender   Musculoskeletal:         General: No deformity. Normal range of motion.   Skin:     General: Skin is warm and dry.   Neurological:      General: No focal deficit present.      Mental Status: He is alert.      Cranial Nerves: No cranial nerve deficit.   Psychiatric:         Mood and Affect: Mood normal.         Behavior: Behavior normal.              Significant Labs:  CBC:   Recent Labs   Lab 05/27/24  0522   WBC 18.17*   RBC 3.40*   HGB 10.0*   HCT 30.4*   *   MCV 89   MCH 29.4   MCHC 32.9     CMP:   Recent Labs   Lab 05/27/24 0522   *   CALCIUM 8.6*   ALBUMIN 2.1*   PROT 5.3*      K 3.5   CO2 29       BUN 12   CREATININE 0.8   ALKPHOS 228*   ALT 15   AST 22   BILITOT 0.3       Significant Diagnostics:  I have reviewed all pertinent imaging results/findings within the past 24 hours.

## 2024-05-27 NOTE — SUBJECTIVE & OBJECTIVE
"Interval HPI:   Overnight events: Remains in [facility]. POD 6. BG at higher end or slightly above goal ranges on current scheduled insulin (basal and correction scale). Remains on TPN at 75 ml/hr. TPN ADULT CENTRAL LINE CUSTOM  Diet Clear Liquid  TPN ADULT CENTRAL LINE CUSTOM    Eating:    clear liquids   Nausea: No  Hypoglycemia and intervention: No  Fever: No  TPN and/or TF: TPN   If yes, type of TF/TPN and rate: TPN at 75 ml/hr     /67 (BP Location: Right arm, Patient Position: Lying)   Pulse 76   Temp 96.8 °F (36 °C)   Resp 12   Ht 6' 2" (1.88 m)   Wt 99.8 kg (220 lb 0.3 oz)   SpO2 98%   BMI 28.25 kg/m²     Labs Reviewed and Include    Recent Labs   Lab 05/27/24  0522   *   CALCIUM 8.6*   ALBUMIN 2.1*   PROT 5.3*      K 3.5   CO2 29      BUN 12   CREATININE 0.8   ALKPHOS 228*   ALT 15   AST 22   BILITOT 0.3     Lab Results   Component Value Date    WBC 18.17 (H) 05/27/2024    HGB 10.0 (L) 05/27/2024    HCT 30.4 (L) 05/27/2024    MCV 89 05/27/2024     (H) 05/27/2024     No results for input(s): "TSH", "FREET4" in the last 168 hours.  Lab Results   Component Value Date    HGBA1C 6.5 (H) 05/22/2024       Nutritional status:   Body mass index is 28.25 kg/m².  Lab Results   Component Value Date    ALBUMIN 2.1 (L) 05/27/2024    ALBUMIN 2.0 (L) 05/26/2024    ALBUMIN 2.0 (L) 05/25/2024     No results found for: "PREALBUMIN"    Estimated Creatinine Clearance: 123.9 mL/min (based on SCr of 0.8 mg/dL).    Accu-Checks  Recent Labs     05/25/24  2108 05/25/24  2331 05/26/24  0433 05/26/24  0815 05/26/24  1237 05/26/24  1604 05/26/24  1925 05/26/24  2323 05/27/24  0410 05/27/24  0739   POCTGLUCOSE 122* 118* 203* 232* 218* 195* 180* 219* 173* 210*       Current Medications and/or Treatments Impacting Glycemic Control  Immunotherapy:    Immunosuppressants       None          Steroids:   Hormones (From admission, onward)      None          Pressors:    Autonomic Drugs (From admission, onward) "      None          Hyperglycemia/Diabetes Medications:   Antihyperglycemics (From admission, onward)      Start     Stop Route Frequency Ordered    05/22/24 1032  insulin aspart U-100 pen 1-10 Units         -- SubQ Every 4 hours PRN 05/22/24 0932    05/22/24 0945  insulin glargine U-100 (Lantus) pen 20 Units         -- SubQ Daily 05/22/24 0936

## 2024-05-27 NOTE — PT/OT/SLP PROGRESS
Physical Therapy Treatment    Patient Name:  Osman Li Jr.   MRN:  [id number]    Recommendations:     Discharge Recommendations: No Therapy Indicated  Discharge Equipment Recommendations: none  Barriers to discharge: None    Assessment:     Osman Li Jr. is a 59 y.o. male admitted with a medical diagnosis of Large bowel anastomotic leak.  He presents with the following impairments/functional limitations: impaired endurance, impaired self care skills, weakness, pain . presents with  improved overall functional mobility requiring decreased assistance and increased activity tolerance to perform functional mobility.Pt would continue to benefit from skilled PT to address overall functional mobility, to return to functional baseline and goals. Goals remain appropriate.    Rehab Prognosis: Good; patient would benefit from acute skilled PT services to address these deficits and reach maximum level of function.    Recent Surgery: Procedure(s) (LRB):  LAPAROTOMY, EXPLORATORY (N/A)  SIGMOIDOSCOPY, FLEXIBLE  INSERTION, CATHETER, URETER (Left)  COLECTOMY, COMPLETION, ABDOMINAL (N/A)  CREATION, ILEOSTOMY, DIVERTING LOOP (N/A)  ANASTOMOSIS, ILEORECTAL (N/A) 6 Days Post-Op    Plan:     During this hospitalization, patient to be seen 4 x/week to address the identified rehab impairments via gait training, therapeutic activities, therapeutic exercises and progress toward the following goals:    Plan of Care Expires:  06/22/24    Subjective     Patient/Family Comments/goals: I am doing good  Pain/Comfort:  Pain Rating 1: 6/10  Location - Orientation 1: generalized  Location 1: abdomen  Pain Addressed 1: Pre-medicate for activity      Objective:     Communicated with RN prior to session.  Patient found ambulatory in room/oneal with  (NG tube; peripheral IV; PCA; oxygen) upon PT entry to room.     General Precautions: Standard, fall  Orthopedic Precautions: N/A  Braces: N/A  Respiratory Status: Room air      Functional Mobility:  Bed Mobility:     Sit to Supine: stand by assistance  Transfers:     Sit to Stand:  stand by assistance with no AD  Gait: 300 feet with no AD supervision with flexed posture and decreased speed.  Stairs:  Pt ascended/descended 6 stair(s) x 2 trials  with No Assistive Device with right handrail with Stand-by Assistance and Contact Guard Assistance.       AM-PAC 6 CLICK MOBILITY  Turning over in bed (including adjusting bedclothes, sheets and blankets)?: 4  Sitting down on and standing up from a chair with arms (e.g., wheelchair, bedside commode, etc.): 4  Moving from lying on back to sitting on the side of the bed?: 3  Moving to and from a bed to a chair (including a wheelchair)?: 4  Need to walk in hospital room?: 3  Climbing 3-5 steps with a railing?: 3  Basic Mobility Total Score: 21       Treatment & Education:  Therapist provided instruction and educated of  patient on progress, safety,d/c,PT POC,   proper body mechanics, energy conservation, and fall prevention strategies during tasks listed above, on the effects of prolonged immobility and the importance of performing OOB activity and exercises to promote healing and reduce recovery time   Updated white board with appropriate PT mobility information for medical team notification   Donned an extra gown   Pt safe to ambulate in hallway with RN or PCT assistance  Call nursing/pct to transfer to chair/use bathroom. Pt stated understanding  Bedside table in front of patient and area set up for function, convenience, and safety. RN aware of patient's mobility needs and status. Questions/concerns addressed within PTA scope of practice; patient  with no further questions. Time was provided for active listening, discussion of health disposition, and discussion of safe discharge. Pt?verbalized?agreement .       Patient left HOB elevated with all lines intact, call button in reach, and nsg notified..    GOALS:   Multidisciplinary Problems        Physical Therapy Goals          Problem: Physical Therapy    Goal Priority Disciplines Outcome Goal Variances Interventions   Physical Therapy Goal     PT, PT/OT Progressing     Description: Goals to be met by: 2024     Patient will increase functional independence with mobility by performin. Supine to sit with Set-up [place]  2. Sit to supine with Set-up [place]  3. Sit to stand transfer with Supervision  4. Bed to chair transfer with Supervision using No Assistive Device  5. Gait  x 300 feet with Supervision using No Assistive Device.   6. Ascend/descend 20 stairs with right Handrail Stand-by Assistance using No Assistive Device.   7. Lower extremity exercise program x15 reps per handout, with supervision                         Time Tracking:     PT Received On: 24  PT Start Time: 1130     PT Stop Time: 1138  PT Total Time (min): 8 min     Billable Minutes: Gait Training 8    Treatment Type: Treatment  PT/PTA: PTA     Number of PTA visits since last PT visit: 2     2024

## 2024-05-27 NOTE — ASSESSMENT & PLAN NOTE
BG goal: 140-180  T2DM S/p completion colectomy, ileorectal anastomosis, diverting loop ileostomy, and flexible sigmoidoscopy on 5/21/24. TPN at 75 ml/hr. BG excursions noted with TPN therapy- responding well to prn correction scale     - Continue Lantus 20 units daily   - Start Novolog 2 units q 4 hrs while on TPN (HOLD if TPN is stopped or BG < 100)   - Novolog Moderate dose correction with ISF 25 starting at 150    - POCT Glucose every 4 hours  - Hypoglycemia protocol in place    ** Please notify Endocrine for any change and/or advance in diet**  ** Please call Endocrine for any BG related issues **     Discharge Planning:   TBD. Please notify endocrinology prior to discharge.

## 2024-05-27 NOTE — RESPIRATORY THERAPY
"RAPID RESPONSE RESPIRATORY THERAPY ETCO2 CHECK         Time of visit: 836      Code Status: Full Code   : 1965  Bed: 1004/1004 A:   MRN: [id number]  Time spent at the bedside: < 15 min    SITUATION    Evaluated patient for: ETCo2 compliance    BACKGROUND    Why is the patient in the hospital?: Large bowel anastomotic leak    Patient has a past medical history of Colon cancer, Digestive disorder, DM (diabetes mellitus), Hyperlipidemia, Hypertension, and Prediabetes.    24 Hours Vitals Range:  Temp:  [96.8 °F (36 °C)-98.9 °F (37.2 °C)]   Pulse:  [69-83]   Resp:  [12-20]   BP: (137-193)/(67-87)   SpO2:  [96 %-100 %]     Labs:    Recent Labs     24  0352 24  0557 24  05   * 133* 136   K 3.0* 3.1* 3.5   CL 98 99 101   CO2 28 28 29   BUN 14 11 12   CREATININE 0.8 0.8 0.8   * 199* 166*   PHOS 2.9 2.7 2.5*   MG 1.8 1.9 1.9        No results for input(s): "PH", "PCO2", "PO2", "HCO3", "POCSATURATED", "BE" in the last 72 hours.    ASSESSMENT/INTERVENTIONS  ETCO2 check completed    Last    Temp: 96.8 °F (36 °C) (736)  Pulse: 76 (836)  Resp: 12 (836)  BP: 137/67 (736)  SpO2: 98 % (736)    Level of Consciousness: Level of Consciousness (AVPU): alert  Respiratory Effort: Respiratory Effort: Normal, Unlabored Expansion/Accessory Muscle Usage: Expansion/Accessory Muscles/Retractions: no use of accessory muscles, no retractions, expansion symmetric  All Lung Field Breath Sounds:    Is the ETCO2 monitor on? Yes  Is the patient wearing a cannula? Yes  Are ETCO2 orders placed? Yes  Is the patient on a PCA pump? Yes  ETCO2 monitored: ETCO2 (mmHg): 39 mmHg  Ambu at bedside:      Active Orders   Respiratory Care    END TIDAL CO2 MONITOR Q12H     Frequency: Q12H     Number of Occurrences: Until Specified    Incentive spirometry     Frequency: Q4H     Number of Occurrences: Until Specified     Order Comments: Q4 while awake until discharge.  Educate patient in use; " Keep incentive spirometer within reach; and Document incentive spirometer volume every 4 hours while awake.    ((10 sets per hour (3-5 inspirations per set)) on original order)      Oxygen Continuous     Frequency: Continuous     Number of Occurrences: Until Specified     Order Questions:      Device type: Low flow      Device: Nasal Cannula (1- 5 Liters)      LPM: 1      Titrate O2 per Oxygen Titration Protocol: Yes      To maintain SpO2 goal of: >= 90%      Notify MD of: Inability to achieve desired SpO2; Sudden change in patient status and requires 20% increase in FiO2; Patient requires >60% FiO2    SMOKING CESSATION EDUCATION PER RESPIRATORY Once     Frequency: Once     Number of Occurrences: 1 Occurrences       RECOMMENDATIONS    We recommend: RRT Recs: Continue POC per primary team.      FOLLOW-UP    Please call back the Rapid Response RT, Elvira Chaidez RRT at x [phone] for any questions or concerns.

## 2024-05-27 NOTE — PLAN OF CARE
"[facility] Plan of Care Note    Dx:   SBO (small bowel obstruction) [K56.609]    Shift Events: No significant events overnight    Goals of Care: Comfort, safety    Neuro: AAO    Vital Signs: BP (!) 167/77 (BP Location: Right arm, Patient Position: Lying)   Pulse 79   Temp 97.8 °F (36.6 °C) (Oral)   Resp 18   Ht 6' 2" (1.88 m)   Wt 99.8 kg (220 lb 0.3 oz)   SpO2 99%   BMI 28.25 kg/m²     Respiratory: 2L NC    Diet: Diet NPO ex sips with meds per Dr. Palacio    Is patient tolerating current diet? Yes    GTTS: TPN, PCA    Urine Output/Bowel Movement:   See I/O flowsheet  Last Bowel Movement: 05/26/24    Drains/Tubes/Tube Feeds (include total output/shift):   No intake/output data recorded.    Lines: PICC, PIV    Accuchecks: Q4    Skin: MLI, ileostomy, DAWIT drain    Fall Risk Score: Per charting    Activity level? Indepedent    Any scheduled procedures? None    Any safety concerns? Fall precautions    Other: None   "

## 2024-05-28 LAB
ALBUMIN SERPL BCP-MCNC: 2.2 G/DL (ref 3.5–5.2)
ALP SERPL-CCNC: 236 U/L (ref 55–135)
ALT SERPL W/O P-5'-P-CCNC: 15 U/L (ref 10–44)
ANION GAP SERPL CALC-SCNC: 8 MMOL/L (ref 8–16)
AST SERPL-CCNC: 23 U/L (ref 10–40)
BASOPHILS # BLD AUTO: 0.09 K/UL (ref 0–0.2)
BASOPHILS NFR BLD: 0.5 % (ref 0–1.9)
BILIRUB SERPL-MCNC: 0.3 MG/DL (ref 0.1–1)
BUN SERPL-MCNC: 11 MG/DL (ref 6–20)
CALCIUM SERPL-MCNC: 8.7 MG/DL (ref 8.7–10.5)
CHLORIDE SERPL-SCNC: 102 MMOL/L (ref 95–110)
CO2 SERPL-SCNC: 27 MMOL/L (ref 23–29)
CREAT SERPL-MCNC: 0.9 MG/DL (ref 0.5–1.4)
CRP SERPL-MCNC: 52.6 MG/L (ref 0–8.2)
DIFFERENTIAL METHOD BLD: ABNORMAL
EOSINOPHIL # BLD AUTO: 0.7 K/UL (ref 0–0.5)
EOSINOPHIL NFR BLD: 3.8 % (ref 0–8)
ERYTHROCYTE [DISTWIDTH] IN BLOOD BY AUTOMATED COUNT: 14.3 % (ref 11.5–14.5)
EST. GFR  (NO RACE VARIABLE): >60 ML/MIN/1.73 M^2
GLUCOSE SERPL-MCNC: 185 MG/DL (ref 70–110)
HCT VFR BLD AUTO: 29.8 % (ref 40–54)
HGB BLD-MCNC: 10.2 G/DL (ref 14–18)
IMM GRANULOCYTES # BLD AUTO: 0.15 K/UL (ref 0–0.04)
IMM GRANULOCYTES NFR BLD AUTO: 0.9 % (ref 0–0.5)
LYMPHOCYTES # BLD AUTO: 3.4 K/UL (ref 1–4.8)
LYMPHOCYTES NFR BLD: 19.8 % (ref 18–48)
MAGNESIUM SERPL-MCNC: 2 MG/DL (ref 1.6–2.6)
MCH RBC QN AUTO: 29.9 PG (ref 27–31)
MCHC RBC AUTO-ENTMCNC: 34.2 G/DL (ref 32–36)
MCV RBC AUTO: 87 FL (ref 82–98)
MONOCYTES # BLD AUTO: 1.6 K/UL (ref 0.3–1)
MONOCYTES NFR BLD: 9 % (ref 4–15)
NEUTROPHILS # BLD AUTO: 11.3 K/UL (ref 1.8–7.7)
NEUTROPHILS NFR BLD: 66 % (ref 38–73)
NRBC BLD-RTO: 0 /100 WBC
PHOSPHATE SERPL-MCNC: 2.7 MG/DL (ref 2.7–4.5)
PLATELET # BLD AUTO: 717 K/UL (ref 150–450)
PMV BLD AUTO: 9.7 FL (ref 9.2–12.9)
POCT GLUCOSE: 176 MG/DL (ref 70–110)
POCT GLUCOSE: 176 MG/DL (ref 70–110)
POCT GLUCOSE: 186 MG/DL (ref 70–110)
POCT GLUCOSE: 189 MG/DL (ref 70–110)
POCT GLUCOSE: 202 MG/DL (ref 70–110)
POCT GLUCOSE: 250 MG/DL (ref 70–110)
POTASSIUM SERPL-SCNC: 3.7 MMOL/L (ref 3.5–5.1)
PROT SERPL-MCNC: 5.8 G/DL (ref 6–8.4)
RBC # BLD AUTO: 3.41 M/UL (ref 4.6–6.2)
SODIUM SERPL-SCNC: 137 MMOL/L (ref 136–145)
WBC # BLD AUTO: 17.18 K/UL (ref 3.9–12.7)

## 2024-05-28 PROCEDURE — 63600175 PHARM REV CODE 636 W HCPCS: Performed by: STUDENT IN AN ORGANIZED HEALTH CARE EDUCATION/TRAINING PROGRAM

## 2024-05-28 PROCEDURE — 25000003 PHARM REV CODE 250: Performed by: STUDENT IN AN ORGANIZED HEALTH CARE EDUCATION/TRAINING PROGRAM

## 2024-05-28 PROCEDURE — 20600001 HC STEP DOWN PRIVATE ROOM

## 2024-05-28 PROCEDURE — 80053 COMPREHEN METABOLIC PANEL: CPT | Performed by: SURGERY

## 2024-05-28 PROCEDURE — B4185 PARENTERAL SOL 10 GM LIPIDS: HCPCS | Performed by: STUDENT IN AN ORGANIZED HEALTH CARE EDUCATION/TRAINING PROGRAM

## 2024-05-28 PROCEDURE — 84100 ASSAY OF PHOSPHORUS: CPT | Performed by: SURGERY

## 2024-05-28 PROCEDURE — A4216 STERILE WATER/SALINE, 10 ML: HCPCS | Performed by: STUDENT IN AN ORGANIZED HEALTH CARE EDUCATION/TRAINING PROGRAM

## 2024-05-28 PROCEDURE — 86140 C-REACTIVE PROTEIN: CPT | Performed by: SURGERY

## 2024-05-28 PROCEDURE — 83735 ASSAY OF MAGNESIUM: CPT | Performed by: SURGERY

## 2024-05-28 PROCEDURE — 99232 SBSQ HOSP IP/OBS MODERATE 35: CPT | Mod: ,,, | Performed by: NURSE PRACTITIONER

## 2024-05-28 PROCEDURE — 85025 COMPLETE CBC W/AUTO DIFF WBC: CPT | Performed by: SURGERY

## 2024-05-28 PROCEDURE — S4991 NICOTINE PATCH NONLEGEND: HCPCS | Performed by: STUDENT IN AN ORGANIZED HEALTH CARE EDUCATION/TRAINING PROGRAM

## 2024-05-28 PROCEDURE — A4217 STERILE WATER/SALINE, 500 ML: HCPCS | Performed by: STUDENT IN AN ORGANIZED HEALTH CARE EDUCATION/TRAINING PROGRAM

## 2024-05-28 RX ORDER — INSULIN ASPART 100 [IU]/ML
3 INJECTION, SOLUTION INTRAVENOUS; SUBCUTANEOUS
Status: DISCONTINUED | OUTPATIENT
Start: 2024-05-28 | End: 2024-05-29

## 2024-05-28 RX ORDER — METOPROLOL TARTRATE 25 MG/1
25 TABLET, FILM COATED ORAL 2 TIMES DAILY
Status: DISCONTINUED | OUTPATIENT
Start: 2024-05-28 | End: 2024-05-29 | Stop reason: HOSPADM

## 2024-05-28 RX ORDER — ATORVASTATIN CALCIUM 20 MG/1
20 TABLET, FILM COATED ORAL NIGHTLY
Status: DISCONTINUED | OUTPATIENT
Start: 2024-05-28 | End: 2024-05-29 | Stop reason: HOSPADM

## 2024-05-28 RX ADMIN — ACETAMINOPHEN 1000 MG: 500 TABLET ORAL at 10:05

## 2024-05-28 RX ADMIN — METHOCARBAMOL 500 MG: 500 TABLET ORAL at 01:05

## 2024-05-28 RX ADMIN — INSULIN ASPART 2 UNITS: 100 INJECTION, SOLUTION INTRAVENOUS; SUBCUTANEOUS at 04:05

## 2024-05-28 RX ADMIN — TAMSULOSIN HYDROCHLORIDE 0.8 MG: 0.4 CAPSULE ORAL at 08:05

## 2024-05-28 RX ADMIN — OXYCODONE HYDROCHLORIDE 10 MG: 10 TABLET ORAL at 04:05

## 2024-05-28 RX ADMIN — NICOTINE 1 PATCH: 14 PATCH, EXTENDED RELEASE TRANSDERMAL at 08:05

## 2024-05-28 RX ADMIN — SMOFLIPID 250 ML: 6; 6; 5; 3 INJECTION, EMULSION INTRAVENOUS at 10:05

## 2024-05-28 RX ADMIN — ACETAMINOPHEN 1000 MG: 500 TABLET ORAL at 05:05

## 2024-05-28 RX ADMIN — INSULIN GLARGINE 20 UNITS: 100 INJECTION, SOLUTION SUBCUTANEOUS at 08:05

## 2024-05-28 RX ADMIN — GABAPENTIN 300 MG: 300 CAPSULE ORAL at 02:05

## 2024-05-28 RX ADMIN — HYDROMORPHONE HYDROCHLORIDE 0.2 MG: 1 INJECTION, SOLUTION INTRAMUSCULAR; INTRAVENOUS; SUBCUTANEOUS at 10:05

## 2024-05-28 RX ADMIN — PANTOPRAZOLE SODIUM 40 MG: 40 TABLET, DELAYED RELEASE ORAL at 08:05

## 2024-05-28 RX ADMIN — HYDROMORPHONE HYDROCHLORIDE 0.2 MG: 1 INJECTION, SOLUTION INTRAMUSCULAR; INTRAVENOUS; SUBCUTANEOUS at 12:05

## 2024-05-28 RX ADMIN — ONDANSETRON 4 MG: 2 INJECTION INTRAMUSCULAR; INTRAVENOUS at 05:05

## 2024-05-28 RX ADMIN — OXYCODONE HYDROCHLORIDE 10 MG: 10 TABLET ORAL at 08:05

## 2024-05-28 RX ADMIN — OXYCODONE HYDROCHLORIDE 10 MG: 10 TABLET ORAL at 11:05

## 2024-05-28 RX ADMIN — CIPROFLOXACIN HYDROCHLORIDE 500 MG: 500 TABLET, FILM COATED ORAL at 08:05

## 2024-05-28 RX ADMIN — INSULIN ASPART 2 UNITS: 100 INJECTION, SOLUTION INTRAVENOUS; SUBCUTANEOUS at 08:05

## 2024-05-28 RX ADMIN — MAGNESIUM SULFATE HEPTAHYDRATE: 500 INJECTION, SOLUTION INTRAMUSCULAR; INTRAVENOUS at 10:05

## 2024-05-28 RX ADMIN — INSULIN ASPART 1 UNITS: 100 INJECTION, SOLUTION INTRAVENOUS; SUBCUTANEOUS at 12:05

## 2024-05-28 RX ADMIN — METHOCARBAMOL 500 MG: 500 TABLET ORAL at 10:05

## 2024-05-28 RX ADMIN — ENALAPRIL MALEATE 10 MG: 5 TABLET ORAL at 08:05

## 2024-05-28 RX ADMIN — METOPROLOL TARTRATE 25 MG: 25 TABLET, FILM COATED ORAL at 08:05

## 2024-05-28 RX ADMIN — ENOXAPARIN SODIUM 40 MG: 40 INJECTION SUBCUTANEOUS at 04:05

## 2024-05-28 RX ADMIN — HYDROMORPHONE HYDROCHLORIDE 0.2 MG: 1 INJECTION, SOLUTION INTRAMUSCULAR; INTRAVENOUS; SUBCUTANEOUS at 06:05

## 2024-05-28 RX ADMIN — INSULIN ASPART 2 UNITS: 100 INJECTION, SOLUTION INTRAVENOUS; SUBCUTANEOUS at 05:05

## 2024-05-28 RX ADMIN — HYDROMORPHONE HYDROCHLORIDE 0.2 MG: 1 INJECTION, SOLUTION INTRAMUSCULAR; INTRAVENOUS; SUBCUTANEOUS at 08:05

## 2024-05-28 RX ADMIN — Medication 10 ML: at 11:05

## 2024-05-28 RX ADMIN — ATORVASTATIN CALCIUM 20 MG: 20 TABLET, FILM COATED ORAL at 08:05

## 2024-05-28 RX ADMIN — INSULIN ASPART 2 UNITS: 100 INJECTION, SOLUTION INTRAVENOUS; SUBCUTANEOUS at 12:05

## 2024-05-28 RX ADMIN — INSULIN ASPART 3 UNITS: 100 INJECTION, SOLUTION INTRAVENOUS; SUBCUTANEOUS at 08:05

## 2024-05-28 RX ADMIN — Medication 10 ML: at 12:05

## 2024-05-28 RX ADMIN — INSULIN ASPART 3 UNITS: 100 INJECTION, SOLUTION INTRAVENOUS; SUBCUTANEOUS at 04:05

## 2024-05-28 RX ADMIN — ACETAMINOPHEN 1000 MG: 500 TABLET ORAL at 01:05

## 2024-05-28 RX ADMIN — METHOCARBAMOL 500 MG: 500 TABLET ORAL at 05:05

## 2024-05-28 RX ADMIN — Medication 10 ML: at 04:05

## 2024-05-28 RX ADMIN — Medication 10 ML: at 05:05

## 2024-05-28 RX ADMIN — HYDROMORPHONE HYDROCHLORIDE 0.2 MG: 1 INJECTION, SOLUTION INTRAMUSCULAR; INTRAVENOUS; SUBCUTANEOUS at 01:05

## 2024-05-28 RX ADMIN — GABAPENTIN 300 MG: 300 CAPSULE ORAL at 08:05

## 2024-05-28 NOTE — PROGRESS NOTES
[facility]  Endocrinology  Progress Note    Admit Date: 5/21/2024     Reason for Consult: Management of T2DM, Hyperglycemia     Surgical Procedure and Date: s/p 5/21-Open completion colectomy with ileorectal anastomosis, Diverting loop ileostomy, Flexible sigmoidoscopy    Diabetes diagnosis year: >5 years ago    Home Diabetes Medications:  Toujeo 50 units.  Metformin    How often checking glucose at home? 1-3 x day   BG readings on regimen: 100s  Hypoglycemia on the regimen?  No  Missed doses on regimen?  No    Diabetes Complications include:     Hyperglycemia    Complicating diabetes co morbidities:   HLD, HTN      HPI:   Patient is a 59 y.o. male with with a history of HTN, HLD, and DM, Stage IV transverse colon adenocarcinoma who was recently discharged from the hospital on 5/18/24. 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 Post operatively he developed an ileus which improved with conservative management. He was able to be discharged on POD#5. At that time he was tolerating a regular diet, ambulating, voiding spontaneously, having BMs, and had adequate pain control.      Unfortunately he represented to [facility] early on 5/21/24 with worsening left lower quadrant pain and nonbilious, nonbloody vomiting. Upon arrival there he was febrile to 103 and tachycardic, but HDS on RA. Labs with an elevated WBC to 22.8 and slight anemia but otherwise unremarkable. CT A/P was concerning for an internal hernia with closed loop obstruction and so [place] [facility] [place]. S/p completion colectomy, ileorectal anastomosis, diverting loop ileostomy, and flexible sigmoidoscopy on 5/21/24.         Interval HPI:   Overnight events: Remains in [facility]. POD 7. BG at or slightly above goal ranges on current SQ insulin regimen. Diet Clear  "Liquid  TPN ADULT CENTRAL LINE CUSTOM  TPN ADULT CENTRAL LINE CUSTOM    Eating:    clear liquids   Nausea: No  Hypoglycemia and intervention: No  Fever: No  TPN and/or TF: Yes  If yes, type of TF/TPN and rate: TPN at 75 ml/hr     BP (!) 163/78 (BP Location: Right arm, Patient Position: Lying)   Pulse 72   Temp 98.7 °F (37.1 °C) (Oral)   Resp 18   Ht 6' 2" (1.88 m)   Wt 99.8 kg (220 lb 0.3 oz)   SpO2 95%   BMI 28.25 kg/m²     Labs Reviewed and Include    Recent Labs   Lab 05/28/24  0506   *   CALCIUM 8.7   ALBUMIN 2.2*   PROT 5.8*      K 3.7   CO2 27      BUN 11   CREATININE 0.9   ALKPHOS 236*   ALT 15   AST 23   BILITOT 0.3     Lab Results   Component Value Date    WBC 17.18 (H) 05/28/2024    HGB 10.2 (L) 05/28/2024    HCT 29.8 (L) 05/28/2024    MCV 87 05/28/2024     (H) 05/28/2024     No results for input(s): "TSH", "FREET4" in the last 168 hours.  Lab Results   Component Value Date    HGBA1C 6.5 (H) 05/22/2024       Nutritional status:   Body mass index is 28.25 kg/m².  Lab Results   Component Value Date    ALBUMIN 2.2 (L) 05/28/2024    ALBUMIN 2.1 (L) 05/27/2024    ALBUMIN 2.0 (L) 05/26/2024     No results found for: "PREALBUMIN"    Estimated Creatinine Clearance: 110.1 mL/min (based on SCr of 0.9 mg/dL).    Accu-Checks  Recent Labs     05/26/24  1925 05/26/24  2323 05/27/24  0410 05/27/24  0739 05/27/24  1210 05/27/24  1555 05/27/24  1929 05/27/24  2321 05/28/24  0459 05/28/24  0759   POCTGLUCOSE 180* 219* 173* 210* 155* 177* 217* 146* 202* 186*       Current Medications and/or Treatments Impacting Glycemic Control  Immunotherapy:    Immunosuppressants       None          Steroids:   Hormones (From admission, onward)      None          Pressors:    Autonomic Drugs (From admission, onward)      None          Hyperglycemia/Diabetes Medications:   Antihyperglycemics (From admission, onward)      Start     Stop Route Frequency Ordered    05/27/24 1200  insulin aspart U-100 pen 2 " Units         -- SubQ 6 times per day 05/27/24 1144    05/22/24 1032  insulin aspart U-100 pen 1-10 Units         -- SubQ Every 4 hours PRN 05/22/24 0932    05/22/24 0945  insulin glargine U-100 (Lantus) pen 20 Units         -- SubQ Daily 05/22/24 0936            ASSESSMENT and PLAN    Cardiac/Vascular  HLD (hyperlipidemia)  Managed per primary team  Optimize bg control        Endocrine  Type 2 diabetes mellitus  BG goal: 140-180  T2DM S/p completion colectomy, ileorectal anastomosis, diverting loop ileostomy, and flexible sigmoidoscopy on 5/21/24. TPN at 75 ml/hr. BG excursions noted with TPN therapy- responding well to prn correction scale     - Continue Lantus 20 units daily   - Increase Novolog to 3 units q 4 hrs while on TPN (HOLD if TPN is stopped or BG < 100)   - Novolog Moderate dose correction with ISF 25 starting at 150    - POCT Glucose every 4 hours  - Hypoglycemia protocol in place    ** Please notify Endocrine for any change and/or advance in diet**  ** Please call Endocrine for any BG related issues **     Discharge Planning:   TBD. Please notify endocrinology prior to discharge.        GI  * Large bowel anastomotic leak  Managed per primary team  Optimize bg control            Kasey Braga NP  Endocrinology  [facility]

## 2024-05-28 NOTE — ASSESSMENT & PLAN NOTE
BG goal: 140-180  T2DM S/p completion colectomy, ileorectal anastomosis, diverting loop ileostomy, and flexible sigmoidoscopy on 5/21/24. TPN at 75 ml/hr. BG excursions noted with TPN therapy- responding well to prn correction scale     - Continue Lantus 20 units daily   - Increase Novolog to 3 units q 4 hrs while on TPN (HOLD if TPN is stopped or BG < 100)   - Novolog Moderate dose correction with ISF 25 starting at 150    - POCT Glucose every 4 hours  - Hypoglycemia protocol in place    ** Please notify Endocrine for any change and/or advance in diet**  ** Please call Endocrine for any BG related issues **     Discharge Planning:   TBD. Please notify endocrinology prior to discharge.

## 2024-05-28 NOTE — PLAN OF CARE
"[facility] Plan of Care Note    Dx:   SBO (small bowel obstruction) [K56.609]    Shift Events: No significant events overnight    Goals of Care: Comfort, safety, ostomy care, drain care    Neuro: AAO    Vital Signs: BP (!) 168/77   Pulse 75   Temp 98.2 °F (36.8 °C) (Oral)   Resp 18   Ht 6' 2" (1.88 m)   Wt 99.8 kg (220 lb 0.3 oz)   SpO2 95%   BMI 28.25 kg/m²     Respiratory: RA    Diet: Diet Clear Liquid    Is patient tolerating current diet? Yes    GTTS: TPN    Urine Output/Bowel Movement:   I/O this shift:  In: 240 [P.O.:240]  Out: 1550 [Urine:1380; Drains:20; Stool:150]  Last Bowel Movement: 05/27/24    Drains/Tubes/Tube Feeds (include total output/shift):   I/O this shift:  In: 240 [P.O.:240]  Out: 1550 [Urine:1380; Drains:20; Stool:150]    Lines: PICC, PIV    Accuchecks: Q4    Skin: MLI, ileostomy, R side drain    Fall Risk Score: Per charting    Activity level? Independent    Any scheduled procedures? None    Any safety concerns? Fall precautions maintained    Other: None   "

## 2024-05-28 NOTE — PROGRESS NOTES
[facility]  Colorectal Surgery  Progress Note    Patient Name: Osman Li Jr.  MRN: [id number]  Admission Date: 5/21/2024  Hospital Length of Stay: 7 days  Attending Physician: Farhan Lance MD    Subjective:     Interval History: No acute events. Remains AF and HDS. Tolerated a CLD yesterday. Had some nausea this morning, but this is overall controlled with PRN meds. Ostomy with thin liquidy output. UA concerning for UTI so started on PO cipro. DAWIT drain with 40 mL serous output.     Post-Op Info:  Procedure(s) (LRB):  LAPAROTOMY, EXPLORATORY (N/A)  SIGMOIDOSCOPY, FLEXIBLE  INSERTION, CATHETER, URETER (Left)  COLECTOMY, COMPLETION, ABDOMINAL (N/A)  CREATION, ILEOSTOMY, DIVERTING LOOP (N/A)  ANASTOMOSIS, ILEORECTAL (N/A)   7 Days Post-Op      Medications:  Continuous Infusions:   TPN ADULT CENTRAL LINE CUSTOM   Intravenous Continuous 75 mL/hr at 05/27/24 2215 New Bag at 05/27/24 2215    TPN ADULT CENTRAL LINE CUSTOM   Intravenous Continuous         Scheduled Meds:   acetaminophen  1,000 mg Oral Q8H    ciprofloxacin HCl  500 mg Oral Q12H    enalapril  10 mg Oral Daily    enoxparin  40 mg Subcutaneous Q24H (prophylaxis, 1700)    fat emulsion 20%  250 mL Intravenous Daily    fat emulsion 20%  250 mL Intravenous Daily    gabapentin  300 mg Oral TID    insulin aspart U-100  2 Units Subcutaneous 6 times per day    insulin glargine U-100  20 Units Subcutaneous Daily    methocarbamoL  500 mg Oral Q8H    nicotine  1 patch Transdermal Daily    pantoprazole  40 mg Oral Daily    scopolamine  1 patch Transdermal Q3 Days    sodium chloride 0.9%  10 mL Intravenous Q6H    tamsulosin  0.8 mg Oral Daily     PRN Meds:   acetaminophen tablet 1,000 mg    ciprofloxacin HCl tablet 500 mg    enalapril tablet 10 mg    enoxaparin injection 40 mg    fat emulsion 20% infusion 250 mL    fat emulsion 20% infusion 250 mL    gabapentin capsule 300 mg    insulin aspart U-100 pen 2 Units    insulin glargine U-100 (Lantus) pen  20 Units    methocarbamoL tablet 500 mg    nicotine 14 mg/24 hr 1 patch    pantoprazole EC tablet 40 mg    scopolamine 1.3-1.5 mg (1 mg over 3 days) 1 patch    sodium chloride 0.9% flush 10 mL    tamsulosin 24 hr capsule 0.8 mg        Objective:     Vital Signs (Most Recent):  Temp: 98.2 °F (36.8 °C) (05/28/24 0616)  Pulse: 75 (05/28/24 0616)  Resp: 18 (05/28/24 0456)  BP: (!) 168/77 (05/28/24 0616)  SpO2: 95 % (05/28/24 0616) Vital Signs (24h Range):  Temp:  [96.8 °F (36 °C)-99 °F (37.2 °C)] 98.2 °F (36.8 °C)  Pulse:  [74-89] 75  Resp:  [12-20] 18  SpO2:  [95 %-98 %] 95 %  BP: (137-168)/(64-80) 168/77     Intake/Output - Last 3 Shifts         05/26 0700  05/27 0659 05/27 0700  05/28 0659 05/28 0700  05/29 0659    P.O. 120 1100     Other  0     NG/GT       Total Intake(mL/kg) 120 (1.2) 1100 (11)     Urine (mL/kg/hr) 1560 (0.7) 3040 (1.3)     Drains 40 40     Stool 351 270     Total Output 1951 3350     Net -1831 -2250            Stool Occurrence 0 x 1 x              Physical Exam  Vitals and nursing note reviewed.   Constitutional:       General: He is not in acute distress.     Appearance: He is well-developed. He is not ill-appearing or diaphoretic.   HENT:      Head: Normocephalic and atraumatic.      Mouth/Throat:      Pharynx: Oropharynx is clear. No oropharyngeal exudate.   Eyes:      General: No scleral icterus.     Extraocular Movements: Extraocular movements intact.   Cardiovascular:      Rate and Rhythm: Normal rate and regular rhythm.   Pulmonary:      Effort: Pulmonary effort is normal. No respiratory distress.   Abdominal:      Comments: Midline incision with gauze in place  Ostomy with thin liquidy output  DAWIT drain x1 with serous output  Soft, non-distended, appropriately tender   Musculoskeletal:         General: No deformity. Normal range of motion.   Skin:     General: Skin is warm and dry.   Neurological:      General: No focal deficit present.      Mental Status: He is alert.      Cranial Nerves:  No cranial nerve deficit.   Psychiatric:         Mood and Affect: Mood normal.         Behavior: Behavior normal.              Significant Labs:  CBC:   Recent Labs   Lab 05/28/24  0506   WBC 17.18*   RBC 3.41*   HGB 10.2*   HCT 29.8*   *   MCV 87   MCH 29.9   MCHC 34.2     CMP:   Recent Labs   Lab 05/28/24  0506   *   CALCIUM 8.7   ALBUMIN 2.2*   PROT 5.8*      K 3.7   CO2 27      BUN 11   CREATININE 0.9   ALKPHOS 236*   ALT 15   AST 23   BILITOT 0.3       Significant Diagnostics:  I have reviewed all pertinent imaging results/findings within the past 24 hours.  Assessment/Plan:     Malignant neoplasm of transverse colon  Osman Li Jr. is a 59 y.o. 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 He was transferred to the hospital with concern for internal hernia with closed loop bowel obstruction now s/p ex-lap where he was found to have a perforation near his anastomosis. He is s/p completion colectomy, ileorectal anastomosis, diverting loop ileostomy, and flexible sigmoidoscopy on 5/21/24.    - PO cipro x5 days for UTI. Follow up urine cultures  - MM PO pain meds. Cole tylenol, gabapentin, and robaxin with PRN oxy and dilaudid for breakthrough pain  - CLD. Will discuss advancing to low residue diet today  - TPN reordered today  - DAWIT to bulb suction. Will discuss removing this today  - Flush port today  - PRN nausea meds  - Nicotine patch  - PT/OT  - DVT ppx          Belem Vernon MD  Colorectal Surgery  [facility]

## 2024-05-28 NOTE — ASSESSMENT & PLAN NOTE
Osman Li Jr. is a 59 y.o. 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 He was transferred to the hospital with concern for internal hernia with closed loop bowel obstruction now s/p ex-lap where he was found to have a perforation near his anastomosis. He is s/p completion colectomy, ileorectal anastomosis, diverting loop ileostomy, and flexible sigmoidoscopy on 5/21/24.    - PO cipro x5 days for UTI. Follow up urine cultures  - MM PO pain meds. Cole tylenol, gabapentin, and robaxin with PRN oxy and dilaudid for breakthrough pain  - CLD. Will discuss advancing to low residue diet today  - TPN reordered today  - DAWIT to bulb suction. Will discuss removing this today  - Flush port today  - PRN nausea meds  - Nicotine patch  - PT/OT  - DVT ppx

## 2024-05-28 NOTE — SUBJECTIVE & OBJECTIVE
"Interval HPI:   Overnight events: Remains in [facility]. POD 7. BG at or slightly above goal ranges on current SQ insulin regimen. Diet Clear Liquid  TPN ADULT CENTRAL LINE CUSTOM  TPN ADULT CENTRAL LINE CUSTOM    Eating:    clear liquids   Nausea: No  Hypoglycemia and intervention: No  Fever: No  TPN and/or TF: Yes  If yes, type of TF/TPN and rate: TPN at 75 ml/hr     BP (!) 163/78 (BP Location: Right arm, Patient Position: Lying)   Pulse 72   Temp 98.7 °F (37.1 °C) (Oral)   Resp 18   Ht 6' 2" (1.88 m)   Wt 99.8 kg (220 lb 0.3 oz)   SpO2 95%   BMI 28.25 kg/m²     Labs Reviewed and Include    Recent Labs   Lab 05/28/24  0506   *   CALCIUM 8.7   ALBUMIN 2.2*   PROT 5.8*      K 3.7   CO2 27      BUN 11   CREATININE 0.9   ALKPHOS 236*   ALT 15   AST 23   BILITOT 0.3     Lab Results   Component Value Date    WBC 17.18 (H) 05/28/2024    HGB 10.2 (L) 05/28/2024    HCT 29.8 (L) 05/28/2024    MCV 87 05/28/2024     (H) 05/28/2024     No results for input(s): "TSH", "FREET4" in the last 168 hours.  Lab Results   Component Value Date    HGBA1C 6.5 (H) 05/22/2024       Nutritional status:   Body mass index is 28.25 kg/m².  Lab Results   Component Value Date    ALBUMIN 2.2 (L) 05/28/2024    ALBUMIN 2.1 (L) 05/27/2024    ALBUMIN 2.0 (L) 05/26/2024     No results found for: "PREALBUMIN"    Estimated Creatinine Clearance: 110.1 mL/min (based on SCr of 0.9 mg/dL).    Accu-Checks  Recent Labs     05/26/24 1925 05/26/24  2323 05/27/24  0410 05/27/24  0739 05/27/24  1210 05/27/24  1555 05/27/24  1929 05/27/24  2321 05/28/24  0459 05/28/24  0759   POCTGLUCOSE 180* 219* 173* 210* 155* 177* 217* 146* 202* 186*       Current Medications and/or Treatments Impacting Glycemic Control  Immunotherapy:    Immunosuppressants       None          Steroids:   Hormones (From admission, onward)      None          Pressors:    Autonomic Drugs (From admission, onward)      None          Hyperglycemia/Diabetes Medications: "   Antihyperglycemics (From admission, onward)      Start     Stop Route Frequency Ordered    05/27/24 1200  insulin aspart U-100 pen 2 Units         -- SubQ 6 times per day 05/27/24 1144    05/22/24 1032  insulin aspart U-100 pen 1-10 Units         -- SubQ Every 4 hours PRN 05/22/24 0932    05/22/24 0945  insulin glargine U-100 (Lantus) pen 20 Units         -- SubQ Daily 05/22/24 0936

## 2024-05-28 NOTE — SUBJECTIVE & OBJECTIVE
Subjective:     Interval History: No acute events. Remains AF and HDS. Tolerated a CLD yesterday. Had some nausea this morning, but this is overall controlled with PRN meds. Ostomy with thin liquidy output. UA concerning for UTI so started on PO cipro. DAWIT drain with 40 mL serous output.     Post-Op Info:  Procedure(s) (LRB):  LAPAROTOMY, EXPLORATORY (N/A)  SIGMOIDOSCOPY, FLEXIBLE  INSERTION, CATHETER, URETER (Left)  COLECTOMY, COMPLETION, ABDOMINAL (N/A)  CREATION, ILEOSTOMY, DIVERTING LOOP (N/A)  ANASTOMOSIS, ILEORECTAL (N/A)   7 Days Post-Op      Medications:  Continuous Infusions:   TPN ADULT CENTRAL LINE CUSTOM   Intravenous Continuous 75 mL/hr at 05/27/24 2215 New Bag at 05/27/24 2215    TPN ADULT CENTRAL LINE CUSTOM   Intravenous Continuous         Scheduled Meds:   acetaminophen  1,000 mg Oral Q8H    ciprofloxacin HCl  500 mg Oral Q12H    enalapril  10 mg Oral Daily    enoxparin  40 mg Subcutaneous Q24H (prophylaxis, 1700)    fat emulsion 20%  250 mL Intravenous Daily    fat emulsion 20%  250 mL Intravenous Daily    gabapentin  300 mg Oral TID    insulin aspart U-100  2 Units Subcutaneous 6 times per day    insulin glargine U-100  20 Units Subcutaneous Daily    methocarbamoL  500 mg Oral Q8H    nicotine  1 patch Transdermal Daily    pantoprazole  40 mg Oral Daily    scopolamine  1 patch Transdermal Q3 Days    sodium chloride 0.9%  10 mL Intravenous Q6H    tamsulosin  0.8 mg Oral Daily     PRN Meds:   acetaminophen tablet 1,000 mg    ciprofloxacin HCl tablet 500 mg    enalapril tablet 10 mg    enoxaparin injection 40 mg    fat emulsion 20% infusion 250 mL    fat emulsion 20% infusion 250 mL    gabapentin capsule 300 mg    insulin aspart U-100 pen 2 Units    insulin glargine U-100 (Lantus) pen 20 Units    methocarbamoL tablet 500 mg    nicotine 14 mg/24 hr 1 patch    pantoprazole EC tablet 40 mg    scopolamine 1.3-1.5 mg (1 mg over 3 days) 1 patch    sodium chloride 0.9% flush 10 mL    tamsulosin 24 hr  capsule 0.8 mg        Objective:     Vital Signs (Most Recent):  Temp: 98.2 °F (36.8 °C) (05/28/24 0616)  Pulse: 75 (05/28/24 0616)  Resp: 18 (05/28/24 0456)  BP: (!) 168/77 (05/28/24 0616)  SpO2: 95 % (05/28/24 0616) Vital Signs (24h Range):  Temp:  [96.8 °F (36 °C)-99 °F (37.2 °C)] 98.2 °F (36.8 °C)  Pulse:  [74-89] 75  Resp:  [12-20] 18  SpO2:  [95 %-98 %] 95 %  BP: (137-168)/(64-80) 168/77     Intake/Output - Last 3 Shifts         05/26 0700  05/27 0659 05/27 0700  05/28 0659 05/28 0700  05/29 0659    P.O. 120 1100     Other  0     NG/GT       Total Intake(mL/kg) 120 (1.2) 1100 (11)     Urine (mL/kg/hr) 1560 (0.7) 3040 (1.3)     Drains 40 40     Stool 351 270     Total Output 1951 3350     Net -1831 -2250            Stool Occurrence 0 x 1 x              Physical Exam  Vitals and nursing note reviewed.   Constitutional:       General: He is not in acute distress.     Appearance: He is well-developed. He is not ill-appearing or diaphoretic.   HENT:      Head: Normocephalic and atraumatic.      Mouth/Throat:      Pharynx: Oropharynx is clear. No oropharyngeal exudate.   Eyes:      General: No scleral icterus.     Extraocular Movements: Extraocular movements intact.   Cardiovascular:      Rate and Rhythm: Normal rate and regular rhythm.   Pulmonary:      Effort: Pulmonary effort is normal. No respiratory distress.   Abdominal:      Comments: Midline incision with gauze in place  Ostomy with thin liquidy output  DAWIT drain x1 with serous output  Soft, non-distended, appropriately tender   Musculoskeletal:         General: No deformity. Normal range of motion.   Skin:     General: Skin is warm and dry.   Neurological:      General: No focal deficit present.      Mental Status: He is alert.      Cranial Nerves: No cranial nerve deficit.   Psychiatric:         Mood and Affect: Mood normal.         Behavior: Behavior normal.              Significant Labs:  CBC:   Recent Labs   Lab 05/28/24  0506   WBC 17.18*   RBC 3.41*    HGB 10.2*   HCT 29.8*   *   MCV 87   MCH 29.9   MCHC 34.2     CMP:   Recent Labs   Lab 05/28/24  0506   *   CALCIUM 8.7   ALBUMIN 2.2*   PROT 5.8*      K 3.7   CO2 27      BUN 11   CREATININE 0.9   ALKPHOS 236*   ALT 15   AST 23   BILITOT 0.3       Significant Diagnostics:  I have reviewed all pertinent imaging results/findings within the past 24 hours.

## 2024-05-29 VITALS
OXYGEN SATURATION: 99 % | BODY MASS INDEX: 28.23 KG/M2 | HEART RATE: 71 BPM | HEIGHT: 74 IN | RESPIRATION RATE: 18 BRPM | DIASTOLIC BLOOD PRESSURE: 71 MMHG | SYSTOLIC BLOOD PRESSURE: 152 MMHG | WEIGHT: 220 LBS | TEMPERATURE: 99 F

## 2024-05-29 PROBLEM — E87.6 HYPOKALEMIA: Status: RESOLVED | Noted: 2022-04-24 | Resolved: 2024-05-29

## 2024-05-29 PROBLEM — A41.9 SEPSIS: Status: RESOLVED | Noted: 2022-06-20 | Resolved: 2024-05-29

## 2024-05-29 PROBLEM — K91.89 LARGE BOWEL ANASTOMOTIC LEAK: Status: RESOLVED | Noted: 2024-05-21 | Resolved: 2024-05-29

## 2024-05-29 LAB
ALBUMIN SERPL BCP-MCNC: 2.1 G/DL (ref 3.5–5.2)
ALP SERPL-CCNC: 239 U/L (ref 55–135)
ALT SERPL W/O P-5'-P-CCNC: 19 U/L (ref 10–44)
ANION GAP SERPL CALC-SCNC: 6 MMOL/L (ref 8–16)
AST SERPL-CCNC: 25 U/L (ref 10–40)
BACTERIA UR CULT: ABNORMAL
BASOPHILS # BLD AUTO: 0.12 K/UL (ref 0–0.2)
BASOPHILS NFR BLD: 1 % (ref 0–1.9)
BILIRUB SERPL-MCNC: 0.2 MG/DL (ref 0.1–1)
BUN SERPL-MCNC: 16 MG/DL (ref 6–20)
CALCIUM SERPL-MCNC: 8.8 MG/DL (ref 8.7–10.5)
CHLORIDE SERPL-SCNC: 102 MMOL/L (ref 95–110)
CO2 SERPL-SCNC: 27 MMOL/L (ref 23–29)
CREAT SERPL-MCNC: 0.8 MG/DL (ref 0.5–1.4)
CRP SERPL-MCNC: 36.6 MG/L (ref 0–8.2)
DIFFERENTIAL METHOD BLD: ABNORMAL
EOSINOPHIL # BLD AUTO: 0.6 K/UL (ref 0–0.5)
EOSINOPHIL NFR BLD: 5.1 % (ref 0–8)
ERYTHROCYTE [DISTWIDTH] IN BLOOD BY AUTOMATED COUNT: 14.4 % (ref 11.5–14.5)
EST. GFR  (NO RACE VARIABLE): >60 ML/MIN/1.73 M^2
GLUCOSE SERPL-MCNC: 170 MG/DL (ref 70–110)
HCT VFR BLD AUTO: 27.7 % (ref 40–54)
HGB BLD-MCNC: 9.4 G/DL (ref 14–18)
IMM GRANULOCYTES # BLD AUTO: 0.17 K/UL (ref 0–0.04)
IMM GRANULOCYTES NFR BLD AUTO: 1.4 % (ref 0–0.5)
LYMPHOCYTES # BLD AUTO: 2.6 K/UL (ref 1–4.8)
LYMPHOCYTES NFR BLD: 21.4 % (ref 18–48)
MAGNESIUM SERPL-MCNC: 2 MG/DL (ref 1.6–2.6)
MCH RBC QN AUTO: 30 PG (ref 27–31)
MCHC RBC AUTO-ENTMCNC: 33.9 G/DL (ref 32–36)
MCV RBC AUTO: 89 FL (ref 82–98)
MONOCYTES # BLD AUTO: 1.2 K/UL (ref 0.3–1)
MONOCYTES NFR BLD: 10 % (ref 4–15)
NEUTROPHILS # BLD AUTO: 7.4 K/UL (ref 1.8–7.7)
NEUTROPHILS NFR BLD: 61.1 % (ref 38–73)
NRBC BLD-RTO: 0 /100 WBC
PHOSPHATE SERPL-MCNC: 3 MG/DL (ref 2.7–4.5)
PLATELET # BLD AUTO: 715 K/UL (ref 150–450)
PMV BLD AUTO: 9.7 FL (ref 9.2–12.9)
POCT GLUCOSE: 183 MG/DL (ref 70–110)
POCT GLUCOSE: 198 MG/DL (ref 70–110)
POCT GLUCOSE: 219 MG/DL (ref 70–110)
POTASSIUM SERPL-SCNC: 4.2 MMOL/L (ref 3.5–5.1)
PROT SERPL-MCNC: 5.4 G/DL (ref 6–8.4)
RBC # BLD AUTO: 3.13 M/UL (ref 4.6–6.2)
SODIUM SERPL-SCNC: 135 MMOL/L (ref 136–145)
WBC # BLD AUTO: 12.15 K/UL (ref 3.9–12.7)

## 2024-05-29 PROCEDURE — 84100 ASSAY OF PHOSPHORUS: CPT | Performed by: SURGERY

## 2024-05-29 PROCEDURE — 80053 COMPREHEN METABOLIC PANEL: CPT | Performed by: SURGERY

## 2024-05-29 PROCEDURE — 36415 COLL VENOUS BLD VENIPUNCTURE: CPT | Performed by: STUDENT IN AN ORGANIZED HEALTH CARE EDUCATION/TRAINING PROGRAM

## 2024-05-29 PROCEDURE — 99232 SBSQ HOSP IP/OBS MODERATE 35: CPT | Mod: ,,, | Performed by: NURSE PRACTITIONER

## 2024-05-29 PROCEDURE — 63600175 PHARM REV CODE 636 W HCPCS: Performed by: STUDENT IN AN ORGANIZED HEALTH CARE EDUCATION/TRAINING PROGRAM

## 2024-05-29 PROCEDURE — 85025 COMPLETE CBC W/AUTO DIFF WBC: CPT | Performed by: SURGERY

## 2024-05-29 PROCEDURE — 86140 C-REACTIVE PROTEIN: CPT | Performed by: STUDENT IN AN ORGANIZED HEALTH CARE EDUCATION/TRAINING PROGRAM

## 2024-05-29 PROCEDURE — S4991 NICOTINE PATCH NONLEGEND: HCPCS | Performed by: STUDENT IN AN ORGANIZED HEALTH CARE EDUCATION/TRAINING PROGRAM

## 2024-05-29 PROCEDURE — A4216 STERILE WATER/SALINE, 10 ML: HCPCS | Performed by: STUDENT IN AN ORGANIZED HEALTH CARE EDUCATION/TRAINING PROGRAM

## 2024-05-29 PROCEDURE — 25000003 PHARM REV CODE 250: Performed by: STUDENT IN AN ORGANIZED HEALTH CARE EDUCATION/TRAINING PROGRAM

## 2024-05-29 PROCEDURE — 83735 ASSAY OF MAGNESIUM: CPT | Performed by: SURGERY

## 2024-05-29 PROCEDURE — 97116 GAIT TRAINING THERAPY: CPT | Mod: CQ

## 2024-05-29 RX ORDER — CIPROFLOXACIN 500 MG/1
500 TABLET ORAL EVERY 12 HOURS
Qty: 6 TABLET | Refills: 0 | Status: SHIPPED | OUTPATIENT
Start: 2024-05-29 | End: 2024-05-30 | Stop reason: ALTCHOICE

## 2024-05-29 RX ORDER — SCOLOPAMINE TRANSDERMAL SYSTEM 1 MG/1
1 PATCH, EXTENDED RELEASE TRANSDERMAL
Qty: 5 PATCH | Refills: 0 | Status: SHIPPED | OUTPATIENT
Start: 2024-05-29

## 2024-05-29 RX ORDER — INSULIN ASPART 100 [IU]/ML
0-10 INJECTION, SOLUTION INTRAVENOUS; SUBCUTANEOUS
Status: DISCONTINUED | OUTPATIENT
Start: 2024-05-29 | End: 2024-05-29 | Stop reason: HOSPADM

## 2024-05-29 RX ORDER — ENALAPRIL MALEATE 10 MG/1
10 TABLET ORAL DAILY
Qty: 90 TABLET | Refills: 0 | Status: SHIPPED | OUTPATIENT
Start: 2024-05-30 | End: 2025-05-30

## 2024-05-29 RX ORDER — METHOCARBAMOL 500 MG/1
500 TABLET, FILM COATED ORAL EVERY 8 HOURS
Qty: 30 TABLET | Refills: 0 | Status: SHIPPED | OUTPATIENT
Start: 2024-05-29 | End: 2024-06-08

## 2024-05-29 RX ORDER — IBUPROFEN 200 MG
24 TABLET ORAL
Status: DISCONTINUED | OUTPATIENT
Start: 2024-05-29 | End: 2024-05-29 | Stop reason: HOSPADM

## 2024-05-29 RX ORDER — OXYCODONE HYDROCHLORIDE 10 MG/1
10 TABLET ORAL EVERY 4 HOURS PRN
Qty: 20 TABLET | Refills: 0 | Status: SHIPPED | OUTPATIENT
Start: 2024-05-29

## 2024-05-29 RX ORDER — INSULIN GLARGINE 300 [IU]/ML
20 INJECTION, SOLUTION SUBCUTANEOUS DAILY
Qty: 2 PEN | Refills: 11 | Status: SHIPPED | OUTPATIENT
Start: 2024-05-29

## 2024-05-29 RX ORDER — TAMSULOSIN HYDROCHLORIDE 0.4 MG/1
0.8 CAPSULE ORAL DAILY
Qty: 14 CAPSULE | Refills: 0 | Status: SHIPPED | OUTPATIENT
Start: 2024-05-30 | End: 2024-06-06

## 2024-05-29 RX ORDER — IBUPROFEN 200 MG
16 TABLET ORAL
Status: DISCONTINUED | OUTPATIENT
Start: 2024-05-29 | End: 2024-05-29 | Stop reason: HOSPADM

## 2024-05-29 RX ORDER — METOPROLOL TARTRATE 25 MG/1
25 TABLET, FILM COATED ORAL 2 TIMES DAILY
Qty: 180 TABLET | Refills: 0 | Status: SHIPPED | OUTPATIENT
Start: 2024-05-29 | End: 2025-05-29

## 2024-05-29 RX ORDER — GABAPENTIN 300 MG/1
300 CAPSULE ORAL 3 TIMES DAILY
Qty: 90 CAPSULE | Refills: 0 | Status: SHIPPED | OUTPATIENT
Start: 2024-05-29 | End: 2024-06-28

## 2024-05-29 RX ORDER — GLUCAGON 1 MG
1 KIT INJECTION
Status: DISCONTINUED | OUTPATIENT
Start: 2024-05-29 | End: 2024-05-29 | Stop reason: HOSPADM

## 2024-05-29 RX ORDER — IBUPROFEN 200 MG
1 TABLET ORAL DAILY
Qty: 28 PATCH | Refills: 2 | Status: SHIPPED | OUTPATIENT
Start: 2024-05-30 | End: 2024-08-28

## 2024-05-29 RX ADMIN — INSULIN ASPART 1 UNITS: 100 INJECTION, SOLUTION INTRAVENOUS; SUBCUTANEOUS at 12:05

## 2024-05-29 RX ADMIN — METOPROLOL TARTRATE 25 MG: 25 TABLET, FILM COATED ORAL at 08:05

## 2024-05-29 RX ADMIN — INSULIN GLARGINE 20 UNITS: 100 INJECTION, SOLUTION SUBCUTANEOUS at 08:05

## 2024-05-29 RX ADMIN — INSULIN ASPART 4 UNITS: 100 INJECTION, SOLUTION INTRAVENOUS; SUBCUTANEOUS at 12:05

## 2024-05-29 RX ADMIN — ENALAPRIL MALEATE 10 MG: 5 TABLET ORAL at 08:05

## 2024-05-29 RX ADMIN — SCOPALAMINE 1 PATCH: 1 PATCH, EXTENDED RELEASE TRANSDERMAL at 10:05

## 2024-05-29 RX ADMIN — INSULIN ASPART 1 UNITS: 100 INJECTION, SOLUTION INTRAVENOUS; SUBCUTANEOUS at 04:05

## 2024-05-29 RX ADMIN — INSULIN ASPART 3 UNITS: 100 INJECTION, SOLUTION INTRAVENOUS; SUBCUTANEOUS at 04:05

## 2024-05-29 RX ADMIN — OXYCODONE HYDROCHLORIDE 10 MG: 10 TABLET ORAL at 04:05

## 2024-05-29 RX ADMIN — INSULIN ASPART 2 UNITS: 100 INJECTION, SOLUTION INTRAVENOUS; SUBCUTANEOUS at 08:05

## 2024-05-29 RX ADMIN — Medication 10 ML: at 12:05

## 2024-05-29 RX ADMIN — TAMSULOSIN HYDROCHLORIDE 0.8 MG: 0.4 CAPSULE ORAL at 08:05

## 2024-05-29 RX ADMIN — ACETAMINOPHEN 1000 MG: 500 TABLET ORAL at 01:05

## 2024-05-29 RX ADMIN — INSULIN ASPART 3 UNITS: 100 INJECTION, SOLUTION INTRAVENOUS; SUBCUTANEOUS at 12:05

## 2024-05-29 RX ADMIN — GABAPENTIN 300 MG: 300 CAPSULE ORAL at 08:05

## 2024-05-29 RX ADMIN — PANTOPRAZOLE SODIUM 40 MG: 40 TABLET, DELAYED RELEASE ORAL at 08:05

## 2024-05-29 RX ADMIN — OXYCODONE HYDROCHLORIDE 10 MG: 10 TABLET ORAL at 08:05

## 2024-05-29 RX ADMIN — METHOCARBAMOL 500 MG: 500 TABLET ORAL at 05:05

## 2024-05-29 RX ADMIN — HYDROMORPHONE HYDROCHLORIDE 0.2 MG: 1 INJECTION, SOLUTION INTRAMUSCULAR; INTRAVENOUS; SUBCUTANEOUS at 02:05

## 2024-05-29 RX ADMIN — Medication 10 ML: at 05:05

## 2024-05-29 RX ADMIN — ACETAMINOPHEN 1000 MG: 500 TABLET ORAL at 05:05

## 2024-05-29 RX ADMIN — CIPROFLOXACIN HYDROCHLORIDE 500 MG: 500 TABLET, FILM COATED ORAL at 08:05

## 2024-05-29 RX ADMIN — METHOCARBAMOL 500 MG: 500 TABLET ORAL at 01:05

## 2024-05-29 RX ADMIN — NICOTINE 1 PATCH: 14 PATCH, EXTENDED RELEASE TRANSDERMAL at 08:05

## 2024-05-29 RX ADMIN — OXYCODONE HYDROCHLORIDE 10 MG: 10 TABLET ORAL at 12:05

## 2024-05-29 NOTE — SUBJECTIVE & OBJECTIVE
"Interval HPI:   Overnight events: Remains in [facility]. POD 8. BG at and above goal ranges on current SQ insulin regimen. TPN stopping today with plans for discharge. TPN ADULT CENTRAL LINE CUSTOM  Diet Low Fiber/Residue    Eatin%  Nausea: No  Hypoglycemia and intervention: No  Fever: No  TPN and/or TF: Yes  If yes, type of TF/TPN and rate: TPN stopped     BP (!) 168/80   Pulse 66   Temp 98.6 °F (37 °C) (Oral)   Resp 16   Ht 6' 2" (1.88 m)   Wt 99.8 kg (220 lb 0.3 oz)   SpO2 97%   BMI 28.25 kg/m²     Labs Reviewed and Include    Recent Labs   Lab 24  0441   *   CALCIUM 8.8   ALBUMIN 2.1*   PROT 5.4*   *   K 4.2   CO2 27      BUN 16   CREATININE 0.8   ALKPHOS 239*   ALT 19   AST 25   BILITOT 0.2     Lab Results   Component Value Date    WBC 12.15 2024    HGB 9.4 (L) 2024    HCT 27.7 (L) 2024    MCV 89 2024     (H) 2024     No results for input(s): "TSH", "FREET4" in the last 168 hours.  Lab Results   Component Value Date    HGBA1C 6.5 (H) 2024       Nutritional status:   Body mass index is 28.25 kg/m².  Lab Results   Component Value Date    ALBUMIN 2.1 (L) 2024    ALBUMIN 2.2 (L) 2024    ALBUMIN 2.1 (L) 2024     No results found for: "PREALBUMIN"    Estimated Creatinine Clearance: 123.9 mL/min (based on SCr of 0.8 mg/dL).    Accu-Checks  Recent Labs     24  1929 24  2321 24  0459 24  0759 24  1225 24  1611 24  1938 24  2318 24  0408 24  0814   POCTGLUCOSE 217* 146* 202* 186* 189* 176* 250* 176* 183* 198*       Current Medications and/or Treatments Impacting Glycemic Control  Immunotherapy:    Immunosuppressants       None          Steroids:   Hormones (From admission, onward)      None          Pressors:    Autonomic Drugs (From admission, onward)      None          Hyperglycemia/Diabetes Medications:   Antihyperglycemics (From admission, onward)      Start     " Stop Route Frequency Ordered    05/22/24 1032  insulin aspart U-100 pen 1-10 Units         -- SubQ Every 4 hours PRN 05/22/24 0932    05/22/24 0945  insulin glargine U-100 (Lantus) pen 20 Units         -- SubQ Daily 05/22/24 0936

## 2024-05-29 NOTE — SUBJECTIVE & OBJECTIVE
Subjective:     Interval History: Remains AF and HDS. Tolerating a low residue diet (about 1/3 of his plate and 3/4 boost) and having good ileostomy output. DAWIT drain with 20 mL serous output. Urine culture with gram negative rods.     Post-Op Info:  Procedure(s) (LRB):  LAPAROTOMY, EXPLORATORY (N/A)  SIGMOIDOSCOPY, FLEXIBLE  INSERTION, CATHETER, URETER (Left)  COLECTOMY, COMPLETION, ABDOMINAL (N/A)  CREATION, ILEOSTOMY, DIVERTING LOOP (N/A)  ANASTOMOSIS, ILEORECTAL (N/A)   8 Days Post-Op      Medications:  Continuous Infusions:   TPN ADULT CENTRAL LINE CUSTOM   Intravenous Continuous 75 mL/hr at 05/28/24 2212 New Bag at 05/28/24 2212     Scheduled Meds:   acetaminophen  1,000 mg Oral Q8H    atorvastatin  20 mg Oral QHS    ciprofloxacin HCl  500 mg Oral Q12H    enalapril  10 mg Oral Daily    enoxparin  40 mg Subcutaneous Q24H (prophylaxis, 1700)    gabapentin  300 mg Oral TID    insulin aspart U-100  3 Units Subcutaneous 6 times per day    insulin glargine U-100  20 Units Subcutaneous Daily    lipid (SMOFLIPID)  250 mL Intravenous Daily    methocarbamoL  500 mg Oral Q8H    metoprolol tartrate  25 mg Oral BID    nicotine  1 patch Transdermal Daily    pantoprazole  40 mg Oral Daily    scopolamine  1 patch Transdermal Q3 Days    sodium chloride 0.9%  10 mL Intravenous Q6H    tamsulosin  0.8 mg Oral Daily     PRN Meds:   acetaminophen tablet 1,000 mg    atorvastatin tablet 20 mg    ciprofloxacin HCl tablet 500 mg    enalapril tablet 10 mg    enoxaparin injection 40 mg    gabapentin capsule 300 mg    insulin aspart U-100 pen 3 Units    insulin glargine U-100 (Lantus) pen 20 Units    lipid (SMOFLIPID) (SMOFLIPID) 20 % infusion 250 mL    methocarbamoL tablet 500 mg    metoprolol tartrate (LOPRESSOR) tablet 25 mg    nicotine 14 mg/24 hr 1 patch    pantoprazole EC tablet 40 mg    scopolamine 1.3-1.5 mg (1 mg over 3 days) 1 patch    sodium chloride 0.9% flush 10 mL    tamsulosin 24 hr capsule 0.8 mg        Objective:      Vital Signs (Most Recent):  Temp: 98.4 °F (36.9 °C) (05/29/24 0408)  Pulse: 70 (05/29/24 0408)  Resp: 18 (05/29/24 0444)  BP: (!) 172/81 (05/29/24 0408)  SpO2: 96 % (05/29/24 0408) Vital Signs (24h Range):  Temp:  [98.4 °F (36.9 °C)-99.4 °F (37.4 °C)] 98.4 °F (36.9 °C)  Pulse:  [70-82] 70  Resp:  [16-18] 18  SpO2:  [94 %-97 %] 96 %  BP: (145-177)/(69-85) 172/81     Intake/Output - Last 3 Shifts         05/27 0700  05/28 0659 05/28 0700  05/29 0659 05/29 0700  05/30 0659    P.O. 1100 360     Other 0      Total Intake(mL/kg) 1100 (11) 360 (3.6)     Urine (mL/kg/hr) 3040 (1.3) 794 (0.3)     Drains 40 20     Stool 270 150     Total Output 3350 964     Net -2250 -604            Stool Occurrence 1 x               Physical Exam  Vitals and nursing note reviewed.   Constitutional:       General: He is not in acute distress.     Appearance: He is well-developed. He is not ill-appearing or diaphoretic.   HENT:      Head: Normocephalic and atraumatic.      Mouth/Throat:      Pharynx: Oropharynx is clear. No oropharyngeal exudate.   Eyes:      General: No scleral icterus.     Extraocular Movements: Extraocular movements intact.   Cardiovascular:      Rate and Rhythm: Normal rate and regular rhythm.   Pulmonary:      Effort: Pulmonary effort is normal. No respiratory distress.   Abdominal:      Comments: Midline incision with gauze in place with scant serous drainage  No surrounding erythema or induration  Ostomy with thin liquidy output  DAWIT drain x1 with serous output  Soft, non-distended, appropriately tender   Musculoskeletal:         General: No deformity. Normal range of motion.   Skin:     General: Skin is warm and dry.   Neurological:      General: No focal deficit present.      Mental Status: He is alert.      Cranial Nerves: No cranial nerve deficit.   Psychiatric:         Mood and Affect: Mood normal.         Behavior: Behavior normal.              Significant Labs:  CBC (Last 3 Results):   Recent Labs   Lab  05/27/24  0522 05/28/24  0506 05/29/24  0441   WBC 18.17* 17.18* 12.15   RBC 3.40* 3.41* 3.13*   HGB 10.0* 10.2* 9.4*   HCT 30.4* 29.8* 27.7*   * 717* 715*   MCV 89 87 89   MCH 29.4 29.9 30.0   MCHC 32.9 34.2 33.9     CMP (Last 3 Results):   Recent Labs   Lab 05/27/24 0522 05/28/24  0506 05/29/24  0441   * 185* 170*   CALCIUM 8.6* 8.7 8.8   ALBUMIN 2.1* 2.2* 2.1*   PROT 5.3* 5.8* 5.4*    137 135*   K 3.5 3.7 4.2   CO2 29 27 27    102 102   BUN 12 11 16   CREATININE 0.8 0.9 0.8   ALKPHOS 228* 236* 239*   ALT 15 15 19   AST 22 23 25   BILITOT 0.3 0.3 0.2     CRP (Last 3 Results):   Recent Labs   Lab 05/27/24  0522 05/28/24  0508 05/29/24  0435   CRP 44.8* 52.6* 36.6*     All pertinent lab results within the last 24 hours have been reviewed.     Significant Diagnostics:  I have reviewed all pertinent imaging results/findings within the past 24 hours.

## 2024-05-29 NOTE — PLAN OF CARE
"[facility] Plan of Care Note    Dx:   SBO (small bowel obstruction) [K56.609]    Shift Events: No significant events overnight    Goals of Care: Comfort,safety, drain care, ileostomy care    Neuro: AAO    Vital Signs: BP (!) 172/81 (BP Location: Right arm, Patient Position: Lying)   Pulse 70   Temp 98.4 °F (36.9 °C) (Oral)   Resp 18   Ht 6' 2" (1.88 m)   Wt 99.8 kg (220 lb 0.3 oz)   SpO2 96%   BMI 28.25 kg/m²     Respiratory: RA    Diet: Diet Low Fiber/Residue  Dietary nutrition supplements Boost Plus - Any flavor    Is patient tolerating current diet? Yes    GTTS: None    Urine Output/Bowel Movement:   I/O this shift:  In: 360 [P.O.:360]  Out: 570 [Urine:410; Drains:10; Stool:150]  Last Bowel Movement: 05/28/24    Drains/Tubes/Tube Feeds (include total output/shift):   I/O this shift:  In: 360 [P.O.:360]  Out: 570 [Urine:410; Drains:10; Stool:150]    Lines: ADDY PICC    Accuchecks: Q4    Skin: MLI, ileostomy, R side drain     Fall Risk Score: Per charting    Activity level? Independent    Any scheduled procedures? None    Any safety concerns? Fall precautions maintained    Other: None   "

## 2024-05-29 NOTE — HOSPITAL COURSE
Mr. Li was admitted to the colorectal surgery as a transfer with concern for a possible closed loop obstruction. He was taken for an emergent ex-lap on 5/21/24 where he was found to have a perforation near his anastomosis. He then underwent completion colectomy, ileorectal anastomosis, diverting loop ileostomy, and flexible sigmoidoscopy. Following the procedure, he had a nasogastric tube for anticipated delayed return of bowel function and we started him on TPN. Endocrinology was consulted for assistance with blood glucose management. His ileostomy started having output on POD#4 and his NGT was removed. Following this, we advanced his diet slowly and transitioned him over to oral medication. He did report some burning with urination on POD#6 and UA was consistent with a UTI for which he was started on cipro. His recovery was overall uneventful and he was stable for discharge on POD#8. At that time he was tolerating a low residue diet, ambulating, voiding spontaneously, having good ileostomy output, and had adequate pain control. Discharge instructions were provided along with a recommended time for follow up.

## 2024-05-29 NOTE — PROGRESS NOTES
[facility] - [facility]  Endocrinology  Progress Note    Admit Date: 5/21/2024     Reason for Consult: Management of T2DM, Hyperglycemia     Surgical Procedure and Date: s/p 5/21-Open completion colectomy with ileorectal anastomosis, Diverting loop ileostomy, Flexible sigmoidoscopy    Diabetes diagnosis year: >5 years ago    Home Diabetes Medications:  Toujeo 50 units.  Metformin    How often checking glucose at home? 1-3 x day   BG readings on regimen: 100s  Hypoglycemia on the regimen?  No  Missed doses on regimen?  No    Diabetes Complications include:     Hyperglycemia    Complicating diabetes co morbidities:   HLD, HTN      HPI:   Patient is a 59 y.o. male with with a history of HTN, HLD, and DM, Stage IV transverse colon adenocarcinoma who was recently discharged from the hospital on 5/18/24. 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 Post operatively he developed an ileus which improved with conservative management. He was able to be discharged on POD#5. At that time he was tolerating a regular diet, ambulating, voiding spontaneously, having BMs, and had adequate pain control.      Unfortunately [facility]24 with worsening left lower quadrant pain and nonbilious, nonbloody vomiting. Upon arrival there he was febrile to 103 and tachycardic, but HDS on RA. Labs with an elevated WBC to 22.8 and slight anemia but otherwise unremarkable. CT A/P was concerning for an internal hernia with closed loop obstruction and so he was transferred to [facility] for higher level of care. S/p completion colectomy, ileorectal anastomosis, diverting loop ileostomy, and flexible sigmoidoscopy on 5/21/24.         Interval HPI:   Overnight events: Remains in [facility]. POD 8. BG at and above goal ranges on current SQ insulin regimen. TPN stopping today  "with plans for discharge. TPN ADULT CENTRAL LINE CUSTOM  Diet Low Fiber/Residue    Eatin%  Nausea: No  Hypoglycemia and intervention: No  Fever: No  TPN and/or TF: Yes  If yes, type of TF/TPN and rate: TPN stopped     BP (!) 168/80   Pulse 66   Temp 98.6 °F (37 °C) (Oral)   Resp 16   Ht 6' 2" (1.88 m)   Wt 99.8 kg (220 lb 0.3 oz)   SpO2 97%   BMI 28.25 kg/m²     Labs Reviewed and Include    Recent Labs   Lab 24  0441   *   CALCIUM 8.8   ALBUMIN 2.1*   PROT 5.4*   *   K 4.2   CO2 27      BUN 16   CREATININE 0.8   ALKPHOS 239*   ALT 19   AST 25   BILITOT 0.2     Lab Results   Component Value Date    WBC 12.15 2024    HGB 9.4 (L) 2024    HCT 27.7 (L) 2024    MCV 89 2024     (H) 2024     No results for input(s): "TSH", "FREET4" in the last 168 hours.  Lab Results   Component Value Date    HGBA1C 6.5 (H) 2024       Nutritional status:   Body mass index is 28.25 kg/m².  Lab Results   Component Value Date    ALBUMIN 2.1 (L) 2024    ALBUMIN 2.2 (L) 2024    ALBUMIN 2.1 (L) 2024     No results found for: "PREALBUMIN"    Estimated Creatinine Clearance: 123.9 mL/min (based on SCr of 0.8 mg/dL).    Accu-Checks  Recent Labs     24  1929 24  2321 24  0459 24  0759 24  1225 24  1611 24  1938 24  2318 24  0408 24  0814   POCTGLUCOSE 217* 146* 202* 186* 189* 176* 250* 176* 183* 198*       Current Medications and/or Treatments Impacting Glycemic Control  Immunotherapy:    Immunosuppressants       None          Steroids:   Hormones (From admission, onward)      None          Pressors:    Autonomic Drugs (From admission, onward)      None          Hyperglycemia/Diabetes Medications:   Antihyperglycemics (From admission, onward)      Start     Stop Route Frequency Ordered    24 1032  insulin aspart U-100 pen 1-10 Units         -- SubQ Every 4 hours PRN 24 0932    " 05/22/24 0945  insulin glargine U-100 (Lantus) pen 20 Units         -- SubQ Daily 05/22/24 0936            ASSESSMENT and PLAN    Cardiac/Vascular  HLD (hyperlipidemia)  Managed per primary team  Optimize bg control        Endocrine  Type 2 diabetes mellitus  BG goal: 140-180  T2DM S/p completion colectomy, ileorectal anastomosis, diverting loop ileostomy, and flexible sigmoidoscopy on 5/21/24. TPN at 75 ml/hr. BG excursions noted with TPN therapy- responding well to prn correction scale     - Continue Lantus 20 units daily   - Discontinue Novolog 3 units q 4 hrs while on TPN (TPN  discontinued)   - Novolog Moderate dose correction with ISF 25 starting at 150    - Change POCT Glucose to ac/hs  - Hypoglycemia protocol in place    ** Please notify Endocrine for any change and/or advance in diet**  ** Please call Endocrine for any BG related issues **     Discharge Planning:   Recommend decreasing Toujeo to 20 units daily and keeping BG logs (monitor BG 2-4x daily). Notify [facility] [place] [facility] [place] PCP if BG consistently > 200 or any < 80. Patient will follow up with PCP as scheduled for continuous DM management.             Kasey Braga, NP  Endocrinology  [facility]

## 2024-05-29 NOTE — ASSESSMENT & PLAN NOTE
BG goal: 140-180  T2DM S/p completion colectomy, ileorectal anastomosis, diverting loop ileostomy, and flexible sigmoidoscopy on 5/21/24. TPN at 75 ml/hr. BG excursions noted with TPN therapy- responding well to prn correction scale     - Continue Lantus 20 units daily   - Discontinue Novolog 3 units q 4 hrs while on TPN (TPN  discontinued)   - Novolog Moderate dose correction with ISF 25 starting at 150    - Change POCT Glucose to ac/hs  - Hypoglycemia protocol in place    ** Please notify Endocrine for any change and/or advance in diet**  ** Please call Endocrine for any BG related issues **     Discharge Planning:   Recommend decreasing Toujeo to 20 units daily and keeping BG logs (monitor BG 2-4x daily). Notify [facility] endocrine clinic of PCP if BG consistently > 200 or any < 80. Patient will follow up with PCP as scheduled for continuous DM management.

## 2024-05-29 NOTE — PROGRESS NOTES
Patient seen for ostomy care. The ostomy pouch is intact, with a good seal. The patient is independent in ostomy care. All questions/concerns answered. Supplies at the bedside for discharge. Wound care will sign off, re-consult as needed.

## 2024-05-29 NOTE — NURSING
Patient discharged and walked off unit to personal vehicle. Patients PICC removed, bleeding controlled prior to discharge. Patient received medications from pharmacy prior to discharge. Ileostomy output form given to patient prior to discharge. Patient and wife verbalizes understanding of all discharge instructions. Patient left with all personal belongings. Patient stable at discharge.

## 2024-05-29 NOTE — PLAN OF CARE
Problem: Adult Inpatient Plan of Care  Goal: Plan of Care Review  Outcome: Met  Goal: Patient-Specific Goal (Individualized)  Outcome: Met  Goal: Absence of Hospital-Acquired Illness or Injury  Outcome: Met  Goal: Optimal Comfort and Wellbeing  Outcome: Met  Goal: Readiness for Transition of Care  Outcome: Met     Problem: Diabetes Comorbidity  Goal: Blood Glucose Level Within Targeted Range  Outcome: Met     Problem: Sepsis/Septic Shock  Goal: Optimal Coping  Outcome: Met  Goal: Absence of Bleeding  Outcome: Met  Goal: Blood Glucose Level Within Targeted Range  Outcome: Met  Goal: Absence of Infection Signs and Symptoms  Outcome: Met  Goal: Optimal Nutrition Intake  Outcome: Met     Problem: Wound  Goal: Optimal Coping  Outcome: Met  Goal: Optimal Functional Ability  Outcome: Met  Goal: Absence of Infection Signs and Symptoms  Outcome: Met  Goal: Improved Oral Intake  Outcome: Met  Goal: Optimal Pain Control and Function  Outcome: Met  Goal: Skin Health and Integrity  Outcome: Met  Goal: Optimal Wound Healing  Outcome: Met     Problem: Infection  Goal: Absence of Infection Signs and Symptoms  Outcome: Met     Problem: Fall Injury Risk  Goal: Absence of Fall and Fall-Related Injury  Outcome: Met

## 2024-05-29 NOTE — PT/OT/SLP PROGRESS
Physical Therapy Treatment    Patient Name:  Osman Li Jr.   MRN:  [id number]    Recommendations:     Discharge Recommendations: No Therapy Indicated  Discharge Equipment Recommendations: none  Barriers to discharge: None    Assessment:     Osman Li Jr. is a 59 y.o. male admitted with a medical diagnosis of Large bowel anastomotic leak.  He presents with the following impairments/functional limitations: impaired endurance, impaired self care skills, weakness, pain . presents with  improved overall functional mobility requiring decreased assistance and increased activity tolerance to perform functional mobility.Pt would continue to benefit from skilled PT to address overall functional mobility, to return to functional baseline and goals. Goals remain appropriate.        Rehab Prognosis: Good; patient would benefit from acute skilled PT services to address these deficits and reach maximum level of function.    Recent Surgery: Procedure(s) (LRB):  LAPAROTOMY, EXPLORATORY (N/A)  SIGMOIDOSCOPY, FLEXIBLE  INSERTION, CATHETER, URETER (Left)  COLECTOMY, COMPLETION, ABDOMINAL (N/A)  CREATION, ILEOSTOMY, DIVERTING LOOP (N/A)  ANASTOMOSIS, ILEORECTAL (N/A) 8 Days Post-Op    Plan:     During this hospitalization, patient to be seen 4 x/week to address the identified rehab impairments via gait training, therapeutic activities, therapeutic exercises and progress toward the following goals:    Plan of Care Expires:  06/22/24    Subjective     Patient/Family Comments/goals: I am leaving today  Pain/Comfort:  Pain Rating 1: 6/10  Location - Orientation 1: generalized  Location 1: abdomen  Pain Addressed 1: Pre-medicate for activity  Pain Rating Post-Intervention 1: 6/10      Objective:     Communicated with RN prior to session.  Patient found HOB elevated with  (NG tube; peripheral IV; PCA; oxygen) upon PT entry to room.     General Precautions: Standard, fall  Orthopedic Precautions: N/A  Braces:  N/A  Respiratory Status: Room air     Functional Mobility:  Bed Mobility:     Sit <> Supine: (S)  Transfers:     Sit to Stand:  (S) with no AD  Gait: 350 feet with no AD supervision with flexed posture and decreased speed.  Stairs:  Pt ascended/descended  18 stair(s) x 2 trials  with No Assistive Device with right handrail with Stand-by Assistance.       AM-PAC 6 CLICK MOBILITY  Turning over in bed (including adjusting bedclothes, sheets and blankets)?: 4  Sitting down on and standing up from a chair with arms (e.g., wheelchair, bedside commode, etc.): 4  Moving from lying on back to sitting on the side of the bed?: 3  Moving to and from a bed to a chair (including a wheelchair)?: 4  Need to walk in hospital room?: 3  Climbing 3-5 steps with a railing?: 3  Basic Mobility Total Score: 21       Treatment & Education:  Therapist provided instruction and educated of  patient on progress, safety,d/c,PT POC,   proper body mechanics, energy conservation, and fall prevention strategies during tasks listed above, on the effects of prolonged immobility and the importance of performing OOB activity and exercises to promote healing and reduce recovery time   Updated white board with appropriate PT mobility information for medical team notification   Donned an extra gown   Pt safe to ambulate in hallway with RN or PCT assistance  Call nursing/pct to transfer to chair/use bathroom. Pt stated understanding  Bedside table in front of patient and area set up for function, convenience, and safety. RN aware of patient's mobility needs and status. Questions/concerns addressed within PTA scope of practice; patient  with no further questions. Time was provided for active listening, discussion of health disposition, and discussion of safe discharge. Pt?verbalized?agreement .       Patient left HOB elevated with all lines intact, call button in reach, and nsg notified..  GOALS:   Multidisciplinary Problems       Physical Therapy Goals           Problem: Physical Therapy    Goal Priority Disciplines Outcome Goal Variances Interventions   Physical Therapy Goal     PT, PT/OT Progressing     Description: Goals to be met by: 2024     Patient will increase functional independence with mobility by performin. Supine to sit with Set-up Grady  2. Sit to supine with Set-up Grady  3. Sit to stand transfer with Supervision  4. Bed to chair transfer with Supervision using No Assistive Device  5. Gait  x 300 feet with Supervision using No Assistive Device.   6. Ascend/descend 20 stairs with right Handrail Stand-by Assistance using No Assistive Device.   7. Lower extremity exercise program x15 reps per handout, with supervision                         Time Tracking:     PT Received On: 24  PT Start Time: 832     PT Stop Time: 844  PT Total Time (min): 12 min     Billable Minutes: Gait Training 12    Treatment Type: Treatment  PT/PTA: PTA     Number of PTA visits since last PT visit: 3     2024

## 2024-05-29 NOTE — PROGRESS NOTES
[facility]  Colorectal Surgery  Progress Note    Patient Name: Osman Li Jr.  MRN: [id number]  Admission Date: 5/21/2024  Hospital Length of Stay: 8 days  Attending Physician: Farhan Lance MD    Subjective:     Interval History: Remains AF and HDS. Tolerating a low residue diet (about 1/3 of his plate and 3/4 boost) and having good ileostomy output. DAWIT drain with 20 mL serous output. Urine culture with gram negative rods.     Post-Op Info:  Procedure(s) (LRB):  LAPAROTOMY, EXPLORATORY (N/A)  SIGMOIDOSCOPY, FLEXIBLE  INSERTION, CATHETER, URETER (Left)  COLECTOMY, COMPLETION, ABDOMINAL (N/A)  CREATION, ILEOSTOMY, DIVERTING LOOP (N/A)  ANASTOMOSIS, ILEORECTAL (N/A)   8 Days Post-Op      Medications:  Continuous Infusions:   TPN ADULT CENTRAL LINE CUSTOM   Intravenous Continuous 75 mL/hr at 05/28/24 2212 New Bag at 05/28/24 2212     Scheduled Meds:   acetaminophen  1,000 mg Oral Q8H    atorvastatin  20 mg Oral QHS    ciprofloxacin HCl  500 mg Oral Q12H    enalapril  10 mg Oral Daily    enoxparin  40 mg Subcutaneous Q24H (prophylaxis, 1700)    gabapentin  300 mg Oral TID    insulin aspart U-100  3 Units Subcutaneous 6 times per day    insulin glargine U-100  20 Units Subcutaneous Daily    lipid (SMOFLIPID)  250 mL Intravenous Daily    methocarbamoL  500 mg Oral Q8H    metoprolol tartrate  25 mg Oral BID    nicotine  1 patch Transdermal Daily    pantoprazole  40 mg Oral Daily    scopolamine  1 patch Transdermal Q3 Days    sodium chloride 0.9%  10 mL Intravenous Q6H    tamsulosin  0.8 mg Oral Daily     PRN Meds:   acetaminophen tablet 1,000 mg    atorvastatin tablet 20 mg    ciprofloxacin HCl tablet 500 mg    enalapril tablet 10 mg    enoxaparin injection 40 mg    gabapentin capsule 300 mg    insulin aspart U-100 pen 3 Units    insulin glargine U-100 (Lantus) pen 20 Units    lipid (SMOFLIPID) (SMOFLIPID) 20 % infusion 250 mL    methocarbamoL tablet 500 mg    metoprolol tartrate (LOPRESSOR) tablet  25 mg    nicotine 14 mg/24 hr 1 patch    pantoprazole EC tablet 40 mg    scopolamine 1.3-1.5 mg (1 mg over 3 days) 1 patch    sodium chloride 0.9% flush 10 mL    tamsulosin 24 hr capsule 0.8 mg        Objective:     Vital Signs (Most Recent):  Temp: 98.4 °F (36.9 °C) (05/29/24 0408)  Pulse: 70 (05/29/24 0408)  Resp: 18 (05/29/24 0444)  BP: (!) 172/81 (05/29/24 0408)  SpO2: 96 % (05/29/24 0408) Vital Signs (24h Range):  Temp:  [98.4 °F (36.9 °C)-99.4 °F (37.4 °C)] 98.4 °F (36.9 °C)  Pulse:  [70-82] 70  Resp:  [16-18] 18  SpO2:  [94 %-97 %] 96 %  BP: (145-177)/(69-85) 172/81     Intake/Output - Last 3 Shifts         05/27 0700  05/28 0659 05/28 0700  05/29 0659 05/29 0700  05/30 0659    P.O. 1100 360     Other 0      Total Intake(mL/kg) 1100 (11) 360 (3.6)     Urine (mL/kg/hr) 3040 (1.3) 794 (0.3)     Drains 40 20     Stool 270 150     Total Output 3350 964     Net -2250 -604            Stool Occurrence 1 x               Physical Exam  Vitals and nursing note reviewed.   Constitutional:       General: He is not in acute distress.     Appearance: He is well-developed. He is not ill-appearing or diaphoretic.   HENT:      Head: Normocephalic and atraumatic.      Mouth/Throat:      Pharynx: Oropharynx is clear. No oropharyngeal exudate.   Eyes:      General: No scleral icterus.     Extraocular Movements: Extraocular movements intact.   Cardiovascular:      Rate and Rhythm: Normal rate and regular rhythm.   Pulmonary:      Effort: Pulmonary effort is normal. No respiratory distress.   Abdominal:      Comments: Midline incision with gauze in place with scant serous drainage  No surrounding erythema or induration  Ostomy with thin liquidy output  DAWIT drain x1 with serous output  Soft, non-distended, appropriately tender   Musculoskeletal:         General: No deformity. Normal range of motion.   Skin:     General: Skin is warm and dry.   Neurological:      General: No focal deficit present.      Mental Status: He is alert.       Cranial Nerves: No cranial nerve deficit.   Psychiatric:         Mood and Affect: Mood normal.         Behavior: Behavior normal.              Significant Labs:  CBC (Last 3 Results):   Recent Labs   Lab 05/27/24  0522 05/28/24  0506 05/29/24  0441   WBC 18.17* 17.18* 12.15   RBC 3.40* 3.41* 3.13*   HGB 10.0* 10.2* 9.4*   HCT 30.4* 29.8* 27.7*   * 717* 715*   MCV 89 87 89   MCH 29.4 29.9 30.0   MCHC 32.9 34.2 33.9     CMP (Last 3 Results):   Recent Labs   Lab 05/27/24  0522 05/28/24  0506 05/29/24  0441   * 185* 170*   CALCIUM 8.6* 8.7 8.8   ALBUMIN 2.1* 2.2* 2.1*   PROT 5.3* 5.8* 5.4*    137 135*   K 3.5 3.7 4.2   CO2 29 27 27    102 102   BUN 12 11 16   CREATININE 0.8 0.9 0.8   ALKPHOS 228* 236* 239*   ALT 15 15 19   AST 22 23 25   BILITOT 0.3 0.3 0.2     CRP (Last 3 Results):   Recent Labs   Lab 05/27/24  0522 05/28/24  0508 05/29/24  0435   CRP 44.8* 52.6* 36.6*     All pertinent lab results within the last 24 hours have been reviewed.     Significant Diagnostics:  I have reviewed all pertinent imaging results/findings within the past 24 hours.  Assessment/Plan:     Malignant neoplasm of transverse colon  Osman Li Jr. is a 59 y.o. 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 He was transferred to the hospital with concern for internal hernia with closed loop bowel obstruction now s/p ex-lap where he was found to have a perforation near his anastomosis. He is s/p completion colectomy, ileorectal anastomosis, diverting loop ileostomy, and flexible sigmoidoscopy on 5/21/24.    - PO cipro x5 days for UTI.   - Follow up urine cultures. Currently growing gram negative rods  - MM PO pain meds. Cole tylenol, gabapentin, and robaxin with PRN oxy and dilaudid for breakthrough  pain  - Low residue diet  - Stop TPN today. Will nee  - Endocrine consulted for DM management   - Continue lantus 20u   - ISS   - Hold aspart 3u q4 while TPN stopped  - DAWIT to bulb suction. Will remove prior to DC  - PRN nausea meds  - Nicotine patch  - PT/OT  - DVT ppx    - Dispo: Plan for dc this afternoon if his glucose is okay after stopping TPN. Needs to have another visit with wound care          Belem Vernon MD  Colorectal Surgery  [facility]

## 2024-05-29 NOTE — DISCHARGE SUMMARY
[facility]  Colorectal Surgery  Discharge Summary      Patient Name: Osman iL Jr.  MRN: [id number]  Admission Date: 5/21/2024  Hospital Length of Stay: 8 days  Discharge Date and Time:  05/29/2024 2:12 PM  Attending Physician: Farhan Lance MD   Discharging Provider: Belem Vernon MD  Primary Care Provider: Natalee Wagner NP     HPI:  Osman Li Jr. is a 59 y.o. male with a history of HTN, HLD, and DM, Stage IV transverse colon adenocarcinoma who was recently discharged from the hospital on 5/18/24. 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 Post operatively he developed an ileus which improved with conservative management. He was able to be discharged on POD#5. At that time he was tolerating a regular diet, ambulating, voiding spontaneously, having BMs, and had adequate pain control.     Unfortunately he represented to [facility] early on 5/21/24 with worsening left lower quadrant pain and nonbilious, nonbloody vomiting. Upon arrival there he was febrile to 103 and tachycardic, but HDS on RA. Labs with an elevated WBC to 22.8 and slight anemia but otherwise unremarkable. CT A/P was concerning for an internal hernia with closed loop obstruction and so he was transferred to [facility] for higher level of care.    Procedure(s) (LRB):  LAPAROTOMY, EXPLORATORY (N/A)  SIGMOIDOSCOPY, FLEXIBLE  INSERTION, CATHETER, URETER (Left)  COLECTOMY, COMPLETION, ABDOMINAL (N/A)  CREATION, ILEOSTOMY, DIVERTING LOOP (N/A)  ANASTOMOSIS, ILEORECTAL (N/A)     Hospital Course:  Mr. Li was admitted to the colorectal surgery as a transfer with concern for a possible closed loop obstruction. He was taken for an emergent ex-lap on 5/21/24 where he was found to have a perforation near his anastomosis. He then underwent completion  colectomy, ileorectal anastomosis, diverting loop ileostomy, and flexible sigmoidoscopy. Following the procedure, he had a nasogastric tube for anticipated delayed return of bowel function and we started him on TPN. Endocrinology was consulted for assistance with blood glucose management. His ileostomy started having output on POD#4 and his NGT was removed. Following this, we advanced his diet slowly and transitioned him over to oral medication. His drain remained bland and his CRP downtrended. He did report some burning with urination on POD#6 and UA was consistent with a UTI for which he was started on cipro. His recovery was overall uneventful and he was stable for discharge on POD#8. At that time he was tolerating a low residue diet, ambulating, voiding spontaneously, having good ileostomy output, and had adequate pain control. Discharge instructions were provided along with a recommended time for follow up.      Goals of Care Treatment Preferences:  Code Status: Full Code      Consults (From admission, onward)          Status Ordering Provider     Inpatient consult to Registered Dietitian/Nutritionist  Once        Provider:  (Not yet assigned)    Completed ALEXANDRA MONGE     Inpatient consult to PICC team ([facility])  Once        Provider:  (Not yet assigned)    Completed PAOLA QUINTERO     Inpatient consult to Endocrinology  Once        Provider:  (Not yet assigned)    Completed ALEXANDRA MONGE            Significant Diagnostic Studies: Labs: CMP   Recent Labs   Lab 05/28/24  0506 05/29/24  0441    135*   K 3.7 4.2    102   CO2 27 27   * 170*   BUN 11 16   CREATININE 0.9 0.8   CALCIUM 8.7 8.8   PROT 5.8* 5.4*   ALBUMIN 2.2* 2.1*   BILITOT 0.3 0.2   ALKPHOS 236* 239*   AST 23 25   ALT 15 19   ANIONGAP 8 6*   , CBC   Recent Labs   Lab 05/28/24  0506 05/29/24  0441   WBC 17.18* 12.15   HGB 10.2* 9.4*   HCT 29.8* 27.7*   * 715*   , and All labs within the past 24 hours have been  reviewed  Microbiology: Urine Culture    Lab Results   Component Value Date    LABURIN (A) 05/27/2024     GRAM NEGATIVE NICHOLE  >100,000 cfu/ml  Identification and susceptibility pending         Pending Diagnostic Studies:       Procedure Component Value Units Date/Time    C-reactive protein [[id number]] Collected: 05/29/24 0512    Order Status: Sent Lab Status: In process Updated: 05/29/24 0512    Specimen: Blood           Final Active Diagnoses:    Diagnosis Date Noted POA    Intra-abdominal abscess [K65.1] 06/20/2022 Yes    Malignant neoplasm of transverse colon [C18.4] 05/05/2022 Yes    HTN (hypertension) [I10] 04/24/2022 Yes    Type 2 diabetes mellitus [E11.9] 04/24/2022 Yes    HLD (hyperlipidemia) [E78.5] 04/24/2022 Yes    Anemia [D64.9] 04/24/2022 Yes      Problems Resolved During this Admission:    Diagnosis Date Noted Date Resolved POA    PRINCIPAL PROBLEM:  Large bowel anastomotic leak [K91.89] 05/21/2024 05/29/2024 Yes    Sepsis [A41.9] 06/20/2022 05/29/2024 Yes    Hypokalemia [E87.6] 04/24/2022 05/29/2024 Yes      Discharged Condition: good    Disposition: Home or Self Care    Follow Up:   Follow-up Information       Farhan Lance MD Follow up on 6/28/2024.    Specialty: Colon and Rectal Surgery  Why: For post operative check up  Contact information:  [address] [facility] [address]  [phone]               Khoobehi, Aurash, MD Follow up on 6/10/2024.    Specialties: Hematology and Oncology, Hematology, Oncology  Contact information:  [address] [facility] [address]  [address]  [place] [address]  [phone]               Natalee Wagner NP Follow up on 6/27/2024.    Specialty: Family Medicine  Why: Hospital follow up including blood pressure and diabetes management  Contact information:  [address]  [address]  [place] [address]  [phone]                           Patient Instructions:      Lifting restrictions   Order Comments: Do not lift anything >10 lbs (about a gallon of milk) for 6  weeks     No driving until:   Order Comments: No longer taking narcotic pain meds     Notify your health care provider if you experience any of the following:  temperature >100.4     Notify your health care provider if you experience any of the following:  persistent nausea and vomiting or diarrhea     Notify your health care provider if you experience any of the following:  severe uncontrolled pain     Notify your health care provider if you experience any of the following:  redness, tenderness, or signs of infection (pain, swelling, redness, odor or green/yellow discharge around incision site)     Notify your health care provider if you experience any of the following:  difficulty breathing or increased cough     Notify your health care provider if you experience any of the following:  severe persistent headache     Notify your health care provider if you experience any of the following:  worsening rash     Notify your health care provider if you experience any of the following:  persistent dizziness, light-headedness, or visual disturbances     Notify your health care provider if you experience any of the following:  increased confusion or weakness     Change dressing (specify)   Order Comments: Change midline dressing daily or as needed     Medications:  Reconciled Home Medications:      Medication List        START taking these medications      ciprofloxacin HCl 500 MG tablet  Commonly known as: CIPRO  Take 1 tablet (500 mg total) by mouth every 12 (twelve) hours. for 3 days     enalapril 10 MG tablet  Commonly known as: VASOTEC  Take 1 tablet (10 mg total) by mouth once daily.  Start taking on: May 30, 2024     gabapentin 300 MG capsule  Commonly known as: NEURONTIN  Take 1 capsule (300 mg total) by mouth 3 (three) times daily.     methocarbamoL 500 MG Tab  Commonly known as: ROBAXIN  Take 1 tablet (500 mg total) by mouth every 8 (eight) hours. for 10 days     metoprolol tartrate 25 MG tablet  Commonly known  as: LOPRESSOR  Take 1 tablet (25 mg total) by mouth 2 (two) times daily.     nicotine 14 mg/24 hr  Commonly known as: NICODERM CQ  Place 1 patch onto the skin once daily.  Start taking on: May 30, 2024     oxyCODONE 10 mg Tab immediate release tablet  Commonly known as: ROXICODONE  Take 1 tablet (10 mg total) by mouth every 4 (four) hours as needed for Pain.     scopolamine 1.3-1.5 mg (1 mg over 3 days)  Commonly known as: TRANSDERM-SCOP  Place 1 patch onto the skin Every 3 (three) days.     tamsulosin 0.4 mg Cap  Commonly known as: FLOMAX  Take 2 capsules (0.8 mg total) by mouth once daily. for 7 days  Start taking on: May 30, 2024            CHANGE how you take these medications      atorvastatin 20 MG tablet  Commonly known as: LIPITOR  Take 1 tablet (20 mg total) by mouth once daily.  What changed: when to take this     promethazine 12.5 MG Tab  Commonly known as: PHENERGAN  Take 1 tablet (12.5 mg total) by mouth every 4 (four) hours as needed.  What changed: reasons to take this     sildenafiL 100 MG tablet  Commonly known as: VIAGRA  Take 1 tablet (100 mg total) by mouth daily as needed for Erectile Dysfunction.  What changed: reasons to take this     TOUJEO MAX U-300 SOLOSTAR 300 unit/mL (3 mL) insulin pen  Generic drug: insulin glargine U-300 conc  Inject 20 Units into the skin once daily. (Discard pen 56 days after initial use)  What changed:   how much to take  additional instructions            CONTINUE taking these medications      metFORMIN 1000 MG tablet  Commonly known as: GLUCOPHAGE  Take 1 tablet (1,000 mg total) by mouth 2 (two) times daily with meals.     traZODone 50 MG tablet  Commonly known as: DESYREL  Take 1 tablet (50 mg total) by mouth every evening.            STOP taking these medications      enalapriL-hydrochlorothiazide 10-25 mg per tablet  Commonly known as: VASERETIC     morphine 30 MG 12 hr tablet  Commonly known as: MS CONTIN            ASK your doctor about these medications   "    * BD ULTRA-FINE MINI PEN NEEDLE 31 gauge x 3/16" Ndle  Generic drug: pen needle, diabetic  1 each by Misc.(Non-Drug; Combo Route) route once daily.  Ask about: Which instructions should I use?     * BD ULTRA-FINE MINI PEN NEEDLE 31 gauge x 3/16" Ndle  Generic drug: pen needle, diabetic  Use to inject insulin once daily  Ask about: Which instructions should I use?           * This list has 2 medication(s) that are the same as other medications prescribed for you. Read the directions carefully, and ask your doctor or other care provider to review them with you.                  Belem Vernon MD  Colorectal Surgery  [facility]      "

## 2024-05-29 NOTE — ASSESSMENT & PLAN NOTE
Osman Li Jr. is a 59 y.o. 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 He was transferred to the hospital with concern for internal hernia with closed loop bowel obstruction now s/p ex-lap where he was found to have a perforation near his anastomosis. He is s/p completion colectomy, ileorectal anastomosis, diverting loop ileostomy, and flexible sigmoidoscopy on 5/21/24.    - PO cipro x5 days for UTI.   - Follow up urine cultures. Currently growing gram negative rods  - MM PO pain meds. Cole tylenol, gabapentin, and robaxin with PRN oxy and dilaudid for breakthrough pain  - Low residue diet  - Stop TPN today. Will nee  - Endocrine consulted for DM management   - Continue lantus 20u   - ISS   - Hold aspart 3u q4 while TPN stopped  - DAWIT to bulb suction. Will remove prior to DC  - PRN nausea meds  - Nicotine patch  - PT/OT  - DVT ppx    - Dispo: Plan for dc this afternoon if his glucose is okay after stopping TPN. Needs to have another visit with wound care

## 2024-05-30 ENCOUNTER — PATIENT MESSAGE (OUTPATIENT)
Dept: SURGERY | Facility: CLINIC | Age: 59
End: 2024-05-30
Payer: COMMERCIAL

## 2024-05-30 ENCOUNTER — TELEPHONE (OUTPATIENT)
Dept: SURGERY | Facility: HOSPITAL | Age: 59
End: 2024-05-30
Payer: COMMERCIAL

## 2024-05-30 DIAGNOSIS — F51.01 PRIMARY INSOMNIA: ICD-10-CM

## 2024-05-30 RX ORDER — TRAZODONE HYDROCHLORIDE 50 MG/1
50 TABLET ORAL NIGHTLY
Qty: 90 TABLET | Refills: 3 | OUTPATIENT
Start: 2024-05-30 | End: 2025-05-30

## 2024-05-30 RX ORDER — SULFAMETHOXAZOLE AND TRIMETHOPRIM 800; 160 MG/1; MG/1
1 TABLET ORAL 2 TIMES DAILY
Qty: 10 TABLET | Refills: 0 | Status: SHIPPED | OUTPATIENT
Start: 2024-05-30 | End: 2024-06-05

## 2024-05-30 NOTE — TELEPHONE ENCOUNTER
Urine culture results reviewed and are positive for e coli resistant to ciprofloxacin.     Called patient to discuss changing antibiotics to bactrim but he did not . Voice mail left.     Abx sent to [facility]    Belem Vernon MD  General Surgery, PGY-5  [phone]

## 2024-05-31 NOTE — PLAN OF CARE
[facility]  Discharge Final Note    Primary Care Provider: Natalee Wagner NP  Expected Discharge Date: 5/29/2024    Patient medically ready for discharge to home.     Transportation by family.     Is family/patient aware of discharge: yes     Hospital follow up scheduled: yes      Final Discharge Note (most recent)       Final Note - 05/31/24 0921          Final Note    Assessment Type Final Discharge Note     Anticipated Discharge Disposition Home or Self Care     Hospital Resources/Appts/Education Provided Provided patient/caregiver with written discharge plan information                     Important Message from Medicare         Referral Info (most recent)       Referral Info    No documentation.                 Contact Info       Farhan Lance MD   Specialty: Colon and Rectal Surgery    [address] [facility] [address]   Phone: [phone]       Next Steps: Follow up on 6/28/2024    Instructions: For post operative check up    Khoobehi, Aurash, MD   Specialty: Hematology and Oncology, Hematology, Oncology    [address]  [address]  [place] [address]   Phone: [phone]       Next Steps: Follow up on 6/10/2024    Natalee Wagner NP   Specialty: Family Medicine   Relationship: PCP - General    [address]  [address]  [place] [address]   Phone: [phone]       Next Steps: Follow up on 6/27/2024    Instructions: Hospital follow up including blood pressure and diabetes management          Future Appointments   Date Time Provider Department Center   6/6/2024  9:30 AM Daxa Whelan CNS [facility] ENTERO [facility]   6/10/2024  7:30 [facility] [place] [facility]   6/10/2024 11:00 AM Khoobehi, Aurash, MD [facility] HEMON O at [facility] CC   6/11/2024  9:00 AM CHAIR 26 [facility] CC [facility] CHEMO [facility] [address]   6/27/2024  8:20 AM Natalee Wagner NP [facility] OFFAM O at [facility] [place]   6/28/2024  9:40 AM Farhan Lance MD [facility] COLON Isai steve   7/2/2024 10:00 AM CHAIR 16 [facility] CC [facility] CHEMO [facility] [address]    7/23/2024  8:00 [facility]   8/13/2024  8:00 AM CHAIR 21 [facility] RIN [facility] CHEMO [facility] Ben Nicholas RN  Case Management  Ext: [phone]  05/31/2024

## 2024-06-06 ENCOUNTER — OFFICE VISIT (OUTPATIENT)
Dept: WOUND CARE | Facility: CLINIC | Age: 59
End: 2024-06-06
Payer: COMMERCIAL

## 2024-06-06 DIAGNOSIS — Z43.2 ATTENTION TO ILEOSTOMY: Primary | ICD-10-CM

## 2024-06-06 PROCEDURE — 99999 PR PBB SHADOW E&M-EST. PATIENT-LVL III: CPT | Mod: PBBFAC,,, | Performed by: CLINICAL NURSE SPECIALIST

## 2024-06-06 PROCEDURE — 99211 OFF/OP EST MAY X REQ PHY/QHP: CPT | Mod: S$GLB,,, | Performed by: CLINICAL NURSE SPECIALIST

## 2024-06-06 NOTE — PROGRESS NOTES
This patient is unknown to me and is here in clinic today for first post op evaluation related to ileostomy. Surgery done 5/21/24 by Dr. Lance. The patient reports no  problems with  new ostomy. The patient is not receiving home health care .   Pain level today is reported as  1. He was to have colostomy closure but complications necessitated a temp ileostomy     The ileostomy is 35mm addison low budded  loop stoma.  The patient is currently  wearing a 1piece FLAT ALONZO pouching system by [employer].   Average wear time is 4 days.   Peristomal skin is clear today     There is no  mucocutaneous separation.  Pt is coping well with the new ostomy. Wife helps him   SUPPLIES/DME: uses [employer]  Pouching concerns include:    Using flat pch with ring but I did introduce soft convex and belt and he did want to give them a try  gavehim 4 pouches total to try   SPECIAL NEEDS:  Pt counseled on skin care, nutrition, hydration as well as  how to order ostomy supplies.  I spent greater than 50% of this 45 minute visit in face to face counseling.

## 2024-06-10 ENCOUNTER — LAB VISIT (OUTPATIENT)
Dept: LAB | Facility: HOSPITAL | Age: 59
End: 2024-06-10
Attending: INTERNAL MEDICINE
Payer: COMMERCIAL

## 2024-06-10 ENCOUNTER — OFFICE VISIT (OUTPATIENT)
Dept: HEMATOLOGY/ONCOLOGY | Facility: CLINIC | Age: 59
End: 2024-06-10
Payer: COMMERCIAL

## 2024-06-10 VITALS
TEMPERATURE: 98 F | HEIGHT: 74 IN | DIASTOLIC BLOOD PRESSURE: 68 MMHG | WEIGHT: 203.5 LBS | OXYGEN SATURATION: 98 % | SYSTOLIC BLOOD PRESSURE: 138 MMHG | RESPIRATION RATE: 16 BRPM | BODY MASS INDEX: 26.12 KG/M2 | HEART RATE: 73 BPM

## 2024-06-10 DIAGNOSIS — T45.1X5A CHEMOTHERAPY-INDUCED NEUROPATHY: ICD-10-CM

## 2024-06-10 DIAGNOSIS — C18.4 MALIGNANT NEOPLASM OF TRANSVERSE COLON: ICD-10-CM

## 2024-06-10 DIAGNOSIS — G62.0 CHEMOTHERAPY-INDUCED NEUROPATHY: ICD-10-CM

## 2024-06-10 DIAGNOSIS — E87.5 HYPERKALEMIA: ICD-10-CM

## 2024-06-10 DIAGNOSIS — I10 PRIMARY HYPERTENSION: ICD-10-CM

## 2024-06-10 DIAGNOSIS — E11.00 TYPE 2 DIABETES MELLITUS WITH HYPEROSMOLARITY WITHOUT COMA, WITHOUT LONG-TERM CURRENT USE OF INSULIN: ICD-10-CM

## 2024-06-10 DIAGNOSIS — C78.89 METASTATIC ADENOCARCINOMA TO PANCREAS: ICD-10-CM

## 2024-06-10 DIAGNOSIS — C77.2 METASTASIS TO RETROPERITONEAL LYMPH NODE: ICD-10-CM

## 2024-06-10 DIAGNOSIS — C18.4 MALIGNANT NEOPLASM OF TRANSVERSE COLON: Primary | ICD-10-CM

## 2024-06-10 LAB
ALBUMIN SERPL BCP-MCNC: 3.5 G/DL (ref 3.5–5.2)
ALP SERPL-CCNC: 169 U/L (ref 55–135)
ALT SERPL W/O P-5'-P-CCNC: 20 U/L (ref 10–44)
ANION GAP SERPL CALC-SCNC: 10 MMOL/L (ref 8–16)
AST SERPL-CCNC: 18 U/L (ref 10–40)
BASOPHILS # BLD AUTO: 0.13 K/UL (ref 0–0.2)
BASOPHILS NFR BLD: 1.1 % (ref 0–1.9)
BILIRUB SERPL-MCNC: 0.4 MG/DL (ref 0.1–1)
BUN SERPL-MCNC: 21 MG/DL (ref 6–20)
CALCIUM SERPL-MCNC: 10.6 MG/DL (ref 8.7–10.5)
CHLORIDE SERPL-SCNC: 100 MMOL/L (ref 95–110)
CO2 SERPL-SCNC: 24 MMOL/L (ref 23–29)
CREAT SERPL-MCNC: 1.3 MG/DL (ref 0.5–1.4)
DIFFERENTIAL METHOD BLD: ABNORMAL
EOSINOPHIL # BLD AUTO: 0.3 K/UL (ref 0–0.5)
EOSINOPHIL NFR BLD: 2.8 % (ref 0–8)
ERYTHROCYTE [DISTWIDTH] IN BLOOD BY AUTOMATED COUNT: 13.6 % (ref 11.5–14.5)
EST. GFR  (NO RACE VARIABLE): >60 ML/MIN/1.73 M^2
GLUCOSE SERPL-MCNC: 234 MG/DL (ref 70–110)
HCT VFR BLD AUTO: 38.5 % (ref 40–54)
HGB BLD-MCNC: 12.7 G/DL (ref 14–18)
IMM GRANULOCYTES # BLD AUTO: 0.04 K/UL (ref 0–0.04)
IMM GRANULOCYTES NFR BLD AUTO: 0.3 % (ref 0–0.5)
LYMPHOCYTES # BLD AUTO: 4.1 K/UL (ref 1–4.8)
LYMPHOCYTES NFR BLD: 34.8 % (ref 18–48)
MCH RBC QN AUTO: 29.2 PG (ref 27–31)
MCHC RBC AUTO-ENTMCNC: 33 G/DL (ref 32–36)
MCV RBC AUTO: 89 FL (ref 82–98)
MONOCYTES # BLD AUTO: 1.1 K/UL (ref 0.3–1)
MONOCYTES NFR BLD: 8.9 % (ref 4–15)
NEUTROPHILS # BLD AUTO: 6.1 K/UL (ref 1.8–7.7)
NEUTROPHILS NFR BLD: 52.1 % (ref 38–73)
NRBC BLD-RTO: 0 /100 WBC
PLATELET # BLD AUTO: 715 K/UL (ref 150–450)
PMV BLD AUTO: 9.4 FL (ref 9.2–12.9)
POTASSIUM SERPL-SCNC: 5.9 MMOL/L (ref 3.5–5.1)
PROT SERPL-MCNC: 7.7 G/DL (ref 6–8.4)
RBC # BLD AUTO: 4.35 M/UL (ref 4.6–6.2)
SODIUM SERPL-SCNC: 134 MMOL/L (ref 136–145)
WBC # BLD AUTO: 11.74 K/UL (ref 3.9–12.7)

## 2024-06-10 PROCEDURE — 36415 COLL VENOUS BLD VENIPUNCTURE: CPT | Performed by: INTERNAL MEDICINE

## 2024-06-10 PROCEDURE — G2211 COMPLEX E/M VISIT ADD ON: HCPCS | Mod: S$GLB,,, | Performed by: INTERNAL MEDICINE

## 2024-06-10 PROCEDURE — 99999 PR PBB SHADOW E&M-EST. PATIENT-LVL IV: CPT | Mod: PBBFAC,,, | Performed by: INTERNAL MEDICINE

## 2024-06-10 PROCEDURE — 85025 COMPLETE CBC W/AUTO DIFF WBC: CPT | Performed by: INTERNAL MEDICINE

## 2024-06-10 PROCEDURE — 80053 COMPREHEN METABOLIC PANEL: CPT | Performed by: INTERNAL MEDICINE

## 2024-06-10 PROCEDURE — 99215 OFFICE O/P EST HI 40 MIN: CPT | Mod: S$GLB,,, | Performed by: INTERNAL MEDICINE

## 2024-06-10 NOTE — PROGRESS NOTES
Service Date:  6/10/24    Chief Complaint:  Colon cancer    Osman Li Jr. is a 59 y.o. 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  This occurred very shortly after completing treatment.    Patient was part of a clinical trial to measure CT DNA.  His CT DNA started to go up while he was on FOLFOX, which prompted the scan, which showed the progression.  Next generation sequencing also showed a high tumor burden.    He is now on Keytruda as his cancer has a very high tumor mutation burden.  So far, Keytruda has been helping    Here for Keytruda.  Recently had a ostomy takedown with reversal.  Patient then had a leakage at the anastomosis site and now has an ileostomy in place, with plans to reverse this in the future.  Patient is doing well now.  Recovered from the surgery mostly.  Having some constant nausea which is relieved by his antiemetics.      Review of Systems   Constitutional:  Positive for fatigue. Negative for appetite change and unexpected weight change.   HENT: Negative.  Negative for mouth sores.    Eyes: Negative.  Negative for visual disturbance.   Respiratory: Negative.  Negative for cough and shortness of breath.    Cardiovascular: Negative.  Negative for chest pain.   Gastrointestinal: Negative.  Negative for diarrhea.   Endocrine: Negative.    Genitourinary: Negative.  Negative for frequency.   Musculoskeletal: Negative.  Negative for back pain.   Integumentary:  Negative for rash. Negative.   Neurological:  Positive for numbness. Negative for headaches.   Hematological: Negative.  Negative for adenopathy.   Psychiatric/Behavioral: Negative.  The patient is not nervous/anxious.         Current Outpatient Medications   Medication Instructions    atorvastatin (LIPITOR) 20 mg, Oral, Daily    enalapril (VASOTEC) 10 mg, Oral, Daily    gabapentin (NEURONTIN) 300 mg, Oral, 3 times daily    metFORMIN  "(GLUCOPHAGE) 1,000 mg, Oral, 2 times daily with meals    metoprolol tartrate (LOPRESSOR) 25 mg, Oral, 2 times daily    nicotine (NICODERM CQ) 14 mg/24 hr 1 patch, Transdermal, Daily    oxyCODONE (ROXICODONE) 10 mg, Oral, Every 4 hours PRN    pen needle, diabetic (BD ULTRA-FINE MINI PEN NEEDLE) 31 gauge x 3/16" Ndle Use to inject insulin once daily    pen needle, diabetic 31 gauge x 3/16" Ndle 1 each, Misc.(Non-Drug; Combo Route), Daily    promethazine (PHENERGAN) 12.5 mg, Oral, Every 4 hours PRN    scopolamine (TRANSDERM-SCOP) 1.3-1.5 mg (1 mg over 3 days) 1 patch, Transdermal, Every 3 days    sildenafiL (VIAGRA) 100 mg, Oral, Daily PRN    tamsulosin (FLOMAX) 0.8 mg, Oral, Daily    TOUJEO MAX U-300 SOLOSTAR 20 Units, Subcutaneous, Daily, (Discard pen 56 days after initial use)    traZODone (DESYREL) 50 mg, Oral, Nightly        Past Medical History:   Diagnosis Date    Colon cancer     Digestive disorder     DM (diabetes mellitus)     Hyperlipidemia     Hypertension     Prediabetes         Past Surgical History:   Procedure Laterality Date    ADENOIDECTOMY  1972    ANASTOMOSIS OF ILEUM TO RECTUM N/A 5/21/2024    Procedure: ANASTOMOSIS, ILEORECTAL;  Surgeon: Farhan Lance MD;  Location: [address];  Service: Colon and Rectal;  Laterality: N/A;    CHOLECYSTECTOMY  2011    CLOSURE, COLOSTOMY N/A 5/13/2024    Procedure: CLOSURE, COLOSTOMY (eras high/lithotomy);  Surgeon: Farhan Lance MD;  [address];  Service: Colon and Rectal;  Laterality: N/A;  CONSENTS IN [facility]    COLON SURGERY      COLONOSCOPY      2018    COLONOSCOPY N/A 3/5/2024    Procedure: COLONOSCOPY;  Surgeon: Farhan Lance MD;  Location: [facility] (4TH FLR);  Service: Endoscopy;  Laterality: N/A;  2/27/24-Kethman pt, 1-4wks, port, PEG plus 2 enemas morning of procedure, instr portal-DS  2/28/24- LVM for precall - ERW  pt confirmed procedure. cf    COLOSTOMY N/A 04/25/2022    Procedure: CREATION, COLOSTOMY;  Surgeon: " Carlton Waddell MD;  Location: [facility] OR;  Service: General;  Laterality: N/A;    ENDOSCOPIC ULTRASOUND OF UPPER GASTROINTESTINAL TRACT N/A 01/06/2023    Procedure: ULTRASOUND, UPPER GI TRACT, ENDOSCOPIC;  Surgeon: Rinku Orozco III, MD;  Location: [facility] ENDO;  Service: Endoscopy;  Laterality: N/A;    FLEXIBLE SIGMOIDOSCOPY N/A 5/13/2024    Procedure: SIGMOIDOSCOPY, FLEXIBLE;  Surgeon: Farhan Lance MD;  Location: [facility] OR 2ND FLR;  Service: Colon and Rectal;  Laterality: N/A;    FLEXIBLE SIGMOIDOSCOPY  5/21/2024    Procedure: SIGMOIDOSCOPY, FLEXIBLE;  Surgeon: Farhan Lance MD;  [facility] 2ND F[facility];  Service: Colon and Rectal;;    ILEOSTOMY N/A 5/21/2024    Procedure: CREATION, ILEOSTOMY, DIVERTING LOOP;  Surgeon: Farhan Lance MD;  [facility] 2ND FLR;  Service: Colon and Rectal;  Laterality: N/A;    INSERTION OF TUNNELED CENTRAL VENOUS CATHETER (CVC) WITH SUBCUTANEOUS PORT Right 05/18/2022    Procedure: PUPUVFDLE-EURW-X-CATH;  Surgeon: Carlton Waddell MD;  Location: [facility] OR;  Service: General;  Laterality: Right;    INSERTION OF URETERAL CATHETER N/A 5/13/2024    Procedure: INSERTION, CATHETER, URETER, BILATERAL;  Surgeon: Travis Edwards MD;  Location: [facility] OR 2ND FLR;  Service: Urology;  Laterality: N/A;    INSERTION OF URETERAL CATHETER Left 5/21/2024    Procedure: INSERTION, CATHETER, URETER;  Surgeon: Carlton Santos MD;  [facility];  Service: Urology;  Laterality: Left;    LAPAROTOMY, EXPLORATORY N/A 5/21/2024    Procedure: LAPAROTOMY, EXPLORATORY;  Surgeon: Farhan Lance MD;  [facility] F[facility];  Service: Colon and Rectal;  Laterality: N/A;    LYSIS OF ADHESIONS N/A 5/13/2024    Procedure: LYSIS, ADHESIONS;  Surgeon: Farhan aLnce MD;  [facility] 2ND FLR;  Service: Colon and Rectal;  Laterality: N/A;    MOBILIZATION OF SPLENIC FLEXURE N/A 04/25/2022    Procedure: MOBILIZATION, SPLENIC FLEXURE;  Surgeon: Carlton Waddell MD;   "Location: [facility] OR;  Service: General;  Laterality: N/A;    SPLENECTOMY N/A 04/25/2022    Procedure: SPLENECTOMY;  Surgeon: Carlton Waddell MD;  Location: [facility] OR;  Service: General;  Laterality: N/A;    SUBTOTAL COLECTOMY N/A 04/25/2022    Procedure: COLECTOMY, PARTIAL;  Surgeon: Carlton Waddell MD;  Location: [facility] OR;  Service: General;  Laterality: N/A;    TONSILLECTOMY      TOTAL ABDOMINAL COLECTOMY N/A 5/21/2024    Procedure: COLECTOMY, COMPLETION, ABDOMINAL;  Surgeon: Farhan Lance MD;  Location: [facility] OR 2ND FLR;  Service: Colon and Rectal;  Laterality: N/A;    TUMOR REMOVAL  10/18/2023    on top of the nose    VASECTOMY  1994        Family History   Problem Relation Name Age of Onset    Heart disease Father Loki Sr     Diabetes Father Loki Sr     Rectal cancer Other Padmini         half-sister       Social History     Tobacco Use    Smoking status: Every Day     Current packs/day: 1.00     Average packs/day: 1 pack/day for 40.0 years (40.0 ttl pk-yrs)     Types: Cigarettes     Passive exposure: Current    Smokeless tobacco: Never   Substance Use Topics    Alcohol use: Not Currently    Drug use: Never         Vitals:    06/10/24 1106   BP: 138/68   Pulse: 73   Resp: 16   Temp: 98.1 °F (36.7 °C)          Physical Exam:  /68 (BP Location: Right arm, Patient Position: Sitting, BP Method: Large (Automatic))   Pulse 73   Temp 98.1 °F (36.7 °C) (Temporal)   Resp 16   Ht 6' 2" (1.88 m)   Wt 92.3 kg (203 lb 7.8 oz)   SpO2 98%   BMI 26.13 kg/m²     Physical Exam  Vitals and nursing note reviewed.   Constitutional:       Appearance: Normal appearance.   HENT:      Head: Normocephalic and atraumatic.      Nose: Nose normal.      Mouth/Throat:      Mouth: Mucous membranes are moist.      Pharynx: Oropharynx is clear.   Eyes:      Extraocular Movements: Extraocular movements intact.      Conjunctiva/sclera: Conjunctivae normal.   Cardiovascular:      Rate and Rhythm: Normal rate and regular rhythm.      " Heart sounds: Normal heart sounds.   Pulmonary:      Effort: Pulmonary effort is normal.      Breath sounds: Normal breath sounds.   Abdominal:      General: Abdomen is flat. Bowel sounds are normal.      Palpations: Abdomen is soft.      Comments: Ostomy bag in place.   Musculoskeletal:         General: Normal range of motion.      Cervical back: Normal range of motion and neck supple.   Skin:     General: Skin is warm and dry.   Neurological:      General: No focal deficit present.      Mental Status: He is alert and oriented to person, place, and time. Mental status is at baseline.   Psychiatric:         Mood and Affect: Mood normal.          Labs:  Lab Results   Component Value Date    WBC 11.74 06/10/2024    RBC 4.35 (L) 06/10/2024    HGB 12.7 (L) 06/10/2024    HCT 38.5 (L) 06/10/2024    MCV 89 06/10/2024    MCH 29.2 06/10/2024    MCHC 33.0 06/10/2024    RDW 13.6 06/10/2024     (H) 06/10/2024    MPV 9.4 06/10/2024    GRAN 6.1 06/10/2024    GRAN 52.1 06/10/2024    LYMPH 4.1 06/10/2024    LYMPH 34.8 06/10/2024    MONO 1.1 (H) 06/10/2024    MONO 8.9 06/10/2024    EOS 0.3 06/10/2024    BASO 0.13 06/10/2024    EOSINOPHIL 2.8 06/10/2024    BASOPHIL 1.1 06/10/2024     Sodium   Date Value Ref Range Status   06/10/2024 134 (L) 136 - 145 mmol/L Final     Potassium   Date Value Ref Range Status   06/10/2024 5.9 (H) 3.5 - 5.1 mmol/L Final     Chloride   Date Value Ref Range Status   06/10/2024 100 95 - 110 mmol/L Final     CO2   Date Value Ref Range Status   06/10/2024 24 23 - 29 mmol/L Final     Glucose   Date Value Ref Range Status   06/10/2024 234 (H) 70 - 110 mg/dL Final     BUN   Date Value Ref Range Status   06/10/2024 21 (H) 6 - 20 mg/dL Final     Creatinine   Date Value Ref Range Status   06/10/2024 1.3 0.5 - 1.4 mg/dL Final     Calcium   Date Value Ref Range Status   06/10/2024 10.6 (H) 8.7 - 10.5 mg/dL Final     Total Protein   Date Value Ref Range Status   06/10/2024 7.7 6.0 - 8.4 g/dL Final     Albumin    Date Value Ref Range Status   06/10/2024 3.5 3.5 - 5.2 g/dL Final     Total Bilirubin   Date Value Ref Range Status   06/10/2024 0.4 0.1 - 1.0 mg/dL Final     Comment:     For infants and newborns, interpretation of results should be based  on gestational age, weight and in agreement with clinical  observations.    Premature Infant recommended reference ranges:  Up to 24 hours.............<8.0 mg/dL  Up to 48 hours............<12.0 mg/dL  3-5 days..................<15.0 mg/dL  6-29 days.................<15.0 mg/dL       Alkaline Phosphatase   Date Value Ref Range Status   06/10/2024 169 (H) 55 - 135 U/L Final     AST   Date Value Ref Range Status   06/10/2024 18 10 - 40 U/L Final     ALT   Date Value Ref Range Status   06/10/2024 20 10 - 44 U/L Final     Anion Gap   Date Value Ref Range Status   06/10/2024 10 8 - 16 mmol/L Final     eGFR if    Date Value Ref Range Status   07/25/2022 >60 >60 mL/min/1.73 m^2 Final     eGFR if non    Date Value Ref Range Status   07/25/2022 >60 >60 mL/min/1.73 m^2 Final     Comment:     Calculation used to obtain the estimated glomerular filtration  rate (eGFR) is the CKD-EPI equation.          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    -proceed with Keytruda cycle 21 today  -Recent CT scan shows no new evidence of disease, with a smaller lesion on the pancreatic tail and stability of the retroperitoneal lymph node that was radiated.  PET scan shows no evidence of disease, so it was decided to forego surgery.  Testing for ctDNA  negative  -resume Keytruda   -return to clinic in 6 weeks for next dose    Pancreatic mass   -confirmed recurrence of colon adenocarcinoma    Back pain/pain from stoma  - NM bone scan negative for mets  -now on morphine dose to 30 mg bid    HTN  - on Enalapril  - follow up with PCP    HLP  - follow up with PCP    DM2  - on Metformin  - follow up with PCP    Tobacco use  - counseled on cessation      Aurash Khoobehi, MD  Hematology and Oncology

## 2024-06-11 ENCOUNTER — INFUSION (OUTPATIENT)
Dept: INFUSION THERAPY | Facility: HOSPITAL | Age: 59
End: 2024-06-11
Attending: INTERNAL MEDICINE
Payer: COMMERCIAL

## 2024-06-11 VITALS
SYSTOLIC BLOOD PRESSURE: 138 MMHG | RESPIRATION RATE: 18 BRPM | OXYGEN SATURATION: 98 % | DIASTOLIC BLOOD PRESSURE: 77 MMHG | WEIGHT: 203.69 LBS | HEIGHT: 74 IN | HEART RATE: 68 BPM | BODY MASS INDEX: 26.14 KG/M2 | TEMPERATURE: 98 F

## 2024-06-11 DIAGNOSIS — C78.89 METASTATIC ADENOCARCINOMA TO PANCREAS: ICD-10-CM

## 2024-06-11 DIAGNOSIS — C18.4 MALIGNANT NEOPLASM OF TRANSVERSE COLON: Primary | ICD-10-CM

## 2024-06-11 LAB — TSH SERPL DL<=0.005 MIU/L-ACNC: 1.81 UIU/ML (ref 0.34–5.6)

## 2024-06-11 PROCEDURE — 63600175 PHARM REV CODE 636 W HCPCS: Mod: JZ,JG | Performed by: INTERNAL MEDICINE

## 2024-06-11 PROCEDURE — A4216 STERILE WATER/SALINE, 10 ML: HCPCS | Performed by: INTERNAL MEDICINE

## 2024-06-11 PROCEDURE — 84443 ASSAY THYROID STIM HORMONE: CPT | Performed by: INTERNAL MEDICINE

## 2024-06-11 PROCEDURE — 25000003 PHARM REV CODE 250: Performed by: INTERNAL MEDICINE

## 2024-06-11 PROCEDURE — 96413 CHEMO IV INFUSION 1 HR: CPT

## 2024-06-11 RX ORDER — HEPARIN 100 UNIT/ML
500 SYRINGE INTRAVENOUS
Status: CANCELLED | OUTPATIENT
Start: 2024-06-11

## 2024-06-11 RX ORDER — EPINEPHRINE 0.3 MG/.3ML
0.3 INJECTION SUBCUTANEOUS ONCE AS NEEDED
Status: DISCONTINUED | OUTPATIENT
Start: 2024-06-11 | End: 2024-06-11 | Stop reason: HOSPADM

## 2024-06-11 RX ORDER — DIPHENHYDRAMINE HYDROCHLORIDE 50 MG/ML
50 INJECTION INTRAMUSCULAR; INTRAVENOUS ONCE AS NEEDED
Status: CANCELLED | OUTPATIENT
Start: 2024-06-11

## 2024-06-11 RX ORDER — DIPHENHYDRAMINE HYDROCHLORIDE 50 MG/ML
50 INJECTION INTRAMUSCULAR; INTRAVENOUS ONCE AS NEEDED
Status: DISCONTINUED | OUTPATIENT
Start: 2024-06-11 | End: 2024-06-11 | Stop reason: HOSPADM

## 2024-06-11 RX ORDER — EPINEPHRINE 0.3 MG/.3ML
0.3 INJECTION SUBCUTANEOUS ONCE AS NEEDED
Status: CANCELLED | OUTPATIENT
Start: 2024-06-11

## 2024-06-11 RX ORDER — SODIUM CHLORIDE 0.9 % (FLUSH) 0.9 %
10 SYRINGE (ML) INJECTION
Status: DISCONTINUED | OUTPATIENT
Start: 2024-06-11 | End: 2024-06-11 | Stop reason: HOSPADM

## 2024-06-11 RX ORDER — HEPARIN 100 UNIT/ML
500 SYRINGE INTRAVENOUS
Status: DISCONTINUED | OUTPATIENT
Start: 2024-06-11 | End: 2024-06-11 | Stop reason: HOSPADM

## 2024-06-11 RX ORDER — SODIUM CHLORIDE 0.9 % (FLUSH) 0.9 %
10 SYRINGE (ML) INJECTION
Status: CANCELLED | OUTPATIENT
Start: 2024-06-11

## 2024-06-11 RX ADMIN — SODIUM CHLORIDE 200 MG: 9 INJECTION, SOLUTION INTRAVENOUS at 10:06

## 2024-06-11 RX ADMIN — HEPARIN 500 UNITS: 100 SYRINGE at 11:06

## 2024-06-11 RX ADMIN — SODIUM CHLORIDE, PRESERVATIVE FREE 10 ML: 5 INJECTION INTRAVENOUS at 10:06

## 2024-06-11 RX ADMIN — SODIUM CHLORIDE: 9 INJECTION, SOLUTION INTRAVENOUS at 10:06

## 2024-06-11 NOTE — PLAN OF CARE
Problem: Fatigue  Goal: Improved Activity Tolerance  Outcome: Progressing  Intervention: Promote Improved Energy  Flowsheets (Taken 6/11/2024 1029)  Fatigue Management: fatigue-related activity identified  Sleep/Rest Enhancement: noise level reduced  Activity Management: Up in chair - L3  Environmental Support: calm environment promoted

## 2024-06-12 DIAGNOSIS — F51.01 PRIMARY INSOMNIA: ICD-10-CM

## 2024-06-12 RX ORDER — TRAZODONE HYDROCHLORIDE 50 MG/1
50 TABLET ORAL NIGHTLY
Qty: 90 TABLET | Refills: 3 | Status: SHIPPED | OUTPATIENT
Start: 2024-06-12 | End: 2025-06-12

## 2024-06-12 NOTE — TELEPHONE ENCOUNTER
Pt is needing a refill on his Trazodone. Last office visit 02/26/2024. Next office visit 06/27/2024.

## 2024-06-17 ENCOUNTER — LAB VISIT (OUTPATIENT)
Dept: LAB | Facility: HOSPITAL | Age: 59
End: 2024-06-17
Attending: INTERNAL MEDICINE
Payer: COMMERCIAL

## 2024-06-17 DIAGNOSIS — E87.5 HYPERKALEMIA: ICD-10-CM

## 2024-06-17 LAB — POTASSIUM SERPL-SCNC: 5.6 MMOL/L (ref 3.5–5.1)

## 2024-06-17 PROCEDURE — 36415 COLL VENOUS BLD VENIPUNCTURE: CPT | Performed by: INTERNAL MEDICINE

## 2024-06-17 PROCEDURE — 84132 ASSAY OF SERUM POTASSIUM: CPT | Performed by: INTERNAL MEDICINE

## 2024-06-25 ENCOUNTER — TELEPHONE (OUTPATIENT)
Dept: ENDOSCOPY | Facility: HOSPITAL | Age: 59
End: 2024-06-25
Payer: COMMERCIAL

## 2024-06-25 NOTE — TELEPHONE ENCOUNTER
Good evening. Contacted the patient to schedule an endoscopy procedure. The patient said he wanted to discuss a few things with the doctor first and he will schedule this Friday in office. Message sent to doctor and his staff.

## 2024-06-25 NOTE — TELEPHONE ENCOUNTER
----- Message from Bhumi Baker sent at 2024 12:24 PM CDT -----  Regarding: FW: Please assist with scheduling in August    ----- Message -----  From: Sol Rojo RN  Sent: 2024  10:56 AM CDT  To: [facility] Patients  Subject: Please assist with scheduling in August          Procedure: Colonoscopy     Diagnosis: Surveillance colonoscopy - Hx of rectal cancer     Procedure Timin-8wks, 2024, needs to be timed 1 month after chemotherapy     Provider: Dr. Lance    [facility]     Additional Scheduling Information: Please check with pt re: last date of chemo    Prep Specifications:Standard prep     Is the patient taking a GLP-1 Agonist:no     Have you attached a patient to this message: yes     Thank you!

## 2024-06-26 ENCOUNTER — TELEPHONE (OUTPATIENT)
Dept: ENDOSCOPY | Facility: HOSPITAL | Age: 59
End: 2024-06-26
Payer: COMMERCIAL

## 2024-06-26 NOTE — TELEPHONE ENCOUNTER
----- Message from Farhan Lance MD sent at 2024  6:46 PM CDT -----  Regarding: RE: Please assist with scheduling in August  Long story but he does not need this    I called him     Farhan MORTON  ----- Message -----  From: Kendall Schwartz MA  Sent: 2024   4:10 PM CDT  To: Sol Rojo RN; Farhan Lance MD  Subject: RE: Please assist with scheduling in August      Good evening. Contacted the patient to schedule an endoscopy procedure. The patient said he wanted to discuss a few things with the doctor first and he will schedule this Friday in office.  ----- Message -----  From: Bhumi Baker  Sent: 2024  12:24 PM CDT  To: Kendall Schwartz MA  Subject: FW: Please assist with scheduling in August        ----- Message -----  From: Sol Rojo RN  Sent: 2024  10:56 AM CDT  To: [facility] Patients  Subject: Please assist with scheduling in August          Procedure: Colonoscopy     Diagnosis: Surveillance colonoscopy - Hx of rectal cancer     Procedure Timin-8wks, 2024, needs to be timed 1 month after chemotherapy     Provider: Dr. Lance    Location: [address]     Additional Scheduling Information: Please check with pt re: last date of chemo    Prep Specifications:Standard prep     Is the patient taking a GLP-1 Agonist:no     Have you attached a patient to this message: yes     Thank you!

## 2024-06-27 ENCOUNTER — OFFICE VISIT (OUTPATIENT)
Dept: FAMILY MEDICINE | Facility: CLINIC | Age: 59
End: 2024-06-27
Payer: COMMERCIAL

## 2024-06-27 ENCOUNTER — TELEPHONE (OUTPATIENT)
Dept: SURGERY | Facility: CLINIC | Age: 59
End: 2024-06-27
Payer: COMMERCIAL

## 2024-06-27 VITALS
HEART RATE: 67 BPM | BODY MASS INDEX: 27.08 KG/M2 | HEIGHT: 74 IN | SYSTOLIC BLOOD PRESSURE: 116 MMHG | OXYGEN SATURATION: 98 % | DIASTOLIC BLOOD PRESSURE: 64 MMHG | WEIGHT: 211 LBS

## 2024-06-27 DIAGNOSIS — Z93.2 ILEOSTOMY STATUS: ICD-10-CM

## 2024-06-27 DIAGNOSIS — E11.69 DM TYPE 2 WITH DIABETIC DYSLIPIDEMIA: Primary | ICD-10-CM

## 2024-06-27 DIAGNOSIS — F51.01 PRIMARY INSOMNIA: ICD-10-CM

## 2024-06-27 DIAGNOSIS — C18.4 MALIGNANT NEOPLASM OF TRANSVERSE COLON: ICD-10-CM

## 2024-06-27 DIAGNOSIS — E78.5 DM TYPE 2 WITH DIABETIC DYSLIPIDEMIA: Primary | ICD-10-CM

## 2024-06-27 DIAGNOSIS — E78.2 MIXED HYPERLIPIDEMIA: ICD-10-CM

## 2024-06-27 PROCEDURE — 99214 OFFICE O/P EST MOD 30 MIN: CPT | Mod: S$GLB,,, | Performed by: NURSE PRACTITIONER

## 2024-06-27 RX ORDER — METHOCARBAMOL 500 MG/1
500 TABLET, FILM COATED ORAL EVERY 8 HOURS PRN
COMMUNITY

## 2024-06-27 RX ORDER — MORPHINE SULFATE 30 MG/1
15 TABLET, FILM COATED, EXTENDED RELEASE ORAL 2 TIMES DAILY
COMMUNITY

## 2024-06-27 RX ORDER — TRAZODONE HYDROCHLORIDE 50 MG/1
50 TABLET ORAL NIGHTLY
Qty: 90 TABLET | Refills: 3 | Status: SHIPPED | OUTPATIENT
Start: 2024-06-27 | End: 2025-06-27

## 2024-06-27 RX ORDER — METFORMIN HYDROCHLORIDE 1000 MG/1
1000 TABLET ORAL 2 TIMES DAILY WITH MEALS
Qty: 180 TABLET | Refills: 3 | Status: SHIPPED | OUTPATIENT
Start: 2024-06-27

## 2024-06-27 RX ORDER — ATORVASTATIN CALCIUM 20 MG/1
20 TABLET, FILM COATED ORAL NIGHTLY
Qty: 90 TABLET | Refills: 3 | Status: SHIPPED | OUTPATIENT
Start: 2024-06-27

## 2024-06-27 NOTE — PROGRESS NOTES
SUBJECTIVE:    Patient ID: Osman Li Jr. is a 59 y.o. male.    Chief Complaint: Diabetes (Bottles brought//Pt is here for a check up and medication refills//Foot exam ordered today//Pt is scheduled his eye exam this next month, with Dr. Cheema//JULIUS )    Patient here for regular follow-up- DM , HTN, lipids.     Pt reports overall doing okay. underwent colostomy take down early May however within 1-2 weeks developed bowel obstruction and required laparotomy which showed perforation near his anastomosis. He then underwent completion colectomy, ileorectal anastomosis, diverting loop ileostomy by  at [facility]. Reports overall is doing well since the ileostomy- has good output and feels he's recovering. Had lost some weight during that time but appetite is good.  Taking Lomotil 6-10 capsules daily to help manage watery diarrhea. Has f/u with surgeon tomorrow    A1C 6.5% last month in hospital. Currently on toujeo 48 units and FBS ranging 90-120s, denies any hypoglycemic episodes    Keytruda infusions were on hold for short time during surgery issues but has since resumed under care of Dr. Khoobehi    Quit smoking about 2 months ago!!! Denies cough, wheeze or shortness of breath. Subpleural blebs and 2mm lung nodule on CT scan last year though CT of chest in November doesn't mention any blebs/nodules    Diabetes  He has type 1 diabetes mellitus. No MedicAlert identification noted. The initial diagnosis of diabetes was made 15 years ago. Pertinent negatives for hypoglycemia include no confusion, dizziness, headaches, hunger, mood changes, nervousness/anxiousness, pallor, seizures, sleepiness, speech difficulty, sweats or tremors. Pertinent negatives for diabetes include no blurred vision, no chest pain, no fatigue, no foot paresthesias, no foot ulcerations, no polydipsia, no polyphagia, no polyuria, no visual change, no weakness and no weight loss. Pertinent negatives for hypoglycemia  complications include no blackouts, no hospitalization, no nocturnal hypoglycemia, no required assistance and no required glucagon injection. Symptoms are stable. Diabetic complications include impotence. Pertinent negatives for diabetic complications include no autonomic neuropathy, CVA, heart disease, nephropathy, peripheral neuropathy, PVD or retinopathy. Risk factors for coronary artery disease include dyslipidemia, hypertension, tobacco exposure, diabetes mellitus and male sex. Current diabetic treatment includes insulin injections and oral agent (dual therapy). He is compliant with treatment all of the time. He is currently taking insulin pre-breakfast. Insulin injections are given by patient. Rotation sites for injection include the thighs. His weight is stable. He is following a generally healthy diet. Meal planning includes avoidance of concentrated sweets. He has not had a previous visit with a dietitian. He participates in exercise intermittently. He monitors blood glucose at home 1-2 x per day. He monitors urine at home <1 x per month. Blood glucose monitoring compliance is good. His home blood glucose trend is decreasing steadily. He does not see a podiatrist.Eye exam is current.       Lab Visit on 06/17/2024   Component Date Value Ref Range Status    Potassium 06/17/2024 5.6 (H)  3.5 - 5.1 mmol/L Final   Infusion on 06/11/2024   Component Date Value Ref Range Status    TSH 06/11/2024 1.810  0.340 - 5.600 uIU/mL Final   Lab Visit on 06/10/2024   Component Date Value Ref Range Status    WBC 06/10/2024 11.74  3.90 - 12.70 K/uL Final    RBC 06/10/2024 4.35 (L)  4.60 - 6.20 M/uL Final    Hemoglobin 06/10/2024 12.7 (L)  14.0 - 18.0 g/dL Final    Hematocrit 06/10/2024 38.5 (L)  40.0 - 54.0 % Final    MCV 06/10/2024 89  82 - 98 fL Final    MCH 06/10/2024 29.2  27.0 - 31.0 pg Final    MCHC 06/10/2024 33.0  32.0 - 36.0 g/dL Final    RDW 06/10/2024 13.6  11.5 - 14.5 % Final    Platelets 06/10/2024 715 (H)  150 -  450 K/uL Final    MPV 06/10/2024 9.4  9.2 - 12.9 fL Final    Immature Granulocytes 06/10/2024 0.3  0.0 - 0.5 % Final    Gran # (ANC) 06/10/2024 6.1  1.8 - 7.7 K/uL Final    Immature Grans (Abs) 06/10/2024 0.04  0.00 - 0.04 K/uL Final    Lymph # 06/10/2024 4.1  1.0 - 4.8 K/uL Final    Mono # 06/10/2024 1.1 (H)  0.3 - 1.0 K/uL Final    Eos # 06/10/2024 0.3  0.0 - 0.5 K/uL Final    Baso # 06/10/2024 0.13  0.00 - 0.20 K/uL Final    nRBC 06/10/2024 0  0 /100 WBC Final    Gran % 06/10/2024 52.1  38.0 - 73.0 % Final    Lymph % 06/10/2024 34.8  18.0 - 48.0 % Final    Mono % 06/10/2024 8.9  4.0 - 15.0 % Final    Eosinophil % 06/10/2024 2.8  0.0 - 8.0 % Final    Basophil % 06/10/2024 1.1  0.0 - 1.9 % Final    Differential Method 06/10/2024 Automated   Final    Sodium 06/10/2024 134 (L)  136 - 145 mmol/L Final    Potassium 06/10/2024 5.9 (H)  3.5 - 5.1 mmol/L Final    Chloride 06/10/2024 100  95 - 110 mmol/L Final    CO2 06/10/2024 24  23 - 29 mmol/L Final    Glucose 06/10/2024 234 (H)  70 - 110 mg/dL Final    BUN 06/10/2024 21 (H)  6 - 20 mg/dL Final    Creatinine 06/10/2024 1.3  0.5 - 1.4 mg/dL Final    Calcium 06/10/2024 10.6 (H)  8.7 - 10.5 mg/dL Final    Total Protein 06/10/2024 7.7  6.0 - 8.4 g/dL Final    Albumin 06/10/2024 3.5  3.5 - 5.2 g/dL Final    Total Bilirubin 06/10/2024 0.4  0.1 - 1.0 mg/dL Final    Alkaline Phosphatase 06/10/2024 169 (H)  55 - 135 U/L Final    AST 06/10/2024 18  10 - 40 U/L Final    ALT 06/10/2024 20  10 - 44 U/L Final    eGFR 06/10/2024 >60  >60 mL/min/1.73 m^2 Final    Anion Gap 06/10/2024 10  8 - 16 mmol/L Final   No results displayed because visit has over 200 results.      Admission on 05/21/2024, Discharged on 05/21/2024   Component Date Value Ref Range Status    Blood Culture, Routine 05/21/2024 No growth after 5 days.   Final    Blood Culture, Routine 05/21/2024 No growth after 5 days.   Final    WBC 05/21/2024 22.80 (H)  3.90 - 12.70 K/uL Final    RBC 05/21/2024 4.47 (L)  4.60 - 6.20  M/uL Final    Hemoglobin 05/21/2024 13.4 (L)  14.0 - 18.0 g/dL Final    Hematocrit 05/21/2024 39.0 (L)  40.0 - 54.0 % Final    MCV 05/21/2024 87  82 - 98 fL Final    MCH 05/21/2024 30.0  27.0 - 31.0 pg Final    MCHC 05/21/2024 34.4  32.0 - 36.0 g/dL Final    RDW 05/21/2024 14.3  11.5 - 14.5 % Final    Platelets 05/21/2024 459 (H)  150 - 450 K/uL Final    MPV 05/21/2024 9.3  9.2 - 12.9 fL Final    Immature Granulocytes 05/21/2024 1.0 (H)  0.0 - 0.5 % Final    Gran # (ANC) 05/21/2024 20.7 (H)  1.8 - 7.7 K/uL Final    Immature Grans (Abs) 05/21/2024 0.22 (H)  0.00 - 0.04 K/uL Final    Lymph # 05/21/2024 1.1  1.0 - 4.8 K/uL Final    Mono # 05/21/2024 0.8  0.3 - 1.0 K/uL Final    Eos # 05/21/2024 0.0  0.0 - 0.5 K/uL Final    Baso # 05/21/2024 0.05  0.00 - 0.20 K/uL Final    nRBC 05/21/2024 0  0 /100 WBC Final    Gran % 05/21/2024 90.9 (H)  38.0 - 73.0 % Final    Lymph % 05/21/2024 4.6 (L)  18.0 - 48.0 % Final    Mono % 05/21/2024 3.3 (L)  4.0 - 15.0 % Final    Eosinophil % 05/21/2024 0.0  0.0 - 8.0 % Final    Basophil % 05/21/2024 0.2  0.0 - 1.9 % Final    Platelet Estimate 05/21/2024 Increased (A)   Final    Large/Giant Platelets 05/21/2024 Present   Final    Differential Method 05/21/2024 Automated   Final    Sodium 05/21/2024 136  136 - 145 mmol/L Final    Potassium 05/21/2024 3.2 (L)  3.5 - 5.1 mmol/L Final    Chloride 05/21/2024 99  95 - 110 mmol/L Final    CO2 05/21/2024 26  23 - 29 mmol/L Final    Glucose 05/21/2024 163 (H)  70 - 110 mg/dL Final    BUN 05/21/2024 12  6 - 20 mg/dL Final    Creatinine 05/21/2024 1.1  0.5 - 1.4 mg/dL Final    Calcium 05/21/2024 8.9  8.7 - 10.5 mg/dL Final    Total Protein 05/21/2024 6.2  6.0 - 8.4 g/dL Final    Albumin 05/21/2024 3.4 (L)  3.5 - 5.2 g/dL Final    Total Bilirubin 05/21/2024 0.8  0.1 - 1.0 mg/dL Final    Alkaline Phosphatase 05/21/2024 113  55 - 135 U/L Final    AST 05/21/2024 20  10 - 40 U/L Final    ALT 05/21/2024 27  10 - 44 U/L Final    eGFR 05/21/2024 >60.0  >60  mL/min/1.73 m^2 Final    Anion Gap 05/21/2024 11  8 - 16 mmol/L Final    Lactate (Lactic Acid) 05/21/2024 1.3  0.5 - 1.9 mmol/L Final    Specimen UA 05/21/2024 Urine, Clean Catch   Final    Color, UA 05/21/2024 Yellow  Yellow, Straw, [place] Final    Appearance, UA 05/21/2024 Clear  Clear Final    pH, UA 05/21/2024 6.0  5.0 - 8.0 Final    Specific Gravity, UA 05/21/2024 1.015  1.005 - 1.030 Final    Protein, UA 05/21/2024 Trace (A)  Negative Final    Glucose, UA 05/21/2024 2+ (A)  Negative Final    Ketones, UA 05/21/2024 Trace (A)  Negative Final    Bilirubin (UA) 05/21/2024 Negative  Negative Final    Occult Blood UA 05/21/2024 TRACE  Negative Final    Nitrite, UA 05/21/2024 Negative  Negative Final    Urobilinogen, UA 05/21/2024 Negative  Negative EU/dL Final    Leukocytes, UA 05/21/2024 Negative  Negative Final    QRS Duration 05/21/2024 140  ms Final    [facility] QTC Calculation 05/21/2024 505  ms Final    Lipase 05/21/2024 3 (L)  4 - 60 U/L Final   No results displayed because visit has over 200 results.      Lab Visit on 04/08/2024   Component Date Value Ref Range Status    WBC 04/08/2024 12.74 (H)  3.90 - 12.70 K/uL Final    RBC 04/08/2024 4.76  4.60 - 6.20 M/uL Final    Hemoglobin 04/08/2024 14.1  14.0 - 18.0 g/dL Final    Hematocrit 04/08/2024 42.0  40.0 - 54.0 % Final    MCV 04/08/2024 88  82 - 98 fL Final    MCH 04/08/2024 29.6  27.0 - 31.0 pg Final    MCHC 04/08/2024 33.6  32.0 - 36.0 g/dL Final    RDW 04/08/2024 13.7  11.5 - 14.5 % Final    Platelets 04/08/2024 383  150 - 450 K/uL Final    MPV 04/08/2024 9.2  9.2 - 12.9 fL Final    Immature Granulocytes 04/08/2024 0.4  0.0 - 0.5 % Final    Gran # (ANC) 04/08/2024 6.1  1.8 - 7.7 K/uL Final    Immature Grans (Abs) 04/08/2024 0.05 (H)  0.00 - 0.04 K/uL Final    Lymph # 04/08/2024 5.2 (H)  1.0 - 4.8 K/uL Final    Mono # 04/08/2024 1.1 (H)  0.3 - 1.0 K/uL Final    Eos # 04/08/2024 0.1  0.0 - 0.5 K/uL Final    Baso # 04/08/2024 0.11  0.00 - 0.20 K/uL Final    nRBC  04/08/2024 0  0 /100 WBC Final    Gran % 04/08/2024 48.2  38.0 - 73.0 % Final    Lymph % 04/08/2024 40.7  18.0 - 48.0 % Final    Mono % 04/08/2024 8.8  4.0 - 15.0 % Final    Eosinophil % 04/08/2024 1.0  0.0 - 8.0 % Final    Basophil % 04/08/2024 0.9  0.0 - 1.9 % Final    Differential Method 04/08/2024 Automated   Final    Sodium 04/08/2024 138  136 - 145 mmol/L Final    Potassium 04/08/2024 4.9  3.5 - 5.1 mmol/L Final    Chloride 04/08/2024 101  95 - 110 mmol/L Final    CO2 04/08/2024 27  23 - 29 mmol/L Final    Glucose 04/08/2024 143 (H)  70 - 110 mg/dL Final    BUN 04/08/2024 17  6 - 20 mg/dL Final    Creatinine 04/08/2024 1.3  0.5 - 1.4 mg/dL Final    Calcium 04/08/2024 9.9  8.7 - 10.5 mg/dL Final    Total Protein 04/08/2024 7.1  6.0 - 8.4 g/dL Final    Albumin 04/08/2024 3.9  3.5 - 5.2 g/dL Final    Total Bilirubin 04/08/2024 0.5  0.1 - 1.0 mg/dL Final    Alkaline Phosphatase 04/08/2024 88  55 - 135 U/L Final    AST 04/08/2024 20  10 - 40 U/L Final    ALT 04/08/2024 20  10 - 44 U/L Final    eGFR 04/08/2024 >60  >60 mL/min/1.73 m^2 Final    Anion Gap 04/08/2024 10  8 - 16 mmol/L Final    CEA 04/08/2024 3.6  0.0 - 5.0 ng/mL Final   Lab Visit on 03/18/2024   Component Date Value Ref Range Status    WBC 03/18/2024 10.96  3.90 - 12.70 K/uL Final    RBC 03/18/2024 4.76  4.60 - 6.20 M/uL Final    Hemoglobin 03/18/2024 14.3  14.0 - 18.0 g/dL Final    Hematocrit 03/18/2024 42.6  40.0 - 54.0 % Final    MCV 03/18/2024 90  82 - 98 fL Final    MCH 03/18/2024 30.0  27.0 - 31.0 pg Final    MCHC 03/18/2024 33.6  32.0 - 36.0 g/dL Final    RDW 03/18/2024 13.7  11.5 - 14.5 % Final    Platelets 03/18/2024 385  150 - 450 K/uL Final    MPV 03/18/2024 9.0 (L)  9.2 - 12.9 fL Final    Immature Granulocytes 03/18/2024 0.3  0.0 - 0.5 % Final    Gran # (ANC) 03/18/2024 5.2  1.8 - 7.7 K/uL Final    Immature Grans (Abs) 03/18/2024 0.03  0.00 - 0.04 K/uL Final    Lymph # 03/18/2024 4.5  1.0 - 4.8 K/uL Final    Mono # 03/18/2024 0.9  0.3 - 1.0  K/uL Final    Eos # 03/18/2024 0.1  0.0 - 0.5 K/uL Final    Baso # 03/18/2024 0.11  0.00 - 0.20 K/uL Final    nRBC 03/18/2024 0  0 /100 WBC Final    Gran % 03/18/2024 47.6  38.0 - 73.0 % Final    Lymph % 03/18/2024 41.4  18.0 - 48.0 % Final    Mono % 03/18/2024 8.4  4.0 - 15.0 % Final    Eosinophil % 03/18/2024 1.3  0.0 - 8.0 % Final    Basophil % 03/18/2024 1.0  0.0 - 1.9 % Final    Differential Method 03/18/2024 Automated   Final    Sodium 03/18/2024 136  136 - 145 mmol/L Final    Potassium 03/18/2024 4.9  3.5 - 5.1 mmol/L Final    Chloride 03/18/2024 100  95 - 110 mmol/L Final    CO2 03/18/2024 28  23 - 29 mmol/L Final    Glucose 03/18/2024 200 (H)  70 - 110 mg/dL Final    BUN 03/18/2024 13  6 - 20 mg/dL Final    Creatinine 03/18/2024 1.1  0.5 - 1.4 mg/dL Final    Calcium 03/18/2024 10.6 (H)  8.7 - 10.5 mg/dL Final    Total Protein 03/18/2024 7.4  6.0 - 8.4 g/dL Final    Albumin 03/18/2024 4.1  3.5 - 5.2 g/dL Final    Total Bilirubin 03/18/2024 0.5  0.1 - 1.0 mg/dL Final    Alkaline Phosphatase 03/18/2024 90  55 - 135 U/L Final    AST 03/18/2024 20  10 - 40 U/L Final    ALT 03/18/2024 22  10 - 44 U/L Final    eGFR 03/18/2024 >60  >60 mL/min/1.73 m^2 Final    Anion Gap 03/18/2024 8  8 - 16 mmol/L Final    TSH 03/18/2024 1.097  0.400 - 4.000 uIU/mL Final    CEA 03/18/2024 3.3  0.0 - 5.0 ng/mL Final   Admission on 03/05/2024, Discharged on 03/05/2024   Component Date Value Ref Range Status    POCT Glucose 03/05/2024 75  70 - 110 mg/dL Final   Lab Visit on 02/26/2024   Component Date Value Ref Range Status    WBC 02/26/2024 10.95  3.90 - 12.70 K/uL Final    RBC 02/26/2024 4.64  4.60 - 6.20 M/uL Final    Hemoglobin 02/26/2024 13.8 (L)  14.0 - 18.0 g/dL Final    Hematocrit 02/26/2024 41.1  40.0 - 54.0 % Final    MCV 02/26/2024 89  82 - 98 fL Final    MCH 02/26/2024 29.7  27.0 - 31.0 pg Final    MCHC 02/26/2024 33.6  32.0 - 36.0 g/dL Final    RDW 02/26/2024 13.9  11.5 - 14.5 % Final    Platelets 02/26/2024 359  150 -  450 K/uL Final    MPV 02/26/2024 8.8 (L)  9.2 - 12.9 fL Final    Immature Granulocytes 02/26/2024 0.3  0.0 - 0.5 % Final    Gran # (ANC) 02/26/2024 5.7  1.8 - 7.7 K/uL Final    Immature Grans (Abs) 02/26/2024 0.03  0.00 - 0.04 K/uL Final    Lymph # 02/26/2024 4.0  1.0 - 4.8 K/uL Final    Mono # 02/26/2024 1.1 (H)  0.3 - 1.0 K/uL Final    Eos # 02/26/2024 0.1  0.0 - 0.5 K/uL Final    Baso # 02/26/2024 0.07  0.00 - 0.20 K/uL Final    nRBC 02/26/2024 0  0 /100 WBC Final    Gran % 02/26/2024 52.1  38.0 - 73.0 % Final    Lymph % 02/26/2024 36.5  18.0 - 48.0 % Final    Mono % 02/26/2024 9.7  4.0 - 15.0 % Final    Eosinophil % 02/26/2024 0.8  0.0 - 8.0 % Final    Basophil % 02/26/2024 0.6  0.0 - 1.9 % Final    Differential Method 02/26/2024 Automated   Final    Sodium 02/26/2024 138  136 - 145 mmol/L Final    Potassium 02/26/2024 5.1  3.5 - 5.1 mmol/L Final    Chloride 02/26/2024 104  95 - 110 mmol/L Final    CO2 02/26/2024 27  23 - 29 mmol/L Final    Glucose 02/26/2024 101  70 - 110 mg/dL Final    BUN 02/26/2024 15  6 - 20 mg/dL Final    Creatinine 02/26/2024 1.1  0.5 - 1.4 mg/dL Final    Calcium 02/26/2024 9.4  8.7 - 10.5 mg/dL Final    Total Protein 02/26/2024 7.1  6.0 - 8.4 g/dL Final    Albumin 02/26/2024 4.0  3.5 - 5.2 g/dL Final    Total Bilirubin 02/26/2024 0.5  0.1 - 1.0 mg/dL Final    Alkaline Phosphatase 02/26/2024 80  55 - 135 U/L Final    AST 02/26/2024 20  10 - 40 U/L Final    ALT 02/26/2024 15  10 - 44 U/L Final    eGFR 02/26/2024 >60  >60 mL/min/1.73 m^2 Final    Anion Gap 02/26/2024 7 (L)  8 - 16 mmol/L Final    TSH 02/26/2024 1.143  0.400 - 4.000 uIU/mL Final   There may be more visits with results that are not included.       Past Medical History:   Diagnosis Date    Colon cancer     Digestive disorder     DM (diabetes mellitus)     Hyperlipidemia     Hypertension     Prediabetes      Past Surgical History:   Procedure Laterality Date    ADENOIDECTOMY  1972    ANASTOMOSIS OF ILEUM TO RECTUM N/A 5/21/2024     Procedure: ANASTOMOSIS, ILEORECTAL;  Surgeon: Farhan Lance MD;  Location: [facility] OR 2ND FLR;  Service: Colon and Rectal;  Laterality: N/A;    CHOLECYSTECTOMY  2011    CLOSURE, COLOSTOMY N/A 5/13/2024    Procedure: CLOSURE, COLOSTOMY (eras high/lithotomy);  Surgeon: Farhan Lance MD;  Location: [facility] OR 2ND FLR;  Service: Colon and Rectal;  Laterality: N/A;  CONSENTS IN [facility]    COLON SURGERY      COLONOSCOPY      2018    COLONOSCOPY N/A 3/5/2024    Procedure: COLONOSCOPY;  Surgeon: Farhan Lance MD;  Location: [facility] ENDO (4TH FLR);  Service: Endoscopy;  Laterality: N/A;  2/27/24-Kethman pt, 1-4wks, port, PEG plus 2 enemas morning of procedure, instr portal-DS  2/28/24- LVM for precall - ERW  pt confirmed procedure. cf    COLOSTOMY N/A 04/25/2022    Procedure: CREATION, COLOSTOMY;  Surgeon: Carlton Waddell MD;  Location: [facility] OR;  Service: General;  Laterality: N/A;    ENDOSCOPIC ULTRASOUND OF UPPER GASTROINTESTINAL TRACT N/A 01/06/2023    Procedure: ULTRASOUND, UPPER GI TRACT, ENDOSCOPIC;  Surgeon: Rinku Orozco III, MD;  Location: [facility];  Service: Endoscopy;  Laterality: N/A;    FLEXIBLE SIGMOIDOSCOPY N/A 5/13/2024    Procedure: SIGMOIDOSCOPY, FLEXIBLE;  Surgeon: Farhan Lance MD;  [facility];  Service: Colon and Rectal;  Laterality: N/A;    FLEXIBLE SIGMOIDOSCOPY  5/21/2024    Procedure: SIGMOIDOSCOPY, FLEXIBLE;  Surgeon: Farhan Lance MD;  [facility];  Service: Colon and Rectal;;    ILEOSTOMY N/A 5/21/2024    Procedure: CREATION, ILEOSTOMY, DIVERTING LOOP;  Surgeon: Farhan Lance MD;  Location: [facility] OR 2ND FLR;  Service: Colon and Rectal;  Laterality: N/A;    INSERTION OF TUNNELED CENTRAL VENOUS CATHETER (CVC) WITH SUBCUTANEOUS PORT Right 05/18/2022    Procedure: [id number];  Surgeon: Carlton Waddell MD;  Location: [facility];  Service: General;  Laterality: Right;    INSERTION OF URETERAL CATHETER N/A 5/13/2024    Procedure:  INSERTION, CATHETER, URETER, BILATERAL;  Surgeon: Travis Edwards MD;  Location: [facility] OR 2ND FLR;  Service: Urology;  Laterality: N/A;    INSERTION OF URETERAL CATHETER Left 5/21/2024    Procedure: INSERTION, CATHETER, URETER;  Surgeon: Carlton Santos MD;  Location: [facility] OR 2ND FLR;  Service: Urology;  Laterality: Left;    LAPAROTOMY, EXPLORATORY N/A 5/21/2024    Procedure: LAPAROTOMY, EXPLORATORY;  Surgeon: Farhan Lance MD;  Location: [facility] OR 2ND FLR;  Service: Colon and Rectal;  Laterality: N/A;    LYSIS OF ADHESIONS N/A 5/13/2024    Procedure: LYSIS, ADHESIONS;  Surgeon: Farhan Lance MD;  Location: [facility] OR 2ND FLR;  Service: Colon and Rectal;  Laterality: N/A;    MOBILIZATION OF SPLENIC FLEXURE N/A 04/25/2022    Procedure: MOBILIZATION, SPLENIC FLEXURE;  Surgeon: Carlton Waddell MD;  Location: [facility] OR;  Service: General;  Laterality: N/A;    SPLENECTOMY N/A 04/25/2022    Procedure: SPLENECTOMY;  Surgeon: Carlton Waddell MD;  Location: [facility] OR;  Service: General;  Laterality: N/A;    SUBTOTAL COLECTOMY N/A 04/25/2022    Procedure: COLECTOMY, PARTIAL;  Surgeon: Carlton Waddell MD;  Location: [facility] OR;  Service: General;  Laterality: N/A;    TONSILLECTOMY      TOTAL ABDOMINAL COLECTOMY N/A 5/21/2024    Procedure: COLECTOMY, COMPLETION, ABDOMINAL;  Surgeon: Farhan Lance MD;  Location: [facility] OR 2ND FLR;  Service: Colon and Rectal;  Laterality: N/A;    TUMOR REMOVAL  10/18/2023    on top of the nose    VASECTOMY  1994     Family History   Problem Relation Name Age of Onset    Heart disease Father Loki senior     Diabetes Father Loki senior     Alcohol abuse Paternal Grandfather Waylon Li     Rectal cancer Other Makenzie         half-sister    Cancer Sister makenzie        Tests to Keep You Healthy    Eye Exam: Met on 7/17/2023  Last Blood Pressure <= 139/89 (6/27/2024): Yes  Last HbA1c < 8 (05/22/2024): Yes  Tobacco Cessation: NO      The 10-year CVD risk score (Denise et al., 2008) is:  30.6%    Values used to calculate the score:      Age: 59 years      Sex: Male      Diabetic: Yes      Tobacco smoker: Yes      Systolic Blood Pressure: 116 mmHg      Is BP treated: Yes      HDL Cholesterol: 47 mg/dL      Total Cholesterol: 127 mg/dL     Marital Status:   Alcohol History:  reports that he does not currently use alcohol.  Tobacco History:  reports that he has been smoking cigarettes. He has a 40 pack-year smoking history. He has been exposed to tobacco smoke. He has never used smokeless tobacco.  Drug History:  reports no history of drug use.    Health Maintenance Topics with due status: Not Due       Topic Last Completion Date    Pneumococcal Vaccines (Age 0-64) 07/07/2022    Influenza Vaccine 10/12/2022    Diabetes Urine Screening 02/22/2024    Hemoglobin A1c 05/22/2024    Lipid Panel 05/25/2024    Foot Exam 06/27/2024     Immunization History   Administered Date(s) Administered    COVID-19, vector-nr, [id number], PF (Jose Elias) 05/27/2021    HiB PRP-T 10/07/2022    Influenza 11/11/2020    Influenza (FLUBLOK) - Quadrivalent - Recombinant - PF *Preferred* (egg allergy) 10/12/2022    Influenza - Quadrivalent - MDCK 10/16/2019    Influenza - Quadrivalent - MDCK - PF 11/07/2020    Meningococcal Conjugate (MCV4O) 2 Vial (2mo-55yr) 05/02/2022, 07/07/2022    Pneumococcal Conjugate - 13 Valent 05/02/2022    Pneumococcal Polysaccharide - 23 Valent 07/07/2022    Zoster Recombinant 02/06/2021, 04/30/2021       Review of patient's allergies indicates:   Allergen Reactions    Penicillins Rash       Current Outpatient Medications:     enalapril (VASOTEC) 10 MG tablet, Take 1 tablet (10 mg total) by mouth once daily., Disp: 90 tablet, Rfl: 0    gabapentin (NEURONTIN) 300 MG capsule, Take 1 capsule (300 mg total) by mouth 3 (three) times daily., Disp: 90 capsule, Rfl: 0    insulin glargine U-300 conc (TOUJEO MAX U-300 SOLOSTAR) 300 unit/mL (3 mL) insulin pen, Inject 20 Units into the skin once daily. (Discard  "pen 56 days after initial use) (Patient taking differently: Inject 48 Units into the skin once daily. (Discard pen 56 days after initial use)), Disp: 2 Pen, Rfl: 11    methocarbamoL (ROBAXIN) 500 MG Tab, Take 500 mg by mouth every 8 (eight) hours as needed., Disp: , Rfl:     metoprolol tartrate (LOPRESSOR) 25 MG tablet, Take 1 tablet (25 mg total) by mouth 2 (two) times daily., Disp: 180 tablet, Rfl: 0    morphine (MS CONTIN) 30 MG 12 hr tablet, Take 15 mg by mouth 2 (two) times daily., Disp: , Rfl:     nicotine (NICODERM CQ) 14 mg/24 hr, Place 1 patch onto the skin once daily., Disp: 28 patch, Rfl: 2    pen needle, diabetic (BD ULTRA-FINE MINI PEN NEEDLE) 31 gauge x 3/16" Ndle, Use to inject insulin once daily, Disp: 100 each, Rfl: 0    pen needle, diabetic 31 gauge x 3/16" Ndle, 1 each by Misc.(Non-Drug; Combo Route) route once daily., Disp: 90 each, Rfl: 3    tamsulosin (FLOMAX) 0.4 mg Cap, Take 2 capsules (0.8 mg total) by mouth once daily. for 7 days, Disp: 14 capsule, Rfl: 0    atorvastatin (LIPITOR) 20 MG tablet, Take 1 tablet (20 mg total) by mouth every evening., Disp: 90 tablet, Rfl: 3    metFORMIN (GLUCOPHAGE) 1000 MG tablet, Take 1 tablet (1,000 mg total) by mouth 2 (two) times daily with meals., Disp: 180 tablet, Rfl: 3    oxyCODONE (ROXICODONE) 10 mg Tab immediate release tablet, Take 1 tablet (10 mg total) by mouth every 4 (four) hours as needed for Pain. (Patient not taking: Reported on 6/27/2024), Disp: 20 tablet, Rfl: 0    promethazine (PHENERGAN) 12.5 MG Tab, Take 1 tablet (12.5 mg total) by mouth every 4 (four) hours as needed. (Patient not taking: Reported on 6/27/2024), Disp: 25 tablet, Rfl: 1    scopolamine (TRANSDERM-SCOP) 1.3-1.5 mg (1 mg over 3 days), Place 1 patch onto the skin Every 3 (three) days. (Patient not taking: Reported on 6/27/2024), Disp: 5 patch, Rfl: 0    sildenafiL (VIAGRA) 100 MG tablet, Take 1 tablet (100 mg total) by mouth daily as needed for Erectile Dysfunction. (Patient " "not taking: Reported on 6/27/2024), Disp: 30 tablet, Rfl: 3    traZODone (DESYREL) 50 MG tablet, Take 1 tablet (50 mg total) by mouth every evening., Disp: 90 tablet, Rfl: 3    Review of Systems   Constitutional:  Positive for unexpected weight change (wt down 10lbs since last visit). Negative for appetite change, chills, fatigue, fever and weight loss.   HENT:  Negative for sore throat and trouble swallowing.    Eyes:  Negative for blurred vision.   Respiratory:  Negative for cough, shortness of breath and wheezing.    Cardiovascular:  Negative for chest pain, palpitations and leg swelling.   Gastrointestinal:  Positive for abdominal pain (has some mild discomfort around ileostomy since surgery). Negative for constipation, diarrhea, nausea and vomiting.        Liquid brown stool from ileostomy   Endocrine: Negative for polydipsia, polyphagia and polyuria.   Genitourinary:  Positive for impotence. Negative for dysuria, frequency and hematuria.   Musculoskeletal:  Negative for back pain and gait problem.   Skin:  Negative for pallor and rash.   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 Negative for dizziness, tremors, seizures, syncope, speech difficulty, weakness and headaches.   Psychiatric/Behavioral:  Negative for confusion and dysphoric mood. The patient is not nervous/anxious.           Objective:      Vitals:    06/27/24 0808   BP: 116/64   Pulse: 67   SpO2: 98%   Weight: 95.7 kg (211 lb)   Height: 6' 2" (1.88 m)     Physical Exam  Vitals reviewed.   Constitutional:       General: He is not in acute distress.     Appearance: He is well-developed.      Comments: Healthy-appearing white male   HENT:      Head: Normocephalic and atraumatic.      Right Ear: Tympanic membrane and ear canal normal.      Left Ear: Tympanic membrane and ear canal normal.   Neck:      Vascular: No carotid bruit.   Cardiovascular:      Rate and Rhythm: Normal rate and regular " rhythm.      Heart sounds: No murmur heard.  Pulmonary:      Effort: Pulmonary effort is normal. No respiratory distress.      Breath sounds: Normal breath sounds. No wheezing or rales.   Abdominal:      General: There is no distension.      Palpations: Abdomen is soft.      Tenderness: There is no abdominal tenderness.      Comments: Ileostomy left upper abd, stoma pink/moist   Musculoskeletal:      Cervical back: Neck supple.      Right lower leg: No edema.      Left lower leg: No edema.   Lymphadenopathy:      Cervical: No cervical adenopathy.   Skin:     General: Skin is warm and dry.      Findings: No rash.   Neurological:      General: No focal deficit present.      Mental Status: He is alert and oriented to person, place, and time.      Gait: Gait normal.   Psychiatric:         Mood and Affect: Mood normal.           Assessment:       1. DM type 2 with diabetic dyslipidemia    2. Mixed hyperlipidemia    3. Primary insomnia    4. Malignant neoplasm of transverse colon    5. Ileostomy status           Plan:       1. DM type 2 with diabetic dyslipidemia  -well controlled with A1c 6.5% in hospital  -      DIABETES FOOT EXAM  -     metFORMIN (GLUCOPHAGE) 1000 MG tablet; Take 1 tablet (1,000 mg total) by mouth 2 (two) times daily with meals.  Dispense: 180 tablet; Refill: 3    2. Mixed hyperlipidemia  -well controlled on February labs  -     atorvastatin (LIPITOR) 20 MG tablet; Take 1 tablet (20 mg total) by mouth every evening.  Dispense: 90 tablet; Refill: 3    3. Primary insomnia  -stable on med  -     traZODone (DESYREL) 50 MG tablet; Take 1 tablet (50 mg total) by mouth every evening.  Dispense: 90 tablet; Refill: 3    4. Malignant neoplasm of transverse colon   -doing well on Keytruda, follow-up Dr. Khoobehi as scheduled    5. Ileostomy status   -patient reports feeling better since surgery last month requiring total colectomy and ileostomy, has follow-up with surgeon tomorrow    Follow up in about 4 months  (around 10/27/2024) for Diabetes.          Counseled on age and gender appropriate medical preventative services, including cancer screenings, immunizations, overall nutritional health, need for a consistent exercise regimen and an overall push towards maintaining a vigorous and active lifestyle.      6/27/2024 Natalee Wagner NP

## 2024-06-27 NOTE — PROGRESS NOTES
[facility]  Colon and Rectal Surgery    [address]  [place], [place]  Tel: [phone]  Fax: [phone]  [email or web address]   MD Payam Reid MD Brian Kann, MD W. Forrest Johnston, MD Matthew Giglia, MD Jennifer Paruch, MD William Kethman, MD Danielle Kay, MD     Patient name: Loki Li Jr.   YOB: 1965   MRN: [id number]    Dear Carissa Goodwin and Khoobehi,    It was a pleasure seeing Mr. Li in the Colon and Rectal Surgery clinic here at [facility].     As you know, Mr. Li is a 59 year old man with a history of HTN, HLD, and DM, Stage IV transverse colon adenocarcinoma who presents for evaluation of colostomy takedown . He underwent a splenic flexure resection, end colostomy and splenectomy with Dr. Waddell > FOLFOX > progression (pancreatic tail - biopsy proven and RPLN PET avidity) > Keytruda since 1/2023. He was seen by Dr. Goodwin in 1/2024 (note reviewed) - his plan was to follow-up the results of his PET CT, decision was made to complete Keytruda and continue surveillance - if recurrence occurs then move forward with resection. He is doing well - fatigue only around the time of Keytruda for about 1 week after, he denies any nausea, vomiting, fevers, or chills. His weight is stable but about 30 lbs less than originally. He denies fecal incontinence.     PET CT - 1/8/2024  Positive therapy response with resolution of the metastatic retroperitoneal lymph node compared to the prior examination.  No FDG avid foci are present on today's exam.     Last colonoscopy: 2018 (Complete) - repeat in 5 years  Two sessile polyps 5-6 mm in descending colon, polypectomy performed (HP)  Two sessile polyps 5-7 mm in the sigmoid colon, polypectomy performed (HP)  18 diminutive recto sigmoid polyps were ablated    4/19/2024  Here for pre-operative consultation for colostomy takedown and likely distal pancreatectomy. He is doing  well - no major changes since the last time I saw him. He is ready for surgery.    Colonoscopy - 3/5/2024  The perianal and digital rectal examinations were normal. Pertinent negatives include normal sphincter tone.   Diffuse mild inflammation characterized by altered vascularity, friability and mucus was found in the rectum, in the sigmoid colon and in the descending colon. No biopsies or other specimens were collected for this exam. Estimated blood loss was minimal.   There was evidence of a widely patent end colostomy in the transverse colon. This was characterized by healthy appearing mucosa.   The exam was otherwise without abnormality.     6/28/2024  Here for follow-up, underwent open colostomy takedown and repair of parastomal hernia on 5/13/2024 complicated by what was felt to be an internal hernia requiring emergent take back, found to have a small anastomotic complication requiring completion colectomy, ileorectal anastomosis and DLI on 5/21/2024. He is doing well - no nausea or vomiting. Some mild pain in bilateral abdomen but ostomy is functioning. He denies any fevers or chills.     5/13/2024 - Pathology  1. Ostomy with peristomal hernia, excision:   Portion of colostomy with no evidence of neoplastic change.   Fibromembranous tissue, consistent with hernia sac.     2. Descending colon, excision:   Segment of colon with focal necrosis.   Resection margins appear viable.   No dysplasia or malignancy.     5/21/2024 - Pathology  1. TOTAL ABDOMINAL COLON, COLECTOMY:   Abdominal perforation with peritonitis adjacent to an anastomosis   Appendix with reactive changes of mesopappendix   Negative for neoplasia or malignancy     2. SIGMOID COLON, RESECTION:Portion of colon with features of acute peritonitis     3. ANASTOMOTIC DONUTS:   Portion of small bowel with ulceration   A separate portion of colon with acute inflammatory and reactive changes     Oncology History   Malignant neoplasm of transverse colon    4/20/2022 Surgery    Partial colectomy, splenectomy, end colostomy - pancreas appeared to be involved at that time    1. Spleen, splenectomy:   - Benign splenic parenchyma   - Negative for malignancy   2. Colon, partial colectomy:   - Invasive colonic adenocarcinoma, poorly differentiated (G3)     - Invades into pericolorectal tissue (pT3)   - Margins uninvolved by invasive carcinoma     - 0.1 cm to the mesenteric margin   - Lymphovascular invasion identified   - No perineural invasion identified   - Seventeen of twenty-seven lymph nodes, positive for metastatic carcinoma   (17/27, pN2b)   - Fibrous serosal adhesions   - See below for results of MSI testing   - CARLOS ENRIQUE/MILLIE Targeted Gene Panel has been ordered and results will be issued in   a supplemental report     CAP Synoptic Checklist for Colonic Neoplasms:     - Procedure: Partial colectomy     - Tumor Site: Splenic flexure     - Histologic Type: Adenocarcinoma     - Histologic Grade: G3, poorly differentiated     - Tumor Size: 5.0 x 4.5 x 2.7 cm     - Tumor Extent: Invades through muscularis propria into pericolorectal   tissue    - Macroscopic Tumor Perforation: Not identified     - Lymphovascular Invasion: Present, small vessel involved     - Perineural Invasion: Not identified     - Number of Tumor Buds: 5 in one hotspot field     - Tumor Bud Score: Intermediate (5-9)     - Type of Polyp in which Invasive Carcinoma Arose: None identified     - Treatment Effect: No known presurgical therapy     - Margins: All margins negative for invasive carcinoma       - Closest Margin to Invasive Carcinoma: 0.1 cm to mesenteric margin     - Regional Lymph Nodes:       - Number of Lymph Nodes with Tumor: 17       - Number of Lymph Nodes Examined: 27       - Tumor Deposits: Not identified     - Distant Metastasis: Not applicable     - Pathologic Stage Classification: pT3 pN2b     - Additional Findings: Fibrous serosal adhesions; benign spleen     - Special Studies: See below  for results of MSI testing; CARLOS ENRIQUE/MILLIE panel has         been ordered and results will be issued in a supplemental report     - Designate Block for Future Studies: [id number]   Immunohistochemistry (IHC) Testing for Mismatch Repair (MMR) Proteins:   MLH1 - Intact nuclear expression   MSH2 - Intact nuclear expression   MSH6 - Intact nuclear expression   PMS2 - Intact nuclear expression      5/5/2022 Initial Diagnosis    Malignant neoplasm of transverse colon     7/12/2022 - 12/14/2022 Chemotherapy    Treatment Summary   Plan Name: OP mFOLFOX 6 Q2W  Treatment Goal: Curative  Status: Inactive  Start Date: 7/12/2022  End Date: 12/14/2022  Provider: Aurash Khoobehi, MD  Chemotherapy: fluorouraciL injection 930 mg, 400 mg/m2 = 930 mg, Intravenous, Clinic/HOD 1 time, 12 of 12 cycles  Administration: 930 mg (7/12/2022), 930 mg (7/26/2022), 930 mg (8/9/2022), 930 mg (8/23/2022), 930 mg (9/6/2022), 930 mg (9/19/2022), 835 mg (10/3/2022), 835 mg (10/17/2022), 825 mg (10/31/2022), 820 mg (11/14/2022), 830 mg (11/28/2022), 825 mg (12/12/2022)  oxaliplatin (ELOXATIN) 85 mg/m2 = 197 mg in dextrose 5 % 539.4 mL chemo infusion, 85 mg/m2 = 197 mg, Intravenous, Clinic/HOD 1 time, 10 of 10 cycles  Dose modification: 68 mg/m2 (original dose 85 mg/m2, Cycle 8, Reason: MD Discretion)  Administration: 197 mg (7/12/2022), 197 mg (7/26/2022), 197 mg (8/9/2022), 197 mg (8/23/2022), 197 mg (9/6/2022), 197 mg (9/19/2022), 178 mg (10/3/2022), 142 mg (10/17/2022), 139 mg (11/14/2022)     2/13/2023 -  Chemotherapy    Treatment Summary   Plan Name: OP PEMBROLIZUMAB 200MG Q3W  Treatment Goal: Curative  Status: Active  Start Date: 2/13/2023  End Date: 3/11/2025 (Planned)  Provider: Aurash Khoobehi, MD  Chemotherapy: [No matching medication found in this treatment plan]     2/26/2024 Cancer Staged    Staging form: Colon and Rectum, AJCC 8th Edition  - Pathologic: Stage IVB (pT3, pN2b, cM1b)     Metastatic adenocarcinoma to pancreas   1/13/2023 Initial  Diagnosis    Metastatic adenocarcinoma to pancreas     2/13/2023 -  Chemotherapy    Treatment Summary   Plan Name: OP PEMBROLIZUMAB 200MG Q3W  Treatment Goal: Curative  Status: Active  Start Date: 2/13/2023  End Date: 3/11/2025 (Planned)  Provider: Aurash Khoobehi, MD  Chemotherapy: [No matching medication found in this treatment plan]         The patient was informed of the availability of a certified  without charge. A certified  was not necessary for this visit.    Review of Systems  See pertinent review of systems above    Past Medical History:   Diagnosis Date    Colon cancer     Digestive disorder     DM (diabetes mellitus)     Hyperlipidemia     Hypertension     Prediabetes      Past Surgical History:   Procedure Laterality Date    ADENOIDECTOMY  1972    ANASTOMOSIS OF ILEUM TO RECTUM N/A 5/21/2024    Procedure: ANASTOMOSIS, ILEORECTAL;  Surgeon: Farhan Lance MD;  Location: [facility] OR [address] [place] [address];  Service: Colon and Rectal;  Laterality: N/A;    CHOLECYSTECTOMY  2011    CLOSURE, COLOSTOMY N/A 5/13/2024    Procedure: CLOSURE, COLOSTOMY (eras high/lithotomy);  Surgeon: Farhan Lance MD;  [facility];  Service: Colon and Rectal;  Laterality: N/A;  CONSENTS IN [facility]    COLON SURGERY      COLONOSCOPY      2018    COLONOSCOPY N/A 3/5/2024    Procedure: COLONOSCOPY;  Surgeon: Farhan Lance MD;  Location: [facility] (4TH FLR);  Service: Endoscopy;  Laterality: N/A;  2/27/24-Kethman pt, 1-4wks, port, PEG plus 2 enemas morning of procedure, instr portal-DS  2/28/24- LVM for precall - ERW  pt confirmed procedure. cf    COLOSTOMY N/A 04/25/2022    Procedure: CREATION, COLOSTOMY;  Surgeon: Carlton Waddell MD;  Location: [facility];  Service: General;  Laterality: N/A;    ENDOSCOPIC ULTRASOUND OF UPPER GASTROINTESTINAL TRACT N/A 01/06/2023    Procedure: ULTRASOUND, UPPER GI TRACT, ENDOSCOPIC;  Surgeon: Rinku Orozco III, MD;  Location: [facility];  Service:  Endoscopy;  Laterality: N/A;    FLEXIBLE SIGMOIDOSCOPY N/A 5/13/2024    Procedure: SIGMOIDOSCOPY, FLEXIBLE;  Surgeon: Farhan Lance MD;  Location: [facility] OR 2ND FLR;  Service: Colon and Rectal;  Laterality: N/A;    FLEXIBLE SIGMOIDOSCOPY  5/21/2024    Procedure: SIGMOIDOSCOPY, FLEXIBLE;  Surgeon: Farhan Lance MD;  Location: [facility] OR 2ND FLR;  Service: Colon and Rectal;;    ILEOSTOMY N/A 5/21/2024    Procedure: CREATION, ILEOSTOMY, DIVERTING LOOP;  Surgeon: Farhan Lance MD;  Location: [facility] OR 2ND FLR;  Service: Colon and Rectal;  Laterality: N/A;    INSERTION OF TUNNELED CENTRAL VENOUS CATHETER (CVC) WITH SUBCUTANEOUS PORT Right 05/18/2022    Procedure: [id number];  Surgeon: Carlton Waddell MD;  Location: [facility] OR;  Service: General;  Laterality: Right;    INSERTION OF URETERAL CATHETER N/A 5/13/2024    Procedure: INSERTION, CATHETER, URETER, BILATERAL;  Surgeon: Travis Edwards MD;  Location: [facility] OR 2ND FLR;  Service: Urology;  Laterality: N/A;    INSERTION OF URETERAL CATHETER Left 5/21/2024    Procedure: INSERTION, CATHETER, URETER;  Surgeon: Carlton Santos MD;  Location: [facility] OR 2ND FLR;  Service: Urology;  Laterality: Left;    LAPAROTOMY, EXPLORATORY N/A 5/21/2024    Procedure: LAPAROTOMY, EXPLORATORY;  Surgeon: Farhan Lance MD;  Location: [facility] OR 2ND FLR;  Service: Colon and Rectal;  Laterality: N/A;    LYSIS OF ADHESIONS N/A 5/13/2024    Procedure: LYSIS, ADHESIONS;  Surgeon: Farhan Lance MD;  Location: [facility] OR 2ND FLR;  Service: Colon and Rectal;  Laterality: N/A;    MOBILIZATION OF SPLENIC FLEXURE N/A 04/25/2022    Procedure: MOBILIZATION, SPLENIC FLEXURE;  Surgeon: Carlton Waddell MD;  Location: [facility] OR;  Service: General;  Laterality: N/A;    SPLENECTOMY N/A 04/25/2022    Procedure: SPLENECTOMY;  Surgeon: Carlton Waddell MD;  Location: [facility];  Service: General;  Laterality: N/A;    SUBTOTAL COLECTOMY N/A 04/25/2022    Procedure: COLECTOMY, PARTIAL;   Surgeon: Carlton Waddell MD;  Location: [facility] OR;  Service: General;  Laterality: N/A;    TONSILLECTOMY      TOTAL ABDOMINAL COLECTOMY N/A 5/21/2024    Procedure: COLECTOMY, COMPLETION, ABDOMINAL;  Surgeon: Farhan Lance MD;  Location: [facility] OR 2ND FLR;  Service: Colon and Rectal;  Laterality: N/A;    TUMOR REMOVAL  10/18/2023    on top of the nose    VASECTOMY  1994     Family History   Problem Relation Name Age of Onset    Heart disease Father Loki senior     Diabetes Father Loki senior     Alcohol abuse Paternal Grandfather Waylon Li     Rectal cancer Other Makenzie         half-sister    Cancer Sister makenzie      Social History     Tobacco Use    Smoking status: Every Day     Current packs/day: 1.00     Average packs/day: 1 pack/day for 40.0 years (40.0 ttl pk-yrs)     Types: Cigarettes     Passive exposure: Current    Smokeless tobacco: Never   Substance Use Topics    Alcohol use: Not Currently    Drug use: Never     Review of patient's allergies indicates:   Allergen Reactions    Penicillins Rash       Current Outpatient Medications on File Prior to Visit   Medication Sig Dispense Refill    atorvastatin (LIPITOR) 20 MG tablet Take 1 tablet (20 mg total) by mouth every evening. 90 tablet 3    enalapril (VASOTEC) 10 MG tablet Take 1 tablet (10 mg total) by mouth once daily. 90 tablet 0    insulin glargine U-300 conc (TOUJEO MAX U-300 SOLOSTAR) 300 unit/mL (3 mL) insulin pen Inject 20 Units into the skin once daily. (Discard pen 56 days after initial use) (Patient taking differently: Inject 48 Units into the skin once daily. (Discard pen 56 days after initial use)) 2 Pen 11    metFORMIN (GLUCOPHAGE) 1000 MG tablet Take 1 tablet (1,000 mg total) by mouth 2 (two) times daily with meals. 180 tablet 3    methocarbamoL (ROBAXIN) 500 MG Tab Take 500 mg by mouth every 8 (eight) hours as needed.      metoprolol tartrate (LOPRESSOR) 25 MG tablet Take 1 tablet (25 mg total) by mouth 2 (two) times daily. 180 tablet 0  "   morphine (MS CONTIN) 30 MG 12 hr tablet Take 15 mg by mouth 2 (two) times daily.      nicotine (NICODERM CQ) 14 mg/24 hr Place 1 patch onto the skin once daily. 28 patch 2    oxyCODONE (ROXICODONE) 10 mg Tab immediate release tablet Take 1 tablet (10 mg total) by mouth every 4 (four) hours as needed for Pain. (Patient not taking: Reported on 6/27/2024) 20 tablet 0    pen needle, diabetic (BD ULTRA-FINE MINI PEN NEEDLE) 31 gauge x 3/16" Ndle Use to inject insulin once daily 100 each 0    pen needle, diabetic 31 gauge x 3/16" Ndle 1 each by Misc.(Non-Drug; Combo Route) route once daily. 90 each 3    promethazine (PHENERGAN) 12.5 MG Tab Take 1 tablet (12.5 mg total) by mouth every 4 (four) hours as needed. (Patient not taking: Reported on 6/27/2024) 25 tablet 1    scopolamine (TRANSDERM-SCOP) 1.3-1.5 mg (1 mg over 3 days) Place 1 patch onto the skin Every 3 (three) days. (Patient not taking: Reported on 6/27/2024) 5 patch 0    sildenafiL (VIAGRA) 100 MG tablet Take 1 tablet (100 mg total) by mouth daily as needed for Erectile Dysfunction. (Patient not taking: Reported on 6/27/2024) 30 tablet 3    tamsulosin (FLOMAX) 0.4 mg Cap Take 2 capsules (0.8 mg total) by mouth once daily. for 7 days 14 capsule 0    traZODone (DESYREL) 50 MG tablet Take 1 tablet (50 mg total) by mouth every evening. 90 tablet 3    [DISCONTINUED] gabapentin (NEURONTIN) 300 MG capsule Take 1 capsule (300 mg total) by mouth 3 (three) times daily. 90 capsule 0     No current facility-administered medications on file prior to visit.     Physical Examination  /71   Pulse 66   Wt 95.9 kg (211 lb 6.7 oz)   BMI 27.14 kg/m²      Constitutional: well developed, no cough, no dyspnea, alert, and no acute distress    Head: Normocephalic, no lesions, without obvious abnormality  Eye: Normal external eye, conjunctiva, and lids  Cardiovascular: regular rate and regular rhythm  Respiratory: normal air entry  Gastrointestinal: soft, non-tender, ostomy in " place, silver nitrate applied to hypergranulation tissue      Musculoskeletal: full range of motion without pain  Neurologic: alert, oriented, normal speech, no focal findings or movement disorder noted  Psychiatric: appropriate, normal mood    Assessment and Plan of Care    Thank you again for referring Mr. Li to my care. In summary, Mr. 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 He underwent colostomy takedown complicated by anastomotic leak requiring revision and DLI. We discussed treatment options and have provided the following recommendations:    CT AP with IV and rectal contrast  BMP today  Offered WOCN follow-up if he is having any issues  Plan follow-up in 1 month for flexible proctoscopy and scheduling for ileostomy takedown    Please do not hesitate to contact me if you have any questions.      Farhan Lance MD, FACS, FASCRS  Department of Colon & Rectal Surgery  [facility]

## 2024-06-28 ENCOUNTER — LAB VISIT (OUTPATIENT)
Dept: LAB | Facility: HOSPITAL | Age: 59
End: 2024-06-28
Attending: STUDENT IN AN ORGANIZED HEALTH CARE EDUCATION/TRAINING PROGRAM
Payer: COMMERCIAL

## 2024-06-28 ENCOUNTER — OFFICE VISIT (OUTPATIENT)
Dept: SURGERY | Facility: CLINIC | Age: 59
End: 2024-06-28
Payer: COMMERCIAL

## 2024-06-28 VITALS
SYSTOLIC BLOOD PRESSURE: 133 MMHG | DIASTOLIC BLOOD PRESSURE: 71 MMHG | BODY MASS INDEX: 27.14 KG/M2 | HEART RATE: 66 BPM | WEIGHT: 211.44 LBS

## 2024-06-28 DIAGNOSIS — C18.5 MALIGNANT NEOPLASM OF SPLENIC FLEXURE: ICD-10-CM

## 2024-06-28 DIAGNOSIS — C18.5 MALIGNANT NEOPLASM OF SPLENIC FLEXURE: Primary | ICD-10-CM

## 2024-06-28 LAB
ANION GAP SERPL CALC-SCNC: 9 MMOL/L (ref 8–16)
BUN SERPL-MCNC: 13 MG/DL (ref 6–20)
CALCIUM SERPL-MCNC: 9.8 MG/DL (ref 8.7–10.5)
CHLORIDE SERPL-SCNC: 104 MMOL/L (ref 95–110)
CO2 SERPL-SCNC: 26 MMOL/L (ref 23–29)
CREAT SERPL-MCNC: 1.1 MG/DL (ref 0.5–1.4)
EST. GFR  (NO RACE VARIABLE): >60 ML/MIN/1.73 M^2
GLUCOSE SERPL-MCNC: 241 MG/DL (ref 70–110)
POTASSIUM SERPL-SCNC: 5.4 MMOL/L (ref 3.5–5.1)
SODIUM SERPL-SCNC: 139 MMOL/L (ref 136–145)

## 2024-06-28 PROCEDURE — 36415 COLL VENOUS BLD VENIPUNCTURE: CPT | Performed by: STUDENT IN AN ORGANIZED HEALTH CARE EDUCATION/TRAINING PROGRAM

## 2024-06-28 PROCEDURE — 99999 PR PBB SHADOW E&M-EST. PATIENT-LVL III: CPT | Mod: PBBFAC,,, | Performed by: STUDENT IN AN ORGANIZED HEALTH CARE EDUCATION/TRAINING PROGRAM

## 2024-06-28 PROCEDURE — 80048 BASIC METABOLIC PNL TOTAL CA: CPT | Performed by: STUDENT IN AN ORGANIZED HEALTH CARE EDUCATION/TRAINING PROGRAM

## 2024-06-28 RX ORDER — GABAPENTIN 300 MG/1
300 CAPSULE ORAL 3 TIMES DAILY
Qty: 90 CAPSULE | Refills: 3 | Status: SHIPPED | OUTPATIENT
Start: 2024-06-28 | End: 2024-10-26

## 2024-07-01 ENCOUNTER — LAB VISIT (OUTPATIENT)
Dept: LAB | Facility: HOSPITAL | Age: 59
End: 2024-07-01
Attending: INTERNAL MEDICINE
Payer: COMMERCIAL

## 2024-07-01 ENCOUNTER — OFFICE VISIT (OUTPATIENT)
Dept: HEMATOLOGY/ONCOLOGY | Facility: CLINIC | Age: 59
End: 2024-07-01
Payer: COMMERCIAL

## 2024-07-01 ENCOUNTER — PATIENT MESSAGE (OUTPATIENT)
Dept: FAMILY MEDICINE | Facility: CLINIC | Age: 59
End: 2024-07-01
Payer: COMMERCIAL

## 2024-07-01 VITALS
HEART RATE: 90 BPM | TEMPERATURE: 98 F | RESPIRATION RATE: 16 BRPM | HEIGHT: 74 IN | OXYGEN SATURATION: 97 % | DIASTOLIC BLOOD PRESSURE: 80 MMHG | BODY MASS INDEX: 26.94 KG/M2 | SYSTOLIC BLOOD PRESSURE: 162 MMHG | WEIGHT: 209.88 LBS

## 2024-07-01 DIAGNOSIS — E87.5 HYPERKALEMIA: ICD-10-CM

## 2024-07-01 DIAGNOSIS — C18.4 MALIGNANT NEOPLASM OF TRANSVERSE COLON: Primary | ICD-10-CM

## 2024-07-01 DIAGNOSIS — C77.2 METASTASIS TO RETROPERITONEAL LYMPH NODE: ICD-10-CM

## 2024-07-01 DIAGNOSIS — C78.89 METASTATIC ADENOCARCINOMA TO PANCREAS: ICD-10-CM

## 2024-07-01 DIAGNOSIS — I10 PRIMARY HYPERTENSION: ICD-10-CM

## 2024-07-01 DIAGNOSIS — E11.00 TYPE 2 DIABETES MELLITUS WITH HYPEROSMOLARITY WITHOUT COMA, WITHOUT LONG-TERM CURRENT USE OF INSULIN: ICD-10-CM

## 2024-07-01 DIAGNOSIS — C18.4 MALIGNANT NEOPLASM OF TRANSVERSE COLON: ICD-10-CM

## 2024-07-01 LAB
ALBUMIN SERPL BCP-MCNC: 4 G/DL (ref 3.5–5.2)
ALP SERPL-CCNC: 122 U/L (ref 55–135)
ALT SERPL W/O P-5'-P-CCNC: 29 U/L (ref 10–44)
ANION GAP SERPL CALC-SCNC: 8 MMOL/L (ref 8–16)
AST SERPL-CCNC: 30 U/L (ref 10–40)
BASOPHILS NFR BLD: 0 % (ref 0–1.9)
BILIRUB SERPL-MCNC: 0.5 MG/DL (ref 0.1–1)
BUN SERPL-MCNC: 15 MG/DL (ref 6–20)
CALCIUM SERPL-MCNC: 10.5 MG/DL (ref 8.7–10.5)
CHLORIDE SERPL-SCNC: 105 MMOL/L (ref 95–110)
CO2 SERPL-SCNC: 25 MMOL/L (ref 23–29)
CREAT SERPL-MCNC: 1.2 MG/DL (ref 0.5–1.4)
DIFFERENTIAL METHOD BLD: ABNORMAL
EOSINOPHIL NFR BLD: 3 % (ref 0–8)
ERYTHROCYTE [DISTWIDTH] IN BLOOD BY AUTOMATED COUNT: 14.1 % (ref 11.5–14.5)
EST. GFR  (NO RACE VARIABLE): >60 ML/MIN/1.73 M^2
GLUCOSE SERPL-MCNC: 126 MG/DL (ref 70–110)
HCT VFR BLD AUTO: 36.8 % (ref 40–54)
HGB BLD-MCNC: 12.2 G/DL (ref 14–18)
IMM GRANULOCYTES # BLD AUTO: ABNORMAL K/UL (ref 0–0.04)
IMM GRANULOCYTES NFR BLD AUTO: ABNORMAL % (ref 0–0.5)
LYMPHOCYTES NFR BLD: 46 % (ref 18–48)
MCH RBC QN AUTO: 29.3 PG (ref 27–31)
MCHC RBC AUTO-ENTMCNC: 33.2 G/DL (ref 32–36)
MCV RBC AUTO: 88 FL (ref 82–98)
MONOCYTES NFR BLD: 11 % (ref 4–15)
NEUTROPHILS NFR BLD: 39 % (ref 38–73)
NEUTS BAND NFR BLD MANUAL: 1 %
NRBC BLD-RTO: 0 /100 WBC
PLATELET # BLD AUTO: 343 K/UL (ref 150–450)
PLATELET BLD QL SMEAR: ABNORMAL
PMV BLD AUTO: 9.8 FL (ref 9.2–12.9)
POTASSIUM SERPL-SCNC: 5.5 MMOL/L (ref 3.5–5.1)
PROT SERPL-MCNC: 7.6 G/DL (ref 6–8.4)
RBC # BLD AUTO: 4.17 M/UL (ref 4.6–6.2)
SODIUM SERPL-SCNC: 138 MMOL/L (ref 136–145)
TSH SERPL DL<=0.005 MIU/L-ACNC: 1.76 UIU/ML (ref 0.4–4)
WBC # BLD AUTO: 14.03 K/UL (ref 3.9–12.7)

## 2024-07-01 PROCEDURE — 80053 COMPREHEN METABOLIC PANEL: CPT | Performed by: INTERNAL MEDICINE

## 2024-07-01 PROCEDURE — 36415 COLL VENOUS BLD VENIPUNCTURE: CPT | Performed by: INTERNAL MEDICINE

## 2024-07-01 PROCEDURE — 99215 OFFICE O/P EST HI 40 MIN: CPT | Mod: S$GLB,,, | Performed by: INTERNAL MEDICINE

## 2024-07-01 PROCEDURE — 84443 ASSAY THYROID STIM HORMONE: CPT | Performed by: INTERNAL MEDICINE

## 2024-07-01 PROCEDURE — G2211 COMPLEX E/M VISIT ADD ON: HCPCS | Mod: S$GLB,,, | Performed by: INTERNAL MEDICINE

## 2024-07-01 PROCEDURE — 85027 COMPLETE CBC AUTOMATED: CPT | Performed by: INTERNAL MEDICINE

## 2024-07-01 PROCEDURE — 99999 PR PBB SHADOW E&M-EST. PATIENT-LVL V: CPT | Mod: PBBFAC,,, | Performed by: INTERNAL MEDICINE

## 2024-07-01 PROCEDURE — 85007 BL SMEAR W/DIFF WBC COUNT: CPT | Performed by: INTERNAL MEDICINE

## 2024-07-01 RX ORDER — SODIUM CHLORIDE 0.9 % (FLUSH) 0.9 %
10 SYRINGE (ML) INJECTION
Status: CANCELLED | OUTPATIENT
Start: 2024-07-02

## 2024-07-01 RX ORDER — HEPARIN 100 UNIT/ML
500 SYRINGE INTRAVENOUS
Status: CANCELLED | OUTPATIENT
Start: 2024-07-02

## 2024-07-01 RX ORDER — ENALAPRIL MALEATE 10 MG/1
10 TABLET ORAL DAILY
Qty: 90 TABLET | Refills: 3 | Status: SHIPPED | OUTPATIENT
Start: 2024-07-01 | End: 2025-07-01

## 2024-07-01 RX ORDER — DIPHENHYDRAMINE HYDROCHLORIDE 50 MG/ML
50 INJECTION INTRAMUSCULAR; INTRAVENOUS ONCE AS NEEDED
Status: CANCELLED | OUTPATIENT
Start: 2024-07-02

## 2024-07-01 RX ORDER — EPINEPHRINE 0.3 MG/.3ML
0.3 INJECTION SUBCUTANEOUS ONCE AS NEEDED
Status: CANCELLED | OUTPATIENT
Start: 2024-07-02

## 2024-07-01 NOTE — PROGRESS NOTES
Service Date:  7/1/24    Chief Complaint:  Colon cancer    Osman Li Jr. is a 59 y.o. 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  This occurred very shortly after completing treatment.    Patient was part of a clinical trial to measure CT DNA.  His CT DNA started to go up while he was on FOLFOX, which prompted the scan, which showed the progression.  Next generation sequencing also showed a high tumor burden.    He is now on Keytruda as his cancer has a very high tumor mutation burden.  So far, Keytruda has been helping    Here for Keytruda.  Recently had a ostomy takedown with reversal.  Patient then had a leakage at the anastomosis site and now has an ileostomy in place, with plans to reverse this in the future.  Patient is doing well now.  Recovered from the surgery mostly.  Will have re-imaging soon. No complaints to me      Review of Systems   Constitutional:  Positive for fatigue. Negative for appetite change and unexpected weight change.   HENT: Negative.  Negative for mouth sores.    Eyes: Negative.  Negative for visual disturbance.   Respiratory: Negative.  Negative for cough and shortness of breath.    Cardiovascular: Negative.  Negative for chest pain.   Gastrointestinal: Negative.  Negative for diarrhea.   Endocrine: Negative.    Genitourinary: Negative.  Negative for frequency.   Musculoskeletal: Negative.  Negative for back pain.   Integumentary:  Negative for rash. Negative.   Neurological:  Positive for numbness. Negative for headaches.   Hematological: Negative.  Negative for adenopathy.   Psychiatric/Behavioral: Negative.  The patient is not nervous/anxious.         Current Outpatient Medications   Medication Instructions    atorvastatin (LIPITOR) 20 mg, Oral, Nightly    enalapril (VASOTEC) 10 mg, Oral, Daily    gabapentin (NEURONTIN) 300 mg, Oral, 3 times daily    metFORMIN (GLUCOPHAGE)  "1,000 mg, Oral, 2 times daily with meals    methocarbamoL (ROBAXIN) 500 mg, Oral, Every 8 hours PRN    metoprolol tartrate (LOPRESSOR) 25 mg, Oral, 2 times daily    morphine (MS CONTIN) 15 mg, Oral, 2 times daily    nicotine (NICODERM CQ) 14 mg/24 hr 1 patch, Transdermal, Daily    oxyCODONE (ROXICODONE) 10 mg, Oral, Every 4 hours PRN    pen needle, diabetic (BD ULTRA-FINE MINI PEN NEEDLE) 31 gauge x 3/16" Ndle Use to inject insulin once daily    pen needle, diabetic 31 gauge x 3/16" Ndle 1 each, Misc.(Non-Drug; Combo Route), Daily    promethazine (PHENERGAN) 12.5 mg, Oral, Every 4 hours PRN    scopolamine (TRANSDERM-SCOP) 1.3-1.5 mg (1 mg over 3 days) 1 patch, Transdermal, Every 3 days    sildenafiL (VIAGRA) 100 mg, Oral, Daily PRN    tamsulosin (FLOMAX) 0.8 mg, Oral, Daily    TOUJEO MAX U-300 SOLOSTAR 20 Units, Subcutaneous, Daily, (Discard pen 56 days after initial use)    traZODone (DESYREL) 50 mg, Oral, Nightly        Past Medical History:   Diagnosis Date    Colon cancer     Digestive disorder     DM (diabetes mellitus)     Hyperlipidemia     Hypertension     Prediabetes         Past Surgical History:   Procedure Laterality Date    ADENOIDECTOMY  1972    ANASTOMOSIS OF ILEUM TO RECTUM N/A 5/21/2024    Procedure: ANASTOMOSIS, ILEORECTAL;  Surgeon: Farhan Lance MD;  Location: [facility] OR 2ND FLR;  Service: Colon and Rectal;  Laterality: N/A;    CHOLECYSTECTOMY  2011    CLOSURE, COLOSTOMY N/A 5/13/2024    Procedure: CLOSURE, COLOSTOMY (eras high/lithotomy);  Surgeon: Farhan Lance MD;  Location: [facility] OR 2ND FLR;  Service: Colon and Rectal;  Laterality: N/A;  CONSENTS IN [facility]    COLON SURGERY      COLONOSCOPY      2018    COLONOSCOPY N/A 3/5/2024    Procedure: COLONOSCOPY;  Surgeon: Farhan Lance MD;  Location: [facility] (4TH FLR);  Service: Endoscopy;  Laterality: N/A;  2/27/24-Casi pt, 1-4wks, port, PEG plus 2 enemas morning of procedure, instr portal-DS  2/28/24- LV for precall - ERW  pt " confirmed procedure. cf    COLOSTOMY N/A 04/25/2022    Procedure: CREATION, COLOSTOMY;  Surgeon: Carlton Waddell MD;  Location: [facility] OR;  Service: General;  Laterality: N/A;    ENDOSCOPIC ULTRASOUND OF UPPER GASTROINTESTINAL TRACT N/A 01/06/2023    Procedure: ULTRASOUND, UPPER GI TRACT, ENDOSCOPIC;  Surgeon: Rinku Orozco III, MD;  Location: [facility] ENDO;  Service: Endoscopy;  Laterality: N/A;    FLEXIBLE SIGMOIDOSCOPY N/A 5/13/2024    Procedure: SIGMOIDOSCOPY, FLEXIBLE;  Surgeon: Farhan Lance MD;  Location: [facility] OR 2ND FLR;  Service: Colon and Rectal;  Laterality: N/A;    FLEXIBLE SIGMOIDOSCOPY  5/21/2024    Procedure: SIGMOIDOSCOPY, FLEXIBLE;  Surgeon: Farhan Lance MD;  Location: [facility] OR 2ND FLR;  Service: Colon and Rectal;;    ILEOSTOMY N/A 5/21/2024    Procedure: CREATION, ILEOSTOMY, DIVERTING LOOP;  Surgeon: Farhan Lance MD;  Location: [facility] OR 2ND FLR;  Service: Colon and Rectal;  Laterality: N/A;    INSERTION OF TUNNELED CENTRAL VENOUS CATHETER (CVC) WITH SUBCUTANEOUS PORT Right 05/18/2022    Procedure: [id number];  Surgeon: Carlton Waddell MD;  Location: [facility] OR;  Service: General;  Laterality: Right;    INSERTION OF URETERAL CATHETER N/A 5/13/2024    Procedure: INSERTION, CATHETER, URETER, BILATERAL;  Surgeon: Travis Edwards MD;  Location: [facility] OR 2ND FLR;  Service: Urology;  Laterality: N/A;    INSERTION OF URETERAL CATHETER Left 5/21/2024    Procedure: INSERTION, CATHETER, URETER;  Surgeon: Carlton Santos MD;  Location: [facility] OR 2ND FLR;  Service: Urology;  Laterality: Left;    LAPAROTOMY, EXPLORATORY N/A 5/21/2024    Procedure: LAPAROTOMY, EXPLORATORY;  Surgeon: Farhan Lance MD;  Location: [facility] OR 2ND FLR;  Service: Colon and Rectal;  Laterality: N/A;    LYSIS OF ADHESIONS N/A 5/13/2024    Procedure: LYSIS, ADHESIONS;  Surgeon: Farhan Lance MD;  Location: [facility] OR 2ND FLR;  Service: Colon and Rectal;  Laterality: N/A;    MOBILIZATION OF SPLENIC  "FLEXURE N/A 04/25/2022    Procedure: MOBILIZATION, SPLENIC FLEXURE;  Surgeon: Carlton Waddell MD;  Location: [facility] OR;  Service: General;  Laterality: N/A;    SPLENECTOMY N/A 04/25/2022    Procedure: SPLENECTOMY;  Surgeon: Carlton Waddell MD;  Location: [facility] OR;  Service: General;  Laterality: N/A;    SUBTOTAL COLECTOMY N/A 04/25/2022    Procedure: COLECTOMY, PARTIAL;  Surgeon: Carlton Waddell MD;  Location: [facility] OR;  Service: General;  Laterality: N/A;    TONSILLECTOMY      TOTAL ABDOMINAL COLECTOMY N/A 5/21/2024    Procedure: COLECTOMY, COMPLETION, ABDOMINAL;  Surgeon: Farhan Lance MD;  [facility];  Service: Colon and Rectal;  Laterality: N/A;    TUMOR REMOVAL  10/18/2023    on top of the nose    VASECTOMY  1994        Family History   Problem Relation Name Age of Onset    Heart disease Father Loki senior     Diabetes Father Loki senior     Alcohol abuse Paternal Grandfather Waylon Li     Rectal cancer Other Makenzie         half-sister    Cancer Sister makenzie        Social History     Tobacco Use    Smoking status: Every Day     Current packs/day: 1.00     Average packs/day: 1 pack/day for 40.0 years (40.0 ttl pk-yrs)     Types: Cigarettes     Passive exposure: Current    Smokeless tobacco: Never   Substance Use Topics    Alcohol use: Not Currently    Drug use: Never         Vitals:    07/01/24 1112   BP: (!) 162/80   Pulse: 90   Resp: 16   Temp: 98.1 °F (36.7 °C)          Physical Exam:  BP (!) 162/80 (BP Location: Left arm, Patient Position: Sitting, BP Method: Small (Automatic))   Pulse 90   Temp 98.1 °F (36.7 °C) (Temporal)   Resp 16   Ht 6' 2" (1.88 m)   Wt 95.2 kg (209 lb 14.1 oz)   SpO2 97%   BMI 26.95 kg/m²     Physical Exam  Vitals and nursing note reviewed.   Constitutional:       Appearance: Normal appearance.   HENT:      Head: Normocephalic and atraumatic.      Nose: Nose normal.      Mouth/Throat:      Mouth: Mucous membranes are moist.      Pharynx: Oropharynx is clear. "   Eyes:      Extraocular Movements: Extraocular movements intact.      Conjunctiva/sclera: Conjunctivae normal.   Cardiovascular:      Rate and Rhythm: Normal rate and regular rhythm.      Heart sounds: Normal heart sounds.   Pulmonary:      Effort: Pulmonary effort is normal.      Breath sounds: Normal breath sounds.   Abdominal:      General: Abdomen is flat. Bowel sounds are normal.      Palpations: Abdomen is soft.      Comments: Ostomy bag in place.   Musculoskeletal:         General: Normal range of motion.      Cervical back: Normal range of motion and neck supple.   Skin:     General: Skin is warm and dry.   Neurological:      General: No focal deficit present.      Mental Status: He is alert and oriented to person, place, and time. Mental status is at baseline.   Psychiatric:         Mood and Affect: Mood normal.          Labs:  Lab Results   Component Value Date    WBC 14.03 (H) 07/01/2024    RBC 4.17 (L) 07/01/2024    HGB 12.2 (L) 07/01/2024    HCT 36.8 (L) 07/01/2024    MCV 88 07/01/2024    MCH 29.3 07/01/2024    MCHC 33.2 07/01/2024    RDW 14.1 07/01/2024     07/01/2024    MPV 9.8 07/01/2024    GRAN 39.0 07/01/2024    LYMPH 46.0 07/01/2024    MONO 11.0 07/01/2024    EOS 0.3 06/10/2024    BASO 0.13 06/10/2024    EOSINOPHIL 3.0 07/01/2024    BASOPHIL 0.0 07/01/2024     Sodium   Date Value Ref Range Status   07/01/2024 138 136 - 145 mmol/L Final     Potassium   Date Value Ref Range Status   07/01/2024 5.5 (H) 3.5 - 5.1 mmol/L Final     Chloride   Date Value Ref Range Status   07/01/2024 105 95 - 110 mmol/L Final     CO2   Date Value Ref Range Status   07/01/2024 25 23 - 29 mmol/L Final     Glucose   Date Value Ref Range Status   07/01/2024 126 (H) 70 - 110 mg/dL Final     BUN   Date Value Ref Range Status   07/01/2024 15 6 - 20 mg/dL Final     Creatinine   Date Value Ref Range Status   07/01/2024 1.2 0.5 - 1.4 mg/dL Final     Calcium   Date Value Ref Range Status   07/01/2024 10.5 8.7 - 10.5 mg/dL  Final     Total Protein   Date Value Ref Range Status   07/01/2024 7.6 6.0 - 8.4 g/dL Final     Albumin   Date Value Ref Range Status   07/01/2024 4.0 3.5 - 5.2 g/dL Final     Total Bilirubin   Date Value Ref Range Status   07/01/2024 0.5 0.1 - 1.0 mg/dL Final     Comment:     For infants and newborns, interpretation of results should be based  on gestational age, weight and in agreement with clinical  observations.    Premature Infant recommended reference ranges:  Up to 24 hours.............<8.0 mg/dL  Up to 48 hours............<12.0 mg/dL  3-5 days..................<15.0 mg/dL  6-29 days.................<15.0 mg/dL       Alkaline Phosphatase   Date Value Ref Range Status   07/01/2024 122 55 - 135 U/L Final     AST   Date Value Ref Range Status   07/01/2024 30 10 - 40 U/L Final     ALT   Date Value Ref Range Status   07/01/2024 29 10 - 44 U/L Final     Anion Gap   Date Value Ref Range Status   07/01/2024 8 8 - 16 mmol/L Final     eGFR if    Date Value Ref Range Status   07/25/2022 >60 >60 mL/min/1.73 m^2 Final     eGFR if non    Date Value Ref Range Status   07/25/2022 >60 >60 mL/min/1.73 m^2 Final     Comment:     Calculation used to obtain the estimated glomerular filtration  rate (eGFR) is the CKD-EPI equation.          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    -proceed with Keytruda cycle 21 today  -Recent CT scan shows no new evidence of disease, with a smaller lesion on the pancreatic tail and stability of the retroperitoneal lymph node that was radiated.  PET  scan shows no evidence of disease, so it was decided to forego surgery.  Testing for ctDNA negative  -resume Keytruda   -return to clinic in 3 weeks for next dose    Pancreatic mass   -confirmed recurrence of colon adenocarcinoma    Back pain/pain from stoma  - NM bone scan negative for mets  -now on morphine dose to 30 mg bid    HTN  - on Enalapril  - follow up with PCP    HLP  - follow up with PCP    DM2  - on Metformin  - follow up with PCP    Tobacco use  - counseled on cessation      Aurash Khoobehi, MD  Hematology and Oncology

## 2024-07-02 ENCOUNTER — PATIENT MESSAGE (OUTPATIENT)
Dept: SURGERY | Facility: CLINIC | Age: 59
End: 2024-07-02
Payer: COMMERCIAL

## 2024-07-02 ENCOUNTER — INFUSION (OUTPATIENT)
Dept: INFUSION THERAPY | Facility: HOSPITAL | Age: 59
End: 2024-07-02
Attending: INTERNAL MEDICINE
Payer: COMMERCIAL

## 2024-07-02 VITALS
WEIGHT: 210 LBS | HEART RATE: 70 BPM | RESPIRATION RATE: 16 BRPM | BODY MASS INDEX: 26.95 KG/M2 | HEIGHT: 74 IN | OXYGEN SATURATION: 96 % | SYSTOLIC BLOOD PRESSURE: 134 MMHG | DIASTOLIC BLOOD PRESSURE: 74 MMHG | TEMPERATURE: 98 F

## 2024-07-02 DIAGNOSIS — C78.89 METASTATIC ADENOCARCINOMA TO PANCREAS: ICD-10-CM

## 2024-07-02 DIAGNOSIS — C18.4 MALIGNANT NEOPLASM OF TRANSVERSE COLON: Primary | ICD-10-CM

## 2024-07-02 PROCEDURE — 63600175 PHARM REV CODE 636 W HCPCS: Performed by: INTERNAL MEDICINE

## 2024-07-02 PROCEDURE — 96413 CHEMO IV INFUSION 1 HR: CPT

## 2024-07-02 PROCEDURE — A4216 STERILE WATER/SALINE, 10 ML: HCPCS | Performed by: INTERNAL MEDICINE

## 2024-07-02 PROCEDURE — 25000003 PHARM REV CODE 250: Performed by: INTERNAL MEDICINE

## 2024-07-02 RX ORDER — DIPHENHYDRAMINE HYDROCHLORIDE 50 MG/ML
50 INJECTION INTRAMUSCULAR; INTRAVENOUS ONCE AS NEEDED
Status: DISCONTINUED | OUTPATIENT
Start: 2024-07-02 | End: 2024-07-02 | Stop reason: HOSPADM

## 2024-07-02 RX ORDER — EPINEPHRINE 0.3 MG/.3ML
0.3 INJECTION SUBCUTANEOUS ONCE AS NEEDED
Status: DISCONTINUED | OUTPATIENT
Start: 2024-07-02 | End: 2024-07-02 | Stop reason: HOSPADM

## 2024-07-02 RX ORDER — HEPARIN 100 UNIT/ML
500 SYRINGE INTRAVENOUS
Status: DISCONTINUED | OUTPATIENT
Start: 2024-07-02 | End: 2024-07-02 | Stop reason: HOSPADM

## 2024-07-02 RX ORDER — SODIUM CHLORIDE 0.9 % (FLUSH) 0.9 %
10 SYRINGE (ML) INJECTION
Status: DISCONTINUED | OUTPATIENT
Start: 2024-07-02 | End: 2024-07-02 | Stop reason: HOSPADM

## 2024-07-02 RX ADMIN — SODIUM CHLORIDE 200 MG: 9 INJECTION, SOLUTION INTRAVENOUS at 10:07

## 2024-07-02 RX ADMIN — HEPARIN 500 UNITS: 100 SYRINGE at 10:07

## 2024-07-02 RX ADMIN — SODIUM CHLORIDE, PRESERVATIVE FREE 10 ML: 5 INJECTION INTRAVENOUS at 10:07

## 2024-07-02 NOTE — PLAN OF CARE
Problem: Fatigue  Goal: Improved Activity Tolerance  Outcome: Progressing  Intervention: Promote Improved Energy  Flowsheets (Taken 7/2/2024 1004)  Fatigue Management: fatigue-related activity identified  Sleep/Rest Enhancement:   relaxation techniques promoted   regular sleep/rest pattern promoted  Environmental Support: rest periods encouraged

## 2024-07-05 ENCOUNTER — HOSPITAL ENCOUNTER (OUTPATIENT)
Dept: RADIOLOGY | Facility: HOSPITAL | Age: 59
Discharge: HOME | End: 2024-07-05
Attending: STUDENT IN AN ORGANIZED HEALTH CARE EDUCATION/TRAINING PROGRAM
Payer: COMMERCIAL

## 2024-07-05 DIAGNOSIS — C18.5 MALIGNANT NEOPLASM OF SPLENIC FLEXURE: ICD-10-CM

## 2024-07-05 PROCEDURE — 74177 CT ABD & PELVIS W/CONTRAST: CPT | Mod: TC

## 2024-07-05 PROCEDURE — 74177 CT ABD & PELVIS W/CONTRAST: CPT | Mod: 26,,, | Performed by: RADIOLOGY

## 2024-07-05 PROCEDURE — 25500020 PHARM REV CODE 255: Performed by: STUDENT IN AN ORGANIZED HEALTH CARE EDUCATION/TRAINING PROGRAM

## 2024-07-05 RX ADMIN — IOHEXOL 50 ML: 350 INJECTION, SOLUTION INTRAVENOUS at 08:07

## 2024-07-05 RX ADMIN — IOHEXOL 100 ML: 350 INJECTION, SOLUTION INTRAVENOUS at 08:07

## 2024-07-09 ENCOUNTER — PATIENT MESSAGE (OUTPATIENT)
Dept: SURGERY | Facility: CLINIC | Age: 59
End: 2024-07-09
Payer: COMMERCIAL

## 2024-07-15 ENCOUNTER — PATIENT MESSAGE (OUTPATIENT)
Dept: FAMILY MEDICINE | Facility: CLINIC | Age: 59
End: 2024-07-15
Payer: COMMERCIAL

## 2024-07-15 ENCOUNTER — TELEPHONE (OUTPATIENT)
Dept: HEMATOLOGY/ONCOLOGY | Facility: CLINIC | Age: 59
End: 2024-07-15
Payer: COMMERCIAL

## 2024-07-15 DIAGNOSIS — E11.69 DM TYPE 2 WITH DIABETIC DYSLIPIDEMIA: ICD-10-CM

## 2024-07-15 DIAGNOSIS — E78.5 DM TYPE 2 WITH DIABETIC DYSLIPIDEMIA: ICD-10-CM

## 2024-07-15 RX ORDER — INSULIN GLARGINE 300 [IU]/ML
48 INJECTION, SOLUTION SUBCUTANEOUS DAILY
Qty: 5 PEN | Refills: 5 | Status: SHIPPED | OUTPATIENT
Start: 2024-07-15

## 2024-07-22 ENCOUNTER — LAB VISIT (OUTPATIENT)
Dept: LAB | Facility: HOSPITAL | Age: 59
End: 2024-07-22
Attending: INTERNAL MEDICINE
Payer: COMMERCIAL

## 2024-07-22 ENCOUNTER — OFFICE VISIT (OUTPATIENT)
Dept: HEMATOLOGY/ONCOLOGY | Facility: CLINIC | Age: 59
End: 2024-07-22
Payer: COMMERCIAL

## 2024-07-22 VITALS
TEMPERATURE: 98 F | HEART RATE: 79 BPM | OXYGEN SATURATION: 98 % | WEIGHT: 214.75 LBS | DIASTOLIC BLOOD PRESSURE: 57 MMHG | HEIGHT: 74 IN | SYSTOLIC BLOOD PRESSURE: 118 MMHG | RESPIRATION RATE: 16 BRPM | BODY MASS INDEX: 27.56 KG/M2

## 2024-07-22 DIAGNOSIS — C77.2 METASTASIS TO RETROPERITONEAL LYMPH NODE: ICD-10-CM

## 2024-07-22 DIAGNOSIS — C18.4 MALIGNANT NEOPLASM OF TRANSVERSE COLON: ICD-10-CM

## 2024-07-22 DIAGNOSIS — I10 PRIMARY HYPERTENSION: ICD-10-CM

## 2024-07-22 DIAGNOSIS — E11.00 TYPE 2 DIABETES MELLITUS WITH HYPEROSMOLARITY WITHOUT COMA, WITHOUT LONG-TERM CURRENT USE OF INSULIN: ICD-10-CM

## 2024-07-22 DIAGNOSIS — C18.4 MALIGNANT NEOPLASM OF TRANSVERSE COLON: Primary | ICD-10-CM

## 2024-07-22 DIAGNOSIS — C78.89 METASTATIC ADENOCARCINOMA TO PANCREAS: ICD-10-CM

## 2024-07-22 LAB
ALBUMIN SERPL BCP-MCNC: 3.8 G/DL (ref 3.5–5.2)
ALP SERPL-CCNC: 107 U/L (ref 55–135)
ALT SERPL W/O P-5'-P-CCNC: 17 U/L (ref 10–44)
ANION GAP SERPL CALC-SCNC: 7 MMOL/L (ref 8–16)
AST SERPL-CCNC: 20 U/L (ref 10–40)
BASOPHILS # BLD AUTO: 0.07 K/UL (ref 0–0.2)
BASOPHILS NFR BLD: 0.5 % (ref 0–1.9)
BILIRUB SERPL-MCNC: 0.6 MG/DL (ref 0.1–1)
BUN SERPL-MCNC: 24 MG/DL (ref 6–20)
CALCIUM SERPL-MCNC: 9.8 MG/DL (ref 8.7–10.5)
CHLORIDE SERPL-SCNC: 106 MMOL/L (ref 95–110)
CO2 SERPL-SCNC: 25 MMOL/L (ref 23–29)
CREAT SERPL-MCNC: 1.2 MG/DL (ref 0.5–1.4)
DIFFERENTIAL METHOD BLD: ABNORMAL
EOSINOPHIL # BLD AUTO: 0.3 K/UL (ref 0–0.5)
EOSINOPHIL NFR BLD: 2 % (ref 0–8)
ERYTHROCYTE [DISTWIDTH] IN BLOOD BY AUTOMATED COUNT: 14.2 % (ref 11.5–14.5)
EST. GFR  (NO RACE VARIABLE): >60 ML/MIN/1.73 M^2
GLUCOSE SERPL-MCNC: 141 MG/DL (ref 70–110)
HCT VFR BLD AUTO: 36.2 % (ref 40–54)
HGB BLD-MCNC: 11.7 G/DL (ref 14–18)
IMM GRANULOCYTES # BLD AUTO: 0.06 K/UL (ref 0–0.04)
IMM GRANULOCYTES NFR BLD AUTO: 0.4 % (ref 0–0.5)
LYMPHOCYTES # BLD AUTO: 7.3 K/UL (ref 1–4.8)
LYMPHOCYTES NFR BLD: 52.4 % (ref 18–48)
MCH RBC QN AUTO: 28.3 PG (ref 27–31)
MCHC RBC AUTO-ENTMCNC: 32.3 G/DL (ref 32–36)
MCV RBC AUTO: 87 FL (ref 82–98)
MONOCYTES # BLD AUTO: 1.5 K/UL (ref 0.3–1)
MONOCYTES NFR BLD: 10.4 % (ref 4–15)
NEUTROPHILS # BLD AUTO: 4.8 K/UL (ref 1.8–7.7)
NEUTROPHILS NFR BLD: 34.3 % (ref 38–73)
NRBC BLD-RTO: 0 /100 WBC
PLATELET # BLD AUTO: 443 K/UL (ref 150–450)
PMV BLD AUTO: 9.1 FL (ref 9.2–12.9)
POTASSIUM SERPL-SCNC: 5.5 MMOL/L (ref 3.5–5.1)
PROT SERPL-MCNC: 6.9 G/DL (ref 6–8.4)
RBC # BLD AUTO: 4.14 M/UL (ref 4.6–6.2)
SODIUM SERPL-SCNC: 138 MMOL/L (ref 136–145)
WBC # BLD AUTO: 14.01 K/UL (ref 3.9–12.7)

## 2024-07-22 PROCEDURE — 99215 OFFICE O/P EST HI 40 MIN: CPT | Mod: S$GLB,,, | Performed by: INTERNAL MEDICINE

## 2024-07-22 PROCEDURE — 80053 COMPREHEN METABOLIC PANEL: CPT | Performed by: INTERNAL MEDICINE

## 2024-07-22 PROCEDURE — 85025 COMPLETE CBC W/AUTO DIFF WBC: CPT | Performed by: INTERNAL MEDICINE

## 2024-07-22 PROCEDURE — 99999 PR PBB SHADOW E&M-EST. PATIENT-LVL IV: CPT | Mod: PBBFAC,,, | Performed by: INTERNAL MEDICINE

## 2024-07-22 PROCEDURE — G2211 COMPLEX E/M VISIT ADD ON: HCPCS | Mod: S$GLB,,, | Performed by: INTERNAL MEDICINE

## 2024-07-22 PROCEDURE — 36415 COLL VENOUS BLD VENIPUNCTURE: CPT | Performed by: INTERNAL MEDICINE

## 2024-07-22 RX ORDER — DIPHENHYDRAMINE HYDROCHLORIDE 50 MG/ML
50 INJECTION INTRAMUSCULAR; INTRAVENOUS ONCE AS NEEDED
Status: CANCELLED | OUTPATIENT
Start: 2024-07-23

## 2024-07-22 RX ORDER — DIPHENHYDRAMINE HYDROCHLORIDE 50 MG/ML
50 INJECTION INTRAMUSCULAR; INTRAVENOUS ONCE AS NEEDED
OUTPATIENT
Start: 2024-08-13

## 2024-07-22 RX ORDER — EPINEPHRINE 0.3 MG/.3ML
0.3 INJECTION SUBCUTANEOUS ONCE AS NEEDED
Status: CANCELLED | OUTPATIENT
Start: 2024-07-23

## 2024-07-22 RX ORDER — SODIUM CHLORIDE 0.9 % (FLUSH) 0.9 %
10 SYRINGE (ML) INJECTION
Status: CANCELLED | OUTPATIENT
Start: 2024-07-23

## 2024-07-22 RX ORDER — EPINEPHRINE 0.3 MG/.3ML
0.3 INJECTION SUBCUTANEOUS ONCE AS NEEDED
OUTPATIENT
Start: 2024-08-13

## 2024-07-22 RX ORDER — HEPARIN 100 UNIT/ML
500 SYRINGE INTRAVENOUS
Status: CANCELLED | OUTPATIENT
Start: 2024-07-23

## 2024-07-22 RX ORDER — SODIUM CHLORIDE 0.9 % (FLUSH) 0.9 %
10 SYRINGE (ML) INJECTION
OUTPATIENT
Start: 2024-08-13

## 2024-07-22 RX ORDER — HEPARIN 100 UNIT/ML
500 SYRINGE INTRAVENOUS
OUTPATIENT
Start: 2024-08-13

## 2024-07-22 NOTE — PROGRESS NOTES
Service Date:  7/22/24    Chief Complaint:  Colon cancer    Osman Li Jr. is a 59 y.o. 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  This occurred very shortly after completing treatment.    Patient was part of a clinical trial to measure CT DNA.  His CT DNA started to go up while he was on FOLFOX, which prompted the scan, which showed the progression.  Next generation sequencing also showed a high tumor burden.    He is now on Keytruda as his cancer has a very high tumor mutation burden.  So far, Keytruda has been helping    Here for Keytruda.  Recently had a ostomy takedown with reversal.  Patient then had a leakage at the anastomosis site and now has an ileostomy in place, with plans to reverse this in the future.  Patient is doing well now.  Recovered from the surgery mostly.  Will have re-imaging soon. No complaints to me      Review of Systems   Constitutional:  Positive for fatigue. Negative for appetite change and unexpected weight change.   HENT: Negative.  Negative for mouth sores.    Eyes: Negative.  Negative for visual disturbance.   Respiratory: Negative.  Negative for cough and shortness of breath.    Cardiovascular: Negative.  Negative for chest pain.   Gastrointestinal: Negative.  Negative for diarrhea.   Endocrine: Negative.    Genitourinary: Negative.  Negative for frequency.   Musculoskeletal: Negative.  Negative for back pain.   Integumentary:  Negative for rash. Negative.   Neurological:  Positive for numbness. Negative for headaches.   Hematological: Negative.  Negative for adenopathy.   Psychiatric/Behavioral: Negative.  The patient is not nervous/anxious.         Current Outpatient Medications   Medication Instructions    atorvastatin (LIPITOR) 20 mg, Oral, Nightly    enalapril (VASOTEC) 10 mg, Oral, Daily    gabapentin (NEURONTIN) 300 mg, Oral, 3 times daily    metFORMIN (GLUCOPHAGE)  "1,000 mg, Oral, 2 times daily with meals    methocarbamoL (ROBAXIN) 500 mg, Oral, Every 8 hours PRN    metoprolol tartrate (LOPRESSOR) 25 mg, Oral, 2 times daily    morphine (MS CONTIN) 15 mg, Oral, 2 times daily    nicotine (NICODERM CQ) 14 mg/24 hr 1 patch, Transdermal, Daily    oxyCODONE (ROXICODONE) 10 mg, Oral, Every 4 hours PRN    pen needle, diabetic 31 gauge x 3/16" Ndle 1 each, Misc.(Non-Drug; Combo Route), Daily    promethazine (PHENERGAN) 12.5 mg, Oral, Every 4 hours PRN    scopolamine (TRANSDERM-SCOP) 1.3-1.5 mg (1 mg over 3 days) 1 patch, Transdermal, Every 3 days    sildenafiL (VIAGRA) 100 mg, Oral, Daily PRN    tamsulosin (FLOMAX) 0.8 mg, Oral, Daily    TOUJEO MAX U-300 SOLOSTAR 48 Units, Subcutaneous, Daily, (Discard pen 56 days after initial use)    traZODone (DESYREL) 50 mg, Oral, Nightly        Past Medical History:   Diagnosis Date    Colon cancer     Digestive disorder     DM (diabetes mellitus)     Hyperlipidemia     Hypertension     Prediabetes         Past Surgical History:   Procedure Laterality Date    ADENOIDECTOMY  1972    ANASTOMOSIS OF ILEUM TO RECTUM N/A 5/21/2024    Procedure: ANASTOMOSIS, ILEORECTAL;  Surgeon: Farhan Lance MD;  Location: [address];  Service: Colon and Rectal;  Laterality: N/A;    CHOLECYSTECTOMY  2011    CLOSURE, COLOSTOMY N/A 5/13/2024    Procedure: CLOSURE, COLOSTOMY (eras high/lithotomy);  Surgeon: Farhan Lance MD;  [address];  Service: Colon and Rectal;  Laterality: N/A;  CONSENTS IN [facility]    COLON SURGERY      COLONOSCOPY      2018    COLONOSCOPY N/A 3/5/2024    Procedure: COLONOSCOPY;  Surgeon: Farhan Lance MD;  Location: [facility] (4TH FLR);  Service: Endoscopy;  Laterality: N/A;  2/27/24-[facility] pt, 1-4wks, port, PEG plus 2 enemas morning of procedure, instr portal-DS  2/28/24- LVM for precall - ERW  pt confirmed procedure. cf    COLOSTOMY N/A 04/25/2022    Procedure: CREATION, COLOSTOMY;  Surgeon: Carlton Waddell, " MD;  Location: [facility] OR;  Service: General;  Laterality: N/A;    ENDOSCOPIC ULTRASOUND OF UPPER GASTROINTESTINAL TRACT N/A 01/06/2023    Procedure: ULTRASOUND, UPPER GI TRACT, ENDOSCOPIC;  Surgeon: Rinku Orozco III, MD;  Location: [facility] ENDO;  Service: Endoscopy;  Laterality: N/A;    FLEXIBLE SIGMOIDOSCOPY N/A 5/13/2024    Procedure: SIGMOIDOSCOPY, FLEXIBLE;  Surgeon: Farhan Lance MD;  Location: [facility] OR 2ND FLR;  Service: Colon and Rectal;  Laterality: N/A;    FLEXIBLE SIGMOIDOSCOPY  5/21/2024    Procedure: SIGMOIDOSCOPY, FLEXIBLE;  Surgeon: Farhan Lance MD;  Location: [facility] OR 2ND FLR;  Service: Colon and Rectal;;    ILEOSTOMY N/A 5/21/2024    Procedure: CREATION, ILEOSTOMY, DIVERTING LOOP;  Surgeon: Farhan Lance MD;  [facility] OR [facility] FLR;  Service: Colon and Rectal;  Laterality: N/A;    INSERTION OF TUNNELED CENTRAL VENOUS CATHETER (CVC) WITH SUBCUTANEOUS PORT Right 05/18/2022    Procedure: [id number];  Surgeon: Carlton Waddell MD;  Location: [facility] OR;  Service: General;  Laterality: Right;    INSERTION OF URETERAL CATHETER N/A 5/13/2024    Procedure: INSERTION, CATHETER, URETER, BILATERAL;  Surgeon: Travis Edwards MD;  Location: [facility] OR 2ND FLR;  Service: Urology;  Laterality: N/A;    INSERTION OF URETERAL CATHETER Left 5/21/2024    Procedure: INSERTION, CATHETER, URETER;  Surgeon: Carlton Santos MD;  Location: [facility] OR 2ND FLR;  Service: Urology;  Laterality: Left;    LAPAROTOMY, EXPLORATORY N/A 5/21/2024    Procedure: LAPAROTOMY, EXPLORATORY;  Surgeon: Farhan Lance MD;  Location: [facility] OR 2ND FLR;  Service: Colon and Rectal;  Laterality: N/A;    LYSIS OF ADHESIONS N/A 5/13/2024    Procedure: LYSIS, ADHESIONS;  Surgeon: Farhan Lance MD;  Location: [facility] OR 2ND FLR;  Service: Colon and Rectal;  Laterality: N/A;    MOBILIZATION OF SPLENIC FLEXURE N/A 04/25/2022    Procedure: MOBILIZATION, SPLENIC FLEXURE;  Surgeon: Carlton Waddell MD;  Location: [facility] OR;  " Service: General;  Laterality: N/A;    SPLENECTOMY N/A 04/25/2022    Procedure: SPLENECTOMY;  Surgeon: Carlton Waddell MD;  Location: [facility] OR;  Service: General;  Laterality: N/A;    SUBTOTAL COLECTOMY N/A 04/25/2022    Procedure: COLECTOMY, PARTIAL;  Surgeon: Carlton Waddell MD;  Location: [facility] OR;  Service: General;  Laterality: N/A;    TONSILLECTOMY      TOTAL ABDOMINAL COLECTOMY N/A 5/21/2024    Procedure: COLECTOMY, COMPLETION, ABDOMINAL;  Surgeon: Farhan Lance MD;  [facility];  Service: Colon and Rectal;  Laterality: N/A;    TUMOR REMOVAL  10/18/2023    on top of the nose    VASECTOMY  1994        Family History   Problem Relation Name Age of Onset    Heart disease Father Loki senior     Diabetes Father Loki senior     Alcohol abuse Paternal Grandfather Waylon Li     Rectal cancer Other Makenzie         half-sister    Cancer Sister makenzie        Social History     Tobacco Use    Smoking status: Every Day     Current packs/day: 1.00     Average packs/day: 1 pack/day for 40.0 years (40.0 ttl pk-yrs)     Types: Cigarettes     Passive exposure: Current    Smokeless tobacco: Never   Substance Use Topics    Alcohol use: Not Currently    Drug use: Never         Vitals:    07/22/24 0910   BP: (!) 118/57   Pulse: 79   Resp: 16   Temp: 97.7 °F (36.5 °C)          Physical Exam:  BP (!) 118/57 (BP Location: Right arm, Patient Position: Sitting, BP Method: Small (Automatic))   Pulse 79   Temp 97.7 °F (36.5 °C) (Temporal)   Resp 16   Ht 6' 2" (1.88 m)   Wt 97.4 kg (214 lb 11.7 oz)   SpO2 98%   BMI 27.57 kg/m²     Physical Exam  Vitals and nursing note reviewed.   Constitutional:       Appearance: Normal appearance.   HENT:      Head: Normocephalic and atraumatic.      Nose: Nose normal.      Mouth/Throat:      Mouth: Mucous membranes are moist.      Pharynx: Oropharynx is clear.   Eyes:      Extraocular Movements: Extraocular movements intact.      Conjunctiva/sclera: Conjunctivae normal. "   Cardiovascular:      Rate and Rhythm: Normal rate and regular rhythm.      Heart sounds: Normal heart sounds.   Pulmonary:      Effort: Pulmonary effort is normal.      Breath sounds: Normal breath sounds.   Abdominal:      General: Abdomen is flat. Bowel sounds are normal.      Palpations: Abdomen is soft.      Comments: Ostomy bag in place.   Musculoskeletal:         General: Normal range of motion.      Cervical back: Normal range of motion and neck supple.   Skin:     General: Skin is warm and dry.   Neurological:      General: No focal deficit present.      Mental Status: He is alert and oriented to person, place, and time. Mental status is at baseline.   Psychiatric:         Mood and Affect: Mood normal.          Labs:  Lab Results   Component Value Date    WBC 14.01 (H) 07/22/2024    RBC 4.14 (L) 07/22/2024    HGB 11.7 (L) 07/22/2024    HCT 36.2 (L) 07/22/2024    MCV 87 07/22/2024    MCH 28.3 07/22/2024    MCHC 32.3 07/22/2024    RDW 14.2 07/22/2024     07/22/2024    MPV 9.1 (L) 07/22/2024    GRAN 4.8 07/22/2024    GRAN 34.3 (L) 07/22/2024    LYMPH 7.3 (H) 07/22/2024    LYMPH 52.4 (H) 07/22/2024    MONO 1.5 (H) 07/22/2024    MONO 10.4 07/22/2024    EOS 0.3 07/22/2024    BASO 0.07 07/22/2024    EOSINOPHIL 2.0 07/22/2024    BASOPHIL 0.5 07/22/2024     Sodium   Date Value Ref Range Status   07/22/2024 138 136 - 145 mmol/L Final     Potassium   Date Value Ref Range Status   07/22/2024 5.5 (H) 3.5 - 5.1 mmol/L Final     Chloride   Date Value Ref Range Status   07/22/2024 106 95 - 110 mmol/L Final     CO2   Date Value Ref Range Status   07/22/2024 25 23 - 29 mmol/L Final     Glucose   Date Value Ref Range Status   07/22/2024 141 (H) 70 - 110 mg/dL Final     BUN   Date Value Ref Range Status   07/22/2024 24 (H) 6 - 20 mg/dL Final     Creatinine   Date Value Ref Range Status   07/22/2024 1.2 0.5 - 1.4 mg/dL Final     Calcium   Date Value Ref Range Status   07/22/2024 9.8 8.7 - 10.5 mg/dL Final     Total  Protein   Date Value Ref Range Status   07/22/2024 6.9 6.0 - 8.4 g/dL Final     Albumin   Date Value Ref Range Status   07/22/2024 3.8 3.5 - 5.2 g/dL Final     Total Bilirubin   Date Value Ref Range Status   07/22/2024 0.6 0.1 - 1.0 mg/dL Final     Comment:     For infants and newborns, interpretation of results should be based  on gestational age, weight and in agreement with clinical  observations.    Premature Infant recommended reference ranges:  Up to 24 hours.............<8.0 mg/dL  Up to 48 hours............<12.0 mg/dL  3-5 days..................<15.0 mg/dL  6-29 days.................<15.0 mg/dL       Alkaline Phosphatase   Date Value Ref Range Status   07/22/2024 107 55 - 135 U/L Final     AST   Date Value Ref Range Status   07/22/2024 20 10 - 40 U/L Final     ALT   Date Value Ref Range Status   07/22/2024 17 10 - 44 U/L Final     Anion Gap   Date Value Ref Range Status   07/22/2024 7 (L) 8 - 16 mmol/L Final     eGFR if    Date Value Ref Range Status   07/25/2022 >60 >60 mL/min/1.73 m^2 Final     eGFR if non    Date Value Ref Range Status   07/25/2022 >60 >60 mL/min/1.73 m^2 Final     Comment:     Calculation used to obtain the estimated glomerular filtration  rate (eGFR) is the CKD-EPI equation.          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    -Recent CT scan shows no new evidence of disease, with a smaller lesion on the pancreatic tail and stability of the retroperitoneal lymph node that was radiated.  PET scan shows no evidence of disease, so it was  decided to forego surgery.  Testing for ctDNA negative  -now back on Keytruda  -okay for next 2 doses.  Return to clinic in 6 weeks for next.    Pancreatic mass   -confirmed recurrence of colon adenocarcinoma    Back pain/pain from stoma  - NM bone scan negative for mets  -now on morphine dose to 30 mg bid    HTN  - on Enalapril  - follow up with PCP    HLP  - follow up with PCP    DM2  - on Metformin  - follow up with PCP    Tobacco use  - counseled on cessation      Aurash Khoobehi, MD  Hematology and Oncology

## 2024-07-23 ENCOUNTER — INFUSION (OUTPATIENT)
Dept: INFUSION THERAPY | Facility: HOSPITAL | Age: 59
End: 2024-07-23
Attending: INTERNAL MEDICINE
Payer: COMMERCIAL

## 2024-07-23 VITALS
HEART RATE: 79 BPM | BODY MASS INDEX: 27.66 KG/M2 | RESPIRATION RATE: 18 BRPM | DIASTOLIC BLOOD PRESSURE: 72 MMHG | WEIGHT: 215.5 LBS | HEIGHT: 74 IN | SYSTOLIC BLOOD PRESSURE: 119 MMHG | TEMPERATURE: 98 F

## 2024-07-23 DIAGNOSIS — C18.4 MALIGNANT NEOPLASM OF TRANSVERSE COLON: Primary | ICD-10-CM

## 2024-07-23 DIAGNOSIS — C78.89 METASTATIC ADENOCARCINOMA TO PANCREAS: ICD-10-CM

## 2024-07-23 PROCEDURE — 96413 CHEMO IV INFUSION 1 HR: CPT

## 2024-07-23 PROCEDURE — A4216 STERILE WATER/SALINE, 10 ML: HCPCS | Performed by: INTERNAL MEDICINE

## 2024-07-23 PROCEDURE — 63600175 PHARM REV CODE 636 W HCPCS: Mod: JZ,JG | Performed by: INTERNAL MEDICINE

## 2024-07-23 PROCEDURE — 25000003 PHARM REV CODE 250: Performed by: INTERNAL MEDICINE

## 2024-07-23 RX ORDER — DIPHENHYDRAMINE HYDROCHLORIDE 50 MG/ML
50 INJECTION INTRAMUSCULAR; INTRAVENOUS ONCE AS NEEDED
Status: DISCONTINUED | OUTPATIENT
Start: 2024-07-23 | End: 2024-07-23 | Stop reason: HOSPADM

## 2024-07-23 RX ORDER — HEPARIN 100 UNIT/ML
500 SYRINGE INTRAVENOUS
Status: DISCONTINUED | OUTPATIENT
Start: 2024-07-23 | End: 2024-07-23 | Stop reason: HOSPADM

## 2024-07-23 RX ORDER — EPINEPHRINE 0.3 MG/.3ML
0.3 INJECTION SUBCUTANEOUS ONCE AS NEEDED
Status: DISCONTINUED | OUTPATIENT
Start: 2024-07-23 | End: 2024-07-23 | Stop reason: HOSPADM

## 2024-07-23 RX ORDER — SODIUM CHLORIDE 0.9 % (FLUSH) 0.9 %
10 SYRINGE (ML) INJECTION
Status: DISCONTINUED | OUTPATIENT
Start: 2024-07-23 | End: 2024-07-23 | Stop reason: HOSPADM

## 2024-07-23 RX ADMIN — SODIUM CHLORIDE 200 MG: 9 INJECTION, SOLUTION INTRAVENOUS at 08:07

## 2024-07-23 RX ADMIN — SODIUM CHLORIDE, PRESERVATIVE FREE 10 ML: 5 INJECTION INTRAVENOUS at 09:07

## 2024-07-23 RX ADMIN — HEPARIN 500 UNITS: 100 SYRINGE at 09:07

## 2024-07-23 NOTE — PLAN OF CARE
Problem: Fatigue  Goal: Improved Activity Tolerance  7/23/2024 0826 by Shobha Gordillo, RN  Outcome: Met  7/23/2024 0826 by Shobha Gordillo, RN  Outcome: Progressing

## 2024-07-24 ENCOUNTER — TELEPHONE (OUTPATIENT)
Dept: HEMATOLOGY/ONCOLOGY | Facility: CLINIC | Age: 59
End: 2024-07-24
Payer: COMMERCIAL

## 2024-08-09 ENCOUNTER — OFFICE VISIT (OUTPATIENT)
Dept: SURGERY | Facility: CLINIC | Age: 59
End: 2024-08-09
Payer: COMMERCIAL

## 2024-08-09 VITALS
WEIGHT: 220.25 LBS | HEIGHT: 74 IN | SYSTOLIC BLOOD PRESSURE: 137 MMHG | BODY MASS INDEX: 28.27 KG/M2 | RESPIRATION RATE: 18 BRPM | HEART RATE: 70 BPM | DIASTOLIC BLOOD PRESSURE: 75 MMHG

## 2024-08-09 DIAGNOSIS — C18.5 MALIGNANT NEOPLASM OF SPLENIC FLEXURE: Primary | ICD-10-CM

## 2024-08-09 PROCEDURE — 99999 PR PBB SHADOW E&M-EST. PATIENT-LVL V: CPT | Mod: PBBFAC,,, | Performed by: STUDENT IN AN ORGANIZED HEALTH CARE EDUCATION/TRAINING PROGRAM

## 2024-08-12 ENCOUNTER — TELEPHONE (OUTPATIENT)
Dept: HEMATOLOGY/ONCOLOGY | Facility: CLINIC | Age: 59
End: 2024-08-12
Payer: COMMERCIAL

## 2024-08-12 DIAGNOSIS — E11.00 TYPE 2 DIABETES MELLITUS WITH HYPEROSMOLARITY WITHOUT COMA, WITHOUT LONG-TERM CURRENT USE OF INSULIN: ICD-10-CM

## 2024-08-12 RX ORDER — METRONIDAZOLE 500 MG/1
500 TABLET ORAL SEE ADMIN INSTRUCTIONS
Qty: 2 TABLET | Refills: 0 | Status: SHIPPED | OUTPATIENT
Start: 2024-08-12

## 2024-08-12 RX ORDER — PEN NEEDLE, DIABETIC 30 GX3/16"
1 NEEDLE, DISPOSABLE MISCELLANEOUS DAILY
Qty: 100 EACH | Refills: 3 | Status: SHIPPED | OUTPATIENT
Start: 2024-08-12

## 2024-08-12 RX ORDER — CIPROFLOXACIN 500 MG/1
500 TABLET ORAL SEE ADMIN INSTRUCTIONS
Qty: 2 TABLET | Refills: 0 | Status: SHIPPED | OUTPATIENT
Start: 2024-08-12

## 2024-08-12 NOTE — TELEPHONE ENCOUNTER
Pt is needing a refill on his pen needles. Last office visit 06/27/2024. Next office visit 10/31/2024.

## 2024-08-12 NOTE — TELEPHONE ENCOUNTER
----- Message from Aurash Khoobehi, MD sent at 8/9/2024  4:46 PM CDT -----  Sure thing. He is 8/13 and 9/4, so we can hold both.    Jasmin, please cancel those two treatments.    Thanks.  ----- Message -----  From: Farhan Lance MD  Sent: 8/9/2024   8:54 AM CDT  To: Aurash Khoobehi, MD    Good morning, planning ileostomy closure on 9/20, can you please arrange to hold Keytruda in advance?

## 2024-08-15 ENCOUNTER — TELEPHONE (OUTPATIENT)
Dept: SURGERY | Facility: CLINIC | Age: 59
End: 2024-08-15
Payer: COMMERCIAL

## 2024-08-15 NOTE — TELEPHONE ENCOUNTER
RN called pt's wife to get new sx date options b/c Dr. Lance will be out on 9/20.  RN will message Dr. Lance and propose 9/23, 9/27 or 10/11.  Pt's wife verbalized understanding to all.

## 2024-08-16 ENCOUNTER — PATIENT MESSAGE (OUTPATIENT)
Dept: SURGERY | Facility: CLINIC | Age: 59
End: 2024-08-16
Payer: COMMERCIAL

## 2024-08-16 ENCOUNTER — TELEPHONE (OUTPATIENT)
Dept: SURGERY | Facility: CLINIC | Age: 59
End: 2024-08-16
Payer: COMMERCIAL

## 2024-08-16 NOTE — TELEPHONE ENCOUNTER
RN called pt's wife and confirmed his new sx date of 9/23 which works best for Dr. Lance.  Pt's wife verbalized understanding to all.

## 2024-09-03 ENCOUNTER — PATIENT MESSAGE (OUTPATIENT)
Dept: SURGERY | Facility: CLINIC | Age: 59
End: 2024-09-03
Payer: COMMERCIAL

## 2024-09-03 ENCOUNTER — PATIENT MESSAGE (OUTPATIENT)
Dept: FAMILY MEDICINE | Facility: CLINIC | Age: 59
End: 2024-09-03
Payer: COMMERCIAL

## 2024-09-03 DIAGNOSIS — E78.2 MIXED HYPERLIPIDEMIA: ICD-10-CM

## 2024-09-03 DIAGNOSIS — E78.5 DM TYPE 2 WITH DIABETIC DYSLIPIDEMIA: ICD-10-CM

## 2024-09-03 DIAGNOSIS — F51.01 PRIMARY INSOMNIA: ICD-10-CM

## 2024-09-03 DIAGNOSIS — E11.69 DM TYPE 2 WITH DIABETIC DYSLIPIDEMIA: ICD-10-CM

## 2024-09-04 RX ORDER — INSULIN GLARGINE 300 [IU]/ML
48 INJECTION, SOLUTION SUBCUTANEOUS DAILY
Qty: 2 PEN | Refills: 11 | Status: SHIPPED | OUTPATIENT
Start: 2024-09-04

## 2024-09-04 RX ORDER — ATORVASTATIN CALCIUM 20 MG/1
20 TABLET, FILM COATED ORAL NIGHTLY
Qty: 90 TABLET | Refills: 3 | Status: SHIPPED | OUTPATIENT
Start: 2024-09-04

## 2024-09-04 RX ORDER — TRAZODONE HYDROCHLORIDE 50 MG/1
50 TABLET ORAL NIGHTLY
Qty: 90 TABLET | Refills: 3 | Status: SHIPPED | OUTPATIENT
Start: 2024-09-04 | End: 2025-09-04

## 2024-09-04 RX ORDER — METFORMIN HYDROCHLORIDE 1000 MG/1
1000 TABLET ORAL 2 TIMES DAILY WITH MEALS
Qty: 180 TABLET | Refills: 3 | Status: SHIPPED | OUTPATIENT
Start: 2024-09-04

## 2024-09-04 RX ORDER — ENALAPRIL MALEATE 10 MG/1
10 TABLET ORAL DAILY
Qty: 90 TABLET | Refills: 3 | Status: SHIPPED | OUTPATIENT
Start: 2024-09-04 | End: 2025-09-04

## 2024-09-19 NOTE — PROGRESS NOTES
Problem: Potential for Falls  Goal: Patient will remain free of falls  Description: INTERVENTIONS:  - Educate patient/family on patient safety including physical limitations  - Instruct patient to call for assistance with activity   - Consult OT/PT to assist with strengthening/mobility   - Keep Call bell within reach  - Keep bed low and locked with side rails adjusted as appropriate  - Keep care items and personal belongings within reach  - Initiate and maintain comfort rounds  - Make Fall Risk Sign visible to staff  - Offer Toileting Hours, in advance of need  - Initiate/Maintain alarm  - Obtain necessary fall risk management equipment:   - Apply yellow socks and bracelet for high fall risk patients  - Consider moving patient to room near nurses station  Outcome: Progressing     Problem: Prexisting or High Potential for Compromised Skin Integrity  Goal: Skin integrity is maintained or improved  Description: INTERVENTIONS:  - Identify patients at risk for skin breakdown  - Assess and monitor skin integrity  - Assess and monitor nutrition and hydration status  - Monitor labs   - Assess for incontinence   - Turn and reposition patient  - Assist with mobility/ambulation  - Relieve pressure over bony prominences  - Avoid friction and shearing  - Provide appropriate hygiene as needed including keeping skin clean and dry  - Evaluate need for skin moisturizer/barrier cream  - Collaborate with interdisciplinary team   - Patient/family teaching  - Consider wound care consult   Outcome: Progressing     Problem: Neurological Deficit  Goal: Neurological status is stable or improving  Description: Interventions:  - Monitor and assess patient's level of consciousness, motor function, sensory function, and level of assistance needed for ADLs.   - Monitor and report changes from baseline. Collaborate with interdisciplinary team to initiate plan and implement interventions as ordered.   - Provide and maintain a safe  "Hospital Medicine  [facility]  Daily Progress Note      Recent 24-Hour Events    Abdominal pain improving.     No vomiting; minimal ostomy output. No CP, SOB, lightheadedness    Good urine output.         PHYSICAL EXAM    Vitals: BP (!) 141/69   Pulse 99   Temp 98.9 °F (37.2 °C)   Resp 18   Ht 6' 2" (1.88 m)   Wt 107.5 kg (237 lb)   SpO2 97%   BMI 30.43 kg/m²  Body mass index is 30.43 kg/m².    General: NAD, AO3  HEENT: PERRL, EOMI.   Cardiovascular: RRR  Respiratory: lungs CTAB  GI: abdomen S/mild diffuse tenderness without rebound or guarding/ND, +BS. Colostomy in place  Extremities: no edema  MSK: moves upper extremities.   Neuro/Psych: A&OX3. CNII-XII grossly intact.          Medications      Scheduled Meds:   acetaminophen  650 mg Oral Q6H    ceFEPime (MAXIPIME) IVPB  2 g Intravenous Q8H    enoxaparin  40 mg Subcutaneous Daily    gabapentin  300 mg Oral TID    methocarbamoL  500 mg Oral QID    metronidazole  500 mg Intravenous Q8H    mupirocin   Nasal BID    nicotine  1 patch Transdermal Daily    polyethylene glycol  17 g Oral Daily    sodium phosphate IVPB  30 mmol Intravenous Once    vancomycin (VANCOCIN) IVPB  1,750 mg Intravenous Q12H     Continuous Infusions:   sodium chloride 0.9% 75 mL/hr at 04/27/22 0547     PRN Meds:.dextrose 50%, dextrose 50%, glucagon (human recombinant), hydrALAZINE, HYDROmorphone, insulin aspart U-100, naloxone, ondansetron, oxyCODONE, oxyCODONE, sodium chloride 0.9%, Pharmacy to dose Vancomycin consult **AND** vancomycin - pharmacy to dose        Labs & Diagnostics    Labs:      Recent Labs     04/24/22  1658 04/24/22  1658 04/25/22  0504 04/26/22  0536 04/27/22  0452 04/27/22  0948   WBC 13.58*  --  10.25 12.38 22.27*  --    HGB 12.9*  --  12.5* 11.6* 9.4*  --    HCT 37.0*  --  37.2* 34.6* 28.0*  --    MCV 83  --  84 84 85  --      --  419 433 384  --    *  --  136 136 127* 127*   K 3.0*  --  3.6 4.7 4.3 4.8   CO2 22*  --  25 26 25 26 "   BUN 11  --  9 14 10 9   CREATININE 0.8  --  0.8 1.0 0.8 0.8   ANIONGAP 13  --  10 9 8 8   MG  --   --  1.9 1.9 1.8  --    PHOS  --    < > 2.7 3.2 1.5* 1.5*   ALT 10  --  18 18 18  --    AST 12  --  17 18 19  --    ALKPHOS 79  --  99 73 103  --    BILITOT 0.5  --  0.7 0.8 0.8  --    PROT 7.1  --  6.8 5.9* 5.7*  --    LIPASE 19  --   --   --   --   --     < > = values in this interval not displayed.          EKG, labs and radiographs from the past 24h reviewed and personally interpreted.       Assessment & Plan      56 y.o. y.o. male with a PMHx of HTN, T2DM, and HLD who presented to the ED with upper abdominal pain onset x 1 months found to have a large bowel obstruction 2/2 mass.  On 4/25 the patient underwent colectomy, splenectomy and mass removal with colostomy creation.  Currently pain is improving and we are awaiting ostomy output.       On 4/27, WBC count increased significantly. At this point potentially reactive as on vanco/cefepime/flagyl with no new signs/symptoms of infection or surgical complication.    Additionally, course complicated by hyponatremia. Urine lytes sent and renal consulted.     Seen by GI. Requires outpatient referral for consideration of endoscopy of stoma and blind limb.      * Large bowel obstruction  Acute:  -s/p colectomy, splenectomy with colostomy creation  - consult GI appreciate recommendations  - Consult general surgery: appreciate recommendations;  - prn antiemetics ordered  - prn pain medication ordered   - IVFH  -awaiting return of bowel function   -ID consulted re: vaccinations following splenectomy   Gi has recommended outpatient follow up to consider scopes including of stoma and blind limb           Anemia  Patient's anemia is currently controlled. Has not received any PRBCs to date..   Current CBC reviewed-         Lab Results   Component Value Date     HGB 12.9 (L) 04/24/2022     HCT 37.0 (L) 04/24/2022      Monitor serial CBC and transfuse if patient becomes  environment.  - Consider seizure precautions.  - Consider fall precautions.  - Consider aspiration precautions.  - Consider bleeding precautions.  Outcome: Progressing     Problem: Activity Intolerance/Impaired Mobility  Goal: Mobility/activity is maintained at optimum level for patient  Description: Interventions:  - Assess and monitor patient  barriers to mobility and need for assistive/adaptive devices.  - Assess patient's emotional response to limitations.  - Collaborate with interdisciplinary team and initiate plans and interventions as ordered.  - Encourage independent activity per ability.  - Maintain proper body alignment.  - Perform active/passive rom as tolerated/ordered.  - Plan activities to conserve energy.  - Turn patient as appropriate  Outcome: Progressing     Problem: Communication Impairment  Goal: Ability to express needs and understand communication  Description: Assess patient's communication skills and ability to understand information.  Patient will demonstrate use of effective communication techniques, alternative methods of communication and understanding even if not able to speak.     - Encourage communication and provide alternate methods of communication as needed.  - Collaborate with case management/ for discharge needs.  - Include patient/family/caregiver in decisions related to communication.  Outcome: Progressing     Problem: Potential for Aspiration  Goal: Non-ventilated patient's risk of aspiration is minimized  Description: Assess and monitor vital signs, respiratory status, and labs (WBC).  Monitor for signs of aspiration (tachypnea, cough, rales, wheezing, cyanosis, fever).    - Assess and monitor patient's ability to swallow.  - Place patient up in chair to eat if possible.  - HOB up at 90 degrees to eat if unable to get patient up into chair.  - Supervise patient during oral intake.   - Instruct patient/ family to take small bites.  - Instruct patient/ family to  hemodynamically unstable, symptomatic or H/H drops below 7/21.            Hyponatremia  Acute:  - woresened  -renal consulted.            Hypokalemia  Acute:  - replacement lytes ordered  - monitor electrolytes closely         HLD (hyperlipidemia)   Patient is chronically on statin.will continue for now. Monitor clinically. Last LDL was         Lab Results   Component Value Date     LDLCALC 77 10/19/2021               Type 2 diabetes mellitus  Patient's FSGs are controlled on current medication regimen.  Last A1c reviewed-         Lab Results   Component Value Date     HGBA1C 7.2 01/31/2022      Most recent fingerstick glucose reviewed- No results for input(s): POCTGLUCOSE in the last 24 hours.  Current correctional scale  Low  Maintain anti-hyperglycemic dose as follows-              Antihyperglycemics (From admission, onward)                            Start     Stop Route Frequency Ordered     04/24/22 2205   insulin aspart U-100 pen 0-5 Units         -- SubQ Before meals & nightly PRN 04/24/22 2108          Hold Oral hypoglycemics while patient is in the hospital.           HTN (hypertension)  Chronic, controlled.  Latest blood pressure and vitals reviewed-   Temp:  [98.5 °F (36.9 °C)]   Pulse:  [78-98]   Resp:  [18-20]   BP: (149-163)/(78-83)   SpO2:  [97 %-98 %] .   Home meds for hypertension were reviewed and noted below.         Hypertension Medications                 enalapriL-hydrochlorothiazide (VASERETIC) 10-25 mg per tablet Take 1 tablet by mouth once daily.             While in the hospital, will manage blood pressure as follows; Continue home antihypertensive regimen     Will utilize p.r.n. blood pressure medication only if patient's blood pressure greater than  180/110 and he develops symptoms such as worsening chest pain or shortness of breath.             Dispo/Code status: Full Code.     ?    Delaney Pan    Date: 4/27/2022         take small single sips when taking liquids.  - Follow patient-specific strategies generated by speech pathologist.  Outcome: Progressing  Goal: Ventilated patient's risk of aspiration is minimized  Description: Assess and monitor vital signs, respiratory status, airway cuff pressure, and labs (WBC).  Monitor for signs of aspiration (tachypnea, cough, rales, wheezing, cyanosis, fever).    - Elevate head of bed 30 degrees if patient has tube feeding.  - Monitor tube feeding.  Outcome: Progressing     Problem: Nutrition  Goal: Nutrition/Hydration status is improving  Description: Monitor and assess patient's nutrition/hydration status for malnutrition (ex- brittle hair, bruises, dry skin, pale skin and conjunctiva, muscle wasting, smooth red tongue, and disorientation). Collaborate with interdisciplinary team and initiate plan and interventions as ordered.  Monitor patient's weight and dietary intake as ordered or per policy. Utilize nutrition screening tool and intervene per policy. Determine patient's food preferences and provide high-protein, high-caloric foods as appropriate.     - Assist patient with eating.  - Allow adequate time for meals.  - Encourage patient to take dietary supplement as ordered.  - Collaborate with clinical nutritionist.  - Include patient/family/caregiver in decisions related to nutrition.  Outcome: Progressing     Problem: Knowledge Deficit  Goal: Patient/family/caregiver demonstrates understanding of disease process, treatment plan, medications, and discharge instructions  Description: Complete learning assessment and assess knowledge base.  Interventions:  - Provide teaching at level of understanding  - Provide teaching via preferred learning methods  Outcome: Progressing     Problem: NEUROSENSORY - ADULT  Goal: Achieves stable or improved neurological status  Description: INTERVENTIONS  - Monitor and report changes in neurological status  - Monitor vital signs such as temperature, blood  pressure, glucose, and any other labs ordered   - Initiate measures to prevent increased intracranial pressure  - Monitor for seizure activity and implement precautions if appropriate      Outcome: Progressing  Goal: Remains free of injury related to seizures activity  Description: INTERVENTIONS  - Maintain airway, patient safety  and administer oxygen as ordered  - Monitor patient for seizure activity, document and report duration and description of seizure to physician/advanced practitioner  - If seizure occurs,  ensure patient safety during seizure  - Reorient patient post seizure  - Seizure pads on all 4 side rails  - Instruct patient/family to notify RN of any seizure activity including if an aura is experienced  - Instruct patient/family to call for assistance with activity based on nursing assessment  - Administer anti-seizure medications if ordered    Outcome: Progressing  Goal: Achieves maximal functionality and self care  Description: INTERVENTIONS  - Monitor swallowing and airway patency with patient fatigue and changes in neurological status  - Encourage and assist patient to increase activity and self care.   - Encourage visually impaired, hearing impaired and aphasic patients to use assistive/communication devices  Outcome: Progressing

## 2024-10-03 NOTE — PRE-PROCEDURE INSTRUCTIONS
PREOP INSTRUCTIONS:  No food,milk or milk products for 8 hours before surgery.  Clear liquids like water,gatorade,apple juice are allowed up until 2 hours before surgery.  Instructed to follow the surgeon's instructions if they differ from these.  Shower instructions as well as directions to the Surgery Center were given.  Encouraged to wear loose fitting,comfortable clothing.  Medication instructions for pm prior to and am of procedure reviewed.  Instructed to avoid taking vitamins,supplements,aspirin and ibuprofen the morning of surgery.    Patient denies any side effects or issues with anesthesia or sedation.     Patient does not know arrival time.Explained that this information comes from the surgeon's office and if they haven't heard from them by 2 or 3 pm 10/4/2024 to call the office.Patient stated an understanding.

## 2024-10-04 ENCOUNTER — TELEPHONE (OUTPATIENT)
Dept: SURGERY | Facility: CLINIC | Age: 59
End: 2024-10-04
Payer: COMMERCIAL

## 2024-10-06 ENCOUNTER — ANESTHESIA EVENT (OUTPATIENT)
Dept: SURGERY | Facility: HOSPITAL | Age: 59
End: 2024-10-06
Payer: COMMERCIAL

## 2024-10-06 NOTE — ANESTHESIA PREPROCEDURE EVALUATION
[facility]  Anesthesia Pre-Operative Evaluation         Patient Name: Osman Li Jr.  YOB: 1965  MRN: [id number]    SUBJECTIVE:     Pre-operative evaluation for Procedure(s) (LRB):  CLOSURE, ILEOSTOMY, LOOP (N/A)  SIGMOIDOSCOPY, FLEXIBLE (N/A)     10/06/2024    Osman Li Jr. is a 59 y.o. 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 Underwent open colostomy takedown takedown and repair of parastomal hernia (05/13/24) requiring emergent takeback for anastomotic complication requiring completion colectomy with ileorectal anastomosis and DLI (05/21/24).    Patient now presents for the above procedure(s).      LDA:   Port A Cath Single Lumen Atrial Right (Active)   Number of days:             Ileostomy 05/21/24 1314 Loop LLQ (Active)   Number of days: 137       Prev airway:   Date/Time: 5/21/2024 8:52 AM     Performed by: Kasey Gann CRNA  Authorized by: Pete Thomas MD    Intubation:     Induction:  Intravenous    Intubated:  Postinduction    Mask Ventilation:  Easy mask    Attempts:  1    Attempted By:  CRNA    Method of Intubation:  Video laryngoscopy    Blade:  Rodriguez 4    Laryngeal View Grade: Grade I - full view of cords      Difficult Airway Encountered?: No      Complications:  None    Airway Device:  Oral endotracheal tube    Airway Device Size:  7.5    Style/Cuff Inflation:  Cuffed (inflated to minimal occlusive pressure)    Inflation Amount (mL):  7    Tube secured:  22    Secured at:  The lips    Placement Verified By:  Capnometry    Complicating Factors:  None    Findings Post-Intubation:  BS equal bilateral and atraumatic/condition of teeth unchanged    Drips: None documented.    Patient Active Problem List   Diagnosis    HTN (hypertension)    Type 2 diabetes mellitus    HLD  (hyperlipidemia)    Anemia    Hypophosphatemia    Malignant neoplasm of transverse colon    Intra-abdominal abscess    Hypomagnesemia    Metastatic adenocarcinoma to pancreas    Colostomy status       Review of patient's allergies indicates:   Allergen Reactions    Penicillins Rash       Current Outpatient Medications:  No current facility-administered medications for this encounter.    Current Outpatient Medications:     atorvastatin (LIPITOR) 20 MG tablet, Take 1 tablet (20 mg total) by mouth every evening., Disp: 90 tablet, Rfl: 3    gabapentin (NEURONTIN) 300 MG capsule, Take 1 capsule (300 mg total) by mouth 3 (three) times daily., Disp: 90 capsule, Rfl: 3    insulin glargine U-300 conc (TOUJEO MAX U-300 SOLOSTAR) 300 unit/mL (3 mL) insulin pen, Inject 48 Units into the skin once daily. (Discard pen 56 days after initial use) (Patient taking differently: Inject 48 Units into the skin every morning. (Discard pen 56 days after initial use)), Disp: 2 Pen, Rfl: 11    metFORMIN (GLUCOPHAGE) 1000 MG tablet, Take 1 tablet (1,000 mg total) by mouth 2 (two) times daily with meals., Disp: 180 tablet, Rfl: 3    ciprofloxacin HCl (CIPRO) 500 MG tablet, Take 1 tablet (500 mg total) by mouth As instructed (preop). One pill at 9p and one pill at 11p night before surgery, Disp: 2 tablet, Rfl: 0    enalapril (VASOTEC) 10 MG tablet, Take 1 tablet (10 mg total) by mouth once daily. (Patient taking differently: Take 10 mg by mouth every morning.), Disp: 90 tablet, Rfl: 3    methocarbamoL (ROBAXIN) 500 MG Tab, Take 500 mg by mouth every 8 (eight) hours as needed., Disp: , Rfl:     metoprolol tartrate (LOPRESSOR) 25 MG tablet, Take 1 tablet (25 mg total) by mouth 2 (two) times daily., Disp: 180 tablet, Rfl: 0    metroNIDAZOLE (FLAGYL) 500 MG tablet, Take 1 tablet (500 mg total) by mouth As instructed (preop). Take one pill at 9pm,  one pill at 11pm, Disp: 2 tablet, Rfl: 0    morphine (MS CONTIN) 30 MG 12 hr tablet, Take 15 mg by  "mouth 2 (two) times daily., Disp: , Rfl:     oxyCODONE (ROXICODONE) 10 mg Tab immediate release tablet, Take 1 tablet (10 mg total) by mouth every 4 (four) hours as needed for Pain., Disp: 20 tablet, Rfl: 0    pen needle, diabetic (BD ULTRA-FINE MINI PEN NEEDLE) 31 gauge x 3/16" Ndle, 1 each by Misc.(Non-Drug; Combo Route) route once daily., Disp: 100 each, Rfl: 3    promethazine (PHENERGAN) 12.5 MG Tab, Take 1 tablet (12.5 mg total) by mouth every 4 (four) hours as needed., Disp: 25 tablet, Rfl: 1    scopolamine (TRANSDERM-SCOP) 1.3-1.5 mg (1 mg over 3 days), Place 1 patch onto the skin Every 3 (three) days., Disp: 5 patch, Rfl: 0    sildenafiL (VIAGRA) 100 MG tablet, Take 1 tablet (100 mg total) by mouth daily as needed for Erectile Dysfunction., Disp: 30 tablet, Rfl: 3    tamsulosin (FLOMAX) 0.4 mg Cap, Take 2 capsules (0.8 mg total) by mouth once daily. for 7 days, Disp: 14 capsule, Rfl: 0    traZODone (DESYREL) 50 MG tablet, Take 1 tablet (50 mg total) by mouth every evening., Disp: 90 tablet, Rfl: 3    Past Surgical History:   Procedure Laterality Date    ADENOIDECTOMY  1972    ANASTOMOSIS OF ILEUM TO RECTUM N/A 5/21/2024    Procedure: ANASTOMOSIS, ILEORECTAL;  Surgeon: Farhan Lance MD;  [facility];  Service: Colon and Rectal;  Laterality: N/A;    CHOLECYSTECTOMY  2011    CLOSURE, COLOSTOMY N/A 5/13/2024    Procedure: CLOSURE, COLOSTOMY (eras high/lithotomy);  Surgeon: Farhan Lance MD;  Location: [facility] OR 2ND FLR;  Service: Colon and Rectal;  Laterality: N/A;  CONSENTS IN [facility]    COLON SURGERY      COLONOSCOPY      2018    COLONOSCOPY N/A 3/5/2024    Procedure: COLONOSCOPY;  Surgeon: Farhan Lance MD;  Location: [facility] (4TH FLR);  Service: Endoscopy;  Laterality: N/A;  2/27/24-Kethman pt, 1-4wks, port, PEG plus 2 enemas morning of procedure, instr portal-DS  2/28/24- LVM for precall - ERW  pt confirmed procedure. cf    COLOSTOMY N/A 04/25/2022    Procedure: CREATION, " COLOSTOMY;  Surgeon: Carlton Waddell MD;  Location: [facility] OR;  Service: General;  Laterality: N/A;    ENDOSCOPIC ULTRASOUND OF UPPER GASTROINTESTINAL TRACT N/A 01/06/2023    Procedure: ULTRASOUND, UPPER GI TRACT, ENDOSCOPIC;  Surgeon: Rinku Orozco III, MD;  Location: [facility] ENDO;  Service: Endoscopy;  Laterality: N/A;    FLEXIBLE SIGMOIDOSCOPY N/A 5/13/2024    Procedure: SIGMOIDOSCOPY, FLEXIBLE;  Surgeon: Farhan Lance MD;  Location: [facility] OR 2ND FLR;  Service: Colon and Rectal;  Laterality: N/A;    FLEXIBLE SIGMOIDOSCOPY  5/21/2024    Procedure: SIGMOIDOSCOPY, FLEXIBLE;  Surgeon: Farhan Lance MD;  Location: [facility] OR 2ND FLR;  Service: Colon and Rectal;;    ILEOSTOMY N/A 5/21/2024    Procedure: CREATION, ILEOSTOMY, DIVERTING LOOP;  Surgeon: Farhan Lance MD;  Location: [facility] OR [facility] FLR;  Service: Colon and Rectal;  Laterality: N/A;    INSERTION OF TUNNELED CENTRAL VENOUS CATHETER (CVC) WITH SUBCUTANEOUS PORT Right 05/18/2022    Procedure: [id number]-[id number];  Surgeon: Carlton Waddell MD;  Location: [facility] OR;  Service: General;  Laterality: Right;    INSERTION OF URETERAL CATHETER N/A 5/13/2024    Procedure: INSERTION, CATHETER, URETER, BILATERAL;  Surgeon: Travis Edwards MD;  [facility];  Service: Urology;  Laterality: N/A;    INSERTION OF URETERAL CATHETER Left 5/21/2024    Procedure: INSERTION, CATHETER, URETER;  Surgeon: Carlton Santos MD;  Location: [facility] OR 2ND FLR;  Service: Urology;  Laterality: Left;    LAPAROTOMY, EXPLORATORY N/A 5/21/2024    Procedure: LAPAROTOMY, EXPLORATORY;  Surgeon: Farhan Lance MD;  Location: [facility] OR 2ND FLR;  Service: Colon and Rectal;  Laterality: N/A;    LYSIS OF ADHESIONS N/A 5/13/2024    Procedure: LYSIS, ADHESIONS;  Surgeon: Farhan Lance MD;  Location: [facility] OR 2ND FLR;  Service: Colon and Rectal;  Laterality: N/A;    MOBILIZATION OF SPLENIC FLEXURE N/A 04/25/2022    Procedure: MOBILIZATION, SPLENIC FLEXURE;  Surgeon:  Carlton Waddell MD;  Location: [facility] OR;  Service: General;  Laterality: N/A;    SPLENECTOMY N/A 04/25/2022    Procedure: SPLENECTOMY;  Surgeon: Carlton Waddell MD;  Location: [facility] OR;  Service: General;  Laterality: N/A;    SUBTOTAL COLECTOMY N/A 04/25/2022    Procedure: COLECTOMY, PARTIAL;  Surgeon: Carlton Waddell MD;  Location: [facility] OR;  Service: General;  Laterality: N/A;    TONSILLECTOMY      TOTAL ABDOMINAL COLECTOMY N/A 5/21/2024    Procedure: COLECTOMY, COMPLETION, ABDOMINAL;  Surgeon: Farhan Lance MD;  Location: [facility] OR 2ND FLR;  Service: Colon and Rectal;  Laterality: N/A;    TUMOR REMOVAL  10/18/2023    on top of the nose    VASECTOMY  1994       Social History     Socioeconomic History    Marital status:    Tobacco Use    Smoking status: Every Day     Current packs/day: 1.00     Average packs/day: 1 pack/day for 40.0 years (40.0 ttl pk-yrs)     Types: Cigarettes     Passive exposure: Current    Smokeless tobacco: Never   Substance and Sexual Activity    Alcohol use: Not Currently    Drug use: Never    Sexual activity: Yes     Partners: Female     Social Drivers of Health     Financial Resource Strain: Low Risk  (5/29/2024)    Overall Financial Resource Strain (CARDIA)     Difficulty of Paying Living Expenses: Not very hard   Food Insecurity: No Food Insecurity (5/29/2024)    Hunger Vital Sign     Worried About Running Out of Food in the Last Year: Never true     Ran Out of Food in the Last Year: Never true   Transportation Needs: No Transportation Needs (5/22/2024)    TRANSPORTATION NEEDS     Transportation : No   Physical Activity: Inactive (5/29/2024)    Exercise Vital Sign     Days of Exercise per Week: 1 day     Minutes of Exercise per Session: 0 min   Stress: No Stress Concern Present (5/29/2024)    [employer] - Occupational Stress Questionnaire     Feeling of Stress : Not at all   Housing Stability: Low Risk  (5/29/2024)    Housing Stability Vital Sign      Unable to Pay for Housing in the Last Year: No     Homeless in the Last Year: No       OBJECTIVE:     Vital Signs Range (Last 24H):         Significant Labs:  Lab Results   Component Value Date    WBC 14.01 (H) 07/22/2024    HGB 11.7 (L) 07/22/2024    HCT 36.2 (L) 07/22/2024     07/22/2024    CHOL 127 02/22/2024    TRIG 129 05/25/2024    HDL 47 02/22/2024    ALT 17 07/22/2024    AST 20 07/22/2024     07/22/2024    K 5.5 (H) 07/22/2024     07/22/2024    CREATININE 1.2 07/22/2024    BUN 24 (H) 07/22/2024    CO2 25 07/22/2024    TSH 1.763 07/01/2024    INR 1.0 06/21/2022    HGBA1C 6.5 (H) 05/22/2024    MICROALBUR 1.0 02/22/2024       Diagnostic Studies: No relevant studies.    EKG:   Results for orders placed or performed during the hospital encounter of 05/21/24   EKG 12-lead    Collection Time: 05/21/24  1:48 AM   Result Value Ref Range    QRS Duration 140 ms    [facility] QTC Calculation 505 ms    Narrative    Test Reason : R10.32,    Vent. Rate : 127 BPM     Atrial Rate : 127 BPM     P-R Int : 116 ms          QRS Dur : 140 ms      QT Int : 348 ms       P-R-T Axes : 036 018 006 degrees     QTc Int : 505 ms    Sinus tachycardia with frequent Premature ventricular complexes  Possible Left atrial enlargement  Right bundle branch block  T wave abnormality, consider inferior ischemia  Abnormal ECG  When compared with ECG of 04-JAN-2023 14:37,  Premature ventricular complexes are now Present  Vent. rate has increased BY  49 BPM  Right bundle branch block is now Present  Criteria for Inferior-posterior infarct are no longer Present  Confirmed by Manuela ROJAS, Travis BELLE ([id number]) on 5/21/2024 8:54:46 PM    Referred By: DRAKE   SELF           Confirmed By:Travis Roy MD       2D ECHO:  TTE:  No results found for this or any previous visit.    RADHA:  No results found. However, due to the size of the patient record, not all encounters were searched. Please check Results Review for a complete set of  results.    ASSESSMENT/PLAN:           Pre-op Assessment    I have reviewed the Patient Summary Reports.     I have reviewed the Nursing Notes. I have reviewed the NPO Status.   I have reviewed the Medications.     Review of Systems  Anesthesia Hx:  No problems with previous Anesthesia   History of prior surgery of interest to airway management or planning:          Denies Family Hx of Anesthesia complications.    Denies Personal Hx of Anesthesia complications.                    Social:  Former Smoker       Hematology/Oncology:       -- Anemia:                  Current/Recent Cancer.         surgery and chemotherapy       Cardiovascular:  Exercise tolerance: good   Hypertension           hyperlipidemia                             Endocrine:  Diabetes, type 2               Physical Exam  General: Cooperative, Alert and Oriented    Airway:  Mallampati: III / II  Mouth Opening: Normal  TM Distance: Normal  Tongue: Normal  Neck ROM: Normal ROM    Dental:  Intact    Chest/Lungs:  Normal Respiratory Rate    Heart:  Rate: Normal        Anesthesia Plan  Type of Anesthesia, risks & benefits discussed:    Anesthesia Type: Gen ETT  Intra-op Monitoring Plan: Standard ASA Monitors  Post Op Pain Control Plan: IV/PO Opioids PRN and multimodal analgesia  Induction:  IV  Airway Plan: Direct and Video, Post-Induction  Informed Consent: Informed consent signed with the Patient and all parties understand the risks and agree with anesthesia plan.  All questions answered.   ASA Score: 3  Day of Surgery Review of History & Physical: H&P Update referred to the surgeon/provider.    Ready For Surgery From Anesthesia Perspective.     .

## 2024-10-07 ENCOUNTER — HOSPITAL ENCOUNTER (INPATIENT)
Facility: HOSPITAL | Age: 59
LOS: 2 days | Discharge: HOME | DRG: 331 | End: 2024-10-09
Attending: STUDENT IN AN ORGANIZED HEALTH CARE EDUCATION/TRAINING PROGRAM | Admitting: STUDENT IN AN ORGANIZED HEALTH CARE EDUCATION/TRAINING PROGRAM
Payer: COMMERCIAL

## 2024-10-07 ENCOUNTER — ANESTHESIA (OUTPATIENT)
Dept: SURGERY | Facility: HOSPITAL | Age: 59
End: 2024-10-07
Payer: COMMERCIAL

## 2024-10-07 DIAGNOSIS — Z93.2 ILEOSTOMY IN PLACE: ICD-10-CM

## 2024-10-07 PROBLEM — F17.200 TOBACCO DEPENDENCY: Status: ACTIVE | Noted: 2024-10-07

## 2024-10-07 LAB
ABO + RH BLD: NORMAL
BLD GP AB SCN CELLS X3 SERPL QL: NORMAL
ESTIMATED AVG GLUCOSE: 140 MG/DL (ref 68–131)
HBA1C MFR BLD: 6.5 % (ref 4–5.6)
POCT GLUCOSE: 122 MG/DL (ref 70–110)
POCT GLUCOSE: 205 MG/DL (ref 70–110)
POCT GLUCOSE: 97 MG/DL (ref 70–110)
SPECIMEN OUTDATE: NORMAL

## 2024-10-07 PROCEDURE — 36000709 HC OR TIME LEV III EA ADD 15 MIN: Performed by: STUDENT IN AN ORGANIZED HEALTH CARE EDUCATION/TRAINING PROGRAM

## 2024-10-07 PROCEDURE — 25000003 PHARM REV CODE 250: Performed by: STUDENT IN AN ORGANIZED HEALTH CARE EDUCATION/TRAINING PROGRAM

## 2024-10-07 PROCEDURE — 99900035 HC TECH TIME PER 15 MIN (STAT)

## 2024-10-07 PROCEDURE — 44625 REPAIR BOWEL OPENING: CPT | Mod: ,,, | Performed by: STUDENT IN AN ORGANIZED HEALTH CARE EDUCATION/TRAINING PROGRAM

## 2024-10-07 PROCEDURE — 71000015 HC POSTOP RECOV 1ST HR: Performed by: STUDENT IN AN ORGANIZED HEALTH CARE EDUCATION/TRAINING PROGRAM

## 2024-10-07 PROCEDURE — 82962 GLUCOSE BLOOD TEST: CPT | Performed by: STUDENT IN AN ORGANIZED HEALTH CARE EDUCATION/TRAINING PROGRAM

## 2024-10-07 PROCEDURE — 27000221 HC OXYGEN, UP TO 24 HOURS

## 2024-10-07 PROCEDURE — 88304 TISSUE EXAM BY PATHOLOGIST: CPT | Performed by: PATHOLOGY

## 2024-10-07 PROCEDURE — 83036 HEMOGLOBIN GLYCOSYLATED A1C: CPT | Performed by: STUDENT IN AN ORGANIZED HEALTH CARE EDUCATION/TRAINING PROGRAM

## 2024-10-07 PROCEDURE — 86850 RBC ANTIBODY SCREEN: CPT | Performed by: NURSE PRACTITIONER

## 2024-10-07 PROCEDURE — 63600175 PHARM REV CODE 636 W HCPCS: Performed by: STUDENT IN AN ORGANIZED HEALTH CARE EDUCATION/TRAINING PROGRAM

## 2024-10-07 PROCEDURE — 71000033 HC RECOVERY, INTIAL HOUR: Performed by: STUDENT IN AN ORGANIZED HEALTH CARE EDUCATION/TRAINING PROGRAM

## 2024-10-07 PROCEDURE — 25000003 PHARM REV CODE 250: Performed by: NURSE PRACTITIONER

## 2024-10-07 PROCEDURE — 94761 N-INVAS EAR/PLS OXIMETRY MLT: CPT

## 2024-10-07 PROCEDURE — 20600001 HC STEP DOWN PRIVATE ROOM

## 2024-10-07 PROCEDURE — 0DBB0ZZ EXCISION OF ILEUM, OPEN APPROACH: ICD-10-PCS | Performed by: STUDENT IN AN ORGANIZED HEALTH CARE EDUCATION/TRAINING PROGRAM

## 2024-10-07 PROCEDURE — 88304 TISSUE EXAM BY PATHOLOGIST: CPT | Mod: 26,,, | Performed by: PATHOLOGY

## 2024-10-07 PROCEDURE — 71000016 HC POSTOP RECOV ADDL HR: Performed by: STUDENT IN AN ORGANIZED HEALTH CARE EDUCATION/TRAINING PROGRAM

## 2024-10-07 PROCEDURE — 37000009 HC ANESTHESIA EA ADD 15 MINS: Performed by: STUDENT IN AN ORGANIZED HEALTH CARE EDUCATION/TRAINING PROGRAM

## 2024-10-07 PROCEDURE — S4991 NICOTINE PATCH NONLEGEND: HCPCS | Performed by: STUDENT IN AN ORGANIZED HEALTH CARE EDUCATION/TRAINING PROGRAM

## 2024-10-07 PROCEDURE — 63600175 PHARM REV CODE 636 W HCPCS

## 2024-10-07 PROCEDURE — 63600175 PHARM REV CODE 636 W HCPCS: Performed by: NURSE PRACTITIONER

## 2024-10-07 PROCEDURE — 27201423 OPTIME MED/SURG SUP & DEVICES STERILE SUPPLY: Performed by: STUDENT IN AN ORGANIZED HEALTH CARE EDUCATION/TRAINING PROGRAM

## 2024-10-07 PROCEDURE — 0DJD8ZZ INSPECTION OF LOWER INTESTINAL TRACT, VIA NATURAL OR ARTIFICIAL OPENING ENDOSCOPIC: ICD-10-PCS | Performed by: STUDENT IN AN ORGANIZED HEALTH CARE EDUCATION/TRAINING PROGRAM

## 2024-10-07 PROCEDURE — 36000708 HC OR TIME LEV III 1ST 15 MIN: Performed by: STUDENT IN AN ORGANIZED HEALTH CARE EDUCATION/TRAINING PROGRAM

## 2024-10-07 PROCEDURE — 37000008 HC ANESTHESIA 1ST 15 MINUTES: Performed by: STUDENT IN AN ORGANIZED HEALTH CARE EDUCATION/TRAINING PROGRAM

## 2024-10-07 PROCEDURE — 63600175 PHARM REV CODE 636 W HCPCS: Mod: JG | Performed by: STUDENT IN AN ORGANIZED HEALTH CARE EDUCATION/TRAINING PROGRAM

## 2024-10-07 PROCEDURE — 36415 COLL VENOUS BLD VENIPUNCTURE: CPT | Performed by: STUDENT IN AN ORGANIZED HEALTH CARE EDUCATION/TRAINING PROGRAM

## 2024-10-07 PROCEDURE — 63600175 PHARM REV CODE 636 W HCPCS: Mod: JZ,JG | Performed by: NURSE PRACTITIONER

## 2024-10-07 RX ORDER — OXYCODONE HYDROCHLORIDE 5 MG/1
5 TABLET ORAL
Status: DISCONTINUED | OUTPATIENT
Start: 2024-10-07 | End: 2024-10-07 | Stop reason: HOSPADM

## 2024-10-07 RX ORDER — METRONIDAZOLE 500 MG/100ML
INJECTION, SOLUTION INTRAVENOUS
Status: DISCONTINUED | OUTPATIENT
Start: 2024-10-07 | End: 2024-10-07

## 2024-10-07 RX ORDER — ACETAMINOPHEN 10 MG/ML
1000 INJECTION, SOLUTION INTRAVENOUS EVERY 8 HOURS
Status: DISPENSED | OUTPATIENT
Start: 2024-10-07 | End: 2024-10-08

## 2024-10-07 RX ORDER — ACETAMINOPHEN 650 MG/20.3ML
975 LIQUID ORAL
Status: COMPLETED | OUTPATIENT
Start: 2024-10-07 | End: 2024-10-07

## 2024-10-07 RX ORDER — INSULIN GLARGINE 100 [IU]/ML
42 INJECTION, SOLUTION SUBCUTANEOUS NIGHTLY
Status: DISCONTINUED | OUTPATIENT
Start: 2024-10-07 | End: 2024-10-07

## 2024-10-07 RX ORDER — SODIUM CHLORIDE 0.9 % (FLUSH) 0.9 %
10 SYRINGE (ML) INJECTION
Status: DISCONTINUED | OUTPATIENT
Start: 2024-10-07 | End: 2024-10-07 | Stop reason: HOSPADM

## 2024-10-07 RX ORDER — IBUPROFEN 200 MG
24 TABLET ORAL
Status: DISCONTINUED | OUTPATIENT
Start: 2024-10-07 | End: 2024-10-09 | Stop reason: HOSPADM

## 2024-10-07 RX ORDER — LIDOCAINE HYDROCHLORIDE ANHYDROUS AND DEXTROSE MONOHYDRATE .8; 5 G/100ML; G/100ML
INJECTION, SOLUTION INTRAVENOUS CONTINUOUS PRN
Status: DISCONTINUED | OUTPATIENT
Start: 2024-10-07 | End: 2024-10-07

## 2024-10-07 RX ORDER — GLUCAGON 1 MG
1 KIT INJECTION
Status: DISCONTINUED | OUTPATIENT
Start: 2024-10-07 | End: 2024-10-07 | Stop reason: HOSPADM

## 2024-10-07 RX ORDER — ACETAMINOPHEN 500 MG
1000 TABLET ORAL EVERY 8 HOURS
Status: DISCONTINUED | OUTPATIENT
Start: 2024-10-08 | End: 2024-10-09 | Stop reason: HOSPADM

## 2024-10-07 RX ORDER — TRAZODONE HYDROCHLORIDE 50 MG/1
50 TABLET ORAL NIGHTLY
Status: DISCONTINUED | OUTPATIENT
Start: 2024-10-07 | End: 2024-10-09 | Stop reason: HOSPADM

## 2024-10-07 RX ORDER — DEXAMETHASONE SODIUM PHOSPHATE 4 MG/ML
INJECTION, SOLUTION INTRA-ARTICULAR; INTRALESIONAL; INTRAMUSCULAR; INTRAVENOUS; SOFT TISSUE
Status: DISCONTINUED | OUTPATIENT
Start: 2024-10-07 | End: 2024-10-07

## 2024-10-07 RX ORDER — ONDANSETRON HYDROCHLORIDE 2 MG/ML
4 INJECTION, SOLUTION INTRAVENOUS EVERY 12 HOURS PRN
Status: DISCONTINUED | OUTPATIENT
Start: 2024-10-07 | End: 2024-10-09 | Stop reason: HOSPADM

## 2024-10-07 RX ORDER — ATORVASTATIN CALCIUM 20 MG/1
20 TABLET, FILM COATED ORAL NIGHTLY
Status: DISCONTINUED | OUTPATIENT
Start: 2024-10-07 | End: 2024-10-09 | Stop reason: HOSPADM

## 2024-10-07 RX ORDER — METRONIDAZOLE 500 MG/100ML
500 INJECTION, SOLUTION INTRAVENOUS
Status: COMPLETED | OUTPATIENT
Start: 2024-10-07 | End: 2024-10-07

## 2024-10-07 RX ORDER — INSULIN ASPART 100 [IU]/ML
0-10 INJECTION, SOLUTION INTRAVENOUS; SUBCUTANEOUS
Status: DISCONTINUED | OUTPATIENT
Start: 2024-10-07 | End: 2024-10-09 | Stop reason: HOSPADM

## 2024-10-07 RX ORDER — IBUPROFEN 200 MG
1 TABLET ORAL DAILY
Status: DISCONTINUED | OUTPATIENT
Start: 2024-10-07 | End: 2024-10-09 | Stop reason: HOSPADM

## 2024-10-07 RX ORDER — MIDAZOLAM HYDROCHLORIDE 1 MG/ML
INJECTION INTRAMUSCULAR; INTRAVENOUS
Status: DISCONTINUED | OUTPATIENT
Start: 2024-10-07 | End: 2024-10-07

## 2024-10-07 RX ORDER — TRAMADOL HYDROCHLORIDE 50 MG/1
50 TABLET ORAL EVERY 6 HOURS PRN
Status: DISCONTINUED | OUTPATIENT
Start: 2024-10-07 | End: 2024-10-09 | Stop reason: HOSPADM

## 2024-10-07 RX ORDER — IBUPROFEN 400 MG/1
800 TABLET ORAL EVERY 8 HOURS
Status: DISCONTINUED | OUTPATIENT
Start: 2024-10-08 | End: 2024-10-09 | Stop reason: HOSPADM

## 2024-10-07 RX ORDER — HALOPERIDOL 5 MG/ML
0.5 INJECTION INTRAMUSCULAR EVERY 10 MIN PRN
Status: DISCONTINUED | OUTPATIENT
Start: 2024-10-07 | End: 2024-10-07 | Stop reason: HOSPADM

## 2024-10-07 RX ORDER — FENTANYL CITRATE 50 UG/ML
25 INJECTION, SOLUTION INTRAMUSCULAR; INTRAVENOUS EVERY 5 MIN PRN
Status: COMPLETED | OUTPATIENT
Start: 2024-10-07 | End: 2024-10-07

## 2024-10-07 RX ORDER — TRIPROLIDINE/PSEUDOEPHEDRINE 2.5MG-60MG
600 TABLET ORAL
Status: COMPLETED | OUTPATIENT
Start: 2024-10-07 | End: 2024-10-07

## 2024-10-07 RX ORDER — PHENYLEPHRINE HCL IN 0.9% NACL 1 MG/10 ML
SYRINGE (ML) INTRAVENOUS
Status: DISCONTINUED | OUTPATIENT
Start: 2024-10-07 | End: 2024-10-07

## 2024-10-07 RX ORDER — MUPIROCIN 20 MG/G
1 OINTMENT TOPICAL
Status: COMPLETED | OUTPATIENT
Start: 2024-10-07 | End: 2024-10-07

## 2024-10-07 RX ORDER — EPHEDRINE SULFATE 50 MG/ML
INJECTION, SOLUTION INTRAVENOUS
Status: DISCONTINUED | OUTPATIENT
Start: 2024-10-07 | End: 2024-10-07

## 2024-10-07 RX ORDER — ONDANSETRON HYDROCHLORIDE 2 MG/ML
INJECTION, SOLUTION INTRAVENOUS
Status: DISCONTINUED | OUTPATIENT
Start: 2024-10-07 | End: 2024-10-07

## 2024-10-07 RX ORDER — GABAPENTIN 300 MG/1
300 CAPSULE ORAL 3 TIMES DAILY
Status: DISCONTINUED | OUTPATIENT
Start: 2024-10-07 | End: 2024-10-09 | Stop reason: HOSPADM

## 2024-10-07 RX ORDER — FENTANYL CITRATE 50 UG/ML
INJECTION, SOLUTION INTRAMUSCULAR; INTRAVENOUS
Status: DISCONTINUED | OUTPATIENT
Start: 2024-10-07 | End: 2024-10-07

## 2024-10-07 RX ORDER — SODIUM CHLORIDE 9 MG/ML
INJECTION, SOLUTION INTRAVENOUS CONTINUOUS
Status: DISCONTINUED | OUTPATIENT
Start: 2024-10-07 | End: 2024-10-08

## 2024-10-07 RX ORDER — VASOPRESSIN 20 [USP'U]/ML
INJECTION, SOLUTION INTRAMUSCULAR; SUBCUTANEOUS
Status: DISCONTINUED | OUTPATIENT
Start: 2024-10-07 | End: 2024-10-07

## 2024-10-07 RX ORDER — KETAMINE HCL IN 0.9 % NACL 50 MG/5 ML
SYRINGE (ML) INTRAVENOUS
Status: DISCONTINUED | OUTPATIENT
Start: 2024-10-07 | End: 2024-10-07

## 2024-10-07 RX ORDER — ACETAMINOPHEN 500 MG
1000 TABLET ORAL ONCE
Status: DISCONTINUED | OUTPATIENT
Start: 2024-10-07 | End: 2024-10-07

## 2024-10-07 RX ORDER — LIDOCAINE HYDROCHLORIDE 20 MG/ML
INJECTION, SOLUTION EPIDURAL; INFILTRATION; INTRACAUDAL; PERINEURAL
Status: DISCONTINUED | OUTPATIENT
Start: 2024-10-07 | End: 2024-10-07

## 2024-10-07 RX ORDER — ROCURONIUM BROMIDE 10 MG/ML
INJECTION, SOLUTION INTRAVENOUS
Status: DISCONTINUED | OUTPATIENT
Start: 2024-10-07 | End: 2024-10-07

## 2024-10-07 RX ORDER — INSULIN GLARGINE 100 [IU]/ML
30 INJECTION, SOLUTION SUBCUTANEOUS NIGHTLY
Status: DISCONTINUED | OUTPATIENT
Start: 2024-10-07 | End: 2024-10-09 | Stop reason: HOSPADM

## 2024-10-07 RX ORDER — HEPARIN SODIUM 5000 [USP'U]/ML
5000 INJECTION, SOLUTION INTRAVENOUS; SUBCUTANEOUS ONCE
Status: COMPLETED | OUTPATIENT
Start: 2024-10-07 | End: 2024-10-07

## 2024-10-07 RX ORDER — OXYCODONE HYDROCHLORIDE 10 MG/1
10 TABLET ORAL EVERY 4 HOURS PRN
Status: DISCONTINUED | OUTPATIENT
Start: 2024-10-07 | End: 2024-10-09 | Stop reason: HOSPADM

## 2024-10-07 RX ORDER — PROMETHAZINE HYDROCHLORIDE 12.5 MG/1
12.5 TABLET ORAL EVERY 4 HOURS PRN
Status: DISCONTINUED | OUTPATIENT
Start: 2024-10-07 | End: 2024-10-09 | Stop reason: HOSPADM

## 2024-10-07 RX ORDER — OXYCODONE HYDROCHLORIDE 5 MG/1
5 TABLET ORAL EVERY 4 HOURS PRN
Status: DISCONTINUED | OUTPATIENT
Start: 2024-10-07 | End: 2024-10-09 | Stop reason: HOSPADM

## 2024-10-07 RX ORDER — PROPOFOL 10 MG/ML
VIAL (ML) INTRAVENOUS
Status: DISCONTINUED | OUTPATIENT
Start: 2024-10-07 | End: 2024-10-07

## 2024-10-07 RX ORDER — ENOXAPARIN SODIUM 100 MG/ML
40 INJECTION SUBCUTANEOUS EVERY 24 HOURS
Status: DISCONTINUED | OUTPATIENT
Start: 2024-10-08 | End: 2024-10-08

## 2024-10-07 RX ORDER — LIDOCAINE HYDROCHLORIDE 10 MG/ML
1 INJECTION, SOLUTION EPIDURAL; INFILTRATION; INTRACAUDAL; PERINEURAL
Status: COMPLETED | OUTPATIENT
Start: 2024-10-07 | End: 2024-10-07

## 2024-10-07 RX ORDER — GABAPENTIN 300 MG/1
300 CAPSULE ORAL
Status: COMPLETED | OUTPATIENT
Start: 2024-10-07 | End: 2024-10-07

## 2024-10-07 RX ORDER — ALVIMOPAN 12 MG/1
12 CAPSULE ORAL 2 TIMES DAILY
Status: DISCONTINUED | OUTPATIENT
Start: 2024-10-07 | End: 2024-10-09 | Stop reason: HOSPADM

## 2024-10-07 RX ORDER — IBUPROFEN 200 MG
16 TABLET ORAL
Status: DISCONTINUED | OUTPATIENT
Start: 2024-10-07 | End: 2024-10-09 | Stop reason: HOSPADM

## 2024-10-07 RX ORDER — GLUCAGON 1 MG
1 KIT INJECTION
Status: DISCONTINUED | OUTPATIENT
Start: 2024-10-07 | End: 2024-10-09 | Stop reason: HOSPADM

## 2024-10-07 RX ORDER — GABAPENTIN 300 MG/1
300 CAPSULE ORAL 3 TIMES DAILY
Status: DISCONTINUED | OUTPATIENT
Start: 2024-10-07 | End: 2024-10-07

## 2024-10-07 RX ORDER — SODIUM CHLORIDE 9 MG/ML
INJECTION, SOLUTION INTRAVENOUS
Status: COMPLETED | OUTPATIENT
Start: 2024-10-07 | End: 2024-10-07

## 2024-10-07 RX ADMIN — SODIUM CHLORIDE: 9 INJECTION, SOLUTION INTRAVENOUS at 09:10

## 2024-10-07 RX ADMIN — PROPOFOL 200 MG: 10 INJECTION, EMULSION INTRAVENOUS at 07:10

## 2024-10-07 RX ADMIN — ROCURONIUM BROMIDE 20 MG: 10 INJECTION, SOLUTION INTRAVENOUS at 08:10

## 2024-10-07 RX ADMIN — IBUPROFEN 800 MG: 800 INJECTION INTRAVENOUS at 02:10

## 2024-10-07 RX ADMIN — ACETAMINOPHEN 976.6 MG: 650 SOLUTION ORAL at 06:10

## 2024-10-07 RX ADMIN — EPHEDRINE SULFATE 5 MG: 50 INJECTION INTRAVENOUS at 08:10

## 2024-10-07 RX ADMIN — FENTANYL CITRATE 25 MCG: 50 INJECTION INTRAMUSCULAR; INTRAVENOUS at 09:10

## 2024-10-07 RX ADMIN — SODIUM CHLORIDE: 9 INJECTION, SOLUTION INTRAVENOUS at 06:10

## 2024-10-07 RX ADMIN — ACETAMINOPHEN 1000 MG: 10 INJECTION, SOLUTION INTRAVENOUS at 02:10

## 2024-10-07 RX ADMIN — Medication 150 MCG: at 07:10

## 2024-10-07 RX ADMIN — LIDOCAINE HYDROCHLORIDE 1 MG: 10 INJECTION, SOLUTION EPIDURAL; INFILTRATION; INTRACAUDAL; PERINEURAL at 06:10

## 2024-10-07 RX ADMIN — ALVIMOPAN 12 MG: 12 CAPSULE ORAL at 02:10

## 2024-10-07 RX ADMIN — MIDAZOLAM 1 MG: 1 INJECTION INTRAMUSCULAR; INTRAVENOUS at 07:10

## 2024-10-07 RX ADMIN — IBUPROFEN 800 MG: 800 INJECTION INTRAVENOUS at 09:10

## 2024-10-07 RX ADMIN — ROCURONIUM BROMIDE 20 MG: 10 INJECTION, SOLUTION INTRAVENOUS at 07:10

## 2024-10-07 RX ADMIN — NICOTINE 1 PATCH: 14 PATCH, EXTENDED RELEASE TRANSDERMAL at 02:10

## 2024-10-07 RX ADMIN — ATORVASTATIN CALCIUM 20 MG: 20 TABLET, FILM COATED ORAL at 08:10

## 2024-10-07 RX ADMIN — ONDANSETRON 4 MG: 2 INJECTION INTRAMUSCULAR; INTRAVENOUS at 08:10

## 2024-10-07 RX ADMIN — VASOPRESSIN 1 UNITS: 20 INJECTION INTRAVENOUS at 07:10

## 2024-10-07 RX ADMIN — ROCURONIUM BROMIDE 50 MG: 10 INJECTION, SOLUTION INTRAVENOUS at 07:10

## 2024-10-07 RX ADMIN — LIDOCAINE HYDROCHLORIDE 0.3 MG/KG/MIN: 8 INJECTION, SOLUTION INTRAVENOUS at 07:10

## 2024-10-07 RX ADMIN — SODIUM CHLORIDE: 0.9 INJECTION, SOLUTION INTRAVENOUS at 06:10

## 2024-10-07 RX ADMIN — SUGAMMADEX 400 MG: 100 INJECTION, SOLUTION INTRAVENOUS at 08:10

## 2024-10-07 RX ADMIN — VASOPRESSIN 2 UNITS: 20 INJECTION INTRAVENOUS at 08:10

## 2024-10-07 RX ADMIN — Medication 10 MG: at 08:10

## 2024-10-07 RX ADMIN — PROPOFOL 30 MG: 10 INJECTION, EMULSION INTRAVENOUS at 08:10

## 2024-10-07 RX ADMIN — GLYCOPYRROLATE 0.2 MG: 0.2 INJECTION, SOLUTION INTRAMUSCULAR; INTRAVENOUS at 07:10

## 2024-10-07 RX ADMIN — HEPARIN SODIUM 5000 UNITS: 5000 INJECTION INTRAVENOUS; SUBCUTANEOUS at 06:10

## 2024-10-07 RX ADMIN — TRAZODONE HYDROCHLORIDE 50 MG: 50 TABLET ORAL at 08:10

## 2024-10-07 RX ADMIN — MUPIROCIN 1 G: 20 OINTMENT TOPICAL at 06:10

## 2024-10-07 RX ADMIN — OXYCODONE HYDROCHLORIDE 10 MG: 10 TABLET ORAL at 08:10

## 2024-10-07 RX ADMIN — IBUPROFEN 600 MG: 100 SUSPENSION ORAL at 06:10

## 2024-10-07 RX ADMIN — FENTANYL CITRATE 100 MCG: 50 INJECTION INTRAMUSCULAR; INTRAVENOUS at 07:10

## 2024-10-07 RX ADMIN — DEXAMETHASONE SODIUM PHOSPHATE 8 MG: 4 INJECTION INTRA-ARTICULAR; INTRALESIONAL; INTRAMUSCULAR; INTRAVENOUS; SOFT TISSUE at 07:10

## 2024-10-07 RX ADMIN — GENTAMICIN SULFATE 446.4 MG: 40 INJECTION, SOLUTION INTRAMUSCULAR; INTRAVENOUS at 07:10

## 2024-10-07 RX ADMIN — ACETAMINOPHEN 1000 MG: 10 INJECTION, SOLUTION INTRAVENOUS at 09:10

## 2024-10-07 RX ADMIN — GABAPENTIN 300 MG: 300 CAPSULE ORAL at 02:10

## 2024-10-07 RX ADMIN — OXYCODONE 5 MG: 5 TABLET ORAL at 04:10

## 2024-10-07 RX ADMIN — METRONIDAZOLE 500 MG: 500 INJECTION, SOLUTION INTRAVENOUS at 07:10

## 2024-10-07 RX ADMIN — EPHEDRINE SULFATE 10 MG: 50 INJECTION INTRAVENOUS at 08:10

## 2024-10-07 RX ADMIN — INSULIN ASPART 1 UNITS: 100 INJECTION, SOLUTION INTRAVENOUS; SUBCUTANEOUS at 09:10

## 2024-10-07 RX ADMIN — GABAPENTIN 300 MG: 300 CAPSULE ORAL at 06:10

## 2024-10-07 RX ADMIN — METRONIDAZOLE 500 MG: 500 INJECTION, SOLUTION INTRAVENOUS at 06:10

## 2024-10-07 RX ADMIN — INSULIN GLARGINE 30 UNITS: 100 INJECTION, SOLUTION SUBCUTANEOUS at 09:10

## 2024-10-07 RX ADMIN — Medication 30 MG: at 07:10

## 2024-10-07 RX ADMIN — VASOPRESSIN 1 UNITS: 20 INJECTION INTRAVENOUS at 08:10

## 2024-10-07 RX ADMIN — SODIUM CHLORIDE, SODIUM GLUCONATE, SODIUM ACETATE, POTASSIUM CHLORIDE, MAGNESIUM CHLORIDE, SODIUM PHOSPHATE, DIBASIC, AND POTASSIUM PHOSPHATE: .53; .5; .37; .037; .03; .012; .00082 INJECTION, SOLUTION INTRAVENOUS at 08:10

## 2024-10-07 RX ADMIN — OXYCODONE HYDROCHLORIDE 10 MG: 10 TABLET ORAL at 09:10

## 2024-10-07 RX ADMIN — LIDOCAINE HYDROCHLORIDE 100 MG: 20 INJECTION, SOLUTION EPIDURAL; INFILTRATION; INTRACAUDAL at 07:10

## 2024-10-07 RX ADMIN — GABAPENTIN 300 MG: 300 CAPSULE ORAL at 08:10

## 2024-10-07 NOTE — NURSING TRANSFER
Nursing Transfer Note      10/7/2024   11:16 AM    Transfer To: 1049    Transfer via bed    Transported by PCT x2    Transfer Vital Signs: see flowsheet     Additional Lines: Macias Catheter    Medicines sent: IVF    Chart send with patient: Yes    Notified: spouse    Patient reassessed at: 1100    Personal belongings transferred with patient.

## 2024-10-07 NOTE — ANESTHESIA PROCEDURE NOTES
Intubation    Date/Time: 10/7/2024 7:12 AM    Performed by: Liyah Ruiz MD  Authorized by: Rica Mclain MD    Intubation:     Induction:  Intravenous    Intubated:  Postinduction    Mask Ventilation:  Easy with oral airway    Attempts:  1    Attempted By:  Resident anesthesiologist    Method of Intubation:  Direct    Blade:  Maravilla 2    Laryngeal View Grade: Grade I - full view of cords      Difficult Airway Encountered?: No      Complications:  None    Airway Device:  Oral endotracheal tube    Airway Device Size:  7.5    Style/Cuff Inflation:  Cuffed (inflated to minimal occlusive pressure)    Tube secured:  23    Secured at:  The lips    Placement Verified By:  Capnometry    Complicating Factors:  None    Findings Post-Intubation:  Atraumatic/condition of teeth unchanged and BS equal bilateral

## 2024-10-07 NOTE — BRIEF OP NOTE
[facility] - Surgery ([facility])  Brief Operative Note    SUMMARY     Surgery Date: 10/7/2024     Surgeons and Role:     * Farhan Lance MD - Primary     * Yaniv Caban MD - Fellow    Assisting Surgeon: None    Pre-op Diagnosis:  Malignant neoplasm of splenic flexure [C18.5]    Post-op Diagnosis:  Post-Op Diagnosis Codes:     * Malignant neoplasm of splenic flexure [C18.5]    Procedure(s) (LRB):  CLOSURE, ILEOSTOMY, LOOP (N/A)  SIGMOIDOSCOPY, FLEXIBLE (N/A)    Anesthesia: General    Implants:  * No implants in log *    Operative Findings: Ileostomy closure    Estimated Blood Loss: * No values recorded between 10/7/2024  7:41 AM and 10/7/2024  8:51 AM *    Estimated Blood Loss has not been documented. EBL = 15cc.         Specimens:   Specimen (24h ago, onward)       Start     Ordered    10/07/24 0828  Specimen to Pathology, Surgery Other  Once        Comments: Pre-op Diagnosis: Malignant neoplasm of splenic flexure [C18.5]Procedure(s):CLOSURE, ILEOSTOMY, LOOPSIGMOIDOSCOPY, FLEXIBLE Number of specimens: 1Name of specimens: 1. Ileostomy- permanent     References:    Click here for ordering Quick Tip   Question Answer Comment   Procedure Type: Other    Specimen Class: Routine/Screening    Release to patient Immediate        10/07/24 7988                    [id number]

## 2024-10-07 NOTE — ANESTHESIA POSTPROCEDURE EVALUATION
Anesthesia Post Evaluation    Patient: Osman Li Jr.    Procedure(s) Performed: Procedure(s) (LRB):  CLOSURE, ILEOSTOMY, LOOP (N/A)  SIGMOIDOSCOPY, FLEXIBLE (N/A)    Final Anesthesia Type: general      Patient location during evaluation: PACU  Patient participation: Yes- Able to Participate  Level of consciousness: awake and alert  Post-procedure vital signs: reviewed and stable  Pain management: adequate  Airway patency: patent    PONV status at discharge: No PONV  Anesthetic complications: no      Cardiovascular status: blood pressure returned to baseline and hemodynamically stable  Respiratory status: unassisted and spontaneous ventilation  Hydration status: euvolemic  Follow-up not needed.              Vitals Value Taken Time   /72 10/07/24 1150   Temp 36.4 °C (97.5 °F) 10/07/24 1150   Pulse 64 10/07/24 1150   Resp 18 10/07/24 1150   SpO2 97 % 10/07/24 1150         Event Time   Out of Recovery 09:15:00         Pain/Suleiman Score: Pain Rating Prior to Med Admin: 6 (10/7/2024  9:42 AM)  Suleiman Score: 10 (10/7/2024 10:00 AM)

## 2024-10-07 NOTE — OP NOTE
[facility]  Colon and Rectal Surgery    [address]  [place], [place]  Tel: [phone]  Fax: [phone]  [email or web address]   MD Payam Reid MD Brian Kann, MD W. Forrest Johnston, MD Matthew Giglia, MD Jennifer Paruch, MD William Kethman, MD Danielle Kay, MD     Patient name: Loki Li Jr.   YOB: 1965   MRN: [id number]  Date of surgery: 10/07/2024    Operative Report    Pre-operative diagnosis: Attention to ileostomy  Post-operative diagnosis: Same as above    Procedure:  Ileostomy closure  Flexible sigmoidoscopy    Findings:  Disimpaction performed and flexible endoscopic assessment of anastomosis performed, no concerns    Side-to-side stapled ileostomy closure performed    Surgeon: Farhan Lance MD  Assistant:  Yaniv Caban MD    Indication: Mr. 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 Waddell > FOLFOX > progression (pancreatic tail - biopsy proven and RPLN PET avidity) > Keytruda since 1/2023. He was seen by Dr. Goodwin in 1/2024 - his plan was to follow-up the results of his PET CT, decision was made to complete Keytruda and continue surveillance. He developed significant improvement in burden of metastatic disease on Keytruda - he was presented at our tumor board and it was decided that he could undergo palliative takedown of his colostomy. This was performed on 5/13/2024 - he was discharged on 5/18/2024 after an uneventful recovery, tolerating a diet, his pain was well-controlled, and he was having bowel function - he had a down trending CRP of 45. He presented, unfortunately, on 5/21/2024 with sudden onset left abdominal pain, vomiting, fever, and tachycardia - CT was concerning for internal hernia and closed loop obstruction. He underwent an open completion  colectomy and ileorectal anastomosis with diverting loop ileostomy. He presents now for ileostomy takedown with reassuring contrasted CT. The benefits, risks, and alternatives were discussed with the patient, they were given the opportunity to ask questions and they elected to proceed with operative intervention after signing written consent.    Procedure:  After pre-operative assessment and review of informed consent, the patient was taken to the operating room and received general anesthesia. A blas catheter was then placed under strict sterile precautions. Pre-operative antibiotics were administered if indicated and the patient was placed in lithotomy position. The abdomen was prepped and draped in the usual sterile fashion and a timeout was performed according to [facility] Quality and Safety guidelines.      A flexible sigmoidoscopy to 30 cm from the AV was first performed with disimpaction to evaluate the proximal small bowel and our anastomosis - see description of findings above.     Once this was complete we made a circumferential incision along the mucocutaneous junction with electrocautery through to the dermis. The subcutaneous tissue was sharply  from the ileum and its mesentery down through the hernia sac into the abdomen. This was performed until the small bowel could be freely brought through the ostomy site. We then performed a saline test of the proximal and distal limbs - this was reassuring. This did reveal two 1 mm areas that were over sewn with 3-0 Vicryl as we could not differentiate these from hernia sac vs serosal disruption.    A side-to-side functional end-to-end anastomosis was made with a single fire of a 75 mm ROSIO blue load. The common channel was approximated with a TA-60 green load. aThis portion of small bowel was sent for pathologic review. Between fires, the common channel staple line was observed and hemostasis was confirmed. The common channel staple line was oversewn with  3-0 Vicryl and a single interrupted stitch was placed at the base of the anastomosis. This was then gently pushed back into the abdomen without difficulty.    The fascia was approximated with 0-PDS interrupted figure-of-eight suture and the subcutaneous tissue was thoroughly irrigated with saline. The skin was approximated with 3-0 Vicryl in pursestring fashion and a dry dressing was placed.     Prior to closure and after final closure instrument, needle, and sponge counts were correct.    The procedure was completed without complication and was well-tolerated. The patient was then brought to the post-anesthesia care unit in stable condition. I was present for the entire operation and discussed the case with family at its conclusion.     Complications: None  Estimated blood loss: 15 mL  Disposition: PACU      Farhan Lance MD, FACS, FASCRS  Department of Colon & Rectal Surgery  [facility]

## 2024-10-07 NOTE — TRANSFER OF CARE
"Anesthesia Transfer of Care Note    Patient: Osman Li Jr.    Procedure(s) Performed: Procedure(s) (LRB):  CLOSURE, ILEOSTOMY, LOOP (N/A)  SIGMOIDOSCOPY, FLEXIBLE (N/A)    Patient location: PACU    Anesthesia Type: general    Transport from OR: Transported from OR on 6-10 L/min O2 by face mask with adequate spontaneous ventilation    Post pain: adequate analgesia    Post assessment: no apparent anesthetic complications    Post vital signs: stable    Level of consciousness: awake    Nausea/Vomiting: no nausea/vomiting    Complications: none    Transfer of care protocol was followed      Last vitals: Visit Vitals  BP (!) 150/74 (BP Location: Left arm, Patient Position: Lying)   Pulse 82   Temp 36.6 °C (97.9 °F) (Temporal)   Resp 16   Ht 6' 2" (1.88 m)   Wt 99.9 kg (220 lb 3.8 oz)   SpO2 100%   BMI 28.28 kg/m²     "

## 2024-10-07 NOTE — H&P
59 year old man with a history of HTN, HLD, and DM, Stage IV transverse colon adenocarcinoma who presents for evaluation of colostomy takedown . He underwent a splenic flexure resection, end colostomy and splenectomy with Dr. Waddell > FOLFOX > progression (pancreatic tail - biopsy proven and RPLN PET avidity) > Keytruda since 1/2023. He was seen by Dr. Goodwin in 1/2024 (note reviewed) - his plan was to follow-up the results of his PET CT, decision was made to complete Keytruda and continue surveillance - if recurrence occurs then move forward with resection. He is doing well - fatigue only around the time of Keytruda for about 1 week after, he denies any nausea, vomiting, fevers, or chills. His weight is stable but about 30 lbs less than originally. He denies fecal incontinence.      PET CT - 1/8/2024  Positive therapy response with resolution of the metastatic retroperitoneal lymph node compared to the prior examination.  No FDG avid foci are present on today's exam.      Last colonoscopy: 2018 (Complete) - repeat in 5 years  Two sessile polyps 5-6 mm in descending colon, polypectomy performed (HP)  Two sessile polyps 5-7 mm in the sigmoid colon, polypectomy performed (HP)  18 diminutive recto sigmoid polyps were ablated     4/19/2024  Here for pre-operative consultation for colostomy takedown and likely distal pancreatectomy. He is doing well - no major changes since the last time I saw him. He is ready for surgery.     Colonoscopy - 3/5/2024  The perianal and digital rectal examinations were normal. Pertinent negatives include normal sphincter tone.   Diffuse mild inflammation characterized by altered vascularity, friability and mucus was found in the rectum, in the sigmoid colon and in the descending colon. No biopsies or other specimens were collected for this exam. Estimated blood loss was minimal.   There was evidence of a widely patent end colostomy in the transverse colon. This was characterized by healthy  appearing mucosa.   The exam was otherwise without abnormality.      6/28/2024  Here for follow-up, underwent open colostomy takedown and repair of parastomal hernia on 5/13/2024 complicated by what was felt to be an internal hernia requiring emergent take back, found to have a small anastomotic complication requiring completion colectomy, ileorectal anastomosis and DLI on 5/21/2024. He is doing well - no nausea or vomiting. Some mild pain in bilateral abdomen but ostomy is functioning. He denies any fevers or chills.      5/13/2024 - Pathology  1. Ostomy with peristomal hernia, excision:   Portion of colostomy with no evidence of neoplastic change.   Fibromembranous tissue, consistent with hernia sac.     2. Descending colon, excision:   Segment of colon with focal necrosis.   Resection margins appear viable.   No dysplasia or malignancy.      5/21/2024 - Pathology  1. TOTAL ABDOMINAL COLON, COLECTOMY:   Abdominal perforation with peritonitis adjacent to an anastomosis   Appendix with reactive changes of mesopappendix   Negative for neoplasia or malignancy     2. SIGMOID COLON, RESECTION:Portion of colon with features of acute peritonitis     3. ANASTOMOTIC DONUTS:   Portion of small bowel with ulceration   A separate portion of colon with acute inflammatory and reactive changes      8/9/2024  Here for follow-up, seen by Dr. Koobehi for Keytruda. He is doing well - still not smoking. He is not having any major issues - CT was reassuring. He does have mild prolapse of his ileostomy.     CT AP - 7/5/2024  The ileo rectal anastomosis appears intact. No extraluminal contrast to suggest anastomotic leak. No bowel obstruction. Visualized loops of small bowel are normal in caliber without wall thickening.      History of colonic adenocarcinoma status post colectomy with ileorectal anastomosis and diverting loop ileostomy as well as splenectomy.     No evidence to suggest local recurrence or distant abdominopelvic  metastasis.     Persistent fluid/peritoneal thickening left upper quadrant similar/improved compared to prior.     10/7: Presents for elective ileostomy reversal in his usual state of health.         Oncology History   Malignant neoplasm of transverse colon   4/20/2022 Surgery     Partial colectomy, splenectomy, end colostomy - pancreas appeared to be involved at that time     1. Spleen, splenectomy:   - Benign splenic parenchyma   - Negative for malignancy   2. Colon, partial colectomy:   - Invasive colonic adenocarcinoma, poorly differentiated (G3)     - Invades into pericolorectal tissue (pT3)   - Margins uninvolved by invasive carcinoma     - 0.1 cm to the mesenteric margin   - Lymphovascular invasion identified   - No perineural invasion identified   - Seventeen of twenty-seven lymph nodes, positive for metastatic carcinoma   (17/27, pN2b)   - Fibrous serosal adhesions   - See below for results of MSI testing   - CARLOS ENRIQUE/MILLIE Targeted Gene Panel has been ordered and results will be issued in   a supplemental report     CAP Synoptic Checklist for Colonic Neoplasms:     - Procedure: Partial colectomy     - Tumor Site: Splenic flexure     - Histologic Type: Adenocarcinoma     - Histologic Grade: G3, poorly differentiated     - Tumor Size: 5.0 x 4.5 x 2.7 cm     - Tumor Extent: Invades through muscularis propria into pericolorectal   tissue    - Macroscopic Tumor Perforation: Not identified     - Lymphovascular Invasion: Present, small vessel involved     - Perineural Invasion: Not identified     - Number of Tumor Buds: 5 in one hotspot field     - Tumor Bud Score: Intermediate (5-9)     - Type of Polyp in which Invasive Carcinoma Arose: None identified     - Treatment Effect: No known presurgical therapy     - Margins: All margins negative for invasive carcinoma       - Closest Margin to Invasive Carcinoma: 0.1 cm to mesenteric margin     - Regional Lymph Nodes:       - Number of Lymph Nodes with Tumor: 17       -  Number of Lymph Nodes Examined: 27       - Tumor Deposits: Not identified     - Distant Metastasis: Not applicable     - Pathologic Stage Classification: pT3 pN2b     - Additional Findings: Fibrous serosal adhesions; benign spleen     - Special Studies: See below for results of MSI testing; CARLOS ENRIQUE/MILLIE panel has         been ordered and results will be issued in a supplemental report     - Designate Block for Future Studies: [id number]   Immunohistochemistry (IHC) Testing for Mismatch Repair (MMR) Proteins:   MLH1 - Intact nuclear expression   MSH2 - Intact nuclear expression   MSH6 - Intact nuclear expression   PMS2 - Intact nuclear expression       5/5/2022 Initial Diagnosis     Malignant neoplasm of transverse colon      7/12/2022 - 12/14/2022 Chemotherapy     Treatment Summary   Plan Name: OP mFOLFOX 6 Q2W  Treatment Goal: Curative  Status: Inactive  Start Date: 7/12/2022  End Date: 12/14/2022  Provider: Aurash Khoobehi, MD  Chemotherapy: fluorouraciL injection 930 mg, 400 mg/m2 = 930 mg, Intravenous, Clinic/HOD 1 time, 12 of 12 cycles  Administration: 930 mg (7/12/2022), 930 mg (7/26/2022), 930 mg (8/9/2022), 930 mg (8/23/2022), 930 mg (9/6/2022), 930 mg (9/19/2022), 835 mg (10/3/2022), 835 mg (10/17/2022), 825 mg (10/31/2022), 820 mg (11/14/2022), 830 mg (11/28/2022), 825 mg (12/12/2022)  oxaliplatin (ELOXATIN) 85 mg/m2 = 197 mg in dextrose 5 % 539.4 mL chemo infusion, 85 mg/m2 = 197 mg, Intravenous, Clinic/HOD 1 time, 10 of 10 cycles  Dose modification: 68 mg/m2 (original dose 85 mg/m2, Cycle 8, Reason: MD Discretion)  Administration: 197 mg (7/12/2022), 197 mg (7/26/2022), 197 mg (8/9/2022), 197 mg (8/23/2022), 197 mg (9/6/2022), 197 mg (9/19/2022), 178 mg (10/3/2022), 142 mg (10/17/2022), 139 mg (11/14/2022)      2/13/2023 -  Chemotherapy     Treatment Summary   Plan Name: OP PEMBROLIZUMAB 200MG Q3W  Treatment Goal: Curative  Status: Active  Start Date: 2/13/2023  End Date: 3/11/2025 (Planned)  Provider:  Aurash Khoobehi, MD  Chemotherapy: [No matching medication found in this treatment plan]      2/26/2024 Cancer Staged     Staging form: Colon and Rectum, AJCC 8th Edition  - Pathologic: Stage IVB (pT3, pN2b, cM1b)      Metastatic adenocarcinoma to pancreas   1/13/2023 Initial Diagnosis     Metastatic adenocarcinoma to pancreas      2/13/2023 -  Chemotherapy     Treatment Summary   Plan Name: OP PEMBROLIZUMAB 200MG Q3W  Treatment Goal: Curative  Status: Active  Start Date: 2/13/2023  End Date: 3/11/2025 (Planned)  Provider: Aurash Khoobehi, MD  Chemotherapy: [No matching medication found in this treatment plan]            The patient was informed of the availability of a certified  without charge. A certified  was not necessary for this visit.     Review of Systems  See pertinent review of systems above          Past Medical History:   Diagnosis Date    Colon cancer      Digestive disorder      DM (diabetes mellitus)      Hyperlipidemia      Hypertension      Prediabetes              Past Surgical History:   Procedure Laterality Date    ADENOIDECTOMY   1972    ANASTOMOSIS OF ILEUM TO RECTUM N/A 5/21/2024     Procedure: ANASTOMOSIS, ILEORECTAL;  Surgeon: Farhan Lance MD;  Location: [facility] OR [address] [place] [address];  Service: Colon and Rectal;  Laterality: N/A;    CHOLECYSTECTOMY   2011    CLOSURE, COLOSTOMY N/A 5/13/2024     Procedure: CLOSURE, COLOSTOMY (eras high/lithotomy);  Surgeon: Farhan Lance MD;  [facility];  Service: Colon and Rectal;  Laterality: N/A;  CONSENTS IN [facility]    COLON SURGERY        COLONOSCOPY         2018    COLONOSCOPY N/A 3/5/2024     Procedure: COLONOSCOPY;  Surgeon: Farhan Lance MD;  Location: [facility] (4TH FLR);  Service: Endoscopy;  Laterality: N/A;  2/27/24-[facility] pt, 1-4wks, port, PEG plus 2 enemas morning of procedure, instr portal-DS  2/28/24- LVM for precall - ERW  pt confirmed procedure. cf    COLOSTOMY N/A 04/25/2022      Procedure: CREATION, COLOSTOMY;  Surgeon: Carlton Waddell MD;  Location: [facility] OR;  Service: General;  Laterality: N/A;    ENDOSCOPIC ULTRASOUND OF UPPER GASTROINTESTINAL TRACT N/A 01/06/2023     Procedure: ULTRASOUND, UPPER GI TRACT, ENDOSCOPIC;  Surgeon: Rinku Orozco III, MD;  Location: [facility] ENDO;  Service: Endoscopy;  Laterality: N/A;    FLEXIBLE SIGMOIDOSCOPY N/A 5/13/2024     Procedure: SIGMOIDOSCOPY, FLEXIBLE;  Surgeon: Farhan Lance MD;  Location: [facility] OR 2ND FLR;  Service: Colon and Rectal;  Laterality: N/A;    FLEXIBLE SIGMOIDOSCOPY   5/21/2024     Procedure: SIGMOIDOSCOPY, FLEXIBLE;  Surgeon: Farhan Lance MD;  Location: [facility] OR 2ND FLR;  Service: Colon and Rectal;;    ILEOSTOMY N/A 5/21/2024     Procedure: CREATION, ILEOSTOMY, DIVERTING LOOP;  Surgeon: Farhan Lance MD;  Location: [facility] OR [facility] FLR;  Service: Colon and Rectal;  Laterality: N/A;    INSERTION OF TUNNELED CENTRAL VENOUS CATHETER (CVC) WITH SUBCUTANEOUS PORT Right 05/18/2022     Procedure: [id number];  Surgeon: Carlton Waddell MD;  Location: [facility] OR;  Service: General;  Laterality: Right;    INSERTION OF URETERAL CATHETER N/A 5/13/2024     Procedure: INSERTION, CATHETER, URETER, BILATERAL;  Surgeon: Travis Edwards MD;  Location: [facility] OR [facility] FLR;  Service: Urology;  Laterality: N/A;    INSERTION OF URETERAL CATHETER Left 5/21/2024     Procedure: INSERTION, CATHETER, URETER;  Surgeon: Carlton Santos MD;  Location: [facility] OR 2ND FLR;  Service: Urology;  Laterality: Left;    LAPAROTOMY, EXPLORATORY N/A 5/21/2024     Procedure: LAPAROTOMY, EXPLORATORY;  Surgeon: Farhan Lance MD;  Location: [facility] OR 2ND FLR;  Service: Colon and Rectal;  Laterality: N/A;    LYSIS OF ADHESIONS N/A 5/13/2024     Procedure: LYSIS, ADHESIONS;  Surgeon: Farhan Lance MD;  Location: [facility] OR 2ND FLR;  Service: Colon and Rectal;  Laterality: N/A;    MOBILIZATION OF SPLENIC FLEXURE N/A 04/25/2022     Procedure:  MOBILIZATION, SPLENIC FLEXURE;  Surgeon: Carlton Waddell MD;  Location: [facility] OR;  Service: General;  Laterality: N/A;    SPLENECTOMY N/A 04/25/2022     Procedure: SPLENECTOMY;  Surgeon: Carlton Waddell MD;  Location: [facility] OR;  Service: General;  Laterality: N/A;    SUBTOTAL COLECTOMY N/A 04/25/2022     Procedure: COLECTOMY, PARTIAL;  Surgeon: Carlton Waddell MD;  Location: [facility] OR;  Service: General;  Laterality: N/A;    TONSILLECTOMY        TOTAL ABDOMINAL COLECTOMY N/A 5/21/2024     Procedure: COLECTOMY, COMPLETION, ABDOMINAL;  Surgeon: Farhan Lance MD;  Location: [facility] OR [facility] FLR;  Service: Colon and Rectal;  Laterality: N/A;    TUMOR REMOVAL   10/18/2023     on top of the nose    VASECTOMY   1994             Family History   Problem Relation Name Age of Onset    Heart disease Father Loki senior      Diabetes Father Loki senior      Alcohol abuse Paternal Grandfather Waylon Li      Rectal cancer Other Makenzie           half-sister    Cancer Sister makenzie        Social History   Social History            Tobacco Use    Smoking status: Every Day       Current packs/day: 1.00       Average packs/day: 1 pack/day for 40.0 years (40.0 ttl pk-yrs)       Types: Cigarettes       Passive exposure: Current    Smokeless tobacco: Never   Substance Use Topics    Alcohol use: Not Currently    Drug use: Never              Review of patient's allergies indicates:   Allergen Reactions    Penicillins Rash         Medications Ordered Prior to Encounter          Current Outpatient Medications on File Prior to Visit   Medication Sig Dispense Refill    atorvastatin (LIPITOR) 20 MG tablet Take 1 tablet (20 mg total) by mouth every evening. 90 tablet 3    enalapril (VASOTEC) 10 MG tablet Take 1 tablet (10 mg total) by mouth once daily. 90 tablet 3    gabapentin (NEURONTIN) 300 MG capsule Take 1 capsule (300 mg total) by mouth 3 (three) times daily. 90 capsule 3    insulin glargine U-300 conc (TOUJEO MAX U-300 SOLOSTAR) 300  "unit/mL (3 mL) insulin pen Inject 48 Units into the skin once daily. (Discard pen 56 days after initial use) 5 Pen 5    metFORMIN (GLUCOPHAGE) 1000 MG tablet Take 1 tablet (1,000 mg total) by mouth 2 (two) times daily with meals. 180 tablet 3    nicotine (NICODERM CQ) 14 mg/24 hr Place 1 patch onto the skin once daily. 28 patch 2    pen needle, diabetic 31 gauge x 3/16" Ndle 1 each by Misc.(Non-Drug; Combo Route) route once daily. 90 each 3    promethazine (PHENERGAN) 12.5 MG Tab Take 1 tablet (12.5 mg total) by mouth every 4 (four) hours as needed. 25 tablet 1    sildenafiL (VIAGRA) 100 MG tablet Take 1 tablet (100 mg total) by mouth daily as needed for Erectile Dysfunction. 30 tablet 3    traZODone (DESYREL) 50 MG tablet Take 1 tablet (50 mg total) by mouth every evening. 90 tablet 3    methocarbamoL (ROBAXIN) 500 MG Tab Take 500 mg by mouth every 8 (eight) hours as needed.        metoprolol tartrate (LOPRESSOR) 25 MG tablet Take 1 tablet (25 mg total) by mouth 2 (two) times daily. 180 tablet 0    morphine (MS CONTIN) 30 MG 12 hr tablet Take 15 mg by mouth 2 (two) times daily.        oxyCODONE (ROXICODONE) 10 mg Tab immediate release tablet Take 1 tablet (10 mg total) by mouth every 4 (four) hours as needed for Pain. 20 tablet 0    scopolamine (TRANSDERM-SCOP) 1.3-1.5 mg (1 mg over 3 days) Place 1 patch onto the skin Every 3 (three) days. 5 patch 0    tamsulosin (FLOMAX) 0.4 mg Cap Take 2 capsules (0.8 mg total) by mouth once daily. for 7 days 14 capsule 0      No current facility-administered medications on file prior to visit.         Physical Examination  Vitals:    10/07/24 0544   BP: (!) 153/74   Resp: 16   Temp: 98.2 °F (36.8 °C)          Constitutional: well developed, no cough, no dyspnea, alert, and no acute distress    Head: Normocephalic, no lesions, without obvious abnormality  Eye: Normal external eye, conjunctiva, and lids  Cardiovascular: regular rate and regular rhythm  Respiratory: normal air " entry  Gastrointestinal: soft, non-tender, ostomy in place  Musculoskeletal: full range of motion without pain  Neurologic: alert, oriented, normal speech, no focal findings or movement disorder noted  Psychiatric: appropriate, normal mood     Flexible Sigmoidoscopy Procedure Note     Procedure: Flexible Sigmoidoscopy     Pre-operative Diagnosis: Evaluate ileorectal anastomosis     Post-operative Diagnosis: Same as above     Procedure Details      Informed consent was obtained for the procedure. Risks of perforation and hemorrhage were discussed. The patient was placed in the left lateral decubitus position, the anal region was examined, a digital rectal examination performed, then the 60 cm flexible sigmoidoscope was inserted and advanced without difficulty to a distance of 20 cm. The preparation was adequate. The instrument was then withdrawn.     Findings: Intact, healthy appearing anastomosis       Specimens: None           Complications: None     Scope number: [id number]      Assessment and Plan of Care     59 year old man presenting with Stage IV splenic flexure adenocarcinoma, metastatic to retroperitoneal node and pancreatic tail with excellent response now to Keytruda. He underwent colostomy takedown complicated by anastomotic leak requiring revision and DLI. We discussed treatment options and have provided the following recommendations:     Benefits and risks of ileostomy takedown discussed, consent signed. To OR today

## 2024-10-08 ENCOUNTER — PATIENT MESSAGE (OUTPATIENT)
Dept: HEMATOLOGY/ONCOLOGY | Facility: CLINIC | Age: 59
End: 2024-10-08
Payer: COMMERCIAL

## 2024-10-08 LAB
ANION GAP SERPL CALC-SCNC: 6 MMOL/L (ref 8–16)
BASOPHILS # BLD AUTO: 0.02 K/UL (ref 0–0.2)
BASOPHILS NFR BLD: 0.1 % (ref 0–1.9)
BUN SERPL-MCNC: 12 MG/DL (ref 6–20)
CALCIUM SERPL-MCNC: 9.4 MG/DL (ref 8.7–10.5)
CHLORIDE SERPL-SCNC: 105 MMOL/L (ref 95–110)
CO2 SERPL-SCNC: 25 MMOL/L (ref 23–29)
CREAT SERPL-MCNC: 1 MG/DL (ref 0.5–1.4)
DIFFERENTIAL METHOD BLD: ABNORMAL
EOSINOPHIL # BLD AUTO: 0 K/UL (ref 0–0.5)
EOSINOPHIL NFR BLD: 0.1 % (ref 0–8)
ERYTHROCYTE [DISTWIDTH] IN BLOOD BY AUTOMATED COUNT: 14.4 % (ref 11.5–14.5)
EST. GFR  (NO RACE VARIABLE): >60 ML/MIN/1.73 M^2
FINAL PATHOLOGIC DIAGNOSIS: NORMAL
GLUCOSE SERPL-MCNC: 118 MG/DL (ref 70–110)
GROSS: NORMAL
HCT VFR BLD AUTO: 33.9 % (ref 40–54)
HGB BLD-MCNC: 10.9 G/DL (ref 14–18)
IMM GRANULOCYTES # BLD AUTO: 0.05 K/UL (ref 0–0.04)
IMM GRANULOCYTES NFR BLD AUTO: 0.4 % (ref 0–0.5)
LYMPHOCYTES # BLD AUTO: 3.7 K/UL (ref 1–4.8)
LYMPHOCYTES NFR BLD: 27 % (ref 18–48)
Lab: NORMAL
MAGNESIUM SERPL-MCNC: 2 MG/DL (ref 1.6–2.6)
MCH RBC QN AUTO: 27.7 PG (ref 27–31)
MCHC RBC AUTO-ENTMCNC: 32.2 G/DL (ref 32–36)
MCV RBC AUTO: 86 FL (ref 82–98)
MONOCYTES # BLD AUTO: 1.3 K/UL (ref 0.3–1)
MONOCYTES NFR BLD: 9.2 % (ref 4–15)
NEUTROPHILS # BLD AUTO: 8.6 K/UL (ref 1.8–7.7)
NEUTROPHILS NFR BLD: 63.2 % (ref 38–73)
NRBC BLD-RTO: 0 /100 WBC
PHOSPHATE SERPL-MCNC: 3.2 MG/DL (ref 2.7–4.5)
PLATELET # BLD AUTO: 320 K/UL (ref 150–450)
PMV BLD AUTO: 10.4 FL (ref 9.2–12.9)
POCT GLUCOSE: 106 MG/DL (ref 70–110)
POCT GLUCOSE: 144 MG/DL (ref 70–110)
POCT GLUCOSE: 172 MG/DL (ref 70–110)
POCT GLUCOSE: 181 MG/DL (ref 70–110)
POCT GLUCOSE: 187 MG/DL (ref 70–110)
POTASSIUM SERPL-SCNC: 4 MMOL/L (ref 3.5–5.1)
RBC # BLD AUTO: 3.94 M/UL (ref 4.6–6.2)
SODIUM SERPL-SCNC: 136 MMOL/L (ref 136–145)
WBC # BLD AUTO: 13.63 K/UL (ref 3.9–12.7)

## 2024-10-08 PROCEDURE — 83735 ASSAY OF MAGNESIUM: CPT | Performed by: STUDENT IN AN ORGANIZED HEALTH CARE EDUCATION/TRAINING PROGRAM

## 2024-10-08 PROCEDURE — 85025 COMPLETE CBC W/AUTO DIFF WBC: CPT | Performed by: STUDENT IN AN ORGANIZED HEALTH CARE EDUCATION/TRAINING PROGRAM

## 2024-10-08 PROCEDURE — 84100 ASSAY OF PHOSPHORUS: CPT | Performed by: STUDENT IN AN ORGANIZED HEALTH CARE EDUCATION/TRAINING PROGRAM

## 2024-10-08 PROCEDURE — 25000003 PHARM REV CODE 250: Performed by: STUDENT IN AN ORGANIZED HEALTH CARE EDUCATION/TRAINING PROGRAM

## 2024-10-08 PROCEDURE — 36415 COLL VENOUS BLD VENIPUNCTURE: CPT | Performed by: STUDENT IN AN ORGANIZED HEALTH CARE EDUCATION/TRAINING PROGRAM

## 2024-10-08 PROCEDURE — 63600175 PHARM REV CODE 636 W HCPCS: Performed by: STUDENT IN AN ORGANIZED HEALTH CARE EDUCATION/TRAINING PROGRAM

## 2024-10-08 PROCEDURE — S4991 NICOTINE PATCH NONLEGEND: HCPCS | Performed by: STUDENT IN AN ORGANIZED HEALTH CARE EDUCATION/TRAINING PROGRAM

## 2024-10-08 PROCEDURE — 20600001 HC STEP DOWN PRIVATE ROOM

## 2024-10-08 PROCEDURE — 80048 BASIC METABOLIC PNL TOTAL CA: CPT | Performed by: STUDENT IN AN ORGANIZED HEALTH CARE EDUCATION/TRAINING PROGRAM

## 2024-10-08 RX ORDER — TAMSULOSIN HYDROCHLORIDE 0.4 MG/1
0.4 CAPSULE ORAL DAILY
Status: DISCONTINUED | OUTPATIENT
Start: 2024-10-08 | End: 2024-10-09 | Stop reason: HOSPADM

## 2024-10-08 RX ADMIN — NICOTINE 1 PATCH: 14 PATCH, EXTENDED RELEASE TRANSDERMAL at 08:10

## 2024-10-08 RX ADMIN — TRAZODONE HYDROCHLORIDE 50 MG: 50 TABLET ORAL at 09:10

## 2024-10-08 RX ADMIN — ALVIMOPAN 12 MG: 12 CAPSULE ORAL at 09:10

## 2024-10-08 RX ADMIN — GABAPENTIN 300 MG: 300 CAPSULE ORAL at 02:10

## 2024-10-08 RX ADMIN — ATORVASTATIN CALCIUM 20 MG: 20 TABLET, FILM COATED ORAL at 09:10

## 2024-10-08 RX ADMIN — OXYCODONE HYDROCHLORIDE 10 MG: 10 TABLET ORAL at 05:10

## 2024-10-08 RX ADMIN — ENOXAPARIN SODIUM 40 MG: 40 INJECTION SUBCUTANEOUS at 04:10

## 2024-10-08 RX ADMIN — INSULIN ASPART 2 UNITS: 100 INJECTION, SOLUTION INTRAVENOUS; SUBCUTANEOUS at 12:10

## 2024-10-08 RX ADMIN — ALVIMOPAN 12 MG: 12 CAPSULE ORAL at 08:10

## 2024-10-08 RX ADMIN — INSULIN ASPART 1 UNITS: 100 INJECTION, SOLUTION INTRAVENOUS; SUBCUTANEOUS at 09:10

## 2024-10-08 RX ADMIN — OXYCODONE HYDROCHLORIDE 10 MG: 10 TABLET ORAL at 08:10

## 2024-10-08 RX ADMIN — IBUPROFEN 800 MG: 800 INJECTION INTRAVENOUS at 05:10

## 2024-10-08 RX ADMIN — ACETAMINOPHEN 1000 MG: 500 TABLET ORAL at 09:10

## 2024-10-08 RX ADMIN — TAMSULOSIN HYDROCHLORIDE 0.4 MG: 0.4 CAPSULE ORAL at 08:10

## 2024-10-08 RX ADMIN — GABAPENTIN 300 MG: 300 CAPSULE ORAL at 08:10

## 2024-10-08 RX ADMIN — INSULIN GLARGINE 30 UNITS: 100 INJECTION, SOLUTION SUBCUTANEOUS at 09:10

## 2024-10-08 RX ADMIN — OXYCODONE HYDROCHLORIDE 10 MG: 10 TABLET ORAL at 12:10

## 2024-10-08 RX ADMIN — IBUPROFEN 800 MG: 400 TABLET ORAL at 09:10

## 2024-10-08 RX ADMIN — IBUPROFEN 800 MG: 400 TABLET ORAL at 02:10

## 2024-10-08 RX ADMIN — GABAPENTIN 300 MG: 300 CAPSULE ORAL at 09:10

## 2024-10-08 NOTE — NURSING
..[facility] Plan of Care Note  Dx : Ileostomy in place     Shift Events: Removed blas cath 10/8/24 at 6am    Goals of Care: Pain management, safety  and comfort    Neuro:  AO x4    Vital Signs: WDL    Respiratory: RA    Diet: CLD    Is patient tolerating current diet? N/A    GTTS: PIV x1    Urine Output/Bowel Movement: BM X3      Drains/Tubes/Tube Feeds (include total output/shift):  Urethral  catheter-    Lines: 0.9% NaCl [id number] mL/hr       Accuchecks: ACHS    Skin: Incision LLQ    Fall Risk Score: 7    Activity level? Independent    Any scheduled procedures?  N/a    Any safety concerns? none    Other:

## 2024-10-08 NOTE — NURSING
Patient's ileostomy closure site was bleeding thru the gauze.  Gauze and Tegaderm. Changed. JOHN Purvis NP notified. NP also notified nurse it's okay to give Lovenox because it's a small dose and the bleeding from the ostomy site is to be expected.

## 2024-10-08 NOTE — PROGRESS NOTES
[facility]  Colorectal Surgery  Progress Note    Patient Name: Osman Li Jr.  MRN: [id number]  Admission Date: 10/7/2024  Hospital Length of Stay: 1 days  Attending Physician: Farhan Lance MD    Subjective:     Interval History: no acute events overnite, adequate pain control, no n/v, denies flatus    Post-Op Info:  Procedure(s) (LRB):  CLOSURE, ILEOSTOMY, LOOP (N/A)  SIGMOIDOSCOPY, FLEXIBLE (N/A)   1 Day Post-Op      Medications:  Continuous Infusions:  Scheduled Meds:   acetaminophen  1,000 mg Intravenous Q8H    Followed by    acetaminophen  1,000 mg Oral Q8H    alvimopan  12 mg Oral BID    atorvastatin  20 mg Oral QHS    enoxparin  40 mg Subcutaneous Daily    gabapentin  300 mg Oral TID    ibuprofen  800 mg Oral Q8H    insulin glargine U-100  30 Units Subcutaneous QHS    nicotine  1 patch Transdermal Daily    tamsulosin  0.4 mg Oral Daily    traZODone  50 mg Oral QHS     PRN Meds:   acetaminophen 1,000 mg/100 mL (10 mg/mL) injection 1,000 mg    Followed by    acetaminophen tablet 1,000 mg    alvimopan capsule 12 mg    atorvastatin tablet 20 mg    enoxaparin injection 40 mg    gabapentin capsule 300 mg    ibuprofen tablet 800 mg    insulin glargine U-100 (Lantus) pen 30 Units    nicotine 14 mg/24 hr 1 patch    tamsulosin 24 hr capsule 0.4 mg    traZODone tablet 50 mg        Objective:     Vital Signs (Most Recent):  Temp: 98 °F (36.7 °C) (10/08/24 1108)  Pulse: 60 (10/08/24 1108)  Resp: 18 (10/08/24 1243)  BP: (!) 151/74 (10/08/24 1108)  SpO2: 99 % (10/08/24 1108) Vital Signs (24h Range):  Temp:  [97.5 °F (36.4 °C)-98.6 °F (37 °C)] 98 °F (36.7 °C)  Pulse:  [58-68] 60  Resp:  [16-18] 18  SpO2:  [97 %-99 %] 99 %  BP: (135-151)/(68-74) 151/74     Intake/Output - Last 3 Shifts         10/06 0700  10/07 0659 10/07 0700  10/08 0659 10/08 0700  10/09 0659    I.V. (mL/kg)  825.7 (8.3)     IV Piggyback  2738.2     Total Intake(mL/kg)  3563.9 (35.7)     Urine (mL/kg/hr)  2450 (1)     Stool  0      Total Output  2450     Net  +1113.9            Stool Occurrence  3 x 2 x             Physical Exam  Vitals and nursing note reviewed.   Constitutional:       Appearance: He is well-developed.   Cardiovascular:      Rate and Rhythm: Normal rate and regular rhythm.      Heart sounds: Normal heart sounds.   Pulmonary:      Effort: Pulmonary effort is normal. No respiratory distress.      Breath sounds: Normal breath sounds. No wheezing or rales.   Abdominal:      General: There is no distension.      Palpations: Abdomen is soft. There is no mass.      Tenderness: There is no abdominal tenderness. There is no guarding.      Comments: Former ostomy site healing well   Musculoskeletal:         General: Normal range of motion.   Skin:     General: Skin is warm and dry.   Neurological:      Mental Status: He is alert and oriented to person, place, and time.   Psychiatric:         Behavior: Behavior normal.         Thought Content: Thought content normal.         Judgment: Judgment normal.                Significant Labs:  BMP (Last 3 Results):   Recent Labs   Lab 10/08/24  0549   *      K 4.0      CO2 25   BUN 12   CREATININE 1.0   CALCIUM 9.4   MG 2.0     CBC (Last 3 Results):   Recent Labs   Lab 10/08/24  0549   WBC 13.63*   RBC 3.94*   HGB 10.9*   HCT 33.9*      MCV 86   MCH 27.7   MCHC 32.2       Significant Diagnostics:  None  Assessment/Plan:     * Ileostomy in place  OR 10/7 ileostomy closure    -await return of bowel function, lfd  -continue multi modality for pain control  -encourage ambulation and use of IS  -waqar hose and SCD  -prophalactic lovenox and PPI     Tobacco dependency  Dangers of cigarette smoking were reviewed with patient in detail. Patient was Counseled for 10 minutes or greater. Nicotine replacement options were discussed. Nicotine replacement was discussed- not prescribed per patient's request    Type 2 diabetes mellitus  Patient's FSGs are controlled on current medication  regimen.  Last A1c reviewed-   Lab Results   Component Value Date    HGBA1C 6.5 (H) 10/07/2024     Most recent fingerstick glucose reviewed-   Recent Labs   Lab 10/07/24  1607 10/07/24  2100 10/08/24  0715 10/08/24  1113   POCTGLUCOSE 205* 187* 106 172*     Current correctional scale  High  Maintain anti-hyperglycemic dose as follows-   Antihyperglycemics (From admission, onward)      Start     Stop Route Frequency Ordered    10/07/24 2100  insulin glargine U-100 (Lantus) pen 30 Units         -- SubQ Nightly 10/07/24 1029    10/07/24 0955  insulin aspart U-100 pen 0-10 Units         -- SubQ Before meals & nightly PRN 10/07/24 0905          Hold Oral hypoglycemics while patient is in the hospital.     HTN (hypertension)  Chronic, controlled.  Latest blood pressure and vitals reviewed-     Temp:  [97.5 °F (36.4 °C)-98.6 °F (37 °C)]   Pulse:  [58-68]   Resp:  [16-18]   BP: (135-151)/(68-74)   SpO2:  [97 %-99 %] .   Home meds for hypertension were reviewed and noted below-  Hypertension Medications               enalapril (VASOTEC) 10 MG tablet Take 1 tablet (10 mg total) by mouth once daily.            While in the hospital, will manage blood pressure as follows; Continue home antihypertensive regimen    Will utilize p.r.n. blood pressure medication only if patient's blood pressure greater than 140/90 and he develops symptoms such as worsening chest pain or shortness of breath.           Agustina Purvis NP  Colorectal Surgery  [facility]

## 2024-10-08 NOTE — ASSESSMENT & PLAN NOTE
Chronic, controlled.  Latest blood pressure and vitals reviewed-     Temp:  [97.5 °F (36.4 °C)-98.6 °F (37 °C)]   Pulse:  [58-68]   Resp:  [16-18]   BP: (135-151)/(68-74)   SpO2:  [97 %-99 %] .   Home meds for hypertension were reviewed and noted below-  Hypertension Medications               enalapril (VASOTEC) 10 MG tablet Take 1 tablet (10 mg total) by mouth once daily.            While in the hospital, will manage blood pressure as follows; Continue home antihypertensive regimen    Will utilize p.r.n. blood pressure medication only if patient's blood pressure greater than 140/90 and he develops symptoms such as worsening chest pain or shortness of breath.

## 2024-10-08 NOTE — PLAN OF CARE
Problem: Wound  Goal: Optimal Coping  Outcome: Progressing     Problem: Adult Inpatient Plan of Care  Goal: Optimal Comfort and Wellbeing  Outcome: Progressing     Problem: Adult Inpatient Plan of Care  Goal: Plan of Care Review  Outcome: Progressing

## 2024-10-08 NOTE — ASSESSMENT & PLAN NOTE
Patient's FSGs are controlled on current medication regimen.  Last A1c reviewed-   Lab Results   Component Value Date    HGBA1C 6.5 (H) 10/07/2024     Most recent fingerstick glucose reviewed-   Recent Labs   Lab 10/07/24  1607 10/07/24  2100 10/08/24  0715 10/08/24  1113   POCTGLUCOSE 205* 187* 106 172*     Current correctional scale  High  Maintain anti-hyperglycemic dose as follows-   Antihyperglycemics (From admission, onward)      Start     Stop Route Frequency Ordered    10/07/24 2100  insulin glargine U-100 (Lantus) pen 30 Units         -- SubQ Nightly 10/07/24 1029    10/07/24 0955  insulin aspart U-100 pen 0-10 Units         -- SubQ Before meals & nightly PRN 10/07/24 0905          Hold Oral hypoglycemics while patient is in the hospital.

## 2024-10-08 NOTE — ASSESSMENT & PLAN NOTE
OR 10/7 ileostomy closure    -await return of bowel function, lfd  -continue multi modality for pain control  -encourage ambulation and use of IS  -waqar hose and SCD  -prophalactic lovenox and PPI

## 2024-10-08 NOTE — PLAN OF CARE
Problem: Adult Inpatient Plan of Care  Goal: Plan of Care Review  Outcome: Progressing  Goal: Absence of Hospital-Acquired Illness or Injury  Outcome: Progressing  Goal: Optimal Comfort and Wellbeing  Outcome: Progressing     Problem: Diabetes Comorbidity  Goal: Blood Glucose Level Within Targeted Range  Outcome: Progressing     Problem: Infection  Goal: Absence of Infection Signs and Symptoms  Outcome: Progressing     Problem: Wound  Goal: Optimal Coping  Outcome: Progressing  Goal: Optimal Functional Ability  Outcome: Progressing  Goal: Absence of Infection Signs and Symptoms  Outcome: Progressing  Goal: Improved Oral Intake  Outcome: Progressing  Goal: Optimal Pain Control and Function  Outcome: Progressing  Goal: Skin Health and Integrity  Outcome: Progressing  Goal: Optimal Wound Healing  Outcome: Progressing

## 2024-10-08 NOTE — SUBJECTIVE & OBJECTIVE
Subjective:     Interval History: no acute events overnite, adequate pain control, no n/v, denies flatus    Post-Op Info:  Procedure(s) (LRB):  CLOSURE, ILEOSTOMY, LOOP (N/A)  SIGMOIDOSCOPY, FLEXIBLE (N/A)   1 Day Post-Op      Medications:  Continuous Infusions:  Scheduled Meds:   acetaminophen  1,000 mg Intravenous Q8H    Followed by    acetaminophen  1,000 mg Oral Q8H    alvimopan  12 mg Oral BID    atorvastatin  20 mg Oral QHS    enoxparin  40 mg Subcutaneous Daily    gabapentin  300 mg Oral TID    ibuprofen  800 mg Oral Q8H    insulin glargine U-100  30 Units Subcutaneous QHS    nicotine  1 patch Transdermal Daily    tamsulosin  0.4 mg Oral Daily    traZODone  50 mg Oral QHS     PRN Meds:   acetaminophen 1,000 mg/100 mL (10 mg/mL) injection 1,000 mg    Followed by    acetaminophen tablet 1,000 mg    alvimopan capsule 12 mg    atorvastatin tablet 20 mg    enoxaparin injection 40 mg    gabapentin capsule 300 mg    ibuprofen tablet 800 mg    insulin glargine U-100 (Lantus) pen 30 Units    nicotine 14 mg/24 hr 1 patch    tamsulosin 24 hr capsule 0.4 mg    traZODone tablet 50 mg        Objective:     Vital Signs (Most Recent):  Temp: 98 °F (36.7 °C) (10/08/24 1108)  Pulse: 60 (10/08/24 1108)  Resp: 18 (10/08/24 1243)  BP: (!) 151/74 (10/08/24 1108)  SpO2: 99 % (10/08/24 1108) Vital Signs (24h Range):  Temp:  [97.5 °F (36.4 °C)-98.6 °F (37 °C)] 98 °F (36.7 °C)  Pulse:  [58-68] 60  Resp:  [16-18] 18  SpO2:  [97 %-99 %] 99 %  BP: (135-151)/(68-74) 151/74     Intake/Output - Last 3 Shifts         10/06 0700  10/07 0659 10/07 0700  10/08 0659 10/08 0700  10/09 0659    I.V. (mL/kg)  825.7 (8.3)     IV Piggyback  2738.2     Total Intake(mL/kg)  3563.9 (35.7)     Urine (mL/kg/hr)  2450 (1)     Stool  0     Total Output  2450     Net  +1113.9            Stool Occurrence  3 x 2 x             Physical Exam  Vitals and nursing note reviewed.   Constitutional:       Appearance: He is well-developed.   Cardiovascular:      Rate  and Rhythm: Normal rate and regular rhythm.      Heart sounds: Normal heart sounds.   Pulmonary:      Effort: Pulmonary effort is normal. No respiratory distress.      Breath sounds: Normal breath sounds. No wheezing or rales.   Abdominal:      General: There is no distension.      Palpations: Abdomen is soft. There is no mass.      Tenderness: There is no abdominal tenderness. There is no guarding.      Comments: Former ostomy site healing well   Musculoskeletal:         General: Normal range of motion.   Skin:     General: Skin is warm and dry.   Neurological:      Mental Status: He is alert and oriented to person, place, and time.   Psychiatric:         Behavior: Behavior normal.         Thought Content: Thought content normal.         Judgment: Judgment normal.                Significant Labs:  BMP (Last 3 Results):   Recent Labs   Lab 10/08/24  0549   *      K 4.0      CO2 25   BUN 12   CREATININE 1.0   CALCIUM 9.4   MG 2.0     CBC (Last 3 Results):   Recent Labs   Lab 10/08/24  0549   WBC 13.63*   RBC 3.94*   HGB 10.9*   HCT 33.9*      MCV 86   MCH 27.7   MCHC 32.2       Significant Diagnostics:  None

## 2024-10-08 NOTE — PLAN OF CARE
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 No    Discharge Plan A: Home with family  Discharge Plan B: Home      [facility]  [address]  [facility] [address]  Phone: [phone] Fax: [phone]    [facility]  [address] [facility] [address]  Phone: [phone] Fax: [phone]    [facility] [place] - [address] AT Portal to Registered [facility] Sites  [facility]  [place] [address]  Phone: [phone] Fax: [phone]      Initial Assessment (most recent)       Adult Discharge Assessment - 10/08/24 1248          Discharge Assessment    Assessment Type Discharge Planning Assessment     Confirmed/corrected address, phone number and insurance Yes     Confirmed Demographics Correct on Facesheet     Source of Information health record     Does patient/caregiver understand observation status Yes     Communicated ONIEL with patient/caregiver Yes     People in Home spouse     Do you expect to return to your current living situation? Yes     Do you have help at home or someone to help you manage your care at home? Yes     Prior to hospitilization cognitive status: Alert/Oriented     Current cognitive status: Alert/Oriented     Walking or Climbing Stairs Difficulty no     Dressing/Bathing Difficulty no     Home Accessibility  wheelchair accessible     Home Layout Able to live on 1st floor     Equipment Currently Used at Home none     Readmission within 30 days? No     Patient currently being followed by outpatient case management? No     Do you currently have service(s) that help you manage your care at home? No     Do you take prescription medications? Yes     Do you have prescription coverage? Yes     Do you have any problems affording any of your prescribed medications? No     Is the patient taking medications as prescribed? yes     How do you get to doctors appointments? car, drives self;family or friend will provide     Are you on dialysis? No     Do you take coumadin? No     Discharge Plan A Home with family     Discharge Plan B Home     DME Needed Upon Discharge  none     Discharge Plan discussed with: Patient     Transition of Care Barriers None                        There have been no hospitalizations within the last 30 days. Verified pt PCP and preferred pharmacy. Pt chart stated not on Coumadin and is not receiving dialysis.     Discharge Plan A and Plan B have been determined by review of patient's clinical status, future medical and therapeutic needs, and coverage/benefits for post-acute care in coordination with multidisciplinary team members.         Tiffany Gleason LCSW  Case Management/[facility]  [phone]

## 2024-10-08 NOTE — ASSESSMENT & PLAN NOTE
Dangers of cigarette smoking were reviewed with patient in detail. Patient was Counseled for 10 minutes or greater. Nicotine replacement options were discussed. Nicotine replacement was discussed- not prescribed per patient's request

## 2024-10-09 VITALS
WEIGHT: 220.25 LBS | TEMPERATURE: 98 F | SYSTOLIC BLOOD PRESSURE: 144 MMHG | RESPIRATION RATE: 18 BRPM | DIASTOLIC BLOOD PRESSURE: 71 MMHG | HEART RATE: 67 BPM | BODY MASS INDEX: 28.27 KG/M2 | HEIGHT: 74 IN | OXYGEN SATURATION: 96 %

## 2024-10-09 LAB
ERYTHROCYTE [DISTWIDTH] IN BLOOD BY AUTOMATED COUNT: 14.6 % (ref 11.5–14.5)
HCT VFR BLD AUTO: 31.6 % (ref 40–54)
HGB BLD-MCNC: 10.7 G/DL (ref 14–18)
MCH RBC QN AUTO: 28.9 PG (ref 27–31)
MCHC RBC AUTO-ENTMCNC: 33.9 G/DL (ref 32–36)
MCV RBC AUTO: 85 FL (ref 82–98)
PLATELET # BLD AUTO: 297 K/UL (ref 150–450)
PMV BLD AUTO: 10.6 FL (ref 9.2–12.9)
POCT GLUCOSE: 92 MG/DL (ref 70–110)
RBC # BLD AUTO: 3.7 M/UL (ref 4.6–6.2)
WBC # BLD AUTO: 13.02 K/UL (ref 3.9–12.7)

## 2024-10-09 PROCEDURE — 85027 COMPLETE CBC AUTOMATED: CPT | Performed by: STUDENT IN AN ORGANIZED HEALTH CARE EDUCATION/TRAINING PROGRAM

## 2024-10-09 PROCEDURE — 36415 COLL VENOUS BLD VENIPUNCTURE: CPT | Performed by: STUDENT IN AN ORGANIZED HEALTH CARE EDUCATION/TRAINING PROGRAM

## 2024-10-09 PROCEDURE — 25000003 PHARM REV CODE 250: Performed by: STUDENT IN AN ORGANIZED HEALTH CARE EDUCATION/TRAINING PROGRAM

## 2024-10-09 PROCEDURE — S4991 NICOTINE PATCH NONLEGEND: HCPCS | Performed by: STUDENT IN AN ORGANIZED HEALTH CARE EDUCATION/TRAINING PROGRAM

## 2024-10-09 RX ORDER — OXYCODONE HYDROCHLORIDE 5 MG/1
5 TABLET ORAL EVERY 4 HOURS PRN
Qty: 20 TABLET | Refills: 0 | Status: SHIPPED | OUTPATIENT
Start: 2024-10-09

## 2024-10-09 RX ADMIN — IBUPROFEN 800 MG: 400 TABLET ORAL at 05:10

## 2024-10-09 RX ADMIN — OXYCODONE HYDROCHLORIDE 10 MG: 10 TABLET ORAL at 05:10

## 2024-10-09 RX ADMIN — ACETAMINOPHEN 1000 MG: 500 TABLET ORAL at 05:10

## 2024-10-09 RX ADMIN — NICOTINE 1 PATCH: 14 PATCH, EXTENDED RELEASE TRANSDERMAL at 09:10

## 2024-10-09 RX ADMIN — TAMSULOSIN HYDROCHLORIDE 0.4 MG: 0.4 CAPSULE ORAL at 09:10

## 2024-10-09 RX ADMIN — GABAPENTIN 300 MG: 300 CAPSULE ORAL at 09:10

## 2024-10-09 RX ADMIN — OXYCODONE HYDROCHLORIDE 10 MG: 10 TABLET ORAL at 09:10

## 2024-10-09 RX ADMIN — ALVIMOPAN 12 MG: 12 CAPSULE ORAL at 09:10

## 2024-10-09 NOTE — PLAN OF CARE
Problem: Adult Inpatient Plan of Care  Goal: Plan of Care Review  Outcome: Met     Problem: Adult Inpatient Plan of Care  Goal: Readiness for Transition of Care  Outcome: Met

## 2024-10-09 NOTE — ASSESSMENT & PLAN NOTE
Chronic, controlled.  Latest blood pressure and vitals reviewed-     Temp:  [97.5 °F (36.4 °C)-98 °F (36.7 °C)]   Pulse:  [60-69]   Resp:  [16-20]   BP: (134-152)/(63-74)   SpO2:  [94 %-99 %] .   Home meds for hypertension were reviewed and noted below-  Hypertension Medications               enalapril (VASOTEC) 10 MG tablet Take 1 tablet (10 mg total) by mouth once daily.            While in the hospital, will manage blood pressure as follows; Continue home antihypertensive regimen    Will utilize p.r.n. blood pressure medication only if patient's blood pressure greater than 140/90 and he develops symptoms such as worsening chest pain or shortness of breath.

## 2024-10-09 NOTE — DISCHARGE SUMMARY
[facility]  Colorectal Surgery  Discharge Summary      Patient Name: Osman Li Jr.  MRN: [id number]  Admission Date: 10/7/2024  Hospital Length of Stay: 2 days  Discharge Date and Time: No discharge date for patient encounter.  Attending Physician: Farhan Lance MD   Discharging Provider: Agustina Purvis NP  Primary Care Provider: Natalee Wagner NP     HPI:  No notes on file    Procedure(s) (LRB):  CLOSURE, ILEOSTOMY, LOOP (N/A)  SIGMOIDOSCOPY, FLEXIBLE (N/A)     Hospital Course:  Mr. 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 Waddell > FOLFOX > progression (pancreatic tail - biopsy proven and RPLN PET avidity) > Keytruda since 1/2023. He was seen by Dr. Goodwin in 1/2024 - his plan was to follow-up the results of his PET CT, decision was made to complete Keytruda and continue surveillance. He developed significant improvement in burden of metastatic disease on Keytruda - he was presented at our tumor board and it was decided that he could undergo palliative takedown of his colostomy. This was performed on 5/13/2024 - he was discharged on 5/18/2024 after an uneventful recovery, tolerating a diet, his pain was well-controlled, and he was having bowel function - he had a down trending CRP of 45. He presented, unfortunately, on 5/21/2024 with sudden onset left abdominal pain, vomiting, fever, and tachycardia - CT was concerning for internal hernia and closed loop obstruction. He underwent an open completion colectomy and ileorectal anastomosis with diverting loop ileostomy. He presents now for ileostomy takedown with reassuring contrasted CT. The benefits, risks, and alternatives were discussed with the patient, they were given the opportunity to ask questions and they elected to proceed with operative intervention after signing written  consent.  He had a normal post op course.  Once bowel function resumed diet was advanced.  On day of discharge pt is samy regular diet, inc line healing well, positive for bm with flatus, ambulating in oneal without difficulty, adequate pain control with oral medication, VS stable and afebrile.     FU 2 weeks Dr. Lance    Goals of Care Treatment Preferences:  Code Status: Full Code          Significant Diagnostic Studies: Labs: BMP:   Recent Labs   Lab 10/08/24  0549   *      K 4.0      CO2 25   BUN 12   CREATININE 1.0   CALCIUM 9.4   MG 2.0    and CBC   Recent Labs   Lab 10/08/24  0549 10/09/24  0305   WBC 13.63* 13.02*   HGB 10.9* 10.7*   HCT 33.9* 31.6*    297       Pending Diagnostic Studies:       None          Final Active Diagnoses:    Diagnosis Date Noted POA    PRINCIPAL PROBLEM:  Ileostomy in place [Z93.2] 10/07/2024 Not Applicable    Tobacco dependency [F17.200] 10/07/2024 Yes    HTN (hypertension) [I10] 04/24/2022 Yes    Type 2 diabetes mellitus [E11.9] 04/24/2022 Yes      Problems Resolved During this Admission:      Discharged Condition: good    Disposition: Home or Self Care    Follow Up:   Follow-up Information       Farhan Lance MD Follow up in 2 week(s).    Specialty: Colon and Rectal Surgery  Contact information:  [address]  [facility] [id number] [phone]                           Patient Instructions:      Ambulatory referral/consult to Smoking Cessation Program   Standing Status: Future   Referral Priority: Routine Referral Type: Consultation   Referral Reason: Specialty Services Required   Requested Specialty: CTTS   Number of Visits Requested: 1     Diet Adult Regular   Order Comments: Low fiber, no fresh fruit or fresh vegetables, no nuts, grapes, popcorn or raisins     Lifting restrictions   Order Comments: No lifting anything greater than 5 pounds     No dressing needed   Order Comments: May shower, no tub bath     Notify your health care  provider if you experience any of the following:  temperature >100.4     Notify your health care provider if you experience any of the following:  persistent nausea and vomiting or diarrhea     Notify your health care provider if you experience any of the following:  severe uncontrolled pain     Notify your health care provider if you experience any of the following:  redness, tenderness, or signs of infection (pain, swelling, redness, odor or green/yellow discharge around incision site)     Notify your health care provider if you experience any of the following:  difficulty breathing or increased cough     Notify your health care provider if you experience any of the following:  severe persistent headache     Notify your health care provider if you experience any of the following:  worsening rash     Notify your health care provider if you experience any of the following:  persistent dizziness, light-headedness, or visual disturbances     Notify your health care provider if you experience any of the following:  increased confusion or weakness     Reason for not Prescribing Nicotine Replacement     Order Specific Question Answer Comments   Reason for not Prescribing: Patient refused      Medications:  Reconciled Home Medications:      Medication List        START taking these medications      oxyCODONE 5 MG immediate release tablet  Commonly known as: ROXICODONE  Take 1 tablet (5 mg total) by mouth every 4 (four) hours as needed for Pain.            CHANGE how you take these medications      enalapril 10 MG tablet  Commonly known as: VASOTEC  Take 1 tablet (10 mg total) by mouth once daily.  What changed: when to take this     insulin glargine U-300 conc 300 unit/mL (3 mL) insulin pen  Commonly known as: TOUJEO MAX U-300 SOLOSTAR  Inject 48 Units into the skin once daily. (Discard pen 56 days after initial use)  What changed: when to take this            CONTINUE taking these medications      atorvastatin 20 MG  "tablet  Commonly known as: LIPITOR  Take 1 tablet (20 mg total) by mouth every evening.     BD ULTRA-FINE MINI PEN NEEDLE 31 gauge x 3/16" Ndle  Generic drug: pen needle, diabetic  1 each by Misc.(Non-Drug; Combo Route) route once daily.     gabapentin 300 MG capsule  Commonly known as: NEURONTIN  Take 1 capsule (300 mg total) by mouth 3 (three) times daily.     metFORMIN 1000 MG tablet  Commonly known as: GLUCOPHAGE  Take 1 tablet (1,000 mg total) by mouth 2 (two) times daily with meals.     promethazine 12.5 MG Tab  Commonly known as: PHENERGAN  Take 1 tablet (12.5 mg total) by mouth every 4 (four) hours as needed.     sildenafiL 100 MG tablet  Commonly known as: VIAGRA  Take 1 tablet (100 mg total) by mouth daily as needed for Erectile Dysfunction.     tamsulosin 0.4 mg Cap  Commonly known as: FLOMAX  Take 2 capsules (0.8 mg total) by mouth once daily. for 7 days     traZODone 50 MG tablet  Commonly known as: DESYREL  Take 1 tablet (50 mg total) by mouth every evening.              Agustina Purvis NP  Colorectal Surgery  [facility]      "

## 2024-10-09 NOTE — PLAN OF CARE
[facility]  Discharge Final Note    Primary Care Provider: Natalee Wagner NP    Expected Discharge Date: 10/9/2024    Final Discharge Note (most recent)       Final Note - 10/09/24 1104          Final Note    Assessment Type Final Discharge Note     Hospital Resources/Appts/Education Provided Provided patient/caregiver with written discharge plan information        Post-Acute Status    Patient choice form signed by patient/caregiver List with quality metrics by geographic area provided     Discharge Delays None known at this time                     Important Message from Medicare             Contact Info       Farhan Lance MD   Specialty: Colon and Rectal Surgery    [facility] [facility] [address]   Phone: [phone]       Next Steps: Follow up in 2 week(s)          Pt d/c home with family. No d/c needs reported by medical team at this time.     Tiffany Gleason LCSW  Case Management/[facility]  [phone]

## 2024-10-09 NOTE — NURSING
..[facility] Plan of Care Note  Dx: Ileostomy in place     Shift Events: Uneventful    Goals of Care: Pain management and comfort    Neuro:  AO x4    Vital Signs: WDL    Respiratory: RA    Diet: Low fiber    Is patient tolerating current diet? yes    GTTS: G20 RFA    Urine Output/Bowel Movement:  Up to toilet  -BM x3    Drains/Tubes/Tube Feeds (include total output/shift): n/a    Lines: N/A      Accuchecks: ACHS    Skin: Incision at LLQ    Fall Risk Score: 7    Activity level? Independent    Any scheduled procedures? N/a    Any safety concerns? N/a    Other:

## 2024-10-09 NOTE — HOSPITAL COURSE
Mr. 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 Waddell > FOLFOX > progression (pancreatic tail - biopsy proven and RPLN PET avidity) > Keytruda since 1/2023. He was seen by Dr. Goodwin in 1/2024 - his plan was to follow-up the results of his PET CT, decision was made to complete Keytruda and continue surveillance. He developed significant improvement in burden of metastatic disease on Keytruda - he was presented at our tumor board and it was decided that he could undergo palliative takedown of his colostomy. This was performed on 5/13/2024 - he was discharged on 5/18/2024 after an uneventful recovery, tolerating a diet, his pain was well-controlled, and he was having bowel function - he had a down trending CRP of 45. He presented, unfortunately, on 5/21/2024 with sudden onset left abdominal pain, vomiting, fever, and tachycardia - CT was concerning for internal hernia and closed loop obstruction. He underwent an open completion colectomy and ileorectal anastomosis with diverting loop ileostomy. He presents now for ileostomy takedown with reassuring contrasted CT. The benefits, risks, and alternatives were discussed with the patient, they were given the opportunity to ask questions and they elected to proceed with operative intervention after signing written consent.  He had a normal post op course.  Once bowel function resumed diet was advanced.  On day of discharge pt is samy regular diet, inc line healing well, positive for bm with flatus, ambulating in oneal without difficulty, adequate pain control with oral medication, VS stable and afebrile.     FU 2 weeks Dr. Lance

## 2024-10-09 NOTE — ASSESSMENT & PLAN NOTE
Patient's FSGs are controlled on current medication regimen.  Last A1c reviewed-   Lab Results   Component Value Date    HGBA1C 6.5 (H) 10/07/2024     Most recent fingerstick glucose reviewed-   Recent Labs   Lab 10/08/24  1113 10/08/24  1611 10/08/24  2127 10/09/24  0747   POCTGLUCOSE 172* 144* 181* 92       Current correctional scale  High  Maintain anti-hyperglycemic dose as follows-   Antihyperglycemics (From admission, onward)    Start     Stop Route Frequency Ordered    10/07/24 2100  insulin glargine U-100 (Lantus) pen 30 Units         -- SubQ Nightly 10/07/24 1029    10/07/24 0955  insulin aspart U-100 pen 0-10 Units         -- SubQ Before meals & nightly PRN 10/07/24 0905        Hold Oral hypoglycemics while patient is in the hospital.

## 2024-10-09 NOTE — PLAN OF CARE
Problem: Adult Inpatient Plan of Care  Goal: Plan of Care Review  Outcome: Progressing  Goal: Absence of Hospital-Acquired Illness or Injury  Outcome: Progressing  Goal: Optimal Comfort and Wellbeing  Outcome: Progressing  Goal: Readiness for Transition of Care  Outcome: Progressing     Problem: Diabetes Comorbidity  Goal: Blood Glucose Level Within Targeted Range  Outcome: Progressing     Problem: Infection  Goal: Absence of Infection Signs and Symptoms  Outcome: Progressing     Problem: Wound  Goal: Optimal Coping  Outcome: Progressing  Goal: Optimal Functional Ability  Outcome: Progressing  Goal: Absence of Infection Signs and Symptoms  Outcome: Progressing  Goal: Improved Oral Intake  Outcome: Progressing  Goal: Optimal Pain Control and Function  Outcome: Progressing  Goal: Skin Health and Integrity  Outcome: Progressing  Goal: Optimal Wound Healing  Outcome: Progressing

## 2024-10-10 ENCOUNTER — TELEPHONE (OUTPATIENT)
Dept: SURGERY | Facility: CLINIC | Age: 59
End: 2024-10-10
Payer: COMMERCIAL

## 2024-10-10 ENCOUNTER — PATIENT MESSAGE (OUTPATIENT)
Dept: SURGERY | Facility: CLINIC | Age: 59
End: 2024-10-10
Payer: COMMERCIAL

## 2024-10-10 NOTE — TELEPHONE ENCOUNTER
Called us today, low grade temperature 100.7 - took Tylenol  Otherwise is feeling OK - he denies any cough, shortness of breath, nausea, vomiting, his wound his healthy appearing - no bleeding, abdomen is soft, no pain in his abdomen, no swelling in his lower extremities  Bowel movements are frequent but are softening  He is urinating without issue - clear, no dysuria  Wife to send me a photo of the wound  Blood sugars have been OK    Continue to monitor for now, discussed warning signs       Farhan Lance MD, FACS, FASCRS  Department of Colon & Rectal Surgery  [facility]

## 2024-10-14 ENCOUNTER — LAB VISIT (OUTPATIENT)
Dept: LAB | Facility: HOSPITAL | Age: 59
End: 2024-10-14
Attending: INTERNAL MEDICINE
Payer: COMMERCIAL

## 2024-10-14 ENCOUNTER — OFFICE VISIT (OUTPATIENT)
Dept: HEMATOLOGY/ONCOLOGY | Facility: CLINIC | Age: 59
End: 2024-10-14
Payer: COMMERCIAL

## 2024-10-14 VITALS
SYSTOLIC BLOOD PRESSURE: 150 MMHG | HEIGHT: 74 IN | BODY MASS INDEX: 28.6 KG/M2 | DIASTOLIC BLOOD PRESSURE: 72 MMHG | RESPIRATION RATE: 18 BRPM | TEMPERATURE: 98 F | WEIGHT: 222.88 LBS | OXYGEN SATURATION: 98 % | HEART RATE: 67 BPM

## 2024-10-14 DIAGNOSIS — C78.89 METASTATIC ADENOCARCINOMA TO PANCREAS: ICD-10-CM

## 2024-10-14 DIAGNOSIS — C77.2 METASTASIS TO RETROPERITONEAL LYMPH NODE: ICD-10-CM

## 2024-10-14 DIAGNOSIS — C18.9 METASTATIC COLON CANCER TO LIVER: ICD-10-CM

## 2024-10-14 DIAGNOSIS — I10 PRIMARY HYPERTENSION: ICD-10-CM

## 2024-10-14 DIAGNOSIS — C18.4 MALIGNANT NEOPLASM OF TRANSVERSE COLON: Primary | ICD-10-CM

## 2024-10-14 DIAGNOSIS — E11.00 TYPE 2 DIABETES MELLITUS WITH HYPEROSMOLARITY WITHOUT COMA, WITHOUT LONG-TERM CURRENT USE OF INSULIN: ICD-10-CM

## 2024-10-14 DIAGNOSIS — C18.4 MALIGNANT NEOPLASM OF TRANSVERSE COLON: ICD-10-CM

## 2024-10-14 DIAGNOSIS — C78.7 METASTATIC COLON CANCER TO LIVER: ICD-10-CM

## 2024-10-14 LAB
ALBUMIN SERPL BCP-MCNC: 3.3 G/DL (ref 3.5–5.2)
ALP SERPL-CCNC: 130 U/L (ref 55–135)
ALT SERPL W/O P-5'-P-CCNC: 25 U/L (ref 10–44)
ANION GAP SERPL CALC-SCNC: 9 MMOL/L (ref 8–16)
AST SERPL-CCNC: 15 U/L (ref 10–40)
BASOPHILS # BLD AUTO: 0.07 K/UL (ref 0–0.2)
BASOPHILS NFR BLD: 0.5 % (ref 0–1.9)
BILIRUB SERPL-MCNC: 0.3 MG/DL (ref 0.1–1)
BUN SERPL-MCNC: 13 MG/DL (ref 6–20)
CALCIUM SERPL-MCNC: 10 MG/DL (ref 8.7–10.5)
CHLORIDE SERPL-SCNC: 105 MMOL/L (ref 95–110)
CO2 SERPL-SCNC: 26 MMOL/L (ref 23–29)
CREAT SERPL-MCNC: 1.1 MG/DL (ref 0.5–1.4)
DIFFERENTIAL METHOD BLD: ABNORMAL
EOSINOPHIL # BLD AUTO: 0.5 K/UL (ref 0–0.5)
EOSINOPHIL NFR BLD: 4.1 % (ref 0–8)
ERYTHROCYTE [DISTWIDTH] IN BLOOD BY AUTOMATED COUNT: 14.4 % (ref 11.5–14.5)
EST. GFR  (NO RACE VARIABLE): >60 ML/MIN/1.73 M^2
GLUCOSE SERPL-MCNC: 163 MG/DL (ref 70–110)
HCT VFR BLD AUTO: 33.3 % (ref 40–54)
HGB BLD-MCNC: 10.7 G/DL (ref 14–18)
IMM GRANULOCYTES # BLD AUTO: 0.04 K/UL (ref 0–0.04)
IMM GRANULOCYTES NFR BLD AUTO: 0.3 % (ref 0–0.5)
LYMPHOCYTES # BLD AUTO: 6 K/UL (ref 1–4.8)
LYMPHOCYTES NFR BLD: 47 % (ref 18–48)
MCH RBC QN AUTO: 28.2 PG (ref 27–31)
MCHC RBC AUTO-ENTMCNC: 32.1 G/DL (ref 32–36)
MCV RBC AUTO: 88 FL (ref 82–98)
MONOCYTES # BLD AUTO: 1.4 K/UL (ref 0.3–1)
MONOCYTES NFR BLD: 11.1 % (ref 4–15)
NEUTROPHILS # BLD AUTO: 4.7 K/UL (ref 1.8–7.7)
NEUTROPHILS NFR BLD: 37 % (ref 38–73)
NRBC BLD-RTO: 0 /100 WBC
PLATELET # BLD AUTO: 411 K/UL (ref 150–450)
PLATELET BLD QL SMEAR: ABNORMAL
PMV BLD AUTO: 9.8 FL (ref 9.2–12.9)
POTASSIUM SERPL-SCNC: 4.9 MMOL/L (ref 3.5–5.1)
PROT SERPL-MCNC: 7.1 G/DL (ref 6–8.4)
RBC # BLD AUTO: 3.8 M/UL (ref 4.6–6.2)
SODIUM SERPL-SCNC: 140 MMOL/L (ref 136–145)
WBC # BLD AUTO: 12.73 K/UL (ref 3.9–12.7)

## 2024-10-14 PROCEDURE — G2211 COMPLEX E/M VISIT ADD ON: HCPCS | Mod: S$GLB,,, | Performed by: INTERNAL MEDICINE

## 2024-10-14 PROCEDURE — 36415 COLL VENOUS BLD VENIPUNCTURE: CPT | Performed by: INTERNAL MEDICINE

## 2024-10-14 PROCEDURE — 99999 PR PBB SHADOW E&M-EST. PATIENT-LVL IV: CPT | Mod: PBBFAC,,, | Performed by: INTERNAL MEDICINE

## 2024-10-14 PROCEDURE — 85025 COMPLETE CBC W/AUTO DIFF WBC: CPT | Performed by: INTERNAL MEDICINE

## 2024-10-14 PROCEDURE — 80053 COMPREHEN METABOLIC PANEL: CPT | Performed by: INTERNAL MEDICINE

## 2024-10-14 PROCEDURE — 99215 OFFICE O/P EST HI 40 MIN: CPT | Mod: S$GLB,,, | Performed by: INTERNAL MEDICINE

## 2024-10-14 NOTE — PROGRESS NOTES
Service Date:  10/14/24    Chief Complaint:  Colon cancer    Osman Li Jr. is a 59 y.o. 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  This occurred very shortly after completing treatment.    Patient was part of a clinical trial to measure CT DNA.  His CT DNA started to go up while he was on FOLFOX, which prompted the scan, which showed the progression.  Next generation sequencing also showed a high tumor burden.    He is now on Keytruda as his cancer has a very high tumor mutation burden.  So far, Keytruda has been helping    Here for Keytruda.  Recently underwent ileostomy takedown.  Doing well.  Recovered from surgery.  No complaints to me.      Review of Systems   Constitutional:  Positive for fatigue. Negative for appetite change and unexpected weight change.   HENT: Negative.  Negative for mouth sores.    Eyes: Negative.  Negative for visual disturbance.   Respiratory: Negative.  Negative for cough and shortness of breath.    Cardiovascular: Negative.  Negative for chest pain.   Gastrointestinal: Negative.  Negative for abdominal pain and diarrhea.   Endocrine: Negative.    Genitourinary: Negative.  Negative for frequency.   Musculoskeletal: Negative.  Negative for back pain.   Integumentary:  Negative for rash. Negative.   Neurological:  Positive for numbness. Negative for headaches.   Hematological: Negative.  Negative for adenopathy.   Psychiatric/Behavioral: Negative.  The patient is not nervous/anxious.         Current Outpatient Medications   Medication Instructions    atorvastatin (LIPITOR) 20 mg, Oral, Nightly    enalapril (VASOTEC) 10 mg, Oral, Daily    gabapentin (NEURONTIN) 300 mg, Oral, 3 times daily    insulin glargine U-300 conc (TOUJEO MAX U-300 SOLOSTAR) 48 Units, Subcutaneous, Daily, (Discard pen 56 days after initial use)    metFORMIN (GLUCOPHAGE) 1,000 mg, Oral, 2 times daily with meals  "   oxyCODONE (ROXICODONE) 5 mg, Oral, Every 4 hours PRN    pen needle, diabetic (BD ULTRA-FINE MINI PEN NEEDLE) 31 gauge x 3/16" Ndle 1 each, Misc.(Non-Drug; Combo Route), Daily    promethazine (PHENERGAN) 12.5 mg, Oral, Every 4 hours PRN    sildenafiL (VIAGRA) 100 mg, Oral, Daily PRN    tamsulosin (FLOMAX) 0.8 mg, Oral, Daily    traZODone (DESYREL) 50 mg, Oral, Nightly        Past Medical History:   Diagnosis Date    Colon cancer     Digestive disorder     DM (diabetes mellitus)     Hyperlipidemia     Hypertension     Prediabetes         Past Surgical History:   Procedure Laterality Date    ADENOIDECTOMY  1972    ANASTOMOSIS OF ILEUM TO RECTUM N/A 5/21/2024    Procedure: ANASTOMOSIS, ILEORECTAL;  Surgeon: Farhan Lance MD;  [facility];  Service: Colon and Rectal;  Laterality: N/A;    CHOLECYSTECTOMY  2011    CLOSURE, COLOSTOMY N/A 5/13/2024    Procedure: CLOSURE, COLOSTOMY (eras high/lithotomy);  Surgeon: Farhan Lance MD;  [facility];  Service: Colon and Rectal;  Laterality: N/A;  CONSENTS IN [facility]    CLOSURE, ILEOSTOMY, LOOP N/A 10/7/2024    Procedure: CLOSURE, ILEOSTOMY, LOOP;  Surgeon: Farhan Lance MD;  [facility];  Service: Colon and Rectal;  Laterality: N/A;    COLON SURGERY      COLONOSCOPY      2018    COLONOSCOPY N/A 3/5/2024    Procedure: COLONOSCOPY;  Surgeon: Farhan Lance MD;  Location: [facility] (4TH FLR);  Service: Endoscopy;  Laterality: N/A;  2/27/24-Kethman pt, 1-4wks, port, PEG plus 2 enemas morning of procedure, instr portal-DS  2/28/24- LVM for precall - ERW  pt confirmed procedure. cf    COLOSTOMY N/A 04/25/2022    Procedure: CREATION, COLOSTOMY;  Surgeon: Carlton Waddell MD;  Location: [facility];  Service: General;  Laterality: N/A;    ENDOSCOPIC ULTRASOUND OF UPPER GASTROINTESTINAL TRACT N/A 01/06/2023    Procedure: ULTRASOUND, UPPER GI TRACT, ENDOSCOPIC;  Surgeon: Rinku Orozco III, MD;  Location: [facility];  " Service: Endoscopy;  Laterality: N/A;    FLEXIBLE SIGMOIDOSCOPY N/A 5/13/2024    Procedure: SIGMOIDOSCOPY, FLEXIBLE;  Surgeon: Farhan Lance MD;  Location: [facility] 2ND FLR;  Service: Colon and Rectal;  Laterality: N/A;    FLEXIBLE SIGMOIDOSCOPY  5/21/2024    Procedure: SIGMOIDOSCOPY, FLEXIBLE;  Surgeon: Farhan Lance MD;  Location: [facility] FLR;  Service: Colon and Rectal;;    FLEXIBLE SIGMOIDOSCOPY N/A 10/7/2024    Procedure: SIGMOIDOSCOPY, FLEXIBLE;  Surgeon: Farhan Lance MD;  Location: [facility];  Service: Colon and Rectal;  Laterality: N/A;    ILEOSTOMY N/A 5/21/2024    Procedure: CREATION, ILEOSTOMY, DIVERTING LOOP;  Surgeon: Farhan Lance MD;  Location: [facility];  Service: Colon and Rectal;  Laterality: N/A;    INSERTION OF TUNNELED CENTRAL VENOUS CATHETER (CVC) WITH SUBCUTANEOUS PORT Right 05/18/2022    Procedure: [id number];  Surgeon: Carlton Waddell MD;  Location: [facility] OR;  Service: General;  Laterality: Right;    INSERTION OF URETERAL CATHETER N/A 5/13/2024    Procedure: INSERTION, CATHETER, URETER, BILATERAL;  Surgeon: Travis Edwards MD;  [facility];  Service: Urology;  Laterality: N/A;    INSERTION OF URETERAL CATHETER Left 5/21/2024    Procedure: INSERTION, CATHETER, URETER;  Surgeon: Carlton Santos MD;  Location: [facility] OR [facility] FLR;  Service: Urology;  Laterality: Left;    LAPAROTOMY, EXPLORATORY N/A 5/21/2024    Procedure: LAPAROTOMY, EXPLORATORY;  Surgeon: Farhan Lance MD;  [facility]: [facility];  Service: Colon and Rectal;  Laterality: N/A;    LYSIS OF ADHESIONS N/A 5/13/2024    Procedure: LYSIS, ADHESIONS;  Surgeon: Farhan Lance MD;  Location: [facility];  Service: Colon and Rectal;  Laterality: N/A;    MOBILIZATION OF SPLENIC FLEXURE N/A 04/25/2022    Procedure: MOBILIZATION, SPLENIC FLEXURE;  Surgeon: Carlton Waddell MD;  Location: [facility];  Service: General;  Laterality: N/A;    SPLENECTOMY N/A  "04/25/2022    Procedure: SPLENECTOMY;  Surgeon: Carlton Waddell MD;  Location: [facility] OR;  Service: General;  Laterality: N/A;    SUBTOTAL COLECTOMY N/A 04/25/2022    Procedure: COLECTOMY, PARTIAL;  Surgeon: Carlton Waddell MD;  Location: [facility] OR;  Service: General;  Laterality: N/A;    TONSILLECTOMY      TOTAL ABDOMINAL COLECTOMY N/A 5/21/2024    Procedure: COLECTOMY, COMPLETION, ABDOMINAL;  Surgeon: Farhan Lance MD;  Location: [facility] OR [facility] FLR;  Service: Colon and Rectal;  Laterality: N/A;    TUMOR REMOVAL  10/18/2023    on top of the nose    VASECTOMY  1994        Family History   Problem Relation Name Age of Onset    Heart disease Father Loki senior     Diabetes Father Loki senior     Alcohol abuse Paternal Grandfather Waylon Li     Rectal cancer Other Makenzie         half-sister    Cancer Sister makenzie        Social History     Tobacco Use    Smoking status: Every Day     Current packs/day: 1.00     Average packs/day: 1 pack/day for 40.0 years (40.0 ttl pk-yrs)     Types: Cigarettes     Passive exposure: Current    Smokeless tobacco: Never   Substance Use Topics    Alcohol use: Not Currently    Drug use: Never         Vitals:    10/14/24 1134   BP: (!) 150/72   Pulse: 67   Resp: 18   Temp: 97.9 °F (36.6 °C)          Physical Exam:  BP (!) 150/72   Pulse 67   Temp 97.9 °F (36.6 °C) (Temporal)   Resp 18   Ht 6' 2" (1.88 m)   Wt 101.1 kg (222 lb 14.2 oz)   SpO2 98%   BMI 28.62 kg/m²     Physical Exam  Vitals and nursing note reviewed.   Constitutional:       Appearance: Normal appearance.   HENT:      Head: Normocephalic and atraumatic.      Nose: Nose normal.      Mouth/Throat:      Mouth: Mucous membranes are moist.      Pharynx: Oropharynx is clear.   Eyes:      Extraocular Movements: Extraocular movements intact.      Conjunctiva/sclera: Conjunctivae normal.   Cardiovascular:      Rate and Rhythm: Normal rate and regular rhythm.      Heart sounds: Normal heart sounds.   Pulmonary:      Effort: " Pulmonary effort is normal.      Breath sounds: Normal breath sounds.   Abdominal:      General: Abdomen is flat. Bowel sounds are normal.      Palpations: Abdomen is soft.      Comments: Ostomy bag in place.   Musculoskeletal:         General: Normal range of motion.      Cervical back: Normal range of motion and neck supple.   Skin:     General: Skin is warm and dry.   Neurological:      General: No focal deficit present.      Mental Status: He is alert and oriented to person, place, and time. Mental status is at baseline.   Psychiatric:         Mood and Affect: Mood normal.          Labs:  Lab Results   Component Value Date    WBC 12.73 (H) 10/14/2024    RBC 3.80 (L) 10/14/2024    HGB 10.7 (L) 10/14/2024    HCT 33.3 (L) 10/14/2024    MCV 88 10/14/2024    MCH 28.2 10/14/2024    MCHC 32.1 10/14/2024    RDW 14.4 10/14/2024     10/14/2024    MPV 9.8 10/14/2024    GRAN 4.7 10/14/2024    GRAN 37.0 (L) 10/14/2024    LYMPH 6.0 (H) 10/14/2024    LYMPH 47.0 10/14/2024    MONO 1.4 (H) 10/14/2024    MONO 11.1 10/14/2024    EOS 0.5 10/14/2024    BASO 0.07 10/14/2024    EOSINOPHIL 4.1 10/14/2024    BASOPHIL 0.5 10/14/2024     Sodium   Date Value Ref Range Status   10/14/2024 140 136 - 145 mmol/L Final     Potassium   Date Value Ref Range Status   10/14/2024 4.9 3.5 - 5.1 mmol/L Final     Chloride   Date Value Ref Range Status   10/14/2024 105 95 - 110 mmol/L Final     CO2   Date Value Ref Range Status   10/14/2024 26 23 - 29 mmol/L Final     Glucose   Date Value Ref Range Status   10/14/2024 163 (H) 70 - 110 mg/dL Final     BUN   Date Value Ref Range Status   10/14/2024 13 6 - 20 mg/dL Final     Creatinine   Date Value Ref Range Status   10/14/2024 1.1 0.5 - 1.4 mg/dL Final     Calcium   Date Value Ref Range Status   10/14/2024 10.0 8.7 - 10.5 mg/dL Final     Total Protein   Date Value Ref Range Status   10/14/2024 7.1 6.0 - 8.4 g/dL Final     Albumin   Date Value Ref Range Status   10/14/2024 3.3 (L) 3.5 - 5.2 g/dL  Final     Total Bilirubin   Date Value Ref Range Status   10/14/2024 0.3 0.1 - 1.0 mg/dL Final     Comment:     For infants and newborns, interpretation of results should be based  on gestational age, weight and in agreement with clinical  observations.    Premature Infant recommended reference ranges:  Up to 24 hours.............<8.0 mg/dL  Up to 48 hours............<12.0 mg/dL  3-5 days..................<15.0 mg/dL  6-29 days.................<15.0 mg/dL       Alkaline Phosphatase   Date Value Ref Range Status   10/14/2024 130 55 - 135 U/L Final     AST   Date Value Ref Range Status   10/14/2024 15 10 - 40 U/L Final     ALT   Date Value Ref Range Status   10/14/2024 25 10 - 44 U/L Final     Anion Gap   Date Value Ref Range Status   10/14/2024 9 8 - 16 mmol/L Final     eGFR if    Date Value Ref Range Status   07/25/2022 >60 >60 mL/min/1.73 m^2 Final     eGFR if non    Date Value Ref Range Status   07/25/2022 >60 >60 mL/min/1.73 m^2 Final     Comment:     Calculation used to obtain the estimated glomerular filtration  rate (eGFR) is the CKD-EPI equation.          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    -Recent CT scan shows no new evidence of disease, with a smaller lesion on the pancreatic tail and stability of the retroperitoneal lymph node that was radiated.  PET scan shows no evidence of disease, so it was decided to forego surgery.  Testing for ctDNA negative  -resume Keytruda  -Return to clinic in 6 weeks for next.    Pancreatic mass   -confirmed recurrence of  colon adenocarcinoma    Back pain/pain from stoma  - NM bone scan negative for mets  -now on morphine dose to 30 mg bid    HTN  - on Enalapril  - follow up with PCP    HLP  - follow up with PCP    DM2  - on Metformin  - follow up with PCP    Tobacco use  - counseled on cessation      Aurash Khoobehi, MD  Hematology and Oncology

## 2024-10-15 ENCOUNTER — TELEPHONE (OUTPATIENT)
Dept: SURGERY | Facility: CLINIC | Age: 59
End: 2024-10-15
Payer: COMMERCIAL

## 2024-10-15 ENCOUNTER — TELEPHONE (OUTPATIENT)
Dept: FAMILY MEDICINE | Facility: CLINIC | Age: 59
End: 2024-10-15
Payer: COMMERCIAL

## 2024-10-15 ENCOUNTER — INFUSION (OUTPATIENT)
Dept: INFUSION THERAPY | Facility: HOSPITAL | Age: 59
End: 2024-10-15
Attending: INTERNAL MEDICINE
Payer: COMMERCIAL

## 2024-10-15 VITALS
OXYGEN SATURATION: 98 % | HEIGHT: 74 IN | HEART RATE: 73 BPM | BODY MASS INDEX: 28.81 KG/M2 | RESPIRATION RATE: 18 BRPM | DIASTOLIC BLOOD PRESSURE: 80 MMHG | SYSTOLIC BLOOD PRESSURE: 160 MMHG | TEMPERATURE: 98 F | WEIGHT: 224.5 LBS

## 2024-10-15 DIAGNOSIS — C18.4 MALIGNANT NEOPLASM OF TRANSVERSE COLON: Primary | ICD-10-CM

## 2024-10-15 DIAGNOSIS — C78.89 METASTATIC ADENOCARCINOMA TO PANCREAS: ICD-10-CM

## 2024-10-15 DIAGNOSIS — E78.2 MIXED HYPERLIPIDEMIA: ICD-10-CM

## 2024-10-15 DIAGNOSIS — Z79.899 ENCOUNTER FOR LONG-TERM (CURRENT) USE OF MEDICATIONS: Primary | ICD-10-CM

## 2024-10-15 DIAGNOSIS — I10 ESSENTIAL HYPERTENSION: ICD-10-CM

## 2024-10-15 PROCEDURE — 25000003 PHARM REV CODE 250: Performed by: INTERNAL MEDICINE

## 2024-10-15 PROCEDURE — 63600175 PHARM REV CODE 636 W HCPCS: Performed by: INTERNAL MEDICINE

## 2024-10-15 PROCEDURE — A4216 STERILE WATER/SALINE, 10 ML: HCPCS | Performed by: INTERNAL MEDICINE

## 2024-10-15 PROCEDURE — 96413 CHEMO IV INFUSION 1 HR: CPT

## 2024-10-15 RX ORDER — DIPHENHYDRAMINE HYDROCHLORIDE 50 MG/ML
50 INJECTION INTRAMUSCULAR; INTRAVENOUS ONCE AS NEEDED
Status: DISCONTINUED | OUTPATIENT
Start: 2024-10-15 | End: 2024-10-15 | Stop reason: HOSPADM

## 2024-10-15 RX ORDER — EPINEPHRINE 0.3 MG/.3ML
0.3 INJECTION SUBCUTANEOUS ONCE AS NEEDED
Status: DISCONTINUED | OUTPATIENT
Start: 2024-10-15 | End: 2024-10-15 | Stop reason: HOSPADM

## 2024-10-15 RX ORDER — SODIUM CHLORIDE 0.9 % (FLUSH) 0.9 %
10 SYRINGE (ML) INJECTION
Status: DISCONTINUED | OUTPATIENT
Start: 2024-10-15 | End: 2024-10-15 | Stop reason: HOSPADM

## 2024-10-15 RX ORDER — HEPARIN 100 UNIT/ML
500 SYRINGE INTRAVENOUS
Status: DISCONTINUED | OUTPATIENT
Start: 2024-10-15 | End: 2024-10-15 | Stop reason: HOSPADM

## 2024-10-15 RX ADMIN — HEPARIN 500 UNITS: 100 SYRINGE at 09:10

## 2024-10-15 RX ADMIN — SODIUM CHLORIDE 200 MG: 9 INJECTION, SOLUTION INTRAVENOUS at 08:10

## 2024-10-15 RX ADMIN — SODIUM CHLORIDE, PRESERVATIVE FREE 10 ML: 5 INJECTION INTRAVENOUS at 09:10

## 2024-10-15 NOTE — TELEPHONE ENCOUNTER
Spoke with pt to reschedule his November 8th appt to the November 15th at 8:00am. Pt agreed to new appt date and time. Appt slip placed in the mail.

## 2024-10-15 NOTE — PLAN OF CARE
Problem: Fatigue  Goal: Improved Activity Tolerance  Outcome: Progressing  Intervention: Promote Improved Energy  Flowsheets (Taken 10/15/2024 5742)  Fatigue Management:   paced activity encouraged   fatigue-related activity identified   frequent rest breaks encouraged  Environmental Support:   personal routine supported   rest periods encouraged   calm environment promoted

## 2024-10-31 ENCOUNTER — OFFICE VISIT (OUTPATIENT)
Dept: FAMILY MEDICINE | Facility: CLINIC | Age: 59
End: 2024-10-31
Payer: COMMERCIAL

## 2024-10-31 VITALS
WEIGHT: 219.19 LBS | BODY MASS INDEX: 28.13 KG/M2 | SYSTOLIC BLOOD PRESSURE: 136 MMHG | OXYGEN SATURATION: 99 % | HEIGHT: 74 IN | DIASTOLIC BLOOD PRESSURE: 72 MMHG | HEART RATE: 82 BPM

## 2024-10-31 DIAGNOSIS — C78.89 METASTATIC ADENOCARCINOMA TO PANCREAS: ICD-10-CM

## 2024-10-31 DIAGNOSIS — Z23 NEED FOR INFLUENZA VACCINATION: ICD-10-CM

## 2024-10-31 DIAGNOSIS — I10 ESSENTIAL HYPERTENSION: ICD-10-CM

## 2024-10-31 DIAGNOSIS — E11.69 DM TYPE 2 WITH DIABETIC DYSLIPIDEMIA: Primary | ICD-10-CM

## 2024-10-31 DIAGNOSIS — E78.2 MIXED HYPERLIPIDEMIA: ICD-10-CM

## 2024-10-31 DIAGNOSIS — E78.5 DM TYPE 2 WITH DIABETIC DYSLIPIDEMIA: Primary | ICD-10-CM

## 2024-11-04 ENCOUNTER — TELEPHONE (OUTPATIENT)
Dept: INFUSION THERAPY | Facility: HOSPITAL | Age: 59
End: 2024-11-04

## 2024-11-04 ENCOUNTER — DOCUMENTATION ONLY (OUTPATIENT)
Dept: INFUSION THERAPY | Facility: HOSPITAL | Age: 59
End: 2024-11-04

## 2024-11-04 ENCOUNTER — LAB VISIT (OUTPATIENT)
Dept: LAB | Facility: HOSPITAL | Age: 59
End: 2024-11-04
Attending: INTERNAL MEDICINE
Payer: COMMERCIAL

## 2024-11-04 ENCOUNTER — PATIENT MESSAGE (OUTPATIENT)
Dept: HEMATOLOGY/ONCOLOGY | Facility: CLINIC | Age: 59
End: 2024-11-04
Payer: COMMERCIAL

## 2024-11-04 DIAGNOSIS — C18.4 MALIGNANT NEOPLASM OF TRANSVERSE COLON: ICD-10-CM

## 2024-11-04 LAB
ALBUMIN SERPL BCP-MCNC: 3.8 G/DL (ref 3.5–5.2)
ALP SERPL-CCNC: 108 U/L (ref 40–150)
ALT SERPL W/O P-5'-P-CCNC: 16 U/L (ref 10–44)
ANION GAP SERPL CALC-SCNC: 6 MMOL/L (ref 8–16)
AST SERPL-CCNC: 15 U/L (ref 10–40)
BASOPHILS # BLD AUTO: 0.12 K/UL (ref 0–0.2)
BASOPHILS NFR BLD: 1 % (ref 0–1.9)
BILIRUB SERPL-MCNC: 0.4 MG/DL (ref 0.1–1)
BUN SERPL-MCNC: 17 MG/DL (ref 6–20)
CALCIUM SERPL-MCNC: 9.9 MG/DL (ref 8.7–10.5)
CHLORIDE SERPL-SCNC: 106 MMOL/L (ref 95–110)
CO2 SERPL-SCNC: 28 MMOL/L (ref 23–29)
CREAT SERPL-MCNC: 1.2 MG/DL (ref 0.5–1.4)
DIFFERENTIAL METHOD BLD: ABNORMAL
EOSINOPHIL # BLD AUTO: 0.4 K/UL (ref 0–0.5)
EOSINOPHIL NFR BLD: 3.5 % (ref 0–8)
ERYTHROCYTE [DISTWIDTH] IN BLOOD BY AUTOMATED COUNT: 14 % (ref 11.5–14.5)
EST. GFR  (NO RACE VARIABLE): >60 ML/MIN/1.73 M^2
GLUCOSE SERPL-MCNC: 142 MG/DL (ref 70–110)
HCT VFR BLD AUTO: 36.1 % (ref 40–54)
HGB BLD-MCNC: 11.7 G/DL (ref 14–18)
IMM GRANULOCYTES # BLD AUTO: 0.03 K/UL (ref 0–0.04)
IMM GRANULOCYTES NFR BLD AUTO: 0.2 % (ref 0–0.5)
LYMPHOCYTES # BLD AUTO: 5.8 K/UL (ref 1–4.8)
LYMPHOCYTES NFR BLD: 47.3 % (ref 18–48)
MCH RBC QN AUTO: 28.2 PG (ref 27–31)
MCHC RBC AUTO-ENTMCNC: 32.4 G/DL (ref 32–36)
MCV RBC AUTO: 87 FL (ref 82–98)
MONOCYTES # BLD AUTO: 1.2 K/UL (ref 0.3–1)
MONOCYTES NFR BLD: 9.5 % (ref 4–15)
NEUTROPHILS # BLD AUTO: 4.7 K/UL (ref 1.8–7.7)
NEUTROPHILS NFR BLD: 38.5 % (ref 38–73)
NRBC BLD-RTO: 0 /100 WBC
PLATELET # BLD AUTO: 385 K/UL (ref 150–450)
PMV BLD AUTO: 9.5 FL (ref 9.2–12.9)
POTASSIUM SERPL-SCNC: 5.2 MMOL/L (ref 3.5–5.1)
PROT SERPL-MCNC: 7.3 G/DL (ref 6–8.4)
RBC # BLD AUTO: 4.15 M/UL (ref 4.6–6.2)
SODIUM SERPL-SCNC: 140 MMOL/L (ref 136–145)
WBC # BLD AUTO: 12.29 K/UL (ref 3.9–12.7)

## 2024-11-04 PROCEDURE — 85025 COMPLETE CBC W/AUTO DIFF WBC: CPT | Performed by: INTERNAL MEDICINE

## 2024-11-04 PROCEDURE — 80053 COMPREHEN METABOLIC PANEL: CPT | Performed by: INTERNAL MEDICINE

## 2024-11-04 PROCEDURE — 36415 COLL VENOUS BLD VENIPUNCTURE: CPT | Performed by: INTERNAL MEDICINE

## 2024-11-04 NOTE — TELEPHONE ENCOUNTER
Spoke with pt about needing new labs done prior to appt tomorrow 11/5/24. Pt stated understanding and he will get labs prior to appt.

## 2024-11-05 ENCOUNTER — INFUSION (OUTPATIENT)
Dept: INFUSION THERAPY | Facility: HOSPITAL | Age: 59
End: 2024-11-05
Attending: INTERNAL MEDICINE
Payer: COMMERCIAL

## 2024-11-05 VITALS
BODY MASS INDEX: 28.53 KG/M2 | TEMPERATURE: 98 F | HEART RATE: 69 BPM | SYSTOLIC BLOOD PRESSURE: 137 MMHG | OXYGEN SATURATION: 97 % | HEIGHT: 74 IN | DIASTOLIC BLOOD PRESSURE: 74 MMHG | RESPIRATION RATE: 16 BRPM | WEIGHT: 222.31 LBS

## 2024-11-05 DIAGNOSIS — C78.89 METASTATIC ADENOCARCINOMA TO PANCREAS: ICD-10-CM

## 2024-11-05 DIAGNOSIS — C18.4 MALIGNANT NEOPLASM OF TRANSVERSE COLON: Primary | ICD-10-CM

## 2024-11-05 PROCEDURE — 63600175 PHARM REV CODE 636 W HCPCS: Mod: JZ,JG | Performed by: INTERNAL MEDICINE

## 2024-11-05 PROCEDURE — 25000003 PHARM REV CODE 250: Performed by: INTERNAL MEDICINE

## 2024-11-05 PROCEDURE — A4216 STERILE WATER/SALINE, 10 ML: HCPCS | Performed by: INTERNAL MEDICINE

## 2024-11-05 PROCEDURE — 96413 CHEMO IV INFUSION 1 HR: CPT

## 2024-11-05 RX ORDER — DIPHENHYDRAMINE HYDROCHLORIDE 50 MG/ML
50 INJECTION INTRAMUSCULAR; INTRAVENOUS ONCE AS NEEDED
Status: DISCONTINUED | OUTPATIENT
Start: 2024-11-05 | End: 2024-11-05 | Stop reason: HOSPADM

## 2024-11-05 RX ORDER — SODIUM CHLORIDE 0.9 % (FLUSH) 0.9 %
10 SYRINGE (ML) INJECTION
Status: DISCONTINUED | OUTPATIENT
Start: 2024-11-05 | End: 2024-11-05 | Stop reason: HOSPADM

## 2024-11-05 RX ORDER — HEPARIN 100 UNIT/ML
500 SYRINGE INTRAVENOUS
Status: DISCONTINUED | OUTPATIENT
Start: 2024-11-05 | End: 2024-11-05 | Stop reason: HOSPADM

## 2024-11-05 RX ORDER — EPINEPHRINE 0.3 MG/.3ML
0.3 INJECTION SUBCUTANEOUS ONCE AS NEEDED
Status: DISCONTINUED | OUTPATIENT
Start: 2024-11-05 | End: 2024-11-05 | Stop reason: HOSPADM

## 2024-11-05 RX ADMIN — SODIUM CHLORIDE, PRESERVATIVE FREE 10 ML: 5 INJECTION INTRAVENOUS at 08:11

## 2024-11-05 RX ADMIN — SODIUM CHLORIDE 200 MG: 9 INJECTION, SOLUTION INTRAVENOUS at 08:11

## 2024-11-05 RX ADMIN — HEPARIN 500 UNITS: 100 SYRINGE at 08:11

## 2024-11-14 NOTE — PROGRESS NOTES
[facility]  Colon and Rectal Surgery    [address]  [place], [place]  Tel: [phone]  Fax: [phone]  [email or web address]   MD Payam Reid MD Brian Kann, MD W. Forrest Johnston, MD Matthew Giglia, MD Jennifer Paruch, MD William Kethman, MD Danielle Kay, MD     Patient name: Loki Li Jr.   YOB: 1965   MRN: [id number]    Dear Carissa Goodwin and Khoobehi,    It was a pleasure seeing Mr. Li in the Colon and Rectal Surgery clinic here at [facility].     As you know, Mr. Li is a 59 year old man with a history of HTN, HLD, and DM, Stage IV transverse colon adenocarcinoma who presents for evaluation of colostomy takedown . He underwent a splenic flexure resection, end colostomy and splenectomy with Dr. Waddell > FOLFOX > progression (pancreatic tail - biopsy proven and RPLN PET avidity) > Keytruda since 1/2023. He was seen by Dr. Goodwin in 1/2024 (note reviewed) - his plan was to follow-up the results of his PET CT, decision was made to complete Keytruda and continue surveillance - if recurrence occurs then move forward with resection. He is doing well - fatigue only around the time of Keytruda for about 1 week after, he denies any nausea, vomiting, fevers, or chills. His weight is stable but about 30 lbs less than originally. He denies fecal incontinence.     PET CT - 1/8/2024  Positive therapy response with resolution of the metastatic retroperitoneal lymph node compared to the prior examination.  No FDG avid foci are present on today's exam.     Last colonoscopy: 2018 (Complete) - repeat in 5 years  Two sessile polyps 5-6 mm in descending colon, polypectomy performed (HP)  Two sessile polyps 5-7 mm in the sigmoid colon, polypectomy performed (HP)  18 diminutive recto sigmoid polyps were ablated    4/19/2024  Here for pre-operative consultation for colostomy takedown and likely distal pancreatectomy. He is doing  well - no major changes since the last time I saw him. He is ready for surgery.    Colonoscopy - 3/5/2024  The perianal and digital rectal examinations were normal. Pertinent negatives include normal sphincter tone.   Diffuse mild inflammation characterized by altered vascularity, friability and mucus was found in the rectum, in the sigmoid colon and in the descending colon. No biopsies or other specimens were collected for this exam. Estimated blood loss was minimal.   There was evidence of a widely patent end colostomy in the transverse colon. This was characterized by healthy appearing mucosa.   The exam was otherwise without abnormality.     6/28/2024  Here for follow-up, underwent open colostomy takedown and repair of parastomal hernia on 5/13/2024 complicated by what was felt to be an internal hernia requiring emergent take back, found to have a small anastomotic complication requiring completion colectomy, ileorectal anastomosis and DLI on 5/21/2024. He is doing well - no nausea or vomiting. Some mild pain in bilateral abdomen but ostomy is functioning. He denies any fevers or chills.     5/13/2024 - Pathology  1. Ostomy with peristomal hernia, excision:   Portion of colostomy with no evidence of neoplastic change.   Fibromembranous tissue, consistent with hernia sac.     2. Descending colon, excision:   Segment of colon with focal necrosis.   Resection margins appear viable.   No dysplasia or malignancy.     5/21/2024 - Pathology  1. TOTAL ABDOMINAL COLON, COLECTOMY:   Abdominal perforation with peritonitis adjacent to an anastomosis   Appendix with reactive changes of mesopappendix   Negative for neoplasia or malignancy     2. SIGMOID COLON, RESECTION:Portion of colon with features of acute peritonitis     3. ANASTOMOTIC DONUTS:   Portion of small bowel with ulceration   A separate portion of colon with acute inflammatory and reactive changes     8/9/2024  Here for follow-up, seen by Dr. Koobehi for  Keytruda. He is doing well - still not smoking. He is not having any major issues - CT was reassuring. He does have mild prolapse of his ileostomy.    CT AP - 7/5/2024  The ileo rectal anastomosis appears intact. No extraluminal contrast to suggest anastomotic leak. No bowel obstruction. Visualized loops of small bowel are normal in caliber without wall thickening.     History of colonic adenocarcinoma status post colectomy with ileorectal anastomosis and diverting loop ileostomy as well as splenectomy.     No evidence to suggest local recurrence or distant abdominopelvic metastasis.     Persistent fluid/peritoneal thickening left upper quadrant similar/improved compared to prior.    11/15/2024  Follow-up after ileostomy takedown on 10/7/2024, here for post-operative follow-up. He is doing better - having somewhere between 10-20 bowel movements per day, small volume but it is improving. He is controlled the stools without significant issues. He is taking 10 mg of Imodium twice daily.    Ileostomy, excision:  Small intestine mucosa with underlying benign lymphoid aggregates and submucosal hemorrhage with cautery artifact consistent with area ostomy.    No evidence of dysplasia or malignancy.    Oncology History   Malignant neoplasm of transverse colon   4/20/2022 Surgery    Partial colectomy, splenectomy, end colostomy - pancreas appeared to be involved at that time    1. Spleen, splenectomy:   - Benign splenic parenchyma   - Negative for malignancy   2. Colon, partial colectomy:   - Invasive colonic adenocarcinoma, poorly differentiated (G3)     - Invades into pericolorectal tissue (pT3)   - Margins uninvolved by invasive carcinoma     - 0.1 cm to the mesenteric margin   - Lymphovascular invasion identified   - No perineural invasion identified   - Seventeen of twenty-seven lymph nodes, positive for metastatic carcinoma   (17/27, pN2b)   - Fibrous serosal adhesions   - See below for results of MSI testing   -  CARLOS ENRIQUE/MILLIE Targeted Gene Panel has been ordered and results will be issued in   a supplemental report     CAP Synoptic Checklist for Colonic Neoplasms:     - Procedure: Partial colectomy     - Tumor Site: Splenic flexure     - Histologic Type: Adenocarcinoma     - Histologic Grade: G3, poorly differentiated     - Tumor Size: 5.0 x 4.5 x 2.7 cm     - Tumor Extent: Invades through muscularis propria into pericolorectal   tissue    - Macroscopic Tumor Perforation: Not identified     - Lymphovascular Invasion: Present, small vessel involved     - Perineural Invasion: Not identified     - Number of Tumor Buds: 5 in one hotspot field     - Tumor Bud Score: Intermediate (5-9)     - Type of Polyp in which Invasive Carcinoma Arose: None identified     - Treatment Effect: No known presurgical therapy     - Margins: All margins negative for invasive carcinoma       - Closest Margin to Invasive Carcinoma: 0.1 cm to mesenteric margin     - Regional Lymph Nodes:       - Number of Lymph Nodes with Tumor: 17       - Number of Lymph Nodes Examined: 27       - Tumor Deposits: Not identified     - Distant Metastasis: Not applicable     - Pathologic Stage Classification: pT3 pN2b     - Additional Findings: Fibrous serosal adhesions; benign spleen     - Special Studies: See below for results of MSI testing; CARLOS ENRIQUE/MILLIE panel has         been ordered and results will be issued in a supplemental report     - Designate Block for Future Studies: [id number]   Immunohistochemistry (IHC) Testing for Mismatch Repair (MMR) Proteins:   MLH1 - Intact nuclear expression   MSH2 - Intact nuclear expression   MSH6 - Intact nuclear expression   PMS2 - Intact nuclear expression      5/5/2022 Initial Diagnosis    Malignant neoplasm of transverse colon     7/12/2022 - 12/14/2022 Chemotherapy    Treatment Summary   Plan Name: OP mFOLFOX 6 Q2W  Treatment Goal: Curative  Status: Inactive  Start Date: 7/12/2022  End Date: 12/14/2022  Provider: Aurash Khoobehi,  MD  Chemotherapy: fluorouraciL injection 930 mg, 400 mg/m2 = 930 mg, Intravenous, Clinic/HOD 1 time, 12 of 12 cycles  Administration: 930 mg (7/12/2022), 930 mg (7/26/2022), 930 mg (8/9/2022), 930 mg (8/23/2022), 930 mg (9/6/2022), 930 mg (9/19/2022), 835 mg (10/3/2022), 835 mg (10/17/2022), 825 mg (10/31/2022), 820 mg (11/14/2022), 830 mg (11/28/2022), 825 mg (12/12/2022)  oxaliplatin (ELOXATIN) 85 mg/m2 = 197 mg in dextrose 5 % 539.4 mL chemo infusion, 85 mg/m2 = 197 mg, Intravenous, Clinic/HOD 1 time, 10 of 10 cycles  Dose modification: 68 mg/m2 (original dose 85 mg/m2, Cycle 8, Reason: MD Discretion)  Administration: 197 mg (7/12/2022), 197 mg (7/26/2022), 197 mg (8/9/2022), 197 mg (8/23/2022), 197 mg (9/6/2022), 197 mg (9/19/2022), 178 mg (10/3/2022), 142 mg (10/17/2022), 139 mg (11/14/2022)     2/13/2023 -  Chemotherapy    Treatment Summary   Plan Name: OP PEMBROLIZUMAB 200MG Q3W  Treatment Goal: Curative  Status: Active  Start Date: 2/13/2023  End Date: 5/13/2025 (Planned)  Provider: Aurash Khoobehi, MD  Chemotherapy: [No matching medication found in this treatment plan]     2/26/2024 Cancer Staged    Staging form: Colon and Rectum, AJCC 8th Edition  - Pathologic: Stage IVB (pT3, pN2b, cM1b)     Metastatic adenocarcinoma to pancreas   1/13/2023 Initial Diagnosis    Metastatic adenocarcinoma to pancreas     2/13/2023 -  Chemotherapy    Treatment Summary   Plan Name: OP PEMBROLIZUMAB 200MG Q3W  Treatment Goal: Curative  Status: Active  Start Date: 2/13/2023  End Date: 5/13/2025 (Planned)  Provider: Aurash Khoobehi, MD  Chemotherapy: [No matching medication found in this treatment plan]         The patient was informed of the availability of a certified  without charge. A certified  was not necessary for this visit.    Review of Systems  See pertinent review of systems above    Past Medical History:   Diagnosis Date    Colon cancer     Digestive disorder     DM (diabetes mellitus)      Hyperlipidemia     Hypertension     Prediabetes      Past Surgical History:   Procedure Laterality Date    ADENOIDECTOMY  1972    ANASTOMOSIS OF ILEUM TO RECTUM N/A 5/21/2024    Procedure: ANASTOMOSIS, ILEORECTAL;  Surgeon: Farhan Lance MD;  Location: [facility] OR [facility] FLR;  Service: Colon and Rectal;  Laterality: N/A;    CHOLECYSTECTOMY  2011    CLOSURE, COLOSTOMY N/A 5/13/2024    Procedure: CLOSURE, COLOSTOMY (eras high/lithotomy);  Surgeon: Farhan Lance MD;  [facility];  Service: Colon and Rectal;  Laterality: N/A;  CONSENTS IN [facility]    CLOSURE, ILEOSTOMY, LOOP N/A 10/7/2024    Procedure: CLOSURE, ILEOSTOMY, LOOP;  Surgeon: Farhan Lance MD;  [facility];  Service: Colon and Rectal;  Laterality: N/A;    COLON SURGERY      COLONOSCOPY      2018    COLONOSCOPY N/A 3/5/2024    Procedure: COLONOSCOPY;  Surgeon: Farhan Lance MD;  Location: [facility] (4TH FLR);  Service: Endoscopy;  Laterality: N/A;  2/27/24-Kethman pt, 1-4wks, port, PEG plus 2 enemas morning of procedure, instr portal-DS  2/28/24- LVM for precall - ERW  pt confirmed procedure. cf    COLOSTOMY N/A 04/25/2022    Procedure: CREATION, COLOSTOMY;  Surgeon: Carlton Waddell MD;  [facility]: [facility];  Service: General;  Laterality: N/A;    ENDOSCOPIC ULTRASOUND OF UPPER GASTROINTESTINAL TRACT N/A 01/06/2023    Procedure: ULTRASOUND, UPPER GI TRACT, ENDOSCOPIC;  Surgeon: Rinku Orozco III, MD;  Location: [facility] ENDO;  Service: Endoscopy;  Laterality: N/A;    FLEXIBLE SIGMOIDOSCOPY N/A 5/13/2024    Procedure: SIGMOIDOSCOPY, FLEXIBLE;  Surgeon: Farhan Lance MD;  [facility];  Service: Colon and Rectal;  Laterality: N/A;    FLEXIBLE SIGMOIDOSCOPY  5/21/2024    Procedure: SIGMOIDOSCOPY, FLEXIBLE;  Surgeon: Farhan Lance MD;  [facility];  Service: Colon and Rectal;;    FLEXIBLE SIGMOIDOSCOPY N/A 10/7/2024    Procedure: SIGMOIDOSCOPY, FLEXIBLE;  Surgeon: Casi  Farhan MILLER MD;  Location: [facility] OR 2ND FLR;  Service: Colon and Rectal;  Laterality: N/A;    ILEOSTOMY N/A 5/21/2024    Procedure: CREATION, ILEOSTOMY, DIVERTING LOOP;  Surgeon: Farhan Lance MD;  Location: [facility] OR 2ND FLR;  Service: Colon and Rectal;  Laterality: N/A;    INSERTION OF TUNNELED CENTRAL VENOUS CATHETER (CVC) WITH SUBCUTANEOUS PORT Right 05/18/2022    Procedure: [id number];  Surgeon: Carlton Waddell MD;  Location: [facility] OR;  Service: General;  Laterality: Right;    INSERTION OF URETERAL CATHETER N/A 5/13/2024    Procedure: INSERTION, CATHETER, URETER, BILATERAL;  Surgeon: Travis Edwards MD;  Location: [facility] OR 2ND FLR;  Service: Urology;  Laterality: N/A;    INSERTION OF URETERAL CATHETER Left 5/21/2024    Procedure: INSERTION, CATHETER, URETER;  Surgeon: Carlton Santos MD;  Location: [facility] OR 2ND FLR;  Service: Urology;  Laterality: Left;    LAPAROTOMY, EXPLORATORY N/A 5/21/2024    Procedure: LAPAROTOMY, EXPLORATORY;  Surgeon: Farhan Lance MD;  Location: [facility] OR 2ND FLR;  Service: Colon and Rectal;  Laterality: N/A;    LYSIS OF ADHESIONS N/A 5/13/2024    Procedure: LYSIS, ADHESIONS;  Surgeon: Farhan Lance MD;  Location: [facility] OR 2ND FLR;  Service: Colon and Rectal;  Laterality: N/A;    MOBILIZATION OF SPLENIC FLEXURE N/A 04/25/2022    Procedure: MOBILIZATION, SPLENIC FLEXURE;  Surgeon: Carlton Waddell MD;  Location: [facility] OR;  Service: General;  Laterality: N/A;    SPLENECTOMY N/A 04/25/2022    Procedure: SPLENECTOMY;  Surgeon: Carlton Waddell MD;  Location: [facility] OR;  Service: General;  Laterality: N/A;    SUBTOTAL COLECTOMY N/A 04/25/2022    Procedure: COLECTOMY, PARTIAL;  Surgeon: Carlton Waddell MD;  Location: [facility] OR;  Service: General;  Laterality: N/A;    TONSILLECTOMY      TOTAL ABDOMINAL COLECTOMY N/A 5/21/2024    Procedure: COLECTOMY, COMPLETION, ABDOMINAL;  Surgeon: Farhan Lance MD;  Location: [facility] OR [address] [place] [address];  Service: Colon and Rectal;  Laterality:  "N/A;    TUMOR REMOVAL  10/18/2023    on top of the nose    VASECTOMY       Family History   Problem Relation Name Age of Onset    Heart disease Father Loki senior     Diabetes Father Loki senior     Alcohol abuse Paternal Grandfather Waylon Li     Rectal cancer Other Makenzie         half-sister    Cancer Sister makenzie      Social History     Tobacco Use    Smoking status: Former     Current packs/day: 0.00     Average packs/day: 1 pack/day for 40.0 years (40.0 ttl pk-yrs)     Types: Cigarettes     Start date: 1984     Quit date: 2024     Years since quittin.5     Passive exposure: Current    Smokeless tobacco: Never   Substance Use Topics    Alcohol use: Not Currently    Drug use: Never     Review of patient's allergies indicates:   Allergen Reactions    Penicillins Rash       Current Outpatient Medications on File Prior to Visit   Medication Sig Dispense Refill    atorvastatin (LIPITOR) 20 MG tablet Take 1 tablet (20 mg total) by mouth every evening. 90 tablet 3    enalapril (VASOTEC) 10 MG tablet Take 1 tablet (10 mg total) by mouth once daily. (Patient taking differently: Take 10 mg by mouth every morning.) 90 tablet 3    insulin glargine U-300 conc (TOUJEO MAX U-300 SOLOSTAR) 300 unit/mL (3 mL) insulin pen Inject 48 Units into the skin once daily. (Discard pen 56 days after initial use) (Patient taking differently: Inject 48 Units into the skin every morning. (Discard pen 56 days after initial use)) 2 Pen 11    metFORMIN (GLUCOPHAGE) 1000 MG tablet Take 1 tablet (1,000 mg total) by mouth 2 (two) times daily with meals. 180 tablet 3    pen needle, diabetic (BD ULTRA-FINE MINI PEN NEEDLE) 31 gauge x 3/16" Ndle 1 each by Misc.(Non-Drug; Combo Route) route once daily. 100 each 3    sildenafiL (VIAGRA) 100 MG tablet Take 1 tablet (100 mg total) by mouth daily as needed for Erectile Dysfunction. 30 tablet 3    traZODone (DESYREL) 50 MG tablet Take 1 tablet (50 mg total) by mouth every evening. 90 tablet " "3    gabapentin (NEURONTIN) 300 MG capsule Take 1 capsule (300 mg total) by mouth 3 (three) times daily. 90 capsule 3    oxyCODONE (ROXICODONE) 5 MG immediate release tablet Take 1 tablet (5 mg total) by mouth every 4 (four) hours as needed for Pain. (Patient not taking: Reported on 11/15/2024) 20 tablet 0    promethazine (PHENERGAN) 12.5 MG Tab Take 1 tablet (12.5 mg total) by mouth every 4 (four) hours as needed. (Patient not taking: Reported on 11/15/2024) 25 tablet 1     No current facility-administered medications on file prior to visit.     Physical Examination  BP (!) 155/83 (BP Location: Left arm, Patient Position: Sitting)   Pulse 72   Resp 16   Ht 6' 2" (1.88 m)   Wt 102.8 kg (226 lb 10.1 oz)   BMI 29.10 kg/m²      Constitutional: well developed, no cough, no dyspnea, alert, and no acute distress    Head: Normocephalic, no lesions, without obvious abnormality  Eye: Normal external eye, conjunctiva, and lids  Cardiovascular: regular rate and regular rhythm  Respiratory: normal air entry  Gastrointestinal: soft, non-tender, ostomy site healing, midline healed  Musculoskeletal: full range of motion without pain  Neurologic: alert, oriented, normal speech, no focal findings or movement disorder noted  Psychiatric: appropriate, normal mood    Assessment and Plan of Care    Thank you again for referring Mr. Li to my care. In summary, Mr. 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 He underwent colostomy takedown complicated by anastomotic leak requiring revision and DLI - this has now been taken down. We discussed treatment options and have provided the following recommendations:    Follow-up with Dr. Khoobehi  Recommended starting fiber, Imodium 1-2 tabs (2 mg) up to four times per day, 30 minutes before meals and at bedtime, may message to start " Lomotil  Follow-up with me in 2 months virtually to assist in bowel management    Please do not hesitate to contact me if you have any questions.      Farhan Lance MD, FACS, FASCRS  Department of Colon & Rectal Surgery  [facility]

## 2024-11-15 ENCOUNTER — OFFICE VISIT (OUTPATIENT)
Dept: SURGERY | Facility: CLINIC | Age: 59
End: 2024-11-15
Payer: COMMERCIAL

## 2024-11-15 VITALS
BODY MASS INDEX: 29.09 KG/M2 | HEIGHT: 74 IN | SYSTOLIC BLOOD PRESSURE: 155 MMHG | HEART RATE: 72 BPM | DIASTOLIC BLOOD PRESSURE: 83 MMHG | WEIGHT: 226.63 LBS | RESPIRATION RATE: 16 BRPM

## 2024-11-15 DIAGNOSIS — C18.5 MALIGNANT NEOPLASM OF SPLENIC FLEXURE: Primary | ICD-10-CM

## 2024-11-15 PROCEDURE — 99999 PR PBB SHADOW E&M-EST. PATIENT-LVL III: CPT | Mod: PBBFAC,,, | Performed by: STUDENT IN AN ORGANIZED HEALTH CARE EDUCATION/TRAINING PROGRAM

## 2024-11-25 ENCOUNTER — LAB VISIT (OUTPATIENT)
Dept: LAB | Facility: HOSPITAL | Age: 59
End: 2024-11-25
Attending: INTERNAL MEDICINE
Payer: COMMERCIAL

## 2024-11-25 ENCOUNTER — TELEPHONE (OUTPATIENT)
Dept: HEMATOLOGY/ONCOLOGY | Facility: CLINIC | Age: 59
End: 2024-11-25
Payer: COMMERCIAL

## 2024-11-25 ENCOUNTER — OFFICE VISIT (OUTPATIENT)
Dept: HEMATOLOGY/ONCOLOGY | Facility: CLINIC | Age: 59
End: 2024-11-25
Payer: COMMERCIAL

## 2024-11-25 VITALS
DIASTOLIC BLOOD PRESSURE: 74 MMHG | RESPIRATION RATE: 18 BRPM | HEIGHT: 74 IN | OXYGEN SATURATION: 98 % | BODY MASS INDEX: 29.34 KG/M2 | TEMPERATURE: 98 F | HEART RATE: 72 BPM | WEIGHT: 228.63 LBS | SYSTOLIC BLOOD PRESSURE: 155 MMHG

## 2024-11-25 DIAGNOSIS — C78.89 METASTATIC ADENOCARCINOMA TO PANCREAS: ICD-10-CM

## 2024-11-25 DIAGNOSIS — C77.2 METASTASIS TO RETROPERITONEAL LYMPH NODE: ICD-10-CM

## 2024-11-25 DIAGNOSIS — C18.4 MALIGNANT NEOPLASM OF TRANSVERSE COLON: Primary | ICD-10-CM

## 2024-11-25 DIAGNOSIS — C18.4 MALIGNANT NEOPLASM OF TRANSVERSE COLON: ICD-10-CM

## 2024-11-25 PROBLEM — D64.9 ANEMIA: Status: RESOLVED | Noted: 2022-04-24 | Resolved: 2024-11-25

## 2024-11-25 LAB
ALBUMIN SERPL BCP-MCNC: 3.9 G/DL (ref 3.5–5.2)
ALP SERPL-CCNC: 89 U/L (ref 40–150)
ALT SERPL W/O P-5'-P-CCNC: 16 U/L (ref 10–44)
ANION GAP SERPL CALC-SCNC: 10 MMOL/L (ref 8–16)
AST SERPL-CCNC: 17 U/L (ref 10–40)
BASOPHILS # BLD AUTO: 0.11 K/UL (ref 0–0.2)
BASOPHILS NFR BLD: 0.9 % (ref 0–1.9)
BILIRUB SERPL-MCNC: 0.5 MG/DL (ref 0.1–1)
BUN SERPL-MCNC: 15 MG/DL (ref 6–20)
CALCIUM SERPL-MCNC: 10.1 MG/DL (ref 8.7–10.5)
CHLORIDE SERPL-SCNC: 104 MMOL/L (ref 95–110)
CO2 SERPL-SCNC: 25 MMOL/L (ref 23–29)
CREAT SERPL-MCNC: 1.3 MG/DL (ref 0.5–1.4)
DIFFERENTIAL METHOD BLD: ABNORMAL
EOSINOPHIL # BLD AUTO: 0.2 K/UL (ref 0–0.5)
EOSINOPHIL NFR BLD: 1.6 % (ref 0–8)
ERYTHROCYTE [DISTWIDTH] IN BLOOD BY AUTOMATED COUNT: 14.5 % (ref 11.5–14.5)
EST. GFR  (NO RACE VARIABLE): >60 ML/MIN/1.73 M^2
GLUCOSE SERPL-MCNC: 94 MG/DL (ref 70–110)
HCT VFR BLD AUTO: 37.2 % (ref 40–54)
HGB BLD-MCNC: 12 G/DL (ref 14–18)
IMM GRANULOCYTES # BLD AUTO: 0.02 K/UL (ref 0–0.04)
IMM GRANULOCYTES NFR BLD AUTO: 0.2 % (ref 0–0.5)
LYMPHOCYTES # BLD AUTO: 6.1 K/UL (ref 1–4.8)
LYMPHOCYTES NFR BLD: 51.2 % (ref 18–48)
MCH RBC QN AUTO: 28.1 PG (ref 27–31)
MCHC RBC AUTO-ENTMCNC: 32.3 G/DL (ref 32–36)
MCV RBC AUTO: 87 FL (ref 82–98)
MONOCYTES # BLD AUTO: 1.1 K/UL (ref 0.3–1)
MONOCYTES NFR BLD: 9.6 % (ref 4–15)
NEUTROPHILS # BLD AUTO: 4.3 K/UL (ref 1.8–7.7)
NEUTROPHILS NFR BLD: 36.5 % (ref 38–73)
NRBC BLD-RTO: 0 /100 WBC
PLATELET # BLD AUTO: 414 K/UL (ref 150–450)
PMV BLD AUTO: 9.3 FL (ref 9.2–12.9)
POTASSIUM SERPL-SCNC: 5.6 MMOL/L (ref 3.5–5.1)
PROT SERPL-MCNC: 7.2 G/DL (ref 6–8.4)
RBC # BLD AUTO: 4.27 M/UL (ref 4.6–6.2)
SODIUM SERPL-SCNC: 139 MMOL/L (ref 136–145)
WBC # BLD AUTO: 11.83 K/UL (ref 3.9–12.7)

## 2024-11-25 PROCEDURE — G2211 COMPLEX E/M VISIT ADD ON: HCPCS | Mod: S$GLB,,, | Performed by: INTERNAL MEDICINE

## 2024-11-25 PROCEDURE — 85025 COMPLETE CBC W/AUTO DIFF WBC: CPT | Performed by: INTERNAL MEDICINE

## 2024-11-25 PROCEDURE — 99215 OFFICE O/P EST HI 40 MIN: CPT | Mod: S$GLB,,, | Performed by: INTERNAL MEDICINE

## 2024-11-25 PROCEDURE — 36415 COLL VENOUS BLD VENIPUNCTURE: CPT | Performed by: INTERNAL MEDICINE

## 2024-11-25 PROCEDURE — 80053 COMPREHEN METABOLIC PANEL: CPT | Performed by: INTERNAL MEDICINE

## 2024-11-25 PROCEDURE — 99999 PR PBB SHADOW E&M-EST. PATIENT-LVL IV: CPT | Mod: PBBFAC,,, | Performed by: INTERNAL MEDICINE

## 2024-11-25 RX ORDER — SODIUM CHLORIDE 0.9 % (FLUSH) 0.9 %
10 SYRINGE (ML) INJECTION
Status: CANCELLED | OUTPATIENT
Start: 2024-11-26

## 2024-11-25 RX ORDER — EPINEPHRINE 0.3 MG/.3ML
0.3 INJECTION SUBCUTANEOUS ONCE AS NEEDED
Status: CANCELLED | OUTPATIENT
Start: 2024-11-26

## 2024-11-25 RX ORDER — DIPHENHYDRAMINE HYDROCHLORIDE 50 MG/ML
50 INJECTION INTRAMUSCULAR; INTRAVENOUS ONCE AS NEEDED
Status: CANCELLED | OUTPATIENT
Start: 2024-11-26

## 2024-11-25 RX ORDER — HEPARIN 100 UNIT/ML
500 SYRINGE INTRAVENOUS
Status: CANCELLED | OUTPATIENT
Start: 2024-11-26

## 2024-11-25 NOTE — TELEPHONE ENCOUNTER
Spoke with pt's wife to notify that appt will need to be rescheduled until 10 am per provider. She verbalized understanding.

## 2024-11-25 NOTE — Clinical Note
Ok for treatment. RTC in 6 weeks with labs for next treatment (changing to every 6 weeks). Guardant reveal

## 2024-11-25 NOTE — PROGRESS NOTES
Service Date:  11/25/24    Chief Complaint:  Colon cancer    Osman Li Jr. is a 59 y.o. 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  This occurred very shortly after completing treatment.    Patient was part of a clinical trial to measure CT DNA.  His CT DNA started to go up while he was on FOLFOX, which prompted the scan, which showed the progression.  Next generation sequencing also showed a high tumor burden.    He is now on Keytruda as his cancer has a very high tumor mutation burden.  So far, Keytruda has been helping    Here for Keytruda.  Doing well.  Recovered from surgery.  No complaints to me. Want to change keytruda to every 6 weeks.      Review of Systems   Constitutional:  Positive for fatigue. Negative for appetite change and unexpected weight change.   HENT: Negative.  Negative for mouth sores.    Eyes: Negative.  Negative for visual disturbance.   Respiratory: Negative.  Negative for cough and shortness of breath.    Cardiovascular: Negative.  Negative for chest pain.   Gastrointestinal: Negative.  Negative for abdominal pain and diarrhea.   Endocrine: Negative.    Genitourinary: Negative.  Negative for frequency.   Musculoskeletal: Negative.  Negative for back pain.   Integumentary:  Negative for rash. Negative.   Neurological:  Positive for numbness. Negative for headaches.   Hematological: Negative.  Negative for adenopathy.   Psychiatric/Behavioral: Negative.  The patient is not nervous/anxious.         Current Outpatient Medications   Medication Instructions    atorvastatin (LIPITOR) 20 mg, Oral, Nightly    enalapril (VASOTEC) 10 mg, Oral, Daily    gabapentin (NEURONTIN) 300 mg, Oral, 3 times daily    insulin glargine U-300 conc (TOUJEO MAX U-300 SOLOSTAR) 48 Units, Subcutaneous, Daily, (Discard pen 56 days after initial use)    metFORMIN (GLUCOPHAGE) 1,000 mg, Oral, 2 times daily with  "meals    oxyCODONE (ROXICODONE) 5 mg, Oral, Every 4 hours PRN    pen needle, diabetic (BD ULTRA-FINE MINI PEN NEEDLE) 31 gauge x 3/16" Ndle 1 each, Misc.(Non-Drug; Combo Route), Daily    promethazine (PHENERGAN) 12.5 mg, Oral, Every 4 hours PRN    sildenafiL (VIAGRA) 100 mg, Oral, Daily PRN    traZODone (DESYREL) 50 mg, Oral, Nightly        Past Medical History:   Diagnosis Date    Colon cancer     Digestive disorder     DM (diabetes mellitus)     Hyperlipidemia     Hypertension     Prediabetes         Past Surgical History:   Procedure Laterality Date    ADENOIDECTOMY  1972    ANASTOMOSIS OF ILEUM TO RECTUM N/A 5/21/2024    Procedure: ANASTOMOSIS, ILEORECTAL;  Surgeon: Farhan Lance MD;  Location: [facility] OR [address] [place] [address];  Service: Colon and Rectal;  Laterality: N/A;    CHOLECYSTECTOMY  2011    CLOSURE, COLOSTOMY N/A 5/13/2024    Procedure: CLOSURE, COLOSTOMY (eras high/lithotomy);  Surgeon: Farhan Lance MD;  [facility];  Service: Colon and Rectal;  Laterality: N/A;  CONSENTS IN [facility]    CLOSURE, ILEOSTOMY, LOOP N/A 10/7/2024    Procedure: CLOSURE, ILEOSTOMY, LOOP;  Surgeon: Farhan Lance MD;  [facility];  Service: Colon and Rectal;  Laterality: N/A;    COLON SURGERY      COLONOSCOPY      2018    COLONOSCOPY N/A 3/5/2024    Procedure: COLONOSCOPY;  Surgeon: Farhan Lance MD;  Location: [facility] (4TH FLR);  Service: Endoscopy;  Laterality: N/A;  2/27/24-Kethman pt, 1-4wks, port, PEG plus 2 enemas morning of procedure, instr portal-DS  2/28/24- LVM for precall - ERW  pt confirmed procedure. cf    COLOSTOMY N/A 04/25/2022    Procedure: CREATION, COLOSTOMY;  Surgeon: Carlton Waddell MD;  Location: [facility];  Service: General;  Laterality: N/A;    ENDOSCOPIC ULTRASOUND OF UPPER GASTROINTESTINAL TRACT N/A 01/06/2023    Procedure: ULTRASOUND, UPPER GI TRACT, ENDOSCOPIC;  Surgeon: Rinku Orozco III, MD;  Location: [facility];  Service: Endoscopy;  Laterality: N/A;    " FLEXIBLE SIGMOIDOSCOPY N/A 5/13/2024    Procedure: SIGMOIDOSCOPY, FLEXIBLE;  Surgeon: Farhan Lance MD;  Location: [facility] OR 2ND FLR;  Service: Colon and Rectal;  Laterality: N/A;    FLEXIBLE SIGMOIDOSCOPY  5/21/2024    Procedure: SIGMOIDOSCOPY, FLEXIBLE;  Surgeon: Farhan Lance MD;  Location: [facility] OR 2ND FLR;  Service: Colon and Rectal;;    FLEXIBLE SIGMOIDOSCOPY N/A 10/7/2024    Procedure: SIGMOIDOSCOPY, FLEXIBLE;  Surgeon: Farhan Lance MD;  Location: [facility] OR 2ND FLR;  Service: Colon and Rectal;  Laterality: N/A;    ILEOSTOMY N/A 5/21/2024    Procedure: CREATION, ILEOSTOMY, DIVERTING LOOP;  Surgeon: Farhan Lance MD;  Location: [facility] OR 2ND FLR;  Service: Colon and Rectal;  Laterality: N/A;    INSERTION OF TUNNELED CENTRAL VENOUS CATHETER (CVC) WITH SUBCUTANEOUS PORT Right 05/18/2022    Procedure: [id number];  Surgeon: Carlton Waddell MD;  Location: [facility] OR;  Service: General;  Laterality: Right;    INSERTION OF URETERAL CATHETER N/A 5/13/2024    Procedure: INSERTION, CATHETER, URETER, BILATERAL;  Surgeon: Travis Edwards MD;  Location: [facility] OR 2ND FLR;  Service: Urology;  Laterality: N/A;    INSERTION OF URETERAL CATHETER Left 5/21/2024    Procedure: INSERTION, CATHETER, URETER;  Surgeon: Carlton Santos MD;  Location: [facility] OR 2ND FLR;  Service: Urology;  Laterality: Left;    LAPAROTOMY, EXPLORATORY N/A 5/21/2024    Procedure: LAPAROTOMY, EXPLORATORY;  Surgeon: Farhan Lance MD;  Location: [facility] OR 2ND FLR;  Service: Colon and Rectal;  Laterality: N/A;    LYSIS OF ADHESIONS N/A 5/13/2024    Procedure: LYSIS, ADHESIONS;  Surgeon: Farhan Lance MD;  Location: [facility] OR 2ND FLR;  Service: Colon and Rectal;  Laterality: N/A;    MOBILIZATION OF SPLENIC FLEXURE N/A 04/25/2022    Procedure: MOBILIZATION, SPLENIC FLEXURE;  Surgeon: Carlton Waddell MD;  Location: [facility] OR;  Service: General;  Laterality: N/A;    SPLENECTOMY N/A 04/25/2022    Procedure: SPLENECTOMY;   "Surgeon: Carlton Waddell MD;  Location: [facility];  Service: General;  Laterality: N/A;    SUBTOTAL COLECTOMY N/A 2022    Procedure: COLECTOMY, PARTIAL;  Surgeon: Carlton Waddell MD;  Location: [facility] OR;  Service: General;  Laterality: N/A;    TONSILLECTOMY      TOTAL ABDOMINAL COLECTOMY N/A 2024    Procedure: COLECTOMY, COMPLETION, ABDOMINAL;  Surgeon: Farhan Lance MD;  Location: [address];  Service: Colon and Rectal;  Laterality: N/A;    TUMOR REMOVAL  10/18/2023    on top of the nose    VASECTOMY          Family History   Problem Relation Name Age of Onset    Heart disease Father Loki senior     Diabetes Father Loki senior     Alcohol abuse Paternal Grandfather Waylon Li     Rectal cancer Other Makenzie         half-sister    Cancer Sister makenzie        Social History     Tobacco Use    Smoking status: Former     Current packs/day: 0.00     Average packs/day: 1 pack/day for 40.0 years (40.0 ttl pk-yrs)     Types: Cigarettes     Start date: 1984     Quit date: 2024     Years since quittin.5     Passive exposure: Current    Smokeless tobacco: Never   Substance Use Topics    Alcohol use: Not Currently    Drug use: Never         Vitals:    24 0952   BP: (!) 155/74   Pulse: 72   Resp: 18   Temp: 98 °F (36.7 °C)          Physical Exam:  BP (!) 155/74 (BP Location: Right arm, Patient Position: Sitting)   Pulse 72   Temp 98 °F (36.7 °C) (Temporal)   Resp 18   Ht 6' 2" (1.88 m)   Wt 103.7 kg (228 lb 9.9 oz)   SpO2 98%   BMI 29.35 kg/m²     Physical Exam  Vitals and nursing note reviewed.   Constitutional:       Appearance: Normal appearance.   HENT:      Head: Normocephalic and atraumatic.      Nose: Nose normal.      Mouth/Throat:      Mouth: Mucous membranes are moist.      Pharynx: Oropharynx is clear.   Eyes:      Extraocular Movements: Extraocular movements intact.      Conjunctiva/sclera: Conjunctivae normal.   Cardiovascular:      Rate and Rhythm: Normal rate and " regular rhythm.      Heart sounds: Normal heart sounds.   Pulmonary:      Effort: Pulmonary effort is normal.      Breath sounds: Normal breath sounds.   Abdominal:      General: Abdomen is flat. Bowel sounds are normal.      Palpations: Abdomen is soft.      Comments: Ostomy bag in place.   Musculoskeletal:         General: Normal range of motion.      Cervical back: Normal range of motion and neck supple.   Skin:     General: Skin is warm and dry.   Neurological:      General: No focal deficit present.      Mental Status: He is alert and oriented to person, place, and time. Mental status is at baseline.   Psychiatric:         Mood and Affect: Mood normal.          Labs:  Lab Results   Component Value Date    WBC 11.83 11/25/2024    RBC 4.27 (L) 11/25/2024    HGB 12.0 (L) 11/25/2024    HCT 37.2 (L) 11/25/2024    MCV 87 11/25/2024    MCH 28.1 11/25/2024    MCHC 32.3 11/25/2024    RDW 14.5 11/25/2024     11/25/2024    MPV 9.3 11/25/2024    GRAN 4.3 11/25/2024    GRAN 36.5 (L) 11/25/2024    LYMPH 6.1 (H) 11/25/2024    LYMPH 51.2 (H) 11/25/2024    MONO 1.1 (H) 11/25/2024    MONO 9.6 11/25/2024    EOS 0.2 11/25/2024    BASO 0.11 11/25/2024    EOSINOPHIL 1.6 11/25/2024    BASOPHIL 0.9 11/25/2024     Sodium   Date Value Ref Range Status   11/25/2024 139 136 - 145 mmol/L Final     Potassium   Date Value Ref Range Status   11/25/2024 5.6 (H) 3.5 - 5.1 mmol/L Final     Chloride   Date Value Ref Range Status   11/25/2024 104 95 - 110 mmol/L Final     CO2   Date Value Ref Range Status   11/25/2024 25 23 - 29 mmol/L Final     Glucose   Date Value Ref Range Status   11/25/2024 94 70 - 110 mg/dL Final     BUN   Date Value Ref Range Status   11/25/2024 15 6 - 20 mg/dL Final     Creatinine   Date Value Ref Range Status   11/25/2024 1.3 0.5 - 1.4 mg/dL Final     Calcium   Date Value Ref Range Status   11/25/2024 10.1 8.7 - 10.5 mg/dL Final     Total Protein   Date Value Ref Range Status   11/25/2024 7.2 6.0 - 8.4 g/dL Final      Albumin   Date Value Ref Range Status   11/25/2024 3.9 3.5 - 5.2 g/dL Final     Total Bilirubin   Date Value Ref Range Status   11/25/2024 0.5 0.1 - 1.0 mg/dL Final     Comment:     For infants and newborns, interpretation of results should be based  on gestational age, weight and in agreement with clinical  observations.    Premature Infant recommended reference ranges:  Up to 24 hours.............<8.0 mg/dL  Up to 48 hours............<12.0 mg/dL  3-5 days..................<15.0 mg/dL  6-29 days.................<15.0 mg/dL       Alkaline Phosphatase   Date Value Ref Range Status   11/25/2024 89 40 - 150 U/L Final     AST   Date Value Ref Range Status   11/25/2024 17 10 - 40 U/L Final     ALT   Date Value Ref Range Status   11/25/2024 16 10 - 44 U/L Final     Anion Gap   Date Value Ref Range Status   11/25/2024 10 8 - 16 mmol/L Final     eGFR if    Date Value Ref Range Status   07/25/2022 >60 >60 mL/min/1.73 m^2 Final     eGFR if non    Date Value Ref Range Status   07/25/2022 >60 >60 mL/min/1.73 m^2 Final     Comment:     Calculation used to obtain the estimated glomerular filtration  rate (eGFR) is the CKD-EPI equation.          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    -Recent CT scan shows no new evidence of disease, with a smaller lesion on the pancreatic tail and stability of the retroperitoneal lymph node that was radiated.  PET scan shows no evidence of disease, so it was decided to forego surgery.  Testing for ctDNA negative: will repeat. Repeat scans in  January 2025  -resume Keytruda  -Return to clinic in 6 weeks for next.    Back pain/pain from stoma  - NM bone scan negative for mets  -now on morphine dose to 30 mg bid    HTN  - on Enalapril  - follow up with PCP    HLP  - follow up with PCP    DM2  - on Metformin  - follow up with PCP    Tobacco use  - counseled on cessation      Aurash Khoobehi, MD  Hematology and Oncology    Visit today included increased complexity associated with the care of the episodic problem colon cancer addressed and managing the longitudinal care of the patient due to the serious and/or complex managed problem(s).

## 2024-11-26 ENCOUNTER — INFUSION (OUTPATIENT)
Dept: INFUSION THERAPY | Facility: HOSPITAL | Age: 59
End: 2024-11-26
Attending: INTERNAL MEDICINE
Payer: COMMERCIAL

## 2024-11-26 VITALS
WEIGHT: 228.31 LBS | HEIGHT: 74 IN | HEART RATE: 71 BPM | SYSTOLIC BLOOD PRESSURE: 126 MMHG | DIASTOLIC BLOOD PRESSURE: 75 MMHG | OXYGEN SATURATION: 97 % | BODY MASS INDEX: 29.3 KG/M2 | TEMPERATURE: 98 F | RESPIRATION RATE: 17 BRPM

## 2024-11-26 DIAGNOSIS — C18.4 MALIGNANT NEOPLASM OF TRANSVERSE COLON: Primary | ICD-10-CM

## 2024-11-26 DIAGNOSIS — C78.89 METASTATIC ADENOCARCINOMA TO PANCREAS: ICD-10-CM

## 2024-11-26 PROCEDURE — 25000003 PHARM REV CODE 250: Performed by: INTERNAL MEDICINE

## 2024-11-26 PROCEDURE — A4216 STERILE WATER/SALINE, 10 ML: HCPCS | Performed by: INTERNAL MEDICINE

## 2024-11-26 PROCEDURE — 96413 CHEMO IV INFUSION 1 HR: CPT

## 2024-11-26 PROCEDURE — 63600175 PHARM REV CODE 636 W HCPCS: Performed by: INTERNAL MEDICINE

## 2024-11-26 RX ORDER — SODIUM CHLORIDE 0.9 % (FLUSH) 0.9 %
10 SYRINGE (ML) INJECTION
Status: DISCONTINUED | OUTPATIENT
Start: 2024-11-26 | End: 2024-11-26 | Stop reason: HOSPADM

## 2024-11-26 RX ORDER — HEPARIN 100 UNIT/ML
500 SYRINGE INTRAVENOUS
Status: DISCONTINUED | OUTPATIENT
Start: 2024-11-26 | End: 2024-11-26 | Stop reason: HOSPADM

## 2024-11-26 RX ORDER — DIPHENHYDRAMINE HYDROCHLORIDE 50 MG/ML
50 INJECTION INTRAMUSCULAR; INTRAVENOUS ONCE AS NEEDED
Status: DISCONTINUED | OUTPATIENT
Start: 2024-11-26 | End: 2024-11-26 | Stop reason: HOSPADM

## 2024-11-26 RX ORDER — EPINEPHRINE 0.3 MG/.3ML
0.3 INJECTION SUBCUTANEOUS ONCE AS NEEDED
Status: DISCONTINUED | OUTPATIENT
Start: 2024-11-26 | End: 2024-11-26 | Stop reason: HOSPADM

## 2024-11-26 RX ADMIN — SODIUM CHLORIDE, PRESERVATIVE FREE 10 ML: 5 INJECTION INTRAVENOUS at 08:11

## 2024-11-26 RX ADMIN — SODIUM CHLORIDE 400 MG: 9 INJECTION, SOLUTION INTRAVENOUS at 08:11

## 2024-11-26 RX ADMIN — HEPARIN 500 UNITS: 100 SYRINGE at 08:11

## 2024-11-26 NOTE — PLAN OF CARE
Problem: Fatigue  Goal: Improved Activity Tolerance  Outcome: Progressing  Intervention: Promote Improved Energy  Flowsheets (Taken 11/26/2024 2245)  Fatigue Management: frequent rest breaks encouraged  Sleep/Rest Enhancement: regular sleep/rest pattern promoted  Activity Management: Ambulated -L4  Environmental Support: calm environment promoted

## 2024-11-30 DIAGNOSIS — E11.69 DM TYPE 2 WITH DIABETIC DYSLIPIDEMIA: ICD-10-CM

## 2024-11-30 DIAGNOSIS — E78.2 MIXED HYPERLIPIDEMIA: ICD-10-CM

## 2024-11-30 DIAGNOSIS — F51.01 PRIMARY INSOMNIA: ICD-10-CM

## 2024-11-30 DIAGNOSIS — E78.5 DM TYPE 2 WITH DIABETIC DYSLIPIDEMIA: ICD-10-CM

## 2024-11-30 RX ORDER — TRAZODONE HYDROCHLORIDE 50 MG/1
50 TABLET ORAL NIGHTLY
Qty: 90 TABLET | Refills: 3 | Status: CANCELLED | OUTPATIENT
Start: 2024-11-30 | End: 2025-11-30

## 2024-11-30 RX ORDER — METFORMIN HYDROCHLORIDE 1000 MG/1
1000 TABLET ORAL 2 TIMES DAILY WITH MEALS
Qty: 180 TABLET | Refills: 3 | Status: CANCELLED | OUTPATIENT
Start: 2024-11-30

## 2024-11-30 RX ORDER — ATORVASTATIN CALCIUM 20 MG/1
20 TABLET, FILM COATED ORAL NIGHTLY
Qty: 90 TABLET | Refills: 3 | Status: CANCELLED | OUTPATIENT
Start: 2024-11-30

## 2024-11-30 RX ORDER — ENALAPRIL MALEATE 10 MG/1
10 TABLET ORAL DAILY
Qty: 90 TABLET | Refills: 3 | Status: CANCELLED | OUTPATIENT
Start: 2024-11-30 | End: 2025-11-30

## 2024-11-30 RX ORDER — INSULIN GLARGINE 300 [IU]/ML
48 INJECTION, SOLUTION SUBCUTANEOUS DAILY
Qty: 2 PEN | Refills: 11 | Status: CANCELLED | OUTPATIENT
Start: 2024-11-30

## 2024-12-02 NOTE — TELEPHONE ENCOUNTER
Pt is needing a refill on his Enalapril, Atorvastatin, Metformin, Toujeo and Trazodone. Last office visit 10/31/2024. Next office visit 03/05/2025.    [facility]  Dr. Toshia Silver. OLGA Gonzalez Ms.Ed. Patient Name:  Tammy Nieves  :  1965     CHIEF C/O:    Chief Complaint   Patient presents with    Hearing Problem     2 week hearing check; still muffled like underwater    Sinus Problem     sinus congestion and drainage better       HISTORY OBTAINED FROM:  patient    HISTORY OF PRESENT ILLNESS:       Abdifatah Cespedes is a 61y.o. year old female, here today for hearing loss. Hearing loss occurred suddenly 3/5. Was given prednisone and pseudophed from urgent care. Was recently treated with antibiotics for a cough in February. History of surgery in left ear (PORP). Still cannot hear out of left ear. Denies dizziness    3/29: here for follow up after steroids. Not improved      Past Medical History:   Diagnosis Date    Hyperlipidemia      Past Surgical History:   Procedure Laterality Date     SECTION      EAR SURGERY         Current Outpatient Medications:     rosuvastatin (CRESTOR) 20 MG tablet, Take 20 mg by mouth daily, Disp: , Rfl:     Multiple Vitamin (MULTIVITAMINS PO), Take by mouth, Disp: , Rfl:     ZINC PO, Take by mouth, Disp: , Rfl:     Coenzyme Q10 (CO Q 10 PO), Take by mouth, Disp: , Rfl:     acetaminophen (TYLENOL) 500 MG tablet, Take 1,000 mg by mouth every 6 hours as needed for Pain, Disp: , Rfl:   Codeine  Social History     Tobacco Use    Smoking status: Never Smoker    Smokeless tobacco: Never Used   Substance Use Topics    Alcohol use: Not on file    Drug use: Not on file     No family history on file. Review of Systems   Constitutional: Negative for activity change, fatigue and fever. HENT: Positive for congestion, hearing loss, postnasal drip, rhinorrhea and tinnitus. Negative for ear discharge, ear pain, nosebleeds, sinus pressure, sinus pain, sore throat, trouble swallowing and voice change. Respiratory: Negative for cough, choking, wheezing and stridor.     Cardiovascular: Negative for chest pain.   Gastrointestinal: Negative. Genitourinary: Negative. Skin: Negative for color change and rash. Neurological: Negative for speech difficulty, light-headedness, numbness and headaches. Hematological: Negative for adenopathy. Psychiatric/Behavioral: Negative for behavioral problems. Ht 5' 5\" (1.651 m)   Wt 224 lb (101.6 kg)   BMI 37.28 kg/m²   Physical Exam  Constitutional:       Appearance: Normal appearance. She is normal weight. HENT:      Head: Normocephalic and atraumatic. Right Ear: Tympanic membrane, ear canal and external ear normal. No drainage. Left Ear: Tympanic membrane, ear canal and external ear normal. No drainage. Nose: Nose normal. No nasal deformity or septal deviation. Mouth/Throat:      Mouth: Mucous membranes are moist.   Eyes:      General: Lids are normal. Vision grossly intact. Extraocular Movements: Extraocular movements intact. Conjunctiva/sclera: Conjunctivae normal.      Pupils: Pupils are equal, round, and reactive to light. Cardiovascular:      Rate and Rhythm: Normal rate. Pulmonary:      Effort: Pulmonary effort is normal.   Musculoskeletal:         General: Normal range of motion. Cervical back: Normal range of motion. Lymphadenopathy:      Cervical: No cervical adenopathy. Skin:     Capillary Refill: Capillary refill takes less than 2 seconds. Neurological:      Mental Status: She is alert. Psychiatric:         Mood and Affect: Mood normal.         IMPRESSION/PLAN:  Sudden L SNHL without improvement after 2 rounds of 10 days high dose prednisone  Sinus infection improved   H/o of PORP in left ear years ago  Recommend MRI IAC to r/o vestibular schwannoma   Follow up in 2 weeks     Dr. Nika Angeles [facility]  Associate Clinical Professor:  [employer]            Sun Ivey  1965      I have discussed the case, including pertinent history and exam findings with the resident. I have seen and examined the patient and the key elements of the encounter have been performed by me. I agree with the assessment, plan and orders as documented by the resident. Patient here for follow up of medical problems. Remainder of medical problems as per resident note.       Eliezer Sparrow DO  4/16/22

## 2024-12-06 ENCOUNTER — PATIENT MESSAGE (OUTPATIENT)
Dept: HEMATOLOGY/ONCOLOGY | Facility: CLINIC | Age: 59
End: 2024-12-06
Payer: COMMERCIAL

## 2024-12-09 ENCOUNTER — TELEPHONE (OUTPATIENT)
Dept: HEMATOLOGY/ONCOLOGY | Facility: CLINIC | Age: 59
End: 2024-12-09
Payer: COMMERCIAL

## 2024-12-11 ENCOUNTER — TELEPHONE (OUTPATIENT)
Dept: HEMATOLOGY/ONCOLOGY | Facility: CLINIC | Age: 59
End: 2024-12-11
Payer: COMMERCIAL

## 2024-12-18 ENCOUNTER — TELEPHONE (OUTPATIENT)
Dept: HEMATOLOGY/ONCOLOGY | Facility: CLINIC | Age: 59
End: 2024-12-18
Payer: COMMERCIAL

## 2024-12-18 NOTE — TELEPHONE ENCOUNTER
----- Message from Shavon sent at 12/18/2024 12:50 PM CST -----  Janey with pt assistance program with [employer] for Keytruda would like a call back, she is asking if they need to disregard clinical intake work sheet because it is missing DX code.     [phone]    Case # [id number]

## 2024-12-30 ENCOUNTER — TELEPHONE (OUTPATIENT)
Dept: HEMATOLOGY/ONCOLOGY | Facility: CLINIC | Age: 59
End: 2024-12-30
Payer: COMMERCIAL

## 2025-01-06 ENCOUNTER — OFFICE VISIT (OUTPATIENT)
Dept: HEMATOLOGY/ONCOLOGY | Facility: CLINIC | Age: 60
End: 2025-01-06
Payer: COMMERCIAL

## 2025-01-06 ENCOUNTER — LAB VISIT (OUTPATIENT)
Dept: LAB | Facility: HOSPITAL | Age: 60
End: 2025-01-06
Attending: INTERNAL MEDICINE
Payer: COMMERCIAL

## 2025-01-06 VITALS
WEIGHT: 234.81 LBS | TEMPERATURE: 98 F | DIASTOLIC BLOOD PRESSURE: 76 MMHG | BODY MASS INDEX: 30.15 KG/M2 | RESPIRATION RATE: 17 BRPM | SYSTOLIC BLOOD PRESSURE: 152 MMHG | HEART RATE: 75 BPM

## 2025-01-06 DIAGNOSIS — C77.2 METASTASIS TO RETROPERITONEAL LYMPH NODE: ICD-10-CM

## 2025-01-06 DIAGNOSIS — C18.4 MALIGNANT NEOPLASM OF TRANSVERSE COLON: Primary | ICD-10-CM

## 2025-01-06 DIAGNOSIS — C78.89 METASTATIC ADENOCARCINOMA TO PANCREAS: ICD-10-CM

## 2025-01-06 DIAGNOSIS — I10 PRIMARY HYPERTENSION: ICD-10-CM

## 2025-01-06 DIAGNOSIS — C18.9 METASTATIC COLON CANCER TO LIVER: ICD-10-CM

## 2025-01-06 DIAGNOSIS — E87.5 HYPERKALEMIA: ICD-10-CM

## 2025-01-06 DIAGNOSIS — C18.4 MALIGNANT NEOPLASM OF TRANSVERSE COLON: ICD-10-CM

## 2025-01-06 DIAGNOSIS — C78.7 METASTATIC COLON CANCER TO LIVER: ICD-10-CM

## 2025-01-06 DIAGNOSIS — E11.00 TYPE 2 DIABETES MELLITUS WITH HYPEROSMOLARITY WITHOUT COMA, WITHOUT LONG-TERM CURRENT USE OF INSULIN: ICD-10-CM

## 2025-01-06 LAB
ALBUMIN SERPL BCP-MCNC: 3.9 G/DL (ref 3.5–5.2)
ALP SERPL-CCNC: 77 U/L (ref 40–150)
ALT SERPL W/O P-5'-P-CCNC: 15 U/L (ref 10–44)
ANION GAP SERPL CALC-SCNC: 8 MMOL/L (ref 8–16)
AST SERPL-CCNC: 15 U/L (ref 10–40)
BASOPHILS # BLD AUTO: 0.12 K/UL (ref 0–0.2)
BASOPHILS NFR BLD: 1.1 % (ref 0–1.9)
BILIRUB SERPL-MCNC: 0.6 MG/DL (ref 0.1–1)
BUN SERPL-MCNC: 14 MG/DL (ref 6–20)
CALCIUM SERPL-MCNC: 9.4 MG/DL (ref 8.7–10.5)
CHLORIDE SERPL-SCNC: 106 MMOL/L (ref 95–110)
CO2 SERPL-SCNC: 26 MMOL/L (ref 23–29)
CREAT SERPL-MCNC: 1.2 MG/DL (ref 0.5–1.4)
DIFFERENTIAL METHOD BLD: ABNORMAL
EOSINOPHIL # BLD AUTO: 0.2 K/UL (ref 0–0.5)
EOSINOPHIL NFR BLD: 1.4 % (ref 0–8)
ERYTHROCYTE [DISTWIDTH] IN BLOOD BY AUTOMATED COUNT: 14.1 % (ref 11.5–14.5)
EST. GFR  (NO RACE VARIABLE): >60 ML/MIN/1.73 M^2
GLUCOSE SERPL-MCNC: 98 MG/DL (ref 70–110)
HCT VFR BLD AUTO: 36.7 % (ref 40–54)
HGB BLD-MCNC: 11.7 G/DL (ref 14–18)
IMM GRANULOCYTES # BLD AUTO: 0.03 K/UL (ref 0–0.04)
IMM GRANULOCYTES NFR BLD AUTO: 0.3 % (ref 0–0.5)
LYMPHOCYTES # BLD AUTO: 6 K/UL (ref 1–4.8)
LYMPHOCYTES NFR BLD: 55 % (ref 18–48)
MCH RBC QN AUTO: 27 PG (ref 27–31)
MCHC RBC AUTO-ENTMCNC: 31.9 G/DL (ref 32–36)
MCV RBC AUTO: 85 FL (ref 82–98)
MONOCYTES # BLD AUTO: 1.1 K/UL (ref 0.3–1)
MONOCYTES NFR BLD: 9.9 % (ref 4–15)
NEUTROPHILS # BLD AUTO: 3.5 K/UL (ref 1.8–7.7)
NEUTROPHILS NFR BLD: 32.3 % (ref 38–73)
NRBC BLD-RTO: 0 /100 WBC
PLATELET # BLD AUTO: 376 K/UL (ref 150–450)
PMV BLD AUTO: 9.2 FL (ref 9.2–12.9)
POTASSIUM SERPL-SCNC: 5.2 MMOL/L (ref 3.5–5.1)
PROT SERPL-MCNC: 7.1 G/DL (ref 6–8.4)
RBC # BLD AUTO: 4.33 M/UL (ref 4.6–6.2)
SODIUM SERPL-SCNC: 140 MMOL/L (ref 136–145)
WBC # BLD AUTO: 10.92 K/UL (ref 3.9–12.7)

## 2025-01-06 PROCEDURE — 99215 OFFICE O/P EST HI 40 MIN: CPT | Mod: S$GLB,,, | Performed by: INTERNAL MEDICINE

## 2025-01-06 PROCEDURE — G2211 COMPLEX E/M VISIT ADD ON: HCPCS | Mod: S$GLB,,, | Performed by: INTERNAL MEDICINE

## 2025-01-06 PROCEDURE — 36415 COLL VENOUS BLD VENIPUNCTURE: CPT | Performed by: INTERNAL MEDICINE

## 2025-01-06 PROCEDURE — 99999 PR PBB SHADOW E&M-EST. PATIENT-LVL IV: CPT | Mod: PBBFAC,,, | Performed by: INTERNAL MEDICINE

## 2025-01-06 PROCEDURE — 85025 COMPLETE CBC W/AUTO DIFF WBC: CPT | Performed by: INTERNAL MEDICINE

## 2025-01-06 PROCEDURE — 80053 COMPREHEN METABOLIC PANEL: CPT | Performed by: INTERNAL MEDICINE

## 2025-01-06 RX ORDER — HEPARIN 100 UNIT/ML
500 SYRINGE INTRAVENOUS
Status: CANCELLED | OUTPATIENT
Start: 2025-01-07

## 2025-01-06 RX ORDER — EPINEPHRINE 0.3 MG/.3ML
0.3 INJECTION SUBCUTANEOUS ONCE AS NEEDED
Status: CANCELLED | OUTPATIENT
Start: 2025-01-07

## 2025-01-06 RX ORDER — SODIUM CHLORIDE 0.9 % (FLUSH) 0.9 %
10 SYRINGE (ML) INJECTION
Status: CANCELLED | OUTPATIENT
Start: 2025-01-07

## 2025-01-06 RX ORDER — DIPHENHYDRAMINE HYDROCHLORIDE 50 MG/ML
50 INJECTION INTRAMUSCULAR; INTRAVENOUS ONCE AS NEEDED
Status: CANCELLED | OUTPATIENT
Start: 2025-01-07

## 2025-01-06 NOTE — PROGRESS NOTES
Service Date:  1/6/25    Chief Complaint:  Colon cancer    Osman Li Jr. is a 59 y.o. 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  This occurred very shortly after completing treatment.    Patient was part of a clinical trial to measure CT DNA.  His CT DNA started to go up while he was on FOLFOX, which prompted the scan, which showed the progression.  Next generation sequencing also showed a high tumor burden.    He is now on Keytruda as his cancer has a very high tumor mutation burden.  So far, Keytruda has been helping    Here for Keytruda.  Doing well.  No new complaints to me.      Review of Systems   Constitutional:  Positive for fatigue. Negative for appetite change and unexpected weight change.   HENT: Negative.  Negative for mouth sores.    Eyes: Negative.  Negative for visual disturbance.   Respiratory: Negative.  Negative for cough and shortness of breath.    Cardiovascular: Negative.  Negative for chest pain.   Gastrointestinal: Negative.  Negative for abdominal pain and diarrhea.   Endocrine: Negative.    Genitourinary: Negative.  Negative for frequency.   Musculoskeletal: Negative.  Negative for back pain.   Integumentary:  Negative for rash. Negative.   Neurological:  Positive for numbness. Negative for headaches.   Hematological: Negative.  Negative for adenopathy.   Psychiatric/Behavioral: Negative.  The patient is not nervous/anxious.         Current Outpatient Medications   Medication Instructions    atorvastatin (LIPITOR) 20 mg, Oral, Nightly    enalapril (VASOTEC) 10 mg, Oral, Daily    gabapentin (NEURONTIN) 300 mg, Oral, 3 times daily    insulin glargine U-300 conc (TOUJEO MAX U-300 SOLOSTAR) 48 Units, Subcutaneous, Daily, (Discard pen 56 days after initial use)    metFORMIN (GLUCOPHAGE) 1,000 mg, Oral, 2 times daily with meals    oxyCODONE (ROXICODONE) 5 mg, Oral, Every 4 hours PRN    pen  "needle, diabetic (BD ULTRA-FINE MINI PEN NEEDLE) 31 gauge x 3/16" Ndle 1 each, Misc.(Non-Drug; Combo Route), Daily    promethazine (PHENERGAN) 12.5 mg, Oral, Every 4 hours PRN    sildenafiL (VIAGRA) 100 mg, Oral, Daily PRN    traZODone (DESYREL) 50 mg, Oral, Nightly        Past Medical History:   Diagnosis Date    Colon cancer     Digestive disorder     DM (diabetes mellitus)     Hyperlipidemia     Hypertension     Prediabetes         Past Surgical History:   Procedure Laterality Date    ADENOIDECTOMY  1972    ANASTOMOSIS OF ILEUM TO RECTUM N/A 5/21/2024    Procedure: ANASTOMOSIS, ILEORECTAL;  Surgeon: Farhan Lance MD;  [facility];  Service: Colon and Rectal;  Laterality: N/A;    CHOLECYSTECTOMY  2011    CLOSURE, COLOSTOMY N/A 5/13/2024    Procedure: CLOSURE, COLOSTOMY (eras high/lithotomy);  Surgeon: Farhan Lance MD;  [facility];  Service: Colon and Rectal;  Laterality: N/A;  CONSENTS IN [facility]    CLOSURE, ILEOSTOMY, LOOP N/A 10/7/2024    Procedure: CLOSURE, ILEOSTOMY, LOOP;  Surgeon: Farhan Lance MD;  [facility];  Service: Colon and Rectal;  Laterality: N/A;    COLON SURGERY      COLONOSCOPY      2018    COLONOSCOPY N/A 3/5/2024    Procedure: COLONOSCOPY;  Surgeon: Farhan Lance MD;  Location: [facility] (4TH FLR);  Service: Endoscopy;  Laterality: N/A;  2/27/24-[place] pt, 1-4wks, port, PEG plus 2 enemas morning of procedure, instr portal-DS  2/28/24- LVM for precall - ERW  pt confirmed procedure. cf    COLOSTOMY N/A 04/25/2022    Procedure: CREATION, COLOSTOMY;  Surgeon: Carlton Waddell MD;  Location: [facility] OR;  Service: General;  Laterality: N/A;    ENDOSCOPIC ULTRASOUND OF UPPER GASTROINTESTINAL TRACT N/A 01/06/2023    Procedure: ULTRASOUND, UPPER GI TRACT, ENDOSCOPIC;  Surgeon: Rinku Orozco III, MD;  Location: [facility];  Service: Endoscopy;  Laterality: N/A;    FLEXIBLE SIGMOIDOSCOPY N/A 5/13/2024    Procedure: SIGMOIDOSCOPY, " FLEXIBLE;  Surgeon: Farhan Lance MD;  Location: [facility] OR 2ND FLR;  Service: Colon and Rectal;  Laterality: N/A;    FLEXIBLE SIGMOIDOSCOPY  5/21/2024    Procedure: SIGMOIDOSCOPY, FLEXIBLE;  Surgeon: Farhan Lance MD;  Location: [facility] OR 2ND FLR;  Service: Colon and Rectal;;    FLEXIBLE SIGMOIDOSCOPY N/A 10/7/2024    Procedure: SIGMOIDOSCOPY, FLEXIBLE;  Surgeon: Farhan Lance MD;  Location: [facility] OR 2ND FLR;  Service: Colon and Rectal;  Laterality: N/A;    ILEOSTOMY N/A 5/21/2024    Procedure: CREATION, ILEOSTOMY, DIVERTING LOOP;  Surgeon: Farhan Lance MD;  Location: [facility] OR 2ND FLR;  Service: Colon and Rectal;  Laterality: N/A;    INSERTION OF TUNNELED CENTRAL VENOUS CATHETER (CVC) WITH SUBCUTANEOUS PORT Right 05/18/2022    Procedure: [id number];  Surgeon: Carlton Waddell MD;  Location: [facility] OR;  Service: General;  Laterality: Right;    INSERTION OF URETERAL CATHETER N/A 5/13/2024    Procedure: INSERTION, CATHETER, URETER, BILATERAL;  Surgeon: Travis Edwards MD;  [facility] 2ND FLR;  Service: Urology;  Laterality: N/A;    INSERTION OF URETERAL CATHETER Left 5/21/2024    Procedure: INSERTION, CATHETER, URETER;  Surgeon: Carlton Santos MD;  Location: [facility] OR 2ND FLR;  Service: Urology;  Laterality: Left;    LAPAROTOMY, EXPLORATORY N/A 5/21/2024    Procedure: LAPAROTOMY, EXPLORATORY;  Surgeon: Farhan Lance MD;  Location: [facility] OR 2ND FLR;  Service: Colon and Rectal;  Laterality: N/A;    LYSIS OF ADHESIONS N/A 5/13/2024    Procedure: LYSIS, ADHESIONS;  Surgeon: Farhan Lance MD;  Location: [facility] OR 2ND FLR;  Service: Colon and Rectal;  Laterality: N/A;    MOBILIZATION OF SPLENIC FLEXURE N/A 04/25/2022    Procedure: MOBILIZATION, SPLENIC FLEXURE;  Surgeon: Carlton Waddell MD;  Location: [facility] OR;  Service: General;  Laterality: N/A;    SPLENECTOMY N/A 04/25/2022    Procedure: SPLENECTOMY;  Surgeon: Carlton Waddell MD;  [facility];  Service: General;   Laterality: N/A;    SUBTOTAL COLECTOMY N/A 2022    Procedure: COLECTOMY, PARTIAL;  Surgeon: Carlton Waddell MD;  Location: [facility] OR;  Service: General;  Laterality: N/A;    TONSILLECTOMY      TOTAL ABDOMINAL COLECTOMY N/A 2024    Procedure: COLECTOMY, COMPLETION, ABDOMINAL;  Surgeon: Farhan Lance MD;  [facility];  Service: Colon and Rectal;  Laterality: N/A;    TUMOR REMOVAL  10/18/2023    on top of the nose    VASECTOMY          Family History   Problem Relation Name Age of Onset    Heart disease Father Loki senior     Diabetes Father Loki senior     Alcohol abuse Paternal Grandfather Waylon Li     Rectal cancer Other Makenzie         half-sister    Cancer Sister makenzie        Social History     Tobacco Use    Smoking status: Former     Current packs/day: 0.00     Average packs/day: 1 pack/day for 40.0 years (40.0 ttl pk-yrs)     Types: Cigarettes     Start date: 1984     Quit date: 2024     Years since quittin.6     Passive exposure: Current    Smokeless tobacco: Never   Substance Use Topics    Alcohol use: Not Currently    Drug use: Never         Vitals:    25 0931   BP: (!) 152/76   Pulse: 75   Resp: 17   Temp: 97.9 °F (36.6 °C)          Physical Exam:  BP (!) 152/76   Pulse 75   Temp 97.9 °F (36.6 °C)   Resp 17   Wt 106.5 kg (234 lb 12.6 oz)   BMI 30.15 kg/m²     Physical Exam  Vitals and nursing note reviewed.   Constitutional:       Appearance: Normal appearance.   HENT:      Head: Normocephalic and atraumatic.      Nose: Nose normal.      Mouth/Throat:      Mouth: Mucous membranes are moist.      Pharynx: Oropharynx is clear.   Eyes:      Extraocular Movements: Extraocular movements intact.      Conjunctiva/sclera: Conjunctivae normal.   Cardiovascular:      Rate and Rhythm: Normal rate and regular rhythm.      Heart sounds: Normal heart sounds.   Pulmonary:      Effort: Pulmonary effort is normal.      Breath sounds: Normal breath sounds.    Abdominal:      General: Abdomen is flat. Bowel sounds are normal.      Palpations: Abdomen is soft.      Comments: Ostomy bag in place.   Musculoskeletal:         General: Normal range of motion.      Cervical back: Normal range of motion and neck supple.   Skin:     General: Skin is warm and dry.   Neurological:      General: No focal deficit present.      Mental Status: He is alert and oriented to person, place, and time. Mental status is at baseline.   Psychiatric:         Mood and Affect: Mood normal.          Labs:  Lab Results   Component Value Date    WBC 10.92 01/06/2025    RBC 4.33 (L) 01/06/2025    HGB 11.7 (L) 01/06/2025    HCT 36.7 (L) 01/06/2025    MCV 85 01/06/2025    MCH 27.0 01/06/2025    MCHC 31.9 (L) 01/06/2025    RDW 14.1 01/06/2025     01/06/2025    MPV 9.2 01/06/2025    GRAN 3.5 01/06/2025    GRAN 32.3 (L) 01/06/2025    LYMPH 6.0 (H) 01/06/2025    LYMPH 55.0 (H) 01/06/2025    MONO 1.1 (H) 01/06/2025    MONO 9.9 01/06/2025    EOS 0.2 01/06/2025    BASO 0.12 01/06/2025    EOSINOPHIL 1.4 01/06/2025    BASOPHIL 1.1 01/06/2025     Sodium   Date Value Ref Range Status   01/06/2025 140 136 - 145 mmol/L Final     Potassium   Date Value Ref Range Status   01/06/2025 5.2 (H) 3.5 - 5.1 mmol/L Final     Chloride   Date Value Ref Range Status   01/06/2025 106 95 - 110 mmol/L Final     CO2   Date Value Ref Range Status   01/06/2025 26 23 - 29 mmol/L Final     Glucose   Date Value Ref Range Status   01/06/2025 98 70 - 110 mg/dL Final     BUN   Date Value Ref Range Status   01/06/2025 14 6 - 20 mg/dL Final     Creatinine   Date Value Ref Range Status   01/06/2025 1.2 0.5 - 1.4 mg/dL Final     Calcium   Date Value Ref Range Status   01/06/2025 9.4 8.7 - 10.5 mg/dL Final     Total Protein   Date Value Ref Range Status   01/06/2025 7.1 6.0 - 8.4 g/dL Final     Albumin   Date Value Ref Range Status   01/06/2025 3.9 3.5 - 5.2 g/dL Final     Total Bilirubin   Date Value Ref Range Status   01/06/2025 0.6 0.1  - 1.0 mg/dL Final     Comment:     For infants and newborns, interpretation of results should be based  on gestational age, weight and in agreement with clinical  observations.    Premature Infant recommended reference ranges:  Up to 24 hours.............<8.0 mg/dL  Up to 48 hours............<12.0 mg/dL  3-5 days..................<15.0 mg/dL  6-29 days.................<15.0 mg/dL       Alkaline Phosphatase   Date Value Ref Range Status   01/06/2025 77 40 - 150 U/L Final     AST   Date Value Ref Range Status   01/06/2025 15 10 - 40 U/L Final     ALT   Date Value Ref Range Status   01/06/2025 15 10 - 44 U/L Final     Anion Gap   Date Value Ref Range Status   01/06/2025 8 8 - 16 mmol/L Final     eGFR if    Date Value Ref Range Status   07/25/2022 >60 >60 mL/min/1.73 m^2 Final     eGFR if non    Date Value Ref Range Status   07/25/2022 >60 >60 mL/min/1.73 m^2 Final     Comment:     Calculation used to obtain the estimated glomerular filtration  rate (eGFR) is the CKD-EPI equation.          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    -Testing for ctDNA negative again on repeat!. Repeat scans ordered today  -ok for Keytruda  -Return to clinic in 6 weeks for next.    Back pain/pain from stoma  - NM bone scan negative for mets  -now on morphine dose to 30 mg bid    HTN  - on Enalapril  - follow up with PCP    HLP  - follow up with PCP    DM2  - on Metformin  - follow up with PCP    Tobacco use  - counseled on cessation      Aurash Khoobehi, MD  Hematology and Oncology    Visit  today included increased complexity associated with the care of the episodic problem colon cancer addressed and managing the longitudinal care of the patient due to the serious and/or complex managed problem(s).

## 2025-01-07 ENCOUNTER — INFUSION (OUTPATIENT)
Dept: INFUSION THERAPY | Facility: HOSPITAL | Age: 60
End: 2025-01-07
Attending: INTERNAL MEDICINE
Payer: COMMERCIAL

## 2025-01-07 VITALS
RESPIRATION RATE: 18 BRPM | TEMPERATURE: 98 F | SYSTOLIC BLOOD PRESSURE: 156 MMHG | OXYGEN SATURATION: 99 % | DIASTOLIC BLOOD PRESSURE: 82 MMHG | WEIGHT: 234.88 LBS | HEART RATE: 67 BPM | BODY MASS INDEX: 30.14 KG/M2 | HEIGHT: 74 IN

## 2025-01-07 DIAGNOSIS — E03.2 DRUG-INDUCED HYPOTHYROIDISM: ICD-10-CM

## 2025-01-07 DIAGNOSIS — C18.4 MALIGNANT NEOPLASM OF TRANSVERSE COLON: Primary | ICD-10-CM

## 2025-01-07 DIAGNOSIS — C78.89 METASTATIC ADENOCARCINOMA TO PANCREAS: ICD-10-CM

## 2025-01-07 LAB — TSH SERPL DL<=0.005 MIU/L-ACNC: 1.92 UIU/ML (ref 0.34–5.6)

## 2025-01-07 PROCEDURE — 96413 CHEMO IV INFUSION 1 HR: CPT

## 2025-01-07 PROCEDURE — 25000003 PHARM REV CODE 250: Performed by: INTERNAL MEDICINE

## 2025-01-07 PROCEDURE — 84443 ASSAY THYROID STIM HORMONE: CPT | Performed by: INTERNAL MEDICINE

## 2025-01-07 PROCEDURE — 63600175 PHARM REV CODE 636 W HCPCS: Performed by: INTERNAL MEDICINE

## 2025-01-07 PROCEDURE — A4216 STERILE WATER/SALINE, 10 ML: HCPCS | Performed by: INTERNAL MEDICINE

## 2025-01-07 RX ORDER — SODIUM CHLORIDE 0.9 % (FLUSH) 0.9 %
10 SYRINGE (ML) INJECTION
Status: DISCONTINUED | OUTPATIENT
Start: 2025-01-07 | End: 2025-01-07 | Stop reason: HOSPADM

## 2025-01-07 RX ORDER — EPINEPHRINE 0.3 MG/.3ML
0.3 INJECTION SUBCUTANEOUS ONCE AS NEEDED
Status: DISCONTINUED | OUTPATIENT
Start: 2025-01-07 | End: 2025-01-07 | Stop reason: HOSPADM

## 2025-01-07 RX ORDER — HEPARIN 100 UNIT/ML
500 SYRINGE INTRAVENOUS
Status: DISCONTINUED | OUTPATIENT
Start: 2025-01-07 | End: 2025-01-07 | Stop reason: HOSPADM

## 2025-01-07 RX ORDER — DIPHENHYDRAMINE HYDROCHLORIDE 50 MG/ML
50 INJECTION INTRAMUSCULAR; INTRAVENOUS ONCE AS NEEDED
Status: DISCONTINUED | OUTPATIENT
Start: 2025-01-07 | End: 2025-01-07 | Stop reason: HOSPADM

## 2025-01-07 RX ADMIN — Medication 10 ML: at 09:01

## 2025-01-07 RX ADMIN — SODIUM CHLORIDE 400 MG: 9 INJECTION, SOLUTION INTRAVENOUS at 08:01

## 2025-01-07 RX ADMIN — HEPARIN 500 UNITS: 100 SYRINGE at 09:01

## 2025-01-07 NOTE — PLAN OF CARE
Problem: Fatigue  Goal: Improved Activity Tolerance  Outcome: Progressing  Intervention: Promote Improved Energy  Flowsheets (Taken 1/7/2025 0049)  Fatigue Management: frequent rest breaks encouraged  Sleep/Rest Enhancement: regular sleep/rest pattern promoted  Activity Management:   Ambulated -L4   Up in chair - L3  Environmental Support:   calm environment promoted   rest periods encouraged

## 2025-01-09 NOTE — PROGRESS NOTES
[facility]  Colon and Rectal Surgery    [address]  [place], [place]  Tel: [phone]  Fax: [phone]  [email or web address]   MD Payam Reid MD Brian Kann, MD W. Forrest Johnston, MD Matthew Giglia, MD Jennifer Paruch, MD William Kethman, MD Danielle Kay, MD     Patient name: Loki Li Jr.   YOB: 1965   MRN: [id number]    Dear Carissa Goodwin and Khoobehi,    It was a pleasure seeing Mr. Li in the Colon and Rectal Surgery clinic here at [facility].     As you know, Mr. Li is a 59 year old man with a history of HTN, HLD, and DM, Stage IV transverse colon adenocarcinoma who presents for evaluation of colostomy takedown . He underwent a splenic flexure resection, end colostomy and splenectomy with Dr. Waddell > FOLFOX > progression (pancreatic tail - biopsy proven and RPLN PET avidity) > Keytruda since 1/2023. He was seen by Dr. Goodwin in 1/2024 (note reviewed) - his plan was to follow-up the results of his PET CT, decision was made to complete Keytruda and continue surveillance - if recurrence occurs then move forward with resection. He is doing well - fatigue only around the time of Keytruda for about 1 week after, he denies any nausea, vomiting, fevers, or chills. His weight is stable but about 30 lbs less than originally. He denies fecal incontinence.     PET CT - 1/8/2024  Positive therapy response with resolution of the metastatic retroperitoneal lymph node compared to the prior examination.  No FDG avid foci are present on today's exam.     Last colonoscopy: 2018 (Complete) - repeat in 5 years  Two sessile polyps 5-6 mm in descending colon, polypectomy performed (HP)  Two sessile polyps 5-7 mm in the sigmoid colon, polypectomy performed (HP)  18 diminutive recto sigmoid polyps were ablated    4/19/2024  Here for pre-operative consultation for colostomy takedown and likely distal pancreatectomy. He is doing  well - no major changes since the last time I saw him. He is ready for surgery.    Colonoscopy - 3/5/2024  The perianal and digital rectal examinations were normal. Pertinent negatives include normal sphincter tone.   Diffuse mild inflammation characterized by altered vascularity, friability and mucus was found in the rectum, in the sigmoid colon and in the descending colon. No biopsies or other specimens were collected for this exam. Estimated blood loss was minimal.   There was evidence of a widely patent end colostomy in the transverse colon. This was characterized by healthy appearing mucosa.   The exam was otherwise without abnormality.     6/28/2024  Here for follow-up, underwent open colostomy takedown and repair of parastomal hernia on 5/13/2024 complicated by what was felt to be an internal hernia requiring emergent take back, found to have a small anastomotic complication requiring completion colectomy, ileorectal anastomosis and DLI on 5/21/2024. He is doing well - no nausea or vomiting. Some mild pain in bilateral abdomen but ostomy is functioning. He denies any fevers or chills.     5/13/2024 - Pathology  1. Ostomy with peristomal hernia, excision:   Portion of colostomy with no evidence of neoplastic change.   Fibromembranous tissue, consistent with hernia sac.     2. Descending colon, excision:   Segment of colon with focal necrosis.   Resection margins appear viable.   No dysplasia or malignancy.     5/21/2024 - Pathology  1. TOTAL ABDOMINAL COLON, COLECTOMY:   Abdominal perforation with peritonitis adjacent to an anastomosis   Appendix with reactive changes of mesopappendix   Negative for neoplasia or malignancy     2. SIGMOID COLON, RESECTION:Portion of colon with features of acute peritonitis     3. ANASTOMOTIC DONUTS:   Portion of small bowel with ulceration   A separate portion of colon with acute inflammatory and reactive changes     8/9/2024  Here for follow-up, seen by Dr. Koobehi for  Keytruda. He is doing well - still not smoking. He is not having any major issues - CT was reassuring. He does have mild prolapse of his ileostomy.    CT AP - 7/5/2024  The ileo rectal anastomosis appears intact. No extraluminal contrast to suggest anastomotic leak. No bowel obstruction. Visualized loops of small bowel are normal in caliber without wall thickening.     History of colonic adenocarcinoma status post colectomy with ileorectal anastomosis and diverting loop ileostomy as well as splenectomy.     No evidence to suggest local recurrence or distant abdominopelvic metastasis.     Persistent fluid/peritoneal thickening left upper quadrant similar/improved compared to prior.    11/15/2024  Follow-up after ileostomy takedown on 10/7/2024, here for post-operative follow-up. He is doing better - having somewhere between 10-20 bowel movements per day, small volume but it is improving. He is controlled the stools without significant issues. He is taking 10 mg of Imodium twice daily.    Ileostomy, excision:  Small intestine mucosa with underlying benign lymphoid aggregates and submucosal hemorrhage with cautery artifact consistent with area ostomy.    No evidence of dysplasia or malignancy.    1/10/2025  Here for follow-up. He is followed by Dr. Khoobehi - ctDNA negative. He is having about 8-10 times per day but it is getting some consistency. He is taking Imodium 3-4 times per day before meals - 2 tabs - about 8 total pills per day. The Benefiber makes things worse. He is not taking Lomotil. He is going to do a PET CT in February and he may stop the immunotherapy.    Oncology History   Malignant neoplasm of transverse colon   4/20/2022 Surgery    Partial colectomy, splenectomy, end colostomy - pancreas appeared to be involved at that time    1. Spleen, splenectomy:   - Benign splenic parenchyma   - Negative for malignancy   2. Colon, partial colectomy:   - Invasive colonic adenocarcinoma, poorly differentiated  (G3)     - Invades into pericolorectal tissue (pT3)   - Margins uninvolved by invasive carcinoma     - 0.1 cm to the mesenteric margin   - Lymphovascular invasion identified   - No perineural invasion identified   - Seventeen of twenty-seven lymph nodes, positive for metastatic carcinoma   (17/27, pN2b)   - Fibrous serosal adhesions   - See below for results of MSI testing   - CARLOS ENRIQUE/MILLIE Targeted Gene Panel has been ordered and results will be issued in   a supplemental report     CAP Synoptic Checklist for Colonic Neoplasms:     - Procedure: Partial colectomy     - Tumor Site: Splenic flexure     - Histologic Type: Adenocarcinoma     - Histologic Grade: G3, poorly differentiated     - Tumor Size: 5.0 x 4.5 x 2.7 cm     - Tumor Extent: Invades through muscularis propria into pericolorectal   tissue    - Macroscopic Tumor Perforation: Not identified     - Lymphovascular Invasion: Present, small vessel involved     - Perineural Invasion: Not identified     - Number of Tumor Buds: 5 in one hotspot field     - Tumor Bud Score: Intermediate (5-9)     - Type of Polyp in which Invasive Carcinoma Arose: None identified     - Treatment Effect: No known presurgical therapy     - Margins: All margins negative for invasive carcinoma       - Closest Margin to Invasive Carcinoma: 0.1 cm to mesenteric margin     - Regional Lymph Nodes:       - Number of Lymph Nodes with Tumor: 17       - Number of Lymph Nodes Examined: 27       - Tumor Deposits: Not identified     - Distant Metastasis: Not applicable     - Pathologic Stage Classification: pT3 pN2b     - Additional Findings: Fibrous serosal adhesions; benign spleen     - Special Studies: See below for results of MSI testing; CARLOS ENRIQUE/MILLIE panel has         been ordered and results will be issued in a supplemental report     - Designate Block for Future Studies: [id number]   Immunohistochemistry (IHC) Testing for Mismatch Repair (MMR) Proteins:   MLH1 - Intact nuclear expression   MSH2 -  Intact nuclear expression   MSH6 - Intact nuclear expression   PMS2 - Intact nuclear expression      5/5/2022 Initial Diagnosis    Malignant neoplasm of transverse colon     7/12/2022 - 12/14/2022 Chemotherapy    Treatment Summary   Plan Name: OP mFOLFOX 6 Q2W  Treatment Goal: Curative  Status: Inactive  Start Date: 7/12/2022  End Date: 12/14/2022  Provider: Aurash Khoobehi, MD  Chemotherapy: fluorouraciL injection 930 mg, 400 mg/m2 = 930 mg, Intravenous, Clinic/HOD 1 time, 12 of 12 cycles  Administration: 930 mg (7/12/2022), 930 mg (7/26/2022), 930 mg (8/9/2022), 930 mg (8/23/2022), 930 mg (9/6/2022), 930 mg (9/19/2022), 835 mg (10/3/2022), 835 mg (10/17/2022), 825 mg (10/31/2022), 820 mg (11/14/2022), 830 mg (11/28/2022), 825 mg (12/12/2022)  oxaliplatin (ELOXATIN) 85 mg/m2 = 197 mg in dextrose 5 % 539.4 mL chemo infusion, 85 mg/m2 = 197 mg, Intravenous, Clinic/HOD 1 time, 10 of 10 cycles  Dose modification: 68 mg/m2 (original dose 85 mg/m2, Cycle 8, Reason: MD Discretion)  Administration: 197 mg (7/12/2022), 197 mg (7/26/2022), 197 mg (8/9/2022), 197 mg (8/23/2022), 197 mg (9/6/2022), 197 mg (9/19/2022), 178 mg (10/3/2022), 142 mg (10/17/2022), 139 mg (11/14/2022)     2/13/2023 -  Chemotherapy    Treatment Summary   Plan Name: OP PEMBROLIZUMAB 400MG Q6W  Treatment Goal: Curative  Status: Active  Start Date: 2/13/2023  End Date: 10/28/2025 (Planned)  Provider: Aurash Khoobehi, MD  Chemotherapy: [No matching medication found in this treatment plan]     2/26/2024 Cancer Staged    Staging form: Colon and Rectum, AJCC 8th Edition  - Pathologic: Stage IVB (pT3, pN2b, cM1b)     Metastatic adenocarcinoma to pancreas   1/13/2023 Initial Diagnosis    Metastatic adenocarcinoma to pancreas     2/13/2023 -  Chemotherapy    Treatment Summary   Plan Name: OP PEMBROLIZUMAB 400MG Q6W  Treatment Goal: Curative  Status: Active  Start Date: 2/13/2023  End Date: 10/28/2025 (Planned)  Provider: Aurash Khoobehi, MD  Chemotherapy: [No  matching medication found in this treatment plan]         The patient was informed of the availability of a certified  without charge. A certified  was not necessary for this visit.    Review of Systems  See pertinent review of systems above    Past Medical History:   Diagnosis Date    Colon cancer     Digestive disorder     DM (diabetes mellitus)     Hyperlipidemia     Hypertension     Prediabetes      Past Surgical History:   Procedure Laterality Date    ADENOIDECTOMY  1972    ANASTOMOSIS OF ILEUM TO RECTUM N/A 5/21/2024    Procedure: ANASTOMOSIS, ILEORECTAL;  Surgeon: Farhan Lance MD;  [facility];  Service: Colon and Rectal;  Laterality: N/A;    CHOLECYSTECTOMY  2011    CLOSURE, COLOSTOMY N/A 5/13/2024    Procedure: CLOSURE, COLOSTOMY (eras high/lithotomy);  Surgeon: Farhan Lance MD;  [facility];  Service: Colon and Rectal;  Laterality: N/A;  CONSENTS IN [facility]    CLOSURE, ILEOSTOMY, LOOP N/A 10/7/2024    Procedure: CLOSURE, ILEOSTOMY, LOOP;  Surgeon: Farhan Lance MD;  [facility];  Service: Colon and Rectal;  Laterality: N/A;    COLON SURGERY      COLONOSCOPY      2018    COLONOSCOPY N/A 3/5/2024    Procedure: COLONOSCOPY;  Surgeon: Farhan Lance MD;  Location: [facility] (4TH FLR);  Service: Endoscopy;  Laterality: N/A;  2/27/24-Kethman pt, 1-4wks, port, PEG plus 2 enemas morning of procedure, instr portal-DS  2/28/24- LVM for precall - ERW  pt confirmed procedure. cf    COLOSTOMY N/A 04/25/2022    Procedure: CREATION, COLOSTOMY;  Surgeon: Carlton Waddell MD;  Location: [facility];  Service: General;  Laterality: N/A;    ENDOSCOPIC ULTRASOUND OF UPPER GASTROINTESTINAL TRACT N/A 01/06/2023    Procedure: ULTRASOUND, UPPER GI TRACT, ENDOSCOPIC;  Surgeon: Rinku Orozco III, MD;  Location: [facility];  Service: Endoscopy;  Laterality: N/A;    FLEXIBLE SIGMOIDOSCOPY N/A 5/13/2024    Procedure: SIGMOIDOSCOPY,  FLEXIBLE;  Surgeon: Farhan Lance MD;  Location: [facility] OR 2ND FLR;  Service: Colon and Rectal;  Laterality: N/A;    FLEXIBLE SIGMOIDOSCOPY  5/21/2024    Procedure: SIGMOIDOSCOPY, FLEXIBLE;  Surgeon: Farhan Lance MD;  Location: [facility] OR 2ND FLR;  Service: Colon and Rectal;;    FLEXIBLE SIGMOIDOSCOPY N/A 10/7/2024    Procedure: SIGMOIDOSCOPY, FLEXIBLE;  Surgeon: Farhan Lance MD;  Location: [facility] OR 2ND FLR;  Service: Colon and Rectal;  Laterality: N/A;    ILEOSTOMY N/A 5/21/2024    Procedure: CREATION, ILEOSTOMY, DIVERTING LOOP;  Surgeon: Farhan Lance MD;  Location: [facility] OR 2ND FLR;  Service: Colon and Rectal;  Laterality: N/A;    INSERTION OF TUNNELED CENTRAL VENOUS CATHETER (CVC) WITH SUBCUTANEOUS PORT Right 05/18/2022    Procedure: LNOAOXAHA-TISA-N-CATH;  Surgeon: Carlton Waddell MD;  Location: [facility] OR;  Service: General;  Laterality: Right;    INSERTION OF URETERAL CATHETER N/A 5/13/2024    Procedure: INSERTION, CATHETER, URETER, BILATERAL;  Surgeon: Travis Edwards MD;  [facility]: [facility] 2[facility] FLR;  Service: Urology;  Laterality: N/A;    INSERTION OF URETERAL CATHETER Left 5/21/2024    Procedure: INSERTION, CATHETER, URETER;  Surgeon: Carlton Santos MD;  Location: [facility] OR 2ND FLR;  Service: Urology;  Laterality: Left;    LAPAROTOMY, EXPLORATORY N/A 5/21/2024    Procedure: LAPAROTOMY, EXPLORATORY;  Surgeon: Farhan Lance MD;  Location: [facility] OR 2ND FLR;  Service: Colon and Rectal;  Laterality: N/A;    LYSIS OF ADHESIONS N/A 5/13/2024    Procedure: LYSIS, ADHESIONS;  Surgeon: Farhan Lance MD;  Location: [facility] OR 2ND FLR;  Service: Colon and Rectal;  Laterality: N/A;    MOBILIZATION OF SPLENIC FLEXURE N/A 04/25/2022    Procedure: MOBILIZATION, SPLENIC FLEXURE;  Surgeon: Carlton Waddell MD;  Location: [facility] OR;  Service: General;  Laterality: N/A;    SPLENECTOMY N/A 04/25/2022    Procedure: SPLENECTOMY;  Surgeon: Carlton Waddell MD;  Location: [facility];  Service: General;   Laterality: N/A;    SUBTOTAL COLECTOMY N/A 2022    Procedure: COLECTOMY, PARTIAL;  Surgeon: Carlton Waddell MD;  Location: [facility] OR;  Service: General;  Laterality: N/A;    TONSILLECTOMY      TOTAL ABDOMINAL COLECTOMY N/A 2024    Procedure: COLECTOMY, COMPLETION, ABDOMINAL;  Surgeon: Farhan Lance MD;  Location: [facility] OR 2ND FLR;  Service: Colon and Rectal;  Laterality: N/A;    TUMOR REMOVAL  10/18/2023    on top of the nose    VASECTOMY       Family History   Problem Relation Name Age of Onset    Heart disease Father Loki senior     Diabetes Father Loki senior     Alcohol abuse Paternal Grandfather Waylon Li     Rectal cancer Other Makenzie         half-sister    Cancer Sister makenzie      Social History     Tobacco Use    Smoking status: Former     Current packs/day: 0.00     Average packs/day: 1 pack/day for 40.0 years (40.0 ttl pk-yrs)     Types: Cigarettes     Start date: 1984     Quit date: 2024     Years since quittin.6     Passive exposure: Current    Smokeless tobacco: Never   Substance Use Topics    Alcohol use: Not Currently    Drug use: Never     Review of patient's allergies indicates:   Allergen Reactions    Penicillins Rash       Current Outpatient Medications on File Prior to Visit   Medication Sig Dispense Refill    atorvastatin (LIPITOR) 20 MG tablet Take 1 tablet (20 mg total) by mouth every evening. 90 tablet 3    enalapril (VASOTEC) 10 MG tablet Take 1 tablet (10 mg total) by mouth once daily. (Patient taking differently: Take 10 mg by mouth every morning.) 90 tablet 3    gabapentin (NEURONTIN) 300 MG capsule Take 1 capsule (300 mg total) by mouth 3 (three) times daily. 90 capsule 3    insulin glargine U-300 conc (TOUJEO MAX U-300 SOLOSTAR) 300 unit/mL (3 mL) insulin pen Inject 48 Units into the skin once daily. (Discard pen 56 days after initial use) (Patient taking differently: Inject 48 Units into the skin every morning. (Discard pen 56 days after initial use)) 2  "Pen 11    metFORMIN (GLUCOPHAGE) 1000 MG tablet Take 1 tablet (1,000 mg total) by mouth 2 (two) times daily with meals. 180 tablet 3    oxyCODONE (ROXICODONE) 5 MG immediate release tablet Take 1 tablet (5 mg total) by mouth every 4 (four) hours as needed for Pain. 20 tablet 0    pen needle, diabetic (BD ULTRA-FINE MINI PEN NEEDLE) 31 gauge x 3/16" Ndle 1 each by Misc.(Non-Drug; Combo Route) route once daily. 100 each 3    promethazine (PHENERGAN) 12.5 MG Tab Take 1 tablet (12.5 mg total) by mouth every 4 (four) hours as needed. 25 tablet 1    sildenafiL (VIAGRA) 100 MG tablet Take 1 tablet (100 mg total) by mouth daily as needed for Erectile Dysfunction. 30 tablet 3    traZODone (DESYREL) 50 MG tablet Take 1 tablet (50 mg total) by mouth every evening. 90 tablet 3     No current facility-administered medications on file prior to visit.     Physical Examination  Virtual visit    Assessment and Plan of Care    Thank you again for referring Mr. Li to my care. In summary, Mr. 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 He underwent colostomy takedown complicated by anastomotic leak requiring revision and DLI - this has now been taken down. We discussed treatment options and have provided the following recommendations:    Follow-up with Dr. Khoobehi for ongoing surveillance  Recommended stopping fiber, continue Imodium 1-2 tabs (2 mg) up to four times per day, 30 minutes before meals and at bedtime, start Lomotil TID  Follow-up with me in 2 months    Please do not hesitate to contact me if you have any questions.      Farhan Lance MD, FACS, FASCRS  Department of Colon & Rectal Surgery  [facility]              "

## 2025-01-10 ENCOUNTER — OFFICE VISIT (OUTPATIENT)
Dept: SURGERY | Facility: CLINIC | Age: 60
End: 2025-01-10
Payer: COMMERCIAL

## 2025-01-10 DIAGNOSIS — C18.5 MALIGNANT NEOPLASM OF SPLENIC FLEXURE: Primary | ICD-10-CM

## 2025-01-10 RX ORDER — DIPHENOXYLATE HYDROCHLORIDE AND ATROPINE SULFATE 2.5; .025 MG/1; MG/1
1 TABLET ORAL 3 TIMES DAILY
Qty: 90 TABLET | Refills: 3 | Status: SHIPPED | OUTPATIENT
Start: 2025-01-10 | End: 2025-02-09

## 2025-01-13 ENCOUNTER — OFFICE VISIT (OUTPATIENT)
Dept: OPHTHALMOLOGY | Facility: CLINIC | Age: 60
End: 2025-01-13
Payer: COMMERCIAL

## 2025-01-13 DIAGNOSIS — H52.13 MYOPIA, BILATERAL: ICD-10-CM

## 2025-01-13 DIAGNOSIS — E11.9 DIABETES MELLITUS TYPE 2 WITHOUT RETINOPATHY: Primary | ICD-10-CM

## 2025-01-13 PROCEDURE — 92014 COMPRE OPH EXAM EST PT 1/>: CPT | Mod: S$GLB,,, | Performed by: OPHTHALMOLOGY

## 2025-01-13 PROCEDURE — 99999 PR PBB SHADOW E&M-EST. PATIENT-LVL III: CPT | Mod: PBBFAC,,, | Performed by: OPHTHALMOLOGY

## 2025-01-13 PROCEDURE — 92015 DETERMINE REFRACTIVE STATE: CPT | Mod: S$GLB,,, | Performed by: OPHTHALMOLOGY

## 2025-01-13 NOTE — PROGRESS NOTES
HPI    DM eye exam.     Pt sates DM has been stable. States states OD feels weaker when reading.   States feels like he is reading more with OS.       Denies pain/ FOL/ floaters.         Hemoglobin A1C       Date                     Value               Ref Range             Status                10/07/2024               6.5 (H)             4.0 - 5.6 %           Final                   05/22/2024               6.5 (H)             4.0 - 5.6 %           Final                  02/22/2024               7.3 (H)             <5.7 % of tota*       Final                Last edited by Claudia Soliz on 1/13/2025  8:24 AM.            Assessment /Plan     For exam results, see Encounter Report.    Diabetes mellitus type 2 without retinopathy    Myopia, bilateral      1. Diabetes mellitus type 2 without retinopathy (Primary)  Diabetes without retinopathy, discussed with patient importance of glucose control and follow up.  Patient voices understanding.    2. Myopia, bilateral  New glasses Rx today    F/u 1 year, routine exam

## 2025-01-22 ENCOUNTER — PATIENT MESSAGE (OUTPATIENT)
Dept: HEMATOLOGY/ONCOLOGY | Facility: CLINIC | Age: 60
End: 2025-01-22
Payer: COMMERCIAL

## 2025-01-23 DIAGNOSIS — C18.4 MALIGNANT NEOPLASM OF TRANSVERSE COLON: Primary | ICD-10-CM

## 2025-02-17 ENCOUNTER — OFFICE VISIT (OUTPATIENT)
Dept: HEMATOLOGY/ONCOLOGY | Facility: CLINIC | Age: 60
End: 2025-02-17
Payer: COMMERCIAL

## 2025-02-17 ENCOUNTER — LAB VISIT (OUTPATIENT)
Dept: LAB | Facility: HOSPITAL | Age: 60
End: 2025-02-17
Attending: INTERNAL MEDICINE
Payer: COMMERCIAL

## 2025-02-17 VITALS
HEIGHT: 74 IN | WEIGHT: 238.56 LBS | HEART RATE: 76 BPM | OXYGEN SATURATION: 99 % | BODY MASS INDEX: 30.62 KG/M2 | TEMPERATURE: 98 F | DIASTOLIC BLOOD PRESSURE: 81 MMHG | RESPIRATION RATE: 18 BRPM | SYSTOLIC BLOOD PRESSURE: 171 MMHG

## 2025-02-17 DIAGNOSIS — E11.00 TYPE 2 DIABETES MELLITUS WITH HYPEROSMOLARITY WITHOUT COMA, WITHOUT LONG-TERM CURRENT USE OF INSULIN: ICD-10-CM

## 2025-02-17 DIAGNOSIS — C78.89 METASTATIC ADENOCARCINOMA TO PANCREAS: ICD-10-CM

## 2025-02-17 DIAGNOSIS — C18.4 MALIGNANT NEOPLASM OF TRANSVERSE COLON: Primary | ICD-10-CM

## 2025-02-17 DIAGNOSIS — E78.49 OTHER HYPERLIPIDEMIA: ICD-10-CM

## 2025-02-17 DIAGNOSIS — C18.4 MALIGNANT NEOPLASM OF TRANSVERSE COLON: ICD-10-CM

## 2025-02-17 DIAGNOSIS — I10 PRIMARY HYPERTENSION: ICD-10-CM

## 2025-02-17 DIAGNOSIS — E03.9 ACQUIRED HYPOTHYROIDISM: ICD-10-CM

## 2025-02-17 DIAGNOSIS — C77.2 METASTASIS TO RETROPERITONEAL LYMPH NODE: ICD-10-CM

## 2025-02-17 LAB — TSH SERPL DL<=0.005 MIU/L-ACNC: 2.18 UIU/ML (ref 0.4–4)

## 2025-02-17 PROCEDURE — 84443 ASSAY THYROID STIM HORMONE: CPT | Performed by: INTERNAL MEDICINE

## 2025-02-17 PROCEDURE — 36415 COLL VENOUS BLD VENIPUNCTURE: CPT | Performed by: INTERNAL MEDICINE

## 2025-02-17 RX ORDER — SODIUM CHLORIDE 0.9 % (FLUSH) 0.9 %
10 SYRINGE (ML) INJECTION
Status: CANCELLED | OUTPATIENT
Start: 2025-02-18

## 2025-02-17 RX ORDER — HEPARIN 100 UNIT/ML
500 SYRINGE INTRAVENOUS
Status: CANCELLED | OUTPATIENT
Start: 2025-02-18

## 2025-02-17 RX ORDER — EPINEPHRINE 0.3 MG/.3ML
0.3 INJECTION SUBCUTANEOUS ONCE AS NEEDED
Status: CANCELLED | OUTPATIENT
Start: 2025-02-18

## 2025-02-17 RX ORDER — DIPHENHYDRAMINE HYDROCHLORIDE 50 MG/ML
50 INJECTION INTRAMUSCULAR; INTRAVENOUS ONCE AS NEEDED
Status: CANCELLED | OUTPATIENT
Start: 2025-02-18

## 2025-02-17 NOTE — PROGRESS NOTES
"Service Date:  2/17/25    Chief Complaint:  Colon cancer    Osman Li Jr. is a 59 y.o. 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  This occurred very shortly after completing treatment.    Patient was part of a clinical trial to measure CT DNA.  His CT DNA started to go up while he was on FOLFOX, which prompted the scan, which showed the progression.  Next generation sequencing also showed a high tumor burden.    He is now on Keytruda as his cancer has a very high tumor mutation burden.  So far, Keytruda has been helping    Here for Keytruda.  Doing well.  No new complaints to me.      Review of Systems   Constitutional:  Positive for fatigue. Negative for appetite change and unexpected weight change.   HENT: Negative.  Negative for mouth sores.    Eyes: Negative.  Negative for visual disturbance.   Respiratory: Negative.  Negative for cough and shortness of breath.    Cardiovascular: Negative.  Negative for chest pain.   Gastrointestinal: Negative.  Negative for abdominal pain and diarrhea.   Endocrine: Negative.    Genitourinary: Negative.  Negative for frequency.   Musculoskeletal: Negative.  Negative for back pain.   Integumentary:  Negative for rash. Negative.   Neurological:  Positive for numbness. Negative for headaches.   Hematological: Negative.  Negative for adenopathy.   Psychiatric/Behavioral: Negative.  The patient is not nervous/anxious.         Current Outpatient Medications   Medication Instructions    atorvastatin (LIPITOR) 20 mg, Oral, Nightly    enalapril (VASOTEC) 10 mg, Oral, Daily    insulin glargine U-300 conc (TOUJEO MAX U-300 SOLOSTAR) 48 Units, Subcutaneous, Daily, (Discard pen 56 days after initial use)    metFORMIN (GLUCOPHAGE) 1,000 mg, Oral, 2 times daily with meals    pen needle, diabetic (BD ULTRA-FINE MINI PEN NEEDLE) 31 gauge x 3/16" Ndle 1 each, Misc.(Non-Drug; Combo Route), " Daily    promethazine (PHENERGAN) 12.5 mg, Oral, Every 4 hours PRN    sildenafiL (VIAGRA) 100 mg, Oral, Daily PRN    traZODone (DESYREL) 50 mg, Oral, Nightly        Past Medical History:   Diagnosis Date    Colon cancer     Digestive disorder     DM (diabetes mellitus)     Hyperlipidemia     Hypertension     Prediabetes         Past Surgical History:   Procedure Laterality Date    ADENOIDECTOMY  1972    ANASTOMOSIS OF ILEUM TO RECTUM N/A 5/21/2024    Procedure: ANASTOMOSIS, ILEORECTAL;  Surgeon: Farhan Lance MD;  [facility];  Service: Colon and Rectal;  Laterality: N/A;    CHOLECYSTECTOMY  2011    CLOSURE, COLOSTOMY N/A 5/13/2024    Procedure: CLOSURE, COLOSTOMY (eras high/lithotomy);  Surgeon: Farhan Lance MD;  Location: [facility] OR [address] [place] [address];  Service: Colon and Rectal;  Laterality: N/A;  CONSENTS IN [facility]    CLOSURE, ILEOSTOMY, LOOP N/A 10/7/2024    Procedure: CLOSURE, ILEOSTOMY, LOOP;  Surgeon: Farhan Lance MD;  [address];  Service: Colon and Rectal;  Laterality: N/A;    COLON SURGERY      COLONOSCOPY      2018    COLONOSCOPY N/A 3/5/2024    Procedure: COLONOSCOPY;  Surgeon: Farhan Lance MD;  [facility]  Service: Endoscopy;  Laterality: N/A;  2/27/24-Kethman pt, 1-4wks, port, PEG plus 2 enemas morning of procedure, instr portal-DS  2/28/24- LVM for precall - ERW  pt confirmed procedure. cf    COLOSTOMY N/A 04/25/2022    Procedure: CREATION, COLOSTOMY;  Surgeon: Carlton Waddell MD;  Location: [facility] OR;  Service: General;  Laterality: N/A;    ENDOSCOPIC ULTRASOUND OF UPPER GASTROINTESTINAL TRACT N/A 01/06/2023    Procedure: ULTRASOUND, UPPER GI TRACT, ENDOSCOPIC;  Surgeon: Rinku Orozco III, MD;  [facility]: [facility];  Service: Endoscopy;  Laterality: N/A;    FLEXIBLE SIGMOIDOSCOPY N/A 5/13/2024    Procedure: SIGMOIDOSCOPY, FLEXIBLE;  Surgeon: Farhan Lance MD;  Location: [facility] OR 2ND FLR;  Service: Colon and Rectal;  Laterality:  N/A;    FLEXIBLE SIGMOIDOSCOPY  5/21/2024    Procedure: SIGMOIDOSCOPY, FLEXIBLE;  Surgeon: Farhan Lance MD;  Location: [facility] OR 2ND FLR;  Service: Colon and Rectal;;    FLEXIBLE SIGMOIDOSCOPY N/A 10/7/2024    Procedure: SIGMOIDOSCOPY, FLEXIBLE;  Surgeon: Farhan Lance MD;  Location: [facility] OR 2ND FLR;  Service: Colon and Rectal;  Laterality: N/A;    ILEOSTOMY N/A 5/21/2024    Procedure: CREATION, ILEOSTOMY, DIVERTING LOOP;  Surgeon: Farhan Lance MD;  Location: [facility] OR 2ND FLR;  Service: Colon and Rectal;  Laterality: N/A;    INSERTION OF TUNNELED CENTRAL VENOUS CATHETER (CVC) WITH SUBCUTANEOUS PORT Right 05/18/2022    Procedure: [id number];  Surgeon: Carlton Waddell MD;  Location: [facility] [place] [facility] OR;  Service: General;  Laterality: Right;    INSERTION OF URETERAL CATHETER N/A 5/13/2024    Procedure: INSERTION, CATHETER, URETER, BILATERAL;  Surgeon: Travis Edwards MD;  Location: [facility] OR 2ND FLR;  Service: Urology;  Laterality: N/A;    INSERTION OF URETERAL CATHETER Left 5/21/2024    Procedure: INSERTION, CATHETER, URETER;  Surgeon: Cartlon Santos MD;  [facility];  Service: Urology;  Laterality: Left;    LAPAROTOMY, EXPLORATORY N/A 5/21/2024    Procedure: LAPAROTOMY, EXPLORATORY;  Surgeon: Farhan Lance MD;  Location: [facility] OR 2ND FLR;  Service: Colon and Rectal;  Laterality: N/A;    LYSIS OF ADHESIONS N/A 5/13/2024    Procedure: LYSIS, ADHESIONS;  Surgeon: Farhan Lance MD;  Location: [facility] OR 2ND FLR;  Service: Colon and Rectal;  Laterality: N/A;    MOBILIZATION OF SPLENIC FLEXURE N/A 04/25/2022    Procedure: MOBILIZATION, SPLENIC FLEXURE;  Surgeon: Carlton Waddell MD;  Location: [facility] [place] [facility] OR;  Service: General;  Laterality: N/A;    SPLENECTOMY N/A 04/25/2022    Procedure: SPLENECTOMY;  Surgeon: Carlton Waddell MD;  Location: [facility] [place] [facility] OR;  Service: General;  Laterality: N/A;    SUBTOTAL COLECTOMY N/A 04/25/2022    Procedure: COLECTOMY, PARTIAL;  Surgeon: Carlton Waddell MD;  " Location: [facility] OR;  Service: General;  Laterality: N/A;    TONSILLECTOMY      TOTAL ABDOMINAL COLECTOMY N/A 2024    Procedure: COLECTOMY, COMPLETION, ABDOMINAL;  Surgeon: Farhan Lance MD;  [facility];  Service: Colon and Rectal;  Laterality: N/A;    TUMOR REMOVAL  10/18/2023    on top of the nose    VASECTOMY          Family History   Problem Relation Name Age of Onset    Heart disease Father Loki alves     Diabetes Father Loki senior     Alcohol abuse Paternal Grandfather Waylon Li     Rectal cancer Other Mkaenzie         half-sister    Cancer Sister makenzie        Social History     Tobacco Use    Smoking status: Former     Current packs/day: 0.00     Average packs/day: 1 pack/day for 40.0 years (40.0 ttl pk-yrs)     Types: Cigarettes     Start date: 1984     Quit date: 2024     Years since quittin.8     Passive exposure: Current    Smokeless tobacco: Never   Substance Use Topics    Alcohol use: Not Currently    Drug use: Never         Vitals:    25 0903   BP: (!) 171/81   Pulse: 76   Resp: 18   Temp: 98.1 °F (36.7 °C)          Physical Exam:  BP (!) 171/81 (BP Location: Right arm, Patient Position: Sitting)   Pulse 76   Temp 98.1 °F (36.7 °C) (Temporal)   Resp 18   Ht 6' 2" (1.88 m)   Wt 108.2 kg (238 lb 8.6 oz)   SpO2 99%   BMI 30.63 kg/m²     Physical Exam  Vitals and nursing note reviewed.   Constitutional:       Appearance: Normal appearance.   HENT:      Head: Normocephalic and atraumatic.      Nose: Nose normal.      Mouth/Throat:      Mouth: Mucous membranes are moist.      Pharynx: Oropharynx is clear.   Eyes:      Extraocular Movements: Extraocular movements intact.      Conjunctiva/sclera: Conjunctivae normal.   Cardiovascular:      Rate and Rhythm: Normal rate and regular rhythm.      Heart sounds: Normal heart sounds.   Pulmonary:      Effort: Pulmonary effort is normal.      Breath sounds: Normal breath sounds.   Abdominal:      General: Abdomen " is flat. Bowel sounds are normal.      Palpations: Abdomen is soft.      Comments: Ostomy bag in place.   Musculoskeletal:         General: Normal range of motion.      Cervical back: Normal range of motion and neck supple.   Skin:     General: Skin is warm and dry.   Neurological:      General: No focal deficit present.      Mental Status: He is alert and oriented to person, place, and time. Mental status is at baseline.   Psychiatric:         Mood and Affect: Mood normal.          Labs:  Lab Results   Component Value Date    WBC 10.92 01/06/2025    RBC 4.33 (L) 01/06/2025    HGB 11.7 (L) 01/06/2025    HCT 36.7 (L) 01/06/2025    MCV 85 01/06/2025    MCH 27.0 01/06/2025    MCHC 31.9 (L) 01/06/2025    RDW 14.1 01/06/2025     01/06/2025    MPV 9.2 01/06/2025    GRAN 3.5 01/06/2025    GRAN 32.3 (L) 01/06/2025    LYMPH 6.0 (H) 01/06/2025    LYMPH 55.0 (H) 01/06/2025    MONO 1.1 (H) 01/06/2025    MONO 9.9 01/06/2025    EOS 0.2 01/06/2025    BASO 0.12 01/06/2025    EOSINOPHIL 1.4 01/06/2025    BASOPHIL 1.1 01/06/2025     Sodium   Date Value Ref Range Status   01/06/2025 140 136 - 145 mmol/L Final     Potassium   Date Value Ref Range Status   01/06/2025 5.2 (H) 3.5 - 5.1 mmol/L Final     Chloride   Date Value Ref Range Status   01/06/2025 106 95 - 110 mmol/L Final     CO2   Date Value Ref Range Status   01/06/2025 26 23 - 29 mmol/L Final     Glucose   Date Value Ref Range Status   01/06/2025 98 70 - 110 mg/dL Final     BUN   Date Value Ref Range Status   01/06/2025 14 6 - 20 mg/dL Final     Creatinine   Date Value Ref Range Status   01/06/2025 1.2 0.5 - 1.4 mg/dL Final     Calcium   Date Value Ref Range Status   01/06/2025 9.4 8.7 - 10.5 mg/dL Final     Total Protein   Date Value Ref Range Status   01/06/2025 7.1 6.0 - 8.4 g/dL Final     Albumin   Date Value Ref Range Status   01/06/2025 3.9 3.5 - 5.2 g/dL Final     Total Bilirubin   Date Value Ref Range Status   01/06/2025 0.6 0.1 - 1.0 mg/dL Final     Comment:      For infants and newborns, interpretation of results should be based  on gestational age, weight and in agreement with clinical  observations.    Premature Infant recommended reference ranges:  Up to 24 hours.............<8.0 mg/dL  Up to 48 hours............<12.0 mg/dL  3-5 days..................<15.0 mg/dL  6-29 days.................<15.0 mg/dL       Alkaline Phosphatase   Date Value Ref Range Status   01/06/2025 77 40 - 150 U/L Final     AST   Date Value Ref Range Status   01/06/2025 15 10 - 40 U/L Final     ALT   Date Value Ref Range Status   01/06/2025 15 10 - 44 U/L Final     Anion Gap   Date Value Ref Range Status   01/06/2025 8 8 - 16 mmol/L Final     eGFR if    Date Value Ref Range Status   07/25/2022 >60 >60 mL/min/1.73 m^2 Final     eGFR if non    Date Value Ref Range Status   07/25/2022 >60 >60 mL/min/1.73 m^2 Final     Comment:     Calculation used to obtain the estimated glomerular filtration  rate (eGFR) is the CKD-EPI equation.          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    -Testing for ctDNA negative again on repeat!. Repeat scans ordered today  -ok for Keytruda if labs ok  -Return to clinic in 6 weeks for next.    HTN  - on Enalapril  - follow up with PCP    HLP  - follow up with PCP    DM2  - on Metformin  - follow up with PCP    Tobacco use  - counseled on cessation      Aurash Khoobehi, MD  Hematology and Oncology    Visit today included increased complexity associated with the care of the episodic problem colon cancer addressed and managing the  longitudinal care of the patient due to the serious and/or complex managed problem(s).

## 2025-02-18 ENCOUNTER — INFUSION (OUTPATIENT)
Dept: INFUSION THERAPY | Facility: HOSPITAL | Age: 60
End: 2025-02-18
Attending: INTERNAL MEDICINE
Payer: COMMERCIAL

## 2025-02-18 VITALS
OXYGEN SATURATION: 98 % | SYSTOLIC BLOOD PRESSURE: 150 MMHG | WEIGHT: 236.38 LBS | HEART RATE: 72 BPM | DIASTOLIC BLOOD PRESSURE: 85 MMHG | RESPIRATION RATE: 17 BRPM | TEMPERATURE: 98 F | HEIGHT: 74 IN | BODY MASS INDEX: 30.34 KG/M2

## 2025-02-18 DIAGNOSIS — C78.89 METASTATIC ADENOCARCINOMA TO PANCREAS: ICD-10-CM

## 2025-02-18 DIAGNOSIS — C18.4 MALIGNANT NEOPLASM OF TRANSVERSE COLON: Primary | ICD-10-CM

## 2025-02-18 PROCEDURE — A4216 STERILE WATER/SALINE, 10 ML: HCPCS | Performed by: INTERNAL MEDICINE

## 2025-02-18 PROCEDURE — 63600175 PHARM REV CODE 636 W HCPCS: Performed by: INTERNAL MEDICINE

## 2025-02-18 PROCEDURE — 25000003 PHARM REV CODE 250: Performed by: INTERNAL MEDICINE

## 2025-02-18 PROCEDURE — 96413 CHEMO IV INFUSION 1 HR: CPT

## 2025-02-18 RX ORDER — EPINEPHRINE 0.3 MG/.3ML
0.3 INJECTION SUBCUTANEOUS ONCE AS NEEDED
Status: DISCONTINUED | OUTPATIENT
Start: 2025-02-18 | End: 2025-02-18 | Stop reason: HOSPADM

## 2025-02-18 RX ORDER — SODIUM CHLORIDE 0.9 % (FLUSH) 0.9 %
10 SYRINGE (ML) INJECTION
Status: DISCONTINUED | OUTPATIENT
Start: 2025-02-18 | End: 2025-02-18 | Stop reason: HOSPADM

## 2025-02-18 RX ORDER — DIPHENHYDRAMINE HYDROCHLORIDE 50 MG/ML
50 INJECTION INTRAMUSCULAR; INTRAVENOUS ONCE AS NEEDED
Status: DISCONTINUED | OUTPATIENT
Start: 2025-02-18 | End: 2025-02-18 | Stop reason: HOSPADM

## 2025-02-18 RX ORDER — HEPARIN 100 UNIT/ML
500 SYRINGE INTRAVENOUS
Status: DISCONTINUED | OUTPATIENT
Start: 2025-02-18 | End: 2025-02-18 | Stop reason: HOSPADM

## 2025-02-18 RX ADMIN — HEPARIN 500 UNITS: 100 SYRINGE at 10:02

## 2025-02-18 RX ADMIN — SODIUM CHLORIDE, PRESERVATIVE FREE 10 ML: 5 INJECTION INTRAVENOUS at 10:02

## 2025-02-18 RX ADMIN — SODIUM CHLORIDE 400 MG: 9 INJECTION, SOLUTION INTRAVENOUS at 09:02

## 2025-03-05 ENCOUNTER — OFFICE VISIT (OUTPATIENT)
Dept: FAMILY MEDICINE | Facility: CLINIC | Age: 60
End: 2025-03-05
Payer: COMMERCIAL

## 2025-03-05 ENCOUNTER — TELEPHONE (OUTPATIENT)
Dept: RADIOLOGY | Facility: HOSPITAL | Age: 60
End: 2025-03-05

## 2025-03-05 VITALS
HEART RATE: 87 BPM | SYSTOLIC BLOOD PRESSURE: 130 MMHG | OXYGEN SATURATION: 98 % | HEIGHT: 74 IN | WEIGHT: 240 LBS | BODY MASS INDEX: 30.8 KG/M2 | DIASTOLIC BLOOD PRESSURE: 78 MMHG

## 2025-03-05 DIAGNOSIS — N52.9 ERECTILE DYSFUNCTION, UNSPECIFIED ERECTILE DYSFUNCTION TYPE: ICD-10-CM

## 2025-03-05 DIAGNOSIS — I10 ESSENTIAL HYPERTENSION: ICD-10-CM

## 2025-03-05 DIAGNOSIS — E11.69 DM TYPE 2 WITH DIABETIC DYSLIPIDEMIA: Primary | ICD-10-CM

## 2025-03-05 DIAGNOSIS — E78.2 MIXED HYPERLIPIDEMIA: ICD-10-CM

## 2025-03-05 DIAGNOSIS — C78.89 METASTATIC ADENOCARCINOMA TO PANCREAS: ICD-10-CM

## 2025-03-05 DIAGNOSIS — E78.5 DM TYPE 2 WITH DIABETIC DYSLIPIDEMIA: Primary | ICD-10-CM

## 2025-03-05 LAB — HBA1C MFR BLD: 6.9 %

## 2025-03-05 RX ORDER — TADALAFIL 20 MG/1
TABLET ORAL
Qty: 20 TABLET | Refills: 3 | Status: SHIPPED | OUTPATIENT
Start: 2025-03-05

## 2025-03-05 NOTE — PROGRESS NOTES
SUBJECTIVE:    Patient ID: Osman Li Jr. is a 59 y.o. male.    Chief Complaint: Diabetes (A1c ordered, no complaints, brought bottles, abc )      History of Present Illness    CHIEF COMPLAINT:  Osman presents today for follow-up of diabetes mellitus type 2, hypertension, dyslipidemia, and colon cancer with metastasis to pancreas.    Overall he has been doing well.  Admits has not been following his diet over the holidays and has gained some weight.  Just recently started back his walking for exercise    ONCOLOGY:  He continues to do well on Keytruda- follows with Dr. Goldberg. recent ctDNA test was negative. All ostomies have been removed and he is having regular bowel movements.     DIABETES:  A1C is 6.9% today, increased from previous value of 6.5. He reports rare episodes of hypoglycemia with glucose readings down to 78.    HYPERTENSION:  His blood pressure continues on Toujeo 48 units daily and metformin readings have been elevated at other medical appointments. ranging from 150-170, for the past 3-4 months. He does not monitor blood pressure at home.  Currently on enalapril 10 mg daily    WEIGHT MANAGEMENT:  He reports a 12-pound weight gain since November attributed to holiday eating. He recently initiated walking for exercise.    SEXUAL HEALTH:  He reports Viagra is causing strong headaches and facial flushing, which did not occur when initially starting the medication. He also notes decreased effectiveness of the medication and asking about another option    SOCIAL HISTORY:  He quit smoking in May and continues to maintain smoking cessation.      ROS:  General: no fever, no chills, no fatigue, complains of weight gain, no weight loss  Eyes: no vision changes, no redness, no discharge  ENT: no ear pain, no nasal congestion, no sore throat  Cardiovascular: no chest pain, no palpitations, no lower extremity edema  Respiratory: no cough, no shortness of breath  Gastrointestinal: complains of  abdominal pain, no nausea, no vomiting, no diarrhea, no constipation, no blood in stool  Genitourinary: no dysuria, no hematuria, no frequency  Musculoskeletal: no joint pain, no muscle pain  Skin: no rash, no lesion  Neurological: complains of headache, no dizziness, + numbness ot fingertips and toes since chemo, no tingling  Psychiatric: no anxiety, no depression, no sleep difficulty              Office Visit on 03/05/2025   Component Date Value Ref Range Status    Hemoglobin A1C, POC 03/05/2025 6.9  % Final   Lab Visit on 02/17/2025   Component Date Value Ref Range Status    WBC 02/17/2025 7.99  3.90 - 12.70 K/uL Final    RBC 02/17/2025 4.55 (L)  4.60 - 6.20 M/uL Final    Hemoglobin 02/17/2025 12.4 (L)  14.0 - 18.0 g/dL Final    Hematocrit 02/17/2025 38.5 (L)  40.0 - 54.0 % Final    MCV 02/17/2025 85  82 - 98 fL Final    MCH 02/17/2025 27.3  27.0 - 31.0 pg Final    MCHC 02/17/2025 32.2  32.0 - 36.0 g/dL Final    RDW 02/17/2025 14.3  11.5 - 14.5 % Final    Platelets 02/17/2025 390  150 - 450 K/uL Final    MPV 02/17/2025 9.6  9.2 - 12.9 fL Final    Immature Granulocytes 02/17/2025 0.3  0.0 - 0.5 % Final    Gran # (ANC) 02/17/2025 3.3  1.8 - 7.7 K/uL Final    Immature Grans (Abs) 02/17/2025 0.02  0.00 - 0.04 K/uL Final    Lymph # 02/17/2025 3.7  1.0 - 4.8 K/uL Final    Mono # 02/17/2025 0.7  0.3 - 1.0 K/uL Final    Eos # 02/17/2025 0.1  0.0 - 0.5 K/uL Final    Baso # 02/17/2025 0.09  0.00 - 0.20 K/uL Final    nRBC 02/17/2025 0  0 /100 WBC Final    Gran % 02/17/2025 41.4  38.0 - 73.0 % Final    Lymph % 02/17/2025 46.8  18.0 - 48.0 % Final    Mono % 02/17/2025 9.1  4.0 - 15.0 % Final    Eosinophil % 02/17/2025 1.3  0.0 - 8.0 % Final    Basophil % 02/17/2025 1.1  0.0 - 1.9 % Final    Differential Method 02/17/2025 Automated   Final    Sodium 02/17/2025 138  136 - 145 mmol/L Final    Potassium 02/17/2025 5.2 (H)  3.5 - 5.1 mmol/L Final    Chloride 02/17/2025 102  95 - 110 mmol/L Final    CO2 02/17/2025 29  23 - 29  mmol/L Final    Glucose 02/17/2025 286 (H)  70 - 110 mg/dL Final    BUN 02/17/2025 16  6 - 20 mg/dL Final    Creatinine 02/17/2025 1.1  0.5 - 1.4 mg/dL Final    Calcium 02/17/2025 9.9  8.7 - 10.5 mg/dL Final    Total Protein 02/17/2025 7.4  6.0 - 8.4 g/dL Final    Albumin 02/17/2025 4.1  3.5 - 5.2 g/dL Final    Total Bilirubin 02/17/2025 0.6  0.1 - 1.0 mg/dL Final    Alkaline Phosphatase 02/17/2025 83  55 - 135 U/L Final    AST 02/17/2025 17  10 - 40 U/L Final    ALT 02/17/2025 17  10 - 44 U/L Final    eGFR 02/17/2025 >60.0  >60 mL/min/1.73 m^2 Final    Anion Gap 02/17/2025 7 (L)  8 - 16 mmol/L Final   Lab Visit on 02/17/2025   Component Date Value Ref Range Status    TSH 02/17/2025 2.184  0.400 - 4.000 uIU/mL Final   Infusion on 01/07/2025   Component Date Value Ref Range Status    TSH 01/07/2025 1.918  0.340 - 5.600 uIU/mL Final   Lab Visit on 01/06/2025   Component Date Value Ref Range Status    WBC 01/06/2025 10.92  3.90 - 12.70 K/uL Final    RBC 01/06/2025 4.33 (L)  4.60 - 6.20 M/uL Final    Hemoglobin 01/06/2025 11.7 (L)  14.0 - 18.0 g/dL Final    Hematocrit 01/06/2025 36.7 (L)  40.0 - 54.0 % Final    MCV 01/06/2025 85  82 - 98 fL Final    MCH 01/06/2025 27.0  27.0 - 31.0 pg Final    MCHC 01/06/2025 31.9 (L)  32.0 - 36.0 g/dL Final    RDW 01/06/2025 14.1  11.5 - 14.5 % Final    Platelets 01/06/2025 376  150 - 450 K/uL Final    MPV 01/06/2025 9.2  9.2 - 12.9 fL Final    Immature Granulocytes 01/06/2025 0.3  0.0 - 0.5 % Final    Gran # (ANC) 01/06/2025 3.5  1.8 - 7.7 K/uL Final    Immature Grans (Abs) 01/06/2025 0.03  0.00 - 0.04 K/uL Final    Lymph # 01/06/2025 6.0 (H)  1.0 - 4.8 K/uL Final    Mono # 01/06/2025 1.1 (H)  0.3 - 1.0 K/uL Final    Eos # 01/06/2025 0.2  0.0 - 0.5 K/uL Final    Baso # 01/06/2025 0.12  0.00 - 0.20 K/uL Final    nRBC 01/06/2025 0  0 /100 WBC Final    Gran % 01/06/2025 32.3 (L)  38.0 - 73.0 % Final    Lymph % 01/06/2025 55.0 (H)  18.0 - 48.0 % Final    Mono % 01/06/2025 9.9  4.0 - 15.0  % Final    Eosinophil % 01/06/2025 1.4  0.0 - 8.0 % Final    Basophil % 01/06/2025 1.1  0.0 - 1.9 % Final    Differential Method 01/06/2025 Automated   Final    Sodium 01/06/2025 140  136 - 145 mmol/L Final    Potassium 01/06/2025 5.2 (H)  3.5 - 5.1 mmol/L Final    Chloride 01/06/2025 106  95 - 110 mmol/L Final    CO2 01/06/2025 26  23 - 29 mmol/L Final    Glucose 01/06/2025 98  70 - 110 mg/dL Final    BUN 01/06/2025 14  6 - 20 mg/dL Final    Creatinine 01/06/2025 1.2  0.5 - 1.4 mg/dL Final    Calcium 01/06/2025 9.4  8.7 - 10.5 mg/dL Final    Total Protein 01/06/2025 7.1  6.0 - 8.4 g/dL Final    Albumin 01/06/2025 3.9  3.5 - 5.2 g/dL Final    Total Bilirubin 01/06/2025 0.6  0.1 - 1.0 mg/dL Final    Alkaline Phosphatase 01/06/2025 77  40 - 150 U/L Final    AST 01/06/2025 15  10 - 40 U/L Final    ALT 01/06/2025 15  10 - 44 U/L Final    eGFR 01/06/2025 >60  >60 mL/min/1.73 m^2 Final    Anion Gap 01/06/2025 8  8 - 16 mmol/L Final   Lab Visit on 11/25/2024   Component Date Value Ref Range Status    WBC 11/25/2024 11.83  3.90 - 12.70 K/uL Final    RBC 11/25/2024 4.27 (L)  4.60 - 6.20 M/uL Final    Hemoglobin 11/25/2024 12.0 (L)  14.0 - 18.0 g/dL Final    Hematocrit 11/25/2024 37.2 (L)  40.0 - 54.0 % Final    MCV 11/25/2024 87  82 - 98 fL Final    MCH 11/25/2024 28.1  27.0 - 31.0 pg Final    MCHC 11/25/2024 32.3  32.0 - 36.0 g/dL Final    RDW 11/25/2024 14.5  11.5 - 14.5 % Final    Platelets 11/25/2024 414  150 - 450 K/uL Final    MPV 11/25/2024 9.3  9.2 - 12.9 fL Final    Immature Granulocytes 11/25/2024 0.2  0.0 - 0.5 % Final    Gran # (ANC) 11/25/2024 4.3  1.8 - 7.7 K/uL Final    Immature Grans (Abs) 11/25/2024 0.02  0.00 - 0.04 K/uL Final    Lymph # 11/25/2024 6.1 (H)  1.0 - 4.8 K/uL Final    Mono # 11/25/2024 1.1 (H)  0.3 - 1.0 K/uL Final    Eos # 11/25/2024 0.2  0.0 - 0.5 K/uL Final    Baso # 11/25/2024 0.11  0.00 - 0.20 K/uL Final    nRBC 11/25/2024 0  0 /100 WBC Final    Gran % 11/25/2024 36.5 (L)  38.0 - 73.0 %  Final    Lymph % 11/25/2024 51.2 (H)  18.0 - 48.0 % Final    Mono % 11/25/2024 9.6  4.0 - 15.0 % Final    Eosinophil % 11/25/2024 1.6  0.0 - 8.0 % Final    Basophil % 11/25/2024 0.9  0.0 - 1.9 % Final    Differential Method 11/25/2024 Automated   Final    Sodium 11/25/2024 139  136 - 145 mmol/L Final    Potassium 11/25/2024 5.6 (H)  3.5 - 5.1 mmol/L Final    Chloride 11/25/2024 104  95 - 110 mmol/L Final    CO2 11/25/2024 25  23 - 29 mmol/L Final    Glucose 11/25/2024 94  70 - 110 mg/dL Final    BUN 11/25/2024 15  6 - 20 mg/dL Final    Creatinine 11/25/2024 1.3  0.5 - 1.4 mg/dL Final    Calcium 11/25/2024 10.1  8.7 - 10.5 mg/dL Final    Total Protein 11/25/2024 7.2  6.0 - 8.4 g/dL Final    Albumin 11/25/2024 3.9  3.5 - 5.2 g/dL Final    Total Bilirubin 11/25/2024 0.5  0.1 - 1.0 mg/dL Final    Alkaline Phosphatase 11/25/2024 89  40 - 150 U/L Final    AST 11/25/2024 17  10 - 40 U/L Final    ALT 11/25/2024 16  10 - 44 U/L Final    eGFR 11/25/2024 >60  >60 mL/min/1.73 m^2 Final    Anion Gap 11/25/2024 10  8 - 16 mmol/L Final   Lab Visit on 11/04/2024   Component Date Value Ref Range Status    WBC 11/04/2024 12.29  3.90 - 12.70 K/uL Final    RBC 11/04/2024 4.15 (L)  4.60 - 6.20 M/uL Final    Hemoglobin 11/04/2024 11.7 (L)  14.0 - 18.0 g/dL Final    Hematocrit 11/04/2024 36.1 (L)  40.0 - 54.0 % Final    MCV 11/04/2024 87  82 - 98 fL Final    MCH 11/04/2024 28.2  27.0 - 31.0 pg Final    MCHC 11/04/2024 32.4  32.0 - 36.0 g/dL Final    RDW 11/04/2024 14.0  11.5 - 14.5 % Final    Platelets 11/04/2024 385  150 - 450 K/uL Final    MPV 11/04/2024 9.5  9.2 - 12.9 fL Final    Immature Granulocytes 11/04/2024 0.2  0.0 - 0.5 % Final    Gran # (ANC) 11/04/2024 4.7  1.8 - 7.7 K/uL Final    Immature Grans (Abs) 11/04/2024 0.03  0.00 - 0.04 K/uL Final    Lymph # 11/04/2024 5.8 (H)  1.0 - 4.8 K/uL Final    Mono # 11/04/2024 1.2 (H)  0.3 - 1.0 K/uL Final    Eos # 11/04/2024 0.4  0.0 - 0.5 K/uL Final    Baso # 11/04/2024 0.12  0.00 - 0.20  K/uL Final    nRBC 11/04/2024 0  0 /100 WBC Final    Gran % 11/04/2024 38.5  38.0 - 73.0 % Final    Lymph % 11/04/2024 47.3  18.0 - 48.0 % Final    Mono % 11/04/2024 9.5  4.0 - 15.0 % Final    Eosinophil % 11/04/2024 3.5  0.0 - 8.0 % Final    Basophil % 11/04/2024 1.0  0.0 - 1.9 % Final    Differential Method 11/04/2024 Automated   Final    Sodium 11/04/2024 140  136 - 145 mmol/L Final    Potassium 11/04/2024 5.2 (H)  3.5 - 5.1 mmol/L Final    Chloride 11/04/2024 106  95 - 110 mmol/L Final    CO2 11/04/2024 28  23 - 29 mmol/L Final    Glucose 11/04/2024 142 (H)  70 - 110 mg/dL Final    BUN 11/04/2024 17  6 - 20 mg/dL Final    Creatinine 11/04/2024 1.2  0.5 - 1.4 mg/dL Final    Calcium 11/04/2024 9.9  8.7 - 10.5 mg/dL Final    Total Protein 11/04/2024 7.3  6.0 - 8.4 g/dL Final    Albumin 11/04/2024 3.8  3.5 - 5.2 g/dL Final    Total Bilirubin 11/04/2024 0.4  0.1 - 1.0 mg/dL Final    Alkaline Phosphatase 11/04/2024 108  40 - 150 U/L Final    AST 11/04/2024 15  10 - 40 U/L Final    ALT 11/04/2024 16  10 - 44 U/L Final    eGFR 11/04/2024 >60  >60 mL/min/1.73 m^2 Final    Anion Gap 11/04/2024 6 (L)  8 - 16 mmol/L Final   Telephone on 10/15/2024   Component Date Value Ref Range Status    Cholesterol 10/24/2024 112  <200 mg/dL Final    HDL 10/24/2024 48  > OR = 40 mg/dL Final    Triglycerides 10/24/2024 68  <150 mg/dL Final    LDL Cholesterol 10/24/2024 50  mg/dL (calc) Final    HDL/Cholesterol Ratio 10/24/2024 2.3  <5.0 (calc) Final    Non HDL Chol. (LDL+VLDL) 10/24/2024 64  <130 mg/dL (calc) Final    Creatinine, Urine 10/24/2024 187  20 - 320 mg/dL Final    Microalb, Ur 10/24/2024 2.8  See Note: mg/dL Final    Microalb/Creat Ratio 10/24/2024 15  <30 mg/g creat Final    Color, UA 10/24/2024 YELLOW  YELLOW Final    Appearance, UA 10/24/2024 CLEAR  CLEAR Final    Specific Gravity, UA 10/24/2024 1.022  1.001 - 1.035 Final    pH, UA 10/24/2024 5.5  5.0 - 8.0 Final    Glucose, UA 10/24/2024 TRACE (A)  NEGATIVE Final     Bilirubin, UA 10/24/2024 NEGATIVE  NEGATIVE Final    Ketones, UA 10/24/2024 NEGATIVE  NEGATIVE Final    Occult Blood UA 10/24/2024 NEGATIVE  NEGATIVE Final    Protein, UA 10/24/2024 TRACE (A)  NEGATIVE Final    Nitrite, UA 10/24/2024 NEGATIVE  NEGATIVE Final    Leukocytes, UA 10/24/2024 NEGATIVE  NEGATIVE Final    WBC Casts, UA 10/24/2024 NONE SEEN  < OR = 5 /HPF Final    RBC Casts, UA 10/24/2024 NONE SEEN  < OR = 2 /HPF Final    Squam Epithel, UA 10/24/2024 NONE SEEN  < OR = 5 /HPF Final    Bacteria, UA 10/24/2024 NONE SEEN  NONE SEEN /HPF Final    Hyaline Casts, UA 10/24/2024 NONE SEEN  NONE SEEN /LPF Final    Service Cmt: 10/24/2024 SEE COMMENT   Final    Reflexive Urine Culture 10/24/2024 SEE COMMENT   Final   Lab Visit on 10/14/2024   Component Date Value Ref Range Status    WBC 10/14/2024 12.73 (H)  3.90 - 12.70 K/uL Final    RBC 10/14/2024 3.80 (L)  4.60 - 6.20 M/uL Final    Hemoglobin 10/14/2024 10.7 (L)  14.0 - 18.0 g/dL Final    Hematocrit 10/14/2024 33.3 (L)  40.0 - 54.0 % Final    MCV 10/14/2024 88  82 - 98 fL Final    MCH 10/14/2024 28.2  27.0 - 31.0 pg Final    MCHC 10/14/2024 32.1  32.0 - 36.0 g/dL Final    RDW 10/14/2024 14.4  11.5 - 14.5 % Final    Platelets 10/14/2024 411  150 - 450 K/uL Final    MPV 10/14/2024 9.8  9.2 - 12.9 fL Final    Immature Granulocytes 10/14/2024 0.3  0.0 - 0.5 % Final    Gran # (ANC) 10/14/2024 4.7  1.8 - 7.7 K/uL Final    Immature Grans (Abs) 10/14/2024 0.04  0.00 - 0.04 K/uL Final    Lymph # 10/14/2024 6.0 (H)  1.0 - 4.8 K/uL Final    Mono # 10/14/2024 1.4 (H)  0.3 - 1.0 K/uL Final    Eos # 10/14/2024 0.5  0.0 - 0.5 K/uL Final    Baso # 10/14/2024 0.07  0.00 - 0.20 K/uL Final    nRBC 10/14/2024 0  0 /100 WBC Final    Gran % 10/14/2024 37.0 (L)  38.0 - 73.0 % Final    Lymph % 10/14/2024 47.0  18.0 - 48.0 % Final    Mono % 10/14/2024 11.1  4.0 - 15.0 % Final    Eosinophil % 10/14/2024 4.1  0.0 - 8.0 % Final    Basophil % 10/14/2024 0.5  0.0 - 1.9 % Final    Platelet  Estimate 10/14/2024 Appears normal   Final    Differential Method 10/14/2024 Automated   Final    Sodium 10/14/2024 140  136 - 145 mmol/L Final    Potassium 10/14/2024 4.9  3.5 - 5.1 mmol/L Final    Chloride 10/14/2024 105  95 - 110 mmol/L Final    CO2 10/14/2024 26  23 - 29 mmol/L Final    Glucose 10/14/2024 163 (H)  70 - 110 mg/dL Final    BUN 10/14/2024 13  6 - 20 mg/dL Final    Creatinine 10/14/2024 1.1  0.5 - 1.4 mg/dL Final    Calcium 10/14/2024 10.0  8.7 - 10.5 mg/dL Final    Total Protein 10/14/2024 7.1  6.0 - 8.4 g/dL Final    Albumin 10/14/2024 3.3 (L)  3.5 - 5.2 g/dL Final    Total Bilirubin 10/14/2024 0.3  0.1 - 1.0 mg/dL Final    Alkaline Phosphatase 10/14/2024 130  55 - 135 U/L Final    AST 10/14/2024 15  10 - 40 U/L Final    ALT 10/14/2024 25  10 - 44 U/L Final    eGFR 10/14/2024 >60  >60 mL/min/1.73 m^2 Final    Anion Gap 10/14/2024 9  8 - 16 mmol/L Final   Admission on 10/07/2024, Discharged on 10/09/2024   Component Date Value Ref Range Status    Group & Rh 10/07/2024 A POS   Final    Indirect Sharon 10/07/2024 NEG   Final    Specimen Outdate 10/07/2024 10/10/2024 23:59   Final    POCT Glucose 10/07/2024 97  70 - 110 mg/dL Final    Final Pathologic Diagnosis 10/07/2024    Final                    Value:Ileostomy, excision:  Small intestine mucosa with underlying benign lymphoid aggregates and submucosal hemorrhage with cautery artifact consistent with area ostomy.    No evidence of dysplasia or malignancy.      Gross 10/07/2024    Final                    Value:Pathology ID# [id number]  Pathology MRN # [id number]  Hospital/Clinic label MRN# [id number]  CSN: [id number]    Received in formalin, labeled with the patients name and MRN, designated as, &quot;ileostomy, is a donut shaped portion of tan-pink-red bowel mucosa measuring 0.9 cm in length by 3.7 cm in diameter.  Rare fragments of serosa are identified and   cauterized.  The mucosa is erythematous and granular in appearance.  Representative  sections are submitted in cassette [id number].      Johnny Vega MD, MS  Pathologists' Assistant      Disclaimer 10/07/2024 Unless the case is a 'gross only' or additional testing only, the final diagnosis for each specimen is based on a microscopic examination of appropriate tissue sections.   Final    Hemoglobin A1C 10/07/2024 6.5 (H)  4.0 - 5.6 % Final    Estimated Avg Glucose 10/07/2024 140 (H)  68 - 131 mg/dL Final    POCT Glucose 10/07/2024 122 (H)  70 - 110 mg/dL Final    POCT Glucose 10/07/2024 205 (H)  70 - 110 mg/dL Final    WBC 10/08/2024 13.63 (H)  3.90 - 12.70 K/uL Final    RBC 10/08/2024 3.94 (L)  4.60 - 6.20 M/uL Final    Hemoglobin 10/08/2024 10.9 (L)  14.0 - 18.0 g/dL Final    Hematocrit 10/08/2024 33.9 (L)  40.0 - 54.0 % Final    MCV 10/08/2024 86  82 - 98 fL Final    MCH 10/08/2024 27.7  27.0 - 31.0 pg Final    MCHC 10/08/2024 32.2  32.0 - 36.0 g/dL Final    RDW 10/08/2024 14.4  11.5 - 14.5 % Final    Platelets 10/08/2024 320  150 - 450 K/uL Final    MPV 10/08/2024 10.4  9.2 - 12.9 fL Final    Immature Granulocytes 10/08/2024 0.4  0.0 - 0.5 % Final    Gran # (ANC) 10/08/2024 8.6 (H)  1.8 - 7.7 K/uL Final    Immature Grans (Abs) 10/08/2024 0.05 (H)  0.00 - 0.04 K/uL Final    Lymph # 10/08/2024 3.7  1.0 - 4.8 K/uL Final    Mono # 10/08/2024 1.3 (H)  0.3 - 1.0 K/uL Final    Eos # 10/08/2024 0.0  0.0 - 0.5 K/uL Final    Baso # 10/08/2024 0.02  0.00 - 0.20 K/uL Final    nRBC 10/08/2024 0  0 /100 WBC Final    Gran % 10/08/2024 63.2  38.0 - 73.0 % Final    Lymph % 10/08/2024 27.0  18.0 - 48.0 % Final    Mono % 10/08/2024 9.2  4.0 - 15.0 % Final    Eosinophil % 10/08/2024 0.1  0.0 - 8.0 % Final    Basophil % 10/08/2024 0.1  0.0 - 1.9 % Final    Differential Method 10/08/2024 Automated   Final    Sodium 10/08/2024 136  136 - 145 mmol/L Final    Potassium 10/08/2024 4.0  3.5 - 5.1 mmol/L Final    Chloride 10/08/2024 105  95 - 110 mmol/L Final    CO2 10/08/2024 25  23 - 29 mmol/L Final    Glucose  10/08/2024 118 (H)  70 - 110 mg/dL Final    BUN 10/08/2024 12  6 - 20 mg/dL Final    Creatinine 10/08/2024 1.0  0.5 - 1.4 mg/dL Final    Calcium 10/08/2024 9.4  8.7 - 10.5 mg/dL Final    Anion Gap 10/08/2024 6 (L)  8 - 16 mmol/L Final    eGFR 10/08/2024 >60.0  >60 mL/min/1.73 m^2 Final    Magnesium 10/08/2024 2.0  1.6 - 2.6 mg/dL Final    Phosphorus 10/08/2024 3.2  2.7 - 4.5 mg/dL Final    POCT Glucose 10/07/2024 187 (H)  70 - 110 mg/dL Final    POCT Glucose 10/08/2024 106  70 - 110 mg/dL Final    POCT Glucose 10/08/2024 172 (H)  70 - 110 mg/dL Final    POCT Glucose 10/08/2024 144 (H)  70 - 110 mg/dL Final    POCT Glucose 10/08/2024 181 (H)  70 - 110 mg/dL Final    WBC 10/09/2024 13.02 (H)  3.90 - 12.70 K/uL Final    RBC 10/09/2024 3.70 (L)  4.60 - 6.20 M/uL Final    Hemoglobin 10/09/2024 10.7 (L)  14.0 - 18.0 g/dL Final    Hematocrit 10/09/2024 31.6 (L)  40.0 - 54.0 % Final    MCV 10/09/2024 85  82 - 98 fL Final    MCH 10/09/2024 28.9  27.0 - 31.0 pg Final    MCHC 10/09/2024 33.9  32.0 - 36.0 g/dL Final    RDW 10/09/2024 14.6 (H)  11.5 - 14.5 % Final    Platelets 10/09/2024 297  150 - 450 K/uL Final    MPV 10/09/2024 10.6  9.2 - 12.9 fL Final    POCT Glucose 10/09/2024 92  70 - 110 mg/dL Final   There may be more visits with results that are not included.       Past Medical History:   Diagnosis Date    Colon cancer     Digestive disorder     DM (diabetes mellitus)     Hyperlipidemia     Hypertension     Prediabetes      Past Surgical History:   Procedure Laterality Date    ADENOIDECTOMY  1972    ANASTOMOSIS OF ILEUM TO RECTUM N/A 5/21/2024    Procedure: ANASTOMOSIS, ILEORECTAL;  Surgeon: Farhan Lance MD;  Location: [facility] OR [address] [place] [address];  Service: Colon and Rectal;  Laterality: N/A;    CHOLECYSTECTOMY  2011    CLOSURE, COLOSTOMY N/A 5/13/2024    Procedure: CLOSURE, COLOSTOMY (eras high/lithotomy);  Surgeon: Farhan Lance MD;  Location: [facility] OR [address] [place] [address];  Service: Colon and Rectal;  Laterality: N/A;   CONSENTS IN [facility] [place]    CLOSURE, ILEOSTOMY, LOOP N/A 10/7/2024    Procedure: CLOSURE, ILEOSTOMY, LOOP;  Surgeon: Farhan Lance MD;  Location: [facility] OR [facility] FLR;  Service: Colon and Rectal;  Laterality: N/A;    COLON SURGERY      COLONOSCOPY      2018    COLONOSCOPY N/A 3/5/2024    Procedure: COLONOSCOPY;  Surgeon: Farhan Lance MD;  Location: [facility] ENDO (4TH FLR);  Service: Endoscopy;  Laterality: N/A;  2/27/24-Kethman pt, 1-4wks, port, PEG plus 2 enemas morning of procedure, instr portal-DS  2/28/24- LVM for precall - ERW  pt confirmed procedure. cf    COLOSTOMY N/A 04/25/2022    Procedure: CREATION, COLOSTOMY;  Surgeon: Carlton Waddell MD;  Location: [facility] OR;  Service: General;  Laterality: N/A;    ENDOSCOPIC ULTRASOUND OF UPPER GASTROINTESTINAL TRACT N/A 01/06/2023    Procedure: ULTRASOUND, UPPER GI TRACT, ENDOSCOPIC;  Surgeon: Rinku Orozco III, MD;  Location: [facility];  Service: Endoscopy;  Laterality: N/A;    FLEXIBLE SIGMOIDOSCOPY N/A 5/13/2024    Procedure: SIGMOIDOSCOPY, FLEXIBLE;  Surgeon: Farhan Lance MD;  [facility];  Service: Colon and Rectal;  Laterality: N/A;    FLEXIBLE SIGMOIDOSCOPY  5/21/2024    Procedure: SIGMOIDOSCOPY, FLEXIBLE;  Surgeon: Farhan Lance MD;  [facility];  Service: Colon and Rectal;;    FLEXIBLE SIGMOIDOSCOPY N/A 10/7/2024    Procedure: SIGMOIDOSCOPY, FLEXIBLE;  Surgeon: Farhan Lance MD;  [facility];  Service: Colon and Rectal;  Laterality: N/A;    ILEOSTOMY N/A 5/21/2024    Procedure: CREATION, ILEOSTOMY, DIVERTING LOOP;  Surgeon: Farhan Lance MD;  [facility];  Service: Colon and Rectal;  Laterality: N/A;    INSERTION OF TUNNELED CENTRAL VENOUS CATHETER (CVC) WITH SUBCUTANEOUS PORT Right 05/18/2022    Procedure: [id number];  Surgeon: Carlton Waddell MD;  Location: [facility];  Service: General;  Laterality: Right;    INSERTION OF URETERAL CATHETER N/A 5/13/2024     Procedure: INSERTION, CATHETER, URETER, BILATERAL;  Surgeon: Travis Edwards MD;  Location: [facility] OR 2ND FLR;  Service: Urology;  Laterality: N/A;    INSERTION OF URETERAL CATHETER Left 5/21/2024    Procedure: INSERTION, CATHETER, URETER;  Surgeon: Carlton Santos MD;  Location: [facility] OR 2ND FLR;  Service: Urology;  Laterality: Left;    LAPAROTOMY, EXPLORATORY N/A 5/21/2024    Procedure: LAPAROTOMY, EXPLORATORY;  Surgeon: Farhan Lnace MD;  Location: [facility] OR 2ND FLR;  Service: Colon and Rectal;  Laterality: N/A;    LYSIS OF ADHESIONS N/A 5/13/2024    Procedure: LYSIS, ADHESIONS;  Surgeon: Farhan Lance MD;  Location: [facility] OR 2ND FLR;  Service: Colon and Rectal;  Laterality: N/A;    MOBILIZATION OF SPLENIC FLEXURE N/A 04/25/2022    Procedure: MOBILIZATION, SPLENIC FLEXURE;  Surgeon: Carlton Waddell MD;  Location: [facility] OR;  Service: General;  Laterality: N/A;    SPLENECTOMY N/A 04/25/2022    Procedure: SPLENECTOMY;  Surgeon: Carlton Waddell MD;  Location: [facility] OR;  Service: General;  Laterality: N/A;    SUBTOTAL COLECTOMY N/A 04/25/2022    Procedure: COLECTOMY, PARTIAL;  Surgeon: Carlton Waddell MD;  Location: [facility] OR;  Service: General;  Laterality: N/A;    TONSILLECTOMY      TOTAL ABDOMINAL COLECTOMY N/A 5/21/2024    Procedure: COLECTOMY, COMPLETION, ABDOMINAL;  Surgeon: Farhan Lance MD;  Location: [facility] OR 2ND FLR;  Service: Colon and Rectal;  Laterality: N/A;    TUMOR REMOVAL  10/18/2023    on top of the nose    VASECTOMY  1994     Family History   Problem Relation Name Age of Onset    Heart disease Father Loki senior     Diabetes Father Loki senior     Alcohol abuse Paternal Grandfather Waylon Li     Rectal cancer Other Makenzie         half-sister    Cancer Sister makenzie        All of your core healthy metrics are met.      The 10-year CVD risk score (RANDALL'Agostino, et al., 2008) is: 18.4%    Values used to calculate the score:      Age: 59 years      Sex: Male      Diabetic: Yes      Tobacco  "smoker: No      Systolic Blood Pressure: 130 mmHg      Is BP treated: Yes      HDL Cholesterol: 48 mg/dL      Total Cholesterol: 112 mg/dL     Marital Status:   Alcohol History:  reports that he does not currently use alcohol.  Tobacco History:  reports that he quit smoking about 10 months ago. His smoking use included cigarettes. He started smoking about 40 years ago. He has a 40 pack-year smoking history. He has been exposed to tobacco smoke. He has never used smokeless tobacco.  Drug History:  reports no history of drug use.    Health Maintenance Topics with due status: Not Due       Topic Last Completion Date    Pneumococcal Vaccines (Age 50+) 07/07/2022    Foot Exam 06/27/2024    Diabetes Urine Screening 10/24/2024    Lipid Panel 10/24/2024    Diabetic Eye Exam 01/13/2025    Hemoglobin A1c 03/05/2025     Immunization History   Administered Date(s) Administered    COVID-19, vector-nr, rS-Ad26, PF ([email or web address]) 05/27/2021    HiB PRP-T 10/07/2022    Influenza 11/11/2020    Influenza (FLUBLOK) - Quadrivalent - Recombinant - PF *Preferred* (egg allergy) 10/12/2022    Influenza - Quadrivalent - MDCK 10/16/2019    Influenza - Quadrivalent - MDCK - PF 11/07/2020    Influenza - Trivalent - Fluarix, Flulaval, Fluzone, Afluria - PF 10/31/2024    Meningococcal Conjugate (MCV4O) 2 Vial (2mo-55yr) 05/02/2022, 07/07/2022    Pneumococcal Conjugate - 13 Valent 05/02/2022    Pneumococcal Polysaccharide - 23 Valent 07/07/2022    Zoster Recombinant 02/06/2021, 04/30/2021       Review of patient's allergies indicates:   Allergen Reactions    Penicillins Rash     Current Medications[1]        Objective:      Vitals:    03/05/25 0751   BP: 130/78   Pulse: 87   SpO2: 98%   Weight: 108.9 kg (240 lb)   Height: 6' 2" (1.88 m)       Physical Exam  Vitals reviewed.   Constitutional:       General: He is not in acute distress.     Appearance: He is well-developed.      Comments: Healthy-appearing white male   HENT:      Head: " Normocephalic and atraumatic.      Right Ear: Tympanic membrane and ear canal normal.      Left Ear: Tympanic membrane and ear canal normal.   Neck:      Vascular: No carotid bruit.   Cardiovascular:      Rate and Rhythm: Normal rate and regular rhythm.      Heart sounds: No murmur heard.  Pulmonary:      Effort: Pulmonary effort is normal. No respiratory distress.      Breath sounds: Normal breath sounds. No wheezing or rales.   Abdominal:      General: There is no distension.      Palpations: Abdomen is soft.      Tenderness: There is no abdominal tenderness.   Musculoskeletal:      Cervical back: Neck supple.      Right lower leg: No edema.      Left lower leg: No edema.   Lymphadenopathy:      Cervical: No cervical adenopathy.   Skin:     General: Skin is warm and dry.      Findings: No rash.   Neurological:      General: No focal deficit present.      Mental Status: He is alert and oriented to person, place, and time.      Gait: Gait normal.   Psychiatric:         Mood and Affect: Mood normal.          Assessment:       1. DM type 2 with diabetic dyslipidemia    2. Essential hypertension    3. Mixed hyperlipidemia    4. Metastatic adenocarcinoma to pancreas    5. Erectile dysfunction, unspecified erectile dysfunction type           Assessment & Plan    - Assessed diabetes management: A1C increased to 6.9%  - Evaluated blood pressure control: Recent elevations noted, current reading 130/78  - Considered adjusting enalapril dosage if blood pressure remains elevated  - Reviewed ED medication efficacy and side effects  - Will switch from sildenafil to tadalafil for ED management  - Monitored potassium levels, noting slight elevation    COLON CANCER WITH METASTASIS TO PANCREAS:  - Monitor the patient's condition under the care of Dr. Khoobehii for colon cancer with metastasis to pancreas. Doing well on keytruda  - Note that the CT DNA test was negative.  - he is scheduled for follow-up chest/abd/pelvis CT scan  tomorrow.      DIABETES:  - Continue Metformin for diabetes management.  - Monitor the patient's A1C, which has slightly increased to 6.9 from the previous reading of 6.5.  - Note that the patient reports rare episodes of hypoglycemia (around 78).  -encouraged getting back on track with healthy diet and exercise  - Consider adjusting treatment if A1C continues to rise.    HYPERTENSION:  - Continue enalapril 10 mg daily for blood pressure management.  - Monitor the patient's blood pressure, which is currently 130/78, an improvement from recent high readings (150, 170, 156).  - Instruct the patient to monitor blood pressure at home.  - Consider increasing medication dosage if blood pressure remains elevated.  - Advise the patient to contact the office or send a portal message if blood pressure readings continue to be high.    MIXED HYPERLIPIDEMIA:  -well controlled on October labs    ERECTILE DYSFUNCTION:  - Discuss common side effects of ED medications, including headache and flushing.  - Evaluate the patient's experience with sildenafil (Viagra) 100mg, noting strong headaches, flushing, and reduced effectiveness.  - Prescribe tadalafil (Cialis) 20 mg as needed before sexual activity, replacing sildenafil.  - Consider referral to a urologist if issues persist.    SMOKING CESSATION:  - Confirm the patient's continued abstinence from smoking since May.  - Encourage ongoing smoking cessation efforts.    WEIGHT MANAGEMENT:  - Osman to continue walking regimen.  - Osman to focus on diet and exercise to address recent weight gain.    FOLLOW UP:  - Follow up in 6 months.        Plan:       1. DM type 2 with diabetic dyslipidemia  -     Hemoglobin A1C, POCT    2. Essential hypertension    3. Mixed hyperlipidemia    4. Metastatic adenocarcinoma to pancreas    5. Erectile dysfunction, unspecified erectile dysfunction type  -     tadalafiL (CIALIS) 20 MG Tab; Take 1 tablet 20-30 minutes before sexual activity as needed   "Dispense: 20 tablet; Refill: 3      Follow up in about 6 months (around 9/5/2025) for Diabetes.          Counseled on age and gender appropriate medical preventative services, including cancer screenings, immunizations, overall nutritional health, need for a consistent exercise regimen and an overall push towards maintaining a vigorous and active lifestyle.      This note was generated with the assistance of ambient listening technology. Verbal consent was obtained by the patient and accompanying visitor(s) for the recording of patient appointment to facilitate this note. I attest to having reviewed and edited the generated note for accuracy, though some syntax or spelling errors may persist. Please contact the author of this note for any clarification.       3/5/2025 Natalee Wagner NP           [1]   Current Outpatient Medications:     atorvastatin (LIPITOR) 20 MG tablet, Take 1 tablet (20 mg total) by mouth every evening., Disp: 90 tablet, Rfl: 3    diphenoxylate-atropine 2.5-0.025 mg (LOMOTIL) 2.5-0.025 mg per tablet, Take 1 tablet by mouth 3 (three) times daily., Disp: 90 tablet, Rfl: 3    enalapril (VASOTEC) 10 MG tablet, Take 1 tablet (10 mg total) by mouth once daily. (Patient taking differently: Take 10 mg by mouth every morning.), Disp: 90 tablet, Rfl: 3    insulin glargine U-300 conc (TOUJEO MAX U-300 SOLOSTAR) 300 unit/mL (3 mL) insulin pen, Inject 48 Units into the skin once daily. (Discard pen 56 days after initial use) (Patient taking differently: Inject 48 Units into the skin every morning. (Discard pen 56 days after initial use)), Disp: 2 Pen, Rfl: 11    metFORMIN (GLUCOPHAGE) 1000 MG tablet, Take 1 tablet (1,000 mg total) by mouth 2 (two) times daily with meals., Disp: 180 tablet, Rfl: 3    pen needle, diabetic (BD ULTRA-FINE MINI PEN NEEDLE) 31 gauge x 3/16" Ndle, 1 each by Misc.(Non-Drug; Combo Route) route once daily., Disp: 100 each, Rfl: 3    promethazine (PHENERGAN) 12.5 MG Tab, Take 1 " tablet (12.5 mg total) by mouth every 4 (four) hours as needed., Disp: 25 tablet, Rfl: 1    traZODone (DESYREL) 50 MG tablet, Take 1 tablet (50 mg total) by mouth every evening., Disp: 90 tablet, Rfl: 3    tadalafiL (CIALIS) 20 MG Tab, Take 1 tablet 20-30 minutes before sexual activity as needed, Disp: 20 tablet, Rfl: 3

## 2025-03-06 ENCOUNTER — HOSPITAL ENCOUNTER (OUTPATIENT)
Dept: RADIOLOGY | Facility: HOSPITAL | Age: 60
Discharge: HOME | End: 2025-03-06
Attending: NURSE PRACTITIONER
Payer: COMMERCIAL

## 2025-03-06 DIAGNOSIS — C18.4 MALIGNANT NEOPLASM OF TRANSVERSE COLON: ICD-10-CM

## 2025-03-06 PROCEDURE — 71260 CT THORAX DX C+: CPT | Mod: 26,,, | Performed by: RADIOLOGY

## 2025-03-06 PROCEDURE — 74177 CT ABD & PELVIS W/CONTRAST: CPT | Mod: TC

## 2025-03-06 PROCEDURE — 25500020 PHARM REV CODE 255

## 2025-03-06 PROCEDURE — 74177 CT ABD & PELVIS W/CONTRAST: CPT | Mod: 26,,, | Performed by: RADIOLOGY

## 2025-03-06 RX ADMIN — IOHEXOL 100 ML: 350 INJECTION, SOLUTION INTRAVENOUS at 09:03

## 2025-03-06 RX ADMIN — IOHEXOL 30 ML: 300 INJECTION, SOLUTION INTRAVENOUS at 09:03

## 2025-03-13 NOTE — PROGRESS NOTES
[facility]  Colon and Rectal Surgery    [address]  [place], [place]  Tel: [phone]  Fax: [phone]  [email or web address]   MD Payam Reid MD Brian Kann, MD W. Forrest Johnston, MD Matthew Giglia, MD Jennifer Paruch, MD William Kethman, MD Danielle Kay, MD     Patient name: Loki Li Jr.   YOB: 1965   MRN: [id number]    Dear Carissa Goodwin and Khoobehi,    It was a pleasure seeing Mr. Li in the Colon and Rectal Surgery clinic here at [facility].     As you know, Mr. Li is a 59 year old man with a history of HTN, HLD, and DM, Stage IV transverse colon adenocarcinoma who presents for evaluation of colostomy takedown . He underwent a splenic flexure resection, end colostomy and splenectomy with Dr. Waddell > FOLFOX > progression (pancreatic tail - biopsy proven and RPLN PET avidity) > Keytruda since 1/2023. He was seen by Dr. Goodwin in 1/2024 (note reviewed) - his plan was to follow-up the results of his PET CT, decision was made to complete Keytruda and continue surveillance - if recurrence occurs then move forward with resection. He is doing well - fatigue only around the time of Keytruda for about 1 week after, he denies any nausea, vomiting, fevers, or chills. His weight is stable but about 30 lbs less than originally. He denies fecal incontinence.     PET CT - 1/8/2024  Positive therapy response with resolution of the metastatic retroperitoneal lymph node compared to the prior examination.  No FDG avid foci are present on today's exam.     Last colonoscopy: 2018 (Complete) - repeat in 5 years  Two sessile polyps 5-6 mm in descending colon, polypectomy performed (HP)  Two sessile polyps 5-7 mm in the sigmoid colon, polypectomy performed (HP)  18 diminutive recto sigmoid polyps were ablated    4/19/2024  Here for pre-operative consultation for colostomy takedown and likely distal pancreatectomy. He is doing  well - no major changes since the last time I saw him. He is ready for surgery.    Colonoscopy - 3/5/2024  The perianal and digital rectal examinations were normal. Pertinent negatives include normal sphincter tone.   Diffuse mild inflammation characterized by altered vascularity, friability and mucus was found in the rectum, in the sigmoid colon and in the descending colon. No biopsies or other specimens were collected for this exam. Estimated blood loss was minimal.   There was evidence of a widely patent end colostomy in the transverse colon. This was characterized by healthy appearing mucosa.   The exam was otherwise without abnormality.     6/28/2024  Here for follow-up, underwent open colostomy takedown and repair of parastomal hernia on 5/13/2024 complicated by what was felt to be an internal hernia requiring emergent take back, found to have a small anastomotic complication requiring completion colectomy, ileorectal anastomosis and DLI on 5/21/2024. He is doing well - no nausea or vomiting. Some mild pain in bilateral abdomen but ostomy is functioning. He denies any fevers or chills.     5/13/2024 - Pathology  1. Ostomy with peristomal hernia, excision:   Portion of colostomy with no evidence of neoplastic change.   Fibromembranous tissue, consistent with hernia sac.     2. Descending colon, excision:   Segment of colon with focal necrosis.   Resection margins appear viable.   No dysplasia or malignancy.     5/21/2024 - Pathology  1. TOTAL ABDOMINAL COLON, COLECTOMY:   Abdominal perforation with peritonitis adjacent to an anastomosis   Appendix with reactive changes of mesopappendix   Negative for neoplasia or malignancy     2. SIGMOID COLON, RESECTION:Portion of colon with features of acute peritonitis     3. ANASTOMOTIC DONUTS:   Portion of small bowel with ulceration   A separate portion of colon with acute inflammatory and reactive changes     8/9/2024  Here for follow-up, seen by Dr. Koobehi for  Keytruda. He is doing well - still not smoking. He is not having any major issues - CT was reassuring. He does have mild prolapse of his ileostomy.    CT AP - 7/5/2024  The ileo rectal anastomosis appears intact. No extraluminal contrast to suggest anastomotic leak. No bowel obstruction. Visualized loops of small bowel are normal in caliber without wall thickening.     History of colonic adenocarcinoma status post colectomy with ileorectal anastomosis and diverting loop ileostomy as well as splenectomy.     No evidence to suggest local recurrence or distant abdominopelvic metastasis.     Persistent fluid/peritoneal thickening left upper quadrant similar/improved compared to prior.    11/15/2024  Follow-up after ileostomy takedown on 10/7/2024, here for post-operative follow-up. He is doing better - having somewhere between 10-20 bowel movements per day, small volume but it is improving. He is controlled the stools without significant issues. He is taking 10 mg of Imodium twice daily.    Ileostomy, excision:  Small intestine mucosa with underlying benign lymphoid aggregates and submucosal hemorrhage with cautery artifact consistent with area ostomy.    No evidence of dysplasia or malignancy.    1/10/2025  Here for follow-up. He is followed by Dr. Khoobehi - ctDNA negative. He is having about 8-10 times per day but it is getting some consistency. He is taking Imodium 3-4 times per day before meals - 2 tabs - about 8 total pills per day. The Benefiber makes things worse. He is not taking Lomotil. He is going to do a PET CT in February and he may stop the immunotherapy.    3/14/2025  Here for follow-up. He is followed by Dr. Khoobehi - recent CT reassuring. His bowel function is better - but slow - he is taking about 10 Imodium per day. He has good days and bad days (better than two months ago). Infrequently will have accidents but only at night when sleeping. He is taking the Imodium before meals and before bedtime.  Lomotil didn't seem to add much benefit. He is still on immunotherapy every 6 weeks - doesn't notice that this changes his bowel function but will be on the watch.    Oncology History   Malignant neoplasm of transverse colon   4/20/2022 Surgery    Partial colectomy, splenectomy, end colostomy - pancreas appeared to be involved at that time    1. Spleen, splenectomy:   - Benign splenic parenchyma   - Negative for malignancy   2. Colon, partial colectomy:   - Invasive colonic adenocarcinoma, poorly differentiated (G3)     - Invades into pericolorectal tissue (pT3)   - Margins uninvolved by invasive carcinoma     - 0.1 cm to the mesenteric margin   - Lymphovascular invasion identified   - No perineural invasion identified   - Seventeen of twenty-seven lymph nodes, positive for metastatic carcinoma   (17/27, pN2b)   - Fibrous serosal adhesions   - See below for results of MSI testing   - CARLOS ENRIQUE/MILLIE Targeted Gene Panel has been ordered and results will be issued in   a supplemental report     CAP Synoptic Checklist for Colonic Neoplasms:     - Procedure: Partial colectomy     - Tumor Site: Splenic flexure     - Histologic Type: Adenocarcinoma     - Histologic Grade: G3, poorly differentiated     - Tumor Size: 5.0 x 4.5 x 2.7 cm     - Tumor Extent: Invades through muscularis propria into pericolorectal   tissue    - Macroscopic Tumor Perforation: Not identified     - Lymphovascular Invasion: Present, small vessel involved     - Perineural Invasion: Not identified     - Number of Tumor Buds: 5 in one hotspot field     - Tumor Bud Score: Intermediate (5-9)     - Type of Polyp in which Invasive Carcinoma Arose: None identified     - Treatment Effect: No known presurgical therapy     - Margins: All margins negative for invasive carcinoma       - Closest Margin to Invasive Carcinoma: 0.1 cm to mesenteric margin     - Regional Lymph Nodes:       - Number of Lymph Nodes with Tumor: 17       - Number of Lymph Nodes Examined: 27        - Tumor Deposits: Not identified     - Distant Metastasis: Not applicable     - Pathologic Stage Classification: pT3 pN2b     - Additional Findings: Fibrous serosal adhesions; benign spleen     - Special Studies: See below for results of MSI testing; CARLOS ENRIQUE/MILLIE panel has         been ordered and results will be issued in a supplemental report     - Designate Block for Future Studies: [id number]   Immunohistochemistry (IHC) Testing for Mismatch Repair (MMR) Proteins:   MLH1 - Intact nuclear expression   MSH2 - Intact nuclear expression   MSH6 - Intact nuclear expression   PMS2 - Intact nuclear expression      5/5/2022 Initial Diagnosis    Malignant neoplasm of transverse colon     7/12/2022 - 12/14/2022 Chemotherapy    Treatment Summary   Plan Name: OP mFOLFOX 6 Q2W  Treatment Goal: Curative  Status: Inactive  Start Date: 7/12/2022  End Date: 12/14/2022  Provider: Aurash Khoobehi, MD  Chemotherapy: fluorouraciL injection 930 mg, 400 mg/m2 = 930 mg, Intravenous, Clinic/HOD 1 time, 12 of 12 cycles  Administration: 930 mg (7/12/2022), 930 mg (7/26/2022), 930 mg (8/9/2022), 930 mg (8/23/2022), 930 mg (9/6/2022), 930 mg (9/19/2022), 835 mg (10/3/2022), 835 mg (10/17/2022), 825 mg (10/31/2022), 820 mg (11/14/2022), 830 mg (11/28/2022), 825 mg (12/12/2022)  oxaliplatin (ELOXATIN) 85 mg/m2 = 197 mg in dextrose 5 % 539.4 mL chemo infusion, 85 mg/m2 = 197 mg, Intravenous, Clinic/HOD 1 time, 10 of 10 cycles  Dose modification: 68 mg/m2 (original dose 85 mg/m2, Cycle 8, Reason: MD Discretion)  Administration: 197 mg (7/12/2022), 197 mg (7/26/2022), 197 mg (8/9/2022), 197 mg (8/23/2022), 197 mg (9/6/2022), 197 mg (9/19/2022), 178 mg (10/3/2022), 142 mg (10/17/2022), 139 mg (11/14/2022)     2/13/2023 -  Chemotherapy    Treatment Summary   Plan Name: OP PEMBROLIZUMAB 400MG Q6W  Treatment Goal: Curative  Status: Active  Start Date: 2/13/2023  End Date: 10/28/2025 (Planned)  Provider: Aurash Khoobehi, MD  Chemotherapy: [No matching  medication found in this treatment plan]     2/26/2024 Cancer Staged    Staging form: Colon and Rectum, AJCC 8th Edition  - Pathologic: Stage IVB (pT3, pN2b, cM1b)     3/6/2025 Imaging Significant Findings    CT CAP  Postoperative changes with no findings suggesting recurrent or metastatic disease. Findings as detailed above including bilateral renal scarring. Prior cholecystectomy. Prior splenectomy.      Metastatic adenocarcinoma to pancreas   1/13/2023 Initial Diagnosis    Metastatic adenocarcinoma to pancreas     2/13/2023 -  Chemotherapy    Treatment Summary   Plan Name: OP PEMBROLIZUMAB 400MG Q6W  Treatment Goal: Curative  Status: Active  Start Date: 2/13/2023  End Date: 10/28/2025 (Planned)  Provider: Aurash Khoobehi, MD  Chemotherapy: [No matching medication found in this treatment plan]         The patient was informed of the availability of a certified  without charge. A certified  was not necessary for this visit.    Review of Systems  See pertinent review of systems above    Past Medical History:   Diagnosis Date    Colon cancer     Digestive disorder     DM (diabetes mellitus)     Hyperlipidemia     Hypertension     Prediabetes      Past Surgical History:   Procedure Laterality Date    ADENOIDECTOMY  1972    ANASTOMOSIS OF ILEUM TO RECTUM N/A 5/21/2024    Procedure: ANASTOMOSIS, ILEORECTAL;  Surgeon: Farhan Lance MD;  Location: [facility] OR [address] [place] [address];  Service: Colon and Rectal;  Laterality: N/A;    CHOLECYSTECTOMY  2011    CLOSURE, COLOSTOMY N/A 5/13/2024    Procedure: CLOSURE, COLOSTOMY (eras high/lithotomy);  Surgeon: Farhan Lance MD;  Location: [facility] OR [address] [place] [address];  Service: Colon and Rectal;  Laterality: N/A;  CONSENTS IN DOS    CLOSURE, ILEOSTOMY, LOOP N/A 10/7/2024    Procedure: CLOSURE, ILEOSTOMY, LOOP;  Surgeon: Farhan Lance MD;  Location: [facility] OR [address] [place] [address];  Service: Colon and Rectal;  Laterality: N/A;    COLON SURGERY      COLONOSCOPY      2018     COLONOSCOPY N/A 3/5/2024    Procedure: COLONOSCOPY;  Surgeon: Farhan Lance MD;  Location: [facility] ENDO (4TH FLR);  Service: Endoscopy;  Laterality: N/A;  2/27/24-Kethman pt, 1-4wks, port, PEG plus 2 enemas morning of procedure, instr portal-DS  2/28/24- LVM for precall - ERW  pt confirmed procedure. cf    COLOSTOMY N/A 04/25/2022    Procedure: CREATION, COLOSTOMY;  Surgeon: Carlton Waddell MD;  Location: [facility] OR;  Service: General;  Laterality: N/A;    ENDOSCOPIC ULTRASOUND OF UPPER GASTROINTESTINAL TRACT N/A 01/06/2023    Procedure: ULTRASOUND, UPPER GI TRACT, ENDOSCOPIC;  Surgeon: Rinku Orozco III, MD;  Location: [facility] ENDO;  Service: Endoscopy;  Laterality: N/A;    FLEXIBLE SIGMOIDOSCOPY N/A 5/13/2024    Procedure: SIGMOIDOSCOPY, FLEXIBLE;  Surgeon: Farhan Lance MD;  Location: [facility] OR 2ND FLR;  Service: Colon and Rectal;  Laterality: N/A;    FLEXIBLE SIGMOIDOSCOPY  5/21/2024    Procedure: SIGMOIDOSCOPY, FLEXIBLE;  Surgeon: Farhan Lance MD;  Location: [facility] OR 2ND FLR;  Service: Colon and Rectal;;    FLEXIBLE SIGMOIDOSCOPY N/A 10/7/2024    Procedure: SIGMOIDOSCOPY, FLEXIBLE;  Surgeon: Farhan Lance MD;  Location: [facility] OR 2ND FLR;  Service: Colon and Rectal;  Laterality: N/A;    ILEOSTOMY N/A 5/21/2024    Procedure: CREATION, ILEOSTOMY, DIVERTING LOOP;  Surgeon: Farhan Lance MD;  Location: [facility] OR 2ND FLR;  Service: Colon and Rectal;  Laterality: N/A;    INSERTION OF TUNNELED CENTRAL VENOUS CATHETER (CVC) WITH SUBCUTANEOUS PORT Right 05/18/2022    Procedure: [id number];  Surgeon: Carlton Waddell MD;  Location: [facility] OR;  Service: General;  Laterality: Right;    INSERTION OF URETERAL CATHETER N/A 5/13/2024    Procedure: INSERTION, CATHETER, URETER, BILATERAL;  Surgeon: Travis Edwards MD;  Location: [facility] OR 2ND FLR;  Service: Urology;  Laterality: N/A;    INSERTION OF URETERAL CATHETER Left 5/21/2024    Procedure: INSERTION, CATHETER, URETER;  Surgeon: Danielle  Carlton ARREDONDO MD;  Location: [facility] OR 2ND FLR;  Service: Urology;  Laterality: Left;    LAPAROTOMY, EXPLORATORY N/A 2024    Procedure: LAPAROTOMY, EXPLORATORY;  Surgeon: Farhan Lance MD;  Location: [facility] OR 2ND FLR;  Service: Colon and Rectal;  Laterality: N/A;    LYSIS OF ADHESIONS N/A 2024    Procedure: LYSIS, ADHESIONS;  Surgeon: Farhan Lance MD;  Location: [facility] OR 2ND FLR;  Service: Colon and Rectal;  Laterality: N/A;    MOBILIZATION OF SPLENIC FLEXURE N/A 2022    Procedure: MOBILIZATION, SPLENIC FLEXURE;  Surgeon: Carlton Waddell MD;  Location: [facility] OR;  Service: General;  Laterality: N/A;    SPLENECTOMY N/A 2022    Procedure: SPLENECTOMY;  Surgeon: Carlton Waddell MD;  Location: [facility] OR;  Service: General;  Laterality: N/A;    SUBTOTAL COLECTOMY N/A 2022    Procedure: COLECTOMY, PARTIAL;  Surgeon: Carlton Waddell MD;  Location: [facility] OR;  Service: General;  Laterality: N/A;    TONSILLECTOMY      TOTAL ABDOMINAL COLECTOMY N/A 2024    Procedure: COLECTOMY, COMPLETION, ABDOMINAL;  Surgeon: Farhan Lance MD;  Location: [facility] OR 2ND FLR;  Service: Colon and Rectal;  Laterality: N/A;    TUMOR REMOVAL  10/18/2023    on top of the nose    VASECTOMY       Family History   Problem Relation Name Age of Onset    Heart disease Father Loki senior     Diabetes Father Loki senior     Alcohol abuse Paternal Grandfather Waylon Li     Rectal cancer Other Makenzie         half-sister    Cancer Sister makenzie      Social History     Tobacco Use    Smoking status: Former     Current packs/day: 0.00     Average packs/day: 1 pack/day for 40.0 years (40.0 ttl pk-yrs)     Types: Cigarettes     Start date: 1984     Quit date: 2024     Years since quittin.8     Passive exposure: Current    Smokeless tobacco: Never   Substance Use Topics    Alcohol use: Not Currently    Drug use: Never     Review of patient's allergies indicates:   Allergen Reactions    Penicillins Rash  "      Current Outpatient Medications on File Prior to Visit   Medication Sig Dispense Refill    atorvastatin (LIPITOR) 20 MG tablet Take 1 tablet (20 mg total) by mouth every evening. 90 tablet 3    diphenoxylate-atropine 2.5-0.025 mg (LOMOTIL) 2.5-0.025 mg per tablet Take 1 tablet by mouth 3 (three) times daily. 90 tablet 3    enalapril (VASOTEC) 10 MG tablet Take 1 tablet (10 mg total) by mouth once daily. (Patient taking differently: Take 10 mg by mouth every morning.) 90 tablet 3    insulin glargine U-300 conc (TOUJEO MAX U-300 SOLOSTAR) 300 unit/mL (3 mL) insulin pen Inject 48 Units into the skin once daily. (Discard pen 56 days after initial use) (Patient taking differently: Inject 48 Units into the skin every morning. (Discard pen 56 days after initial use)) 2 Pen 11    metFORMIN (GLUCOPHAGE) 1000 MG tablet Take 1 tablet (1,000 mg total) by mouth 2 (two) times daily with meals. 180 tablet 3    pen needle, diabetic (BD ULTRA-FINE MINI PEN NEEDLE) 31 gauge x 3/16" Ndle 1 each by Misc.(Non-Drug; Combo Route) route once daily. 100 each 3    promethazine (PHENERGAN) 12.5 MG Tab Take 1 tablet (12.5 mg total) by mouth every 4 (four) hours as needed. 25 tablet 1    tadalafiL (CIALIS) 20 MG Tab Take 1 tablet 20-30 minutes before sexual activity as needed 20 tablet 3    traZODone (DESYREL) 50 MG tablet Take 1 tablet (50 mg total) by mouth every evening. 90 tablet 3     No current facility-administered medications on file prior to visit.     Physical Examination  Virtual visit    Assessment and Plan of Care    Thank you again for referring Mr. Li to my care. In summary, Mr. 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 He underwent colostomy takedown complicated by anastomotic leak requiring revision and DLI - this has now been taken down. Struggling with bowel function - working " on this with him. We discussed treatment options and have provided the following recommendations:    Follow-up with Dr. Khoobehi for ongoing surveillance  Working to optimize function - discussed foods that may help, may want to talk to PCP about coming off Metformin, continue Imodium  Follow-up with me in 3 months    Please do not hesitate to contact me if you have any questions.      Farhan Lance MD, FACS, FASCRS  Department of Colon & Rectal Surgery  [facility]

## 2025-03-14 ENCOUNTER — OFFICE VISIT (OUTPATIENT)
Dept: SURGERY | Facility: CLINIC | Age: 60
End: 2025-03-14
Payer: COMMERCIAL

## 2025-03-14 DIAGNOSIS — C18.5 MALIGNANT NEOPLASM OF SPLENIC FLEXURE: Primary | ICD-10-CM

## 2025-03-24 ENCOUNTER — TELEPHONE (OUTPATIENT)
Dept: HEMATOLOGY/ONCOLOGY | Facility: CLINIC | Age: 60
End: 2025-03-24
Payer: COMMERCIAL

## 2025-03-31 ENCOUNTER — OFFICE VISIT (OUTPATIENT)
Dept: HEMATOLOGY/ONCOLOGY | Facility: CLINIC | Age: 60
End: 2025-03-31
Payer: COMMERCIAL

## 2025-03-31 ENCOUNTER — LAB VISIT (OUTPATIENT)
Dept: LAB | Facility: HOSPITAL | Age: 60
End: 2025-03-31
Attending: INTERNAL MEDICINE
Payer: COMMERCIAL

## 2025-03-31 VITALS
TEMPERATURE: 97 F | OXYGEN SATURATION: 100 % | DIASTOLIC BLOOD PRESSURE: 82 MMHG | RESPIRATION RATE: 18 BRPM | SYSTOLIC BLOOD PRESSURE: 166 MMHG | HEART RATE: 70 BPM | WEIGHT: 239.44 LBS | BODY MASS INDEX: 30.74 KG/M2

## 2025-03-31 DIAGNOSIS — E78.49 OTHER HYPERLIPIDEMIA: ICD-10-CM

## 2025-03-31 DIAGNOSIS — C77.2 METASTASIS TO RETROPERITONEAL LYMPH NODE: ICD-10-CM

## 2025-03-31 DIAGNOSIS — C18.4 MALIGNANT NEOPLASM OF TRANSVERSE COLON: ICD-10-CM

## 2025-03-31 DIAGNOSIS — E11.00 TYPE 2 DIABETES MELLITUS WITH HYPEROSMOLARITY WITHOUT COMA, WITHOUT LONG-TERM CURRENT USE OF INSULIN: ICD-10-CM

## 2025-03-31 DIAGNOSIS — E03.9 ACQUIRED HYPOTHYROIDISM: ICD-10-CM

## 2025-03-31 DIAGNOSIS — C78.89 METASTATIC ADENOCARCINOMA TO PANCREAS: Primary | ICD-10-CM

## 2025-03-31 DIAGNOSIS — I10 PRIMARY HYPERTENSION: ICD-10-CM

## 2025-03-31 LAB
ABSOLUTE EOSINOPHIL (SMH): 0.18 K/UL
ABSOLUTE MONOCYTE (SMH): 1.18 K/UL (ref 0.3–1)
ABSOLUTE NEUTROPHIL COUNT (SMH): 4 K/UL (ref 1.8–7.7)
ALBUMIN SERPL-MCNC: 3.9 G/DL (ref 3.5–5.2)
ALP SERPL-CCNC: 86 UNIT/L (ref 40–150)
ALT SERPL-CCNC: 11 UNIT/L (ref 10–44)
ANION GAP (SMH): 6 MMOL/L (ref 8–16)
AST SERPL-CCNC: 22 UNIT/L (ref 11–45)
BASOPHILS # BLD AUTO: 0.12 K/UL
BASOPHILS NFR BLD AUTO: 1.2 %
BILIRUB SERPL-MCNC: 0.6 MG/DL (ref 0.1–1)
BUN SERPL-MCNC: 15 MG/DL (ref 6–20)
CALCIUM SERPL-MCNC: 9.3 MG/DL (ref 8.7–10.5)
CHLORIDE SERPL-SCNC: 104 MMOL/L (ref 95–110)
CO2 SERPL-SCNC: 28 MMOL/L (ref 23–29)
CREAT SERPL-MCNC: 1.1 MG/DL (ref 0.5–1.4)
ERYTHROCYTE [DISTWIDTH] IN BLOOD BY AUTOMATED COUNT: 14.3 % (ref 11.5–14.5)
GFR SERPLBLD CREATININE-BSD FMLA CKD-EPI: >60 ML/MIN/1.73/M2
GLUCOSE SERPL-MCNC: 85 MG/DL (ref 70–110)
HCT VFR BLD AUTO: 36 % (ref 40–54)
HGB BLD-MCNC: 11.7 GM/DL (ref 14–18)
IMM GRANULOCYTES # BLD AUTO: 0.02 K/UL (ref 0–0.04)
IMM GRANULOCYTES NFR BLD AUTO: 0.2 % (ref 0–0.5)
LYMPHOCYTES # BLD AUTO: 4.77 K/UL (ref 1–4.8)
MCH RBC QN AUTO: 27.3 PG (ref 27–31)
MCHC RBC AUTO-ENTMCNC: 32.5 G/DL (ref 32–36)
MCV RBC AUTO: 84 FL (ref 82–98)
NUCLEATED RBC (/100WBC) (SMH): 0 /100 WBC
PLATELET # BLD AUTO: 364 K/UL (ref 150–450)
PMV BLD AUTO: 9.1 FL (ref 9.2–12.9)
POTASSIUM SERPL-SCNC: 5.5 MMOL/L (ref 3.5–5.1)
PROT SERPL-MCNC: 6.9 GM/DL (ref 6–8.4)
RBC # BLD AUTO: 4.28 M/UL (ref 4.6–6.2)
RELATIVE EOSINOPHIL (SMH): 1.8 % (ref 0–8)
RELATIVE LYMPHOCYTE (SMH): 46.4 % (ref 18–48)
RELATIVE MONOCYTE (SMH): 11.5 % (ref 4–15)
RELATIVE NEUTROPHIL (SMH): 38.9 % (ref 38–73)
SODIUM SERPL-SCNC: 138 MMOL/L (ref 136–145)
TSH SERPL-ACNC: 1.83 UIU/ML (ref 0.4–4)
WBC # BLD AUTO: 10.28 K/UL (ref 3.9–12.7)

## 2025-03-31 PROCEDURE — 99215 OFFICE O/P EST HI 40 MIN: CPT | Mod: S$GLB,,, | Performed by: INTERNAL MEDICINE

## 2025-03-31 PROCEDURE — 36415 COLL VENOUS BLD VENIPUNCTURE: CPT

## 2025-03-31 PROCEDURE — 80053 COMPREHEN METABOLIC PANEL: CPT

## 2025-03-31 PROCEDURE — 99999 PR PBB SHADOW E&M-EST. PATIENT-LVL III: CPT | Mod: PBBFAC,,, | Performed by: INTERNAL MEDICINE

## 2025-03-31 PROCEDURE — 84443 ASSAY THYROID STIM HORMONE: CPT

## 2025-03-31 PROCEDURE — 85025 COMPLETE CBC W/AUTO DIFF WBC: CPT

## 2025-03-31 PROCEDURE — G2211 COMPLEX E/M VISIT ADD ON: HCPCS | Mod: S$GLB,,, | Performed by: INTERNAL MEDICINE

## 2025-03-31 RX ORDER — EPINEPHRINE 0.3 MG/.3ML
0.3 INJECTION SUBCUTANEOUS ONCE AS NEEDED
Status: CANCELLED | OUTPATIENT
Start: 2025-04-01

## 2025-03-31 RX ORDER — HEPARIN 100 UNIT/ML
500 SYRINGE INTRAVENOUS
Status: CANCELLED | OUTPATIENT
Start: 2025-04-01

## 2025-03-31 RX ORDER — DIPHENHYDRAMINE HYDROCHLORIDE 50 MG/ML
50 INJECTION, SOLUTION INTRAMUSCULAR; INTRAVENOUS ONCE AS NEEDED
Status: CANCELLED | OUTPATIENT
Start: 2025-04-01

## 2025-03-31 RX ORDER — SODIUM CHLORIDE 0.9 % (FLUSH) 0.9 %
10 SYRINGE (ML) INJECTION
Status: CANCELLED | OUTPATIENT
Start: 2025-04-01

## 2025-03-31 NOTE — PROGRESS NOTES
Service Date:  3/31/25    Chief Complaint:  Colon cancer    Osman Li Jr. is a 59 y.o. 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  This occurred very shortly after completing treatment.    Patient was part of a clinical trial to measure CT DNA.  His CT DNA started to go up while he was on FOLFOX, which prompted the scan, which showed the progression.  Next generation sequencing also showed a high tumor burden.    He is now on Keytruda as his cancer has a very high tumor mutation burden.  So far, Keytruda has been helping    Here for Keytruda.  Doing well.  No new complaints to me. Also here for CT scan results.      Review of Systems   Constitutional:  Positive for fatigue. Negative for appetite change and unexpected weight change.   HENT: Negative.  Negative for mouth sores.    Eyes: Negative.  Negative for visual disturbance.   Respiratory: Negative.  Negative for cough and shortness of breath.    Cardiovascular: Negative.  Negative for chest pain.   Gastrointestinal: Negative.  Negative for abdominal pain and diarrhea.   Endocrine: Negative.    Genitourinary: Negative.  Negative for frequency.   Musculoskeletal: Negative.  Negative for back pain.   Integumentary:  Negative for rash. Negative.   Neurological:  Positive for numbness. Negative for headaches.   Hematological: Negative.  Negative for adenopathy.   Psychiatric/Behavioral: Negative.  The patient is not nervous/anxious.         Current Outpatient Medications   Medication Instructions    atorvastatin (LIPITOR) 20 mg, Oral, Nightly    diphenoxylate-atropine 2.5-0.025 mg (LOMOTIL) 2.5-0.025 mg per tablet 1 tablet, Oral, 3 times daily    enalapril (VASOTEC) 10 mg, Oral, Daily    insulin glargine U-300 conc (TOUJEO MAX U-300 SOLOSTAR) 48 Units, Subcutaneous, Daily, (Discard pen 56 days after initial use)    metFORMIN (GLUCOPHAGE) 1,000 mg, Oral, 2 times  "daily with meals    pen needle, diabetic (BD ULTRA-FINE MINI PEN NEEDLE) 31 gauge x 3/16" Ndle 1 each, Misc.(Non-Drug; Combo Route), Daily    promethazine (PHENERGAN) 12.5 mg, Oral, Every 4 hours PRN    tadalafiL (CIALIS) 20 MG Tab Take 1 tablet 20-30 minutes before sexual activity as needed    traZODone (DESYREL) 50 mg, Oral, Nightly        Past Medical History:   Diagnosis Date    Colon cancer     Digestive disorder     DM (diabetes mellitus)     Hyperlipidemia     Hypertension     Prediabetes         Past Surgical History:   Procedure Laterality Date    ADENOIDECTOMY  1972    ANASTOMOSIS OF ILEUM TO RECTUM N/A 5/21/2024    Procedure: ANASTOMOSIS, ILEORECTAL;  Surgeon: Farhan Lance MD;  [facility];  Service: Colon and Rectal;  Laterality: N/A;    CHOLECYSTECTOMY  2011    CLOSURE, COLOSTOMY N/A 5/13/2024    Procedure: CLOSURE, COLOSTOMY (eras high/lithotomy);  Surgeon: Farhan Lance MD;  [facility];  Service: Colon and Rectal;  Laterality: N/A;  CONSENTS IN [facility]    CLOSURE, ILEOSTOMY, LOOP N/A 10/7/2024    Procedure: CLOSURE, ILEOSTOMY, LOOP;  Surgeon: Farhan Lance MD;  [facility];  Service: Colon and Rectal;  Laterality: N/A;    COLON SURGERY      COLONOSCOPY      2018    COLONOSCOPY N/A 3/5/2024    Procedure: COLONOSCOPY;  Surgeon: Farhan Lance MD;  Location: [facility] (4TH FLR);  Service: Endoscopy;  Laterality: N/A;  2/27/24-K[facility]man pt, 1-4wks, port, PEG plus 2 enemas morning of procedure, instr portal-DS  2/28/24- LVM for precall - ERW  pt confirmed procedure. cf    COLOSTOMY N/A 04/25/2022    Procedure: CREATION, COLOSTOMY;  Surgeon: Carlton Waddell MD;  Location: [facility];  Service: General;  Laterality: N/A;    ENDOSCOPIC ULTRASOUND OF UPPER GASTROINTESTINAL TRACT N/A 01/06/2023    Procedure: ULTRASOUND, UPPER GI TRACT, ENDOSCOPIC;  Surgeon: Rinku Orozco III, MD;  Location: [facility];  Service: Endoscopy;  Laterality: N/A;    " FLEXIBLE SIGMOIDOSCOPY N/A 5/13/2024    Procedure: SIGMOIDOSCOPY, FLEXIBLE;  Surgeon: Farhan Lance MD;  Location: [facility] OR 2ND FLR;  Service: Colon and Rectal;  Laterality: N/A;    FLEXIBLE SIGMOIDOSCOPY  5/21/2024    Procedure: SIGMOIDOSCOPY, FLEXIBLE;  Surgeon: Farhan Lance MD;  Location: [facility] OR 2ND FLR;  Service: Colon and Rectal;;    FLEXIBLE SIGMOIDOSCOPY N/A 10/7/2024    Procedure: SIGMOIDOSCOPY, FLEXIBLE;  Surgeon: Farhan Lance MD;  Location: [facility] OR 2ND FLR;  Service: Colon and Rectal;  Laterality: N/A;    ILEOSTOMY N/A 5/21/2024    Procedure: CREATION, ILEOSTOMY, DIVERTING LOOP;  Surgeon: Farhan Lance MD;  [facility];  Service: Colon and Rectal;  Laterality: N/A;    INSERTION OF TUNNELED CENTRAL VENOUS CATHETER (CVC) WITH SUBCUTANEOUS PORT Right 05/18/2022    Procedure: [id number];  Surgeon: Carlton Waddell MD;  Location: [facility] OR;  Service: General;  Laterality: Right;    INSERTION OF URETERAL CATHETER N/A 5/13/2024    Procedure: INSERTION, CATHETER, URETER, BILATERAL;  Surgeon: Travis Edwards MD;  Location: [facility] OR 2ND FLR;  Service: Urology;  Laterality: N/A;    INSERTION OF URETERAL CATHETER Left 5/21/2024    Procedure: INSERTION, CATHETER, URETER;  Surgeon: Carlton Santos MD;  [facility];  Service: Urology;  Laterality: Left;    LAPAROTOMY, EXPLORATORY N/A 5/21/2024    Procedure: LAPAROTOMY, EXPLORATORY;  Surgeon: Farhan Lance MD;  [facility] 2ND FLR;  Service: Colon and Rectal;  Laterality: N/A;    LYSIS OF ADHESIONS N/A 5/13/2024    Procedure: LYSIS, ADHESIONS;  Surgeon: Farhan Lance MD;  [facility] 2ND FLR;  Service: Colon and Rectal;  Laterality: N/A;    MOBILIZATION OF SPLENIC FLEXURE N/A 04/25/2022    Procedure: MOBILIZATION, SPLENIC FLEXURE;  Surgeon: Carlton Waddell MD;  Location: [facility] OR;  Service: General;  Laterality: N/A;    SPLENECTOMY N/A 04/25/2022    Procedure: SPLENECTOMY;   Surgeon: Carlton Waddell MD;  Location: [facility] OR;  Service: General;  Laterality: N/A;    SUBTOTAL COLECTOMY N/A 2022    Procedure: COLECTOMY, PARTIAL;  Surgeon: Carlton Waddell MD;  Location: [facility] OR;  Service: General;  Laterality: N/A;    TONSILLECTOMY      TOTAL ABDOMINAL COLECTOMY N/A 2024    Procedure: COLECTOMY, COMPLETION, ABDOMINAL;  Surgeon: Farhan Lance MD;  [facility];  Service: Colon and Rectal;  Laterality: N/A;    TUMOR REMOVAL  10/18/2023    on top of the nose    VASECTOMY          Family History   Problem Relation Name Age of Onset    Heart disease Father Loki senior     Diabetes Father Loki senior     Alcohol abuse Paternal Grandfather Waylon Li     Rectal cancer Other Makenzie         half-sister    Cancer Sister makenzie        Social History     Tobacco Use    Smoking status: Former     Current packs/day: 0.00     Average packs/day: 1 pack/day for 40.0 years (40.0 ttl pk-yrs)     Types: Cigarettes     Start date: 1984     Quit date: 2024     Years since quittin.9     Passive exposure: Current    Smokeless tobacco: Never   Substance Use Topics    Alcohol use: Not Currently    Drug use: Never         Vitals:    25 0910   BP: (!) 166/82   Pulse: 70   Resp: 18   Temp: 97.3 °F (36.3 °C)          Physical Exam:  BP (!) 166/82 (BP Location: Left arm, Patient Position: Sitting)   Pulse 70   Temp 97.3 °F (36.3 °C) (Temporal)   Resp 18   Wt 108.6 kg (239 lb 6.7 oz)   SpO2 100%   BMI 30.74 kg/m²     Physical Exam  Vitals and nursing note reviewed.   Constitutional:       Appearance: Normal appearance.   HENT:      Head: Normocephalic and atraumatic.      Nose: Nose normal.      Mouth/Throat:      Mouth: Mucous membranes are moist.      Pharynx: Oropharynx is clear.   Eyes:      Extraocular Movements: Extraocular movements intact.      Conjunctiva/sclera: Conjunctivae normal.   Cardiovascular:      Rate and Rhythm: Normal rate and regular rhythm.       Heart sounds: Normal heart sounds.   Pulmonary:      Effort: Pulmonary effort is normal.      Breath sounds: Normal breath sounds.   Abdominal:      General: Abdomen is flat. Bowel sounds are normal.      Palpations: Abdomen is soft.      Comments: Ostomy bag in place.   Musculoskeletal:         General: Normal range of motion.      Cervical back: Normal range of motion and neck supple.   Skin:     General: Skin is warm and dry.   Neurological:      General: No focal deficit present.      Mental Status: He is alert and oriented to person, place, and time. Mental status is at baseline.   Psychiatric:         Mood and Affect: Mood normal.          Labs:  Lab Results   Component Value Date    WBC 10.28 03/31/2025    RBC 4.28 (L) 03/31/2025    HGB 11.7 (L) 03/31/2025    HCT 36.0 (L) 03/31/2025    MCV 84 03/31/2025    MCH 27.3 03/31/2025    MCHC 32.5 03/31/2025    RDW 14.3 03/31/2025     03/31/2025    MPV 9.1 (L) 03/31/2025    GRAN 3.3 02/17/2025    GRAN 41.4 02/17/2025    LYMPH 3.7 02/17/2025    LYMPH 46.8 02/17/2025    MONO 0.7 02/17/2025    MONO 9.1 02/17/2025    EOS 0.1 02/17/2025    BASO 0.09 02/17/2025    EOSINOPHIL 1.3 02/17/2025    BASOPHIL 1.1 02/17/2025     Sodium   Date Value Ref Range Status   03/31/2025 138 136 - 145 mmol/L Final     Potassium   Date Value Ref Range Status   03/31/2025 5.5 (H) 3.5 - 5.1 mmol/L Final     Chloride   Date Value Ref Range Status   02/17/2025 102 95 - 110 mmol/L Final     CO2   Date Value Ref Range Status   03/31/2025 28 23 - 29 mmol/L Final     Glucose   Date Value Ref Range Status   02/17/2025 286 (H) 70 - 110 mg/dL Final     BUN   Date Value Ref Range Status   03/31/2025 15 6 - 20 mg/dL Final     Creatinine   Date Value Ref Range Status   03/31/2025 1.1 0.5 - 1.4 mg/dL Final     Calcium   Date Value Ref Range Status   03/31/2025 9.3 8.7 - 10.5 mg/dL Final     Total Protein   Date Value Ref Range Status   02/17/2025 7.4 6.0 - 8.4 g/dL Final     Albumin   Date Value Ref  Range Status   03/31/2025 3.9 3.5 - 5.2 g/dL Final     Bilirubin Total   Date Value Ref Range Status   03/31/2025 0.6 0.1 - 1.0 mg/dL Final     Comment:     For infants and newborns, interpretation of results should be based   on gestational age, weight and in agreement with clinical   observations.    Premature Infant recommended reference ranges:   0-24 hours:  <8.0 mg/dL   24-48 hours: <12.0 mg/dL   3-5 days:    <15.0 mg/dL   6-29 days:   <15.0 mg/dL     ALP   Date Value Ref Range Status   03/31/2025 86 40 - 150 unit/L Final     AST   Date Value Ref Range Status   03/31/2025 22 11 - 45 unit/L Final     ALT   Date Value Ref Range Status   03/31/2025 11 10 - 44 unit/L Final     Anion Gap   Date Value Ref Range Status   02/17/2025 7 (L) 8 - 16 mmol/L Final     eGFR if    Date Value Ref Range Status   07/25/2022 >60 >60 mL/min/1.73 m^2 Final     eGFR if non    Date Value Ref Range Status   07/25/2022 >60 >60 mL/min/1.73 m^2 Final     Comment:     Calculation used to obtain the estimated glomerular filtration  rate (eGFR) is the CKD-EPI equation.          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    -Testing for ctDNA negative again on repeat!. Repeat scans show no evidence of dz  -ok for Keytruda if labs ok  -Return to clinic in 6 weeks for next.    Hyperkalemia  -he has stopped his diuretic and I believe this is the culprit. No need for kayexelate at this time.    HTN  - on Enalapril  - follow up with PCP    HLP  - follow up with PCP    DM2  - on Metformin  - follow up  with PCP    Tobacco use  - counseled on cessation      Aurash Khoobehi, MD  Hematology and Oncology    Visit today included increased complexity associated with the care of the episodic problem colon cancer addressed and managing the longitudinal care of the patient due to the serious and/or complex managed problem(s).

## 2025-04-01 ENCOUNTER — INFUSION (OUTPATIENT)
Dept: INFUSION THERAPY | Facility: HOSPITAL | Age: 60
End: 2025-04-01
Attending: INTERNAL MEDICINE
Payer: COMMERCIAL

## 2025-04-01 VITALS
RESPIRATION RATE: 17 BRPM | SYSTOLIC BLOOD PRESSURE: 157 MMHG | BODY MASS INDEX: 30.83 KG/M2 | OXYGEN SATURATION: 99 % | WEIGHT: 240.19 LBS | TEMPERATURE: 98 F | HEIGHT: 74 IN | HEART RATE: 66 BPM | DIASTOLIC BLOOD PRESSURE: 80 MMHG

## 2025-04-01 DIAGNOSIS — C18.4 MALIGNANT NEOPLASM OF TRANSVERSE COLON: Primary | ICD-10-CM

## 2025-04-01 DIAGNOSIS — C78.89 METASTATIC ADENOCARCINOMA TO PANCREAS: ICD-10-CM

## 2025-04-01 PROCEDURE — A4216 STERILE WATER/SALINE, 10 ML: HCPCS | Performed by: INTERNAL MEDICINE

## 2025-04-01 PROCEDURE — 63600175 PHARM REV CODE 636 W HCPCS: Performed by: INTERNAL MEDICINE

## 2025-04-01 PROCEDURE — 96413 CHEMO IV INFUSION 1 HR: CPT

## 2025-04-01 PROCEDURE — 25000003 PHARM REV CODE 250: Performed by: INTERNAL MEDICINE

## 2025-04-01 RX ORDER — HEPARIN 100 UNIT/ML
500 SYRINGE INTRAVENOUS
Status: DISCONTINUED | OUTPATIENT
Start: 2025-04-01 | End: 2025-04-01 | Stop reason: HOSPADM

## 2025-04-01 RX ORDER — EPINEPHRINE 0.3 MG/.3ML
0.3 INJECTION SUBCUTANEOUS ONCE AS NEEDED
Status: DISCONTINUED | OUTPATIENT
Start: 2025-04-01 | End: 2025-04-01 | Stop reason: HOSPADM

## 2025-04-01 RX ORDER — SODIUM CHLORIDE 0.9 % (FLUSH) 0.9 %
10 SYRINGE (ML) INJECTION
Status: DISCONTINUED | OUTPATIENT
Start: 2025-04-01 | End: 2025-04-01 | Stop reason: HOSPADM

## 2025-04-01 RX ORDER — DIPHENHYDRAMINE HYDROCHLORIDE 50 MG/ML
50 INJECTION, SOLUTION INTRAMUSCULAR; INTRAVENOUS ONCE AS NEEDED
Status: DISCONTINUED | OUTPATIENT
Start: 2025-04-01 | End: 2025-04-01 | Stop reason: HOSPADM

## 2025-04-01 RX ADMIN — HEPARIN 500 UNITS: 100 SYRINGE at 09:04

## 2025-04-01 RX ADMIN — SODIUM CHLORIDE 400 MG: 9 INJECTION, SOLUTION INTRAVENOUS at 09:04

## 2025-04-01 RX ADMIN — SODIUM CHLORIDE, PRESERVATIVE FREE 10 ML: 5 INJECTION INTRAVENOUS at 09:04

## 2025-04-01 NOTE — PLAN OF CARE
Problem: Fatigue  Goal: Improved Activity Tolerance  Outcome: Progressing  Intervention: Promote Improved Energy  Flowsheets (Taken 4/1/2025 7674)  Fatigue Management:   fatigue-related activity identified   frequent rest breaks encouraged   paced activity encouraged  Sleep/Rest Enhancement:   regular sleep/rest pattern promoted   relaxation techniques promoted  Activity Management: Ambulated -L4  Environmental Support: rest periods encouraged

## 2025-05-05 ENCOUNTER — TELEPHONE (OUTPATIENT)
Dept: HEMATOLOGY/ONCOLOGY | Facility: CLINIC | Age: 60
End: 2025-05-05
Payer: COMMERCIAL

## 2025-05-10 ENCOUNTER — PATIENT MESSAGE (OUTPATIENT)
Dept: HEMATOLOGY/ONCOLOGY | Facility: CLINIC | Age: 60
End: 2025-05-10
Payer: COMMERCIAL

## 2025-05-12 ENCOUNTER — PATIENT MESSAGE (OUTPATIENT)
Dept: HEMATOLOGY/ONCOLOGY | Facility: CLINIC | Age: 60
End: 2025-05-12
Payer: COMMERCIAL

## 2025-05-19 ENCOUNTER — LAB VISIT (OUTPATIENT)
Dept: LAB | Facility: HOSPITAL | Age: 60
End: 2025-05-19
Attending: INTERNAL MEDICINE
Payer: COMMERCIAL

## 2025-05-19 ENCOUNTER — OFFICE VISIT (OUTPATIENT)
Dept: HEMATOLOGY/ONCOLOGY | Facility: CLINIC | Age: 60
End: 2025-05-19
Payer: COMMERCIAL

## 2025-05-19 VITALS
HEART RATE: 80 BPM | SYSTOLIC BLOOD PRESSURE: 140 MMHG | BODY MASS INDEX: 30.44 KG/M2 | TEMPERATURE: 98 F | WEIGHT: 237.19 LBS | HEIGHT: 74 IN | DIASTOLIC BLOOD PRESSURE: 66 MMHG | OXYGEN SATURATION: 97 % | RESPIRATION RATE: 16 BRPM

## 2025-05-19 DIAGNOSIS — C18.4 MALIGNANT NEOPLASM OF TRANSVERSE COLON: ICD-10-CM

## 2025-05-19 DIAGNOSIS — C18.4 MALIGNANT NEOPLASM OF TRANSVERSE COLON: Primary | ICD-10-CM

## 2025-05-19 DIAGNOSIS — E87.5 HYPERKALEMIA: ICD-10-CM

## 2025-05-19 DIAGNOSIS — C78.89 METASTATIC ADENOCARCINOMA TO PANCREAS: ICD-10-CM

## 2025-05-19 DIAGNOSIS — C77.2 METASTASIS TO RETROPERITONEAL LYMPH NODE: ICD-10-CM

## 2025-05-19 DIAGNOSIS — E03.9 ACQUIRED HYPOTHYROIDISM: Primary | ICD-10-CM

## 2025-05-19 DIAGNOSIS — I10 PRIMARY HYPERTENSION: ICD-10-CM

## 2025-05-19 DIAGNOSIS — E11.00 TYPE 2 DIABETES MELLITUS WITH HYPEROSMOLARITY WITHOUT COMA, WITHOUT LONG-TERM CURRENT USE OF INSULIN: ICD-10-CM

## 2025-05-19 DIAGNOSIS — E03.9 ACQUIRED HYPOTHYROIDISM: ICD-10-CM

## 2025-05-19 LAB
ABSOLUTE EOSINOPHIL (SMH): 0.14 K/UL
ABSOLUTE MONOCYTE (SMH): 1.39 K/UL (ref 0.3–1)
ABSOLUTE NEUTROPHIL COUNT (SMH): 4.8 K/UL (ref 1.8–7.7)
ALBUMIN SERPL-MCNC: 4.1 G/DL (ref 3.5–5.2)
ALP SERPL-CCNC: 82 UNIT/L (ref 40–150)
ALT SERPL-CCNC: 17 UNIT/L (ref 10–44)
ANION GAP (SMH): 9 MMOL/L (ref 8–16)
AST SERPL-CCNC: 35 UNIT/L (ref 11–45)
BASOPHILS # BLD AUTO: 0.11 K/UL
BASOPHILS NFR BLD AUTO: 0.9 %
BILIRUB SERPL-MCNC: 0.7 MG/DL (ref 0.1–1)
BUN SERPL-MCNC: 23 MG/DL (ref 8–23)
CALCIUM SERPL-MCNC: 9.9 MG/DL (ref 8.7–10.5)
CHLORIDE SERPL-SCNC: 102 MMOL/L (ref 95–110)
CO2 SERPL-SCNC: 26 MMOL/L (ref 23–29)
CREAT SERPL-MCNC: 1.2 MG/DL (ref 0.5–1.4)
ERYTHROCYTE [DISTWIDTH] IN BLOOD BY AUTOMATED COUNT: 14.9 % (ref 11.5–14.5)
GFR SERPLBLD CREATININE-BSD FMLA CKD-EPI: >60 ML/MIN/1.73/M2
GLUCOSE SERPL-MCNC: 104 MG/DL (ref 70–110)
HCT VFR BLD AUTO: 37.3 % (ref 40–54)
HGB BLD-MCNC: 12.2 GM/DL (ref 14–18)
IMM GRANULOCYTES # BLD AUTO: 0.03 K/UL (ref 0–0.04)
IMM GRANULOCYTES NFR BLD AUTO: 0.2 % (ref 0–0.5)
LYMPHOCYTES # BLD AUTO: 5.67 K/UL (ref 1–4.8)
MCH RBC QN AUTO: 27.2 PG (ref 27–31)
MCHC RBC AUTO-ENTMCNC: 32.7 G/DL (ref 32–36)
MCV RBC AUTO: 83 FL (ref 82–98)
NUCLEATED RBC (/100WBC) (SMH): 0 /100 WBC
PLATELET # BLD AUTO: 407 K/UL (ref 150–450)
PMV BLD AUTO: 9.4 FL (ref 9.2–12.9)
POTASSIUM SERPL-SCNC: 5.5 MMOL/L (ref 3.5–5.1)
PROT SERPL-MCNC: 7.2 GM/DL (ref 6–8.4)
RBC # BLD AUTO: 4.49 M/UL (ref 4.6–6.2)
RELATIVE EOSINOPHIL (SMH): 1.2 % (ref 0–8)
RELATIVE LYMPHOCYTE (SMH): 46.9 % (ref 18–48)
RELATIVE MONOCYTE (SMH): 11.5 % (ref 4–15)
RELATIVE NEUTROPHIL (SMH): 39.3 % (ref 38–73)
SODIUM SERPL-SCNC: 137 MMOL/L (ref 136–145)
TSH SERPL-ACNC: 1.91 UIU/ML (ref 0.4–4)
WBC # BLD AUTO: 12.09 K/UL (ref 3.9–12.7)

## 2025-05-19 PROCEDURE — 36415 COLL VENOUS BLD VENIPUNCTURE: CPT

## 2025-05-19 PROCEDURE — 80053 COMPREHEN METABOLIC PANEL: CPT

## 2025-05-19 PROCEDURE — 99999 PR PBB SHADOW E&M-EST. PATIENT-LVL IV: CPT | Mod: PBBFAC,,, | Performed by: INTERNAL MEDICINE

## 2025-05-19 PROCEDURE — 84443 ASSAY THYROID STIM HORMONE: CPT

## 2025-05-19 PROCEDURE — 99215 OFFICE O/P EST HI 40 MIN: CPT | Mod: S$GLB,,, | Performed by: INTERNAL MEDICINE

## 2025-05-19 PROCEDURE — 85025 COMPLETE CBC W/AUTO DIFF WBC: CPT

## 2025-05-19 PROCEDURE — G2211 COMPLEX E/M VISIT ADD ON: HCPCS | Mod: S$GLB,,, | Performed by: INTERNAL MEDICINE

## 2025-05-19 RX ORDER — DIPHENHYDRAMINE HYDROCHLORIDE 50 MG/ML
50 INJECTION, SOLUTION INTRAMUSCULAR; INTRAVENOUS ONCE AS NEEDED
Status: CANCELLED | OUTPATIENT
Start: 2025-05-20

## 2025-05-19 RX ORDER — HEPARIN 100 UNIT/ML
500 SYRINGE INTRAVENOUS
Status: CANCELLED | OUTPATIENT
Start: 2025-05-20

## 2025-05-19 RX ORDER — EPINEPHRINE 0.3 MG/.3ML
0.3 INJECTION SUBCUTANEOUS ONCE AS NEEDED
Status: CANCELLED | OUTPATIENT
Start: 2025-05-20

## 2025-05-19 RX ORDER — SODIUM CHLORIDE 0.9 % (FLUSH) 0.9 %
10 SYRINGE (ML) INJECTION
Status: CANCELLED | OUTPATIENT
Start: 2025-05-20

## 2025-05-19 NOTE — PROGRESS NOTES
Service Date:  5/19/25    Chief Complaint:  Colon cancer    Osman Li Jr. is a 60 y.o. 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  This occurred very shortly after completing treatment.    Patient was part of a clinical trial to measure CT DNA.  His CT DNA started to go up while he was on FOLFOX, which prompted the scan, which showed the progression.  Next generation sequencing also showed a high tumor burden.    He is now on Keytruda as his cancer has a very high tumor mutation burden.  So far, Keytruda has been helping    Here for Keytruda.  Doing well.  No new complaints to me.       Review of Systems   Constitutional:  Positive for fatigue. Negative for appetite change and unexpected weight change.   HENT: Negative.  Negative for mouth sores.    Eyes: Negative.  Negative for visual disturbance.   Respiratory: Negative.  Negative for cough and shortness of breath.    Cardiovascular: Negative.  Negative for chest pain.   Gastrointestinal: Negative.  Negative for abdominal pain and diarrhea.   Endocrine: Negative.    Genitourinary: Negative.  Negative for frequency.   Musculoskeletal: Negative.  Negative for back pain.   Integumentary:  Negative for rash. Negative.   Neurological:  Positive for numbness. Negative for headaches.   Hematological: Negative.  Negative for adenopathy.   Psychiatric/Behavioral: Negative.  The patient is not nervous/anxious.         Current Outpatient Medications   Medication Instructions    atorvastatin (LIPITOR) 20 mg, Oral, Nightly    diphenoxylate-atropine 2.5-0.025 mg (LOMOTIL) 2.5-0.025 mg per tablet 1 tablet, Oral, 3 times daily    enalapril (VASOTEC) 10 mg, Oral, Daily    insulin glargine U-300 conc (TOUJEO MAX U-300 SOLOSTAR) 48 Units, Subcutaneous, Daily, (Discard pen 56 days after initial use)    metFORMIN (GLUCOPHAGE) 1,000 mg, Oral, 2 times daily with meals    pen  "needle, diabetic (BD ULTRA-FINE MINI PEN NEEDLE) 31 gauge x 3/16" Ndle 1 each, Misc.(Non-Drug; Combo Route), Daily    promethazine (PHENERGAN) 12.5 mg, Oral, Every 4 hours PRN    tadalafiL (CIALIS) 20 MG Tab Take 1 tablet 20-30 minutes before sexual activity as needed    traZODone (DESYREL) 50 mg, Oral, Nightly        Past Medical History:   Diagnosis Date    Colon cancer     Digestive disorder     DM (diabetes mellitus)     Hyperlipidemia     Hypertension     Prediabetes         Past Surgical History:   Procedure Laterality Date    ADENOIDECTOMY  1972    ANASTOMOSIS OF ILEUM TO RECTUM N/A 5/21/2024    Procedure: ANASTOMOSIS, ILEORECTAL;  Surgeon: Farhan Lance MD;  [facility];  Service: Colon and Rectal;  Laterality: N/A;    CHOLECYSTECTOMY  2011    CLOSURE, COLOSTOMY N/A 5/13/2024    Procedure: CLOSURE, COLOSTOMY (eras high/lithotomy);  Surgeon: Farhan Lance MD;  [facility];  Service: Colon and Rectal;  Laterality: N/A;  CONSENTS IN [facility]    CLOSURE, ILEOSTOMY, LOOP N/A 10/7/2024    Procedure: CLOSURE, ILEOSTOMY, LOOP;  Surgeon: Farhan Lance MD;  [facility];  Service: Colon and Rectal;  Laterality: N/A;    COLON SURGERY      COLONOSCOPY      2018    COLONOSCOPY N/A 3/5/2024    Procedure: COLONOSCOPY;  Surgeon: Farhan Lance MD;  Location: [facility] (4TH FLR);  Service: Endoscopy;  Laterality: N/A;  2/27/24-K[facility]man pt, 1-4wks, port, PEG plus 2 enemas morning of procedure, instr portal-DS  2/28/24- LVM for precall - ERW  pt confirmed procedure. cf    COLOSTOMY N/A 04/25/2022    Procedure: CREATION, COLOSTOMY;  Surgeon: Carlton Waddell MD;  Location: [facility];  Service: General;  Laterality: N/A;    ENDOSCOPIC ULTRASOUND OF UPPER GASTROINTESTINAL TRACT N/A 01/06/2023    Procedure: ULTRASOUND, UPPER GI TRACT, ENDOSCOPIC;  Surgeon: Rinku Orozco III, MD;  Location: [facility];  Service: Endoscopy;  Laterality: N/A;    FLEXIBLE SIGMOIDOSCOPY " N/A 5/13/2024    Procedure: SIGMOIDOSCOPY, FLEXIBLE;  Surgeon: Farhan Lance MD;  Location: [facility] OR 2ND FLR;  Service: Colon and Rectal;  Laterality: N/A;    FLEXIBLE SIGMOIDOSCOPY  5/21/2024    Procedure: SIGMOIDOSCOPY, FLEXIBLE;  Surgeon: Farhan Lance MD;  Location: [facility] OR 2ND FLR;  Service: Colon and Rectal;;    FLEXIBLE SIGMOIDOSCOPY N/A 10/7/2024    Procedure: SIGMOIDOSCOPY, FLEXIBLE;  Surgeon: Farhan Lance MD;  Location: [facility] OR 2ND FLR;  Service: Colon and Rectal;  Laterality: N/A;    ILEOSTOMY N/A 5/21/2024    Procedure: CREATION, ILEOSTOMY, DIVERTING LOOP;  Surgeon: Farhan Lance MD;  Location: [facility] OR 2ND FLR;  Service: Colon and Rectal;  Laterality: N/A;    INSERTION OF TUNNELED CENTRAL VENOUS CATHETER (CVC) WITH SUBCUTANEOUS PORT Right 05/18/2022    Procedure: [id number];  Surgeon: Carlton Waddell MD;  Location: [facility] OR;  Service: General;  Laterality: Right;    INSERTION OF URETERAL CATHETER N/A 5/13/2024    Procedure: INSERTION, CATHETER, URETER, BILATERAL;  Surgeon: Travis Edwards MD;  Location: [facility] OR 2ND FLR;  Service: Urology;  Laterality: N/A;    INSERTION OF URETERAL CATHETER Left 5/21/2024    Procedure: INSERTION, CATHETER, URETER;  Surgeon: Carlton Santos MD;  Location: [facility] OR 2ND FLR;  Service: Urology;  Laterality: Left;    LAPAROTOMY, EXPLORATORY N/A 5/21/2024    Procedure: LAPAROTOMY, EXPLORATORY;  Surgeon: Farhan Lance MD;  Location: [facility] OR 2ND FLR;  Service: Colon and Rectal;  Laterality: N/A;    LYSIS OF ADHESIONS N/A 5/13/2024    Procedure: LYSIS, ADHESIONS;  Surgeon: Farhan Lance MD;  Location: [facility] OR 2ND FLR;  Service: Colon and Rectal;  Laterality: N/A;    MOBILIZATION OF SPLENIC FLEXURE N/A 04/25/2022    Procedure: MOBILIZATION, SPLENIC FLEXURE;  Surgeon: Carlton Waddell MD;  Location: [facility] OR;  Service: General;  Laterality: N/A;    SPLENECTOMY N/A 04/25/2022    Procedure: SPLENECTOMY;  Surgeon: Carlton Waddell MD;   "Location: [facility] OR;  Service: General;  Laterality: N/A;    SUBTOTAL COLECTOMY N/A 2022    Procedure: COLECTOMY, PARTIAL;  Surgeon: Carlton Waddell MD;  Location: [facility] OR;  Service: General;  Laterality: N/A;    TONSILLECTOMY      TOTAL ABDOMINAL COLECTOMY N/A 2024    Procedure: COLECTOMY, COMPLETION, ABDOMINAL;  Surgeon: Farhan Lance MD;  [facility];  Service: Colon and Rectal;  Laterality: N/A;    TUMOR REMOVAL  10/18/2023    on top of the nose    VASECTOMY          Family History   Problem Relation Name Age of Onset    Heart disease Father Loki senior     Diabetes Father Loik senior     Alcohol abuse Paternal Grandfather Waylon Li     Rectal cancer Other Makenzie         half-sister    Cancer Sister makenzie        Social History     Tobacco Use    Smoking status: Former     Current packs/day: 0.00     Average packs/day: 1 pack/day for 40.0 years (40.0 ttl pk-yrs)     Types: Cigarettes     Start date: 1984     Quit date: 2024     Years since quittin.0     Passive exposure: Current    Smokeless tobacco: Never   Substance Use Topics    Alcohol use: Not Currently    Drug use: Never         Vitals:    25 0959   BP: (!) 140/66   Pulse: 80   Resp: 16   Temp: 97.5 °F (36.4 °C)          Physical Exam:  BP (!) 140/66 (BP Location: Right arm, Patient Position: Sitting)   Pulse 80   Temp 97.5 °F (36.4 °C) (Temporal)   Resp 16   Ht 6' 2" (1.88 m)   Wt 107.6 kg (237 lb 3.4 oz)   SpO2 97%   BMI 30.46 kg/m²     Physical Exam  Vitals and nursing note reviewed.   Constitutional:       Appearance: Normal appearance.   HENT:      Head: Normocephalic and atraumatic.      Nose: Nose normal.      Mouth/Throat:      Mouth: Mucous membranes are moist.      Pharynx: Oropharynx is clear.   Eyes:      Extraocular Movements: Extraocular movements intact.      Conjunctiva/sclera: Conjunctivae normal.   Cardiovascular:      Rate and Rhythm: Normal rate and regular rhythm.      Heart " sounds: Normal heart sounds.   Pulmonary:      Effort: Pulmonary effort is normal.      Breath sounds: Normal breath sounds.   Abdominal:      General: Abdomen is flat. Bowel sounds are normal.      Palpations: Abdomen is soft.      Comments: Ostomy bag in place.   Musculoskeletal:         General: Normal range of motion.      Cervical back: Normal range of motion and neck supple.   Skin:     General: Skin is warm and dry.   Neurological:      General: No focal deficit present.      Mental Status: He is alert and oriented to person, place, and time. Mental status is at baseline.   Psychiatric:         Mood and Affect: Mood normal.          Labs:  Lab Results   Component Value Date    WBC 12.09 05/19/2025    RBC 4.49 (L) 05/19/2025    HGB 12.2 (L) 05/19/2025    HCT 37.3 (L) 05/19/2025    MCV 83 05/19/2025    MCH 27.2 05/19/2025    MCHC 32.7 05/19/2025    RDW 14.9 (H) 05/19/2025     05/19/2025    MPV 9.4 05/19/2025    GRAN 3.3 02/17/2025    GRAN 41.4 02/17/2025    LYMPH 3.7 02/17/2025    LYMPH 46.8 02/17/2025    MONO 0.7 02/17/2025    MONO 9.1 02/17/2025    EOS 0.1 02/17/2025    BASO 0.09 02/17/2025    EOSINOPHIL 1.3 02/17/2025    BASOPHIL 1.1 02/17/2025     Sodium   Date Value Ref Range Status   05/19/2025 137 136 - 145 mmol/L Final     Potassium   Date Value Ref Range Status   05/19/2025 5.5 (H) 3.5 - 5.1 mmol/L Final     Chloride   Date Value Ref Range Status   05/19/2025 102 95 - 110 mmol/L Final     CO2   Date Value Ref Range Status   05/19/2025 26 23 - 29 mmol/L Final     Glucose   Date Value Ref Range Status   05/19/2025 104 70 - 110 mg/dL Final     BUN   Date Value Ref Range Status   05/19/2025 23 8 - 23 mg/dL Final     Creatinine   Date Value Ref Range Status   05/19/2025 1.2 0.5 - 1.4 mg/dL Final     Calcium   Date Value Ref Range Status   05/19/2025 9.9 8.7 - 10.5 mg/dL Final     Protein Total   Date Value Ref Range Status   05/19/2025 7.2 6.0 - 8.4 gm/dL Final     Albumin   Date Value Ref Range  Status   05/19/2025 4.1 3.5 - 5.2 g/dL Final     Bilirubin Total   Date Value Ref Range Status   05/19/2025 0.7 0.1 - 1.0 mg/dL Final     Comment:     For infants and newborns, interpretation of results should be based   on gestational age, weight and in agreement with clinical   observations.    Premature Infant recommended reference ranges:   0-24 hours:  <8.0 mg/dL   24-48 hours: <12.0 mg/dL   3-5 days:    <15.0 mg/dL   6-29 days:   <15.0 mg/dL     ALP   Date Value Ref Range Status   05/19/2025 82 40 - 150 unit/L Final     AST   Date Value Ref Range Status   05/19/2025 35 11 - 45 unit/L Final     ALT   Date Value Ref Range Status   05/19/2025 17 10 - 44 unit/L Final     Anion Gap   Date Value Ref Range Status   05/19/2025 9 8 - 16 mmol/L Final     eGFR if    Date Value Ref Range Status   07/25/2022 >60 >60 mL/min/1.73 m^2 Final     eGFR if non    Date Value Ref Range Status   07/25/2022 >60 >60 mL/min/1.73 m^2 Final     Comment:     Calculation used to obtain the estimated glomerular filtration  rate (eGFR) is the CKD-EPI equation.          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    -Testing for ctDNA negative again on repeat 12/2024!. Repeat scans show no evidence of dz  -ok for Keytruda. Repeat ctDNA test next month  -Return to clinic in 6 weeks for next.    Hyperkalemia  -may be due to the enalipril  -stable     HTN  - on Enalapril  - follow up with PCP    HLP  - follow up with PCP    DM2  - on Metformin  - follow up with PCP    Tobacco use  - counseled on  cessation      Aurash Khoobehi, MD  Hematology and Oncology    Visit today included increased complexity associated with the care of the episodic problem colon cancer addressed and managing the longitudinal care of the patient due to the serious and/or complex managed problem(s).

## 2025-05-20 ENCOUNTER — INFUSION (OUTPATIENT)
Dept: INFUSION THERAPY | Facility: HOSPITAL | Age: 60
End: 2025-05-20
Attending: INTERNAL MEDICINE
Payer: COMMERCIAL

## 2025-05-20 VITALS
RESPIRATION RATE: 17 BRPM | HEIGHT: 74 IN | WEIGHT: 237.13 LBS | DIASTOLIC BLOOD PRESSURE: 71 MMHG | TEMPERATURE: 98 F | SYSTOLIC BLOOD PRESSURE: 137 MMHG | OXYGEN SATURATION: 99 % | BODY MASS INDEX: 30.43 KG/M2 | HEART RATE: 66 BPM

## 2025-05-20 DIAGNOSIS — C18.4 MALIGNANT NEOPLASM OF TRANSVERSE COLON: Primary | ICD-10-CM

## 2025-05-20 DIAGNOSIS — C78.89 METASTATIC ADENOCARCINOMA TO PANCREAS: ICD-10-CM

## 2025-05-20 PROCEDURE — 63600175 PHARM REV CODE 636 W HCPCS: Performed by: INTERNAL MEDICINE

## 2025-05-20 PROCEDURE — 96413 CHEMO IV INFUSION 1 HR: CPT

## 2025-05-20 PROCEDURE — A4216 STERILE WATER/SALINE, 10 ML: HCPCS | Performed by: INTERNAL MEDICINE

## 2025-05-20 PROCEDURE — 25000003 PHARM REV CODE 250: Performed by: INTERNAL MEDICINE

## 2025-05-20 RX ORDER — HEPARIN 100 UNIT/ML
500 SYRINGE INTRAVENOUS
Status: DISCONTINUED | OUTPATIENT
Start: 2025-05-20 | End: 2025-05-20 | Stop reason: HOSPADM

## 2025-05-20 RX ORDER — EPINEPHRINE 0.3 MG/.3ML
0.3 INJECTION SUBCUTANEOUS ONCE AS NEEDED
Status: DISCONTINUED | OUTPATIENT
Start: 2025-05-20 | End: 2025-05-20 | Stop reason: HOSPADM

## 2025-05-20 RX ORDER — SODIUM CHLORIDE 0.9 % (FLUSH) 0.9 %
10 SYRINGE (ML) INJECTION
Status: DISCONTINUED | OUTPATIENT
Start: 2025-05-20 | End: 2025-05-20 | Stop reason: HOSPADM

## 2025-05-20 RX ORDER — DIPHENHYDRAMINE HYDROCHLORIDE 50 MG/ML
50 INJECTION, SOLUTION INTRAMUSCULAR; INTRAVENOUS ONCE AS NEEDED
Status: DISCONTINUED | OUTPATIENT
Start: 2025-05-20 | End: 2025-05-20 | Stop reason: HOSPADM

## 2025-05-20 RX ADMIN — SODIUM CHLORIDE 400 MG: 9 INJECTION, SOLUTION INTRAVENOUS at 08:05

## 2025-05-20 RX ADMIN — HEPARIN 500 UNITS: 100 SYRINGE at 08:05

## 2025-05-20 RX ADMIN — SODIUM CHLORIDE: 9 INJECTION, SOLUTION INTRAVENOUS at 08:05

## 2025-05-20 RX ADMIN — Medication 10 ML: at 08:05

## 2025-05-20 NOTE — PLAN OF CARE
Problem: Fatigue  Goal: Improved Activity Tolerance  Outcome: Progressing  Intervention: Promote Improved Energy  Flowsheets (Taken 5/20/2025 8814)  Fatigue Management: frequent rest breaks encouraged  Sleep/Rest Enhancement: regular sleep/rest pattern promoted  Activity Management: Ambulated -L4  Environmental Support: calm environment promoted

## 2025-06-10 ENCOUNTER — PATIENT MESSAGE (OUTPATIENT)
Dept: SURGERY | Facility: CLINIC | Age: 60
End: 2025-06-10
Payer: COMMERCIAL

## 2025-06-10 DIAGNOSIS — C18.5 MALIGNANT NEOPLASM OF SPLENIC FLEXURE: Primary | ICD-10-CM

## 2025-06-10 RX ORDER — DIPHENOXYLATE HYDROCHLORIDE AND ATROPINE SULFATE 2.5; .025 MG/1; MG/1
1 TABLET ORAL 3 TIMES DAILY PRN
Qty: 90 TABLET | Refills: 3 | Status: SHIPPED | OUTPATIENT
Start: 2025-06-10 | End: 2026-03-07

## 2025-06-13 NOTE — PLAN OF CARE
[facility]  Discharge Reassessment    Primary Care Provider: ANTONIO Emmanuel    Expected Discharge Date: 5/1/2022     Possible DC this weekend. Discussed DC needs with pt and spouse at bedside upon rounding this AM. Both verbalized that no home health or DME is needed. Voiced understanding that if get home and change mind and go through PCP for HH orders    Reassessment (most recent)     Discharge Reassessment - 04/29/22 8027        Discharge Reassessment    Assessment Type Discharge Planning Reassessment     Did the patient's condition or plan change since previous assessment? No     Discharge Plan discussed with: Spouse/sig other;Patient     Communicated ONIEL with patient/caregiver No     Discharge Plan A Home with family     Discharge Plan B Home                  declines

## 2025-06-19 NOTE — PROGRESS NOTES
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     [facility]  Colon and Rectal Surgery    [address] [place] [address]  Tel: [phone]  Fax: [phone]  [email or web address]   MD Payam Reid MD Brian Kann, MD W. Forrest Johnston, MD Matthew Giglia, MD Jennifer Paruch, MD William Kethman, MD Danielle Kay, MD     Patient name: Loki Li Jr.   YOB: 1965   MRN: [id number]    Dear Carissa Goodwin and Khoobehi,    It was a pleasure seeing Mr. Li in the Colon and Rectal Surgery clinic here at [facility].     As you know, Mr. Li is a 60 year old man with a history of HTN, HLD, and DM, Stage IV transverse colon adenocarcinoma who presents for evaluation of colostomy takedown. He underwent a splenic flexure resection, end colostomy and splenectomy with Dr. Waddell > FOLFOX > progression (pancreatic tail - biopsy proven and RPLN PET avidity) > Keytruda since 1/2023. He was seen by Dr. Goodwin in 1/2024 (note reviewed) - his plan was to follow-up the results of his PET CT, decision was made to complete Keytruda and continue surveillance - if recurrence occurs then move forward with resection. He is doing well - fatigue only around the time of Keytruda for about 1 week after, he denies any nausea, vomiting, fevers, or chills. His weight is stable but about 30 lbs  less than originally. He denies fecal incontinence.     PET CT - 1/8/2024  Positive therapy response with resolution of the metastatic retroperitoneal lymph node compared to the prior examination.  No FDG avid foci are present on today's exam.     Last colonoscopy: 2018 (Complete) - repeat in 5 years  Two sessile polyps 5-6 mm in descending colon, polypectomy performed (HP)  Two sessile polyps 5-7 mm in the sigmoid colon, polypectomy performed (HP)  18 diminutive recto sigmoid polyps were ablated    4/19/2024  Here for pre-operative consultation for colostomy takedown and likely distal pancreatectomy. He is doing well - no major changes since the last time I saw him. He is ready for surgery.    Colonoscopy - 3/5/2024  The perianal and digital rectal examinations were normal. Pertinent negatives include normal sphincter tone.   Diffuse mild inflammation characterized by altered vascularity, friability and mucus was found in the rectum, in the sigmoid colon and in the descending colon. No biopsies or other specimens were collected for this exam. Estimated blood loss was minimal.   There was evidence of a widely patent end colostomy in the transverse colon. This was characterized by healthy appearing mucosa.   The exam was otherwise without abnormality.     6/28/2024  Here for follow-up, underwent open colostomy takedown and repair of parastomal hernia on 5/13/2024 complicated by what was felt to be an internal hernia requiring emergent take back, found to have a small anastomotic complication requiring completion colectomy, ileorectal anastomosis and DLI on 5/21/2024. He is doing well - no nausea or vomiting. Some mild pain in bilateral abdomen but ostomy is functioning. He denies any fevers or chills.     5/13/2024 - Pathology  1. Ostomy with peristomal hernia, excision:   Portion of colostomy with no evidence of neoplastic change.   Fibromembranous tissue, consistent with hernia sac.     2. Descending colon,  excision:   Segment of colon with focal necrosis.   Resection margins appear viable.   No dysplasia or malignancy.     5/21/2024 - Pathology  1. TOTAL ABDOMINAL COLON, COLECTOMY:   Abdominal perforation with peritonitis adjacent to an anastomosis   Appendix with reactive changes of mesopappendix   Negative for neoplasia or malignancy     2. SIGMOID COLON, RESECTION:Portion of colon with features of acute peritonitis     3. ANASTOMOTIC DONUTS:   Portion of small bowel with ulceration   A separate portion of colon with acute inflammatory and reactive changes     8/9/2024  Here for follow-up, seen by Dr. Koobehi for Keytruda. He is doing well - still not smoking. He is not having any major issues - CT was reassuring. He does have mild prolapse of his ileostomy.    CT AP - 7/5/2024  The ileo rectal anastomosis appears intact. No extraluminal contrast to suggest anastomotic leak. No bowel obstruction. Visualized loops of small bowel are normal in caliber without wall thickening.     History of colonic adenocarcinoma status post colectomy with ileorectal anastomosis and diverting loop ileostomy as well as splenectomy.     No evidence to suggest local recurrence or distant abdominopelvic metastasis.     Persistent fluid/peritoneal thickening left upper quadrant similar/improved compared to prior.    11/15/2024  Follow-up after ileostomy takedown on 10/7/2024, here for post-operative follow-up. He is doing better - having somewhere between 10-20 bowel movements per day, small volume but it is improving. He is controlled the stools without significant issues. He is taking 10 mg of Imodium twice daily.    Ileostomy, excision:  Small intestine mucosa with underlying benign lymphoid aggregates and submucosal hemorrhage with cautery artifact consistent with area ostomy.    No evidence of dysplasia or malignancy.    1/10/2025  Here for follow-up. He is followed by Dr. Khoobehi - ctDNA negative. He is having about 8-10 times per  day but it is getting some consistency. He is taking Imodium 3-4 times per day before meals - 2 tabs - about 8 total pills per day. The Benefiber makes things worse. He is not taking Lomotil. He is going to do a PET CT in February and he may stop the immunotherapy.    3/14/2025  Here for follow-up. He is followed by Dr. Khoobehi - recent CT reassuring. His bowel function is better - but slow - he is taking about 10 Imodium per day. He has good days and bad days (better than two months ago). Infrequently will have accidents but only at night when sleeping. He is taking the Imodium before meals and before bedtime. Lomotil didn't seem to add much benefit. He is still on immunotherapy every 6 weeks - doesn't notice that this changes his bowel function but will be on the watch.    6/20/2025  Here for follow-up. He feels like the Lomotil has helped his function better - average 4 per day. He is eating well - he is back up to his fighting weight.   He is still being followed by Dr. Khoobehi.     Oncology History   Malignant neoplasm of transverse colon   4/20/2022 Surgery    Partial colectomy, splenectomy, end colostomy - pancreas appeared to be involved at that time    1. Spleen, splenectomy:   - Benign splenic parenchyma   - Negative for malignancy   2. Colon, partial colectomy:   - Invasive colonic adenocarcinoma, poorly differentiated (G3)     - Invades into pericolorectal tissue (pT3)   - Margins uninvolved by invasive carcinoma     - 0.1 cm to the mesenteric margin   - Lymphovascular invasion identified   - No perineural invasion identified   - Seventeen of twenty-seven lymph nodes, positive for metastatic carcinoma   (17/27, pN2b)   - Fibrous serosal adhesions   - See below for results of MSI testing   - CARLOS ENRIQUE/MILLIE Targeted Gene Panel has been ordered and results will be issued in   a supplemental report     CAP Synoptic Checklist for Colonic Neoplasms:     - Procedure: Partial colectomy     - Tumor Site: Splenic  flexure     - Histologic Type: Adenocarcinoma     - Histologic Grade: G3, poorly differentiated     - Tumor Size: 5.0 x 4.5 x 2.7 cm     - Tumor Extent: Invades through muscularis propria into pericolorectal   tissue    - Macroscopic Tumor Perforation: Not identified     - Lymphovascular Invasion: Present, small vessel involved     - Perineural Invasion: Not identified     - Number of Tumor Buds: 5 in one hotspot field     - Tumor Bud Score: Intermediate (5-9)     - Type of Polyp in which Invasive Carcinoma Arose: None identified     - Treatment Effect: No known presurgical therapy     - Margins: All margins negative for invasive carcinoma       - Closest Margin to Invasive Carcinoma: 0.1 cm to mesenteric margin     - Regional Lymph Nodes:       - Number of Lymph Nodes with Tumor: 17       - Number of Lymph Nodes Examined: 27       - Tumor Deposits: Not identified     - Distant Metastasis: Not applicable     - Pathologic Stage Classification: pT3 pN2b     - Additional Findings: Fibrous serosal adhesions; benign spleen     - Special Studies: See below for results of MSI testing; CARLOS ENRIQUE/MILLIE panel has         been ordered and results will be issued in a supplemental report     - Designate Block for Future Studies: [id number]   Immunohistochemistry (IHC) Testing for Mismatch Repair (MMR) Proteins:   MLH1 - Intact nuclear expression   MSH2 - Intact nuclear expression   MSH6 - Intact nuclear expression   PMS2 - Intact nuclear expression      5/5/2022 Initial Diagnosis    Malignant neoplasm of transverse colon     7/12/2022 - 12/14/2022 Chemotherapy    Treatment Summary   Plan Name: OP mFOLFOX 6 Q2W  Treatment Goal: Curative  Status: Inactive  Start Date: 7/12/2022  End Date: 12/14/2022  Provider: Aurash Khoobehi, MD  Chemotherapy: fluorouraciL injection 930 mg, 400 mg/m2 = 930 mg, Intravenous, Clinic/HOD 1 time, 12 of 12 cycles  Administration: 930 mg (7/12/2022), 930 mg (7/26/2022), 930 mg (8/9/2022), 930 mg (8/23/2022),  930 mg (9/6/2022), 930 mg (9/19/2022), 835 mg (10/3/2022), 835 mg (10/17/2022), 825 mg (10/31/2022), 820 mg (11/14/2022), 830 mg (11/28/2022), 825 mg (12/12/2022)  oxaliplatin (ELOXATIN) 85 mg/m2 = 197 mg in dextrose 5 % 539.4 mL chemo infusion, 85 mg/m2 = 197 mg, Intravenous, Clinic/HOD 1 time, 10 of 10 cycles  Dose modification: 68 mg/m2 (original dose 85 mg/m2, Cycle 8, Reason: MD Discretion)  Administration: 197 mg (7/12/2022), 197 mg (7/26/2022), 197 mg (8/9/2022), 197 mg (8/23/2022), 197 mg (9/6/2022), 197 mg (9/19/2022), 178 mg (10/3/2022), 142 mg (10/17/2022), 139 mg (11/14/2022)     2/13/2023 -  Chemotherapy    Treatment Summary   Plan Name: OP PEMBROLIZUMAB 400MG Q6W  Treatment Goal: Curative  Status: Active  Start Date: 2/13/2023  End Date: 11/4/2025 (Planned)  Provider: Aurash Khoobehi, MD  Chemotherapy: [No matching medication found in this treatment plan]     2/26/2024 Cancer Staged    Staging form: Colon and Rectum, AJCC 8th Edition  - Pathologic: Stage IVB (pT3, pN2b, cM1b)     3/6/2025 Imaging Significant Findings    CT CAP  Postoperative changes with no findings suggesting recurrent or metastatic disease. Findings as detailed above including bilateral renal scarring. Prior cholecystectomy. Prior splenectomy.      Metastatic adenocarcinoma to pancreas   1/13/2023 Initial Diagnosis    Metastatic adenocarcinoma to pancreas     2/13/2023 -  Chemotherapy    Treatment Summary   Plan Name: OP PEMBROLIZUMAB 400MG Q6W  Treatment Goal: Curative  Status: Active  Start Date: 2/13/2023  End Date: 11/4/2025 (Planned)  Provider: Aurash Khoobehi, MD  Chemotherapy: [No matching medication found in this treatment plan]         The patient was informed of the availability of a certified  without charge. A certified  was not necessary for this visit.    Review of Systems  See pertinent review of systems above    Past Medical History:   Diagnosis Date    Colon cancer     Digestive  disorder     DM (diabetes mellitus)     Hyperlipidemia     Hypertension     Prediabetes      Past Surgical History:   Procedure Laterality Date    ADENOIDECTOMY  1972    ANASTOMOSIS OF ILEUM TO RECTUM N/A 5/21/2024    Procedure: ANASTOMOSIS, ILEORECTAL;  Surgeon: Farhan Lance MD;  [facility];  Service: Colon and Rectal;  Laterality: N/A;    CHOLECYSTECTOMY  2011    CLOSURE, COLOSTOMY N/A 5/13/2024    Procedure: CLOSURE, COLOSTOMY (eras high/lithotomy);  Surgeon: Farhan Lance MD;  [facility];  Service: Colon and Rectal;  Laterality: N/A;  CONSENTS IN [facility]    CLOSURE, ILEOSTOMY, LOOP N/A 10/7/2024    Procedure: CLOSURE, ILEOSTOMY, LOOP;  Surgeon: Farhan Lance MD;  [facility];  Service: Colon and Rectal;  Laterality: N/A;    COLON SURGERY      COLONOSCOPY      2018    COLONOSCOPY N/A 3/5/2024    Procedure: COLONOSCOPY;  Surgeon: Farhan Lance MD;  Location: [facility] (4TH FLR);  Service: Endoscopy;  Laterality: N/A;  2/27/24-Kethman pt, 1-4wks, port, PEG plus 2 enemas morning of procedure, instr portal-DS  2/28/24- LVM for precall - ERW  pt confirmed procedure. cf    COLOSTOMY N/A 04/25/2022    Procedure: CREATION, COLOSTOMY;  Surgeon: Carlton Waddell MD;  Location: [facility];  Service: General;  Laterality: N/A;    ENDOSCOPIC ULTRASOUND OF UPPER GASTROINTESTINAL TRACT N/A 01/06/2023    Procedure: ULTRASOUND, UPPER GI TRACT, ENDOSCOPIC;  Surgeon: Rinku Orozco III, MD;  Location: [facility] ENDO;  Service: Endoscopy;  Laterality: N/A;    FLEXIBLE SIGMOIDOSCOPY N/A 5/13/2024    Procedure: SIGMOIDOSCOPY, FLEXIBLE;  Surgeon: Farhan Lance MD;  [facility];  Service: Colon and Rectal;  Laterality: N/A;    FLEXIBLE SIGMOIDOSCOPY  5/21/2024    Procedure: SIGMOIDOSCOPY, FLEXIBLE;  Surgeon: Farhan Lance MD;  [facility];  Service: Colon and Rectal;;    FLEXIBLE SIGMOIDOSCOPY N/A 10/7/2024    Procedure:  SIGMOIDOSCOPY, FLEXIBLE;  Surgeon: Farhan Lance MD;  Location: [facility] OR 2ND FLR;  Service: Colon and Rectal;  Laterality: N/A;    ILEOSTOMY N/A 5/21/2024    Procedure: CREATION, ILEOSTOMY, DIVERTING LOOP;  Surgeon: Farhan Lance MD;  Location: [facility] OR 2ND FLR;  Service: Colon and Rectal;  Laterality: N/A;    INSERTION OF TUNNELED CENTRAL VENOUS CATHETER (CVC) WITH SUBCUTANEOUS PORT Right 05/18/2022    Procedure: [id number];  Surgeon: Carlton Waddell MD;  Location: [facility];  Service: General;  Laterality: Right;    INSERTION OF URETERAL CATHETER N/A 5/13/2024    Procedure: INSERTION, CATHETER, URETER, BILATERAL;  Surgeon: Travis Edwards MD;  Location: [facility] OR 2ND FLR;  Service: Urology;  Laterality: N/A;    INSERTION OF URETERAL CATHETER Left 5/21/2024    Procedure: INSERTION, CATHETER, URETER;  Surgeon: Carlton Santos MD;  Location: [facility] OR 2ND FLR;  Service: Urology;  Laterality: Left;    LAPAROTOMY, EXPLORATORY N/A 5/21/2024    Procedure: LAPAROTOMY, EXPLORATORY;  Surgeon: Farhan Lance MD;  Location: [facility] OR 2ND FLR;  Service: Colon and Rectal;  Laterality: N/A;    LYSIS OF ADHESIONS N/A 5/13/2024    Procedure: LYSIS, ADHESIONS;  Surgeon: Farhan Lance MD;  Location: [facility] OR 2ND FLR;  Service: Colon and Rectal;  Laterality: N/A;    MOBILIZATION OF SPLENIC FLEXURE N/A 04/25/2022    Procedure: MOBILIZATION, SPLENIC FLEXURE;  Surgeon: Carlton Waddell MD;  Location: [facility];  Service: General;  Laterality: N/A;    SPLENECTOMY N/A 04/25/2022    Procedure: SPLENECTOMY;  Surgeon: Carlton Waddell MD;  Location: [facility];  Service: General;  Laterality: N/A;    SUBTOTAL COLECTOMY N/A 04/25/2022    Procedure: COLECTOMY, PARTIAL;  Surgeon: Carlton Waddell MD;  Location: [facility];  Service: General;  Laterality: N/A;    TONSILLECTOMY      TOTAL ABDOMINAL COLECTOMY N/A 5/21/2024    Procedure: COLECTOMY, COMPLETION, ABDOMINAL;  Surgeon: Farhan Lance MD;  [facility]  "[facility];  Service: Colon and Rectal;  Laterality: N/A;    TUMOR REMOVAL  10/18/2023    on top of the nose    VASECTOMY       Family History   Problem Relation Name Age of Onset    Heart disease Father Loki senior     Diabetes Father Loki senior     Alcohol abuse Paternal Grandfather Waylon Li     Rectal cancer Other Makenzie         half-sister    Cancer Sister makenzie      Social History     Tobacco Use    Smoking status: Former     Current packs/day: 0.00     Average packs/day: 1 pack/day for 40.0 years (40.0 ttl pk-yrs)     Types: Cigarettes     Start date: 1984     Quit date: 2024     Years since quittin.1     Passive exposure: Current    Smokeless tobacco: Never   Substance Use Topics    Alcohol use: Not Currently    Drug use: Never     Review of patient's allergies indicates:   Allergen Reactions    Penicillins Rash       Current Outpatient Medications on File Prior to Visit   Medication Sig Dispense Refill    atorvastatin (LIPITOR) 20 MG tablet Take 1 tablet (20 mg total) by mouth every evening. 90 tablet 3    diphenoxylate-atropine 2.5-0.025 mg (LOMOTIL) 2.5-0.025 mg per tablet Take 1 tablet by mouth 3 (three) times daily as needed for Diarrhea. 90 tablet 3    enalapril (VASOTEC) 10 MG tablet Take 1 tablet (10 mg total) by mouth once daily. (Patient taking differently: Take 10 mg by mouth every morning.) 90 tablet 3    insulin glargine U-300 conc (TOUJEO MAX U-300 SOLOSTAR) 300 unit/mL (3 mL) insulin pen Inject 48 Units into the skin once daily. (Discard pen 56 days after initial use) (Patient taking differently: Inject 48 Units into the skin every morning. (Discard pen 56 days after initial use)) 2 Pen 11    metFORMIN (GLUCOPHAGE) 1000 MG tablet Take 1 tablet (1,000 mg total) by mouth 2 (two) times daily with meals. 180 tablet 3    pen needle, diabetic (BD ULTRA-FINE MINI PEN NEEDLE) 31 gauge x 3/16" Ndle 1 each by Misc.(Non-Drug; Combo Route) route once daily. 100 each 3    promethazine " (PHENERGAN) 12.5 MG Tab Take 1 tablet (12.5 mg total) by mouth every 4 (four) hours as needed. 25 tablet 1    tadalafiL (CIALIS) 20 MG Tab Take 1 tablet 20-30 minutes before sexual activity as needed 20 tablet 3    traZODone (DESYREL) 50 MG tablet Take 1 tablet (50 mg total) by mouth every evening. 90 tablet 3     No current facility-administered medications on file prior to visit.     Physical Examination  Virtual visit    Assessment and Plan of Care    Thank you again for referring Mr. Li to my care. In summary, Mr. Li is a 60 year old man presenting with Stage IV splenic flexure adenocarcinoma, metastatic to retroperitoneal node and pancreatic tail with excellent response now to Keytruda. He underwent colostomy takedown complicated by anastomotic leak requiring revision and DLI - this has now been taken down. Struggling with bowel function - working on this with him. We discussed treatment options and have provided the following recommendations:    Follow-up with Dr. Khoobehi for ongoing surveillance  Function is ideal  Follow-up with me in 10/2025 for flexible sigmoidoscopy (endoscopic surveillance)    Please do not hesitate to contact me if you have any questions.      Farhan Lance MD, FACS, FASCRS  Department of Colon & Rectal Surgery  [facility]    Each patient to whom he or she provides medical services by telemedicine is:  (1) informed of the relationship between the physician and patient and the respective role of any other health care provider with respect to management of the patient; and (2) notified that he or she may decline to receive medical services by telemedicine and may withdraw from such care at any time.

## 2025-06-20 ENCOUNTER — OFFICE VISIT (OUTPATIENT)
Dept: SURGERY | Facility: CLINIC | Age: 60
End: 2025-06-20
Payer: COMMERCIAL

## 2025-06-20 DIAGNOSIS — C18.5 MALIGNANT NEOPLASM OF SPLENIC FLEXURE: Primary | ICD-10-CM

## 2025-06-23 ENCOUNTER — TELEPHONE (OUTPATIENT)
Dept: HEMATOLOGY/ONCOLOGY | Facility: CLINIC | Age: 60
End: 2025-06-23
Payer: COMMERCIAL

## 2025-06-27 ENCOUNTER — DOCUMENTATION ONLY (OUTPATIENT)
Dept: HEMATOLOGY/ONCOLOGY | Facility: CLINIC | Age: 60
End: 2025-06-27
Payer: COMMERCIAL

## 2025-06-27 ENCOUNTER — LAB VISIT (OUTPATIENT)
Dept: LAB | Facility: HOSPITAL | Age: 60
End: 2025-06-27
Attending: INTERNAL MEDICINE
Payer: COMMERCIAL

## 2025-06-27 ENCOUNTER — TELEPHONE (OUTPATIENT)
Facility: CLINIC | Age: 60
End: 2025-06-27
Payer: COMMERCIAL

## 2025-06-27 DIAGNOSIS — C77.2 METASTASIS TO RETROPERITONEAL LYMPH NODE: ICD-10-CM

## 2025-06-27 DIAGNOSIS — C18.4 MALIGNANT NEOPLASM OF TRANSVERSE COLON: ICD-10-CM

## 2025-06-27 DIAGNOSIS — C78.89 METASTATIC ADENOCARCINOMA TO PANCREAS: ICD-10-CM

## 2025-06-27 DIAGNOSIS — E87.5 HYPERKALEMIA: Primary | ICD-10-CM

## 2025-06-27 DIAGNOSIS — E03.9 ACQUIRED HYPOTHYROIDISM: ICD-10-CM

## 2025-06-27 LAB
ABSOLUTE EOSINOPHIL (SMH): 0.13 K/UL
ABSOLUTE MONOCYTE (SMH): 0.94 K/UL (ref 0.3–1)
ABSOLUTE NEUTROPHIL COUNT (SMH): 4 K/UL (ref 1.8–7.7)
ALBUMIN SERPL-MCNC: 4.2 G/DL (ref 3.5–5.2)
ALP SERPL-CCNC: 72 UNIT/L (ref 40–150)
ALT SERPL-CCNC: 13 UNIT/L (ref 10–44)
ANION GAP (SMH): 7 MMOL/L (ref 8–16)
AST SERPL-CCNC: 22 UNIT/L (ref 11–45)
BASOPHILS # BLD AUTO: 0.08 K/UL
BASOPHILS NFR BLD AUTO: 0.8 %
BILIRUB SERPL-MCNC: 0.4 MG/DL (ref 0.1–1)
BUN SERPL-MCNC: 18 MG/DL (ref 8–23)
CALCIUM SERPL-MCNC: 10.2 MG/DL (ref 8.7–10.5)
CHLORIDE SERPL-SCNC: 105 MMOL/L (ref 95–110)
CO2 SERPL-SCNC: 28 MMOL/L (ref 23–29)
CREAT SERPL-MCNC: 1.2 MG/DL (ref 0.5–1.4)
ERYTHROCYTE [DISTWIDTH] IN BLOOD BY AUTOMATED COUNT: 15 % (ref 11.5–14.5)
GFR SERPLBLD CREATININE-BSD FMLA CKD-EPI: >60 ML/MIN/1.73/M2
GLUCOSE SERPL-MCNC: 116 MG/DL (ref 70–110)
HCT VFR BLD AUTO: 37.6 % (ref 40–54)
HGB BLD-MCNC: 12 GM/DL (ref 14–18)
IMM GRANULOCYTES # BLD AUTO: 0.01 K/UL (ref 0–0.04)
IMM GRANULOCYTES NFR BLD AUTO: 0.1 % (ref 0–0.5)
LYMPHOCYTES # BLD AUTO: 4.57 K/UL (ref 1–4.8)
MCH RBC QN AUTO: 26.8 PG (ref 27–31)
MCHC RBC AUTO-ENTMCNC: 31.9 G/DL (ref 32–36)
MCV RBC AUTO: 84 FL (ref 82–98)
NUCLEATED RBC (/100WBC) (SMH): 0 /100 WBC
PLATELET # BLD AUTO: 382 K/UL (ref 150–450)
PMV BLD AUTO: 9 FL (ref 9.2–12.9)
POTASSIUM SERPL-SCNC: 6.3 MMOL/L (ref 3.5–5.1)
PROT SERPL-MCNC: 7.6 GM/DL (ref 6–8.4)
RBC # BLD AUTO: 4.47 M/UL (ref 4.6–6.2)
RELATIVE EOSINOPHIL (SMH): 1.3 % (ref 0–8)
RELATIVE LYMPHOCYTE (SMH): 47 % (ref 18–48)
RELATIVE MONOCYTE (SMH): 9.7 % (ref 4–15)
RELATIVE NEUTROPHIL (SMH): 41.1 % (ref 38–73)
SODIUM SERPL-SCNC: 140 MMOL/L (ref 136–145)
TSH SERPL-ACNC: 1.44 UIU/ML (ref 0.4–4)
TSH SERPL-ACNC: 1.44 UIU/ML (ref 0.4–4)
WBC # BLD AUTO: 9.72 K/UL (ref 3.9–12.7)

## 2025-06-27 PROCEDURE — 84443 ASSAY THYROID STIM HORMONE: CPT

## 2025-06-27 PROCEDURE — 36415 COLL VENOUS BLD VENIPUNCTURE: CPT

## 2025-06-27 PROCEDURE — 85025 COMPLETE CBC W/AUTO DIFF WBC: CPT

## 2025-06-27 PROCEDURE — 80053 COMPREHEN METABOLIC PANEL: CPT

## 2025-06-27 NOTE — TELEPHONE ENCOUNTER
Dr. Khoobehi ordered CTDNA testing through "[employer] for recurrence monitoring for patient. Writer met with patient and educated them on the purpose of CTDNA monitoring and schedule for CTDNA monitoring. Patient educated that [employer] is a highly sensitive and personalized molecular residual disease assay (MRD) using circulating tumor DNA (ctDNA), custom designed for each patient to help identify relapse earlier than standard of care tools (per [employer]'s website).    Patient educated that blood would be drawn every 3 months, or sooner as ordered by the provider, at the [facility] and a staff nurse from the office will call with results when results have been received from the lab and reviewed by the provider.     Patient educated that first test will take approximately 3-4 weeks to result, and then subsequent testing will take up to 1 week but may take longer if a redraw is needed. Patient made aware that if a positive result is received, that at that time the provider may request to have additional testing ordered including but not limited to additional blood testing and imaging studies.     Patient also notified that a staff nurse will call with a reminder when due for their next CTDNA blood draw. Patient instructed that they will need to stop by the office prior to each lab draw to allow the staff nurses to fill out the test tubes and ensure forms are signed.     Patient verbalized understanding of above. All questions answered to the best of writer's ability. Patient agreeable to CTDNA recurrence monitoring.     Patient agreeable to get blood drawn on 7/1/2025 when he comes in for keytruda.

## 2025-06-30 ENCOUNTER — OFFICE VISIT (OUTPATIENT)
Dept: HEMATOLOGY/ONCOLOGY | Facility: CLINIC | Age: 60
End: 2025-06-30
Payer: COMMERCIAL

## 2025-06-30 VITALS
HEIGHT: 74 IN | TEMPERATURE: 98 F | DIASTOLIC BLOOD PRESSURE: 78 MMHG | OXYGEN SATURATION: 96 % | WEIGHT: 239.44 LBS | RESPIRATION RATE: 18 BRPM | HEART RATE: 75 BPM | SYSTOLIC BLOOD PRESSURE: 156 MMHG | BODY MASS INDEX: 30.73 KG/M2

## 2025-06-30 DIAGNOSIS — C18.4 MALIGNANT NEOPLASM OF TRANSVERSE COLON: ICD-10-CM

## 2025-06-30 DIAGNOSIS — C77.2 METASTASIS TO RETROPERITONEAL LYMPH NODE: ICD-10-CM

## 2025-06-30 DIAGNOSIS — E03.9 ACQUIRED HYPOTHYROIDISM: ICD-10-CM

## 2025-06-30 DIAGNOSIS — E87.5 HYPERKALEMIA: Primary | ICD-10-CM

## 2025-06-30 PROCEDURE — G2211 COMPLEX E/M VISIT ADD ON: HCPCS | Mod: S$GLB,,, | Performed by: INTERNAL MEDICINE

## 2025-06-30 PROCEDURE — 99215 OFFICE O/P EST HI 40 MIN: CPT | Mod: S$GLB,,, | Performed by: INTERNAL MEDICINE

## 2025-06-30 PROCEDURE — 99999 PR PBB SHADOW E&M-EST. PATIENT-LVL IV: CPT | Mod: PBBFAC,,, | Performed by: INTERNAL MEDICINE

## 2025-06-30 RX ORDER — DIPHENHYDRAMINE HYDROCHLORIDE 50 MG/ML
50 INJECTION, SOLUTION INTRAMUSCULAR; INTRAVENOUS ONCE AS NEEDED
Status: CANCELLED | OUTPATIENT
Start: 2025-07-01

## 2025-06-30 RX ORDER — EPINEPHRINE 0.3 MG/.3ML
0.3 INJECTION SUBCUTANEOUS ONCE AS NEEDED
Status: CANCELLED | OUTPATIENT
Start: 2025-07-01

## 2025-06-30 RX ORDER — HEPARIN 100 UNIT/ML
500 SYRINGE INTRAVENOUS
Status: CANCELLED | OUTPATIENT
Start: 2025-07-01

## 2025-06-30 RX ORDER — SODIUM CHLORIDE 0.9 % (FLUSH) 0.9 %
10 SYRINGE (ML) INJECTION
Status: CANCELLED | OUTPATIENT
Start: 2025-07-01

## 2025-06-30 NOTE — PROGRESS NOTES
Service Date:  6/30/25    Chief Complaint:  Colon cancer    Osman Li Jr. is a 60 y.o. 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  This occurred very shortly after completing treatment.    Patient was part of a clinical trial to measure CT DNA.  His CT DNA started to go up while he was on FOLFOX, which prompted the scan, which showed the progression.  Next generation sequencing also showed a high tumor burden.    He is now on Keytruda as his cancer has a very high tumor mutation burden.  So far, Keytruda has been helping    Here for Keytruda.  Doing well.  No new complaints to me.       Review of Systems   Constitutional:  Positive for fatigue. Negative for appetite change and unexpected weight change.   HENT: Negative.  Negative for mouth sores.    Eyes: Negative.  Negative for visual disturbance.   Respiratory: Negative.  Negative for cough and shortness of breath.    Cardiovascular: Negative.  Negative for chest pain.   Gastrointestinal: Negative.  Negative for abdominal pain and diarrhea.   Endocrine: Negative.    Genitourinary: Negative.  Negative for frequency.   Musculoskeletal: Negative.  Negative for back pain.   Integumentary:  Negative for rash. Negative.   Neurological:  Positive for numbness. Negative for headaches.   Hematological: Negative.  Negative for adenopathy.   Psychiatric/Behavioral: Negative.  The patient is not nervous/anxious.         Current Outpatient Medications   Medication Instructions    atorvastatin (LIPITOR) 20 mg, Oral, Nightly    diphenoxylate-atropine 2.5-0.025 mg (LOMOTIL) 2.5-0.025 mg per tablet 1 tablet, Oral, 3 times daily PRN    enalapril (VASOTEC) 10 mg, Oral, Daily    insulin glargine U-300 conc (TOUJEO MAX U-300 SOLOSTAR) 48 Units, Subcutaneous, Daily, (Discard pen 56 days after initial use)    metFORMIN (GLUCOPHAGE) 1,000 mg, Oral, 2 times daily with meals    pen  "needle, diabetic (BD ULTRA-FINE MINI PEN NEEDLE) 31 gauge x 3/16" Ndle 1 each, Misc.(Non-Drug; Combo Route), Daily    promethazine (PHENERGAN) 12.5 mg, Oral, Every 4 hours PRN    tadalafiL (CIALIS) 20 MG Tab Take 1 tablet 20-30 minutes before sexual activity as needed    traZODone (DESYREL) 50 mg, Oral, Nightly        Past Medical History:   Diagnosis Date    Colon cancer     Digestive disorder     DM (diabetes mellitus)     Hyperlipidemia     Hypertension     Prediabetes         Past Surgical History:   Procedure Laterality Date    ADENOIDECTOMY  1972    ANASTOMOSIS OF ILEUM TO RECTUM N/A 5/21/2024    Procedure: ANASTOMOSIS, ILEORECTAL;  Surgeon: Farhan Lance MD;  [facility];  Service: Colon and Rectal;  Laterality: N/A;    CHOLECYSTECTOMY  2011    CLOSURE, COLOSTOMY N/A 5/13/2024    Procedure: CLOSURE, COLOSTOMY (eras high/lithotomy);  Surgeon: Farhan Lance MD;  [facility];  Service: Colon and Rectal;  Laterality: N/A;  CONSENTS IN [facility]    CLOSURE, ILEOSTOMY, LOOP N/A 10/7/2024    Procedure: CLOSURE, ILEOSTOMY, LOOP;  Surgeon: Farhan Lance MD;  [facility];  Service: Colon and Rectal;  Laterality: N/A;    COLON SURGERY      COLONOSCOPY      2018    COLONOSCOPY N/A 3/5/2024    Procedure: COLONOSCOPY;  Surgeon: Farhan Lance MD;  Location: [facility] (4TH FLR);  Service: Endoscopy;  Laterality: N/A;  2/27/24-Kethman pt, 1-4wks, port, PEG plus 2 enemas morning of procedure, instr portal-DS  2/28/24- LVM for precall - ERW  pt confirmed procedure. cf    COLOSTOMY N/A 04/25/2022    Procedure: CREATION, COLOSTOMY;  Surgeon: Carlton Waddell MD;  Location: [facility];  Service: General;  Laterality: N/A;    ENDOSCOPIC ULTRASOUND OF UPPER GASTROINTESTINAL TRACT N/A 01/06/2023    Procedure: ULTRASOUND, UPPER GI TRACT, ENDOSCOPIC;  Surgeon: Rinku Orozco III, MD;  Location: [facility];  Service: Endoscopy;  Laterality: N/A;    FLEXIBLE SIGMOIDOSCOPY " N/A 5/13/2024    Procedure: SIGMOIDOSCOPY, FLEXIBLE;  Surgeon: Farhan Lance MD;  Location: [facility] OR 2ND FLR;  Service: Colon and Rectal;  Laterality: N/A;    FLEXIBLE SIGMOIDOSCOPY  5/21/2024    Procedure: SIGMOIDOSCOPY, FLEXIBLE;  Surgeon: Farhan Lance MD;  Location: [facility] OR 2ND FLR;  Service: Colon and Rectal;;    FLEXIBLE SIGMOIDOSCOPY N/A 10/7/2024    Procedure: SIGMOIDOSCOPY, FLEXIBLE;  Surgeon: Farhan Lance MD;  Location: [facility] OR 2ND FLR;  Service: Colon and Rectal;  Laterality: N/A;    ILEOSTOMY N/A 5/21/2024    Procedure: CREATION, ILEOSTOMY, DIVERTING LOOP;  Surgeon: Farhan Lance MD;  Location: [facility] OR 2ND FLR;  Service: Colon and Rectal;  Laterality: N/A;    INSERTION OF TUNNELED CENTRAL VENOUS CATHETER (CVC) WITH SUBCUTANEOUS PORT Right 05/18/2022    Procedure: [id number];  Surgeon: Carlton Waddell MD;  Location: [facility] OR;  Service: General;  Laterality: Right;    INSERTION OF URETERAL CATHETER N/A 5/13/2024    Procedure: INSERTION, CATHETER, URETER, BILATERAL;  Surgeon: Travis Edwards MD;  Location: [facility] OR 2ND FLR;  Service: Urology;  Laterality: N/A;    INSERTION OF URETERAL CATHETER Left 5/21/2024    Procedure: INSERTION, CATHETER, URETER;  Surgeon: Carlton Santos MD;  Location: [facility] OR 2ND FLR;  Service: Urology;  Laterality: Left;    LAPAROTOMY, EXPLORATORY N/A 5/21/2024    Procedure: LAPAROTOMY, EXPLORATORY;  Surgeon: Farhan Lance MD;  Location: [facility] OR 2ND FLR;  Service: Colon and Rectal;  Laterality: N/A;    LYSIS OF ADHESIONS N/A 5/13/2024    Procedure: LYSIS, ADHESIONS;  Surgeon: Farhan Lance MD;  Location: [facility] OR 2ND FLR;  Service: Colon and Rectal;  Laterality: N/A;    MOBILIZATION OF SPLENIC FLEXURE N/A 04/25/2022    Procedure: MOBILIZATION, SPLENIC FLEXURE;  Surgeon: Carlton Waddell MD;  Location: [facility] OR;  Service: General;  Laterality: N/A;    SPLENECTOMY N/A 04/25/2022    Procedure: SPLENECTOMY;  Surgeon: Carlton Waddell MD;   "Location: [facility] OR;  Service: General;  Laterality: N/A;    SUBTOTAL COLECTOMY N/A 2022    Procedure: COLECTOMY, PARTIAL;  Surgeon: Carlton Waddell MD;  Location: [facility] OR;  Service: General;  Laterality: N/A;    TONSILLECTOMY      TOTAL ABDOMINAL COLECTOMY N/A 2024    Procedure: COLECTOMY, COMPLETION, ABDOMINAL;  Surgeon: Farhan Lance MD;  [facility];  Service: Colon and Rectal;  Laterality: N/A;    TUMOR REMOVAL  10/18/2023    on top of the nose    VASECTOMY          Family History   Problem Relation Name Age of Onset    Heart disease Father Loki senior     Diabetes Father Loki senior     Alcohol abuse Paternal Grandfather Waylon Li     Rectal cancer Other Makenzie         half-sister    Cancer Sister makenzie        Social History     Tobacco Use    Smoking status: Former     Current packs/day: 0.00     Average packs/day: 1 pack/day for 40.0 years (40.0 ttl pk-yrs)     Types: Cigarettes     Start date: 1984     Quit date: 2024     Years since quittin.1     Passive exposure: Current    Smokeless tobacco: Never   Substance Use Topics    Alcohol use: Not Currently    Drug use: Never         Vitals:    25 1037   BP: (!) 156/78   Pulse: 75   Resp: 18   Temp: 97.8 °F (36.6 °C)          Physical Exam:  BP (!) 156/78 (BP Location: Right arm, Patient Position: Sitting)   Pulse 75   Temp 97.8 °F (36.6 °C) (Temporal)   Resp 18   Ht 6' 2" (1.88 m)   Wt 108.6 kg (239 lb 6.7 oz)   SpO2 96%   BMI 30.74 kg/m²     Physical Exam  Vitals and nursing note reviewed.   Constitutional:       Appearance: Normal appearance.   HENT:      Head: Normocephalic and atraumatic.      Nose: Nose normal.      Mouth/Throat:      Mouth: Mucous membranes are moist.      Pharynx: Oropharynx is clear.   Eyes:      Extraocular Movements: Extraocular movements intact.      Conjunctiva/sclera: Conjunctivae normal.   Cardiovascular:      Rate and Rhythm: Normal rate and regular rhythm.      Heart " sounds: Normal heart sounds.   Pulmonary:      Effort: Pulmonary effort is normal.      Breath sounds: Normal breath sounds.   Abdominal:      General: Abdomen is flat. Bowel sounds are normal.      Palpations: Abdomen is soft.      Comments: Ostomy bag in place.   Musculoskeletal:         General: Normal range of motion.      Cervical back: Normal range of motion and neck supple.   Skin:     General: Skin is warm and dry.   Neurological:      General: No focal deficit present.      Mental Status: He is alert and oriented to person, place, and time. Mental status is at baseline.   Psychiatric:         Mood and Affect: Mood normal.          Labs:  Lab Results   Component Value Date    WBC 9.72 06/27/2025    RBC 4.47 (L) 06/27/2025    HGB 12.0 (L) 06/27/2025    HCT 37.6 (L) 06/27/2025    MCV 84 06/27/2025    MCH 26.8 (L) 06/27/2025    MCHC 31.9 (L) 06/27/2025    RDW 15.0 (H) 06/27/2025     06/27/2025    MPV 9.0 (L) 06/27/2025    GRAN 3.3 02/17/2025    GRAN 41.4 02/17/2025    LYMPH 3.7 02/17/2025    LYMPH 46.8 02/17/2025    MONO 0.7 02/17/2025    MONO 9.1 02/17/2025    EOS 0.1 02/17/2025    BASO 0.09 02/17/2025    EOSINOPHIL 1.3 02/17/2025    BASOPHIL 1.1 02/17/2025     Sodium   Date Value Ref Range Status   06/27/2025 140 136 - 145 mmol/L Final     Potassium   Date Value Ref Range Status   06/27/2025 5.4 (H) 3.5 - 5.1 mmol/L Final     Chloride   Date Value Ref Range Status   06/27/2025 105 95 - 110 mmol/L Final     CO2   Date Value Ref Range Status   06/27/2025 28 23 - 29 mmol/L Final     Glucose   Date Value Ref Range Status   06/27/2025 116 (H) 70 - 110 mg/dL Final     BUN   Date Value Ref Range Status   06/27/2025 18 8 - 23 mg/dL Final     Creatinine   Date Value Ref Range Status   06/27/2025 1.2 0.5 - 1.4 mg/dL Final     Calcium   Date Value Ref Range Status   06/27/2025 10.2 8.7 - 10.5 mg/dL Final     Protein Total   Date Value Ref Range Status   06/27/2025 7.6 6.0 - 8.4 gm/dL Final     Albumin   Date  Value Ref Range Status   06/27/2025 4.2 3.5 - 5.2 g/dL Final     Bilirubin Total   Date Value Ref Range Status   06/27/2025 0.4 0.1 - 1.0 mg/dL Final     Comment:     For infants and newborns, interpretation of results should be based   on gestational age, weight and in agreement with clinical   observations.    Premature Infant recommended reference ranges:   0-24 hours:  <8.0 mg/dL   24-48 hours: <12.0 mg/dL   3-5 days:    <15.0 mg/dL   6-29 days:   <15.0 mg/dL     ALP   Date Value Ref Range Status   06/27/2025 72 40 - 150 unit/L Final     AST   Date Value Ref Range Status   06/27/2025 22 11 - 45 unit/L Final     ALT   Date Value Ref Range Status   06/27/2025 13 10 - 44 unit/L Final     Anion Gap   Date Value Ref Range Status   06/27/2025 7 (L) 8 - 16 mmol/L Final     eGFR if    Date Value Ref Range Status   07/25/2022 >60 >60 mL/min/1.73 m^2 Final     eGFR if non    Date Value Ref Range Status   07/25/2022 >60 >60 mL/min/1.73 m^2 Final     Comment:     Calculation used to obtain the estimated glomerular filtration  rate (eGFR) is the CKD-EPI equation.          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    -Testing for ctDNA negative again on repeat 12/2024!. Repeat scans show no evidence of dz  -ok for Keytruda. Repeat ctDNA test next month  -Return to clinic in 6 weeks for next.    Hyperkalemia  -may be due to the enalipril  -stable     HTN  - on Enalapril  - follow up with PCP    HLP  - follow up with PCP    DM2  - on Metformin  - follow up with PCP    Tobacco use  -  counseled on cessation      Aurash Khoobehi, MD  Hematology and Oncology    Visit today included increased complexity associated with the care of the episodic problem colon cancer addressed and managing the longitudinal care of the patient due to the serious and/or complex managed problem(s).      6/30/25  Hyperkalemia - recheck labs 5.4.  Patient has potassium baseline of  5.2 -5.5.  No need for intervention close monitoring  Patient tolerating immunotherapy very well.  Proceed forward with immunotherapy RTC prior to next cycle of therapy and with Dr. Khoobehi

## 2025-07-01 ENCOUNTER — INFUSION (OUTPATIENT)
Dept: INFUSION THERAPY | Facility: HOSPITAL | Age: 60
End: 2025-07-01
Attending: INTERNAL MEDICINE
Payer: COMMERCIAL

## 2025-07-01 VITALS
SYSTOLIC BLOOD PRESSURE: 157 MMHG | OXYGEN SATURATION: 97 % | BODY MASS INDEX: 30.85 KG/M2 | DIASTOLIC BLOOD PRESSURE: 90 MMHG | HEART RATE: 73 BPM | RESPIRATION RATE: 18 BRPM | TEMPERATURE: 98 F | WEIGHT: 240.38 LBS | HEIGHT: 74 IN

## 2025-07-01 DIAGNOSIS — C18.4 MALIGNANT NEOPLASM OF TRANSVERSE COLON: Primary | ICD-10-CM

## 2025-07-01 DIAGNOSIS — C78.89 METASTATIC ADENOCARCINOMA TO PANCREAS: ICD-10-CM

## 2025-07-01 PROCEDURE — 25000003 PHARM REV CODE 250: Performed by: INTERNAL MEDICINE

## 2025-07-01 PROCEDURE — 96413 CHEMO IV INFUSION 1 HR: CPT

## 2025-07-01 PROCEDURE — 63600175 PHARM REV CODE 636 W HCPCS: Performed by: INTERNAL MEDICINE

## 2025-07-01 RX ORDER — EPINEPHRINE 0.3 MG/.3ML
0.3 INJECTION SUBCUTANEOUS ONCE AS NEEDED
Status: DISCONTINUED | OUTPATIENT
Start: 2025-07-01 | End: 2025-07-01 | Stop reason: HOSPADM

## 2025-07-01 RX ORDER — HEPARIN 100 UNIT/ML
500 SYRINGE INTRAVENOUS
Status: DISCONTINUED | OUTPATIENT
Start: 2025-07-01 | End: 2025-07-01 | Stop reason: HOSPADM

## 2025-07-01 RX ORDER — SODIUM CHLORIDE 0.9 % (FLUSH) 0.9 %
10 SYRINGE (ML) INJECTION
Status: DISCONTINUED | OUTPATIENT
Start: 2025-07-01 | End: 2025-07-01 | Stop reason: HOSPADM

## 2025-07-01 RX ORDER — DIPHENHYDRAMINE HYDROCHLORIDE 50 MG/ML
50 INJECTION, SOLUTION INTRAMUSCULAR; INTRAVENOUS ONCE AS NEEDED
Status: DISCONTINUED | OUTPATIENT
Start: 2025-07-01 | End: 2025-07-01 | Stop reason: HOSPADM

## 2025-07-01 RX ADMIN — HEPARIN 500 UNITS: 100 SYRINGE at 08:07

## 2025-07-01 RX ADMIN — SODIUM CHLORIDE 400 MG: 9 INJECTION, SOLUTION INTRAVENOUS at 08:07

## 2025-07-01 NOTE — PLAN OF CARE
Problem: Fatigue  Goal: Improved Activity Tolerance  Outcome: Progressing  Intervention: Promote Improved Energy  Flowsheets (Taken 7/1/2025 5155)  Fatigue Management: paced activity encouraged  Sleep/Rest Enhancement: regular sleep/rest pattern promoted  Activity Management: Ambulated -L4  Environmental Support: environmental consistency promoted

## 2025-07-03 ENCOUNTER — PATIENT MESSAGE (OUTPATIENT)
Dept: FAMILY MEDICINE | Facility: CLINIC | Age: 60
End: 2025-07-03
Payer: COMMERCIAL

## 2025-07-23 ENCOUNTER — TELEPHONE (OUTPATIENT)
Facility: CLINIC | Age: 60
End: 2025-07-23
Payer: COMMERCIAL

## 2025-07-23 NOTE — TELEPHONE ENCOUNTER
Dr. Khoobehi reviewed patient's CTDNA results and per their verbal order, attempted to notify patient that results were Negative. No answer at number listed. Voicemail left with instructions to call back.

## 2025-08-08 ENCOUNTER — LAB VISIT (OUTPATIENT)
Dept: LAB | Facility: HOSPITAL | Age: 60
End: 2025-08-08
Attending: NURSE PRACTITIONER
Payer: COMMERCIAL

## 2025-08-08 DIAGNOSIS — E78.5 DM TYPE 2 WITH DIABETIC DYSLIPIDEMIA: ICD-10-CM

## 2025-08-08 DIAGNOSIS — F51.01 PRIMARY INSOMNIA: ICD-10-CM

## 2025-08-08 DIAGNOSIS — C18.4 MALIGNANT NEOPLASM OF TRANSVERSE COLON: ICD-10-CM

## 2025-08-08 DIAGNOSIS — E03.9 ACQUIRED HYPOTHYROIDISM: ICD-10-CM

## 2025-08-08 DIAGNOSIS — E78.2 MIXED HYPERLIPIDEMIA: ICD-10-CM

## 2025-08-08 DIAGNOSIS — E87.5 HYPERKALEMIA: ICD-10-CM

## 2025-08-08 DIAGNOSIS — E11.69 DM TYPE 2 WITH DIABETIC DYSLIPIDEMIA: ICD-10-CM

## 2025-08-08 DIAGNOSIS — C77.2 METASTASIS TO RETROPERITONEAL LYMPH NODE: ICD-10-CM

## 2025-08-08 LAB
ABSOLUTE EOSINOPHIL (SMH): 0.12 K/UL
ABSOLUTE MONOCYTE (SMH): 1.21 K/UL (ref 0.3–1)
ABSOLUTE NEUTROPHIL COUNT (SMH): 5.1 K/UL (ref 1.8–7.7)
ALBUMIN SERPL-MCNC: 4.1 G/DL (ref 3.5–5.2)
ALP SERPL-CCNC: 67 UNIT/L (ref 40–150)
ALT SERPL-CCNC: 12 UNIT/L (ref 10–44)
ANION GAP (SMH): 9 MMOL/L (ref 8–16)
AST SERPL-CCNC: 21 UNIT/L (ref 11–45)
BASOPHILS # BLD AUTO: 0.09 K/UL
BASOPHILS NFR BLD AUTO: 0.7 %
BILIRUB SERPL-MCNC: 0.7 MG/DL (ref 0.1–1)
BUN SERPL-MCNC: 20 MG/DL (ref 8–23)
CALCIUM SERPL-MCNC: 10 MG/DL (ref 8.7–10.5)
CHLORIDE SERPL-SCNC: 103 MMOL/L (ref 95–110)
CO2 SERPL-SCNC: 28 MMOL/L (ref 23–29)
CREAT SERPL-MCNC: 1.1 MG/DL (ref 0.5–1.4)
ERYTHROCYTE [DISTWIDTH] IN BLOOD BY AUTOMATED COUNT: 14.5 % (ref 11.5–14.5)
GFR SERPLBLD CREATININE-BSD FMLA CKD-EPI: >60 ML/MIN/1.73/M2
GLUCOSE SERPL-MCNC: 78 MG/DL (ref 70–110)
HCT VFR BLD AUTO: 37.4 % (ref 40–54)
HGB BLD-MCNC: 11.9 GM/DL (ref 14–18)
IMM GRANULOCYTES # BLD AUTO: 0.03 K/UL (ref 0–0.04)
IMM GRANULOCYTES NFR BLD AUTO: 0.2 % (ref 0–0.5)
LYMPHOCYTES # BLD AUTO: 5.84 K/UL (ref 1–4.8)
MCH RBC QN AUTO: 26.3 PG (ref 27–31)
MCHC RBC AUTO-ENTMCNC: 31.8 G/DL (ref 32–36)
MCV RBC AUTO: 83 FL (ref 82–98)
NUCLEATED RBC (/100WBC) (SMH): 0 /100 WBC
PLATELET # BLD AUTO: 426 K/UL (ref 150–450)
PMV BLD AUTO: 9.2 FL (ref 9.2–12.9)
POTASSIUM SERPL-SCNC: 5 MMOL/L (ref 3.5–5.1)
PROT SERPL-MCNC: 7.3 GM/DL (ref 6–8.4)
RBC # BLD AUTO: 4.53 M/UL (ref 4.6–6.2)
RELATIVE EOSINOPHIL (SMH): 1 % (ref 0–8)
RELATIVE LYMPHOCYTE (SMH): 47 % (ref 18–48)
RELATIVE MONOCYTE (SMH): 9.7 % (ref 4–15)
RELATIVE NEUTROPHIL (SMH): 41.4 % (ref 38–73)
SODIUM SERPL-SCNC: 140 MMOL/L (ref 136–145)
T4 FREE SERPL-MCNC: 1.01 NG/DL (ref 0.71–1.51)
TSH SERPL-ACNC: 1.77 UIU/ML (ref 0.4–4)
WBC # BLD AUTO: 12.42 K/UL (ref 3.9–12.7)

## 2025-08-08 PROCEDURE — 84443 ASSAY THYROID STIM HORMONE: CPT

## 2025-08-08 PROCEDURE — 85025 COMPLETE CBC W/AUTO DIFF WBC: CPT

## 2025-08-08 PROCEDURE — 36415 COLL VENOUS BLD VENIPUNCTURE: CPT

## 2025-08-08 PROCEDURE — 80053 COMPREHEN METABOLIC PANEL: CPT

## 2025-08-08 RX ORDER — ATORVASTATIN CALCIUM 20 MG/1
20 TABLET, FILM COATED ORAL NIGHTLY
Qty: 90 TABLET | Refills: 3 | Status: SHIPPED | OUTPATIENT
Start: 2025-08-08

## 2025-08-08 RX ORDER — METFORMIN HYDROCHLORIDE 1000 MG/1
1000 TABLET ORAL 2 TIMES DAILY WITH MEALS
Qty: 180 TABLET | Refills: 3 | Status: SHIPPED | OUTPATIENT
Start: 2025-08-08

## 2025-08-08 RX ORDER — TRAZODONE HYDROCHLORIDE 50 MG/1
50 TABLET ORAL NIGHTLY
Qty: 90 TABLET | Refills: 3 | Status: SHIPPED | OUTPATIENT
Start: 2025-08-08 | End: 2026-08-08

## 2025-08-08 RX ORDER — ENALAPRIL MALEATE 10 MG/1
10 TABLET ORAL DAILY
Qty: 90 TABLET | Refills: 3 | Status: SHIPPED | OUTPATIENT
Start: 2025-08-08 | End: 2026-08-08

## 2025-08-11 ENCOUNTER — OFFICE VISIT (OUTPATIENT)
Dept: HEMATOLOGY/ONCOLOGY | Facility: CLINIC | Age: 60
End: 2025-08-11
Payer: COMMERCIAL

## 2025-08-11 VITALS
BODY MASS INDEX: 31.15 KG/M2 | TEMPERATURE: 100 F | SYSTOLIC BLOOD PRESSURE: 169 MMHG | OXYGEN SATURATION: 97 % | WEIGHT: 242.75 LBS | HEART RATE: 94 BPM | HEIGHT: 74 IN | DIASTOLIC BLOOD PRESSURE: 79 MMHG

## 2025-08-11 DIAGNOSIS — C78.89 METASTATIC ADENOCARCINOMA TO PANCREAS: ICD-10-CM

## 2025-08-11 DIAGNOSIS — E78.00 PURE HYPERCHOLESTEROLEMIA: ICD-10-CM

## 2025-08-11 DIAGNOSIS — C78.7 METASTATIC COLON CANCER TO LIVER: ICD-10-CM

## 2025-08-11 DIAGNOSIS — C77.2 METASTASIS TO RETROPERITONEAL LYMPH NODE: ICD-10-CM

## 2025-08-11 DIAGNOSIS — I10 PRIMARY HYPERTENSION: ICD-10-CM

## 2025-08-11 DIAGNOSIS — C18.4 MALIGNANT NEOPLASM OF TRANSVERSE COLON: Primary | ICD-10-CM

## 2025-08-11 DIAGNOSIS — C18.9 METASTATIC COLON CANCER TO LIVER: ICD-10-CM

## 2025-08-11 DIAGNOSIS — E11.00 TYPE 2 DIABETES MELLITUS WITH HYPEROSMOLARITY WITHOUT COMA, WITHOUT LONG-TERM CURRENT USE OF INSULIN: ICD-10-CM

## 2025-08-11 PROCEDURE — G2211 COMPLEX E/M VISIT ADD ON: HCPCS | Mod: S$GLB,,, | Performed by: INTERNAL MEDICINE

## 2025-08-11 PROCEDURE — 99999 PR PBB SHADOW E&M-EST. PATIENT-LVL III: CPT | Mod: PBBFAC,,, | Performed by: INTERNAL MEDICINE

## 2025-08-11 PROCEDURE — 99215 OFFICE O/P EST HI 40 MIN: CPT | Mod: S$GLB,,, | Performed by: INTERNAL MEDICINE

## 2025-08-11 RX ORDER — HEPARIN 100 UNIT/ML
500 SYRINGE INTRAVENOUS
Status: CANCELLED | OUTPATIENT
Start: 2025-08-12

## 2025-08-11 RX ORDER — SODIUM CHLORIDE 0.9 % (FLUSH) 0.9 %
10 SYRINGE (ML) INJECTION
Status: CANCELLED | OUTPATIENT
Start: 2025-08-12

## 2025-08-11 RX ORDER — EPINEPHRINE 0.3 MG/.3ML
0.3 INJECTION SUBCUTANEOUS ONCE AS NEEDED
Status: CANCELLED | OUTPATIENT
Start: 2025-08-12

## 2025-08-11 RX ORDER — DIPHENHYDRAMINE HYDROCHLORIDE 50 MG/ML
50 INJECTION, SOLUTION INTRAMUSCULAR; INTRAVENOUS ONCE AS NEEDED
Status: CANCELLED | OUTPATIENT
Start: 2025-08-12

## 2025-08-12 ENCOUNTER — INFUSION (OUTPATIENT)
Dept: INFUSION THERAPY | Facility: HOSPITAL | Age: 60
End: 2025-08-12
Attending: INTERNAL MEDICINE
Payer: COMMERCIAL

## 2025-08-12 VITALS
WEIGHT: 243.38 LBS | RESPIRATION RATE: 18 BRPM | TEMPERATURE: 98 F | OXYGEN SATURATION: 97 % | BODY MASS INDEX: 31.24 KG/M2 | DIASTOLIC BLOOD PRESSURE: 82 MMHG | HEART RATE: 73 BPM | SYSTOLIC BLOOD PRESSURE: 152 MMHG | HEIGHT: 74 IN

## 2025-08-12 DIAGNOSIS — C18.4 MALIGNANT NEOPLASM OF TRANSVERSE COLON: Primary | ICD-10-CM

## 2025-08-12 DIAGNOSIS — C78.89 METASTATIC ADENOCARCINOMA TO PANCREAS: ICD-10-CM

## 2025-08-12 PROCEDURE — 63600175 PHARM REV CODE 636 W HCPCS: Performed by: INTERNAL MEDICINE

## 2025-08-12 PROCEDURE — 96413 CHEMO IV INFUSION 1 HR: CPT

## 2025-08-12 PROCEDURE — 25000003 PHARM REV CODE 250: Performed by: INTERNAL MEDICINE

## 2025-08-12 PROCEDURE — A4216 STERILE WATER/SALINE, 10 ML: HCPCS | Performed by: INTERNAL MEDICINE

## 2025-08-12 RX ORDER — HEPARIN 100 UNIT/ML
500 SYRINGE INTRAVENOUS
Status: DISCONTINUED | OUTPATIENT
Start: 2025-08-12 | End: 2025-08-12 | Stop reason: HOSPADM

## 2025-08-12 RX ORDER — SODIUM CHLORIDE 0.9 % (FLUSH) 0.9 %
10 SYRINGE (ML) INJECTION
Status: DISCONTINUED | OUTPATIENT
Start: 2025-08-12 | End: 2025-08-12 | Stop reason: HOSPADM

## 2025-08-12 RX ADMIN — SODIUM CHLORIDE 400 MG: 9 INJECTION, SOLUTION INTRAVENOUS at 08:08

## 2025-08-12 RX ADMIN — Medication 10 ML: at 08:08

## 2025-08-12 RX ADMIN — HEPARIN 500 UNITS: 100 SYRINGE at 08:08

## 2025-08-26 ENCOUNTER — TELEPHONE (OUTPATIENT)
Dept: FAMILY MEDICINE | Facility: CLINIC | Age: 60
End: 2025-08-26
Payer: COMMERCIAL

## 2025-08-26 DIAGNOSIS — Z79.899 ENCOUNTER FOR LONG-TERM (CURRENT) USE OF MEDICATIONS: Primary | ICD-10-CM

## 2025-08-26 DIAGNOSIS — E78.2 MIXED HYPERLIPIDEMIA: ICD-10-CM

## 2025-08-26 DIAGNOSIS — E78.5 DM TYPE 2 WITH DIABETIC DYSLIPIDEMIA: ICD-10-CM

## 2025-08-26 DIAGNOSIS — I10 ESSENTIAL HYPERTENSION: ICD-10-CM

## 2025-08-26 DIAGNOSIS — E11.69 DM TYPE 2 WITH DIABETIC DYSLIPIDEMIA: ICD-10-CM

## (undated) DEVICE — DRAIN CHANNEL ROUND 19FR

## (undated) DEVICE — STAPLER ENDO GIA 60MM MED THCK

## (undated) DEVICE — HEMOSTAT SURGICEL 4X8IN

## (undated) DEVICE — PACK BASIC

## (undated) DEVICE — SUT MONOCRYL 4-0 PS-2

## (undated) DEVICE — TUBING SUCTION STERILE

## (undated) DEVICE — SUT CTD VICRYL 3-0 CR/SH

## (undated) DEVICE — PACK CUSTOM UNIV BASIN SLI

## (undated) DEVICE — SUT 2-0 12-18IN SILK

## (undated) DEVICE — TOWEL OR DISP STRL BLUE 4/PK

## (undated) DEVICE — STRAP OR TABLE 5IN X 72IN

## (undated) DEVICE — PENCIL ROCKER SWITCH 10FT CORD

## (undated) DEVICE — DRAPE INCISE IOBAN 2 23X17IN

## (undated) DEVICE — BLADE SURG #15 CARBON STEEL

## (undated) DEVICE — STAPLER INT PROX TX 60X4.8MM

## (undated) DEVICE — SEALER LIGASURE IMPACT 18CM

## (undated) DEVICE — NDL BOX COUNTER

## (undated) DEVICE — CUTTER PROXIMATE BLUE 75MM

## (undated) DEVICE — DRAPE ABDOMINAL TIBURON 14X11

## (undated) DEVICE — BOVIE SUCTION

## (undated) DEVICE — RELOAD PROXIMATE CUT BLU 100MM

## (undated) DEVICE — DRESSING ADH ISLAND 3.6 X 14

## (undated) DEVICE — SUT CTD VICRYL VIL BR CR/SH

## (undated) DEVICE — APPLICATOR CHLORAPREP ORN 26ML

## (undated) DEVICE — SUT 2-0 SILK 30IN BLK BRAID

## (undated) DEVICE — KIT IRR SUCTION HND PIECE

## (undated) DEVICE — SPONGE COTTON TRAY 4X4IN

## (undated) DEVICE — SUT 3-0 VICRYL / SH (J416)

## (undated) DEVICE — COVER MAYO STND XL 30X57IN

## (undated) DEVICE — BOWL STERILE LARGE 32OZ

## (undated) DEVICE — SOL 9P NACL IRR PIC IL

## (undated) DEVICE — GUIDE WIRE MOTION .035 X 150CM

## (undated) DEVICE — UNDERGLOVES BIOGEL PI SZ 7 LF

## (undated) DEVICE — GOWN POLY REINF BRTH SLV XL

## (undated) DEVICE — GLOVE SURG ULTRA TOUCH 7.5

## (undated) DEVICE — SEE MEDLINE ITEM 157117

## (undated) DEVICE — GLOVE SURGEONS ULTRA TOUCH 6.5

## (undated) DEVICE — SYR 30CC LUER LOCK

## (undated) DEVICE — SYR SALINE PREFILLED FLSH 10ML

## (undated) DEVICE — Device

## (undated) DEVICE — SUT MONOCRYL 3-0 PS-1

## (undated) DEVICE — DRAPE CORETEMP FLD WRM 56X62IN

## (undated) DEVICE — BAG DRAIN ANTI REFLUX 2000ML

## (undated) DEVICE — ELECTRODE BLD EXT 6.50 ST DISP

## (undated) DEVICE — CLIPPER BLADE MOD 4406 (CAREF)

## (undated) DEVICE — ELECTRODE REM PLYHSV RETURN 9

## (undated) DEVICE — SOL NS 1000CC

## (undated) DEVICE — COVER SURG LIGHT HANDLE

## (undated) DEVICE — SUT 1 48IN PDS II VIO MONO

## (undated) DEVICE — TRAY GENERAL SURGERY OMC

## (undated) DEVICE — SUT 3-0 VICRYL SH CR/8 18

## (undated) DEVICE — COVER TRNSDUC CIV-FLX 8.9X91.5

## (undated) DEVICE — UNDERGLOVES BIOGEL PI SIZE 8

## (undated) DEVICE — SOL IRR NACL .9% 3000ML

## (undated) DEVICE — RELOAD PROXIMATE CUT BLUE 75MM

## (undated) DEVICE — SYR 10CC LUER LOCK

## (undated) DEVICE — SUT SILK 3-0 SH 18IN BLACK

## (undated) DEVICE — STAPLER CONTOUR CRV GRN 40MM

## (undated) DEVICE — CATH POLLACK OPEN-END FLEXI-TI

## (undated) DEVICE — TRAY CATH 1-LYR URIMTR 16FR

## (undated) DEVICE — CUTTER PROXIMATE BLUE 100MM

## (undated) DEVICE — CONTAINER SPECIMEN OR STER 4OZ

## (undated) DEVICE — TIP YANKAUERS BULB NO VENT

## (undated) DEVICE — BLADE SURG CARBON STEEL SZ11

## (undated) DEVICE — ELECTRODE BLADE INSULATED 1 IN

## (undated) DEVICE — ADHESIVE DERMABOND ADVANCED

## (undated) DEVICE — SPONGE LAP 18X18 PREWASHED

## (undated) DEVICE — CATH IV INTROCAN 14G X 2.

## (undated) DEVICE — LUBRICANT SURGILUBE 2 OZ

## (undated) DEVICE — SUT VICRYL BR 1 GEN 27 CT-1

## (undated) DEVICE — SUT ETHILON 2-0 PSLX 30IN

## (undated) DEVICE — SUT SILK 2-0 SH 18IN BLACK

## (undated) DEVICE — SET IRR URLGY 2LINE UNIV SPIKE

## (undated) DEVICE — NDL 22GA X1 1/2 REG BEVEL

## (undated) DEVICE — UNDERGLOVE BIOGEL PI SZ 6.5 LF

## (undated) DEVICE — PACK SET UP 190 OMC-NS

## (undated) DEVICE — BLADE 4IN EDGE INSULATED

## (undated) DEVICE — GOWN SMART IMP BREATHABLE XXLG

## (undated) DEVICE — STAPLER GIA HANDLE STD

## (undated) DEVICE — DRAPE C ARM 42 X 120 10/BX

## (undated) DEVICE — SLEEVE SCD EXPRESS KNEE MEDIUM

## (undated) DEVICE — NDL SAFETY 21G X 1 1/2 ECLPSE

## (undated) DEVICE — SUT PROLENE 5-0 36 V-5 V-5

## (undated) DEVICE — SUT CTD VICRYL 2-0 VIL BR

## (undated) DEVICE — LEGGING CLEAR POLY 2/PACK

## (undated) DEVICE — COVER LIGHT HANDLE 80/CA

## (undated) DEVICE — SUT 2-0 VICRYL / SH (J417)

## (undated) DEVICE — SUT 3-0 12-18IN SILK

## (undated) DEVICE — DRAPE LAP TIBURON 77X122IN

## (undated) DEVICE — PAD K-THERMIA 24IN X 60IN

## (undated) DEVICE — STAPLER SKIN PROXIMATE WIDE

## (undated) DEVICE — DRAPE UINDERBUT GRAD PCH

## (undated) DEVICE — SALINE .45% 1000ML VITEK2

## (undated) DEVICE — GLOVE SURG ULTRA TOUCH 7

## (undated) DEVICE — GOWN SURGICAL X-LARGE

## (undated) DEVICE — TRAY SKIN SCRUB WET PREMIUM

## (undated) DEVICE — TUBING SUC UNIV W/CONN 12FT

## (undated) DEVICE — SEALANT TISSEEL FIBRIN 10ML

## (undated) DEVICE — SUT MONOCRYL 4-0 PS-1 UND